# Patient Record
Sex: MALE | Race: WHITE | Employment: OTHER | URBAN - METROPOLITAN AREA
[De-identification: names, ages, dates, MRNs, and addresses within clinical notes are randomized per-mention and may not be internally consistent; named-entity substitution may affect disease eponyms.]

---

## 2017-01-09 ENCOUNTER — TELEPHONE (OUTPATIENT)
Dept: CARDIOLOGY CLINIC | Age: 67
End: 2017-01-09

## 2017-02-23 ENCOUNTER — TELEPHONE (OUTPATIENT)
Dept: CARDIOLOGY CLINIC | Age: 67
End: 2017-02-23

## 2017-03-14 ENCOUNTER — PROCEDURE VISIT (OUTPATIENT)
Dept: CARDIOLOGY CLINIC | Age: 67
End: 2017-03-14

## 2017-03-14 DIAGNOSIS — Z95.0 CARDIAC PACEMAKER IN SITU: Primary | ICD-10-CM

## 2017-03-14 PROCEDURE — 93294 REM INTERROG EVL PM/LDLS PM: CPT | Performed by: INTERNAL MEDICINE

## 2017-03-14 PROCEDURE — 93296 REM INTERROG EVL PM/IDS: CPT | Performed by: INTERNAL MEDICINE

## 2017-05-08 ENCOUNTER — OFFICE VISIT (OUTPATIENT)
Dept: CARDIOLOGY CLINIC | Age: 67
End: 2017-05-08

## 2017-05-08 VITALS
DIASTOLIC BLOOD PRESSURE: 80 MMHG | WEIGHT: 233 LBS | HEIGHT: 71 IN | SYSTOLIC BLOOD PRESSURE: 136 MMHG | BODY MASS INDEX: 32.62 KG/M2 | HEART RATE: 76 BPM

## 2017-05-08 DIAGNOSIS — I48.0 PAF (PAROXYSMAL ATRIAL FIBRILLATION) (HCC): ICD-10-CM

## 2017-05-08 DIAGNOSIS — I25.10 CORONARY ARTERY DISEASE INVOLVING NATIVE CORONARY ARTERY OF NATIVE HEART WITHOUT ANGINA PECTORIS: Primary | ICD-10-CM

## 2017-05-08 PROCEDURE — 99214 OFFICE O/P EST MOD 30 MIN: CPT | Performed by: INTERNAL MEDICINE

## 2017-06-19 ENCOUNTER — TELEPHONE (OUTPATIENT)
Dept: CARDIOLOGY CLINIC | Age: 67
End: 2017-06-19

## 2017-06-19 ENCOUNTER — PROCEDURE VISIT (OUTPATIENT)
Dept: CARDIOLOGY CLINIC | Age: 67
End: 2017-06-19

## 2017-06-19 DIAGNOSIS — Z95.0 CARDIAC PACEMAKER IN SITU: Primary | ICD-10-CM

## 2017-06-19 PROCEDURE — 93296 REM INTERROG EVL PM/IDS: CPT | Performed by: INTERNAL MEDICINE

## 2017-06-19 PROCEDURE — 93294 REM INTERROG EVL PM/LDLS PM: CPT | Performed by: INTERNAL MEDICINE

## 2017-08-15 RX ORDER — METOPROLOL SUCCINATE 25 MG/1
25 TABLET, EXTENDED RELEASE ORAL DAILY
Qty: 90 TABLET | Refills: 3 | Status: SHIPPED | OUTPATIENT
Start: 2017-08-15 | End: 2018-05-04 | Stop reason: SDUPTHER

## 2017-09-25 ENCOUNTER — PROCEDURE VISIT (OUTPATIENT)
Dept: CARDIOLOGY CLINIC | Age: 67
End: 2017-09-25

## 2017-09-25 DIAGNOSIS — Z95.0 CARDIAC PACEMAKER IN SITU: Primary | ICD-10-CM

## 2017-09-25 PROCEDURE — 93296 REM INTERROG EVL PM/IDS: CPT | Performed by: INTERNAL MEDICINE

## 2017-09-25 PROCEDURE — 93294 REM INTERROG EVL PM/LDLS PM: CPT | Performed by: INTERNAL MEDICINE

## 2017-12-01 PROBLEM — S70.01XA HEMATOMA OF HIP, RIGHT, INITIAL ENCOUNTER: Status: ACTIVE | Noted: 2017-12-01

## 2017-12-01 PROBLEM — T14.8XXA HEMATOMA: Status: ACTIVE | Noted: 2017-12-01

## 2018-01-02 ENCOUNTER — PROCEDURE VISIT (OUTPATIENT)
Dept: CARDIOLOGY CLINIC | Age: 68
End: 2018-01-02

## 2018-01-02 DIAGNOSIS — Z95.0 CARDIAC PACEMAKER IN SITU: Primary | ICD-10-CM

## 2018-01-02 PROCEDURE — 93294 REM INTERROG EVL PM/LDLS PM: CPT | Performed by: INTERNAL MEDICINE

## 2018-01-02 PROCEDURE — 93296 REM INTERROG EVL PM/IDS: CPT | Performed by: INTERNAL MEDICINE

## 2018-04-09 ENCOUNTER — PROCEDURE VISIT (OUTPATIENT)
Dept: CARDIOLOGY CLINIC | Age: 68
End: 2018-04-09

## 2018-04-09 DIAGNOSIS — Z95.0 CARDIAC PACEMAKER IN SITU: Primary | ICD-10-CM

## 2018-04-09 PROCEDURE — 93294 REM INTERROG EVL PM/LDLS PM: CPT | Performed by: INTERNAL MEDICINE

## 2018-04-09 PROCEDURE — 93296 REM INTERROG EVL PM/IDS: CPT | Performed by: INTERNAL MEDICINE

## 2018-05-04 ENCOUNTER — OFFICE VISIT (OUTPATIENT)
Dept: CARDIOLOGY CLINIC | Age: 68
End: 2018-05-04

## 2018-05-04 VITALS
SYSTOLIC BLOOD PRESSURE: 134 MMHG | DIASTOLIC BLOOD PRESSURE: 80 MMHG | WEIGHT: 242.8 LBS | HEIGHT: 71 IN | BODY MASS INDEX: 33.99 KG/M2 | HEART RATE: 72 BPM

## 2018-05-04 DIAGNOSIS — I48.0 PAF (PAROXYSMAL ATRIAL FIBRILLATION) (HCC): ICD-10-CM

## 2018-05-04 DIAGNOSIS — I25.10 CORONARY ARTERY DISEASE INVOLVING NATIVE HEART WITHOUT ANGINA PECTORIS, UNSPECIFIED VESSEL OR LESION TYPE: ICD-10-CM

## 2018-05-04 DIAGNOSIS — I10 ESSENTIAL HYPERTENSION: ICD-10-CM

## 2018-05-04 PROCEDURE — G8417 CALC BMI ABV UP PARAM F/U: HCPCS | Performed by: INTERNAL MEDICINE

## 2018-05-04 PROCEDURE — G8598 ASA/ANTIPLAT THER USED: HCPCS | Performed by: INTERNAL MEDICINE

## 2018-05-04 PROCEDURE — 1036F TOBACCO NON-USER: CPT | Performed by: INTERNAL MEDICINE

## 2018-05-04 PROCEDURE — 1123F ACP DISCUSS/DSCN MKR DOCD: CPT | Performed by: INTERNAL MEDICINE

## 2018-05-04 PROCEDURE — G8427 DOCREV CUR MEDS BY ELIG CLIN: HCPCS | Performed by: INTERNAL MEDICINE

## 2018-05-04 PROCEDURE — 99214 OFFICE O/P EST MOD 30 MIN: CPT | Performed by: INTERNAL MEDICINE

## 2018-05-04 PROCEDURE — 4040F PNEUMOC VAC/ADMIN/RCVD: CPT | Performed by: INTERNAL MEDICINE

## 2018-05-04 PROCEDURE — 3017F COLORECTAL CA SCREEN DOC REV: CPT | Performed by: INTERNAL MEDICINE

## 2018-05-04 RX ORDER — AMLODIPINE BESYLATE 5 MG/1
5 TABLET ORAL DAILY
Qty: 90 TABLET | Refills: 3 | Status: SHIPPED | OUTPATIENT
Start: 2018-05-04 | End: 2019-05-24 | Stop reason: SDUPTHER

## 2018-05-04 RX ORDER — METOPROLOL SUCCINATE 25 MG/1
25 TABLET, EXTENDED RELEASE ORAL DAILY
Qty: 90 TABLET | Refills: 3 | Status: SHIPPED | OUTPATIENT
Start: 2018-05-04 | End: 2019-05-24 | Stop reason: SDUPTHER

## 2018-05-04 RX ORDER — SIMVASTATIN 20 MG
10 TABLET ORAL NIGHTLY
Qty: 45 TABLET | Refills: 3 | Status: SHIPPED | OUTPATIENT
Start: 2018-05-04 | End: 2019-05-24 | Stop reason: SDUPTHER

## 2018-05-04 RX ORDER — VALSARTAN 80 MG/1
80 TABLET ORAL DAILY
Qty: 90 TABLET | Refills: 3 | Status: SHIPPED | OUTPATIENT
Start: 2018-05-04 | End: 2018-07-27 | Stop reason: SDUPTHER

## 2018-05-04 RX ORDER — FUROSEMIDE 20 MG/1
20 TABLET ORAL 2 TIMES DAILY
Qty: 180 TABLET | Refills: 3 | Status: SHIPPED | OUTPATIENT
Start: 2018-05-04 | End: 2019-05-21 | Stop reason: SDUPTHER

## 2018-05-21 ENCOUNTER — PROCEDURE VISIT (OUTPATIENT)
Dept: CARDIOLOGY CLINIC | Age: 68
End: 2018-05-21

## 2018-05-21 DIAGNOSIS — I38 VHD (VALVULAR HEART DISEASE): Primary | ICD-10-CM

## 2018-05-21 DIAGNOSIS — I10 ESSENTIAL HYPERTENSION: ICD-10-CM

## 2018-05-21 DIAGNOSIS — I25.10 CORONARY ARTERY DISEASE INVOLVING NATIVE HEART WITHOUT ANGINA PECTORIS, UNSPECIFIED VESSEL OR LESION TYPE: ICD-10-CM

## 2018-05-21 DIAGNOSIS — I48.0 PAF (PAROXYSMAL ATRIAL FIBRILLATION) (HCC): ICD-10-CM

## 2018-05-21 LAB
LV EF: 50 %
LVEF MODALITY: NORMAL

## 2018-05-21 PROCEDURE — 93306 TTE W/DOPPLER COMPLETE: CPT | Performed by: INTERNAL MEDICINE

## 2018-05-22 ENCOUNTER — TELEPHONE (OUTPATIENT)
Dept: CARDIOLOGY CLINIC | Age: 68
End: 2018-05-22

## 2018-07-16 ENCOUNTER — PROCEDURE VISIT (OUTPATIENT)
Dept: CARDIOLOGY CLINIC | Age: 68
End: 2018-07-16

## 2018-07-16 DIAGNOSIS — Z95.0 CARDIAC PACEMAKER IN SITU: Primary | ICD-10-CM

## 2018-07-16 PROCEDURE — 93296 REM INTERROG EVL PM/IDS: CPT | Performed by: INTERNAL MEDICINE

## 2018-07-16 PROCEDURE — 93294 REM INTERROG EVL PM/LDLS PM: CPT | Performed by: INTERNAL MEDICINE

## 2018-07-27 DIAGNOSIS — I10 ESSENTIAL HYPERTENSION: ICD-10-CM

## 2018-07-27 DIAGNOSIS — I48.0 PAF (PAROXYSMAL ATRIAL FIBRILLATION) (HCC): ICD-10-CM

## 2018-07-27 RX ORDER — VALSARTAN 80 MG/1
80 TABLET ORAL DAILY
Qty: 90 TABLET | Refills: 0 | Status: SHIPPED | OUTPATIENT
Start: 2018-07-27 | End: 2018-11-17 | Stop reason: SDUPTHER

## 2018-10-22 ENCOUNTER — PROCEDURE VISIT (OUTPATIENT)
Dept: CARDIOLOGY CLINIC | Age: 68
End: 2018-10-22
Payer: MEDICARE

## 2018-10-22 ENCOUNTER — TELEPHONE (OUTPATIENT)
Dept: CARDIOLOGY CLINIC | Age: 68
End: 2018-10-22

## 2018-10-22 DIAGNOSIS — Z95.0 CARDIAC PACEMAKER IN SITU: Primary | ICD-10-CM

## 2018-10-22 PROCEDURE — 93296 REM INTERROG EVL PM/IDS: CPT | Performed by: INTERNAL MEDICINE

## 2018-10-22 PROCEDURE — 93294 REM INTERROG EVL PM/LDLS PM: CPT | Performed by: INTERNAL MEDICINE

## 2018-11-06 LAB
AVERAGE GLUCOSE: NORMAL
CHOLESTEROL, TOTAL: 118 MG/DL
CHOLESTEROL/HDL RATIO: NORMAL
HBA1C MFR BLD: 7.6 %
HDLC SERPL-MCNC: 41 MG/DL (ref 35–70)
LDL CHOLESTEROL CALCULATED: 44 MG/DL (ref 0–160)
LDL CHOLESTEROL DIRECT: NORMAL MG/DL
TRIGL SERPL-MCNC: 165 MG/DL
VLDLC SERPL CALC-MCNC: 33 MG/DL

## 2018-11-09 ENCOUNTER — TELEPHONE (OUTPATIENT)
Dept: CARDIOLOGY CLINIC | Age: 68
End: 2018-11-09

## 2018-11-17 DIAGNOSIS — I10 ESSENTIAL HYPERTENSION: ICD-10-CM

## 2018-11-17 DIAGNOSIS — I48.0 PAF (PAROXYSMAL ATRIAL FIBRILLATION) (HCC): ICD-10-CM

## 2018-11-19 RX ORDER — VALSARTAN 80 MG/1
80 TABLET ORAL DAILY
Qty: 90 TABLET | Refills: 3 | Status: SHIPPED | OUTPATIENT
Start: 2018-11-19 | End: 2019-05-21 | Stop reason: SDUPTHER

## 2018-12-06 LAB — ANTIBODY: NEGATIVE

## 2019-01-28 ENCOUNTER — TELEPHONE (OUTPATIENT)
Dept: CARDIOLOGY CLINIC | Age: 69
End: 2019-01-28

## 2019-01-29 ENCOUNTER — PROCEDURE VISIT (OUTPATIENT)
Dept: CARDIOLOGY CLINIC | Age: 69
End: 2019-01-29
Payer: MEDICARE

## 2019-01-29 DIAGNOSIS — Z95.0 CARDIAC PACEMAKER IN SITU: Primary | ICD-10-CM

## 2019-01-29 PROCEDURE — 93294 REM INTERROG EVL PM/LDLS PM: CPT | Performed by: INTERNAL MEDICINE

## 2019-01-29 PROCEDURE — 93296 REM INTERROG EVL PM/IDS: CPT | Performed by: INTERNAL MEDICINE

## 2019-04-23 ENCOUNTER — CLINICAL DOCUMENTATION (OUTPATIENT)
Dept: FAMILY MEDICINE CLINIC | Age: 69
End: 2019-04-23

## 2019-04-23 DIAGNOSIS — M10.9 GOUT, UNSPECIFIED CAUSE, UNSPECIFIED CHRONICITY, UNSPECIFIED SITE: ICD-10-CM

## 2019-04-23 DIAGNOSIS — E11.49 OTHER DIABETIC NEUROLOGICAL COMPLICATION ASSOCIATED WITH TYPE 2 DIABETES MELLITUS (HCC): ICD-10-CM

## 2019-04-23 PROBLEM — E11.40 DIABETIC NEUROPATHY ASSOCIATED WITH TYPE 2 DIABETES MELLITUS (HCC): Status: ACTIVE | Noted: 2019-04-23

## 2019-04-23 RX ORDER — TADALAFIL 20 MG/1
20 TABLET ORAL PRN
COMMUNITY
End: 2019-05-14 | Stop reason: SDUPTHER

## 2019-04-23 RX ORDER — CLOTRIMAZOLE AND BETAMETHASONE DIPROPIONATE 10; .64 MG/G; MG/G
CREAM TOPICAL 2 TIMES DAILY
COMMUNITY
End: 2019-05-14 | Stop reason: ALTCHOICE

## 2019-05-06 ENCOUNTER — PROCEDURE VISIT (OUTPATIENT)
Dept: CARDIOLOGY CLINIC | Age: 69
End: 2019-05-06
Payer: MEDICARE

## 2019-05-06 DIAGNOSIS — Z95.0 CARDIAC PACEMAKER IN SITU: Primary | ICD-10-CM

## 2019-05-06 PROCEDURE — 93296 REM INTERROG EVL PM/IDS: CPT | Performed by: INTERNAL MEDICINE

## 2019-05-06 PROCEDURE — 93294 REM INTERROG EVL PM/LDLS PM: CPT | Performed by: INTERNAL MEDICINE

## 2019-05-08 LAB
A/G RATIO: 1.5 (ref 1.1–2.2)
ALBUMIN SERPL-MCNC: 4.4 G/DL (ref 3.4–5)
ALP BLD-CCNC: 85 U/L (ref 40–129)
ALT SERPL-CCNC: 20 U/L (ref 10–40)
ANION GAP SERPL CALCULATED.3IONS-SCNC: 14 MMOL/L (ref 3–16)
AST SERPL-CCNC: 18 U/L (ref 15–37)
BASOPHILS ABSOLUTE: 0 K/UL (ref 0–0.2)
BASOPHILS RELATIVE PERCENT: 0.7 %
BILIRUB SERPL-MCNC: 0.4 MG/DL (ref 0–1)
BUN BLDV-MCNC: 30 MG/DL (ref 7–20)
CALCIUM SERPL-MCNC: 9.3 MG/DL (ref 8.3–10.6)
CHLORIDE BLD-SCNC: 99 MMOL/L (ref 99–110)
CHOLESTEROL, FASTING: 110 MG/DL (ref 0–199)
CO2: 26 MMOL/L (ref 21–32)
CREAT SERPL-MCNC: 1.3 MG/DL (ref 0.8–1.3)
CREATININE URINE: 48.3 MG/DL (ref 39–259)
EOSINOPHILS ABSOLUTE: 0.3 K/UL (ref 0–0.6)
EOSINOPHILS RELATIVE PERCENT: 4.9 %
GFR AFRICAN AMERICAN: >60
GFR NON-AFRICAN AMERICAN: 54
GLOBULIN: 2.9 G/DL
GLUCOSE FASTING: 173 MG/DL (ref 70–99)
HCT VFR BLD CALC: 51.5 % (ref 40.5–52.5)
HDLC SERPL-MCNC: 39 MG/DL (ref 40–60)
HEMOGLOBIN: 16.9 G/DL (ref 13.5–17.5)
LDL CHOLESTEROL CALCULATED: 44 MG/DL
LYMPHOCYTES ABSOLUTE: 1.4 K/UL (ref 1–5.1)
LYMPHOCYTES RELATIVE PERCENT: 25.8 %
MCH RBC QN AUTO: 28 PG (ref 26–34)
MCHC RBC AUTO-ENTMCNC: 32.7 G/DL (ref 31–36)
MCV RBC AUTO: 85.5 FL (ref 80–100)
MICROALBUMIN UR-MCNC: 49.1 MG/DL
MICROALBUMIN/CREAT UR-RTO: 1016.6 MG/G (ref 0–30)
MONOCYTES ABSOLUTE: 0.6 K/UL (ref 0–1.3)
MONOCYTES RELATIVE PERCENT: 10.9 %
NEUTROPHILS ABSOLUTE: 3.1 K/UL (ref 1.7–7.7)
NEUTROPHILS RELATIVE PERCENT: 57.7 %
PDW BLD-RTO: 15.4 % (ref 12.4–15.4)
PLATELET # BLD: 174 K/UL (ref 135–450)
PMV BLD AUTO: 9.6 FL (ref 5–10.5)
POTASSIUM SERPL-SCNC: 4 MMOL/L (ref 3.5–5.1)
RBC # BLD: 6.03 M/UL (ref 4.2–5.9)
SODIUM BLD-SCNC: 139 MMOL/L (ref 136–145)
TOTAL PROTEIN: 7.3 G/DL (ref 6.4–8.2)
TRIGLYCERIDE, FASTING: 137 MG/DL (ref 0–150)
TSH SERPL DL<=0.05 MIU/L-ACNC: 1.26 UIU/ML (ref 0.27–4.2)
URIC ACID, SERUM: 3.7 MG/DL (ref 3.5–7.2)
VLDLC SERPL CALC-MCNC: 27 MG/DL
WBC # BLD: 5.3 K/UL (ref 4–11)

## 2019-05-09 LAB
ESTIMATED AVERAGE GLUCOSE: 188.6 MG/DL
HBA1C MFR BLD: 8.2 %

## 2019-05-14 ENCOUNTER — OFFICE VISIT (OUTPATIENT)
Dept: FAMILY MEDICINE CLINIC | Age: 69
End: 2019-05-14
Payer: MEDICARE

## 2019-05-14 VITALS
SYSTOLIC BLOOD PRESSURE: 134 MMHG | BODY MASS INDEX: 32.23 KG/M2 | HEIGHT: 71 IN | HEART RATE: 68 BPM | WEIGHT: 230.2 LBS | DIASTOLIC BLOOD PRESSURE: 88 MMHG

## 2019-05-14 DIAGNOSIS — E78.2 MIXED HYPERLIPIDEMIA: ICD-10-CM

## 2019-05-14 DIAGNOSIS — Z23 NEED FOR PROPHYLACTIC VACCINATION AGAINST STREPTOCOCCUS PNEUMONIAE (PNEUMOCOCCUS): ICD-10-CM

## 2019-05-14 DIAGNOSIS — I73.9 PVD (PERIPHERAL VASCULAR DISEASE) (HCC): ICD-10-CM

## 2019-05-14 DIAGNOSIS — E11.9 TYPE 2 DIABETES MELLITUS WITHOUT COMPLICATION, WITHOUT LONG-TERM CURRENT USE OF INSULIN (HCC): ICD-10-CM

## 2019-05-14 DIAGNOSIS — G47.30 SLEEP APNEA, UNSPECIFIED TYPE: ICD-10-CM

## 2019-05-14 DIAGNOSIS — N18.30 CHRONIC KIDNEY DISEASE, STAGE III (MODERATE) (HCC): ICD-10-CM

## 2019-05-14 DIAGNOSIS — I10 ESSENTIAL HYPERTENSION: ICD-10-CM

## 2019-05-14 DIAGNOSIS — M10.9 GOUT, UNSPECIFIED CAUSE, UNSPECIFIED CHRONICITY, UNSPECIFIED SITE: Primary | ICD-10-CM

## 2019-05-14 DIAGNOSIS — Z23 NEED FOR PROPHYLACTIC VACCINATION AND INOCULATION AGAINST VARICELLA: ICD-10-CM

## 2019-05-14 DIAGNOSIS — I25.10 CORONARY ARTERY DISEASE INVOLVING NATIVE CORONARY ARTERY OF NATIVE HEART WITHOUT ANGINA PECTORIS: ICD-10-CM

## 2019-05-14 DIAGNOSIS — Z12.11 SCREENING FOR COLON CANCER: ICD-10-CM

## 2019-05-14 DIAGNOSIS — I48.0 PAF (PAROXYSMAL ATRIAL FIBRILLATION) (HCC): ICD-10-CM

## 2019-05-14 PROCEDURE — G8598 ASA/ANTIPLAT THER USED: HCPCS | Performed by: FAMILY MEDICINE

## 2019-05-14 PROCEDURE — 3017F COLORECTAL CA SCREEN DOC REV: CPT | Performed by: FAMILY MEDICINE

## 2019-05-14 PROCEDURE — 3045F PR MOST RECENT HEMOGLOBIN A1C LEVEL 7.0-9.0%: CPT | Performed by: FAMILY MEDICINE

## 2019-05-14 PROCEDURE — G8427 DOCREV CUR MEDS BY ELIG CLIN: HCPCS | Performed by: FAMILY MEDICINE

## 2019-05-14 PROCEDURE — 4040F PNEUMOC VAC/ADMIN/RCVD: CPT | Performed by: FAMILY MEDICINE

## 2019-05-14 PROCEDURE — G8417 CALC BMI ABV UP PARAM F/U: HCPCS | Performed by: FAMILY MEDICINE

## 2019-05-14 PROCEDURE — 1036F TOBACCO NON-USER: CPT | Performed by: FAMILY MEDICINE

## 2019-05-14 PROCEDURE — 1123F ACP DISCUSS/DSCN MKR DOCD: CPT | Performed by: FAMILY MEDICINE

## 2019-05-14 PROCEDURE — 99214 OFFICE O/P EST MOD 30 MIN: CPT | Performed by: FAMILY MEDICINE

## 2019-05-14 PROCEDURE — 2022F DILAT RTA XM EVC RTNOPTHY: CPT | Performed by: FAMILY MEDICINE

## 2019-05-14 RX ORDER — ALLOPURINOL 300 MG/1
300 TABLET ORAL DAILY
Qty: 90 TABLET | Refills: 1 | Status: SHIPPED | OUTPATIENT
Start: 2019-05-14 | End: 2019-09-20 | Stop reason: SDUPTHER

## 2019-05-14 RX ORDER — GLIMEPIRIDE 4 MG/1
4 TABLET ORAL DAILY
Qty: 90 TABLET | Refills: 1 | Status: SHIPPED | OUTPATIENT
Start: 2019-05-14 | End: 2019-08-11 | Stop reason: SDUPTHER

## 2019-05-14 RX ORDER — TADALAFIL 20 MG/1
20 TABLET ORAL PRN
Qty: 18 TABLET | Refills: 1 | Status: SHIPPED | OUTPATIENT
Start: 2019-05-14 | End: 2019-05-24 | Stop reason: SDUPTHER

## 2019-05-14 RX ORDER — ALLOPURINOL 100 MG/1
100 TABLET ORAL DAILY
Qty: 90 TABLET | Refills: 1 | Status: SHIPPED | OUTPATIENT
Start: 2019-05-14 | End: 2019-09-20 | Stop reason: SDUPTHER

## 2019-05-14 RX ORDER — GABAPENTIN 300 MG/1
300 CAPSULE ORAL 3 TIMES DAILY
Qty: 270 CAPSULE | Refills: 0 | Status: SHIPPED | OUTPATIENT
Start: 2019-05-14 | End: 2019-09-11 | Stop reason: SDUPTHER

## 2019-05-14 RX ORDER — ALLOPURINOL 100 MG/1
100 TABLET ORAL DAILY
COMMUNITY
End: 2019-05-14 | Stop reason: SDUPTHER

## 2019-05-14 ASSESSMENT — ENCOUNTER SYMPTOMS
SHORTNESS OF BREATH: 0
SINUS PRESSURE: 0
WHEEZING: 0
SORE THROAT: 0
CHEST TIGHTNESS: 0
VOMITING: 0
NAUSEA: 0
ABDOMINAL PAIN: 0
BACK PAIN: 0
COUGH: 0
SINUS PAIN: 0
DIARRHEA: 0
RHINORRHEA: 0
CONSTIPATION: 0
BLOOD IN STOOL: 0

## 2019-05-14 ASSESSMENT — PATIENT HEALTH QUESTIONNAIRE - PHQ9
SUM OF ALL RESPONSES TO PHQ QUESTIONS 1-9: 0
2. FEELING DOWN, DEPRESSED OR HOPELESS: 0
SUM OF ALL RESPONSES TO PHQ9 QUESTIONS 1 & 2: 0
SUM OF ALL RESPONSES TO PHQ QUESTIONS 1-9: 0
1. LITTLE INTEREST OR PLEASURE IN DOING THINGS: 0

## 2019-05-14 NOTE — PROGRESS NOTES
5/14/2019     Nettie Lyn is a 68 y.o. male who presents today with   Chief Complaint   Patient presents with    Medication Refill     6 MTH CHECK    6 Month Follow-Up     MED REVIEW, REVIEW BW-DISCUSS DECREASE IN ALLOPURINOL    Other     PT HAS HAD CPAP SINCE 2013, NEEDS NEW PAPERWORK FOR THIS BUT DOES NOT HAVE ANY FORMS WITH HIM. NEED OF NECESSITY NOTED IN DOCUMENATION. WILL ALSO HAVE TO HAVE SLEEP EVAL AGAIN PER [facility] HPI    Patient is here for follow-up. He had questions about his allopurinol dose, which was prescribed by rheumatologist at a total of 400 mg daily. His last uric acid was 3.7. Lab profile done this weeks. He has been trying to watch his diet and moderate his alcohol. He needs its face-to-face documentation for re-Donohue of his CPAP supplies and may need a CPAP study ordered. He tolerates his CPAP well. Uses it religiously. He feels much better when he is on it with more restful sleep and improved energy. He's completely compliant with this  Is an appointment this week with his vascular surgeon. Sees podiatry to evaluate his feet through the [place]  He sees cardiology regularly for his coronary artery disease and paroxysmal atrial fibrillation, which is stable. He is on long-term anticoagulation was a relative. He had an episode of cellulitis this winter when he was in [place], which is treated with long-term prescription of antibiotics but is now resolved      REVIEW OF SYMPTOMS    Review of Systems   Constitutional: Negative for appetite change, fatigue and unexpected weight change. HENT: Negative for congestion, hearing loss, postnasal drip, rhinorrhea, sinus pressure, sinus pain and sore throat. Eyes: Negative for visual disturbance. Respiratory: Negative for cough, chest tightness, shortness of breath and wheezing. Cardiovascular: Positive for leg swelling. Negative for chest pain and palpitations.    Gastrointestinal: Negative for abdominal pain, blood in stool, constipation, diarrhea, nausea and vomiting. Endocrine: Negative for polydipsia and polyuria. Genitourinary: Negative for dysuria, flank pain, frequency, hematuria and urgency. Musculoskeletal: Negative for arthralgias, back pain, joint swelling and myalgias. Skin: Negative for rash and wound. Neurological: Negative for dizziness, seizures, speech difficulty, weakness, numbness and headaches. Hematological: Negative for adenopathy. Does not bruise/bleed easily. Psychiatric/Behavioral: Negative for agitation, confusion, decreased concentration, dysphoric mood, sleep disturbance and suicidal ideas. The patient is not nervous/anxious. PAST MEDICAL HISTORY  Past Medical History:   Diagnosis Date    Arthritis 12/2013    rt wrist    Atrial fibrillation ([address]     CAD (coronary artery disease) 6/18/2014    see dr Naima Maharaj Chronic kidney disease, stage III (moderate) ([address] 7/7/2016    Diabetes mellitus ([address]     dx 2004    Diabetic neuropathy associated with type 2 diabetes mellitus ([address] 4/23/2019    Gout     \"got gout when had pacer put in because they did not give me my medication for gout \"    Gout 4/23/2019    H/O 24 hour EKG monitoring 10/3/2013    no afib noted, sinsus rhythm    H/O cardiovascular stress test 5/12/2014    cardiolite- mild ischemia RCA EF50%    H/O transesophageal echocardiography (MABLE) for monitoring 08/05/2013    normal LV function and normal LA appendage without any clot    [place] (hard of hearing)     hearing tonya aides    Hx of Doppler echocardiogram 05/21/2018    EF 50%  Mild LV hypertrophy. Mildly enlarged RA. Mod aortic valve calcification with mod AS. Mitral annular calcification is present. Mild AR, MR and TR. Mild pulmonary htn.     Hyperlipidemia     Hypertension     Other disorders of kidney and ureter     Pneumonia 12/29/2012    Sleep apnea     dx 2013- has c-pap    Type II or unspecified type diabetes mellitus with other specified ALLERGIES  Allergies   Allergen Reactions    Spironolactone        PHYSICAL EXAM    /88 (Site: Left Upper Arm, Position: Sitting, Cuff Size: Medium Adult)   Pulse 68   Ht 5' 11\" (1.803 m)   Wt 230 lb 3.2 oz (104.4 kg)   BMI 32.11 kg/m²     Physical Exam   Constitutional: He is oriented to person, place, and time. He appears well-developed and well-nourished. HENT:   Nose: Nose normal.   Mouth/Throat: Oropharynx is clear and moist.   Eyes: Conjunctivae are normal.   Neck: No thyromegaly present. Cardiovascular: Normal rate, regular rhythm, normal heart sounds and intact distal pulses. No murmur heard. Pulmonary/Chest: Effort normal and breath sounds normal. No respiratory distress. He has no wheezes. He has no rales. Abdominal: Soft. There is no guarding. Musculoskeletal: He exhibits edema. 2+ edema bilaterally   Lymphadenopathy:     He has no cervical adenopathy. Neurological: He is alert and oriented to person, place, and time. Skin: Skin is warm and dry. Psychiatric: He has a normal mood and affect. Nursing note and vitals reviewed. labs were reviewed which included A1c was gone up to 8.4 from a previous value of 7.4. His lipids are in a satisfactory range. His GFR is now above 60. He has a positive microalbumin, but is already on an ARB medication    ASSESSMENT:    Enma Zamorano was seen today for medication refill, 6 month follow-up and other.     Diagnoses and all orders for this visit:    Gout, unspecified cause, unspecified chronicity, unspecified site    Screening for colon cancer    Need for prophylactic vaccination against Streptococcus pneumoniae (pneumococcus)    Need for prophylactic vaccination and inoculation against varicella    Mixed hyperlipidemia    Sleep apnea, unspecified type    Chronic kidney disease, stage III (moderate) (HCC)    PVD (peripheral vascular disease) ([facility]    Coronary artery disease involving native coronary artery of native heart without angina

## 2019-05-16 DIAGNOSIS — G47.30 SLEEP APNEA, UNSPECIFIED TYPE: ICD-10-CM

## 2019-05-16 DIAGNOSIS — G47.33 OBSTRUCTIVE SLEEP APNEA SYNDROME: Primary | ICD-10-CM

## 2019-05-24 ENCOUNTER — OFFICE VISIT (OUTPATIENT)
Dept: CARDIOLOGY CLINIC | Age: 69
End: 2019-05-24
Payer: MEDICARE

## 2019-05-24 ENCOUNTER — TELEPHONE (OUTPATIENT)
Dept: FAMILY MEDICINE CLINIC | Age: 69
End: 2019-05-24

## 2019-05-24 VITALS
HEIGHT: 71 IN | BODY MASS INDEX: 33.07 KG/M2 | SYSTOLIC BLOOD PRESSURE: 120 MMHG | HEART RATE: 72 BPM | DIASTOLIC BLOOD PRESSURE: 80 MMHG | WEIGHT: 236.2 LBS

## 2019-05-24 DIAGNOSIS — I73.9 PVD (PERIPHERAL VASCULAR DISEASE) (HCC): ICD-10-CM

## 2019-05-24 DIAGNOSIS — N18.30 CHRONIC KIDNEY DISEASE, STAGE III (MODERATE) (HCC): ICD-10-CM

## 2019-05-24 DIAGNOSIS — I25.10 CORONARY ARTERY DISEASE INVOLVING NATIVE HEART WITHOUT ANGINA PECTORIS, UNSPECIFIED VESSEL OR LESION TYPE: Primary | ICD-10-CM

## 2019-05-24 DIAGNOSIS — E78.2 MIXED HYPERLIPIDEMIA: ICD-10-CM

## 2019-05-24 DIAGNOSIS — I10 ESSENTIAL HYPERTENSION: ICD-10-CM

## 2019-05-24 DIAGNOSIS — I48.0 PAF (PAROXYSMAL ATRIAL FIBRILLATION) (HCC): ICD-10-CM

## 2019-05-24 DIAGNOSIS — G47.30 SLEEP APNEA, UNSPECIFIED TYPE: ICD-10-CM

## 2019-05-24 PROCEDURE — G8598 ASA/ANTIPLAT THER USED: HCPCS | Performed by: NURSE PRACTITIONER

## 2019-05-24 PROCEDURE — 3017F COLORECTAL CA SCREEN DOC REV: CPT | Performed by: NURSE PRACTITIONER

## 2019-05-24 PROCEDURE — G8427 DOCREV CUR MEDS BY ELIG CLIN: HCPCS | Performed by: NURSE PRACTITIONER

## 2019-05-24 PROCEDURE — 99214 OFFICE O/P EST MOD 30 MIN: CPT | Performed by: NURSE PRACTITIONER

## 2019-05-24 PROCEDURE — 4040F PNEUMOC VAC/ADMIN/RCVD: CPT | Performed by: NURSE PRACTITIONER

## 2019-05-24 PROCEDURE — 1123F ACP DISCUSS/DSCN MKR DOCD: CPT | Performed by: NURSE PRACTITIONER

## 2019-05-24 PROCEDURE — 1036F TOBACCO NON-USER: CPT | Performed by: NURSE PRACTITIONER

## 2019-05-24 PROCEDURE — G8417 CALC BMI ABV UP PARAM F/U: HCPCS | Performed by: NURSE PRACTITIONER

## 2019-05-24 RX ORDER — FUROSEMIDE 20 MG/1
20 TABLET ORAL 2 TIMES DAILY
Qty: 210 TABLET | Refills: 3 | Status: SHIPPED | OUTPATIENT
Start: 2019-05-24 | End: 2020-03-20 | Stop reason: SDUPTHER

## 2019-05-24 RX ORDER — AMLODIPINE BESYLATE 5 MG/1
5 TABLET ORAL DAILY
Qty: 90 TABLET | Refills: 3 | Status: SHIPPED | OUTPATIENT
Start: 2019-05-24 | End: 2020-03-20

## 2019-05-24 RX ORDER — VALSARTAN 80 MG/1
80 TABLET ORAL DAILY
Qty: 90 TABLET | Refills: 3 | Status: SHIPPED | OUTPATIENT
Start: 2019-05-24 | End: 2020-03-20 | Stop reason: SDUPTHER

## 2019-05-24 RX ORDER — SIMVASTATIN 20 MG
10 TABLET ORAL NIGHTLY
Qty: 45 TABLET | Refills: 3 | Status: SHIPPED | OUTPATIENT
Start: 2019-05-24 | End: 2020-03-20 | Stop reason: SDUPTHER

## 2019-05-24 RX ORDER — METOPROLOL SUCCINATE 25 MG/1
25 TABLET, EXTENDED RELEASE ORAL DAILY
Qty: 90 TABLET | Refills: 3 | Status: SHIPPED | OUTPATIENT
Start: 2019-05-24 | End: 2020-03-20

## 2019-05-24 RX ORDER — PRASUGREL 10 MG/1
10 TABLET, FILM COATED ORAL DAILY
Qty: 90 TABLET | Refills: 3 | Status: SHIPPED | OUTPATIENT
Start: 2019-05-24 | End: 2020-06-22

## 2019-05-24 RX ORDER — TADALAFIL 20 MG/1
20 TABLET ORAL PRN
Qty: 18 TABLET | Refills: 1 | Status: SHIPPED | OUTPATIENT
Start: 2019-05-24 | End: 2019-09-09

## 2019-05-24 NOTE — PROGRESS NOTES
[id number] Score for Atrial Fibrillation Stroke Risk   Risk   Factors  Component Value   C CHF No 0   H HTN Yes 1   A2 Age >= 76 No,  (73 y.o.) 0   D DM Yes 1   S2 Prior Stroke/TIA No 0   V Vascular Disease No 0   A Age 74-69 Yes,  (73 y.o.) 1   Sc Sex male 0    [id number]  Score  3   Score last updated 4/83/27 9:62 PM    Click here for a link to the UpToDate guideline \"Atrial Fibrillation: Anticoagulation therapy to prevent embolization    Disclaimer: Risk Score calculation is dependent on accuracy of patient problem list and past encounter diagnosis.

## 2019-05-24 NOTE — TELEPHONE ENCOUNTER
MESSAGE FWD TO DR Mallika Mcdonald TO ADDRESS. PER PT [facility] HE SAID [facility] MEDICAL EQUIPMENT NEEDS DR Mallika Mcdonald TO ORDER SLEEP STUDY.     Electronically signed by Tripp Vaughan MA on 5/24/2019 at 3:00 PM

## 2019-05-24 NOTE — TELEPHONE ENCOUNTER
----- Message from Deborah Lynn sent at 5/24/2019  2:36 PM EDT -----  Contact: Geoffry Shone- [facility] medical equip.   Need  Sleep study ordered to go along with 5/14/19 visit

## 2019-05-24 NOTE — TELEPHONE ENCOUNTER
----- Message from Angelina Bar sent at 5/24/2019  2:36 PM EDT -----  Contact: Bakari Bojorquez- [facility] medical equip.   Need  Sleep study ordered to go along with 5/14/19 visit

## 2019-05-24 NOTE — PROGRESS NOTES
[facility] ([facility] [place] PHYSICAL REHABILITATION [place]  [address], [address]  Phone: [phone]    Fax [phone]                  Virgil Renae MD, Luis Rosas MD, [address], MD, MD Davie Pelayo MD Mela Fairy, MD Julianne Orn, JUSTIN Darby, JUSTIN    CARDIOLOGY  NOTE      6/14/2019    RE: Steph Siddiqui  (1950)                               TO:  Dr. Jeffy Juarez MD  The primary cardiologist is Dr. Jimmie De Luna    CC:  1. Coronary artery disease involving native heart without angina pectoris, unspecified vessel or lesion type    2. PAF (paroxysmal atrial fibrillation) ([facility]    3. Essential hypertension    4. PVD (peripheral vascular disease) ([facility]    5. Sleep apnea, unspecified type    6. Mixed hyperlipidemia    7. Chronic kidney disease, stage III (moderate) (HCC)        Patient denies all of the following:  Chest Pain  Palpitations  Shortness of Breath  Edema  Dizziness  Syncope      HPI: Thank you for involving me in taking care of your patient Steph Siddiqui, who is a  68y.o. year old male with a history as listed above. Denies any cardiac symptoms today. He is complaint with taking his medications. He is active at home.      Vitals:    05/24/19 1337   BP: 120/80   Pulse: 72       Current Outpatient Medications   Medication Sig Dispense Refill    valsartan (DIOVAN) 80 MG tablet Take 1 tablet by mouth daily 90 tablet 3    tadalafil (CIALIS) 20 MG tablet Take 1 tablet by mouth as needed for Erectile Dysfunction 18 tablet 1    simvastatin (ZOCOR) 20 MG tablet Take 0.5 tablets by mouth nightly Patient is taking 1/2 tab daily 45 tablet 3    rivaroxaban (XARELTO) 20 MG TABS tablet Take 1 tablet by mouth daily (with breakfast) 90 tablet 3    prasugrel (EFFIENT) 10 MG TABS Take 1 tablet by mouth daily 90 tablet 3    metoprolol succinate (TOPROL XL) 25 MG extended release tablet Take 1 tablet by mouth daily 90 tablet 3    furosemide (LASIX) 20 MG tablet Take 1 tablet by mouth 2 times daily May take an extra Lasix if has increased swelling 210 tablet 3    amLODIPine (NORVASC) 5 MG tablet Take 1 tablet by mouth daily Patient is taking 1 tab daily 90 tablet 3    allopurinol (ZYLOPRIM) 300 MG tablet Take 1 tablet by mouth daily 90 tablet 1    allopurinol (ZYLOPRIM) 100 MG tablet Take 1 tablet by mouth daily 90 tablet 1    Dulaglutide (TRULICITY) 1.5 KB/6.3HO SOPN Inject 1 pen into the skin once a week 12 pen 1    gabapentin (NEURONTIN) 300 MG capsule Take 1 capsule by mouth 3 times daily for 90 days. 270 capsule 0    glimepiride (AMARYL) 4 MG tablet Take 1 tablet by mouth daily 90 tablet 1    metFORMIN (GLUCOPHAGE) 1000 MG tablet Take 1 tablet by mouth 2 times daily (with meals) 180 tablet 1    canagliflozin (INVOKANA) 300 MG TABS tablet Take 1 tablet by mouth every morning (before breakfast) 90 tablet 1    aspirin 81 MG tablet Take 81 mg by mouth daily       No current facility-administered medications for this visit.       Allergies: Spironolactone  Past Medical History:   Diagnosis Date    Arthritis 12/2013    rt wrist    Atrial fibrillation (HCC)     CAD (coronary artery disease) 6/18/2014    see dr Timbo Rodriguez Chronic kidney disease, stage III (moderate) ([facility] 7/7/2016    Diabetes mellitus ([facility]     dx 2004    Diabetic neuropathy associated with type 2 diabetes mellitus ([facility] 4/23/2019    Gout     \"got gout when had pacer put in because they did not give me my medication for gout \"    Gout 4/23/2019    H/O 24 hour EKG monitoring 10/3/2013    no afib noted, sinsus rhythm    H/O cardiovascular stress test 5/12/2014    cardiolite- mild ischemia RCA EF50%    H/O transesophageal echocardiography (MABLE) for monitoring 08/05/2013    normal LV function and normal LA appendage without any clot    [place] (hard of hearing)     hearing tonya aides    Hx of Doppler echocardiogram 05/21/2018    EF 50%  Mild LV hypertrophy. Mildly enlarged RA. Mod aortic valve calcification with mod AS. Mitral annular calcification is present. Mild AR, MR and TR. Mild pulmonary htn.  Hyperlipidemia     Hypertension     Other disorders of kidney and ureter     Pneumonia 12/29/2012    Sleep apnea     dx 2013- has c-pap    Type II or unspecified type diabetes mellitus with other specified manifestations, uncontrolled 12/12/2012    Venous hypertension, chronic, with ulcer ([address] 12/12/2012    resolved     Past Surgical History:   Procedure Laterality Date    CARDIOVERSION  12/14    at [address]  2014    \"2 stents put in \"   C/ [address]  2004    total left hip    OTHER SURGICAL HISTORY Right 12/02/2017    I&D; evacuation of hematoma right hip    PACEMAKER PLACEMENT      9/18/14 Status post remote permanent pacemaker with atrial lead dislodgement. 7/24/14 PPM Implant    VASCULAR SURGERY  2012    \"have stents in both legs- done in [place]     Family History   Problem Relation Age of Onset    High Blood Pressure Mother     Arthritis Mother     Diabetes Mother     Heart Disease Mother     High Blood Pressure Father     Heart Disease Father     Kidney Disease Father      Social History     Tobacco Use    Smoking status: Never Smoker    Smokeless tobacco: Never Used   Substance Use Topics    Alcohol use:  Yes     Alcohol/week: 1.2 oz     Types: 2 Cans of beer per week     Comment: average \"one time per week\"        Review of Systems - History obtained from the patient  General: negative for - fatigue, malaise, night sweats, weight gain  Psychological: negative for - anxiety, depression, sleep disturbances  Ophthalmic: negative for - blurry vision, loss of vision  ENT: negative for - headaches, vertigo, visual changes  Hematological and Lymphatic: negative for - bleeding problems, blood clots, bruising, fatigue or pallor  Endocrine: negative for - malaise/lethargy, palpitations, unexpected weight changes  Respiratory: negative for - cough, orthopnea, shortness of breath or tachypnea  Cardiovascular: negative for - chest pain, dyspnea on exertion, edema or palpitations  Gastrointestinal: no abdominal pain, change in bowel habits, or black or bloody stools  Genito-Urinary: no dysuria, trouble voiding, or hematuria  Musculoskeletal: negative for - gait disturbance, pain, swelling   Neurological: negative for - confusion, dizziness, impaired coordination/balance or numbness/tingling    Objective:      Physical Exam:  /80   Pulse 72   Ht 5' 11\" (1.803 m)   Wt 236 lb 3.2 oz (107.1 kg)   BMI 32.94 kg/m²   Wt Readings from Last 3 Encounters:   05/24/19 236 lb 3.2 oz (107.1 kg)   05/15/19 237 lb (107.5 kg)   05/14/19 230 lb 3.2 oz (104.4 kg)     Body mass index is 32.94 kg/m². GENERAL - Alert, oriented, pleasant, in no apparent distress. Head unremarkable  Eyes - pupils equal and reactive to light - bilateral conjunctiva are pink: sclera are white   ENT - external ears intact, nose is intact:  tongue is midline pink and moist  Neck - Supple. No jugular venous distention noted. No carotid bruits appreciated. Cardiovascular - Normal S1 and S2:  murmur appreciated No, No gallop. Regular rate- Yes,  rhythm regular-Yes. Extremities - No cyanosis, clubbing, no edema to lower legs. Pulmonary - No respiratory distress. No wheezes or rales. Chest is clear  Pulses: Bilateral radial and pedal pulses normal  Abdomen -  Soft no tenderness, non distended   Musculoskeletal - Normal movement of all extremities   Neurologic - alert and oriented: There are no gross focal neurologic abnormalities.    Skin-  No rash: No echymosis   Affect- normal mood and pleasant       DATA:  Lab Results   Component Value Date    CKTOTAL 116 12/01/2017     BNP:  No results found for: BNP  PT/INR:  No results found for: PTINR  Lab Results   Component Value Date    LABA1C 8.2 Appropriate prescriptions are addressed. Questions answered and patient verbalizes understanding. Call for any problems, questions, or concerns.     Pt is to follow up in 12 months for Cardiac management    Electronically signed by JUSTIN Kee CNP on 6/14/2019 at 2:39 PM

## 2019-06-03 ENCOUNTER — TELEPHONE (OUTPATIENT)
Dept: FAMILY MEDICINE CLINIC | Age: 69
End: 2019-06-03

## 2019-06-03 NOTE — TELEPHONE ENCOUNTER
REP FROM [facility] CALLED TO VERIFY METFORMIN DOSE, ASVISED 500MG 2 TABS BID. VOICED UNDERSTANDING.   Electronically signed by Sergo Slaughter MA on 6/3/2019 at 4:10 PM

## 2019-06-14 ENCOUNTER — HOSPITAL ENCOUNTER (OUTPATIENT)
Dept: SLEEP CENTER | Age: 69
Discharge: HOME | End: 2019-06-14
Payer: MEDICARE

## 2019-06-14 VITALS
SYSTOLIC BLOOD PRESSURE: 125 MMHG | BODY MASS INDEX: 35 KG/M2 | DIASTOLIC BLOOD PRESSURE: 72 MMHG | WEIGHT: 250 LBS | HEART RATE: 62 BPM | OXYGEN SATURATION: 97 % | HEIGHT: 71 IN

## 2019-06-14 DIAGNOSIS — Z99.89 OSA ON CPAP: Primary | ICD-10-CM

## 2019-06-14 DIAGNOSIS — G47.33 OSA ON CPAP: Primary | ICD-10-CM

## 2019-06-14 PROCEDURE — 99211 OFF/OP EST MAY X REQ PHY/QHP: CPT

## 2019-06-14 ASSESSMENT — SLEEP AND FATIGUE QUESTIONNAIRES
HOW LIKELY ARE YOU TO NOD OFF OR FALL ASLEEP WHILE SITTING AND READING: 1
ESS TOTAL SCORE: 9
HOW LIKELY ARE YOU TO NOD OFF OR FALL ASLEEP WHILE LYING DOWN TO REST IN THE AFTERNOON WHEN CIRCUMSTANCES PERMIT: 2
HOW LIKELY ARE YOU TO NOD OFF OR FALL ASLEEP WHILE SITTING QUIETLY AFTER LUNCH WITHOUT ALCOHOL: 0
HOW LIKELY ARE YOU TO NOD OFF OR FALL ASLEEP WHEN YOU ARE A PASSENGER IN A CAR FOR AN HOUR WITHOUT A BREAK: 3
HOW LIKELY ARE YOU TO NOD OFF OR FALL ASLEEP WHILE SITTING INACTIVE IN A PUBLIC PLACE: 2
HOW LIKELY ARE YOU TO NOD OFF OR FALL ASLEEP WHILE SITTING AND TALKING TO SOMEONE: 0
HOW LIKELY ARE YOU TO NOD OFF OR FALL ASLEEP IN A CAR, WHILE STOPPED FOR A FEW MINUTES IN TRAFFIC: 0
HOW LIKELY ARE YOU TO NOD OFF OR FALL ASLEEP WHILE WATCHING TV: 1

## 2019-06-14 NOTE — PROGRESS NOTES
REASON FOR CONSULTATION/CC: DMITRIY on Cpap, needs new machine. CONSULTING PHYSICIAN: Dr Poonam Lopez MD  PCP: Steff Sharma MD    HISTORY OF PRESENT ILLNESS: Darek Hood is a 68y.o. year old male with a history of CAD, DMITRIY on Cpap, who presents with C/O Cpap machine not working well. Has machine since 2013. No SOB. No cough. No f/c. No n/v. No CP. Old studies reviewed. Face to face not available. PAST MEDICAL HISTORY:  Past Medical History:   Diagnosis Date    Arthritis 12/2013    rt wrist    Atrial fibrillation ([address]     CAD (coronary artery disease) 6/18/2014    see dr Patricia Sena Chronic kidney disease, stage III (moderate) ([address] 7/7/2016    Diabetes mellitus ([address]     dx 2004    Diabetic neuropathy associated with type 2 diabetes mellitus ([address] 4/23/2019    Gout     \"got gout when had pacer put in because they did not give me my medication for gout \"    Gout 4/23/2019    H/O 24 hour EKG monitoring 10/3/2013    no afib noted, sinsus rhythm    H/O cardiovascular stress test 5/12/2014    cardiolite- mild ischemia RCA EF50%    H/O transesophageal echocardiography (MABLE) for monitoring 08/05/2013    normal LV function and normal LA appendage without any clot    [place] (hard of hearing)     hearing tonya aides    Hx of Doppler echocardiogram 05/21/2018    EF 50%  Mild LV hypertrophy. Mildly enlarged RA. Mod aortic valve calcification with mod AS. Mitral annular calcification is present. Mild AR, MR and TR. Mild pulmonary htn.     Hyperlipidemia     Hypertension     Other disorders of kidney and ureter     Pneumonia 12/29/2012    Sleep apnea     dx 2013- has c-pap    Type II or unspecified type diabetes mellitus with other specified manifestations, uncontrolled 12/12/2012    Venous hypertension, chronic, with ulcer ([address] 12/12/2012    resolved       PAST SURGICAL HISTORY:  Past Surgical History:   Procedure Laterality Date    CARDIOVERSION  12/14    at [address] [place] [address] ANGIOPLASTY WITH STENT PLACEMENT  2014    \"2 stents put in \"   C/ [address]  2004    total left hip    OTHER SURGICAL HISTORY Right 12/02/2017    I&D; evacuation of hematoma right hip    PACEMAKER PLACEMENT      9/18/14 Status post remote permanent pacemaker with atrial lead dislodgement. 7/24/14 PPM Implant    VASCULAR SURGERY  2012    \"have stents in both legs- done [address]:  family history includes Arthritis in his mother; Diabetes in his mother; Heart Disease in his father and mother; High Blood Pressure in his father and mother; Kidney Disease in his father. SOCIAL HISTORY:   reports that he has never smoked. He has never used smokeless tobacco.    ALLERGIES:  Patient is allergic to spironolactone. REVIEW OF SYSTEMS:  Constitutional: Negative for fever  HENT: Negative for sore throat  Eyes: Negative for redness   Respiratory: Negative for dyspnea, cough  Cardiovascular: Negative for chest pain  Gastrointestinal: Negative for vomiting, diarrhea    Musculoskeletal: Negative for arthralgias   Skin: Negative for rash  Neurological: Negative for syncope        Objective:   PHYSICAL EXAM:  Blood pressure 125/72, pulse 62, height 5' 11\" (1.803 m), weight 250 lb (113.4 kg), SpO2 97 %.'    [place]: Total score: 9    Neck Circumference:  Neck circumference: 18  Inches    BMI:  Body mass index is 34.87 kg/m². Gen: No distress. Eyes: PERRL. No sclera icterus. No conjunctival injection. ENT: No discharge. Pharynx clear. External appearance of ears and nose normal.Mallampati score 4. Neck: Trachea midline. No obvious mass. Resp: No accessory muscle use. No crackles. No wheezes. No rhonchi. No dullness on percussion. CV: Regular rate. Regular rhythm. No murmur or rub. No edema. GI: Non-tender. Non-distended. No hernia. Skin: Warm, dry, normal texture and turgor. No nodule on exposed extremities. Lymph: No cervical LAD. No supraclavicular LAD. M/S: No cyanosis.  No clubbing. No joint deformity. Neuro: Moves all four extremities. CN 2-12 tested, no defect noted. Psych: Oriented x 3. No anxiety. Awake. Alert. Intact judgement and insight. Current Outpatient Medications on File Prior to Encounter   Medication Sig Dispense Refill    valsartan (DIOVAN) 80 MG tablet Take 1 tablet by mouth daily 90 tablet 3    tadalafil (CIALIS) 20 MG tablet Take 1 tablet by mouth as needed for Erectile Dysfunction 18 tablet 1    simvastatin (ZOCOR) 20 MG tablet Take 0.5 tablets by mouth nightly Patient is taking 1/2 tab daily 45 tablet 3    rivaroxaban (XARELTO) 20 MG TABS tablet Take 1 tablet by mouth daily (with breakfast) 90 tablet 3    prasugrel (EFFIENT) 10 MG TABS Take 1 tablet by mouth daily 90 tablet 3    metoprolol succinate (TOPROL XL) 25 MG extended release tablet Take 1 tablet by mouth daily 90 tablet 3    furosemide (LASIX) 20 MG tablet Take 1 tablet by mouth 2 times daily May take an extra Lasix if has increased swelling 210 tablet 3    amLODIPine (NORVASC) 5 MG tablet Take 1 tablet by mouth daily Patient is taking 1 tab daily 90 tablet 3    allopurinol (ZYLOPRIM) 300 MG tablet Take 1 tablet by mouth daily 90 tablet 1    allopurinol (ZYLOPRIM) 100 MG tablet Take 1 tablet by mouth daily 90 tablet 1    Dulaglutide (TRULICITY) 1.5 KL/9.6XO SOPN Inject 1 pen into the skin once a week 12 pen 1    gabapentin (NEURONTIN) 300 MG capsule Take 1 capsule by mouth 3 times daily for 90 days. 270 capsule 0    glimepiride (AMARYL) 4 MG tablet Take 1 tablet by mouth daily 90 tablet 1    metFORMIN (GLUCOPHAGE) 1000 MG tablet Take 1 tablet by mouth 2 times daily (with meals) 180 tablet 1    canagliflozin (INVOKANA) 300 MG TABS tablet Take 1 tablet by mouth every morning (before breakfast) 90 tablet 1    aspirin 81 MG tablet Take 81 mg by mouth daily       No current facility-administered medications on file prior to encounter. Data Reviewed:       Assessment:     1.  DMITRIY on Cpap, needs new machine. Plan:      1. Split night study  2. RTO 1 mth.         c

## 2019-06-21 RX ORDER — GABAPENTIN 300 MG/1
CAPSULE ORAL
Qty: 270 CAPSULE | Refills: 1 | OUTPATIENT
Start: 2019-06-21

## 2019-06-23 ENCOUNTER — HOSPITAL ENCOUNTER (OUTPATIENT)
Dept: SLEEP CENTER | Age: 69
Discharge: HOME | End: 2019-06-23
Payer: MEDICARE

## 2019-06-23 PROCEDURE — 95811 POLYSOM 6/>YRS CPAP 4/> PARM: CPT

## 2019-06-24 NOTE — PROGRESS NOTES
6/24/2019  sleep study  for Salazar Palma  1950 is complete. Results are pending physician review.     Electronically signed by Tj Miles on 6/24/2019 at 5:50 AM

## 2019-06-29 NOTE — PROGRESS NOTES
Results for the most recent sleep study on Mary Most  1950 are finalized and available. Please see media tab.     Electronically signed by Pedro Tom on 6/28/2019 at 10:20 PM

## 2019-08-12 ENCOUNTER — PROCEDURE VISIT (OUTPATIENT)
Dept: CARDIOLOGY CLINIC | Age: 69
End: 2019-08-12
Payer: MEDICARE

## 2019-08-12 DIAGNOSIS — Z95.0 CARDIAC PACEMAKER IN SITU: Primary | ICD-10-CM

## 2019-08-12 PROCEDURE — 93294 REM INTERROG EVL PM/LDLS PM: CPT | Performed by: INTERNAL MEDICINE

## 2019-08-12 PROCEDURE — 93296 REM INTERROG EVL PM/IDS: CPT | Performed by: INTERNAL MEDICINE

## 2019-08-12 RX ORDER — GLIMEPIRIDE 4 MG/1
TABLET ORAL
Qty: 90 TABLET | Refills: 1 | Status: SHIPPED | OUTPATIENT
Start: 2019-08-12 | End: 2019-09-20 | Stop reason: SDUPTHER

## 2019-08-16 ENCOUNTER — HOSPITAL ENCOUNTER (OUTPATIENT)
Dept: SLEEP CENTER | Age: 69
Discharge: HOME | End: 2019-08-16
Payer: MEDICARE

## 2019-08-16 PROCEDURE — 9990000010 HC NO CHARGE VISIT: Performed by: INTERNAL MEDICINE

## 2019-08-16 ASSESSMENT — SLEEP AND FATIGUE QUESTIONNAIRES
HOW LIKELY ARE YOU TO NOD OFF OR FALL ASLEEP IN A CAR, WHILE STOPPED FOR A FEW MINUTES IN TRAFFIC: 0
HOW LIKELY ARE YOU TO NOD OFF OR FALL ASLEEP WHILE SITTING QUIETLY AFTER LUNCH WITHOUT ALCOHOL: 0
HOW LIKELY ARE YOU TO NOD OFF OR FALL ASLEEP WHILE WATCHING TV: 1
ESS TOTAL SCORE: 2
HOW LIKELY ARE YOU TO NOD OFF OR FALL ASLEEP WHILE SITTING AND READING: 0
HOW LIKELY ARE YOU TO NOD OFF OR FALL ASLEEP WHILE SITTING AND TALKING TO SOMEONE: 0
HOW LIKELY ARE YOU TO NOD OFF OR FALL ASLEEP WHEN YOU ARE A PASSENGER IN A CAR FOR AN HOUR WITHOUT A BREAK: 1
HOW LIKELY ARE YOU TO NOD OFF OR FALL ASLEEP WHILE SITTING INACTIVE IN A PUBLIC PLACE: 0
HOW LIKELY ARE YOU TO NOD OFF OR FALL ASLEEP WHILE LYING DOWN TO REST IN THE AFTERNOON WHEN CIRCUMSTANCES PERMIT: 0

## 2019-08-16 NOTE — PROGRESS NOTES
Progress Note    Subjective: The patient received new machine. Sleeping well. Fresh in am.No EDS. No snoring. Download reviewed. AHI 3.0. Shortness of breath none  Chest pain none  Addressing respiratory complaints Patient is negative for  hemoptysis and cyanosis  CONSTITUTIONAL:  negative for fevers and chills      Past Medical History:     has a past medical history of Arthritis, Atrial fibrillation ([address] CAD (coronary artery disease), Chronic kidney disease, stage III (moderate) ([address] Diabetes mellitus ([address] Diabetic neuropathy associated with type 2 diabetes mellitus ([address] Gout, Gout, H/O 24 hour EKG monitoring, H/O cardiovascular stress test, H/O transesophageal echocardiography (MABLE) for monitoring, [place] (hard of hearing), Hx of Doppler echocardiogram, Hyperlipidemia, Hypertension, Other disorders of kidney and ureter, Pneumonia, Sleep apnea, Type II or unspecified type diabetes mellitus with other specified manifestations, uncontrolled, and Venous hypertension, chronic, with ulcer ([address] PAST SURGICAL HISTORY:   has a past surgical history that includes joint replacement (2004); pacemaker placement; Coronary angioplasty with stent (2014); vascular surgery (2012); Colonoscopy (2011); Cardioversion (12/14); and other surgical history (Right, 12/02/2017). SOCIAL HISTORY:   reports that he has never smoked. He has never used smokeless tobacco. He reports that he drinks about 2.0 standard drinks of alcohol per week. He reports that he does not use drugs. Family history:  family history includes Arthritis in his mother; Diabetes in his mother; Heart Disease in his father and mother; High Blood Pressure in his father and mother; Kidney Disease in his father. Objective:   PHYSICAL EXAM:        VITALS:  There were no vitals taken for this visit.     24HR INTAKE/OUTPUT:  No intake or output data in the 24 hours ending 08/16/19 1559    CONSTITUTIONAL:  awake, alert, cooperative, no apparent distress,

## 2019-09-09 ENCOUNTER — OFFICE VISIT (OUTPATIENT)
Dept: FAMILY MEDICINE CLINIC | Age: 69
End: 2019-09-09
Payer: MEDICARE

## 2019-09-09 VITALS
SYSTOLIC BLOOD PRESSURE: 126 MMHG | HEART RATE: 64 BPM | HEIGHT: 71 IN | DIASTOLIC BLOOD PRESSURE: 84 MMHG | WEIGHT: 230.4 LBS | BODY MASS INDEX: 32.26 KG/M2

## 2019-09-09 DIAGNOSIS — S80.10XA TRAUMATIC HEMATOMA OF LOWER LEG, UNSPECIFIED LATERALITY, INITIAL ENCOUNTER: Primary | ICD-10-CM

## 2019-09-09 PROCEDURE — 4040F PNEUMOC VAC/ADMIN/RCVD: CPT | Performed by: FAMILY MEDICINE

## 2019-09-09 PROCEDURE — 3017F COLORECTAL CA SCREEN DOC REV: CPT | Performed by: FAMILY MEDICINE

## 2019-09-09 PROCEDURE — 1123F ACP DISCUSS/DSCN MKR DOCD: CPT | Performed by: FAMILY MEDICINE

## 2019-09-09 PROCEDURE — 1036F TOBACCO NON-USER: CPT | Performed by: FAMILY MEDICINE

## 2019-09-09 PROCEDURE — G8598 ASA/ANTIPLAT THER USED: HCPCS | Performed by: FAMILY MEDICINE

## 2019-09-09 PROCEDURE — 99213 OFFICE O/P EST LOW 20 MIN: CPT | Performed by: FAMILY MEDICINE

## 2019-09-09 PROCEDURE — G8427 DOCREV CUR MEDS BY ELIG CLIN: HCPCS | Performed by: FAMILY MEDICINE

## 2019-09-09 PROCEDURE — G8417 CALC BMI ABV UP PARAM F/U: HCPCS | Performed by: FAMILY MEDICINE

## 2019-09-09 RX ORDER — SULFAMETHOXAZOLE AND TRIMETHOPRIM 400; 80 MG/1; MG/1
1 TABLET ORAL 2 TIMES DAILY
Qty: 20 TABLET | Refills: 0 | Status: SHIPPED | OUTPATIENT
Start: 2019-09-09 | End: 2019-09-19

## 2019-09-12 RX ORDER — GABAPENTIN 300 MG/1
CAPSULE ORAL
Qty: 270 CAPSULE | Refills: 0 | Status: SHIPPED | OUTPATIENT
Start: 2019-09-12 | End: 2019-09-20 | Stop reason: SDUPTHER

## 2019-09-17 DIAGNOSIS — I10 ESSENTIAL HYPERTENSION: ICD-10-CM

## 2019-09-17 DIAGNOSIS — E11.9 TYPE 2 DIABETES MELLITUS WITHOUT COMPLICATION, WITHOUT LONG-TERM CURRENT USE OF INSULIN (HCC): ICD-10-CM

## 2019-09-17 LAB
A/G RATIO: 1.8 (ref 1.1–2.2)
ALBUMIN SERPL-MCNC: 4.3 G/DL (ref 3.4–5)
ALP BLD-CCNC: 85 U/L (ref 40–129)
ALT SERPL-CCNC: 23 U/L (ref 10–40)
ANION GAP SERPL CALCULATED.3IONS-SCNC: 16 MMOL/L (ref 3–16)
AST SERPL-CCNC: 29 U/L (ref 15–37)
BILIRUB SERPL-MCNC: 0.3 MG/DL (ref 0–1)
BUN BLDV-MCNC: 60 MG/DL (ref 7–20)
CALCIUM SERPL-MCNC: 9.2 MG/DL (ref 8.3–10.6)
CHLORIDE BLD-SCNC: 99 MMOL/L (ref 99–110)
CHOLESTEROL, TOTAL: 110 MG/DL (ref 0–199)
CO2: 24 MMOL/L (ref 21–32)
CREAT SERPL-MCNC: 2.6 MG/DL (ref 0.8–1.3)
GFR AFRICAN AMERICAN: 30
GFR NON-AFRICAN AMERICAN: 24
GLOBULIN: 2.4 G/DL
GLUCOSE BLD-MCNC: 175 MG/DL (ref 70–99)
HDLC SERPL-MCNC: 37 MG/DL (ref 40–60)
LDL CHOLESTEROL CALCULATED: 47 MG/DL
POTASSIUM SERPL-SCNC: 5 MMOL/L (ref 3.5–5.1)
SODIUM BLD-SCNC: 139 MMOL/L (ref 136–145)
TOTAL PROTEIN: 6.7 G/DL (ref 6.4–8.2)
TRIGL SERPL-MCNC: 130 MG/DL (ref 0–150)
VLDLC SERPL CALC-MCNC: 26 MG/DL

## 2019-09-18 ENCOUNTER — TELEPHONE (OUTPATIENT)
Dept: FAMILY MEDICINE CLINIC | Age: 69
End: 2019-09-18

## 2019-09-18 LAB
ESTIMATED AVERAGE GLUCOSE: 159.9 MG/DL
HBA1C MFR BLD: 7.2 %

## 2019-09-20 ENCOUNTER — OFFICE VISIT (OUTPATIENT)
Dept: FAMILY MEDICINE CLINIC | Age: 69
End: 2019-09-20
Payer: MEDICARE

## 2019-09-20 VITALS
HEIGHT: 71 IN | SYSTOLIC BLOOD PRESSURE: 124 MMHG | BODY MASS INDEX: 32.53 KG/M2 | WEIGHT: 232.4 LBS | HEART RATE: 61 BPM | OXYGEN SATURATION: 98 % | DIASTOLIC BLOOD PRESSURE: 74 MMHG

## 2019-09-20 DIAGNOSIS — D12.5 ADENOMATOUS POLYP OF SIGMOID COLON: Chronic | ICD-10-CM

## 2019-09-20 DIAGNOSIS — N18.30 CKD (CHRONIC KIDNEY DISEASE) STAGE 3, GFR 30-59 ML/MIN (HCC): ICD-10-CM

## 2019-09-20 DIAGNOSIS — T14.8XXA HEMATOMA: ICD-10-CM

## 2019-09-20 DIAGNOSIS — Z23 NEEDS FLU SHOT: Primary | ICD-10-CM

## 2019-09-20 DIAGNOSIS — M10.9 GOUT, UNSPECIFIED CAUSE, UNSPECIFIED CHRONICITY, UNSPECIFIED SITE: ICD-10-CM

## 2019-09-20 DIAGNOSIS — E78.2 MIXED HYPERLIPIDEMIA: ICD-10-CM

## 2019-09-20 DIAGNOSIS — Z79.4 TYPE 2 DIABETES MELLITUS WITHOUT COMPLICATION, WITH LONG-TERM CURRENT USE OF INSULIN (HCC): ICD-10-CM

## 2019-09-20 DIAGNOSIS — E11.9 TYPE 2 DIABETES MELLITUS WITHOUT COMPLICATION, WITH LONG-TERM CURRENT USE OF INSULIN (HCC): ICD-10-CM

## 2019-09-20 PROCEDURE — G8427 DOCREV CUR MEDS BY ELIG CLIN: HCPCS | Performed by: FAMILY MEDICINE

## 2019-09-20 PROCEDURE — 99214 OFFICE O/P EST MOD 30 MIN: CPT | Performed by: FAMILY MEDICINE

## 2019-09-20 PROCEDURE — G8598 ASA/ANTIPLAT THER USED: HCPCS | Performed by: FAMILY MEDICINE

## 2019-09-20 PROCEDURE — 3017F COLORECTAL CA SCREEN DOC REV: CPT | Performed by: FAMILY MEDICINE

## 2019-09-20 PROCEDURE — 90653 IIV ADJUVANT VACCINE IM: CPT | Performed by: FAMILY MEDICINE

## 2019-09-20 PROCEDURE — G8417 CALC BMI ABV UP PARAM F/U: HCPCS | Performed by: FAMILY MEDICINE

## 2019-09-20 PROCEDURE — 1036F TOBACCO NON-USER: CPT | Performed by: FAMILY MEDICINE

## 2019-09-20 PROCEDURE — 1123F ACP DISCUSS/DSCN MKR DOCD: CPT | Performed by: FAMILY MEDICINE

## 2019-09-20 PROCEDURE — 4040F PNEUMOC VAC/ADMIN/RCVD: CPT | Performed by: FAMILY MEDICINE

## 2019-09-20 PROCEDURE — 3045F PR MOST RECENT HEMOGLOBIN A1C LEVEL 7.0-9.0%: CPT | Performed by: FAMILY MEDICINE

## 2019-09-20 PROCEDURE — G0008 ADMIN INFLUENZA VIRUS VAC: HCPCS | Performed by: FAMILY MEDICINE

## 2019-09-20 PROCEDURE — 2022F DILAT RTA XM EVC RTNOPTHY: CPT | Performed by: FAMILY MEDICINE

## 2019-09-20 RX ORDER — ALLOPURINOL 100 MG/1
100 TABLET ORAL DAILY
Qty: 90 TABLET | Refills: 1 | Status: SHIPPED | OUTPATIENT
Start: 2019-09-20 | End: 2020-03-20 | Stop reason: SDUPTHER

## 2019-09-20 RX ORDER — GLIMEPIRIDE 4 MG/1
4 TABLET ORAL DAILY
Qty: 90 TABLET | Refills: 1 | Status: SHIPPED | OUTPATIENT
Start: 2019-09-20 | End: 2020-03-20 | Stop reason: SDUPTHER

## 2019-09-20 RX ORDER — ALLOPURINOL 300 MG/1
300 TABLET ORAL DAILY
Qty: 90 TABLET | Refills: 1 | Status: SHIPPED | OUTPATIENT
Start: 2019-09-20 | End: 2020-03-20 | Stop reason: SDUPTHER

## 2019-09-20 RX ORDER — GABAPENTIN 300 MG/1
CAPSULE ORAL
Qty: 270 CAPSULE | Refills: 0 | Status: SHIPPED | OUTPATIENT
Start: 2019-09-20 | End: 2019-12-26 | Stop reason: SDUPTHER

## 2019-09-20 ASSESSMENT — ENCOUNTER SYMPTOMS
SHORTNESS OF BREATH: 0
WHEEZING: 0
RESPIRATORY NEGATIVE: 1
CHEST TIGHTNESS: 0
COUGH: 0
ABDOMINAL PAIN: 0

## 2019-09-20 NOTE — PROGRESS NOTES
2019     Sondra Navarrete      Chief Complaint   Patient presents with    Other     4 MONTH F/U.  Leg Injury     F/U RIGHT LEG. PER PT, EDEMA HAS WENT DOWN.  Medication Refill     TO [facility]      Caroline Shabazz is a 68 y.o. male who presents today with the followin. Needs flu shot    2. Adenomatous polyp of sigmoid colon    3. Gout, unspecified cause, unspecified chronicity, unspecified site    4. Mixed hyperlipidemia    5. Type 2 diabetes mellitus without complication, with long-term current use of insulin ([address]    6. CKD (chronic kidney disease) stage 3, GFR 30-59 ml/min ([facility])    7. Hematoma    Multiple issues today  The patient came to have the infected hematoma on his right leg check  The redness and pain went away still has the swelling which is part of the natural history of this there is no drainage  He had some labs done recently and concern is a significant drop in his GFR now is below 30  He admitted he was taking ibuprofen I am sure we told him in the past not to take nonsteroidal anti-inflammatories he is taking it daily  He has not noticed any diminished urine  He is on Invokana and metformin  His diabetes actually is well controlled with A1c is 7.1  He had a normal normal lipid profile  He is not taking any other medicines would affect his kidneys  He is under the care of a nephrologist and has an appointment in about a month  He is going to be going to [place] in a couple months  Blood pressure has been well controlled    REVIEW OF SYMPTOMS    Review of Systems   Constitutional: Negative. Negative for activity change, appetite change and unexpected weight change. Respiratory: Negative. Negative for cough, chest tightness, shortness of breath and wheezing. Cardiovascular: Negative. Negative for chest pain and leg swelling. Gastrointestinal: Negative for abdominal pain. Genitourinary: Negative.     Musculoskeletal:        History of gout in remission on allopurinol  High Blood Pressure Father     Heart Disease Father     Kidney Disease Father        SOCIAL HISTORY  Social History     Socioeconomic History    Marital status:      Spouse name: None    Number of children: None    Years of education: None    Highest education level: None   Occupational History    None   Social Needs    Financial resource strain: None    Food insecurity:     Worry: None     Inability: None    Transportation needs:     Medical: None     Non-medical: None   Tobacco Use    Smoking status: Never Smoker    Smokeless tobacco: Never Used   Substance and Sexual Activity    Alcohol use: Yes     Alcohol/week: 2.0 standard drinks     Types: 2 Cans of beer per week     Comment: average \"one time per week\"    Drug use: No    Sexual activity: Yes     Partners: Female     Comment:    Lifestyle    Physical activity:     Days per week: None     Minutes per session: None    Stress: None   Relationships    Social connections:     Talks on phone: None     Gets together: None     Attends Faith service: None     Active member of club or organization: None     Attends meetings of clubs or organizations: None     Relationship status: None    Intimate partner violence:     Fear of current or ex partner: None     Emotionally abused: None     Physically abused: None     Forced sexual activity: None   Other Topics Concern    None   Social History Narrative    None        SURGICAL HISTORY  Past Surgical History:   Procedure Laterality Date    CARDIOVERSION  12/14    at [address]  2014    \"2 stents put in \"   [facility]/ [address]  2004    total left hip    OTHER SURGICAL HISTORY Right 12/02/2017    I&D; evacuation of hematoma right hip    PACEMAKER PLACEMENT      9/18/14 Status post remote permanent pacemaker with atrial lead dislodgement.  7/24/14 PPM Implant    VASCULAR SURGERY  2012    \"have stents in both legs- done in [place]       CURRENT MEDICATIONS  Current Outpatient Medications   Medication Sig Dispense Refill    allopurinol (ZYLOPRIM) 100 MG tablet Take 1 tablet by mouth daily 90 tablet 1    allopurinol (ZYLOPRIM) 300 MG tablet Take 1 tablet by mouth daily 90 tablet 1    canagliflozin (INVOKANA) 300 MG TABS tablet Take 1 tablet by mouth every morning (before breakfast) 90 tablet 1    gabapentin (NEURONTIN) 300 MG capsule TAKE 1  capsule 0    glimepiride (AMARYL) 4 MG tablet Take 1 tablet by mouth daily 90 tablet 1    valsartan (DIOVAN) 80 MG tablet Take 1 tablet by mouth daily 90 tablet 3    simvastatin (ZOCOR) 20 MG tablet Take 0.5 tablets by mouth nightly Patient is taking 1/2 tab daily 45 tablet 3    rivaroxaban (XARELTO) 20 MG TABS tablet Take 1 tablet by mouth daily (with breakfast) 90 tablet 3    prasugrel (EFFIENT) 10 MG TABS Take 1 tablet by mouth daily 90 tablet 3    metoprolol succinate (TOPROL XL) 25 MG extended release tablet Take 1 tablet by mouth daily 90 tablet 3    furosemide (LASIX) 20 MG tablet Take 1 tablet by mouth 2 times daily May take an extra Lasix if has increased swelling 210 tablet 3    amLODIPine (NORVASC) 5 MG tablet Take 1 tablet by mouth daily Patient is taking 1 tab daily 90 tablet 3    Dulaglutide (TRULICITY) 1.5 /6.1HZ SOPN Inject 1 pen into the skin once a week 12 pen 1    aspirin 81 MG tablet Take 81 mg by mouth daily      metFORMIN (GLUCOPHAGE) 1000 MG tablet Take 1 tablet by mouth 2 times daily (with meals) (Patient not taking: Reported on 9/20/2019) 180 tablet 1     No current facility-administered medications for this visit.         ALLERGIES  Allergies   Allergen Reactions    Spironolactone      CAUSES INCREASED K+    Tape [place] Tape] Rash     SURGICAL TAPE       PHYSICAL EXAM    /74 (Site: Left Upper Arm, Position: Sitting, Cuff Size: Large Adult)   Pulse 61   Ht 5' 11\" (1.803 m)   Wt 232 lb 6.4 oz (105.4 kg)   SpO2 98% Comment: RA  BMI 32.41

## 2019-10-02 RX ORDER — TADALAFIL 20 MG/1
20 TABLET ORAL DAILY PRN
Qty: 90 TABLET | Refills: 1 | Status: SHIPPED | OUTPATIENT
Start: 2019-10-02 | End: 2019-10-23 | Stop reason: DRUGHIGH

## 2019-10-03 ENCOUNTER — OFFICE VISIT (OUTPATIENT)
Dept: FAMILY MEDICINE CLINIC | Age: 69
End: 2019-10-03
Payer: MEDICARE

## 2019-10-03 VITALS
DIASTOLIC BLOOD PRESSURE: 80 MMHG | HEART RATE: 56 BPM | WEIGHT: 222 LBS | BODY MASS INDEX: 31.08 KG/M2 | HEIGHT: 71 IN | SYSTOLIC BLOOD PRESSURE: 110 MMHG

## 2019-10-03 DIAGNOSIS — N18.30 CKD (CHRONIC KIDNEY DISEASE) STAGE 3, GFR 30-59 ML/MIN (HCC): ICD-10-CM

## 2019-10-03 DIAGNOSIS — M54.31 SCIATICA OF RIGHT SIDE: Primary | ICD-10-CM

## 2019-10-03 LAB
ANION GAP SERPL CALCULATED.3IONS-SCNC: 18 MMOL/L (ref 3–16)
BUN BLDV-MCNC: 33 MG/DL (ref 7–20)
CALCIUM SERPL-MCNC: 10.1 MG/DL (ref 8.3–10.6)
CHLORIDE BLD-SCNC: 96 MMOL/L (ref 99–110)
CO2: 26 MMOL/L (ref 21–32)
CREAT SERPL-MCNC: 1.4 MG/DL (ref 0.8–1.3)
GFR AFRICAN AMERICAN: >60
GFR NON-AFRICAN AMERICAN: 50
GLUCOSE BLD-MCNC: 177 MG/DL (ref 70–99)
POTASSIUM SERPL-SCNC: 4.6 MMOL/L (ref 3.5–5.1)
SODIUM BLD-SCNC: 140 MMOL/L (ref 136–145)

## 2019-10-03 PROCEDURE — 3017F COLORECTAL CA SCREEN DOC REV: CPT | Performed by: FAMILY MEDICINE

## 2019-10-03 PROCEDURE — G8417 CALC BMI ABV UP PARAM F/U: HCPCS | Performed by: FAMILY MEDICINE

## 2019-10-03 PROCEDURE — 4040F PNEUMOC VAC/ADMIN/RCVD: CPT | Performed by: FAMILY MEDICINE

## 2019-10-03 PROCEDURE — G8598 ASA/ANTIPLAT THER USED: HCPCS | Performed by: FAMILY MEDICINE

## 2019-10-03 PROCEDURE — 1123F ACP DISCUSS/DSCN MKR DOCD: CPT | Performed by: FAMILY MEDICINE

## 2019-10-03 PROCEDURE — 1036F TOBACCO NON-USER: CPT | Performed by: FAMILY MEDICINE

## 2019-10-03 PROCEDURE — 99213 OFFICE O/P EST LOW 20 MIN: CPT | Performed by: FAMILY MEDICINE

## 2019-10-03 PROCEDURE — G8482 FLU IMMUNIZE ORDER/ADMIN: HCPCS | Performed by: FAMILY MEDICINE

## 2019-10-03 PROCEDURE — G8427 DOCREV CUR MEDS BY ELIG CLIN: HCPCS | Performed by: FAMILY MEDICINE

## 2019-10-03 RX ORDER — TRAMADOL HYDROCHLORIDE 50 MG/1
50 TABLET ORAL EVERY 6 HOURS PRN
Qty: 28 TABLET | Refills: 0 | Status: SHIPPED | OUTPATIENT
Start: 2019-10-03 | End: 2019-10-10

## 2019-10-04 ENCOUNTER — TELEPHONE (OUTPATIENT)
Dept: FAMILY MEDICINE CLINIC | Age: 69
End: 2019-10-04

## 2019-10-08 ENCOUNTER — TELEPHONE (OUTPATIENT)
Dept: CARDIOLOGY CLINIC | Age: 69
End: 2019-10-08

## 2019-10-22 RX ORDER — TADALAFIL 20 MG/1
20 TABLET ORAL DAILY PRN
Qty: 90 TABLET | Refills: 1 | Status: CANCELLED | OUTPATIENT
Start: 2019-10-22

## 2019-10-23 RX ORDER — TADALAFIL 20 MG/1
20 TABLET ORAL DAILY PRN
Qty: 30 TABLET | Refills: 1 | Status: SHIPPED
Start: 2019-10-23 | End: 2020-05-26

## 2019-11-18 ENCOUNTER — TELEPHONE (OUTPATIENT)
Dept: CARDIOLOGY CLINIC | Age: 69
End: 2019-11-18

## 2019-11-18 ENCOUNTER — ANESTHESIA EVENT (OUTPATIENT)
Dept: ENDOSCOPY | Age: 69
End: 2019-11-18
Payer: MEDICARE

## 2019-11-18 ENCOUNTER — PROCEDURE VISIT (OUTPATIENT)
Dept: CARDIOLOGY CLINIC | Age: 69
End: 2019-11-18
Payer: MEDICARE

## 2019-11-18 DIAGNOSIS — Z95.0 CARDIAC PACEMAKER IN SITU: Primary | ICD-10-CM

## 2019-11-18 PROCEDURE — 93294 REM INTERROG EVL PM/LDLS PM: CPT | Performed by: INTERNAL MEDICINE

## 2019-11-18 PROCEDURE — 93296 REM INTERROG EVL PM/IDS: CPT | Performed by: INTERNAL MEDICINE

## 2019-11-19 ENCOUNTER — ANESTHESIA (OUTPATIENT)
Dept: ENDOSCOPY | Age: 69
End: 2019-11-19
Payer: MEDICARE

## 2019-11-19 ENCOUNTER — HOSPITAL ENCOUNTER (OUTPATIENT)
Age: 69
Setting detail: OUTPATIENT SURGERY
Discharge: HOME | End: 2019-11-19
Attending: SPECIALIST | Admitting: SPECIALIST
Payer: MEDICARE

## 2019-11-19 VITALS
SYSTOLIC BLOOD PRESSURE: 98 MMHG | DIASTOLIC BLOOD PRESSURE: 35 MMHG | OXYGEN SATURATION: 100 % | RESPIRATION RATE: 16 BRPM

## 2019-11-19 VITALS
RESPIRATION RATE: 16 BRPM | TEMPERATURE: 97.6 F | DIASTOLIC BLOOD PRESSURE: 67 MMHG | BODY MASS INDEX: 31.5 KG/M2 | WEIGHT: 225 LBS | HEART RATE: 61 BPM | SYSTOLIC BLOOD PRESSURE: 134 MMHG | OXYGEN SATURATION: 95 % | HEIGHT: 71 IN

## 2019-11-19 LAB
GLUCOSE BLD-MCNC: 182 MG/DL (ref 70–99)
GLUCOSE BLD-MCNC: 190 MG/DL (ref 70–99)

## 2019-11-19 PROCEDURE — 3700000000 HC ANESTHESIA ATTENDED CARE: Performed by: SPECIALIST

## 2019-11-19 PROCEDURE — 3700000001 HC ADD 15 MINUTES (ANESTHESIA): Performed by: SPECIALIST

## 2019-11-19 PROCEDURE — 2580000003 HC RX 258

## 2019-11-19 PROCEDURE — 3609027000 HC COLONOSCOPY: Performed by: SPECIALIST

## 2019-11-19 PROCEDURE — 6360000002 HC RX W HCPCS: Performed by: NURSE ANESTHETIST, CERTIFIED REGISTERED

## 2019-11-19 PROCEDURE — 7100000011 HC PHASE II RECOVERY - ADDTL 15 MIN: Performed by: SPECIALIST

## 2019-11-19 PROCEDURE — 2500000003 HC RX 250 WO HCPCS: Performed by: NURSE ANESTHETIST, CERTIFIED REGISTERED

## 2019-11-19 PROCEDURE — 82962 GLUCOSE BLOOD TEST: CPT

## 2019-11-19 PROCEDURE — 7100000010 HC PHASE II RECOVERY - FIRST 15 MIN: Performed by: SPECIALIST

## 2019-11-19 PROCEDURE — 2709999900 HC NON-CHARGEABLE SUPPLY: Performed by: SPECIALIST

## 2019-11-19 RX ORDER — LIDOCAINE HYDROCHLORIDE 20 MG/ML
INJECTION, SOLUTION INFILTRATION; PERINEURAL PRN
Status: DISCONTINUED | OUTPATIENT
Start: 2019-11-19 | End: 2019-11-19 | Stop reason: SDUPTHER

## 2019-11-19 RX ORDER — EPHEDRINE SULFATE 50 MG/ML
INJECTION INTRAVENOUS PRN
Status: DISCONTINUED | OUTPATIENT
Start: 2019-11-19 | End: 2019-11-19 | Stop reason: SDUPTHER

## 2019-11-19 RX ORDER — SODIUM CHLORIDE, SODIUM LACTATE, POTASSIUM CHLORIDE, CALCIUM CHLORIDE 600; 310; 30; 20 MG/100ML; MG/100ML; MG/100ML; MG/100ML
INJECTION, SOLUTION INTRAVENOUS
Status: COMPLETED
Start: 2019-11-19 | End: 2019-11-19

## 2019-11-19 RX ORDER — PROPOFOL 10 MG/ML
INJECTION, EMULSION INTRAVENOUS PRN
Status: DISCONTINUED | OUTPATIENT
Start: 2019-11-19 | End: 2019-11-19 | Stop reason: SDUPTHER

## 2019-11-19 RX ORDER — SODIUM CHLORIDE, SODIUM LACTATE, POTASSIUM CHLORIDE, CALCIUM CHLORIDE 600; 310; 30; 20 MG/100ML; MG/100ML; MG/100ML; MG/100ML
INJECTION, SOLUTION INTRAVENOUS CONTINUOUS
Status: DISCONTINUED | OUTPATIENT
Start: 2019-11-19 | End: 2019-11-19 | Stop reason: HOSPADM

## 2019-11-19 RX ADMIN — LIDOCAINE HYDROCHLORIDE 60 MG: 20 INJECTION, SOLUTION INFILTRATION; PERINEURAL at 09:26

## 2019-11-19 RX ADMIN — PHENYLEPHRINE HYDROCHLORIDE 100 MCG: 10 INJECTION INTRAVENOUS at 09:30

## 2019-11-19 RX ADMIN — PHENYLEPHRINE HYDROCHLORIDE 200 MCG: 10 INJECTION INTRAVENOUS at 09:34

## 2019-11-19 RX ADMIN — PHENYLEPHRINE HYDROCHLORIDE 100 MCG: 10 INJECTION INTRAVENOUS at 09:51

## 2019-11-19 RX ADMIN — SODIUM CHLORIDE, SODIUM LACTATE, POTASSIUM CHLORIDE, CALCIUM CHLORIDE: 600; 310; 30; 20 INJECTION, SOLUTION INTRAVENOUS at 09:06

## 2019-11-19 RX ADMIN — PROPOFOL 170 MG: 10 INJECTION, EMULSION INTRAVENOUS at 09:26

## 2019-11-19 RX ADMIN — PHENYLEPHRINE HYDROCHLORIDE 100 MCG: 10 INJECTION INTRAVENOUS at 09:29

## 2019-11-19 RX ADMIN — SODIUM CHLORIDE, POTASSIUM CHLORIDE, SODIUM LACTATE AND CALCIUM CHLORIDE: 600; 310; 30; 20 INJECTION, SOLUTION INTRAVENOUS at 09:06

## 2019-11-19 RX ADMIN — PHENYLEPHRINE HYDROCHLORIDE 200 MCG: 10 INJECTION INTRAVENOUS at 09:32

## 2019-11-19 RX ADMIN — EPHEDRINE SULFATE 10 MG: 50 INJECTION, SOLUTION INTRAVENOUS at 09:38

## 2019-11-19 RX ADMIN — PHENYLEPHRINE HYDROCHLORIDE 100 MCG: 10 INJECTION INTRAVENOUS at 09:44

## 2019-11-19 RX ADMIN — EPHEDRINE SULFATE 5 MG: 50 INJECTION, SOLUTION INTRAVENOUS at 09:51

## 2019-11-19 ASSESSMENT — PAIN - FUNCTIONAL ASSESSMENT: PAIN_FUNCTIONAL_ASSESSMENT: 0-10

## 2019-12-05 ENCOUNTER — OFFICE VISIT (OUTPATIENT)
Dept: FAMILY MEDICINE CLINIC | Age: 69
End: 2019-12-05
Payer: MEDICARE

## 2019-12-05 VITALS
HEIGHT: 71 IN | SYSTOLIC BLOOD PRESSURE: 130 MMHG | DIASTOLIC BLOOD PRESSURE: 76 MMHG | OXYGEN SATURATION: 96 % | WEIGHT: 229.3 LBS | BODY MASS INDEX: 32.1 KG/M2 | HEART RATE: 80 BPM

## 2019-12-05 DIAGNOSIS — E11.42 TYPE 2 DIABETES MELLITUS WITH DIABETIC POLYNEUROPATHY, WITHOUT LONG-TERM CURRENT USE OF INSULIN (HCC): ICD-10-CM

## 2019-12-05 DIAGNOSIS — Z91.81 AT HIGH RISK FOR FALLS: Primary | ICD-10-CM

## 2019-12-05 DIAGNOSIS — E78.2 MIXED HYPERLIPIDEMIA: ICD-10-CM

## 2019-12-05 DIAGNOSIS — I10 ESSENTIAL HYPERTENSION: ICD-10-CM

## 2019-12-05 LAB
CONTROL: ABNORMAL
HEMOCCULT STL QL: ABNORMAL

## 2019-12-05 PROCEDURE — 3017F COLORECTAL CA SCREEN DOC REV: CPT | Performed by: FAMILY MEDICINE

## 2019-12-05 PROCEDURE — G8598 ASA/ANTIPLAT THER USED: HCPCS | Performed by: FAMILY MEDICINE

## 2019-12-05 PROCEDURE — 99213 OFFICE O/P EST LOW 20 MIN: CPT | Performed by: FAMILY MEDICINE

## 2019-12-05 PROCEDURE — 2022F DILAT RTA XM EVC RTNOPTHY: CPT | Performed by: FAMILY MEDICINE

## 2019-12-05 PROCEDURE — 1123F ACP DISCUSS/DSCN MKR DOCD: CPT | Performed by: FAMILY MEDICINE

## 2019-12-05 PROCEDURE — 1036F TOBACCO NON-USER: CPT | Performed by: FAMILY MEDICINE

## 2019-12-05 PROCEDURE — G8482 FLU IMMUNIZE ORDER/ADMIN: HCPCS | Performed by: FAMILY MEDICINE

## 2019-12-05 PROCEDURE — G8427 DOCREV CUR MEDS BY ELIG CLIN: HCPCS | Performed by: FAMILY MEDICINE

## 2019-12-05 PROCEDURE — G8417 CALC BMI ABV UP PARAM F/U: HCPCS | Performed by: FAMILY MEDICINE

## 2019-12-05 PROCEDURE — 4040F PNEUMOC VAC/ADMIN/RCVD: CPT | Performed by: FAMILY MEDICINE

## 2019-12-05 PROCEDURE — 3051F HG A1C>EQUAL 7.0%<8.0%: CPT | Performed by: FAMILY MEDICINE

## 2019-12-05 ASSESSMENT — ENCOUNTER SYMPTOMS
ABDOMINAL PAIN: 0
DIARRHEA: 0
NAUSEA: 0
SHORTNESS OF BREATH: 0
VOMITING: 0
COUGH: 0

## 2019-12-10 ENCOUNTER — TELEPHONE (OUTPATIENT)
Dept: FAMILY MEDICINE CLINIC | Age: 69
End: 2019-12-10

## 2019-12-16 ENCOUNTER — TELEPHONE (OUTPATIENT)
Dept: FAMILY MEDICINE CLINIC | Age: 69
End: 2019-12-16

## 2019-12-16 DIAGNOSIS — E11.42 TYPE 2 DIABETES MELLITUS WITH DIABETIC POLYNEUROPATHY, WITHOUT LONG-TERM CURRENT USE OF INSULIN (HCC): ICD-10-CM

## 2019-12-20 ENCOUNTER — TELEPHONE (OUTPATIENT)
Dept: FAMILY MEDICINE CLINIC | Age: 69
End: 2019-12-20

## 2019-12-23 DIAGNOSIS — I10 ESSENTIAL HYPERTENSION: ICD-10-CM

## 2019-12-23 DIAGNOSIS — E11.42 TYPE 2 DIABETES MELLITUS WITH DIABETIC POLYNEUROPATHY, WITHOUT LONG-TERM CURRENT USE OF INSULIN (HCC): ICD-10-CM

## 2019-12-23 DIAGNOSIS — E78.2 MIXED HYPERLIPIDEMIA: ICD-10-CM

## 2019-12-23 LAB
A/G RATIO: 1.8 (ref 1.1–2.2)
ALBUMIN SERPL-MCNC: 4.6 G/DL (ref 3.4–5)
ALP BLD-CCNC: 77 U/L (ref 40–129)
ALT SERPL-CCNC: 20 U/L (ref 10–40)
ANION GAP SERPL CALCULATED.3IONS-SCNC: 17 MMOL/L (ref 3–16)
AST SERPL-CCNC: 19 U/L (ref 15–37)
BILIRUB SERPL-MCNC: 0.4 MG/DL (ref 0–1)
BUN BLDV-MCNC: 26 MG/DL (ref 7–20)
CALCIUM SERPL-MCNC: 10.4 MG/DL (ref 8.3–10.6)
CHLORIDE BLD-SCNC: 99 MMOL/L (ref 99–110)
CHOLESTEROL, TOTAL: 116 MG/DL (ref 0–199)
CO2: 27 MMOL/L (ref 21–32)
CREAT SERPL-MCNC: 1.3 MG/DL (ref 0.8–1.3)
GFR AFRICAN AMERICAN: >60
GFR NON-AFRICAN AMERICAN: 54
GLOBULIN: 2.5 G/DL
GLUCOSE BLD-MCNC: 222 MG/DL (ref 70–99)
HDLC SERPL-MCNC: 43 MG/DL (ref 40–60)
LDL CHOLESTEROL CALCULATED: 45 MG/DL
POTASSIUM SERPL-SCNC: 4.3 MMOL/L (ref 3.5–5.1)
SODIUM BLD-SCNC: 143 MMOL/L (ref 136–145)
TOTAL PROTEIN: 7.1 G/DL (ref 6.4–8.2)
TRIGL SERPL-MCNC: 140 MG/DL (ref 0–150)
VLDLC SERPL CALC-MCNC: 28 MG/DL

## 2019-12-24 LAB
ESTIMATED AVERAGE GLUCOSE: 200.1 MG/DL
HBA1C MFR BLD: 8.6 %

## 2019-12-26 ENCOUNTER — OFFICE VISIT (OUTPATIENT)
Dept: FAMILY MEDICINE CLINIC | Age: 69
End: 2019-12-26
Payer: MEDICARE

## 2019-12-26 VITALS
WEIGHT: 230 LBS | HEART RATE: 60 BPM | BODY MASS INDEX: 32.2 KG/M2 | DIASTOLIC BLOOD PRESSURE: 72 MMHG | HEIGHT: 71 IN | SYSTOLIC BLOOD PRESSURE: 120 MMHG

## 2019-12-26 DIAGNOSIS — E11.9 TYPE 2 DIABETES MELLITUS WITHOUT COMPLICATION, WITHOUT LONG-TERM CURRENT USE OF INSULIN (HCC): ICD-10-CM

## 2019-12-26 DIAGNOSIS — N18.30 CKD (CHRONIC KIDNEY DISEASE) STAGE 3, GFR 30-59 ML/MIN (HCC): ICD-10-CM

## 2019-12-26 DIAGNOSIS — E11.9 TYPE 2 DIABETES MELLITUS WITHOUT COMPLICATION, WITH LONG-TERM CURRENT USE OF INSULIN (HCC): ICD-10-CM

## 2019-12-26 DIAGNOSIS — I48.0 PAROXYSMAL ATRIAL FIBRILLATION (HCC): ICD-10-CM

## 2019-12-26 DIAGNOSIS — I48.0 PAF (PAROXYSMAL ATRIAL FIBRILLATION) (HCC): ICD-10-CM

## 2019-12-26 DIAGNOSIS — Z79.4 TYPE 2 DIABETES MELLITUS WITHOUT COMPLICATION, WITH LONG-TERM CURRENT USE OF INSULIN (HCC): ICD-10-CM

## 2019-12-26 DIAGNOSIS — E78.2 MIXED HYPERLIPIDEMIA: Primary | ICD-10-CM

## 2019-12-26 PROCEDURE — G8482 FLU IMMUNIZE ORDER/ADMIN: HCPCS | Performed by: FAMILY MEDICINE

## 2019-12-26 PROCEDURE — 1123F ACP DISCUSS/DSCN MKR DOCD: CPT | Performed by: FAMILY MEDICINE

## 2019-12-26 PROCEDURE — 4040F PNEUMOC VAC/ADMIN/RCVD: CPT | Performed by: FAMILY MEDICINE

## 2019-12-26 PROCEDURE — G8417 CALC BMI ABV UP PARAM F/U: HCPCS | Performed by: FAMILY MEDICINE

## 2019-12-26 PROCEDURE — 2022F DILAT RTA XM EVC RTNOPTHY: CPT | Performed by: FAMILY MEDICINE

## 2019-12-26 PROCEDURE — G8598 ASA/ANTIPLAT THER USED: HCPCS | Performed by: FAMILY MEDICINE

## 2019-12-26 PROCEDURE — G8427 DOCREV CUR MEDS BY ELIG CLIN: HCPCS | Performed by: FAMILY MEDICINE

## 2019-12-26 PROCEDURE — 1036F TOBACCO NON-USER: CPT | Performed by: FAMILY MEDICINE

## 2019-12-26 PROCEDURE — 99214 OFFICE O/P EST MOD 30 MIN: CPT | Performed by: FAMILY MEDICINE

## 2019-12-26 PROCEDURE — 3017F COLORECTAL CA SCREEN DOC REV: CPT | Performed by: FAMILY MEDICINE

## 2019-12-26 RX ORDER — GABAPENTIN 300 MG/1
CAPSULE ORAL
Qty: 270 CAPSULE | Refills: 0 | Status: SHIPPED | OUTPATIENT
Start: 2019-12-26 | End: 2020-03-20 | Stop reason: SDUPTHER

## 2019-12-26 ASSESSMENT — ENCOUNTER SYMPTOMS
CHEST TIGHTNESS: 0
RESPIRATORY NEGATIVE: 1
COUGH: 0
WHEEZING: 0
SHORTNESS OF BREATH: 0
ABDOMINAL PAIN: 0

## 2020-02-25 ENCOUNTER — TELEPHONE (OUTPATIENT)
Dept: CARDIOLOGY CLINIC | Age: 70
End: 2020-02-25

## 2020-02-27 ENCOUNTER — PROCEDURE VISIT (OUTPATIENT)
Dept: CARDIOLOGY CLINIC | Age: 70
End: 2020-02-27
Payer: MEDICARE

## 2020-02-27 PROCEDURE — 93296 REM INTERROG EVL PM/IDS: CPT | Performed by: INTERNAL MEDICINE

## 2020-02-27 PROCEDURE — 93294 REM INTERROG EVL PM/LDLS PM: CPT | Performed by: INTERNAL MEDICINE

## 2020-03-20 RX ORDER — GABAPENTIN 300 MG/1
CAPSULE ORAL
Qty: 270 CAPSULE | Refills: 0 | Status: SHIPPED | OUTPATIENT
Start: 2020-03-20 | End: 2020-06-22 | Stop reason: SDUPTHER

## 2020-03-20 RX ORDER — ALLOPURINOL 100 MG/1
100 TABLET ORAL DAILY
Qty: 90 TABLET | Refills: 1 | Status: SHIPPED | OUTPATIENT
Start: 2020-03-20 | End: 2020-06-22 | Stop reason: SDUPTHER

## 2020-03-20 RX ORDER — GLIMEPIRIDE 4 MG/1
TABLET ORAL
Qty: 90 TABLET | Refills: 1 | OUTPATIENT
Start: 2020-03-20

## 2020-03-20 RX ORDER — METOPROLOL SUCCINATE 25 MG/1
25 TABLET, EXTENDED RELEASE ORAL DAILY
Qty: 90 TABLET | Refills: 3 | Status: ON HOLD | OUTPATIENT
Start: 2020-03-20 | End: 2021-02-24 | Stop reason: HOSPADM

## 2020-03-20 RX ORDER — AMLODIPINE BESYLATE 5 MG/1
5 TABLET ORAL DAILY
Qty: 90 TABLET | Refills: 3 | Status: SHIPPED | OUTPATIENT
Start: 2020-03-20 | End: 2020-06-22 | Stop reason: DRUGHIGH

## 2020-03-20 RX ORDER — ALLOPURINOL 300 MG/1
300 TABLET ORAL DAILY
Qty: 90 TABLET | Refills: 1 | Status: SHIPPED | OUTPATIENT
Start: 2020-03-20 | End: 2020-06-22 | Stop reason: SDUPTHER

## 2020-03-20 RX ORDER — SIMVASTATIN 20 MG
10 TABLET ORAL NIGHTLY
Qty: 45 TABLET | Refills: 3 | Status: SHIPPED | OUTPATIENT
Start: 2020-03-20 | End: 2020-06-22 | Stop reason: SDUPTHER

## 2020-03-20 RX ORDER — GLIMEPIRIDE 4 MG/1
4 TABLET ORAL DAILY
Qty: 90 TABLET | Refills: 1 | Status: SHIPPED | OUTPATIENT
Start: 2020-03-20 | End: 2020-06-22 | Stop reason: SDUPTHER

## 2020-03-20 RX ORDER — GABAPENTIN 300 MG/1
CAPSULE ORAL
Qty: 270 CAPSULE | Refills: 0 | OUTPATIENT
Start: 2020-03-20

## 2020-05-18 NOTE — TELEPHONE ENCOUNTER
12/26/20 6/22/20  Requested Prescriptions     Signed Prescriptions Disp Refills    metFORMIN (GLUCOPHAGE) 1000 MG tablet 180 tablet 0     Sig: TAKE 1 TABLET TWICE DAILY  WITH MEALS     Authorizing Provider: Reid Joseph     Ordering User: Vishnu Antonio

## 2020-05-26 RX ORDER — TADALAFIL 20 MG/1
20 TABLET ORAL PRN
Qty: 30 TABLET | Refills: 1 | Status: SHIPPED | OUTPATIENT
Start: 2020-05-26 | End: 2020-06-22 | Stop reason: ALTCHOICE

## 2020-06-01 ENCOUNTER — PROCEDURE VISIT (OUTPATIENT)
Dept: CARDIOLOGY CLINIC | Age: 70
End: 2020-06-01
Payer: MEDICARE

## 2020-06-01 PROCEDURE — 93294 REM INTERROG EVL PM/LDLS PM: CPT | Performed by: INTERNAL MEDICINE

## 2020-06-01 PROCEDURE — 93296 REM INTERROG EVL PM/IDS: CPT | Performed by: INTERNAL MEDICINE

## 2020-06-16 DIAGNOSIS — E11.9 TYPE 2 DIABETES MELLITUS WITHOUT COMPLICATION, WITH LONG-TERM CURRENT USE OF INSULIN (HCC): ICD-10-CM

## 2020-06-16 DIAGNOSIS — Z79.4 TYPE 2 DIABETES MELLITUS WITHOUT COMPLICATION, WITH LONG-TERM CURRENT USE OF INSULIN (HCC): ICD-10-CM

## 2020-06-16 LAB
A/G RATIO: 2 (ref 1.1–2.2)
ALBUMIN SERPL-MCNC: 4.2 G/DL (ref 3.4–5)
ALP BLD-CCNC: 67 U/L (ref 40–129)
ALT SERPL-CCNC: 18 U/L (ref 10–40)
ANION GAP SERPL CALCULATED.3IONS-SCNC: 12 MMOL/L (ref 3–16)
AST SERPL-CCNC: 25 U/L (ref 15–37)
BILIRUB SERPL-MCNC: <0.2 MG/DL (ref 0–1)
BUN BLDV-MCNC: 47 MG/DL (ref 7–20)
CALCIUM SERPL-MCNC: 8.8 MG/DL (ref 8.3–10.6)
CHLORIDE BLD-SCNC: 100 MMOL/L (ref 99–110)
CHOLESTEROL, TOTAL: 82 MG/DL (ref 0–199)
CO2: 23 MMOL/L (ref 21–32)
CREAT SERPL-MCNC: 1.4 MG/DL (ref 0.8–1.3)
ESTIMATED AVERAGE GLUCOSE: 162.8 MG/DL
GFR AFRICAN AMERICAN: >60
GFR NON-AFRICAN AMERICAN: 50
GLOBULIN: 2.1 G/DL
GLUCOSE BLD-MCNC: 131 MG/DL (ref 70–99)
HBA1C MFR BLD: 7.3 %
HDLC SERPL-MCNC: 34 MG/DL (ref 40–60)
LDL CHOLESTEROL CALCULATED: 25 MG/DL
POTASSIUM SERPL-SCNC: 4.9 MMOL/L (ref 3.5–5.1)
SODIUM BLD-SCNC: 135 MMOL/L (ref 136–145)
TOTAL PROTEIN: 6.3 G/DL (ref 6.4–8.2)
TRIGL SERPL-MCNC: 116 MG/DL (ref 0–150)
VLDLC SERPL CALC-MCNC: 23 MG/DL

## 2020-06-22 ENCOUNTER — OFFICE VISIT (OUTPATIENT)
Dept: FAMILY MEDICINE CLINIC | Age: 70
End: 2020-06-22
Payer: MEDICARE

## 2020-06-22 VITALS
OXYGEN SATURATION: 99 % | DIASTOLIC BLOOD PRESSURE: 60 MMHG | BODY MASS INDEX: 30.66 KG/M2 | HEART RATE: 60 BPM | TEMPERATURE: 97.7 F | HEIGHT: 71 IN | WEIGHT: 219 LBS | SYSTOLIC BLOOD PRESSURE: 114 MMHG

## 2020-06-22 VITALS
HEART RATE: 60 BPM | OXYGEN SATURATION: 99 % | TEMPERATURE: 97.7 F | DIASTOLIC BLOOD PRESSURE: 60 MMHG | RESPIRATION RATE: 16 BRPM | HEIGHT: 71 IN | WEIGHT: 219 LBS | SYSTOLIC BLOOD PRESSURE: 114 MMHG | BODY MASS INDEX: 30.66 KG/M2

## 2020-06-22 PROCEDURE — 1036F TOBACCO NON-USER: CPT | Performed by: FAMILY MEDICINE

## 2020-06-22 PROCEDURE — G8427 DOCREV CUR MEDS BY ELIG CLIN: HCPCS | Performed by: FAMILY MEDICINE

## 2020-06-22 PROCEDURE — 4040F PNEUMOC VAC/ADMIN/RCVD: CPT | Performed by: FAMILY MEDICINE

## 2020-06-22 PROCEDURE — G0438 PPPS, INITIAL VISIT: HCPCS | Performed by: FAMILY MEDICINE

## 2020-06-22 PROCEDURE — 90732 PPSV23 VACC 2 YRS+ SUBQ/IM: CPT | Performed by: FAMILY MEDICINE

## 2020-06-22 PROCEDURE — 3051F HG A1C>EQUAL 7.0%<8.0%: CPT | Performed by: FAMILY MEDICINE

## 2020-06-22 PROCEDURE — 1123F ACP DISCUSS/DSCN MKR DOCD: CPT | Performed by: FAMILY MEDICINE

## 2020-06-22 PROCEDURE — 99214 OFFICE O/P EST MOD 30 MIN: CPT | Performed by: FAMILY MEDICINE

## 2020-06-22 PROCEDURE — G8417 CALC BMI ABV UP PARAM F/U: HCPCS | Performed by: FAMILY MEDICINE

## 2020-06-22 PROCEDURE — 3017F COLORECTAL CA SCREEN DOC REV: CPT | Performed by: FAMILY MEDICINE

## 2020-06-22 PROCEDURE — 2022F DILAT RTA XM EVC RTNOPTHY: CPT | Performed by: FAMILY MEDICINE

## 2020-06-22 PROCEDURE — G0009 ADMIN PNEUMOCOCCAL VACCINE: HCPCS | Performed by: FAMILY MEDICINE

## 2020-06-22 RX ORDER — GLIMEPIRIDE 4 MG/1
4 TABLET ORAL DAILY
Qty: 90 TABLET | Refills: 1 | Status: SHIPPED | OUTPATIENT
Start: 2020-06-22 | End: 2021-02-09 | Stop reason: SDUPTHER

## 2020-06-22 RX ORDER — ALLOPURINOL 300 MG/1
300 TABLET ORAL DAILY
Qty: 90 TABLET | Refills: 1 | Status: SHIPPED | OUTPATIENT
Start: 2020-06-22 | End: 2020-09-29 | Stop reason: SDUPTHER

## 2020-06-22 RX ORDER — ALLOPURINOL 100 MG/1
100 TABLET ORAL DAILY
Qty: 90 TABLET | Refills: 1 | Status: SHIPPED | OUTPATIENT
Start: 2020-06-22 | End: 2020-09-29 | Stop reason: SDUPTHER

## 2020-06-22 RX ORDER — AMLODIPINE BESYLATE 2.5 MG/1
2.5 TABLET ORAL DAILY
Qty: 90 TABLET | Refills: 1 | Status: SHIPPED | OUTPATIENT
Start: 2020-06-22 | End: 2020-07-07 | Stop reason: SINTOL

## 2020-06-22 RX ORDER — GABAPENTIN 300 MG/1
CAPSULE ORAL
Qty: 270 CAPSULE | Refills: 0 | Status: SHIPPED | OUTPATIENT
Start: 2020-06-22 | End: 2020-08-04

## 2020-06-22 RX ORDER — DULAGLUTIDE 1.5 MG/.5ML
1.5 INJECTION, SOLUTION SUBCUTANEOUS WEEKLY
Qty: 12 PEN | Refills: 1 | Status: SHIPPED | OUTPATIENT
Start: 2020-06-22 | End: 2020-07-22

## 2020-06-22 RX ORDER — SIMVASTATIN 20 MG
10 TABLET ORAL NIGHTLY
Qty: 45 TABLET | Refills: 1 | Status: SHIPPED | OUTPATIENT
Start: 2020-06-22 | End: 2020-09-29 | Stop reason: SDUPTHER

## 2020-06-22 RX ORDER — SILDENAFIL 100 MG/1
100 TABLET, FILM COATED ORAL DAILY PRN
Qty: 30 TABLET | Refills: 1 | Status: SHIPPED | OUTPATIENT
Start: 2020-06-22 | End: 2020-10-09

## 2020-06-22 ASSESSMENT — LIFESTYLE VARIABLES
AUDIT-C TOTAL SCORE: 1
HOW OFTEN DO YOU HAVE SIX OR MORE DRINKS ON ONE OCCASION: 0
HOW MANY STANDARD DRINKS CONTAINING ALCOHOL DO YOU HAVE ON A TYPICAL DAY: 0
HAVE YOU OR SOMEONE ELSE BEEN INJURED AS A RESULT OF YOUR DRINKING: 0
HOW OFTEN DURING THE LAST YEAR HAVE YOU FAILED TO DO WHAT WAS NORMALLY EXPECTED FROM YOU BECAUSE OF DRINKING: 0
AUDIT TOTAL SCORE: 1
HOW OFTEN DURING THE LAST YEAR HAVE YOU NEEDED AN ALCOHOLIC DRINK FIRST THING IN THE MORNING TO GET YOURSELF GOING AFTER A NIGHT OF HEAVY DRINKING: 0
HOW OFTEN DURING THE LAST YEAR HAVE YOU BEEN UNABLE TO REMEMBER WHAT HAPPENED THE NIGHT BEFORE BECAUSE YOU HAD BEEN DRINKING: 0
HAS A RELATIVE, FRIEND, DOCTOR, OR ANOTHER HEALTH PROFESSIONAL EXPRESSED CONCERN ABOUT YOUR DRINKING OR SUGGESTED YOU CUT DOWN: 0
HOW OFTEN DO YOU HAVE A DRINK CONTAINING ALCOHOL: 1
HOW OFTEN DURING THE LAST YEAR HAVE YOU HAD A FEELING OF GUILT OR REMORSE AFTER DRINKING: 0
HOW OFTEN DURING THE LAST YEAR HAVE YOU FOUND THAT YOU WERE NOT ABLE TO STOP DRINKING ONCE YOU HAD STARTED: 0

## 2020-06-22 ASSESSMENT — ENCOUNTER SYMPTOMS
SHORTNESS OF BREATH: 0
DIARRHEA: 0
SORE THROAT: 0
CHEST TIGHTNESS: 0
ABDOMINAL PAIN: 0
WHEEZING: 0
RHINORRHEA: 0

## 2020-06-22 ASSESSMENT — PATIENT HEALTH QUESTIONNAIRE - PHQ9
SUM OF ALL RESPONSES TO PHQ QUESTIONS 1-9: 0
SUM OF ALL RESPONSES TO PHQ QUESTIONS 1-9: 0

## 2020-06-22 NOTE — PROGRESS NOTES
vaccine [facility]) 50 MCG/0.5ML SUSR injection Inject 0.5 mLs into the muscle once for 1 dose 50 MCG IM then repeat 2-6 months. 1 each 1    metFORMIN (GLUCOPHAGE) 1000 MG tablet TAKE 1 TABLET TWICE DAILY  WITH MEALS 180 tablet 1    Dulaglutide (TRULICITY) 1.5 TV/8.3YF SOPN Inject 1.5 mg as directed once a week 12 pen 1    allopurinol (ZYLOPRIM) 300 MG tablet Take 1 tablet by mouth daily 90 tablet 1    allopurinol (ZYLOPRIM) 100 MG tablet Take 1 tablet by mouth daily 90 tablet 1    glimepiride (AMARYL) 4 MG tablet Take 1 tablet by mouth daily 90 tablet 1    simvastatin (ZOCOR) 20 MG tablet Take 0.5 tablets by mouth nightly Patient is taking 1/2 tab daily 45 tablet 1    canagliflozin (INVOKANA) 300 MG TABS tablet Take 1 tablet by mouth every morning (before breakfast) 90 tablet 1    gabapentin (NEURONTIN) 300 MG capsule TAKE 1  capsule 0    amLODIPine (NORVASC) 2.5 MG tablet Take 1 tablet by mouth daily 90 tablet 1    sildenafil (VIAGRA) 100 MG tablet Take 1 tablet by mouth daily as needed for Erectile Dysfunction 30 tablet 1    valsartan (DIOVAN) 80 MG tablet TAKE 1 TABLET DAILY 90 tablet 3    metoprolol succinate (TOPROL XL) 25 MG extended release tablet Take 1 tablet by mouth daily 90 tablet 3    furosemide (LASIX) 20 MG tablet Take 1 tablet by mouth 2 times daily May take an extra Lasix if has increased swelling 180 tablet 3    rivaroxaban (XARELTO) 20 MG TABS tablet Take 1 tablet by mouth daily (with breakfast) 90 tablet 3    aspirin 81 MG tablet Take 81 mg by mouth daily       No current facility-administered medications for this visit.         ALLERGIES  Allergies   Allergen Reactions    Spironolactone      CAUSES INCREASED K+    Tape [place] Tape] Rash     SURGICAL TAPE       PHYSICAL EXAM    /60 (Site: Right Upper Arm, Position: Sitting, Cuff Size: Medium Adult)   Pulse 60   Temp 97.7 °F (36.5 °C) (Temporal)   Ht 5' 11\" (1.803 m)   Wt 219 lb (99.3 kg)   SpO2 99%   BMI 30.54 kg/m²     Physical Exam  Constitutional:       Appearance: Normal appearance. HENT:      Head: Normocephalic. Right Ear: External ear normal.      Left Ear: External ear normal.   Eyes:      Conjunctiva/sclera: Conjunctivae normal.   Cardiovascular:      Rate and Rhythm: Normal rate. Pulmonary:      Effort: Pulmonary effort is normal. No respiratory distress. Musculoskeletal:      Right lower leg: No edema. Left lower leg: No edema. Skin:     General: Skin is warm and dry. Neurological:      General: No focal deficit present. Mental Status: He is alert and oriented to person, place, and time. Mental status is at baseline. Psychiatric:         Mood and Affect: Mood normal.     DIABETIC FOOT EXAM:    Visual inspection:      Deformity/amputation: absent  Skin lesions/pre-ulcerative calluses: absent  Edema: right- negative, left- negative    Sensory exam:  Monofilament sensation: abnormal -had multiple areas of no sensation of both feet  (minimum of 5 random plantar locations tested, avoiding callused areas - > 1 area with absence of sensation is + for neuropathy)    Plus at least one of the following:  Pulses:   Does not have good pulses in either foot     Blood work reviewed  A1c 7.3 which is improved  Lipids and chemistry profile are okay except for chronic stage III kidney disease    ASSESSMENT and PLAN    Vinh Turner was seen today for 6 month follow-up and medication refill. Diagnoses and all orders for this visit:    Need for prophylactic vaccination against Streptococcus pneumoniae (pneumococcus)  -     Pneumococcal polysaccharide vaccine 23-valent PPSV23    Need for prophylactic vaccination and inoculation against varicella  -     zoster recombinant adjuvanted vaccine (SHINGRIX) 50 MCG/0.5ML SUSR injection; Inject 0.5 mLs into the muscle once for 1 dose 50 MCG IM then repeat 2-6 months.     Type 2 diabetes mellitus with diabetic polyneuropathy, without long-term current use of

## 2020-06-22 NOTE — PROGRESS NOTES
Medicare Annual Wellness Visit  Name: Srikanth Montoya Date: 2020   MRN: [id number] Sex: Male   Age: 65 y.o. Ethnicity: Non-/Non    : 1950 Race: Amanda Yeboah is here for Medicare AWV    Screenings for behavioral, psychosocial and functional/safety risks, and cognitive dysfunction are all negative except as indicated below. These results, as well as other patient data from the [address] form, are documented in Flowsheets linked to this Encounter. Allergies   Allergen Reactions    Spironolactone      CAUSES INCREASED K+    Tape [place] Tape] Rash     SURGICAL TAPE       Prior to Visit Medications    Medication Sig Taking? Authorizing Provider   zoster recombinant adjuvanted vaccine [facility]) 50 MCG/0.5ML SUSR injection Inject 0.5 mLs into the muscle once for 1 dose 50 MCG IM then repeat 2-6 months.  Yes Aleks Hurst MD   metFORMIN (GLUCOPHAGE) 1000 MG tablet TAKE 1 TABLET TWICE DAILY  WITH MEALS Yes Aleks Hurst MD   Dulaglutide (TRULICITY) 1.5 NI/9.0DA SOPN Inject 1.5 mg as directed once a week Yes Aleks Hurst MD   allopurinol (ZYLOPRIM) 300 MG tablet Take 1 tablet by mouth daily Yes Aleks Hurst MD   allopurinol (ZYLOPRIM) 100 MG tablet Take 1 tablet by mouth daily Yes Aleks Hurst MD   glimepiride (AMARYL) 4 MG tablet Take 1 tablet by mouth daily Yes Aleks Hurst MD   simvastatin (ZOCOR) 20 MG tablet Take 0.5 tablets by mouth nightly Patient is taking 1/2 tab daily Yes Aleks Hurst MD   canagliflozin (INVOKANA) 300 MG TABS tablet Take 1 tablet by mouth every morning (before breakfast) Yes Aleks Hurst MD   gabapentin (NEURONTIN) 300 MG capsule TAKE 1 TID Yes Aleks Hurst MD   amLODIPine (NORVASC) 2.5 MG tablet Take 1 tablet by mouth daily Yes Aleks Hurst MD   sildenafil (VIAGRA) 100 MG tablet Take 1 tablet by mouth daily as needed for Erectile Dysfunction Yes Aleks Hurst MD valsartan (DIOVAN) 80 MG tablet TAKE 1 TABLET DAILY Yes Siegfried Baumgarten, MD   metoprolol succinate (TOPROL XL) 25 MG extended release tablet Take 1 tablet by mouth daily Yes Adriano Mcdonald MD   furosemide (LASIX) 20 MG tablet Take 1 tablet by mouth 2 times daily May take an extra Lasix if has increased swelling Yes Bar Peterson MD   rivaroxaban (XARELTO) 20 MG TABS tablet Take 1 tablet by mouth daily (with breakfast) Yes JUSTIN Shay - CNP   aspirin 81 MG tablet Take 81 mg by mouth daily Yes Historical Provider, MD       Past Medical History:   Diagnosis Date    Arthritis 12/2013    rt wrist    Atrial fibrillation ([address]     CAD (coronary artery disease) 6/18/2014    see dr Jim López Chronic kidney disease, stage III (moderate) ([address] 7/7/2016    Diabetes mellitus ([address]     dx 2004    Diabetic neuropathy associated with type 2 diabetes mellitus ([facility] 4/23/2019    Gout     \"got gout when had pacer put in because they did not give me my medication for gout \"    Gout 4/23/2019    H/O 24 hour EKG monitoring 10/3/2013    no afib noted, sinsus rhythm    H/O cardiovascular stress test 5/12/2014    cardiolite- mild ischemia RCA EF50%    H/O transesophageal echocardiography (MABLE) for monitoring 08/05/2013    normal LV function and normal LA appendage without any clot    [place] (hard of hearing)     hearing tonya aides    Hx of Doppler echocardiogram 05/21/2018    EF 50%  Mild LV hypertrophy. Mildly enlarged RA. Mod aortic valve calcification with mod AS. Mitral annular calcification is present. Mild AR, MR and TR. Mild pulmonary htn.     Hyperlipidemia     Hypertension     Other disorders of kidney and ureter     Pneumonia 12/29/2012    Sleep apnea     dx 2013- has c-pap    Type II or unspecified type diabetes mellitus with other specified manifestations, uncontrolled 12/12/2012    Venous hypertension, chronic, with ulcer ([address] 12/12/2012    resolved       Past Surgical History:   Procedure Laterality Date    CARDIOVERSION  12/14    at [address]  2011    COLONOSCOPY N/A 11/19/2019    COLONOSCOPY DIAGNOSTIC performed by Brandon Christianson MD at [facility] 19 2014    \"2 stents put in \"   C/ [address] 2004    total left hip    OTHER SURGICAL HISTORY Right 12/02/2017    I&D; evacuation of hematoma right hip    PACEMAKER PLACEMENT      9/18/14 Status post remote permanent pacemaker with atrial lead dislodgement. 7/24/14 PPM Implant    VASCULAR SURGERY  2012    \"have stents in both legs- done in [place]       Family History   Problem Relation Age of Onset    High Blood Pressure Mother     Arthritis Mother     Diabetes Mother     Heart Disease Mother     High Blood Pressure Father     Heart Disease Father     Kidney Disease Father        CareTeam (Including outside providers/suppliers regularly involved in providing care):   Patient Care Team:  Sunny Mitchell MD as PCP - Jaci Avalos MD as PCP - [facility] Provider  Johnny Pichardo MD as Consulting Physician (Cardiology)    Wt Readings from Last 3 Encounters:   06/22/20 219 lb (99.3 kg)   06/22/20 219 lb (99.3 kg)   12/26/19 230 lb (104.3 kg)     Vitals:    06/22/20 0930   BP: 114/60   Pulse: 60   Resp: 16   Temp: 97.7 °F (36.5 °C)   TempSrc: Temporal   SpO2: 99%   Weight: 219 lb (99.3 kg)   Height: 5' 11\" (1.803 m)     Body mass index is 30.54 kg/m². Based upon direct observation of the patient, evaluation of cognition reveals recent and remote memory intact. Patient's complete Health Risk Assessment and screening values have been reviewed and are found in Flowsheets. The following problems were reviewed today and where indicated follow up appointments were made and/or referrals ordered.     Positive Risk Factor Screenings with Interventions:     General Health:  General  In general, how would you say your health is?: Very Good  In the past 7 days, have you experienced any of the following? New or Increased Pain, New or Increased Fatigue, Loneliness, Social Isolation, Stress or Anger?: (!) New or Increased Fatigue  Do you get the social and emotional support that you need?: Yes  Do you have a Living Will?: Yes  General Health Risk Interventions:  · Fatigue: regular exercise recommended- 3-5 times per week, 30-45 minutes per session    Health Habits/Nutrition:  Health Habits/Nutrition  Do you exercise for at least 20 minutes 2-3 times per week?: Yes  Have you lost any weight without trying in the past 3 months?: (!) Yes  Do you eat fewer than 2 meals per day?: No  Have you seen a dentist within the past year?: Yes  Body mass index is 30.54 kg/m².   Health Habits/Nutrition Interventions:  · Nutritional issues:  patient is not ready to address his/her nutritional/weight issues at this time    Personalized Preventive Plan   Current Health Maintenance Status  Immunization History   Administered Date(s) Administered    Influenza Vaccine, unspecified formulation 10/27/2017    Influenza Virus Vaccine 11/09/2015, 10/27/2017    Influenza, High Dose (Fluzone 65 yrs and older) 10/11/2018    Influenza, Triv, inactivated, subunit, adjuvanted, IM (Fluad 65 yrs and older) 09/20/2019    Pneumococcal Conjugate 13-valent ([id number]) 10/30/2014    Pneumococcal Polysaccharide ([id number]) 01/01/2008    Tdap (Boostrix, Adacel) 11/09/2015        Health Maintenance   Topic Date Due    Shingles Vaccine (1 of 2) 01/13/1998    Pneumococcal 65+ years Vaccine (2 of 2 - PPSV23) 10/30/2015    Annual Wellness Visit (AWV)  05/29/2019    Diabetic foot exam  04/23/2020    Diabetic retinal exam  08/20/2020    A1C test (Diabetic or Prediabetic)  06/16/2021    Lipid screen  06/16/2021    Potassium monitoring  06/16/2021    Creatinine monitoring  06/16/2021    Colon cancer screen colonoscopy  11/19/2024    DTaP/Tdap/Td vaccine (2 - Td) 11/09/2025    Flu vaccine  Completed    Hepatitis C screen  Completed    Hepatitis A vaccine  Aged Out    Hib vaccine  Aged Out    Meningococcal (ACWY) vaccine  Aged Out     Recommendations for [employer] Due: see orders and patient instructions/AVS.  . Recommended screening schedule for the next 5-10 years is provided to the patient in written form: see Patient Instructions/AVS.  This encounter was performed under my, Alexis Aquino, direct supervision, 6/22/2020. Lizy Robison LPN, 8/72/1400, performed the documented evaluation under the direct supervision of the attending physician.

## 2020-07-07 ENCOUNTER — OFFICE VISIT (OUTPATIENT)
Dept: FAMILY MEDICINE CLINIC | Age: 70
End: 2020-07-07
Payer: MEDICARE

## 2020-07-07 VITALS
HEIGHT: 71 IN | WEIGHT: 216.7 LBS | TEMPERATURE: 97.7 F | BODY MASS INDEX: 30.34 KG/M2 | OXYGEN SATURATION: 100 % | HEART RATE: 62 BPM | SYSTOLIC BLOOD PRESSURE: 100 MMHG | DIASTOLIC BLOOD PRESSURE: 70 MMHG

## 2020-07-07 DIAGNOSIS — E11.42 TYPE 2 DIABETES MELLITUS WITH DIABETIC POLYNEUROPATHY, WITHOUT LONG-TERM CURRENT USE OF INSULIN (HCC): ICD-10-CM

## 2020-07-07 DIAGNOSIS — R53.83 FATIGUE, UNSPECIFIED TYPE: ICD-10-CM

## 2020-07-07 DIAGNOSIS — I10 ESSENTIAL HYPERTENSION: ICD-10-CM

## 2020-07-07 DIAGNOSIS — E78.2 MIXED HYPERLIPIDEMIA: ICD-10-CM

## 2020-07-07 DIAGNOSIS — Z95.0 CARDIAC PACEMAKER IN SITU: ICD-10-CM

## 2020-07-07 PROCEDURE — 1123F ACP DISCUSS/DSCN MKR DOCD: CPT | Performed by: FAMILY MEDICINE

## 2020-07-07 PROCEDURE — 3051F HG A1C>EQUAL 7.0%<8.0%: CPT | Performed by: FAMILY MEDICINE

## 2020-07-07 PROCEDURE — 2022F DILAT RTA XM EVC RTNOPTHY: CPT | Performed by: FAMILY MEDICINE

## 2020-07-07 PROCEDURE — 99214 OFFICE O/P EST MOD 30 MIN: CPT | Performed by: FAMILY MEDICINE

## 2020-07-07 PROCEDURE — G8417 CALC BMI ABV UP PARAM F/U: HCPCS | Performed by: FAMILY MEDICINE

## 2020-07-07 PROCEDURE — 3017F COLORECTAL CA SCREEN DOC REV: CPT | Performed by: FAMILY MEDICINE

## 2020-07-07 PROCEDURE — 1036F TOBACCO NON-USER: CPT | Performed by: FAMILY MEDICINE

## 2020-07-07 PROCEDURE — G8427 DOCREV CUR MEDS BY ELIG CLIN: HCPCS | Performed by: FAMILY MEDICINE

## 2020-07-07 PROCEDURE — 4040F PNEUMOC VAC/ADMIN/RCVD: CPT | Performed by: FAMILY MEDICINE

## 2020-07-07 ASSESSMENT — ENCOUNTER SYMPTOMS
DIARRHEA: 0
ABDOMINAL PAIN: 0
VOMITING: 0
COUGH: 0
NAUSEA: 0
SHORTNESS OF BREATH: 0

## 2020-07-07 NOTE — PROGRESS NOTES
7/7/2020     Dorie Stauffer is a 65 y.o. male who presents today with:  Chief Complaint   Patient presents with    Dizziness     pt states that he has dizziness when he bends over playing golf and when he is just walking . Pt states that his calf  muscle area feels heavy when he walks or does yard work . Pt states no headaches or chest pains . Pt states this has been going on for 3 weeks and has got worse . .    /70 (Site: Right Upper Arm, Position: Standing)   Pulse 62   Temp 97.7 °F (36.5 °C)   Ht 5' 11\" (1.803 m)   Wt 216 lb 11.2 oz (98.3 kg)   SpO2 100%   BMI 30.22 kg/m²     HPI  Patient is here for for a sick office visit due to his dizziness. History was obtained from the pt. He states that his blood pressures at home are 100-109/40's. He was seen in the office back in June for these similar symptoms, and his blood pressure medication was decreased. States that he gets dizzy when he changes position and when he is up and moving. He gets fatigued, or \"weary\" where he feels like he is going to pass out. This passes after a few minutes. He started taking Amlodipine 2.5mg, 1 tablet PO daily but his blood pressures have not really increased as they have been on the lower side. He reports that he has been working out and has lost some weight and feels that he is on too much blood pressure medication at this time. Patient does report some blurred vision with his dizziness and lightheadedness, but denies any cough, shortness of breath, chest pain, palpitations, or leg swelling. He also has complaints of leg pain and bilateral aching and heaviness in his legs. Reports his legs get tired when he is up and walking. He states that he has a history of peripheral vascular disease, and has had stents placed in his legs bilaterally. He does follow w/ Dr. Melina Burks for this. He has issues with his legs feeling heavy. No increased swelling.  sxs are bilateral.  Patient does have a history of type 2 diabetes with polyneuropathy affecting his bilateral lower extremities. Patient is currently treated for A. fib as well as hyperlipidemia and is doing well with those medications, but labs rechecked as we do not have any recent results. Denies any fevers, chills, cough, shortness of breath, chest pain, palpitations, leg swelling, abdominal pain, or headaches. REVIEW OF SYMPTOMS    Review of Systems   Constitutional: Positive for fatigue. Negative for chills and fever. Eyes: Positive for visual disturbance (intermittently blurred vision with dizziness). Respiratory: Negative for cough and shortness of breath. Cardiovascular: Negative for chest pain, palpitations and leg swelling. Gastrointestinal: Negative for abdominal pain, diarrhea, nausea and vomiting. Neurological: Positive for dizziness and light-headedness. Negative for headaches. PAST MEDICAL HISTORY  Past Medical History:   Diagnosis Date    Arthritis 12/2013    rt wrist    Atrial fibrillation ([address]     CAD (coronary artery disease) 6/18/2014    see dr Page Vargas Chronic kidney disease, stage III (moderate) ([address] 7/7/2016    Diabetes mellitus ([facility]     dx 2004    Diabetic neuropathy associated with type 2 diabetes mellitus ([facility] 4/23/2019    Gout     \"got gout when had pacer put in because they did not give me my medication for gout \"    Gout 4/23/2019    H/O 24 hour EKG monitoring 10/3/2013    no afib noted, sinsus rhythm    H/O cardiovascular stress test 5/12/2014    cardiolite- mild ischemia RCA EF50%    H/O transesophageal echocardiography (MABLE) for monitoring 08/05/2013    normal LV function and normal LA appendage without any clot    [place] (hard of hearing)     hearing [employer]    Hx of Doppler echocardiogram 05/21/2018    EF 50%  Mild LV hypertrophy. Mildly enlarged RA. Mod aortic valve calcification with mod AS. Mitral annular calcification is present. Mild AR, MR and TR. Mild pulmonary htn.     Hyperlipidemia     Hypertension     Other disorders of kidney and ureter     Pneumonia 12/29/2012    Sleep apnea     dx 2013- has c-pap    Type II or unspecified type diabetes mellitus with other specified manifestations, uncontrolled 12/12/2012    Venous hypertension, chronic, with ulcer ([facility] 12/12/2012    resolved       FAMILY HISTORY  Family History   Problem Relation Age of Onset    High Blood Pressure Mother     Arthritis Mother     Diabetes Mother     Heart Disease Mother     High Blood Pressure Father     Heart Disease Father     Kidney Disease Father        SOCIAL HISTORY  Social History     Socioeconomic History    Marital status:      Spouse name: None    Number of children: None    Years of education: None    Highest education level: None   Occupational History    None   Social Needs    Financial resource strain: None    Food insecurity     Worry: None     Inability: None    Transportation needs     Medical: None     Non-medical: None   Tobacco Use    Smoking status: Never Smoker    Smokeless tobacco: Never Used   Substance and Sexual Activity    Alcohol use:  Yes     Alcohol/week: 2.0 standard drinks     Types: 2 Cans of beer per week     Comment: average \"one time per week\"    Drug use: No    Sexual activity: Yes     Partners: Female     Comment:    Lifestyle    Physical activity     Days per week: None     Minutes per session: None    Stress: None   Relationships    Social connections     Talks on phone: None     Gets together: None     Attends [employer] service: None     Active member of club or organization: None     Attends meetings of clubs or organizations: None     Relationship status: None    Intimate partner violence     Fear of current or ex partner: None     Emotionally abused: None     Physically abused: None     Forced sexual activity: None   Other Topics Concern    None   Social History Narrative    None        SURGICAL HISTORY  Past Surgical 3    aspirin 81 MG tablet Take 81 mg by mouth daily       No current facility-administered medications for this visit. ALLERGIES  Allergies   Allergen Reactions    Spironolactone      CAUSES INCREASED K+    Tape Ann Ashutosh Tape] Rash     SURGICAL TAPE       PHYSICAL EXAM    Physical Exam  Vitals signs and nursing note reviewed. Constitutional:       General: He is not in acute distress. Appearance: He is well-developed. He is not diaphoretic. HENT:      Head: Normocephalic and atraumatic. Cardiovascular:      Rate and Rhythm: Normal rate and regular rhythm. Pulses:           Dorsalis pedis pulses are 2+ on the right side and 2+ on the left side. Posterior tibial pulses are 2+ on the right side and 2+ on the left side. Heart sounds: Normal heart sounds. Pulmonary:      Effort: Pulmonary effort is normal. No respiratory distress. Breath sounds: Normal breath sounds. Abdominal:      General: Bowel sounds are normal.      Palpations: Abdomen is soft. Tenderness: There is no abdominal tenderness. Musculoskeletal:         General: No swelling or tenderness. Right lower leg: No edema. Left lower leg: No edema. Comments: Bilateral calves non-TTP. No erythema, redness or warmth to the touch      Feet:      Right foot:      Skin integrity: No erythema or warmth. Left foot:      Skin integrity: No erythema or warmth. Skin:     General: Skin is warm and dry. Comments: Venous stasis staining noted in BLE. Hx of PAD. Neurological:      Mental Status: He is alert and oriented to person, place, and time. Psychiatric:         Thought Content: Thought content normal.         ASSESSMENT & PLAN    Spoke with patient's PCP, Dr. Ilir Michael about his presentation and plan of care. Dr. Ilir Michael agreed that for his legs, the patient needed to see Dr. Keena Parikh again, and that we could order the carotid ultrasound due to the possible carotid bruit.   Patient is to follow-up with cardiology, for further evaluation of his murmur. 1. Dizziness  For his dizziness and carotid bruit that was auscultated will have an ultrasound of his bilateral carotid arteries due to these symptoms as well as the patient's history of hyperlipidemia and peripheral vascular disease.  - US CAROTID ARTERY BILATERAL; Future    2. PVD (peripheral vascular disease) ([facility]  Patient has a history of peripheral vascular disease and has seen a vein specialist for this. I did instruct him to contact their office today for further evaluation and management of this issue as I believe his stents may need revisement. 3. Fatigue, unspecified type  For his increased fatigue, we will obtain lab work as outlined below. Patient will have lab work completed today and medication dosages may be changed based on the lab results. Patient will be updated on lab results once they are completed and notified of any medication changes if necessary.  - Comprehensive Metabolic Panel; Future  - CBC Auto Differential; Future  - Vitamin B12; Future  - T4, Free; Future  - TSH WITH REFLEX TO FT4; Future  - Folate; Future    4. Bruit of right carotid artery  See above (#1).  - US CAROTID ARTERY BILATERAL; Future    5. Type 2 diabetes mellitus with diabetic polyneuropathy, without long-term current use of insulin ([facility]  Patient does not have a recent A1c, and due to his neuropathy request his complaints of leg heaviness and achiness we will evaluate labs as outlined below. Patient will have lab work completed today and medication dosages may be changed based on the lab results. Patient will be updated on lab results once they are completed and notified of any medication changes if necessary.  - Vitamin B12; Future  - Hemoglobin A1C; Future  - Folate; Future    6. PAF (paroxysmal atrial fibrillation) ([facility]  Patient is currently taking Xarelto for anticoagulation and is doing well on this.   Issue is stable on current medications. Continue taking medications as prescribed and refills will be provided as necessary. 7. Essential hypertension  Patient is having more hypotensive episodes and so we will have him stop use of amlodipine to see if this helps with his pressures return to her normal level. We will also see if this helps resolve some of the dizziness he is experiencing. Continue taking other medications as prescribed and refills will be provided as necessary. Patient will have lab work completed today and medication dosages may be changed based on the lab results. Patient will be updated on lab results once they are completed and notified of any medication changes if necessary.  - Comprehensive Metabolic Panel; Future  - CBC Auto Differential; Future    8. Mixed hyperlipidemia  Issue is stable on current medications. Continue taking medications as prescribed and refills will be provided as necessary. Patient will have lab work completed today and medication dosages may be changed based on the lab results. Patient will be updated on lab results once they are completed and notified of any medication changes if necessary.  - Lipid Panel; Future    Patient is to follow-up in approximately 2-3 weeks, unless otherwise needed in that time. Pt is to call with any questions, concerns, or increase in symptoms. Pt verbalized understanding of and agreement with current plan of care. Electronically signed by SATISH Medrano on 7/8/2020      Please note that this chart was generated using dragon dictation software. Although every effort was made to ensure the accuracy of this automated transcription, some errors in transcription may have occurred.

## 2020-07-08 ENCOUNTER — TELEPHONE (OUTPATIENT)
Dept: FAMILY MEDICINE CLINIC | Age: 70
End: 2020-07-08

## 2020-07-08 DIAGNOSIS — D50.9 MICROCYTIC ANEMIA: ICD-10-CM

## 2020-07-08 LAB
A/G RATIO: 2.1 (ref 1.1–2.2)
ALBUMIN SERPL-MCNC: 4.9 G/DL (ref 3.4–5)
ALP BLD-CCNC: 64 U/L (ref 40–129)
ALT SERPL-CCNC: 15 U/L (ref 10–40)
ANION GAP SERPL CALCULATED.3IONS-SCNC: 16 MMOL/L (ref 3–16)
AST SERPL-CCNC: 16 U/L (ref 15–37)
BANDED NEUTROPHILS RELATIVE PERCENT: 1 % (ref 0–7)
BASOPHILS ABSOLUTE: 0.1 K/UL (ref 0–0.2)
BASOPHILS RELATIVE PERCENT: 1 %
BILIRUB SERPL-MCNC: 0.3 MG/DL (ref 0–1)
BUN BLDV-MCNC: 34 MG/DL (ref 7–20)
CALCIUM SERPL-MCNC: 9.6 MG/DL (ref 8.3–10.6)
CHLORIDE BLD-SCNC: 97 MMOL/L (ref 99–110)
CHOLESTEROL, TOTAL: 95 MG/DL (ref 0–199)
CO2: 26 MMOL/L (ref 21–32)
CREAT SERPL-MCNC: 1.3 MG/DL (ref 0.8–1.3)
CRENATED RBC'S: ABNORMAL
EOSINOPHILS ABSOLUTE: 0.1 K/UL (ref 0–0.6)
EOSINOPHILS RELATIVE PERCENT: 2 %
ESTIMATED AVERAGE GLUCOSE: 162.8 MG/DL
FOLATE: 12.07 NG/ML (ref 4.78–24.2)
GFR AFRICAN AMERICAN: >60
GFR NON-AFRICAN AMERICAN: 54
GLOBULIN: 2.3 G/DL
GLUCOSE BLD-MCNC: 146 MG/DL (ref 70–99)
HBA1C MFR BLD: 7.3 %
HCT VFR BLD CALC: 31.2 % (ref 40.5–52.5)
HDLC SERPL-MCNC: 36 MG/DL (ref 40–60)
HEMATOLOGY PATH CONSULT: NORMAL
HEMATOLOGY PATH CONSULT: YES
HEMOGLOBIN: 9.1 G/DL (ref 13.5–17.5)
IRON SATURATION: 5 % (ref 20–50)
IRON: 21 UG/DL (ref 59–158)
LDL CHOLESTEROL CALCULATED: 37 MG/DL
LYMPHOCYTES ABSOLUTE: 1.2 K/UL (ref 1–5.1)
LYMPHOCYTES RELATIVE PERCENT: 17 %
MCH RBC QN AUTO: 20.4 PG (ref 26–34)
MCHC RBC AUTO-ENTMCNC: 29.2 G/DL (ref 31–36)
MCV RBC AUTO: 69.9 FL (ref 80–100)
MICROCYTES: ABNORMAL
MONOCYTES ABSOLUTE: 0.4 K/UL (ref 0–1.3)
MONOCYTES RELATIVE PERCENT: 5 %
NEUTROPHILS ABSOLUTE: 5.3 K/UL (ref 1.7–7.7)
NEUTROPHILS RELATIVE PERCENT: 74 %
OVALOCYTES: ABNORMAL
PDW BLD-RTO: 18.3 % (ref 12.4–15.4)
PLATELET # BLD: 270 K/UL (ref 135–450)
PLATELET SLIDE REVIEW: ADEQUATE
PMV BLD AUTO: 8.7 FL (ref 5–10.5)
POIKILOCYTES: ABNORMAL
POTASSIUM SERPL-SCNC: 4.6 MMOL/L (ref 3.5–5.1)
RBC # BLD: 4.47 M/UL (ref 4.2–5.9)
SLIDE REVIEW: ABNORMAL
SODIUM BLD-SCNC: 139 MMOL/L (ref 136–145)
T4 FREE: 1.5 NG/DL (ref 0.9–1.8)
TOTAL IRON BINDING CAPACITY: 462 UG/DL (ref 260–445)
TOTAL PROTEIN: 7.2 G/DL (ref 6.4–8.2)
TRIGL SERPL-MCNC: 109 MG/DL (ref 0–150)
TSH SERPL DL<=0.05 MIU/L-ACNC: 0.99 UIU/ML (ref 0.27–4.2)
VITAMIN B-12: 473 PG/ML (ref 211–911)
VLDLC SERPL CALC-MCNC: 22 MG/DL
WBC # BLD: 7.1 K/UL (ref 4–11)

## 2020-07-08 NOTE — TELEPHONE ENCOUNTER
I spoke with this patient this morning informing him of the plan as previously noted. Patient verbalized understanding of and agreement with current plan of care.

## 2020-07-08 NOTE — TELEPHONE ENCOUNTER
----- [facility] [place] sent at 7/7/2020  6:08 PM EDT -----  Could you please call this patient and let him know that we are going to move forward with the carotid ultrasound as ordered. As far as the murmur goes, he may want to call his cardiologist, Dr. Judy Silveira office to see if they wanted to evaluate this any further. Also see if he was able to get in with Dr. Harpreet Joseph office as well.     Thanks, Linda

## 2020-07-09 RX ORDER — FERROUS SULFATE 325(65) MG
325 TABLET ORAL
Qty: 30 TABLET | Refills: 5 | Status: SHIPPED | OUTPATIENT
Start: 2020-07-09 | End: 2020-11-06

## 2020-07-14 ENCOUNTER — HOSPITAL ENCOUNTER (OUTPATIENT)
Dept: ULTRASOUND IMAGING | Age: 70
Discharge: HOME | End: 2020-07-14
Payer: MEDICARE

## 2020-07-14 PROCEDURE — 93880 EXTRACRANIAL BILAT STUDY: CPT

## 2020-07-21 ENCOUNTER — OFFICE VISIT (OUTPATIENT)
Dept: FAMILY MEDICINE CLINIC | Age: 70
End: 2020-07-21
Payer: MEDICARE

## 2020-07-21 VITALS
HEART RATE: 62 BPM | DIASTOLIC BLOOD PRESSURE: 78 MMHG | HEIGHT: 71 IN | OXYGEN SATURATION: 99 % | TEMPERATURE: 97.6 F | WEIGHT: 220.2 LBS | SYSTOLIC BLOOD PRESSURE: 120 MMHG | BODY MASS INDEX: 30.83 KG/M2

## 2020-07-21 DIAGNOSIS — D50.9 MICROCYTIC ANEMIA: ICD-10-CM

## 2020-07-21 LAB
BASOPHILS ABSOLUTE: 0.1 K/UL (ref 0–0.2)
BASOPHILS RELATIVE PERCENT: 1.6 %
EOSINOPHILS ABSOLUTE: 0.3 K/UL (ref 0–0.6)
EOSINOPHILS RELATIVE PERCENT: 4.8 %
FERRITIN: 15.5 NG/ML (ref 30–400)
HCT VFR BLD CALC: 30.6 % (ref 40.5–52.5)
HEMATOLOGY PATH CONSULT: NO
HEMOGLOBIN: 8.9 G/DL (ref 13.5–17.5)
LYMPHOCYTES ABSOLUTE: 1.4 K/UL (ref 1–5.1)
LYMPHOCYTES RELATIVE PERCENT: 21.3 %
MCH RBC QN AUTO: 20.3 PG (ref 26–34)
MCHC RBC AUTO-ENTMCNC: 29.2 G/DL (ref 31–36)
MCV RBC AUTO: 69.5 FL (ref 80–100)
MONOCYTES ABSOLUTE: 0.8 K/UL (ref 0–1.3)
MONOCYTES RELATIVE PERCENT: 12.2 %
NEUTROPHILS ABSOLUTE: 3.9 K/UL (ref 1.7–7.7)
NEUTROPHILS RELATIVE PERCENT: 60.1 %
PDW BLD-RTO: 20.1 % (ref 12.4–15.4)
PLATELET # BLD: 224 K/UL (ref 135–450)
PMV BLD AUTO: 8.7 FL (ref 5–10.5)
RBC # BLD: 4.4 M/UL (ref 4.2–5.9)
WBC # BLD: 6.5 K/UL (ref 4–11)

## 2020-07-21 PROCEDURE — 3017F COLORECTAL CA SCREEN DOC REV: CPT | Performed by: FAMILY MEDICINE

## 2020-07-21 PROCEDURE — 99213 OFFICE O/P EST LOW 20 MIN: CPT | Performed by: FAMILY MEDICINE

## 2020-07-21 PROCEDURE — 1036F TOBACCO NON-USER: CPT | Performed by: FAMILY MEDICINE

## 2020-07-21 PROCEDURE — G8417 CALC BMI ABV UP PARAM F/U: HCPCS | Performed by: FAMILY MEDICINE

## 2020-07-21 PROCEDURE — 1123F ACP DISCUSS/DSCN MKR DOCD: CPT | Performed by: FAMILY MEDICINE

## 2020-07-21 PROCEDURE — 4040F PNEUMOC VAC/ADMIN/RCVD: CPT | Performed by: FAMILY MEDICINE

## 2020-07-21 PROCEDURE — G8427 DOCREV CUR MEDS BY ELIG CLIN: HCPCS | Performed by: FAMILY MEDICINE

## 2020-07-21 ASSESSMENT — ENCOUNTER SYMPTOMS
SHORTNESS OF BREATH: 0
COUGH: 0
ABDOMINAL PAIN: 0

## 2020-07-21 NOTE — PROGRESS NOTES
7/21/2020     Sam Bowman is a 65 y.o. male who presents today with:  Chief Complaint   Patient presents with    Other     2 week f/u for labs and pt needs to see DR Jose Lin    . /78   Pulse 62   Temp 97.6 °F (36.4 °C)   Ht 5' 11\" (1.803 m)   Wt 220 lb 3.2 oz (99.9 kg)   SpO2 99%   BMI 30.71 kg/m²     HPI  This is a f/u for dizziness and lightheadedness, and hx was obtained from the pt. Is seeing Dr. Patrice Garcia for peripheral vascular disease, but we did refer him back there for further evaluation of his carotid arteries due to his dizziness and lightheadedness. We did do a carotid Doppler which showed some blockages in his carotid arteries, as well as poor visualization of the left vertebral artery which cause concern for possible blockage. Patient was open to going back to see Dr. Patrice Garcia for this additional issue. His symptoms of dizziness and fatigue are much improved, but still present. His fatigue is improving since starting the iron tablets, and his dizziness is getting better since stopping the amlodipine. He notes that his home blood pressures are still in the 90s over 40s typically and is continuing to have some dizziness with the hypotension. Patient states that the highest his blood pressure has been was in the low 100s over 50s. Denies any fevers, chills, blurred vision or double vision, cough, shortness of breath, chest pain, palpitations, leg swelling, abdominal pain, headaches, or lightheadedness. REVIEW OF SYMPTOMS    Review of Systems   Constitutional: Positive for fatigue (improved). Negative for chills and fever. Eyes: Negative for visual disturbance (no blurred or double vision. ). Respiratory: Negative for cough and shortness of breath. Cardiovascular: Negative for chest pain, palpitations and leg swelling. Gastrointestinal: Negative for abdominal pain. Skin: Negative for rash.    Neurological: Positive for dizziness (improved but still present). Negative for light-headedness and headaches. PAST MEDICAL HISTORY  Past Medical History:   Diagnosis Date    Arthritis 12/2013    rt wrist    Atrial fibrillation ([address]     CAD (coronary artery disease) 6/18/2014    see dr Martinez Gambino Chronic kidney disease, stage III (moderate) ([address] 7/7/2016    Diabetes mellitus ([address]     dx 2004    Diabetic neuropathy associated with type 2 diabetes mellitus ([address] 4/23/2019    Gout     \"got gout when had pacer put in because they did not give me my medication for gout \"    Gout 4/23/2019    H/O 24 hour EKG monitoring 10/3/2013    no afib noted, sinsus rhythm    H/O cardiovascular stress test 5/12/2014    cardiolite- mild ischemia RCA EF50%    H/O transesophageal echocardiography (MABLE) for monitoring 08/05/2013    normal LV function and normal LA appendage without any clot    [place] (hard of hearing)     hearing [employer] aides    Hx of Doppler echocardiogram 05/21/2018    EF 50%  Mild LV hypertrophy. Mildly enlarged RA. Mod aortic valve calcification with mod AS. Mitral annular calcification is present. Mild AR, MR and TR. Mild pulmonary htn.     Hyperlipidemia     Hypertension     Other disorders of kidney and ureter     Pneumonia 12/29/2012    Sleep apnea     dx 2013- has c-pap    Type II or unspecified type diabetes mellitus with other specified manifestations, uncontrolled 12/12/2012    Venous hypertension, chronic, with ulcer ([address] 12/12/2012    resolved       FAMILY HISTORY  Family History   Problem Relation Age of Onset    High Blood Pressure Mother     Arthritis Mother     Diabetes Mother     Heart Disease Mother     High Blood Pressure Father     Heart Disease Father     Kidney Disease Father        SOCIAL HISTORY  Social History     Socioeconomic History    Marital status:      Spouse name: None    Number of children: None    Years of education: None    Highest education level: None   Occupational History    None   Social Needs    Financial resource strain: None    Food insecurity     Worry: None     Inability: None    Transportation needs     Medical: None     Non-medical: None   Tobacco Use    Smoking status: Never Smoker    Smokeless tobacco: Never Used   Substance and Sexual Activity    Alcohol use: Yes     Alcohol/week: 2.0 standard drinks     Types: 2 Cans of beer per week     Comment: average \"one time per week\"    Drug use: No    Sexual activity: Yes     Partners: Female     Comment:    Lifestyle    Physical activity     Days per week: None     Minutes per session: None    Stress: None   Relationships    Social connections     Talks on phone: None     Gets together: None     Attends [employer] service: None     Active member of club or organization: None     Attends meetings of clubs or organizations: None     Relationship status: None    Intimate partner violence     Fear of current or ex partner: None     Emotionally abused: None     Physically abused: None     Forced sexual activity: None   Other Topics Concern    None   Social History Narrative    None        SURGICAL HISTORY  Past Surgical History:   Procedure Laterality Date    CARDIOVERSION  12/14    at [address] [place] [address] Avenue [place]A 11/19/2019    COLONOSCOPY DIAGNOSTIC performed by Mario Booker MD at [address]  2014    \"2 stents put in \"   [address]/ [address]  2004    total left hip    OTHER SURGICAL HISTORY Right 12/02/2017    I&D; evacuation of hematoma right hip    PACEMAKER PLACEMENT      9/18/14 Status post remote permanent pacemaker with atrial lead dislodgement.  7/24/14 PPM Implant    VASCULAR SURGERY  2012    \"have stents in both legs- [facility] in [facility]  Current Outpatient Medications   Medication Sig Dispense Refill    ferrous sulfate (IRON 325) 325 (65 Fe) MG tablet Take 1 tablet by mouth daily (with breakfast) 30 tablet 5    metFORMIN (GLUCOPHAGE) 1000 MG tablet TAKE 1 TABLET TWICE DAILY  WITH MEALS 180 tablet 1    Dulaglutide (TRULICITY) 1.5 UH/5.7XQ SOPN Inject 1.5 mg as directed once a week 12 pen 1    allopurinol (ZYLOPRIM) 300 MG tablet Take 1 tablet by mouth daily 90 tablet 1    allopurinol (ZYLOPRIM) 100 MG tablet Take 1 tablet by mouth daily 90 tablet 1    glimepiride (AMARYL) 4 MG tablet Take 1 tablet by mouth daily 90 tablet 1    simvastatin (ZOCOR) 20 MG tablet Take 0.5 tablets by mouth nightly Patient is taking 1/2 tab daily 45 tablet 1    canagliflozin (INVOKANA) 300 MG TABS tablet Take 1 tablet by mouth every morning (before breakfast) 90 tablet 1    gabapentin (NEURONTIN) 300 MG capsule TAKE 1  capsule 0    sildenafil (VIAGRA) 100 MG tablet Take 1 tablet by mouth daily as needed for Erectile Dysfunction 30 tablet 1    valsartan (DIOVAN) 80 MG tablet TAKE 1 TABLET DAILY 90 tablet 3    metoprolol succinate (TOPROL XL) 25 MG extended release tablet Take 1 tablet by mouth daily 90 tablet 3    furosemide (LASIX) 20 MG tablet Take 1 tablet by mouth 2 times daily May take an extra Lasix if has increased swelling 180 tablet 3    rivaroxaban (XARELTO) 20 MG TABS tablet Take 1 tablet by mouth daily (with breakfast) 90 tablet 3    aspirin 81 MG tablet Take 81 mg by mouth daily       No current facility-administered medications for this visit. ALLERGIES  Allergies   Allergen Reactions    Spironolactone      CAUSES INCREASED K+    Tape Arthurine Fuchs Tape] Rash     SURGICAL TAPE       PHYSICAL EXAM    Physical Exam  Vitals signs and nursing note reviewed. Constitutional:       General: He is not in acute distress. Appearance: Normal appearance. He is well-developed and well-groomed. He is not diaphoretic. HENT:      Head: Normocephalic and atraumatic. Cardiovascular:      Rate and Rhythm: Normal rate and regular rhythm. Heart sounds: Normal heart sounds.    Pulmonary:      Effort: Pulmonary effort is

## 2020-08-03 RX ORDER — RIVAROXABAN 20 MG/1
TABLET, FILM COATED ORAL
Qty: 90 TABLET | Refills: 3 | Status: SHIPPED | OUTPATIENT
Start: 2020-08-03 | End: 2021-02-09 | Stop reason: SDUPTHER

## 2020-08-04 ENCOUNTER — TELEPHONE (OUTPATIENT)
Dept: CARDIOLOGY CLINIC | Age: 70
End: 2020-08-04

## 2020-08-04 RX ORDER — GABAPENTIN 300 MG/1
CAPSULE ORAL
Qty: 270 CAPSULE | Refills: 0 | Status: SHIPPED | OUTPATIENT
Start: 2020-08-04 | End: 2020-09-29

## 2020-08-04 NOTE — TELEPHONE ENCOUNTER
Requested Prescriptions     Signed Prescriptions Disp Refills    gabapentin (NEURONTIN) 300 MG capsule 270 capsule 0     Sig: TAKE 1 CAPSULE 3 TIMES A   DAY     Authorizing Provider: Anthony Johns     Ordering User: Moe Solis

## 2020-08-04 NOTE — TELEPHONE ENCOUNTER
Patient needs medical records to take to the [place]  He needs report on when he had pace maker   Implanted / when he had last PTCA / list of medications  And any other cardiac related diagnosis , call when   Ready he will

## 2020-08-13 ENCOUNTER — HOSPITAL ENCOUNTER (INPATIENT)
Age: 70
LOS: 2 days | Discharge: HOME | DRG: 177 | End: 2020-08-16
Attending: INTERNAL MEDICINE | Admitting: INTERNAL MEDICINE
Payer: MEDICARE

## 2020-08-13 ENCOUNTER — APPOINTMENT (OUTPATIENT)
Dept: GENERAL RADIOLOGY | Age: 70
DRG: 177 | End: 2020-08-13
Payer: MEDICARE

## 2020-08-13 ENCOUNTER — APPOINTMENT (OUTPATIENT)
Dept: CT IMAGING | Age: 70
DRG: 177 | End: 2020-08-13
Payer: MEDICARE

## 2020-08-13 ENCOUNTER — TELEPHONE (OUTPATIENT)
Dept: FAMILY MEDICINE CLINIC | Age: 70
End: 2020-08-13

## 2020-08-13 LAB
ALBUMIN SERPL-MCNC: 3.6 GM/DL (ref 3.4–5)
ALP BLD-CCNC: 63 IU/L (ref 40–129)
ALT SERPL-CCNC: 14 U/L (ref 10–40)
ANION GAP SERPL CALCULATED.3IONS-SCNC: 13 MMOL/L (ref 4–16)
AST SERPL-CCNC: 25 IU/L (ref 15–37)
BASOPHILS ABSOLUTE: 0 K/CU MM
BASOPHILS RELATIVE PERCENT: 0.6 % (ref 0–1)
BILIRUB SERPL-MCNC: 0.4 MG/DL (ref 0–1)
BUN BLDV-MCNC: 40 MG/DL (ref 6–23)
CALCIUM SERPL-MCNC: 8.8 MG/DL (ref 8.3–10.6)
CHLORIDE BLD-SCNC: 96 MMOL/L (ref 99–110)
CO2: 23 MMOL/L (ref 21–32)
CREAT SERPL-MCNC: 1.5 MG/DL (ref 0.9–1.3)
DIFFERENTIAL TYPE: ABNORMAL
EOSINOPHILS ABSOLUTE: 0 K/CU MM
EOSINOPHILS RELATIVE PERCENT: 0.2 % (ref 0–3)
GFR AFRICAN AMERICAN: 56 ML/MIN/1.73M2
GFR NON-AFRICAN AMERICAN: 46 ML/MIN/1.73M2
GLUCOSE BLD-MCNC: 79 MG/DL (ref 70–99)
HCT VFR BLD CALC: 36.7 % (ref 42–52)
HEMOGLOBIN: 10 GM/DL (ref 13.5–18)
IMMATURE NEUTROPHIL %: 0.2 % (ref 0–0.43)
LACTATE: 1.5 MMOL/L (ref 0.4–2)
LYMPHOCYTES ABSOLUTE: 1 K/CU MM
LYMPHOCYTES RELATIVE PERCENT: 18.5 % (ref 24–44)
MCH RBC QN AUTO: 20.4 PG (ref 27–31)
MCHC RBC AUTO-ENTMCNC: 27.2 % (ref 32–36)
MCV RBC AUTO: 74.7 FL (ref 78–100)
MONOCYTES ABSOLUTE: 0.7 K/CU MM
MONOCYTES RELATIVE PERCENT: 13.4 % (ref 0–4)
NUCLEATED RBC %: 0 %
PDW BLD-RTO: 21.4 % (ref 11.7–14.9)
PLATELET # BLD: 170 K/CU MM (ref 140–440)
PMV BLD AUTO: 10.9 FL (ref 7.5–11.1)
POTASSIUM SERPL-SCNC: 4.4 MMOL/L (ref 3.5–5.1)
PRO-BNP: 1604 PG/ML
RBC # BLD: 4.91 M/CU MM (ref 4.6–6.2)
SEGMENTED NEUTROPHILS ABSOLUTE COUNT: 3.7 K/CU MM
SEGMENTED NEUTROPHILS RELATIVE PERCENT: 67.1 % (ref 36–66)
SODIUM BLD-SCNC: 132 MMOL/L (ref 135–145)
TOTAL IMMATURE NEUTOROPHIL: 0.01 K/CU MM
TOTAL NUCLEATED RBC: 0 K/CU MM
TOTAL PROTEIN: 6.6 GM/DL (ref 6.4–8.2)
TROPONIN T: 0.04 NG/ML
WBC # BLD: 5.5 K/CU MM (ref 4–10.5)

## 2020-08-13 PROCEDURE — 96365 THER/PROPH/DIAG IV INF INIT: CPT

## 2020-08-13 PROCEDURE — 99285 EMERGENCY DEPT VISIT HI MDM: CPT

## 2020-08-13 PROCEDURE — 96367 TX/PROPH/DG ADDL SEQ IV INF: CPT

## 2020-08-13 PROCEDURE — 71275 CT ANGIOGRAPHY CHEST: CPT

## 2020-08-13 PROCEDURE — U0002 COVID-19 LAB TEST NON-CDC: HCPCS

## 2020-08-13 PROCEDURE — 85025 COMPLETE CBC W/AUTO DIFF WBC: CPT

## 2020-08-13 PROCEDURE — 83605 ASSAY OF LACTIC ACID: CPT

## 2020-08-13 PROCEDURE — 6360000002 HC RX W HCPCS: Performed by: PHYSICIAN ASSISTANT

## 2020-08-13 PROCEDURE — 6370000000 HC RX 637 (ALT 250 FOR IP): Performed by: PHYSICIAN ASSISTANT

## 2020-08-13 PROCEDURE — 96361 HYDRATE IV INFUSION ADD-ON: CPT

## 2020-08-13 PROCEDURE — 93005 ELECTROCARDIOGRAM TRACING: CPT | Performed by: PHYSICIAN ASSISTANT

## 2020-08-13 PROCEDURE — 6360000004 HC RX CONTRAST MEDICATION: Performed by: PHYSICIAN ASSISTANT

## 2020-08-13 PROCEDURE — 80053 COMPREHEN METABOLIC PANEL: CPT

## 2020-08-13 PROCEDURE — 71045 X-RAY EXAM CHEST 1 VIEW: CPT

## 2020-08-13 PROCEDURE — 84484 ASSAY OF TROPONIN QUANT: CPT

## 2020-08-13 PROCEDURE — 2580000003 HC RX 258: Performed by: PHYSICIAN ASSISTANT

## 2020-08-13 PROCEDURE — 83880 ASSAY OF NATRIURETIC PEPTIDE: CPT

## 2020-08-13 RX ORDER — ACETAMINOPHEN 500 MG
1000 TABLET ORAL ONCE
Status: COMPLETED | OUTPATIENT
Start: 2020-08-13 | End: 2020-08-13

## 2020-08-13 RX ORDER — SODIUM CHLORIDE 9 MG/ML
INJECTION, SOLUTION INTRAVENOUS CONTINUOUS
Status: DISCONTINUED | OUTPATIENT
Start: 2020-08-13 | End: 2020-08-13

## 2020-08-13 RX ADMIN — IOPAMIDOL 90 ML: 755 INJECTION, SOLUTION INTRAVENOUS at 21:52

## 2020-08-13 RX ADMIN — ACETAMINOPHEN 1000 MG: 500 TABLET ORAL at 20:15

## 2020-08-13 RX ADMIN — AZITHROMYCIN MONOHYDRATE 500 MG: 500 INJECTION, POWDER, LYOPHILIZED, FOR SOLUTION INTRAVENOUS at 23:14

## 2020-08-13 RX ADMIN — SODIUM CHLORIDE: 900 INJECTION INTRAVENOUS at 21:52

## 2020-08-13 RX ADMIN — SODIUM CHLORIDE: 900 INJECTION INTRAVENOUS at 22:00

## 2020-08-13 RX ADMIN — CEFTRIAXONE SODIUM 1 G: 1 INJECTION, POWDER, FOR SOLUTION INTRAMUSCULAR; INTRAVENOUS at 22:47

## 2020-08-13 ASSESSMENT — PAIN SCALES - GENERAL
PAINLEVEL_OUTOF10: 3
PAINLEVEL_OUTOF10: 2

## 2020-08-13 NOTE — ED PROVIDER NOTES
Emergency [address] EMERGENCY DEPARTMENT    Patient: Otto Garcia  MRN: [id number]  : 1950  Date of Evaluation: 2020  ED Provider: Murphy Stephenson PA-C    Chief Complaint       Chief Complaint   Patient presents with    Cough    Fever     fever off and on but today temp was >100. wife reports temp of 102.4 at home PTA. no meds given       [place]     Otto Garcia is a 65 y.o. male who presents to the emergency department for a cough and fever. Onset was 2 weeks ago. Patient states he and wife both got sick around the same time but she recovered quickly. His symptoms have been waxing and waning. Cough is mostly dry, non-productive. He denies chest pain or sob. Temperature had been up to 100 Tmax until today when it reached 102.4 at home, so wife decided he should come in for evaluation. He has loss of taste. Denies headache. Denies nasal congestion or sore throat. Denies n/v/d. No known sick contacts. No recent travel. ROS     CONSTITUTIONAL:  + fever. EYES:  Denies visual changes. HEAD:  Denies headache. ENT:  Denies earache, nasal congestion, sore throat.  + loss taste. NECK:  Denies neck pain. RESPIRATORY:  Denies any shortness of breath.  + cough. CARDIOVASCULAR:  Denies chest pain. GI:  Denies nausea or vomiting. :  Denies urinary symptoms. MUSCULOSKELETAL:  Denies extremity pain or swelling. BACK:  Denies back pain. INTEGUMENT:  Denies skin changes. LYMPHATIC:  Denies lymphadenopathy. NEUROLOGIC:  Denies any numbness/tingling. PSYCHIATRIC:  Denies SI/HI.     Past History     Past Medical History:   Diagnosis Date    Arthritis 2013    rt wrist    Atrial fibrillation ([facility]     CAD (coronary artery disease) 2014    see dr Kike Lau Chronic kidney disease, stage III (moderate) ([address] 2016    Diabetes mellitus ([facility]     dx 2004    Diabetic neuropathy associated with type 2 diabetes Worry: None     Inability: None    Transportation needs     Medical: None     Non-medical: None   Tobacco Use    Smoking status: Never Smoker    Smokeless tobacco: Never Used   Substance and Sexual Activity    Alcohol use:  Yes     Alcohol/week: 2.0 standard drinks     Types: 2 Cans of beer per week     Comment: average \"one time per week\"    Drug use: No    Sexual activity: Yes     Partners: Female     Comment:    Lifestyle    Physical activity     Days per week: None     Minutes per session: None    Stress: None   Relationships    Social connections     Talks on phone: None     Gets together: None     Attends Sabianist service: None     Active member of club or organization: None     Attends meetings of clubs or organizations: None     Relationship status: None    Intimate partner violence     Fear of current or ex partner: None     Emotionally abused: None     Physically abused: None     Forced sexual activity: None   Other Topics Concern    None   Social History Narrative    None       Medications/Allergies     Previous Medications    ALLOPURINOL (ZYLOPRIM) 100 MG TABLET    Take 1 tablet by mouth daily    ALLOPURINOL (ZYLOPRIM) 300 MG TABLET    Take 1 tablet by mouth daily    ASPIRIN 81 MG TABLET    Take 81 mg by mouth daily    CANAGLIFLOZIN (INVOKANA) 300 MG TABS TABLET    Take 1 tablet by mouth every morning (before breakfast)    FERROUS SULFATE (IRON 325) 325 (65 FE) MG TABLET    Take 1 tablet by mouth daily (with breakfast)    FUROSEMIDE (LASIX) 20 MG TABLET    Take 1 tablet by mouth 2 times daily May take an extra Lasix if has increased swelling    GABAPENTIN (NEURONTIN) 300 MG CAPSULE    TAKE 1 CAPSULE 3 TIMES A   DAY    GLIMEPIRIDE (AMARYL) 4 MG TABLET    Take 1 tablet by mouth daily    METFORMIN (GLUCOPHAGE) 1000 MG TABLET    TAKE 1 TABLET TWICE DAILY  WITH MEALS    METOPROLOL SUCCINATE (TOPROL XL) 25 MG EXTENDED RELEASE TABLET    Take 1 tablet by mouth daily    SILDENAFIL (VIAGRA) 100 MG TABLET    Take 1 tablet by mouth daily as needed for Erectile Dysfunction    SIMVASTATIN (ZOCOR) 20 MG TABLET    Take 0.5 tablets by mouth nightly Patient is taking 1/2 tab daily    VALSARTAN (DIOVAN) 80 MG TABLET    TAKE 1 TABLET DAILY    XARELTO 20 MG TABS TABLET    TAKE 1 TABLET DAILY WITH   BREAKFAST     Allergies   Allergen Reactions    Spironolactone      CAUSES INCREASED K+    Tape [Adhesive Tape] Rash     SURGICAL TAPE        Physical Exam       ED Triage Vitals [08/13/20 1902]   BP Temp Temp Source Pulse Resp SpO2 Height Weight   (!) 134/97 102.9 °F (39.4 °C) Oral 71 18 99 % 5' 11\" (1.803 m) 216 lb (98 kg)     GENERAL APPEARANCE:  Well-developed, well-nourished, no acute distress. HEAD:  NC/AT. EYES:  Sclera anicteric. ENT:  Ears, nose, mouth normal.     NECK:  Supple. LUNGS:   Respirations unlabored. EXTREMITIES:  No acute deformities. SKIN:  Warm and dry. NEUROLOGICAL:  Alert and oriented. PSYCHIATRIC:  Normal mood.      Diagnostics     Labs:  Labs Reviewed   CBC WITH AUTO DIFFERENTIAL - Abnormal; Notable for the following components:       Result Value    Hemoglobin 10.0 (*)     Hematocrit 36.7 (*)     MCV 74.7 (*)     MCH 20.4 (*)     MCHC 27.2 (*)     RDW 21.4 (*)     Segs Relative 67.1 (*)     Lymphocytes % 18.5 (*)     Monocytes % 13.4 (*)     All other components within normal limits   COMPREHENSIVE METABOLIC PANEL W/ REFLEX TO MG FOR LOW K - Abnormal; Notable for the following components:    Sodium 132 (*)     Chloride 96 (*)     BUN 40 (*)     CREATININE 1.5 (*)     GFR Non- 46 (*)     GFR  56 (*)     All other components within normal limits   TROPONIN - Abnormal; Notable for the following components:    Troponin T 0.042 (*)     All other components within normal limits   BRAIN NATRIURETIC PEPTIDE - Abnormal; Notable for the following components:    Pro-BNP 1,604 (*)     All other components within normal limits   LACTIC ACID, PLASMA   COVID-19 PROCALCITONIN   HIGH SENSITIVITY CRP     Radiographs:  Xr Chest Portable    Result Date: 8/13/2020  EXAMINATION: ONE XRAY VIEW OF THE CHEST 8/13/2020 8:30 pm COMPARISON: 12/03/2017 HISTORY: ORDERING SYSTEM PROVIDED HISTORY: cough TECHNOLOGIST PROVIDED HISTORY: Reason for exam:->cough Reason for Exam: cough Acuity: Acute Type of Exam: Initial Additional signs and symptoms: na Relevant Medical/Surgical History: Pneumonia, CAD FINDINGS: The lungs are clear. The cardiac and mediastinal contours are stable. Pacemaker appears unchanged. There is no pleural effusion or pneumothorax. No acute osseous abnormality is identified. No acute cardiopulmonary abnormality. Cta Pulmonary W Contrast    Result Date: 8/13/2020  EXAMINATION: CTA OF THE CHEST 8/13/2020 9:52 pm TECHNIQUE: CTA of the chest was performed after the administration of intravenous contrast.  Multiplanar reformatted images are provided for review. MIP images are provided for review. Dose modulation, iterative reconstruction, and/or weight based adjustment of the mA/kV was utilized to reduce the radiation dose to as low as reasonably achievable. COMPARISON: None. HISTORY: ORDERING SYSTEM PROVIDED HISTORY: further eval from xr, febrile, cough TECHNOLOGIST PROVIDED HISTORY: Reason for exam:->further eval from xr, febrile, cough Reason for Exam: further eval from xr, febrile, cough Acuity: Acute Type of Exam: Initial Additional signs and symptoms: no Relevant Medical/Surgical History: 90 ml isovue 370 used. FINDINGS: Pulmonary Arteries: Study is of good technical quality for evaluation of pulmonary embolism. There are no filling defects to suggest pulmonary embolism. Main pulmonary artery is normal in caliber. No evidence of right ventricular strain. Mediastinum: Pacemaker present in the left chest.  Heart size is normal. There is atherosclerotic calcification of the coronary arteries and aortic valve. Normal caliber aorta.   Shotty mediastinal lymph nodes and bilateral hilar lymph nodes are slightly prominent. Index subcarinal node measures 13 mm. Lungs/pleura: Airways are patent. Trace bilateral pleural effusions. Multifocal ground-glass opacities are present bilaterally with no consolidation or discrete mass/nodule. Upper Abdomen: The adrenal glands are normal.  Perinephric stranding is seen around both kidneys. Pattern suggests chronic medical renal disease. Soft Tissues/Bones: No skeletal abnormalities throughout the chest.     1. No pulmonary embolism. 2. Multifocal ground-glass opacities in the lungs with trace bilateral pleural effusions. Pattern likely represents an underlying viral infection. No focal consolidation to suggest pneumonia. Procedures/EKG:   Please see Dr. Kiko Tinsley note for interpretation. ED Course and MDM   -  Patient seen and evaluated in the emergency department. -  Triage and nursing notes reviewed and incorporated. -  Old chart records reviewed and incorporated. -  Supervising physician was Dr. Rudy Avendano. Patient was seen independently. -  Work-up included:  See above  -  ED medications:  Rocephin, Zithromax, Tylenol  -  Results discussed with patient. No leukocytosis. Cr slightly bumped at 1.5. Troponin 0.042--do not have previous since 2017. BNP 1604. Lactic is normal.  CXR was clear but obtained CTA chest which shows multifocal ground glass opacities with trace pleural effusions. COVID testing sent. Rocephin and Zithromax. I spoke with Dr. Jacy Wolfe, hospitalist, who will admit for further management. In light of current events, I did utilize appropriate PPE (including N95 and surgical face mask, safety glasses, and gloves, as recommended by the health facility/national standard best practice, during my bedside interactions with the patient. Full droplet precaution as well as full PPE was followed throughout patient's ED course and evaluation. Patient was also masked throughout ED course.        Final Impression 1. Pneumonia due to organism    2. Elevated troponin    3.  [facility]acute [facility] ([facility]            DISPOSITION        Isatu Mirza PA-C  [address] [place], [place]  08/14/20 0110

## 2020-08-13 NOTE — TELEPHONE ENCOUNTER
Pt is taking a pill for an iron tablet-- pt is weak and is getting worn down faster-- would like to see what we can do to make him feel better-- please advise and call back-- would like to talk to [facility]

## 2020-08-13 NOTE — TELEPHONE ENCOUNTER
Called pt and gave him the info . Pt stated he will be in here tomorrow in the morning to get labs . Pt has a EGD scheduled for tomorrow at 330pm . Pt says he is really weak and sleeps from 8 at night to 9 in the morning and has no energy . He said he feels like there is something really wrong . Can you please put the orders in for him .  Thank you

## 2020-08-13 NOTE — TELEPHONE ENCOUNTER
We need to make sure his iron levels are going up instead of down. He can come in to the office and get these labs rechecked today or tomorrow.

## 2020-08-14 LAB
ADENOVIRUS DETECTION BY PCR: NOT DETECTED
ANION GAP SERPL CALCULATED.3IONS-SCNC: 13 MMOL/L (ref 4–16)
BORDETELLA PARAPERTUSSIS BY PCR: NOT DETECTED
BORDETELLA PERTUSSIS PCR: NOT DETECTED
BUN BLDV-MCNC: 39 MG/DL (ref 6–23)
C-REACTIVE PROTEIN, HIGH SENSITIVITY: 97.6 MG/L
CALCIUM SERPL-MCNC: 8.2 MG/DL (ref 8.3–10.6)
CHLAMYDOPHILA PNEUMONIA PCR: NOT DETECTED
CHLORIDE BLD-SCNC: 100 MMOL/L (ref 99–110)
CO2: 22 MMOL/L (ref 21–32)
CORONAVIRUS 229E PCR: NOT DETECTED
CORONAVIRUS HKU1 PCR: NOT DETECTED
CORONAVIRUS NL63 PCR: NOT DETECTED
CORONAVIRUS OC43 PCR: NOT DETECTED
CREAT SERPL-MCNC: 1.4 MG/DL (ref 0.9–1.3)
GFR AFRICAN AMERICAN: >60 ML/MIN/1.73M2
GFR NON-AFRICAN AMERICAN: 50 ML/MIN/1.73M2
GLUCOSE BLD-MCNC: 105 MG/DL (ref 70–99)
GLUCOSE BLD-MCNC: 152 MG/DL (ref 70–99)
GLUCOSE BLD-MCNC: 163 MG/DL (ref 70–99)
GLUCOSE BLD-MCNC: 164 MG/DL (ref 70–99)
GLUCOSE BLD-MCNC: 181 MG/DL (ref 70–99)
GLUCOSE BLD-MCNC: 97 MG/DL (ref 70–99)
HUMAN METAPNEUMOVIRUS PCR: NOT DETECTED
INFLUENZA A BY PCR: NOT DETECTED
INFLUENZA A H1 (2009) PCR: NOT DETECTED
INFLUENZA A H1 PANDEMIC PCR: NOT DETECTED
INFLUENZA A H3 PCR: NOT DETECTED
INFLUENZA B BY PCR: NOT DETECTED
LEGIONELLA URINARY AG: NEGATIVE
MYCOPLASMA PNEUMONIAE PCR: NOT DETECTED
PARAINFLUENZA 1 PCR: NOT DETECTED
PARAINFLUENZA 2 PCR: NOT DETECTED
PARAINFLUENZA 3 PCR: NOT DETECTED
PARAINFLUENZA 4 PCR: NOT DETECTED
POTASSIUM SERPL-SCNC: 4.1 MMOL/L (ref 3.5–5.1)
PROCALCITONIN: 0.26
RHINOVIRUS ENTEROVIRUS PCR: NOT DETECTED
RSV PCR: NOT DETECTED
SODIUM BLD-SCNC: 135 MMOL/L (ref 135–145)
STREP PNEUMONIAE ANTIGEN: NORMAL

## 2020-08-14 PROCEDURE — 87486 CHLMYD PNEUM DNA AMP PROBE: CPT

## 2020-08-14 PROCEDURE — 6370000000 HC RX 637 (ALT 250 FOR IP): Performed by: INTERNAL MEDICINE

## 2020-08-14 PROCEDURE — 6360000002 HC RX W HCPCS: Performed by: INTERNAL MEDICINE

## 2020-08-14 PROCEDURE — 87899 AGENT NOS ASSAY W/OPTIC: CPT

## 2020-08-14 PROCEDURE — 84145 PROCALCITONIN (PCT): CPT

## 2020-08-14 PROCEDURE — 82962 GLUCOSE BLOOD TEST: CPT

## 2020-08-14 PROCEDURE — 87581 M.PNEUMON DNA AMP PROBE: CPT

## 2020-08-14 PROCEDURE — 87633 RESP VIRUS 12-25 TARGETS: CPT

## 2020-08-14 PROCEDURE — 86140 C-REACTIVE PROTEIN: CPT

## 2020-08-14 PROCEDURE — 87798 DETECT AGENT NOS DNA AMP: CPT

## 2020-08-14 PROCEDURE — 2580000003 HC RX 258: Performed by: INTERNAL MEDICINE

## 2020-08-14 PROCEDURE — 80048 BASIC METABOLIC PNL TOTAL CA: CPT

## 2020-08-14 PROCEDURE — 6370000000 HC RX 637 (ALT 250 FOR IP): Performed by: NURSE PRACTITIONER

## 2020-08-14 PROCEDURE — 87449 NOS EACH ORGANISM AG IA: CPT

## 2020-08-14 PROCEDURE — 93010 ELECTROCARDIOGRAM REPORT: CPT | Performed by: INTERNAL MEDICINE

## 2020-08-14 PROCEDURE — 1200000000 HC SEMI PRIVATE

## 2020-08-14 PROCEDURE — 94761 N-INVAS EAR/PLS OXIMETRY MLT: CPT

## 2020-08-14 RX ORDER — ONDANSETRON 2 MG/ML
4 INJECTION INTRAMUSCULAR; INTRAVENOUS EVERY 6 HOURS PRN
Status: DISCONTINUED | OUTPATIENT
Start: 2020-08-14 | End: 2020-08-16 | Stop reason: HOSPADM

## 2020-08-14 RX ORDER — SODIUM CHLORIDE 0.9 % (FLUSH) 0.9 %
10 SYRINGE (ML) INJECTION PRN
Status: DISCONTINUED | OUTPATIENT
Start: 2020-08-14 | End: 2020-08-16 | Stop reason: HOSPADM

## 2020-08-14 RX ORDER — DULAGLUTIDE 1.5 MG/.5ML
1.5 INJECTION, SOLUTION SUBCUTANEOUS WEEKLY
COMMUNITY
End: 2020-09-21 | Stop reason: SDUPTHER

## 2020-08-14 RX ORDER — ACETAMINOPHEN 650 MG/1
650 SUPPOSITORY RECTAL EVERY 6 HOURS PRN
Status: DISCONTINUED | OUTPATIENT
Start: 2020-08-14 | End: 2020-08-16 | Stop reason: HOSPADM

## 2020-08-14 RX ORDER — PROMETHAZINE HYDROCHLORIDE 25 MG/1
12.5 TABLET ORAL EVERY 6 HOURS PRN
Status: DISCONTINUED | OUTPATIENT
Start: 2020-08-14 | End: 2020-08-16 | Stop reason: HOSPADM

## 2020-08-14 RX ORDER — ASPIRIN 81 MG/1
81 TABLET, CHEWABLE ORAL DAILY
Status: DISCONTINUED | OUTPATIENT
Start: 2020-08-14 | End: 2020-08-16 | Stop reason: HOSPADM

## 2020-08-14 RX ORDER — ALLOPURINOL 100 MG/1
100 TABLET ORAL DAILY
Status: DISCONTINUED | OUTPATIENT
Start: 2020-08-14 | End: 2020-08-14

## 2020-08-14 RX ORDER — VALSARTAN 40 MG/1
40 TABLET ORAL DAILY
Status: DISCONTINUED | OUTPATIENT
Start: 2020-08-14 | End: 2020-08-16 | Stop reason: HOSPADM

## 2020-08-14 RX ORDER — SIMVASTATIN 10 MG
10 TABLET ORAL EVERY EVENING
Status: DISCONTINUED | OUTPATIENT
Start: 2020-08-14 | End: 2020-08-16 | Stop reason: HOSPADM

## 2020-08-14 RX ORDER — FERROUS SULFATE 325(65) MG
325 TABLET ORAL
Status: DISCONTINUED | OUTPATIENT
Start: 2020-08-14 | End: 2020-08-16 | Stop reason: HOSPADM

## 2020-08-14 RX ORDER — DEXTROSE MONOHYDRATE 25 G/50ML
12.5 INJECTION, SOLUTION INTRAVENOUS PRN
Status: DISCONTINUED | OUTPATIENT
Start: 2020-08-14 | End: 2020-08-16 | Stop reason: HOSPADM

## 2020-08-14 RX ORDER — SODIUM CHLORIDE 9 MG/ML
INJECTION, SOLUTION INTRAVENOUS CONTINUOUS
Status: DISCONTINUED | OUTPATIENT
Start: 2020-08-14 | End: 2020-08-16 | Stop reason: HOSPADM

## 2020-08-14 RX ORDER — SODIUM CHLORIDE 0.9 % (FLUSH) 0.9 %
10 SYRINGE (ML) INJECTION EVERY 12 HOURS SCHEDULED
Status: DISCONTINUED | OUTPATIENT
Start: 2020-08-14 | End: 2020-08-16 | Stop reason: HOSPADM

## 2020-08-14 RX ORDER — POLYETHYLENE GLYCOL 3350 17 G/17G
17 POWDER, FOR SOLUTION ORAL DAILY PRN
Status: DISCONTINUED | OUTPATIENT
Start: 2020-08-14 | End: 2020-08-16 | Stop reason: HOSPADM

## 2020-08-14 RX ORDER — ALLOPURINOL 100 MG/1
400 TABLET ORAL NIGHTLY
Status: DISCONTINUED | OUTPATIENT
Start: 2020-08-14 | End: 2020-08-16 | Stop reason: HOSPADM

## 2020-08-14 RX ORDER — VALSARTAN 40 MG/1
80 TABLET ORAL DAILY
Status: DISCONTINUED | OUTPATIENT
Start: 2020-08-14 | End: 2020-08-14

## 2020-08-14 RX ORDER — NICOTINE POLACRILEX 4 MG
15 LOZENGE BUCCAL PRN
Status: DISCONTINUED | OUTPATIENT
Start: 2020-08-14 | End: 2020-08-16 | Stop reason: HOSPADM

## 2020-08-14 RX ORDER — GABAPENTIN 300 MG/1
300 CAPSULE ORAL 3 TIMES DAILY
Status: DISCONTINUED | OUTPATIENT
Start: 2020-08-14 | End: 2020-08-16 | Stop reason: HOSPADM

## 2020-08-14 RX ORDER — DEXTROSE MONOHYDRATE 50 MG/ML
100 INJECTION, SOLUTION INTRAVENOUS PRN
Status: DISCONTINUED | OUTPATIENT
Start: 2020-08-14 | End: 2020-08-16 | Stop reason: HOSPADM

## 2020-08-14 RX ORDER — METOPROLOL SUCCINATE 25 MG/1
25 TABLET, EXTENDED RELEASE ORAL DAILY
Status: DISCONTINUED | OUTPATIENT
Start: 2020-08-14 | End: 2020-08-16 | Stop reason: HOSPADM

## 2020-08-14 RX ORDER — ACETAMINOPHEN 325 MG/1
650 TABLET ORAL EVERY 6 HOURS PRN
Status: DISCONTINUED | OUTPATIENT
Start: 2020-08-14 | End: 2020-08-16 | Stop reason: HOSPADM

## 2020-08-14 RX ADMIN — SODIUM CHLORIDE, PRESERVATIVE FREE 10 ML: 5 INJECTION INTRAVENOUS at 21:30

## 2020-08-14 RX ADMIN — SODIUM CHLORIDE: 9 INJECTION, SOLUTION INTRAVENOUS at 02:14

## 2020-08-14 RX ADMIN — ALLOPURINOL 400 MG: 100 TABLET ORAL at 21:26

## 2020-08-14 RX ADMIN — FERROUS SULFATE TAB 325 MG (65 MG ELEMENTAL FE) 325 MG: 325 (65 FE) TAB at 09:31

## 2020-08-14 RX ADMIN — ACETAMINOPHEN 650 MG: 325 TABLET ORAL at 06:02

## 2020-08-14 RX ADMIN — RIVAROXABAN 20 MG: 20 TABLET, FILM COATED ORAL at 17:41

## 2020-08-14 RX ADMIN — INSULIN LISPRO 1 UNITS: 100 INJECTION, SOLUTION INTRAVENOUS; SUBCUTANEOUS at 12:29

## 2020-08-14 RX ADMIN — VALSARTAN 40 MG: 40 TABLET ORAL at 09:27

## 2020-08-14 RX ADMIN — METOPROLOL SUCCINATE 25 MG: 25 TABLET, EXTENDED RELEASE ORAL at 09:27

## 2020-08-14 RX ADMIN — SODIUM CHLORIDE: 9 INJECTION, SOLUTION INTRAVENOUS at 17:41

## 2020-08-14 RX ADMIN — SIMVASTATIN 10 MG: 10 TABLET, FILM COATED ORAL at 21:39

## 2020-08-14 RX ADMIN — INSULIN LISPRO 1 UNITS: 100 INJECTION, SOLUTION INTRAVENOUS; SUBCUTANEOUS at 09:32

## 2020-08-14 RX ADMIN — CEFTRIAXONE 1 G: 1 INJECTION, POWDER, FOR SOLUTION INTRAMUSCULAR; INTRAVENOUS at 21:27

## 2020-08-14 RX ADMIN — GABAPENTIN 300 MG: 300 CAPSULE ORAL at 21:27

## 2020-08-14 RX ADMIN — GABAPENTIN 300 MG: 300 CAPSULE ORAL at 09:26

## 2020-08-14 RX ADMIN — GABAPENTIN 300 MG: 300 CAPSULE ORAL at 15:27

## 2020-08-14 RX ADMIN — ACETAMINOPHEN 650 MG: 325 TABLET ORAL at 21:26

## 2020-08-14 RX ADMIN — INSULIN LISPRO 1 UNITS: 100 INJECTION, SOLUTION INTRAVENOUS; SUBCUTANEOUS at 17:37

## 2020-08-14 RX ADMIN — SODIUM CHLORIDE, PRESERVATIVE FREE 10 ML: 5 INJECTION INTRAVENOUS at 09:28

## 2020-08-14 RX ADMIN — SIMVASTATIN 10 MG: 10 TABLET, FILM COATED ORAL at 02:14

## 2020-08-14 RX ADMIN — ASPIRIN 81 MG CHEWABLE TABLET 81 MG: 81 TABLET CHEWABLE at 09:26

## 2020-08-14 RX ADMIN — RIVAROXABAN 20 MG: 20 TABLET, FILM COATED ORAL at 02:14

## 2020-08-14 ASSESSMENT — PAIN SCALES - GENERAL
PAINLEVEL_OUTOF10: 0

## 2020-08-14 NOTE — ED PROVIDER NOTES
12 lead EKG per my interpretation:  Paced rhythm with premature supraventricular complexes in a pattern of bigeminy at a rate of 74  Axis is   Left axis deviation  QTc is  prolonged at 621    Prior EKG to compare with was available and atrial fibrillation is noted on prior from 2014.     Rina Evans MD  08/13/20 2009

## 2020-08-14 NOTE — PLAN OF CARE
Problem: Falls - Risk of:  Goal: Will remain free from falls  Description: Will remain free from falls  8/14/2020 1615 by Uvaldo Carter RN  Outcome: Ongoing  8/14/2020 0344 by Niki Vargas RN  Outcome: Ongoing  Goal: Absence of physical injury  Description: Absence of physical injury  8/14/2020 1615 by Uvaldo Carter RN  Outcome: Ongoing  8/14/2020 0344 by Niki Vargas RN  Outcome: Ongoing     Problem: Airway Clearance - Ineffective  Goal: Achieve or maintain patent airway  8/14/2020 1615 by Uvaldo Carter RN  Outcome: Ongoing  8/14/2020 0344 by Niki Vargas RN  Outcome: Ongoing     Problem: Gas Exchange - Impaired  Goal: Absence of hypoxia  8/14/2020 1615 by Uvaldo Carter RN  Outcome: Ongoing  8/14/2020 0344 by Niki Vargas RN  Outcome: Ongoing  Goal: Promote optimal lung function  8/14/2020 1615 by Uvaldo Carter RN  Outcome: Ongoing  8/14/2020 0344 by Niki Vargas RN  Outcome: Ongoing     Problem: Breathing Pattern - Ineffective  Goal: Ability to achieve and maintain a regular respiratory rate  8/14/2020 1615 by Uvaldo Carter RN  Outcome: Ongoing  8/14/2020 0344 by Niki Vargas RN  Outcome: Ongoing     Problem:  Body Temperature -  Risk of, Imbalanced  Goal: Ability to maintain a body temperature within defined limits  8/14/2020 1615 by Uvaldo Carter RN  Outcome: Ongoing  8/14/2020 0344 by Niki Vargas RN  Outcome: Ongoing  Goal: Will regain or maintain usual level of consciousness  8/14/2020 1615 by Uvaldo Carter RN  Outcome: Ongoing  8/14/2020 0344 by Niki Vargas RN  Outcome: Ongoing  Goal: Complications related to the disease process, condition or treatment will be avoided or minimized  8/14/2020 1615 by Uvaldo Carter RN  Outcome: Ongoing  8/14/2020 0344 by Niki Vargas RN  Outcome: Ongoing     Problem: Isolation Precautions - Risk of Spread of Infection  Goal: Prevent transmission of infection  8/14/2020 1615 by Uvaldo Carter RN  Outcome: Ongoing  8/14/2020 0344 by Jarvis Quiñones RN  Outcome: Ongoing     Problem: Nutrition Deficits  Goal: Optimize nutrtional status  8/14/2020 1615 by Rc Vega RN  Outcome: Ongoing  8/14/2020 0344 by Jarvis Quiñones RN  Outcome: Ongoing     Problem: Risk for Fluid Volume Deficit  Goal: Maintain normal heart rhythm  8/14/2020 1615 by Rc Vega RN  Outcome: Ongoing  8/14/2020 0344 by Jarvis Quiñones RN  Outcome: Ongoing  Goal: Maintain absence of muscle cramping  8/14/2020 1615 by Rc Vega RN  Outcome: Ongoing  8/14/2020 0344 by Jarvis Quiñones RN  Outcome: Ongoing  Goal: Maintain normal serum potassium, sodium, calcium, phosphorus, and pH  8/14/2020 1615 by Rc Vega RN  Outcome: Ongoing  8/14/2020 0344 by Jarvis Quiñones RN  Outcome: Ongoing     Problem: Loneliness or Risk for Loneliness  Goal: Demonstrate positive use of time alone when socialization is not possible  8/14/2020 1615 by Rc Vega RN  Outcome: Ongoing  8/14/2020 0344 by Jarvis Quiñones RN  Outcome: Ongoing     Problem: Fatigue  Goal: Verbalize increase energy and improved vitality  8/14/2020 1615 by Rc Vega RN  Outcome: Ongoing  8/14/2020 0344 by Jarvis Quiñones RN  Outcome: Ongoing     Problem: Patient Education: Go to Patient Education Activity  Goal: Patient/Family Education  8/14/2020 1615 by Rc Vega RN  Outcome: Ongoing  8/14/2020 0344 by Jarvis Quiñones RN  Outcome: Ongoing     Problem:  Activity:  Goal: Risk for activity intolerance will decrease  Description: Risk for activity intolerance will decrease  8/14/2020 1615 by Rc Vega RN  Outcome: Ongoing  8/14/2020 0344 by Jarvis Quiñones RN  Outcome: Ongoing     Problem: Coping:  Goal: Ability to adjust to condition or change in health will improve  Description: Ability to adjust to condition or change in health will improve  8/14/2020 1615 by Rc Vega RN  Outcome: Ongoing  8/14/2020 0344 by Jarvis Quiñones RN  Outcome: Ongoing  8/14/2020 0344 by Harini Valle RN  Outcome: Ongoing  Goal: Diagnostic test results will improve  Description: Diagnostic test results will improve  8/14/2020 1615 by Catracho Dos Santos RN  Outcome: Ongoing  8/14/2020 0344 by Harini Valle RN  Outcome: Ongoing     Problem: Skin Integrity:  Goal: Risk for impaired skin integrity will decrease  Description: Risk for impaired skin integrity will decrease  8/14/2020 1615 by Catracho Dos Santos RN  Outcome: Ongoing  8/14/2020 0344 by Harini Valle RN  Outcome: Ongoing     Problem: Tissue Perfusion:  Goal: Adequacy of tissue perfusion will improve  Description: Adequacy of tissue perfusion will improve  8/14/2020 1615 by Catracho Dos Santos RN  Outcome: Ongoing  8/14/2020 0344 by Harini Valle RN  Outcome: Ongoing

## 2020-08-14 NOTE — PROGRESS NOTES
[address] HOSPITALIST PROGRESS NOTE  Date: 8/14/2020   Name: Cristal Ashton   MRN: [id number]   YOB: 1950  Chief Complaint   Patient presents with    Cough    Fever     fever off and on but today temp was >100. wife reports temp of 102.4 at home PTA. no meds given        Subjective/Interval Hx:     States he was feeling weak for the last 2-3 weeks. He denies any shortness of breath. States he always has cough. He had been placed on iron tablets by his PCP. He was not getting better. He denies fever. ROS: negative unless mentioned above  Objective:   Physical Exam:   BP (!) 115/59   Pulse 84   Temp 98.9 °F (37.2 °C) (Oral)   Resp 16   Ht 5' 11\" (1.803 m)   Wt 216 lb (98 kg)   SpO2 95%   BMI 30.13 kg/m²   CONSTITUTIONAL:  No acute distress  HENT:  NCAT  LUNGS:  cta b/l bs+  CARDIOVASCULAR:  s1s2 rrr  ABDOMEN:  Soft nt nd  MUSCULOSKELETAL/Extremities:  No edema, nontender  NEUROLOGIC:  No gross deficits, aao  SKIN:  No gross lesions    Assessment/Plan:       1. Suspected COVID-19 pneumonia. Need to exclude gram-positive/atypical bacterial infection. CT chest-multifocal groundglass opacities. Fever  Rocephin and azithromycin. Follow-up COVID-19 result. -fever 102.9 at 1902 yesterday. Procalcitonin 0.262. CRP 97. BNP 1.6K. R PCR negative. Urine Legionella and strep negative. 2. Elevated troponin  -EKG: Ventricular paced rhythm. Troponin 0 0.042. Check serial troponins. 3. Mild hyponatremia  -given gentle fluid hydration for 12 hours. Improved.     Other diagnoses, medications continued unless contraindicated  Type 2 diabetes mellitus- oral medications held. Sliding scale insulin  CKD 3-stable  Hypertension  Hyperlipidemia  Atrial fibrillation- rate controlled. On chronic anticoagulation with apixaban. S/p permanent pacemaker  Sleep apnea-CPAP  Gout  Chronic iron deficiency anemia-stable. Suspected occult GI bleed- to continue outpatient work-up by gastroenterology.     DVT Prophylaxis: Xarelto  Diet: DIET GENERAL;  Code Status: Full Code      Dispo:  -Okay to transfer to [facility] if COVID test is negative. Ambulate. Roney Banuelos MD  8/14/2020    Meds:   Meds:    aspirin  81 mg Oral Daily    ferrous sulfate  325 mg Oral Daily with breakfast    gabapentin  300 mg Oral TID    metoprolol succinate  25 mg Oral Daily    simvastatin  10 mg Oral QPM    sodium chloride flush  10 mL Intravenous 2 times per day    azithromycin  500 mg Intravenous Q24H    And    cefTRIAXone (ROCEPHIN) IV  1 g Intravenous Q24H    insulin lispro  0-6 Units Subcutaneous TID WC    insulin lispro  0-3 Units Subcutaneous QPM    rivaroxaban  20 mg Oral Daily    allopurinol  400 mg Oral Nightly    valsartan  40 mg Oral Daily      Infusions:    dextrose      sodium chloride 75 mL/hr at 08/14/20 0214     PRN Meds: sodium chloride flush, 10 mL, PRN  acetaminophen, 650 mg, Q6H PRN    Or  acetaminophen, 650 mg, Q6H PRN  polyethylene glycol, 17 g, Daily PRN  promethazine, 12.5 mg, Q6H PRN    Or  ondansetron, 4 mg, Q6H PRN  glucose, 15 g, PRN  dextrose, 12.5 g, PRN  glucagon (rDNA), 1 mg, PRN  dextrose, 100 mL/hr, PRN        Data/Labs:     Recent Labs     08/13/20 2000   WBC 5.5   HGB 10.0*   HCT 36.7*         Recent Labs     08/13/20 2000 08/14/20 0202   * 135   K 4.4 4.1   CL 96* 100   CO2 23 22   BUN 40* 39*   CREATININE 1.5* 1.4*     Recent Labs     08/13/20 2000   AST 25   ALT 14   BILITOT 0.4   ALKPHOS 63     No results for input(s): INR in the last 72 hours. Recent Labs     08/13/20 2000   TROPONINT 0.042*     HgBA1c:   Lab Results   Component Value Date    LABA1C 7.3 07/07/2020     CALCIUM:  8.2/22 (08/14 0202)    I/O last 3 completed shifts: In: 653.8 [P.O.:240;  I.V.:413.8]  Out: 550 [Urine:550]    Intake/Output Summary (Last 24 hours) at 8/14/2020 1050  Last data filed at 8/14/2020 0605  Gross per 24 hour   Intake 653.75 ml   Output 550 ml   Net 103.75 ml

## 2020-08-14 NOTE — PLAN OF CARE
or change in health will improve  Description: Ability to adjust to condition or change in health will improve  Outcome: Ongoing     Problem: Fluid Volume:  Goal: Ability to maintain a balanced intake and output will improve  Description: Ability to maintain a balanced intake and output will improve  Outcome: Ongoing     Problem: Health Behavior:  Goal: Ability to identify and utilize available resources and services will improve  Description: Ability to identify and utilize available resources and services will improve  Outcome: Ongoing  Goal: Ability to manage health-related needs will improve  Description: Ability to manage health-related needs will improve  Outcome: Ongoing     Problem: Metabolic:  Goal: Ability to maintain appropriate glucose levels will improve  Description: Ability to maintain appropriate glucose levels will improve  Outcome: Ongoing     Problem: Nutritional:  Goal: Maintenance of adequate nutrition will improve  Description: Maintenance of adequate nutrition will improve  Outcome: Ongoing  Goal: Progress toward achieving an optimal weight will improve  Description: Progress toward achieving an optimal weight will improve  Outcome: Ongoing     Problem: Physical Regulation:  Goal: Complications related to the disease process, condition or treatment will be avoided or minimized  Description: Complications related to the disease process, condition or treatment will be avoided or minimized  Outcome: Ongoing  Goal: Diagnostic test results will improve  Description: Diagnostic test results will improve  Outcome: Ongoing     Problem: Skin Integrity:  Goal: Risk for impaired skin integrity will decrease  Description: Risk for impaired skin integrity will decrease  Outcome: Ongoing     Problem: Tissue Perfusion:  Goal: Adequacy of tissue perfusion will improve  Description: Adequacy of tissue perfusion will improve  Outcome: Ongoing

## 2020-08-14 NOTE — H&P
History and Physical  Internal Medicine Hospitalist      Name:  Vu Calderón /Age/Sex: 1950  (65 y.o. male)   MRN & CSN:  [id number] & [id number] Admission Date/Time: 2020  7:15 PM   Location:  ED14/ED-14 PCP: Celsa Early, 27 Asha Dunham Day: 1          Chief Complaint: Cough and Fever (fever off and on but today temp was >100. wife reports temp of 102.4 at home PTA. no meds given)     Assessment and Plan:     --- Suspected COVID-19 pneumonia. Need to exclude gram-positive/atypical bacterial infection. CT chest-multifocal groundglass opacities. Patient also has high-grade fever. Currently not short of breath. Doing okay on room air. Plan  [facility] [address] [facility]. Okay to continue empiric Rocephin and azithromycin. Check procalcitonin and CRP  Check respiratory viral disease panel PCR, urine Legionella and Streptococcus antigen. Follow-up COVID-19 result. --- Mild hyponatremia [132] -gentle fluid rehydration for 12 hrs. Other diagnoses, medications continued unless contraindicated  Type 2 diabetes mellitus- oral medications held. Sliding scale insulin  CKD 3-stable  Hypertension  Hyperlipidemia  Atrial fibrillation- rate controlled. On chronic anticoagulation with apixaban. S/p permanent pacemaker  Sleep apnea-CPAP  Gout  Chronic iron deficiency anemia-stable. Suspected occult GI bleed- to continue outpatient work-up by gastroenterology. Current diagnosis and plan of management discussed with the patient  at the time of admission in lay language who agree to the above plan and disposition of admission for further care. All concerns and questions addressed.          Diet  General diet   DVT Prophylaxis [] Lovenox, []  Heparin, [] SCDs, [] Ambulation  [x] Long term AC   GI Prophylaxis [] PPI,  [] H2 Blocker,  [] Carafate,  [] Diet,  [] No GI prophylaxis, N/A: patient is not under significant medical stress, non-ICU or is receiving a diet/tube feeds Code Status  full code   Disposition  Home   MDM [] Low, [x] Moderate,[]  High     History of Present Illness:     Principal Problem: Cough x2 weeks   Pj Tello is a 65 y.o. male who presents to the ED with above complaint. Patient has PMH of type II DM on oral medications, CKD 3, hypertension, hyperlipidemia atrial fibrillation, s/p pacemaker, sleep apnea, gout. He reports cough and fever for the past 2 weeks. Cough is mostly dry and nonproductive associated with low-grade fever usually up to 100 F max but temperature reportedly went up to 102.4F today at home so wife brought patient in for evaluation. Patient also has loss of taste. He denies nasal congestion, chest pain or shortness of breath. He does endorse generalized weakness. He is currently being evaluated for anemia due to suspected occult GI bleed and was scheduled to have EGD/enteroscopy soon. Temperature in the .9F. CT chest-multifocal groundglass opacities in the lung suggestive of possible viral infection. ED Course: Discussed case with ED physician prior to admission. ROS: As per HPI, otherwise 10-systems reviewed negative, unless as noted above. Objective: Intake/Output Summary (Last 24 hours) at 8/13/2020 2355  Last data filed at 8/13/2020 2307  Gross per 24 hour   Intake 125 ml   Output --   Net 125 ml        Vitals:   Vitals:    08/13/20 1902 08/13/20 2019 08/13/20 2227   BP: (!) 134/97 (!) 132/55    Pulse: 71 85 60   Resp: 18 18 18   Temp: 102.9 °F (39.4 °C)  98.9 °F (37.2 °C)   TempSrc: Oral  Oral   SpO2: 99% 95% 95%   Weight: 216 lb (98 kg)     Height: 5' 11\" (1.803 m)       Physical Exam: 08/13/20    GEN  -Awake, alert, male, Appears given age. EYES -Pupils are equally round. No vision changes. No scleral erythema, discharge, or conjunctivitis. HENT -MM are moist. Oral pharynx without exudates, no evidence of thrush. NECK -Supple, no apparent thyromegaly or masses.   RESP -breathing comfortably on room air  C/V  -S1/S2 auscultated. RRR without appreciable M/R/G. No peripheral edema. GI  -Abdomen is soft non-distended, no significant tenderness. No masses or guarding. + BS in all quadrants. MS  -B/L extremities strong muscles strength. Full movements. No gross joint deformities. No swelling, intact sensation symmetrical.   SKIN  -Normal coloration, warm, dry. No open wounds or ulcers. NEURO  -normal speech, no lateralizing weakness. [facility]  -Awake, alert, oriented x 3    Past Medical History:      Past Medical History:   Diagnosis Date    Arthritis 12/2013    rt wrist    Atrial fibrillation (HCC)     CAD (coronary artery disease) 6/18/2014    see dr Misty Diane Chronic kidney disease, stage III (moderate) ([address] 7/7/2016    Diabetes mellitus ([address]     dx 2004    Diabetic neuropathy associated with type 2 diabetes mellitus ([address] 4/23/2019    Gout     \"got gout when had pacer put in because they did not give me my medication for gout \"    Gout 4/23/2019    H/O 24 hour EKG monitoring 10/3/2013    no afib noted, sinsus rhythm    H/O cardiovascular stress test 5/12/2014    cardiolite- mild ischemia RCA EF50%    H/O transesophageal echocardiography (MABLE) for monitoring 08/05/2013    normal LV function and normal LA appendage without any clot    [place] (hard of hearing)     hearing [employer]    Hx of Doppler echocardiogram 05/21/2018    EF 50%  Mild LV hypertrophy. Mildly enlarged RA. Mod aortic valve calcification with mod AS. Mitral annular calcification is present. Mild AR, MR and TR. Mild pulmonary htn.     Hyperlipidemia     Hypertension     Other disorders of kidney and ureter     Pneumonia 12/29/2012    Sleep apnea     dx 2013- has c-pap    Type II or unspecified type diabetes mellitus with other specified manifestations, uncontrolled 12/12/2012    Venous hypertension, chronic, with ulcer ([address] 12/12/2012    resolved     Past Surgery History:  Patient  has a past surgical history that includes joint replacement (2004); pacemaker placement; Coronary angioplasty with stent (2014); vascular surgery (2012); Colonoscopy (2011); Cardioversion (12/14); other surgical history (Right, 12/02/2017); and Colonoscopy (N/A, 11/19/2019). Social History:    FAM HX: Assessed: family history includes Arthritis in his mother; Diabetes in his mother; Heart Disease in his father and mother; High Blood Pressure in his father and mother; Kidney Disease in his father. Soc HX:   Social History     Socioeconomic History    Marital status:      Spouse name: None    Number of children: None    Years of education: None    Highest education level: None   Occupational History    None   Social Needs    Financial resource strain: None    Food insecurity     Worry: None     Inability: None    Transportation needs     Medical: None     Non-medical: None   Tobacco Use    Smoking status: Never Smoker    Smokeless tobacco: Never Used   Substance and Sexual Activity    Alcohol use: Yes     Alcohol/week: 2.0 standard drinks     Types: 2 Cans of beer per week     Comment: average \"one time per week\"    Drug use: No    Sexual activity: Yes     Partners: Female     Comment:    Lifestyle    Physical activity     Days per week: None     Minutes per session: None    Stress: None   Relationships    Social connections     Talks on phone: None     Gets together: None     Attends Jain service: None     Active member of club or organization: None     Attends meetings of clubs or organizations: None     Relationship status: None    Intimate partner violence     Fear of current or ex partner: None     Emotionally abused: None     Physically abused: None     Forced sexual activity: None   Other Topics Concern    None   Social History Narrative    None     TOBACCO:   reports that he has never smoked.  He has never used smokeless tobacco.  ETOH:   reports current alcohol use of about 2.0 standard drinks of mg by mouth daily    Historical Provider, MD     Data:     Laboratory this visit:  Reviewed  Recent Labs     08/13/20 2000   WBC 5.5   HGB 10.0*   HCT 36.7*         Recent Labs     08/13/20 2000   *   K 4.4   CL 96*   CO2 23   BUN 40*   CREATININE 1.5*     Recent Labs     08/13/20 2000   AST 25   ALT 14   BILITOT 0.4   ALKPHOS 63     No results for input(s): INR in the last 72 hours. No results for input(s): CKTOTAL, CKMB, CKMBINDEX in the last 72 hours. Invalid input(s): Stephy Dice input(s): PRO-BNP    Radiology this visit:  Reviewed. Xr Chest Portable    Result Date: 8/13/2020  EXAMINATION: ONE XRAY VIEW OF THE CHEST 8/13/2020 8:30 pm COMPARISON: 12/03/2017 HISTORY: ORDERING SYSTEM PROVIDED HISTORY: cough TECHNOLOGIST PROVIDED HISTORY: Reason for exam:->cough Reason for Exam: cough Acuity: Acute Type of Exam: Initial Additional signs and symptoms: na Relevant Medical/Surgical History: Pneumonia, CAD FINDINGS: The lungs are clear. The cardiac and mediastinal contours are stable. Pacemaker appears unchanged. There is no pleural effusion or pneumothorax. No acute osseous abnormality is identified. No acute cardiopulmonary abnormality. Cta Pulmonary W Contrast    Result Date: 8/13/2020  EXAMINATION: CTA OF THE CHEST 8/13/2020 9:52 pm TECHNIQUE: CTA of the chest was performed after the administration of intravenous contrast.  Multiplanar reformatted images are provided for review. MIP images are provided for review. Dose modulation, iterative reconstruction, and/or weight based adjustment of the mA/kV was utilized to reduce the radiation dose to as low as reasonably achievable. COMPARISON: None.  HISTORY: ORDERING SYSTEM PROVIDED HISTORY: further eval from xr, febrile, cough TECHNOLOGIST PROVIDED HISTORY: Reason for exam:->further eval from xr, febrile, cough Reason for Exam: further eval from xr, febrile, cough Acuity: Acute Type of Exam: Initial Additional signs and symptoms: no Relevant Medical/Surgical History: 90 ml isovue 370 used. FINDINGS: Pulmonary Arteries: Study is of good technical quality for evaluation of pulmonary embolism. There are no filling defects to suggest pulmonary embolism. Main pulmonary artery is normal in caliber. No evidence of right ventricular strain. Mediastinum: Pacemaker present in the left chest.  Heart size is normal. There is atherosclerotic calcification of the coronary arteries and aortic valve. Normal caliber aorta. Shotty mediastinal lymph nodes and bilateral hilar lymph nodes are slightly prominent. Index subcarinal node measures 13 mm. Lungs/pleura: Airways are patent. Trace bilateral pleural effusions. Multifocal ground-glass opacities are present bilaterally with no consolidation or discrete mass/nodule. Upper Abdomen: The adrenal glands are normal.  Perinephric stranding is seen around both kidneys. Pattern suggests chronic medical renal disease. Soft Tissues/Bones: No skeletal abnormalities throughout the chest.     1. No pulmonary embolism. 2. Multifocal ground-glass opacities in the lungs with trace bilateral pleural effusions. Pattern likely represents an underlying viral infection. No focal consolidation to suggest pneumonia.      EKG this visit:   EKG: V paced rhythm    Current Treatment Team:  Treatment Team: Registered Nurse: Robin Phillips RN; Physician Assistant: DEXTER Taylor Md, MS  [facility]  8/13/2020 11:55 PM      Electronically signed by Titus Pope MD on 8/13/2020 at 11:55 PM

## 2020-08-15 LAB
ABO/RH: NORMAL
ANION GAP SERPL CALCULATED.3IONS-SCNC: 11 MMOL/L (ref 4–16)
ANTIBODY SCREEN: NEGATIVE
BASOPHILS ABSOLUTE: 0 K/CU MM
BASOPHILS RELATIVE PERCENT: 0.4 % (ref 0–1)
BUN BLDV-MCNC: 40 MG/DL (ref 6–23)
CALCIUM SERPL-MCNC: 8 MG/DL (ref 8.3–10.6)
CHLORIDE BLD-SCNC: 101 MMOL/L (ref 99–110)
CO2: 22 MMOL/L (ref 21–32)
CREAT SERPL-MCNC: 1.6 MG/DL (ref 0.9–1.3)
DIFFERENTIAL TYPE: ABNORMAL
EOSINOPHILS ABSOLUTE: 0 K/CU MM
EOSINOPHILS RELATIVE PERCENT: 0 % (ref 0–3)
GFR AFRICAN AMERICAN: 52 ML/MIN/1.73M2
GFR NON-AFRICAN AMERICAN: 43 ML/MIN/1.73M2
GLUCOSE BLD-MCNC: 133 MG/DL (ref 70–99)
GLUCOSE BLD-MCNC: 143 MG/DL (ref 70–99)
GLUCOSE BLD-MCNC: 178 MG/DL (ref 70–99)
GLUCOSE BLD-MCNC: 192 MG/DL (ref 70–99)
GLUCOSE BLD-MCNC: 208 MG/DL (ref 70–99)
HCT VFR BLD CALC: 33.7 % (ref 42–52)
HEMOGLOBIN: 9.5 GM/DL (ref 13.5–18)
HIGH SENSITIVE C-REACTIVE PROTEIN: 177.1 MG/L
IMMATURE NEUTROPHIL %: 0.6 % (ref 0–0.43)
LYMPHOCYTES ABSOLUTE: 0.8 K/CU MM
LYMPHOCYTES RELATIVE PERCENT: 11.9 % (ref 24–44)
MCH RBC QN AUTO: 21.1 PG (ref 27–31)
MCHC RBC AUTO-ENTMCNC: 28.2 % (ref 32–36)
MCV RBC AUTO: 74.7 FL (ref 78–100)
MONOCYTES ABSOLUTE: 0.6 K/CU MM
MONOCYTES RELATIVE PERCENT: 9.2 % (ref 0–4)
NUCLEATED RBC %: 0 %
PDW BLD-RTO: 21.6 % (ref 11.7–14.9)
PLATELET # BLD: 160 K/CU MM (ref 140–440)
PMV BLD AUTO: 11.1 FL (ref 7.5–11.1)
POTASSIUM SERPL-SCNC: 4.5 MMOL/L (ref 3.5–5.1)
RBC # BLD: 4.51 M/CU MM (ref 4.6–6.2)
SARS-COV-2: DETECTED
SEGMENTED NEUTROPHILS ABSOLUTE COUNT: 5.2 K/CU MM
SEGMENTED NEUTROPHILS RELATIVE PERCENT: 77.9 % (ref 36–66)
SODIUM BLD-SCNC: 134 MMOL/L (ref 135–145)
SOURCE: ABNORMAL
TOTAL IMMATURE NEUTOROPHIL: 0.04 K/CU MM
TOTAL NUCLEATED RBC: 0 K/CU MM
TROPONIN T: 0.05 NG/ML
TROPONIN T: 0.07 NG/ML
WBC # BLD: 6.7 K/CU MM (ref 4–10.5)

## 2020-08-15 PROCEDURE — 6360000002 HC RX W HCPCS: Performed by: INTERNAL MEDICINE

## 2020-08-15 PROCEDURE — 82962 GLUCOSE BLOOD TEST: CPT

## 2020-08-15 PROCEDURE — 6370000000 HC RX 637 (ALT 250 FOR IP): Performed by: HOSPITALIST

## 2020-08-15 PROCEDURE — 80048 BASIC METABOLIC PNL TOTAL CA: CPT

## 2020-08-15 PROCEDURE — 6370000000 HC RX 637 (ALT 250 FOR IP): Performed by: NURSE PRACTITIONER

## 2020-08-15 PROCEDURE — 6370000000 HC RX 637 (ALT 250 FOR IP): Performed by: INTERNAL MEDICINE

## 2020-08-15 PROCEDURE — 86900 BLOOD TYPING SEROLOGIC ABO: CPT

## 2020-08-15 PROCEDURE — 86850 RBC ANTIBODY SCREEN: CPT

## 2020-08-15 PROCEDURE — 2700000000 HC OXYGEN THERAPY PER DAY

## 2020-08-15 PROCEDURE — 86901 BLOOD TYPING SEROLOGIC RH(D): CPT

## 2020-08-15 PROCEDURE — 1200000000 HC SEMI PRIVATE

## 2020-08-15 PROCEDURE — 86141 C-REACTIVE PROTEIN HS: CPT

## 2020-08-15 PROCEDURE — 2580000003 HC RX 258: Performed by: INTERNAL MEDICINE

## 2020-08-15 PROCEDURE — 84484 ASSAY OF TROPONIN QUANT: CPT

## 2020-08-15 PROCEDURE — 85025 COMPLETE CBC W/AUTO DIFF WBC: CPT

## 2020-08-15 PROCEDURE — 6360000002 HC RX W HCPCS: Performed by: HOSPITALIST

## 2020-08-15 PROCEDURE — 94761 N-INVAS EAR/PLS OXIMETRY MLT: CPT

## 2020-08-15 PROCEDURE — 94660 CPAP INITIATION&MGMT: CPT

## 2020-08-15 PROCEDURE — 36415 COLL VENOUS BLD VENIPUNCTURE: CPT

## 2020-08-15 RX ORDER — ACETAMINOPHEN 650 MG/1
650 SUPPOSITORY RECTAL EVERY 6 HOURS PRN
Status: DISCONTINUED | OUTPATIENT
Start: 2020-08-15 | End: 2020-08-16 | Stop reason: HOSPADM

## 2020-08-15 RX ORDER — DEXAMETHASONE 4 MG/1
6 TABLET ORAL DAILY
Status: DISCONTINUED | OUTPATIENT
Start: 2020-08-15 | End: 2020-08-16

## 2020-08-15 RX ORDER — ACETAMINOPHEN 325 MG/1
650 TABLET ORAL EVERY 6 HOURS PRN
Status: DISCONTINUED | OUTPATIENT
Start: 2020-08-15 | End: 2020-08-16 | Stop reason: HOSPADM

## 2020-08-15 RX ADMIN — AZITHROMYCIN MONOHYDRATE 500 MG: 500 INJECTION, POWDER, LYOPHILIZED, FOR SOLUTION INTRAVENOUS at 00:19

## 2020-08-15 RX ADMIN — INSULIN LISPRO 1 UNITS: 100 INJECTION, SOLUTION INTRAVENOUS; SUBCUTANEOUS at 12:46

## 2020-08-15 RX ADMIN — RIVAROXABAN 20 MG: 20 TABLET, FILM COATED ORAL at 17:37

## 2020-08-15 RX ADMIN — SODIUM CHLORIDE: 9 INJECTION, SOLUTION INTRAVENOUS at 15:46

## 2020-08-15 RX ADMIN — ACETAMINOPHEN 650 MG: 325 TABLET ORAL at 22:18

## 2020-08-15 RX ADMIN — SIMVASTATIN 10 MG: 10 TABLET, FILM COATED ORAL at 22:18

## 2020-08-15 RX ADMIN — FERROUS SULFATE TAB 325 MG (65 MG ELEMENTAL FE) 325 MG: 325 (65 FE) TAB at 08:29

## 2020-08-15 RX ADMIN — METOPROLOL SUCCINATE 25 MG: 25 TABLET, EXTENDED RELEASE ORAL at 08:29

## 2020-08-15 RX ADMIN — SODIUM CHLORIDE, PRESERVATIVE FREE 10 ML: 5 INJECTION INTRAVENOUS at 22:19

## 2020-08-15 RX ADMIN — CEFTRIAXONE 1 G: 1 INJECTION, POWDER, FOR SOLUTION INTRAMUSCULAR; INTRAVENOUS at 22:18

## 2020-08-15 RX ADMIN — INSULIN LISPRO 2 UNITS: 100 INJECTION, SOLUTION INTRAVENOUS; SUBCUTANEOUS at 17:37

## 2020-08-15 RX ADMIN — DEXAMETHASONE 6 MG: 4 TABLET ORAL at 18:52

## 2020-08-15 RX ADMIN — ALLOPURINOL 400 MG: 100 TABLET ORAL at 22:19

## 2020-08-15 RX ADMIN — GABAPENTIN 300 MG: 300 CAPSULE ORAL at 14:25

## 2020-08-15 RX ADMIN — VALSARTAN 40 MG: 40 TABLET ORAL at 08:29

## 2020-08-15 RX ADMIN — GABAPENTIN 300 MG: 300 CAPSULE ORAL at 22:18

## 2020-08-15 RX ADMIN — GABAPENTIN 300 MG: 300 CAPSULE ORAL at 08:29

## 2020-08-15 RX ADMIN — ASPIRIN 81 MG CHEWABLE TABLET 81 MG: 81 TABLET CHEWABLE at 08:29

## 2020-08-15 ASSESSMENT — PAIN SCALES - GENERAL
PAINLEVEL_OUTOF10: 0
PAINLEVEL_OUTOF10: 0

## 2020-08-15 NOTE — PROGRESS NOTES
COVID 19 positive. Nurse states he has had episodes where he has become hypoxic and placed on nasal canula. CRP is also rising. Will start on oral decadron.

## 2020-08-15 NOTE — PROGRESS NOTES
08/15/20 0029   NIV Type   $NIV $Daily Charge   NIV Started/Stopped On   Equipment Type v60   Mode CPAP   Mask Type Full face mask   Mask Size Medium   Settings/Measurements   CPAP/EPAP 10 cmH2O   Resp 26   FiO2  32 %   Vt Exhaled 392 mL   Mask Leak (lpm) 13 lpm   Comfort Level Good   Using Accessory Muscles No   SpO2 94   Alarm Settings   Alarms On Y   Press Low Alarm 5 cmH2O   High Pressure Alarm 25 cmH2O   Apnea (secs) 20 secs   Resp Rate Low Alarm 12   High Respiratory Rate 40 br/min

## 2020-08-15 NOTE — PROGRESS NOTES
08/15/20 0307   NIV Type   NIV Started/Stopped On   Equipment Type v60   Mode CPAP   Mask Type Full face mask   Mask Size Medium   Settings/Measurements   CPAP/EPAP 10 cmH2O   Resp 19   FiO2  28 %   Vt Exhaled 409 mL   Mask Leak (lpm) 10 lpm   Comfort Level Good   Using Accessory Muscles No   SpO2 100   Alarm Settings   Alarms On Y   Press Low Alarm 5 cmH2O   High Pressure Alarm 25 cmH2O   Delay Alarm 20 sec(s)   Resp Rate Low Alarm 12   High Respiratory Rate 40 br/min

## 2020-08-15 NOTE — FLOWSHEET NOTE
Kait Mccracken about new consult reason for consult covid positive if you need anything refer back to the hospitalist he is signed off till Monday

## 2020-08-15 NOTE — PROGRESS NOTES
Called and spoke to lab again to come and draw troponin level. Stated she would call the  again.      Electronically signed by Maxene Cockayne, RN on 8/15/2020 at 6:50 PM

## 2020-08-16 VITALS
HEART RATE: 66 BPM | BODY MASS INDEX: 30.68 KG/M2 | DIASTOLIC BLOOD PRESSURE: 77 MMHG | SYSTOLIC BLOOD PRESSURE: 130 MMHG | WEIGHT: 219.14 LBS | HEIGHT: 71 IN | RESPIRATION RATE: 22 BRPM | OXYGEN SATURATION: 94 % | TEMPERATURE: 99.1 F

## 2020-08-16 LAB
ALBUMIN SERPL-MCNC: 3.5 GM/DL (ref 3.4–5)
ALP BLD-CCNC: 61 IU/L (ref 40–128)
ALT SERPL-CCNC: 27 U/L (ref 10–40)
ANION GAP SERPL CALCULATED.3IONS-SCNC: 17 MMOL/L (ref 4–16)
APTT: 61.3 SECONDS (ref 25.1–37.1)
AST SERPL-CCNC: 40 IU/L (ref 15–37)
BASOPHILS ABSOLUTE: 0 K/CU MM
BASOPHILS RELATIVE PERCENT: 0 % (ref 0–1)
BILIRUB SERPL-MCNC: 0.4 MG/DL (ref 0–1)
BUN BLDV-MCNC: 39 MG/DL (ref 6–23)
CALCIUM SERPL-MCNC: 8.1 MG/DL (ref 8.3–10.6)
CHLORIDE BLD-SCNC: 103 MMOL/L (ref 99–110)
CO2: 16 MMOL/L (ref 21–32)
CREAT SERPL-MCNC: 1.5 MG/DL (ref 0.9–1.3)
D DIMER: 432 NG/ML(DDU)
DIFFERENTIAL TYPE: ABNORMAL
EOSINOPHILS ABSOLUTE: 0 K/CU MM
EOSINOPHILS RELATIVE PERCENT: 0 % (ref 0–3)
FERRITIN: 246 NG/ML (ref 30–400)
FIBRINOGEN LEVEL: 729 MG/DL (ref 196.9–442.1)
FOLATE: 17 NG/ML (ref 3.1–17.5)
GFR AFRICAN AMERICAN: 56 ML/MIN/1.73M2
GFR NON-AFRICAN AMERICAN: 46 ML/MIN/1.73M2
GLUCOSE BLD-MCNC: 247 MG/DL (ref 70–99)
GLUCOSE BLD-MCNC: 247 MG/DL (ref 70–99)
GLUCOSE BLD-MCNC: 317 MG/DL (ref 70–99)
HCT VFR BLD CALC: 39.5 % (ref 42–52)
HEMOGLOBIN: 10.6 GM/DL (ref 13.5–18)
HIGH SENSITIVE C-REACTIVE PROTEIN: 221 MG/L
IMMATURE NEUTROPHIL %: 0.5 % (ref 0–0.43)
INR BLD: 1.8 INDEX
IRON: 15 UG/DL (ref 59–158)
LACTATE DEHYDROGENASE: 303 IU/L (ref 120–246)
LYMPHOCYTES ABSOLUTE: 0.5 K/CU MM
LYMPHOCYTES RELATIVE PERCENT: 22.8 % (ref 24–44)
MCH RBC QN AUTO: 20.7 PG (ref 27–31)
MCHC RBC AUTO-ENTMCNC: 26.8 % (ref 32–36)
MCV RBC AUTO: 77.3 FL (ref 78–100)
MONOCYTES ABSOLUTE: 0.1 K/CU MM
MONOCYTES RELATIVE PERCENT: 6.8 % (ref 0–4)
NUCLEATED RBC %: 0 %
PCT TRANSFERRIN: 6 % (ref 10–44)
PDW BLD-RTO: 22.1 % (ref 11.7–14.9)
PLATELET # BLD: 150 K/CU MM (ref 140–440)
PMV BLD AUTO: 11.2 FL (ref 7.5–11.1)
POTASSIUM SERPL-SCNC: 5.1 MMOL/L (ref 3.5–5.1)
PROCALCITONIN: 1.88
PROTHROMBIN TIME: 21.9 SECONDS (ref 11.7–14.5)
RBC # BLD: 5.11 M/CU MM (ref 4.6–6.2)
RETICULOCYTE COUNT PCT: 0.6 % (ref 0.2–2.2)
SEGMENTED NEUTROPHILS ABSOLUTE COUNT: 1.4 K/CU MM
SEGMENTED NEUTROPHILS RELATIVE PERCENT: 69.9 % (ref 36–66)
SODIUM BLD-SCNC: 136 MMOL/L (ref 135–145)
TOTAL IMMATURE NEUTOROPHIL: 0.01 K/CU MM
TOTAL IRON BINDING CAPACITY: 262 UG/DL (ref 250–450)
TOTAL NUCLEATED RBC: 0 K/CU MM
TOTAL PROTEIN: 6.1 GM/DL (ref 6.4–8.2)
TROPONIN T: 0.03 NG/ML
UNSATURATED IRON BINDING CAPACITY: 247 UG/DL (ref 110–370)
VITAMIN B-12: 855.7 PG/ML (ref 211–911)
WBC # BLD: 2.1 K/CU MM (ref 4–10.5)

## 2020-08-16 PROCEDURE — 94761 N-INVAS EAR/PLS OXIMETRY MLT: CPT

## 2020-08-16 PROCEDURE — 6360000002 HC RX W HCPCS: Performed by: HOSPITALIST

## 2020-08-16 PROCEDURE — 85384 FIBRINOGEN ACTIVITY: CPT

## 2020-08-16 PROCEDURE — 6370000000 HC RX 637 (ALT 250 FOR IP): Performed by: INTERNAL MEDICINE

## 2020-08-16 PROCEDURE — 2580000003 HC RX 258: Performed by: INTERNAL MEDICINE

## 2020-08-16 PROCEDURE — 6370000000 HC RX 637 (ALT 250 FOR IP): Performed by: NURSE PRACTITIONER

## 2020-08-16 PROCEDURE — 80053 COMPREHEN METABOLIC PANEL: CPT

## 2020-08-16 PROCEDURE — 84145 PROCALCITONIN (PCT): CPT

## 2020-08-16 PROCEDURE — 85025 COMPLETE CBC W/AUTO DIFF WBC: CPT

## 2020-08-16 PROCEDURE — 85610 PROTHROMBIN TIME: CPT

## 2020-08-16 PROCEDURE — 85045 AUTOMATED RETICULOCYTE COUNT: CPT

## 2020-08-16 PROCEDURE — 82607 VITAMIN B-12: CPT

## 2020-08-16 PROCEDURE — 82962 GLUCOSE BLOOD TEST: CPT

## 2020-08-16 PROCEDURE — 85379 FIBRIN DEGRADATION QUANT: CPT

## 2020-08-16 PROCEDURE — 82728 ASSAY OF FERRITIN: CPT

## 2020-08-16 PROCEDURE — 83550 IRON BINDING TEST: CPT

## 2020-08-16 PROCEDURE — 83615 LACTATE (LD) (LDH) ENZYME: CPT

## 2020-08-16 PROCEDURE — 82746 ASSAY OF FOLIC ACID SERUM: CPT

## 2020-08-16 PROCEDURE — 83540 ASSAY OF IRON: CPT

## 2020-08-16 PROCEDURE — 85730 THROMBOPLASTIN TIME PARTIAL: CPT

## 2020-08-16 PROCEDURE — 84484 ASSAY OF TROPONIN QUANT: CPT

## 2020-08-16 PROCEDURE — 86141 C-REACTIVE PROTEIN HS: CPT

## 2020-08-16 PROCEDURE — 6360000002 HC RX W HCPCS: Performed by: INTERNAL MEDICINE

## 2020-08-16 RX ORDER — CEFPODOXIME PROXETIL 200 MG/1
200 TABLET, FILM COATED ORAL 2 TIMES DAILY
Qty: 8 TABLET | Refills: 0 | Status: SHIPPED | OUTPATIENT
Start: 2020-08-16 | End: 2020-08-20

## 2020-08-16 RX ADMIN — DEXAMETHASONE 6 MG: 4 TABLET ORAL at 08:21

## 2020-08-16 RX ADMIN — INSULIN LISPRO 4 UNITS: 100 INJECTION, SOLUTION INTRAVENOUS; SUBCUTANEOUS at 12:42

## 2020-08-16 RX ADMIN — AZITHROMYCIN MONOHYDRATE 500 MG: 500 INJECTION, POWDER, LYOPHILIZED, FOR SOLUTION INTRAVENOUS at 00:18

## 2020-08-16 RX ADMIN — VALSARTAN 40 MG: 40 TABLET ORAL at 08:22

## 2020-08-16 RX ADMIN — GABAPENTIN 300 MG: 300 CAPSULE ORAL at 08:22

## 2020-08-16 RX ADMIN — SODIUM CHLORIDE: 9 INJECTION, SOLUTION INTRAVENOUS at 07:14

## 2020-08-16 RX ADMIN — FERROUS SULFATE TAB 325 MG (65 MG ELEMENTAL FE) 325 MG: 325 (65 FE) TAB at 08:22

## 2020-08-16 RX ADMIN — ASPIRIN 81 MG CHEWABLE TABLET 81 MG: 81 TABLET CHEWABLE at 08:21

## 2020-08-16 RX ADMIN — INSULIN LISPRO 2 UNITS: 100 INJECTION, SOLUTION INTRAVENOUS; SUBCUTANEOUS at 08:40

## 2020-08-16 RX ADMIN — GABAPENTIN 300 MG: 300 CAPSULE ORAL at 14:47

## 2020-08-16 RX ADMIN — METOPROLOL SUCCINATE 25 MG: 25 TABLET, EXTENDED RELEASE ORAL at 08:22

## 2020-08-16 NOTE — PROGRESS NOTES
All discharge instructions discussed with wife over phone call. Wife verbalized understanding and all questions answered. All discharge instructions and medications discussed with patient. Pt verbalized understanding. All questions answered. Pt understands to quarantine at home for 14 more days. PIV removed from patient. Pt belonging gathered and bagged. Pt wheeled to front of 2E unit via wheelchair by this RN. ICU RN picked up patient and transported to main entrance via wheelchair. Pt wearing mask.            Electronically signed by Aurora Boles RN on 8/16/2020 at 4:12 PM

## 2020-08-16 NOTE — PLAN OF CARE
Problem: Falls - Risk of:  Goal: Will remain free from falls  Description: Will remain free from falls  Outcome: Ongoing  Goal: Absence of physical injury  Description: Absence of physical injury  Outcome: Ongoing     Problem: Airway Clearance - Ineffective  Goal: Achieve or maintain patent airway  Outcome: Ongoing     Problem: Gas Exchange - Impaired  Goal: Absence of hypoxia  Outcome: Ongoing  Goal: Promote optimal lung function  Outcome: Ongoing     Problem: Breathing Pattern - Ineffective  Goal: Ability to achieve and maintain a regular respiratory rate  Outcome: Ongoing     Problem:  Body Temperature -  Risk of, Imbalanced  Goal: Ability to maintain a body temperature within defined limits  Outcome: Ongoing  Goal: Will regain or maintain usual level of consciousness  Outcome: Ongoing  Goal: Complications related to the disease process, condition or treatment will be avoided or minimized  Outcome: Ongoing     Problem: Isolation Precautions - Risk of Spread of Infection  Goal: Prevent transmission of infection  Outcome: Ongoing     Problem: Nutrition Deficits  Goal: Optimize nutrtional status  Outcome: Ongoing     Problem: Risk for Fluid Volume Deficit  Goal: Maintain normal heart rhythm  Outcome: Ongoing  Goal: Maintain absence of muscle cramping  Outcome: Ongoing  Goal: Maintain normal serum potassium, sodium, calcium, phosphorus, and pH  Outcome: Ongoing

## 2020-08-16 NOTE — DISCHARGE SUMMARY
negative. Urine Legionella and strep were negative. Patient did briefly require some nasal cannula oxygen but was quickly weaned off to room air. His COVID-19 test was positive. Patient felt that he was doing well. He was able to ambulate without difficulty. He was not requiring any supplemental oxygen. He was discharged on antibiotics in case he was developing a bacterial pneumonia superimposed on top of his COVID-19, because his procalcitonin was elevated on recheck at 1.88. However patient symptomatically appeared to be doing well. He was advised to quarantine for 10 days. He did not need any dexamethasone on discharge because he was not requiring oxygen. Patient had mildly elevated troponin. EKG showed ventricular paced rhythm. Elevated troponin may be due to CKD. Patient was not having any chest pain. Chronically appears to have elevated troponins. Patient had mild hyponatremia. It improved with IVF. Patient had anemia. His iron level was 15. He was on iron tablets. He was being worked up by his GI specialist as an outpatient. Patient also had hyperglycemia and leukopenia during his stay. Patient was stable. He was discharged home. Physical Exam:  /77   Pulse 66   Temp 99.1 °F (37.3 °C) (Oral)   Resp 22   Ht 5' 11\" (1.803 m)   Wt 219 lb 2.2 oz (99.4 kg)   SpO2 94%   BMI 30.56 kg/m²   CONSTITUTIONAL:  No acute distress, laying in bed, relaxed  HENT:  NCAT  LUNGS:  cta b/l bs+  CARDIOVASCULAR:  s1s2 rrr  ABDOMEN:  Soft nt nd  MUSCULOSKELETAL/Extremities:  No edema, nontender  NEUROLOGIC:  No gross deficits, aao  SKIN:  No gross lesions       Consultations  IP CONSULT TO HOSPITALIST  IP CONSULT TO INFECTIOUS DISEASES    Invasive procedures:   none    Significant Diagnostic Studies:    Imaging  Xr Chest Portable  Result Date: 8/13/2020  No acute cardiopulmonary abnormality. Cta Pulmonary W Contrast  Result Date: 8/13/2020  1. No pulmonary embolism.  2. Multifocal 20 MG Tabs tablet  Generic drug:  rivaroxaban  TAKE 1 TABLET DAILY WITH   BREAKFAST         * This list has 2 medication(s) that are the same as other medications prescribed for you. Read the directions carefully, and ask your doctor or other care provider to review them with you. Where to Get Your Medications      These [facility] [facility]  [address] [facility]    Phone:  [phone]   · cefpodoxime 200 MG tablet         Patient Instructions: Follow-up With  Details  Why  Contact Info   Andrea Garcia MD  Schedule an appointment as soon as possible for a visit in 1 week  for follow up of your hospital stay  [place] [address]  [phone]         Please quarantine for another 10 days     Medications: see computerized discharge medication list  Activity: activity as tolerated  Diet: regular diet   Disposition: home  Discharged Condition: Stable     -if you start to feel sick, worsening shortness of breath or fever then please return to the ER. The patient was seen and examined on day of discharge and this discharge summary is in conjunction with any daily progress note from day of discharge. Time spent on discharge is 41 minutes in the examination, evaluation, counseling and review of medications and discharge plan.     Preeti Live MD

## 2020-08-17 ENCOUNTER — CARE COORDINATION (OUTPATIENT)
Dept: CASE MANAGEMENT | Age: 70
End: 2020-08-17

## 2020-08-17 ENCOUNTER — TELEPHONE (OUTPATIENT)
Dept: CARDIOLOGY CLINIC | Age: 70
End: 2020-08-17

## 2020-08-17 NOTE — CARE COORDINATION
[facility] Transitions Initial Follow Up Call    Call within 2 business days of discharge: Yes    Patient: Tanja Robledo Patient : 1950   MRN: [id number]  Reason for Admission: PRIYANK Bullard  Discharge Date: 20 RARS: Readmission Risk Score: 21  Facility: [address] [place] [address]       Complaint Diagnosis Description Type Department Provider    20 Cough; Fever Pneumonia due to organism . .. ED to Hosp-Admission (Discharged) (ADMITTED) [facility] Shannan Sapp MD; Geo Perez. .. Care Transitions 24 Hour Call    Schedule Follow Up Appointment with PCP:  Shalonda Ivey you have any ongoing symptoms?:  Yes  Patient-reported symptoms:  Cough  Interventions for patient-reported symptoms:  Other (Comment: Patient denied need)  Do you have a copy of your discharge instructions?:  Yes  Do you have all of your prescriptions and are they filled?:  Yes  Have you been contacted by a [facility] Pharmacist?:  No  Have you scheduled your follow up appointment?:  No (Comment: Patient to schedule VV w/ PCP)  Were you discharged with any Home Care or Post Acute Services:  No  Do you feel like you have everything you need to keep you well at home?:  Yes  Care Transitions Interventions  No Identified Needs   Home Care Waiver:  Declined        DME Assistance:  Declined        Follow Up  Future Appointments   Date Time Provider Melissa Russell   2020  1:30 PM Janell Cage MD [facility]   2020  8:30 AM Awais Bundy MD [facility]  [address]: 2020  PATIENT RISK FACTORS: DM, Age, CKD    Patient contacted regarding COVID-19 Pna and recent discharge. Care Transition Nurse spoke w/ Patient by telephone to perform post discharge assessment. Verified name and  as identifiers. Provided introduction to self, and explanation of the CTN role, and reason for call due.  Discussed the following recent COVID19 related results:  SARS-CoV-2  DETECTEDAbnormal    NOT DETECTED  Final  08/13/2020  8:10      Patient reports \"doing pretty good, so much better than when I got to the hospital\". Reports ongoing dry, n/p cough. Denies sob, acute distress, fever, malaise, n/v or other Covid19 related sx. Denies need for PCP notification as sx improving. Encouraged rest, adequate nutrition/hydration, prn Tylenol, Gatorade. CTN reviewed discharge instructions, medical action plan and red flags such as increased shortness of breath, increasing fever and signs of decompensation with Patient who verbalized understanding. Discussed potential risk of DVT/PE associated w/ COVID19; education provided re: sx, preventative measures. Discussed exposure protocols and quarantine with CDC Guidelines What to do if you are sick with coronavirus disease 2019.  Patient aware he is to self-quarantine until 8/30/2020 unless otherwise directed by [facility]/PCP. Wife just tested, awaiting results and is following quarantine as well. Have support members who can drop off food/supplies as needed. Patient was given an opportunity for questions and concerns. Patient agrees to contact [facility]/PCP office for questions related to their healthcare. CTN provided contact information for future needs. Reviewed and educated Patient on new and changed medications. Confirmed new obtained. Stressed importance of taking Vantin as directed, not skipping doses, completing entire course unless otherwise directed by MD. Denies resource needs including hhc, dme. Patient to schedule 7 day VV appt w/ PCP given quarantine. Instructed to contact PCP 24/7 re: any health concerns. Primary Healthcare Decision Maker: Arturo Downs, Wife [phone]. Noted to Epic. Patient given information for [employer] and agrees to enroll: Yes  Patient's preferred e-mail: [email or web address]   Patient's preferred phone number: [phone]   Based on Loop alert triggers, patient will be contacted by nurse care manager for worsening symptoms. Loop enrollment completed per this CTN.   Anais Romano RN

## 2020-08-17 NOTE — TELEPHONE ENCOUNTER
Pt called to cx ov on 8/24/20. He is positive for covid 19. Has to stay in for 2 weeks. States will be tested again. If neg he will reschedule than.

## 2020-08-18 LAB
EKG DIAGNOSIS: NORMAL
EKG Q-T INTERVAL: 560 MS
EKG QRS DURATION: 158 MS
EKG QTC CALCULATION (BAZETT): 621 MS
EKG R AXIS: 101 DEGREES
EKG T AXIS: -82 DEGREES
EKG VENTRICULAR RATE: 74 BPM

## 2020-08-19 NOTE — PROGRESS NOTES
Physician Progress Note      Jose Morley  [facility] #:                  [id number]  :                       1950  ADMIT DATE:       2020 7:15 PM  [address] DATE:        2020 2:19 PM  RESPONDING  PROVIDER #:        Mary Alice Lopez MD          QUERY TEXT:    Dear hospitalist,    Patient admitted with ***, noted to have atrial fibrillation. If possible,   please document in progress notes and discharge summary further specificity   regarding the type of atrial fibrillation: The medical record reflects the following:  Risk Factors: history of Afib on anticoagulants. Clinical Indicators: H&P: Atrial fibrillation- rate controlled., ED provider:   Atrial fibrillation ([facility].  Treatment: Xarelto daily, admission, labs, monitor. Chronic: nonspecific term that could be referring to paroxysmal, persistent,   or permanent  Longstanding persistent: persistent and continuous, lasting > 1 year. Paroxysmal - self-terminating or intermittent; resolves with or without   intervention within 7 days of onset; may recur with various frequency. Persistent - Fails to terminate within 7 days; Often requires meds or   cardioversion to restore to NSR. Permanent - longstanding & persistent; Medication has been ineffective in   restoring NSR &/or cardioversion is contraindicated    Definitions per MS-DRG Training Guide and Quick Reference Guide, Bernardino Larsenmar 112 5   Diseases and Disorders of the Circulatory System; 2019; [employer]. Software content   from the [employer]  Advanced CDI Transformation Program  Options provided:  -- Paroxysmal Atrial Fibrillation  -- Longstanding Persistent Atrial Fibrillation  -- Permanent Atrial Fibrillation  -- Persistent Atrial Fibrillation  -- Chronic Atrial Fibrillation, unspecified  -- Other - I will add my own diagnosis  -- Disagree - Not applicable / Not valid  -- Disagree - Clinically unable to determine / Unknown  -- Refer to Clinical Documentation Reviewer    PROVIDER RESPONSE TEXT:    This patient has unspecified chronic atrial fibrillation.     Query created by: Merly Mcginnis on 8/14/2020 4:20 PM      Electronically signed by:  Gregg Dillard MD 8/19/2020 8:14 AM

## 2020-08-27 ENCOUNTER — TELEPHONE (OUTPATIENT)
Dept: FAMILY MEDICINE CLINIC | Age: 70
End: 2020-08-27

## 2020-08-27 NOTE — TELEPHONE ENCOUNTER
Pt called and stated that he was in the hosp . With the Covid a couple weeks ago and he is just so weak and tired . He said he takes his iron pills daily . He also said that when he gets up from his chair to go to the bathroom he can not wait to get back to his chair he is so tired . Did not get EGD done yet due to the Covid . He wants to know if there is anything we can give him for his tiredness .

## 2020-08-27 NOTE — TELEPHONE ENCOUNTER
I believe that most of the symptoms are due to COVID especially the fatigue. Do you think we could try steroids to see if that helps with his energy level?

## 2020-08-28 NOTE — TELEPHONE ENCOUNTER
Can we get him scheduled with someone sooner? Like on Monday? Is he willing to see Brandon Augustin?  If he worsens we may need him seen over the weekend at an UC or ER

## 2020-08-28 NOTE — TELEPHONE ENCOUNTER
Can we get him set up for a visit with someone? Dr. Rowland Salvage suggesting that he be seen virtually by someone.

## 2020-09-01 ENCOUNTER — VIRTUAL VISIT (OUTPATIENT)
Dept: FAMILY MEDICINE CLINIC | Age: 70
End: 2020-09-01
Payer: MEDICARE

## 2020-09-01 PROBLEM — D50.0 IRON DEFICIENCY ANEMIA DUE TO CHRONIC BLOOD LOSS: Chronic | Status: ACTIVE | Noted: 2020-09-01

## 2020-09-01 PROCEDURE — 1123F ACP DISCUSS/DSCN MKR DOCD: CPT | Performed by: FAMILY MEDICINE

## 2020-09-01 PROCEDURE — 3051F HG A1C>EQUAL 7.0%<8.0%: CPT | Performed by: FAMILY MEDICINE

## 2020-09-01 PROCEDURE — 1111F DSCHRG MED/CURRENT MED MERGE: CPT | Performed by: FAMILY MEDICINE

## 2020-09-01 PROCEDURE — 2022F DILAT RTA XM EVC RTNOPTHY: CPT | Performed by: FAMILY MEDICINE

## 2020-09-01 PROCEDURE — 99214 OFFICE O/P EST MOD 30 MIN: CPT | Performed by: FAMILY MEDICINE

## 2020-09-01 PROCEDURE — 3017F COLORECTAL CA SCREEN DOC REV: CPT | Performed by: FAMILY MEDICINE

## 2020-09-01 PROCEDURE — 4040F PNEUMOC VAC/ADMIN/RCVD: CPT | Performed by: FAMILY MEDICINE

## 2020-09-01 PROCEDURE — G8427 DOCREV CUR MEDS BY ELIG CLIN: HCPCS | Performed by: FAMILY MEDICINE

## 2020-09-01 ASSESSMENT — ENCOUNTER SYMPTOMS
SHORTNESS OF BREATH: 0
COUGH: 0
WHEEZING: 0
SORE THROAT: 0

## 2020-09-01 NOTE — PROGRESS NOTES
2020    TELEHEALTH EVALUATION -- Audio/Visual (During WAGBL-83 public health emergency)    HPI:    Bipin Bojorquez (:  1950) has requested an audio/video evaluation for the following concern(s):    Couple concerns patient was hospitalized with respiratory distress and was tested positive for Covid 19  He did not require ICU we had supplemental oxygen he was treated with antibiotics but he had a superimposed pneumonia  He said his sugar went up while he was in the hospital but he was on IV steroids  He is now been home for week or so  Is been 6 days since he has had a fever he has no cough or other symptoms currently  His wife did get ill but she was only ill 1 day and did test positive but she is now asymptomatic      Review of Systems   Constitutional: Positive for activity change. Negative for fever and unexpected weight change. Goes pretty well been confined to home since he had Covid 19 is very weak but he is slowly getting better  Lost about 10 pounds during this recent illness   HENT: Negative for congestion and sore throat. Respiratory: Negative for cough, shortness of breath and wheezing. Cardiovascular:        Patient has a history of hypertension and hyperlipidemia     Endocrine:        Diabetes type 2 reasonably well controlled  Before he got the COVID his A1c was 7.2   Genitourinary:        Chronic chronic kidney disease stage III which is stable   Neurological: Positive for numbness. Has diabetic peripheral neuropathy   Hematological:        Recently found to be anemic  He was undergoing a work-up for GI blood loss which was to include endoscopy per GI but he got Covid 19 and that is been put on hold he was asking whether he could go back and start working on that I think he can now as he is not infectious  Low white count on his most recent labs  Last hemoglobin was around 10   Psychiatric/Behavioral: Negative. Prior to Visit Medications    Medication Sig Taking? Comment: average \"one time per week\"    Drug use: No            PHYSICAL EXAMINATION:  [ INSTRUCTIONS:  \"[x]\" Indicates a positive item  \"[]\" Indicates a negative item  -- DELETE ALL ITEMS NOT EXAMINED]  Vital Signs: (As obtained by patient/caregiver or practitioner observation)    Blood pressure-  Heart rate-    Respiratory rate-    Temperature-  Pulse oximetry-     Constitutional: [x] Appears well-developed and well-nourished [] No apparent distress      [] Abnormal-   Mental status  [x] Alert and awake  [x] Oriented to person/place/time [x]Able to follow commands      Eyes:  EOM    []  Normal  [] Abnormal-  Sclera  []  Normal  [] Abnormal -         Discharge []  None visible  [] Abnormal -    HENT:   [x] Normocephalic, atraumatic. [] Abnormal   [] Mouth/Throat: Mucous membranes are moist.     External Ears [x] Normal  [] Abnormal-     Neck: [x] No visualized mass     Pulmonary/Chest: [x] Respiratory effort normal.  [x] No visualized signs of difficulty breathing or respiratory distress        [] Abnormal-      Musculoskeletal:   [] Normal gait with no signs of ataxia         [] Normal range of motion of neck        [] Abnormal-       Neurological:        [x] No Facial Asymmetry (Cranial nerve 7 motor function) (limited exam to video visit)          [] No gaze palsy        [] Abnormal-         Skin:        [x] No significant exanthematous lesions or discoloration noted on facial skin         [] Abnormal-            Psychiatric:       [x] Normal Affect [] No Hallucinations        [] Abnormal-     Other pertinent observable physical exam findings-     I reviewed his hospital records  Reconciled his medication  Went over his labs  Went over his chest x-ray  Reviewed immunizations     ASSESSMENT/PLAN:  1. COVID-19  In recovery phase and believe he is not contagious    2. Coronary artery disease involving native coronary artery of native heart without angina pectoris      3.  Other diabetic neurological complication

## 2020-09-02 ENCOUNTER — HOSPITAL ENCOUNTER (OUTPATIENT)
Age: 70
Discharge: HOME | End: 2020-09-02
Payer: MEDICARE

## 2020-09-02 ENCOUNTER — TELEPHONE (OUTPATIENT)
Dept: FAMILY MEDICINE CLINIC | Age: 70
End: 2020-09-02

## 2020-09-02 ENCOUNTER — HOSPITAL ENCOUNTER (OUTPATIENT)
Dept: GENERAL RADIOLOGY | Age: 70
Discharge: HOME | End: 2020-09-02
Payer: MEDICARE

## 2020-09-02 LAB
ALBUMIN SERPL-MCNC: 3.5 GM/DL (ref 3.4–5)
ALP BLD-CCNC: 157 IU/L (ref 40–128)
ALT SERPL-CCNC: 74 U/L (ref 10–40)
ANION GAP SERPL CALCULATED.3IONS-SCNC: 15 MMOL/L (ref 4–16)
AST SERPL-CCNC: 36 IU/L (ref 15–37)
BASOPHILS ABSOLUTE: 0.1 K/CU MM
BASOPHILS RELATIVE PERCENT: 1.5 % (ref 0–1)
BILIRUB SERPL-MCNC: 0.4 MG/DL (ref 0–1)
BUN BLDV-MCNC: 34 MG/DL (ref 6–23)
CALCIUM SERPL-MCNC: 9.4 MG/DL (ref 8.3–10.6)
CHLORIDE BLD-SCNC: 96 MMOL/L (ref 99–110)
CO2: 26 MMOL/L (ref 21–32)
CREAT SERPL-MCNC: 1.4 MG/DL (ref 0.9–1.3)
DIFFERENTIAL TYPE: ABNORMAL
EOSINOPHILS ABSOLUTE: 0.2 K/CU MM
EOSINOPHILS RELATIVE PERCENT: 2.3 % (ref 0–3)
GFR AFRICAN AMERICAN: >60 ML/MIN/1.73M2
GFR NON-AFRICAN AMERICAN: 50 ML/MIN/1.73M2
GLUCOSE BLD-MCNC: 224 MG/DL (ref 70–99)
HCT VFR BLD CALC: 39.9 % (ref 42–52)
HEMOGLOBIN: 10.8 GM/DL (ref 13.5–18)
IMMATURE NEUTROPHIL %: 0.6 % (ref 0–0.43)
LYMPHOCYTES ABSOLUTE: 1.2 K/CU MM
LYMPHOCYTES RELATIVE PERCENT: 16.8 % (ref 24–44)
MCH RBC QN AUTO: 20.4 PG (ref 27–31)
MCHC RBC AUTO-ENTMCNC: 27.1 % (ref 32–36)
MCV RBC AUTO: 75.3 FL (ref 78–100)
MONOCYTES ABSOLUTE: 0.6 K/CU MM
MONOCYTES RELATIVE PERCENT: 8.7 % (ref 0–4)
NUCLEATED RBC %: 0 %
PDW BLD-RTO: 20.9 % (ref 11.7–14.9)
PLATELET # BLD: 500 K/CU MM (ref 140–440)
PMV BLD AUTO: 9.3 FL (ref 7.5–11.1)
POTASSIUM SERPL-SCNC: 5.2 MMOL/L (ref 3.5–5.1)
RBC # BLD: 5.3 M/CU MM (ref 4.6–6.2)
SEGMENTED NEUTROPHILS ABSOLUTE COUNT: 5.1 K/CU MM
SEGMENTED NEUTROPHILS RELATIVE PERCENT: 70.1 % (ref 36–66)
SODIUM BLD-SCNC: 137 MMOL/L (ref 135–145)
TOTAL IMMATURE NEUTOROPHIL: 0.04 K/CU MM
TOTAL NUCLEATED RBC: 0 K/CU MM
TOTAL PROTEIN: 7.3 GM/DL (ref 6.4–8.2)
WBC # BLD: 7.3 K/CU MM (ref 4–10.5)

## 2020-09-02 PROCEDURE — 36415 COLL VENOUS BLD VENIPUNCTURE: CPT

## 2020-09-02 PROCEDURE — 85025 COMPLETE CBC W/AUTO DIFF WBC: CPT

## 2020-09-02 PROCEDURE — 71046 X-RAY EXAM CHEST 2 VIEWS: CPT

## 2020-09-02 PROCEDURE — 80053 COMPREHEN METABOLIC PANEL: CPT

## 2020-09-04 ENCOUNTER — TELEPHONE (OUTPATIENT)
Dept: FAMILY MEDICINE CLINIC | Age: 70
End: 2020-09-04

## 2020-09-08 ENCOUNTER — PROCEDURE VISIT (OUTPATIENT)
Dept: CARDIOLOGY CLINIC | Age: 70
End: 2020-09-08
Payer: MEDICARE

## 2020-09-08 PROCEDURE — 93296 REM INTERROG EVL PM/IDS: CPT | Performed by: INTERNAL MEDICINE

## 2020-09-08 PROCEDURE — 93294 REM INTERROG EVL PM/LDLS PM: CPT | Performed by: INTERNAL MEDICINE

## 2020-09-11 ENCOUNTER — HOSPITAL ENCOUNTER (OUTPATIENT)
Dept: LAB | Age: 70
Discharge: HOME | End: 2020-09-11
Payer: MEDICARE

## 2020-09-11 PROCEDURE — U0002 COVID-19 LAB TEST NON-CDC: HCPCS

## 2020-09-12 LAB
SARS-COV-2: NOT DETECTED
SOURCE: NORMAL

## 2020-09-16 ENCOUNTER — ANESTHESIA EVENT (OUTPATIENT)
Dept: OPERATING ROOM | Age: 70
End: 2020-09-16
Payer: MEDICARE

## 2020-09-16 NOTE — ANESTHESIA PRE PROCEDURE
Department of Anesthesiology  Preprocedure Note       Name:  Emmanuel Sheffield   Age:  65 y.o.  :  1950                                          MRN:  [id number]         Date:  2020      Surgeon: Niru Estrella):  Young Wilder MD    Procedure: ENTEROSCOPY (N/A )    Medications prior to admission:   Prior to Admission medications    Medication Sig Start Date End Date Taking?  Authorizing Provider   Dulaglutide (TRULICITY) 1.5 MS/5.4IA SOPN Inject 1.5 mg into the skin once a week    Historical Provider, MD   gabapentin (NEURONTIN) 300 MG capsule TAKE 1 CAPSULE 3 TIMES A   DAY 8/4/20 11/3/20  Merlin Lawrence MD   XARELTO 20 MG TABS tablet TAKE 1 TABLET DAILY WITH   BREAKFAST 8/3/20   Aline Soler MD   ferrous sulfate (IRON 325) 325 (65 Fe) MG tablet Take 1 tablet by mouth daily (with breakfast) 20   SATISH Anton   metFORMIN (GLUCOPHAGE) 1000 MG tablet TAKE 1 TABLET TWICE DAILY  WITH MEALS 20   Merlin Lawrence MD   allopurinol (ZYLOPRIM) 300 MG tablet Take 1 tablet by mouth daily 20   Merlin Lawrence MD   allopurinol (ZYLOPRIM) 100 MG tablet Take 1 tablet by mouth daily 20   Merlin Lawrence MD   glimepiride (AMARYL) 4 MG tablet Take 1 tablet by mouth daily 20   Merlin Lawrence MD   simvastatin (ZOCOR) 20 MG tablet Take 0.5 tablets by mouth nightly Patient is taking 1/2 tab daily  Patient taking differently: Take 10 mg by mouth nightly 10mg 20   Merlin Lawrence MD   canagliflozin [facility]) 300 MG TABS tablet Take 1 tablet by mouth every morning (before breakfast) 20   Merlin Lawrence MD   sildenafil (VIAGRA) 100 MG tablet Take 1 tablet by mouth daily as needed for Erectile Dysfunction 20   Merlin Lawrence MD   valsartan (DIOVAN) 80 MG tablet TAKE 1 TABLET DAILY  Patient taking differently: 40 mg  20   Tylor Fierro MD   metoprolol succinate (TOPROL XL) 25 MG extended release tablet Take 1 tablet by mouth daily 3/20/20   Jovita Olson Renee Barrios MD   furosemide (LASIX) 20 MG tablet Take 1 tablet by mouth 2 times daily May take an extra Lasix if has increased swelling 3/20/20   Artist MD Christopher   aspirin 81 MG tablet Take 81 mg by mouth daily    Historical Provider, MD       Current medications:    Current Outpatient Medications   Medication Sig Dispense Refill    Dulaglutide (TRULICITY) 1.5 DX/1.9CE SOPN Inject 1.5 mg into the skin once a week      gabapentin (NEURONTIN) 300 MG capsule TAKE 1 CAPSULE 3 TIMES A    capsule 0    XARELTO 20 MG TABS tablet TAKE 1 TABLET DAILY WITH   BREAKFAST 90 tablet 3    ferrous sulfate (IRON 325) 325 (65 Fe) MG tablet Take 1 tablet by mouth daily (with breakfast) 30 tablet 5    metFORMIN (GLUCOPHAGE) 1000 MG tablet TAKE 1 TABLET TWICE DAILY  WITH MEALS 180 tablet 1    allopurinol (ZYLOPRIM) 300 MG tablet Take 1 tablet by mouth daily 90 tablet 1    allopurinol (ZYLOPRIM) 100 MG tablet Take 1 tablet by mouth daily 90 tablet 1    glimepiride (AMARYL) 4 MG tablet Take 1 tablet by mouth daily 90 tablet 1    simvastatin (ZOCOR) 20 MG tablet Take 0.5 tablets by mouth nightly Patient is taking 1/2 tab daily (Patient taking differently: Take 10 mg by mouth nightly 10mg) 45 tablet 1    canagliflozin (INVOKANA) 300 MG TABS tablet Take 1 tablet by mouth every morning (before breakfast) 90 tablet 1    sildenafil (VIAGRA) 100 MG tablet Take 1 tablet by mouth daily as needed for Erectile Dysfunction 30 tablet 1    valsartan (DIOVAN) 80 MG tablet TAKE 1 TABLET DAILY (Patient taking differently: 40 mg ) 90 tablet 3    metoprolol succinate (TOPROL XL) 25 MG extended release tablet Take 1 tablet by mouth daily 90 tablet 3    furosemide (LASIX) 20 MG tablet Take 1 tablet by mouth 2 times daily May take an extra Lasix if has increased swelling 180 tablet 3    aspirin 81 MG tablet Take 81 mg by mouth daily       No current facility-administered medications for this visit. Allergies:     Allergies   Allergen Reactions    Spironolactone      CAUSES INCREASED K+    Tape Olive Dy Tape] Rash     SURGICAL TAPE       Problem List:    Patient Active Problem List   Diagnosis Code    Type 2 diabetes mellitus without complication, without long-term current use of insulin ([facility]) E11.9    Ulcer of other part of lower limb L97.809    Venous hypertension, chronic, with ulcer ([facility] I87.319, L97.909    Ulcer of other part of foot L97.509    Pneumonia J18.9    Atrial fibrillation ([facility]) I48.91    Sinus pause I45.5    PAF (paroxysmal atrial fibrillation) ([facility]) I48.0    Hypertension I10    DM (diabetes mellitus) ([facility]) E11.9    DMITRIY on CPAP G47.33, Z99.89    Hyperlipidemia E78.5    Status post incision and drainage Z98.890    CAD (coronary artery disease) I25.10    CKD (chronic kidney disease) stage 3, GFR 30-59 ml/min ([facility]) N18.3    Hyperpotassemia E87.5    Arthritis M19.90    PVD (peripheral vascular disease) ([facility]) I73.9    Hematoma T14. 8XXA    Cardiac pacemaker in situ Z95.0    Coronary artery disease involving native coronary artery of native heart without angina pectoris I25.10    Essential hypertension I10    Gout M10.9    Diabetic neuropathy associated with type 2 diabetes mellitus ([address] E11.40    Adenomatous polyp of sigmoid colon D12.5    Iron deficiency anemia due to chronic blood loss D50.0       Past Medical History:        Diagnosis Date    Arthritis 12/2013    rt wrist    Atrial fibrillation (HCC)     CAD (coronary artery disease) 6/18/2014    see dr [facility] Chronic kidney disease, stage III (moderate) ([address] 7/7/2016    Diabetes mellitus ([address]     dx 2004    Diabetic neuropathy associated with type 2 diabetes mellitus ([address] 4/23/2019    Gout     \"got gout when had pacer put in because they did not give me my medication for gout \"    Gout 4/23/2019    H/O 24 hour EKG monitoring 10/3/2013    no afib noted, sinsus rhythm    H/O cardiovascular stress test 5/12/2014    cardiolite- mild ischemia RCA EF50%    H/O transesophageal echocardiography (MABLE) for monitoring 08/05/2013    normal LV function and normal LA appendage without any clot    Elem (hard of hearing)     hearing [employer]    Hx of Doppler echocardiogram 05/21/2018    EF 50%  Mild LV hypertrophy. Mildly enlarged RA. Mod aortic valve calcification with mod AS. Mitral annular calcification is present. Mild AR, MR and TR. Mild pulmonary htn.  Hyperlipidemia     Hypertension     Other disorders of kidney and ureter     Pneumonia 12/29/2012    Sleep apnea     dx 2013- has c-pap    Type II or unspecified type diabetes mellitus with other specified manifestations, uncontrolled 12/12/2012    Venous hypertension, chronic, with ulcer ([address] 12/12/2012    resolved       Past Surgical History:        Procedure Laterality Date    CARDIOVERSION  12/14    at [address]  2011    COLONOSCOPY N/A 11/19/2019    COLONOSCOPY DIAGNOSTIC performed by Yoselyn Huffman MD at [address]  2014    \"2 stents put in \"   [address]  2004    total left hip    OTHER SURGICAL HISTORY Right 12/02/2017    I&D; evacuation of hematoma right hip    PACEMAKER PLACEMENT      9/18/14 Status post remote permanent pacemaker with atrial lead dislodgement. 7/24/14 PPM Implant    VASCULAR SURGERY  2012    \"have stents in both legs- done in [place]       Social History:    Social History     Tobacco Use    Smoking status: Never Smoker    Smokeless tobacco: Never Used   Substance Use Topics    Alcohol use: Yes     Alcohol/week: 2.0 standard drinks     Types: 2 Cans of beer per week     Comment: average \"one time per week\"                                Counseling given: Not Answered      Vital Signs (Current): There were no vitals filed for this visit.                                            BP Readings from Last 3 Encounters:   08/16/20 130/77   07/21/20 120/78   07/07/20 100/70       NPO Status: BMI:   Wt Readings from Last 3 Encounters:   08/15/20 219 lb 2.2 oz (99.4 kg)   08/10/20 220 lb (99.8 kg)   07/21/20 220 lb 3.2 oz (99.9 kg)     There is no height or weight on file to calculate BMI.    CBC:   Lab Results   Component Value Date    WBC 7.3 09/02/2020    RBC 5.30 09/02/2020    HGB 10.8 09/02/2020    HCT 39.9 09/02/2020    MCV 75.3 09/02/2020    RDW 20.9 09/02/2020     09/02/2020       CMP:   Lab Results   Component Value Date     09/02/2020    K 5.2 09/02/2020    CL 96 09/02/2020    CO2 26 09/02/2020    BUN 34 09/02/2020    CREATININE 1.4 09/02/2020    GFRAA >60 09/02/2020    AGRATIO 2.1 07/07/2020    LABGLOM 50 09/02/2020    LABGLOM 51 06/15/2016    GLUCOSE 224 09/02/2020    PROT 7.3 09/02/2020    PROT 7.3 12/27/2012    CALCIUM 9.4 09/02/2020    BILITOT 0.4 09/02/2020    ALKPHOS 157 09/02/2020    AST 36 09/02/2020    ALT 74 09/02/2020       POC Tests: No results for input(s): POCGLU, POCNA, POCK, POCCL, POCBUN, POCHEMO, POCHCT in the last 72 hours. Coags:   Lab Results   Component Value Date    PROTIME 21.9 08/16/2020    INR 1.80 08/16/2020    APTT 61.3 08/16/2020       HCG (If Applicable): No results found for: PREGTESTUR, PREGSERUM, HCG, HCGQUANT     ABGs: No results found for: PHART, PO2ART, GCL2LDO, ZGI7BKX, BEART, D0BPKBCK     Type & Screen (If Applicable):  No results found for: LABABO, [address]    Anesthesia Evaluation  Patient summary reviewed   history of anesthetic complications: PONV.   Airway: Mallampati: II  TM distance: <3 FB   Neck ROM: full  Mouth opening: > = 3 FB Dental:          Pulmonary:   (+) pneumonia:  sleep apnea:                             Cardiovascular:    (+) hypertension:, pacemaker:, CAD:, pulmonary hypertension: mild,             Echocardiogram reviewed  Stress test reviewed             ROS comment: No recent cardiac symptoms     Neuro/Psych:               GI/Hepatic/Renal:

## 2020-09-17 ENCOUNTER — ANESTHESIA (OUTPATIENT)
Dept: OPERATING ROOM | Age: 70
End: 2020-09-17
Payer: MEDICARE

## 2020-09-17 ENCOUNTER — HOSPITAL ENCOUNTER (OUTPATIENT)
Age: 70
Setting detail: OUTPATIENT SURGERY
Discharge: HOME | End: 2020-09-17
Attending: SPECIALIST | Admitting: SPECIALIST
Payer: MEDICARE

## 2020-09-17 VITALS
OXYGEN SATURATION: 95 % | SYSTOLIC BLOOD PRESSURE: 135 MMHG | DIASTOLIC BLOOD PRESSURE: 65 MMHG | RESPIRATION RATE: 16 BRPM | HEART RATE: 59 BPM | TEMPERATURE: 97 F | BODY MASS INDEX: 28.14 KG/M2 | WEIGHT: 201 LBS | HEIGHT: 71 IN

## 2020-09-17 VITALS — OXYGEN SATURATION: 97 % | DIASTOLIC BLOOD PRESSURE: 38 MMHG | SYSTOLIC BLOOD PRESSURE: 99 MMHG

## 2020-09-17 LAB — GLUCOSE BLD-MCNC: 149 MG/DL (ref 70–99)

## 2020-09-17 PROCEDURE — 3700000001 HC ADD 15 MINUTES (ANESTHESIA): Performed by: SPECIALIST

## 2020-09-17 PROCEDURE — 2709999900 HC NON-CHARGEABLE SUPPLY: Performed by: SPECIALIST

## 2020-09-17 PROCEDURE — 6360000002 HC RX W HCPCS: Performed by: NURSE ANESTHETIST, CERTIFIED REGISTERED

## 2020-09-17 PROCEDURE — 82962 GLUCOSE BLOOD TEST: CPT

## 2020-09-17 PROCEDURE — 2580000003 HC RX 258: Performed by: SPECIALIST

## 2020-09-17 PROCEDURE — 88305 TISSUE EXAM BY PATHOLOGIST: CPT

## 2020-09-17 PROCEDURE — 6370000000 HC RX 637 (ALT 250 FOR IP): Performed by: SPECIALIST

## 2020-09-17 PROCEDURE — 3700000000 HC ANESTHESIA ATTENDED CARE: Performed by: SPECIALIST

## 2020-09-17 PROCEDURE — 7100000010 HC PHASE II RECOVERY - FIRST 15 MIN: Performed by: SPECIALIST

## 2020-09-17 PROCEDURE — 7100000011 HC PHASE II RECOVERY - ADDTL 15 MIN: Performed by: SPECIALIST

## 2020-09-17 PROCEDURE — 2500000003 HC RX 250 WO HCPCS: Performed by: NURSE ANESTHETIST, CERTIFIED REGISTERED

## 2020-09-17 PROCEDURE — 3609014000 HC ENTEROSCOPY BIOPSY: Performed by: SPECIALIST

## 2020-09-17 PROCEDURE — 3609012400 HC EGD TRANSORAL BIOPSY SINGLE/MULTIPLE: Performed by: SPECIALIST

## 2020-09-17 RX ORDER — SIMETHICONE 20 MG/.3ML
EMULSION ORAL PRN
Status: DISCONTINUED | OUTPATIENT
Start: 2020-09-17 | End: 2020-09-17 | Stop reason: ALTCHOICE

## 2020-09-17 RX ORDER — PROPOFOL 10 MG/ML
INJECTION, EMULSION INTRAVENOUS PRN
Status: DISCONTINUED | OUTPATIENT
Start: 2020-09-17 | End: 2020-09-17 | Stop reason: SDUPTHER

## 2020-09-17 RX ORDER — LIDOCAINE HYDROCHLORIDE 20 MG/ML
INJECTION, SOLUTION INTRAVENOUS PRN
Status: DISCONTINUED | OUTPATIENT
Start: 2020-09-17 | End: 2020-09-17 | Stop reason: SDUPTHER

## 2020-09-17 RX ORDER — SODIUM CHLORIDE, SODIUM LACTATE, POTASSIUM CHLORIDE, CALCIUM CHLORIDE 600; 310; 30; 20 MG/100ML; MG/100ML; MG/100ML; MG/100ML
INJECTION, SOLUTION INTRAVENOUS CONTINUOUS
Status: DISCONTINUED | OUTPATIENT
Start: 2020-09-17 | End: 2020-09-17 | Stop reason: HOSPADM

## 2020-09-17 RX ORDER — ONDANSETRON 2 MG/ML
INJECTION INTRAMUSCULAR; INTRAVENOUS PRN
Status: DISCONTINUED | OUTPATIENT
Start: 2020-09-17 | End: 2020-09-17 | Stop reason: SDUPTHER

## 2020-09-17 RX ADMIN — SODIUM CHLORIDE, POTASSIUM CHLORIDE, SODIUM LACTATE AND CALCIUM CHLORIDE: 600; 310; 30; 20 INJECTION, SOLUTION INTRAVENOUS at 09:00

## 2020-09-17 RX ADMIN — PHENYLEPHRINE HYDROCHLORIDE 100 MCG: 10 INJECTION INTRAVENOUS at 09:21

## 2020-09-17 RX ADMIN — PHENYLEPHRINE HYDROCHLORIDE 100 MCG: 10 INJECTION INTRAVENOUS at 09:19

## 2020-09-17 RX ADMIN — PHENYLEPHRINE HYDROCHLORIDE 200 MCG: 10 INJECTION INTRAVENOUS at 09:28

## 2020-09-17 RX ADMIN — LIDOCAINE HYDROCHLORIDE 200 MG: 20 INJECTION, SOLUTION INTRAVENOUS at 09:09

## 2020-09-17 RX ADMIN — PHENYLEPHRINE HYDROCHLORIDE 100 MCG: 10 INJECTION INTRAVENOUS at 09:12

## 2020-09-17 RX ADMIN — PHENYLEPHRINE HYDROCHLORIDE 100 MCG: 10 INJECTION INTRAVENOUS at 09:09

## 2020-09-17 RX ADMIN — PHENYLEPHRINE HYDROCHLORIDE 100 MCG: 10 INJECTION INTRAVENOUS at 09:15

## 2020-09-17 RX ADMIN — ONDANSETRON 4 MG: 2 INJECTION INTRAMUSCULAR; INTRAVENOUS at 09:15

## 2020-09-17 RX ADMIN — PROPOFOL 280 MG: 10 INJECTION, EMULSION INTRAVENOUS at 09:09

## 2020-09-17 RX ADMIN — GLUCAGON HYDROCHLORIDE 0.5 MG: KIT at 09:15

## 2020-09-17 ASSESSMENT — PAIN - FUNCTIONAL ASSESSMENT: PAIN_FUNCTIONAL_ASSESSMENT: 0-10

## 2020-09-17 ASSESSMENT — PAIN SCALES - GENERAL
PAINLEVEL_OUTOF10: 0

## 2020-09-17 NOTE — PROGRESS NOTES
8543 Received from OR, placed on monitor. Drowsy, but appropriate. Denies pain, nausea. Call light in reach. 4685 Repositioned in bed, sitting up sipping [employer] without difficulty. Call light in reach. 5932 Dr. Kriss Gaffney at bedside updating patient on procedure. 200 Spoke with Lindsey Saldivart, wife. Reviewed discharge instructions with wife and patient. Verbalized understanding. 1020 Discharge to car via wheelchair.   Deborah Alvarado

## 2020-09-17 NOTE — BRIEF OP NOTE
BRIEF ENTEROSCOPY REPORT:   Photos and full colonoscopy report available by going to \"chart review\" then \"procedures\" then  \"colonoscopy\" then \"View Endoscopy Report\"      IMPRESSION :    1) mild erythemal of proximal jejunum of doubtful significance- biopsied   2) otherwise normal to a point felt to be 1-2 feet beyond the Ligament of [place]   3) biopsies taken of the normal-appearing descending  duodenum to rule out celiac sprue    PLAN :    repeat colonoscopy- if that is negative refer for capsule  endoscopy

## 2020-09-17 NOTE — ANESTHESIA POSTPROCEDURE EVALUATION
Department of Anesthesiology  Postprocedure Note    Patient: Tiara Taylor  MRN: [id number]  YOB: 1950  Date of evaluation: 9/17/2020  Time:  9:35 AM     Procedure Summary     Date:  09/17/20 Room / Location:  [address]    Anesthesia Start:  0900 Anesthesia Stop:  6617    Procedure:  ENTEROSCOPY (N/A ) Diagnosis:  (IRON DEFICIENCY ANEMIA)    Surgeon:  Elidia Martinez MD Responsible Provider:  JUSTIN Cook CRNA    Anesthesia Type:  MAC ASA Status:  3          Anesthesia Type: MAC    Sanju Phase I:      Sanju Phase II:      Last vitals: Reviewed and per EMR flowsheets.        Anesthesia Post Evaluation    Patient location during evaluation: bedside  Patient participation: complete - patient participated  Level of consciousness: awake and alert  Pain score: 0  Airway patency: patent  Nausea & Vomiting: no nausea and no vomiting  Complications: no  Cardiovascular status: blood pressure returned to baseline  Respiratory status: acceptable, nonlabored ventilation, spontaneous ventilation and room air  Hydration status: euvolemic

## 2020-09-23 ENCOUNTER — HOSPITAL ENCOUNTER (OUTPATIENT)
Dept: GENERAL RADIOLOGY | Age: 70
Discharge: HOME | End: 2020-09-23
Payer: MEDICARE

## 2020-09-23 ENCOUNTER — HOSPITAL ENCOUNTER (OUTPATIENT)
Age: 70
Discharge: HOME | End: 2020-09-23
Payer: MEDICARE

## 2020-09-23 PROCEDURE — 71046 X-RAY EXAM CHEST 2 VIEWS: CPT

## 2020-09-24 ENCOUNTER — HOSPITAL ENCOUNTER (OUTPATIENT)
Dept: LAB | Age: 70
Discharge: HOME | End: 2020-09-24
Payer: MEDICARE

## 2020-09-24 PROCEDURE — U0002 COVID-19 LAB TEST NON-CDC: HCPCS

## 2020-09-24 RX ORDER — DULAGLUTIDE 1.5 MG/.5ML
1.5 INJECTION, SOLUTION SUBCUTANEOUS WEEKLY
Qty: 12 PEN | Refills: 1 | Status: SHIPPED | OUTPATIENT
Start: 2020-09-24 | End: 2020-09-29 | Stop reason: SDUPTHER

## 2020-09-26 LAB
SARS-COV-2: NOT DETECTED
SOURCE: NORMAL

## 2020-09-28 ENCOUNTER — ANESTHESIA EVENT (OUTPATIENT)
Dept: OPERATING ROOM | Age: 70
End: 2020-09-28
Payer: MEDICARE

## 2020-09-29 ENCOUNTER — OFFICE VISIT (OUTPATIENT)
Dept: FAMILY MEDICINE CLINIC | Age: 70
End: 2020-09-29
Payer: MEDICARE

## 2020-09-29 VITALS
OXYGEN SATURATION: 98 % | HEART RATE: 61 BPM | TEMPERATURE: 97.2 F | HEIGHT: 71 IN | DIASTOLIC BLOOD PRESSURE: 80 MMHG | BODY MASS INDEX: 28.56 KG/M2 | WEIGHT: 204 LBS | SYSTOLIC BLOOD PRESSURE: 122 MMHG

## 2020-09-29 DIAGNOSIS — I10 ESSENTIAL HYPERTENSION: ICD-10-CM

## 2020-09-29 DIAGNOSIS — D50.0 IRON DEFICIENCY ANEMIA DUE TO CHRONIC BLOOD LOSS: Chronic | ICD-10-CM

## 2020-09-29 LAB
A/G RATIO: 1.4 (ref 1.1–2.2)
ALBUMIN SERPL-MCNC: 4.2 G/DL (ref 3.4–5)
ALP BLD-CCNC: 78 U/L (ref 40–129)
ALT SERPL-CCNC: 11 U/L (ref 10–40)
ANION GAP SERPL CALCULATED.3IONS-SCNC: 12 MMOL/L (ref 3–16)
AST SERPL-CCNC: 18 U/L (ref 15–37)
BASOPHILS ABSOLUTE: 0.1 K/UL (ref 0–0.2)
BASOPHILS RELATIVE PERCENT: 1.2 %
BILIRUB SERPL-MCNC: 0.3 MG/DL (ref 0–1)
BUN BLDV-MCNC: 24 MG/DL (ref 7–20)
CALCIUM SERPL-MCNC: 9.3 MG/DL (ref 8.3–10.6)
CHLORIDE BLD-SCNC: 101 MMOL/L (ref 99–110)
CO2: 25 MMOL/L (ref 21–32)
CREAT SERPL-MCNC: 1.3 MG/DL (ref 0.8–1.3)
EOSINOPHILS ABSOLUTE: 0.2 K/UL (ref 0–0.6)
EOSINOPHILS RELATIVE PERCENT: 3.8 %
GFR AFRICAN AMERICAN: >60
GFR NON-AFRICAN AMERICAN: 54
GLOBULIN: 3.1 G/DL
GLUCOSE BLD-MCNC: 130 MG/DL (ref 70–99)
HCT VFR BLD CALC: 38.3 % (ref 40.5–52.5)
HEMOGLOBIN: 11.7 G/DL (ref 13.5–17.5)
LYMPHOCYTES ABSOLUTE: 1.3 K/UL (ref 1–5.1)
LYMPHOCYTES RELATIVE PERCENT: 21.6 %
MCH RBC QN AUTO: 21.7 PG (ref 26–34)
MCHC RBC AUTO-ENTMCNC: 30.4 G/DL (ref 31–36)
MCV RBC AUTO: 71.2 FL (ref 80–100)
MONOCYTES ABSOLUTE: 0.6 K/UL (ref 0–1.3)
MONOCYTES RELATIVE PERCENT: 10.5 %
NEUTROPHILS ABSOLUTE: 3.7 K/UL (ref 1.7–7.7)
NEUTROPHILS RELATIVE PERCENT: 62.9 %
PDW BLD-RTO: 22.9 % (ref 12.4–15.4)
PLATELET # BLD: 254 K/UL (ref 135–450)
PMV BLD AUTO: 8.7 FL (ref 5–10.5)
POTASSIUM SERPL-SCNC: 4.7 MMOL/L (ref 3.5–5.1)
RBC # BLD: 5.38 M/UL (ref 4.2–5.9)
SODIUM BLD-SCNC: 138 MMOL/L (ref 136–145)
TOTAL PROTEIN: 7.3 G/DL (ref 6.4–8.2)
WBC # BLD: 5.9 K/UL (ref 4–11)

## 2020-09-29 PROCEDURE — 3017F COLORECTAL CA SCREEN DOC REV: CPT | Performed by: FAMILY MEDICINE

## 2020-09-29 PROCEDURE — 1123F ACP DISCUSS/DSCN MKR DOCD: CPT | Performed by: FAMILY MEDICINE

## 2020-09-29 PROCEDURE — G8427 DOCREV CUR MEDS BY ELIG CLIN: HCPCS | Performed by: FAMILY MEDICINE

## 2020-09-29 PROCEDURE — G8417 CALC BMI ABV UP PARAM F/U: HCPCS | Performed by: FAMILY MEDICINE

## 2020-09-29 PROCEDURE — 1036F TOBACCO NON-USER: CPT | Performed by: FAMILY MEDICINE

## 2020-09-29 PROCEDURE — G0008 ADMIN INFLUENZA VIRUS VAC: HCPCS | Performed by: FAMILY MEDICINE

## 2020-09-29 PROCEDURE — 99214 OFFICE O/P EST MOD 30 MIN: CPT | Performed by: FAMILY MEDICINE

## 2020-09-29 PROCEDURE — 4040F PNEUMOC VAC/ADMIN/RCVD: CPT | Performed by: FAMILY MEDICINE

## 2020-09-29 PROCEDURE — 90694 VACC AIIV4 NO PRSRV 0.5ML IM: CPT | Performed by: FAMILY MEDICINE

## 2020-09-29 RX ORDER — ALLOPURINOL 300 MG/1
300 TABLET ORAL DAILY
Qty: 90 TABLET | Refills: 1 | Status: SHIPPED | OUTPATIENT
Start: 2020-09-29 | End: 2021-02-09 | Stop reason: SDUPTHER

## 2020-09-29 RX ORDER — SIMVASTATIN 20 MG
TABLET ORAL
Qty: 90 TABLET | Refills: 1 | Status: SHIPPED | OUTPATIENT
Start: 2020-09-29 | End: 2021-02-09 | Stop reason: SDUPTHER

## 2020-09-29 RX ORDER — VALSARTAN 40 MG/1
TABLET ORAL
Qty: 90 TABLET | Refills: 1 | Status: ON HOLD | OUTPATIENT
Start: 2020-09-29 | End: 2021-02-24 | Stop reason: HOSPADM

## 2020-09-29 RX ORDER — GABAPENTIN 600 MG/1
600 TABLET ORAL 3 TIMES DAILY
Qty: 90 TABLET | Refills: 1 | Status: SHIPPED | OUTPATIENT
Start: 2020-09-29 | End: 2021-02-09 | Stop reason: SDUPTHER

## 2020-09-29 RX ORDER — DULAGLUTIDE 1.5 MG/.5ML
1.5 INJECTION, SOLUTION SUBCUTANEOUS WEEKLY
Qty: 12 PEN | Refills: 1 | Status: SHIPPED | OUTPATIENT
Start: 2020-09-29 | End: 2021-02-09 | Stop reason: SDUPTHER

## 2020-09-29 RX ORDER — ALLOPURINOL 100 MG/1
100 TABLET ORAL DAILY
Qty: 90 TABLET | Refills: 1 | Status: SHIPPED | OUTPATIENT
Start: 2020-09-29 | End: 2021-02-09 | Stop reason: SDUPTHER

## 2020-09-29 ASSESSMENT — ENCOUNTER SYMPTOMS
COUGH: 0
SORE THROAT: 0
ALLERGIC/IMMUNOLOGIC NEGATIVE: 1

## 2020-09-29 NOTE — H&P
Original H &P in soft chart. I have examined the patient immediately before the procedure and there is no change in the previous history and physical exam, which has been reviewed. There is a history of sleep apnea. There has been no  previous adverse experience with sedation/anesthesia. There is no increased risk for aspiration of gastric contents. The patient has been instructed that all resuscitative measures (during the operative and immediate perioperative period) will be instituted in the unlikely event that they will be needed. The patient has no pertinent past surgical or family history other than listed in the original H&P.     ASA Class: 3  AIRWAY Class: 1

## 2020-09-29 NOTE — PROGRESS NOTES
2020     Bipin Reno      Chief Complaint   Patient presents with    1 Month Follow-Up     pt had the covid,pneumonia and pt is anemic . Pt states that DR Tyler Au did the EGD  1 week ago and he did not find anything and he is having a colonoscopy tomorrow . Pt wants to know if he has had a 2nd pneumonia  shot .  Flu Vaccine     would like flu shot today . Pt states that he is feeling so much better. Pt would like to up his gabapentin to 600mg due to his neuropothy . He can not sleep due to the pain. ELIZABETH Beal is a 65 y.o. male who presents today with the followin. Need for vaccination for H flu type B    2. PAF (paroxysmal atrial fibrillation) ([address]    3. Essential hypertension    4. Flu    5. Iron deficiency anemia due to chronic blood loss    6. Pneumonia due to infectious organism, unspecified laterality, unspecified part of lung    l visit for follow-up of COVID-19 19 pneumonia  He says great now he does not have any cough shortness of breath he back to his usual activities he says he feels back to normal  The started play golf again  He has any cough fever shortness of breath etc.  He had 2 chest x-rays 1 initially showed some worsening of his findings on on x-ray but actually was clinically improved  He got another one a couple weeks later and he was starting to improve  REVIEW OF SYMPTOMS    Review of Systems   Constitutional: Negative for activity change, fever and unexpected weight change. Still following guidelines for prevention of Covid 19 both he and his wife are infected and have recovered   HENT: Negative for congestion and sore throat. Respiratory: Negative for cough. Cardiovascular: Negative for chest pain.         History of hypertension and hyperlipidemia  Both are under control paroxysmal atrial fibrillation stable     Endocrine:        Diabetes type 2 which been fairly well controlled  His last A1c was near 7  Shortly to get another because he been critically ill   Genitourinary:        Stage III kidney disease which is stable  He he had recent covered infection and needs to have a recheck on the labs   Allergic/Immunologic: Negative. Hematological:        Patient was found to have iron deficiency anemia I think with a hemoglobin about 8  He had an EGD which was negative is having colonoscopy tomorrow  I looked back through his chart he had an most recent CBC about a month ago was 10.4  He has not shown any obvious signs of bleeding   Psychiatric/Behavioral: Negative. PAST MEDICAL HISTORY  Past Medical History:   Diagnosis Date    Arthritis 12/2013    rt wrist    Atrial fibrillation ([address]     CAD (coronary artery disease) 6/18/2014    see dr Marylen Caddy Chronic kidney disease, stage III (moderate) ([address] 7/7/2016    Diabetes mellitus ([address]     dx 2004    Diabetic neuropathy associated with type 2 diabetes mellitus ([address] 4/23/2019    Gout     \"got gout when had pacer put in because they did not give me my medication for gout \"    Gout 4/23/2019    H/O 24 hour EKG monitoring 10/3/2013    no afib noted, sinsus rhythm    H/O cardiovascular stress test 5/12/2014    cardiolite- mild ischemia RCA EF50%    H/O transesophageal echocardiography (MABLE) for monitoring 08/05/2013    normal LV function and normal LA appendage without any clot    [place] (hard of hearing)     hearing tonya aides    Hx of Doppler echocardiogram 05/21/2018    EF 50%  Mild LV hypertrophy. Mildly enlarged RA. Mod aortic valve calcification with mod AS. Mitral annular calcification is present. Mild AR, MR and TR. Mild pulmonary htn.     Hyperlipidemia     Hypertension     Other disorders of kidney and ureter     Pneumonia 12/29/2012    Sleep apnea     dx 2013- has c-pap    Type II or unspecified type diabetes mellitus with other specified manifestations, uncontrolled 12/12/2012    Venous hypertension, chronic, with ulcer ([address] 12/12/2012    resolved       FAMILY HISTORY  Family 12/02/2017    I&D; evacuation of hematoma right hip    OTHER SURGICAL HISTORY  09/17/2020    enteroscopy    PACEMAKER PLACEMENT      9/18/14 Status post remote permanent pacemaker with atrial lead dislodgement. 7/24/14 PPM Implant    UPPER GASTROINTESTINAL ENDOSCOPY N/A 9/17/2020    ENTEROSCOPY PUSH BIOPSY performed by Deborah London MD at [address]  2012    \"have stents in both legs- done in [facility]  Current Outpatient Medications   Medication Sig Dispense Refill    allopurinol (ZYLOPRIM) 100 MG tablet Take 1 tablet by mouth daily 90 tablet 1    allopurinol (ZYLOPRIM) 300 MG tablet Take 1 tablet by mouth daily 90 tablet 1    canagliflozin (INVOKANA) 300 MG TABS tablet Take 1 tablet by mouth every morning (before breakfast) 90 tablet 1    Dulaglutide (TRULICITY) 1.5 QK/1.6KX SOPN Inject 1.5 mg into the skin once a week 12 pen 1    metFORMIN (GLUCOPHAGE) 1000 MG tablet TAKE 1 TABLET TWICE DAILY  WITH MEALS 180 tablet 1    simvastatin (ZOCOR) 20 MG tablet Pt takes 10 mg daily 90 tablet 1    valsartan (DIOVAN) 40 MG tablet Take 40mg daily 90 tablet 1    gabapentin (NEURONTIN) 600 MG tablet Take 1 tablet by mouth 3 times daily for 270 days.  90 tablet 1    XARELTO 20 MG TABS tablet TAKE 1 TABLET DAILY WITH   BREAKFAST 90 tablet 3    ferrous sulfate (IRON 325) 325 (65 Fe) MG tablet Take 1 tablet by mouth daily (with breakfast) 30 tablet 5    glimepiride (AMARYL) 4 MG tablet Take 1 tablet by mouth daily 90 tablet 1    sildenafil (VIAGRA) 100 MG tablet Take 1 tablet by mouth daily as needed for Erectile Dysfunction 30 tablet 1    metoprolol succinate (TOPROL XL) 25 MG extended release tablet Take 1 tablet by mouth daily 90 tablet 3    furosemide (LASIX) 20 MG tablet Take 1 tablet by mouth 2 times daily May take an extra Lasix if has increased swelling 180 tablet 3    aspirin 81 MG tablet Take 81 mg by mouth daily       No current facility-administered tablet; Take 1 tablet by mouth daily  -     allopurinol (ZYLOPRIM) 300 MG tablet; Take 1 tablet by mouth daily  -     canagliflozin (INVOKANA) 300 MG TABS tablet; Take 1 tablet by mouth every morning (before breakfast)  -     Dulaglutide (TRULICITY) 1.5 AW/1.3DF SOPN; Inject 1.5 mg into the skin once a week  -     metFORMIN (GLUCOPHAGE) 1000 MG tablet; TAKE 1 TABLET TWICE DAILY  WITH MEALS  -     gabapentin (NEURONTIN) 600 MG tablet; Take 1 tablet by mouth 3 times daily for 270 days. Patient is recovered well from Covid 19 infection  He is undergoing a GI work-up for iron deficiency anemia  His diabetes is coming back under control    Plan is to complete the GI work-up  Getting labs today including CBC and a chemistry profile  He is to follow-up here in 6 weeks  Influenza vaccine administered    Return in about 6 weeks (around 11/10/2020). Electronically signed by Brinda Stout MD on 9/29/2020    Please note that this chart was generated using dragon dictation software. Although every effort was made to ensure the accuracy of this automated transcription, some errors in transcription may have occurred.

## 2020-09-29 NOTE — ANESTHESIA PRE PROCEDURE
Allergies   Allergen Reactions    Spironolactone      CAUSES INCREASED K+    Tape [employer] Tape] Rash     SURGICAL TAPE       Problem List:    Patient Active Problem List   Diagnosis Code    Type 2 diabetes mellitus without complication, without long-term current use of insulin ([facility]) E11.9    Ulcer of other part of lower limb L97.809    Venous hypertension, chronic, with ulcer ([facility] I87.319, L97.909    Ulcer of other part of foot L97.509    Pneumonia J18.9    Atrial fibrillation ([facility]) I48.91    Sinus pause I45.5    PAF (paroxysmal atrial fibrillation) ([facility]) I48.0    Hypertension I10    DM (diabetes mellitus) ([facility]) E11.9    DMITRIY on CPAP G47.33, Z99.89    Hyperlipidemia E78.5    Status post incision and drainage Z98.890    CAD (coronary artery disease) I25.10    CKD (chronic kidney disease) stage 3, GFR 30-59 ml/min ([facility]) N18.3    Hyperpotassemia E87.5    Arthritis M19.90    PVD (peripheral vascular disease) ([facility]) I73.9    Hematoma T14. 8XXA    Cardiac pacemaker in situ Z95.0    Coronary artery disease involving native coronary artery of native heart without angina pectoris I25.10    Essential hypertension I10    Gout M10.9    Diabetic neuropathy associated with type 2 diabetes mellitus ([address] E11.40    Adenomatous polyp of sigmoid colon D12.5    Iron deficiency anemia due to chronic blood loss D50.0       Past Medical History:        Diagnosis Date    Arthritis 12/2013    rt wrist    Atrial fibrillation ([facility])     CAD (coronary artery disease) 6/18/2014    see dr Veroinca Donahue Chronic kidney disease, stage III (moderate) ([address] 7/7/2016    Diabetes mellitus ([address]     dx 2004    Diabetic neuropathy associated with type 2 diabetes mellitus ([address] 4/23/2019    Gout     \"got gout when had pacer put in because they did not give me my medication for gout \"    Gout 4/23/2019    H/O 24 hour EKG monitoring 10/3/2013    no afib noted, sinsus rhythm    H/O cardiovascular stress test 5/12/2014 (Current): There were no vitals filed for this visit. BP Readings from Last 3 Encounters:   09/17/20 (!) 99/38   09/17/20 135/65   08/16/20 130/77       NPO Status:                                                                                 BMI:   Wt Readings from Last 3 Encounters:   09/17/20 201 lb (91.2 kg)   08/15/20 219 lb 2.2 oz (99.4 kg)   08/10/20 220 lb (99.8 kg)     There is no height or weight on file to calculate BMI.    CBC:   Lab Results   Component Value Date    WBC 7.3 09/02/2020    RBC 5.30 09/02/2020    HGB 10.8 09/02/2020    HCT 39.9 09/02/2020    MCV 75.3 09/02/2020    RDW 20.9 09/02/2020     09/02/2020       CMP:   Lab Results   Component Value Date     09/02/2020    K 5.2 09/02/2020    CL 96 09/02/2020    CO2 26 09/02/2020    BUN 34 09/02/2020    CREATININE 1.4 09/02/2020    GFRAA >60 09/02/2020    AGRATIO 2.1 07/07/2020    LABGLOM 50 09/02/2020    LABGLOM 51 06/15/2016    GLUCOSE 224 09/02/2020    PROT 7.3 09/02/2020    PROT 7.3 12/27/2012    CALCIUM 9.4 09/02/2020    BILITOT 0.4 09/02/2020    ALKPHOS 157 09/02/2020    AST 36 09/02/2020    ALT 74 09/02/2020       POC Tests: No results for input(s): POCGLU, POCNA, POCK, POCCL, POCBUN, POCHEMO, POCHCT in the last 72 hours. Coags:   Lab Results   Component Value Date    PROTIME 21.9 08/16/2020    INR 1.80 08/16/2020    APTT 61.3 08/16/2020       HCG (If Applicable): No results found for: PREGTESTUR, PREGSERUM, HCG, HCGQUANT     ABGs: No results found for: PHART, PO2ART, SQX1XLB, EWF0MDZ, BEART, Y8VRZUHX     Type & Screen (If Applicable):  No results found for: [facility], [address]    Anesthesia Evaluation  Patient summary reviewed   history of anesthetic complications: PONV.   Airway:   TM distance: <3 FB      Dental:          Pulmonary:   (+) pneumonia:  sleep apnea:                             Cardiovascular:    (+) hypertension:, pacemaker:, CAD:, pulmonary hypertension: mild, Echocardiogram reviewed  Stress test reviewed             ROS comment: No recent cardiac symptoms     Neuro/Psych:               GI/Hepatic/Renal:   (+) renal disease: CRI,           Endo/Other:    (+) DiabetesType II DM, , .          Pt had no PAT visit       Abdominal:           Vascular:                                          Anesthesia Plan      MAC     ASA 3     (Chart review)  Induction: intravenous.

## 2020-09-30 ENCOUNTER — TELEPHONE (OUTPATIENT)
Dept: FAMILY MEDICINE CLINIC | Age: 70
End: 2020-09-30

## 2020-09-30 ENCOUNTER — TELEPHONE (OUTPATIENT)
Dept: GASTROENTEROLOGY | Age: 70
End: 2020-09-30

## 2020-09-30 ENCOUNTER — HOSPITAL ENCOUNTER (OUTPATIENT)
Age: 70
Setting detail: OUTPATIENT SURGERY
Discharge: HOME | End: 2020-09-30
Attending: SPECIALIST | Admitting: SPECIALIST
Payer: MEDICARE

## 2020-09-30 ENCOUNTER — ANESTHESIA (OUTPATIENT)
Dept: OPERATING ROOM | Age: 70
End: 2020-09-30
Payer: MEDICARE

## 2020-09-30 VITALS
HEIGHT: 71 IN | WEIGHT: 201 LBS | BODY MASS INDEX: 28.14 KG/M2 | RESPIRATION RATE: 18 BRPM | DIASTOLIC BLOOD PRESSURE: 70 MMHG | OXYGEN SATURATION: 98 % | TEMPERATURE: 97.5 F | SYSTOLIC BLOOD PRESSURE: 132 MMHG | HEART RATE: 62 BPM

## 2020-09-30 VITALS
DIASTOLIC BLOOD PRESSURE: 58 MMHG | RESPIRATION RATE: 16 BRPM | OXYGEN SATURATION: 100 % | SYSTOLIC BLOOD PRESSURE: 109 MMHG

## 2020-09-30 LAB — GLUCOSE BLD-MCNC: 88 MG/DL (ref 70–99)

## 2020-09-30 PROCEDURE — 7100000010 HC PHASE II RECOVERY - FIRST 15 MIN: Performed by: SPECIALIST

## 2020-09-30 PROCEDURE — 6360000002 HC RX W HCPCS: Performed by: NURSE ANESTHETIST, CERTIFIED REGISTERED

## 2020-09-30 PROCEDURE — 7100000011 HC PHASE II RECOVERY - ADDTL 15 MIN: Performed by: SPECIALIST

## 2020-09-30 PROCEDURE — 2720000010 HC SURG SUPPLY STERILE: Performed by: SPECIALIST

## 2020-09-30 PROCEDURE — 3700000000 HC ANESTHESIA ATTENDED CARE: Performed by: SPECIALIST

## 2020-09-30 PROCEDURE — 2709999900 HC NON-CHARGEABLE SUPPLY: Performed by: SPECIALIST

## 2020-09-30 PROCEDURE — 3609009900 HC COLONOSCOPY W/CONTROL BLEEDING ANY METHOD: Performed by: SPECIALIST

## 2020-09-30 PROCEDURE — 3700000001 HC ADD 15 MINUTES (ANESTHESIA): Performed by: SPECIALIST

## 2020-09-30 PROCEDURE — 82962 GLUCOSE BLOOD TEST: CPT

## 2020-09-30 PROCEDURE — 2580000003 HC RX 258: Performed by: SPECIALIST

## 2020-09-30 RX ORDER — PROPOFOL 10 MG/ML
INJECTION, EMULSION INTRAVENOUS PRN
Status: DISCONTINUED | OUTPATIENT
Start: 2020-09-30 | End: 2020-09-30 | Stop reason: SDUPTHER

## 2020-09-30 RX ORDER — SODIUM CHLORIDE, SODIUM LACTATE, POTASSIUM CHLORIDE, CALCIUM CHLORIDE 600; 310; 30; 20 MG/100ML; MG/100ML; MG/100ML; MG/100ML
INJECTION, SOLUTION INTRAVENOUS CONTINUOUS
Status: DISCONTINUED | OUTPATIENT
Start: 2020-09-30 | End: 2020-09-30 | Stop reason: HOSPADM

## 2020-09-30 RX ORDER — ONDANSETRON 2 MG/ML
INJECTION INTRAMUSCULAR; INTRAVENOUS PRN
Status: DISCONTINUED | OUTPATIENT
Start: 2020-09-30 | End: 2020-09-30 | Stop reason: SDUPTHER

## 2020-09-30 RX ORDER — LIDOCAINE HYDROCHLORIDE 20 MG/ML
INJECTION, SOLUTION INTRAVENOUS PRN
Status: DISCONTINUED | OUTPATIENT
Start: 2020-09-30 | End: 2020-09-30 | Stop reason: SDUPTHER

## 2020-09-30 RX ADMIN — PROPOFOL 20 MG: 10 INJECTION, EMULSION INTRAVENOUS at 09:17

## 2020-09-30 RX ADMIN — PROPOFOL 20 MG: 10 INJECTION, EMULSION INTRAVENOUS at 09:20

## 2020-09-30 RX ADMIN — ONDANSETRON 4 MG: 2 INJECTION INTRAMUSCULAR; INTRAVENOUS at 09:06

## 2020-09-30 RX ADMIN — PROPOFOL 50 MG: 10 INJECTION, EMULSION INTRAVENOUS at 09:12

## 2020-09-30 RX ADMIN — PROPOFOL 20 MG: 10 INJECTION, EMULSION INTRAVENOUS at 09:24

## 2020-09-30 RX ADMIN — PROPOFOL 20 MG: 10 INJECTION, EMULSION INTRAVENOUS at 09:28

## 2020-09-30 RX ADMIN — PROPOFOL 20 MG: 10 INJECTION, EMULSION INTRAVENOUS at 09:31

## 2020-09-30 RX ADMIN — LIDOCAINE HYDROCHLORIDE 100 MG: 20 INJECTION, SOLUTION INTRAVENOUS at 09:12

## 2020-09-30 RX ADMIN — SODIUM CHLORIDE, POTASSIUM CHLORIDE, SODIUM LACTATE AND CALCIUM CHLORIDE: 600; 310; 30; 20 INJECTION, SOLUTION INTRAVENOUS at 08:50

## 2020-09-30 ASSESSMENT — PAIN - FUNCTIONAL ASSESSMENT: PAIN_FUNCTIONAL_ASSESSMENT: 0-10

## 2020-09-30 ASSESSMENT — PAIN SCALES - GENERAL
PAINLEVEL_OUTOF10: 0
PAINLEVEL_OUTOF10: 0

## 2020-09-30 NOTE — TELEPHONE ENCOUNTER
I called him to report that his prostate felt a bit firm on the digital rectal exam and I forgot to mention that to him atb the time of his colonoscopy  Will order a PSA

## 2020-09-30 NOTE — ANESTHESIA PRE PROCEDURE
increased swelling 3/20/20   Victor M Sanabria MD   aspirin 81 MG tablet Take 81 mg by mouth daily    Historical Provider, MD       Current medications:    No current facility-administered medications for this encounter. Current Outpatient Medications   Medication Sig Dispense Refill    allopurinol (ZYLOPRIM) 100 MG tablet Take 1 tablet by mouth daily 90 tablet 1    allopurinol (ZYLOPRIM) 300 MG tablet Take 1 tablet by mouth daily 90 tablet 1    canagliflozin (INVOKANA) 300 MG TABS tablet Take 1 tablet by mouth every morning (before breakfast) 90 tablet 1    Dulaglutide (TRULICITY) 1.5 IM/4.4BC SOPN Inject 1.5 mg into the skin once a week 12 pen 1    metFORMIN (GLUCOPHAGE) 1000 MG tablet TAKE 1 TABLET TWICE DAILY  WITH MEALS 180 tablet 1    simvastatin (ZOCOR) 20 MG tablet Pt takes 10 mg daily 90 tablet 1    valsartan (DIOVAN) 40 MG tablet Take 40mg daily 90 tablet 1    gabapentin (NEURONTIN) 600 MG tablet Take 1 tablet by mouth 3 times daily for 270 days. 90 tablet 1    XARELTO 20 MG TABS tablet TAKE 1 TABLET DAILY WITH   BREAKFAST 90 tablet 3    ferrous sulfate (IRON 325) 325 (65 Fe) MG tablet Take 1 tablet by mouth daily (with breakfast) 30 tablet 5    glimepiride (AMARYL) 4 MG tablet Take 1 tablet by mouth daily 90 tablet 1    sildenafil (VIAGRA) 100 MG tablet Take 1 tablet by mouth daily as needed for Erectile Dysfunction 30 tablet 1    metoprolol succinate (TOPROL XL) 25 MG extended release tablet Take 1 tablet by mouth daily 90 tablet 3    furosemide (LASIX) 20 MG tablet Take 1 tablet by mouth 2 times daily May take an extra Lasix if has increased swelling 180 tablet 3    aspirin 81 MG tablet Take 81 mg by mouth daily         Allergies:     Allergies   Allergen Reactions    Spironolactone      CAUSES INCREASED K+    Tape [employer] Tape] Rash     SURGICAL TAPE       Problem List:    Patient Active Problem List   Diagnosis Code    Type 2 diabetes mellitus without complication, without long-term current use of insulin ([facility] E11.9    Ulcer of other part of lower limb L97.809    Venous hypertension, chronic, with ulcer ([facility] I87.319, L97.909    Ulcer of other part of foot L97.509    Pneumonia J18.9    Atrial fibrillation ([facility]) I48.91    Sinus pause I45.5    PAF (paroxysmal atrial fibrillation) ([facility]) I48.0    Hypertension I10    DM (diabetes mellitus) ([facility]) E11.9    DMITRIY on CPAP G47.33, Z99.89    Hyperlipidemia E78.5    Status post incision and drainage Z98.890    CAD (coronary artery disease) I25.10    CKD (chronic kidney disease) stage 3, GFR 30-59 ml/min ([facility]) N18.3    Hyperpotassemia E87.5    Arthritis M19.90    PVD (peripheral vascular disease) ([facility]) I73.9    Hematoma T14. 8XXA    Cardiac pacemaker in situ Z95.0    Coronary artery disease involving native coronary artery of native heart without angina pectoris I25.10    Essential hypertension I10    Gout M10.9    Diabetic neuropathy associated with type 2 diabetes mellitus ([facility]) E11.40    Adenomatous polyp of sigmoid colon D12.5    Iron deficiency anemia due to chronic blood loss D50.0       Past Medical History:        Diagnosis Date    Arthritis 12/2013    rt wrist    Atrial fibrillation ([facility])     CAD (coronary artery disease) 6/18/2014    see dr Reagan Howell Chronic kidney disease, stage III (moderate) ([facility] 7/7/2016    Diabetes mellitus ([facility]     dx 2004    Diabetic neuropathy associated with type 2 diabetes mellitus [facility] 4/23/2019    Gout     \"got gout when had pacer put in because they did not give me my medication for gout \"    Gout 4/23/2019    H/O 24 hour EKG monitoring 10/3/2013    no afib noted, sinsus rhythm    H/O cardiovascular stress test 5/12/2014    cardiolite- mild ischemia RCA EF50%    H/O transesophageal echocardiography (MABLE) for monitoring 08/05/2013    normal LV function and normal LA appendage without any clot    [place] (hard of hearing)     hearing tonya aides    Hx of Doppler echocardiogram 05/21/2018    EF 50%  Mild LV hypertrophy. Mildly enlarged RA. Mod aortic valve calcification with mod AS. Mitral annular calcification is present. Mild AR, MR and TR. Mild pulmonary htn.  Hyperlipidemia     Hypertension     Other disorders of kidney and ureter     Pneumonia 12/29/2012    Sleep apnea     dx 2013- has c-pap    Type II or unspecified type diabetes mellitus with other specified manifestations, uncontrolled 12/12/2012    Venous hypertension, chronic, with ulcer ([address] 12/12/2012    resolved       Past Surgical History:        Procedure Laterality Date    CARDIOVERSION  12/14    at [address]  2011    COLONOSCOPY N/A 11/19/2019    COLONOSCOPY DIAGNOSTIC performed by Mari Middleton MD at [address]  2014    \"2 stents put in \"   [address]  2004    total left hip    OTHER SURGICAL HISTORY Right 12/02/2017    I&D; evacuation of hematoma right hip    OTHER SURGICAL HISTORY  09/17/2020    enteroscopy    PACEMAKER PLACEMENT      9/18/14 Status post remote permanent pacemaker with atrial lead dislodgement. 7/24/14 PPM Implant    UPPER GASTROINTESTINAL ENDOSCOPY N/A 9/17/2020    ENTEROSCOPY PUSH BIOPSY performed by Mari Middleton MD at [address]  2012    \"have stents in both legs- done in [place]       Social History:    Social History     Tobacco Use    Smoking status: Never Smoker    Smokeless tobacco: Never Used   Substance Use Topics    Alcohol use:  Yes     Alcohol/week: 2.0 standard drinks     Types: 2 Cans of beer per week     Comment: average \"one time per week\"                                Counseling given: Not Answered      Vital Signs (Current):   Vitals:    09/24/20 1238   Weight: 201 lb (91.2 kg)   Height: 5' 11\" (1.803 m)                                              BP Readings from Last 3 Encounters:   09/29/20 122/80   09/17/20 (!) 99/38   09/17/20 135/65       NPO Status: BMI:   Wt Readings from Last 3 Encounters:   09/29/20 204 lb (92.5 kg)   09/17/20 201 lb (91.2 kg)   08/15/20 219 lb 2.2 oz (99.4 kg)     Body mass index is 28.03 kg/m². CBC:   Lab Results   Component Value Date    WBC 5.9 09/29/2020    RBC 5.38 09/29/2020    HGB 11.7 09/29/2020    HCT 38.3 09/29/2020    MCV 71.2 09/29/2020    RDW 22.9 09/29/2020     09/29/2020       CMP:   Lab Results   Component Value Date     09/29/2020    K 4.7 09/29/2020     09/29/2020    CO2 25 09/29/2020    BUN 24 09/29/2020    CREATININE 1.3 09/29/2020    GFRAA >60 09/29/2020    AGRATIO 1.4 09/29/2020    LABGLOM 54 09/29/2020    LABGLOM 51 06/15/2016    GLUCOSE 130 09/29/2020    PROT 7.3 09/29/2020    PROT 7.3 12/27/2012    CALCIUM 9.3 09/29/2020    BILITOT 0.3 09/29/2020    ALKPHOS 78 09/29/2020    AST 18 09/29/2020    ALT 11 09/29/2020       POC Tests: No results for input(s): POCGLU, POCNA, POCK, POCCL, POCBUN, POCHEMO, POCHCT in the last 72 hours. Coags:   Lab Results   Component Value Date    PROTIME 21.9 08/16/2020    INR 1.80 08/16/2020    APTT 61.3 08/16/2020       HCG (If Applicable): No results found for: PREGTESTUR, PREGSERUM, HCG, HCGQUANT     ABGs: No results found for: PHART, PO2ART, GZT6GUA, PSF2PKO, BEART, G8MPLJNJ     Type & Screen (If Applicable):  No results found for: [facility]    Anesthesia Evaluation  Patient summary reviewed   history of anesthetic complications: PONV. Airway: Mallampati: II  TM distance: <3 FB   Neck ROM: full  Mouth opening: > = 3 FB Dental: normal exam         Pulmonary:   (+) pneumonia: resolved,  sleep apnea: on CPAP,            Patient did not smoke on day of surgery.                 ROS comment: covid + 9/4/2020   Cardiovascular:  Exercise tolerance: poor (<4 METS),   (+) hypertension: mild, valvular problems/murmurs: AS, pacemaker: pacemaker and dependent, CAD: no interval change, pulmonary hypertension: mild,             Echocardiogram reviewed  Stress test reviewed       Beta Blocker:  Dose within 24 Hrs      ROS comment: No recent cardiac symptoms    Pacer analysis is reviewed is consistent with normal dual-chamber MRI safe [employer] Advisa pacer function with stable leads and appropriate battery status for the age of the device. Remaining average battery life is 2.5 years. Device is 95% pacing in the ventricle.  patient is in persistent at fibrillation and on Xarelto for anticoagulation therapy.       Recommend continued every three month check and follow up office visit as scheduled.       Elizabeth Munguia MD, 9/8/2020 6:50 PM       Ventricular-paced rhythm with frequent   PVCs  in a pattern of bigeminy   Left bundle branch block   Abnormal ECG   No previous ECGs available   Confirmed by Amari Littlejohn MD, Lashawn Hernandes ([id number]) on 8/14/2020 3:44:07 PM     Conclusions  1. Suboptimal image quality. 2. The patient was in a-fib and V paced durring this study.     3. The left ventricular chamber size is normal.The estimated ejection  fraction is 50-55%, consistent with low-normal systolic function. 4. There is abnormal ventricular septal wall motion consistent with  right ventricular pacemaker. 5. The right ventricle is enlarged. 6. The right ventricular global systolic function is moderately  reduced. 7. The left atrium is severely dilated. 8. The right atrial cavity size is severely dilated. 9. There is a trace of mitral regurgitation. 10. There is mild to moderate aortic stenosis. The mean gradient of the  aortic valve is 16 mmHg. 11. The aortic valve area, by peak velocities, is calculated at 1 cm2. 12. There is mild tricuspid regurgitation. 13. The right ventricular systolic pressure is calculated at 28 mmHg. Indication - Procedure:  Study indication: LV function assessment.  The study quality is fair.  8/12/2015       Neuro/Psych:                ROS comment: Neuropathy bl feet GI/Hepatic/Renal:   (+) GERD: well controlled, renal disease: CRI,           Endo/Other:    (+) DiabetesType II DM, , blood dyscrasia: anticoagulation therapy, arthritis: OA., electrolyte abnormalities, . Pt had no PAT visit       Abdominal:           Vascular:   + PVD, aortic or cerebral, . Anesthesia Plan      MAC     ASA 3     (Risks benefits of mac discussed pt agrees to proceed)  Induction: intravenous. MIPS: Prophylactic antiemetics administered. Anesthetic plan and risks discussed with patient. Plan discussed with CRNA and attending.

## 2020-09-30 NOTE — BRIEF OP NOTE
BRIEF COLONOSCOPY REPORT:   Photos and full colonoscopy report available by going to \"chart review\" then \"procedures\" then  \"colonoscopy\" then \"View Endoscopy Report\"      IMPRESSION :   1) possible angiodysplasia in the cecum- coagulated with  the APC  2) sigmoid diverticulosis  3) internal hemorrhoids    PLAN :   1) monitor H/H regularly- if evidence of recurrent occult GI  bleed, refer for capsule endoscopy

## 2020-09-30 NOTE — ANESTHESIA PRE PROCEDURE
Department of Anesthesiology  Preprocedure Note       Name:  Dhiraj Breen   Age:  65 y.o.  :  1950                                          MRN:  [id number]         Date:  2020      Surgeon: Akiko Randhawa):  Maria C Lange MD    Procedure: Procedure(s):  COLONOSCOPY DIAGNOSTIC    Medications prior to admission:   Prior to Admission medications    Medication Sig Start Date End Date Taking? Authorizing Provider   allopurinol (ZYLOPRIM) 100 MG tablet Take 1 tablet by mouth daily 20  Yes Ar Jameson MD   allopurinol (ZYLOPRIM) 300 MG tablet Take 1 tablet by mouth daily 20  Yes Ar Jameson MD   canagliflozin [facility]) 300 MG TABS tablet Take 1 tablet by mouth every morning (before breakfast) 20  Yes Ar Jameson MD   Dulaglutide (TRULICITY) 1.5 YN/7.2PU SOPN Inject 1.5 mg into the skin once a week 20  Yes Ar Jameson MD   metFORMIN (GLUCOPHAGE) 1000 MG tablet TAKE 1 TABLET TWICE DAILY  WITH MEALS 20  Yes Ar Jamseon MD   simvastatin (ZOCOR) 20 MG tablet Pt takes 10 mg daily 20  Yes Ar Jameson MD   valsartan (DIOVAN) 40 MG tablet Take 40mg daily 20  Yes Ar Jameson MD   gabapentin (NEURONTIN) 600 MG tablet Take 1 tablet by mouth 3 times daily for 270 days.  20 Yes Ar Jameson MD   ferrous sulfate (IRON 325) 325 (65 Fe) MG tablet Take 1 tablet by mouth daily (with breakfast) 20  Yes SATISH López   glimepiride (AMARYL) 4 MG tablet Take 1 tablet by mouth daily 20  Yes Ar Jameson MD   metoprolol succinate (TOPROL XL) 25 MG extended release tablet Take 1 tablet by mouth daily 3/20/20  Yes Paramjit Bedolla MD   furosemide (LASIX) 20 MG tablet Take 1 tablet by mouth 2 times daily May take an extra Lasix if has increased swelling 3/20/20  Yes Eitan Lebron MD   XARELTO 20 MG TABS tablet TAKE 1 TABLET DAILY WITH   BREAKFAST 8/3/20   Paramjit Bedolla MD   sildenafil (VIAGRA) 100 MG tablet Take 1 tablet by mouth daily as needed for Erectile Dysfunction 6/22/20   Sarthak Chun MD   aspirin 81 MG tablet Take 81 mg by mouth daily    Historical Provider, MD       Current medications:    Current Facility-Administered Medications   Medication Dose Route Frequency Provider Last Rate Last Dose    lactated ringers infusion   Intravenous Continuous Radha Martinez  mL/hr at 09/30/20 0850         Allergies: Allergies   Allergen Reactions    Spironolactone      CAUSES INCREASED K+    Tape Jamey Sers Tape] Rash     SURGICAL TAPE       Problem List:    Patient Active Problem List   Diagnosis Code    Type 2 diabetes mellitus without complication, without long-term current use of insulin ([facility]) E11.9    Ulcer of other part of lower limb L97.809    Venous hypertension, chronic, with ulcer ([facility] I87.319, L97.909    Ulcer of other part of foot L97.509    Pneumonia J18.9    Atrial fibrillation ([facility]) I48.91    Sinus pause I45.5    PAF (paroxysmal atrial fibrillation) ([facility]) I48.0    Hypertension I10    DM (diabetes mellitus) ([facility]) E11.9    DMITRIY on CPAP G47.33, Z99.89    Hyperlipidemia E78.5    Status post incision and drainage Z98.890    CAD (coronary artery disease) I25.10    CKD (chronic kidney disease) stage 3, GFR 30-59 ml/min ([facility]) N18.3    Hyperpotassemia E87.5    Arthritis M19.90    PVD (peripheral vascular disease) ([facility]) I73.9    Hematoma T14. 8XXA    Cardiac pacemaker in situ Z95.0    Coronary artery disease involving native coronary artery of native heart without angina pectoris I25.10    Essential hypertension I10    Gout M10.9    Diabetic neuropathy associated with type 2 diabetes mellitus ([facility] E11.40    Adenomatous polyp of sigmoid colon D12.5    Iron deficiency anemia due to chronic blood loss D50.0       Past Medical History:        Diagnosis Date    Arthritis 12/2013    rt wrist    Atrial fibrillation ([facility])     CAD (coronary artery disease) 6/18/2014    see dr Page Vargas 2012    \"have stents in both legs- done in [place]       Social History:    Social History     Tobacco Use    Smoking status: Never Smoker    Smokeless tobacco: Never Used   Substance Use Topics    Alcohol use: Yes     Alcohol/week: 2.0 standard drinks     Types: 2 Cans of beer per week     Comment: average \"one time per week\"                                Counseling given: Not Answered      Vital Signs (Current):   Vitals:    09/24/20 1238 09/30/20 0851   BP:  134/78   Pulse:  87   Resp:  16   Temp:  36.3 °C (97.3 °F)   TempSrc:  Temporal   SpO2:  100%   Weight: 201 lb (91.2 kg) 201 lb (91.2 kg)   Height: 5' 11\" (1.803 m) 5' 11\" (1.803 m)                                              BP Readings from Last 3 Encounters:   09/30/20 134/78   09/29/20 122/80   09/17/20 (!) 99/38       NPO Status: Time of last liquid consumption: 0700                        Time of last solid consumption: 1000                        Date of last liquid consumption: 09/30/20                        Date of last solid food consumption: 09/29/20    BMI:   Wt Readings from Last 3 Encounters:   09/30/20 201 lb (91.2 kg)   09/29/20 204 lb (92.5 kg)   09/17/20 201 lb (91.2 kg)     Body mass index is 28.03 kg/m².     CBC:   Lab Results   Component Value Date    WBC 5.9 09/29/2020    RBC 5.38 09/29/2020    HGB 11.7 09/29/2020    HCT 38.3 09/29/2020    MCV 71.2 09/29/2020    RDW 22.9 09/29/2020     09/29/2020       CMP:   Lab Results   Component Value Date     09/29/2020    K 4.7 09/29/2020     09/29/2020    CO2 25 09/29/2020    BUN 24 09/29/2020    CREATININE 1.3 09/29/2020    GFRAA >60 09/29/2020    AGRATIO 1.4 09/29/2020    LABGLOM 54 09/29/2020    LABGLOM 51 06/15/2016    GLUCOSE 130 09/29/2020    PROT 7.3 09/29/2020    PROT 7.3 12/27/2012    CALCIUM 9.3 09/29/2020    BILITOT 0.3 09/29/2020    ALKPHOS 78 09/29/2020    AST 18 09/29/2020    ALT 11 09/29/2020       POC Tests:   Recent Labs     09/30/20  0845   POCGLU 88 Coags:   Lab Results   Component Value Date    PROTIME 21.9 08/16/2020    INR 1.80 08/16/2020    APTT 61.3 08/16/2020       HCG (If Applicable): No results found for: PREGTESTUR, PREGSERUM, HCG, HCGQUANT     ABGs: No results found for: PHART, PO2ART, HAS1MUV, OUI7LGW, BEART, R9LXLIJL     Type & Screen (If Applicable):  No results found for: LABABO, LABRH    Drug/Infectious Status (If Applicable):  No results found for: HIV, HEPCAB    COVID-19 Screening (If Applicable):   Lab Results   Component Value Date    COVID19 NOT DETECTED 09/24/2020         Anesthesia Evaluation     Anesthesia Plan        JUSTIN Sinha CRNA   9/30/2020

## 2020-09-30 NOTE — PROGRESS NOTES
6174  Pt returned to room from procedure. Call light given. Denies any discomfort. Will continue to monitor. 0916  Dr Sunny Hanks here to discuss results with pt. Questions answered. Verbalized understanding.

## 2020-09-30 NOTE — PROGRESS NOTES
1282 [facility] Up to side of bed. Denies any discomfort. Call light in reach. Will continue to monitor. 1030  Pt dressing. Call light in reach. 36  Wife here to transport. Pt discharged home via [facility] to private vehicle driven by wife. Denies any discomfort. No distress noted. DC instructions reviewed again with wife at vehicle. Signature obtained.

## 2020-09-30 NOTE — PROGRESS NOTES
BEDSIDE REPORT RECEIVED FROM TATYANA QURESHI. PREOP QUESTIONS AND NPO STATUS VERIFIED WITH PT. PT STATES HE TOOK IS TOPROL AT 0700 AND Braydenal Meckel ON 9/28.

## 2020-09-30 NOTE — PROGRESS NOTES
REPORT GIVEN TO [facility] RN. PT AWAKE AND RESPONSIVE. VS STABLE. MAY RESTART XARELTO TOMORROW AND RECALL IN 10 YEARS PER DR CARRASCO.

## 2020-09-30 NOTE — ANESTHESIA POSTPROCEDURE EVALUATION
Department of Anesthesiology  Postprocedure Note    Patient: Rome Navarrete  MRN: [id number]  YOB: 1950  Date of evaluation: 2020  Time:  10:45 AM     Procedure Summary     Date:  20 Room / Location:  [address]    Anesthesia Start:  7686 Anesthesia Stop:  3135    Procedure:  COLONOSCOPY CONTROL HEMORRHAGE WITH APC (N/A ) Diagnosis:  (Occult GI Bleed)    Surgeon:  Keith Marroquin MD Responsible Provider:  JUSTIN Walters CRNA    Anesthesia Type:  MAC ASA Status:  3          Anesthesia Type: MAC    Sanju Phase I: Sanju Score: 10    Sanju Phase II: Sanju Score: 10    Last vitals: Reviewed and per EMR flowsheets.        Anesthesia Post Evaluation    Patient location during evaluation: bedside  Patient participation: complete - patient participated  Level of consciousness: awake and alert  Pain score: 0  Airway patency: patent  Nausea & Vomiting: no vomiting and no nausea  Complications: no  Cardiovascular status: blood pressure returned to baseline  Respiratory status: acceptable  Hydration status: euvolemic

## 2020-10-01 ENCOUNTER — HOSPITAL ENCOUNTER (OUTPATIENT)
Age: 70
Discharge: HOME | End: 2020-10-01
Payer: MEDICARE

## 2020-10-01 ENCOUNTER — TELEPHONE (OUTPATIENT)
Dept: FAMILY MEDICINE CLINIC | Age: 70
End: 2020-10-01

## 2020-10-01 LAB — PROSTATE SPECIFIC ANTIGEN: 1.38 NG/ML (ref 0–4)

## 2020-10-01 PROCEDURE — 36415 COLL VENOUS BLD VENIPUNCTURE: CPT

## 2020-10-01 PROCEDURE — G0103 PSA SCREENING: HCPCS

## 2020-10-01 NOTE — TELEPHONE ENCOUNTER
Dr. Montserrat Barrera--      Dr. Gucci Patton needs to look at your lab results for patient, when he comes to get blood work done:    Patient explained that he is anemic and has to come once a month for labs so Dr. Gucci Patton can check to make sure patient's red blood count is remaining normal.    Did Dr. Gucci Patton fax over a letter letting you know this or is there anything in Epic from Dr. Gucci Patton regarding this. Please call patient to let him know if he can come in and get lab work done that will show results for DrPrincess Jiménez saw some lab orders that you have on Epic, but patient wants to get a call from you or your nurse re: this.

## 2020-10-09 RX ORDER — SILDENAFIL 100 MG/1
TABLET, FILM COATED ORAL
Qty: 30 TABLET | Refills: 1 | Status: ON HOLD | OUTPATIENT
Start: 2020-10-09 | End: 2021-02-24 | Stop reason: HOSPADM

## 2020-10-14 DIAGNOSIS — I10 ESSENTIAL HYPERTENSION: ICD-10-CM

## 2020-10-14 DIAGNOSIS — D50.0 IRON DEFICIENCY ANEMIA DUE TO CHRONIC BLOOD LOSS: Chronic | ICD-10-CM

## 2020-10-14 LAB
A/G RATIO: 1.4 (ref 1.1–2.2)
ALBUMIN SERPL-MCNC: 4 G/DL (ref 3.4–5)
ALP BLD-CCNC: 66 U/L (ref 40–129)
ALT SERPL-CCNC: 10 U/L (ref 10–40)
ANION GAP SERPL CALCULATED.3IONS-SCNC: 11 MMOL/L (ref 3–16)
AST SERPL-CCNC: 17 U/L (ref 15–37)
BASOPHILS ABSOLUTE: 0.1 K/UL (ref 0–0.2)
BASOPHILS RELATIVE PERCENT: 2.2 %
BILIRUB SERPL-MCNC: 0.4 MG/DL (ref 0–1)
BUN BLDV-MCNC: 26 MG/DL (ref 7–20)
CALCIUM SERPL-MCNC: 9.5 MG/DL (ref 8.3–10.6)
CHLORIDE BLD-SCNC: 103 MMOL/L (ref 99–110)
CO2: 28 MMOL/L (ref 21–32)
CREAT SERPL-MCNC: 1.2 MG/DL (ref 0.8–1.3)
EOSINOPHILS ABSOLUTE: 0.2 K/UL (ref 0–0.6)
EOSINOPHILS RELATIVE PERCENT: 4.8 %
GFR AFRICAN AMERICAN: >60
GFR NON-AFRICAN AMERICAN: 59
GLOBULIN: 2.9 G/DL
GLUCOSE BLD-MCNC: 122 MG/DL (ref 70–99)
HCT VFR BLD CALC: 36.2 % (ref 40.5–52.5)
HEMOGLOBIN: 10.8 G/DL (ref 13.5–17.5)
LYMPHOCYTES ABSOLUTE: 1.1 K/UL (ref 1–5.1)
LYMPHOCYTES RELATIVE PERCENT: 23.6 %
MCH RBC QN AUTO: 21.2 PG (ref 26–34)
MCHC RBC AUTO-ENTMCNC: 30 G/DL (ref 31–36)
MCV RBC AUTO: 70.8 FL (ref 80–100)
MONOCYTES ABSOLUTE: 0.6 K/UL (ref 0–1.3)
MONOCYTES RELATIVE PERCENT: 13 %
NEUTROPHILS ABSOLUTE: 2.7 K/UL (ref 1.7–7.7)
NEUTROPHILS RELATIVE PERCENT: 56.4 %
PDW BLD-RTO: 20.7 % (ref 12.4–15.4)
PLATELET # BLD: 248 K/UL (ref 135–450)
PMV BLD AUTO: 9.1 FL (ref 5–10.5)
POTASSIUM SERPL-SCNC: 4.4 MMOL/L (ref 3.5–5.1)
RBC # BLD: 5.12 M/UL (ref 4.2–5.9)
SODIUM BLD-SCNC: 142 MMOL/L (ref 136–145)
TOTAL PROTEIN: 6.9 G/DL (ref 6.4–8.2)
WBC # BLD: 4.8 K/UL (ref 4–11)

## 2020-11-06 ENCOUNTER — OFFICE VISIT (OUTPATIENT)
Dept: CARDIOLOGY CLINIC | Age: 70
End: 2020-11-06
Payer: MEDICARE

## 2020-11-06 VITALS
SYSTOLIC BLOOD PRESSURE: 128 MMHG | WEIGHT: 218.2 LBS | HEART RATE: 64 BPM | HEIGHT: 71 IN | DIASTOLIC BLOOD PRESSURE: 82 MMHG | BODY MASS INDEX: 30.55 KG/M2

## 2020-11-06 PROCEDURE — 1123F ACP DISCUSS/DSCN MKR DOCD: CPT | Performed by: INTERNAL MEDICINE

## 2020-11-06 PROCEDURE — 1036F TOBACCO NON-USER: CPT | Performed by: INTERNAL MEDICINE

## 2020-11-06 PROCEDURE — 3017F COLORECTAL CA SCREEN DOC REV: CPT | Performed by: INTERNAL MEDICINE

## 2020-11-06 PROCEDURE — G8417 CALC BMI ABV UP PARAM F/U: HCPCS | Performed by: INTERNAL MEDICINE

## 2020-11-06 PROCEDURE — 99214 OFFICE O/P EST MOD 30 MIN: CPT | Performed by: INTERNAL MEDICINE

## 2020-11-06 PROCEDURE — 4040F PNEUMOC VAC/ADMIN/RCVD: CPT | Performed by: INTERNAL MEDICINE

## 2020-11-06 PROCEDURE — G8427 DOCREV CUR MEDS BY ELIG CLIN: HCPCS | Performed by: INTERNAL MEDICINE

## 2020-11-06 PROCEDURE — G8484 FLU IMMUNIZE NO ADMIN: HCPCS | Performed by: INTERNAL MEDICINE

## 2020-11-06 NOTE — PROGRESS NOTES
CARDIOLOGY NOTE      11/6/2020    RE: Rinku Gutierrez  (1950)                               TO:  Dr. Payam Garcia MD            Edward Beaulieu is a 65 y.o. male who was seen today for management of  cad                                    HPI:                   The patient does not have cardiac complaints  Patient also seen  for    - Coronary artery disease, does not have chest pain. Patient is  compliant with prescribed medicines. - Hypertension,is  well controlled, pt is  compliant with medicines  - Hyperlipidimea, importance of hyperlipidimea discussed with pt.   - Diabetes mellitus, blood glucose level is  well controlled. Pt is compliant with meds and diet  - PPM  On carelink  - Atrial fibrillation, pt is  compliant with meds.  Patient does not have symptoms from atrial fibrillation    Rinku Gutierrez has the following history recorded in care path:  Patient Active Problem List    Diagnosis Date Noted    Cardiac pacemaker in situ      Priority: High    Coronary artery disease involving native coronary artery of native heart without angina pectoris      Priority: High    Essential hypertension      Priority: High    Type 2 diabetes mellitus without complication, without long-term current use of insulin ([facility]) 12/12/2012     Priority: High     Class: Chronic    Ulcer of other part of lower limb 12/12/2012     Priority: High     Class: Chronic    Venous hypertension, chronic, with ulcer ([facility] 12/12/2012     Priority: High     Class: Chronic    Ulcer of other part of foot 12/12/2012     Priority: High     Class: Chronic    Hematoma 12/01/2017     Priority: Low    CKD (chronic kidney disease) stage 3, GFR 30-59 ml/min ([address] 07/07/2016     Priority: Low    Hyperpotassemia 07/07/2016     Priority: Low    Arthritis 07/07/2016     Priority: Low    PVD (peripheral vascular disease) ([facility] 07/07/2016     Priority: Low    Status post incision and drainage      Priority: Low    PAF (paroxysmal atrial fibrillation) ([facility] 08/19/2013     Priority: Low    DM (diabetes mellitus) ([facility] 08/19/2013     Priority: Low    DMITRIY on CPAP 08/19/2013     Priority: Low    Hyperlipidemia 08/19/2013     Priority: Low    Atrial fibrillation ([facility] 01/01/2013     Priority: Low    Sinus pause 01/01/2013     Priority: Low    Pneumonia 12/29/2012     Priority: Low    Iron deficiency anemia due to chronic blood loss 09/01/2020    Adenomatous polyp of sigmoid colon 09/20/2019    Gout 04/23/2019    Diabetic neuropathy associated with type 2 diabetes mellitus ([facility] 04/23/2019     Current Outpatient Medications   Medication Sig Dispense Refill    sildenafil (VIAGRA) 100 MG tablet TAKE 1 TABLET DAILY AS     NEEDED FOR ERECTILE        DYSFUNCTION 30 tablet 1    allopurinol (ZYLOPRIM) 100 MG tablet Take 1 tablet by mouth daily 90 tablet 1    allopurinol (ZYLOPRIM) 300 MG tablet Take 1 tablet by mouth daily 90 tablet 1    canagliflozin (INVOKANA) 300 MG TABS tablet Take 1 tablet by mouth every morning (before breakfast) 90 tablet 1    Dulaglutide (TRULICITY) 1.5 ZP/6.1XY SOPN Inject 1.5 mg into the skin once a week 12 pen 1    metFORMIN (GLUCOPHAGE) 1000 MG tablet TAKE 1 TABLET TWICE DAILY  WITH MEALS 180 tablet 1    simvastatin (ZOCOR) 20 MG tablet Pt takes 10 mg daily 90 tablet 1    valsartan (DIOVAN) 40 MG tablet Take 40mg daily 90 tablet 1    gabapentin (NEURONTIN) 600 MG tablet Take 1 tablet by mouth 3 times daily for 270 days.  90 tablet 1    XARELTO 20 MG TABS tablet TAKE 1 TABLET DAILY WITH   BREAKFAST 90 tablet 3    glimepiride (AMARYL) 4 MG tablet Take 1 tablet by mouth daily 90 tablet 1    metoprolol succinate (TOPROL XL) 25 MG extended release tablet Take 1 tablet by mouth daily 90 tablet 3    furosemide (LASIX) 20 MG tablet Take 1 tablet by mouth 2 times daily May take an extra Lasix if has increased swelling 180 tablet 3    aspirin 81 MG tablet Take 81 mg by mouth daily       No current facility-administered medications for this visit. Allergies: Spironolactone and Tape [adhesive tape]  Past Medical History:   Diagnosis Date    Arthritis 12/2013    rt wrist    Atrial fibrillation (HCC)     CAD (coronary artery disease) 6/18/2014    see dr Blayne Hutson Chronic kidney disease, stage III (moderate) 7/7/2016    Diabetes mellitus ([facility]     dx 2004    Diabetic neuropathy associated with type 2 diabetes mellitus ([facility] 4/23/2019    Gout     \"got gout when had pacer put in because they did not give me my medication for gout \"    Gout 4/23/2019    H/O 24 hour EKG monitoring 10/3/2013    no afib noted, sinsus rhythm    H/O cardiovascular stress test 5/12/2014    cardiolite- mild ischemia RCA EF50%    H/O transesophageal echocardiography (MABLE) for monitoring 08/05/2013    normal LV function and normal LA appendage without any clot    Kake (hard of hearing)     hearing tonya aides    Hx of Doppler echocardiogram 05/21/2018    EF 50%  Mild LV hypertrophy. Mildly enlarged RA. Mod aortic valve calcification with mod AS. Mitral annular calcification is present. Mild AR, MR and TR. Mild pulmonary htn.     Hyperlipidemia     Hypertension     Other disorders of kidney and ureter     Pneumonia 12/29/2012    Sleep apnea     dx 2013- has c-pap    Type II or unspecified type diabetes mellitus with other specified manifestations, uncontrolled 12/12/2012    Venous hypertension, chronic, with ulcer ([facility] 12/12/2012    resolved     Past Surgical History:   Procedure Laterality Date    CARDIOVERSION  12/14    at [address]  2011    COLONOSCOPY N/A 11/19/2019    COLONOSCOPY DIAGNOSTIC performed by Finn Galvez MD at [place] [address]  09/30/2020    POSSIBLE CECAL avms, SIGMOID DIVERTICULOSIS, INTERNAL HEMORRHOIDS GRADE 1    COLONOSCOPY N/A 9/30/2020    COLONOSCOPY CONTROL HEMORRHAGE WITH APC performed by Finn Galvez MD at [address] [place] [address] 2014    \"2 stents put in \"   [facility]/ [address]  2004    total left hip    OTHER SURGICAL HISTORY Right 12/02/2017    I&D; evacuation of hematoma right hip    OTHER SURGICAL HISTORY  09/17/2020    enteroscopy    PACEMAKER PLACEMENT      9/18/14 Status post remote permanent pacemaker with atrial lead dislodgement. 7/24/14 PPM Implant    UPPER GASTROINTESTINAL ENDOSCOPY N/A 9/17/2020    ENTEROSCOPY PUSH BIOPSY performed by Adelaide Mann MD at [address]  2012    \"have stents in both legs- done in [place]      As reviewed   Family History   Problem Relation Age of Onset    High Blood Pressure Mother     Arthritis Mother     Diabetes Mother     Heart Disease Mother     High Blood Pressure Father     Heart Disease Father     Kidney Disease Father      Social History     Tobacco Use    Smoking status: Never Smoker    Smokeless tobacco: Never Used   Substance Use Topics    Alcohol use: Yes     Alcohol/week: 2.0 standard drinks     Types: 2 Cans of beer per week     Comment: average \"one time per week\"      Review of Systems:    Constitutional: Negative for diaphoresis and fatigue  Psychological:Negative for anxiety or depression  HENT: Negative for headaches, nasal congestion, sinus pain or vertigo  Eyes: Negative for visual disturbance.    Endocrine: Negative for polydipsia/polyuria  Respiratory: Negative for shortness of breath  Cardiovascular: Negative for chest pain, dyspnea on exertion, claudication, edema, irregular heartbeat, murmur, palpitations or shortness of breath  Gastrointestinal: Negative for abdominal pain or heartburn  Genito-Urinary: Negative for urinary frequency/urgency  Musculoskeletal: Negative for muscle pain, muscular weakness, negative for pain in arm and leg or swelling in foot and leg  Neurological: Negative for dizziness, headaches, memory loss, numbness/tingling, visual changes, syncope  Dermatological: Negative for rash    Objective:    Vitals: AST 17 10/14/2020     BMP:    Lab Results   Component Value Date     10/14/2020    K 4.4 10/14/2020     10/14/2020    CO2 28 10/14/2020    BUN 26 10/14/2020    CREATININE 1.2 10/14/2020     CMP:   Lab Results   Component Value Date     10/14/2020    K 4.4 10/14/2020     10/14/2020    CO2 28 10/14/2020    BUN 26 10/14/2020    PROT 6.9 10/14/2020    PROT 7.3 12/27/2012     TSH:    Lab Results   Component Value Date    TSH 0.99 07/07/2020    TSHHS 1.100 12/31/2012           Impression:    No diagnosis found. Patient Active Problem List   Diagnosis Code    Type 2 diabetes mellitus without complication, without long-term current use of insulin ([facility]) E11.9    Ulcer of other part of lower limb L97.809    Venous hypertension, chronic, with ulcer ([facility] I87.319, L97.909    Ulcer of other part of foot L97.509    Pneumonia J18.9    Atrial fibrillation ([facility]) I48.91    Sinus pause I45.5    PAF (paroxysmal atrial fibrillation) ([facility]) I48.0    DM (diabetes mellitus) ([facility]) E11.9    DMITRIY on CPAP G47.33, Z99.89    Hyperlipidemia E78.5    Status post incision and drainage Z98.890    CKD (chronic kidney disease) stage 3, GFR 30-59 ml/min ([facility]) N18.30    Hyperpotassemia E87.5    Arthritis M19.90    PVD (peripheral vascular disease) ([facility]) I73.9    Hematoma T14. 8XXA    Cardiac pacemaker in situ Z95.0    Coronary artery disease involving native coronary artery of native heart without angina pectoris I25.10    Essential hypertension I10    Gout M10.9    Diabetic neuropathy associated with type 2 diabetes mellitus ([facility] E11.40    Adenomatous polyp of sigmoid colon D12.5    Iron deficiency anemia due to chronic blood loss D50.0       Assessment & Plan:               -     CORONARY ARTERY DISEASE:  asymptomatic     All available  tests in chart reviewed. Management discussed .   Testing ordered  no                                 -  Hypertension: Patients blood pressure is normal. Patient is advised about low sodium diet. Present medical regimen will not be changed. -  LIPID MANAGEMENT:  Importance of lipid levels discussed with patient   and patient was given dietary advice. NCEP- ATP III guidelines reviewed with patient. -   Changes  in medicines made: No          - PPM   On carelink    - Atrial fibrillation, pt is  compliant with meds. Patient does not have symptoms from atrial fibrillation                     -   DIABETES MELLITUS: Available pertinent lab data reviewed   and  patient was given dietary advice . Advised to check blood glucose level on a regular basis. -   Changes  in medicines made:  No              - had covid    - had heart murmur echo          Aileen Ivey MD    [facility]

## 2020-11-06 NOTE — LETTER
[facility] Tamara Dupree  1950  [id number]    Have you had any Chest Pain - No    Have you had any Shortness of Breath - No      Have you had any dizziness - No    Have you had any palpitations - No    Do you have any edema - No  Do you have a surgery or procedure scheduled in the near future - No

## 2020-11-17 ENCOUNTER — TELEPHONE (OUTPATIENT)
Dept: FAMILY MEDICINE CLINIC | Age: 70
End: 2020-11-17

## 2020-11-17 DIAGNOSIS — D50.0 IRON DEFICIENCY ANEMIA DUE TO CHRONIC BLOOD LOSS: Chronic | ICD-10-CM

## 2020-11-17 NOTE — TELEPHONE ENCOUNTER
11/17/2020 Quincy Leigh wanted to know if he needed any other blood work for his 11/30/2020 virtual visit. Will you please call him to let him know @ [phone]?     Past appt 09/29/2020   Future appt 11/30/2020

## 2020-11-18 LAB
BASOPHILS ABSOLUTE: 0.1 K/UL (ref 0–0.2)
BASOPHILS RELATIVE PERCENT: 1.4 %
EOSINOPHILS ABSOLUTE: 0.3 K/UL (ref 0–0.6)
EOSINOPHILS RELATIVE PERCENT: 5 %
HCT VFR BLD CALC: 29.2 % (ref 40.5–52.5)
HEMATOLOGY PATH CONSULT: NO
HEMOGLOBIN: 8.7 G/DL (ref 13.5–17.5)
LYMPHOCYTES ABSOLUTE: 1.2 K/UL (ref 1–5.1)
LYMPHOCYTES RELATIVE PERCENT: 20.8 %
MCH RBC QN AUTO: 20.4 PG (ref 26–34)
MCHC RBC AUTO-ENTMCNC: 29.8 G/DL (ref 31–36)
MCV RBC AUTO: 68.5 FL (ref 80–100)
MONOCYTES ABSOLUTE: 0.7 K/UL (ref 0–1.3)
MONOCYTES RELATIVE PERCENT: 13 %
NEUTROPHILS ABSOLUTE: 3.4 K/UL (ref 1.7–7.7)
NEUTROPHILS RELATIVE PERCENT: 59.8 %
PDW BLD-RTO: 18.8 % (ref 12.4–15.4)
PLATELET # BLD: 250 K/UL (ref 135–450)
PMV BLD AUTO: 8 FL (ref 5–10.5)
RBC # BLD: 4.27 M/UL (ref 4.2–5.9)
WBC # BLD: 5.7 K/UL (ref 4–11)

## 2020-11-24 DIAGNOSIS — I10 ESSENTIAL HYPERTENSION: ICD-10-CM

## 2020-11-24 DIAGNOSIS — N18.30 CKD (CHRONIC KIDNEY DISEASE) STAGE 3, GFR 30-59 ML/MIN (HCC): ICD-10-CM

## 2020-11-24 DIAGNOSIS — E11.49 OTHER DIABETIC NEUROLOGICAL COMPLICATION ASSOCIATED WITH TYPE 2 DIABETES MELLITUS (HCC): ICD-10-CM

## 2020-11-24 DIAGNOSIS — D63.1 ERYTHROPOIETIN DEFICIENCY ANEMIA: ICD-10-CM

## 2020-11-24 DIAGNOSIS — E87.5 HYPERPOTASSEMIA: ICD-10-CM

## 2020-11-24 DIAGNOSIS — I48.0 PAF (PAROXYSMAL ATRIAL FIBRILLATION) (HCC): ICD-10-CM

## 2020-11-24 DIAGNOSIS — E11.9 TYPE 2 DIABETES MELLITUS WITHOUT COMPLICATION, WITHOUT LONG-TERM CURRENT USE OF INSULIN (HCC): ICD-10-CM

## 2020-11-24 DIAGNOSIS — Z95.0 CARDIAC PACEMAKER IN SITU: ICD-10-CM

## 2020-11-24 DIAGNOSIS — D50.0 IRON DEFICIENCY ANEMIA DUE TO CHRONIC BLOOD LOSS: ICD-10-CM

## 2020-11-24 DIAGNOSIS — G47.33 OSA ON CPAP: ICD-10-CM

## 2020-11-24 LAB
A/G RATIO: 1.8 (ref 1.1–2.2)
ALBUMIN SERPL-MCNC: 4.2 G/DL (ref 3.4–5)
ALP BLD-CCNC: 72 U/L (ref 40–129)
ALT SERPL-CCNC: 10 U/L (ref 10–40)
ANION GAP SERPL CALCULATED.3IONS-SCNC: 12 MMOL/L (ref 3–16)
AST SERPL-CCNC: 16 U/L (ref 15–37)
BASOPHILS ABSOLUTE: 0.1 K/UL (ref 0–0.2)
BASOPHILS RELATIVE PERCENT: 1.7 %
BILIRUB SERPL-MCNC: 0.3 MG/DL (ref 0–1)
BILIRUBIN URINE: NEGATIVE
BLOOD, URINE: NEGATIVE
BUN BLDV-MCNC: 41 MG/DL (ref 7–20)
CALCIUM SERPL-MCNC: 9.3 MG/DL (ref 8.3–10.6)
CHLORIDE BLD-SCNC: 103 MMOL/L (ref 99–110)
CLARITY: CLEAR
CO2: 26 MMOL/L (ref 21–32)
COLOR: YELLOW
CREAT SERPL-MCNC: 1.5 MG/DL (ref 0.8–1.3)
CREATININE URINE: 39 MG/DL (ref 39–259)
EOSINOPHILS ABSOLUTE: 0.3 K/UL (ref 0–0.6)
EOSINOPHILS RELATIVE PERCENT: 6 %
GFR AFRICAN AMERICAN: 56
GFR NON-AFRICAN AMERICAN: 46
GLOBULIN: 2.4 G/DL
GLUCOSE BLD-MCNC: 133 MG/DL (ref 70–99)
GLUCOSE URINE: 500 MG/DL
HCT VFR BLD CALC: 26 % (ref 40.5–52.5)
HEMATOLOGY PATH CONSULT: NO
HEMOGLOBIN: 7.6 G/DL (ref 13.5–17.5)
KETONES, URINE: NEGATIVE MG/DL
LEUKOCYTE ESTERASE, URINE: NEGATIVE
LYMPHOCYTES ABSOLUTE: 1.1 K/UL (ref 1–5.1)
LYMPHOCYTES RELATIVE PERCENT: 20.7 %
MAGNESIUM: 2.6 MG/DL (ref 1.8–2.4)
MCH RBC QN AUTO: 20 PG (ref 26–34)
MCHC RBC AUTO-ENTMCNC: 29.1 G/DL (ref 31–36)
MCV RBC AUTO: 68.6 FL (ref 80–100)
MICROSCOPIC EXAMINATION: ABNORMAL
MONOCYTES ABSOLUTE: 0.7 K/UL (ref 0–1.3)
MONOCYTES RELATIVE PERCENT: 12.9 %
NEUTROPHILS ABSOLUTE: 3.1 K/UL (ref 1.7–7.7)
NEUTROPHILS RELATIVE PERCENT: 58.7 %
NITRITE, URINE: NEGATIVE
PDW BLD-RTO: 18.9 % (ref 12.4–15.4)
PH UA: 6.5 (ref 5–8)
PHOSPHORUS: 3.7 MG/DL (ref 2.5–4.9)
PLATELET # BLD: 233 K/UL (ref 135–450)
PMV BLD AUTO: 8.1 FL (ref 5–10.5)
POTASSIUM SERPL-SCNC: 4.7 MMOL/L (ref 3.5–5.1)
PROTEIN PROTEIN: 10 MG/DL
PROTEIN UA: NEGATIVE MG/DL
PROTEIN/CREAT RATIO: 0.3 MG/DL
RBC # BLD: 3.79 M/UL (ref 4.2–5.9)
SODIUM BLD-SCNC: 141 MMOL/L (ref 136–145)
SPECIFIC GRAVITY UA: 1.01 (ref 1–1.03)
TOTAL PROTEIN: 6.6 G/DL (ref 6.4–8.2)
URINE TYPE: ABNORMAL
UROBILINOGEN, URINE: 0.2 E.U./DL
WBC # BLD: 5.4 K/UL (ref 4–11)

## 2020-11-25 LAB
ESTIMATED AVERAGE GLUCOSE: 145.6 MG/DL
HBA1C MFR BLD: 6.7 %

## 2020-11-30 ENCOUNTER — OFFICE VISIT (OUTPATIENT)
Dept: FAMILY MEDICINE CLINIC | Age: 70
End: 2020-11-30
Payer: MEDICARE

## 2020-11-30 ENCOUNTER — HOSPITAL ENCOUNTER (OUTPATIENT)
Age: 70
Discharge: HOME | End: 2020-11-30
Payer: MEDICARE

## 2020-11-30 VITALS
HEIGHT: 71 IN | DIASTOLIC BLOOD PRESSURE: 80 MMHG | SYSTOLIC BLOOD PRESSURE: 116 MMHG | TEMPERATURE: 97.2 F | OXYGEN SATURATION: 96 % | WEIGHT: 216.4 LBS | HEART RATE: 78 BPM | BODY MASS INDEX: 30.3 KG/M2

## 2020-11-30 LAB
BASOPHILS ABSOLUTE: 0.1 K/CU MM
BASOPHILS RELATIVE PERCENT: 1.1 % (ref 0–1)
DIFFERENTIAL TYPE: ABNORMAL
EOSINOPHILS ABSOLUTE: 0.3 K/CU MM
EOSINOPHILS RELATIVE PERCENT: 3.2 % (ref 0–3)
HCT VFR BLD CALC: 26.8 % (ref 42–52)
HEMOGLOBIN: 7 GM/DL (ref 13.5–18)
IMMATURE NEUTROPHIL %: 0.4 % (ref 0–0.43)
LYMPHOCYTES ABSOLUTE: 1.8 K/CU MM
LYMPHOCYTES RELATIVE PERCENT: 19.9 % (ref 24–44)
MCH RBC QN AUTO: 18.8 PG (ref 27–31)
MCHC RBC AUTO-ENTMCNC: 25 % (ref 32–36)
MCV RBC AUTO: 75.1 FL (ref 78–100)
MONOCYTES ABSOLUTE: 1.2 K/CU MM
MONOCYTES RELATIVE PERCENT: 12.6 % (ref 0–4)
NUCLEATED RBC %: 0 %
PDW BLD-RTO: 17.5 % (ref 11.7–14.9)
PLATELET # BLD: 252 K/CU MM (ref 140–440)
PMV BLD AUTO: 10.1 FL (ref 7.5–11.1)
RBC # BLD: 3.57 M/CU MM (ref 4.6–6.2)
SEGMENTED NEUTROPHILS ABSOLUTE COUNT: 5.8 K/CU MM
SEGMENTED NEUTROPHILS RELATIVE PERCENT: 62.8 % (ref 36–66)
TOTAL IMMATURE NEUTOROPHIL: 0.04 K/CU MM
TOTAL NUCLEATED RBC: 0 K/CU MM
WBC # BLD: 9.2 K/CU MM (ref 4–10.5)

## 2020-11-30 PROCEDURE — 1123F ACP DISCUSS/DSCN MKR DOCD: CPT | Performed by: FAMILY MEDICINE

## 2020-11-30 PROCEDURE — 3044F HG A1C LEVEL LT 7.0%: CPT | Performed by: FAMILY MEDICINE

## 2020-11-30 PROCEDURE — 2022F DILAT RTA XM EVC RTNOPTHY: CPT | Performed by: FAMILY MEDICINE

## 2020-11-30 PROCEDURE — G8417 CALC BMI ABV UP PARAM F/U: HCPCS | Performed by: FAMILY MEDICINE

## 2020-11-30 PROCEDURE — 3017F COLORECTAL CA SCREEN DOC REV: CPT | Performed by: FAMILY MEDICINE

## 2020-11-30 PROCEDURE — 4040F PNEUMOC VAC/ADMIN/RCVD: CPT | Performed by: FAMILY MEDICINE

## 2020-11-30 PROCEDURE — G8484 FLU IMMUNIZE NO ADMIN: HCPCS | Performed by: FAMILY MEDICINE

## 2020-11-30 PROCEDURE — G8427 DOCREV CUR MEDS BY ELIG CLIN: HCPCS | Performed by: FAMILY MEDICINE

## 2020-11-30 PROCEDURE — 99214 OFFICE O/P EST MOD 30 MIN: CPT | Performed by: FAMILY MEDICINE

## 2020-11-30 PROCEDURE — 36415 COLL VENOUS BLD VENIPUNCTURE: CPT

## 2020-11-30 PROCEDURE — 85025 COMPLETE CBC W/AUTO DIFF WBC: CPT

## 2020-11-30 PROCEDURE — 1036F TOBACCO NON-USER: CPT | Performed by: FAMILY MEDICINE

## 2020-11-30 RX ORDER — FERROUS SULFATE 325(65) MG
325 TABLET ORAL 2 TIMES DAILY
Qty: 180 TABLET | Refills: 5 | Status: SHIPPED | OUTPATIENT
Start: 2020-11-30 | End: 2021-02-09 | Stop reason: SDUPTHER

## 2020-11-30 ASSESSMENT — ENCOUNTER SYMPTOMS
WHEEZING: 0
COUGH: 0
SHORTNESS OF BREATH: 1
SORE THROAT: 0

## 2020-11-30 NOTE — PROGRESS NOTES
2020     Marry Maxwell      Chief Complaint   Patient presents with    Follow-up     2 month     Shortness of Breath     Complains X 3-4 weeks his breathing has been off.  Gait Problem     Also he is getting off balance when walking around, started after increasing Gabapentin 3 weeks ago    Discuss Labs     20    Other     PATIENT WILL HAVE ECHO TOMORROW       HPI      Memo Jackson is a 65 y.o. male who presents today with the followin. Pneumonia due to COVID-19 virus    2. Other diabetic neurological complication associated with type 2 diabetes mellitus ([address]    3. Type 2 diabetes mellitus without complication, without long-term current use of insulin ([address]    4. Iron deficiency anemia due to chronic blood loss    5. Stage 3 chronic kidney disease, unspecified whether stage 3a or 3b CKD    6. Coronary artery disease involving native coronary artery of native heart without angina pectoris    7. Paroxysmal atrial fibrillation (HCC)    8. Type 2 diabetes mellitus without complication, with long-term current use of insulin ([address]    9. Pneumonia due to infectious organism, unspecified laterality, unspecified part of lung    Here for routine follow-up  The patient complains of extremely poor exercise tolerance with minimal activity which seems to be getting worse  He did have Covid pneumonia but appeared clinically and radiology wise to be improving  He does not have any cough or wheezing  He did increase his gabapentin was asking whether that might be the cause of this    REVIEW OF SYMPTOMS    Review of Systems   Constitutional: Positive for activity change. Negative for fever and unexpected weight change. HENT: Negative for congestion and sore throat. Respiratory: Positive for shortness of breath. Negative for cough and wheezing.          Recent well-documented Covid pneumonia but he is about 2 months post that  He is due to have another chest x-ray but was improving  He does not have any cough sputum production or wheezing  He complains of shortness of breath with minimal activity   Cardiovascular: Negative for chest pain. History of stable coronary artery disease and chronic atrial fibrillation both well controlled   Endocrine:        Type 2 diabetes which is under good control on current medications and diet   Genitourinary:        Previous history of chronic kidney disease stage III that is improved in the last 6 months   Neurological: Positive for numbness. Symptoms of diabetic peripheral neuropathy   Hematological:        History of iron deficiency anemia probably from AV malformations  He had colonoscopy and I think some cautery he quit taking iron  Last hemoglobin done in the last 2 weeks was 7.6 which is trending down   Psychiatric/Behavioral: Negative. PAST MEDICAL HISTORY  Past Medical History:   Diagnosis Date    Arthritis 12/2013    rt wrist    Atrial fibrillation ([address]     CAD (coronary artery disease) 6/18/2014    see dr Eric Salgado kidney disease, stage III (moderate) 7/7/2016    Diabetes mellitus ([address]     dx 2004    Diabetic neuropathy associated with type 2 diabetes mellitus ([facility] 4/23/2019    Gout     \"got gout when had pacer put in because they did not give me my medication for gout \"    Gout 4/23/2019    H/O 24 hour EKG monitoring 10/3/2013    no afib noted, sinsus rhythm    H/O cardiovascular stress test 5/12/2014    cardiolite- mild ischemia RCA EF50%    H/O transesophageal echocardiography (MABLE) for monitoring 08/05/2013    normal LV function and normal LA appendage without any clot    [place] (hard of hearing)     hearing [employer]    Hx of Doppler echocardiogram 05/21/2018    EF 50%  Mild LV hypertrophy. Mildly enlarged RA. Mod aortic valve calcification with mod AS. Mitral annular calcification is present. Mild AR, MR and TR. Mild pulmonary htn.     Hyperlipidemia     Hypertension     Other disorders of kidney and ureter     Pneumonia 12/29/2012    Sleep apnea     dx 2013- has c-pap    Type II or unspecified type diabetes mellitus with other specified manifestations, uncontrolled 12/12/2012    Venous hypertension, chronic, with ulcer ([facility] 12/12/2012    resolved       FAMILY HISTORY  Family History   Problem Relation Age of Onset    High Blood Pressure Mother     Arthritis Mother     Diabetes Mother     Heart Disease Mother     High Blood Pressure Father     Heart Disease Father     Kidney Disease Father        SOCIAL HISTORY  Social History     Socioeconomic History    Marital status:      Spouse name: None    Number of children: None    Years of education: None    Highest education level: None   Occupational History    None   Social Needs    Financial resource strain: None    Food insecurity     Worry: None     Inability: None    Transportation needs     Medical: None     Non-medical: None   Tobacco Use    Smoking status: Never Smoker    Smokeless tobacco: Never Used   Substance and Sexual Activity    Alcohol use:  Yes     Alcohol/week: 2.0 standard drinks     Types: 2 Cans of beer per week     Comment: average \"one time per week\"    Drug use: No    Sexual activity: Yes     Partners: Female     Comment:    Lifestyle    Physical activity     Days per week: None     Minutes per session: None    Stress: None   Relationships    Social connections     Talks on phone: None     Gets together: None     Attends Sikh service: None     Active member of club or organization: None     Attends meetings of clubs or organizations: None     Relationship status: None    Intimate partner violence     Fear of current or ex partner: None     Emotionally abused: None     Physically abused: None     Forced sexual activity: None   Other Topics Concern    None   Social History Narrative    None        SURGICAL HISTORY  Past Surgical History:   Procedure Laterality Date    CARDIOVERSION  12/14    at [address] [place] [address] 2011    COLONOSCOPY N/A 11/19/2019    COLONOSCOPY DIAGNOSTIC performed by Sean Cazares MD at [address]  09/30/2020    POSSIBLE CECAL avms, SIGMOID DIVERTICULOSIS, INTERNAL HEMORRHOIDS GRADE 1    COLONOSCOPY N/A 9/30/2020    COLONOSCOPY CONTROL HEMORRHAGE WITH APC performed by Sean Cazares MD at [address] [facility]  2014    \"2 stents put in \"   [address]  2004    total left hip    OTHER SURGICAL HISTORY Right 12/02/2017    I&D; evacuation of hematoma right hip    OTHER SURGICAL HISTORY  09/17/2020    enteroscopy    PACEMAKER PLACEMENT      9/18/14 Status post remote permanent pacemaker with atrial lead dislodgement. 7/24/14 PPM Implant    UPPER GASTROINTESTINAL ENDOSCOPY N/A 9/17/2020    ENTEROSCOPY PUSH BIOPSY performed by Sean Cazares MD at [address] [facility]  2012    \"have stents in both legs- done in [place] [address]  Current Outpatient Medications   Medication Sig Dispense Refill    ferrous sulfate (IRON 325) 325 (65 Fe) MG tablet Take 1 tablet by mouth 2 times daily 180 tablet 5    sildenafil (VIAGRA) 100 MG tablet TAKE 1 TABLET DAILY AS     NEEDED FOR ERECTILE        DYSFUNCTION 30 tablet 1    allopurinol (ZYLOPRIM) 100 MG tablet Take 1 tablet by mouth daily 90 tablet 1    allopurinol (ZYLOPRIM) 300 MG tablet Take 1 tablet by mouth daily 90 tablet 1    canagliflozin (INVOKANA) 300 MG TABS tablet Take 1 tablet by mouth every morning (before breakfast) 90 tablet 1    Dulaglutide (TRULICITY) 1.5 MG/4.5OS SOPN Inject 1.5 mg into the skin once a week 12 pen 1    metFORMIN (GLUCOPHAGE) 1000 MG tablet TAKE 1 TABLET TWICE DAILY  WITH MEALS 180 tablet 1    simvastatin (ZOCOR) 20 MG tablet Pt takes 10 mg daily 90 tablet 1    valsartan (DIOVAN) 40 MG tablet Take 40mg daily 90 tablet 1    gabapentin (NEURONTIN) 600 MG tablet Take 1 tablet by mouth 3 times daily for 270 days.  90 tablet 1    Terrell Chapman 20 MG TABS tablet TAKE 1 TABLET DAILY WITH   BREAKFAST 90 tablet 3    glimepiride (AMARYL) 4 MG tablet Take 1 tablet by mouth daily 90 tablet 1    metoprolol succinate (TOPROL XL) 25 MG extended release tablet Take 1 tablet by mouth daily 90 tablet 3    furosemide (LASIX) 20 MG tablet Take 1 tablet by mouth 2 times daily May take an extra Lasix if has increased swelling 180 tablet 3    aspirin 81 MG tablet Take 81 mg by mouth daily       No current facility-administered medications for this visit. ALLERGIES  Allergies   Allergen Reactions    Spironolactone      CAUSES INCREASED K+    Tape Sharl Lancaster Tape] Rash     SURGICAL TAPE       PHYSICAL EXAM    /80 (Site: Left Upper Arm, Position: Standing, Cuff Size: Medium Adult)   Pulse 78   Temp 97.2 °F (36.2 °C) (Temporal)   Ht 5' 11\" (1.803 m)   Wt 216 lb 6.4 oz (98.2 kg)   SpO2 96%   BMI 30.18 kg/m²     Physical Exam  Vitals signs and nursing note reviewed. Constitutional:       General: He is not in acute distress. Appearance: Normal appearance. HENT:      Right Ear: External ear normal.      Left Ear: External ear normal.   Eyes:      Conjunctiva/sclera: Conjunctivae normal.   Cardiovascular:      Rate and Rhythm: Normal rate. Pulmonary:      Effort: Pulmonary effort is normal. No respiratory distress. Neurological:      General: No focal deficit present. Mental Status: He is alert. Psychiatric:         Mood and Affect: Mood normal.     Reviewed his immunizations  Reviewed recent lab that shows hemoglobin 7.6  GFR is 46 which is stable  A1c 6.7      ASSESSMENT and Mansoor Cervantes was seen today for follow-up, shortness of breath, gait problem, discuss labs and other. Diagnoses and all orders for this visit:    Pneumonia due to COVID-19 virus  -     XR CHEST (2 VW);  Future    Other diabetic neurological complication associated with type 2 diabetes mellitus ([facility]    Type 2 diabetes mellitus without complication, without long-term current use of insulin (HCC)    Iron deficiency anemia due to chronic blood loss  -     CBC Auto Differential; Standing    Stage 3 chronic kidney disease, unspecified whether stage 3a or 3b CKD    Coronary artery disease involving native coronary artery of native heart without angina pectoris    Paroxysmal atrial fibrillation (HCC)    Type 2 diabetes mellitus without complication, with long-term current use of insulin (HCC)    Pneumonia due to infectious organism, unspecified laterality, unspecified part of lung    Other orders  -     ferrous sulfate (IRON 325) 325 (65 Fe) MG tablet; Take 1 tablet by mouth 2 times daily    Fatigue and -shortness of breath or due to anemia secondary to iron deficiency  Patient will need to take long-term iron ferrous sulfate 325 twice daily  Told him to go ahead and start on that we will check a CBC in 3 weeks  Check monthly for 6 months  See him back in 3 months  Get a repeat chest x-ray for follow-up with pneumonia today      Return in about 3 months (around 2/28/2021). Electronically signed by Berna Cuevas MD on 11/30/2020    Please note that this chart was generated using dragon dictation software. Although every effort was made to ensure the accuracy of this automated transcription, some errors in transcription may have occurred.

## 2020-12-01 ENCOUNTER — PROCEDURE VISIT (OUTPATIENT)
Dept: CARDIOLOGY CLINIC | Age: 70
End: 2020-12-01
Payer: MEDICARE

## 2020-12-01 PROBLEM — D63.1 ERYTHROPOIETIN DEFICIENCY ANEMIA: Status: ACTIVE | Noted: 2020-12-01

## 2020-12-01 LAB
LV EF: 58 %
LVEF MODALITY: NORMAL

## 2020-12-01 PROCEDURE — 93306 TTE W/DOPPLER COMPLETE: CPT | Performed by: INTERNAL MEDICINE

## 2020-12-01 RX ORDER — 0.9 % SODIUM CHLORIDE 0.9 %
250 INTRAVENOUS SOLUTION INTRAVENOUS ONCE
Status: CANCELLED | OUTPATIENT
Start: 2020-12-02

## 2020-12-01 RX ORDER — SODIUM CHLORIDE 0.9 % (FLUSH) 0.9 %
20 SYRINGE (ML) INJECTION PRN
Status: CANCELLED | OUTPATIENT
Start: 2020-12-02

## 2020-12-02 ENCOUNTER — TELEPHONE (OUTPATIENT)
Dept: CARDIOLOGY CLINIC | Age: 70
End: 2020-12-02

## 2020-12-02 ENCOUNTER — HOSPITAL ENCOUNTER (OUTPATIENT)
Dept: INFUSION THERAPY | Age: 70
Setting detail: INFUSION SERIES
Discharge: HOME | End: 2020-12-02
Payer: MEDICARE

## 2020-12-02 VITALS
OXYGEN SATURATION: 100 % | SYSTOLIC BLOOD PRESSURE: 117 MMHG | HEART RATE: 60 BPM | BODY MASS INDEX: 30.52 KG/M2 | HEIGHT: 71 IN | TEMPERATURE: 98.1 F | WEIGHT: 218 LBS | RESPIRATION RATE: 16 BRPM | DIASTOLIC BLOOD PRESSURE: 58 MMHG

## 2020-12-02 DIAGNOSIS — D63.1 ERYTHROPOIETIN DEFICIENCY ANEMIA: Primary | ICD-10-CM

## 2020-12-02 PROCEDURE — 86850 RBC ANTIBODY SCREEN: CPT

## 2020-12-02 PROCEDURE — 2580000003 HC RX 258: Performed by: INTERNAL MEDICINE

## 2020-12-02 PROCEDURE — 99211 OFF/OP EST MAY X REQ PHY/QHP: CPT

## 2020-12-02 PROCEDURE — 36430 TRANSFUSION BLD/BLD COMPNT: CPT

## 2020-12-02 PROCEDURE — 86922 COMPATIBILITY TEST ANTIGLOB: CPT

## 2020-12-02 PROCEDURE — 86900 BLOOD TYPING SEROLOGIC ABO: CPT

## 2020-12-02 PROCEDURE — 86901 BLOOD TYPING SEROLOGIC RH(D): CPT

## 2020-12-02 PROCEDURE — P9016 RBC LEUKOCYTES REDUCED: HCPCS

## 2020-12-02 RX ORDER — SODIUM CHLORIDE 0.9 % (FLUSH) 0.9 %
20 SYRINGE (ML) INJECTION PRN
Status: DISCONTINUED | OUTPATIENT
Start: 2020-12-02 | End: 2020-12-03 | Stop reason: HOSPADM

## 2020-12-02 RX ORDER — 0.9 % SODIUM CHLORIDE 0.9 %
250 INTRAVENOUS SOLUTION INTRAVENOUS ONCE
Status: CANCELLED | OUTPATIENT
Start: 2020-12-02

## 2020-12-02 RX ORDER — 0.9 % SODIUM CHLORIDE 0.9 %
250 INTRAVENOUS SOLUTION INTRAVENOUS ONCE
Status: DISCONTINUED | OUTPATIENT
Start: 2020-12-02 | End: 2020-12-02 | Stop reason: ALTCHOICE

## 2020-12-02 RX ORDER — SODIUM CHLORIDE 0.9 % (FLUSH) 0.9 %
20 SYRINGE (ML) INJECTION PRN
Status: CANCELLED | OUTPATIENT
Start: 2020-12-02

## 2020-12-02 RX ADMIN — SODIUM CHLORIDE 250 ML: 9 INJECTION, SOLUTION INTRAVENOUS at 13:00

## 2020-12-02 NOTE — LETTER
Patient Name: Marielle Haddad   : 1950   MRN# [id number]    Date of Procedure:                         Time:      DIAGNOSIS:   Z01.810    LEFT HEART CATHETERIZATION & RIGHT HEART CATHETERIZATION WITH POSSIBLE PERCUTANEOUS CORONARY INTERVENTION       X Chest x-Ray PA & Lateral View     XEKG   X Type & Screen     X CBC  X BMP  X PT  X PTT            ? PLEASE CALL ABNORMAL RESULTS TO THE  PHYSICIAN? ATTENTION PATIENT: Pretesting is to be done before the cath. You do not have to fast for the lab work. You must go to the [facility] at [address] Zachary Jones to have this lab work done. Phone: [phone] Hours: 7:00 am to 5:00 pm                             PHYSICIAN SIGNATURE:      DATE:                       Rachana Chavarria  Pre Cath Lab Orders   Patient Name: Marielle Haddad   : 1950   MRN# [id number]  Pre Cath Lab orders   1. IV of 0.9 NS@ 75ml/hr started 2 hours prior to procedure. (#20 Gauge Insyte or larger)  2. Diazepam (Valium) 5 mg po ONCE in Cath Lab. 3. Diphenhydramine (Benadryl) 25 mg ONCE. PHYSICIAN SIGNATURE:      DATE:                                                          [facility] ([facility]) Informed Consent for Anesthesia/Sedation, Surgery, Invasive Procedures, and other High-risk Interventions and Medication use      *This consent is applicable for 30 days following patient signature*    Procedure(s)   IMarielle authorize, Dr. Knigs Katz    and the associate(s) or assistant(s) of his/her choice, to perform the following procedure(s): [address]       I know that unexpected conditions may require additional or different procedures than those above. I authorize the above named practitioner(s) perform these as necessary and desirable. This is based on the practitioners professional judgment. The above named practitioner has discussed the above procedure(s) with me, including:  ? Potential benefits, including likelihood of success of the procedure(s) goals  ? Risks  ? Side effects, risk of death, and risk of infection  ? Any potential problems that might occur during recuperation or healing post-procedure  ? Reasonable alternatives  ? Risks of NOT performing the procedure(s)    I acknowledge that no warranty or guarantee has been made to the results the procedure(s). I consent to the above named practitioner(s) providing additional services to me as deemed reasonable and necessary, including but not limited to:    ? Use of medications for anesthesia or sedation. ? All anesthesia and sedation carry risks. My practitioner has discussed my anticipated anesthesia and/or sedation and the risks of using, risk of not using, benefits, side effects, and alternatives. ? Use of pathology  ? I authorize [facility]) to dispose of tissues, specimens or organs when pathology is complete. ? Use of radiology  ? A contrast agent may be required for radiology procedures. My practitioner has advised me of the risks of using, risks of not using, benefits, side effects, and alternatives. ? Observers or use of photography, video/audio recording, or televising of the procedure(s). This is for medical, scientific, or educational purposes. This includes appropriate portions of my body. My identity will not be revealed. ? I consent to release of my social security number and other identifying information to [address] (FDA), and the supplier/, if I receive tissue, a device, or implant. This is to track the tissue, device, or implant for defect, recall, infection, etc.     ? Use of blood and/or blood products, if needed, through my hospital stay. My practitioner has advised me of the risks of using, risks of not using, benefits, side effects, and alternatives. ___ I do NOT want Blood or Blood products given. (Complete separate  refusal form)    Code Status (jose one):  ___ I do NOT HAVE a DNR order. I am a Full-code.   I will receive CPR, intubation,  chest compressions, medications, and/or other life saving measures if I have a  cardiac or respiratory arrest.    ___ I have a Do Not Resuscitate (DNR)order.   (jose one below)  ___  I rescind my DNR for surgery and immediate post-operative period through Phase 2 recovery. This means, for that time period, I will be a Full-code and receive CPR, intubation, chest compressions, medications, and/or other life saving measures, if I have a cardiac or respiratory arrest.    ___ I WANT to keep my DNR in effect during my procedure(s) and immediate post-operative recovery period through Phase 2 recovery. (Complete separate refusal form)     This form has been fully explained to me. I understand its contents. Patients Signature: ___________________________Date: ________  Time: ________    If patient unable to sign, has engaged the [address] [place] [address], is a minor, or has a court-appointed Guardian:  [address] [place] [address] Representative Name (Print):  ____________________________________      Relationship ([place] one):    Guardian   Parent    Spouse    HCPOA   Child   Sibling  Next-of-Kin Friend    Patients Representative Signature: _______________________________________              Date: ______________  Time: __________    An  was used.    name/ID: _________________________________      [facility]) Witness________________________  Date: ________   Time: _________    Physician/Practitioner _______________________  Date: ________   Time: _________     [place] [facility] [place]    Dr. Cyrus Ybarra    PROCEDURE TO SCHEDULE:

## 2020-12-02 NOTE — DISCHARGE SUMMARY
Tolerated Blood Transfusion well. Reviewed discharge instructions, understanding verbalized. Copies of AVS given to take home. Patient discharged home. Down to exit per self.     Orders Placed This Encounter   Medications    DISCONTD: 0.9 % sodium chloride infusion 250 mL    sodium chloride flush 0.9 % injection 20 mL

## 2020-12-03 LAB
ABO/RH: NORMAL
ANTIBODY SCREEN: NEGATIVE
COMPONENT: NORMAL
CROSSMATCH RESULT: NORMAL
STATUS: NORMAL
TRANSFUSION STATUS: NORMAL
UNIT DIVISION: 0
UNIT NUMBER: NORMAL

## 2020-12-04 ENCOUNTER — HOSPITAL ENCOUNTER (OUTPATIENT)
Age: 70
Setting detail: SPECIMEN
Discharge: HOME | End: 2020-12-04
Payer: MEDICARE

## 2020-12-04 ENCOUNTER — OFFICE VISIT (OUTPATIENT)
Dept: CARDIOLOGY CLINIC | Age: 70
End: 2020-12-04
Payer: MEDICARE

## 2020-12-04 ENCOUNTER — NURSE ONLY (OUTPATIENT)
Dept: CARDIOLOGY CLINIC | Age: 70
End: 2020-12-04

## 2020-12-04 VITALS — HEART RATE: 76 BPM | DIASTOLIC BLOOD PRESSURE: 54 MMHG | SYSTOLIC BLOOD PRESSURE: 108 MMHG

## 2020-12-04 PROCEDURE — 93000 ELECTROCARDIOGRAM COMPLETE: CPT | Performed by: INTERNAL MEDICINE

## 2020-12-04 PROCEDURE — G8484 FLU IMMUNIZE NO ADMIN: HCPCS | Performed by: INTERNAL MEDICINE

## 2020-12-04 PROCEDURE — 1036F TOBACCO NON-USER: CPT | Performed by: INTERNAL MEDICINE

## 2020-12-04 PROCEDURE — 99214 OFFICE O/P EST MOD 30 MIN: CPT | Performed by: INTERNAL MEDICINE

## 2020-12-04 PROCEDURE — G8417 CALC BMI ABV UP PARAM F/U: HCPCS | Performed by: INTERNAL MEDICINE

## 2020-12-04 PROCEDURE — 4040F PNEUMOC VAC/ADMIN/RCVD: CPT | Performed by: INTERNAL MEDICINE

## 2020-12-04 PROCEDURE — 3017F COLORECTAL CA SCREEN DOC REV: CPT | Performed by: INTERNAL MEDICINE

## 2020-12-04 PROCEDURE — G8427 DOCREV CUR MEDS BY ELIG CLIN: HCPCS | Performed by: INTERNAL MEDICINE

## 2020-12-04 PROCEDURE — 1123F ACP DISCUSS/DSCN MKR DOCD: CPT | Performed by: INTERNAL MEDICINE

## 2020-12-04 PROCEDURE — U0002 COVID-19 LAB TEST NON-CDC: HCPCS

## 2020-12-04 RX ORDER — MULTIVIT-MIN/IRON/FOLIC ACID/K 18-600-40
CAPSULE ORAL NIGHTLY
COMMUNITY

## 2020-12-04 NOTE — PROGRESS NOTES
Pt here in office & educated on [facility] for Dx: aortic stenosis, scheduled for 12/10/20 @ 1:00pm, w/arrival @ 11am, @ [facility]; risks explained; & consents signed. Pre-admission orders given to pt for labs & CXR due 1-2 @ [facility]. Instructions given to pt to:  NPO after midnight night before procedure; do not take lasix morning before procedure, xarelto held 48 hours , metformin hold day before procedure and restart two days after, do not take regular insulin dose, 1/2 in the morning of procedure or night before. covid test and self quarantine. Call hospital @ [phone] to pre-register; May take rest of morning meds. am of procedure; & pt voiced understanding. Copies of consent, pre-testing orders, & info. sheet given to [facility] for scanning.

## 2020-12-04 NOTE — LETTER
[address]  1950  [id number]    Have you had any Chest Pain that is not new? - No         Have you had any Shortness of Breath - Yes  If Yes - When on exertion    Have you had any dizziness - No       Have you had any palpitations that are not new? - No       Is the patient on any of the following medications - NONE  If Yes DO EKG - Needs done every 6 months    Do you have any edema - swelling in ankles, ongoing, occasional, unchanged    If Yes - CHECK TO SEE IF THE EDEMA IS PITTING  How long have they been having edema - Years  If Yes - Have they worn compression stockings Yes     Do you have a surgery or procedure scheduled in the near future - No  ? If Yes- DO EKG  ?   ? [facility] patient [facility] if [facility] are not already [facility]    ? Check to see if we have an E-MAIL on file for the patient    ? Check medication list thoroughly!!! AND RECONCILE OUTSIDE MEDICATIONS  If dose has changed change the entire order not just the MG  BE SURE TO ASK PATIENT IF THEY NEED MEDICATION REFILLS    ?  At check out add to every patient's \"wrap up\" the following dot phrase AFTERHOURSEDUCATION and ensure we explain this to our patients

## 2020-12-04 NOTE — PROGRESS NOTES
CARDIOLOGY NOTE      12/4/2020    RE: Dahiana Byrd  (1950)                               TO:  Dr. Tahir Man MD            Oletha Curling is a 65 y.o. male who was seen today for management of  cad                         Here for FU on echo           HPI:                   The patient does have cardiac complaints of feeling weak and getting sob on minimal exertion , hgb was low was transfused feels a little better  Patient also seen  for    - Coronary artery disease, does not have chest pain. Patient is  compliant with prescribed medicines. - Hypertension,is  well controlled, pt is  compliant with medicines  - Hyperlipidimea, importance of hyperlipidimea discussed with pt.   - Diabetes mellitus, blood glucose level is  well controlled. Pt is compliant with meds and diet  - PPM  On carelink  - Atrial fibrillation, pt is  compliant with meds.  Patient does not have symptoms from atrial fibrillation    Dahiana Byrd has the following history recorded in care path:  Patient Active Problem List    Diagnosis Date Noted    Cardiac pacemaker in situ      Priority: High    Coronary artery disease involving native coronary artery of native heart without angina pectoris      Priority: High    Essential hypertension      Priority: High    Type 2 diabetes mellitus without complication, without long-term current use of insulin (HCC) 12/12/2012     Priority: High     Class: Chronic    Ulcer of other part of lower limb 12/12/2012     Priority: High     Class: Chronic    Venous hypertension, chronic, with ulcer ([address] 12/12/2012     Priority: High     Class: Chronic    Ulcer of other part of foot 12/12/2012     Priority: High     Class: Chronic    Hematoma 12/01/2017     Priority: Low    CKD (chronic kidney disease) stage 3, GFR 30-59 ml/min ([address] 07/07/2016     Priority: Low    Hyperpotassemia 07/07/2016     Priority: Low    Arthritis 07/07/2016     Priority: Low    PVD (peripheral vascular disease) ([facility] 07/07/2016     Priority: Low    Status post incision and drainage      Priority: Low    PAF (paroxysmal atrial fibrillation) ([facility] 08/19/2013     Priority: Low    DM (diabetes mellitus) ([facility] 08/19/2013     Priority: Low    DMITRIY on CPAP 08/19/2013     Priority: Low    Hyperlipidemia 08/19/2013     Priority: Low    Atrial fibrillation ([facility] 01/01/2013     Priority: Low    Sinus pause 01/01/2013     Priority: Low    Pneumonia 12/29/2012     Priority: Low    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    Iron deficiency anemia due to chronic blood loss 09/01/2020    Adenomatous polyp of sigmoid colon 09/20/2019    Gout 04/23/2019    Diabetic neuropathy associated with type 2 diabetes mellitus ([facility] 04/23/2019     Current Outpatient Medications   Medication Sig Dispense Refill    Cholecalciferol (VITAMIN D) 50 MCG (2000 UT) CAPS capsule Take by mouth daily      ferrous sulfate (IRON 325) 325 (65 Fe) MG tablet Take 1 tablet by mouth 2 times daily 180 tablet 5    sildenafil (VIAGRA) 100 MG tablet TAKE 1 TABLET DAILY AS     NEEDED FOR ERECTILE        DYSFUNCTION 30 tablet 1    allopurinol (ZYLOPRIM) 100 MG tablet Take 1 tablet by mouth daily 90 tablet 1    allopurinol (ZYLOPRIM) 300 MG tablet Take 1 tablet by mouth daily 90 tablet 1    canagliflozin (INVOKANA) 300 MG TABS tablet Take 1 tablet by mouth every morning (before breakfast) 90 tablet 1    Dulaglutide (TRULICITY) 1.5 AJ/8.7SJ SOPN Inject 1.5 mg into the skin once a week 12 pen 1    metFORMIN (GLUCOPHAGE) 1000 MG tablet TAKE 1 TABLET TWICE DAILY  WITH MEALS 180 tablet 1    simvastatin (ZOCOR) 20 MG tablet Pt takes 10 mg daily 90 tablet 1    valsartan (DIOVAN) 40 MG tablet Take 40mg daily 90 tablet 1    gabapentin (NEURONTIN) 600 MG tablet Take 1 tablet by mouth 3 times daily for 270 days.  90 tablet 1    XARELTO 20 MG TABS tablet TAKE 1 TABLET DAILY WITH   BREAKFAST 90 tablet 3    glimepiride (AMARYL) 4 MG tablet Take 1 tablet by mouth daily 90 tablet 1    metoprolol succinate (TOPROL XL) 25 MG extended release tablet Take 1 tablet by mouth daily 90 tablet 3    furosemide (LASIX) 20 MG tablet Take 1 tablet by mouth 2 times daily May take an extra Lasix if has increased swelling 180 tablet 3    aspirin 81 MG tablet Take 81 mg by mouth daily       No current facility-administered medications for this visit. Allergies: Spironolactone and Tape [adhesive tape]  Past Medical History:   Diagnosis Date    Arthritis 12/2013    rt wrist    Atrial fibrillation (HCC)     CAD (coronary artery disease) 6/18/2014    see dr Rosalina Cheadle Chronic kidney disease, stage III (moderate) 7/7/2016    Diabetes mellitus ([facility]     dx 2004    Diabetic neuropathy associated with type 2 diabetes mellitus ([facility] 4/23/2019    Erythropoietin deficiency anemia 12/1/2020    Gout     \"got gout when had pacer put in because they did not give me my medication for gout \"    Gout 4/23/2019    H/O 24 hour EKG monitoring 10/3/2013    no afib noted, sinsus rhythm    H/O cardiovascular stress test 5/12/2014    cardiolite- mild ischemia RCA EF50%    H/O echocardiogram 12/01/2020    EF 55-60% severe aortic stenosis mild to mod aortic regurg mod to severe tricuspid regurg severe pulm htn significant changes since 2018 echo.  H/O transesophageal echocardiography (MABLE) for monitoring 08/05/2013    normal LV function and normal LA appendage without any clot    [place] (hard of hearing)     hearing [employer]    Hx of Doppler echocardiogram 05/21/2018    EF 50%  Mild LV hypertrophy. Mildly enlarged RA. Mod aortic valve calcification with mod AS. Mitral annular calcification is present. Mild AR, MR and TR. Mild pulmonary htn.     Hyperlipidemia     Hypertension     Other disorders of kidney and ureter     Pneumonia 12/29/2012    Sleep apnea     dx 2013- has c-pap    Type II or unspecified type diabetes mellitus with other specified manifestations, uncontrolled 12/12/2012    Venous hypertension, chronic, with ulcer ([address] 12/12/2012    resolved     Past Surgical History:   Procedure Laterality Date    CARDIOVERSION  12/14    at [address]  2011    COLONOSCOPY N/A 11/19/2019    [facility]  09/30/2020    POSSIBLE CECAL avms, SIGMOID DIVERTICULOSIS, INTERNAL HEMORRHOIDS GRADE 1    COLONOSCOPY N/A 9/30/2020    COLONOSCOPY CONTROL HEMORRHAGE WITH APC performed by Hunter Bird MD at [facility]  2014    \"2 stents put in \"   [address]/ [address]  2004    total left hip    OTHER SURGICAL HISTORY Right 12/02/2017    I&D; evacuation of hematoma right hip    OTHER SURGICAL HISTORY  09/17/2020    enteroscopy    PACEMAKER PLACEMENT      9/18/14 Status post remote permanent pacemaker with atrial lead dislodgement. 7/24/14 PPM Implant    UPPER GASTROINTESTINAL ENDOSCOPY N/A 9/17/2020    ENTEROSCOPY PUSH BIOPSY performed by Hunter Bird MD at [address]  2012    \"have stents in both legs- done in [place]      As reviewed   Family History   Problem Relation Age of Onset    High Blood Pressure Mother     Arthritis Mother     Diabetes Mother     Heart Disease Mother     High Blood Pressure Father     Heart Disease Father     Kidney Disease Father      Social History     Tobacco Use    Smoking status: Never Smoker    Smokeless tobacco: Never Used   Substance Use Topics    Alcohol use: Yes     Alcohol/week: 2.0 standard drinks     Types: 2 Cans of beer per week     Comment: average \"one time per week\"      Review of Systems:    Constitutional: Negative for diaphoresis and fatigue  Psychological:Negative for anxiety or depression  HENT: Negative for headaches, nasal congestion, sinus pain or vertigo  Eyes: Negative for visual disturbance.    Endocrine: Negative for polydipsia/polyuria  Respiratory: Negative for shortness of breath  Cardiovascular:  SOB  Gastrointestinal: Negative for abdominal pain or heartburn  Genito-Urinary: Negative for urinary frequency/urgency  Musculoskeletal: Negative for muscle pain, muscular weakness, negative for pain in arm and leg or swelling in foot and leg  Neurological: Negative for dizziness, headaches, memory loss, numbness/tingling, visual changes, syncope  Dermatological: Negative for rash    Objective:    Vitals:    12/04/20 1100   BP: (!) 108/54   Site: Left Upper Arm   Position: Sitting   Cuff Size: Medium Adult   Pulse: 76     BP (!) 108/54 (Site: Left Upper Arm, Position: Sitting, Cuff Size: Medium Adult)   Pulse 76     Patient-Reported Vitals 9/1/2020   Patient-Reported Weight 196lb   Patient-Reported Height 71\"   Patient-Reported Systolic 112   Patient-Reported Diastolic 55   Patient-Reported Pulse 62        Wt Readings from Last 3 Encounters:   12/02/20 218 lb (98.9 kg)   12/01/20 218 lb 6.4 oz (99.1 kg)   11/30/20 216 lb 6.4 oz (98.2 kg)     There is no height or weight on file to calculate BMI. GENERAL - Alert, oriented, pleasant, in no apparent distress. EYES: No jaundice, no conjunctival pallor. SKIN: It is warm & dry. No rashes. No Echhymosis    HEENT - No clinically significant abnormalities seen. Neck - Supple. No jugular venous distention noted. No carotid bruits. Cardiovascular - Normal S1 and S2 with3/6 AIDA. Extremities - No cyanosis, clubbing, or significant edema. Pulmonary - No respiratory distress. No wheezes or rales. Abdomen - No masses, tenderness, or organomegaly. Musculoskeletal - No significant edema. No joint deformities. No muscle wasting. Neurologic - Cranial nerves II through XII are grossly intact. There were no gross focal neurologic abnormalities.     Lab Review   Lab Results   Component Value Date    CKTOTAL 116 12/01/2017    TROPONINT 0.033 08/16/2020     BNP:  No results found for: BNP  PT/INR:    Lab Results   Component Value Date    INR 1.80 08/16/2020     Lab Results   Component Value Date    LABA1C 6.7 11/24/2020    LABA1C 7.3 07/07/2020     Lab Results   Component Value Date    WBC 9.2 11/30/2020    HCT 26.8 (L) 11/30/2020    MCV 75.1 (L) 11/30/2020     11/30/2020     Lab Results   Component Value Date    CHOL 95 07/07/2020    TRIG 109 07/07/2020    HDL 36 (L) 07/07/2020    LDLCALC 37 07/07/2020    LDLDIRECT 62 07/22/2014     Lab Results   Component Value Date    ALT 10 11/24/2020    AST 16 11/24/2020     BMP:    Lab Results   Component Value Date     11/24/2020    K 4.7 11/24/2020     11/24/2020    CO2 26 11/24/2020    BUN 41 11/24/2020    CREATININE 1.5 11/24/2020     CMP:   Lab Results   Component Value Date     11/24/2020    K 4.7 11/24/2020     11/24/2020    CO2 26 11/24/2020    BUN 41 11/24/2020    PROT 6.6 11/24/2020    PROT 7.3 12/27/2012     TSH:    Lab Results   Component Value Date    TSH 0.99 07/07/2020    TSHHS 1.100 12/31/2012           Impression:    1. Atrial fibrillation, unspecified type [facility])       Patient Active Problem List   Diagnosis Code    Type 2 diabetes mellitus without complication, without long-term current use of insulin ([facility] E11.9    Ulcer of other part of lower limb L97.809    Venous hypertension, chronic, with ulcer ([facility] I87.319, L97.909    Ulcer of other part of foot L97.509    Pneumonia J18.9    Atrial fibrillation (HCC) I48.91    Sinus pause I45.5    PAF (paroxysmal atrial fibrillation) ([facility] I48.0    DM (diabetes mellitus) (HCC) E11.9    DMITRIY on CPAP G47.33, Z99.89    Hyperlipidemia E78.5    Status post incision and drainage Z98.890    CKD (chronic kidney disease) stage 3, GFR 30-59 ml/min ([facility]) N18.30    Hyperpotassemia E87.5    Arthritis M19.90    PVD (peripheral vascular disease) ([facility]) I73.9    Hematoma T14. 8XXA    Cardiac pacemaker in situ Z95.0    Coronary artery disease involving native coronary artery of native heart without angina pectoris I25.10    Essential hypertension I10    Gout M10.9    Diabetic neuropathy associated with type 2 diabetes mellitus ([facility] E11.40    Adenomatous polyp of sigmoid colon D12.5    Iron deficiency anemia due to chronic blood loss D50.0    Erythropoietin deficiency anemia D63.1       Assessment & Plan:               -     CORONARY ARTERY DISEASE:  symptomatic     All available  tests in chart reviewed. Management discussed . Testing ordered  lhc                                 -  Hypertension: Patients blood pressure is normal. Patient is advised about low sodium diet. Present medical regimen will not be changed. -  LIPID MANAGEMENT:  Importance of lipid levels discussed with patient   and patient was given dietary advice. NCEP- ATP III guidelines reviewed with patient. -   Changes  in medicines made: No          - PPM   On carelink    - Atrial fibrillation, pt is  compliant with meds. Patient does not have symptoms from atrial fibrillation                     -   DIABETES MELLITUS: Available pertinent lab data reviewed   and  patient was given dietary advice . Advised to check blood glucose level on a regular basis. -   Changes  in medicines made: No              - had covid    -  VHD has severe as  Rt and LHC  Conclusions      Summary   Left ventricular systolic function is normal with an ejection fraction of   55-60%. Mild concentric left ventricular hypertrophy. Moderately dilated left atrium. Mildly dilated right side with mildly hypokinetic right ventricular   function. Calcific aortic valve with severe aortic stenosis, mean gradient of 43 mmhg   and an ANNA of 0.99 cm sq. Mild to moderate aortic regurgitation with PHT of 453 msec. Mitral annular calcification is present with increased mean pressure   gradient of 4 mmHg. Moderate to severe tricuspid regurgitation. Pacer wire noted on right side passing through tricuspid valve.    Severe pulmonary hypertension at 88 mmHg. No evidence of pericardial effusion. Significant changes since 2018 echo. ov to discuss   schedule rhc and lhc      Signature      ------------------------------------------------------------------   Electronically signed by Swetha Dillard MD   (Interpreting physician) on 12/01/2020 at 01:38 PM    - CKD  Per renal  DW renal  - anemia  ?  Heyde syndrome, dw renal for epo      Sydni Medrano MD    [address] [facility]

## 2020-12-04 NOTE — PATIENT INSTRUCTIONS
Please be informed that if you contact our office outside of normal business hours the physician on call cannot help with any scheduling or rescheduling issues, procedure instruction questions or any type of medication issue. We advise you for any urgent/emergency that you go to the nearest emergency room!     PLEASE CALL OUR OFFICE DURING NORMAL BUSINESS HOURS    Monday - Friday   8 am to 5 pm    [place]: [address]: [phone]    [place]:  [phone]

## 2020-12-04 NOTE — PROGRESS NOTES
[id number] Score for Atrial Fibrillation Stroke Risk   Risk   Factors  Component Value   C CHF No 0   H HTN Yes 1   A2 Age >= 76 No,  (71 y.o.) 0   D DM Yes 1   S2 Prior Stroke/TIA No 0   V Vascular Disease No 0   A Age 74-69 Yes,  (71 y.o.) 1   Sc Sex male 0    [id number]  Score  3   Score last updated 12/4/20 39:05 AM EST    Click here for a link to the UpToDate guideline \"Atrial Fibrillation: Anticoagulation therapy to prevent embolization    Disclaimer: Risk Score calculation is dependent on accuracy of patient problem list and past encounter diagnosis.

## 2020-12-07 ENCOUNTER — HOSPITAL ENCOUNTER (OUTPATIENT)
Dept: GENERAL RADIOLOGY | Age: 70
Discharge: HOME | End: 2020-12-07
Payer: MEDICARE

## 2020-12-07 ENCOUNTER — HOSPITAL ENCOUNTER (OUTPATIENT)
Age: 70
Discharge: HOME | End: 2020-12-07
Payer: MEDICARE

## 2020-12-07 LAB
ABO/RH: NORMAL
ANION GAP SERPL CALCULATED.3IONS-SCNC: 10 MMOL/L (ref 4–16)
ANTIBODY SCREEN: NEGATIVE
APTT: 60.8 SECONDS (ref 25.1–37.1)
BASOPHILS ABSOLUTE: 0.1 K/CU MM
BASOPHILS RELATIVE PERCENT: 1.6 % (ref 0–1)
BUN BLDV-MCNC: 41 MG/DL (ref 6–23)
CALCIUM SERPL-MCNC: 9.3 MG/DL (ref 8.3–10.6)
CHLORIDE BLD-SCNC: 97 MMOL/L (ref 99–110)
CO2: 28 MMOL/L (ref 21–32)
CREAT SERPL-MCNC: 1.7 MG/DL (ref 0.9–1.3)
DIFFERENTIAL TYPE: ABNORMAL
EOSINOPHILS ABSOLUTE: 0.4 K/CU MM
EOSINOPHILS RELATIVE PERCENT: 5.3 % (ref 0–3)
GFR AFRICAN AMERICAN: 48 ML/MIN/1.73M2
GFR NON-AFRICAN AMERICAN: 40 ML/MIN/1.73M2
GLUCOSE BLD-MCNC: 145 MG/DL (ref 70–99)
HCT VFR BLD CALC: 29.1 % (ref 42–52)
HEMOGLOBIN: 7.7 GM/DL (ref 13.5–18)
IMMATURE NEUTROPHIL %: 0.3 % (ref 0–0.43)
INR BLD: 2.2 INDEX
LYMPHOCYTES ABSOLUTE: 1.4 K/CU MM
LYMPHOCYTES RELATIVE PERCENT: 20.4 % (ref 24–44)
MCH RBC QN AUTO: 20.7 PG (ref 27–31)
MCHC RBC AUTO-ENTMCNC: 26.5 % (ref 32–36)
MCV RBC AUTO: 78.2 FL (ref 78–100)
MONOCYTES ABSOLUTE: 0.9 K/CU MM
MONOCYTES RELATIVE PERCENT: 13 % (ref 0–4)
NUCLEATED RBC %: 0 %
PDW BLD-RTO: 24.1 % (ref 11.7–14.9)
PLATELET # BLD: 226 K/CU MM (ref 140–440)
PMV BLD AUTO: 10.1 FL (ref 7.5–11.1)
POTASSIUM SERPL-SCNC: 4.6 MMOL/L (ref 3.5–5.1)
PROTHROMBIN TIME: 26.8 SECONDS (ref 11.7–14.5)
RBC # BLD: 3.72 M/CU MM (ref 4.6–6.2)
SARS-COV-2: NOT DETECTED
SEGMENTED NEUTROPHILS ABSOLUTE COUNT: 4 K/CU MM
SEGMENTED NEUTROPHILS RELATIVE PERCENT: 59.4 % (ref 36–66)
SODIUM BLD-SCNC: 135 MMOL/L (ref 135–145)
SOURCE: NORMAL
TOTAL IMMATURE NEUTOROPHIL: 0.02 K/CU MM
TOTAL NUCLEATED RBC: 0 K/CU MM
WBC # BLD: 6.8 K/CU MM (ref 4–10.5)

## 2020-12-07 PROCEDURE — 86901 BLOOD TYPING SEROLOGIC RH(D): CPT

## 2020-12-07 PROCEDURE — 85610 PROTHROMBIN TIME: CPT

## 2020-12-07 PROCEDURE — 71046 X-RAY EXAM CHEST 2 VIEWS: CPT

## 2020-12-07 PROCEDURE — 93005 ELECTROCARDIOGRAM TRACING: CPT | Performed by: INTERNAL MEDICINE

## 2020-12-07 PROCEDURE — 86900 BLOOD TYPING SEROLOGIC ABO: CPT

## 2020-12-07 PROCEDURE — 85730 THROMBOPLASTIN TIME PARTIAL: CPT

## 2020-12-07 PROCEDURE — 85025 COMPLETE CBC W/AUTO DIFF WBC: CPT

## 2020-12-07 PROCEDURE — 86850 RBC ANTIBODY SCREEN: CPT

## 2020-12-07 PROCEDURE — 80048 BASIC METABOLIC PNL TOTAL CA: CPT

## 2020-12-07 PROCEDURE — 36415 COLL VENOUS BLD VENIPUNCTURE: CPT

## 2020-12-07 PROCEDURE — 93010 ELECTROCARDIOGRAM REPORT: CPT | Performed by: INTERNAL MEDICINE

## 2020-12-09 ENCOUNTER — TELEPHONE (OUTPATIENT)
Dept: CARDIOLOGY CLINIC | Age: 70
End: 2020-12-09

## 2020-12-09 NOTE — TELEPHONE ENCOUNTER
Called patient to remind of procedure tomorrow he asked how his labs were and stated that he is anemic and was wondering if he would still be able to have procedure, reviewed abnormal labs with Dr. Kiki Weller and he said it is okay to proceed with procedure.

## 2020-12-10 ENCOUNTER — HOSPITAL ENCOUNTER (OUTPATIENT)
Dept: CARDIAC CATH/INVASIVE PROCEDURES | Age: 70
Setting detail: OUTPATIENT SURGERY
Discharge: HOME | End: 2020-12-10
Attending: INTERNAL MEDICINE | Admitting: INTERNAL MEDICINE
Payer: MEDICARE

## 2020-12-10 ENCOUNTER — TELEPHONE (OUTPATIENT)
Dept: CARDIOLOGY CLINIC | Age: 70
End: 2020-12-10

## 2020-12-10 VITALS
BODY MASS INDEX: 30.52 KG/M2 | RESPIRATION RATE: 16 BRPM | DIASTOLIC BLOOD PRESSURE: 65 MMHG | HEIGHT: 71 IN | WEIGHT: 218 LBS | OXYGEN SATURATION: 99 % | HEART RATE: 65 BPM | SYSTOLIC BLOOD PRESSURE: 124 MMHG | TEMPERATURE: 97.6 F

## 2020-12-10 LAB
APTT: 41.5 SECONDS (ref 25.1–37.1)
BASE EXCESS MIXED: 3.5 (ref 0–1.2)
CARBON MONOXIDE, BLOOD: 1.8 % (ref 0–5)
CO2 CONTENT: 29 MMOL/L (ref 19–24)
COMMENT: ABNORMAL
ESTIMATED AVERAGE GLUCOSE: 120 MG/DL
GLUCOSE BLD-MCNC: 124 MG/DL (ref 70–99)
HBA1C MFR BLD: 5.8 % (ref 4.2–6.3)
HCO3 ARTERIAL: 27.8 MMOL/L (ref 18–23)
INR BLD: 1.22 INDEX
METHEMOGLOBIN ARTERIAL: 0.4 %
O2 SATURATION: 93.8 % (ref 96–97)
PCO2 ARTERIAL: 40 MMHG (ref 32–45)
PH BLOOD: 7.45 (ref 7.34–7.45)
PO2 ARTERIAL: 81 MMHG (ref 75–100)
PROTHROMBIN TIME: 14.8 SECONDS (ref 11.7–14.5)

## 2020-12-10 PROCEDURE — 2580000003 HC RX 258: Performed by: INTERNAL MEDICINE

## 2020-12-10 PROCEDURE — 93460 R&L HRT ART/VENTRICLE ANGIO: CPT

## 2020-12-10 PROCEDURE — 85730 THROMBOPLASTIN TIME PARTIAL: CPT

## 2020-12-10 PROCEDURE — 6360000002 HC RX W HCPCS

## 2020-12-10 PROCEDURE — 82962 GLUCOSE BLOOD TEST: CPT

## 2020-12-10 PROCEDURE — C1894 INTRO/SHEATH, NON-LASER: HCPCS

## 2020-12-10 PROCEDURE — 2709999900 HC NON-CHARGEABLE SUPPLY

## 2020-12-10 PROCEDURE — 82803 BLOOD GASES ANY COMBINATION: CPT

## 2020-12-10 PROCEDURE — 6370000000 HC RX 637 (ALT 250 FOR IP): Performed by: INTERNAL MEDICINE

## 2020-12-10 PROCEDURE — 85610 PROTHROMBIN TIME: CPT

## 2020-12-10 PROCEDURE — 83036 HEMOGLOBIN GLYCOSYLATED A1C: CPT

## 2020-12-10 PROCEDURE — 94761 N-INVAS EAR/PLS OXIMETRY MLT: CPT

## 2020-12-10 PROCEDURE — 2500000003 HC RX 250 WO HCPCS

## 2020-12-10 PROCEDURE — 37799 UNLISTED PX VASCULAR SURGERY: CPT

## 2020-12-10 PROCEDURE — 6360000004 HC RX CONTRAST MEDICATION

## 2020-12-10 PROCEDURE — 93460 R&L HRT ART/VENTRICLE ANGIO: CPT | Performed by: INTERNAL MEDICINE

## 2020-12-10 PROCEDURE — C1751 CATH, INF, PER/CENT/MIDLINE: HCPCS

## 2020-12-10 RX ORDER — SODIUM CHLORIDE 0.9 % (FLUSH) 0.9 %
10 SYRINGE (ML) INJECTION EVERY 12 HOURS SCHEDULED
Status: CANCELLED | OUTPATIENT
Start: 2020-12-10

## 2020-12-10 RX ORDER — SODIUM CHLORIDE 0.9 % (FLUSH) 0.9 %
10 SYRINGE (ML) INJECTION EVERY 12 HOURS SCHEDULED
Status: DISCONTINUED | OUTPATIENT
Start: 2020-12-10 | End: 2020-12-10 | Stop reason: HOSPADM

## 2020-12-10 RX ORDER — SODIUM CHLORIDE 0.9 % (FLUSH) 0.9 %
10 SYRINGE (ML) INJECTION PRN
Status: CANCELLED | OUTPATIENT
Start: 2020-12-10

## 2020-12-10 RX ORDER — DIPHENHYDRAMINE HCL 25 MG
25 TABLET ORAL EVERY 6 HOURS PRN
Status: DISCONTINUED | OUTPATIENT
Start: 2020-12-10 | End: 2020-12-10 | Stop reason: HOSPADM

## 2020-12-10 RX ORDER — SODIUM CHLORIDE 9 MG/ML
INJECTION, SOLUTION INTRAVENOUS CONTINUOUS
Status: DISCONTINUED | OUTPATIENT
Start: 2020-12-10 | End: 2020-12-10 | Stop reason: HOSPADM

## 2020-12-10 RX ORDER — ACETAMINOPHEN 325 MG/1
650 TABLET ORAL EVERY 4 HOURS PRN
Status: CANCELLED | OUTPATIENT
Start: 2020-12-10

## 2020-12-10 RX ORDER — 0.9 % SODIUM CHLORIDE 0.9 %
250 INTRAVENOUS SOLUTION INTRAVENOUS ONCE
Status: COMPLETED | OUTPATIENT
Start: 2020-12-10 | End: 2020-12-10

## 2020-12-10 RX ORDER — ACETAMINOPHEN 325 MG/1
650 TABLET ORAL EVERY 4 HOURS PRN
Status: DISCONTINUED | OUTPATIENT
Start: 2020-12-10 | End: 2020-12-10 | Stop reason: HOSPADM

## 2020-12-10 RX ORDER — SODIUM CHLORIDE 9 MG/ML
INJECTION, SOLUTION INTRAVENOUS CONTINUOUS
Status: CANCELLED | OUTPATIENT
Start: 2020-12-10

## 2020-12-10 RX ORDER — SODIUM CHLORIDE 0.9 % (FLUSH) 0.9 %
10 SYRINGE (ML) INJECTION PRN
Status: DISCONTINUED | OUTPATIENT
Start: 2020-12-10 | End: 2020-12-10 | Stop reason: HOSPADM

## 2020-12-10 RX ORDER — DIAZEPAM 5 MG/1
5 TABLET ORAL EVERY 6 HOURS PRN
Status: DISCONTINUED | OUTPATIENT
Start: 2020-12-10 | End: 2020-12-10 | Stop reason: HOSPADM

## 2020-12-10 RX ADMIN — SODIUM CHLORIDE 250 ML: 9 INJECTION, SOLUTION INTRAVENOUS at 12:15

## 2020-12-10 RX ADMIN — DIAZEPAM 5 MG: 5 TABLET ORAL at 12:13

## 2020-12-10 RX ADMIN — SODIUM CHLORIDE: 9 INJECTION, SOLUTION INTRAVENOUS at 12:12

## 2020-12-10 RX ADMIN — DIPHENHYDRAMINE HYDROCHLORIDE 25 MG: 25 TABLET ORAL at 12:13

## 2020-12-10 NOTE — TELEPHONE ENCOUNTER
[facility] sent fax requesting additional clinical information for CPT [id number]- L/RHC. Procedure was done today. Please call to go over clinicals. Patient has history of CAD, Hypertension, hyperlipidemia and echo showed severe AS. Patient being evaluated for possible valve surgery.  Please go over clinicals with insurance

## 2020-12-10 NOTE — H&P
Yazmin Rosado is a 65 y.o. male who was seen today for management of cad   Here for  [facility]  HPI:   The patient does have cardiac complaints of feeling weak and getting sob on minimal exertion , hgb was low was transfused feels a little better   Patient also seen for   - Coronary artery disease, does not have chest pain. Patient is compliant with prescribed medicines. - Hypertension,is well controlled, pt is compliant with medicines   - Hyperlipidimea, importance of hyperlipidimea discussed with pt.   - Diabetes mellitus, blood glucose level is well controlled. Pt is compliant with meds and diet   - PPM On carelink   - Atrial fibrillation, pt is compliant with meds.  Patient does not have symptoms from atrial fibrillation   Marielle Haddad has the following history recorded in care path:         Patient Active Problem List    Diagnosis Date Noted    Cardiac pacemaker in situ      Priority: High    Coronary artery disease involving native coronary artery of native heart without angina pectoris      Priority: High    Essential hypertension      Priority: High    Type 2 diabetes mellitus without complication, without long-term current use of insulin ([facility]) 12/12/2012     Priority: High     Class: Chronic    Ulcer of other part of lower limb 12/12/2012     Priority: High     Class: Chronic    Venous hypertension, chronic, with ulcer ([facility] 12/12/2012     Priority: High     Class: Chronic    Ulcer of other part of foot 12/12/2012     Priority: High     Class: Chronic    Hematoma 12/01/2017     Priority: Low    CKD (chronic kidney disease) stage 3, GFR 30-59 ml/min ([address] 07/07/2016     Priority: Low    Hyperpotassemia 07/07/2016     Priority: Low    Arthritis 07/07/2016     Priority: Low    PVD (peripheral vascular disease) ([address] 07/07/2016     Priority: Low    Status post incision and drainage      Priority: Low    PAF (paroxysmal atrial fibrillation) ([facility] 08/19/2013     Priority: Low    DM (diabetes mellitus) ([facility] 08/19/2013     Priority: Low    DMITRIY on CPAP 08/19/2013     Priority: Low    Hyperlipidemia 08/19/2013     Priority: Low    Atrial fibrillation ([facility] 01/01/2013     Priority: Low    Sinus pause 01/01/2013     Priority: Low    Pneumonia 12/29/2012     Priority: Low    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    Iron deficiency anemia due to chronic blood loss 09/01/2020    Adenomatous polyp of sigmoid colon 09/20/2019    Gout 04/23/2019    Diabetic neuropathy associated with type 2 diabetes mellitus ([facility] 04/23/2019     Current Facility-Administered Medications          Current Outpatient Medications   Medication Sig Dispense Refill    Cholecalciferol (VITAMIN D) 50 MCG (2000 UT) CAPS capsule Take by mouth daily      ferrous sulfate (IRON 325) 325 (65 Fe) MG tablet Take 1 tablet by mouth 2 times daily 180 tablet 5    sildenafil (VIAGRA) 100 MG tablet TAKE 1 TABLET DAILY AS NEEDED FOR ERECTILE DYSFUNCTION 30 tablet 1    allopurinol (ZYLOPRIM) 100 MG tablet Take 1 tablet by mouth daily 90 tablet 1    allopurinol (ZYLOPRIM) 300 MG tablet Take 1 tablet by mouth daily 90 tablet 1    canagliflozin (INVOKANA) 300 MG TABS tablet Take 1 tablet by mouth every morning (before breakfast) 90 tablet 1    Dulaglutide (TRULICITY) 1.5 SS/5.7AM SOPN Inject 1.5 mg into the skin once a week 12 pen 1    metFORMIN (GLUCOPHAGE) 1000 MG tablet TAKE 1 TABLET TWICE DAILY WITH MEALS 180 tablet 1    simvastatin (ZOCOR) 20 MG tablet Pt takes 10 mg daily 90 tablet 1    valsartan (DIOVAN) 40 MG tablet Take 40mg daily 90 tablet 1    gabapentin (NEURONTIN) 600 MG tablet Take 1 tablet by mouth 3 times daily for 270 days.  90 tablet 1    XARELTO 20 MG TABS tablet TAKE 1 TABLET DAILY WITH BREAKFAST 90 tablet 3    glimepiride (AMARYL) 4 MG tablet Take 1 tablet by mouth daily 90 tablet 1    metoprolol succinate (TOPROL XL) 25 MG extended release tablet Take 1 tablet by mouth daily 90 tablet 3    furosemide (LASIX) 20 MG tablet Take 1 tablet by mouth 2 times daily May take an extra Lasix if has increased swelling 180 tablet 3    aspirin 81 MG tablet Take 81 mg by mouth daily     No current facility-administered medications for this visit. Allergies: Spironolactone and Tape [adhesive tape]   Past Medical History        Past Medical History:   Diagnosis Date    Arthritis 12/2013    rt wrist    Atrial fibrillation (HCC)     CAD (coronary artery disease) 6/18/2014    see dr Canelo Bender kidney disease, stage III (moderate) 7/7/2016    Diabetes mellitus ([address]     dx 2004    Diabetic neuropathy associated with type 2 diabetes mellitus ([facility] 4/23/2019    Erythropoietin deficiency anemia 12/1/2020    Gout     \"got gout when had pacer put in because they did not give me my medication for gout \"    Gout 4/23/2019    H/O 24 hour EKG monitoring 10/3/2013    no afib noted, sinsus rhythm    H/O cardiovascular stress test 5/12/2014    cardiolite- mild ischemia RCA EF50%    H/O echocardiogram 12/01/2020    EF 55-60% severe aortic stenosis mild to mod aortic regurg mod to severe tricuspid regurg severe pulm htn significant changes since 2018 echo.  H/O transesophageal echocardiography (MABLE) for monitoring 08/05/2013    normal LV function and normal LA appendage without any clot    [place] (hard of hearing)     hearing tonya aides    Hx of Doppler echocardiogram 05/21/2018    EF 50% Mild LV hypertrophy. Mildly enlarged RA. Mod aortic valve calcification with mod AS. Mitral annular calcification is present. Mild AR, MR and TR. Mild pulmonary htn.     Hyperlipidemia     Hypertension     Other disorders of kidney and ureter     Pneumonia 12/29/2012    Sleep apnea     dx 2013- has c-pap    Type II or unspecified type diabetes mellitus with other specified manifestations, uncontrolled 12/12/2012    Venous hypertension, chronic, with ulcer ([address] 12/12/2012    resolved     Past Surgical History Gastrointestinal: Negative for abdominal pain or heartburn   Genito-Urinary: Negative for urinary frequency/urgency   Musculoskeletal: Negative for muscle pain, muscular weakness, negative for pain in arm and leg or swelling in foot and leg   Neurological: Negative for dizziness, headaches, memory loss, numbness/tingling, visual changes, syncope   Dermatological: Negative for rash   Objective:   Vitals                                          BP (!) 108/54 (Site: Left Upper Arm, Position: Sitting, Cuff Size: Medium Adult)  Pulse 76   Patient-Reported Vitals 9/1/2020   Patient-Reported Weight 196lb   Patient-Reported Height 71\"   Patient-Reported Systolic 932   Patient-Reported Diastolic 55   Patient-Reported Pulse 62         Wt Readings from Last 3 Encounters:   12/02/20 218 lb (98.9 kg)   12/01/20 218 lb 6.4 oz (99.1 kg)   11/30/20 216 lb 6.4 oz (98.2 kg)     There is no height or weight on file to calculate BMI. GENERAL - Alert, oriented, pleasant, in no apparent distress. EYES: No jaundice, no conjunctival pallor. SKIN: It is warm & dry. No rashes. No Echhymosis   HEENT - No clinically significant abnormalities seen. Neck - Supple. No jugular venous distention noted. No carotid bruits. Cardiovascular - Normal S1 and S2 with3/6 AIDA. Extremities - No cyanosis, clubbing, or significant edema. Pulmonary - No respiratory distress. No wheezes or rales. Abdomen - No masses, tenderness, or organomegaly. Musculoskeletal - No significant edema. No joint deformities. No muscle wasting. Neurologic - Cranial nerves II through XII are grossly intact. There were no gross focal neurologic abnormalities.    Lab Review         Lab Results   Component Value Date    CKTOTAL 116 12/01/2017    TROPONINT 0.033 08/16/2020     BNP: No results found for: BNP   PT/INR:         Lab Results   Component Value Date    INR 1.80 08/16/2020           Lab Results   Component Value Date    LABA1C 6.7 11/24/2020    LABA1C 7.3 neuropathy associated with type 2 diabetes mellitus (HCC) E11.40    Adenomatous polyp of sigmoid colon D12.5    Iron deficiency anemia due to chronic blood loss D50.0    Erythropoietin deficiency anemia D63.1     Assessment & Plan:   - CORONARY ARTERY DISEASE: symptomatic All available tests in chart reviewed. Management discussed . Testing ordered lhc   - Hypertension: Patients blood pressure is normal. Patient is advised about low sodium diet. Present medical regimen will not be changed. - LIPID MANAGEMENT: Importance of lipid levels discussed with patient and patient was given dietary advice. NCEP- ATP III guidelines reviewed with patient. - Changes in medicines made: No   - PPM On carelink   - Atrial fibrillation, pt is compliant with meds. Patient does not have symptoms from atrial fibrillation   - DIABETES MELLITUS: Available pertinent lab data reviewed and patient was given dietary advice . Advised to check blood glucose level on a regular basis.    - Changes in medicines made: No   - had covid   - VHD has severe as Rt and LHC   Conclusions

## 2020-12-10 NOTE — PROGRESS NOTES
Discharge instructions reviewed with patient. Voices understanding. Ambulated without difficulty. Right groin site free from any active bleeding, oozing, or hematoma noted at this time.

## 2020-12-10 NOTE — OP NOTE
SEVERE AS  MODERATE LAD DISEASE    LOW HGB PER GI  HIGH CREAT PER RENAL    ?  TAVR, WILL SEE IN TAVR CLINIC  MABLE NEXT WEEK    HOLD MEFORMIN FOR 48 HOURS  RESTART [address]

## 2020-12-11 ENCOUNTER — NURSE ONLY (OUTPATIENT)
Dept: CARDIOLOGY CLINIC | Age: 70
End: 2020-12-11
Payer: MEDICARE

## 2020-12-11 ENCOUNTER — HOSPITAL ENCOUNTER (OUTPATIENT)
Age: 70
Setting detail: SPECIMEN
Discharge: HOME | End: 2020-12-11
Payer: MEDICARE

## 2020-12-11 ENCOUNTER — TELEPHONE (OUTPATIENT)
Dept: CARDIOLOGY CLINIC | Age: 70
End: 2020-12-11

## 2020-12-11 PROCEDURE — U0002 COVID-19 LAB TEST NON-CDC: HCPCS

## 2020-12-11 PROCEDURE — 99211 OFF/OP EST MAY X REQ PHY/QHP: CPT | Performed by: INTERNAL MEDICINE

## 2020-12-11 NOTE — TELEPHONE ENCOUNTER
Called [facility] & spoke to Hallie GOLDMANconcerning pt's L/RHC needing additional information to be provided. Pt's procedure was done on 12/10/2020. Call was transferred to nurse reviewer Miss Delgadillofady Keane. Reviewed pt's past cardiac medical history, along w/abn. ECHO that was done on 12/1/2020 w/her. She approved auth. for [facility], valid from today's date of 12/11/2020, through to 1/25/2021. Asked her if able to have back-dated & she gave this nurse telephone# for [phone]. Called [facility] & spoke to [facility] getting procedure auth. back-dated to 12/10/2020. She stated that they do not back-date & that this nurse would have [facility] Informed her had already spoke to them & they had this nure call [facility]. She placed call on a brief hold to call [facility] herself. She connected this nurse to Torres, nurse reviewer @ [facility]. Reviewed all of pt's information w/her & she stated that she sent an email to have procedure auth. back-dated to yesterday, 12/10/2020. She stated to check back in 2 business days to make sure of back-dating. Routed to [facility] P.for notification.

## 2020-12-13 ENCOUNTER — PROCEDURE VISIT (OUTPATIENT)
Dept: CARDIOLOGY CLINIC | Age: 70
End: 2020-12-13
Payer: MEDICARE

## 2020-12-13 LAB
SARS-COV-2: NOT DETECTED
SOURCE: NORMAL

## 2020-12-13 PROCEDURE — 93294 REM INTERROG EVL PM/LDLS PM: CPT | Performed by: INTERNAL MEDICINE

## 2020-12-13 PROCEDURE — 93296 REM INTERROG EVL PM/IDS: CPT | Performed by: INTERNAL MEDICINE

## 2020-12-14 ENCOUNTER — TELEPHONE (OUTPATIENT)
Dept: CARDIOLOGY CLINIC | Age: 70
End: 2020-12-14

## 2020-12-15 ENCOUNTER — HOSPITAL ENCOUNTER (OUTPATIENT)
Dept: NON INVASIVE DIAGNOSTICS | Age: 70
Discharge: HOME | End: 2020-12-15
Payer: MEDICARE

## 2020-12-15 ENCOUNTER — TELEPHONE (OUTPATIENT)
Dept: CARDIOLOGY CLINIC | Age: 70
End: 2020-12-15

## 2020-12-15 VITALS
HEART RATE: 60 BPM | OXYGEN SATURATION: 97 % | SYSTOLIC BLOOD PRESSURE: 114 MMHG | RESPIRATION RATE: 14 BRPM | DIASTOLIC BLOOD PRESSURE: 56 MMHG

## 2020-12-15 PROCEDURE — 7100000001 HC PACU RECOVERY - ADDTL 15 MIN

## 2020-12-15 PROCEDURE — 7100000000 HC PACU RECOVERY - FIRST 15 MIN

## 2020-12-15 PROCEDURE — 93325 DOPPLER ECHO COLOR FLOW MAPG: CPT | Performed by: INTERNAL MEDICINE

## 2020-12-15 PROCEDURE — 93312 ECHO TRANSESOPHAGEAL: CPT | Performed by: INTERNAL MEDICINE

## 2020-12-15 PROCEDURE — 93312 ECHO TRANSESOPHAGEAL: CPT

## 2020-12-18 ENCOUNTER — HOSPITAL ENCOUNTER (OUTPATIENT)
Dept: GENERAL RADIOLOGY | Age: 70
Discharge: HOME | End: 2020-12-18
Payer: MEDICARE

## 2020-12-18 ENCOUNTER — HOSPITAL ENCOUNTER (OUTPATIENT)
Age: 70
Discharge: HOME | End: 2020-12-18
Payer: MEDICARE

## 2020-12-18 DIAGNOSIS — D50.0 IRON DEFICIENCY ANEMIA DUE TO CHRONIC BLOOD LOSS: Chronic | ICD-10-CM

## 2020-12-18 DIAGNOSIS — E11.42 TYPE 2 DIABETES MELLITUS WITH DIABETIC POLYNEUROPATHY, WITHOUT LONG-TERM CURRENT USE OF INSULIN (HCC): ICD-10-CM

## 2020-12-18 LAB
BASOPHILS ABSOLUTE: 0.1 K/UL (ref 0–0.2)
BASOPHILS RELATIVE PERCENT: 1.5 %
CHOLESTEROL, TOTAL: 91 MG/DL (ref 0–199)
EOSINOPHILS ABSOLUTE: 0.3 K/UL (ref 0–0.6)
EOSINOPHILS RELATIVE PERCENT: 6.3 %
HCT VFR BLD CALC: 30.8 % (ref 40.5–52.5)
HDLC SERPL-MCNC: 43 MG/DL (ref 40–60)
HEMOGLOBIN: 9 G/DL (ref 13.5–17.5)
LDL CHOLESTEROL CALCULATED: 35 MG/DL
LYMPHOCYTES ABSOLUTE: 1.1 K/UL (ref 1–5.1)
LYMPHOCYTES RELATIVE PERCENT: 21.7 %
MCH RBC QN AUTO: 21.4 PG (ref 26–34)
MCHC RBC AUTO-ENTMCNC: 29.2 G/DL (ref 31–36)
MCV RBC AUTO: 73.3 FL (ref 80–100)
MONOCYTES ABSOLUTE: 0.6 K/UL (ref 0–1.3)
MONOCYTES RELATIVE PERCENT: 11.9 %
NEUTROPHILS ABSOLUTE: 2.9 K/UL (ref 1.7–7.7)
NEUTROPHILS RELATIVE PERCENT: 58.6 %
PDW BLD-RTO: 26 % (ref 12.4–15.4)
PLATELET # BLD: 264 K/UL (ref 135–450)
PMV BLD AUTO: 8 FL (ref 5–10.5)
RBC # BLD: 4.2 M/UL (ref 4.2–5.9)
TRIGL SERPL-MCNC: 66 MG/DL (ref 0–150)
VLDLC SERPL CALC-MCNC: 13 MG/DL
WBC # BLD: 4.9 K/UL (ref 4–11)

## 2020-12-18 PROCEDURE — 71046 X-RAY EXAM CHEST 2 VIEWS: CPT

## 2020-12-19 LAB
ESTIMATED AVERAGE GLUCOSE: 114 MG/DL
HBA1C MFR BLD: 5.6 %

## 2021-01-05 ENCOUNTER — HOSPITAL ENCOUNTER (OUTPATIENT)
Dept: CARDIAC CATH/INVASIVE PROCEDURES | Age: 71
Discharge: HOME | End: 2021-01-05
Attending: INTERNAL MEDICINE | Admitting: INTERNAL MEDICINE
Payer: MEDICARE

## 2021-01-05 ENCOUNTER — HOSPITAL ENCOUNTER (OUTPATIENT)
Dept: LAB | Age: 71
Discharge: HOME | End: 2021-01-05
Payer: MEDICARE

## 2021-01-05 ENCOUNTER — TELEPHONE (OUTPATIENT)
Dept: CARDIOLOGY CLINIC | Age: 71
End: 2021-01-05

## 2021-01-05 ENCOUNTER — HOSPITAL ENCOUNTER (OUTPATIENT)
Dept: PULMONOLOGY | Age: 71
Discharge: HOME | End: 2021-01-05
Payer: MEDICARE

## 2021-01-05 ENCOUNTER — HOSPITAL ENCOUNTER (OUTPATIENT)
Dept: CT IMAGING | Age: 71
Discharge: HOME | End: 2021-01-05
Payer: MEDICARE

## 2021-01-05 VITALS
DIASTOLIC BLOOD PRESSURE: 73 MMHG | OXYGEN SATURATION: 98 % | HEART RATE: 68 BPM | WEIGHT: 218 LBS | RESPIRATION RATE: 14 BRPM | SYSTOLIC BLOOD PRESSURE: 135 MMHG | BODY MASS INDEX: 30.52 KG/M2 | HEIGHT: 71 IN

## 2021-01-05 LAB
ALBUMIN SERPL-MCNC: 4.4 GM/DL (ref 3.4–5)
ALP BLD-CCNC: 75 IU/L (ref 40–129)
ALT SERPL-CCNC: 13 U/L (ref 10–40)
ANION GAP SERPL CALCULATED.3IONS-SCNC: 10 MMOL/L (ref 4–16)
APTT: 56.1 SECONDS (ref 25.1–37.1)
AST SERPL-CCNC: 17 IU/L (ref 15–37)
BILIRUB SERPL-MCNC: 0.2 MG/DL (ref 0–1)
BUN BLDV-MCNC: 31 MG/DL (ref 6–23)
CALCIUM SERPL-MCNC: 9.1 MG/DL (ref 8.3–10.6)
CHLORIDE BLD-SCNC: 96 MMOL/L (ref 99–110)
CO2: 29 MMOL/L (ref 21–32)
CREAT SERPL-MCNC: 1.7 MG/DL (ref 0.9–1.3)
GFR AFRICAN AMERICAN: 48 ML/MIN/1.73M2
GFR AFRICAN AMERICAN: 51 ML/MIN/1.73M2
GFR NON-AFRICAN AMERICAN: 40 ML/MIN/1.73M2
GFR NON-AFRICAN AMERICAN: 42 ML/MIN/1.73M2
GLUCOSE BLD-MCNC: 124 MG/DL (ref 70–99)
HCT VFR BLD CALC: 37.8 % (ref 42–52)
HEMOGLOBIN: 10.4 GM/DL (ref 13.5–18)
INR BLD: 2.12 INDEX
MCH RBC QN AUTO: 22 PG (ref 27–31)
MCHC RBC AUTO-ENTMCNC: 27.5 % (ref 32–36)
MCV RBC AUTO: 79.9 FL (ref 78–100)
PDW BLD-RTO: 21.2 % (ref 11.7–14.9)
PLATELET # BLD: 250 K/CU MM (ref 140–440)
PMV BLD AUTO: 10.5 FL (ref 7.5–11.1)
POC CREATININE: 1.6 MG/DL (ref 0.9–1.3)
POTASSIUM SERPL-SCNC: 3.9 MMOL/L (ref 3.5–5.1)
PRO-BNP: 1131 PG/ML
PROTHROMBIN TIME: 25.9 SECONDS (ref 11.7–14.5)
RBC # BLD: 4.73 M/CU MM (ref 4.6–6.2)
SARS-COV-2, NAAT: NOT DETECTED
SODIUM BLD-SCNC: 135 MMOL/L (ref 135–145)
SOURCE: NORMAL
TOTAL PROTEIN: 7.2 GM/DL (ref 6.4–8.2)
WBC # BLD: 6.2 K/CU MM (ref 4–10.5)

## 2021-01-05 PROCEDURE — 85730 THROMBOPLASTIN TIME PARTIAL: CPT

## 2021-01-05 PROCEDURE — 74174 CTA ABD&PLVS W/CONTRAST: CPT

## 2021-01-05 PROCEDURE — 94010 BREATHING CAPACITY TEST: CPT

## 2021-01-05 PROCEDURE — 6360000004 HC RX CONTRAST MEDICATION: Performed by: INTERNAL MEDICINE

## 2021-01-05 PROCEDURE — 2580000003 HC RX 258: Performed by: INTERNAL MEDICINE

## 2021-01-05 PROCEDURE — 83880 ASSAY OF NATRIURETIC PEPTIDE: CPT

## 2021-01-05 PROCEDURE — 85610 PROTHROMBIN TIME: CPT

## 2021-01-05 PROCEDURE — 80053 COMPREHEN METABOLIC PANEL: CPT

## 2021-01-05 PROCEDURE — 85027 COMPLETE CBC AUTOMATED: CPT

## 2021-01-05 PROCEDURE — 75574 CT ANGIO HRT W/3D IMAGE: CPT

## 2021-01-05 PROCEDURE — 36415 COLL VENOUS BLD VENIPUNCTURE: CPT

## 2021-01-05 PROCEDURE — U0002 COVID-19 LAB TEST NON-CDC: HCPCS

## 2021-01-05 RX ORDER — 0.9 % SODIUM CHLORIDE 0.9 %
10 VIAL (ML) INJECTION
Status: COMPLETED | OUTPATIENT
Start: 2021-01-05 | End: 2021-01-05

## 2021-01-05 RX ADMIN — Medication 10 ML: at 08:29

## 2021-01-05 RX ADMIN — IOPAMIDOL 150 ML: 755 INJECTION, SOLUTION INTRAVENOUS at 08:29

## 2021-01-05 NOTE — TELEPHONE ENCOUNTER
[facility] scheduled for 1/12/21 @10 am arrival 8am. Patient will come sign consent 1/6/21 @ 11am.         Conclusions      Summary   PPM wiring visualized within the right heart. Severe aortic stenosis (ANNA by planimetry: 0.778 cm sq). Mild aortic regurgitation. There was no thrombus noted in the left atrial appendage. Negative bubble study. No PFO or ASD noted.       Signature

## 2021-01-05 NOTE — TELEPHONE ENCOUNTER
Called patient to discuss [employer] monitor. He has the old gray monitor that half's works now with then phone system. I gave patient the number to call [employer] Stay connected service to get a new monitor.

## 2021-01-06 ENCOUNTER — TELEPHONE (OUTPATIENT)
Dept: CARDIOLOGY CLINIC | Age: 71
End: 2021-01-06

## 2021-01-06 NOTE — TELEPHONE ENCOUNTER
Pt here in office & educated on [facility] for Dx: AS , scheduled for 1/12/21 @ 10am, w/arrival @ 8am, @ [facility]; risks explained; & consents signed. Pre-admission orders given to pt. Instructions given to pt to:  NPO after midnight night before procedure; Call hospital @ [phone] to pre-register; May take rest of morning meds. am of procedure; & pt voiced understanding. Copies of consent, pre-testing orders, & info. sheet scanned into chart.

## 2021-01-06 NOTE — CONSULTS
Department of Cardiovascular & Thoracic Surgery   Consult Note        Reason for Consult: Severe symptomatic aortic stenosis  Requesting Physician: Rosalio Vasquez       Date of Consult: 01/06/21    Chief Complaint: Shortness of breath and fatigue    History Obtained From:  patient, electronic medical record    HISTORY OF PRESENT ILLNESS:    The patient is a 65 y.o. male who presents with significant shortness of breath with minimal exertion. The patient was recently diagnosed with GI bleed and underwent colonoscopy. Colonoscopy in November showed possible angiodysplasia in the cecum. He is also had a history of multivessel coronary disease status post PCI in the past.  He is followed by nephrology (Dr. Chad Villaseñor). The patient is known to our office. We have assisted in adjustment of his pacemaker in the past.  He has also been seen for evaluation for peripheral vascular disease. He has a history of atrial fibrillation. He does have intermittent bilateral lower extremity edema which has been fairly stable recently. He denies anginal symptoms. He has no history of syncope. He has never been admitted to the hospital with heart failure. He is a history of peripheral vascular disease and has undergone stenting in bilateral lower extremities per the patient. This was performed in [place].     Patient was found to have COVID-19 in August    He now is feeling better from this infection and golfs regularly though he has noticed being winded more often    Past Medical History:        Diagnosis Date    Arthritis 12/2013    rt wrist    Atrial fibrillation ([address]     CAD (coronary artery disease) 06/18/2014    see dr Mabel Kehr Chronic kidney disease, stage III (moderate) 07/07/2016    Diabetes mellitus ([address]     dx 2004    Diabetic neuropathy associated with type 2 diabetes mellitus ([address] 04/23/2019    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    Gout     \"got gout when had pacer put in because they did not give me my medication for gout \"    Gout 04/23/2019    H/O 24 hour EKG monitoring 10/03/2013    no afib noted, sinsus rhythm    H/O cardiovascular stress test 05/12/2014    cardiolite- mild ischemia RCA EF50%    H/O echocardiogram 12/01/2020    EF 55-60% severe aortic stenosis mild to mod aortic regurg mod to severe tricuspid regurg severe pulm htn significant changes since 2018 echo.  H/O right and left heart catheterization 12/10/2020    DIFFUSE LAD DISEASE, Mild ECA Disease, Severe AS, Milf Pul HTN on RHC.  H/O transesophageal echocardiography (MABLE) for monitoring 08/05/2013    normal LV function and normal LA appendage without any clot    History of transesophageal echocardiography (MABLE) 12/15/2020    Severe aortic stenosis (ANNA by planimetry: 0.778 cm sq). Mild AR.    [place] (hard of hearing)     hearing tonya aides    Hx of Doppler echocardiogram 05/21/2018    EF 50%  Mild LV hypertrophy. Mildly enlarged RA. Mod aortic valve calcification with mod AS. Mitral annular calcification is present. Mild AR, MR and TR. Mild pulmonary htn.     Hyperlipidemia     Hypertension     Other disorders of kidney and ureter     Pneumonia 12/29/2012    Sleep apnea     dx 2013- has c-pap    Type II or unspecified type diabetes mellitus with other specified manifestations, uncontrolled 12/12/2012    Venous hypertension, chronic, with ulcer ([address] 12/12/2012    resolved     Past Surgical History:        Procedure Laterality Date    CARDIOVERSION  12/14    at [facility]  2011    COLONOSCOPY N/A 11/19/2019    COLONOSCOPY DIAGNOSTIC performed by Jose Breen MD at [facility]  09/30/2020    POSSIBLE CECAL avms, SIGMOID DIVERTICULOSIS, INTERNAL HEMORRHOIDS GRADE 1    COLONOSCOPY N/A 9/30/2020    COLONOSCOPY CONTROL HEMORRHAGE WITH APC performed by Jose Breen MD at [facility]  2014    \"2 stents put in \"   [address]  2004 Emotionally abused: Not on file     Physically abused: Not on file     Forced sexual activity: Not on file   Other Topics Concern    Not on file   Social History Narrative    Not on file     Family History:    Family History   Problem Relation Age of Onset    High Blood Pressure Mother     Arthritis Mother     Diabetes Mother     Heart Disease Mother     High Blood Pressure Father     Heart Disease Father     Kidney Disease Father        REVIEW OF SYSTEMS:    CONSTITUTIONAL:  Neg. EYES:  Neg. EARS:  Neg     NOSE:  Neg.    MOUTH/THROAT:  Neg.    RESPIRATORY:   Neg. CARDIOVASCULAR   see HPI  GASTROINTESTINAL: Recent GI bleed  GENITOURINARY: History of chronic renal insufficiency  HEMATOLOGIC/LYMPHATIC:  Neg.    MUSCULOSKELETAL:  Neg. NEUROLOGICAL:  Neg. SKIN :  Neg. PSYCHIATRIC:  Neg   ENDOCRINE:  Neg. ALL/IMM :  Neg. PHYSICAL EXAM:  VITALS:  /73   Pulse 68   Resp 14   Ht 5' 11\" (1.803 m)   Wt 218 lb (98.9 kg)   SpO2 98%   BMI 30.40 kg/m²   CONSTITUTIONAL:  awake, alert, cooperative, no apparent distress, and appears stated age  NECK:  Supple, symmetrical, trachea midline, no adenopathy, thyroid symmetric, not enlarged and no tenderness, skin normal  HEMATOLOGIC/LYMPHATICS:  no cervical lymphadenopathy and no supraclavicular lymphadenopathy  LUNGS:  No increased work of breathing, good air exchange, clear to auscultation bilaterally, no crackles or wheezing  CARDIOVASCULAR:  Normal apical impulse, regular rate and rhythm, normal S1 and S2, no S3 or S4, and murmurs noted  CHEST/BREASTS:  symmetric  ABDOMEN: soft nt nd, no pulsitile masses  MUSCULOSKELETAL:  There is no redness, warmth, or swelling of the joints. Full range of motion noted. Motor strength is 5 out of 5 all extremities bilaterally. Tone is normal.  NEUROLOGIC:  Awake, alert, oriented to name, place and time. Cranial nerves II-XII are grossly intact. Motor is 5 out of 5 bilaterally.      SKIN:  no bruising or bleeding and normal skin color, texture, turgor  VASC: 1+ femoral pulses        DATA:  CT was evaluated in detail. He has fairly adequate access with no significant circumferential areas of calcification in his lower extremity vessels. Cardiac catheterization was reviewed in detail with cardiology. He has diffuse LAD disease with an area just distal to the takeoff of the diagonal that may be significant. He has significant and worsening proximal right coronary disease. Transesophageal echocardiogram shows normal ejection fraction with mean gradient across the aortic valve of 43 mmHg    IMPRESSION  There are no active hospital problems to display for this patient. Severe symptomatic aortic stenosis      RECOMMENDATIONS:    After review of his cardiac catheterization, cardiology and I have discussed that he will reevaluate the LAD using FFR. If the FFR is positive then surgery is his most likely best option. If FFR is negative then he will stent the right coronary artery and we will plan for transcatheter aortic valve replacement. He will most likely be a good candidate for transfemoral approach. She has score is 4.89% which makes him intermediate risk for surgical AVR and CABG    We will discuss further as a heart team.  Patient understands and agrees with the plan.     Electronically signed by Saskia Tapia MD on 1/6/2021 at 10:42 AM

## 2021-01-07 ENCOUNTER — TELEPHONE (OUTPATIENT)
Dept: CARDIOLOGY CLINIC | Age: 71
End: 2021-01-07

## 2021-01-11 ENCOUNTER — CLINICAL DOCUMENTATION (OUTPATIENT)
Dept: CARDIOLOGY CLINIC | Age: 71
End: 2021-01-11

## 2021-01-11 ENCOUNTER — TELEPHONE (OUTPATIENT)
Dept: CARDIOLOGY CLINIC | Age: 71
End: 2021-01-11

## 2021-01-12 ENCOUNTER — TELEPHONE (OUTPATIENT)
Dept: CARDIOLOGY CLINIC | Age: 71
End: 2021-01-12

## 2021-01-14 LAB
EKG ATRIAL RATE: 72 BPM
EKG DIAGNOSIS: NORMAL
EKG Q-T INTERVAL: 502 MS
EKG QRS DURATION: 194 MS
EKG QTC CALCULATION (BAZETT): 533 MS
EKG R AXIS: -76 DEGREES
EKG T AXIS: 66 DEGREES
EKG VENTRICULAR RATE: 68 BPM

## 2021-01-15 ENCOUNTER — HOSPITAL ENCOUNTER (OUTPATIENT)
Dept: CARDIAC CATH/INVASIVE PROCEDURES | Age: 71
Discharge: HOME | End: 2021-01-15
Attending: INTERNAL MEDICINE | Admitting: INTERNAL MEDICINE
Payer: MEDICARE

## 2021-01-15 VITALS
BODY MASS INDEX: 30.8 KG/M2 | HEIGHT: 71 IN | WEIGHT: 220 LBS | SYSTOLIC BLOOD PRESSURE: 112 MMHG | TEMPERATURE: 97.4 F | OXYGEN SATURATION: 100 % | RESPIRATION RATE: 17 BRPM | HEART RATE: 102 BPM | DIASTOLIC BLOOD PRESSURE: 52 MMHG

## 2021-01-15 LAB
ACTIVATED CLOTTING TIME, LOW RANGE: 284 SEC
ANION GAP SERPL CALCULATED.3IONS-SCNC: 12 MMOL/L (ref 4–16)
BUN BLDV-MCNC: 34 MG/DL (ref 6–23)
CALCIUM SERPL-MCNC: 9.1 MG/DL (ref 8.3–10.6)
CHLORIDE BLD-SCNC: 95 MMOL/L (ref 99–110)
CO2: 27 MMOL/L (ref 21–32)
CREAT SERPL-MCNC: 1.8 MG/DL (ref 0.9–1.3)
GFR AFRICAN AMERICAN: 45 ML/MIN/1.73M2
GFR NON-AFRICAN AMERICAN: 37 ML/MIN/1.73M2
GLUCOSE BLD-MCNC: 105 MG/DL (ref 70–99)
POTASSIUM SERPL-SCNC: 3.9 MMOL/L (ref 3.5–5.1)
SODIUM BLD-SCNC: 134 MMOL/L (ref 135–145)

## 2021-01-15 PROCEDURE — 85347 COAGULATION TIME ACTIVATED: CPT

## 2021-01-15 PROCEDURE — 93454 CORONARY ARTERY ANGIO S&I: CPT | Performed by: INTERNAL MEDICINE

## 2021-01-15 PROCEDURE — 2580000003 HC RX 258: Performed by: INTERNAL MEDICINE

## 2021-01-15 PROCEDURE — C1894 INTRO/SHEATH, NON-LASER: HCPCS

## 2021-01-15 PROCEDURE — 2500000003 HC RX 250 WO HCPCS

## 2021-01-15 PROCEDURE — C1760 CLOSURE DEV, VASC: HCPCS

## 2021-01-15 PROCEDURE — 2709999900 HC NON-CHARGEABLE SUPPLY

## 2021-01-15 PROCEDURE — 6370000000 HC RX 637 (ALT 250 FOR IP): Performed by: INTERNAL MEDICINE

## 2021-01-15 PROCEDURE — 2720000010 HC SURG SUPPLY STERILE

## 2021-01-15 PROCEDURE — C1887 CATHETER, GUIDING: HCPCS

## 2021-01-15 PROCEDURE — C1769 GUIDE WIRE: HCPCS

## 2021-01-15 PROCEDURE — 93799 UNLISTED CV SVC/PROCEDURE: CPT

## 2021-01-15 PROCEDURE — 6360000002 HC RX W HCPCS

## 2021-01-15 PROCEDURE — 93571 IV DOP VEL&/PRESS C FLO 1ST: CPT | Performed by: INTERNAL MEDICINE

## 2021-01-15 PROCEDURE — 6360000004 HC RX CONTRAST MEDICATION

## 2021-01-15 PROCEDURE — 80048 BASIC METABOLIC PNL TOTAL CA: CPT

## 2021-01-15 RX ORDER — SODIUM CHLORIDE 9 MG/ML
INJECTION, SOLUTION INTRAVENOUS CONTINUOUS
Status: DISCONTINUED | OUTPATIENT
Start: 2021-01-15 | End: 2021-01-15 | Stop reason: HOSPADM

## 2021-01-15 RX ORDER — 0.9 % SODIUM CHLORIDE 0.9 %
250 INTRAVENOUS SOLUTION INTRAVENOUS ONCE
Status: COMPLETED | OUTPATIENT
Start: 2021-01-15 | End: 2021-01-15

## 2021-01-15 RX ORDER — ACETAMINOPHEN 325 MG/1
650 TABLET ORAL EVERY 4 HOURS PRN
Status: DISCONTINUED | OUTPATIENT
Start: 2021-01-15 | End: 2021-01-15 | Stop reason: HOSPADM

## 2021-01-15 RX ORDER — DIAZEPAM 5 MG/1
5 TABLET ORAL ONCE
Status: COMPLETED | OUTPATIENT
Start: 2021-01-15 | End: 2021-01-15

## 2021-01-15 RX ORDER — SODIUM CHLORIDE 0.9 % (FLUSH) 0.9 %
10 SYRINGE (ML) INJECTION PRN
Status: DISCONTINUED | OUTPATIENT
Start: 2021-01-15 | End: 2021-01-15 | Stop reason: HOSPADM

## 2021-01-15 RX ORDER — DIPHENHYDRAMINE HCL 25 MG
25 TABLET ORAL ONCE
Status: COMPLETED | OUTPATIENT
Start: 2021-01-15 | End: 2021-01-15

## 2021-01-15 RX ORDER — SODIUM CHLORIDE 0.9 % (FLUSH) 0.9 %
10 SYRINGE (ML) INJECTION EVERY 12 HOURS SCHEDULED
Status: DISCONTINUED | OUTPATIENT
Start: 2021-01-15 | End: 2021-01-15 | Stop reason: HOSPADM

## 2021-01-15 RX ADMIN — DIAZEPAM 5 MG: 5 TABLET ORAL at 06:25

## 2021-01-15 RX ADMIN — DIPHENHYDRAMINE HYDROCHLORIDE 25 MG: 25 TABLET ORAL at 06:25

## 2021-01-15 RX ADMIN — SODIUM CHLORIDE 250 ML: 9 INJECTION, SOLUTION INTRAVENOUS at 06:25

## 2021-01-15 NOTE — OP NOTE
dw cts  ffr of lad performed   Plan was if lad ifr positive   Will need cabg X 2 plus SAVR  Otherwise PCI of RCa and TAVR    IFR of LAD  . 77  dw cts   Will need cabgX 2  plus SAVR

## 2021-01-15 NOTE — FLOWSHEET NOTE
Called patient with clarification to hold Xarelto starting 1/24 for surgery 1/26. He will not take it on 1/24,1/25 or 1/26. Understanding verbalized.

## 2021-01-15 NOTE — PROGRESS NOTES
Received from cath lab. Monitor and alarms on. Call Light in reach. Procedure site assessment completed per JUANY Jay Rn and J Peabody RN    No hematoma or bleeding noted.

## 2021-01-15 NOTE — H&P
Saqib Rowland is a 65 y.o. male who was seen today for management of cad   Here for [facility]   HPI:   The patient does have cardiac complaints of feeling weak and getting sob on minimal exertion , hgb was low was transfused feels a little better   Patient also seen for   - Coronary artery disease, does not have chest pain. Patient is compliant with prescribed medicines. - Hypertension,is well controlled, pt is compliant with medicines   - Hyperlipidimea, importance of hyperlipidimea discussed with pt.   - Diabetes mellitus, blood glucose level is well controlled. Pt is compliant with meds and diet   - PPM On carelink   - Atrial fibrillation, pt is compliant with meds.  Patient does not have symptoms from atrial fibrillation   Jimbo Martinez has the following history recorded in care path:         Patient Active Problem List    Diagnosis Date Noted    Cardiac pacemaker in situ      Priority: High    Coronary artery disease involving native coronary artery of native heart without angina pectoris      Priority: High    Essential hypertension      Priority: High    Type 2 diabetes mellitus without complication, without long-term current use of insulin ([facility]) 12/12/2012     Priority: High     Class: Chronic    Ulcer of other part of lower limb 12/12/2012     Priority: High     Class: Chronic    Venous hypertension, chronic, with ulcer ([facility] [address] 12/12/2012     Priority: High     Class: Chronic    Ulcer of other part of foot 12/12/2012     Priority: High     Class: Chronic    Hematoma 12/01/2017     Priority: Low    CKD (chronic kidney disease) stage 3, GFR 30-59 ml/min ([address] 07/07/2016     Priority: Low    Hyperpotassemia 07/07/2016     Priority: Low    Arthritis 07/07/2016     Priority: Low    PVD (peripheral vascular disease) ([address] 07/07/2016     Priority: Low    Status post incision and drainage      Priority: Low    PAF (paroxysmal atrial fibrillation) ([address] 08/19/2013     Priority: Low    DM (diabetes mellitus) ([address] 08/19/2013     Priority: Low    DMITRIY on CPAP 08/19/2013     Priority: Low    Hyperlipidemia 08/19/2013     Priority: Low    Atrial fibrillation ([address] 01/01/2013     Priority: Low    Sinus pause 01/01/2013     Priority: Low    Pneumonia 12/29/2012     Priority: Low    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    Iron deficiency anemia due to chronic blood loss 09/01/2020    Adenomatous polyp of sigmoid colon 09/20/2019    Gout 04/23/2019    Diabetic neuropathy associated with type 2 diabetes mellitus ([address] 04/23/2019            Current Facility-Administered Medications          Current Outpatient Medications   Medication Sig Dispense Refill    Cholecalciferol (VITAMIN D) 50 MCG (2000 UT) CAPS capsule Take by mouth daily      ferrous sulfate (IRON 325) 325 (65 Fe) MG tablet Take 1 tablet by mouth 2 times daily 180 tablet 5    sildenafil (VIAGRA) 100 MG tablet TAKE 1 TABLET DAILY AS NEEDED FOR ERECTILE DYSFUNCTION 30 tablet 1    allopurinol (ZYLOPRIM) 100 MG tablet Take 1 tablet by mouth daily 90 tablet 1    allopurinol (ZYLOPRIM) 300 MG tablet Take 1 tablet by mouth daily 90 tablet 1    canagliflozin (INVOKANA) 300 MG TABS tablet Take 1 tablet by mouth every morning (before breakfast) 90 tablet 1    Dulaglutide (TRULICITY) 1.5 OX/5.3SA SOPN Inject 1.5 mg into the skin once a week 12 pen 1    metFORMIN (GLUCOPHAGE) 1000 MG tablet TAKE 1 TABLET TWICE DAILY WITH MEALS 180 tablet 1    simvastatin (ZOCOR) 20 MG tablet Pt takes 10 mg daily 90 tablet 1    valsartan (DIOVAN) 40 MG tablet Take 40mg daily 90 tablet 1    gabapentin (NEURONTIN) 600 MG tablet Take 1 tablet by mouth 3 times daily for 270 days.  90 tablet 1    XARELTO 20 MG TABS tablet TAKE 1 TABLET DAILY WITH BREAKFAST 90 tablet 3    glimepiride (AMARYL) 4 MG tablet Take 1 tablet by mouth daily 90 tablet 1    metoprolol succinate (TOPROL XL) 25 MG extended release tablet Take 1 tablet by mouth daily 90 tablet 3    furosemide (LASIX) 20 MG tablet Take 1 tablet by mouth 2 times daily May take an extra Lasix if has increased swelling 180 tablet 3    aspirin 81 MG tablet Take 81 mg by mouth daily     No current facility-administered medications for this visit. Allergies: Spironolactone and Tape [adhesive tape]        Past Medical History        Past Medical History:   Diagnosis Date    Arthritis 12/2013    rt wrist    Atrial fibrillation (HCC)     CAD (coronary artery disease) 6/18/2014    see dr Jenelle Spain kidney disease, stage III (moderate) 7/7/2016    Diabetes mellitus ([facility]     dx 2004    Diabetic neuropathy associated with type 2 diabetes mellitus ([facility] 4/23/2019    Erythropoietin deficiency anemia 12/1/2020    Gout     \"got gout when had pacer put in because they did not give me my medication for gout \"    Gout 4/23/2019    H/O 24 hour EKG monitoring 10/3/2013    no afib noted, sinsus rhythm    H/O cardiovascular stress test 5/12/2014    cardiolite- mild ischemia RCA EF50%    H/O echocardiogram 12/01/2020    EF 55-60% severe aortic stenosis mild to mod aortic regurg mod to severe tricuspid regurg severe pulm htn significant changes since 2018 echo.  H/O transesophageal echocardiography (MABLE) for monitoring 08/05/2013    normal LV function and normal LA appendage without any clot    [employer] [place] (hard of hearing)     hearing tonya aides    Hx of Doppler echocardiogram 05/21/2018    EF 50% Mild LV hypertrophy. Mildly enlarged RA. Mod aortic valve calcification with mod AS. Mitral annular calcification is present. Mild AR, MR and TR. Mild pulmonary htn.     Hyperlipidemia     Hypertension     Other disorders of kidney and ureter     Pneumonia 12/29/2012    Sleep apnea     dx 2013- has c-pap    Type II or unspecified type diabetes mellitus with other specified manifestations, uncontrolled 12/12/2012    Venous hypertension, chronic, with ulcer ([address] 12/12/2012    resolved           Past Surgical History Past Surgical History:   Procedure Laterality Date    CARDIOVERSION  12/14    at [address]  2011    COLONOSCOPY N/A 11/19/2019    [facility]  09/30/2020    POSSIBLE CECAL avms, SIGMOID DIVERTICULOSIS, INTERNAL HEMORRHOIDS GRADE 1    COLONOSCOPY N/A 9/30/2020    COLONOSCOPY CONTROL HEMORRHAGE WITH APC performed by Wendy Hare MD at [facility]  2014    \"2 stents put in \"   [address]/ [address]  2004    total left hip    OTHER SURGICAL HISTORY Right 12/02/2017    I&D; evacuation of hematoma right hip    OTHER SURGICAL HISTORY  09/17/2020    enteroscopy    PACEMAKER PLACEMENT      9/18/14 Status post remote permanent pacemaker with atrial lead dislodgement. 7/24/14 PPM Implant    UPPER GASTROINTESTINAL ENDOSCOPY N/A 9/17/2020    ENTEROSCOPY PUSH BIOPSY performed by Wendy Hare MD at [address] [facility]  2012    \"have stents in both legs- done in [place]   As reviewed         Family HistoryExpand by Default         Family History   Problem Relation Age of Onset    High Blood Pressure Mother     Arthritis Mother     Diabetes Mother     Heart Disease Mother     High Blood Pressure Father     Heart Disease Father     Kidney Disease Father      Social History           Tobacco Use    Smoking status: Never Smoker    Smokeless tobacco: Never Used   Substance Use Topics    Alcohol use: Yes     Alcohol/week: 2.0 standard drinks     Types: 2 Cans of beer per week     Comment: average \"one time per week\"   Review of Systems:   Constitutional: Negative for diaphoresis and fatigue   Psychological:Negative for anxiety or depression   HENT: Negative for headaches, nasal congestion, sinus pain or vertigo   Eyes: Negative for visual disturbance.    Endocrine: Negative for polydipsia/polyuria   Respiratory: Negative for shortness of breath   Cardiovascular: SOB Gastrointestinal: Negative for abdominal pain or heartburn   Genito-Urinary: Negative for urinary frequency/urgency   Musculoskeletal: Negative for muscle pain, muscular weakness, negative for pain in arm and leg or swelling in foot and leg   Neurological: Negative for dizziness, headaches, memory loss, numbness/tingling, visual changes, syncope   Dermatological: Negative for rash   Objective:        Vitals                                               BP (!) 108/54 (Site: Left Upper Arm, Position: Sitting, Cuff Size: Medium Adult)  Pulse 76   Patient-Reported Vitals 9/1/2020   Patient-Reported Weight 196lb   Patient-Reported Height 71\"   Patient-Reported Systolic 416   Patient-Reported Diastolic 55   Patient-Reported Pulse 62         Wt Readings from Last 3 Encounters:   12/02/20 218 lb (98.9 kg)   12/01/20 218 lb 6.4 oz (99.1 kg)   11/30/20 216 lb 6.4 oz (98.2 kg)   There is no height or weight on file to calculate BMI. GENERAL - Alert, oriented, pleasant, in no apparent distress. EYES: No jaundice, no conjunctival pallor. SKIN: It is warm & dry. No rashes. No Echhymosis   HEENT - No clinically significant abnormalities seen. Neck - Supple. No jugular venous distention noted. No carotid bruits. Cardiovascular - Normal S1 and S2 with3/6 AIDA. Extremities - No cyanosis, clubbing, or significant edema. Pulmonary - No respiratory distress. No wheezes or rales. Abdomen - No masses, tenderness, or organomegaly. Musculoskeletal - No significant edema. No joint deformities. No muscle wasting. Neurologic - Cranial nerves II through XII are grossly intact. There were no gross focal neurologic abnormalities.    Lab Review         Lab Results   Component Value Date    CKTOTAL 116 12/01/2017    TROPONINT 0.033 08/16/2020   BNP: No results found for: BNP   PT/INR:         Lab Results   Component Value Date    INR 1.80 08/16/2020           Lab Results   Component Value Date    LABA1C 6.7 11/24/2020    LABA1C 7.3 07/07/2020           Lab Results   Component Value Date    WBC 9.2 11/30/2020    HCT 26.8 (L) 11/30/2020    MCV 75.1 (L) 11/30/2020     11/30/2020           Lab Results   Component Value Date    CHOL 95 07/07/2020    TRIG 109 07/07/2020    HDL 36 (L) 07/07/2020    LDLCALC 37 07/07/2020    LDLDIRECT 62 07/22/2014           Lab Results   Component Value Date    ALT 10 11/24/2020    AST 16 11/24/2020   BMP:         Lab Results   Component Value Date     11/24/2020    K 4.7 11/24/2020     11/24/2020    CO2 26 11/24/2020    BUN 41 11/24/2020    CREATININE 1.5 11/24/2020   CMP:         Lab Results   Component Value Date     11/24/2020    K 4.7 11/24/2020     11/24/2020    CO2 26 11/24/2020    BUN 41 11/24/2020    PROT 6.6 11/24/2020    PROT 7.3 12/27/2012   TSH:         Lab Results   Component Value Date    TSH 0.99 07/07/2020    TSHHS 1.100 12/31/2012   Impression:   1. Atrial fibrillation, unspecified type [facility])           Patient Active Problem List   Diagnosis Code    Type 2 diabetes mellitus without complication, without long-term current use of insulin ([facility] E11.9    Ulcer of other part of lower limb L97.809    Venous hypertension, chronic, with ulcer ([facility] I87.319, L97.909    Ulcer of other part of foot L97.509    Pneumonia J18.9    Atrial fibrillation ([facility]) I48.91    Sinus pause I45.5    PAF (paroxysmal atrial fibrillation) ([facility]) I48.0    DM (diabetes mellitus) ([facility]) E11.9    DMITRIY on CPAP G47.33, Z99.89    Hyperlipidemia E78.5    Status post incision and drainage Z98.890    CKD (chronic kidney disease) stage 3, GFR 30-59 ml/min ([facility]) N18.30    Hyperpotassemia E87.5    Arthritis M19.90    PVD (peripheral vascular disease) ([facility]) I73.9    Hematoma T14. 8XXA    Cardiac pacemaker in situ Z95.0    Coronary artery disease involving native coronary artery of native heart without angina pectoris I25.10    Essential hypertension I10    Gout M10.9    Diabetic neuropathy associated with type 2 diabetes mellitus (HCC) E11.40    Adenomatous polyp of sigmoid colon D12.5    Iron deficiency anemia due to chronic blood loss D50.0    Erythropoietin deficiency anemia D63.1   Assessment & Plan:   - CORONARY ARTERY DISEASE: symptomatic All available tests in chart reviewed. Management discussed . Testing ordered [facility]   - Hypertension: Patients blood pressure is normal. Patient is advised about low sodium diet. Present medical regimen will not be changed. - LIPID MANAGEMENT: Importance of lipid levels discussed with patient and patient was given dietary advice. NCEP- ATP III guidelines reviewed with patient. - Changes in medicines made: No   - PPM On carelink   - Atrial fibrillation, pt is compliant with meds. Patient does not have symptoms from atrial fibrillation   - DIABETES MELLITUS: Available pertinent lab data reviewed and patient was given dietary advice . Advised to check blood glucose level on a regular basis.    - Changes in medicines made: No   - had covid

## 2021-01-19 RX ORDER — CARVEDILOL 3.12 MG/1
3.12 TABLET ORAL ONCE
Status: CANCELLED | OUTPATIENT
Start: 2021-01-26

## 2021-01-19 RX ORDER — SIMVASTATIN 40 MG
40 TABLET ORAL ONCE
Status: CANCELLED | OUTPATIENT
Start: 2021-01-26

## 2021-01-19 RX ORDER — CHLORHEXIDINE GLUCONATE 0.12 MG/ML
30 RINSE ORAL ONCE
Status: CANCELLED | OUTPATIENT
Start: 2021-01-26

## 2021-01-19 RX ORDER — SODIUM CHLORIDE 9 MG/ML
INJECTION, SOLUTION INTRAVENOUS PRN
Status: CANCELLED | OUTPATIENT
Start: 2021-01-19

## 2021-01-21 ENCOUNTER — HOSPITAL ENCOUNTER (OUTPATIENT)
Dept: PREADMISSION TESTING | Age: 71
Discharge: HOME | End: 2021-01-25
Payer: MEDICARE

## 2021-01-21 ENCOUNTER — HOSPITAL ENCOUNTER (OUTPATIENT)
Dept: LAB | Age: 71
Discharge: HOME | End: 2021-01-21
Payer: MEDICARE

## 2021-01-21 ENCOUNTER — HOSPITAL ENCOUNTER (OUTPATIENT)
Dept: ULTRASOUND IMAGING | Age: 71
Discharge: HOME | End: 2021-01-21
Payer: MEDICARE

## 2021-01-21 ENCOUNTER — HOSPITAL ENCOUNTER (OUTPATIENT)
Dept: PULMONOLOGY | Age: 71
Discharge: HOME | End: 2021-01-21
Payer: MEDICARE

## 2021-01-21 VITALS
TEMPERATURE: 97.9 F | RESPIRATION RATE: 16 BRPM | OXYGEN SATURATION: 98 % | HEIGHT: 70 IN | HEART RATE: 65 BPM | WEIGHT: 210 LBS | DIASTOLIC BLOOD PRESSURE: 63 MMHG | BODY MASS INDEX: 30.06 KG/M2 | SYSTOLIC BLOOD PRESSURE: 119 MMHG

## 2021-01-21 LAB
ANION GAP SERPL CALCULATED.3IONS-SCNC: 11 MMOL/L (ref 4–16)
APTT: 59.6 SECONDS (ref 25.1–37.1)
BACTERIA: NEGATIVE /HPF
BASOPHILS ABSOLUTE: 0.1 K/CU MM
BASOPHILS RELATIVE PERCENT: 1.4 % (ref 0–1)
BILIRUBIN URINE: NEGATIVE MG/DL
BLOOD, URINE: NEGATIVE
BUN BLDV-MCNC: 42 MG/DL (ref 6–23)
CALCIUM SERPL-MCNC: 9.4 MG/DL (ref 8.3–10.6)
CHLORIDE BLD-SCNC: 97 MMOL/L (ref 99–110)
CLARITY: CLEAR
CO2: 26 MMOL/L (ref 21–32)
COLOR: ABNORMAL
CREAT SERPL-MCNC: 1.6 MG/DL (ref 0.9–1.3)
DIFFERENTIAL TYPE: ABNORMAL
EKG ATRIAL RATE: 96 BPM
EKG DIAGNOSIS: NORMAL
EKG Q-T INTERVAL: 464 MS
EKG QRS DURATION: 188 MS
EKG QTC CALCULATION (BAZETT): 511 MS
EKG R AXIS: -82 DEGREES
EKG T AXIS: 72 DEGREES
EKG VENTRICULAR RATE: 73 BPM
EOSINOPHILS ABSOLUTE: 0.4 K/CU MM
EOSINOPHILS RELATIVE PERCENT: 5.6 % (ref 0–3)
ESTIMATED AVERAGE GLUCOSE: 123 MG/DL
GFR AFRICAN AMERICAN: 52 ML/MIN/1.73M2
GFR NON-AFRICAN AMERICAN: 43 ML/MIN/1.73M2
GLUCOSE BLD-MCNC: 148 MG/DL (ref 70–99)
GLUCOSE, URINE: >500 MG/DL
HBA1C MFR BLD: 5.9 % (ref 4.2–6.3)
HCT VFR BLD CALC: 38.3 % (ref 42–52)
HEMOGLOBIN: 10.7 GM/DL (ref 13.5–18)
HYALINE CASTS: 0 /LPF
IMMATURE NEUTROPHIL %: 0.2 % (ref 0–0.43)
INR BLD: 1.91 INDEX
KETONES, URINE: NEGATIVE MG/DL
LEUKOCYTE ESTERASE, URINE: NEGATIVE
LYMPHOCYTES ABSOLUTE: 1.4 K/CU MM
LYMPHOCYTES RELATIVE PERCENT: 21.8 % (ref 24–44)
MAGNESIUM: 2.2 MG/DL (ref 1.8–2.4)
MCH RBC QN AUTO: 22.3 PG (ref 27–31)
MCHC RBC AUTO-ENTMCNC: 27.9 % (ref 32–36)
MCV RBC AUTO: 80 FL (ref 78–100)
MONOCYTES ABSOLUTE: 0.7 K/CU MM
MONOCYTES RELATIVE PERCENT: 10.8 % (ref 0–4)
NITRITE URINE, QUANTITATIVE: NEGATIVE
NUCLEATED RBC %: 0 %
PDW BLD-RTO: 21 % (ref 11.7–14.9)
PH, URINE: 6 (ref 5–8)
PLATELET # BLD: 226 K/CU MM (ref 140–440)
PMV BLD AUTO: 10.3 FL (ref 7.5–11.1)
POTASSIUM SERPL-SCNC: 4.3 MMOL/L (ref 3.5–5.1)
PROTEIN UA: NEGATIVE MG/DL
PROTHROMBIN TIME: 23.3 SECONDS (ref 11.7–14.5)
RBC # BLD: 4.79 M/CU MM (ref 4.6–6.2)
RBC URINE: 1 /HPF (ref 0–3)
SARS-COV-2, NAAT: NOT DETECTED
SEGMENTED NEUTROPHILS ABSOLUTE COUNT: 3.8 K/CU MM
SEGMENTED NEUTROPHILS RELATIVE PERCENT: 60.2 % (ref 36–66)
SODIUM BLD-SCNC: 134 MMOL/L (ref 135–145)
SOURCE: NORMAL
SPECIFIC GRAVITY UA: 1.01 (ref 1–1.03)
TOTAL IMMATURE NEUTOROPHIL: 0.01 K/CU MM
TOTAL NUCLEATED RBC: 0 K/CU MM
TRICHOMONAS: ABNORMAL /HPF
UROBILINOGEN, URINE: NORMAL MG/DL (ref 0.2–1)
WBC # BLD: 6.3 K/CU MM (ref 4–10.5)
WBC UA: 1 /HPF (ref 0–2)

## 2021-01-21 PROCEDURE — C9803 HOPD COVID-19 SPEC COLLECT: HCPCS

## 2021-01-21 PROCEDURE — 36415 COLL VENOUS BLD VENIPUNCTURE: CPT

## 2021-01-21 PROCEDURE — U0002 COVID-19 LAB TEST NON-CDC: HCPCS

## 2021-01-21 PROCEDURE — 86900 BLOOD TYPING SEROLOGIC ABO: CPT

## 2021-01-21 PROCEDURE — 83735 ASSAY OF MAGNESIUM: CPT

## 2021-01-21 PROCEDURE — 85610 PROTHROMBIN TIME: CPT

## 2021-01-21 PROCEDURE — 93010 ELECTROCARDIOGRAM REPORT: CPT | Performed by: INTERNAL MEDICINE

## 2021-01-21 PROCEDURE — 93005 ELECTROCARDIOGRAM TRACING: CPT | Performed by: THORACIC SURGERY (CARDIOTHORACIC VASCULAR SURGERY)

## 2021-01-21 PROCEDURE — 93880 EXTRACRANIAL BILAT STUDY: CPT

## 2021-01-21 PROCEDURE — 81001 URINALYSIS AUTO W/SCOPE: CPT

## 2021-01-21 PROCEDURE — 80048 BASIC METABOLIC PNL TOTAL CA: CPT

## 2021-01-21 PROCEDURE — 87081 CULTURE SCREEN ONLY: CPT

## 2021-01-21 PROCEDURE — 83036 HEMOGLOBIN GLYCOSYLATED A1C: CPT

## 2021-01-21 PROCEDURE — 93971 EXTREMITY STUDY: CPT

## 2021-01-21 PROCEDURE — 85025 COMPLETE CBC W/AUTO DIFF WBC: CPT

## 2021-01-21 PROCEDURE — 85730 THROMBOPLASTIN TIME PARTIAL: CPT

## 2021-01-21 NOTE — PROGRESS NOTES
Chart reviewed per cardiac rehab for upcoming CABG and AVR surgery. Patient completed all testing except for type and screen d/t recent blood transfusion. While reviewing chart Carotid US results came back and patient has a left internal carotid stenosis which reads 70-99%. Dr. Jerold Kanner updated on findings, labs and recent blood transfusion. Dr. Jerold Kanner into speak to patient. Surgery will be canceled until further review. Patient is scheduled to see Dr Jerold Kanner in the office on 1/25/2021 @ [phone], repeat carotid US will be done in office. Patient is also to see Dr. Wilfred Toussaint in office before surgery as well due to an increase in kidney levels. Patient states he will also be seeing Dr Damian Oconnell on 1/25/2021 as well.

## 2021-01-23 LAB
CULTURE: NORMAL
Lab: NORMAL
SPECIMEN: NORMAL

## 2021-01-27 NOTE — PROGRESS NOTES
.Surgery 1/29/2021 @ 1200, arrival 0900               1. Do not eat or drink anything after midnight - unless instructed by your doctor prior to surgery. This includes                   no water, chewing gum or mints. 2. Follow your directions as prescribed by the doctor for your procedure and medications. Take Metformin & Metoprolol in am with a sip. 3. Check with your Doctor regarding stopping Plavix, Coumadin, Lovenox,Effient,Pradaxa,Xarelto, Fragmin or other blood thinners and                   follow their instructions. 4. Do not smoke, and do not drink any alcoholic beverages 24 hours prior to surgery. This includes NA Beer. 5. You may brush your teeth and gargle the morning of surgery. DO NOT SWALLOW WATER   6. You MUST make arrangements for a responsible adult to take you home after your surgery and be able to check on you every couple                   hours for the day. You will not be allowed to leave alone or drive yourself home. It is strongly suggested someone stay with you the first 24                   hrs. Your surgery will be cancelled if you do not have a ride home. 7. Please wear simple, loose fitting clothing to the hospital.  Theora Brian not bring valuables (money, credit cards, checkbooks, etc.) Do not wear any                   makeup (including no eye makeup) or nail polish on your fingers or toes. 8. DO NOT wear any jewelry or piercings on day of surgery. All body piercing jewelry must be removed. 9. If you have dentures, they will be removed before going to the OR; we will provide you a container. If you wear contact lenses or glasses,                  they will be removed; please bring a case for them. 10. If you  have a Living Will and Durable Power of  for Healthcare, please bring in a copy. 11. Please bring picture ID,  insurance card, paperwork from the doctors office    (H & P, Consent, & card for implantable devices). 12. Take a shower the night before or morning of your procedure, do not apply any lotion, oil or powder. 13. Wear a mask covering your nose & mouth when entering the hospital. Have your covid-19 test performed within 10 days of your                  Surgery (tested neg 1/21/21). Quarantine yourself after the test until after your surgery.

## 2021-01-29 ENCOUNTER — ANESTHESIA EVENT (OUTPATIENT)
Dept: OPERATING ROOM | Age: 71
DRG: 039 | End: 2021-01-29
Payer: MEDICARE

## 2021-01-29 ENCOUNTER — HOSPITAL ENCOUNTER (INPATIENT)
Age: 71
LOS: 1 days | Discharge: HOME | DRG: 039 | End: 2021-01-30
Attending: THORACIC SURGERY (CARDIOTHORACIC VASCULAR SURGERY) | Admitting: THORACIC SURGERY (CARDIOTHORACIC VASCULAR SURGERY)
Payer: MEDICARE

## 2021-01-29 ENCOUNTER — ANESTHESIA (OUTPATIENT)
Dept: OPERATING ROOM | Age: 71
DRG: 039 | End: 2021-01-29
Payer: MEDICARE

## 2021-01-29 VITALS
TEMPERATURE: 95.2 F | OXYGEN SATURATION: 100 % | DIASTOLIC BLOOD PRESSURE: 75 MMHG | SYSTOLIC BLOOD PRESSURE: 130 MMHG | RESPIRATION RATE: 9 BRPM

## 2021-01-29 DIAGNOSIS — I65.22 CAROTID STENOSIS, LEFT: Primary | ICD-10-CM

## 2021-01-29 LAB
ABO/RH: NORMAL
ANTIBODY SCREEN: NEGATIVE
GLUCOSE BLD-MCNC: 115 MG/DL (ref 70–99)
GLUCOSE BLD-MCNC: 183 MG/DL (ref 70–99)
GLUCOSE BLD-MCNC: 303 MG/DL (ref 70–99)
GLUCOSE BLD-MCNC: 89 MG/DL (ref 70–99)

## 2021-01-29 PROCEDURE — 7100000001 HC PACU RECOVERY - ADDTL 15 MIN: Performed by: SURGERY

## 2021-01-29 PROCEDURE — 94761 N-INVAS EAR/PLS OXIMETRY MLT: CPT

## 2021-01-29 PROCEDURE — 2580000003 HC RX 258: Performed by: PHYSICIAN ASSISTANT

## 2021-01-29 PROCEDURE — 6370000000 HC RX 637 (ALT 250 FOR IP): Performed by: PHYSICIAN ASSISTANT

## 2021-01-29 PROCEDURE — 82962 GLUCOSE BLOOD TEST: CPT

## 2021-01-29 PROCEDURE — 2580000003 HC RX 258: Performed by: NURSE ANESTHETIST, CERTIFIED REGISTERED

## 2021-01-29 PROCEDURE — 6360000002 HC RX W HCPCS: Performed by: ANESTHESIOLOGY

## 2021-01-29 PROCEDURE — 03CJ0ZZ EXTIRPATION OF MATTER FROM LEFT COMMON CAROTID ARTERY, OPEN APPROACH: ICD-10-PCS | Performed by: SURGERY

## 2021-01-29 PROCEDURE — 6360000002 HC RX W HCPCS: Performed by: PHYSICIAN ASSISTANT

## 2021-01-29 PROCEDURE — 88300 SURGICAL PATH GROSS: CPT

## 2021-01-29 PROCEDURE — 6360000002 HC RX W HCPCS: Performed by: THORACIC SURGERY (CARDIOTHORACIC VASCULAR SURGERY)

## 2021-01-29 PROCEDURE — 6360000002 HC RX W HCPCS

## 2021-01-29 PROCEDURE — 86900 BLOOD TYPING SEROLOGIC ABO: CPT

## 2021-01-29 PROCEDURE — C1768 GRAFT, VASCULAR: HCPCS | Performed by: SURGERY

## 2021-01-29 PROCEDURE — 2709999900 HC NON-CHARGEABLE SUPPLY: Performed by: SURGERY

## 2021-01-29 PROCEDURE — 6370000000 HC RX 637 (ALT 250 FOR IP)

## 2021-01-29 PROCEDURE — 6360000002 HC RX W HCPCS: Performed by: SURGERY

## 2021-01-29 PROCEDURE — 3600000004 HC SURGERY LEVEL 4 BASE: Performed by: SURGERY

## 2021-01-29 PROCEDURE — 2500000003 HC RX 250 WO HCPCS: Performed by: NURSE ANESTHETIST, CERTIFIED REGISTERED

## 2021-01-29 PROCEDURE — 6370000000 HC RX 637 (ALT 250 FOR IP): Performed by: NURSE ANESTHETIST, CERTIFIED REGISTERED

## 2021-01-29 PROCEDURE — C9248 INJ, CLEVIDIPINE BUTYRATE: HCPCS | Performed by: ANESTHESIOLOGY

## 2021-01-29 PROCEDURE — 03UJ07Z SUPPLEMENT LEFT COMMON CAROTID ARTERY WITH AUTOLOGOUS TISSUE SUBSTITUTE, OPEN APPROACH: ICD-10-PCS | Performed by: SURGERY

## 2021-01-29 PROCEDURE — 2000000000 HC ICU R&B

## 2021-01-29 PROCEDURE — 2580000003 HC RX 258: Performed by: SURGERY

## 2021-01-29 PROCEDURE — 3700000001 HC ADD 15 MINUTES (ANESTHESIA): Performed by: SURGERY

## 2021-01-29 PROCEDURE — 86901 BLOOD TYPING SEROLOGIC RH(D): CPT

## 2021-01-29 PROCEDURE — 86850 RBC ANTIBODY SCREEN: CPT

## 2021-01-29 PROCEDURE — 3700000000 HC ANESTHESIA ATTENDED CARE: Performed by: SURGERY

## 2021-01-29 PROCEDURE — 6360000002 HC RX W HCPCS: Performed by: NURSE ANESTHETIST, CERTIFIED REGISTERED

## 2021-01-29 PROCEDURE — 7100000000 HC PACU RECOVERY - FIRST 15 MIN: Performed by: SURGERY

## 2021-01-29 PROCEDURE — 3600000014 HC SURGERY LEVEL 4 ADDTL 15MIN: Performed by: SURGERY

## 2021-01-29 PROCEDURE — P9045 ALBUMIN (HUMAN), 5%, 250 ML: HCPCS | Performed by: NURSE ANESTHETIST, CERTIFIED REGISTERED

## 2021-01-29 DEVICE — PATCH CAR TISS REP CORMATRIX 1 X 10 CM: Type: IMPLANTABLE DEVICE | Site: NECK | Status: FUNCTIONAL

## 2021-01-29 DEVICE — Z INACTIVE USE 2641837 CLIP LIG M BLU TI HRT SHP WIRE HORZ 600 PER BX: Type: IMPLANTABLE DEVICE | Site: NECK | Status: FUNCTIONAL

## 2021-01-29 DEVICE — CLIP INT SM WIDE RED TI TRNSVRS GRV CHEVRON SHP W PRECIS: Type: IMPLANTABLE DEVICE | Site: NECK | Status: FUNCTIONAL

## 2021-01-29 RX ORDER — SUCCINYLCHOLINE/SOD CL,ISO/PF 100 MG/5ML
SYRINGE (ML) INTRAVENOUS PRN
Status: DISCONTINUED | OUTPATIENT
Start: 2021-01-29 | End: 2021-01-29 | Stop reason: SDUPTHER

## 2021-01-29 RX ORDER — ROCURONIUM BROMIDE 10 MG/ML
INJECTION, SOLUTION INTRAVENOUS PRN
Status: DISCONTINUED | OUTPATIENT
Start: 2021-01-29 | End: 2021-01-29 | Stop reason: SDUPTHER

## 2021-01-29 RX ORDER — DEXTROSE MONOHYDRATE 25 G/50ML
12.5 INJECTION, SOLUTION INTRAVENOUS PRN
Status: DISCONTINUED | OUTPATIENT
Start: 2021-01-29 | End: 2021-01-30 | Stop reason: HOSPADM

## 2021-01-29 RX ORDER — METOPROLOL SUCCINATE 25 MG/1
25 TABLET, EXTENDED RELEASE ORAL DAILY
Status: DISCONTINUED | OUTPATIENT
Start: 2021-01-30 | End: 2021-01-30 | Stop reason: HOSPADM

## 2021-01-29 RX ORDER — PHENYLEPHRINE HYDROCHLORIDE 10 MG/ML
INJECTION INTRAVENOUS PRN
Status: DISCONTINUED | OUTPATIENT
Start: 2021-01-29 | End: 2021-01-29 | Stop reason: SDUPTHER

## 2021-01-29 RX ORDER — FENTANYL CITRATE 50 UG/ML
25 INJECTION, SOLUTION INTRAMUSCULAR; INTRAVENOUS EVERY 5 MIN PRN
Status: DISCONTINUED | OUTPATIENT
Start: 2021-01-29 | End: 2021-01-29 | Stop reason: HOSPADM

## 2021-01-29 RX ORDER — ASPIRIN 81 MG/1
81 TABLET ORAL DAILY
Status: DISCONTINUED | OUTPATIENT
Start: 2021-01-29 | End: 2021-01-30 | Stop reason: HOSPADM

## 2021-01-29 RX ORDER — FERROUS SULFATE 325(65) MG
325 TABLET ORAL 2 TIMES DAILY WITH MEALS
Status: DISCONTINUED | OUTPATIENT
Start: 2021-01-30 | End: 2021-01-30 | Stop reason: HOSPADM

## 2021-01-29 RX ORDER — DEXTROSE MONOHYDRATE 50 MG/ML
100 INJECTION, SOLUTION INTRAVENOUS PRN
Status: DISCONTINUED | OUTPATIENT
Start: 2021-01-29 | End: 2021-01-30 | Stop reason: HOSPADM

## 2021-01-29 RX ORDER — GLIMEPIRIDE 4 MG/1
4 TABLET ORAL
Status: DISCONTINUED | OUTPATIENT
Start: 2021-01-30 | End: 2021-01-30 | Stop reason: HOSPADM

## 2021-01-29 RX ORDER — PROPOFOL 10 MG/ML
INJECTION, EMULSION INTRAVENOUS PRN
Status: DISCONTINUED | OUTPATIENT
Start: 2021-01-29 | End: 2021-01-29 | Stop reason: SDUPTHER

## 2021-01-29 RX ORDER — LABETALOL HYDROCHLORIDE 5 MG/ML
5 INJECTION, SOLUTION INTRAVENOUS EVERY 10 MIN PRN
Status: DISCONTINUED | OUTPATIENT
Start: 2021-01-29 | End: 2021-01-29 | Stop reason: HOSPADM

## 2021-01-29 RX ORDER — PROTAMINE SULFATE 10 MG/ML
INJECTION, SOLUTION INTRAVENOUS PRN
Status: DISCONTINUED | OUTPATIENT
Start: 2021-01-29 | End: 2021-01-29 | Stop reason: SDUPTHER

## 2021-01-29 RX ORDER — LIDOCAINE HYDROCHLORIDE 40 MG/ML
SOLUTION TOPICAL PRN
Status: DISCONTINUED | OUTPATIENT
Start: 2021-01-29 | End: 2021-01-29 | Stop reason: SDUPTHER

## 2021-01-29 RX ORDER — FENTANYL CITRATE 50 UG/ML
INJECTION, SOLUTION INTRAMUSCULAR; INTRAVENOUS PRN
Status: DISCONTINUED | OUTPATIENT
Start: 2021-01-29 | End: 2021-01-29 | Stop reason: SDUPTHER

## 2021-01-29 RX ORDER — SODIUM CHLORIDE 0.9 % (FLUSH) 0.9 %
10 SYRINGE (ML) INJECTION EVERY 12 HOURS SCHEDULED
Status: DISCONTINUED | OUTPATIENT
Start: 2021-01-29 | End: 2021-01-30 | Stop reason: HOSPADM

## 2021-01-29 RX ORDER — ESMOLOL HYDROCHLORIDE 10 MG/ML
INJECTION INTRAVENOUS PRN
Status: DISCONTINUED | OUTPATIENT
Start: 2021-01-29 | End: 2021-01-29 | Stop reason: SDUPTHER

## 2021-01-29 RX ORDER — LABETALOL 20 MG/4 ML (5 MG/ML) INTRAVENOUS SYRINGE
PRN
Status: DISCONTINUED | OUTPATIENT
Start: 2021-01-29 | End: 2021-01-29 | Stop reason: SDUPTHER

## 2021-01-29 RX ORDER — GABAPENTIN 300 MG/1
600 CAPSULE ORAL 3 TIMES DAILY
Status: DISCONTINUED | OUTPATIENT
Start: 2021-01-29 | End: 2021-01-30 | Stop reason: HOSPADM

## 2021-01-29 RX ORDER — ONDANSETRON 2 MG/ML
4 INJECTION INTRAMUSCULAR; INTRAVENOUS EVERY 6 HOURS PRN
Status: DISCONTINUED | OUTPATIENT
Start: 2021-01-29 | End: 2021-01-30 | Stop reason: HOSPADM

## 2021-01-29 RX ORDER — ACETAMINOPHEN 325 MG/1
650 TABLET ORAL EVERY 4 HOURS PRN
Status: DISCONTINUED | OUTPATIENT
Start: 2021-01-29 | End: 2021-01-30 | Stop reason: HOSPADM

## 2021-01-29 RX ORDER — NICOTINE POLACRILEX 4 MG
15 LOZENGE BUCCAL PRN
Status: DISCONTINUED | OUTPATIENT
Start: 2021-01-29 | End: 2021-01-30 | Stop reason: HOSPADM

## 2021-01-29 RX ORDER — LABETALOL HYDROCHLORIDE 5 MG/ML
10 INJECTION, SOLUTION INTRAVENOUS PRN
Status: DISCONTINUED | OUTPATIENT
Start: 2021-01-29 | End: 2021-01-30 | Stop reason: HOSPADM

## 2021-01-29 RX ORDER — DEXAMETHASONE SODIUM PHOSPHATE 4 MG/ML
INJECTION, SOLUTION INTRA-ARTICULAR; INTRALESIONAL; INTRAMUSCULAR; INTRAVENOUS; SOFT TISSUE PRN
Status: DISCONTINUED | OUTPATIENT
Start: 2021-01-29 | End: 2021-01-29 | Stop reason: SDUPTHER

## 2021-01-29 RX ORDER — SODIUM CHLORIDE 0.9 % (FLUSH) 0.9 %
10 SYRINGE (ML) INJECTION PRN
Status: DISCONTINUED | OUTPATIENT
Start: 2021-01-29 | End: 2021-01-30 | Stop reason: HOSPADM

## 2021-01-29 RX ORDER — ONDANSETRON 2 MG/ML
4 INJECTION INTRAMUSCULAR; INTRAVENOUS
Status: DISCONTINUED | OUTPATIENT
Start: 2021-01-29 | End: 2021-01-29 | Stop reason: HOSPADM

## 2021-01-29 RX ORDER — VALSARTAN 40 MG/1
40 TABLET ORAL DAILY
Status: DISCONTINUED | OUTPATIENT
Start: 2021-01-29 | End: 2021-01-30 | Stop reason: HOSPADM

## 2021-01-29 RX ORDER — ATORVASTATIN CALCIUM 10 MG/1
10 TABLET, FILM COATED ORAL DAILY
Status: DISCONTINUED | OUTPATIENT
Start: 2021-01-29 | End: 2021-01-30 | Stop reason: HOSPADM

## 2021-01-29 RX ORDER — HEPARIN SODIUM 1000 [USP'U]/ML
INJECTION, SOLUTION INTRAVENOUS; SUBCUTANEOUS PRN
Status: DISCONTINUED | OUTPATIENT
Start: 2021-01-29 | End: 2021-01-29 | Stop reason: SDUPTHER

## 2021-01-29 RX ORDER — SODIUM CHLORIDE, SODIUM LACTATE, POTASSIUM CHLORIDE, CALCIUM CHLORIDE 600; 310; 30; 20 MG/100ML; MG/100ML; MG/100ML; MG/100ML
INJECTION, SOLUTION INTRAVENOUS CONTINUOUS PRN
Status: DISCONTINUED | OUTPATIENT
Start: 2021-01-29 | End: 2021-01-29 | Stop reason: SDUPTHER

## 2021-01-29 RX ORDER — ALBUMIN, HUMAN INJ 5% 5 %
SOLUTION INTRAVENOUS PRN
Status: DISCONTINUED | OUTPATIENT
Start: 2021-01-29 | End: 2021-01-29 | Stop reason: SDUPTHER

## 2021-01-29 RX ORDER — ONDANSETRON 2 MG/ML
INJECTION INTRAMUSCULAR; INTRAVENOUS PRN
Status: DISCONTINUED | OUTPATIENT
Start: 2021-01-29 | End: 2021-01-29 | Stop reason: SDUPTHER

## 2021-01-29 RX ORDER — FENTANYL CITRATE 50 UG/ML
INJECTION, SOLUTION INTRAMUSCULAR; INTRAVENOUS
Status: COMPLETED
Start: 2021-01-29 | End: 2021-01-29

## 2021-01-29 RX ORDER — TRAMADOL HYDROCHLORIDE 50 MG/1
50 TABLET ORAL EVERY 6 HOURS PRN
Status: DISCONTINUED | OUTPATIENT
Start: 2021-01-29 | End: 2021-01-30 | Stop reason: HOSPADM

## 2021-01-29 RX ORDER — FUROSEMIDE 20 MG/1
20 TABLET ORAL 2 TIMES DAILY
Status: DISCONTINUED | OUTPATIENT
Start: 2021-01-29 | End: 2021-01-30 | Stop reason: HOSPADM

## 2021-01-29 RX ORDER — FENTANYL CITRATE 50 UG/ML
50 INJECTION, SOLUTION INTRAMUSCULAR; INTRAVENOUS EVERY 5 MIN PRN
Status: DISCONTINUED | OUTPATIENT
Start: 2021-01-29 | End: 2021-01-29 | Stop reason: HOSPADM

## 2021-01-29 RX ORDER — HYDRALAZINE HYDROCHLORIDE 20 MG/ML
5 INJECTION INTRAMUSCULAR; INTRAVENOUS EVERY 10 MIN PRN
Status: DISCONTINUED | OUTPATIENT
Start: 2021-01-29 | End: 2021-01-29 | Stop reason: HOSPADM

## 2021-01-29 RX ORDER — ETOMIDATE 2 MG/ML
INJECTION INTRAVENOUS PRN
Status: DISCONTINUED | OUTPATIENT
Start: 2021-01-29 | End: 2021-01-29 | Stop reason: SDUPTHER

## 2021-01-29 RX ORDER — MORPHINE SULFATE 2 MG/ML
2 INJECTION, SOLUTION INTRAMUSCULAR; INTRAVENOUS
Status: DISCONTINUED | OUTPATIENT
Start: 2021-01-29 | End: 2021-01-30 | Stop reason: HOSPADM

## 2021-01-29 RX ORDER — CEFAZOLIN SODIUM 2 G/50ML
2000 SOLUTION INTRAVENOUS EVERY 8 HOURS
Status: DISCONTINUED | OUTPATIENT
Start: 2021-01-29 | End: 2021-01-29 | Stop reason: CLARIF

## 2021-01-29 RX ORDER — LIDOCAINE HYDROCHLORIDE 20 MG/ML
INJECTION, SOLUTION INTRAVENOUS PRN
Status: DISCONTINUED | OUTPATIENT
Start: 2021-01-29 | End: 2021-01-29 | Stop reason: SDUPTHER

## 2021-01-29 RX ORDER — SODIUM CHLORIDE 450 MG/100ML
INJECTION, SOLUTION INTRAVENOUS CONTINUOUS
Status: DISCONTINUED | OUTPATIENT
Start: 2021-01-29 | End: 2021-01-30

## 2021-01-29 RX ADMIN — FENTANYL CITRATE 25 MCG: 50 INJECTION, SOLUTION INTRAMUSCULAR; INTRAVENOUS at 16:17

## 2021-01-29 RX ADMIN — LIDOCAINE HYDROCHLORIDE 4 ML: 40 SOLUTION TOPICAL at 13:24

## 2021-01-29 RX ADMIN — ALBUMIN (HUMAN) 250 ML: 12.5 INJECTION, SOLUTION INTRAVENOUS at 13:36

## 2021-01-29 RX ADMIN — DEXTROSE MONOHYDRATE 2000 MG: 50 INJECTION, SOLUTION INTRAVENOUS at 20:37

## 2021-01-29 RX ADMIN — HEPARIN SODIUM 5000 UNITS: 1000 INJECTION, SOLUTION INTRAVENOUS; SUBCUTANEOUS at 14:15

## 2021-01-29 RX ADMIN — GABAPENTIN 600 MG: 300 CAPSULE ORAL at 20:36

## 2021-01-29 RX ADMIN — SODIUM CHLORIDE, POTASSIUM CHLORIDE, SODIUM LACTATE AND CALCIUM CHLORIDE: 600; 310; 30; 20 INJECTION, SOLUTION INTRAVENOUS at 13:08

## 2021-01-29 RX ADMIN — ATORVASTATIN CALCIUM 10 MG: 10 TABLET, FILM COATED ORAL at 20:37

## 2021-01-29 RX ADMIN — ETOMIDATE 12 MG: 2 INJECTION, SOLUTION INTRAVENOUS at 13:23

## 2021-01-29 RX ADMIN — PHENYLEPHRINE HYDROCHLORIDE 100 MCG: 10 INJECTION INTRAVENOUS at 13:34

## 2021-01-29 RX ADMIN — ONDANSETRON 4 MG: 2 INJECTION INTRAMUSCULAR; INTRAVENOUS at 13:51

## 2021-01-29 RX ADMIN — FUROSEMIDE 20 MG: 20 TABLET ORAL at 20:36

## 2021-01-29 RX ADMIN — PROTAMINE SULFATE 25 MG: 10 INJECTION, SOLUTION INTRAVENOUS at 14:48

## 2021-01-29 RX ADMIN — FENTANYL CITRATE 100 MCG: 50 INJECTION INTRAMUSCULAR; INTRAVENOUS at 13:23

## 2021-01-29 RX ADMIN — METFORMIN HYDROCHLORIDE 1000 MG: 500 TABLET ORAL at 18:56

## 2021-01-29 RX ADMIN — PHENYLEPHRINE HYDROCHLORIDE 50 MCG: 10 INJECTION INTRAVENOUS at 13:23

## 2021-01-29 RX ADMIN — PHENYLEPHRINE HYDROCHLORIDE 100 MCG: 10 INJECTION INTRAVENOUS at 13:41

## 2021-01-29 RX ADMIN — PHENYLEPHRINE HYDROCHLORIDE 20 MCG/MIN: 10 INJECTION INTRAVENOUS at 13:55

## 2021-01-29 RX ADMIN — SUGAMMADEX 200 MG: 100 INJECTION, SOLUTION INTRAVENOUS at 15:04

## 2021-01-29 RX ADMIN — VALSARTAN 40 MG: 40 TABLET ORAL at 20:37

## 2021-01-29 RX ADMIN — LABETALOL 20 MG/4 ML (5 MG/ML) INTRAVENOUS SYRINGE 5 MG: at 14:50

## 2021-01-29 RX ADMIN — ASPIRIN 81 MG: 81 TABLET, COATED ORAL at 20:37

## 2021-01-29 RX ADMIN — ALBUMIN (HUMAN) 250 ML: 12.5 INJECTION, SOLUTION INTRAVENOUS at 14:02

## 2021-01-29 RX ADMIN — PROPOFOL 20 MG: 10 INJECTION, EMULSION INTRAVENOUS at 13:23

## 2021-01-29 RX ADMIN — CEFAZOLIN SODIUM 2 G: 10 INJECTION, POWDER, FOR SOLUTION INTRAVENOUS at 13:19

## 2021-01-29 RX ADMIN — ROCURONIUM BROMIDE 10 MG: 10 INJECTION INTRAVENOUS at 14:34

## 2021-01-29 RX ADMIN — ESMOLOL HYDROCHLORIDE 20 MG: 10 INJECTION, SOLUTION INTRAVENOUS at 13:26

## 2021-01-29 RX ADMIN — SODIUM CHLORIDE: 4.5 INJECTION, SOLUTION INTRAVENOUS at 16:02

## 2021-01-29 RX ADMIN — LIDOCAINE HYDROCHLORIDE 60 MG: 20 INJECTION, SOLUTION INTRAVENOUS at 13:23

## 2021-01-29 RX ADMIN — DEXAMETHASONE SODIUM PHOSPHATE 8 MG: 4 INJECTION, SOLUTION INTRAMUSCULAR; INTRAVENOUS at 13:51

## 2021-01-29 RX ADMIN — Medication 100 MG: at 13:23

## 2021-01-29 RX ADMIN — ROCURONIUM BROMIDE 50 MG: 10 INJECTION INTRAVENOUS at 13:29

## 2021-01-29 RX ADMIN — CLEVIPIDINE 5 MG/HR: 0.5 EMULSION INTRAVENOUS at 15:06

## 2021-01-29 ASSESSMENT — PULMONARY FUNCTION TESTS
PIF_VALUE: 21
PIF_VALUE: 16
PIF_VALUE: 0
PIF_VALUE: 17
PIF_VALUE: 17
PIF_VALUE: 15
PIF_VALUE: 15
PIF_VALUE: 17
PIF_VALUE: 15
PIF_VALUE: 15
PIF_VALUE: 17
PIF_VALUE: 15
PIF_VALUE: 16
PIF_VALUE: 1
PIF_VALUE: 16
PIF_VALUE: 1
PIF_VALUE: 5
PIF_VALUE: 17
PIF_VALUE: 16
PIF_VALUE: 17
PIF_VALUE: 16
PIF_VALUE: 21
PIF_VALUE: 15
PIF_VALUE: 17
PIF_VALUE: 15
PIF_VALUE: 17
PIF_VALUE: 17
PIF_VALUE: 0
PIF_VALUE: 16
PIF_VALUE: 15
PIF_VALUE: 15
PIF_VALUE: 16
PIF_VALUE: 23
PIF_VALUE: 0
PIF_VALUE: 17
PIF_VALUE: 15
PIF_VALUE: 17
PIF_VALUE: 1
PIF_VALUE: 15
PIF_VALUE: 17
PIF_VALUE: 1
PIF_VALUE: 1
PIF_VALUE: 0
PIF_VALUE: 13
PIF_VALUE: 1
PIF_VALUE: 14
PIF_VALUE: 8
PIF_VALUE: 17
PIF_VALUE: 15
PIF_VALUE: 17
PIF_VALUE: 17
PIF_VALUE: 19
PIF_VALUE: 17
PIF_VALUE: 28
PIF_VALUE: 17
PIF_VALUE: 1
PIF_VALUE: 17
PIF_VALUE: 15
PIF_VALUE: 17
PIF_VALUE: 16
PIF_VALUE: 16
PIF_VALUE: 17
PIF_VALUE: 16
PIF_VALUE: 17
PIF_VALUE: 1

## 2021-01-29 ASSESSMENT — PAIN SCALES - GENERAL
PAINLEVEL_OUTOF10: 0
PAINLEVEL_OUTOF10: 5

## 2021-01-29 ASSESSMENT — PAIN DESCRIPTION - FREQUENCY: FREQUENCY: CONTINUOUS

## 2021-01-29 ASSESSMENT — PAIN DESCRIPTION - ORIENTATION: ORIENTATION: LEFT

## 2021-01-29 ASSESSMENT — PAIN DESCRIPTION - DESCRIPTORS: DESCRIPTORS: DISCOMFORT

## 2021-01-29 ASSESSMENT — PAIN DESCRIPTION - LOCATION: LOCATION: NECK

## 2021-01-29 ASSESSMENT — PAIN DESCRIPTION - PAIN TYPE: TYPE: SURGICAL PAIN

## 2021-01-29 NOTE — ANESTHESIA PRE PROCEDURE
Department of Anesthesiology  Preprocedure Note       Name:  Marco Antonio Cline   Age:  66 y.o.  :  1950                                          MRN:  [id number]         Date:  2021      Surgeon: Jake Figueroa):  Ryan Hays MD    Procedure: Procedure(s):  LEFT CAROTID ENDARTERECTOMY    Medications prior to admission:   Prior to Admission medications    Medication Sig Start Date End Date Taking? Authorizing Provider   Cholecalciferol (VITAMIN D) 50 MCG ( UT) CAPS capsule Take by mouth daily    Historical Provider, MD   ferrous sulfate (IRON 325) 325 (65 Fe) MG tablet Take 1 tablet by mouth 2 times daily  Patient taking differently: Take 325 mg by mouth daily  20   Jose Vick MD   sildenafil (VIAGRA) 100 MG tablet TAKE 1 TABLET DAILY AS     NEEDED FOR ERECTILE        DYSFUNCTION 10/9/20   Jose Vick MD   allopurinol (ZYLOPRIM) 100 MG tablet Take 1 tablet by mouth daily 20   Jose Vick MD   allopurinol (ZYLOPRIM) 300 MG tablet Take 1 tablet by mouth daily 20   Jose Vick MD   canagliflozin [facility]) 300 MG TABS tablet Take 1 tablet by mouth every morning (before breakfast) 20   Jose Vick MD   Dulaglutide (TRULICITY) 1.5 BA/3.3YE SOPN Inject 1.5 mg into the skin once a week  Patient taking differently: Inject 1.5 mg into the skin once a week Takes on 20   Jose Vick MD   metFORMIN (GLUCOPHAGE) 1000 MG tablet TAKE 1 TABLET TWICE DAILY  WITH MEALS 20   Jose Vick MD   simvastatin (ZOCOR) 20 MG tablet Pt takes 10 mg daily  Patient taking differently: Take 10 mg by mouth Pt takes 10 mg daily 20   Jose Vick MD   valsartan (DIOVAN) 40 MG tablet Take 40mg daily 20   Jose Vick MD   gabapentin (NEURONTIN) 600 MG tablet Take 1 tablet by mouth 3 times daily for 270 days.  20  Jose Vick MD   XARELTO 20 MG TABS tablet TAKE 1 TABLET DAILY WITH   BREAKFAST 8/3/20   Viridiana Gardiner MD glimepiride (AMARYL) 4 MG tablet Take 1 tablet by mouth daily 6/22/20   Judi Berrios MD   metoprolol succinate (TOPROL XL) 25 MG extended release tablet Take 1 tablet by mouth daily 3/20/20   Bruna Christianson MD   furosemide (LASIX) 20 MG tablet Take 1 tablet by mouth 2 times daily May take an extra Lasix if has increased swelling 3/20/20   Nguyen Peterson MD   aspirin 81 MG tablet Take 81 mg by mouth daily    Historical Provider, MD       Current medications:    No current facility-administered medications for this encounter. Current Outpatient Medications   Medication Sig Dispense Refill    Cholecalciferol (VITAMIN D) 50 MCG (2000 UT) CAPS capsule Take by mouth daily      ferrous sulfate (IRON 325) 325 (65 Fe) MG tablet Take 1 tablet by mouth 2 times daily (Patient taking differently: Take 325 mg by mouth daily ) 180 tablet 5    sildenafil (VIAGRA) 100 MG tablet TAKE 1 TABLET DAILY AS     NEEDED FOR ERECTILE        DYSFUNCTION 30 tablet 1    allopurinol (ZYLOPRIM) 100 MG tablet Take 1 tablet by mouth daily 90 tablet 1    allopurinol (ZYLOPRIM) 300 MG tablet Take 1 tablet by mouth daily 90 tablet 1    canagliflozin (INVOKANA) 300 MG TABS tablet Take 1 tablet by mouth every morning (before breakfast) 90 tablet 1    Dulaglutide (TRULICITY) 1.5 SQ/8.9IP SOPN Inject 1.5 mg into the skin once a week (Patient taking differently: Inject 1.5 mg into the skin once a week Takes on Monday) 12 pen 1    metFORMIN (GLUCOPHAGE) 1000 MG tablet TAKE 1 TABLET TWICE DAILY  WITH MEALS 180 tablet 1    simvastatin (ZOCOR) 20 MG tablet Pt takes 10 mg daily (Patient taking differently: Take 10 mg by mouth Pt takes 10 mg daily) 90 tablet 1    valsartan (DIOVAN) 40 MG tablet Take 40mg daily 90 tablet 1    gabapentin (NEURONTIN) 600 MG tablet Take 1 tablet by mouth 3 times daily for 270 days.  90 tablet 1    XARELTO 20 MG TABS tablet TAKE 1 TABLET DAILY WITH   BREAKFAST 90 tablet 3    glimepiride (AMARYL) 4 MG tablet Take 1 tablet by mouth daily 90 tablet 1    metoprolol succinate (TOPROL XL) 25 MG extended release tablet Take 1 tablet by mouth daily 90 tablet 3    furosemide (LASIX) 20 MG tablet Take 1 tablet by mouth 2 times daily May take an extra Lasix if has increased swelling 180 tablet 3    aspirin 81 MG tablet Take 81 mg by mouth daily         Allergies: Allergies   Allergen Reactions    Spironolactone      CAUSES INCREASED K+    Tape Juany Clos Tape] Rash     SURGICAL TAPE       Problem List:    Patient Active Problem List   Diagnosis Code    Type 2 diabetes mellitus without complication, without long-term current use of insulin ([facility]) E11.9    Ulcer of other part of lower limb L97.809    Venous hypertension, chronic, with ulcer [facility] I87.319, L97.909    Ulcer of other part of foot L97.509    Pneumonia J18.9    Atrial fibrillation ([facility]) I48.91    Sinus pause I45.5    PAF (paroxysmal atrial fibrillation) ([facility]) I48.0    DM (diabetes mellitus) ([facility]) E11.9    DMITRIY on CPAP G47.33, Z99.89    Hyperlipidemia E78.5    Status post incision and drainage Z98.890    CKD (chronic kidney disease) stage 3, GFR 30-59 ml/min ([facility]) N18.30    Hyperpotassemia E87.5    Arthritis M19.90    PVD (peripheral vascular disease) ([facility]) I73.9    Hematoma T14. 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 Salvador Gates CAD (coronary artery disease) 06/18/2014    see dr Darrel Reich performed by Omi Morales MD at [facility] [address] COLONOSCOPY  09/30/2020    POSSIBLE CECAL avms, SIGMOID DIVERTICULOSIS, INTERNAL HEMORRHOIDS GRADE 1    COLONOSCOPY N/A 9/30/2020    COLONOSCOPY CONTROL HEMORRHAGE WITH APC performed by Omi Morales MD at [facility]  2014    \"2 stents put in \"   C/ [address]  2004    total left hip    OTHER SURGICAL HISTORY Right 12/02/2017    I&D; evacuation of hematoma right hip    OTHER SURGICAL HISTORY  09/17/2020    enteroscopy    PACEMAKER PLACEMENT      9/18/14 Status post remote permanent pacemaker with atrial lead dislodgement. 7/24/14 PPM Implant    UPPER GASTROINTESTINAL ENDOSCOPY N/A 9/17/2020    ENTEROSCOPY PUSH BIOPSY performed by Omi Morales MD at [address]  2012    \"have stents in both legs- done in [place]       Social History:    Social History     Tobacco Use    Smoking status: Never Smoker    Smokeless tobacco: Never Used   Substance Use Topics    Alcohol use: Yes     Alcohol/week: 2.0 standard drinks     Types: 2 Cans of beer per week     Comment: average \"one time per week\"                                Counseling given: Not Answered      Vital Signs (Current): There were no vitals filed for this visit.                                            BP Readings from Last 3 Encounters:   01/26/21 128/70   01/21/21 119/63   01/25/21 132/78       NPO Status:                                                                                 BMI:   Wt Readings from Last 3 Encounters:   01/26/21 221 lb (100.2 kg)   01/21/21 210 lb (95.3 kg)   01/25/21 210 lb (95.3 kg)     There is no height or weight on file to calculate BMI.    CBC:   Lab Results   Component Value Date    WBC 6.3 01/21/2021    RBC 4.79 01/21/2021    HGB 10.7 01/21/2021    HCT 38.3 01/21/2021    MCV 80.0 01/21/2021    RDW 21.0 01/21/2021     01/21/2021       CMP:   Lab Results   Component Value Date    NA 134 01/21/2021    K 4.3 01/21/2021    CL 97 01/21/2021    CO2 26 01/21/2021    BUN 42 01/21/2021    CREATININE 1.6 01/21/2021    GFRAA 52 01/21/2021    AGRATIO 1.8 11/24/2020    LABGLOM 43 01/21/2021    LABGLOM 51 06/15/2016    GLUCOSE 148 01/21/2021    PROT 7.2 01/05/2021    PROT 7.3 12/27/2012    CALCIUM 9.4 01/21/2021    BILITOT 0.2 01/05/2021    ALKPHOS 75 01/05/2021    AST 17 01/05/2021    ALT 13 01/05/2021       POC Tests: No results for input(s): POCGLU, POCNA, POCK, POCCL, POCBUN, POCHEMO, POCHCT in the last 72 hours. Coags:   Lab Results   Component Value Date    PROTIME 23.3 01/21/2021    INR 1.91 01/21/2021    APTT 59.6 01/21/2021       HCG (If Applicable): No results found for: PREGTESTUR, PREGSERUM, HCG, HCGQUANT     ABGs:   Lab Results   Component Value Date    PO2ART 81 12/10/2020    EBM3GKF 40.0 12/10/2020    UOA9XTU 27.8 12/10/2020        Type & Screen (If Applicable):  No results found for: LABABO, LABRH    Drug/Infectious Status (If Applicable):  No results found for: HIV, HEPCAB    COVID-19 Screening (If Applicable):   Lab Results   Component Value Date    COVID19 NOT DETECTED 01/21/2021    COVID19 NOT DETECTED 12/11/2020         Anesthesia Evaluation  Patient summary reviewed and Nursing notes reviewed no history of anesthetic complications:   Airway: Mallampati: I  TM distance: >3 FB   Neck ROM: limited  Mouth opening: > = 3 FB Dental: normal exam         Pulmonary:   (+) pneumonia:  sleep apnea: on CPAP,                             Cardiovascular:  Exercise tolerance: poor (<4 METS),   (+) hypertension:, valvular problems/murmurs (severe AS): MR and AS, pacemaker: pacemaker, CAD:, dysrhythmias: atrial fibrillation, CHF:, pulmonary hypertension: mild, hyperlipidemia      ECG reviewed      Echocardiogram reviewed  Stress test reviewed  Cleared by cardiology     Beta Blocker:  Dose within 24 Hrs      ROS comment:  Summary   PPM wiring visualized within the right heart.    Severe aortic stenosis (ANNA by planimetry: 0.778 cm sq). Mild aortic regurgitation. There was no thrombus noted in the left atrial appendage. Negative bubble study. No PFO or ASD noted. Signature      ------------------------------------------------------------------   Electronically signed by Olita Olszewski MD   (Interpreting physician) on 12/15/2020 at 03:50 PM     Procedure Summary   SIGNIFICANT LAD DISEASE   IFR . 77      Recommendations   DW CTS   WILL BE SCHEDULED FOR CABG+ SAVR      Signatures      --------------------------------------------------------   Electronically signed by Olita Olszewski MD   (Interventional Physician) on 01/18/2021 at 08:59     was scheduled for cabgx2 and avr on 1/26 2021- given bun and cr, delayed     PE comment: pacemaker   Neuro/Psych:               GI/Hepatic/Renal:   (+) renal disease: CRI,      (-) GERD      ROS comment: Nephrology on board . Endo/Other:    (+) DiabetesType II DM, , blood dyscrasia: anticoagulation therapy and anemia, arthritis: OA., . Pt had PAT visit. Abdominal:   (+) obese,         Vascular:   + PVD, aortic or cerebral, . ROS comment: Left carotid 70-99%. Anesthesia Plan      general     ASA 4       Induction: intravenous. arterial line  MIPS: Postoperative opioids intended and Prophylactic antiemetics administered. Use of blood products discussed with patient whom. Plan discussed with CRNA. Pre Anesthesia Assessment complete.  Chart reviewed on 1/29/2021        JUSTIN Borden - CRNA   1/29/2021

## 2021-01-29 NOTE — PLAN OF CARE
Problem: Falls - Risk of:  Goal: Will remain free from falls  Description: Will remain free from falls  Outcome: Ongoing  Goal: Absence of physical injury  Description: Absence of physical injury  Outcome: Ongoing     Problem: Infection - Surgical Site:  Goal: Will show no infection signs and symptoms  Description: Will show no infection signs and symptoms  Outcome: Ongoing     Problem: Infection:  Goal: Will remain free from infection  Description: Will remain free from infection  Outcome: Ongoing     Problem: Safety:  Goal: Free from accidental physical injury  Description: Free from accidental physical injury  Outcome: Ongoing  Goal: Free from intentional harm  Description: Free from intentional harm  Outcome: Ongoing     Problem: Daily Care:  Goal: Daily care needs are met  Description: Daily care needs are met  Outcome: Ongoing     Problem: Pain:  Goal: Patient's pain/discomfort is manageable  Description: Patient's pain/discomfort is manageable  Outcome: Ongoing     Problem: Skin Integrity:  Goal: Skin integrity will stabilize  Description: Skin integrity will stabilize  Outcome: Ongoing     Problem: Discharge Planning:  Goal: Patients continuum of care needs are met  Description: Patients continuum of care needs are met  Outcome: Ongoing

## 2021-01-29 NOTE — ANESTHESIA PRE PROCEDURE
Department of Anesthesiology  Preprocedure Note       Name:  Jimbo Martinez   Age:  66 y.o.  :  1950                                          MRN:  [id number]         Date:  2021      Surgeon: Mechelle Jaime):  Rd Jimenez MD    Procedure: Procedure(s):  LEFT CAROTID ENDARTERECTOMY    Medications prior to admission:   Prior to Admission medications    Medication Sig Start Date End Date Taking? Authorizing Provider   Cholecalciferol (VITAMIN D) 50 MCG ( UT) CAPS capsule Take by mouth daily    Historical Provider, MD   ferrous sulfate (IRON 325) 325 (65 Fe) MG tablet Take 1 tablet by mouth 2 times daily  Patient taking differently: Take 325 mg by mouth daily  20   Aelyda Almodovar MD   sildenafil (VIAGRA) 100 MG tablet TAKE 1 TABLET DAILY AS     NEEDED FOR ERECTILE        DYSFUNCTION 10/9/20   Aleyda Almodovar MD   allopurinol (ZYLOPRIM) 100 MG tablet Take 1 tablet by mouth daily 20   Aleyda Almodovar MD   allopurinol (ZYLOPRIM) 300 MG tablet Take 1 tablet by mouth daily 20   Aleyda Almodovar MD   canagliflozin [facility]) 300 MG TABS tablet Take 1 tablet by mouth every morning (before breakfast) 20   Aleyda Almodovar MD   Dulaglutide (TRULICITY) 1.5 YW/7.5OA SOPN Inject 1.5 mg into the skin once a week  Patient taking differently: Inject 1.5 mg into the skin once a week Takes on 20   Aleyda Almodovar MD   metFORMIN (GLUCOPHAGE) 1000 MG tablet TAKE 1 TABLET TWICE DAILY  WITH MEALS 20   Aleyda Almodovar MD   simvastatin (ZOCOR) 20 MG tablet Pt takes 10 mg daily  Patient taking differently: Take 10 mg by mouth Pt takes 10 mg daily 20   Aleyda Almodovar MD   valsartan (DIOVAN) 40 MG tablet Take 40mg daily 20   Aleyda Almodovar MD   gabapentin (NEURONTIN) 600 MG tablet Take 1 tablet by mouth 3 times daily for 270 days.  20  Aleyda Almodovar MD   XARELTO 20 MG TABS tablet TAKE 1 TABLET DAILY WITH   BREAKFAST 8/3/20   Nanette Hutchins MD glimepiride (AMARYL) 4 MG tablet Take 1 tablet by mouth daily 6/22/20   Mary Jo Arce MD   metoprolol succinate (TOPROL XL) 25 MG extended release tablet Take 1 tablet by mouth daily 3/20/20   Orlando Metzger MD   furosemide (LASIX) 20 MG tablet Take 1 tablet by mouth 2 times daily May take an extra Lasix if has increased swelling 3/20/20   Teofilo Desouza MD   aspirin 81 MG tablet Take 81 mg by mouth daily    Historical Provider, MD       Current medications:    No current facility-administered medications for this encounter. Current Outpatient Medications   Medication Sig Dispense Refill    Cholecalciferol (VITAMIN D) 50 MCG (2000 UT) CAPS capsule Take by mouth daily      ferrous sulfate (IRON 325) 325 (65 Fe) MG tablet Take 1 tablet by mouth 2 times daily (Patient taking differently: Take 325 mg by mouth daily ) 180 tablet 5    sildenafil (VIAGRA) 100 MG tablet TAKE 1 TABLET DAILY AS     NEEDED FOR ERECTILE        DYSFUNCTION 30 tablet 1    allopurinol (ZYLOPRIM) 100 MG tablet Take 1 tablet by mouth daily 90 tablet 1    allopurinol (ZYLOPRIM) 300 MG tablet Take 1 tablet by mouth daily 90 tablet 1    canagliflozin (INVOKANA) 300 MG TABS tablet Take 1 tablet by mouth every morning (before breakfast) 90 tablet 1    Dulaglutide (TRULICITY) 1.5 NM/7.5KB SOPN Inject 1.5 mg into the skin once a week (Patient taking differently: Inject 1.5 mg into the skin once a week Takes on Monday) 12 pen 1    metFORMIN (GLUCOPHAGE) 1000 MG tablet TAKE 1 TABLET TWICE DAILY  WITH MEALS 180 tablet 1    simvastatin (ZOCOR) 20 MG tablet Pt takes 10 mg daily (Patient taking differently: Take 10 mg by mouth Pt takes 10 mg daily) 90 tablet 1    valsartan (DIOVAN) 40 MG tablet Take 40mg daily 90 tablet 1    gabapentin (NEURONTIN) 600 MG tablet Take 1 tablet by mouth 3 times daily for 270 days.  90 tablet 1    XARELTO 20 MG TABS tablet TAKE 1 TABLET DAILY WITH   BREAKFAST 90 tablet 3    glimepiride (AMARYL) 4 MG tablet kidney disease, stage III (moderate) 07/07/2016    Diabetes mellitus ([facility]     dx 2004    Diabetic neuropathy associated with type 2 diabetes mellitus ([facility] 04/23/2019    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    Gout 04/2019    \"got gout when had pacer put in because they did not give me my medication for gout \"    H/O 24 hour EKG monitoring 10/03/2013    no afib noted, sinsus rhythm    H/O cardiovascular stress test 05/12/2014    cardiolite- mild ischemia RCA EF50%    H/O echocardiogram 12/01/2020    EF 55-60% severe aortic stenosis mild to mod aortic regurg mod to severe tricuspid regurg severe pulm htn significant changes since 2018 echo.  H/O right and left heart catheterization 12/10/2020    DIFFUSE LAD DISEASE, Mild ECA Disease, Severe AS, Milf Pul HTN on RHC.  H/O transesophageal echocardiography (MABLE) for monitoring 08/05/2013    normal LV function and normal LA appendage without any clot    History of transesophageal echocardiography (MABLE) 12/15/2020    Severe aortic stenosis (ANNA by planimetry: 0.778 cm sq). Mild AR.    [place] (hard of hearing)     hearing tonya aides    Hx of Doppler echocardiogram 05/21/2018    EF 50%  Mild LV hypertrophy. Mildly enlarged RA. Mod aortic valve calcification with mod AS. Mitral annular calcification is present. Mild AR, MR and TR. Mild pulmonary htn.     Hyperlipidemia     Hypertension     Follows with PCP & Dr. Davida Sainz Other disorders of kidney and ureter     Pacemaker     [employer], implanted 2014    Pneumonia 12/29/2012    Sleep apnea     dx 2013- has c-pap    Type II or unspecified type diabetes mellitus with other specified manifestations, uncontrolled 12/12/2012    Venous hypertension, chronic, with ulcer ([address] 12/12/2012    resolved       Past Surgical History:        Procedure Laterality Date    CARDIOVERSION  12/14    at [address]  2017    COLONOSCOPY  2011    COLONOSCOPY N/A 11/19/2019    COLONOSCOPY DIAGNOSTIC performed by Wendy Hare MD at [facility] [address] COLONOSCOPY  09/30/2020    POSSIBLE CECAL avms, SIGMOID DIVERTICULOSIS, INTERNAL HEMORRHOIDS GRADE 1    COLONOSCOPY N/A 9/30/2020    COLONOSCOPY CONTROL HEMORRHAGE WITH APC performed by Wendy Hare MD at [facility]  2014    \"2 stents put in \"   C/ [address]  2004    total left hip    OTHER SURGICAL HISTORY Right 12/02/2017    I&D; evacuation of hematoma right hip    OTHER SURGICAL HISTORY  09/17/2020    enteroscopy    PACEMAKER PLACEMENT      9/18/14 Status post remote permanent pacemaker with atrial lead dislodgement. 7/24/14 PPM Implant    UPPER GASTROINTESTINAL ENDOSCOPY N/A 9/17/2020    ENTEROSCOPY PUSH BIOPSY performed by Wendy Hare MD at [address]  2012    \"have stents in both legs- done in [place]       Social History:    Social History     Tobacco Use    Smoking status: Never Smoker    Smokeless tobacco: Never Used   Substance Use Topics    Alcohol use: Yes     Alcohol/week: 2.0 standard drinks     Types: 2 Cans of beer per week     Comment: average \"one time per week\"                                Counseling given: Not Answered      Vital Signs (Current): There were no vitals filed for this visit.                                            BP Readings from Last 3 Encounters:   01/26/21 128/70   01/21/21 119/63   01/25/21 132/78       NPO Status:                                                                                 BMI:   Wt Readings from Last 3 Encounters:   01/26/21 221 lb (100.2 kg)   01/21/21 210 lb (95.3 kg)   01/25/21 210 lb (95.3 kg)     There is no height or weight on file to calculate BMI.    CBC:   Lab Results   Component Value Date    WBC 6.3 01/21/2021    RBC 4.79 01/21/2021    HGB 10.7 01/21/2021    HCT 38.3 01/21/2021    MCV 80.0 01/21/2021    RDW 21.0 01/21/2021     01/21/2021       CMP:   Lab Results   Component Value Date    NA 134 01/21/2021    K 4.3 01/21/2021    CL 97 01/21/2021    CO2 26 01/21/2021    BUN 42 01/21/2021    CREATININE 1.6 01/21/2021    GFRAA 52 01/21/2021    AGRATIO 1.8 11/24/2020    LABGLOM 43 01/21/2021    LABGLOM 51 06/15/2016    GLUCOSE 148 01/21/2021    PROT 7.2 01/05/2021    PROT 7.3 12/27/2012    CALCIUM 9.4 01/21/2021    BILITOT 0.2 01/05/2021    ALKPHOS 75 01/05/2021    AST 17 01/05/2021    ALT 13 01/05/2021       POC Tests: No results for input(s): POCGLU, POCNA, POCK, POCCL, POCBUN, POCHEMO, POCHCT in the last 72 hours. Coags:   Lab Results   Component Value Date    PROTIME 23.3 01/21/2021    INR 1.91 01/21/2021    APTT 59.6 01/21/2021       HCG (If Applicable): No results found for: PREGTESTUR, PREGSERUM, HCG, HCGQUANT     ABGs:   Lab Results   Component Value Date    PO2ART 81 12/10/2020    ZYJ5KTD 40.0 12/10/2020    CLJ4OJK 27.8 12/10/2020        Type & Screen (If Applicable):  No results found for: LABABO, LABRH    Drug/Infectious Status (If Applicable):  No results found for: HIV, HEPCAB    COVID-19 Screening (If Applicable):   Lab Results   Component Value Date    COVID19 NOT DETECTED 01/21/2021    COVID19 NOT DETECTED 12/11/2020         Anesthesia Evaluation    Airway:         Dental:          Pulmonary:   (+) pneumonia:  sleep apnea: on CPAP,                             Cardiovascular:  Exercise tolerance: poor (<4 METS),   (+) hypertension:, valvular problems/murmurs (severe AS): MR and AS, pacemaker: pacemaker, CAD:, dysrhythmias: atrial fibrillation, CHF:, pulmonary hypertension: mild, hyperlipidemia         Beta Blocker:  Dose within 24 Hrs      ROS comment: 50%  Mild LV hypertrophy. Mildly enlarged RA. Mod aortic valve calcification with mod AS. Mitral annular calcification is present. Mild AR, MR and TR. Mild pulmonary htn.      Neuro/Psych:               GI/Hepatic/Renal:   (+) renal disease: CRI,           Endo/Other:    (+) DiabetesType II DM, , blood dyscrasia: anticoagulation therapy and anemia, arthritis: OA., .                 Abdominal:   (+) obese,         Vascular:   + PVD, aortic or cerebral, . Anesthesia Plan      general     ASA 4       Induction: intravenous. arterial line                 Pre Anesthesia Assessment complete.  Chart reviewed on 1/29/2021        JUSTIN Boyd - BRADLY   1/29/2021

## 2021-01-30 VITALS
HEIGHT: 70 IN | OXYGEN SATURATION: 100 % | TEMPERATURE: 98.1 F | WEIGHT: 220 LBS | RESPIRATION RATE: 16 BRPM | HEART RATE: 61 BPM | DIASTOLIC BLOOD PRESSURE: 51 MMHG | SYSTOLIC BLOOD PRESSURE: 105 MMHG | BODY MASS INDEX: 31.5 KG/M2

## 2021-01-30 LAB
ANION GAP SERPL CALCULATED.3IONS-SCNC: 14 MMOL/L (ref 4–16)
BASOPHILS ABSOLUTE: 0 K/CU MM
BASOPHILS RELATIVE PERCENT: 0.2 % (ref 0–1)
BUN BLDV-MCNC: 49 MG/DL (ref 6–23)
CALCIUM SERPL-MCNC: 8.2 MG/DL (ref 8.3–10.6)
CHLORIDE BLD-SCNC: 100 MMOL/L (ref 99–110)
CO2: 22 MMOL/L (ref 21–32)
CREAT SERPL-MCNC: 1.6 MG/DL (ref 0.9–1.3)
DIFFERENTIAL TYPE: ABNORMAL
EOSINOPHILS ABSOLUTE: 0 K/CU MM
EOSINOPHILS RELATIVE PERCENT: 0 % (ref 0–3)
GFR AFRICAN AMERICAN: 52 ML/MIN/1.73M2
GFR NON-AFRICAN AMERICAN: 43 ML/MIN/1.73M2
GLUCOSE BLD-MCNC: 217 MG/DL (ref 70–99)
GLUCOSE BLD-MCNC: 246 MG/DL (ref 70–99)
GLUCOSE BLD-MCNC: 256 MG/DL (ref 70–99)
HCT VFR BLD CALC: 33.1 % (ref 42–52)
HEMOGLOBIN: 9.3 GM/DL (ref 13.5–18)
IMMATURE NEUTROPHIL %: 0.5 % (ref 0–0.43)
LYMPHOCYTES ABSOLUTE: 0.6 K/CU MM
LYMPHOCYTES RELATIVE PERCENT: 8.9 % (ref 24–44)
MAGNESIUM: 2 MG/DL (ref 1.8–2.4)
MCH RBC QN AUTO: 22.5 PG (ref 27–31)
MCHC RBC AUTO-ENTMCNC: 28.1 % (ref 32–36)
MCV RBC AUTO: 80.1 FL (ref 78–100)
MONOCYTES ABSOLUTE: 0.4 K/CU MM
MONOCYTES RELATIVE PERCENT: 6.6 % (ref 0–4)
NUCLEATED RBC %: 0 %
PDW BLD-RTO: 19.5 % (ref 11.7–14.9)
PLATELET # BLD: 195 K/CU MM (ref 140–440)
PMV BLD AUTO: 9.9 FL (ref 7.5–11.1)
POTASSIUM SERPL-SCNC: 4.9 MMOL/L (ref 3.5–5.1)
RBC # BLD: 4.13 M/CU MM (ref 4.6–6.2)
SEGMENTED NEUTROPHILS ABSOLUTE COUNT: 5.3 K/CU MM
SEGMENTED NEUTROPHILS RELATIVE PERCENT: 83.8 % (ref 36–66)
SODIUM BLD-SCNC: 136 MMOL/L (ref 135–145)
TOTAL IMMATURE NEUTOROPHIL: 0.03 K/CU MM
TOTAL NUCLEATED RBC: 0 K/CU MM
WBC # BLD: 6.4 K/CU MM (ref 4–10.5)

## 2021-01-30 PROCEDURE — 82962 GLUCOSE BLOOD TEST: CPT

## 2021-01-30 PROCEDURE — 6360000002 HC RX W HCPCS: Performed by: PHYSICIAN ASSISTANT

## 2021-01-30 PROCEDURE — 85025 COMPLETE CBC W/AUTO DIFF WBC: CPT

## 2021-01-30 PROCEDURE — 6370000000 HC RX 637 (ALT 250 FOR IP): Performed by: PHYSICIAN ASSISTANT

## 2021-01-30 PROCEDURE — 80048 BASIC METABOLIC PNL TOTAL CA: CPT

## 2021-01-30 PROCEDURE — 83735 ASSAY OF MAGNESIUM: CPT

## 2021-01-30 PROCEDURE — 2580000003 HC RX 258: Performed by: PHYSICIAN ASSISTANT

## 2021-01-30 RX ORDER — ACETAMINOPHEN 325 MG/1
650 TABLET ORAL EVERY 6 HOURS PRN
Qty: 30 TABLET | Refills: 0 | Status: SHIPPED | OUTPATIENT
Start: 2021-01-30 | End: 2021-05-31

## 2021-01-30 RX ADMIN — GABAPENTIN 600 MG: 300 CAPSULE ORAL at 14:18

## 2021-01-30 RX ADMIN — FUROSEMIDE 20 MG: 20 TABLET ORAL at 09:46

## 2021-01-30 RX ADMIN — FERROUS SULFATE TAB 325 MG (65 MG ELEMENTAL FE) 325 MG: 325 (65 FE) TAB at 09:46

## 2021-01-30 RX ADMIN — METFORMIN HYDROCHLORIDE 1000 MG: 500 TABLET ORAL at 09:46

## 2021-01-30 RX ADMIN — METOPROLOL SUCCINATE 25 MG: 25 TABLET, EXTENDED RELEASE ORAL at 09:46

## 2021-01-30 RX ADMIN — DEXTROSE MONOHYDRATE 2000 MG: 50 INJECTION, SOLUTION INTRAVENOUS at 05:22

## 2021-01-30 RX ADMIN — VALSARTAN 40 MG: 40 TABLET ORAL at 09:52

## 2021-01-30 RX ADMIN — ACETAMINOPHEN 650 MG: 325 TABLET ORAL at 09:47

## 2021-01-30 RX ADMIN — SODIUM CHLORIDE: 4.5 INJECTION, SOLUTION INTRAVENOUS at 05:20

## 2021-01-30 RX ADMIN — SODIUM CHLORIDE, PRESERVATIVE FREE 10 ML: 5 INJECTION INTRAVENOUS at 09:47

## 2021-01-30 RX ADMIN — ASPIRIN 81 MG: 81 TABLET, COATED ORAL at 09:46

## 2021-01-30 RX ADMIN — GABAPENTIN 600 MG: 300 CAPSULE ORAL at 05:32

## 2021-01-30 ASSESSMENT — PAIN SCALES - GENERAL
PAINLEVEL_OUTOF10: 0

## 2021-01-30 ASSESSMENT — PAIN DESCRIPTION - FREQUENCY: FREQUENCY: CONTINUOUS

## 2021-01-30 ASSESSMENT — PAIN DESCRIPTION - DESCRIPTORS: DESCRIPTORS: ACHING

## 2021-01-30 ASSESSMENT — PAIN DESCRIPTION - ORIENTATION
ORIENTATION: LEFT
ORIENTATION: LEFT

## 2021-01-30 ASSESSMENT — PAIN DESCRIPTION - PAIN TYPE: TYPE: ACUTE PAIN;SURGICAL PAIN

## 2021-01-30 ASSESSMENT — PAIN DESCRIPTION - PROGRESSION
CLINICAL_PROGRESSION: NOT CHANGED
CLINICAL_PROGRESSION: GRADUALLY WORSENING

## 2021-01-30 ASSESSMENT — PAIN DESCRIPTION - ONSET: ONSET: ON-GOING

## 2021-01-30 ASSESSMENT — PAIN DESCRIPTION - LOCATION: LOCATION: NECK

## 2021-01-30 NOTE — FLOWSHEET NOTE
Right radial art line discontinued as ordered. Manual pressure held for 20 minutes until hemostasis achieved. Pressure dressing applied. Patient up to chair.      Shirley Milan RN 8:39 AM

## 2021-01-30 NOTE — FLOWSHEET NOTE
Discharge instructions reviewed. Questions answered. Belongings gathered and checked with admission list. Saline locks removed. Site WNL. Patient discharged to home. To lobby via wheelchair and wife picked him up in private vehicle. No acute distress. Patient with hearting aids, case, , cell phone, money clip, all clothing.      Courtney Vick RN 3:08 PM

## 2021-01-30 NOTE — DISCHARGE SUMMARY
Discharge Summary     Patient ID  Davie Bose   1950  [id number]      Primary Care Doctor:  Trinidad Jose MD    Admit date: 1/29/2021   Discharge date: 1/30/2021      Admitting Physician: To Rutherford MD   Discharge Physician: Lucille Urbina MD    Discharge Diagnoses: Active Hospital Problems    Diagnosis Date Noted    Coronary artery stenosis [I25.10]      Priority: High    Carotid stenosis, left [I65.22] 01/29/2021    VHD (valvular heart disease) [I38]      Discharged Condition: stable. Hospital Course:  Upon admission underwent surgery of L CEA after discussion and preparation. Tolerated surgery well   Post op ; monitored rhythm, O2 sat, I/O, Labs, CXRs serially  Neuro status , BP and vital signs monitored  Started on diet and activity.   Uneventful recovery  At discharge, pt ambulating  Tolerating diet  Wounds clean  Had Bm  Lungs clear   NSR  VS at discharge   Vitals:    01/30/21 1303   BP: 113/63   Pulse: 64   Resp: 20   Temp:    SpO2: 100%       Consults: none    Significant Diagnostic Studies:     Disposition: home    Patient Instructions:      Medication List      START taking these medications    acetaminophen 325 MG tablet  Commonly known as: Aminofen  Take 2 tablets by mouth every 6 hours as needed for Pain        CHANGE how you take these medications    ferrous sulfate 325 (65 Fe) MG tablet  Commonly known as: IRON 325  Take 1 tablet by mouth 2 times daily  What changed: when to take this     simvastatin 20 MG tablet  Commonly known as: ZOCOR  Pt takes 10 mg daily  What changed:   · how much to take  · how to take this     Trulicity 1.5 XK/0.6UO Sopn  Generic drug: Dulaglutide  Inject 1.5 mg into the skin once a week  What changed: additional instructions        CONTINUE taking these medications    * allopurinol 100 MG tablet  Commonly known as: ZYLOPRIM  Take 1 tablet by mouth daily     * allopurinol 300 MG tablet  Commonly known as: ZYLOPRIM  Take 1 tablet by mouth daily aspirin 81 MG tablet     canagliflozin 300 MG Tabs tablet  Commonly known as: INVOKANA  Take 1 tablet by mouth every morning (before breakfast)     furosemide 20 MG tablet  Commonly known as: LASIX  Take 1 tablet by mouth 2 times daily May take an extra Lasix if has increased swelling     gabapentin 600 MG tablet  Commonly known as: Neurontin  Take 1 tablet by mouth 3 times daily for 270 days. glimepiride 4 MG tablet  Commonly known as: AMARYL  Take 1 tablet by mouth daily     metFORMIN 1000 MG tablet  Commonly known as: GLUCOPHAGE  TAKE 1 TABLET TWICE DAILY  WITH MEALS     metoprolol succinate 25 MG extended release tablet  Commonly known as: TOPROL XL  Take 1 tablet by mouth daily     sildenafil 100 MG tablet  Commonly known as: VIAGRA  TAKE 1 TABLET DAILY AS     NEEDED FOR ERECTILE        DYSFUNCTION     valsartan 40 MG tablet  Commonly known as: DIOVAN  Take 40mg daily     vitamin D 50 MCG (2000 UT) Caps capsule     Xarelto 20 MG Tabs tablet  Generic drug: rivaroxaban  TAKE 1 TABLET DAILY WITH   BREAKFAST         * This list has 2 medication(s) that are the same as other medications prescribed for you. Read the directions carefully, and ask your doctor or other care provider to review them with you.                Where to Get Your Medications      You can get these medications from any pharmacy    Bring a paper prescription for each of these medications  · acetaminophen 325 MG tablet       Activity: activity as tolerated and no lifting, Driving, or Strenuous exercise until seen by physician in office  Diet: cardiac diet  Wound Care: as directed    Follow-up as directed at discharge    Signed: Jessica Cummings    Time spent on discharge 35 minutes

## 2021-01-30 NOTE — PLAN OF CARE
Problem: Falls - Risk of:  Goal: Will remain free from falls  Description: Will remain free from falls  1/30/2021 0719 by Melany Magana RN  Outcome: Ongoing  1/29/2021 1726 by Melany Magana RN  Outcome: Ongoing  Goal: Absence of physical injury  Description: Absence of physical injury  1/30/2021 0719 by Melany Magana RN  Outcome: Ongoing  1/29/2021 1726 by Melany Magana RN  Outcome: Ongoing     Problem: Infection - Surgical Site:  Goal: Will show no infection signs and symptoms  Description: Will show no infection signs and symptoms  1/30/2021 0719 by Melany Magana RN  Outcome: Ongoing  1/29/2021 1726 by Melany Magana RN  Outcome: Ongoing     Problem: Infection:  Goal: Will remain free from infection  Description: Will remain free from infection  1/30/2021 0719 by Melany Magana RN  Outcome: Ongoing  1/29/2021 1726 by Melany Magana RN  Outcome: Ongoing     Problem: Safety:  Goal: Free from accidental physical injury  Description: Free from accidental physical injury  1/30/2021 0719 by Melany Magana RN  Outcome: Ongoing  1/29/2021 1726 by Melany Magana RN  Outcome: Ongoing  Goal: Free from intentional harm  Description: Free from intentional harm  1/30/2021 0719 by Melany Magana RN  Outcome: Ongoing  1/29/2021 1726 by Melany Magana RN  Outcome: Ongoing     Problem: Daily Care:  Goal: Daily care needs are met  Description: Daily care needs are met  1/30/2021 0719 by Melany Magana RN  Outcome: Ongoing  1/29/2021 1726 by Melany Magana RN  Outcome: Ongoing     Problem: Pain:  Goal: Patient's pain/discomfort is manageable  Description: Patient's pain/discomfort is manageable  1/30/2021 0719 by Melany Magana RN  Outcome: Ongoing  1/29/2021 1726 by Melany Magana RN  Outcome: Ongoing     Problem: Skin Integrity:  Goal: Skin integrity will stabilize  Description: Skin integrity will stabilize  1/30/2021 0719 by Melany Magana RN  Outcome: Ongoing  1/29/2021 1726 by Melany Magana RN  Outcome: Ongoing     Problem: Discharge Planning:  Goal: Patients continuum of care needs are met  Description: Patients continuum of care needs are met  1/30/2021 0719 by Olive Womack RN  Outcome: Ongoing  1/29/2021 1726 by Olive Womack RN  Outcome: Ongoing

## 2021-01-30 NOTE — FLOWSHEET NOTE
Patient having bigeminal PVCs, magnesium level added on to morning labs. Patient is not symptomatic, patient to have open heart surgery in 2 weeks.      Era Pacheco RN 8:02 AM

## 2021-01-30 NOTE — FLOWSHEET NOTE
Wife Moira Adams called and updated, security code verified, condition update given.      Sigrid Abraham RN 7:22 PM

## 2021-01-30 NOTE — FLOWSHEET NOTE
Discharge instruction reviewed with patient and wife Neha Dove while on speakerphone. Both verbalize understanding of all instructions. Dr. Tamiko Gomez aware of ongoing PVCs, continued but decreased since this morning.      Juan Davison RN 2:17 PM

## 2021-01-30 NOTE — DISCHARGE INSTR - ACTIVITY
Activity as tolerated. No heavy lifting or work. No strenuous exercise. No driving until seen in the office of your cardiac surgeon. Continue to ambulate in your home at least every 2 hours.

## 2021-02-01 NOTE — H&P
Patient was seen in pre-op. I have reviewed the history and physical.  I have examined the patient today. There are no changes noted from the H & P available in chart. The patient was counseled at length about the risks of nahun Covid-19 during their perioperative period and any recovery window from their procedure. The patient was made aware that nahun Covid-19  may worsen their prognosis for recovering from their procedure  and lend to a higher morbidity and/or mortality risk. All material risks, benefits, and reasonable alternatives including postponing the procedure were discussed. The patient does wish to proceed with the procedure at this time.

## 2021-02-01 NOTE — OP NOTE
Date of Procedure:   1/30/21    Surgeon: Devin Preston MD    Assistant: Lane Aviles MD    Preoperative Diagnosis: Carotid stenosis, left [I65.22]  Active Hospital Problems    Diagnosis Date Noted    Coronary artery stenosis [I25.10]      Priority: High    Carotid stenosis, left [I65.22] 01/29/2021    VHD (valvular heart disease) [I38]         Severe Carotid Stenosis with history of syncope, TIA, CVA     Postoperative Diagnosis: Carotid stenosis, left [I65.22]  Active Hospital Problems    Diagnosis Date Noted    Coronary artery stenosis [I25.10]      Priority: High    Carotid stenosis, left [I65.22] 01/29/2021    VHD (valvular heart disease) [I38]          Operation: Left Carotid Endarterectomy Cormatrix patch      Anesthesia: General    Findings: Heavily calcific, ulcerated stenosis on the takeoff of the internal carotid artery    Procedure:  Patient was brought to the operating room after preoperative discussion with patient and family and review of the investigations. Preoperative arterial line and transcranial oxygen saturation monitoring was established. Time out per check list confirmed  Under general anesthesia Left neck was prepared and draped. Incision was made along the anterior border of the sternomastoid muscle. The common carotid was dissected out circumferentially and dissected distally to the bifurcation. The internal carotid was dissected beyond the lesion. After heparinization clamps were placed on the internal followed by external and common carotid arteries in that order. Arteriotomy was made in the common carotid and carried through the plaque, into the internal beyond the lesion. The plaque was  shortly from the common carotid and removed smoothly from the internal and the external carotid. All the loose plaques were removed. The arteriotomy was closed using a CorMatrix patch with a continuous prolene suture.  Blood flow was established to the external followed by internal carotid arteries. Protamine was given to reverse the Heparin. Hemostasis was secured and confirmed throughout surgery. Wound was irrigated with antibiotic solution and closed in layers. Blood loss was 50 ml. Sponge and instrument count was correct before closure. Patient tolerated the procedure well. He was fully alert and awake without neurologic and cardiologic problems at the end of the procedure. We're optimistic that he will make a good recovery.

## 2021-02-02 NOTE — ANESTHESIA POSTPROCEDURE EVALUATION
Department of Anesthesiology  Postprocedure Note    Patient: Farhan Castillo  MRN: [id number]  YOB: 1950  Date of evaluation: 2/2/2021  Time:  7:11 AM     Procedure Summary     Date: 01/29/21 Room / Location: [address]    Anesthesia Start: 7497 Anesthesia Stop: 4985    Procedure: LEFT CAROTID ENDARTERECTOMY (Left Neck) Diagnosis: (LEFT CAROTID STENOSIS)    Surgeons: Trini Dang MD Responsible Provider: Kristin Che MD    Anesthesia Type: general ASA Status: 4          Anesthesia Type: general    Sanju Phase I: Sanju Score: 10    Sanju Phase II:      Last vitals: Reviewed and per EMR flowsheets.        Anesthesia Post Evaluation    Patient location during evaluation: PACU  Patient participation: waiting for patient participation  Level of consciousness: awake and alert  Pain score: 2  Airway patency: patent  Nausea & Vomiting: no nausea and no vomiting  Complications: no  Cardiovascular status: blood pressure returned to baseline  Respiratory status: acceptable  Hydration status: euvolemic

## 2021-02-04 DIAGNOSIS — I48.0 PAF (PAROXYSMAL ATRIAL FIBRILLATION) (HCC): ICD-10-CM

## 2021-02-04 DIAGNOSIS — I10 ESSENTIAL HYPERTENSION: ICD-10-CM

## 2021-02-04 RX ORDER — SIMVASTATIN 20 MG
20 TABLET ORAL NIGHTLY
Qty: 90 TABLET | Refills: 1 | OUTPATIENT
Start: 2021-02-04 | End: 2021-03-06

## 2021-02-04 RX ORDER — FERROUS SULFATE 325(65) MG
325 TABLET ORAL 2 TIMES DAILY
Qty: 180 TABLET | Refills: 5 | OUTPATIENT
Start: 2021-02-04

## 2021-02-09 ENCOUNTER — OFFICE VISIT (OUTPATIENT)
Dept: FAMILY MEDICINE CLINIC | Age: 71
End: 2021-02-09
Payer: MEDICARE

## 2021-02-09 VITALS
WEIGHT: 214.4 LBS | BODY MASS INDEX: 30.69 KG/M2 | TEMPERATURE: 96.8 F | OXYGEN SATURATION: 96 % | SYSTOLIC BLOOD PRESSURE: 124 MMHG | HEART RATE: 66 BPM | DIASTOLIC BLOOD PRESSURE: 62 MMHG | HEIGHT: 70 IN

## 2021-02-09 DIAGNOSIS — I65.22 CAROTID STENOSIS, LEFT: Primary | ICD-10-CM

## 2021-02-09 DIAGNOSIS — I35.0 AORTIC STENOSIS, SEVERE: ICD-10-CM

## 2021-02-09 DIAGNOSIS — I25.10 CORONARY ARTERY DISEASE INVOLVING NATIVE HEART WITHOUT ANGINA PECTORIS, UNSPECIFIED VESSEL OR LESION TYPE: ICD-10-CM

## 2021-02-09 PROCEDURE — 3017F COLORECTAL CA SCREEN DOC REV: CPT | Performed by: PHYSICIAN ASSISTANT

## 2021-02-09 PROCEDURE — 1111F DSCHRG MED/CURRENT MED MERGE: CPT | Performed by: PHYSICIAN ASSISTANT

## 2021-02-09 PROCEDURE — 99214 OFFICE O/P EST MOD 30 MIN: CPT | Performed by: PHYSICIAN ASSISTANT

## 2021-02-09 PROCEDURE — G8427 DOCREV CUR MEDS BY ELIG CLIN: HCPCS | Performed by: PHYSICIAN ASSISTANT

## 2021-02-09 PROCEDURE — 4040F PNEUMOC VAC/ADMIN/RCVD: CPT | Performed by: PHYSICIAN ASSISTANT

## 2021-02-09 PROCEDURE — G8417 CALC BMI ABV UP PARAM F/U: HCPCS | Performed by: PHYSICIAN ASSISTANT

## 2021-02-09 PROCEDURE — G8484 FLU IMMUNIZE NO ADMIN: HCPCS | Performed by: PHYSICIAN ASSISTANT

## 2021-02-09 PROCEDURE — 1123F ACP DISCUSS/DSCN MKR DOCD: CPT | Performed by: PHYSICIAN ASSISTANT

## 2021-02-09 PROCEDURE — 1036F TOBACCO NON-USER: CPT | Performed by: PHYSICIAN ASSISTANT

## 2021-02-09 RX ORDER — FERROUS SULFATE 325(65) MG
325 TABLET ORAL 2 TIMES DAILY
Qty: 180 TABLET | Refills: 1 | Status: ON HOLD | OUTPATIENT
Start: 2021-02-09 | End: 2021-03-24 | Stop reason: HOSPADM

## 2021-02-09 RX ORDER — GABAPENTIN 600 MG/1
600 TABLET ORAL 3 TIMES DAILY
Qty: 90 TABLET | Refills: 1 | Status: ON HOLD | OUTPATIENT
Start: 2021-02-09 | End: 2021-03-24 | Stop reason: HOSPADM

## 2021-02-09 RX ORDER — SIMVASTATIN 20 MG
TABLET ORAL
Qty: 90 TABLET | Refills: 1 | Status: SHIPPED | OUTPATIENT
Start: 2021-02-09 | End: 2021-04-07 | Stop reason: SDUPTHER

## 2021-02-09 RX ORDER — ALLOPURINOL 300 MG/1
300 TABLET ORAL DAILY
Qty: 90 TABLET | Refills: 1 | Status: ON HOLD | OUTPATIENT
Start: 2021-02-09 | End: 2021-02-24 | Stop reason: HOSPADM

## 2021-02-09 RX ORDER — GLIMEPIRIDE 4 MG/1
4 TABLET ORAL DAILY
Qty: 90 TABLET | Refills: 1 | Status: SHIPPED | OUTPATIENT
Start: 2021-02-09 | End: 2021-04-02 | Stop reason: ALTCHOICE

## 2021-02-09 RX ORDER — DULAGLUTIDE 1.5 MG/.5ML
1.5 INJECTION, SOLUTION SUBCUTANEOUS WEEKLY
Qty: 12 PEN | Refills: 1 | Status: SHIPPED | OUTPATIENT
Start: 2021-02-09 | End: 2021-08-04

## 2021-02-09 RX ORDER — ALLOPURINOL 100 MG/1
100 TABLET ORAL DAILY
Qty: 90 TABLET | Refills: 1 | Status: ON HOLD | OUTPATIENT
Start: 2021-02-09 | End: 2021-03-24 | Stop reason: HOSPADM

## 2021-02-09 NOTE — PROGRESS NOTES
2/9/2021    [facility]    Chief Complaint   Patient presents with    Follow-Up from [address] [place] [address]. , blocked arteries, 2-4-21 , having open heart surg , replacing heart valves 2-16-21    Medication Refill       HPI  History obtained from the patient. Leila Vazquez is a 66 y.o. male who presents today for hospital follow up. Patient underwent left carotid endarterectomy on 1/29/21. The patient states that he had an open heart surgery planned in which his mitral valve would be replaced and two bypasses would be done. However, before the operation, he was found to have carotid artery stenosis with 90% blockage on the left and 50% on right. He states that his open heart surgery has been rescheduled for 2/16/21. The patient states that he never did have any chest pain. His symptoms were extreme fatigue. He states that after 15-20 minutes of being active, he gets extremely exhausted, so he has to sit down and rest. He denies ever having chest pain or shortness of breath. He notes that his only medication change was increasing his dose of simvastatin to 20 mg daily and increasing iron supplements to two daily. REVIEW OF SYMPTOMS  Review of Systems   Constitutional: Negative for chills and fever. Respiratory: Negative for cough and shortness of breath. Gastrointestinal: Negative for diarrhea and vomiting.      PAST MEDICAL HISTORY  Past Medical History:   Diagnosis Date    Arrhythmia     Pacemaker placed aprox 5 years ago for A Fib per patient    Arthritis 12/2013    rt wrist    Atrial fibrillation ([facility] [address]     on Xarelto - Dr. Nettie Cuevas CAD (coronary artery disease) 06/18/2014    see dr Stephanie Truong kidney disease, stage III (moderate) 07/07/2016    Diabetes mellitus ([address]     dx 2004    Diabetic neuropathy associated with type 2 diabetes mellitus ([address] 04/23/2019    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    Gout 04/2019    \"got gout when had pacer put in because they did not give me my medication for gout \"    H/O 24 hour EKG monitoring 10/03/2013    no afib noted, sinsus rhythm    H/O cardiovascular stress test 05/12/2014    cardiolite- mild ischemia RCA EF50%    H/O echocardiogram 12/01/2020    EF 55-60% severe aortic stenosis mild to mod aortic regurg mod to severe tricuspid regurg severe pulm htn significant changes since 2018 echo.  H/O right and left heart catheterization 12/10/2020    DIFFUSE LAD DISEASE, Mild ECA Disease, Severe AS, Milf Pul HTN on RHC.  H/O transesophageal echocardiography (MABLE) for monitoring 08/05/2013    normal LV function and normal LA appendage without any clot    History of blood transfusion     History of transesophageal echocardiography (MABLE) 12/15/2020    Severe aortic stenosis (ANNA by planimetry: 0.778 cm sq). Mild AR.    [place] (hard of hearing)     hearing tonya aides    Hx of Doppler echocardiogram 05/21/2018    EF 50%  Mild LV hypertrophy. Mildly enlarged RA. Mod aortic valve calcification with mod AS. Mitral annular calcification is present. Mild AR, MR and TR. Mild pulmonary htn.     Hyperlipidemia     Hypertension     Follows with PCP & Dr. Elsy Fang Other disorders of kidney and ureter     Pacemaker     [employer], implanted 2014    Pneumonia 12/29/2012    Sleep apnea     dx 2013- has c-pap    Type II or unspecified type diabetes mellitus with other specified manifestations, uncontrolled 12/12/2012    Venous hypertension, chronic, with ulcer ([address] 12/12/2012    resolved       FAMILY HISTORY  Family History   Problem Relation Age of Onset    High Blood Pressure Mother     Arthritis Mother     Diabetes Mother     Heart Disease Mother     High Blood Pressure Father     Heart Disease Father     Kidney Disease Father        SOCIAL HISTORY  Social History     Socioeconomic History    Marital status:      Spouse name: Not on file    Number of children: Not on file    Years of education: Not on file    Highest education level: Not on file   Occupational History    Not on file   Social Needs    Financial resource strain: Not on file    Food insecurity     Worry: Not on file     Inability: Not on file    Transportation needs     Medical: Not on file     Non-medical: Not on file   Tobacco Use    Smoking status: Never Smoker    Smokeless tobacco: Never Used   Substance and Sexual Activity    Alcohol use:  Yes     Alcohol/week: 2.0 standard drinks     Types: 2 Cans of beer per week     Comment: average \"one time per week\"    Drug use: No    Sexual activity: Yes     Partners: Female     Comment:    Lifestyle    Physical activity     Days per week: Not on file     Minutes per session: Not on file    Stress: Not on file   Relationships    Social connections     Talks on phone: Not on file     Gets together: Not on file     Attends Congregation service: Not on file     Active member of club or organization: Not on file     Attends meetings of clubs or organizations: Not on file     Relationship status: Not on file    Intimate partner violence     Fear of current or ex partner: Not on file     Emotionally abused: Not on file     Physically abused: Not on file     Forced sexual activity: Not on file   Other Topics Concern    Not on file   Social History Narrative    Not on file        SURGICAL HISTORY  Past Surgical History:   Procedure Laterality Date    CARDIOVERSION  12/14    at [address] Left 1/29/2021    LEFT CAROTID ENDARTERECTOMY performed by Russel Hernández MD at [address] [place] [address]  2017    COLONOSCOPY  2011    COLONOSCOPY N/A 11/19/2019    COLONOSCOPY DIAGNOSTIC performed by Omi Morales MD at [place] [address]  09/30/2020    POSSIBLE CECAL avms, SIGMOID DIVERTICULOSIS, INTERNAL HEMORRHOIDS GRADE 1    COLONOSCOPY N/A 9/30/2020    COLONOSCOPY CONTROL HEMORRHAGE WITH APC performed by Omi Morales MD at [place] [address] \"2 stents put in \"   [address]  2004    total left hip    OTHER SURGICAL HISTORY Right 12/02/2017    I&D; evacuation of hematoma right hip    OTHER SURGICAL HISTORY  09/17/2020    enteroscopy    PACEMAKER PLACEMENT      9/18/14 Status post remote permanent pacemaker with atrial lead dislodgement. 7/24/14 PPM Implant    UPPER GASTROINTESTINAL ENDOSCOPY N/A 9/17/2020    ENTEROSCOPY PUSH BIOPSY performed by Wendy Pyle MD at [address]  2012    \"have stents in both legs- done in [facility]  Current Outpatient Medications   Medication Sig Dispense Refill    ferrous sulfate (IRON 325) 325 (65 Fe) MG tablet Take 1 tablet by mouth 2 times daily 180 tablet 1    allopurinol (ZYLOPRIM) 100 MG tablet Take 1 tablet by mouth daily 90 tablet 1    allopurinol (ZYLOPRIM) 300 MG tablet Take 1 tablet by mouth daily 90 tablet 1    canagliflozin (INVOKANA) 300 MG TABS tablet Take 1 tablet by mouth every morning (before breakfast) 90 tablet 1    Dulaglutide (TRULICITY) 1.5 HN/5.1VJ SOPN Inject 1.5 mg into the skin once a week 12 pen 1    gabapentin (NEURONTIN) 600 MG tablet Take 1 tablet by mouth 3 times daily for 270 days.  90 tablet 1    glimepiride (AMARYL) 4 MG tablet Take 1 tablet by mouth daily 90 tablet 1    metFORMIN (GLUCOPHAGE) 1000 MG tablet TAKE 1 TABLET TWICE DAILY  WITH MEALS 180 tablet 1    simvastatin (ZOCOR) 20 MG tablet Pt takes 10 mg daily 90 tablet 1    rivaroxaban (XARELTO) 20 MG TABS tablet TAKE 1 TABLET DAILY WITH   BREAKFAST 90 tablet 3    acetaminophen (AMINOFEN) 325 MG tablet Take 2 tablets by mouth every 6 hours as needed for Pain 30 tablet 0    Cholecalciferol (VITAMIN D) 50 MCG (2000 UT) CAPS capsule Take by mouth daily      sildenafil (VIAGRA) 100 MG tablet TAKE 1 TABLET DAILY AS     NEEDED FOR ERECTILE        DYSFUNCTION 30 tablet 1    valsartan (DIOVAN) 40 MG tablet Take 40mg daily 90 tablet 1    metoprolol succinate (TOPROL XL) 25 MG extended release tablet Take 1 tablet by mouth daily 90 tablet 3    furosemide (LASIX) 20 MG tablet Take 1 tablet by mouth 2 times daily May take an extra Lasix if has increased swelling 180 tablet 3    aspirin 81 MG tablet Take 81 mg by mouth daily       No current facility-administered medications for this visit. ALLERGIES  Allergies   Allergen Reactions    Spironolactone      CAUSES INCREASED K+    Tape [Adhesive Tape] Rash     SURGICAL TAPE       RECENT LABS    Lab Results   Component Value Date    LABA1C 5.9 01/21/2021     Lab Results   Component Value Date     01/21/2021       Lab Results   Component Value Date    CHOL 91 12/18/2020    CHOL 95 07/07/2020    CHOL 82 06/16/2020     Lab Results   Component Value Date    LDLCALC 35 12/18/2020    LDLCALC 37 07/07/2020    [address] 06/16/2020       Lab Results   Component Value Date    WBC 6.4 01/30/2021    HGB 9.3 (L) 01/30/2021    HCT 33.1 (L) 01/30/2021    MCV 80.1 01/30/2021     01/30/2021       PHYSICAL EXAM  /62   Pulse 66   Temp 96.8 °F (36 °C)   Ht 5' 10\" (1.778 m)   Wt 214 lb 6.4 oz (97.3 kg)   SpO2 96%   BMI 30.76 kg/m²     Physical Exam  Constitutional:       Appearance: Normal appearance. HENT:      Head: Normocephalic and atraumatic. Eyes:      Comments: EOM grossly intact. Neck:      Comments: Incision over left carotid clean, dry, intact. Healing well. Cardiovascular:      Rate and Rhythm: Normal rate and regular rhythm. Heart sounds: Murmur present. No friction rub. No gallop. Pulmonary:      Effort: Pulmonary effort is normal.      Breath sounds: Normal breath sounds. No wheezing, rhonchi or rales. Skin:     General: Skin is warm and dry. Neurological:      Mental Status: He is alert and oriented to person, place, and time.       Comments: Cranial nerves II-XII grossly intact   Psychiatric:         Mood and Affect: Mood normal.         Behavior: Behavior normal.         ASSESSMENT & PLAN  1. Carotid stenosis, left  Healing well from left carotid endarterectomy. 2. Coronary artery disease involving native heart without angina pectoris, unspecified vessel or lesion type  Patient has an open heart surgery scheduled for 2/16/21 to have his valve replaced and two bypasses. - simvastatin (ZOCOR) 20 MG tablet; Pt takes 10 mg daily  Dispense: 90 tablet; Refill: 1    3. Aortic stenosis, severe  Patient has an open heart surgery scheduled for 2/16/21 to have his valve replaced and two bypasses. No follow-ups on file.             Electronically signed by Rafael Pineda PA-C on 2/9/2021

## 2021-02-10 ENCOUNTER — ANESTHESIA EVENT (OUTPATIENT)
Dept: OPERATING ROOM | Age: 71
DRG: 219 | End: 2021-02-10
Payer: MEDICARE

## 2021-02-10 NOTE — ANESTHESIA PRE PROCEDURE
Department of Anesthesiology  Preprocedure Note       Name:  Soo Marc   Age:  66 y.o.  :  1950                                          MRN:  [id number]         Date:  2/10/2021      Surgeon: Tish Montelongo):  Felipa Howard MD    Procedure: Procedure(s):  CABG CORONARY ARTERY BYPASS  AORTIC VALVE REPAIR    Medications prior to admission:   Prior to Admission medications    Medication Sig Start Date End Date Taking? Authorizing Provider   Cholecalciferol (VITAMIN D) 50 MCG (2000) CAPS capsule Take by mouth daily   Yes Historical Provider, MD   sildenafil (VIAGRA) 100 MG tablet TAKE 1 TABLET DAILY AS     NEEDED FOR ERECTILE        DYSFUNCTION 10/9/20  Yes Amanda Mireles MD   valsartan (DIOVAN) 40 MG tablet Take 40mg daily 20  Yes Amanda Mireles MD   metoprolol succinate (TOPROL XL) 25 MG extended release tablet Take 1 tablet by mouth daily 3/20/20  Yes Esa Hernandez MD   furosemide (LASIX) 20 MG tablet Take 1 tablet by mouth 2 times daily May take an extra Lasix if has increased swelling 3/20/20  Yes Bar Carroll MD   aspirin 81 MG tablet Take 81 mg by mouth daily   Yes Historical Provider, MD   ferrous sulfate (IRON 325) 325 (65 Fe) MG tablet Take 1 tablet by mouth 2 times daily 21   Yessenia Rey PA-C   allopurinol (ZYLOPRIM) 100 MG tablet Take 1 tablet by mouth daily 21   Yessenia Rey PA-C   allopurinol (ZYLOPRIM) 300 MG tablet Take 1 tablet by mouth daily 21   Yessenia Rey PA-C   canagliflozin [facility]) 300 MG TABS tablet Take 1 tablet by mouth every morning (before breakfast) 21   Yessenia Rey PA-C   Dulaglutide (TRULICITY) 1.5 GD/4.4SH SOPN Inject 1.5 mg into the skin once a week 21   Yessenia Rey PA-C   gabapentin (NEURONTIN) 600 MG tablet Take 1 tablet by mouth 3 times daily for 270 days.  21  Yessenia Rey PA-C   glimepiride (AMARYL) 4 MG tablet Take 1 tablet by mouth daily 21   Yessenia Rey PA-C   metFORMIN (GLUCOPHAGE) 1000 MG tablet TAKE 1 TABLET TWICE DAILY  WITH MEALS 2/9/21   Palmira Douglas, PA-C   simvastatin (ZOCOR) 20 MG tablet Pt takes 10 mg daily 2/9/21   Palmira Douglas, PA-C   rivaroxaban (XARELTO) 20 MG TABS tablet TAKE 1 TABLET DAILY WITH   BREAKFAST 2/9/21   Palmira Douglas, PA-C   acetaminophen (AMINOFEN) 325 MG tablet Take 2 tablets by mouth every 6 hours as needed for Pain 1/30/21   Judy Middleton MD       Current medications:    Current Outpatient Medications   Medication Sig Dispense Refill    Cholecalciferol (VITAMIN D) 50 MCG (2000 UT) CAPS capsule Take by mouth daily      sildenafil (VIAGRA) 100 MG tablet TAKE 1 TABLET DAILY AS     NEEDED FOR ERECTILE        DYSFUNCTION 30 tablet 1    valsartan (DIOVAN) 40 MG tablet Take 40mg daily 90 tablet 1    metoprolol succinate (TOPROL XL) 25 MG extended release tablet Take 1 tablet by mouth daily 90 tablet 3    furosemide (LASIX) 20 MG tablet Take 1 tablet by mouth 2 times daily May take an extra Lasix if has increased swelling 180 tablet 3    aspirin 81 MG tablet Take 81 mg by mouth daily      ferrous sulfate (IRON 325) 325 (65 Fe) MG tablet Take 1 tablet by mouth 2 times daily 180 tablet 1    allopurinol (ZYLOPRIM) 100 MG tablet Take 1 tablet by mouth daily 90 tablet 1    allopurinol (ZYLOPRIM) 300 MG tablet Take 1 tablet by mouth daily 90 tablet 1    canagliflozin (INVOKANA) 300 MG TABS tablet Take 1 tablet by mouth every morning (before breakfast) 90 tablet 1    Dulaglutide (TRULICITY) 1.5 HUSEYIN/6.6VV SOPN Inject 1.5 mg into the skin once a week 12 pen 1    gabapentin (NEURONTIN) 600 MG tablet Take 1 tablet by mouth 3 times daily for 270 days.  90 tablet 1    glimepiride (AMARYL) 4 MG tablet Take 1 tablet by mouth daily 90 tablet 1    metFORMIN (GLUCOPHAGE) 1000 MG tablet TAKE 1 TABLET TWICE DAILY  WITH MEALS 180 tablet 1    simvastatin (ZOCOR) 20 MG tablet Pt takes 10 mg daily 90 tablet 1    rivaroxaban (XARELTO) 20 MG TABS tablet TAKE 1 TABLET DAILY WITH BREAKFAST 90 tablet 3    acetaminophen (AMINOFEN) 325 MG tablet Take 2 tablets by mouth every 6 hours as needed for Pain 30 tablet 0     No current facility-administered medications for this encounter. Allergies: Allergies   Allergen Reactions    Spironolactone      CAUSES INCREASED K+    Tape Evalene Irasema Tape] Rash     SURGICAL TAPE       Problem List:    Patient Active Problem List   Diagnosis Code    Type 2 diabetes mellitus without complication, without long-term current use of insulin ([facility]) E11.9    Ulcer of other part of lower limb L97.809    Venous hypertension, chronic, with ulcer ([facility] I87.319, L97.909    Ulcer of other part of foot L97.509    Pneumonia J18.9    Atrial fibrillation ([facility]) I48.91    Sinus pause I45.5    PAF (paroxysmal atrial fibrillation) ([facility]) I48.0    DM (diabetes mellitus) ([facility]) E11.9    DMITRIY on CPAP G47.33, Z99.89    Hyperlipidemia E78.5    Status post incision and drainage Z98.890    CKD (chronic kidney disease) stage 3, GFR 30-59 ml/min ([facility]) N18.30    Hyperpotassemia E87.5    Arthritis M19.90    PVD (peripheral vascular disease) ([facility]) I73.9    Hematoma T14. 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 Dann Ferreira CAD (coronary artery disease) 06/18/2014    see dr Dann Ferreira Chronic kidney disease, stage III (moderate) 07/07/2016    Diabetes mellitus ([facility]     dx 2004    Diabetic neuropathy associated with type 2 diabetes mellitus ([facility] 04/23/2019    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    Gout 04/2019    \"got gout when had pacer put in because they did not give me my medication for gout \"    H/O 24 hour EKG monitoring 10/03/2013    no afib noted, sinsus rhythm    H/O cardiovascular stress test 05/12/2014    cardiolite- mild ischemia RCA EF50%    H/O echocardiogram 12/01/2020    EF 55-60% severe aortic stenosis mild to mod aortic regurg mod to severe tricuspid regurg severe pulm htn significant changes since 2018 echo.  H/O right and left heart catheterization 12/10/2020    DIFFUSE LAD DISEASE, Mild ECA Disease, Severe AS, Milf Pul HTN on RHC.  H/O transesophageal echocardiography (MABLE) for monitoring 08/05/2013    normal LV function and normal LA appendage without any clot    History of blood transfusion     History of transesophageal echocardiography (MABLE) 12/15/2020    Severe aortic stenosis (ANNA by planimetry: 0.778 cm sq). Mild AR.    [place] (hard of hearing)     hearing tonya aides    Hx of Doppler echocardiogram 05/21/2018    EF 50%  Mild LV hypertrophy. Mildly enlarged RA. Mod aortic valve calcification with mod AS. Mitral annular calcification is present. Mild AR, MR and TR. Mild pulmonary htn.     Hyperlipidemia     Hypertension     Follows with PCP & Dr. Jason Herrera Other disorders of kidney and ureter     Pacemaker     Medtronic, implanted 2014    Pneumonia 12/29/2012    Sleep apnea     dx 2013- has c-pap    Type II or unspecified type diabetes mellitus with other specified manifestations, uncontrolled 12/12/2012    Venous hypertension, chronic, with ulcer ([address] 12/12/2012    resolved       Past Surgical History:        Procedure Laterality Date    CARDIOVERSION  12/14    at [address] Left 1/29/2021    LEFT CAROTID ENDARTERECTOMY performed by Eddy Ricardo MD at [facility]  2017    COLONOSCOPY  2011    COLONOSCOPY N/A 11/19/2019    COLONOSCOPY DIAGNOSTIC performed by Aly Walsh MD at [facility]  09/30/2020    POSSIBLE CECAL avms, SIGMOID DIVERTICULOSIS, INTERNAL HEMORRHOIDS GRADE 1    COLONOSCOPY N/A 9/30/2020    COLONOSCOPY CONTROL HEMORRHAGE WITH APC performed by Aly Walsh MD at [address] [facility]  2014    \"2 stents put in \"   [address]/ [address]  2004    total left hip    OTHER SURGICAL HISTORY Right 12/02/2017    I&D; evacuation of hematoma right hip    OTHER SURGICAL HISTORY  09/17/2020    enteroscopy    PACEMAKER PLACEMENT      9/18/14 Status post remote permanent pacemaker with atrial lead dislodgement. 7/24/14 PPM Implant    UPPER GASTROINTESTINAL ENDOSCOPY N/A 9/17/2020    ENTEROSCOPY PUSH BIOPSY performed by Aly Walsh MD at [address]  2012    \"have stents in both legs- done in [place]       Social History:    Social History     Tobacco Use    Smoking status: Never Smoker    Smokeless tobacco: Never Used   Substance Use Topics    Alcohol use:  Yes     Alcohol/week: 2.0 standard drinks     Types: 2 Cans of beer per week     Comment: average \"one time per week\"                                Counseling given: Not Answered      Vital Signs (Current):   Vitals:    01/21/21 0825 01/21/21 0826   BP: 130/69 119/63   Pulse: 86 65   Resp: 16 16   Temp: 36.6 °C (97.9 °F)    TempSrc: Temporal    SpO2: 96% 98%   Weight: 210 lb (95.3 kg)    Height: 5' 10\" (1.778 m)                                               BP Readings from Last 3 Encounters:   02/09/21 124/62   02/08/21 134/82   01/30/21 (!) 105/51       NPO Status:                                                                                 BMI:   Wt Readings from Last 3 Encounters:   02/09/21 214 lb 6.4 oz (97.3 kg)   02/08/21 218 lb (98.9 (+) hypertension (on beta blocker and ASA):, valvular problems/murmurs (severe AS, mean gradient of 43 mmhg): AS and AI, pacemaker (medtronic implanted 2014, last interrogated: 12/2020, DDDR mode, 96% , dep. EBL: 2 years ): pacemaker and dependent, CAD:, CABG/stent (stents x 2, 2014 ):, dysrhythmias (on Xarelto. Last dose 2/11/2021 s/p cardioversion, 2014): atrial fibrillation, pulmonary hypertension ( 88 mmHg per echo ): severe, hyperlipidemia        Rhythm: regular  Rate: normal           Beta Blocker:  Dose within 24 Hrs      ROS comment: Cardiac cath 1/2021   SIGNIFICANT LAD DISEASE    MABLE 1/2021   PPM wiring visualized within the right heart. Severe aortic stenosis (ANNA by planimetry: 0.778 cm sq). Mild aortic regurgitation. There was no thrombus noted in the left atrial appendage. Negative bubble study. No PFO or ASD noted. Echo 12/2020  EF 55-60%   Left ventricular systolic function is normal    Mild concentric left ventricular hypertrophy. Moderately dilated left atrium. Mildly dilated right side with mildly hypokinetic right ventricular  function. Calcific aortic valve with severe aortic stenosis, mean gradient of 43 mmhg and an ANNA of 0.99 cm sq. Mild to moderate aortic regurgitation with PHT of 453 msec. Mitral annular calcification is present with increased mean pressure   gradient of 4 mmHg. Moderate to severe tricuspid regurgitation. Pacer wire noted on right side passing through tricuspid valve. Severe pulmonary hypertension at 88 mmHg. No evidence of pericardial effusion. Significant changes since 2018 echo. ov to discuss   schedule rhc and lhc       CP or SOB: NO   Exercise: NO     Neuro/Psych:                ROS comment: HEENT:   [place] tonya hearing aids  \  Mini-cog evaluation performed per anesthesia protocol,    > age 70. Scored: 3/5  Copy on chart for review. GI/Hepatic/Renal:   (+) renal disease (CKD, stage III. Baseline CR:  1.6-1.8.   PAT CR:  1.7, BUN: 54, 2/12/2021. K+ 5.4, recheck ordered DOS. Followed by Dr. Nora Tellez): CRI,          ROS comment: S/p barbara. Endo/Other:    (+) Diabetes (A1c 5.9,  1/2021)Type II DM, , blood dyscrasia (STEVEN): anemia and anticoagulation therapy, arthritis (hips s/p left RODNEY, 2004): OA., electrolyte abnormalities, . Pt had PAT visit. ROS comment: Hx gout, on allopurinol Abdominal:   (+) obese,         Vascular:   + PVD, aortic or cerebral (hx venous hypertension with leg ulcer, 2012, resolved   PAD s/p tonya leg stents, 2012 ), .        ROS comment: Carotid stenosis left ICA 80-90%  s/p left CEA, 1/29/2021  Right ICA 40-50%. Anesthesia Plan      general     ASA 4       Induction: intravenous. arterial line, central line, CVP, PA catheter and MABLE  MIPS: Postoperative opioids intended and Prophylactic antiemetics administered. Anesthetic plan and risks discussed with patient. Use of blood products discussed with patient whom consented to blood products. Plan discussed with CRNA. Attending anesthesiologist reviewed and agrees with Pre Eval content            JUSTIN Childress - CNP Chart reviewed and pt. Interviewed. Anesthesia Evaluation will follow by a certified practitioner prior to surgery. 2/10/2021      Pre Anesthesia Evaluation complete. Anesthesia plan, risks, benefits, alternatives, and personnel discussed with patient and/or legal guardian. Patient and/or legal guardian verbalized an understanding and agreed to proceed. Anesthesia plan discussed with care team members and agreed upon.   JUSTIN Zapata - BRADLY  2/16/2021

## 2021-02-12 ENCOUNTER — HOSPITAL ENCOUNTER (OUTPATIENT)
Dept: PREADMISSION TESTING | Age: 71
Discharge: HOME | End: 2021-02-16
Payer: MEDICARE

## 2021-02-12 ENCOUNTER — HOSPITAL ENCOUNTER (OUTPATIENT)
Dept: GENERAL RADIOLOGY | Age: 71
Discharge: HOME | End: 2021-02-12
Payer: MEDICARE

## 2021-02-12 ENCOUNTER — HOSPITAL ENCOUNTER (OUTPATIENT)
Dept: ULTRASOUND IMAGING | Age: 71
End: 2021-02-12
Payer: MEDICARE

## 2021-02-12 ENCOUNTER — HOSPITAL ENCOUNTER (OUTPATIENT)
Dept: PULMONOLOGY | Age: 71
Discharge: HOME | End: 2021-02-12
Payer: MEDICARE

## 2021-02-12 ENCOUNTER — HOSPITAL ENCOUNTER (OUTPATIENT)
Dept: ULTRASOUND IMAGING | Age: 71
Discharge: HOME | End: 2021-02-12
Payer: MEDICARE

## 2021-02-12 VITALS
DIASTOLIC BLOOD PRESSURE: 60 MMHG | HEART RATE: 78 BPM | BODY MASS INDEX: 30.92 KG/M2 | TEMPERATURE: 97.8 F | SYSTOLIC BLOOD PRESSURE: 119 MMHG | RESPIRATION RATE: 16 BRPM | OXYGEN SATURATION: 95 % | HEIGHT: 70 IN | WEIGHT: 216 LBS

## 2021-02-12 DIAGNOSIS — Z01.818 PREOP TESTING: Primary | ICD-10-CM

## 2021-02-12 LAB
ANION GAP SERPL CALCULATED.3IONS-SCNC: 12 MMOL/L (ref 4–16)
BACTERIA: NEGATIVE /HPF
BASOPHILS ABSOLUTE: 0.1 K/CU MM
BASOPHILS RELATIVE PERCENT: 1.1 % (ref 0–1)
BILIRUBIN URINE: NEGATIVE MG/DL
BLOOD, URINE: ABNORMAL
BUN BLDV-MCNC: 54 MG/DL (ref 6–23)
CALCIUM SERPL-MCNC: 8.8 MG/DL (ref 8.3–10.6)
CHLORIDE BLD-SCNC: 96 MMOL/L (ref 99–110)
CLARITY: CLEAR
CO2: 26 MMOL/L (ref 21–32)
COLOR: YELLOW
CREAT SERPL-MCNC: 1.7 MG/DL (ref 0.9–1.3)
DIFFERENTIAL TYPE: ABNORMAL
EOSINOPHILS ABSOLUTE: 0.6 K/CU MM
EOSINOPHILS RELATIVE PERCENT: 6.6 % (ref 0–3)
GFR AFRICAN AMERICAN: 48 ML/MIN/1.73M2
GFR NON-AFRICAN AMERICAN: 40 ML/MIN/1.73M2
GLUCOSE BLD-MCNC: 173 MG/DL (ref 70–99)
GLUCOSE, URINE: >500 MG/DL
HCT VFR BLD CALC: 38.2 % (ref 42–52)
HEMOGLOBIN: 10.4 GM/DL (ref 13.5–18)
HYALINE CASTS: 2 /LPF
IMMATURE NEUTROPHIL %: 0.8 % (ref 0–0.43)
INR BLD: 2.01 INDEX
KETONES, URINE: NEGATIVE MG/DL
LEUKOCYTE ESTERASE, URINE: NEGATIVE
LYMPHOCYTES ABSOLUTE: 1.5 K/CU MM
LYMPHOCYTES RELATIVE PERCENT: 15.9 % (ref 24–44)
MAGNESIUM: 2.6 MG/DL (ref 1.8–2.4)
MCH RBC QN AUTO: 23.1 PG (ref 27–31)
MCHC RBC AUTO-ENTMCNC: 27.2 % (ref 32–36)
MCV RBC AUTO: 84.9 FL (ref 78–100)
MONOCYTES ABSOLUTE: 0.9 K/CU MM
MONOCYTES RELATIVE PERCENT: 8.8 % (ref 0–4)
MUCUS: ABNORMAL HPF
NITRITE URINE, QUANTITATIVE: NEGATIVE
NUCLEATED RBC %: 0 %
PDW BLD-RTO: 19.9 % (ref 11.7–14.9)
PH, URINE: 6 (ref 5–8)
PLATELET # BLD: 242 K/CU MM (ref 140–440)
PMV BLD AUTO: 9.6 FL (ref 7.5–11.1)
POTASSIUM SERPL-SCNC: 5.4 MMOL/L (ref 3.5–5.1)
PROTEIN UA: NEGATIVE MG/DL
PROTHROMBIN TIME: 24.5 SECONDS (ref 11.7–14.5)
RBC # BLD: 4.5 M/CU MM (ref 4.6–6.2)
RBC URINE: 2 /HPF (ref 0–3)
SARS-COV-2, NAAT: NOT DETECTED
SEGMENTED NEUTROPHILS ABSOLUTE COUNT: 6.4 K/CU MM
SEGMENTED NEUTROPHILS RELATIVE PERCENT: 66.8 % (ref 36–66)
SODIUM BLD-SCNC: 134 MMOL/L (ref 135–145)
SOURCE: NORMAL
SPECIFIC GRAVITY UA: 1.01 (ref 1–1.03)
TOTAL IMMATURE NEUTOROPHIL: 0.08 K/CU MM
TOTAL NUCLEATED RBC: 0 K/CU MM
TRICHOMONAS: ABNORMAL /HPF
UROBILINOGEN, URINE: NEGATIVE MG/DL (ref 0.2–1)
WBC # BLD: 9.6 K/CU MM (ref 4–10.5)
WBC UA: 1 /HPF (ref 0–2)

## 2021-02-12 PROCEDURE — 83735 ASSAY OF MAGNESIUM: CPT

## 2021-02-12 PROCEDURE — 93971 EXTREMITY STUDY: CPT

## 2021-02-12 PROCEDURE — 80048 BASIC METABOLIC PNL TOTAL CA: CPT

## 2021-02-12 PROCEDURE — 71046 X-RAY EXAM CHEST 2 VIEWS: CPT

## 2021-02-12 PROCEDURE — 85025 COMPLETE CBC W/AUTO DIFF WBC: CPT

## 2021-02-12 PROCEDURE — U0002 COVID-19 LAB TEST NON-CDC: HCPCS

## 2021-02-12 PROCEDURE — 85610 PROTHROMBIN TIME: CPT

## 2021-02-12 PROCEDURE — 86900 BLOOD TYPING SEROLOGIC ABO: CPT

## 2021-02-12 PROCEDURE — 87081 CULTURE SCREEN ONLY: CPT

## 2021-02-12 PROCEDURE — 36415 COLL VENOUS BLD VENIPUNCTURE: CPT

## 2021-02-12 PROCEDURE — 81001 URINALYSIS AUTO W/SCOPE: CPT

## 2021-02-12 PROCEDURE — 86850 RBC ANTIBODY SCREEN: CPT

## 2021-02-12 RX ORDER — CHLORHEXIDINE GLUCONATE 0.12 MG/ML
30 RINSE ORAL ONCE
Status: CANCELLED | OUTPATIENT
Start: 2021-02-16

## 2021-02-12 RX ORDER — SIMVASTATIN 40 MG
40 TABLET ORAL ONCE
Status: CANCELLED | OUTPATIENT
Start: 2021-02-16

## 2021-02-12 RX ORDER — CARVEDILOL 3.12 MG/1
3.12 TABLET ORAL ONCE
Status: CANCELLED | OUTPATIENT
Start: 2021-02-16

## 2021-02-12 NOTE — PROGRESS NOTES
Chart reviewed by cardiac rehab nurse. This nurse had PAT'd patient last month but surgery had to be canceled due to his left carotid stenosis and the need for surgery before going through with this surgery. Met patient in lobby. Re-introduced myself and teaching plan. Patient's planned procedure is CABG/AVR. Teaching done on A&P of the heart and heart valves, as well as the formation of CAD and valve disease. Explained the surgical process, Pre-Op,Inter-Op and Post-Op. Discussed length of stay and recovery. Explanation given of pre op routine tests, lines/tubes/equipment, and infection control. Educated on weaning from ventilator, medication and equipment to expect, on progressive activity, post op pain, and how it will be managed. Encouraged pt to communicate about pain in order to create a partnership for his recovery success. Discussed importance of coughing and deep breathing and sternal precautions. Introduced multidisciplinary approach and daily routine in CVICU. Pt verbalized commitment to recovery program.        Teaching done on post discharge recovery, activity guidelines, and weight monitoring. Discussed follow up appointments, possibility of ECF, role of home health, and family involvement. Pt lives with his spouse who will be assisting with his care when he returns home. Introduced benefits of out patient cardiac rehab after appropriate time frame. Given Understanding Heart Surgery book. Escorted to main exit with all personal belongings as well as items given to patient from facility for upcoming surgery.

## 2021-02-12 NOTE — PROGRESS NOTES
Cardiac Surgery Risk Factor Worksheet      STS Criteria  Possible Score Yes/No Score ICD 10 Notes   Emergency Case 6 No      Serum Creatinine      BUN 54  CR 1.7   >1.2 0 No      >1.6 to <1.8mg/dl 1 Yes 1     >.1.9 4 No      Acute/Chronic Renal Failure/Renal Insufficiency 0   Yes 0 GFR  Stage of CKD GFR 40  STAGE 3 CKD   Left Ventricular Dysfunction(EF<40%) 3   No   EF 55-60%   Operative Mitral Valve Insufficiency 3   No      Reoperation 3 No      Age >71 and <74 years 1  Yes   1  66 YOA   Age >75 years 2 No      Prior Vascular Surgery 2   No      COPD +History of Patient Use of Bronchodilators 2   No  Acuity of  COPD PNEUMONIA AND COVID AUGUST 2020   COPD 0 No   PFT STUDY:  MILD RESTRICTIVE   FEV1 83   Current Smoker of History of Smoking  0   No   NEVER SMOKER   Anemia (Hemotocrit<0.34) 2   No   HGB 10.4  HCT 38.2     ASA / Anticoagulants/ Bleeding Tendiencies / Antiplatelets 0   Yes 0  XARELTO STOPPED 2/11/2021  PT 24.5  INR 2.01   Operative Aortic Valve Stenosis 1 Yes 1  AVR     Weight<143 lbs (  BMI<18.5) 1   No  Obesity with  BMI    Weight >200 lbs (BMI >30    0   Yes 0  HT 5'10\"/177.8CM  WT 216LBS/98KG  BMI 30.99   Diabetes Oral or Insulin Therapy 1   Yes 1 Type & Control HGB A1C 5.9  CLUCOSE 173    Cerebrovascular Disease 1   Yes 1  CAROTID US:  R 0-50  L 70-99   left Carotid endarterectomy1/29/2021    Impaired Mobility 0 Yes 0  Looses balance easily   Walker/Cane/Wheelchair 0 No   cane   Recent MI 0 No      Hypertension 0 Yes 0     Peripheral Vascular Disease 0 Yes   DM Neuropathy   Lives Alone 0 No   SpouseDonell Conn   Other (GI Auto Immune Hepatic etc) 0 No  Malnutrition &   Acuity    CHF & Type & Acuity    TOTAL   5     Note The surgeon must be notified for all risk scores >7    Notified by Kelechi Stone, RN   2/12/2021

## 2021-02-15 LAB
CULTURE: NORMAL
Lab: NORMAL
SPECIMEN: NORMAL

## 2021-02-16 ENCOUNTER — APPOINTMENT (OUTPATIENT)
Dept: GENERAL RADIOLOGY | Age: 71
DRG: 219 | End: 2021-02-16
Attending: THORACIC SURGERY (CARDIOTHORACIC VASCULAR SURGERY)
Payer: MEDICARE

## 2021-02-16 ENCOUNTER — HOSPITAL ENCOUNTER (INPATIENT)
Age: 71
LOS: 8 days | Discharge: HOME | DRG: 219 | End: 2021-02-24
Attending: THORACIC SURGERY (CARDIOTHORACIC VASCULAR SURGERY) | Admitting: THORACIC SURGERY (CARDIOTHORACIC VASCULAR SURGERY)
Payer: MEDICARE

## 2021-02-16 ENCOUNTER — ANESTHESIA (OUTPATIENT)
Dept: OPERATING ROOM | Age: 71
DRG: 219 | End: 2021-02-16
Payer: MEDICARE

## 2021-02-16 VITALS
DIASTOLIC BLOOD PRESSURE: 62 MMHG | OXYGEN SATURATION: 96 % | RESPIRATION RATE: 12 BRPM | SYSTOLIC BLOOD PRESSURE: 106 MMHG

## 2021-02-16 PROBLEM — I25.10 CAD IN NATIVE ARTERY: Status: ACTIVE | Noted: 2021-02-16

## 2021-02-16 LAB
ANION GAP SERPL CALCULATED.3IONS-SCNC: 14 MMOL/L (ref 4–16)
APTT: 33 SECONDS (ref 25.1–37.1)
BASE EXCESS MIXED: 0 (ref 0–1.2)
BASE EXCESS MIXED: 0.1 (ref 0–1.2)
BASE EXCESS MIXED: 0.1 (ref 0–1.2)
BASE EXCESS MIXED: 1 (ref 0–1.2)
BASE EXCESS MIXED: 1 (ref 0–1.2)
BASE EXCESS MIXED: 1.3 (ref 0–1.2)
BASE EXCESS MIXED: 1.4 (ref 0–1.2)
BASE EXCESS MIXED: 1.6 (ref 0–1.2)
BASE EXCESS MIXED: 1.6 (ref 0–1.2)
BASE EXCESS MIXED: 1.8 (ref 0–1.2)
BASE EXCESS MIXED: 1.8 (ref 0–1.2)
BASE EXCESS MIXED: 2.1 (ref 0–1.2)
BASE EXCESS MIXED: 2.2 (ref 0–1.2)
BASE EXCESS MIXED: 2.2 (ref 0–1.2)
BASE EXCESS MIXED: 3.1 (ref 0–1.2)
BASE EXCESS MIXED: 3.1 (ref 0–1.2)
BASE EXCESS MIXED: 3.3 (ref 0–1.2)
BASE EXCESS MIXED: 3.9 (ref 0–1.2)
BASE EXCESS: ABNORMAL (ref 0–3.3)
BUN BLDV-MCNC: 41 MG/DL (ref 6–23)
CALCIUM SERPL-MCNC: 8.8 MG/DL (ref 8.3–10.6)
CHLORIDE BLD-SCNC: 107 MMOL/L (ref 99–110)
CO2 CONTENT: 26.6 MMOL/L (ref 19–24)
CO2: 21 MMOL/L (ref 21–32)
CO2: 24 MMOL/L (ref 21–32)
CO2: 24 MMOL/L (ref 21–32)
CO2: 25 MMOL/L (ref 21–32)
CO2: 25 MMOL/L (ref 21–32)
CO2: 26 MMOL/L (ref 21–32)
CO2: 27 MMOL/L (ref 21–32)
CO2: 28 MMOL/L (ref 21–32)
CO2: 30 MMOL/L (ref 21–32)
CREAT SERPL-MCNC: 1.8 MG/DL (ref 0.9–1.3)
GFR AFRICAN AMERICAN: 34 ML/MIN/1.73M2
GFR AFRICAN AMERICAN: 36 ML/MIN/1.73M2
GFR AFRICAN AMERICAN: 40 ML/MIN/1.73M2
GFR AFRICAN AMERICAN: 45 ML/MIN/1.73M2
GFR AFRICAN AMERICAN: 46 ML/MIN/1.73M2
GFR NON-AFRICAN AMERICAN: 28 ML/MIN/1.73M2
GFR NON-AFRICAN AMERICAN: 30 ML/MIN/1.73M2
GFR NON-AFRICAN AMERICAN: 33 ML/MIN/1.73M2
GFR NON-AFRICAN AMERICAN: 37 ML/MIN/1.73M2
GFR NON-AFRICAN AMERICAN: 38 ML/MIN/1.73M2
GLUCOSE BLD-MCNC: 108 MG/DL (ref 70–99)
GLUCOSE BLD-MCNC: 118 MG/DL (ref 70–99)
GLUCOSE BLD-MCNC: 125 MG/DL (ref 70–99)
GLUCOSE BLD-MCNC: 135 MG/DL (ref 70–99)
GLUCOSE BLD-MCNC: 136 MG/DL (ref 70–99)
GLUCOSE BLD-MCNC: 162 MG/DL (ref 70–99)
GLUCOSE BLD-MCNC: 168 MG/DL (ref 70–99)
GLUCOSE BLD-MCNC: 175 MG/DL (ref 70–99)
GLUCOSE BLD-MCNC: 188 MG/DL (ref 70–99)
GLUCOSE BLD-MCNC: 194 MG/DL (ref 70–99)
GLUCOSE BLD-MCNC: 206 MG/DL (ref 70–99)
GLUCOSE BLD-MCNC: 222 MG/DL (ref 70–99)
GLUCOSE BLD-MCNC: 226 MG/DL (ref 70–99)
GLUCOSE BLD-MCNC: 227 MG/DL (ref 70–99)
GLUCOSE BLD-MCNC: 231 MG/DL (ref 70–99)
GLUCOSE BLD-MCNC: 233 MG/DL (ref 70–99)
GLUCOSE BLD-MCNC: 250 MG/DL (ref 70–99)
GLUCOSE BLD-MCNC: 250 MG/DL (ref 70–99)
GLUCOSE BLD-MCNC: 93 MG/DL (ref 70–99)
GLUCOSE BLD-MCNC: 97 MG/DL (ref 70–99)
HCO3 ARTERIAL: 22.5 MMOL/L (ref 18–23)
HCO3 ARTERIAL: 22.7 MMOL/L (ref 18–23)
HCO3 ARTERIAL: 23.9 MMOL/L (ref 18–23)
HCO3 ARTERIAL: 24 MMOL/L (ref 18–23)
HCO3 ARTERIAL: 24.2 MMOL/L (ref 18–23)
HCO3 ARTERIAL: 24.3 MMOL/L (ref 18–23)
HCO3 ARTERIAL: 24.4 MMOL/L (ref 18–23)
HCO3 ARTERIAL: 24.5 MMOL/L (ref 18–23)
HCO3 ARTERIAL: 24.5 MMOL/L (ref 18–23)
HCO3 ARTERIAL: 24.6 MMOL/L (ref 18–23)
HCO3 ARTERIAL: 24.9 MMOL/L (ref 18–23)
HCO3 ARTERIAL: 25 MMOL/L (ref 18–23)
HCO3 ARTERIAL: 25.1 MMOL/L (ref 18–23)
HCO3 ARTERIAL: 25.2 MMOL/L (ref 18–23)
HCO3 ARTERIAL: 26.3 MMOL/L (ref 18–23)
HCO3 ARTERIAL: 26.6 MMOL/L (ref 18–23)
HCO3 ARTERIAL: 26.7 MMOL/L (ref 18–23)
HCO3 ARTERIAL: 28.4 MMOL/L (ref 18–23)
HCT VFR BLD CALC: 24 % (ref 42–52)
HCT VFR BLD CALC: 25 % (ref 42–52)
HCT VFR BLD CALC: 25 % (ref 42–52)
HCT VFR BLD CALC: 26 % (ref 42–52)
HCT VFR BLD CALC: 27 % (ref 42–52)
HCT VFR BLD CALC: 28 % (ref 42–52)
HCT VFR BLD CALC: 29 % (ref 42–52)
HCT VFR BLD CALC: 29 % (ref 42–52)
HCT VFR BLD CALC: 30 % (ref 42–52)
HCT VFR BLD CALC: 31 % (ref 42–52)
HCT VFR BLD CALC: 32 % (ref 42–52)
HCT VFR BLD CALC: 35 % (ref 42–52)
HEMOGLOBIN: 10 GM/DL (ref 13.5–18)
HEMOGLOBIN: 10.2 GM/DL (ref 13.5–18)
HEMOGLOBIN: 10.2 GM/DL (ref 13.5–18)
HEMOGLOBIN: 10.5 GM/DL (ref 13.5–18)
HEMOGLOBIN: 10.7 GM/DL (ref 13.5–18)
HEMOGLOBIN: 10.8 GM/DL (ref 13.5–18)
HEMOGLOBIN: 10.8 GM/DL (ref 13.5–18)
HEMOGLOBIN: 8.2 GM/DL (ref 13.5–18)
HEMOGLOBIN: 8.4 GM/DL (ref 13.5–18)
HEMOGLOBIN: 8.6 GM/DL (ref 13.5–18)
HEMOGLOBIN: 8.9 GM/DL (ref 13.5–18)
HEMOGLOBIN: 9.2 GM/DL (ref 13.5–18)
HEMOGLOBIN: 9.3 GM/DL (ref 13.5–18)
HEMOGLOBIN: 9.7 GM/DL (ref 13.5–18)
HEMOGLOBIN: 9.7 GM/DL (ref 13.5–18)
INR BLD: 1.25 INDEX
MAGNESIUM: 2.7 MG/DL (ref 1.8–2.4)
MCH RBC QN AUTO: 23.1 PG (ref 27–31)
MCHC RBC AUTO-ENTMCNC: 26.6 % (ref 32–36)
MCV RBC AUTO: 87.1 FL (ref 78–100)
O2 SATURATION: 100 % (ref 96–97)
O2 SATURATION: 89 % (ref 96–97)
O2 SATURATION: 89.1 % (ref 96–97)
O2 SATURATION: 91.7 % (ref 96–97)
O2 SATURATION: 92.9 % (ref 96–97)
O2 SATURATION: 93.5 % (ref 96–97)
O2 SATURATION: 93.6 % (ref 96–97)
O2 SATURATION: 93.8 % (ref 96–97)
O2 SATURATION: 94 % (ref 96–97)
O2 SATURATION: 94.4 % (ref 96–97)
O2 SATURATION: 94.7 % (ref 96–97)
O2 SATURATION: 99.9 % (ref 96–97)
O2 SATURATION: 99.9 % (ref 96–97)
PCO2 ARTERIAL: 41.7 MMHG (ref 32–45)
PCO2 ARTERIAL: 42.6 MMHG (ref 32–45)
PCO2 ARTERIAL: 42.6 MMHG (ref 32–45)
PCO2 ARTERIAL: 43.1 MMHG (ref 32–45)
PCO2 ARTERIAL: 44 MMHG (ref 32–45)
PCO2 ARTERIAL: 45.5 MMHG (ref 32–45)
PCO2 ARTERIAL: 45.6 MMHG (ref 32–45)
PCO2 ARTERIAL: 45.8 MMHG (ref 32–45)
PCO2 ARTERIAL: 46.6 MMHG (ref 32–45)
PCO2 ARTERIAL: 47.7 MMHG (ref 32–45)
PCO2 ARTERIAL: 47.8 MMHG (ref 32–45)
PCO2 ARTERIAL: 48.5 MMHG (ref 32–45)
PCO2 ARTERIAL: 49.4 MMHG (ref 32–45)
PCO2 ARTERIAL: 50.3 MMHG (ref 32–45)
PCO2 ARTERIAL: 52.5 MMHG (ref 32–45)
PCO2 ARTERIAL: 53.1 MMHG (ref 32–45)
PCO2 ARTERIAL: 53.3 MMHG (ref 32–45)
PCO2 ARTERIAL: 57.5 MMHG (ref 32–45)
PDW BLD-RTO: 20.4 % (ref 11.7–14.9)
PH BLOOD: 7.23 (ref 7.34–7.45)
PH BLOOD: 7.28 (ref 7.34–7.45)
PH BLOOD: 7.29 (ref 7.34–7.45)
PH BLOOD: 7.29 (ref 7.34–7.45)
PH BLOOD: 7.3 (ref 7.34–7.45)
PH BLOOD: 7.31 (ref 7.34–7.45)
PH BLOOD: 7.31 (ref 7.34–7.45)
PH BLOOD: 7.32 (ref 7.34–7.45)
PH BLOOD: 7.33 (ref 7.34–7.45)
PH BLOOD: 7.34 (ref 7.34–7.45)
PH BLOOD: 7.36 (ref 7.34–7.45)
PH BLOOD: 7.36 (ref 7.34–7.45)
PH BLOOD: 7.37 (ref 7.34–7.45)
PH BLOOD: 7.38 (ref 7.34–7.45)
PH BLOOD: 7.4 (ref 7.34–7.45)
PLATELET # BLD: 183 K/CU MM (ref 140–440)
PMV BLD AUTO: 9.5 FL (ref 7.5–11.1)
PO2 ARTERIAL: 293 MMHG (ref 75–100)
PO2 ARTERIAL: 350.1 MMHG (ref 75–100)
PO2 ARTERIAL: 471.9 MMHG (ref 75–100)
PO2 ARTERIAL: 482.2 MMHG (ref 75–100)
PO2 ARTERIAL: 520.7 MMHG (ref 75–100)
PO2 ARTERIAL: 553.1 MMHG (ref 75–100)
PO2 ARTERIAL: 589.8 MMHG (ref 75–100)
PO2 ARTERIAL: 617.1 MMHG (ref 75–100)
PO2 ARTERIAL: 64.1 MMHG (ref 75–100)
PO2 ARTERIAL: 64.6 MMHG (ref 75–100)
PO2 ARTERIAL: 68.5 MMHG (ref 75–100)
PO2 ARTERIAL: 71.6 MMHG (ref 75–100)
PO2 ARTERIAL: 73.8 MMHG (ref 75–100)
PO2 ARTERIAL: 73.9 MMHG (ref 75–100)
PO2 ARTERIAL: 74 MMHG (ref 75–100)
PO2 ARTERIAL: 76.2 MMHG (ref 75–100)
PO2 ARTERIAL: 78.5 MMHG (ref 75–100)
PO2 ARTERIAL: 83.1 MMHG (ref 75–100)
POC ACT PLUS: 114 SEC
POC ACT PLUS: 117 SEC
POC ACT PLUS: 426 SEC
POC ACT PLUS: 432 SEC
POC ACT PLUS: 443 SEC
POC ACT PLUS: 495 SEC
POC ACT PLUS: 503 SEC
POC ACT PLUS: 513 SEC
POC ACT PLUS: 520 SEC
POC ACT PLUS: 601 SEC
POC CALCIUM: 1.06 MMOL/L (ref 1.12–1.32)
POC CALCIUM: 1.07 MMOL/L (ref 1.12–1.32)
POC CALCIUM: 1.09 MMOL/L (ref 1.12–1.32)
POC CALCIUM: 1.11 MMOL/L (ref 1.12–1.32)
POC CALCIUM: 1.12 MMOL/L (ref 1.12–1.32)
POC CALCIUM: 1.12 MMOL/L (ref 1.12–1.32)
POC CALCIUM: 1.14 MMOL/L (ref 1.12–1.32)
POC CALCIUM: 1.15 MMOL/L (ref 1.12–1.32)
POC CALCIUM: 1.17 MMOL/L (ref 1.12–1.32)
POC CALCIUM: 1.19 MMOL/L (ref 1.12–1.32)
POC CALCIUM: 1.22 MMOL/L (ref 1.12–1.32)
POC CALCIUM: 1.22 MMOL/L (ref 1.12–1.32)
POC CALCIUM: 1.23 MMOL/L (ref 1.12–1.32)
POC CALCIUM: 1.23 MMOL/L (ref 1.12–1.32)
POC CALCIUM: 1.24 MMOL/L (ref 1.12–1.32)
POC CALCIUM: 1.25 MMOL/L (ref 1.12–1.32)
POC CALCIUM: 1.26 MMOL/L (ref 1.12–1.32)
POC CALCIUM: 1.38 MMOL/L (ref 1.12–1.32)
POC CHLORIDE: 102 MMOL/L (ref 98–109)
POC CHLORIDE: 104 MMOL/L (ref 98–109)
POC CHLORIDE: 104 MMOL/L (ref 98–109)
POC CHLORIDE: 105 MMOL/L (ref 98–109)
POC CHLORIDE: 106 MMOL/L (ref 98–109)
POC CHLORIDE: 107 MMOL/L (ref 98–109)
POC CHLORIDE: 107 MMOL/L (ref 98–109)
POC CHLORIDE: 108 MMOL/L (ref 98–109)
POC CHLORIDE: 108 MMOL/L (ref 98–109)
POC CHLORIDE: 109 MMOL/L (ref 98–109)
POC CHLORIDE: 110 MMOL/L (ref 98–109)
POC CHLORIDE: 110 MMOL/L (ref 98–109)
POC CREATININE: 1.4 MG/DL (ref 0.9–1.3)
POC CREATININE: 1.4 MG/DL (ref 0.9–1.3)
POC CREATININE: 1.5 MG/DL (ref 0.9–1.3)
POC CREATININE: 1.7 MG/DL (ref 0.9–1.3)
POC CREATININE: 1.7 MG/DL (ref 0.9–1.3)
POC CREATININE: 1.8 MG/DL (ref 0.9–1.3)
POC CREATININE: 1.8 MG/DL (ref 0.9–1.3)
POC CREATININE: 1.9 MG/DL (ref 0.9–1.3)
POC CREATININE: 2 MG/DL (ref 0.9–1.3)
POC CREATININE: 2.1 MG/DL (ref 0.9–1.3)
POC CREATININE: 2.1 MG/DL (ref 0.9–1.3)
POC CREATININE: 2.2 MG/DL (ref 0.9–1.3)
POC CREATININE: 2.3 MG/DL (ref 0.9–1.3)
POTASSIUM SERPL-SCNC: 3.8 MMOL/L (ref 3.5–4.5)
POTASSIUM SERPL-SCNC: 3.9 MMOL/L (ref 3.5–4.5)
POTASSIUM SERPL-SCNC: 3.9 MMOL/L (ref 3.5–5.1)
POTASSIUM SERPL-SCNC: 4 MMOL/L (ref 3.5–4.5)
POTASSIUM SERPL-SCNC: 4 MMOL/L (ref 3.5–4.5)
POTASSIUM SERPL-SCNC: 4.2 MMOL/L (ref 3.5–4.5)
POTASSIUM SERPL-SCNC: 4.3 MMOL/L (ref 3.5–4.5)
POTASSIUM SERPL-SCNC: 4.4 MMOL/L (ref 3.5–4.5)
POTASSIUM SERPL-SCNC: 4.5 MMOL/L (ref 3.5–4.5)
POTASSIUM SERPL-SCNC: 4.7 MMOL/L (ref 3.5–4.5)
POTASSIUM SERPL-SCNC: 4.9 MMOL/L (ref 3.5–4.5)
PROTHROMBIN TIME: 15.1 SECONDS (ref 11.7–14.5)
RBC # BLD: 4.02 M/CU MM (ref 4.6–6.2)
SODIUM BLD-SCNC: 136 MMOL/L (ref 138–146)
SODIUM BLD-SCNC: 137 MMOL/L (ref 138–146)
SODIUM BLD-SCNC: 139 MMOL/L (ref 138–146)
SODIUM BLD-SCNC: 140 MMOL/L (ref 138–146)
SODIUM BLD-SCNC: 141 MMOL/L (ref 138–146)
SODIUM BLD-SCNC: 142 MMOL/L (ref 135–145)
SODIUM BLD-SCNC: 143 MMOL/L (ref 138–146)
SODIUM BLD-SCNC: 144 MMOL/L (ref 138–146)
SODIUM BLD-SCNC: 144 MMOL/L (ref 138–146)
SOURCE, BLOOD GAS: ABNORMAL
WBC # BLD: 34.6 K/CU MM (ref 4–10.5)

## 2021-02-16 PROCEDURE — 6360000002 HC RX W HCPCS

## 2021-02-16 PROCEDURE — 6360000002 HC RX W HCPCS: Performed by: THORACIC SURGERY (CARDIOTHORACIC VASCULAR SURGERY)

## 2021-02-16 PROCEDURE — 99223 1ST HOSP IP/OBS HIGH 75: CPT | Performed by: INTERNAL MEDICINE

## 2021-02-16 PROCEDURE — 2580000003 HC RX 258: Performed by: THORACIC SURGERY (CARDIOTHORACIC VASCULAR SURGERY)

## 2021-02-16 PROCEDURE — C1768 GRAFT, VASCULAR: HCPCS | Performed by: THORACIC SURGERY (CARDIOTHORACIC VASCULAR SURGERY)

## 2021-02-16 PROCEDURE — 85610 PROTHROMBIN TIME: CPT

## 2021-02-16 PROCEDURE — 86900 BLOOD TYPING SEROLOGIC ABO: CPT

## 2021-02-16 PROCEDURE — APPNB45 APP NON BILLABLE 31-45 MINUTES: Performed by: NURSE PRACTITIONER

## 2021-02-16 PROCEDURE — 84295 ASSAY OF SERUM SODIUM: CPT

## 2021-02-16 PROCEDURE — 2500000003 HC RX 250 WO HCPCS: Performed by: THORACIC SURGERY (CARDIOTHORACIC VASCULAR SURGERY)

## 2021-02-16 PROCEDURE — 2580000003 HC RX 258: Performed by: NURSE ANESTHETIST, CERTIFIED REGISTERED

## 2021-02-16 PROCEDURE — 6360000002 HC RX W HCPCS: Performed by: NURSE ANESTHETIST, CERTIFIED REGISTERED

## 2021-02-16 PROCEDURE — 82803 BLOOD GASES ANY COMBINATION: CPT

## 2021-02-16 PROCEDURE — 2720000010 HC SURG SUPPLY STERILE: Performed by: THORACIC SURGERY (CARDIOTHORACIC VASCULAR SURGERY)

## 2021-02-16 PROCEDURE — 85347 COAGULATION TIME ACTIVATED: CPT

## 2021-02-16 PROCEDURE — 86850 RBC ANTIBODY SCREEN: CPT

## 2021-02-16 PROCEDURE — 2580000003 HC RX 258: Performed by: ANESTHESIOLOGY

## 2021-02-16 PROCEDURE — 2500000003 HC RX 250 WO HCPCS

## 2021-02-16 PROCEDURE — C1889 IMPLANT/INSERT DEVICE, NOC: HCPCS | Performed by: THORACIC SURGERY (CARDIOTHORACIC VASCULAR SURGERY)

## 2021-02-16 PROCEDURE — P9016 RBC LEUKOCYTES REDUCED: HCPCS

## 2021-02-16 PROCEDURE — 6360000002 HC RX W HCPCS: Performed by: PHYSICIAN ASSISTANT

## 2021-02-16 PROCEDURE — 2580000003 HC RX 258

## 2021-02-16 PROCEDURE — 6370000000 HC RX 637 (ALT 250 FOR IP): Performed by: THORACIC SURGERY (CARDIOTHORACIC VASCULAR SURGERY)

## 2021-02-16 PROCEDURE — P9047 ALBUMIN (HUMAN), 25%, 50ML: HCPCS

## 2021-02-16 PROCEDURE — P9045 ALBUMIN (HUMAN), 5%, 250 ML: HCPCS | Performed by: PHYSICIAN ASSISTANT

## 2021-02-16 PROCEDURE — P9045 ALBUMIN (HUMAN), 5%, 250 ML: HCPCS | Performed by: NURSE ANESTHETIST, CERTIFIED REGISTERED

## 2021-02-16 PROCEDURE — 80048 BASIC METABOLIC PNL TOTAL CA: CPT

## 2021-02-16 PROCEDURE — 86901 BLOOD TYPING SEROLOGIC RH(D): CPT

## 2021-02-16 PROCEDURE — 3700000001 HC ADD 15 MINUTES (ANESTHESIA): Performed by: THORACIC SURGERY (CARDIOTHORACIC VASCULAR SURGERY)

## 2021-02-16 PROCEDURE — 6370000000 HC RX 637 (ALT 250 FOR IP)

## 2021-02-16 PROCEDURE — C1894 INTRO/SHEATH, NON-LASER: HCPCS | Performed by: THORACIC SURGERY (CARDIOTHORACIC VASCULAR SURGERY)

## 2021-02-16 PROCEDURE — 93005 ELECTROCARDIOGRAM TRACING: CPT | Performed by: NURSE PRACTITIONER

## 2021-02-16 PROCEDURE — 3700000000 HC ANESTHESIA ATTENDED CARE: Performed by: THORACIC SURGERY (CARDIOTHORACIC VASCULAR SURGERY)

## 2021-02-16 PROCEDURE — 2780000006 HC MISC HEART VALVE: Performed by: THORACIC SURGERY (CARDIOTHORACIC VASCULAR SURGERY)

## 2021-02-16 PROCEDURE — 2500000003 HC RX 250 WO HCPCS: Performed by: NURSE ANESTHETIST, CERTIFIED REGISTERED

## 2021-02-16 PROCEDURE — 3600000018 HC SURGERY OHS ADDTL 15MIN: Performed by: THORACIC SURGERY (CARDIOTHORACIC VASCULAR SURGERY)

## 2021-02-16 PROCEDURE — 94002 VENT MGMT INPAT INIT DAY: CPT

## 2021-02-16 PROCEDURE — 85730 THROMBOPLASTIN TIME PARTIAL: CPT

## 2021-02-16 PROCEDURE — P9045 ALBUMIN (HUMAN), 5%, 250 ML: HCPCS

## 2021-02-16 PROCEDURE — 6370000000 HC RX 637 (ALT 250 FOR IP): Performed by: PHYSICIAN ASSISTANT

## 2021-02-16 PROCEDURE — 2000000000 HC ICU R&B

## 2021-02-16 PROCEDURE — 88305 TISSUE EXAM BY PATHOLOGIST: CPT | Performed by: PATHOLOGY

## 2021-02-16 PROCEDURE — 7100000000 HC PACU RECOVERY - FIRST 15 MIN

## 2021-02-16 PROCEDURE — 3600000008 HC SURGERY OHS BASE: Performed by: THORACIC SURGERY (CARDIOTHORACIC VASCULAR SURGERY)

## 2021-02-16 PROCEDURE — 94761 N-INVAS EAR/PLS OXIMETRY MLT: CPT

## 2021-02-16 PROCEDURE — 2580000003 HC RX 258: Performed by: PHYSICIAN ASSISTANT

## 2021-02-16 PROCEDURE — 2709999900 HC NON-CHARGEABLE SUPPLY: Performed by: THORACIC SURGERY (CARDIOTHORACIC VASCULAR SURGERY)

## 2021-02-16 PROCEDURE — 85027 COMPLETE CBC AUTOMATED: CPT

## 2021-02-16 PROCEDURE — 71045 X-RAY EXAM CHEST 1 VIEW: CPT

## 2021-02-16 PROCEDURE — 83735 ASSAY OF MAGNESIUM: CPT

## 2021-02-16 PROCEDURE — 88311 DECALCIFY TISSUE: CPT | Performed by: PATHOLOGY

## 2021-02-16 PROCEDURE — 86922 COMPATIBILITY TEST ANTIGLOB: CPT

## 2021-02-16 PROCEDURE — C1729 CATH, DRAINAGE: HCPCS | Performed by: THORACIC SURGERY (CARDIOTHORACIC VASCULAR SURGERY)

## 2021-02-16 PROCEDURE — C1751 CATH, INF, PER/CENT/MIDLINE: HCPCS | Performed by: THORACIC SURGERY (CARDIOTHORACIC VASCULAR SURGERY)

## 2021-02-16 PROCEDURE — 82330 ASSAY OF CALCIUM: CPT

## 2021-02-16 PROCEDURE — 82962 GLUCOSE BLOOD TEST: CPT

## 2021-02-16 PROCEDURE — 2700000000 HC OXYGEN THERAPY PER DAY

## 2021-02-16 DEVICE — PATCH CAR TISS REP CORMATRIX 1 X 10 CM: Type: IMPLANTABLE DEVICE | Site: CHEST | Status: FUNCTIONAL

## 2021-02-16 DEVICE — Z INACTIVE USE 2424444 DEVICE OCCL CLP L45MM SM FOOTPRINT 1 HND APPL SUTURELESS W/: Type: IMPLANTABLE DEVICE | Site: CHEST | Status: FUNCTIONAL

## 2021-02-16 DEVICE — Z INACTIVE USE 2540311 LEAD PACE L475MM CHN A OR V MYOCARDIAL STEROID ELUT SIL: Type: IMPLANTABLE DEVICE | Site: CHEST | Status: FUNCTIONAL

## 2021-02-16 DEVICE — VALVE AORT DIA27MM H18.5MM ORIFICE DIA24MM SUT RNG DIA32MM: Type: IMPLANTABLE DEVICE | Site: CHEST | Status: FUNCTIONAL

## 2021-02-16 DEVICE — Z INACTIVE USE 2641837 CLIP LIG M BLU TI HRT SHP WIRE HORZ 600 PER BX: Type: IMPLANTABLE DEVICE | Site: CHEST | Status: FUNCTIONAL

## 2021-02-16 DEVICE — CLIP INT SM WIDE RED TI TRNSVRS GRV CHEVRON SHP W PRECIS: Type: IMPLANTABLE DEVICE | Site: CHEST | Status: FUNCTIONAL

## 2021-02-16 DEVICE — BARD® PTFE FELT, 10.2 CM X 10.2 CM
Type: IMPLANTABLE DEVICE | Site: CHEST | Status: FUNCTIONAL
Brand: BARD® PTFE FELT

## 2021-02-16 RX ORDER — HYDROCODONE BITARTRATE AND ACETAMINOPHEN 5; 325 MG/1; MG/1
1 TABLET ORAL EVERY 4 HOURS PRN
Status: DISCONTINUED | OUTPATIENT
Start: 2021-02-16 | End: 2021-02-24 | Stop reason: HOSPADM

## 2021-02-16 RX ORDER — PANTOPRAZOLE SODIUM 40 MG/1
40 TABLET, DELAYED RELEASE ORAL DAILY
Status: DISCONTINUED | OUTPATIENT
Start: 2021-02-16 | End: 2021-02-17

## 2021-02-16 RX ORDER — ALBUMIN, HUMAN INJ 5% 5 %
SOLUTION INTRAVENOUS PRN
Status: DISCONTINUED | OUTPATIENT
Start: 2021-02-16 | End: 2021-02-16 | Stop reason: SDUPTHER

## 2021-02-16 RX ORDER — POLYETHYLENE GLYCOL 3350 17 G/17G
17 POWDER, FOR SOLUTION ORAL DAILY
Status: DISCONTINUED | OUTPATIENT
Start: 2021-02-16 | End: 2021-02-24 | Stop reason: HOSPADM

## 2021-02-16 RX ORDER — BISACODYL 10 MG
10 SUPPOSITORY, RECTAL RECTAL DAILY PRN
Status: DISCONTINUED | OUTPATIENT
Start: 2021-02-16 | End: 2021-02-24 | Stop reason: HOSPADM

## 2021-02-16 RX ORDER — SODIUM CHLORIDE, SODIUM LACTATE, POTASSIUM CHLORIDE, CALCIUM CHLORIDE 600; 310; 30; 20 MG/100ML; MG/100ML; MG/100ML; MG/100ML
INJECTION, SOLUTION INTRAVENOUS CONTINUOUS
Status: DISCONTINUED | OUTPATIENT
Start: 2021-02-16 | End: 2021-02-16

## 2021-02-16 RX ORDER — NICOTINE POLACRILEX 4 MG
15 LOZENGE BUCCAL PRN
Status: DISCONTINUED | OUTPATIENT
Start: 2021-02-16 | End: 2021-02-19 | Stop reason: SDUPTHER

## 2021-02-16 RX ORDER — VECURONIUM BROMIDE 1 MG/ML
INJECTION, POWDER, LYOPHILIZED, FOR SOLUTION INTRAVENOUS PRN
Status: DISCONTINUED | OUTPATIENT
Start: 2021-02-16 | End: 2021-02-17 | Stop reason: SDUPTHER

## 2021-02-16 RX ORDER — CARVEDILOL 3.12 MG/1
3.12 TABLET ORAL 2 TIMES DAILY
Status: DISCONTINUED | OUTPATIENT
Start: 2021-02-16 | End: 2021-02-20

## 2021-02-16 RX ORDER — MIDAZOLAM HYDROCHLORIDE 5 MG/ML
INJECTION INTRAMUSCULAR; INTRAVENOUS PRN
Status: DISCONTINUED | OUTPATIENT
Start: 2021-02-16 | End: 2021-02-17 | Stop reason: SDUPTHER

## 2021-02-16 RX ORDER — LIDOCAINE HYDROCHLORIDE 20 MG/ML
INJECTION, SOLUTION INTRAVENOUS PRN
Status: DISCONTINUED | OUTPATIENT
Start: 2021-02-16 | End: 2021-02-17 | Stop reason: SDUPTHER

## 2021-02-16 RX ORDER — METHYLENE BLUE 10 MG/ML
1 INJECTION INTRAVENOUS ONCE
Status: DISCONTINUED | OUTPATIENT
Start: 2021-02-16 | End: 2021-02-16 | Stop reason: RX

## 2021-02-16 RX ORDER — SODIUM CHLORIDE 0.9 % (FLUSH) 0.9 %
10 SYRINGE (ML) INJECTION EVERY 12 HOURS SCHEDULED
Status: DISCONTINUED | OUTPATIENT
Start: 2021-02-16 | End: 2021-02-20 | Stop reason: SDUPTHER

## 2021-02-16 RX ORDER — DEXTROSE MONOHYDRATE 25 G/50ML
12.5 INJECTION, SOLUTION INTRAVENOUS PRN
Status: DISCONTINUED | OUTPATIENT
Start: 2021-02-16 | End: 2021-02-19 | Stop reason: SDUPTHER

## 2021-02-16 RX ORDER — HYDROCODONE BITARTRATE AND ACETAMINOPHEN 5; 325 MG/1; MG/1
2 TABLET ORAL EVERY 4 HOURS PRN
Status: DISCONTINUED | OUTPATIENT
Start: 2021-02-16 | End: 2021-02-24 | Stop reason: HOSPADM

## 2021-02-16 RX ORDER — PROTAMINE SULFATE 10 MG/ML
INJECTION, SOLUTION INTRAVENOUS PRN
Status: DISCONTINUED | OUTPATIENT
Start: 2021-02-16 | End: 2021-02-17 | Stop reason: SDUPTHER

## 2021-02-16 RX ORDER — HYDRALAZINE HYDROCHLORIDE 20 MG/ML
5 INJECTION INTRAMUSCULAR; INTRAVENOUS EVERY 5 MIN PRN
Status: DISCONTINUED | OUTPATIENT
Start: 2021-02-16 | End: 2021-02-24 | Stop reason: HOSPADM

## 2021-02-16 RX ORDER — CHLORHEXIDINE GLUCONATE 0.12 MG/ML
30 RINSE ORAL ONCE
Status: COMPLETED | OUTPATIENT
Start: 2021-02-16 | End: 2021-02-16

## 2021-02-16 RX ORDER — ALBUTEROL SULFATE 90 UG/1
2 AEROSOL, METERED RESPIRATORY (INHALATION) 4 TIMES DAILY
Status: DISCONTINUED | OUTPATIENT
Start: 2021-02-16 | End: 2021-02-19

## 2021-02-16 RX ORDER — PROPOFOL 10 MG/ML
INJECTION, EMULSION INTRAVENOUS PRN
Status: DISCONTINUED | OUTPATIENT
Start: 2021-02-16 | End: 2021-02-17 | Stop reason: SDUPTHER

## 2021-02-16 RX ORDER — FUROSEMIDE 10 MG/ML
20 INJECTION INTRAMUSCULAR; INTRAVENOUS
Status: ACTIVE | OUTPATIENT
Start: 2021-02-16 | End: 2021-02-16

## 2021-02-16 RX ORDER — DOBUTAMINE HYDROCHLORIDE 200 MG/100ML
2.5 INJECTION INTRAVENOUS CONTINUOUS
Status: DISCONTINUED | OUTPATIENT
Start: 2021-02-16 | End: 2021-02-20

## 2021-02-16 RX ORDER — POTASSIUM CHLORIDE 29.8 MG/ML
20 INJECTION INTRAVENOUS PRN
Status: DISCONTINUED | OUTPATIENT
Start: 2021-02-16 | End: 2021-02-24 | Stop reason: HOSPADM

## 2021-02-16 RX ORDER — ALBUMIN, HUMAN INJ 5% 5 %
25 SOLUTION INTRAVENOUS PRN
Status: DISCONTINUED | OUTPATIENT
Start: 2021-02-16 | End: 2021-02-24 | Stop reason: HOSPADM

## 2021-02-16 RX ORDER — ROCURONIUM BROMIDE 10 MG/ML
INJECTION, SOLUTION INTRAVENOUS PRN
Status: DISCONTINUED | OUTPATIENT
Start: 2021-02-16 | End: 2021-02-17 | Stop reason: SDUPTHER

## 2021-02-16 RX ORDER — AMIODARONE HYDROCHLORIDE 200 MG/1
200 TABLET ORAL 3 TIMES DAILY
Status: DISCONTINUED | OUTPATIENT
Start: 2021-02-16 | End: 2021-02-20 | Stop reason: SDUPTHER

## 2021-02-16 RX ORDER — MAGNESIUM SULFATE IN WATER 40 MG/ML
2000 INJECTION, SOLUTION INTRAVENOUS PRN
Status: DISCONTINUED | OUTPATIENT
Start: 2021-02-16 | End: 2021-02-21

## 2021-02-16 RX ORDER — ASPIRIN 81 MG/1
81 TABLET ORAL DAILY
Status: DISCONTINUED | OUTPATIENT
Start: 2021-02-16 | End: 2021-02-24 | Stop reason: HOSPADM

## 2021-02-16 RX ORDER — SODIUM PHOSPHATE, DIBASIC AND SODIUM PHOSPHATE, MONOBASIC 7; 19 G/133ML; G/133ML
1 ENEMA RECTAL DAILY PRN
Status: DISCONTINUED | OUTPATIENT
Start: 2021-02-16 | End: 2021-02-24 | Stop reason: HOSPADM

## 2021-02-16 RX ORDER — HEPARIN SODIUM 1000 [USP'U]/ML
INJECTION, SOLUTION INTRAVENOUS; SUBCUTANEOUS PRN
Status: DISCONTINUED | OUTPATIENT
Start: 2021-02-16 | End: 2021-02-17 | Stop reason: SDUPTHER

## 2021-02-16 RX ORDER — SIMVASTATIN 40 MG
40 TABLET ORAL ONCE
Status: COMPLETED | OUTPATIENT
Start: 2021-02-16 | End: 2021-02-16

## 2021-02-16 RX ORDER — METOPROLOL TARTRATE 5 MG/5ML
2.5 INJECTION INTRAVENOUS EVERY 10 MIN PRN
Status: DISCONTINUED | OUTPATIENT
Start: 2021-02-16 | End: 2021-02-24 | Stop reason: HOSPADM

## 2021-02-16 RX ORDER — DEXTROSE MONOHYDRATE 50 MG/ML
100 INJECTION, SOLUTION INTRAVENOUS PRN
Status: DISCONTINUED | OUTPATIENT
Start: 2021-02-16 | End: 2021-02-19 | Stop reason: SDUPTHER

## 2021-02-16 RX ORDER — PHENYLEPHRINE HYDROCHLORIDE 10 MG/ML
INJECTION INTRAVENOUS PRN
Status: DISCONTINUED | OUTPATIENT
Start: 2021-02-16 | End: 2021-02-17 | Stop reason: SDUPTHER

## 2021-02-16 RX ORDER — AMIODARONE HYDROCHLORIDE 200 MG/1
200 TABLET ORAL DAILY
Status: DISCONTINUED | OUTPATIENT
Start: 2021-02-20 | End: 2021-02-24 | Stop reason: HOSPADM

## 2021-02-16 RX ORDER — CALCIUM CHLORIDE 100 MG/ML
1000 INJECTION INTRAVENOUS; INTRAVENTRICULAR ONCE
Status: DISCONTINUED | OUTPATIENT
Start: 2021-02-16 | End: 2021-02-16 | Stop reason: CLARIF

## 2021-02-16 RX ORDER — FENTANYL CITRATE 50 UG/ML
25 INJECTION, SOLUTION INTRAMUSCULAR; INTRAVENOUS
Status: DISCONTINUED | OUTPATIENT
Start: 2021-02-16 | End: 2021-02-17

## 2021-02-16 RX ORDER — NITROGLYCERIN 20 MG/100ML
10 INJECTION INTRAVENOUS CONTINUOUS PRN
Status: DISCONTINUED | OUTPATIENT
Start: 2021-02-16 | End: 2021-02-24 | Stop reason: HOSPADM

## 2021-02-16 RX ORDER — FENTANYL CITRATE 0.05 MG/ML
INJECTION, SOLUTION INTRAMUSCULAR; INTRAVENOUS PRN
Status: DISCONTINUED | OUTPATIENT
Start: 2021-02-16 | End: 2021-02-17 | Stop reason: SDUPTHER

## 2021-02-16 RX ORDER — SODIUM CHLORIDE 9 MG/ML
INJECTION, SOLUTION INTRAVENOUS CONTINUOUS PRN
Status: DISCONTINUED | OUTPATIENT
Start: 2021-02-16 | End: 2021-02-17 | Stop reason: SDUPTHER

## 2021-02-16 RX ORDER — CALCIUM GLUCONATE 20 MG/ML
2000 INJECTION, SOLUTION INTRAVENOUS PRN
Status: DISCONTINUED | OUTPATIENT
Start: 2021-02-16 | End: 2021-02-24 | Stop reason: HOSPADM

## 2021-02-16 RX ORDER — CARVEDILOL 3.12 MG/1
3.12 TABLET ORAL ONCE
Status: DISCONTINUED | OUTPATIENT
Start: 2021-02-16 | End: 2021-02-16 | Stop reason: HOSPADM

## 2021-02-16 RX ORDER — ATORVASTATIN CALCIUM 20 MG/1
20 TABLET, FILM COATED ORAL NIGHTLY
Status: DISCONTINUED | OUTPATIENT
Start: 2021-02-17 | End: 2021-02-24 | Stop reason: HOSPADM

## 2021-02-16 RX ORDER — DOBUTAMINE HYDROCHLORIDE 200 MG/100ML
INJECTION INTRAVENOUS
Status: COMPLETED
Start: 2021-02-16 | End: 2021-02-16

## 2021-02-16 RX ORDER — VASOPRESSIN 20 U/ML
INJECTION PARENTERAL PRN
Status: DISCONTINUED | OUTPATIENT
Start: 2021-02-16 | End: 2021-02-17 | Stop reason: SDUPTHER

## 2021-02-16 RX ORDER — IPRATROPIUM BROMIDE AND ALBUTEROL SULFATE 2.5; .5 MG/3ML; MG/3ML
1 SOLUTION RESPIRATORY (INHALATION)
Status: DISCONTINUED | OUTPATIENT
Start: 2021-02-16 | End: 2021-02-16

## 2021-02-16 RX ORDER — ONDANSETRON 2 MG/ML
4 INJECTION INTRAMUSCULAR; INTRAVENOUS EVERY 8 HOURS PRN
Status: DISCONTINUED | OUTPATIENT
Start: 2021-02-16 | End: 2021-02-24 | Stop reason: HOSPADM

## 2021-02-16 RX ORDER — SODIUM CHLORIDE 450 MG/100ML
INJECTION, SOLUTION INTRAVENOUS CONTINUOUS
Status: DISCONTINUED | OUTPATIENT
Start: 2021-02-16 | End: 2021-02-17

## 2021-02-16 RX ORDER — ACETAMINOPHEN 325 MG/1
650 TABLET ORAL EVERY 4 HOURS PRN
Status: DISCONTINUED | OUTPATIENT
Start: 2021-02-16 | End: 2021-02-24 | Stop reason: HOSPADM

## 2021-02-16 RX ORDER — M-VIT,TX,IRON,MINS/CALC/FOLIC 27MG-0.4MG
1 TABLET ORAL
Status: DISCONTINUED | OUTPATIENT
Start: 2021-02-17 | End: 2021-02-24 | Stop reason: HOSPADM

## 2021-02-16 RX ORDER — SODIUM CHLORIDE 0.9 % (FLUSH) 0.9 %
10 SYRINGE (ML) INJECTION PRN
Status: DISCONTINUED | OUTPATIENT
Start: 2021-02-16 | End: 2021-02-20 | Stop reason: SDUPTHER

## 2021-02-16 RX ORDER — CALCIUM CHLORIDE 100 MG/ML
1000 INJECTION INTRAVENOUS; INTRAVENTRICULAR ONCE
Status: COMPLETED | OUTPATIENT
Start: 2021-02-16 | End: 2021-02-16

## 2021-02-16 RX ADMIN — SIMVASTATIN 40 MG: 40 TABLET, FILM COATED ORAL at 06:45

## 2021-02-16 RX ADMIN — PHENYLEPHRINE HYDROCHLORIDE 100 MCG: 10 INJECTION INTRAVENOUS at 07:23

## 2021-02-16 RX ADMIN — SODIUM CHLORIDE, POTASSIUM CHLORIDE, SODIUM LACTATE AND CALCIUM CHLORIDE: 600; 310; 30; 20 INJECTION, SOLUTION INTRAVENOUS at 06:34

## 2021-02-16 RX ADMIN — DEXTROSE MONOHYDRATE 1 MG/MIN: 50 INJECTION, SOLUTION INTRAVENOUS at 09:46

## 2021-02-16 RX ADMIN — SODIUM CHLORIDE, POTASSIUM CHLORIDE, SODIUM LACTATE AND CALCIUM CHLORIDE: 600; 310; 30; 20 INJECTION, SOLUTION INTRAVENOUS at 06:57

## 2021-02-16 RX ADMIN — PHENYLEPHRINE HYDROCHLORIDE 100 MCG: 10 INJECTION INTRAVENOUS at 07:50

## 2021-02-16 RX ADMIN — PROTAMINE SULFATE 350 MG: 10 INJECTION, SOLUTION INTRAVENOUS at 13:01

## 2021-02-16 RX ADMIN — DEXTROSE MONOHYDRATE 150 MG: 50 INJECTION, SOLUTION INTRAVENOUS at 09:36

## 2021-02-16 RX ADMIN — ROCURONIUM BROMIDE 50 MG: 10 INJECTION INTRAVENOUS at 07:16

## 2021-02-16 RX ADMIN — FENTANYL CITRATE 250 MCG: 50 INJECTION, SOLUTION INTRAMUSCULAR; INTRAVENOUS at 07:15

## 2021-02-16 RX ADMIN — METHYLENE BLUE: 5 INJECTION INTRAVENOUS at 20:25

## 2021-02-16 RX ADMIN — ALBUMIN (HUMAN) 12.5 G: 12.5 INJECTION, SOLUTION INTRAVENOUS at 17:18

## 2021-02-16 RX ADMIN — SODIUM CHLORIDE 4.5 UNITS/HR: 9 INJECTION, SOLUTION INTRAVENOUS at 15:31

## 2021-02-16 RX ADMIN — Medication: at 21:44

## 2021-02-16 RX ADMIN — MIDAZOLAM 2 MG: 5 INJECTION INTRAMUSCULAR; INTRAVENOUS at 07:15

## 2021-02-16 RX ADMIN — VASOPRESSIN 1 UNITS: 20 INJECTION INTRAVENOUS at 13:07

## 2021-02-16 RX ADMIN — VECURONIUM BROMIDE FOR INJECTION 6 MG: 1 INJECTION, POWDER, LYOPHILIZED, FOR SOLUTION INTRAVENOUS at 08:35

## 2021-02-16 RX ADMIN — SODIUM CHLORIDE 5 UNITS/HR: 9 INJECTION, SOLUTION INTRAVENOUS at 09:16

## 2021-02-16 RX ADMIN — VECURONIUM BROMIDE FOR INJECTION 2 MG: 1 INJECTION, POWDER, LYOPHILIZED, FOR SOLUTION INTRAVENOUS at 10:50

## 2021-02-16 RX ADMIN — SODIUM CHLORIDE, PRESERVATIVE FREE 10 ML: 5 INJECTION INTRAVENOUS at 21:46

## 2021-02-16 RX ADMIN — MIDAZOLAM 3 MG: 5 INJECTION INTRAMUSCULAR; INTRAVENOUS at 12:56

## 2021-02-16 RX ADMIN — SODIUM CHLORIDE, POTASSIUM CHLORIDE, SODIUM LACTATE AND CALCIUM CHLORIDE: 600; 310; 30; 20 INJECTION, SOLUTION INTRAVENOUS at 11:46

## 2021-02-16 RX ADMIN — SODIUM CHLORIDE: 4.5 INJECTION, SOLUTION INTRAVENOUS at 14:52

## 2021-02-16 RX ADMIN — ALBUMIN (HUMAN) 250 ML: 12.5 INJECTION, SOLUTION INTRAVENOUS at 13:15

## 2021-02-16 RX ADMIN — MIDAZOLAM 2 MG: 5 INJECTION INTRAMUSCULAR; INTRAVENOUS at 12:32

## 2021-02-16 RX ADMIN — PHENYLEPHRINE HYDROCHLORIDE 100 MCG: 10 INJECTION INTRAVENOUS at 13:07

## 2021-02-16 RX ADMIN — ALBUMIN (HUMAN) 12.5 G: 12.5 INJECTION, SOLUTION INTRAVENOUS at 23:38

## 2021-02-16 RX ADMIN — DEXTROSE MONOHYDRATE 0.5 MG/MIN: 50 INJECTION, SOLUTION INTRAVENOUS at 21:23

## 2021-02-16 RX ADMIN — MIDAZOLAM 3 MG: 5 INJECTION INTRAMUSCULAR; INTRAVENOUS at 12:40

## 2021-02-16 RX ADMIN — LIDOCAINE HYDROCHLORIDE 60 MG: 20 INJECTION, SOLUTION INTRAVENOUS at 07:15

## 2021-02-16 RX ADMIN — VASOPRESSIN 1 UNITS: 20 INJECTION INTRAVENOUS at 13:11

## 2021-02-16 RX ADMIN — Medication: at 06:30

## 2021-02-16 RX ADMIN — ALBUMIN (HUMAN) 25 G: 12.5 INJECTION, SOLUTION INTRAVENOUS at 16:00

## 2021-02-16 RX ADMIN — EPINEPHRINE 3 MCG/MIN: 1 INJECTION INTRAMUSCULAR; INTRAVENOUS; SUBCUTANEOUS at 07:51

## 2021-02-16 RX ADMIN — PHENYLEPHRINE HYDROCHLORIDE 100 MCG: 10 INJECTION INTRAVENOUS at 07:42

## 2021-02-16 RX ADMIN — FENTANYL CITRATE 250 MCG: 50 INJECTION, SOLUTION INTRAMUSCULAR; INTRAVENOUS at 07:56

## 2021-02-16 RX ADMIN — VASOPRESSIN 2 UNITS: 20 INJECTION INTRAVENOUS at 12:34

## 2021-02-16 RX ADMIN — CALCIUM CHLORIDE 1000 MG: 100 INJECTION INTRAVENOUS; INTRAVENTRICULAR at 18:08

## 2021-02-16 RX ADMIN — CEFAZOLIN SODIUM 2000 MG: 10 INJECTION, POWDER, FOR SOLUTION INTRAVENOUS at 18:59

## 2021-02-16 RX ADMIN — CHLORHEXIDINE GLUCONATE 0.12% ORAL RINSE 30 ML: 1.2 LIQUID ORAL at 06:30

## 2021-02-16 RX ADMIN — AMINOCAPROIC ACID 5 G/HR: 250 INJECTION, SOLUTION INTRAVENOUS at 07:39

## 2021-02-16 RX ADMIN — VASOPRESSIN 0.04 UNITS/MIN: 20 INJECTION INTRAVENOUS at 14:51

## 2021-02-16 RX ADMIN — PROPOFOL 60 MG: 10 INJECTION, EMULSION INTRAVENOUS at 07:15

## 2021-02-16 RX ADMIN — PHENYLEPHRINE HYDROCHLORIDE 100 MCG: 10 INJECTION INTRAVENOUS at 07:20

## 2021-02-16 RX ADMIN — PHENYLEPHRINE HYDROCHLORIDE 100 MCG: 10 INJECTION INTRAVENOUS at 07:48

## 2021-02-16 RX ADMIN — VASOPRESSIN 0.04 UNITS/MIN: 20 INJECTION INTRAVENOUS at 21:28

## 2021-02-16 RX ADMIN — FENTANYL CITRATE 100 MCG: 50 INJECTION, SOLUTION INTRAMUSCULAR; INTRAVENOUS at 12:32

## 2021-02-16 RX ADMIN — PHENYLEPHRINE HYDROCHLORIDE 100 MCG: 10 INJECTION INTRAVENOUS at 07:29

## 2021-02-16 RX ADMIN — DOBUTAMINE HYDROCHLORIDE 5 MCG/KG/MIN: 200 INJECTION INTRAVENOUS at 16:27

## 2021-02-16 RX ADMIN — FENTANYL CITRATE 200 MCG: 50 INJECTION, SOLUTION INTRAMUSCULAR; INTRAVENOUS at 12:23

## 2021-02-16 RX ADMIN — VECURONIUM BROMIDE FOR INJECTION 2 MG: 1 INJECTION, POWDER, LYOPHILIZED, FOR SOLUTION INTRAVENOUS at 12:32

## 2021-02-16 RX ADMIN — PHENYLEPHRINE HYDROCHLORIDE 100 MCG: 10 INJECTION INTRAVENOUS at 07:37

## 2021-02-16 RX ADMIN — FENTANYL CITRATE 25 MCG: 50 INJECTION INTRAMUSCULAR; INTRAVENOUS at 15:16

## 2021-02-16 RX ADMIN — NOREPINEPHRINE BITARTRATE 5 MCG/MIN: 1 INJECTION INTRAVENOUS at 12:31

## 2021-02-16 RX ADMIN — CEFAZOLIN SODIUM 2 G: 10 INJECTION, POWDER, FOR SOLUTION INTRAVENOUS at 11:30

## 2021-02-16 RX ADMIN — SODIUM CHLORIDE: 900 INJECTION INTRAVENOUS at 07:40

## 2021-02-16 RX ADMIN — CEFAZOLIN SODIUM 2 G: 10 INJECTION, POWDER, FOR SOLUTION INTRAVENOUS at 07:25

## 2021-02-16 RX ADMIN — FENTANYL CITRATE 200 MCG: 50 INJECTION, SOLUTION INTRAMUSCULAR; INTRAVENOUS at 13:01

## 2021-02-16 RX ADMIN — CHLORHEXIDINE GLUCONATE: 4 LIQUID TOPICAL at 06:34

## 2021-02-16 RX ADMIN — HEPARIN SODIUM 30000 UNITS: 1000 INJECTION INTRAVENOUS; SUBCUTANEOUS at 08:22

## 2021-02-16 ASSESSMENT — PULMONARY FUNCTION TESTS
PIF_VALUE: 0
PIF_VALUE: 18
PIF_VALUE: 0
PIF_VALUE: 15
PIF_VALUE: 0
PIF_VALUE: 0
PIF_VALUE: 13
PIF_VALUE: 0
PIF_VALUE: 14
PIF_VALUE: 13
PIF_VALUE: 12
PIF_VALUE: 0
PIF_VALUE: 13
PIF_VALUE: 0
PIF_VALUE: 1
PIF_VALUE: 16
PIF_VALUE: 13
PIF_VALUE: 1
PIF_VALUE: 13
PIF_VALUE: 0
PIF_VALUE: 14
PIF_VALUE: 0
PIF_VALUE: 14
PIF_VALUE: 0
PIF_VALUE: 20
PIF_VALUE: 20
PIF_VALUE: 0
PIF_VALUE: 22
PIF_VALUE: 13
PIF_VALUE: 14
PIF_VALUE: 15
PIF_VALUE: 14
PIF_VALUE: 0
PIF_VALUE: 20
PIF_VALUE: 0
PIF_VALUE: 14
PIF_VALUE: 12
PIF_VALUE: 0
PIF_VALUE: 19
PIF_VALUE: 25
PIF_VALUE: 0
PIF_VALUE: 17
PIF_VALUE: 15
PIF_VALUE: 0
PIF_VALUE: 1
PIF_VALUE: 20
PIF_VALUE: 0
PIF_VALUE: 14
PIF_VALUE: 0
PIF_VALUE: 14
PIF_VALUE: 0
PIF_VALUE: 21
PIF_VALUE: 13
PIF_VALUE: 8
PIF_VALUE: 0
PIF_VALUE: 0
PIF_VALUE: 1
PIF_VALUE: 21
PIF_VALUE: 17
PIF_VALUE: 0
PIF_VALUE: 14
PIF_VALUE: 17
PIF_VALUE: 0
PIF_VALUE: 18
PIF_VALUE: 0
PIF_VALUE: 0
PIF_VALUE: 13
PIF_VALUE: 0
PIF_VALUE: 14
PIF_VALUE: 21
PIF_VALUE: 0
PIF_VALUE: 0
PIF_VALUE: 3
PIF_VALUE: 0
PIF_VALUE: 0
PIF_VALUE: 21
PIF_VALUE: 0
PIF_VALUE: 1
PIF_VALUE: 20
PIF_VALUE: 0
PIF_VALUE: 0
PIF_VALUE: 16
PIF_VALUE: 0
PIF_VALUE: 16
PIF_VALUE: 1
PIF_VALUE: 0
PIF_VALUE: 0
PIF_VALUE: 20
PIF_VALUE: 14
PIF_VALUE: 15
PIF_VALUE: 0
PIF_VALUE: 18
PIF_VALUE: 15
PIF_VALUE: 0
PIF_VALUE: 22
PIF_VALUE: 0
PIF_VALUE: 15
PIF_VALUE: 14
PIF_VALUE: 0
PIF_VALUE: 1
PIF_VALUE: 0
PIF_VALUE: 20
PIF_VALUE: 16
PIF_VALUE: 16
PIF_VALUE: 0
PIF_VALUE: 0
PIF_VALUE: 13
PIF_VALUE: 0
PIF_VALUE: 20
PIF_VALUE: 20
PIF_VALUE: 0
PIF_VALUE: 13
PIF_VALUE: 13
PIF_VALUE: 0
PIF_VALUE: 15
PIF_VALUE: 0
PIF_VALUE: 13
PIF_VALUE: 0
PIF_VALUE: 16
PIF_VALUE: 16
PIF_VALUE: 17
PIF_VALUE: 15
PIF_VALUE: 16
PIF_VALUE: 0
PIF_VALUE: 0
PIF_VALUE: 14
PIF_VALUE: 14
PIF_VALUE: 1
PIF_VALUE: 0
PIF_VALUE: 0
PIF_VALUE: 19
PIF_VALUE: 1
PIF_VALUE: 13
PIF_VALUE: 16
PIF_VALUE: 0
PIF_VALUE: 0
PIF_VALUE: 2
PIF_VALUE: 16
PIF_VALUE: 14
PIF_VALUE: 0
PIF_VALUE: 14
PIF_VALUE: 13
PIF_VALUE: 14
PIF_VALUE: 0
PIF_VALUE: 14
PIF_VALUE: 21
PIF_VALUE: 0
PIF_VALUE: 0
PIF_VALUE: 14
PIF_VALUE: 22
PIF_VALUE: 1
PIF_VALUE: 0
PIF_VALUE: 0
PIF_VALUE: 14
PIF_VALUE: 0
PIF_VALUE: 13
PIF_VALUE: 0
PIF_VALUE: 14
PIF_VALUE: 13
PIF_VALUE: 21
PIF_VALUE: 0
PIF_VALUE: 21
PIF_VALUE: 13
PIF_VALUE: 0
PIF_VALUE: 14
PIF_VALUE: 0
PIF_VALUE: 12
PIF_VALUE: 20
PIF_VALUE: 0
PIF_VALUE: 20
PIF_VALUE: 14
PIF_VALUE: 14
PIF_VALUE: 0
PIF_VALUE: 15
PIF_VALUE: 0
PIF_VALUE: 20

## 2021-02-16 NOTE — PROGRESS NOTES
Russ, RT at bedside, pt waking up more at this time, following commands. Changed to SIMV at this time, FiO2 40%. Per Kayy Dimas with [employer], pt's atrial lead is not working appropriately, speaking with Dr. King Mcgrath on phone at this time.

## 2021-02-16 NOTE — PROGRESS NOTES
Updated Dr. Michael Victor regarding hemodynamics and ABG results. /48, PAP 50/21 CVP 13    ABG pH 7.33 pCO2 45 pO2 74 HCO3 24 BE -1.6    Orders to give methylene blue. Orders not to extubate d/t hemodynamic instability, pt remains on CPAP mode on ventilator and tolerating well.

## 2021-02-16 NOTE — CONSULTS
Nephrology Service Consultation    Patient:  Emi Mckenzie  MRN: [id number]  Consulting physician:  Corazon Hines MD  Reason for Consult: arf on ckd     History Obtained From:  electronic medical record  PCP: Jamari Carter MD    HISTORY OF PRESENT ILLNESS:   The patient is a 66 y.o. male who presents with sp cabg with vent and recent carotid surgery well known to me with ckd 3 1.4-1.6 and close to baseline when had surgery todaya dn tolerated well and seen post op with chest tube and vent and trying to arouse. Stable uop 40-50 cc/hr and monitor with swan volume status. Pt with Dm2, cad, PM. htn    Past Medical History:        Diagnosis Date    Arrhythmia     Pacemaker placed aprox 5 years ago for A Fib per patient    Arthritis 12/2013    rt wrist    Atrial fibrillation ([facility]     on Xarelto - Dr. Mary Han CAD (coronary artery disease) 06/18/2014    see dr Yanci Dangelo kidney disease, stage III (moderate) 07/07/2016    Diabetes mellitus ([facility]     dx 2004    Diabetic neuropathy associated with type 2 diabetes mellitus ([facility] 04/23/2019    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    Gout 04/2019    \"got gout when had pacer put in because they did not give me my medication for gout \"    H/O 24 hour EKG monitoring 10/03/2013    no afib noted, sinsus rhythm    H/O cardiovascular stress test 05/12/2014    cardiolite- mild ischemia RCA EF50%    H/O echocardiogram 12/01/2020    EF 55-60% severe aortic stenosis mild to mod aortic regurg mod to severe tricuspid regurg severe pulm htn significant changes since 2018 echo.  H/O right and left heart catheterization 12/10/2020    DIFFUSE LAD DISEASE, Mild ECA Disease, Severe AS, Milf Pul HTN on RHC.     H/O transesophageal echocardiography (MABLE) for monitoring 08/05/2013    normal LV function and normal LA appendage without any clot    History of blood transfusion 12/2020    d/t anemia    History of transesophageal echocardiography (MABLE) 12/15/2020    Severe aortic stenosis (ANNA by planimetry: 0.778 cm sq). Mild AR.    [place] (hard of hearing)     hearing tonya aides    Hx of Doppler echocardiogram 05/21/2018    EF 50%  Mild LV hypertrophy. Mildly enlarged RA. Mod aortic valve calcification with mod AS. Mitral annular calcification is present. Mild AR, MR and TR. Mild pulmonary htn.  Hyperlipidemia     Hypertension     Follows with PCP & Dr. Chary Guerra Other disorders of kidney and ureter     Pacemaker     Medtronic, implanted 2014    Pneumonia 12/29/2012    Sleep apnea     dx 2013- has c-pap    Type II or unspecified type diabetes mellitus with other specified manifestations, uncontrolled 12/12/2012    Venous hypertension, chronic, with ulcer ([address] 12/12/2012    resolved       Past Surgical History:        Procedure Laterality Date    CARDIOVERSION  12/14    at [address] Left 1/29/2021    LEFT CAROTID ENDARTERECTOMY performed by Gilda Voss MD at [id number]  2017    COLONOSCOPY  2011    COLONOSCOPY N/A 11/19/2019    COLONOSCOPY DIAGNOSTIC performed by Lubna Tidwell MD at [facility]  09/30/2020    POSSIBLE CECAL avms, SIGMOID DIVERTICULOSIS, INTERNAL HEMORRHOIDS GRADE 1    COLONOSCOPY N/A 9/30/2020    COLONOSCOPY CONTROL HEMORRHAGE WITH APC performed by Lubna Tidwell MD at [facility]  2014    \"2 stents put in \"   [address]/ [address]  2004    total left hip    OTHER SURGICAL HISTORY Right 12/02/2017    I&D; evacuation of hematoma right hip    OTHER SURGICAL HISTORY  09/17/2020    enteroscopy    PACEMAKER PLACEMENT      9/18/14 Status post remote permanent pacemaker with atrial lead dislodgement.  7/24/14 PPM Implant    UPPER GASTROINTESTINAL ENDOSCOPY N/A 9/17/2020    ENTEROSCOPY PUSH BIOPSY performed by Lubna Tidwell MD at [email or web address]  2012    \"have stents in both legs- done in [place] Medications:   Scheduled Meds:   sodium chloride flush  10 mL Intravenous 2 times per day    aspirin  81 mg Oral Daily    amiodarone  200 mg Oral TID    mupirocin   Nasal BID    [START ON 2/17/2021] therapeutic multivitamin-minerals  1 tablet Oral Daily with breakfast    polyethylene glycol  17 g Oral Daily    carvedilol  3.125 mg Oral BID    [START ON 2/17/2021] atorvastatin  20 mg Oral Nightly    pantoprazole  40 mg Oral Daily    ceFAZolin (ANCEF) IVPB  2,000 mg Intravenous Q8H    ipratropium-albuterol  1 ampule Inhalation Q4H WA    [START ON 2/20/2021] amiodarone  200 mg Oral Daily     Continuous Infusions:   amiodarone 0.5 mg/min (02/16/21 1504)    sodium chloride 75 mL/hr at 02/16/21 1452    norepinephrine      EPINEPHrine infusion 7 mcg/min (02/16/21 1507)    nitroGLYCERIN      insulin 4.5 Units/hr (02/16/21 1531)    dextrose      vasopressin (Septic Shock) infusion 0.04 Units/min (02/16/21 1451)     PRN Meds:.sodium chloride flush, ondansetron, acetaminophen, HYDROcodone 5 mg - acetaminophen **OR** HYDROcodone 5 mg - acetaminophen, fentanNYL, hydrALAZINE, metoprolol, sodium bicarbonate, bisacodyl, fleet, potassium chloride, magnesium sulfate, calcium gluconate, albumin human, furosemide, norepinephrine, EPINEPHrine infusion, nitroGLYCERIN, glucose, dextrose, glucagon (rDNA), dextrose    Allergies:  Spironolactone and Tape [adhesive tape]    Social History:   TOBACCO:   reports that he has never smoked. He has never used smokeless tobacco.  ETOH:   reports current alcohol use of about 2.0 standard drinks of alcohol per week. OCCUPATION:      Family History:       Problem Relation Age of Onset    High Blood Pressure Mother     Arthritis Mother     Diabetes Mother     Heart Disease Mother     High Blood Pressure Father     Heart Disease Father     Kidney Disease Father        REVIEW OF SYSTEMS:  Negative except for sedated on vent.     Physical Exam:    I/O: No intake/output data recorded. Vitals: BP (!) 96/53   Pulse 70   Temp 98.6 °F (37 °C) (Core)   Resp 18   Ht 5' 10\" (1.778 m)   Wt 216 lb (98 kg)   SpO2 95%   BMI 30.99 kg/m²   General appearance: sedated ett  HEENT: Head: Normal, normocephalic, atraumatic. Neck: supple, symmetrical, trachea midline  Lungs: diminished breath sounds bilaterally chest tube  Heart: S1, S2 sp cabg  Abdomen: abnormal findings:  Soft obese nt  Extremities: edema trace  Neurologic: Mental status: alertness: sedated    CBC:   Recent Labs     02/16/21  1229 02/16/21  1310 02/16/21  1400   WBC  --   --  34.6*   HGB 8.4* 8.2* 9.3*   PLT  --   --  183     BMP:    Recent Labs     02/16/21  1229 02/16/21  1310 02/16/21  1400    143 142   K 4.9* 3.9 3.9   CL  --   --  107   CO2 26 26 21   BUN  --   --  41*   CREATININE 1.5* 2.1* 1.8*   GLUCOSE  --   --  125*     Hepatic: No results for input(s): AST, ALT, ALB, BILITOT, ALKPHOS in the last 72 hours. Troponin: No results for input(s): TROPONINI in the last 72 hours.   Mg, Phos:   Recent Labs     02/16/21  1400   MG 2.7*       ABGs:   Lab Results   Component Value Date    PO2ART 83.1 02/16/2021    EVJ9ZUN 57.5 02/16/2021     INR:   Recent Labs     02/16/21  1400   INR 1.25     -----------------------------------------------------------------      Assessment and Recommendations     Patient Active Problem List   Diagnosis Code    Type 2 diabetes mellitus without complication, without long-term current use of insulin (HCC) E11.9    Ulcer of other part of lower limb L97.809    Venous hypertension, chronic, with ulcer ([facility] I87.319, L97.909    Ulcer of other part of foot L97.509    Pneumonia J18.9    Atrial fibrillation (HCC) I48.91    Sinus pause I45.5    PAF (paroxysmal atrial fibrillation) (HCC) I48.0    DM (diabetes mellitus) (HCC) E11.9    DMITRIY on CPAP G47.33, Z99.89    Hyperlipidemia E78.5    Status post incision and drainage Z98.890    CKD (chronic kidney disease) stage 3, GFR 30-59 ml/min ([facility] N18.30    Hyperpotassemia E87.5    Arthritis M19.90    PVD (peripheral vascular disease) ([facility] I73.9    Hematoma T14. 8XXA    Cardiac pacemaker in situ Z95.0    Coronary artery stenosis I25.10    Essential hypertension I10    Gout M10.9    Diabetic neuropathy associated with type 2 diabetes mellitus ([facility] E11.40    Adenomatous polyp of sigmoid colon D12.5    Iron deficiency anemia due to chronic blood loss D50.0    Erythropoietin deficiency anemia D63.1    VHD (valvular heart disease) I38    Abnormal fractional flow reserve (FFR) on cardiac catheterization R94.39    Carotid stenosis, left I65.22    Aortic stenosis, severe I35.0    CAD in native artery I25.10     Imp/plan  1 arf from atn on ckd 3 creat 1.6  2 cad sp cabg  3 sp carotid surgery left  4 hypotension  5 anemia    Plan  1 gentle ivf and monitor- hold K supplement and diuretic for now  2 cardiac monitor post cabg  3 recent carotid surgery monitor  4 bp low stable post surgery monitor  5 hb low stable  Will monitor and plan to wean vent today and lasix as need    Electronically signed by Mayra Lott MD on 2/16/2021 at 3:46 PM

## 2021-02-16 NOTE — PROGRESS NOTES
Pt's Wife Mark Alvarado called and updated, all questions regarding recovery answered. Denies any further questions. Pt's room number, ICU main phone number, and pt's security code #[id number] given.

## 2021-02-16 NOTE — PROGRESS NOTES
Pt arrive to CVICU from OR with OR RN and anesthesia, pt connected to CVICU monitor's. Assessment completed, see documentation. Pt with epi gtt, levophed gtt, amiodarone gtt, and insulin gtt infusing. See MAR for titrations. Pt with V wires, disconnected from temporary pacer, pt has internal pacemaker. Pt intubated, RT at bedside and placed pt on ventilator. Will continue to monitor very closely.     Electronically signed by Enzo Braxton RN on 2/16/2021 at 2:56 PM

## 2021-02-16 NOTE — PROGRESS NOTES
Russ RT at bedside, pt placed on CPAP mode on ventilator at this time. Pt tolerating well, will continue to wean to extubate as tolerated.

## 2021-02-16 NOTE — PROGRESS NOTES
Dr. Rodolfo Del Toro at bedside, updated on pt's hemodynamics. ABGs pH 7.29, pCO2 52, pO2 64, HCO3 25.2    Orders to changed AC rate to 18 and Tv to 550. RT Russ at bedside and adjustments made.

## 2021-02-16 NOTE — PROGRESS NOTES
Non capturing atrial lead  S/P AVR  S/P MAZE  S/P 2 vessel CABG     Atrial lead is not capturing  Chronic v paced. Previous lead was sensing prior to procedure. Now not sensing  Patient currently in ectopic atrial rhythm with LBBB  Patient previous non paced EKG patient had 2013 left anterior fasicular block, so probably may have developed LBBB post AVR  Currently patient is on 3 pressors post procedure  Patient is high risk of going back in afib  Could consider new atrial lead placement once patient is more stable  Currently underlying HR is 80, pacing at higher rate is not improving BP any further.    Will continue to follow patient

## 2021-02-16 NOTE — PROGRESS NOTES
Updated Dr. Negrito Knapp regarding pt's hemodynamics. ABP 84/39 map 52; PAP 37/18 CVP 10; orders to increase dobutamine gtt to 7.5mcg/kg/min; give 250ml of albumin. Orders placed.

## 2021-02-16 NOTE — ANESTHESIA PROCEDURE NOTES
Arterial Line:    An arterial line was placed in the OR for the following indication(s): continuous blood pressure monitoring and blood sampling needed. A 20 gauge (size), (length), Arrow (type) catheter was placed, Seldinger technique not used, into the left radial artery, secured by tape and Tegaderm. Anesthesia type: Local  Local infiltration: Injection    Events:  patient tolerated procedure well with no complications. 2/16/2021 7:08 AM2/16/2021 7:15 AM  Anesthesiologist: Rupal Gil MD  Resident/CRNA: JUSTIN Peña CRNA  Other anesthesia staff: JUSTIN Jacques CRNA  Performed:  Other anesthesia staff   Preanesthetic Checklist  Completed: patient identified, IV checked, site marked, risks and benefits discussed, surgical consent, monitors and equipment checked, pre-op evaluation, timeout performed, anesthesia consent given, oxygen available and patient being monitored

## 2021-02-16 NOTE — PROGRESS NOTES
Dr. Ogden Overall at bedside, orders for 12 lead EKG to be performed, EKG completed, atrial lead of permanent pacemaker is not functioning correctly.  Updated Dr. Ortiz Bel

## 2021-02-16 NOTE — PROGRESS NOTES
Dr. Hernandes Mantoloking at bedside, updated on pt's progress. No new orders at this time. Will continue to monitor closely.

## 2021-02-16 NOTE — PROGRESS NOTES
Dr. Ortiz Bel updated, pt's hemodynamics ABP 80s/40s, CVP 10; orders to increase levophed gtt to 15mcg/min; give calcium chloride, and reassess.

## 2021-02-17 ENCOUNTER — APPOINTMENT (OUTPATIENT)
Dept: GENERAL RADIOLOGY | Age: 71
DRG: 219 | End: 2021-02-17
Attending: THORACIC SURGERY (CARDIOTHORACIC VASCULAR SURGERY)
Payer: MEDICARE

## 2021-02-17 LAB
ANION GAP SERPL CALCULATED.3IONS-SCNC: 11 MMOL/L (ref 4–16)
ANION GAP SERPL CALCULATED.3IONS-SCNC: 12 MMOL/L (ref 4–16)
BASE EXCESS MIXED: 0 (ref 0–1.2)
BASE EXCESS MIXED: 0.1 (ref 0–1.2)
BASE EXCESS MIXED: 0.3 (ref 0–1.2)
BASE EXCESS MIXED: 0.3 (ref 0–1.2)
BASE EXCESS MIXED: 0.4 (ref 0–1.2)
BASE EXCESS MIXED: 0.4 (ref 0–1.2)
BASE EXCESS MIXED: 0.5 (ref 0–1.2)
BASE EXCESS MIXED: 0.6 (ref 0–1.2)
BASE EXCESS MIXED: 0.9 (ref 0–1.2)
BASE EXCESS MIXED: 0.9 (ref 0–1.2)
BASE EXCESS MIXED: 1 (ref 0–1.2)
BASE EXCESS MIXED: 1.2 (ref 0–1.2)
BASE EXCESS MIXED: 1.2 (ref 0–1.2)
BASE EXCESS MIXED: 1.4 (ref 0–1.2)
BASE EXCESS MIXED: 1.6 (ref 0–1.2)
BASE EXCESS MIXED: 1.9 (ref 0–1.2)
BASE EXCESS MIXED: 2.1 (ref 0–1.2)
BASE EXCESS MIXED: 4 (ref 0–1.2)
BASE EXCESS: 2 (ref 0–3.3)
BASE EXCESS: ABNORMAL (ref 0–3.3)
BUN BLDV-MCNC: 46 MG/DL (ref 6–23)
BUN BLDV-MCNC: 50 MG/DL (ref 6–23)
CALCIUM IONIZED: 4.16 MG/DL (ref 4.48–5.28)
CALCIUM SERPL-MCNC: 8.1 MG/DL (ref 8.3–10.6)
CALCIUM SERPL-MCNC: 8.5 MG/DL (ref 8.3–10.6)
CARBON MONOXIDE, BLOOD: 1.2 % (ref 0–5)
CHLORIDE BLD-SCNC: 100 MMOL/L (ref 99–110)
CHLORIDE BLD-SCNC: 107 MMOL/L (ref 99–110)
CO2 CONTENT: 25.7 MMOL/L (ref 19–24)
CO2 CONTENT: 26.4 MMOL/L (ref 19–24)
CO2 CONTENT: 26.5 MMOL/L (ref 19–24)
CO2 CONTENT: 26.9 MMOL/L (ref 19–24)
CO2: 22 MMOL/L (ref 21–32)
CO2: 23 MMOL/L (ref 21–32)
CO2: 25 MMOL/L (ref 21–32)
CO2: 26 MMOL/L (ref 21–32)
CO2: 27 MMOL/L (ref 21–32)
CO2: 28 MMOL/L (ref 21–32)
COMMENT: ABNORMAL
CREAT SERPL-MCNC: 2.3 MG/DL (ref 0.9–1.3)
CREAT SERPL-MCNC: 2.6 MG/DL (ref 0.9–1.3)
EKG DIAGNOSIS: NORMAL
EKG Q-T INTERVAL: 438 MS
EKG QRS DURATION: 160 MS
EKG QTC CALCULATION (BAZETT): 505 MS
EKG R AXIS: -55 DEGREES
EKG T AXIS: 201 DEGREES
EKG VENTRICULAR RATE: 80 BPM
GFR AFRICAN AMERICAN: 29 ML/MIN/1.73M2
GFR AFRICAN AMERICAN: 32 ML/MIN/1.73M2
GFR AFRICAN AMERICAN: 33 ML/MIN/1.73M2
GFR AFRICAN AMERICAN: 34 ML/MIN/1.73M2
GFR AFRICAN AMERICAN: 35 ML/MIN/1.73M2
GFR NON-AFRICAN AMERICAN: 24 ML/MIN/1.73M2
GFR NON-AFRICAN AMERICAN: 27 ML/MIN/1.73M2
GFR NON-AFRICAN AMERICAN: 28 ML/MIN/1.73M2
GFR NON-AFRICAN AMERICAN: 29 ML/MIN/1.73M2
GLUCOSE BLD-MCNC: 111 MG/DL (ref 70–99)
GLUCOSE BLD-MCNC: 112 MG/DL (ref 70–99)
GLUCOSE BLD-MCNC: 112 MG/DL (ref 70–99)
GLUCOSE BLD-MCNC: 121 MG/DL (ref 70–99)
GLUCOSE BLD-MCNC: 122 MG/DL (ref 70–99)
GLUCOSE BLD-MCNC: 129 MG/DL (ref 70–99)
GLUCOSE BLD-MCNC: 133 MG/DL (ref 70–99)
GLUCOSE BLD-MCNC: 134 MG/DL (ref 70–99)
GLUCOSE BLD-MCNC: 141 MG/DL (ref 70–99)
GLUCOSE BLD-MCNC: 142 MG/DL (ref 70–99)
GLUCOSE BLD-MCNC: 142 MG/DL (ref 70–99)
GLUCOSE BLD-MCNC: 145 MG/DL (ref 70–99)
GLUCOSE BLD-MCNC: 146 MG/DL (ref 70–99)
GLUCOSE BLD-MCNC: 148 MG/DL (ref 70–99)
GLUCOSE BLD-MCNC: 149 MG/DL (ref 70–99)
GLUCOSE BLD-MCNC: 149 MG/DL (ref 70–99)
GLUCOSE BLD-MCNC: 152 MG/DL (ref 70–99)
GLUCOSE BLD-MCNC: 159 MG/DL (ref 70–99)
GLUCOSE BLD-MCNC: 169 MG/DL (ref 70–99)
GLUCOSE BLD-MCNC: 176 MG/DL (ref 70–99)
GLUCOSE BLD-MCNC: 190 MG/DL (ref 70–99)
GLUCOSE BLD-MCNC: 193 MG/DL (ref 70–99)
GLUCOSE BLD-MCNC: 97 MG/DL (ref 70–99)
HCO3 ARTERIAL: 20.7 MMOL/L (ref 18–23)
HCO3 ARTERIAL: 23.6 MMOL/L (ref 18–23)
HCO3 ARTERIAL: 23.7 MMOL/L (ref 18–23)
HCO3 ARTERIAL: 23.8 MMOL/L (ref 18–23)
HCO3 ARTERIAL: 23.9 MMOL/L (ref 18–23)
HCO3 ARTERIAL: 24 MMOL/L (ref 18–23)
HCO3 ARTERIAL: 24 MMOL/L (ref 18–23)
HCO3 ARTERIAL: 24.1 MMOL/L (ref 18–23)
HCO3 ARTERIAL: 24.2 MMOL/L (ref 18–23)
HCO3 ARTERIAL: 24.3 MMOL/L (ref 18–23)
HCO3 ARTERIAL: 24.5 MMOL/L (ref 18–23)
HCO3 ARTERIAL: 24.8 MMOL/L (ref 18–23)
HCO3 ARTERIAL: 24.9 MMOL/L (ref 18–23)
HCO3 ARTERIAL: 25 MMOL/L (ref 18–23)
HCO3 ARTERIAL: 25.1 MMOL/L (ref 18–23)
HCO3 ARTERIAL: 25.2 MMOL/L (ref 18–23)
HCO3 ARTERIAL: 25.2 MMOL/L (ref 18–23)
HCO3 ARTERIAL: 25.6 MMOL/L (ref 18–23)
HCO3 ARTERIAL: 26.1 MMOL/L (ref 18–23)
HCT VFR BLD CALC: 24 % (ref 42–52)
HCT VFR BLD CALC: 24 % (ref 42–52)
HCT VFR BLD CALC: 26 % (ref 42–52)
HCT VFR BLD CALC: 27 % (ref 42–52)
HCT VFR BLD CALC: 27 % (ref 42–52)
HCT VFR BLD CALC: 28 % (ref 42–52)
HCT VFR BLD CALC: 29 % (ref 42–52)
HCT VFR BLD CALC: 29.2 % (ref 42–52)
HCT VFR BLD CALC: 29.3 % (ref 42–52)
HCT VFR BLD CALC: 30 % (ref 42–52)
HEMOGLOBIN: 10 GM/DL (ref 13.5–18)
HEMOGLOBIN: 8.1 GM/DL (ref 13.5–18)
HEMOGLOBIN: 8.3 GM/DL (ref 13.5–18)
HEMOGLOBIN: 8.3 GM/DL (ref 13.5–18)
HEMOGLOBIN: 8.6 GM/DL (ref 13.5–18)
HEMOGLOBIN: 8.8 GM/DL (ref 13.5–18)
HEMOGLOBIN: 9.1 GM/DL (ref 13.5–18)
HEMOGLOBIN: 9.2 GM/DL (ref 13.5–18)
HEMOGLOBIN: 9.4 GM/DL (ref 13.5–18)
HEMOGLOBIN: 9.4 GM/DL (ref 13.5–18)
HEMOGLOBIN: 9.5 GM/DL (ref 13.5–18)
HEMOGLOBIN: 9.6 GM/DL (ref 13.5–18)
HEMOGLOBIN: 9.7 GM/DL (ref 13.5–18)
IONIZED CA: 1.04 MMOL/L (ref 1.12–1.32)
LACTATE: 0.9 MMOL/L (ref 0.4–2)
MAGNESIUM: 2.6 MG/DL (ref 1.8–2.4)
MCH RBC QN AUTO: 23.7 PG (ref 27–31)
MCH RBC QN AUTO: 24.4 PG (ref 27–31)
MCHC RBC AUTO-ENTMCNC: 28.3 % (ref 32–36)
MCHC RBC AUTO-ENTMCNC: 29.5 % (ref 32–36)
MCV RBC AUTO: 82.7 FL (ref 78–100)
MCV RBC AUTO: 83.7 FL (ref 78–100)
METHEMOGLOBIN ARTERIAL: 0.5 %
O2 SATURATION: 84.7 % (ref 96–97)
O2 SATURATION: 86.5 % (ref 96–97)
O2 SATURATION: 90.5 % (ref 96–97)
O2 SATURATION: 91.8 % (ref 96–97)
O2 SATURATION: 92.5 % (ref 96–97)
O2 SATURATION: 92.7 % (ref 96–97)
O2 SATURATION: 92.9 % (ref 96–97)
O2 SATURATION: 93.1 % (ref 96–97)
O2 SATURATION: 93.8 % (ref 96–97)
O2 SATURATION: 93.8 % (ref 96–97)
O2 SATURATION: 93.9 % (ref 96–97)
O2 SATURATION: 94.4 % (ref 96–97)
O2 SATURATION: 94.7 % (ref 96–97)
O2 SATURATION: 94.9 % (ref 96–97)
O2 SATURATION: 94.9 % (ref 96–97)
O2 SATURATION: 95 % (ref 96–97)
O2 SATURATION: 95.2 % (ref 96–97)
O2 SATURATION: 95.2 % (ref 96–97)
O2 SATURATION: 95.4 % (ref 96–97)
O2 SATURATION: 97.4 % (ref 96–97)
O2 SATURATION: 97.5 % (ref 96–97)
PCO2 ARTERIAL: 34.9 MMHG (ref 32–45)
PCO2 ARTERIAL: 36.5 MMHG (ref 32–45)
PCO2 ARTERIAL: 38.8 MMHG (ref 32–45)
PCO2 ARTERIAL: 39.3 MMHG (ref 32–45)
PCO2 ARTERIAL: 39.3 MMHG (ref 32–45)
PCO2 ARTERIAL: 40.6 MMHG (ref 32–45)
PCO2 ARTERIAL: 40.9 MMHG (ref 32–45)
PCO2 ARTERIAL: 40.9 MMHG (ref 32–45)
PCO2 ARTERIAL: 41.2 MMHG (ref 32–45)
PCO2 ARTERIAL: 42.8 MMHG (ref 32–45)
PCO2 ARTERIAL: 43 MMHG (ref 32–45)
PCO2 ARTERIAL: 43.1 MMHG (ref 32–45)
PCO2 ARTERIAL: 43.8 MMHG (ref 32–45)
PCO2 ARTERIAL: 44 MMHG (ref 32–45)
PCO2 ARTERIAL: 44.4 MMHG (ref 32–45)
PCO2 ARTERIAL: 44.7 MMHG (ref 32–45)
PCO2 ARTERIAL: 44.9 MMHG (ref 32–45)
PCO2 ARTERIAL: 45.2 MMHG (ref 32–45)
PCO2 ARTERIAL: 45.3 MMHG (ref 32–45)
PCO2 ARTERIAL: 45.6 MMHG (ref 32–45)
PCO2 ARTERIAL: 46 MMHG (ref 32–45)
PDW BLD-RTO: 20.5 % (ref 11.7–14.9)
PDW BLD-RTO: 20.5 % (ref 11.7–14.9)
PH BLOOD: 7.33 (ref 7.34–7.45)
PH BLOOD: 7.33 (ref 7.34–7.45)
PH BLOOD: 7.34 (ref 7.34–7.45)
PH BLOOD: 7.35 (ref 7.34–7.45)
PH BLOOD: 7.37 (ref 7.34–7.45)
PH BLOOD: 7.38 (ref 7.34–7.45)
PH BLOOD: 7.39 (ref 7.34–7.45)
PH BLOOD: 7.4 (ref 7.34–7.45)
PH BLOOD: 7.4 (ref 7.34–7.45)
PH BLOOD: 7.41 (ref 7.34–7.45)
PH BLOOD: 7.43 (ref 7.34–7.45)
PLATELET # BLD: 105 K/CU MM (ref 140–440)
PLATELET # BLD: 146 K/CU MM (ref 140–440)
PMV BLD AUTO: 10.2 FL (ref 7.5–11.1)
PMV BLD AUTO: 10.4 FL (ref 7.5–11.1)
PO2 ARTERIAL: 102.3 MMHG (ref 75–100)
PO2 ARTERIAL: 52.5 MMHG (ref 75–100)
PO2 ARTERIAL: 53.6 MMHG (ref 75–100)
PO2 ARTERIAL: 61.3 MMHG (ref 75–100)
PO2 ARTERIAL: 63.6 MMHG (ref 75–100)
PO2 ARTERIAL: 65.1 MMHG (ref 75–100)
PO2 ARTERIAL: 67.2 MMHG (ref 75–100)
PO2 ARTERIAL: 67.7 MMHG (ref 75–100)
PO2 ARTERIAL: 68 MMHG (ref 75–100)
PO2 ARTERIAL: 69.4 MMHG (ref 75–100)
PO2 ARTERIAL: 71 MMHG (ref 75–100)
PO2 ARTERIAL: 75.2 MMHG (ref 75–100)
PO2 ARTERIAL: 75.9 MMHG (ref 75–100)
PO2 ARTERIAL: 77.5 MMHG (ref 75–100)
PO2 ARTERIAL: 77.7 MMHG (ref 75–100)
PO2 ARTERIAL: 78.1 MMHG (ref 75–100)
PO2 ARTERIAL: 80.3 MMHG (ref 75–100)
PO2 ARTERIAL: 80.8 MMHG (ref 75–100)
PO2 ARTERIAL: 82.3 MMHG (ref 75–100)
PO2 ARTERIAL: 84 MMHG (ref 75–100)
PO2 ARTERIAL: 97.3 MMHG (ref 75–100)
POC CALCIUM: 1.02 MMOL/L (ref 1.12–1.32)
POC CALCIUM: 1.02 MMOL/L (ref 1.12–1.32)
POC CALCIUM: 1.03 MMOL/L (ref 1.12–1.32)
POC CALCIUM: 1.04 MMOL/L (ref 1.12–1.32)
POC CALCIUM: 1.11 MMOL/L (ref 1.12–1.32)
POC CALCIUM: 1.11 MMOL/L (ref 1.12–1.32)
POC CALCIUM: 1.13 MMOL/L (ref 1.12–1.32)
POC CALCIUM: 1.14 MMOL/L (ref 1.12–1.32)
POC CALCIUM: 1.16 MMOL/L (ref 1.12–1.32)
POC CHLORIDE: 103 MMOL/L (ref 98–109)
POC CHLORIDE: 104 MMOL/L (ref 98–109)
POC CHLORIDE: 105 MMOL/L (ref 98–109)
POC CHLORIDE: 106 MMOL/L (ref 98–109)
POC CHLORIDE: 107 MMOL/L (ref 98–109)
POC CHLORIDE: 107 MMOL/L (ref 98–109)
POC CHLORIDE: 108 MMOL/L (ref 98–109)
POC CREATININE: 2.2 MG/DL (ref 0.9–1.3)
POC CREATININE: 2.3 MG/DL (ref 0.9–1.3)
POC CREATININE: 2.4 MG/DL (ref 0.9–1.3)
POC CREATININE: 2.5 MG/DL (ref 0.9–1.3)
POC CREATININE: 2.6 MG/DL (ref 0.9–1.3)
POC CREATININE: 2.6 MG/DL (ref 0.9–1.3)
POTASSIUM SERPL-SCNC: 4.4 MMOL/L (ref 3.5–4.5)
POTASSIUM SERPL-SCNC: 4.5 MMOL/L (ref 3.5–4.5)
POTASSIUM SERPL-SCNC: 5 MMOL/L (ref 3.5–4.5)
POTASSIUM SERPL-SCNC: 5.1 MMOL/L (ref 3.5–4.5)
POTASSIUM SERPL-SCNC: 5.2 MMOL/L (ref 3.5–4.5)
POTASSIUM SERPL-SCNC: 5.2 MMOL/L (ref 3.5–4.5)
POTASSIUM SERPL-SCNC: 5.2 MMOL/L (ref 3.5–5.1)
POTASSIUM SERPL-SCNC: 5.2 MMOL/L (ref 3.5–5.1)
POTASSIUM SERPL-SCNC: 5.3 MMOL/L (ref 3.5–4.5)
POTASSIUM SERPL-SCNC: 5.3 MMOL/L (ref 3.5–4.5)
POTASSIUM SERPL-SCNC: 5.5 MMOL/L (ref 3.5–4.5)
POTASSIUM SERPL-SCNC: 5.6 MMOL/L (ref 3.5–5.1)
POTASSIUM SERPL-SCNC: 5.6 MMOL/L (ref 3.5–5.1)
RBC # BLD: 3.5 M/CU MM (ref 4.6–6.2)
RBC # BLD: 3.53 M/CU MM (ref 4.6–6.2)
SODIUM BLD-SCNC: 132 MMOL/L (ref 138–146)
SODIUM BLD-SCNC: 132 MMOL/L (ref 138–146)
SODIUM BLD-SCNC: 133 MMOL/L (ref 138–146)
SODIUM BLD-SCNC: 134 MMOL/L (ref 138–146)
SODIUM BLD-SCNC: 134 MMOL/L (ref 138–146)
SODIUM BLD-SCNC: 135 MMOL/L (ref 135–145)
SODIUM BLD-SCNC: 135 MMOL/L (ref 138–146)
SODIUM BLD-SCNC: 135 MMOL/L (ref 138–146)
SODIUM BLD-SCNC: 138 MMOL/L (ref 138–146)
SODIUM BLD-SCNC: 139 MMOL/L (ref 138–146)
SODIUM BLD-SCNC: 139 MMOL/L (ref 138–146)
SODIUM BLD-SCNC: 143 MMOL/L (ref 135–145)
SOURCE, BLOOD GAS: ABNORMAL
WBC # BLD: 15.5 K/CU MM (ref 4–10.5)
WBC # BLD: 15.6 K/CU MM (ref 4–10.5)

## 2021-02-17 PROCEDURE — 2700000000 HC OXYGEN THERAPY PER DAY

## 2021-02-17 PROCEDURE — 94660 CPAP INITIATION&MGMT: CPT

## 2021-02-17 PROCEDURE — 6370000000 HC RX 637 (ALT 250 FOR IP): Performed by: THORACIC SURGERY (CARDIOTHORACIC VASCULAR SURGERY)

## 2021-02-17 PROCEDURE — 94640 AIRWAY INHALATION TREATMENT: CPT

## 2021-02-17 PROCEDURE — 84295 ASSAY OF SERUM SODIUM: CPT

## 2021-02-17 PROCEDURE — 2500000003 HC RX 250 WO HCPCS: Performed by: PHYSICIAN ASSISTANT

## 2021-02-17 PROCEDURE — 31720 CLEARANCE OF AIRWAYS: CPT

## 2021-02-17 PROCEDURE — 82330 ASSAY OF CALCIUM: CPT

## 2021-02-17 PROCEDURE — 85018 HEMOGLOBIN: CPT

## 2021-02-17 PROCEDURE — 85027 COMPLETE CBC AUTOMATED: CPT

## 2021-02-17 PROCEDURE — 84132 ASSAY OF SERUM POTASSIUM: CPT

## 2021-02-17 PROCEDURE — 82962 GLUCOSE BLOOD TEST: CPT

## 2021-02-17 PROCEDURE — 6360000002 HC RX W HCPCS: Performed by: INTERNAL MEDICINE

## 2021-02-17 PROCEDURE — 80048 BASIC METABOLIC PNL TOTAL CA: CPT

## 2021-02-17 PROCEDURE — 74018 RADEX ABDOMEN 1 VIEW: CPT

## 2021-02-17 PROCEDURE — 6370000000 HC RX 637 (ALT 250 FOR IP): Performed by: PHYSICIAN ASSISTANT

## 2021-02-17 PROCEDURE — 2580000003 HC RX 258: Performed by: PHYSICIAN ASSISTANT

## 2021-02-17 PROCEDURE — 93308 TTE F-UP OR LMTD: CPT

## 2021-02-17 PROCEDURE — P9045 ALBUMIN (HUMAN), 5%, 250 ML: HCPCS | Performed by: THORACIC SURGERY (CARDIOTHORACIC VASCULAR SURGERY)

## 2021-02-17 PROCEDURE — 2580000003 HC RX 258: Performed by: THORACIC SURGERY (CARDIOTHORACIC VASCULAR SURGERY)

## 2021-02-17 PROCEDURE — 85014 HEMATOCRIT: CPT

## 2021-02-17 PROCEDURE — 93306 TTE W/DOPPLER COMPLETE: CPT

## 2021-02-17 PROCEDURE — 82803 BLOOD GASES ANY COMBINATION: CPT

## 2021-02-17 PROCEDURE — 2580000003 HC RX 258: Performed by: INTERNAL MEDICINE

## 2021-02-17 PROCEDURE — 6360000002 HC RX W HCPCS: Performed by: PHYSICIAN ASSISTANT

## 2021-02-17 PROCEDURE — 93010 ELECTROCARDIOGRAM REPORT: CPT | Performed by: INTERNAL MEDICINE

## 2021-02-17 PROCEDURE — 6370000000 HC RX 637 (ALT 250 FOR IP): Performed by: INTERNAL MEDICINE

## 2021-02-17 PROCEDURE — 94761 N-INVAS EAR/PLS OXIMETRY MLT: CPT

## 2021-02-17 PROCEDURE — 71045 X-RAY EXAM CHEST 1 VIEW: CPT

## 2021-02-17 PROCEDURE — 2500000003 HC RX 250 WO HCPCS: Performed by: THORACIC SURGERY (CARDIOTHORACIC VASCULAR SURGERY)

## 2021-02-17 PROCEDURE — 2000000000 HC ICU R&B

## 2021-02-17 PROCEDURE — 6360000002 HC RX W HCPCS: Performed by: THORACIC SURGERY (CARDIOTHORACIC VASCULAR SURGERY)

## 2021-02-17 PROCEDURE — 83605 ASSAY OF LACTIC ACID: CPT

## 2021-02-17 PROCEDURE — 83735 ASSAY OF MAGNESIUM: CPT

## 2021-02-17 PROCEDURE — C9113 INJ PANTOPRAZOLE SODIUM, VIA: HCPCS | Performed by: THORACIC SURGERY (CARDIOTHORACIC VASCULAR SURGERY)

## 2021-02-17 PROCEDURE — 94667 MNPJ CHEST WALL 1ST: CPT

## 2021-02-17 RX ORDER — FUROSEMIDE 10 MG/ML
20 INJECTION INTRAMUSCULAR; INTRAVENOUS ONCE
Status: DISCONTINUED | OUTPATIENT
Start: 2021-02-17 | End: 2021-02-17

## 2021-02-17 RX ORDER — SODIUM CHLORIDE 450 MG/100ML
INJECTION, SOLUTION INTRAVENOUS CONTINUOUS
Status: DISCONTINUED | OUTPATIENT
Start: 2021-02-17 | End: 2021-02-20

## 2021-02-17 RX ORDER — ALBUMIN, HUMAN INJ 5% 5 %
12.5 SOLUTION INTRAVENOUS ONCE
Status: COMPLETED | OUTPATIENT
Start: 2021-02-17 | End: 2021-02-17

## 2021-02-17 RX ORDER — FUROSEMIDE 10 MG/ML
40 INJECTION INTRAMUSCULAR; INTRAVENOUS ONCE
Status: COMPLETED | OUTPATIENT
Start: 2021-02-17 | End: 2021-02-17

## 2021-02-17 RX ORDER — ACETYLCYSTEINE 200 MG/ML
600 SOLUTION ORAL; RESPIRATORY (INHALATION) 2 TIMES DAILY
Status: DISCONTINUED | OUTPATIENT
Start: 2021-02-17 | End: 2021-02-18

## 2021-02-17 RX ORDER — PANTOPRAZOLE SODIUM 40 MG/10ML
40 INJECTION, POWDER, LYOPHILIZED, FOR SOLUTION INTRAVENOUS 2 TIMES DAILY
Status: DISCONTINUED | OUTPATIENT
Start: 2021-02-17 | End: 2021-02-20

## 2021-02-17 RX ORDER — GUAIFENESIN 600 MG/1
600 TABLET, EXTENDED RELEASE ORAL 2 TIMES DAILY
Status: DISCONTINUED | OUTPATIENT
Start: 2021-02-17 | End: 2021-02-24 | Stop reason: HOSPADM

## 2021-02-17 RX ADMIN — ALBUMIN (HUMAN) 12.5 G: 12.5 INJECTION, SOLUTION INTRAVENOUS at 08:41

## 2021-02-17 RX ADMIN — ASPIRIN 81 MG: 81 TABLET, FILM COATED ORAL at 08:42

## 2021-02-17 RX ADMIN — FUROSEMIDE 40 MG: 10 INJECTION INTRAMUSCULAR; INTRAVENOUS at 10:06

## 2021-02-17 RX ADMIN — GUAIFENESIN 600 MG: 600 TABLET, EXTENDED RELEASE ORAL at 20:44

## 2021-02-17 RX ADMIN — Medication 2 PUFF: at 08:24

## 2021-02-17 RX ADMIN — VASOPRESSIN 0.04 UNITS/MIN: 20 INJECTION INTRAVENOUS at 06:20

## 2021-02-17 RX ADMIN — Medication 2 PUFF: at 04:51

## 2021-02-17 RX ADMIN — Medication 2 PUFF: at 11:53

## 2021-02-17 RX ADMIN — ALBUTEROL SULFATE 2 PUFF: 90 AEROSOL, METERED RESPIRATORY (INHALATION) at 15:34

## 2021-02-17 RX ADMIN — ALBUTEROL SULFATE 2 PUFF: 90 AEROSOL, METERED RESPIRATORY (INHALATION) at 04:50

## 2021-02-17 RX ADMIN — POLYETHYLENE GLYCOL (3350) 17 G: 17 POWDER, FOR SOLUTION ORAL at 08:42

## 2021-02-17 RX ADMIN — Medication 2 PUFF: at 20:59

## 2021-02-17 RX ADMIN — SODIUM CHLORIDE: 4.5 INJECTION, SOLUTION INTRAVENOUS at 04:32

## 2021-02-17 RX ADMIN — Medication 14 MCG/MIN: at 12:14

## 2021-02-17 RX ADMIN — SODIUM CHLORIDE 3.7 UNITS/HR: 9 INJECTION, SOLUTION INTRAVENOUS at 11:07

## 2021-02-17 RX ADMIN — CARVEDILOL 3.12 MG: 3.12 TABLET, FILM COATED ORAL at 20:43

## 2021-02-17 RX ADMIN — ACETAMINOPHEN 650 MG: 325 TABLET ORAL at 08:42

## 2021-02-17 RX ADMIN — CALCIUM GLUCONATE 2000 MG: 20 INJECTION, SOLUTION INTRAVENOUS at 20:46

## 2021-02-17 RX ADMIN — ACETAMINOPHEN 650 MG: 325 TABLET ORAL at 20:44

## 2021-02-17 RX ADMIN — GUAIFENESIN 600 MG: 600 TABLET, EXTENDED RELEASE ORAL at 14:35

## 2021-02-17 RX ADMIN — Medication: at 08:42

## 2021-02-17 RX ADMIN — ATORVASTATIN CALCIUM 20 MG: 20 TABLET, FILM COATED ORAL at 20:43

## 2021-02-17 RX ADMIN — FENTANYL CITRATE 25 MCG: 50 INJECTION INTRAMUSCULAR; INTRAVENOUS at 00:38

## 2021-02-17 RX ADMIN — ACETAMINOPHEN 650 MG: 325 TABLET ORAL at 14:36

## 2021-02-17 RX ADMIN — ALBUTEROL SULFATE 2 PUFF: 90 AEROSOL, METERED RESPIRATORY (INHALATION) at 11:51

## 2021-02-17 RX ADMIN — CALCIUM GLUCONATE 2000 MG: 20 INJECTION, SOLUTION INTRAVENOUS at 22:58

## 2021-02-17 RX ADMIN — AMIODARONE HYDROCHLORIDE 200 MG: 200 TABLET ORAL at 14:36

## 2021-02-17 RX ADMIN — SODIUM ZIRCONIUM CYCLOSILICATE 10 G: 5 POWDER, FOR SUSPENSION ORAL at 15:40

## 2021-02-17 RX ADMIN — FUROSEMIDE 40 MG: 10 INJECTION INTRAMUSCULAR; INTRAVENOUS at 10:51

## 2021-02-17 RX ADMIN — Medication: at 20:44

## 2021-02-17 RX ADMIN — SODIUM ZIRCONIUM CYCLOSILICATE 10 G: 5 POWDER, FOR SUSPENSION ORAL at 20:44

## 2021-02-17 RX ADMIN — Medication 2 PUFF: at 15:36

## 2021-02-17 RX ADMIN — VASOPRESSIN 0.04 UNITS/MIN: 20 INJECTION INTRAVENOUS at 15:14

## 2021-02-17 RX ADMIN — DOBUTAMINE HYDROCHLORIDE 7.5 MCG/KG/MIN: 200 INJECTION INTRAVENOUS at 16:23

## 2021-02-17 RX ADMIN — DOBUTAMINE HYDROCHLORIDE 7.5 MCG/KG/MIN: 200 INJECTION INTRAVENOUS at 04:31

## 2021-02-17 RX ADMIN — SODIUM BICARBONATE 50 MEQ: 84 INJECTION, SOLUTION INTRAVENOUS at 22:29

## 2021-02-17 RX ADMIN — MULTIPLE VITAMINS W/ MINERALS TAB 1 TABLET: TAB at 08:41

## 2021-02-17 RX ADMIN — AMIODARONE HYDROCHLORIDE 200 MG: 200 TABLET ORAL at 20:43

## 2021-02-17 RX ADMIN — PANTOPRAZOLE SODIUM 40 MG: 40 TABLET, DELAYED RELEASE ORAL at 08:41

## 2021-02-17 RX ADMIN — SODIUM CHLORIDE, PRESERVATIVE FREE 10 ML: 5 INJECTION INTRAVENOUS at 10:52

## 2021-02-17 RX ADMIN — PANTOPRAZOLE SODIUM 40 MG: 40 INJECTION, POWDER, LYOPHILIZED, FOR SOLUTION INTRAVENOUS at 20:43

## 2021-02-17 RX ADMIN — FUROSEMIDE 10 MG/HR: 10 INJECTION, SOLUTION INTRAVENOUS at 11:09

## 2021-02-17 RX ADMIN — AMIODARONE HYDROCHLORIDE 200 MG: 200 TABLET ORAL at 08:56

## 2021-02-17 RX ADMIN — ALBUTEROL SULFATE 2 PUFF: 90 AEROSOL, METERED RESPIRATORY (INHALATION) at 20:59

## 2021-02-17 RX ADMIN — CALCIUM GLUCONATE 2000 MG: 20 INJECTION, SOLUTION INTRAVENOUS at 12:39

## 2021-02-17 RX ADMIN — FUROSEMIDE 10 MG/HR: 10 INJECTION, SOLUTION INTRAVENOUS at 20:08

## 2021-02-17 RX ADMIN — ALBUTEROL SULFATE 2 PUFF: 90 AEROSOL, METERED RESPIRATORY (INHALATION) at 08:22

## 2021-02-17 RX ADMIN — CEFAZOLIN SODIUM 2000 MG: 10 INJECTION, POWDER, FOR SOLUTION INTRAVENOUS at 03:19

## 2021-02-17 ASSESSMENT — PULMONARY FUNCTION TESTS
PIF_VALUE: 16
PIF_VALUE: 15
PIF_VALUE: 16

## 2021-02-17 ASSESSMENT — PAIN - FUNCTIONAL ASSESSMENT
PAIN_FUNCTIONAL_ASSESSMENT: ACTIVITIES ARE NOT PREVENTED

## 2021-02-17 ASSESSMENT — PAIN DESCRIPTION - PAIN TYPE
TYPE: ACUTE PAIN
TYPE: ACUTE PAIN;SURGICAL PAIN

## 2021-02-17 ASSESSMENT — PAIN DESCRIPTION - FREQUENCY
FREQUENCY: CONTINUOUS

## 2021-02-17 ASSESSMENT — PAIN DESCRIPTION - ORIENTATION
ORIENTATION: MID

## 2021-02-17 ASSESSMENT — PAIN DESCRIPTION - LOCATION
LOCATION: CHEST;STERNUM

## 2021-02-17 ASSESSMENT — PAIN DESCRIPTION - PROGRESSION
CLINICAL_PROGRESSION: GRADUALLY WORSENING
CLINICAL_PROGRESSION: NOT CHANGED
CLINICAL_PROGRESSION: GRADUALLY WORSENING
CLINICAL_PROGRESSION: GRADUALLY WORSENING

## 2021-02-17 ASSESSMENT — PAIN DESCRIPTION - ONSET
ONSET: ON-GOING

## 2021-02-17 ASSESSMENT — PAIN SCALES - GENERAL
PAINLEVEL_OUTOF10: 0
PAINLEVEL_OUTOF10: 0
PAINLEVEL_OUTOF10: 3
PAINLEVEL_OUTOF10: 7

## 2021-02-17 ASSESSMENT — PAIN DESCRIPTION - DESCRIPTORS
DESCRIPTORS: ACHING
DESCRIPTORS: ACHING;DISCOMFORT

## 2021-02-17 NOTE — PLAN OF CARE
pain  Description: Control of acute pain  Outcome: Ongoing  Goal: Control of chronic pain  Description: Control of chronic pain  Outcome: Ongoing     Problem: Tissue Perfusion - Cardiopulmonary, Altered:  Goal: Absence of angina  Description: Absence of angina  Outcome: Ongoing  Goal: Hemodynamic stability will improve  Description: Hemodynamic stability will improve  Outcome: Ongoing  Goal: Will show no evidence of cardiac arrhythmias  Description: Will show no evidence of cardiac arrhythmias  Outcome: Ongoing     Problem: Tobacco Use:  Goal: Will participate in inpatient tobacco-use cessation counseling  Description: Will participate in inpatient tobacco-use cessation counseling  Outcome: Ongoing

## 2021-02-17 NOTE — BRIEF OP NOTE
Brief Postoperative Note      Patient: Janine Harris  YOB: 1950  MRN: [id number]    Date of Procedure: 2/16/2021    Pre-Op Diagnosis: CAD    Post-Op Diagnosis: Same       Procedure(s):  CABG CORONARY ARTERY BYPASS X2 WITH LIMA, AORTIC VALVE REPLACEMENT AND AORTIC ROOT REPAIR, INTRAOPERATIVE MABLE, INDUCED HYPOTHERMIA, LEFT LEG ENDOVEIN HARVEST, LEFT ATRIAL CLIP, AND CRYO PROCEDURE   FRIEND to LAD  SVG to RCA    Surgeon(s):  Shine Kamara MD    Assistant:  Physician Assistant: Tereso Smith PA-C    Anesthesia: General    Estimated Blood Loss (mL): 320    Complications: None    Specimens:   ID Type Source Tests Collected by Time Destination   A : NATIVE AORTIC VALVE Tissue Heart SURGICAL PATHOLOGY Shine Kamara MD 2/16/2021 1150        Implants:  Implant Name Type Inv.  Item Serial No.  Lot No. LRB No. Used Action   CLIP INT SM WIDE RED TI TRNSVRS GRV CHEVRON SHP W PRECIS  CLIP INT SM WIDE RED TI TRNSVRS GRV [place]ON SHP W PRECIS  TELEFLEX WECK-WD  N/A 1 Implanted   CLIP LIG M KATHERINE TI HRT SHP WIRE HORZ 600 PER BX  CLIP LIG M KATHERINE TI HRT SHP WIRE HORZ 600 PER BX  TELEFLEX WECK-WD  N/A 1 Implanted   ZINACTIVE USE [id number] LEAD PACE L475MM CHN A OR V MYOCARDIAL STEROID ELUT LORI  ZINACTIVE USE [id number] LEAD PACE L475MM CHN A OR V MYOCARDIAL STEROID ELUT LORI  MEDTRONIC CARDIAC SURGERY-WD  N/A 1 Implanted   DEVICE OCCL CLP L45MM SM FOOTPRINT 1 HND APPL SUTURELESS W/  DEVICE OCCL CLP L45MM SM FOOTPRINT 1 HND APPL SUTURELESS W/  ATRICURE INC-WD [id number] N/A 1 Implanted   VALVE AORT [id number] H18.5MM ORIFICE [id number] SUT RNG [id number]  VALVE AORT [id number] H18.5MM ORIFICE [id number] SUT RNG [id number]  MEDTRONIC [place] INC-WD [id number] N/A 1 Implanted   FELT SURG [id number] THK1.65MM POLY PTFE  FELT SURG [id number] THK1.65MM POLY PTFE  BARD PERIPHERAL VASCULAR-WD [id number] N/A 1 Implanted   PATCH CAR TISS REP CORMATRIX 1 X 10 CM  PATCH CAR TISS REP CORMATRIX 1 X 10 CM  CORMATRIX CARDIOVASCULAR INC-WD [id number] N/A 1 Implanted Drains:   Chest Tube 2 Pleural 24 Trin[place]adian (Active)   Suction -20 cm H2O 02/17/21 1203   Chest Tube Airleak No 02/17/21 1203   Drainage Description Serosanguinous 02/17/21 1203   Dressing Status Clean;Dry; Intact 02/17/21 1203   Dressing Type Dry dressing 02/17/21 1203   Dressing Change Due 02/18/21 02/17/21 1203   Site Assessment Dry; Intact 02/17/21 1203   Surrounding Skin Dry 02/17/21 1203   Patency Intervention Stripped; Tip/Tilt 02/16/21 2300   Output (ml) 40 ml 02/17/21 1414       Chest Tube Pleural 24 Trin[place] (Active)       Chest Tube 1 Mediastinal 32 Trin[place]adian (Active)   Suction -20 cm H2O 02/17/21 1203   Chest Tube Airleak No 02/17/21 1203   Drainage Description Serosanguinous 02/17/21 1203   Dressing Status Clean;Dry; Intact 02/17/21 1203   Dressing Type Dry dressing 02/17/21 1203   Dressing Change Due 02/18/21 02/17/21 1203   Site Assessment Dry; Intact 02/17/21 1203   Surrounding Skin Unable to view 02/17/21 1203   Patency Intervention Stripped; Tip/Tilt 02/16/21 2300   Output (ml) 0 ml 02/17/21 1414       NG/OG/NJ/NE Tube Nasogastric 18 fr Right nostril (Active)   Surrounding Skin Intact 02/17/21 1220   Securement device Yes 02/17/21 1220   Status Clamped 02/17/21 1436   Placement Verified by Gastric Contents;by X-Ray (repeat) 02/17/21 1220   NG/OG/NJ/NE External Measurement (cm) 68 cm 02/17/21 1220   Drainage Appearance Bile;Green 02/17/21 1220   Output (mL) 230 ml 02/17/21 1220       Urethral Catheter (Active)   Catheter Indications Need for fluid management in critically ill patients in a critical care setting not able to be managed by other means such as BSC with hat, bedpan, urinal, condom catheter, or short term intermittent urethral catherization 02/17/21 1203   Site Assessment No urethral drainage;[place] 02/17/21 1203   Urine Color Yellow 02/17/21 1203   Urine Appearance Clear 02/17/21 1203   Output (mL) 100 mL 02/17/21 1414       [REMOVED] Urethral Catheter Non-latex 16 fr (Removed)   Catheter Indications Perioperative use in selected surgeries including but not limited to urologic, pelvic or need for intraoperative monitoring of urinary output due to prolonged surgery, large volume infusion or need for diuretic therapy in surgery 01/30/21 0330   Site Assessment Pink 01/30/21 0330   Urine Color Yellow 01/30/21 0330   Urine Appearance Clear 01/30/21 0330   Output (mL) 700 mL 01/30/21 0530       Findings:     Electronically signed by Aylin Hutchins PA-C on 2/17/2021 at 2:37 PM

## 2021-02-17 NOTE — PROGRESS NOTES
02/17/21 0411   Vent Information   Vent Type 980   Vent Mode CPAP   Vt Ordered 0 mL   Rate Set 0 bmp   Peak Flow 0 L/min   Pressure Support 10 cmH20   FiO2  40 %   SpO2 98 %   SpO2/FiO2 ratio 245   Sensitivity 3   PEEP/CPAP 5   I Time/ I Time % 0 s   Humidification Source HME   Vent Patient Data   High Peep/I Pressure 0   Peak Inspiratory Pressure 16 cmH2O   Mean Airway Pressure 7.6 cmH20   Rate Measured 21 br/min   Vt Exhaled 492 mL   Minute Volume 9.78 Liters   I:E Ratio 1:3.30   Spontaneous Breathing Trial (SBT) RT Doc   Pulse 74   Additional Respiratory  Assessments   Resp 20   Alarm Settings   High Pressure Alarm 40 cmH2O   Delay Alarm 20 sec(s)   Low Minute Volume Alarm 2.5 L/min   Apnea (secs) 20 secs   High Respiratory Rate 50 br/min   Low Exhaled Vt  250 mL   ETT (adult)   Placement Date/Time: 02/16/21 0717   Preoxygenation: Yes  Mask Ventilation: Ventilated by mask with oral airway (2)  Technique: Video laryngoscopy  Type: Cuffed  Tube Size: 8 mm  Laryngoscope: Buchanan  Blade Size: 4  Location: Oral  Grade View: Full v...    Measured From Lips   ET Placement Right   Secured By Commercial tube mendoza   Site Condition Dry   Cuff Pressure   (minimal leak)

## 2021-02-17 NOTE — FLOWSHEET NOTE
EPOC ABG improving. ABG drawn and sent to RT to run to check accuracy of EPOC. O2 sat 93% on monitor. Lasix drip now infusing as ordered.      Anita Stanton RN 11:12 AM

## 2021-02-17 NOTE — PROGRESS NOTES
Physical Therapy  Attempted to see pt this afternoon for therapy rigo. RN requesting therapy hold at this time until pt more medically appropriate. Will check back tomorrow.

## 2021-02-17 NOTE — FLOWSHEET NOTE
Dr. Eduin Burch rounding and Dr. Moe Sullivan updated. Dr. Eduin Burch states NGT is in good placement. Both aware RN is slowly weaning FIO2 per bipap O2 sat 100% on monitor at this time. States patient may have ice chips.      Jamaica Cadena RN 12:24 PM

## 2021-02-17 NOTE — FLOWSHEET NOTE
Portable CXR and KUB done, manual percussion performed while patient was turned.  Dr. Andrea Meade at bedside and viewed both images     Valeriano Michael RN 10:24 AM

## 2021-02-17 NOTE — FLOWSHEET NOTE
O2 saturations continue to be low 80-88%. Rachidse August RT to room. Bipap placed on as ordered per Dr. Willa Ulrich who is also at bedside. Dr. Viviana Aldrich called, telephone orders received for an additional 40 mg Lasix IV now and then start Lasix drip @ 10 mg/hr. EPOC ABGs done, Dr. Willa Ulrich at bedside. STAT portable CXR ordered, to room immediately.      Tonia Alberts RN 10:11 AM

## 2021-02-17 NOTE — FLOWSHEET NOTE
Dr. Tolbert Slight updated via phone     Emanuel Mckeon RN 1:29 PM Spoke with patient about message below. Patient stated that she has had problems with the [place] [facility] and their bad customer service. Patient would like to go to [facility] in [place]. Patient also agreeable with labs below. She will call back for the appointment.    Orders placed as below.    Dr. Henriquez- please review and sign PT order. Thanks

## 2021-02-17 NOTE — PROGRESS NOTES
Nephrology Progress Note  2/17/2021 3:05 PM  Subjective: Interval History: Emi Mckenzie is a 66 y.o. male with weakness and awake off vent and sob        Data:   Scheduled Meds:   guaiFENesin  600 mg Oral BID    sodium zirconium cyclosilicate  10 g Oral TID    acetylcysteine  600 mg Inhalation BID    sodium chloride flush  10 mL Intravenous 2 times per day    aspirin  81 mg Oral Daily    amiodarone  200 mg Oral TID    mupirocin   Nasal BID    therapeutic multivitamin-minerals  1 tablet Oral Daily with breakfast    polyethylene glycol  17 g Oral Daily    carvedilol  3.125 mg Oral BID    atorvastatin  20 mg Oral Nightly    pantoprazole  40 mg Oral Daily    [START ON 2/20/2021] amiodarone  200 mg Oral Daily    albuterol sulfate HFA  2 puff Inhalation 4x daily    ipratropium  2 puff Inhalation 4x daily     Continuous Infusions:   furosemide (LASIX) 1mg/ml infusion 10 mg/hr (02/17/21 1109)    sodium chloride 10 mL/hr at 02/17/21 1046    norepinephrine 7 mcg/min (02/17/21 1352)    EPINEPHrine infusion Stopped (02/17/21 0423)    nitroGLYCERIN      insulin 4.2 Units/hr (02/17/21 1427)    dextrose      vasopressin (Septic Shock) infusion 0.04 Units/min (02/17/21 0620)    DOBUTamine 7.5 mcg/kg/min (02/17/21 0431)           CBC:   Recent Labs     02/16/21  1400 02/16/21  1400 02/17/21  0450 02/17/21  0450 02/17/21  1004 02/17/21  1104 02/17/21  1226   WBC 34.6*  --  15.5*  --   --   --   --    HGB 9.3*   < > 8.6*   < > 9.2* 9.6* 9.7*     --  146  --   --   --   --     < > = values in this interval not displayed.      BMP:    Recent Labs     02/16/21  1400 02/16/21  1400 02/17/21  0450 02/17/21  0450 02/17/21  1004 02/17/21  1104 02/17/21  1226 02/17/21  1230 02/17/21  1310      < > 143   < > 135* 134* 133*  --   --    K 3.9   < > 5.2*   < > 5.2* 5.3* 5.5* 5.6*  --      --  107  --   --   --   --   --   --    CO2 21   < > 25   < > 26 25 26  --  25   BUN 41*  --  46*  --   -- --   --   --   --    CREATININE 1.8*   < > 2.3*   < > 2.6* 2.5* 2.6*  --   --    GLUCOSE 125*  --  146*  --   --   --   --   --   --     < > = values in this interval not displayed. Renal Labs  Albumin:    Lab Results   Component Value Date    LABALBU 4.4 01/05/2021     Calcium:    Lab Results   Component Value Date    CALCIUM 8.5 02/17/2021     Phosphorus:    Lab Results   Component Value Date    PHOS 3.7 11/24/2020     U/A:    Lab Results   Component Value Date    NITRU NEGATIVE 02/12/2021    NITRU Negative 11/24/2020    COLORU YELLOW 02/12/2021    PHUR 6.5 11/24/2020    LABCAST NONE SEEN 06/15/2016    LABCAST NONE SEEN 06/15/2016    WBCUA 1 02/12/2021    RBCUA 2 02/12/2021    MUCUS RARE 02/12/2021    TRICHOMONAS NONE SEEN 02/12/2021    BACTERIA NEGATIVE 02/12/2021    CLARITYU CLEAR 02/12/2021    SPECGRAV 1.012 02/12/2021    UROBILINOGEN NEGATIVE 02/12/2021    BILIRUBINUR NEGATIVE 02/12/2021    BLOODU SMALL 02/12/2021    GLUCOSEU 500 11/24/2020    KETUA NEGATIVE 02/12/2021           Objective:   I/O: 02/16 0701 - 02/17 0700  In: 5324 [I.V.:4963]  Out: 5608 [Urine:1056]  Vitals: BP (!) 121/59   Pulse 66   Temp 97.9 °F (36.6 °C) (Core)   Resp 22   Ht 5' 10\" (1.778 m)   Wt 227 lb 15.3 oz (103.4 kg)   SpO2 96%   BMI 32.71 kg/m²   General appearance: awake weak  HEENT: Head: Normal, normocephalic, atraumatic.   Neck: supple, symmetrical, trachea midline  Lungs: diminished breath sounds bilaterally  Heart: S1, S2 normal sp cabg  Abdomen: abnormal findings:  soft nt  Extremities: edema +  Neurologic: Mental status: alertness: awake        Assessment and Plan:      IMP:  1 arf from atn on ckd 3 creat 1.6  2 cad sp cabg  3 sp carotid surgery left  4 hypotension  5 anemia    Plan     1 renal worse from atn as expected post surgery and monitor for now- maintain diuresis and no acute hd need  2 stable post cabg monitor  3 sp carotid monitor stable  4 bp stable overall and wean pressor  5 hb low stable  6 with sob start lasix gtt and keep negative and not uremic no acute dialysis need           Yeison Garcia MD

## 2021-02-17 NOTE — FLOWSHEET NOTE
Wife Neha Dove updated via phone, condition update given. Aware oxygen levels are improving with continuous bipap. Updated on K+ level 5.6, awaiting orders from Dr. Faby Sanchez RN 1:25 PM     Spoke to Dr. Anai Carbone, [facility]      Juan Davison RN 1:39 PM

## 2021-02-17 NOTE — PROGRESS NOTES
Pt extubated at [phone]. RSBI 48, NIF -36, equal bilateral breath sounds. Given 2L of oxygen by nasal cannula. Albuterol and Atrovent ,2 puffs each, given for post extubation breathing treatment. Will continue to monitor.

## 2021-02-17 NOTE — FLOWSHEET NOTE
Dr. Nora Tellez notified via phone noon K+ critical value 5.6. Notified patient has received 80 mg Lasix IVP total this morning, and Lasix drip infusing @ 10 mg/hr as ordered. RN administering Calcium gluconate IV 2 GM now as ordered, see eMAR. Awaiting further orders.      Nupur Westbrook RN 1:17 PM

## 2021-02-17 NOTE — FLOWSHEET NOTE
Danilo RT at bedside, acapella used multiple times to loosen secretions and NT suctioned. Very thick secretions noted.      Matt Muñoz RN 9:13 AM

## 2021-02-17 NOTE — CARE COORDINATION
Awaiting PT/OT recs. Patient is from home; independent prior to admission. He has a PCP and insurance that assist with Rx when needed.  Pati Connolly RN

## 2021-02-17 NOTE — PROGRESS NOTES
PATIENT NAME: Siri Mayer    TODAY'S DATE: 02/17/21    SUBJECTIVE:    Pt is POD # 1 s/p CABG x 2, AVR, maze. Pt has had a productive cough and periods of hypoxia this am. He has not been out of bed. He was hypotensive overnight. OBJECTIVE:   VITALS:    Vitals:    02/17/21 1403   BP: (!) 121/59   Pulse: 66   Resp: 22   Temp: 97.9 °F (36.6 °C)   SpO2: 96%     INTAKE/OUTPUT:    Date 02/17/21 0000 - 02/17/21 2359   Shift 7777-1797 7371-4661 9129-9501 24 Hour Total   INTAKE   I.V.(mL/kg/hr) 1821(2.2) 10  1831   IV Piggyback 250   250   Shift Total(mL/kg) 3366(64) 10(0.1)  1706(61.6)   OUTPUT   Urine(mL/kg/hr) 295(0.4) 443  738   Emesis/NG output  230  230   Chest Tube 230 280  510   Shift Total(mL/kg) 525(5.1) 953(9.2)  1478(14.3)   Weight (kg) 103.4 103.4 103.4 103.4      Patient Vitals for the past 96 hrs (Last 3 readings):   Weight   02/17/21 0600 227 lb 15.3 oz (103.4 kg)   02/16/21 0556 216 lb (98 kg)       EXAM:  Blood pressure (!) 121/59, pulse 66, temperature 97.9 °F (36.6 °C), temperature source Core, resp. rate 22, height 5' 10\" (1.778 m), weight 227 lb 15.3 oz (103.4 kg), SpO2 96 %. General appearance: No apparent distress, appears stated age and cooperative. Skin: unremarkable  HEENT Normocephalic, atraumatic without obvious deformity. Neck: Supple, Trachea midline   Lungs: Good respiratory effort. Rhonchi, bilaterally  Heart: Regular rate/ rhythm inc c/d/i  Abdomen: Soft, non-tender or non-distended   Extremities: 1+ edema warm well perfused  Neurologic: Alert, grossly intact  Mental status: normal affect      Data:  CBC:   Recent Labs     02/16/21  1400 02/16/21  1400 02/17/21  0450 02/17/21  0450 02/17/21  1004 02/17/21  1104 02/17/21  1226   WBC 34.6*  --  15.5*  --   --   --   --    HGB 9.3*   < > 8.6*   < > 9.2* 9.6* 9.7*   HCT 35.0*   < > 29.2*   < > 27.0* 28.0* 29.0*     --  146  --   --   --   --     < > = values in this interval not displayed.      BMP:    Recent Labs 02/16/21  1400 02/16/21  1400 02/17/21  0450 02/17/21  0450 02/17/21  1004 02/17/21  1104 02/17/21  1226 02/17/21  1230 02/17/21  1310      < > 143   < > 135* 134* 133*  --   --    K 3.9   < > 5.2*   < > 5.2* 5.3* 5.5* 5.6*  --      --  107  --   --   --   --   --   --    CO2 21   < > 25   < > 26 25 26  --  25   BUN 41*  --  46*  --   --   --   --   --   --    CREATININE 1.8*   < > 2.3*   < > 2.6* 2.5* 2.6*  --   --    GLUCOSE 125*  --  146*  --   --   --   --   --   --     < > = values in this interval not displayed. Hepatic: No results for input(s): AST, ALT, ALB, BILITOT, ALKPHOS in the last 72 hours. Mag:      Recent Labs     02/16/21  1400 02/17/21  0450   MG 2.7* 2.6*      Phos:   No results for input(s): PHOS in the last 72 hours. INR:   Recent Labs     02/16/21  1400   INR 1.25       Radiology Review:  CXR  Impression:     1. No change in life support.  The appearance of the chest is not   significantly changed. 2. Moderate gastric distention.  Mild small bowel distention, consider a   postoperative ileus.             ASSESSMENT AND PLAN:    Patient Active Problem List   Diagnosis    Type 2 diabetes mellitus without complication, without long-term current use of insulin (HCC)    Ulcer of other part of lower limb    Venous hypertension, chronic, with ulcer ([address]    Ulcer of other part of foot    Pneumonia    Atrial fibrillation (HCC)    Sinus pause    PAF (paroxysmal atrial fibrillation) ([address]    DM (diabetes mellitus) ([address]    DMITRIY on CPAP    Hyperlipidemia    Status post incision and drainage    CKD (chronic kidney disease) stage 3, GFR 30-59 ml/min (HCC)    Hyperpotassemia    Arthritis    PVD (peripheral vascular disease) ([address]    Hematoma    Cardiac pacemaker in situ    Coronary artery stenosis    Essential hypertension    Gout    Diabetic neuropathy associated with type 2 diabetes mellitus ([address]    Adenomatous polyp of sigmoid colon    Iron deficiency anemia due to chronic blood loss    Erythropoietin deficiency anemia    VHD (valvular heart disease)    Abnormal fractional flow reserve (FFR) on cardiac catheterization    Carotid stenosis, left    Aortic stenosis, severe    CAD in native artery    Displacement of atrial pacemaker leads       S/P CABG x 2, AVR, ascending aortic repair, maze     Cardio: HOTN overnight requiring multiple pressors and gave 1 dose of methylene blue. Now weaning gtts as tolerated. Continue amio gtt x 24 hrs. No BB for now. Current drips: amio 30, dobutamine 7.5, levo 7, vaso 0.04, lasix 10  Pulm: had sudden onset of hypoxia this am, desaturating down to 70%. Placed on vapotherm, then BiPAP. He has thick secretions, mucinex and mucomyst ordered. NT suction PRN. Send sputum cx. On BiPAP FiO2 70%. GI: large gastric bubble on KUB, NGT placed and abdomen less distended   Renal: creatinine up to 2.3, lasix 80 mg IV this am and now on lasix gtt. Appreciate nephrology input. DC MIVF. Tubes/Lines: keep all lines and tubes for now.    Wound: stable     Critical care time 60 min     Reno Baez PA-C

## 2021-02-18 ENCOUNTER — APPOINTMENT (OUTPATIENT)
Dept: GENERAL RADIOLOGY | Age: 71
DRG: 219 | End: 2021-02-18
Attending: THORACIC SURGERY (CARDIOTHORACIC VASCULAR SURGERY)
Payer: MEDICARE

## 2021-02-18 LAB
ALBUMIN SERPL-MCNC: 3.1 GM/DL (ref 3.4–5)
ALBUMIN SERPL-MCNC: 3.3 GM/DL (ref 3.4–5)
ALP BLD-CCNC: 42 IU/L (ref 40–129)
ALT SERPL-CCNC: 106 U/L (ref 10–40)
ANION GAP SERPL CALCULATED.3IONS-SCNC: 10 MMOL/L (ref 4–16)
ANION GAP SERPL CALCULATED.3IONS-SCNC: 11 MMOL/L (ref 4–16)
AST SERPL-CCNC: 168 IU/L (ref 15–37)
BASE EXCESS MIXED: 0.2 (ref 0–1.2)
BASE EXCESS MIXED: 0.3 (ref 0–1.2)
BASE EXCESS MIXED: 0.8 (ref 0–1.2)
BASE EXCESS MIXED: 0.9 (ref 0–1.2)
BASE EXCESS MIXED: 2 (ref 0–1.2)
BASE EXCESS MIXED: 2.7 (ref 0–1.2)
BASE EXCESS MIXED: 3.6 (ref 0–1.2)
BASE EXCESS MIXED: 4.9 (ref 0–1.2)
BASE EXCESS: ABNORMAL (ref 0–2)
BASE EXCESS: ABNORMAL (ref 0–3.3)
BILIRUB SERPL-MCNC: 0.3 MG/DL (ref 0–1)
BILIRUBIN DIRECT: 0.2 MG/DL (ref 0–0.3)
BILIRUBIN, INDIRECT: 0.1 MG/DL (ref 0–0.7)
BUN BLDV-MCNC: 52 MG/DL (ref 6–23)
BUN BLDV-MCNC: 55 MG/DL (ref 6–23)
CALCIUM IONIZED: 4.32 MG/DL (ref 4.48–5.28)
CALCIUM SERPL-MCNC: 8.7 MG/DL (ref 8.3–10.6)
CALCIUM SERPL-MCNC: 9 MG/DL (ref 8.3–10.6)
CHLORIDE BLD-SCNC: 96 MMOL/L (ref 99–110)
CHLORIDE BLD-SCNC: 97 MMOL/L (ref 99–110)
CO2 CONTENT: 20.6 MMOL/L (ref 19–24)
CO2 CONTENT: 21.6 MMOL/L (ref 19–24)
CO2 CONTENT: 22.8 MMOL/L (ref 19–24)
CO2 CONTENT: 22.9 MMOL/L (ref 19–24)
CO2 CONTENT: 25.1 MMOL/L (ref 19–24)
CO2 CONTENT: 26 MMOL/L (ref 19–24)
CO2: 25 MMOL/L (ref 21–32)
CO2: 27 MMOL/L (ref 21–32)
CREAT SERPL-MCNC: 2.5 MG/DL (ref 0.9–1.3)
CREAT SERPL-MCNC: 2.6 MG/DL (ref 0.9–1.3)
EKG DIAGNOSIS: NORMAL
EKG Q-T INTERVAL: 472 MS
EKG QRS DURATION: 152 MS
EKG QTC CALCULATION (BAZETT): 501 MS
EKG R AXIS: -46 DEGREES
EKG T AXIS: -19 DEGREES
EKG VENTRICULAR RATE: 68 BPM
GFR AFRICAN AMERICAN: 29 ML/MIN/1.73M2
GFR AFRICAN AMERICAN: 31 ML/MIN/1.73M2
GFR NON-AFRICAN AMERICAN: 24 ML/MIN/1.73M2
GFR NON-AFRICAN AMERICAN: 25 ML/MIN/1.73M2
GLUCOSE BLD-MCNC: 108 MG/DL (ref 70–99)
GLUCOSE BLD-MCNC: 110 MG/DL (ref 70–99)
GLUCOSE BLD-MCNC: 113 MG/DL (ref 70–99)
GLUCOSE BLD-MCNC: 113 MG/DL (ref 70–99)
GLUCOSE BLD-MCNC: 114 MG/DL (ref 70–99)
GLUCOSE BLD-MCNC: 115 MG/DL (ref 70–99)
GLUCOSE BLD-MCNC: 116 MG/DL (ref 70–99)
GLUCOSE BLD-MCNC: 119 MG/DL (ref 70–99)
GLUCOSE BLD-MCNC: 120 MG/DL (ref 70–99)
GLUCOSE BLD-MCNC: 120 MG/DL (ref 70–99)
GLUCOSE BLD-MCNC: 123 MG/DL (ref 70–99)
GLUCOSE BLD-MCNC: 126 MG/DL (ref 70–99)
GLUCOSE BLD-MCNC: 129 MG/DL (ref 70–99)
GLUCOSE BLD-MCNC: 129 MG/DL (ref 70–99)
GLUCOSE BLD-MCNC: 136 MG/DL (ref 70–99)
GLUCOSE BLD-MCNC: 137 MG/DL (ref 70–99)
GLUCOSE BLD-MCNC: 139 MG/DL (ref 70–99)
GLUCOSE BLD-MCNC: 147 MG/DL (ref 70–99)
GLUCOSE BLD-MCNC: 167 MG/DL (ref 70–99)
GLUCOSE BLD-MCNC: 87 MG/DL (ref 70–99)
HCO3 ARTERIAL: 19.6 MMOL/L (ref 18–23)
HCO3 ARTERIAL: 20.6 MMOL/L (ref 18–23)
HCO3 ARTERIAL: 21.8 MMOL/L (ref 18–23)
HCO3 ARTERIAL: 21.8 MMOL/L (ref 18–23)
HCO3 ARTERIAL: 23.8 MMOL/L (ref 22–26)
HCO3 ARTERIAL: 23.9 MMOL/L (ref 18–23)
HCO3 ARTERIAL: 24.1 MMOL/L (ref 18–23)
HCO3 ARTERIAL: 24.8 MMOL/L (ref 18–23)
HCO3 ARTERIAL: 25.5 MMOL/L (ref 18–23)
HCT VFR BLD CALC: 22 % (ref 42–52)
HCT VFR BLD CALC: 22 % (ref 42–52)
HCT VFR BLD CALC: 23 % (ref 42–52)
HCT VFR BLD CALC: 24 % (ref 42–52)
HCT VFR BLD CALC: 26 % (ref 42–52)
HCT VFR BLD CALC: 26 % (ref 42–52)
HCT VFR BLD CALC: 27 % (ref 42–52)
HCT VFR BLD CALC: 28.4 % (ref 42–52)
HEMOGLOBIN: 7.5 GM/DL (ref 13.5–18)
HEMOGLOBIN: 7.6 GM/DL (ref 13.5–18)
HEMOGLOBIN: 7.7 GM/DL (ref 13.5–18)
HEMOGLOBIN: 8.3 GM/DL (ref 13.5–18)
HEMOGLOBIN: 8.3 GM/DL (ref 13.5–18)
HEMOGLOBIN: 9 GM/DL (ref 13.5–18)
HEMOGLOBIN: 9 GM/DL (ref 13.5–18)
HEMOGLOBIN: 9.2 GM/DL (ref 13.5–18)
HEMOGLOBIN: 9.2 GM/DL (ref 13.5–18)
HEMOGLOBIN: 9.3 GM/DL (ref 13.5–18)
IONIZED CA: 1.08 MMOL/L (ref 1.12–1.32)
MAGNESIUM: 2.4 MG/DL (ref 1.8–2.4)
MCH RBC QN AUTO: 24.1 PG (ref 27–31)
MCHC RBC AUTO-ENTMCNC: 29.2 % (ref 32–36)
MCV RBC AUTO: 82.3 FL (ref 78–100)
O2 SATURATION: 91.5 % (ref 96–97)
O2 SATURATION: 91.7 % (ref 94–100)
O2 SATURATION: 93.3 % (ref 96–97)
O2 SATURATION: 93.5 % (ref 96–97)
O2 SATURATION: 93.9 % (ref 96–97)
O2 SATURATION: 94.6 % (ref 96–97)
O2 SATURATION: 95.6 % (ref 96–97)
O2 SATURATION: 98.1 % (ref 96–97)
O2 SATURATION: 99.7 % (ref 96–97)
PCO2 ARTERIAL: 32 MMHG (ref 32–45)
PCO2 ARTERIAL: 32.7 MMHG (ref 32–45)
PCO2 ARTERIAL: 32.7 MMHG (ref 32–45)
PCO2 ARTERIAL: 35.2 MMHG (ref 32–45)
PCO2 ARTERIAL: 37 MMHG (ref 32–45)
PCO2 ARTERIAL: 37.4 MMHG (ref 32–45)
PCO2 ARTERIAL: 38.6 MMHG (ref 32–45)
PCO2 ARTERIAL: 39 MMHG (ref 32–45)
PCO2 ARTERIAL: 40 MMHG (ref 32–45)
PDW BLD-RTO: 20.6 % (ref 11.7–14.9)
PH BLOOD: 7.39 (ref 7.34–7.45)
PH BLOOD: 7.4 (ref 7.34–7.45)
PH BLOOD: 7.4 (ref 7.34–7.45)
PH BLOOD: 7.41 (ref 7.34–7.45)
PH BLOOD: 7.42 (ref 7.34–7.45)
PH BLOOD: 7.42 (ref 7.34–7.45)
PH BLOOD: 7.44 (ref 7.34–7.45)
PHOSPHORUS: 7.3 MG/DL (ref 2.5–4.9)
PLATELET # BLD: 91 K/CU MM (ref 140–440)
PMV BLD AUTO: 10.2 FL (ref 7.5–11.1)
PO2 ARTERIAL: 106.4 MMHG (ref 75–100)
PO2 ARTERIAL: 197.1 MMHG (ref 75–100)
PO2 ARTERIAL: 61.1 MMHG (ref 75–100)
PO2 ARTERIAL: 61.6 MMHG (ref 80–90)
PO2 ARTERIAL: 66.6 MMHG (ref 75–100)
PO2 ARTERIAL: 67.3 MMHG (ref 75–100)
PO2 ARTERIAL: 68.1 MMHG (ref 75–100)
PO2 ARTERIAL: 72.5 MMHG (ref 75–100)
PO2 ARTERIAL: 79.1 MMHG (ref 75–100)
POC CALCIUM: 1.01 MMOL/L (ref 1.12–1.32)
POC CALCIUM: 1.05 MMOL/L (ref 1.12–1.32)
POC CALCIUM: 1.06 MMOL/L (ref 1.12–1.32)
POC CALCIUM: 1.08 MMOL/L (ref 1.12–1.32)
POC CALCIUM: 1.09 MMOL/L (ref 1.12–1.32)
POC CALCIUM: 1.11 MMOL/L (ref 1.12–1.32)
POC CALCIUM: 1.14 MMOL/L (ref 1.12–1.32)
POC CALCIUM: 1.16 MMOL/L (ref 1.12–1.32)
POC CALCIUM: 1.16 MMOL/L (ref 1.12–1.32)
POC CHLORIDE: 102 MMOL/L (ref 98–109)
POC CHLORIDE: 102 MMOL/L (ref 98–109)
POC CHLORIDE: 103 MMOL/L (ref 98–109)
POC CREATININE: 2.4 MG/DL (ref 0.9–1.3)
POC CREATININE: 2.8 MG/DL (ref 0.9–1.3)
POTASSIUM SERPL-SCNC: 3.9 MMOL/L (ref 3.5–4.5)
POTASSIUM SERPL-SCNC: 4 MMOL/L (ref 3.5–4.5)
POTASSIUM SERPL-SCNC: 4 MMOL/L (ref 3.5–4.5)
POTASSIUM SERPL-SCNC: 4.4 MMOL/L (ref 3.5–4.5)
POTASSIUM SERPL-SCNC: 4.5 MMOL/L (ref 3.5–4.5)
POTASSIUM SERPL-SCNC: 4.8 MMOL/L (ref 3.5–4.5)
POTASSIUM SERPL-SCNC: 5 MMOL/L (ref 3.5–4.5)
POTASSIUM SERPL-SCNC: 5 MMOL/L (ref 3.5–5.1)
POTASSIUM SERPL-SCNC: 5 MMOL/L (ref 3.6–5.1)
POTASSIUM SERPL-SCNC: 5.2 MMOL/L (ref 3.5–4.5)
POTASSIUM SERPL-SCNC: 5.3 MMOL/L (ref 3.5–5.1)
RBC # BLD: 3.45 M/CU MM (ref 4.6–6.2)
SODIUM BLD-SCNC: 130 MMOL/L (ref 138–146)
SODIUM BLD-SCNC: 131 MMOL/L (ref 135–145)
SODIUM BLD-SCNC: 131 MMOL/L (ref 138–146)
SODIUM BLD-SCNC: 131 MMOL/L (ref 138–146)
SODIUM BLD-SCNC: 132 MMOL/L (ref 136–145)
SODIUM BLD-SCNC: 133 MMOL/L (ref 135–145)
SODIUM BLD-SCNC: 133 MMOL/L (ref 138–146)
SODIUM BLD-SCNC: 134 MMOL/L (ref 138–146)
SODIUM BLD-SCNC: 135 MMOL/L (ref 138–146)
SOURCE, BLOOD GAS: ABNORMAL
TOTAL PROTEIN: 5.1 GM/DL (ref 6.4–8.2)
WBC # BLD: 14.5 K/CU MM (ref 4–10.5)

## 2021-02-18 PROCEDURE — 94660 CPAP INITIATION&MGMT: CPT

## 2021-02-18 PROCEDURE — 94761 N-INVAS EAR/PLS OXIMETRY MLT: CPT

## 2021-02-18 PROCEDURE — 80076 HEPATIC FUNCTION PANEL: CPT

## 2021-02-18 PROCEDURE — 6360000002 HC RX W HCPCS: Performed by: INTERNAL MEDICINE

## 2021-02-18 PROCEDURE — 99211 OFF/OP EST MAY X REQ PHY/QHP: CPT

## 2021-02-18 PROCEDURE — 93010 ELECTROCARDIOGRAM REPORT: CPT | Performed by: INTERNAL MEDICINE

## 2021-02-18 PROCEDURE — 021009W BYPASS CORONARY ARTERY, ONE ARTERY FROM AORTA WITH AUTOLOGOUS VENOUS TISSUE, OPEN APPROACH: ICD-10-PCS | Performed by: INTERNAL MEDICINE

## 2021-02-18 PROCEDURE — 97166 OT EVAL MOD COMPLEX 45 MIN: CPT

## 2021-02-18 PROCEDURE — 94668 MNPJ CHEST WALL SBSQ: CPT

## 2021-02-18 PROCEDURE — 02583ZZ DESTRUCTION OF CONDUCTION MECHANISM, PERCUTANEOUS APPROACH: ICD-10-PCS | Performed by: INTERNAL MEDICINE

## 2021-02-18 PROCEDURE — 93005 ELECTROCARDIOGRAM TRACING: CPT | Performed by: THORACIC SURGERY (CARDIOTHORACIC VASCULAR SURGERY)

## 2021-02-18 PROCEDURE — 02RF08Z REPLACEMENT OF AORTIC VALVE WITH ZOOPLASTIC TISSUE, OPEN APPROACH: ICD-10-PCS | Performed by: INTERNAL MEDICINE

## 2021-02-18 PROCEDURE — 84295 ASSAY OF SERUM SODIUM: CPT

## 2021-02-18 PROCEDURE — 85027 COMPLETE CBC AUTOMATED: CPT

## 2021-02-18 PROCEDURE — 02L70CK OCCLUSION OF LEFT ATRIAL APPENDAGE WITH EXTRALUMINAL DEVICE, OPEN APPROACH: ICD-10-PCS | Performed by: INTERNAL MEDICINE

## 2021-02-18 PROCEDURE — 2580000003 HC RX 258: Performed by: PHYSICIAN ASSISTANT

## 2021-02-18 PROCEDURE — 2580000003 HC RX 258: Performed by: INTERNAL MEDICINE

## 2021-02-18 PROCEDURE — 87070 CULTURE OTHR SPECIMN AEROBIC: CPT

## 2021-02-18 PROCEDURE — 6360000002 HC RX W HCPCS: Performed by: THORACIC SURGERY (CARDIOTHORACIC VASCULAR SURGERY)

## 2021-02-18 PROCEDURE — 97530 THERAPEUTIC ACTIVITIES: CPT

## 2021-02-18 PROCEDURE — 85018 HEMOGLOBIN: CPT

## 2021-02-18 PROCEDURE — 02100Z9 BYPASS CORONARY ARTERY, ONE ARTERY FROM LEFT INTERNAL MAMMARY, OPEN APPROACH: ICD-10-PCS | Performed by: INTERNAL MEDICINE

## 2021-02-18 PROCEDURE — 6370000000 HC RX 637 (ALT 250 FOR IP): Performed by: THORACIC SURGERY (CARDIOTHORACIC VASCULAR SURGERY)

## 2021-02-18 PROCEDURE — 94640 AIRWAY INHALATION TREATMENT: CPT

## 2021-02-18 PROCEDURE — 82330 ASSAY OF CALCIUM: CPT

## 2021-02-18 PROCEDURE — 02HV33Z INSERTION OF INFUSION DEVICE INTO SUPERIOR VENA CAVA, PERCUTANEOUS APPROACH: ICD-10-PCS | Performed by: INTERNAL MEDICINE

## 2021-02-18 PROCEDURE — 82962 GLUCOSE BLOOD TEST: CPT

## 2021-02-18 PROCEDURE — 71045 X-RAY EXAM CHEST 1 VIEW: CPT

## 2021-02-18 PROCEDURE — 97162 PT EVAL MOD COMPLEX 30 MIN: CPT

## 2021-02-18 PROCEDURE — 5A1221Z PERFORMANCE OF CARDIAC OUTPUT, CONTINUOUS: ICD-10-PCS | Performed by: INTERNAL MEDICINE

## 2021-02-18 PROCEDURE — 06BQ4ZZ EXCISION OF LEFT SAPHENOUS VEIN, PERCUTANEOUS ENDOSCOPIC APPROACH: ICD-10-PCS | Performed by: INTERNAL MEDICINE

## 2021-02-18 PROCEDURE — 6370000000 HC RX 637 (ALT 250 FOR IP): Performed by: PHYSICIAN ASSISTANT

## 2021-02-18 PROCEDURE — 84132 ASSAY OF SERUM POTASSIUM: CPT

## 2021-02-18 PROCEDURE — C9113 INJ PANTOPRAZOLE SODIUM, VIA: HCPCS | Performed by: THORACIC SURGERY (CARDIOTHORACIC VASCULAR SURGERY)

## 2021-02-18 PROCEDURE — 80069 RENAL FUNCTION PANEL: CPT

## 2021-02-18 PROCEDURE — 6360000002 HC RX W HCPCS: Performed by: PHYSICIAN ASSISTANT

## 2021-02-18 PROCEDURE — 02K83ZZ MAP CONDUCTION MECHANISM, PERCUTANEOUS APPROACH: ICD-10-PCS | Performed by: INTERNAL MEDICINE

## 2021-02-18 PROCEDURE — 83735 ASSAY OF MAGNESIUM: CPT

## 2021-02-18 PROCEDURE — 2580000003 HC RX 258: Performed by: THORACIC SURGERY (CARDIOTHORACIC VASCULAR SURGERY)

## 2021-02-18 PROCEDURE — 80048 BASIC METABOLIC PNL TOTAL CA: CPT

## 2021-02-18 PROCEDURE — 2500000003 HC RX 250 WO HCPCS: Performed by: THORACIC SURGERY (CARDIOTHORACIC VASCULAR SURGERY)

## 2021-02-18 PROCEDURE — 82803 BLOOD GASES ANY COMBINATION: CPT

## 2021-02-18 PROCEDURE — 85014 HEMATOCRIT: CPT

## 2021-02-18 PROCEDURE — 2000000000 HC ICU R&B

## 2021-02-18 PROCEDURE — 87205 SMEAR GRAM STAIN: CPT

## 2021-02-18 PROCEDURE — 2700000000 HC OXYGEN THERAPY PER DAY

## 2021-02-18 RX ORDER — FUROSEMIDE 10 MG/ML
40 INJECTION INTRAMUSCULAR; INTRAVENOUS 2 TIMES DAILY
Status: DISCONTINUED | OUTPATIENT
Start: 2021-02-18 | End: 2021-02-19

## 2021-02-18 RX ADMIN — Medication 2 PUFF: at 07:24

## 2021-02-18 RX ADMIN — SODIUM CHLORIDE, PRESERVATIVE FREE 10 ML: 5 INJECTION INTRAVENOUS at 09:50

## 2021-02-18 RX ADMIN — SODIUM CHLORIDE, PRESERVATIVE FREE 10 ML: 5 INJECTION INTRAVENOUS at 21:05

## 2021-02-18 RX ADMIN — FUROSEMIDE 40 MG: 10 INJECTION INTRAMUSCULAR; INTRAVENOUS at 21:17

## 2021-02-18 RX ADMIN — AMIODARONE HYDROCHLORIDE 200 MG: 200 TABLET ORAL at 21:01

## 2021-02-18 RX ADMIN — PANTOPRAZOLE SODIUM 40 MG: 40 INJECTION, POWDER, LYOPHILIZED, FOR SOLUTION INTRAVENOUS at 21:07

## 2021-02-18 RX ADMIN — POLYETHYLENE GLYCOL (3350) 17 G: 17 POWDER, FOR SOLUTION ORAL at 09:50

## 2021-02-18 RX ADMIN — VASOPRESSIN 0.04 UNITS/MIN: 20 INJECTION INTRAVENOUS at 00:31

## 2021-02-18 RX ADMIN — FUROSEMIDE 15 MG/HR: 10 INJECTION, SOLUTION INTRAVENOUS at 02:39

## 2021-02-18 RX ADMIN — HYDROCODONE BITARTRATE AND ACETAMINOPHEN 2 TABLET: 5; 325 TABLET ORAL at 12:32

## 2021-02-18 RX ADMIN — Medication 2 PUFF: at 21:09

## 2021-02-18 RX ADMIN — Medication 2 PUFF: at 11:21

## 2021-02-18 RX ADMIN — SODIUM CHLORIDE 1.9 UNITS/HR: 9 INJECTION, SOLUTION INTRAVENOUS at 16:52

## 2021-02-18 RX ADMIN — ASPIRIN 81 MG: 81 TABLET, FILM COATED ORAL at 09:50

## 2021-02-18 RX ADMIN — CALCIUM GLUCONATE 2000 MG: 20 INJECTION, SOLUTION INTRAVENOUS at 04:21

## 2021-02-18 RX ADMIN — SODIUM CHLORIDE, PRESERVATIVE FREE 10 ML: 5 INJECTION INTRAVENOUS at 21:01

## 2021-02-18 RX ADMIN — AMIODARONE HYDROCHLORIDE 200 MG: 200 TABLET ORAL at 09:50

## 2021-02-18 RX ADMIN — Medication: at 09:50

## 2021-02-18 RX ADMIN — DOBUTAMINE HYDROCHLORIDE 7.5 MCG/KG/MIN: 200 INJECTION INTRAVENOUS at 02:40

## 2021-02-18 RX ADMIN — ALBUTEROL SULFATE 2 PUFF: 90 AEROSOL, METERED RESPIRATORY (INHALATION) at 07:23

## 2021-02-18 RX ADMIN — PANTOPRAZOLE SODIUM 40 MG: 40 INJECTION, POWDER, LYOPHILIZED, FOR SOLUTION INTRAVENOUS at 09:50

## 2021-02-18 RX ADMIN — ATORVASTATIN CALCIUM 20 MG: 20 TABLET, FILM COATED ORAL at 21:01

## 2021-02-18 RX ADMIN — Medication: at 21:01

## 2021-02-18 RX ADMIN — AMIODARONE HYDROCHLORIDE 200 MG: 200 TABLET ORAL at 12:33

## 2021-02-18 RX ADMIN — PHENOL 1 SPRAY: 1.4 LIQUID ORAL at 12:37

## 2021-02-18 RX ADMIN — ALBUTEROL SULFATE 2 PUFF: 90 AEROSOL, METERED RESPIRATORY (INHALATION) at 11:20

## 2021-02-18 RX ADMIN — ACETAMINOPHEN 650 MG: 325 TABLET ORAL at 06:04

## 2021-02-18 RX ADMIN — SODIUM CHLORIDE, PRESERVATIVE FREE 10 ML: 5 INJECTION INTRAVENOUS at 21:17

## 2021-02-18 RX ADMIN — GUAIFENESIN 600 MG: 600 TABLET, EXTENDED RELEASE ORAL at 09:50

## 2021-02-18 RX ADMIN — ALBUTEROL SULFATE 2 PUFF: 90 AEROSOL, METERED RESPIRATORY (INHALATION) at 21:08

## 2021-02-18 RX ADMIN — GUAIFENESIN 600 MG: 600 TABLET, EXTENDED RELEASE ORAL at 21:06

## 2021-02-18 RX ADMIN — FUROSEMIDE 15 MG/HR: 10 INJECTION, SOLUTION INTRAVENOUS at 10:05

## 2021-02-18 RX ADMIN — DOBUTAMINE HYDROCHLORIDE 3 MCG/KG/MIN: 200 INJECTION INTRAVENOUS at 22:09

## 2021-02-18 ASSESSMENT — PAIN DESCRIPTION - PAIN TYPE
TYPE: SURGICAL PAIN
TYPE: ACUTE PAIN

## 2021-02-18 ASSESSMENT — PAIN SCALES - GENERAL
PAINLEVEL_OUTOF10: 0
PAINLEVEL_OUTOF10: 3

## 2021-02-18 ASSESSMENT — PAIN DESCRIPTION - LOCATION: LOCATION: GENERALIZED

## 2021-02-18 ASSESSMENT — PULMONARY FUNCTION TESTS: PIF_VALUE: 40

## 2021-02-18 ASSESSMENT — PAIN DESCRIPTION - DESCRIPTORS
DESCRIPTORS: ACHING
DESCRIPTORS: ACHING;DISCOMFORT

## 2021-02-18 NOTE — PROGRESS NOTES
BI-PAP off- High Flow NC 4L  applied- will monitor closely. Called wife- updated her over the phone. Pt also spoke to her over the phone. Set up a time for late to do face time over the phone so they could see each other.

## 2021-02-18 NOTE — PROGRESS NOTES
02/18/21 0440   NIV Type   NIV Started/Stopped On   Equipment Type v60   Mode Bilevel   Mask Type Full face mask   Mask Size Large   Settings/Measurements   IPAP 12 cmH20   CPAP/EPAP 6 cmH2O   Rate Ordered 12   Resp 22   Insp Rise Time (%) 3 %   FiO2  55 %   I Time/ I Time % 1.25 s   Vt Exhaled 407 mL   Minute Volume 8.6 Liters   Mask Leak (lpm) 49 lpm   Comfort Level Good   Using Accessory Muscles No   Alarm Settings   Alarms On Y   Press Low Alarm 5 cmH2O   High Pressure Alarm 25 cmH2O   Delay Alarm 20 sec(s)   Apnea (secs) 20 secs   Resp Rate Low Alarm 8   High Respiratory Rate 40 br/min   Oxygen Therapy/Pulse Ox   SpO2 92 %

## 2021-02-18 NOTE — PROGRESS NOTES
PATIENT NAME: Jimbo Martinez    TODAY'S DATE: 02/18/21    SUBJECTIVE:    Pt is POD # 2 s/p CABG x 2, AVR, maze. Pt is feeling better today. His SOB is improving. He denies abdominal pain or nausea. OBJECTIVE:   VITALS:    Vitals:    02/18/21 1130   BP:    Pulse: 69   Resp: 21   Temp:    SpO2: 100%     INTAKE/OUTPUT:    Date 02/18/21 0000 - 02/18/21 2359   Shift 5020-8205 4607-4348 5311-1250 24 Hour Total   INTAKE   P.O. 100   100   I. V.(mL/kg/hr) 406(0.5)   406   IV Piggyback 200   200   Shift Total(mL/kg) 706(6.7)   706(6.7)   OUTPUT   Urine(mL/kg/hr) 795(0.9) 650  1445   Emesis/NG output 180   180   Chest Tube 120 10  130   Shift Total(mL/kg) 1095(10.4) 660(6.3)  1755(16.7)   Weight (kg) 104.9 104.9 104.9 104.9      Patient Vitals for the past 96 hrs (Last 3 readings):   Weight   02/18/21 0600 231 lb 4.2 oz (104.9 kg)   02/17/21 0600 227 lb 15.3 oz (103.4 kg)   02/16/21 0556 216 lb (98 kg)       EXAM:  Blood pressure (!) 113/59, pulse 69, temperature 97.7 °F (36.5 °C), temperature source Core, resp. rate 21, height 5' 10\" (1.778 m), weight 231 lb 4.2 oz (104.9 kg), SpO2 100 %. General appearance: No apparent distress, appears stated age and cooperative. Skin: unremarkable  HEENT Normocephalic, atraumatic without obvious deformity. Neck: Supple, Trachea midline   Lungs: Good respiratory effort.  CTA, bilaterally  Heart: Regular rate/ rhythm inc c/d/i  Abdomen: Soft, non-tender or non-distended   Extremities: 1+ edema warm well perfused  Neurologic: Alert, grossly intact  Mental status: normal affect      Data:  CBC:   Recent Labs     02/17/21  0450 02/17/21  0450 02/17/21 2015 02/17/21 2015 02/18/21  0410 02/18/21  0410 02/18/21  0620 02/18/21  0719 02/18/21  1118   WBC 15.5*  --  15.6*  --  14.5*  --   --   --   --    HGB 8.6*   < > 8.3*   < > 8.3*  9.3*   < > 7.5* 7.6* 9.0*   HCT 29.2*   < > 29.3*   < > 28.4*  27.0*   < > 22.0* 22.0* 26.0*     --  105*  --  91*  --   --   --   --     < > = values in this interval not displayed. BMP:    Recent Labs     02/17/21  1900 02/17/21  1900 02/18/21  0025 02/18/21  0025 02/18/21  0326 02/18/21  0410 02/18/21  0410 02/18/21  0620 02/18/21  0719 02/18/21  1118      < > 131*   < > 133* 133*  131*   < > 135* 135* 130*   K 5.6*   < > 5.3*   < > 4.8* 5.0  5.0*   < > 4.0 4.0 4.5     --  96*  --   --  97*  --   --   --   --    CO2 23   < > 25  --  25 25  --   --   --   --    BUN 50*  --  52*  --   --  55*  --   --   --   --    CREATININE 2.6*   < > 2.6*  --  2.8* 2.5*  --   --   --   --    GLUCOSE 129*  --  113*  --   --  114*  --   --   --   --     < > = values in this interval not displayed. Hepatic:   Recent Labs     02/18/21  0025   *   *   BILITOT 0.3   ALKPHOS 42     Mag:      Recent Labs     02/16/21  1400 02/17/21  0450 02/18/21  0410   MG 2.7* 2.6* 2.4      Phos:     Recent Labs     02/18/21  0410   PHOS 7.3*      INR:   Recent Labs     02/16/21  1400   INR 1.25       Radiology Review:  CXR  Impression:     1. Stable position of support lines and tubes. 2. No pneumothorax or acute cardiopulmonary process identified.           ASSESSMENT AND PLAN:    Patient Active Problem List   Diagnosis    Type 2 diabetes mellitus without complication, without long-term current use of insulin (HCC)    Ulcer of other part of lower limb    [facility]ous [facility], chronic, with ulcer [facility]    Ulcer of other part of foot    Pneumonia    Atrial fibrillation (HCC)    Sinus pause    PAF (paroxysmal atrial fibrillation) [facility]    DM (diabetes mellitus) ([facility]    DMITRIY on CPAP    Hyperlipidemia    Status post incision and drainage    CKD (chronic kidney disease) stage 3, GFR 30-59 ml/min ([facility]    Hyperpotassemia    Arthritis    PVD (peripheral vascular disease) ([facility]    Hematoma    Cardiac pacemaker in situ    Coronary artery stenosis    Essential hypertension    Gout    Diabetic neuropathy associated with type 2 diabetes mellitus [facility]    Adenomatous polyp of sigmoid colon    Iron deficiency anemia due to chronic blood loss    Erythropoietin deficiency anemia    VHD (valvular heart disease)    Abnormal fractional flow reserve (FFR) on cardiac catheterization    Carotid stenosis, left    Aortic stenosis, severe    CAD in native artery    Displacement of atrial pacemaker leads       S/P CABG x 2, AVR, ascending aortic repair, maze     Cardio: HOTN resolved. Vaso stopped this am. Continue dobutamine at 5. Hold coreg. On PO amio  Pulm: hypoxia improving. Will convert to NC O2 from BiPAP. GI: DC NGT, advance diet as tolerated. ? Coffee grounds NGT overnight but looks better now. Watch for signs of GI bleed. On IV protonix. Renal: creatinine stable 2.5, adequate UOP on lasix gtt at 15. Weight up. Continue lasix gtt. Tubes/Lines: YAN Davenport. Keep art line for ABGs. Keep zaragoza for accurate UOP.    Wound: stable     Dejan Aldridge PA-C

## 2021-02-18 NOTE — PROGRESS NOTES
Called wife- updated her on pt's condition, questions answered. Also let her face time with pt.directly so she could speak with him.

## 2021-02-18 NOTE — PROGRESS NOTES
Lisa at bedside- discussed plan of care etc. New orders received     1. D/C swan  2. D/C MS CT's after pt ambulates  3.  D/C NG tube- advance pt to clear liquids

## 2021-02-18 NOTE — PLAN OF CARE
On bipap, lasix gtt, dobutamine gtt, weaned off of levophed, continues to have poor activity tolerance, continue with plan of care

## 2021-02-18 NOTE — PROGRESS NOTES
Right introducer and swan teri catheter d/c'd per protocol and Dr's order- tip intact and balloon deflated on swan teri catheter.  New dsg applied- pt tolerated well

## 2021-02-18 NOTE — CONSULTS
[address] [address] Continence Nurse  Consult Note       Samreen Gomez  AGE: 66 y.o. GENDER: male  : 1950  TODAY'S DATE:  2021    Subjective:     Reason for Evaluation and Assessment: wound care tommy Gomez is a 66 y.o. male referred by:   [x] Physician  [] Nursing  [] Other:     Wound Identification:  Wound Type: diabetic and pressure  Contributing Factors: diabetes and chronic pressure        PAST MEDICAL HISTORY        Diagnosis Date    Arrhythmia     Pacemaker placed aprox 5 years ago for A Fib per patient    Arthritis 2013    rt wrist    Atrial fibrillation ([facility]     on Xarelto - Dr. Hema Garcia CAD (coronary artery disease) 2014    see dr Jorge Gong kidney disease, stage III (moderate) 2016    Diabetes mellitus ([facility]     dx 2004    Diabetic neuropathy associated with type 2 diabetes mellitus ([facility] 2019    Erythropoietin deficiency anemia 2020    Gout 2019    \"got gout when had pacer put in because they did not give me my medication for gout \"    H/O 24 hour EKG monitoring 10/03/2013    no afib noted, sinsus rhythm    H/O cardiovascular stress test 2014    cardiolite- mild ischemia RCA EF50%    H/O echocardiogram 2020    EF 55-60% severe aortic stenosis mild to mod aortic regurg mod to severe tricuspid regurg severe pulm htn significant changes since  echo.  H/O right and left heart catheterization 12/10/2020    DIFFUSE LAD DISEASE, Mild ECA Disease, Severe AS, Milf Pul HTN on RHC.  H/O transesophageal echocardiography (MABLE) for monitoring 2013    normal LV function and normal LA appendage without any clot    History of blood transfusion 2020    d/t anemia    History of transesophageal echocardiography (MABLE) 12/15/2020    Severe aortic stenosis (ANNA by planimetry: 0.778 cm sq).   Mild AR.    [place] (hard of hearing)     hearing tonya aides    Hx of Doppler echocardiogram 2018    EF 50%  Mild LV hypertrophy. Mildly enlarged RA. Mod aortic valve calcification with mod AS. Mitral annular calcification is present. Mild AR, MR and TR. Mild pulmonary htn.  Hyperlipidemia     Hypertension     Follows with PCP & Dr. Gudelia Black Other disorders of kidney and ureter     Pacemaker     Medtronic, implanted 2014    Pneumonia 12/29/2012    Sleep apnea     dx 2013- has c-pap    Type II or unspecified type diabetes mellitus with other specified manifestations, uncontrolled 12/12/2012    Venous hypertension, chronic, with ulcer ([address] 12/12/2012    resolved       PAST SURGICAL HISTORY    Past Surgical History:   Procedure Laterality Date    CARDIOVERSION  12/14    at [address] Left 1/29/2021    LEFT CAROTID ENDARTERECTOMY performed by Nicolas Moise MD at [address] [place] [address]  2017    COLONOSCOPY  2011    COLONOSCOPY N/A 11/19/2019    COLONOSCOPY DIAGNOSTIC performed by Janet Chavarria MD at [facility]  09/30/2020    POSSIBLE CECAL avms, SIGMOID DIVERTICULOSIS, INTERNAL HEMORRHOIDS GRADE 1    COLONOSCOPY N/A 9/30/2020    COLONOSCOPY CONTROL HEMORRHAGE WITH APC performed by Janet Chavarria MD at [facility]  2014    \"2 stents put in \"   [address]  2004    total left hip    OTHER SURGICAL HISTORY Right 12/02/2017    I&D; evacuation of hematoma right hip    OTHER SURGICAL HISTORY  09/17/2020    enteroscopy    PACEMAKER PLACEMENT      9/18/14 Status post remote permanent pacemaker with atrial lead dislodgement.  7/24/14 PPM Implant    UPPER GASTROINTESTINAL ENDOSCOPY N/A 9/17/2020    ENTEROSCOPY PUSH BIOPSY performed by Janet Chavarria MD at [address]  2012    \"have stents in both legs- done in [facility]    Family History   Problem Relation Age of Onset    High Blood Pressure Mother     Arthritis Mother     Diabetes Mother     Heart Disease Mother     High Blood Pressure Father     Heart Disease Father     Kidney Disease Father        SOCIAL HISTORY    Social History     Tobacco Use    Smoking status: Never Smoker    Smokeless tobacco: Never Used   Substance Use Topics    Alcohol use: Yes     Alcohol/week: 2.0 standard drinks     Types: 2 Cans of beer per week     Comment: average \"one time per week\"    Drug use: No       ALLERGIES    Allergies   Allergen Reactions    Spironolactone      CAUSES INCREASED K+    Tape Worthy Hadley Tape] Rash     SURGICAL TAPE       MEDICATIONS    No current facility-administered medications on file prior to encounter. Current Outpatient Medications on File Prior to Encounter   Medication Sig Dispense Refill    ferrous sulfate (IRON 325) 325 (65 Fe) MG tablet Take 1 tablet by mouth 2 times daily 180 tablet 1    allopurinol (ZYLOPRIM) 100 MG tablet Take 1 tablet by mouth daily (Patient taking differently: Take 100 mg by mouth nightly ) 90 tablet 1    allopurinol (ZYLOPRIM) 300 MG tablet Take 1 tablet by mouth daily 90 tablet 1    canagliflozin (INVOKANA) 300 MG TABS tablet Take 1 tablet by mouth every morning (before breakfast) 90 tablet 1    gabapentin (NEURONTIN) 600 MG tablet Take 1 tablet by mouth 3 times daily for 270 days.  90 tablet 1    glimepiride (AMARYL) 4 MG tablet Take 1 tablet by mouth daily 90 tablet 1    metFORMIN (GLUCOPHAGE) 1000 MG tablet TAKE 1 TABLET TWICE DAILY  WITH MEALS 180 tablet 1    acetaminophen (AMINOFEN) 325 MG tablet Take 2 tablets by mouth every 6 hours as needed for Pain 30 tablet 0    Cholecalciferol (VITAMIN D) 50 MCG (2000 UT) CAPS capsule Take by mouth nightly       metoprolol succinate (TOPROL XL) 25 MG extended release tablet Take 1 tablet by mouth daily 90 tablet 3    furosemide (LASIX) 20 MG tablet Take 1 tablet by mouth 2 times daily May take an extra Lasix if has increased swelling 180 tablet 3    Dulaglutide (TRULICITY) 1.5 DD/5.4FX SOPN Inject 1.5 mg into the skin once a week 12 pen 1    simvastatin (ZOCOR) 20 MG tablet Pt takes 10 mg daily 90 tablet 1    rivaroxaban (XARELTO) 20 MG TABS tablet TAKE 1 TABLET DAILY WITH   BREAKFAST 90 tablet 3    sildenafil (VIAGRA) 100 MG tablet TAKE 1 TABLET DAILY AS     NEEDED FOR ERECTILE        DYSFUNCTION 30 tablet 1    valsartan (DIOVAN) 40 MG tablet Take 40mg daily 90 tablet 1    aspirin 81 MG tablet Take 81 mg by mouth daily           Objective:      /69   Pulse 72   Temp 97.6 °F (36.4 °C) (Oral)   Resp 19   Ht 5' 10\" (1.778 m)   Wt 231 lb 4.2 oz (104.9 kg)   SpO2 97%   BMI 33.18 kg/m²   Dani Risk Score: Dani Scale Score: 20    LABS    CBC:   Lab Results   Component Value Date    WBC 14.5 02/18/2021    RBC 3.45 02/18/2021    HGB 9.0 02/18/2021    HCT 26.0 02/18/2021    MCV 82.3 02/18/2021    MCH 24.1 02/18/2021    MCHC 29.2 02/18/2021    RDW 20.6 02/18/2021    PLT 91 02/18/2021    MPV 10.2 02/18/2021     CMP:    Lab Results   Component Value Date     02/18/2021    K 4.5 02/18/2021    CL 97 02/18/2021    CO2 25 02/18/2021    BUN 55 02/18/2021    CREATININE 2.5 02/18/2021    GFRAA 31 02/18/2021    AGRATIO 1.8 11/24/2020    LABGLOM 25 02/18/2021    LABGLOM 51 06/15/2016    GLUCOSE 114 02/18/2021    PROT 5.1 02/18/2021    PROT 7.3 12/27/2012    LABALBU 3.3 02/18/2021    CALCIUM 8.7 02/18/2021    BILITOT 0.3 02/18/2021    ALKPHOS 42 02/18/2021     02/18/2021     02/18/2021     Albumin:    Lab Results   Component Value Date    LABALBU 3.3 02/18/2021     PT/INR:    Lab Results   Component Value Date    PROTIME 15.1 02/16/2021    INR 1.25 02/16/2021     HgBA1c:    Lab Results   Component Value Date    LABA1C 5.9 01/21/2021         Assessment:     Patient Active Problem List   Diagnosis    Type 2 diabetes mellitus without complication, without long-term current use of insulin (HCC)    Ulcer of other part of lower limb    Venous hypertension, chronic, with ulcer (HCC)    Ulcer of other part of foot    Pneumonia    Atrial fibrillation (HCC)    Sinus pause    PAF (paroxysmal atrial fibrillation) (HCC)    DM (diabetes mellitus) ([facility]    DMITRIY on CPAP    Hyperlipidemia    Status post incision and drainage    CKD (chronic kidney disease) stage 3, GFR 30-59 ml/min (HCC)    Hyperpotassemia    Arthritis    PVD (peripheral vascular disease) (HCC)    Hematoma    Cardiac pacemaker in situ    Coronary artery stenosis    Essential hypertension    Gout    Diabetic neuropathy associated with type 2 diabetes mellitus (HCC)    Adenomatous polyp of sigmoid colon    Iron deficiency anemia due to chronic blood loss    Erythropoietin deficiency anemia    VHD (valvular heart disease)    Abnormal fractional flow reserve (FFR) on cardiac catheterization    Carotid stenosis, left    Aortic stenosis, severe    CAD in native artery    Displacement of atrial pacemaker leads       Measurements:  Wound 02/18/21 Toe (Comment  which one) Right 2nd toe anterior (Active)   Wound Etiology Diabetic 02/18/21 1400   Wound Cleansed Cleansed with saline 02/18/21 1400   Dressing/Treatment Betadine swabs/povidone iodine 02/18/21 1400   Wound Length (cm) 0.5 cm 02/18/21 1400   Wound Width (cm) 0.5 cm 02/18/21 1400   Wound Depth (cm) 0 cm 02/18/21 1400   Wound Surface Area (cm^2) 0.25 cm^2 02/18/21 1400   Wound Volume (cm^3) 0 cm^3 02/18/21 1400   Distance Tunneling (cm) 0 cm 02/18/21 1400   Tunneling Position ___ O'Clock 0 02/18/21 1400   Undermining Starts ___ O'Clock 0 02/18/21 1400   Undermining Ends___ O'Clock 0 02/18/21 1400   Undermining Maxium Distance (cm) 0 02/18/21 1400   Drainage Amount None 02/18/21 1400   Odor None 02/18/21 1400   Margins Attached edges 02/18/21 1400   Number of days: 0       Wound 02/18/21 Toe (Comment  which one) Anterior; Left 3rd toe (Active)   Wound Etiology Diabetic 02/18/21 1400   Wound Cleansed Cleansed with saline 02/18/21 1400   Dressing/Treatment Betadine swabs/povidone iodine 02/18/21 1400 Wound Length (cm) 0.5 cm 02/18/21 1400   Wound Width (cm) 0.5 cm 02/18/21 1400   Wound Depth (cm) 0 cm 02/18/21 1400   Wound Surface Area (cm^2) 0.25 cm^2 02/18/21 1400   Wound Volume (cm^3) 0 cm^3 02/18/21 1400   Distance Tunneling (cm) 0 cm 02/18/21 1400   Tunneling Position ___ O'Clock 0 02/18/21 1400   Undermining Starts ___ O'Clock 0 02/18/21 1400   Undermining Ends___ O'Clock 0 02/18/21 1400   Undermining Maxium Distance (cm) 0 02/18/21 1400   Drainage Amount None 02/18/21 1400   Odor None 02/18/21 1400   Margins Attached edges 02/18/21 1400   Number of days: 0       Response to treatment:  Well tolerated by patient. Pain Assessment:  Severity:  0  Quality of pain: none  Wound Pain Timing/Severity:   Premedicated: no    Plan:     Plan of Care: Wound 02/18/21 Toe (Comment  which one) Right 2nd toe anterior-Dressing/Treatment: Betadine swabs/povidone iodine  Wound 02/18/21 Toe (Comment  which one) Anterior; Left 3rd toe-Dressing/Treatment: Betadine swabs/povidone iodine     Patient sitting up in chair agreeable to wound care assessment. Pt has dry eschar to rt 2nd anterior toe and left 3rd toe etiology diabetic/pressure. Cleansed with NS measured and pictured painted with betadine and maximo. Heels intact. Pt has eduar hose on. Per pt's nurse buttocks is intact. Pt is generally not at risk for skin breakdown AEB christiane score. Specialty Bed Required : yes  [x] Low Air Loss   [] Pressure Redistribution  [] Fluid Immersion  [] Bariatric  [] Total Pressure Relief  [] Other:     Discharge Plan:  Placement for patient upon discharge: tbd  Hospice Care: no  Patient appropriate for Outpatient [address]: discussed with pt to f/u with podiatrist and or wound clinic     Patient/Caregiver Teaching:  Level of patient/caregiver understanding able to: pt verbalized understanding. Electronically signed by Didi Harris.  KIERA Wetzel, on 2/18/2021 at 4:03 PM

## 2021-02-18 NOTE — PROGRESS NOTES
Physical Therapy  [facility] ACUTE CARE PHYSICAL THERAPY EVALUATION  Siri Mayer, 1950, 2128/2128-A, 2/18/2021    History  Kashia:  There were no encounter diagnoses. Patient  has a past medical history of Arrhythmia, Arthritis, Atrial fibrillation ([address] CAD (coronary artery disease), Chronic kidney disease, stage III (moderate), Diabetes mellitus ([address] Diabetic neuropathy associated with type 2 diabetes mellitus ([address] Erythropoietin deficiency anemia, Gout, H/O 24 hour EKG monitoring, H/O cardiovascular stress test, H/O echocardiogram, H/O right and left heart catheterization, H/O transesophageal echocardiography (MABLE) for monitoring, History of blood transfusion, History of transesophageal echocardiography (MABLE), [place] (hard of hearing), Hx of Doppler echocardiogram, Hyperlipidemia, Hypertension, Other disorders of kidney and ureter, Pacemaker, Pneumonia, Sleep apnea, Type II or unspecified type diabetes mellitus with other specified manifestations, uncontrolled, and Venous hypertension, chronic, with ulcer ([address] Patient  has a past surgical history that includes joint replacement (2004); pacemaker placement; Coronary angioplasty with stent (2014); vascular surgery (2012); Cardioversion (12/14); other surgical history (Right, 12/02/2017); other surgical history (09/17/2020); Upper gastrointestinal endoscopy (N/A, 9/17/2020); Colonoscopy (2011); Colonoscopy (N/A, 11/19/2019); Colonoscopy (09/30/2020); Colonoscopy (N/A, 9/30/2020); Cholecystectomy (2017); and Carotid endarterectomy (Left, 1/29/2021). Subjective:  Patient states:  \"I really don't have any pain\"   Pain:  Denies   Communication with other providers:  Handoff to RN, co-eval with Maicol QUINTANA   Restrictions: sternal precautions, falls, portable tele, pulse ox, BP cuff, O2, SCDs, central line, swan teri, NG tube, chest tubes, zaragoza.      Home Setup/Prior level of function  Social/Functional History  Lives With: Spouse(available 24/7)  Type of Home: House  Home Layout: One level(+ basement but does not need to go down there)  Home Access: Stairs to enter with rails  Entrance Stairs - Number of Steps: 2  Bathroom Shower/Tub: Walk-in shower  Bathroom Toilet: Handicap height  Bathroom Equipment: Grab bars in shower, Built-in shower seat  Home Equipment: Cane  ADL Assistance: Independent  Homemaking Assistance: Independent  Ambulation Assistance: Independent  Transfer Assistance: Independent  Active : Yes  Occupation: Retired  Type of occupation: [employer]  Leisure & Hobbies: golf    Examination of body systems (includes body structures/functions, activity/participation limitations):  · Observation:  Supine in bed upon arrival. Cooperative to work with therapy   · Vision:  [employer]  · Hearing:  [place], hearing aids but not charged   · Cardiopulmonary: 6L O2, vitals stable throughout session. · Orientation: [place]     Musculoskeletal  · ROM R/L:  WFL BLEs  · Strength R/L:  BLEs grossly 4+/5    Mobility/treatment:   · Rolling L/R:  NT   · Supine to sit:  modA for trunk support. Cues for overall sequencing and UE placement   · Transfers:   · Sit to stand: CGA from EOB. Cues for fwd weight shift and hand placement  · Stand to sit: Gaby for small amount of steadying to control sit to recliner   · Step pivot: CGA with CW. Cues for sequencing full pivot turn prior to sitting   · Sitting balance:  SBA at EOB, static and light dynamic, no UE support   · Standing balance:  CGA at CW   · Gait: ~8ft with CW CGA for safety. Fair to slower pace with slightly fwd posture. Limited distance today with RN requesting pt only to the chair at this time. · Educated pt on POC, role of PT, DME use, sternal precautions, discharge.  Cues for sequencing, posture, weight shift, UE placement to inc safety and indep with mobility     [facility] Clicks Inpatient Mobility:  AM-PAC Inpatient Mobility Raw Score : 16    Safety: patient left in chair, call light within reach, RN notified, gait belt used. Assessment:  Pt is a 66year old male admitted with CAD and is s/p CABG x 2 and AVR on 2/16. Recommend home with initial 24/7 supervision and [facility] PT once medically stable. At baseline he is indep with gross mobility and ADLs. He performed well this date though somewhat limited with lines. He would benefit from continued therapy to address his current deficits, dec potential fall risk, and restore function. Complexity: Moderate  Prognosis: Good, no significant barriers to participation at this time.    Plan Times per week: 6+/week, 1 week  Discharge Recommendations: 24 hour supervision or assist, Home with Home health PT  Equipment: continue to assess     Goals:  Short term goals  Time Frame for Short term goals: 1 week  Short term goal 1: Pt will perform sit><supine Gaby  Short term goal 2: Pt will transition sit><stand SBA  Short term goal 3: Pt will transfer to bed/recliner SBA  Short term goal 4: Pt will ambulate 200ft with LRAD SBA  Short term goal 5: Pt will ascend/descend 2 steps, 1-2 rails CGA       Treatment plan:  Bed mobility, transfers, balance, gait, TA, TX, stairs     Recommendations for NURSING mobility: ambulate with CW     Time:   Time in: 1035  Time out: 1100  Timed treatment minutes: 10  Total time: 25    Electronically signed by:    Kervin Arita [id number], 1:18 PM

## 2021-02-18 NOTE — PROGRESS NOTES
Occupational Therapy    [facility] ACUTE CARE OCCUPATIONAL THERAPY EVALUATION    Sebastián Holder, 1950, 2128/2128-A, 2/18/2021    Discharge Recommendation: Home with initial 24 hour supervision/assistance, Home Health OT services (S Level 2)      History:  [place]:  There were no encounter diagnoses. Subjective:  Patient states: \"Surprisingly nothing is really hurting right now. \"  Pain: Pt denied pain this date  Communication with other providers: PT Mellissa Novoa, KIERA Castorena  Restrictions: General Precautions, Low Fall Risk, Sternal Precautions, Chest tube (x2), [place], A Line, [place], [employer], NG tube, Telemetry, Pulse Ox, BP cuff, o2, SCDs    Home Setup/Prior level of function:  Social/Functional History  Lives With: Spouse (available 24/7)  Type of Home: [facility] [address]: One level (+ basement but does not need to go down there)  Home Access: Stairs to enter with rails  Entrance Stairs - Number of Steps: 2  Bathroom Shower/Tub: Walk-in shower  Bathroom Toilet: Handicap height  Bathroom Equipment: Grab bars in shower, Built-in shower seat  Home Equipment: Cane  ADL Assistance: Independent  Homemaking Assistance: Independent  Ambulation Assistance: Independent (uses no AD)  Transfer Assistance: Independent  Active : Yes  Occupation: Retired  Type of occupation: [employer]  Leisure & Hobbies: Golf    Examination:  · Observation: Supine in bed upon arrival. Pleasant and agreeable to evaluation. · Vision: [facility]  · Hearing: [place] (has BL hearing aids)  · Vitals: Stable vitals throughout session    Body Systems and functions:  · ROM: WFL all joints in BL UEs (active shoulder flexion kept 0-90')  · Strength: WFL all major muscle groups BL UEs  · Sensation: WFL (denies numbness/tingling)  · Tone: Normal  · Coordination: WFL  · Perception: WNL    Activities of Daily Living (ADLs):  · Feeding: Independent   · Grooming: SBA (seated facial hygiene task EOB)  · UB bathing: SBA   · LB bathing:  Mod A (reaching distal LEs/buttocks)  · UB dressing: SBA (donning clean robe seated EOB)  · LB dressing: Max A (dependent with donning BL socks, able to manage clothing to hips in standing)  · Toileting: CGA    Cognitive and Psychosocial Functioning:  · Overall cognitive status: WNL  · Affect: Normal     Balance:   · Sitting: SBA in unsupported sitting EOB  · Standing: CGA with cardiac walker    Functional Mobility:  · Bed Mobility: Mod A supine to sitting EOB (HOB elevated, increased time/effort required, able to scoot legs but mod trunk boost required for uprighting; followed sternal precautions without cueing)  · Transfers: CGA to and from bed and recliner (min cues for sternal precautions/technique)  · Ambulation: CGA with cardiac walker 8 ft bed to chair-tolerated very well and could have ambulated significantly further but limited by RN request for bed to chair distance only      AM-PAC 6 click short form for inpatient daily activity:   How much help from another person does the patient currently need. .. Unable  Dep A Lot  Max A A Lot   Mod A A Little  Min A A Little   CGA  SBA None   Mod I  Indep  Sup   1. Putting on and taking off regular lower body clothing? [] 1    [x] 2   [] 2   [] 3   [] 3   [] 4      2. Bathing (including washing, rinsing, drying)? [] 1   [] 2   [x] 2 [] 3 [] 3 [] 4   3. Toileting, which includes using toilet, bedpan, or urinal? [] 1    [] 2   [] 2   [] 3   [x] 3   [] 4     4. Putting on and taking off regular upper body clothing? [] 1   [] 2   [] 2   [] 3   [x] 3    [] 4      5. Taking care of personal grooming such as brushing teeth? [] 1   [] 2    [] 2 [] 3    [x] 3   [] 4      6. Eating meals? [] 1   [] 2   [] 2   [] 3   [] 3   [x] 4      Raw Score:  17     [24=0% impaired(CH), 23=1-19%(CI), 20-22=20-39%(CJ), 15-19=40-59%(CK), 10-14=60-79%(CL), 7-9=80-99%(CM), 6=100%(CN)]     Treatment:  Therapeutic Activity Training:   Therapeutic activity training was instructed today.   Cues were given for safety, sequence, UE/LE placement, awareness, and balance. Activities performed today included bed mobility training, UB/LB dressing tasks, transfer training, HH mobility with cardiac walker, education on role of OT, POC, sternal precautions, pursed lip breathing, importance of OOB activity, d/c planning      Safety Measures: Gait belt used, Left in Chair, Alarm in place    Assessment:  Pt is a 66 year male with a past medical history of Arrhythmia, Arthritis, Atrial fibrillation ([address] CAD (coronary artery disease), Chronic kidney disease, stage III (moderate), Diabetes mellitus ([address] Diabetic neuropathy associated with type 2 diabetes mellitus ([facility] Erythropoietin deficiency anemia, Gout, H/O 24 hour EKG monitoring, H/O cardiovascular stress test, H/O echocardiogram, H/O right and left heart catheterization, H/O transesophageal echocardiography (MABLE) for monitoring, History of blood transfusion, History of transesophageal echocardiography (MABLE), [place] (hard of hearing), Hx of Doppler echocardiogram, Hyperlipidemia, Hypertension, Other disorders of kidney and ureter, Pacemaker, Pneumonia, Sleep apnea, Type II or unspecified type diabetes mellitus with other specified manifestations, uncontrolled, and Venous hypertension, chronic, with ulcer ([facility] Pt admitted with CAD and aortic stenosis and underwent CABG x 2 and AV replacement on 2-16. Pt lives at home with his wife and at baseline is fully independent with ADLs, IADLs, mobility, driving, and is active with golfing. Pt performed well this date, and from a self-care and mobility standpoint, is safe to return home at discharge with initial 24 hour supervision and [facility] OT services recommended. Complexity: Moderate  Prognosis: Good  Plan: 3x/week      Goals:  1. Pt will complete all aspects of bed mobility for EOB/OOB ADLs SBA/good adherence to sternal precautions  2. Pt will complete UB/LB bathing CGA with setup  3.  Pt will complete all aspects of

## 2021-02-18 NOTE — FLOWSHEET NOTE
Wife [facility] called and updated, she is very tearful and appreciative of the update. Dr. Rodolfo Del Toro called and notified of RN suddenly noted NG contents now appearing coffee ground in color (had been green bile previously). Telephone orders received to start Protonix 40 mg IV BID and discontinue oral Protonix. Telephone orders received for STAT CBC. Dr. Rodolfo Del Toro also notified patient just informed RN he is feeling flatus and urge to have BM. Lashanda QURESHI aware of new orders received.      Dk Mason RN 8:20 PM

## 2021-02-18 NOTE — PROGRESS NOTES
Hung COVARRUBIAS here- discussed plan of care, labs, IV GTT's and over condition etc.   See new orders:  1. Take BI-PAP off- place on NC- get ABG's 1 hour after  2. D/C Heart CT today and MS CT's later after pt has been up and are able to do so. 3. Leave [place] in for today  4. Leave all lines in for now  5. May have PT/OT evaluate pt and get up to the chair   6. Leave NG in, keep pt NPO except for meds and able to give a few ice chips at times.

## 2021-02-18 NOTE — PROGRESS NOTES
Discussed general updates with Dr. Pau Osorio- he stated ok to wean down Dobutrex GTT - 2.5 mcg/kg/min if SBP> 120's.    See [facility]

## 2021-02-18 NOTE — PROGRESS NOTES
Nephrology Progress Note  2/18/2021 4:13 PM  Subjective: Interval History: Blanquita Horowitz is a 66 y.o. male  Now less sob and more awake with lasix gtt        Data:   Scheduled Meds:   guaiFENesin  600 mg Oral BID    sodium zirconium cyclosilicate  10 g Oral TID    pantoprazole  40 mg Intravenous BID    sodium chloride flush  10 mL Intravenous 2 times per day    aspirin  81 mg Oral Daily    amiodarone  200 mg Oral TID    mupirocin   Nasal BID    therapeutic multivitamin-minerals  1 tablet Oral Daily with breakfast    polyethylene glycol  17 g Oral Daily    carvedilol  3.125 mg Oral BID    atorvastatin  20 mg Oral Nightly    [START ON 2/20/2021] amiodarone  200 mg Oral Daily    albuterol sulfate HFA  2 puff Inhalation 4x daily    ipratropium  2 puff Inhalation 4x daily     Continuous Infusions:   furosemide (LASIX) 1mg/ml infusion 10 mg/hr (02/18/21 1513)    sodium chloride 10 mL/hr at 02/17/21 2236    norepinephrine Stopped (02/17/21 2200)    EPINEPHrine infusion Stopped (02/17/21 0423)    nitroGLYCERIN      insulin 1.9 Units/hr (02/18/21 1527)    dextrose      vasopressin (Septic Shock) infusion Stopped (02/18/21 0930)    DOBUTamine 5 mcg/kg/min (02/18/21 0350)           CBC:   Recent Labs     02/17/21  0450 02/17/21  0450 02/17/21 2015 02/17/21 2015 02/18/21  0410 02/18/21  0410 02/18/21  0620 02/18/21  0719 02/18/21  1118   WBC 15.5*  --  15.6*  --  14.5*  --   --   --   --    HGB 8.6*   < > 8.3*   < > 8.3*  9.3*   < > 7.5* 7.6* 9.0*     --  105*  --  91*  --   --   --   --     < > = values in this interval not displayed.      BMP:    Recent Labs     02/17/21  1900 02/17/21  1900 02/18/21  0025 02/18/21  0025 02/18/21  0326 02/18/21  0410 02/18/21  0410 02/18/21  0620 02/18/21  0719 02/18/21  1118      < > 131*   < > 133* 133*  131*   < > 135* 135* 130*   K 5.6*   < > 5.3*   < > 4.8* 5.0  5.0*   < > 4.0 4.0 4.5     --  96*  --   --  97*  --   --   --   -- CO2 23   < > 25  --  25 25  --   --   --   --    BUN 50*  --  52*  --   --  55*  --   --   --   --    CREATININE 2.6*   < > 2.6*  --  2.8* 2.5*  --   --   --   --    GLUCOSE 129*  --  113*  --   --  114*  --   --   --   --     < > = values in this interval not displayed. Renal Labs  Albumin:    Lab Results   Component Value Date    LABALBU 3.3 02/18/2021     Calcium:    Lab Results   Component Value Date    CALCIUM 8.7 02/18/2021     Phosphorus:    Lab Results   Component Value Date    PHOS 7.3 02/18/2021     U/A:    Lab Results   Component Value Date    NITRU NEGATIVE 02/12/2021    NITRU Negative 11/24/2020    COLORU YELLOW 02/12/2021    PHUR 6.5 11/24/2020    LABCAST NONE SEEN 06/15/2016    LABCAST NONE SEEN 06/15/2016    WBCUA 1 02/12/2021    RBCUA 2 02/12/2021    MUCUS RARE 02/12/2021    TRICHOMONAS NONE SEEN 02/12/2021    BACTERIA NEGATIVE 02/12/2021    CLARITYU CLEAR 02/12/2021    SPECGRAV 1.012 02/12/2021    UROBILINOGEN NEGATIVE 02/12/2021    BILIRUBINUR NEGATIVE 02/12/2021    BLOODU SMALL 02/12/2021    GLUCOSEU 500 11/24/2020    KETUA NEGATIVE 02/12/2021           Objective:   I/O: 02/17 0701 - 02/18 0700  In: 2705.3 [P.O.:300; I.V.:2105.3]  Out: 3129 [Urine:2045]  Vitals: /69   Pulse 72   Temp 97.6 °F (36.4 °C) (Oral)   Resp 19   Ht 5' 10\" (1.778 m)   Wt 231 lb 4.2 oz (104.9 kg)   SpO2 97%   BMI 33.18 kg/m²   General appearance: awake weak  HEENT: Head: Normal, normocephalic, atraumatic.   Neck: supple, symmetrical, trachea midline  Lungs: diminished breath sounds bilaterally  Heart: S1, S2 normal sp cabg  Abdomen: abnormal findings:  soft nt  Extremities: edema + improved   Neurologic: Mental status: alertness: awake        Assessment and Plan:      IMP:  1 arf from atn on ckd 3 creat 1.6  2 cad sp cabg  3 sp carotid surgery left  4 hypotension  5 anemia    Plan     1 creat start improve with good uop  2 sp cabg tolerated  3 carotid stable  4 bp improved wean pressor  5 hb better  6 watch na as drop with diuretic  7 will stop lasix gtt and change to iv lasix 40mg bid  8 K better           Tali Dubon MD

## 2021-02-18 NOTE — PROGRESS NOTES
Dr. Sue Going in unit- observed pt ambulating in hallway. New orders:  1. D/C CT  2. . Leave ART LINE and LE in  3. Leave Dobutamine @ 5 mcg/kg/min, will start to wean tomorrow   4. Leave BI-PAP in room for overnight in case pt needs it, but if pt is doing well, he can continue on NC at nighttime.

## 2021-02-18 NOTE — PROGRESS NOTES
2 MS CT's d/c'd per protocol/Dr's order. New DSG applied    Sternal DSG changed- INC well approx, no redness or drainage noted. Betadine/[place] DSG applied. Pt tolerated all well.

## 2021-02-18 NOTE — OP NOTE
[address] [place] [address], [address] [place] [address]                                OPERATIVE REPORT    PATIENT NAME: Ramón Driscoll                    :        1950  MED REC NO:   [id number]                          ROOM:       2128  ACCOUNT NO:   [de-identified]                           ADMIT DATE: 2021  PROVIDER:     To Rutherford MD    DATE OF PROCEDURE:  2021    PREOPERATIVE DIAGNOSES:  1. Coronary artery disease. 2.  History of severe aortic stenosis. 3.  Chronic atrial fibrillation. POSTPROCEDURE DIAGNOSES:  1. Coronary artery disease. 2.  History of severe aortic stenosis. 3.  Chronic atrial fibrillation. EBL: unable to determine due to CPB    PROCEDURES:  1. Coronary artery bypass grafting x2; left internal mammary artery  anastomosed to left anterior descending artery, reverse saphenous vein  graft anastomosed to distal right coronary artery. 2.  Aortic valve replacement including an aortic root enlargement using  a 27 mm Medtronic Mosaic bioprosthetic prosthesis and the root  enlargement was assisted using CorMatrix patch. 3.  Left atrial maze procedure including pulmonary vein isolation using  bipolar and epicardial roof and floor line using cryoablation. 4.  A 45 mm left atrial clip placement. 5.  Ultrasound vein mapping, left greater saphenous vein. 6.  Endoscopic vein harvest, left greater saphenous vein. 7.  Right [place]-Dipak introducer and central venous catheter placement. SURGEON:  RAFFAELE Hearn:  RAFFAELE Cash [facility]:  Shagufta Renteria PA-C. PREOP:  This is a 26-year-old male who has had worsening shortness of  breath and was found to have symptomatic severe aortic stenosis. Cardiac catheterization showed 2-vessel coronary artery disease  including a positive FFR in the LAD and the right coronary lesion that  was approximately 60% to 70%.   The risks and benefits of the surgery  were discussed in detail with the patient. The patient understood and  agreed to proceed. He has a history of chronic atrial fibrillation with  a pacemaker in place. I discussed the case with Electrophysiology, who  felt that since he has AFib 80% of the time, that it would be beneficial  to do a maze procedure. FINDINGS:  Ultrasound vein mapping of left greater saphenous vein showed  a 2 mm vein that was thin at times, but was adequate for bypass usage. The aortic valve on echocardiogram was severely calcified. Pre and post  biventricular function was normal.  There was no paravalvular leak noted  post bypass. The right atrium and right ventricle were dilated preop  and postop, though function was fairly normal.  LAD was diffusely  diseased and was grafted in location where there was less calcification. The right coronary artery was also diffusely diseased as well. Aortic  valve was trileaflet in nature. DESCRIPTION OF PROCEDURE:  The patient was identified, brought to the  operating room, and laid in supine position. After successful induction  of general anesthesia, the patient was intubated by Anesthesia staff,  prepped and draped in a normal sterile fashion. Prior to incision, a  time-out was performed and antibiotics were given. We placed a right  [place]-Dipak introducer and central venous catheter using Seldinger  technique as the patient has a left-sided pacemaker in place. Ultrasound vein mapping of the left greater saphenous vein and  identified the vein. The median sternotomy was performed in a normal  fashion. The pericardium was opened from the innominate vein and T'd  off at the diaphragm. Left internal mammary artery was harvested in a  normal fashion. At the same time, left greater saphenous vein was  endoscopically harvested. Prior to transection, the patient was  systemically heparinized. Pericardial cradle was formed.   When  appropriate ACT was artery. At this point, we exposed our aortic valve by  performing aortotomy. LV vent was placed through right superior  pulmonary vein and retrograde coronary sinus catheter was placed to the  right atrium. We periodically gave cardioplegia through retrograde and  also down the right coronary graft. At this point, we exposed the  valve. There was heavy calcification at the sinotubular junction and  severe heavy calcification in the valve. The valve leaflets were  removed and aggressive decalcification was performed. We decalcified  onto the underside of the anterior leaflet of the mitral valve at this  location also was heavily calcified. As we were performing this, we  identified that the sinotubular junction was extremely calcified and  that we would have difficulty passing any size valve through this;  therefore, we then turned our attention to decalcifying a portion of the  sinotubular junction. As we did this, we then extended our incision  down to the annulus. We did not feel like we needed to go through the  aortic annulus as this area was also calcified, but we felt that just  like coming down to the annulus, we could put a larger size valve. After decalcifying this location, we then placed our sutures with  pledgets on the ventricular side, first the left side, then the right  and the noncoronary annulus. The heart was rotated with this as well. We measured a  27 valve and we then placed the sutures through the sewing ring of the  aortic valve and the valve was seated into place using Cor-Knot device. At this point, we then began performing a patch of the aorta. We used a  CorMatrix in an elliptical shape using a 4-0 Prolene suture with an RV  needle to reapproximate both sides of the open aorta. As we  reapproximated to a point, we then used a 4-0 Prolene suture from the  opposite side and brought that over. Both sutures were met in the  middle of the aorta.   Prior to doing this, we assured the valve was  seated into place and is functioning well. At this point, we then did  our proximal anastomosis from the right coronary graft using a 6-0  Prolene suture in running fashion. Once this graft is completed, there  was no bleeding distally. We then gave warm blood and deairing  maneuvers were performed. The patient was placed in Trendelenburg  position. Cross-clamp was removed and the heart was allowed to perfuse. Distal targets were evaluated for bleeding. There was no significant  bleeding. We placed a ventricular pacing wire. At this point, the  patient was slowly weaned from cardiopulmonary bypass. After successful  separation from cardiopulmonary bypass, Protamine was given, cannulas  were removed, and cannulation sites were repaired using pledgeted  Prolene suture. Bilateral pleural cavities and mediastinum were policed  for bleeding. Once we were satisfied there was no significant bleeding,  atrial pacing wire was placed, bilateral Blakes are placed. Pericardium  was closed. Hard chest tube was placed under the sternum. Sternum was  reapproximated using sternal wires. Presternal fascia was closed using  0 Vicryl suture, subcutaneous tissue was closed using 2-0 Vicryl suture,  and skin was closed using 4-0 Monocryl in a subcuticular fashion. Sponge count, needle count, and instrument count were correct. The  patient tolerated the procedure well and was transported to the CVICU in  stable but critical condition.         Nicky Bowden MD    D: 02/18/2021 10:07:54       T: 02/18/2021 15:44:49     [id number]  Job#: [id number]     Doc#: [id number]    CC:

## 2021-02-18 NOTE — CONSULTS
Electrophysiology Consult Note      Reason for consultation: atrial lead MALFUNCTION    Chief complaint : S/p aortic valve replacement, maze procedure, two-vessel CABG    Referring physician: Dr. Aly Arellano      Primary care physician: Karo Alba MD      History of Present Illness:     Patient is a 77-year-old male with history of carotid artery disease s/p left carotid endarterectomy, severe aortic stenosis, renal insufficiency, coronary artery disease, diabetes mellitus type 2, s/p pacemaker, hypertension, persistent atrial fibrillation, obstructive sleep apnea on CPAP, hyperlipidemia presented for AVR and maze and two-vessel CABG. Postprocedure patient atrial lead noted to be not be capturing. Patient is currently intubated and sedated and is on pressors and unable to give much information so most of the history was obtained from the nurse and CT surgery.   EP was consulted for evaluation of the atrial lead on pacemaker     Pastmedical history:   Past Medical History:   Diagnosis Date    Arrhythmia     Pacemaker placed aprox 5 years ago for A Fib per patient    Arthritis 12/2013    rt wrist    Atrial fibrillation ([address]     on Xarelto - Dr. Gabriella Viramontes CAD (coronary artery disease) 06/18/2014    see dr Romero Jay kidney disease, stage III (moderate) 07/07/2016    Diabetes mellitus ([address]     dx 2004    Diabetic neuropathy associated with type 2 diabetes mellitus ([address] 04/23/2019    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    Gout 04/2019    \"got gout when had pacer put in because they did not give me my medication for gout \"    H/O 24 hour EKG monitoring 10/03/2013    no afib noted, sinsus rhythm    H/O cardiovascular stress test 05/12/2014    cardiolite- mild ischemia RCA EF50%    H/O echocardiogram 12/01/2020    EF 55-60% severe aortic stenosis mild to mod aortic regurg mod to severe tricuspid regurg severe pulm htn significant changes since 2018 echo.     H/O right and left heart catheterization 12/10/2020    DIFFUSE LAD DISEASE, Mild ECA Disease, Severe AS, Milf Pul HTN on RHC.  H/O transesophageal echocardiography (MABLE) for monitoring 08/05/2013    normal LV function and normal LA appendage without any clot    History of blood transfusion 12/2020    d/t anemia    History of transesophageal echocardiography (MABLE) 12/15/2020    Severe aortic stenosis (ANNA by planimetry: 0.778 cm sq). Mild AR.    [place] (hard of hearing)     hearing tonya aides    Hx of Doppler echocardiogram 05/21/2018    EF 50%  Mild LV hypertrophy. Mildly enlarged RA. Mod aortic valve calcification with mod AS. Mitral annular calcification is present. Mild AR, MR and TR. Mild pulmonary htn.     Hyperlipidemia     Hypertension     Follows with PCP & Dr. Colleen Turner Other disorders of kidney and ureter     Pacemaker     Medtronic, implanted 2014    Pneumonia 12/29/2012    Sleep apnea     dx 2013- has c-pap    Type II or unspecified type diabetes mellitus with other specified manifestations, uncontrolled 12/12/2012    Venous hypertension, chronic, with ulcer ([address] 12/12/2012    resolved       Surgical history :   Past Surgical History:   Procedure Laterality Date    CABG WITH AORTIC VALVE REPLACEMENT N/A 2/16/2021    CABG CORONARY ARTERY BYPASS X2 WITH LIMA, AORTIC VALVE REPLACEMENT AND AORTIC ROOT REPAIR, INTRAOPERATIVE MABLE, INDUCED HYPOTHERMIA, LEFT LEG ENDOVEIN HARVEST, LEFT ATRIAL CLIP, AND CRYO PROCEDURE performed by Paul Maher MD at [address] [place] [address]  12/14    at [address] Left 1/29/2021    LEFT CAROTID ENDARTERECTOMY performed by Edgard Sanderson MD at [id number]  2017    COLONOSCOPY  2011    COLONOSCOPY N/A 11/19/2019    COLONOSCOPY DIAGNOSTIC performed by Scott Spain MD at [place] [address]  09/30/2020    POSSIBLE CECAL avms, SIGMOID DIVERTICULOSIS, INTERNAL HEMORRHOIDS GRADE 1    COLONOSCOPY N/A 9/30/2020    COLONOSCOPY CONTROL HEMORRHAGE WITH APC performed by Aly Walsh MD at [address]   2014    \"2 stents put in \"   [address]  2004    total left hip    OTHER SURGICAL HISTORY Right 12/02/2017    I&D; evacuation of hematoma right hip    OTHER SURGICAL HISTORY  09/17/2020    enteroscopy    PACEMAKER PLACEMENT      9/18/14 Status post remote permanent pacemaker with atrial lead dislodgement. 7/24/14 PPM Implant    UPPER GASTROINTESTINAL ENDOSCOPY N/A 9/17/2020    ENTEROSCOPY PUSH BIOPSY performed by Aly Walsh MD at [address] [employer]  2012    \"have stents in both legs- done in [place]       Family history:   Family History   Problem Relation Age of Onset    High Blood Pressure Mother     Arthritis Mother     Diabetes Mother     Heart Disease Mother     High Blood Pressure Father     Heart Disease Father     Kidney Disease Father        Social history :  reports that he has never smoked. He has never used smokeless tobacco. He reports current alcohol use of about 2.0 standard drinks of alcohol per week. He reports that he does not use drugs. Allergies   Allergen Reactions    Spironolactone      CAUSES INCREASED K+    Tape Franck Katerina Tape] Rash     SURGICAL TAPE       No current facility-administered medications on file prior to encounter.       Current Outpatient Medications on File Prior to Encounter   Medication Sig Dispense Refill    ferrous sulfate (IRON 325) 325 (65 Fe) MG tablet Take 1 tablet by mouth 2 times daily 180 tablet 1    allopurinol (ZYLOPRIM) 100 MG tablet Take 1 tablet by mouth daily (Patient taking differently: Take 100 mg by mouth nightly ) 90 tablet 1    allopurinol (ZYLOPRIM) 300 MG tablet Take 1 tablet by mouth daily 90 tablet 1    canagliflozin (INVOKANA) 300 MG TABS tablet Take 1 tablet by mouth every morning (before breakfast) 90 tablet 1    gabapentin (NEURONTIN) 600 MG tablet Take 1 tablet by mouth 3 times daily for 270 days. 90 tablet 1    glimepiride (AMARYL) 4 MG tablet Take 1 tablet by mouth daily 90 tablet 1    metFORMIN (GLUCOPHAGE) 1000 MG tablet TAKE 1 TABLET TWICE DAILY  WITH MEALS 180 tablet 1    acetaminophen (AMINOFEN) 325 MG tablet Take 2 tablets by mouth every 6 hours as needed for Pain 30 tablet 0    Cholecalciferol (VITAMIN D) 50 MCG (2000 UT) CAPS capsule Take by mouth nightly       metoprolol succinate (TOPROL XL) 25 MG extended release tablet Take 1 tablet by mouth daily 90 tablet 3    furosemide (LASIX) 20 MG tablet Take 1 tablet by mouth 2 times daily May take an extra Lasix if has increased swelling 180 tablet 3    Dulaglutide (TRULICITY) 1.5 WS/5.4UE SOPN Inject 1.5 mg into the skin once a week 12 pen 1    simvastatin (ZOCOR) 20 MG tablet Pt takes 10 mg daily 90 tablet 1    rivaroxaban (XARELTO) 20 MG TABS tablet TAKE 1 TABLET DAILY WITH   BREAKFAST 90 tablet 3    sildenafil (VIAGRA) 100 MG tablet TAKE 1 TABLET DAILY AS     NEEDED FOR ERECTILE        DYSFUNCTION 30 tablet 1    valsartan (DIOVAN) 40 MG tablet Take 40mg daily 90 tablet 1    aspirin 81 MG tablet Take 81 mg by mouth daily         Review of Systems:   Review of Systems   Unable to perform ROS: Intubated           Examination:      Vitals:    02/18/21 1400 02/18/21 1430 02/18/21 1445 02/18/21 1500   BP: 116/66 116/66  118/69   Pulse: 71 72 73 72   Resp: 16 23  19   Temp:    97.6 °F (36.4 °C)   TempSrc:    Oral   SpO2: 98%   97%   Weight:       Height:            Body mass index is 33.18 kg/m². Physical Exam  Constitutional:       Comments: intubated   HENT:      Head: Normocephalic. Mouth/Throat:      Mouth: Mucous membranes are moist.   Eyes:      Conjunctiva/sclera: Conjunctivae normal.      Pupils: Pupils are equal, round, and reactive to light. Cardiovascular:      Rate and Rhythm: Normal rate and regular rhythm. Heart sounds: Murmur (grade 2/6 systolic murmur) present. Comments: Chest tubes in place. External pacemaker from the RV in place. Pulmonary:      Breath sounds: Rales present. Comments: Patient is on vent and conducted sounds with mechanical breath sounds  Abdominal:      General: Abdomen is flat. Palpations: Abdomen is soft. Musculoskeletal:      Right lower leg: No edema. Left lower leg: No edema. Skin:     General: Skin is warm. Neurological:      Comments: Intubated and sedated           CBC:   Lab Results   Component Value Date    WBC 14.5 02/18/2021    HGB 9.0 02/18/2021    HCT 26.0 02/18/2021    PLT 91 02/18/2021     Lipids:  Lab Results   Component Value Date    CHOL 91 12/18/2020    TRIG 66 12/18/2020    HDL 43 12/18/2020    LDLCALC 35 12/18/2020    LDLDIRECT 62 07/22/2014     PT/INR:   Lab Results   Component Value Date    INR 1.25 02/16/2021        BMP:    Lab Results   Component Value Date     (L) 02/18/2021    K 4.5 02/18/2021    CL 97 (L) 02/18/2021    CO2 25 02/18/2021    BUN 55 (H) 02/18/2021     CMP:   Lab Results   Component Value Date     (H) 02/18/2021    PROT 5.1 (L) 02/18/2021    BILITOT 0.3 02/18/2021    ALKPHOS 42 02/18/2021     TSH:    Lab Results   Component Value Date    TSH 0.99 07/07/2020       EKGINTERPRETATION - EKG Interpretation:        IMPRESSION / RECOMMENDATIONS:     Noncapturing atrial lead - malfunctioning atrial lead  Aortic stenosis s/p AVR  Status post maze procedure  Status post CABG    Patient device interrogated and patient was in longstanding persistent atrial fibrillation. Patient atrial lead appears to be pulled back from the previous x-rays to but it was still sensing. We could not assess the capture because patient was in A. fib at that time. Patient is now s/p surgery and atrial lead is not sensing or capturing. Patient is RV paced with a pacemaker prior to the procedure to.   When patient was in A. fib with out pacemaker and in sinus rhythm and his first years ago he had a left

## 2021-02-19 ENCOUNTER — APPOINTMENT (OUTPATIENT)
Dept: GENERAL RADIOLOGY | Age: 71
DRG: 219 | End: 2021-02-19
Attending: THORACIC SURGERY (CARDIOTHORACIC VASCULAR SURGERY)
Payer: MEDICARE

## 2021-02-19 LAB
ALBUMIN SERPL-MCNC: 3.1 GM/DL (ref 3.4–5)
ANION GAP SERPL CALCULATED.3IONS-SCNC: 11 MMOL/L (ref 4–16)
BASE EXCESS MIXED: 0 (ref 0–1.2)
BUN BLDV-MCNC: 64 MG/DL (ref 6–23)
CALCIUM IONIZED: 4.28 MG/DL (ref 4.48–5.28)
CALCIUM SERPL-MCNC: 8.4 MG/DL (ref 8.3–10.6)
CARBON MONOXIDE, BLOOD: 0.9 % (ref 0–5)
CHLORIDE BLD-SCNC: 94 MMOL/L (ref 99–110)
CO2 CONTENT: 26 MMOL/L (ref 19–24)
CO2: 26 MMOL/L (ref 21–32)
COMMENT: ABNORMAL
CREAT SERPL-MCNC: 2.6 MG/DL (ref 0.9–1.3)
GFR AFRICAN AMERICAN: 29 ML/MIN/1.73M2
GFR NON-AFRICAN AMERICAN: 24 ML/MIN/1.73M2
GLUCOSE BLD-MCNC: 114 MG/DL (ref 70–99)
GLUCOSE BLD-MCNC: 119 MG/DL (ref 70–99)
GLUCOSE BLD-MCNC: 156 MG/DL (ref 70–99)
GLUCOSE BLD-MCNC: 182 MG/DL (ref 70–99)
GLUCOSE BLD-MCNC: 186 MG/DL (ref 70–99)
GLUCOSE BLD-MCNC: 87 MG/DL (ref 70–99)
GLUCOSE BLD-MCNC: 97 MG/DL (ref 70–99)
GLUCOSE BLD-MCNC: 97 MG/DL (ref 70–99)
GLUCOSE BLD-MCNC: 98 MG/DL (ref 70–99)
HCO3 ARTERIAL: 24.8 MMOL/L (ref 18–23)
HCT VFR BLD CALC: 27.5 % (ref 42–52)
HEMOGLOBIN: 8.2 GM/DL (ref 13.5–18)
IONIZED CA: 1.07 MMOL/L (ref 1.12–1.32)
MAGNESIUM: 2.4 MG/DL (ref 1.8–2.4)
MCH RBC QN AUTO: 23.8 PG (ref 27–31)
MCHC RBC AUTO-ENTMCNC: 29.8 % (ref 32–36)
MCV RBC AUTO: 79.7 FL (ref 78–100)
METHEMOGLOBIN ARTERIAL: 0.3 %
O2 SATURATION: 92.9 % (ref 96–97)
PCO2 ARTERIAL: 40 MMHG (ref 32–45)
PDW BLD-RTO: 20 % (ref 11.7–14.9)
PH BLOOD: 7.4 (ref 7.34–7.45)
PHOSPHORUS: 6.6 MG/DL (ref 2.5–4.9)
PLATELET # BLD: 107 K/CU MM (ref 140–440)
PMV BLD AUTO: 10.1 FL (ref 7.5–11.1)
PO2 ARTERIAL: 81 MMHG (ref 75–100)
POTASSIUM SERPL-SCNC: 3.9 MMOL/L (ref 3.5–5.1)
RBC # BLD: 3.45 M/CU MM (ref 4.6–6.2)
SODIUM BLD-SCNC: 131 MMOL/L (ref 135–145)
WBC # BLD: 12.5 K/CU MM (ref 4–10.5)

## 2021-02-19 PROCEDURE — 82330 ASSAY OF CALCIUM: CPT

## 2021-02-19 PROCEDURE — 80069 RENAL FUNCTION PANEL: CPT

## 2021-02-19 PROCEDURE — P9045 ALBUMIN (HUMAN), 5%, 250 ML: HCPCS | Performed by: THORACIC SURGERY (CARDIOTHORACIC VASCULAR SURGERY)

## 2021-02-19 PROCEDURE — 85027 COMPLETE CBC AUTOMATED: CPT

## 2021-02-19 PROCEDURE — 6360000002 HC RX W HCPCS: Performed by: THORACIC SURGERY (CARDIOTHORACIC VASCULAR SURGERY)

## 2021-02-19 PROCEDURE — 97530 THERAPEUTIC ACTIVITIES: CPT

## 2021-02-19 PROCEDURE — 2000000000 HC ICU R&B

## 2021-02-19 PROCEDURE — 6360000002 HC RX W HCPCS: Performed by: INTERNAL MEDICINE

## 2021-02-19 PROCEDURE — 99231 SBSQ HOSP IP/OBS SF/LOW 25: CPT | Performed by: INTERNAL MEDICINE

## 2021-02-19 PROCEDURE — 71046 X-RAY EXAM CHEST 2 VIEWS: CPT

## 2021-02-19 PROCEDURE — 94640 AIRWAY INHALATION TREATMENT: CPT

## 2021-02-19 PROCEDURE — 6370000000 HC RX 637 (ALT 250 FOR IP): Performed by: THORACIC SURGERY (CARDIOTHORACIC VASCULAR SURGERY)

## 2021-02-19 PROCEDURE — 6360000002 HC RX W HCPCS: Performed by: PHYSICIAN ASSISTANT

## 2021-02-19 PROCEDURE — 82962 GLUCOSE BLOOD TEST: CPT

## 2021-02-19 PROCEDURE — 6370000000 HC RX 637 (ALT 250 FOR IP): Performed by: SURGERY

## 2021-02-19 PROCEDURE — P9047 ALBUMIN (HUMAN), 25%, 50ML: HCPCS | Performed by: THORACIC SURGERY (CARDIOTHORACIC VASCULAR SURGERY)

## 2021-02-19 PROCEDURE — C9113 INJ PANTOPRAZOLE SODIUM, VIA: HCPCS | Performed by: THORACIC SURGERY (CARDIOTHORACIC VASCULAR SURGERY)

## 2021-02-19 PROCEDURE — 2580000003 HC RX 258: Performed by: PHYSICIAN ASSISTANT

## 2021-02-19 PROCEDURE — 6370000000 HC RX 637 (ALT 250 FOR IP): Performed by: PHYSICIAN ASSISTANT

## 2021-02-19 PROCEDURE — 97116 GAIT TRAINING THERAPY: CPT

## 2021-02-19 PROCEDURE — 82803 BLOOD GASES ANY COMBINATION: CPT

## 2021-02-19 PROCEDURE — 83735 ASSAY OF MAGNESIUM: CPT

## 2021-02-19 PROCEDURE — 94761 N-INVAS EAR/PLS OXIMETRY MLT: CPT

## 2021-02-19 PROCEDURE — 71045 X-RAY EXAM CHEST 1 VIEW: CPT

## 2021-02-19 PROCEDURE — 94668 MNPJ CHEST WALL SBSQ: CPT

## 2021-02-19 RX ORDER — SODIUM CHLORIDE 0.9 % (FLUSH) 0.9 %
10 SYRINGE (ML) INJECTION PRN
Status: DISCONTINUED | OUTPATIENT
Start: 2021-02-19 | End: 2021-02-20 | Stop reason: SDUPTHER

## 2021-02-19 RX ORDER — SODIUM CHLORIDE 0.9 % (FLUSH) 0.9 %
10 SYRINGE (ML) INJECTION EVERY 12 HOURS SCHEDULED
Status: DISCONTINUED | OUTPATIENT
Start: 2021-02-19 | End: 2021-02-20 | Stop reason: SDUPTHER

## 2021-02-19 RX ORDER — MIDODRINE HYDROCHLORIDE 5 MG/1
10 TABLET ORAL
Status: DISCONTINUED | OUTPATIENT
Start: 2021-02-19 | End: 2021-02-23

## 2021-02-19 RX ORDER — ALBUMIN (HUMAN) 12.5 G/50ML
12.5 SOLUTION INTRAVENOUS EVERY 8 HOURS
Status: DISCONTINUED | OUTPATIENT
Start: 2021-02-19 | End: 2021-02-20

## 2021-02-19 RX ORDER — ALBUMIN, HUMAN INJ 5% 5 %
12.5 SOLUTION INTRAVENOUS ONCE
Status: COMPLETED | OUTPATIENT
Start: 2021-02-19 | End: 2021-02-19

## 2021-02-19 RX ORDER — FUROSEMIDE 10 MG/ML
20 INJECTION INTRAMUSCULAR; INTRAVENOUS DAILY
Status: DISCONTINUED | OUTPATIENT
Start: 2021-02-20 | End: 2021-02-20

## 2021-02-19 RX ORDER — DEXTROSE MONOHYDRATE 25 G/50ML
12.5 INJECTION, SOLUTION INTRAVENOUS PRN
Status: DISCONTINUED | OUTPATIENT
Start: 2021-02-19 | End: 2021-02-24 | Stop reason: HOSPADM

## 2021-02-19 RX ORDER — FUROSEMIDE 10 MG/ML
20 INJECTION INTRAMUSCULAR; INTRAVENOUS 2 TIMES DAILY
Status: DISCONTINUED | OUTPATIENT
Start: 2021-02-19 | End: 2021-02-19

## 2021-02-19 RX ORDER — NICOTINE POLACRILEX 4 MG
15 LOZENGE BUCCAL PRN
Status: DISCONTINUED | OUTPATIENT
Start: 2021-02-19 | End: 2021-02-24 | Stop reason: HOSPADM

## 2021-02-19 RX ORDER — INSULIN GLARGINE 100 [IU]/ML
0.15 INJECTION, SOLUTION SUBCUTANEOUS NIGHTLY
Status: DISCONTINUED | OUTPATIENT
Start: 2021-02-19 | End: 2021-02-20

## 2021-02-19 RX ORDER — ALBUTEROL SULFATE 90 UG/1
2 AEROSOL, METERED RESPIRATORY (INHALATION) EVERY 4 HOURS PRN
Status: DISCONTINUED | OUTPATIENT
Start: 2021-02-19 | End: 2021-02-24 | Stop reason: HOSPADM

## 2021-02-19 RX ORDER — LIDOCAINE HYDROCHLORIDE 10 MG/ML
5 INJECTION, SOLUTION EPIDURAL; INFILTRATION; INTRACAUDAL; PERINEURAL ONCE
Status: DISCONTINUED | OUTPATIENT
Start: 2021-02-19 | End: 2021-02-20 | Stop reason: SDUPTHER

## 2021-02-19 RX ORDER — DEXTROSE MONOHYDRATE 50 MG/ML
100 INJECTION, SOLUTION INTRAVENOUS PRN
Status: DISCONTINUED | OUTPATIENT
Start: 2021-02-19 | End: 2021-02-24 | Stop reason: HOSPADM

## 2021-02-19 RX ADMIN — AMIODARONE HYDROCHLORIDE 200 MG: 200 TABLET ORAL at 15:32

## 2021-02-19 RX ADMIN — ALBUMIN (HUMAN) 12.5 G: 0.25 INJECTION, SOLUTION INTRAVENOUS at 18:00

## 2021-02-19 RX ADMIN — INSULIN GLARGINE 16 UNITS: 100 INJECTION, SOLUTION SUBCUTANEOUS at 22:05

## 2021-02-19 RX ADMIN — PANTOPRAZOLE SODIUM 40 MG: 40 INJECTION, POWDER, LYOPHILIZED, FOR SOLUTION INTRAVENOUS at 09:07

## 2021-02-19 RX ADMIN — Medication: at 09:07

## 2021-02-19 RX ADMIN — GUAIFENESIN 600 MG: 600 TABLET, EXTENDED RELEASE ORAL at 09:07

## 2021-02-19 RX ADMIN — Medication 2 PUFF: at 08:13

## 2021-02-19 RX ADMIN — APIXABAN 5 MG: 5 TABLET, FILM COATED ORAL at 21:55

## 2021-02-19 RX ADMIN — ALBUMIN (HUMAN) 12.5 G: 12.5 INJECTION, SOLUTION INTRAVENOUS at 19:06

## 2021-02-19 RX ADMIN — CARVEDILOL 3.12 MG: 3.12 TABLET, FILM COATED ORAL at 09:08

## 2021-02-19 RX ADMIN — ATORVASTATIN CALCIUM 20 MG: 20 TABLET, FILM COATED ORAL at 21:56

## 2021-02-19 RX ADMIN — PANTOPRAZOLE SODIUM 40 MG: 40 INJECTION, POWDER, LYOPHILIZED, FOR SOLUTION INTRAVENOUS at 21:57

## 2021-02-19 RX ADMIN — FUROSEMIDE 20 MG: 10 INJECTION, SOLUTION INTRAVENOUS at 09:07

## 2021-02-19 RX ADMIN — AMIODARONE HYDROCHLORIDE 200 MG: 200 TABLET ORAL at 21:56

## 2021-02-19 RX ADMIN — SODIUM CHLORIDE, PRESERVATIVE FREE 10 ML: 5 INJECTION INTRAVENOUS at 21:57

## 2021-02-19 RX ADMIN — INSULIN LISPRO 1 UNITS: 100 INJECTION, SOLUTION INTRAVENOUS; SUBCUTANEOUS at 18:43

## 2021-02-19 RX ADMIN — INSULIN LISPRO 1 UNITS: 100 INJECTION, SOLUTION INTRAVENOUS; SUBCUTANEOUS at 12:41

## 2021-02-19 RX ADMIN — CALCIUM GLUCONATE 2000 MG: 20 INJECTION, SOLUTION INTRAVENOUS at 09:39

## 2021-02-19 RX ADMIN — ASPIRIN 81 MG: 81 TABLET, FILM COATED ORAL at 09:29

## 2021-02-19 RX ADMIN — MIDODRINE HYDROCHLORIDE 10 MG: 5 TABLET ORAL at 18:48

## 2021-02-19 RX ADMIN — POLYETHYLENE GLYCOL (3350) 17 G: 17 POWDER, FOR SOLUTION ORAL at 09:06

## 2021-02-19 RX ADMIN — APIXABAN 5 MG: 5 TABLET, FILM COATED ORAL at 12:15

## 2021-02-19 RX ADMIN — Medication: at 21:56

## 2021-02-19 RX ADMIN — ALBUMIN (HUMAN) 12.5 G: 12.5 INJECTION, SOLUTION INTRAVENOUS at 15:32

## 2021-02-19 RX ADMIN — AMIODARONE HYDROCHLORIDE 200 MG: 200 TABLET ORAL at 09:07

## 2021-02-19 RX ADMIN — GUAIFENESIN 600 MG: 600 TABLET, EXTENDED RELEASE ORAL at 21:56

## 2021-02-19 RX ADMIN — ALBUTEROL SULFATE 2 PUFF: 90 AEROSOL, METERED RESPIRATORY (INHALATION) at 08:12

## 2021-02-19 RX ADMIN — SODIUM CHLORIDE, PRESERVATIVE FREE 10 ML: 5 INJECTION INTRAVENOUS at 09:07

## 2021-02-19 RX ADMIN — MULTIPLE VITAMINS W/ MINERALS TAB 1 TABLET: TAB at 09:08

## 2021-02-19 ASSESSMENT — PAIN SCALES - GENERAL: PAINLEVEL_OUTOF10: 0

## 2021-02-19 ASSESSMENT — PAIN DESCRIPTION - PROGRESSION
CLINICAL_PROGRESSION: NOT CHANGED
CLINICAL_PROGRESSION: NOT CHANGED

## 2021-02-19 ASSESSMENT — PAIN DESCRIPTION - DESCRIPTORS: DESCRIPTORS: DISCOMFORT

## 2021-02-19 ASSESSMENT — PAIN DESCRIPTION - ONSET: ONSET: ON-GOING

## 2021-02-19 ASSESSMENT — PAIN DESCRIPTION - FREQUENCY: FREQUENCY: INTERMITTENT

## 2021-02-19 NOTE — PROGRESS NOTES
Patient walking in [place]  With PT, RN.  is going to  Have a  BM. Rushing to get back to  Room. Tanmay Patel is hurry to get to toilet and sit down. Cental line was aroun walker and pulled out. Digital pressure  To site. Site cleaned with betadine and Occlusive drsg applied.   [place] Worthys on  Unit, informed

## 2021-02-19 NOTE — PROGRESS NOTES
Admit Date:  2/16/2021    Admission diagnosis / Complaint :  sP avr      Subjective:  Mr. Yesica Gibson  Is feeling better and feels he is recovering      Objective:   BP (!) 81/53   Pulse 60   Temp 98.2 °F (36.8 °C) (Oral)   Resp 21   Ht 5' 10\" (1.778 m)   Wt 229 lb 11.5 oz (104.2 kg)   SpO2 97%   BMI 32.96 kg/m²       Intake/Output Summary (Last 24 hours) at 2/19/2021 1518  Last data filed at 2/19/2021 5064  Gross per 24 hour   Intake 1541.65 ml   Output 1390 ml   Net 151.65 ml       TELEMETRY: ectopic rhythm and intermittent ventricular pacing      has a past medical history of Arrhythmia, Arthritis, Atrial fibrillation ([address] CAD (coronary artery disease), Chronic kidney disease, stage III (moderate), Diabetes mellitus ([address] Diabetic neuropathy associated with type 2 diabetes mellitus ([address] Erythropoietin deficiency anemia, Gout, H/O 24 hour EKG monitoring, H/O cardiovascular stress test, H/O echocardiogram, H/O right and left heart catheterization, H/O transesophageal echocardiography (MABLE) for monitoring, History of blood transfusion, History of transesophageal echocardiography (MABLE), [place] (hard of hearing), Hx of Doppler echocardiogram, Hyperlipidemia, Hypertension, Other disorders of kidney and ureter, Pacemaker, Pneumonia, Sleep apnea, Type II or unspecified type diabetes mellitus with other specified manifestations, uncontrolled, and Venous hypertension, chronic, with ulcer ([address] has a past surgical history that includes joint replacement (2004); pacemaker placement; Coronary angioplasty with stent (2014); vascular surgery (2012); Cardioversion (12/14); other surgical history (Right, 12/02/2017); other surgical history (09/17/2020); Upper gastrointestinal endoscopy (N/A, 9/17/2020); Colonoscopy (2011); Colonoscopy (N/A, 11/19/2019); Colonoscopy (09/30/2020); Colonoscopy (N/A, 9/30/2020); Cholecystectomy (2017);  Carotid endarterectomy (Left, 1/29/2021); and Coronary artery bypass graft (N/A, 2/16/2021).   Physical Exam:  General:  Awake, alert, NAD  Skin:  Warm and dry  Neck:  JVD none  Chest:  Basilar rales  Cardiovascular:  RRR S1S2, grade 2/6 systolic murmur  Abdomen:  Soft nontender  Extremities:   edema    Medications:    furosemide  20 mg Intravenous BID    insulin glargine  0.15 Units/kg Subcutaneous Nightly    insulin lispro  0-6 Units Subcutaneous TID WC    insulin lispro  0-3 Units Subcutaneous Nightly    apixaban  5 mg Oral BID    lidocaine PF  5 mL Intradermal Once    sodium chloride flush  10 mL Intravenous 2 times per day    albumin human  12.5 g Intravenous Once    albumin human  12.5 g Intravenous Q8H    guaiFENesin  600 mg Oral BID    pantoprazole  40 mg Intravenous BID    sodium chloride flush  10 mL Intravenous 2 times per day    aspirin  81 mg Oral Daily    amiodarone  200 mg Oral TID    mupirocin   Nasal BID    therapeutic multivitamin-minerals  1 tablet Oral Daily with breakfast    polyethylene glycol  17 g Oral Daily    carvedilol  3.125 mg Oral BID    atorvastatin  20 mg Oral Nightly    [START ON 2/20/2021] amiodarone  200 mg Oral Daily      dextrose      sodium chloride 10 mL/hr at 02/17/21 2236    norepinephrine Stopped (02/17/21 2200)    EPINEPHrine infusion Stopped (02/17/21 0423)    nitroGLYCERIN      vasopressin (Septic Shock) infusion Stopped (02/18/21 0930)    DOBUTamine 2.5 mcg/kg/min (02/19/21 0605)     glucose, dextrose, glucagon (rDNA), dextrose, albuterol sulfate HFA, ipratropium, sodium chloride flush, sodium chloride, phenol, sodium chloride flush, ondansetron, acetaminophen, HYDROcodone 5 mg - acetaminophen **OR** HYDROcodone 5 mg - acetaminophen, hydrALAZINE, metoprolol, bisacodyl, fleet, potassium chloride, magnesium sulfate, calcium gluconate, albumin human, norepinephrine, EPINEPHrine infusion, nitroGLYCERIN    Lab Data:  CBC:   Recent Labs     02/17/21 2015 02/17/21 2015 02/18/21  0410 02/18/21  0410 02/18/21  0719 02/18/21  1118 02/19/21  0545   WBC 15.6*  --  14.5*  --   --   --  12.5*   HGB 8.3*   < > 8.3*  9.3*   < > 7.6* 9.0* 8.2*   HCT 29.3*   < > 28.4*  27.0*   < > 22.0* 26.0* 27.5*   MCV 83.7  --  82.3  --   --   --  79.7   *  --  91*  --   --   --  107*    < > = values in this interval not displayed. BMP:   Recent Labs     02/18/21  0025 02/18/21  0025 02/18/21  0326 02/18/21  0410 02/18/21  0410 02/18/21  0719 02/18/21  1118 02/19/21  0545   *   < > 133* 133*  131*   < > 135* 130* 131*   K 5.3*   < > 4.8* 5.0  5.0*   < > 4.0 4.5 3.9   CL 96*  --   --  97*  --   --   --  94*   CO2 25   < > 25 25  --   --   --  26   PHOS  --   --   --  7.3*  --   --   --  6.6*   BUN 52*  --   --  55*  --   --   --  64*   CREATININE 2.6*  --  2.8* 2.5*  --   --   --  2.6*    < > = values in this interval not displayed. LIVER PROFILE:   Recent Labs     02/18/21 0025   *   *   BILIDIR 0.2   BILITOT 0.3   ALKPHOS 42     PT/INR: No results for input(s): PROTIME, INR in the last 72 hours. APTT: No results for input(s): APTT in the last 72 hours. BNP:  No results for input(s): BNP in the last 72 hours. TROPONIN: @TROPONINI:3@      Assessment:      Noncapturing atrial lead - malfunctioning atrial lead  Aortic stenosis s/p AVR  Status post maze procedure  Status post CABG    Patient is recovering well  Currently in ectopic rhythm with  intermittent ventricular paced beats  Patient is high risk of going back into atrial fibrillation without the atrial lead  Patient is still recovering from surgery. Patient remains on low-dose dobutamine drip  Could consider atrial pacemaker lead when patient is more stable.     I will follow as needed please call me with questions      Owen Bonilla MD, 2/19/2021 3:18 PM     Please note this report has been partially produced using speech recognition software and may contain errors related to that system including errors in grammar, punctuation, and spelling, as well as words and phrases that may be inappropriate. If there are any questions or concerns please feel free to contact the dictating provider for clarification.

## 2021-02-19 NOTE — PROGRESS NOTES
Physical Therapy    Physical Therapy Treatment Note  Name: Ria Rasheed MRN: [id number] :   1950   Date:  2021   Admission Date: 2021 Room:  -A   Restrictions/Precautions:        Sternal  Communication with other providers:  Blanca Minor states pt is ok to see for therapy, she shlomo help me get his up and walk him  Subjective:  Patient states:  He feels like he has a lot of gas  Pain:   Location, Type, Intensity (0/10 to 10/10):  No c/o  Objective:    Observation:  Pt was sitting up in the bed  Treatment, including education/measures:  Vital Signs: resting  HR: 70, B/P 118/64, O2 97% on room air  Transfers with line management of [place], IV's , O2  Supine to sit :min A and VC's for sternal precautions  Scooting :mod A of 1 and VC's for sternal precautions  Rolling :mod a of 1  Sit to stand :min a of 2 and VC's for sternal precautions  Stand to sit :min A of 2 and VC's for sternal precautions  Pt needed extra VC's for removing arms from CW before sitting  Gait:  Pt amb with CW for 110 ft with min to mod A assist for line management  Pt needed VC's for posture, pathway and PLB  Pt states he has to go to the bathroom now and started to increase his pace. Pt became incontinent of stool, pt came back to his room and amb to the bathroom. When pt was trans to the toilet his art line was removed. I held pressure at the sight as nursing gathered needed equipment. Pt trans sit to stand as above pt amb with CW  For 5 ft to the chair. Trans as above with min a for arms removed from the CW.   Nursing took B/P 62/50, repeated B/P 73/50  Nursing needed to restart IV for meds. No ex at this time  Safety  Patient left safely in the chair, with call light/phone in reach with nursing in the room. Gait belt and mask were used for transfers and gait.   Assessment / Impression:    Patient's tolerance of treatment:  Good, pt is impulsive with delayed responses with directions, pt appears groggy   Adverse Reaction: decreased B/P  Significant change in status and impact:  activity intol  Barriers to improvement:  Endurance, B/P  Plan for Next Session:    Will cont to progress ex's and gt per cardiac protocol and educate pt on sternal precautions, S & S of ex intolerance, purpose of ex's, progression of ex's and gt at home. Time in:  1035  Time out:  1120  Timed treatment minutes:  45  Total treatment time:  39  Previously filed items:  Social/Functional History  Lives With: [employer])  Type of Home: House  Home Layout: One level(+ basement but does not need to go down there)  Home Access: Stairs to enter with rails  Entrance Stairs - Number of Steps: 2  Bathroom Shower/Tub: Walk-in shower  Bathroom Toilet: Handicap height  Bathroom Equipment: Grab bars in shower, Built-in shower seat  Home Equipment: Cane  ADL Assistance: Independent  Homemaking Assistance: Independent  Ambulation Assistance: Independent  Transfer Assistance: Independent  Active : Yes  Occupation: Retired  Type of occupation: [employer]  Leisure & Hobbies: golf  Short term goals  Time Frame for Short term goals: 1 week  Short term goal 1: Pt will perform sit><supine Gaby  Short term goal 2: Pt will transition sit><stand SBA  Short term goal 3: Pt will transfer to bed/recliner SBA  Short term goal 4: Pt will ambulate 200ft with LRAD SBA  Short term goal 5: Pt will ascend/descend 2 steps, 1-2 rails CGA   Electronically signed by:     Winifred Lal PTA  2/19/2021, 11:06 AM

## 2021-02-19 NOTE — PROGRESS NOTES
Spoke with pt and his wife and both decided against [facility] and has [facility] number if they change their minds. They have used [facility] in the past and stated they don't think they will need them. Yael Gaffney updated.

## 2021-02-19 NOTE — ANESTHESIA POSTPROCEDURE EVALUATION
Department of Anesthesiology  Postprocedure Note    Patient: Marco Antonio Cline  MRN: [id number]  YOB: 1950  Date of evaluation: 2/19/2021  Time:  9:07 AM     Procedure Summary     Date: 02/16/21 Room / Location: [address] [place] [address]    Anesthesia Start: 8619 Anesthesia Stop: 1114    Procedure: CABG CORONARY ARTERY BYPASS X2 WITH LIMA, AORTIC VALVE REPLACEMENT AND AORTIC ROOT REPAIR, INTRAOPERATIVE MABLE, INDUCED HYPOTHERMIA, LEFT LEG ENDOVEIN HARVEST, LEFT ATRIAL CLIP, AND CRYO PROCEDURE (N/A Chest) Diagnosis: (CAD)    Surgeons: Ryan Hays MD Responsible Provider: Keri Emmanuel MD    Anesthesia Type: general ASA Status: 4          Anesthesia Type: general    Sanju Phase I: Sanju Score: 5    Sanju Phase II:      Last vitals: Reviewed and per EMR flowsheets.        Anesthesia Post Evaluation    Patient location during evaluation: PACU  Patient participation: complete - patient participated  Level of consciousness: awake and alert  Pain score: 4  Airway patency: patent  Nausea & Vomiting: no nausea and no vomiting  Complications: no  Cardiovascular status: blood pressure returned to baseline  Respiratory status: acceptable  Hydration status: euvolemic

## 2021-02-19 NOTE — CARE COORDINATION
Discussed PT/OT rec of home w/HHC. Patient had no objections to [facility]; voiced no preference when [facility] providers discussed. Contacted [facility] liakendall Aguilar RN. Justus Villalpando RN    1100 Received VM from Santino Nielsen; patient and spouse do not want [facility] upon discharge. Patien twill be dischrage to home WITHOUT [facility] AS PATIENT AND SPOUSE REFUSE.

## 2021-02-19 NOTE — PROGRESS NOTES
Art line removed from left radial.  Pressure held x 10 mins, before bleeding completely stopped.   Pressure drsg  applied

## 2021-02-19 NOTE — PROGRESS NOTES
PATIENT NAME: Samreen Gomez    TODAY'S DATE: 02/19/21    SUBJECTIVE:    Pt is POD # 3 s/p CABG x 2, AVR, maze. Pt continues to improve. He denies SOB. He does feel bloated. OBJECTIVE:   VITALS:    Vitals:    02/19/21 0814   BP:    Pulse:    Resp: 14   Temp:    SpO2: 96%     INTAKE/OUTPUT:    Date 02/19/21 0000 - 02/19/21 2359   Shift 8344-5650 9041-7793 6096-3740 24 Hour Total   INTAKE   I.V.(mL/kg/hr) 277.7(0.3)   277.7   Shift Total(mL/kg) 277.7(2.7)   277.7(2.7)   OUTPUT   Urine(mL/kg/hr) 555(0.7)   555   Shift Total(mL/kg) 555(5.3)   555(5.3)   Weight (kg) 104.2 104.2 104.2 104.2      Patient Vitals for the past 96 hrs (Last 3 readings):   Weight   02/19/21 0600 229 lb 11.5 oz (104.2 kg)   02/18/21 0600 231 lb 4.2 oz (104.9 kg)   02/17/21 0600 227 lb 15.3 oz (103.4 kg)       EXAM:  Blood pressure 118/64, pulse 70, temperature 98.2 °F (36.8 °C), temperature source Oral, resp. rate 14, height 5' 10\" (1.778 m), weight 229 lb 11.5 oz (104.2 kg), SpO2 96 %. General appearance: No apparent distress, appears stated age and cooperative. Skin: unremarkable  HEENT Normocephalic, atraumatic without obvious deformity. Neck: Supple, Trachea midline   Lungs: Good respiratory effort. CTA, bilaterally  Heart: Regular rate/ rhythm inc c/d/i  Abdomen: Soft, non-tender or non-distended   Extremities: 1+ edema warm well perfused  Neurologic: Alert, grossly intact  Mental status: normal affect      Data:  CBC:   Recent Labs     02/17/21 2015 02/17/21 2015 02/18/21  0410 02/18/21  0410 02/18/21  0719 02/18/21  1118 02/19/21  0545   WBC 15.6*  --  14.5*  --   --   --  12.5*   HGB 8.3*   < > 8.3*  9.3*   < > 7.6* 9.0* 8.2*   HCT 29.3*   < > 28.4*  27.0*   < > 22.0* 26.0* 27.5*   *  --  91*  --   --   --  107*    < > = values in this interval not displayed.      BMP:    Recent Labs     02/18/21  0025 02/18/21  0025 02/18/21  0326 02/18/21  0410 02/18/21  0410 02/18/21  0719 02/18/21  1118 02/19/21  0545   * < > 133* 133*  131*   < > 135* 130* 131*   K 5.3*   < > 4.8* 5.0  5.0*   < > 4.0 4.5 3.9   CL 96*  --   --  97*  --   --   --  94*   CO2 25   < > 25 25  --   --   --  26   BUN 52*  --   --  55*  --   --   --  64*   CREATININE 2.6*  --  2.8* 2.5*  --   --   --  2.6*   GLUCOSE 113*  --   --  114*  --   --   --  87    < > = values in this interval not displayed. Hepatic:   Recent Labs     02/18/21  0025   *   *   BILITOT 0.3   ALKPHOS 42     Mag:      Recent Labs     02/17/21  0450 02/18/21  0410 02/19/21  0545   MG 2.6* 2.4 2.4      Phos:     Recent Labs     02/18/21  0410 02/19/21  0545   PHOS 7.3* 6.6*      INR:   Recent Labs     02/16/21  1400   INR 1.25       Radiology Review:  CXR  Impression:     1. Lines and tubes as described.    2. Basilar atelectasis with small effusions.  Atelectasis in the left base   has improved since the prior study       ASSESSMENT AND PLAN:    Patient Active Problem List   Diagnosis    Type 2 diabetes mellitus without complication, without long-term current use of insulin (HCC)    Ulcer of other part of lower limb    Venous hypertension, chronic, with ulcer (HCC)    Ulcer of other part of foot    Pneumonia    Atrial fibrillation (HCC)    Sinus pause    PAF [facility]paroxysmal atrial fibrillation) [facility]    DM (diabetes mellitus) ([facility]    DMITRIY on CPAP    Hyperlipidemia    Status post incision and drainage    CKD (chronic kidney disease) stage 3, GFR 30-59 ml/min (HCC)    Hyperpotassemia    Arthritis    PVD (peripheral vascular disease) ([facility])    Hematoma    Cardiac pacemaker in situ    Coronary artery stenosis    Essential hypertension    Gout    Diabetic neuropathy associated with type 2 diabetes mellitus ([facility]    Adenomatous polyp of sigmoid colon    Iron deficiency anemia due to chronic blood loss    Erythropoietin deficiency anemia    VHD (valvular heart disease)    Abnormal fractional flow reserve (FFR) on cardiac catheterization    Carotid stenosis, left    Aortic stenosis, severe    CAD in native artery    Displacement of atrial pacemaker leads    Pacemaker lead malfunction       S/P CABG x 2, AVR, ascending aortic repair, maze     Cardio: stable, continue dobutamine 2.5 today and plan to DC tomorrow am. Will start on AC once pacing wires are out. Pulm: stable on RA, encourage IS  GI: suppository today  Renal: creatinine stable 2.6, adequate UOP, lasix 20 BID  Tubes/Lines: DC pacing wires, art line.    Wound: stable     Ledy Duke PA-C

## 2021-02-19 NOTE — FLOWSHEET NOTE
Dr. Jim Landon notified of patient with CVC accidentally pulled out during walk this morning. Lamin Cartagena RN started x1 peripheral IV access. Dobutamine infusing @ ordered, see eMAR. Per JOLIE Severino RN, when Dobumatmine was off due to no IV access, BP did drop to 33T to 11X systolic. Telephone orders received to give 5% Albumin 250 ml IV x1 now and then Albumin 25% IV every 8 hours x3 doses. Dr. Jim Landon orders second peripheral IV to be started now. Dr. Jim Landon states no PICC line or central line to be started as long as BP is stable. If BP becomes unstable, RN to notify CT surgery. Lamin Cartagena RN aware. PICC team called that Dr. Jim Landon defers PICC at this time. Per PICC team, patient not candidate for a midline due to Dobutamine infusion. Dr. Leonard Marroquin does okay a PICC if it should become needed, aware of CRE level.      Shirley Milan RN 3:17 PM

## 2021-02-19 NOTE — PROGRESS NOTES
Nephrology Progress Note  2/19/2021 4:52 PM  Subjective: Interval History: Erasmo Lan is a 66 y.o. male  More awake and weak with + uop      Data:   Scheduled Meds:   furosemide  20 mg Intravenous BID    insulin glargine  0.15 Units/kg Subcutaneous Nightly    insulin lispro  0-6 Units Subcutaneous TID WC    insulin lispro  0-3 Units Subcutaneous Nightly    apixaban  5 mg Oral BID    lidocaine PF  5 mL Intradermal Once    sodium chloride flush  10 mL Intravenous 2 times per day    albumin human  12.5 g Intravenous Q8H    guaiFENesin  600 mg Oral BID    pantoprazole  40 mg Intravenous BID    sodium chloride flush  10 mL Intravenous 2 times per day    aspirin  81 mg Oral Daily    amiodarone  200 mg Oral TID    mupirocin   Nasal BID    therapeutic multivitamin-minerals  1 tablet Oral Daily with breakfast    polyethylene glycol  17 g Oral Daily    carvedilol  3.125 mg Oral BID    atorvastatin  20 mg Oral Nightly    [START ON 2/20/2021] amiodarone  200 mg Oral Daily     Continuous Infusions:   dextrose      sodium chloride 10 mL/hr at 02/17/21 2236    norepinephrine Stopped (02/17/21 2200)    EPINEPHrine infusion Stopped (02/17/21 0423)    nitroGLYCERIN      vasopressin (Septic Shock) infusion Stopped (02/18/21 0930)    DOBUTamine 2.5 mcg/kg/min (02/19/21 0605)           CBC:   Recent Labs     02/17/21 2015 02/17/21  2015 02/18/21  0410 02/18/21  0410 02/18/21  0719 02/18/21  1118 02/19/21  0545   WBC 15.6*  --  14.5*  --   --   --  12.5*   HGB 8.3*   < > 8.3*  9.3*   < > 7.6* 9.0* 8.2*   *  --  91*  --   --   --  107*    < > = values in this interval not displayed.      BMP:    Recent Labs     02/18/21  0025 02/18/21  0025 02/18/21  0326 02/18/21  0410 02/18/21  0410 02/18/21  0719 02/18/21  1118 02/19/21  0545   *   < > 133* 133*  131*   < > 135* 130* 131*   K 5.3*   < > 4.8* 5.0  5.0*   < > 4.0 4.5 3.9   CL 96*  --   --  97*  --   --   --  94*   CO2 25   < > 25 25 --   --   --  26   BUN 52*  --   --  55*  --   --   --  64*   CREATININE 2.6*  --  2.8* 2.5*  --   --   --  2.6*   GLUCOSE 113*  --   --  114*  --   --   --  87    < > = values in this interval not displayed. Renal Labs  Albumin:    Lab Results   Component Value Date    LABALBU 3.1 02/19/2021     Calcium:    Lab Results   Component Value Date    CALCIUM 8.4 02/19/2021     Phosphorus:    Lab Results   Component Value Date    PHOS 6.6 02/19/2021     U/A:    Lab Results   Component Value Date    NITRU NEGATIVE 02/12/2021    NITRU Negative 11/24/2020    COLORU YELLOW 02/12/2021    PHUR 6.5 11/24/2020    LABCAST NONE SEEN 06/15/2016    LABCAST NONE SEEN 06/15/2016    WBCUA 1 02/12/2021    RBCUA 2 02/12/2021    MUCUS RARE 02/12/2021    TRICHOMONAS NONE SEEN 02/12/2021    BACTERIA NEGATIVE 02/12/2021    CLARITYU CLEAR 02/12/2021    SPECGRAV 1.012 02/12/2021    UROBILINOGEN NEGATIVE 02/12/2021    BILIRUBINUR NEGATIVE 02/12/2021    BLOODU SMALL 02/12/2021    GLUCOSEU 500 11/24/2020    KETUA NEGATIVE 02/12/2021           Objective:   I/O: 02/18 0701 - 02/19 0700  In: 1761.7 [P.O.:1040; I.V.:721.7]  Out: 2200 [Urine:1990]  Vitals: BP (!) 81/53   Pulse 60   Temp 98.2 °F (36.8 °C) (Oral)   Resp 21   Ht 5' 10\" (1.778 m)   Wt 229 lb 11.5 oz (104.2 kg)   SpO2 97%   BMI 32.96 kg/m²   General appearance: awake weak  HEENT: Head: Normal, normocephalic, atraumatic.   Neck: supple, symmetrical, trachea midline  Lungs: diminished breath sounds bilaterally  Heart: S1, S2 normal sp cabg  Abdomen: abnormal findings:  soft nt  Extremities: edema + improved   Neurologic: Mental status: alertness: awake        Assessment and Plan:      IMP:  1 arf from atn on ckd 3 creat 1.6  2 cad sp cabg  3 sp carotid surgery left  4 hypotension  5 anemia    Plan     1 renal slight increase with diuresis and on low dose lasix  2 ok for picc for pressor  3 stable post cabg chest tube out  4 bp low monitor  5 hb low stable  Will follow and monitor  Can plan dc nik Thomas MD

## 2021-02-20 ENCOUNTER — APPOINTMENT (OUTPATIENT)
Dept: GENERAL RADIOLOGY | Age: 71
DRG: 219 | End: 2021-02-20
Attending: THORACIC SURGERY (CARDIOTHORACIC VASCULAR SURGERY)
Payer: MEDICARE

## 2021-02-20 LAB
ABO/RH: NORMAL
ALBUMIN SERPL-MCNC: 3.8 GM/DL (ref 3.4–5)
ANION GAP SERPL CALCULATED.3IONS-SCNC: 16 MMOL/L (ref 4–16)
ANTIBODY SCREEN: NEGATIVE
BUN BLDV-MCNC: 70 MG/DL (ref 6–23)
CALCIUM IONIZED: 3.56 MG/DL (ref 4.48–5.28)
CALCIUM SERPL-MCNC: 8.3 MG/DL (ref 8.3–10.6)
CHLORIDE BLD-SCNC: 92 MMOL/L (ref 99–110)
CO2: 22 MMOL/L (ref 21–32)
COMMENT: NORMAL
COMPONENT: NORMAL
COMPONENT: NORMAL
CREAT SERPL-MCNC: 2.5 MG/DL (ref 0.9–1.3)
CROSSMATCH RESULT: NORMAL
CROSSMATCH RESULT: NORMAL
CULTURE: NORMAL
ESTIMATED AVERAGE GLUCOSE: 128 MG/DL
GFR AFRICAN AMERICAN: 31 ML/MIN/1.73M2
GFR NON-AFRICAN AMERICAN: 25 ML/MIN/1.73M2
GLUCOSE BLD-MCNC: 150 MG/DL (ref 70–99)
GLUCOSE BLD-MCNC: 207 MG/DL (ref 70–99)
GLUCOSE BLD-MCNC: 215 MG/DL (ref 70–99)
GLUCOSE BLD-MCNC: 54 MG/DL (ref 70–99)
GLUCOSE BLD-MCNC: 74 MG/DL (ref 70–99)
GRAM SMEAR: NORMAL
HBA1C MFR BLD: 6.1 % (ref 4.2–6.3)
HCT VFR BLD CALC: 27.7 % (ref 42–52)
HEMOGLOBIN: 8 GM/DL (ref 13.5–18)
IONIZED CA: 0.89 MMOL/L (ref 1.12–1.32)
Lab: NORMAL
MAGNESIUM: 2.8 MG/DL (ref 1.8–2.4)
MCH RBC QN AUTO: 23.4 PG (ref 27–31)
MCHC RBC AUTO-ENTMCNC: 28.9 % (ref 32–36)
MCV RBC AUTO: 81 FL (ref 78–100)
PDW BLD-RTO: 19.4 % (ref 11.7–14.9)
PHOSPHORUS: 5.3 MG/DL (ref 2.5–4.9)
PLATELET # BLD: 161 K/CU MM (ref 140–440)
PMV BLD AUTO: 11.3 FL (ref 7.5–11.1)
POTASSIUM SERPL-SCNC: 4.4 MMOL/L (ref 3.5–5.1)
RBC # BLD: 3.42 M/CU MM (ref 4.6–6.2)
SODIUM BLD-SCNC: 130 MMOL/L (ref 135–145)
SPECIMEN: NORMAL
STATUS: NORMAL
STATUS: NORMAL
THERMAL AMPLITUDE STUDY: NORMAL
TRANSFUSION STATUS: NORMAL
TRANSFUSION STATUS: NORMAL
UNIT DIVISION: 0
UNIT DIVISION: 0
UNIT NUMBER: NORMAL
UNIT NUMBER: NORMAL
WBC # BLD: 7.3 K/CU MM (ref 4–10.5)

## 2021-02-20 PROCEDURE — 83036 HEMOGLOBIN GLYCOSYLATED A1C: CPT

## 2021-02-20 PROCEDURE — 71045 X-RAY EXAM CHEST 1 VIEW: CPT

## 2021-02-20 PROCEDURE — 2500000003 HC RX 250 WO HCPCS: Performed by: INTERNAL MEDICINE

## 2021-02-20 PROCEDURE — 6370000000 HC RX 637 (ALT 250 FOR IP): Performed by: THORACIC SURGERY (CARDIOTHORACIC VASCULAR SURGERY)

## 2021-02-20 PROCEDURE — 6370000000 HC RX 637 (ALT 250 FOR IP): Performed by: PHYSICIAN ASSISTANT

## 2021-02-20 PROCEDURE — 36569 INSJ PICC 5 YR+ W/O IMAGING: CPT

## 2021-02-20 PROCEDURE — P9047 ALBUMIN (HUMAN), 25%, 50ML: HCPCS | Performed by: THORACIC SURGERY (CARDIOTHORACIC VASCULAR SURGERY)

## 2021-02-20 PROCEDURE — 6360000002 HC RX W HCPCS: Performed by: SURGERY

## 2021-02-20 PROCEDURE — 6360000002 HC RX W HCPCS: Performed by: INTERNAL MEDICINE

## 2021-02-20 PROCEDURE — 97116 GAIT TRAINING THERAPY: CPT

## 2021-02-20 PROCEDURE — 6370000000 HC RX 637 (ALT 250 FOR IP): Performed by: INTERNAL MEDICINE

## 2021-02-20 PROCEDURE — 94761 N-INVAS EAR/PLS OXIMETRY MLT: CPT

## 2021-02-20 PROCEDURE — C1751 CATH, INF, PER/CENT/MIDLINE: HCPCS

## 2021-02-20 PROCEDURE — 6360000002 HC RX W HCPCS: Performed by: THORACIC SURGERY (CARDIOTHORACIC VASCULAR SURGERY)

## 2021-02-20 PROCEDURE — 97530 THERAPEUTIC ACTIVITIES: CPT

## 2021-02-20 PROCEDURE — 76937 US GUIDE VASCULAR ACCESS: CPT

## 2021-02-20 PROCEDURE — 82330 ASSAY OF CALCIUM: CPT

## 2021-02-20 PROCEDURE — 2580000003 HC RX 258: Performed by: INTERNAL MEDICINE

## 2021-02-20 PROCEDURE — 2580000003 HC RX 258: Performed by: PHYSICIAN ASSISTANT

## 2021-02-20 PROCEDURE — 82962 GLUCOSE BLOOD TEST: CPT

## 2021-02-20 PROCEDURE — 36415 COLL VENOUS BLD VENIPUNCTURE: CPT

## 2021-02-20 PROCEDURE — 85027 COMPLETE CBC AUTOMATED: CPT

## 2021-02-20 PROCEDURE — 83735 ASSAY OF MAGNESIUM: CPT

## 2021-02-20 PROCEDURE — 2580000003 HC RX 258: Performed by: THORACIC SURGERY (CARDIOTHORACIC VASCULAR SURGERY)

## 2021-02-20 PROCEDURE — 2000000000 HC ICU R&B

## 2021-02-20 PROCEDURE — 97110 THERAPEUTIC EXERCISES: CPT

## 2021-02-20 PROCEDURE — 80069 RENAL FUNCTION PANEL: CPT

## 2021-02-20 RX ORDER — PANTOPRAZOLE SODIUM 40 MG/1
40 TABLET, DELAYED RELEASE ORAL
Status: DISCONTINUED | OUTPATIENT
Start: 2021-02-20 | End: 2021-02-24 | Stop reason: HOSPADM

## 2021-02-20 RX ORDER — CALCIUM CARBONATE 200(500)MG
1000 TABLET,CHEWABLE ORAL ONCE
Status: COMPLETED | OUTPATIENT
Start: 2021-02-20 | End: 2021-02-20

## 2021-02-20 RX ORDER — SODIUM CHLORIDE 0.9 % (FLUSH) 0.9 %
10 SYRINGE (ML) INJECTION PRN
Status: DISCONTINUED | OUTPATIENT
Start: 2021-02-20 | End: 2021-02-24 | Stop reason: HOSPADM

## 2021-02-20 RX ORDER — GLIPIZIDE 5 MG/1
5 TABLET ORAL
Status: DISCONTINUED | OUTPATIENT
Start: 2021-02-21 | End: 2021-02-23

## 2021-02-20 RX ORDER — DOBUTAMINE HYDROCHLORIDE 200 MG/100ML
2.5 INJECTION INTRAVENOUS CONTINUOUS
Status: DISCONTINUED | OUTPATIENT
Start: 2021-02-20 | End: 2021-02-24 | Stop reason: HOSPADM

## 2021-02-20 RX ORDER — FUROSEMIDE 10 MG/ML
20 INJECTION INTRAMUSCULAR; INTRAVENOUS PRN
Status: DISCONTINUED | OUTPATIENT
Start: 2021-02-20 | End: 2021-02-24 | Stop reason: HOSPADM

## 2021-02-20 RX ORDER — SODIUM CHLORIDE 0.9 % (FLUSH) 0.9 %
10 SYRINGE (ML) INJECTION EVERY 12 HOURS SCHEDULED
Status: DISCONTINUED | OUTPATIENT
Start: 2021-02-20 | End: 2021-02-24 | Stop reason: HOSPADM

## 2021-02-20 RX ORDER — DOBUTAMINE HYDROCHLORIDE 200 MG/100ML
INJECTION INTRAVENOUS
Status: DISCONTINUED
Start: 2021-02-20 | End: 2021-02-21

## 2021-02-20 RX ORDER — LIDOCAINE HYDROCHLORIDE 10 MG/ML
5 INJECTION, SOLUTION EPIDURAL; INFILTRATION; INTRACAUDAL; PERINEURAL ONCE
Status: COMPLETED | OUTPATIENT
Start: 2021-02-20 | End: 2021-02-20

## 2021-02-20 RX ORDER — LOPERAMIDE HYDROCHLORIDE 2 MG/1
4 CAPSULE ORAL ONCE
Status: DISCONTINUED | OUTPATIENT
Start: 2021-02-20 | End: 2021-02-24 | Stop reason: HOSPADM

## 2021-02-20 RX ORDER — INSULIN GLARGINE 100 [IU]/ML
25 INJECTION, SOLUTION SUBCUTANEOUS NIGHTLY
Status: DISCONTINUED | OUTPATIENT
Start: 2021-02-20 | End: 2021-02-21

## 2021-02-20 RX ORDER — FUROSEMIDE 10 MG/ML
20 INJECTION INTRAMUSCULAR; INTRAVENOUS ONCE
Status: COMPLETED | OUTPATIENT
Start: 2021-02-20 | End: 2021-02-20

## 2021-02-20 RX ADMIN — PANTOPRAZOLE SODIUM 40 MG: 40 TABLET, DELAYED RELEASE ORAL at 08:54

## 2021-02-20 RX ADMIN — Medication: at 08:45

## 2021-02-20 RX ADMIN — FUROSEMIDE 20 MG: 10 INJECTION, SOLUTION INTRAVENOUS at 08:54

## 2021-02-20 RX ADMIN — MIDODRINE HYDROCHLORIDE 10 MG: 5 TABLET ORAL at 11:50

## 2021-02-20 RX ADMIN — ATORVASTATIN CALCIUM 20 MG: 20 TABLET, FILM COATED ORAL at 20:52

## 2021-02-20 RX ADMIN — AMIODARONE HYDROCHLORIDE 200 MG: 200 TABLET ORAL at 08:45

## 2021-02-20 RX ADMIN — DOBUTAMINE HYDROCHLORIDE 2.5 MCG/KG/MIN: 200 INJECTION INTRAVENOUS at 13:20

## 2021-02-20 RX ADMIN — INSULIN LISPRO 2 UNITS: 100 INJECTION, SOLUTION INTRAVENOUS; SUBCUTANEOUS at 16:44

## 2021-02-20 RX ADMIN — LIDOCAINE HYDROCHLORIDE ANHYDROUS 5 ML: 10 INJECTION, SOLUTION INFILTRATION at 15:44

## 2021-02-20 RX ADMIN — GUAIFENESIN 600 MG: 600 TABLET, EXTENDED RELEASE ORAL at 08:45

## 2021-02-20 RX ADMIN — FUROSEMIDE 20 MG: 10 INJECTION, SOLUTION INTRAVENOUS at 18:48

## 2021-02-20 RX ADMIN — SODIUM CHLORIDE, PRESERVATIVE FREE 10 ML: 5 INJECTION INTRAVENOUS at 20:53

## 2021-02-20 RX ADMIN — SODIUM CHLORIDE, PRESERVATIVE FREE 10 ML: 5 INJECTION INTRAVENOUS at 08:49

## 2021-02-20 RX ADMIN — GUAIFENESIN 600 MG: 600 TABLET, EXTENDED RELEASE ORAL at 20:52

## 2021-02-20 RX ADMIN — MIDODRINE HYDROCHLORIDE 10 MG: 5 TABLET ORAL at 08:46

## 2021-02-20 RX ADMIN — MULTIPLE VITAMINS W/ MINERALS TAB 1 TABLET: TAB at 08:46

## 2021-02-20 RX ADMIN — APIXABAN 5 MG: 5 TABLET, FILM COATED ORAL at 21:32

## 2021-02-20 RX ADMIN — CARVEDILOL 3.12 MG: 3.12 TABLET, FILM COATED ORAL at 08:45

## 2021-02-20 RX ADMIN — INSULIN LISPRO 2 UNITS: 100 INJECTION, SOLUTION INTRAVENOUS; SUBCUTANEOUS at 11:51

## 2021-02-20 RX ADMIN — SODIUM CHLORIDE: 4.5 INJECTION, SOLUTION INTRAVENOUS at 00:46

## 2021-02-20 RX ADMIN — ALBUMIN (HUMAN) 12.5 G: 0.25 INJECTION, SOLUTION INTRAVENOUS at 00:46

## 2021-02-20 RX ADMIN — CALCIUM CARBONATE (ANTACID) CHEW TAB 500 MG 1000 MG: 500 CHEW TAB at 18:39

## 2021-02-20 RX ADMIN — APIXABAN 5 MG: 5 TABLET, FILM COATED ORAL at 08:45

## 2021-02-20 RX ADMIN — ASPIRIN 81 MG: 81 TABLET, FILM COATED ORAL at 08:45

## 2021-02-20 RX ADMIN — MIDODRINE HYDROCHLORIDE 10 MG: 5 TABLET ORAL at 16:44

## 2021-02-20 RX ADMIN — Medication: at 20:52

## 2021-02-20 ASSESSMENT — PAIN SCALES - GENERAL
PAINLEVEL_OUTOF10: 0

## 2021-02-20 NOTE — PROGRESS NOTES
Messaged Dr. Vinayak Smith about getting midline. Phlebot was unable to draw CBC, stated she was the only phlebot in house at the present time, no one will be here until 1900, then they will do. Pt was also restarted on IV DOBUTREX GTT.    He stated ok to get midline

## 2021-02-20 NOTE — PROGRESS NOTES
Nephrology Progress Note  2/20/2021 12:10 PM  Subjective: Interval History: Vickey Fenton is a 66 y.o. male  State doing better today and got lightheaded yesterday    Data:   Scheduled Meds:   pantoprazole  40 mg Oral QAM AC    insulin glargine  0.15 Units/kg Subcutaneous Nightly    insulin lispro  0-6 Units Subcutaneous TID WC    insulin lispro  0-3 Units Subcutaneous Nightly    apixaban  5 mg Oral BID    lidocaine PF  5 mL Intradermal Once    sodium chloride flush  10 mL Intravenous 2 times per day    midodrine  10 mg Oral TID WC    guaiFENesin  600 mg Oral BID    sodium chloride flush  10 mL Intravenous 2 times per day    aspirin  81 mg Oral Daily    amiodarone  200 mg Oral TID    mupirocin   Nasal BID    therapeutic multivitamin-minerals  1 tablet Oral Daily with breakfast    polyethylene glycol  17 g Oral Daily    carvedilol  3.125 mg Oral BID    atorvastatin  20 mg Oral Nightly    amiodarone  200 mg Oral Daily     Continuous Infusions:   dextrose      norepinephrine Stopped (02/17/21 2200)    EPINEPHrine infusion Stopped (02/17/21 0423)    nitroGLYCERIN             CBC:   Recent Labs     02/17/21 2015 02/17/21 2015 02/18/21  0410 02/18/21  0410 02/18/21  0719 02/18/21  1118 02/19/21  0545   WBC 15.6*  --  14.5*  --   --   --  12.5*   HGB 8.3*   < > 8.3*  9.3*   < > 7.6* 9.0* 8.2*   *  --  91*  --   --   --  107*    < > = values in this interval not displayed. BMP:    Recent Labs     02/18/21 0410 02/18/21  0410 02/18/21  1118 02/19/21  0545 02/20/21  0525   *  131*   < > 130* 131* 130*   K 5.0  5.0*   < > 4.5 3.9 4.4   CL 97*  --   --  94* 92*   CO2 25  --   --  26 22   BUN 55*  --   --  64* 70*   CREATININE 2.5*  --   --  2.6* 2.5*   GLUCOSE 114*  --   --  87 150*    < > = values in this interval not displayed.        Renal Labs  Albumin:    Lab Results   Component Value Date    LABALBU 3.8 02/20/2021     Calcium:    Lab Results   Component Value Date CALCIUM 8.3 02/20/2021     Phosphorus:    Lab Results   Component Value Date    PHOS 5.3 02/20/2021     U/A:    Lab Results   Component Value Date    NITRU NEGATIVE 02/12/2021    NITRU Negative 11/24/2020    COLORU YELLOW 02/12/2021    PHUR 6.5 11/24/2020    LABCAST NONE SEEN 06/15/2016    LABCAST NONE SEEN 06/15/2016    WBCUA 1 02/12/2021    RBCUA 2 02/12/2021    MUCUS RARE 02/12/2021    TRICHOMONAS NONE SEEN 02/12/2021    BACTERIA NEGATIVE 02/12/2021    CLARITYU CLEAR 02/12/2021    SPECGRAV 1.012 02/12/2021    UROBILINOGEN NEGATIVE 02/12/2021    BILIRUBINUR NEGATIVE 02/12/2021    BLOODU SMALL 02/12/2021    GLUCOSEU 500 11/24/2020    KETUA NEGATIVE 02/12/2021           Objective:   I/O: 02/19 0701 - 02/20 0700  In: 1352.8 [P.O.:240; I.V.:712.8]  Out: 1425 [Urine:1425]  Vitals: BP 92/65   Pulse 60   Temp 98 °F (36.7 °C) (Oral)   Resp 21   Ht 5' 10\" (1.778 m)   Wt 232 lb 9.4 oz (105.5 kg)   SpO2 100%   BMI 33.37 kg/m²   General appearance: awake weak  HEENT: Head: Normal, normocephalic, atraumatic.   Neck: supple, symmetrical, trachea midline  Lungs: diminished breath sounds bilaterally  Heart: S1, S2 normal sp cabg  Abdomen: abnormal findings:  soft nt  Extremities: edema trace   Neurologic: Mental status: alertness: awake        Assessment and Plan:      IMP:  1 arf from atn on ckd 3 creat 1.6  2 cad sp cabg  3 sp carotid surgery left  4 hypotension  5 anemia    Plan     1 arf from atn and hold lasix after today  2 rehab post cabg and improved  3 carotid stable  4 bp low stable with proamatine stopped dobutrex  5 hb montior  Slowly better           Olya Maldonado MD

## 2021-02-20 NOTE — PROGRESS NOTES
Wife called, security code received. Updates given, questions answered.  She verbalized understanding

## 2021-02-20 NOTE — PROGRESS NOTES
Pt's oximeter has not been reading well off/on the whole day. Hands are both cold- warm blankets applied with no success on toes either.  Attempting spot checks to get a reading will ask for disposal from RT

## 2021-02-20 NOTE — PROGRESS NOTES
Messaged Dr. Anita Kc in regards to pt's overall weakness, not tolerating ambulating well, gets very weak and dizzy. IV Dobutrex turned off earlier in the AM. Informed him received x2 doses of midodrine(SBP 's), Pacer set @ 60 with good capture(V. Paced) - urine output 300 total for my shift. Wanted to clarify about having pacer rep come in to increase rate( I know Dr. Anita Kc had spoken with them when he made rounds)  or restart IV DOBUTREX GTT. Dr. Anita Kc stated to watch urine output for now.

## 2021-02-20 NOTE — PROGRESS NOTES
Messaged Dr. Ford Prows again about PICC line(IV RN suggested it would be ok). Aubery Kocher Dr. Vilinda Marshal in regards to PICC vs. Midline. Both stated ok to get either one.

## 2021-02-20 NOTE — PROGRESS NOTES
Consult received for Midline. Spoke to nurse in regards to patient being on Dobutamine. Midline being requested d/t dobutamine and frequent blood draws. Discussed with nurse the benefits of a PICC for both these issues. Kell QURESHI getting PICC approved over midline placement with nephrology.

## 2021-02-20 NOTE — PROGRESS NOTES
PATIENT NAME: Jose M Bucio    TODAY'S DATE: 02/20/21    SUBJECTIVE:    Pt postop CABG  AVR and maze. day #3  Slow steady progress  Still on oxygen  Intermittent atrial arrhythmias     OBJECTIVE:   VITALS:    Vitals:    02/20/21 1000   BP: (!) 100/56   Pulse: 60   Resp: 17   Temp:    SpO2: 99%     INTAKE/OUTPUT:    Date 02/20/21 0000 - 02/20/21 2359   Shift [id number] 1978-2865 1600-2359 24 Hour Total   INTAKE   P.O. 240 500  740   I. V.(mL/kg/hr) 508.8(0.6) 37  545. 8   Shift Total(mL/kg) 748. 8(7.1) 537(5.1)  1285. 8(12.2)   OUTPUT   Urine(mL/kg/hr) 470(0.6)   470   Shift Total(mL/kg) 470(4.5)   470(4.5)   Weight (kg) 105.5 105.5 105.5 105.5      Patient Vitals for the past 96 hrs (Last 3 readings):   Weight   02/20/21 0600 232 lb 9.4 oz (105.5 kg)   02/19/21 0600 229 lb 11.5 oz (104.2 kg)   02/18/21 0600 231 lb 4.2 oz (104.9 kg)       EXAM:  Blood pressure (!) 100/56, pulse 60, temperature 98 °F (36.7 °C), temperature source Oral, resp. rate 17, height 5' 10\" (1.778 m), weight 232 lb 9.4 oz (105.5 kg), SpO2 99 %. General appearance: No apparent distress, appears stated age and cooperative. Skin: unremarkable  HEENT Normocephalic, atraumatic without obvious deformity. Neck: Supple, Trachea midline   Lungs: Good respiratory effort. Clear to auscultation, bilaterally  Heart: Regular rate/ rhythm inc c/d/i  Abdomen: Soft, non-tender or non-distended   Extremities: edema warm well perfused  Neurologic: Alert, grossly intact  Mental status: normal affect      Data:  CBC:   Recent Labs     02/17/21 2015 02/17/21 2015 02/18/21  0410 02/18/21  0410 02/18/21  0719 02/18/21 1118 02/19/21  0545   WBC 15.6*  --  14.5*  --   --   --  12.5*   HGB 8.3*   < > 8.3*  9.3*   < > 7.6* 9.0* 8.2*   HCT 29.3*   < > 28.4*  27.0*   < > 22.0* 26.0* 27.5*   *  --  91*  --   --   --  107*    < > = values in this interval not displayed.      BMP:    Recent Labs     02/18/21  0410 02/18/21  0410 02/18/21  1118 02/19/21  0545 02/20/21  0525   *  131*   < > 130* 131* 130*   K 5.0  5.0*   < > 4.5 3.9 4.4   CL 97*  --   --  94* 92*   CO2 25  --   --  26 22   BUN 55*  --   --  64* 70*   CREATININE 2.5*  --   --  2.6* 2.5*   GLUCOSE 114*  --   --  87 150*    < > = values in this interval not displayed. Hepatic:   Recent Labs     02/18/21  0025   *   *   BILITOT 0.3   ALKPHOS 42     Mag:      Recent Labs     02/18/21  0410 02/19/21  0545 02/20/21  0525   MG 2.4 2.4 2.8*      Phos:     Recent Labs     02/18/21  0410 02/19/21  0545 02/20/21  0525   PHOS 7.3* 6.6* 5.3*      INR: No results for input(s): INR in the last 72 hours. Radiology Review:  CXR      ASSESSMENT AND PLAN:  S/P CABG aVR maze  Patient Active Problem List   Diagnosis    Type 2 diabetes mellitus without complication, without long-term current use of insulin (HCC)    Ulcer of other part of lower limb    Venous hypertension, chronic, with ulcer ([address]    Ulcer of other part of foot    Pneumonia    Atrial fibrillation (HCC)    Sinus pause    PAF (paroxysmal atrial fibrillation) ([address]    DM (diabetes mellitus) ([address]    DMITRIY on CPAP    Hyperlipidemia    Status post incision and drainage    CKD (chronic kidney disease) stage 3, GFR 30-59 ml/min (HCC)    Hyperpotassemia    Arthritis    PVD (peripheral vascular disease) ([address]    Hematoma    Cardiac pacemaker in situ    Coronary artery stenosis    Essential hypertension    Gout    Diabetic neuropathy associated with type 2 diabetes mellitus (HCC)    Adenomatous polyp of sigmoid colon    Iron deficiency anemia due to chronic blood loss    Erythropoietin deficiency anemia    VHD (valvular heart disease)    Abnormal fractional flow reserve (FFR) on cardiac catheterization    Carotid stenosis, left    Aortic stenosis, severe    CAD in native artery    Displacement of atrial pacemaker leads    Pacemaker lead malfunction       Mobilize. Pulmonary toilet.    Monitor rhythm  Monitor intake output  Continue postoperative management per pathway

## 2021-02-20 NOTE — CONSULTS
Endocrinology   Consult Note      Dear Doctor Astrid Butler    Thank You for the Consult     Pt. Was Admitted for : CAD, aortic valve disease/underwent CABG, aortic valve replacement and aortic root repair on 2/16/2020    Reason for Consult: Better control of blood glucose      History Obtained From:  Patient/ EMR       HISTORY OF PRESENT ILLNESS:                The patient is a 66 y.o. male with significant past medical history of atrial fibrillation, CAD, chronic kidney disease, diabetes mellitus with diabetic neuropathy has cardiac pacemaker hypertension hyperlipidemia, peripheral vascular disease stent in both legs old admitted to hospital and underwent CABG and aortic valve replacement repair on 2/16/2021. I was  consulted for better control of blood glucose. ROS:   Pt's ROS done in detail. Abnormal ROS are noted in Medical and Surgical History Section below: Other Medical History:        Diagnosis Date    Arrhythmia     Pacemaker placed aprox 5 years ago for A Fib per patient    Arthritis 12/2013    rt wrist    Atrial fibrillation ([address]     on [place] - Dr. Mary Han CAD (coronary artery disease) 06/18/2014    see dr Yanci Dangelo kidney disease, stage III (moderate) 07/07/2016    Diabetes mellitus ([address]     dx 2004    Diabetic neuropathy associated with type 2 diabetes mellitus ([address] 04/23/2019    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    Gout 04/2019    \"got gout when had pacer put in because they did not give me my medication for gout \"    H/O 24 hour EKG monitoring 10/03/2013    no afib noted, sinsus rhythm    H/O cardiovascular stress test 05/12/2014    cardiolite- mild ischemia RCA EF50%    H/O echocardiogram 12/01/2020    EF 55-60% severe aortic stenosis mild to mod aortic regurg mod to severe tricuspid regurg severe pulm htn significant changes since 2018 echo.      H/O right and left heart catheterization 12/10/2020    DIFFUSE LAD DISEASE, Mild ECA Disease, Severe AS, Milf Pul HTN on RHC.  H/O transesophageal echocardiography (MABLE) for monitoring 08/05/2013    normal LV function and normal LA appendage without any clot    History of blood transfusion 12/2020    d/t anemia    History of transesophageal echocardiography (MABLE) 12/15/2020    Severe aortic stenosis (ANNA by planimetry: 0.778 cm sq). Mild AR.    [place] (hard of hearing)     hearing tonya aides    Hx of Doppler echocardiogram 05/21/2018    EF 50%  Mild LV hypertrophy. Mildly enlarged RA. Mod aortic valve calcification with mod AS. Mitral annular calcification is present. Mild AR, MR and TR. Mild pulmonary htn.     Hyperlipidemia     Hypertension     Follows with PCP & Dr. Irwin Morales Other disorders of kidney and ureter     Pacemaker     [employer], implanted 2014    Pneumonia 12/29/2012    Sleep apnea     dx 2013- has c-pap    Type II or unspecified type diabetes mellitus with other specified manifestations, uncontrolled 12/12/2012    Venous hypertension, chronic, with ulcer ([address] 12/12/2012    resolved     Surgical History:        Procedure Laterality Date    CABG WITH AORTIC VALVE REPLACEMENT N/A 2/16/2021    CABG CORONARY ARTERY BYPASS X2 WITH LIMA, AORTIC VALVE REPLACEMENT AND AORTIC ROOT REPAIR, INTRAOPERATIVE MABLE, INDUCED HYPOTHERMIA, LEFT LEG ENDOVEIN HARVEST, LEFT ATRIAL CLIP, AND CRYO PROCEDURE performed by Rd Jimenez MD at [address]  12/14    at [address] Left 1/29/2021    LEFT CAROTID ENDARTERECTOMY performed by Russel Hernández MD at [id number]  2017    COLONOSCOPY  2011    COLONOSCOPY N/A 11/19/2019    COLONOSCOPY DIAGNOSTIC performed by Omi Morales MD at [facility]  09/30/2020    POSSIBLE CECAL avms, SIGMOID DIVERTICULOSIS, INTERNAL HEMORRHOIDS GRADE 1    COLONOSCOPY N/A 9/30/2020    COLONOSCOPY CONTROL HEMORRHAGE WITH APC performed by Omi Morales MD at [address] [facility] 2014    \"2 stents put in \"   [address]  2004    total left hip    OTHER SURGICAL HISTORY Right 12/02/2017    I&D; evacuation of hematoma right hip    OTHER SURGICAL HISTORY  09/17/2020    enteroscopy    PACEMAKER PLACEMENT      9/18/14 Status post remote permanent pacemaker with atrial lead dislodgement. 7/24/14 PPM Implant    UPPER GASTROINTESTINAL ENDOSCOPY N/A 9/17/2020    ENTEROSCOPY PUSH BIOPSY performed by Aparna Maatmoros MD at [employer]  2012    \"have stents in both legs- done in [place]       Allergies:  Spironolactone and Tape Mich Reyes tape]    Family History:       Problem Relation Age of Onset    High Blood Pressure Mother     Arthritis Mother     Diabetes Mother     Heart Disease Mother     High Blood Pressure Father     Heart Disease Father     Kidney Disease Father      REVIEW OF SYSTEMS:  Review of System Done as noted above     PHYSICAL EXAM:      Vitals:    /69   Pulse 60   Temp 98 °F (36.7 °C) (Oral)   Resp 18   Ht 5' 10\" (1.778 m)   Wt 232 lb 9.4 oz (105.5 kg)   SpO2 98%   BMI 33.37 kg/m²     CONSTITUTIONAL:  awake, alert, cooperative, appears stated age   EYES:  vision intact Fundoscopic Exam not performed   ENT:Normal  NECK:  Supple, No JVD. Thyroid Exam:Normal   LUNGS:  Has Vesicular Breath Sounds,   CARDIOVASCULAR:  Normal apical impulse, regular rate and rhythm, normal S1 and S2, no S3 or S4, and has no  murmur cardiac pacemaker  ABDOMEN:  No scars, normal bowel sounds, soft, non-distended, non-tender, no masses palpated, no hepatolienomegaly  Musculoskeletal: Normal  Extremities: Normal, peripheral pulses normal, , has no edema   NEUROLOGIC:  Awake, alert, oriented to name, place and time. Cranial nerves II-XII are grossly intact. Motor is  intact. Sensory neuropathy.  ,  and gait is normal.    DATA:    CBC:   Recent Labs     02/17/21 2015 02/17/21  2015 02/18/21  0410 02/18/21  0410 02/18/21  0719 02/18/21  1118 02/19/21  5643 WBC 15.6*  --  14.5*  --   --   --  12.5*   HGB 8.3*   < > 8.3*  9.3*   < > 7.6* 9.0* 8.2*   *  --  91*  --   --   --  107*    < > = values in this interval not displayed. CMP:  Recent Labs     02/18/21  0025 02/18/21  0025 02/18/21  0410 02/18/21  0410 02/18/21  1118 02/19/21  0545 02/20/21  0525   *   < > 133*  131*   < > 130* 131* 130*   K 5.3*   < > 5.0  5.0*   < > 4.5 3.9 4.4   CL 96*  --  97*  --   --  94* 92*   CO2 25   < > 25  --   --  26 22   BUN 52*  --  55*  --   --  64* 70*   CREATININE 2.6*   < > 2.5*  --   --  2.6* 2.5*   CALCIUM 9.0  --  8.7  --   --  8.4 8.3   PROT 5.1*  --   --   --   --   --   --    LABALBU 3.1*  --  3.3*  --   --  3.1* 3.8   BILITOT 0.3  --   --   --   --   --   --    ALKPHOS 42  --   --   --   --   --   --    *  --   --   --   --   --   --    *  --   --   --   --   --   --     < > = values in this interval not displayed.      Lipids:   Lab Results   Component Value Date    CHOL 91 12/18/2020    HDL 43 12/18/2020    TRIG 66 12/18/2020     Glucose:   Recent Labs     02/19/21  2204 02/20/21  1149 02/20/21  1642   POCGLU 182* 207* 215*     Hemoglobin A1C:   Lab Results   Component Value Date    LABA1C 5.9 01/21/2021     Free T4:   Lab Results   Component Value Date    T4FREE 1.5 07/07/2020     Free T3: No results found for: FT3  TSH High Sensitivity:   Lab Results   Component Value Date    TSHHS 1.100 12/31/2012       Echocardiogram Limited    Result Date: 2/17/2021  Transthoracic Echocardiography Report (TTE)  Demographics   Patient Name       Jayla TATE    Date of Study       02/17/2021   Date of Birth      1950         Gender              Male   Age                66 year(s)         Race                   Patient Number     [id number]         Room Number         2128   Visit Number       [id number]   Corporate ID       [id number]   Accession Number   [id number]         Sonographer         Kandy Munoz [facility]   Ordering Physician Miguelina Ledesma            Physician           Willem TATE  Procedure Type of Study   TTE procedure:ECHOCARDIOGRAM LIMITED. Procedure Date Date: 2021 Start: 07:34 AM Study Location: Portable Technical Quality: Fair visualization Indications:S/P CABG. Patient Status: Routine Contrast Medium: Bubble Study. Height: 70 inches Weight: 215 pounds BSA: 2.15 m2 BMI: 30.85 kg/m2 HR: 70 bpm BP: 111/56 mmHg  Conclusions   Summary  This is a limited echocardiogram.  Left ventricular systolic function is low normal.  Ejection fraction is visually estimated at 50%. S/p AVR on 2021: mean P mmHg. Mild tricuspid regurgitation is present. RVSP is 44 mmHg. No evidence of any pericardial effusion. Negative bubble study; no indication for PFO.    Signature   ------------------------------------------------------------------  Electronically signed by Benjamín Betancourt MD  (Interpreting physician) on 2021 at 11:44 AM              Scheduled Medicines   Medications:    pantoprazole  40 mg Oral QAM AC    loperamide  4 mg Oral Once    sodium chloride flush  10 mL Intravenous 2 times per day    [START ON 2021] glipiZIDE  5 mg Oral QAM AC    insulin lispro  0-12 Units Subcutaneous TID WC    insulin lispro  0-12 Units Subcutaneous 2 times per day    insulin glargine  25 Units Subcutaneous Nightly    insulin lispro  10 Units Subcutaneous TID WC    apixaban  5 mg Oral BID    midodrine  10 mg Oral TID WC    guaiFENesin  600 mg Oral BID    aspirin  81 mg Oral Daily    mupirocin   Nasal BID    therapeutic multivitamin-minerals  1 tablet Oral Daily with breakfast    polyethylene glycol  17 g Oral Daily    atorvastatin  20 mg Oral Nightly    amiodarone  200 mg Oral Daily      Infusions:    DOBUTamine      DOBUTamine 2.5 mcg/kg/min (21 1320)    dextrose      norepinephrine Stopped (02/17/21 2200)    EPINEPHrine infusion Stopped (02/17/21 0423)    nitroGLYCERIN           IMPRESSION    Patient Active Problem List   Diagnosis    Type 2 diabetes mellitus without complication, without long-term current use of insulin (HCC)    Ulcer of other part of lower limb    Venous hypertension, chronic, with ulcer (HCC)    Ulcer of other part of foot    Pneumonia    Atrial fibrillation (HCC)    Sinus pause    [facility]ox[facility] fibrillation) [facility]    DM (diabetes mellitus) [facility]    DMITRIY on CPAP    Hyperlipidemia    Status post incision and drainage    CKD (chronic kidney disease) stage 3, GFR 30-59 ml/min ([facility])    Hyperpotassemia    Arthritis    PVD (peripheral vascular disease) ([facility])    Hematoma    Cardiac pacemaker in situ    Coronary artery stenosis    Essential hypertension    Gout    Diabetic neuropathy associated with type 2 diabetes mellitus ([facility])    Adenomatous polyp of sigmoid colon    Iron deficiency anemia due to chronic blood loss    Erythropoietin deficiency anemia    VHD (valvular heart disease)    Abnormal fractional flow reserve (FFR) on cardiac catheterization    Carotid stenosis, left    Aortic stenosis, severe    CAD in native artery    Displacement of atrial pacemaker leads    Pacemaker lead malfunction         CABG/aortic valve replacement/repair 2/16/2021      RECOMMENDATIONS:      1. Reviewed POC blood glucose . Labs and X ray results   2. Reviewed Home and Current Medicines   3. Will Start On meal+ Correction bolus Humalog/ Lantus Insulin  regime  4. Monitor Blood glucose frequently   5. Modify  the dose of Insulin  as needed        Will follow with you  Again thank you for sharing pt's care with me.      Truly yours,       Steve Clements MD

## 2021-02-20 NOTE — PROGRESS NOTES
Physical Therapy    Physical Therapy Treatment Note  Name: Erasmo Lan MRN: [id number] :   1950   Date:  2021   Admission Date: 2021 Room:  -A   Restrictions/Precautions:          Communication with other providers:  Per nurse ok to tx and was called to room after pt c/o feeling dizzy and needing to return to room during gt training. Pt's blood pressure before gt was 100/56 and after 92/65. Subjective:  Patient states:  Agreeable to tx  Pain:   Location, Type, Intensity (0/10 to 10/10): Denies having pain at this time  Objective:    Observation:  Alert and oriented to self but reports having trouble remembering date and was not sure he had therapy yesterday. Treatment, including education/measures:  Sit to sup cga and cues  Scooting to [facility] with bed flat and cues for using legs to scoot up in bed with min assist.  Sit<=>stand from chair, commode, bed cga/min assist and cues for sternal precautions. Pt attempting mica care in sitting but unable to get completely clean and then needing mod assist in standing. Pt also dependent to don depends. amb with CW [de-identified]' cga but c/o feeling very dizzy and had to return to room for safety. Safety  Patient left safely in the bed, with call light/phone in reach with alarm applied. Gait belt and mask were used for transfers and gait. Education:  Pt was instructed in Sternal Precautions, Purpose of Exercise Program, Ambar Scale and Signs and Symptoms of Exercise Intolerance per Cardiac Protocol  Pt was instructed in Intermediate Cardiac ex program:  Overhead Side Stretch x 10  Ankle Pumps/ Heel Raises x 10  Marching Seated x 10  Forward Arm Raises x 10  Side Arm Raises x 10  Arm Crosses x 10  SittingTrunkTwist x 10    Safety  Patient left safely in the bed, with call light/phone in reach with alarm applied. Gait belt and mask were used for transfers and gait.   Assessment / Impression:       Patient's tolerance of treatment:  good  Adverse Reaction: na  Significant change in status and impact:  na  Barriers to improvement:  Strength and safety  Plan for Next Session:    Education:  Pt was instructed in Sternal Precautions, Purpose of Exercise Program, Ambar Scale and Signs and Symptoms of Exercise Intolerance per Cardiac Protocol  Time in:  1055  Time out:  1150  Timed treatment minutes:  55  Total treatment time:  54    Previously filed items:  Social/Functional History  Lives With: [employer])  Type of Home: House  Home Layout: One level(+ basement but does not need to go down there)  Home Access: Stairs to enter with rails  Entrance Stairs - Number of Steps: 2  Bathroom Shower/Tub: Walk-in shower  Bathroom Toilet: Handicap height  Bathroom Equipment: Grab bars in shower, Built-in shower seat  Home Equipment: Cane  ADL Assistance: Independent  Homemaking Assistance: Independent  Ambulation Assistance: Independent  Transfer Assistance: Independent  Active : Yes  Occupation: Retired  Type of occupation: Create  Leisure & Hobbies: golf  Short term goals  Time Frame for Short term goals: 1 week  Short term goal 1: Pt will perform sit><supine Gaby  Short term goal 2: Pt will transition sit><stand SBA  Short term goal 3: Pt will transfer to bed/recliner SBA  Short term goal 4: Pt will ambulate 200ft with LRAD SBA  Short term goal 5: Pt will ascend/descend 2 steps, 1-2 rails CGA       Electronically signed by:    Byron Salazar PTA  2/20/2021, 8:26 AM

## 2021-02-20 NOTE — PROGRESS NOTES
Ellsworth catheter removed per protocol/Dr's order- urinal given, mica care done. Pt tolerated well. Applied depends d/t incontinence(at times) of very loose stools. Pt stated it comes on so quick, he cannot place his call button on quick enough to be able to get to the bathroom.

## 2021-02-20 NOTE — FLOWSHEET NOTE
Spoke to wife Mukul Rose) regarding pt. Status. Home care needs, teds, gait belt use at home. Emotional support given. Wife would like Dr Dima Ellis / Dr Willa Ulrich to call to update her tomorrow if possible.

## 2021-02-20 NOTE — PROGRESS NOTES
Up to bathroom prior to walking. C/o dizziness while on toilet. B/P 75/58  Assisted back to chair. Repeat b/p in chair 69/55. Feet elevated . Dr Tracey Silver called, requests  Additional 250 5% albumin be given.

## 2021-02-20 NOTE — CONSULTS
Consult for PICC placement completed. Education regarding insertion of PICC reviewed with patient. Risk/benefit/and alternatives discussed. verbalized understanding. Consent given by patient. Time out done at 1500. PICC line inserted right upper arm  without difficulty per protocol. Pt tolerated well. Good blood return and flushes easily. CXR ordered for PICC tip verification. Nurse aware. Educational booklet left at bedside.   Courtney Miller

## 2021-02-20 NOTE — PROGRESS NOTES
PICC tip in appropriate position per xray reading. See below. Nurse aware OK to use PICC. A right arm PICC catheter has been placed which ends in the distal SVC.

## 2021-02-20 NOTE — PROGRESS NOTES
Dr. Neir Noriega back in unit, discussed plan of care about pt- stated to restart  IV DOBUTREX GTT @ 2.5mcg/kg/min

## 2021-02-21 LAB
ALBUMIN SERPL-MCNC: 3.6 GM/DL (ref 3.4–5)
ANION GAP SERPL CALCULATED.3IONS-SCNC: 13 MMOL/L (ref 4–16)
BUN BLDV-MCNC: 82 MG/DL (ref 6–23)
CALCIUM IONIZED: 4.44 MG/DL (ref 4.48–5.28)
CALCIUM SERPL-MCNC: 8.3 MG/DL (ref 8.3–10.6)
CHLORIDE BLD-SCNC: 90 MMOL/L (ref 99–110)
CO2: 24 MMOL/L (ref 21–32)
CREAT SERPL-MCNC: 2.6 MG/DL (ref 0.9–1.3)
GFR AFRICAN AMERICAN: 29 ML/MIN/1.73M2
GFR NON-AFRICAN AMERICAN: 24 ML/MIN/1.73M2
GLUCOSE BLD-MCNC: 100 MG/DL (ref 70–99)
GLUCOSE BLD-MCNC: 103 MG/DL (ref 70–99)
GLUCOSE BLD-MCNC: 120 MG/DL (ref 70–99)
GLUCOSE BLD-MCNC: 215 MG/DL (ref 70–99)
GLUCOSE BLD-MCNC: 304 MG/DL (ref 70–99)
GLUCOSE BLD-MCNC: 61 MG/DL (ref 70–99)
GLUCOSE BLD-MCNC: 64 MG/DL (ref 70–99)
GLUCOSE BLD-MCNC: 79 MG/DL (ref 70–99)
HCT VFR BLD CALC: 27.5 % (ref 42–52)
HEMOGLOBIN: 8.1 GM/DL (ref 13.5–18)
IONIZED CA: 1.11 MMOL/L (ref 1.12–1.32)
MAGNESIUM: 2.9 MG/DL (ref 1.8–2.4)
MCH RBC QN AUTO: 23.6 PG (ref 27–31)
MCHC RBC AUTO-ENTMCNC: 29.5 % (ref 32–36)
MCV RBC AUTO: 80.2 FL (ref 78–100)
PDW BLD-RTO: 19 % (ref 11.7–14.9)
PHOSPHORUS: 4.9 MG/DL (ref 2.5–4.9)
PLATELET # BLD: 172 K/CU MM (ref 140–440)
PMV BLD AUTO: 10.3 FL (ref 7.5–11.1)
POTASSIUM SERPL-SCNC: 4.2 MMOL/L (ref 3.5–5.1)
RBC # BLD: 3.43 M/CU MM (ref 4.6–6.2)
SODIUM BLD-SCNC: 127 MMOL/L (ref 135–145)
WBC # BLD: 10.6 K/CU MM (ref 4–10.5)

## 2021-02-21 PROCEDURE — 2580000003 HC RX 258: Performed by: INTERNAL MEDICINE

## 2021-02-21 PROCEDURE — 6370000000 HC RX 637 (ALT 250 FOR IP): Performed by: THORACIC SURGERY (CARDIOTHORACIC VASCULAR SURGERY)

## 2021-02-21 PROCEDURE — 6370000000 HC RX 637 (ALT 250 FOR IP): Performed by: INTERNAL MEDICINE

## 2021-02-21 PROCEDURE — 82330 ASSAY OF CALCIUM: CPT

## 2021-02-21 PROCEDURE — 6360000002 HC RX W HCPCS: Performed by: PHYSICIAN ASSISTANT

## 2021-02-21 PROCEDURE — 83735 ASSAY OF MAGNESIUM: CPT

## 2021-02-21 PROCEDURE — 6370000000 HC RX 637 (ALT 250 FOR IP): Performed by: PHYSICIAN ASSISTANT

## 2021-02-21 PROCEDURE — 85027 COMPLETE CBC AUTOMATED: CPT

## 2021-02-21 PROCEDURE — 94150 VITAL CAPACITY TEST: CPT

## 2021-02-21 PROCEDURE — 94761 N-INVAS EAR/PLS OXIMETRY MLT: CPT

## 2021-02-21 PROCEDURE — 6360000002 HC RX W HCPCS: Performed by: INTERNAL MEDICINE

## 2021-02-21 PROCEDURE — 80069 RENAL FUNCTION PANEL: CPT

## 2021-02-21 PROCEDURE — 82962 GLUCOSE BLOOD TEST: CPT

## 2021-02-21 PROCEDURE — 2580000003 HC RX 258: Performed by: SURGERY

## 2021-02-21 PROCEDURE — 2000000000 HC ICU R&B

## 2021-02-21 RX ORDER — INSULIN GLARGINE 100 [IU]/ML
10 INJECTION, SOLUTION SUBCUTANEOUS NIGHTLY
Status: DISCONTINUED | OUTPATIENT
Start: 2021-02-21 | End: 2021-02-23

## 2021-02-21 RX ORDER — ALLOPURINOL 100 MG/1
100 TABLET ORAL DAILY
Status: DISCONTINUED | OUTPATIENT
Start: 2021-02-21 | End: 2021-02-24 | Stop reason: HOSPADM

## 2021-02-21 RX ORDER — FUROSEMIDE 10 MG/ML
20 INJECTION INTRAMUSCULAR; INTRAVENOUS ONCE
Status: COMPLETED | OUTPATIENT
Start: 2021-02-21 | End: 2021-02-21

## 2021-02-21 RX ORDER — GABAPENTIN 100 MG/1
100 CAPSULE ORAL 3 TIMES DAILY
Status: DISCONTINUED | OUTPATIENT
Start: 2021-02-21 | End: 2021-02-24 | Stop reason: HOSPADM

## 2021-02-21 RX ADMIN — ASPIRIN 81 MG: 81 TABLET, FILM COATED ORAL at 08:06

## 2021-02-21 RX ADMIN — FUROSEMIDE 20 MG: 10 INJECTION, SOLUTION INTRAVENOUS at 08:07

## 2021-02-21 RX ADMIN — INSULIN GLARGINE 10 UNITS: 100 INJECTION, SOLUTION SUBCUTANEOUS at 20:07

## 2021-02-21 RX ADMIN — GUAIFENESIN 600 MG: 600 TABLET, EXTENDED RELEASE ORAL at 20:07

## 2021-02-21 RX ADMIN — ALLOPURINOL 100 MG: 100 TABLET ORAL at 14:44

## 2021-02-21 RX ADMIN — APIXABAN 5 MG: 5 TABLET, FILM COATED ORAL at 08:06

## 2021-02-21 RX ADMIN — SODIUM CHLORIDE, PRESERVATIVE FREE 10 ML: 5 INJECTION INTRAVENOUS at 08:07

## 2021-02-21 RX ADMIN — MIDODRINE HYDROCHLORIDE 10 MG: 5 TABLET ORAL at 08:07

## 2021-02-21 RX ADMIN — GABAPENTIN 100 MG: 100 CAPSULE ORAL at 14:45

## 2021-02-21 RX ADMIN — AMIODARONE HYDROCHLORIDE 200 MG: 200 TABLET ORAL at 08:06

## 2021-02-21 RX ADMIN — MULTIPLE VITAMINS W/ MINERALS TAB 1 TABLET: TAB at 08:06

## 2021-02-21 RX ADMIN — PANTOPRAZOLE SODIUM 40 MG: 40 TABLET, DELAYED RELEASE ORAL at 08:06

## 2021-02-21 RX ADMIN — CALCIUM GLUCONATE 2000 MG: 20 INJECTION, SOLUTION INTRAVENOUS at 01:09

## 2021-02-21 RX ADMIN — Medication 15 G: at 18:47

## 2021-02-21 RX ADMIN — ATORVASTATIN CALCIUM 20 MG: 20 TABLET, FILM COATED ORAL at 20:07

## 2021-02-21 RX ADMIN — GUAIFENESIN 600 MG: 600 TABLET, EXTENDED RELEASE ORAL at 08:08

## 2021-02-21 RX ADMIN — DEXTROSE MONOHYDRATE 12.5 G: 25 INJECTION, SOLUTION INTRAVENOUS at 00:10

## 2021-02-21 RX ADMIN — APIXABAN 5 MG: 5 TABLET, FILM COATED ORAL at 20:07

## 2021-02-21 RX ADMIN — GABAPENTIN 100 MG: 100 CAPSULE ORAL at 20:07

## 2021-02-21 ASSESSMENT — PAIN SCALES - GENERAL
PAINLEVEL_OUTOF10: 0

## 2021-02-21 NOTE — FLOWSHEET NOTE
bs 64. 1/2 amp D50 ivp given. Dr. Vicenta Jones called and updated per this rn. Orders given to periodically check blood sugars and to start D5 gtt if blood sugars continue to drop.

## 2021-02-21 NOTE — PROGRESS NOTES
PATIENT NAME: Pierce Roman    TODAY'S DATE: 02/21/21    SUBJECTIVE:    Pt status post CABG aVR #3  Making steady progress from surgery  Oxygen being weaned. OBJECTIVE:   VITALS:    Vitals:    02/21/21 0900   BP: (!) 145/67   Pulse: 66   Resp: 20   Temp:    SpO2: 98%     INTAKE/OUTPUT:    Date 02/21/21 0000 - 02/21/21 2359   Shift 9217-10312 8757-0211 7431-5931 24 Hour Total   INTAKE   P.O. 240   240   Shift Total(mL/kg) 240(2.3)   240(2.3)   OUTPUT   Shift Total(mL/kg)       Weight (kg) 105.8 105.8 105.8 105.8      Patient Vitals for the past 96 hrs (Last 3 readings):   Weight   02/21/21 0551 233 lb 4 oz (105.8 kg)   02/20/21 0600 232 lb 9.4 oz (105.5 kg)   02/19/21 0600 229 lb 11.5 oz (104.2 kg)       EXAM:  Blood pressure (!) 145/67, pulse 66, temperature 97.3 °F (36.3 °C), temperature source Oral, resp. rate 20, height 5' 10\" (1.778 m), weight 233 lb 4 oz (105.8 kg), SpO2 98 %. General appearance: No apparent distress, appears stated age and cooperative. Skin: unremarkable  HEENT Normocephalic, atraumatic without obvious deformity. Neck: Supple, Trachea midline   Lungs: Good respiratory effort. Clear to auscultation, bilaterally  Heart: Regular rate/ rhythm inc c/d/i  Abdomen: Soft, non-tender or non-distended   Extremities: edema warm well perfused  Neurologic: Alert, grossly intact  Mental status: normal affect      Data:  CBC:   Recent Labs     02/19/21  0545 02/20/21  1741 02/21/21  0600   WBC 12.5* 7.3 10.6*   HGB 8.2* 8.0* 8.1*   HCT 27.5* 27.7* 27.5*   * 161 172     BMP:    Recent Labs     02/19/21  0545 02/20/21  0525 02/21/21  0600   * 130* 127*   K 3.9 4.4 4.2   CL 94* 92* 90*   CO2 26 22 24   BUN 64* 70* 82*   CREATININE 2.6* 2.5* 2.6*   GLUCOSE 87 150* 61*     Hepatic: No results for input(s): AST, ALT, ALB, BILITOT, ALKPHOS in the last 72 hours.   Mag:      Recent Labs     02/19/21  0545 02/20/21  0525 02/21/21  0600   MG 2.4 2.8* 2.9*      Phos:     Recent Labs

## 2021-02-21 NOTE — PROGRESS NOTES
Progress Note( Dr. Espinal Settler)  2/21/2021  Subjective:   Admit Date: 2/16/2021  PCP: Dk Pyle MD    Admitted For :CAD, aortic valve disease/underwent CABG, aortic valve replacement and aortic root repair on 2/16/2020    Consulted For: Better control of blood glucose    Interval History: Lower blood glucose early morning hours at night  More ambulatory    Soft chest wall pains,   And SOB . Denies nausea or vomiting. No new bowel or bladder symptoms.        Intake/Output Summary (Last 24 hours) at 2/21/2021 0749  Last data filed at 2/21/2021 0300  Gross per 24 hour   Intake 2540 ml   Output 500 ml   Net 2040 ml       DATA    CBC:   Recent Labs     02/19/21  0545 02/20/21  1741 02/21/21  0600   WBC 12.5* 7.3 10.6*   HGB 8.2* 8.0* 8.1*   * 161 172    CMP:  Recent Labs     02/18/21  1118 02/19/21  0545 02/20/21  0525   * 131* 130*   K 4.5 3.9 4.4   CL  --  94* 92*   CO2  --  26 22   BUN  --  64* 70*   CREATININE  --  2.6* 2.5*   CALCIUM  --  8.4 8.3   LABALBU  --  3.1* 3.8     Lipids:   Lab Results   Component Value Date    CHOL 91 12/18/2020    HDL 43 12/18/2020    TRIG 66 12/18/2020     Glucose:  Recent Labs     02/21/21  0036 02/21/21  0229 02/21/21  0558   POCGLU 120* 100* 79     [id number]:  Lab Results   Component Value Date    LABA1C 6.1 02/20/2021     High Sensitivity TSH:   Lab Results   Component Value Date    TSHHS 1.100 12/31/2012     Free T3: No results found for: FT3  Free T4:  Lab Results   Component Value Date    T4FREE 1.5 07/07/2020       Echocardiogram Limited    Result Date: 2/17/2021  Transthoracic Echocardiography Report (TTE)  Demographics   Patient Name       RASHAD TATE    Date of Study       02/17/2021   Date of Birth      1950         Gender              Male   Age                66 year(s)         Race                   Patient Number     [id number]         Room Number         2128   Visit Number       [id number]   Corporate ID       [id number] Accession Number   [id number]         [address] [facility], [address] [facility]   Ordering Physician Craig Osorio            Physician           Willem TATE  Procedure Type of Study   TTE procedure:ECHOCARDIOGRAM LIMITED. Procedure Date Date: 2021 Start: 07:34 AM Study Location: Portable Technical Quality: Fair visualization Indications:S/P CABG. Patient Status: Routine Contrast Medium: Bubble Study. Height: 70 inches Weight: 215 pounds BSA: 2.15 m2 BMI: 30.85 kg/m2 HR: 70 bpm BP: 111/56 mmHg  Conclusions   Summary  This is a limited echocardiogram.  Left ventricular systolic function is low normal.  Ejection fraction is visually estimated at 50%. S/p AVR on 2021: mean P mmHg. Mild tricuspid regurgitation is present. RVSP is 44 mmHg. No evidence of any pericardial effusion. Negative bubble study; no indication for PFO.    Signature   ------------------------------------------------------------------  Electronically signed by Gordon Nevarez MD  (Interpreting physician) on 2021 at 11:44 AM  -.       Scheduled Medicines   Medications:    furosemide  20 mg Intravenous Once    insulin glargine  10 Units Subcutaneous Nightly    pantoprazole  40 mg Oral QAM AC    loperamide  4 mg Oral Once    sodium chloride flush  10 mL Intravenous 2 times per day    glipiZIDE  5 mg Oral QAM AC    insulin lispro  0-12 Units Subcutaneous TID     insulin lispro  0-12 Units Subcutaneous 2 times per day    apixaban  5 mg Oral BID    midodrine  10 mg Oral TID     guaiFENesin  600 mg Oral BID    aspirin  81 mg Oral Daily    mupirocin   Nasal BID    therapeutic multivitamin-minerals  1 tablet Oral Daily with breakfast    polyethylene glycol  17 g Oral Daily    atorvastatin  20 mg Oral Nightly    amiodarone  200 mg Oral Daily      Infusions:    DOBUTamine 2.5 mcg/kg/min (02/20/21 1320)    dextrose      norepinephrine Stopped (02/17/21 2200)    EPINEPHrine infusion Stopped (02/17/21 4713)    nitroGLYCERIN           Objective:   Vitals: /69   Pulse 61   Temp 97.3 °F (36.3 °C) (Oral)   Resp 15   Ht 5' 10\" (1.778 m)   Wt 233 lb 4 oz (105.8 kg)   SpO2 97%   BMI 33.47 kg/m²   General appearance: alert and cooperative with exam  Neck: no JVD or bruit  Thyroid : Normal lobes   Lungs: Has Vesicular Breath sounds   Heart:  regular rate and rhythm cardiac pacemaker  Abdomen: soft, non-tender; bowel sounds normal; no masses,  no organomegaly  Musculoskeletal: Normal  Extremities: extremities normal, , no edema  Neurologic:  Awake, alert, oriented to name, place and time. Cranial nerves II-XII are grossly intact. Motor is  intact. Sensory is intact. ,  and gait is normal.    Assessment:     Patient Active Problem List:     Type 2 diabetes mellitus without complication, without long-term current use of insulin ([facility])     Ulcer of other part of lower limb     Venous hypertension, chronic, with ulcer ([facility])     Ulcer of other part of foot     Pneumonia     Atrial fibrillation ([facility])     Sinus pause     PAF (paroxysmal atrial fibrillation) ([facility])     DM (diabetes mellitus) ([facility])     DMITRIY on CPAP     Hyperlipidemia     Status post incision and drainage     CKD (chronic kidney disease) stage 3, GFR 30-59 ml/min ([facility])     Hyperpotassemia     Arthritis     PVD (peripheral vascular disease) ([facility])     Hematoma     Cardiac pacemaker in situ     Coronary artery stenosis     Essential hypertension     Gout     Diabetic neuropathy associated with type 2 diabetes mellitus ([facility])     Adenomatous polyp of sigmoid colon     Iron deficiency anemia due to chronic blood loss     Erythropoietin deficiency anemia     VHD (valvular heart disease)     Abnormal fractional flow reserve (FFR) on cardiac catheterization     Carotid stenosis, left     Aortic stenosis, severe     CAD in native artery     Displacement of atrial pacemaker leads     Pacemaker lead malfunction      CABG/aortic valve replacement/repair 2/16/2021    Plan:     1. Reviewed POC blood glucose . Labs and X ray results   2. Reviewed Current Medicines   3. On Correction bolus Humalog/ Basal Lantus Insulin regime+ OHGD  4. Monitor Blood glucose frequently   5. Modified  the dose of Insulin/ other medicines as needed   6. Will follow     .      Zaina Noriega MD

## 2021-02-21 NOTE — PROGRESS NOTES
Nephrology Progress Note  2/21/2021 10:35 AM  Subjective:      Interval History: Elias Ayers is a 66 y.o. male  Appear doing better got picc and on dobutrex and Hr above PM function    Data:   Scheduled Meds:   insulin glargine  10 Units Subcutaneous Nightly    gabapentin  100 mg Oral TID    allopurinol  100 mg Oral Daily    pantoprazole  40 mg Oral QAM AC    loperamide  4 mg Oral Once    sodium chloride flush  10 mL Intravenous 2 times per day    glipiZIDE  5 mg Oral QAM AC    insulin lispro  0-12 Units Subcutaneous TID WC    insulin lispro  0-12 Units Subcutaneous 2 times per day    apixaban  5 mg Oral BID    midodrine  10 mg Oral TID WC    guaiFENesin  600 mg Oral BID    aspirin  81 mg Oral Daily    therapeutic multivitamin-minerals  1 tablet Oral Daily with breakfast    polyethylene glycol  17 g Oral Daily    atorvastatin  20 mg Oral Nightly    amiodarone  200 mg Oral Daily     Continuous Infusions:   DOBUTamine 2.5 mcg/kg/min (02/20/21 1320)    dextrose      norepinephrine Stopped (02/17/21 2200)    EPINEPHrine infusion Stopped (02/17/21 0423)    nitroGLYCERIN             CBC:   Recent Labs     02/19/21  0545 02/20/21  1741 02/21/21  0600   WBC 12.5* 7.3 10.6*   HGB 8.2* 8.0* 8.1*   * 161 172     BMP:    Recent Labs     02/19/21  0545 02/20/21  0525 02/21/21  0600   * 130* 127*   K 3.9 4.4 4.2   CL 94* 92* 90*   CO2 26 22 24   BUN 64* 70* 82*   CREATININE 2.6* 2.5* 2.6*   GLUCOSE 87 150* 61*       Renal Labs  Albumin:    Lab Results   Component Value Date    LABALBU 3.6 02/21/2021     Calcium:    Lab Results   Component Value Date    CALCIUM 8.3 02/21/2021     Phosphorus:    Lab Results   Component Value Date    PHOS 4.9 02/21/2021     U/A:    Lab Results   Component Value Date    NITRU NEGATIVE 02/12/2021    NITRU Negative 11/24/2020    COLORU YELLOW 02/12/2021    PHUR 6.5 11/24/2020    LABCAST NONE SEEN 06/15/2016    LABCAST NONE SEEN 06/15/2016    WBCUA 1 02/12/2021 RBCUA 2 02/12/2021    MUCUS RARE 02/12/2021    TRICHOMONAS NONE SEEN 02/12/2021    BACTERIA NEGATIVE 02/12/2021    CLARITYU CLEAR 02/12/2021    SPECGRAV 1.012 02/12/2021    UROBILINOGEN NEGATIVE 02/12/2021    BILIRUBINUR NEGATIVE 02/12/2021    BLOODU SMALL 02/12/2021    GLUCOSEU 500 11/24/2020    KETUA NEGATIVE 02/12/2021           Objective:   I/O: 02/20 0701 - 02/21 0700  In: 2540 [P.O.:2480; I.V.:60]  Out: 500 [Urine:500]  Vitals: BP (!) 145/67   Pulse 66   Temp 97.3 °F (36.3 °C) (Oral)   Resp 20   Ht 5' 10\" (1.778 m)   Wt 233 lb 4 oz (105.8 kg)   SpO2 98%   BMI 33.47 kg/m²   General appearance: awake weak  HEENT: Head: Normal, normocephalic, atraumatic. Neck: supple, symmetrical, trachea midline  Lungs: diminished breath sounds bilaterally  Heart: S1, S2 normal sp cabg  Abdomen: abnormal findings:  soft nt  Extremities: edema trace   Neurologic: Mental status: alertness: awake        Assessment and Plan:      IMP:  1 arf from atn on ckd 3 creat 1.6  2 cad sp cabg  3 sp carotid surgery left  4 hypotension  5 anemia  6 low na      Plan     1 zaragoza out and renal slight worse. Gave lasix today and do prn.  Monitor  2 cardiac stable post cabg but not able wean dobutrex and plan to adjust PM setting  3 carotid stable  4 bp stable with proamatine and wean dobutrex as able  5 hb low stable  6 restrict water add urea           Abdias Flores MD

## 2021-02-22 ENCOUNTER — ANESTHESIA EVENT (OUTPATIENT)
Dept: OPERATING ROOM | Age: 71
DRG: 219 | End: 2021-02-22
Payer: MEDICARE

## 2021-02-22 LAB
ALBUMIN SERPL-MCNC: 3.4 GM/DL (ref 3.4–5)
ANION GAP SERPL CALCULATED.3IONS-SCNC: 11 MMOL/L (ref 4–16)
BUN BLDV-MCNC: 94 MG/DL (ref 6–23)
CALCIUM SERPL-MCNC: 8 MG/DL (ref 8.3–10.6)
CHLORIDE BLD-SCNC: 94 MMOL/L (ref 99–110)
CO2: 25 MMOL/L (ref 21–32)
CREAT SERPL-MCNC: 2.3 MG/DL (ref 0.9–1.3)
GFR AFRICAN AMERICAN: 34 ML/MIN/1.73M2
GFR NON-AFRICAN AMERICAN: 28 ML/MIN/1.73M2
GLUCOSE BLD-MCNC: 125 MG/DL (ref 70–99)
GLUCOSE BLD-MCNC: 137 MG/DL (ref 70–99)
GLUCOSE BLD-MCNC: 177 MG/DL (ref 70–99)
GLUCOSE BLD-MCNC: 218 MG/DL (ref 70–99)
GLUCOSE BLD-MCNC: 242 MG/DL (ref 70–99)
GLUCOSE BLD-MCNC: 342 MG/DL (ref 70–99)
PHOSPHORUS: 4.2 MG/DL (ref 2.5–4.9)
POTASSIUM SERPL-SCNC: 3.8 MMOL/L (ref 3.5–5.1)
SODIUM BLD-SCNC: 130 MMOL/L (ref 135–145)

## 2021-02-22 PROCEDURE — 97116 GAIT TRAINING THERAPY: CPT

## 2021-02-22 PROCEDURE — 93005 ELECTROCARDIOGRAM TRACING: CPT | Performed by: THORACIC SURGERY (CARDIOTHORACIC VASCULAR SURGERY)

## 2021-02-22 PROCEDURE — 80069 RENAL FUNCTION PANEL: CPT

## 2021-02-22 PROCEDURE — 6370000000 HC RX 637 (ALT 250 FOR IP): Performed by: PHYSICIAN ASSISTANT

## 2021-02-22 PROCEDURE — 6370000000 HC RX 637 (ALT 250 FOR IP): Performed by: THORACIC SURGERY (CARDIOTHORACIC VASCULAR SURGERY)

## 2021-02-22 PROCEDURE — 97110 THERAPEUTIC EXERCISES: CPT

## 2021-02-22 PROCEDURE — 6360000002 HC RX W HCPCS: Performed by: SURGERY

## 2021-02-22 PROCEDURE — 82962 GLUCOSE BLOOD TEST: CPT

## 2021-02-22 PROCEDURE — 6370000000 HC RX 637 (ALT 250 FOR IP): Performed by: INTERNAL MEDICINE

## 2021-02-22 PROCEDURE — 97530 THERAPEUTIC ACTIVITIES: CPT

## 2021-02-22 PROCEDURE — 2580000003 HC RX 258: Performed by: INTERNAL MEDICINE

## 2021-02-22 PROCEDURE — 2000000000 HC ICU R&B

## 2021-02-22 PROCEDURE — 94761 N-INVAS EAR/PLS OXIMETRY MLT: CPT

## 2021-02-22 RX ADMIN — DOBUTAMINE HYDROCHLORIDE 2.5 MCG/KG/MIN: 200 INJECTION INTRAVENOUS at 00:40

## 2021-02-22 RX ADMIN — PANTOPRAZOLE SODIUM 40 MG: 40 TABLET, DELAYED RELEASE ORAL at 13:05

## 2021-02-22 RX ADMIN — GLIPIZIDE 5 MG: 5 TABLET ORAL at 12:59

## 2021-02-22 RX ADMIN — SODIUM CHLORIDE, PRESERVATIVE FREE 10 ML: 5 INJECTION INTRAVENOUS at 09:49

## 2021-02-22 RX ADMIN — Medication 15 G: at 09:43

## 2021-02-22 RX ADMIN — ACETAMINOPHEN 325 MG: 325 TABLET ORAL at 21:31

## 2021-02-22 RX ADMIN — GABAPENTIN 100 MG: 100 CAPSULE ORAL at 09:30

## 2021-02-22 RX ADMIN — HYDROCODONE BITARTRATE AND ACETAMINOPHEN 1 TABLET: 5; 325 TABLET ORAL at 21:31

## 2021-02-22 RX ADMIN — INSULIN GLARGINE 10 UNITS: 100 INJECTION, SOLUTION SUBCUTANEOUS at 23:02

## 2021-02-22 RX ADMIN — GABAPENTIN 100 MG: 100 CAPSULE ORAL at 21:32

## 2021-02-22 RX ADMIN — AMIODARONE HYDROCHLORIDE 200 MG: 200 TABLET ORAL at 09:30

## 2021-02-22 RX ADMIN — ASPIRIN 81 MG: 81 TABLET, FILM COATED ORAL at 09:29

## 2021-02-22 RX ADMIN — GUAIFENESIN 600 MG: 600 TABLET, EXTENDED RELEASE ORAL at 09:29

## 2021-02-22 RX ADMIN — ALLOPURINOL 100 MG: 100 TABLET ORAL at 09:30

## 2021-02-22 RX ADMIN — APIXABAN 5 MG: 5 TABLET, FILM COATED ORAL at 09:34

## 2021-02-22 RX ADMIN — GUAIFENESIN 600 MG: 600 TABLET, EXTENDED RELEASE ORAL at 21:31

## 2021-02-22 RX ADMIN — SODIUM CHLORIDE, PRESERVATIVE FREE 10 ML: 5 INJECTION INTRAVENOUS at 21:31

## 2021-02-22 RX ADMIN — MULTIPLE VITAMINS W/ MINERALS TAB 1 TABLET: TAB at 09:43

## 2021-02-22 RX ADMIN — GABAPENTIN 100 MG: 100 CAPSULE ORAL at 14:48

## 2021-02-22 RX ADMIN — ATORVASTATIN CALCIUM 20 MG: 20 TABLET, FILM COATED ORAL at 21:32

## 2021-02-22 ASSESSMENT — PAIN DESCRIPTION - FREQUENCY: FREQUENCY: INTERMITTENT

## 2021-02-22 ASSESSMENT — PAIN DESCRIPTION - PROGRESSION: CLINICAL_PROGRESSION: GRADUALLY WORSENING

## 2021-02-22 ASSESSMENT — PAIN SCALES - GENERAL
PAINLEVEL_OUTOF10: 0
PAINLEVEL_OUTOF10: 6
PAINLEVEL_OUTOF10: 0

## 2021-02-22 ASSESSMENT — PAIN DESCRIPTION - PAIN TYPE: TYPE: SURGICAL PAIN

## 2021-02-22 ASSESSMENT — PAIN DESCRIPTION - DESCRIPTORS: DESCRIPTORS: ACHING

## 2021-02-22 ASSESSMENT — PAIN DESCRIPTION - ONSET: ONSET: ON-GOING

## 2021-02-22 ASSESSMENT — PAIN - FUNCTIONAL ASSESSMENT: PAIN_FUNCTIONAL_ASSESSMENT: PREVENTS OR INTERFERES SOME ACTIVE ACTIVITIES AND ADLS

## 2021-02-22 ASSESSMENT — PAIN DESCRIPTION - LOCATION: LOCATION: BACK;CHEST

## 2021-02-22 NOTE — PROGRESS NOTES
PATIENT NAME: Neela Espinoza    TODAY'S DATE: 02/22/21    SUBJECTIVE:    Pt is POD # 6 s/p CABG x 2, AVR, maze. Pt with minimal complaints. He has been up walking. He has minimal SOB. OBJECTIVE:   VITALS:    Vitals:    02/22/21 1210   BP:    Pulse: 70   Resp:    Temp:    SpO2:      INTAKE/OUTPUT:    Date 02/22/21 0000 - 02/22/21 2359   Shift 3874-6952 5184-0280 8120-0487 24 Hour Total   INTAKE   P.O.  300  300   I. V.(mL/kg/hr) 75(0.1)   75   Shift Total(mL/kg) 75(0.7) 300(2.8)  375(3.5)   OUTPUT   Urine(mL/kg/hr) 900(1.1) 1600  2500   Shift Total(mL/kg) 900(8.4) 1600(15)  2500(23.4)   Weight (kg) 106.7 106.7 106.7 106.7      Patient Vitals for the past 96 hrs (Last 3 readings):   Weight   02/22/21 0500 235 lb 3.7 oz (106.7 kg)   02/21/21 0551 233 lb 4 oz (105.8 kg)   02/20/21 0600 232 lb 9.4 oz (105.5 kg)       EXAM:  Blood pressure 135/61, pulse 70, temperature 97.8 °F (36.6 °C), temperature source Oral, resp. rate 20, height 5' 10\" (1.778 m), weight 235 lb 3.7 oz (106.7 kg), SpO2 97 %. General appearance: No apparent distress, appears stated age and cooperative. Skin: unremarkable  HEENT Normocephalic, atraumatic without obvious deformity. Neck: Supple, Trachea midline   Lungs: Good respiratory effort. CTA, bilaterally  Heart: Regular rate/ rhythm inc c/d/i  Abdomen: Soft, non-tender or non-distended   Extremities: min edema warm well perfused  Neurologic: Alert, grossly intact  Mental status: normal affect      Data:  CBC:   Recent Labs     02/20/21  1741 02/21/21  0600   WBC 7.3 10.6*   HGB 8.0* 8.1*   HCT 27.7* 27.5*    172     BMP:    Recent Labs     02/20/21  0525 02/21/21  0600 02/22/21  0500   * 127* 130*   K 4.4 4.2 3.8   CL 92* 90* 94*   CO2 22 24 25   BUN 70* 82* 94*   CREATININE 2.5* 2.6* 2.3*   GLUCOSE 150* 61* 137*     Hepatic:   No results for input(s): AST, ALT, ALB, BILITOT, ALKPHOS in the last 72 hours.   Mag:      Recent Labs     02/20/21  0525 02/21/21  0600   MG 2.8* 2.9* Phos:     Recent Labs     02/20/21  0525 02/21/21  0600 02/22/21  0500   PHOS 5.3* 4.9 4.2      INR:   No results for input(s): INR in the last 72 hours. Radiology Review:  CXR  Impression:     Left basilar airspace disease may represent atelectasis and/or pneumonia. ASSESSMENT AND PLAN:    Patient Active Problem List   Diagnosis    Type 2 diabetes mellitus without complication, without long-term current use of insulin ([facility])    Ulcer of other part of lower limb    Venous hypertension, chronic, with ulcer ([address]    Ulcer of other part of foot    Pneumonia    Atrial fibrillation ([facility])    Sinus pause    [facility]    DM (diabetes mellitus) ([address]    DMITRIY on CPAP    Hyperlipidemia    Status post incision and drainage    CKD (chronic kidney disease) stage 3, GFR 30-59 ml/min ([facility])    Hyperpotassemia    Arthritis    PVD (peripheral vascular disease) ([facility]    Hematoma    Cardiac pacemaker in situ    Coronary artery stenosis    Essential hypertension    Gout    Diabetic neuropathy associated with type 2 diabetes mellitus ([facility])    Adenomatous polyp of sigmoid colon    Iron deficiency anemia due to chronic blood loss    Erythropoietin deficiency anemia    VHD (valvular heart disease)    Abnormal fractional flow reserve (FFR) on cardiac catheterization    Carotid stenosis, left    Aortic stenosis, severe    CAD in native artery    Displacement of atrial pacemaker leads    Pacemaker lead malfunction       S/P CABG x 2, AVR, ascending aortic repair, maze     Cardio: stable, continue dobutamine 2.5. to OR tomorrow for atrial PPM lead revision. Pulm: stable on RA, encourage IS  GI: had BM  Renal: creatinine stable 2.3, adequate UOP, BUN up, holding diuretics  Wound: stable     Pt plans to return home on DC.      Sandy Moses PA-C

## 2021-02-22 NOTE — PROGRESS NOTES
Physical Therapy    Physical Therapy Treatment Note  Name: Jarred Talbot MRN: [id number] :   1950   Date:  2021   Admission Date: 2021 Room:  -A   Restrictions/Precautions:        Sternal  Communication with other providers:  Jay Jay Au RN states pt is ok to see for therapy  Subjective:  Patient states:  He is done with the I-Pad and the education, waiting for nursing to help him with his bath  Pain:   Location, Type, Intensity (0/10 to 10/10):  3/10 shoulders and chest  Objective:    Observation:  Pt was sitting up in the chair  Treatment, including education/measures:  Vital Signs  HR: 73, B/P 130/56, O2 97 % on room air  Education:  Pt was instructed in Sternal Precautions, Purpose of Exercise Program, Ambar Scale and Signs and Symptoms of Exercise Intolerance per Cardiac Protocol  Pt was instructed in Intermediate Cardiac ex program:sitting with limited ROM in B SHlders  Overhead Side Stretch x 10  Ankle Pumps/ Heel Raises x 10  Marching Seated x 10  Forward Arm Raises x 10  Side Arm Raises x 10  Arm Crosses x 10  Arm Circles Forwards and Backwards x 10  SittingTrunkTwist x 10  Transfers with line management of IV and tele  Scooting :mod a of 1 and VC's for sternal precautions  Sit to stand :min A  and VC's for sternal precautions  Stand to sit :min A  and VC's for sternal precautions  Gait:  Pt amb with CW for 110 ft with CGA to min A for control of the CW  Pt needed VC's for posture, PLB and pathway  Safety  Patient left safely in the chair, with call light/phone in reach. Gait belt and mask were used for transfers and gait.   Assessment / Impression:    Patient's tolerance of treatment:  Good, able to understand education more clearly today,   patient NPO after midnight for pacemaker a wire reconnect  Adverse Reaction: none  Significant change in status and impact:  none  Barriers to improvement:  Endurance, mentation  Plan for Next Session:    Will cont to progress ex's and gt per cardiac protocol and educate pt on sternal precautions, S & S of ex intolerance, purpose of ex's, progression of ex's and gt at home. Time in:  1045  Time out:  1145  Timed treatment minutes:  60  Total treatment time:  61  Previously filed items:  Social/Functional History  Lives With: [employer])  Type of Home: House  Home Layout: One level(+ basement but does not need to go down there)  Home Access: Stairs to enter with rails  Entrance Stairs - Number of Steps: 2  Bathroom Shower/Tub: Walk-in shower  Bathroom Toilet: Handicap height  Bathroom Equipment: Grab bars in shower, Built-in shower seat  Home Equipment: Cane  ADL Assistance: Independent  Homemaking Assistance: Independent  Ambulation Assistance: Independent  Transfer Assistance: Independent  Active : Yes  Occupation: Retired  Type of occupation: [employer]  Leisure & Hobbies: golf  Short term goals  Time Frame for Short term goals: 1 week  Short term goal 1: Pt will perform sit><supine Gaby  Short term goal 2: Pt will transition sit><stand SBA  Short term goal 3: Pt will transfer to bed/recliner SBA  Short term goal 4: Pt will ambulate 200ft with LRAD SBA  Short term goal 5: Pt will ascend/descend 2 steps, 1-2 rails CGA   Electronically signed by:     Kevin Gabriel PTA  2/22/2021, 11:36 AM

## 2021-02-22 NOTE — PROGRESS NOTES
Nephrology Progress Note  2/22/2021 10:40 AM  Subjective:      Interval History: Farhan Castillo is a 66 y.o. male  Overall improved and for lead humbertoal with dr Pavel Ovalles in am    Data:   Scheduled Meds:   insulin glargine  10 Units Subcutaneous Nightly    gabapentin  100 mg Oral TID    allopurinol  100 mg Oral Daily    urea  15 g Oral Daily    pantoprazole  40 mg Oral QAM AC    loperamide  4 mg Oral Once    sodium chloride flush  10 mL Intravenous 2 times per day    glipiZIDE  5 mg Oral QAM AC    insulin lispro  0-12 Units Subcutaneous TID WC    insulin lispro  0-12 Units Subcutaneous 2 times per day    [Held by provider] apixaban  5 mg Oral BID    midodrine  10 mg Oral TID WC    guaiFENesin  600 mg Oral BID    aspirin  81 mg Oral Daily    therapeutic multivitamin-minerals  1 tablet Oral Daily with breakfast    polyethylene glycol  17 g Oral Daily    atorvastatin  20 mg Oral Nightly    amiodarone  200 mg Oral Daily     Continuous Infusions:   DOBUTamine 2.5 mcg/kg/min (02/22/21 0916)    dextrose      norepinephrine Stopped (02/17/21 2200)    EPINEPHrine infusion Stopped (02/17/21 0423)    nitroGLYCERIN             CBC:   Recent Labs     02/20/21  1741 02/21/21  0600   WBC 7.3 10.6*   HGB 8.0* 8.1*    172     BMP:    Recent Labs     02/20/21  0525 02/21/21  0600 02/22/21  0500   * 127* 130*   K 4.4 4.2 3.8   CL 92* 90* 94*   CO2 22 24 25   BUN 70* 82* 94*   CREATININE 2.5* 2.6* 2.3*   GLUCOSE 150* 61* 137*       Renal Labs  Albumin:    Lab Results   Component Value Date    LABALBU 3.4 02/22/2021     Calcium:    Lab Results   Component Value Date    CALCIUM 8.0 02/22/2021     Phosphorus:    Lab Results   Component Value Date    PHOS 4.2 02/22/2021     U/A:    Lab Results   Component Value Date    NITRU NEGATIVE 02/12/2021    NITRU Negative 11/24/2020    COLORU YELLOW 02/12/2021    PHUR 6.5 11/24/2020    LABCAST NONE SEEN 06/15/2016    LABCAST NONE SEEN 06/15/2016    WBCUA 1 02/12/2021 RBCUA 2 02/12/2021    MUCUS RARE 02/12/2021    TRICHOMONAS NONE SEEN 02/12/2021    BACTERIA NEGATIVE 02/12/2021    CLARITYU CLEAR 02/12/2021    SPECGRAV 1.012 02/12/2021    UROBILINOGEN NEGATIVE 02/12/2021    BILIRUBINUR NEGATIVE 02/12/2021    BLOODU SMALL 02/12/2021    GLUCOSEU 500 11/24/2020    KETUA NEGATIVE 02/12/2021           Objective:   I/O: 02/21 0701 - 02/22 0700  In: 6020 [P.O.:1400; I.V.:169]  Out: 1100 [Urine:1100]  Vitals: BP (!) 132/57   Pulse 74   Temp 97.8 °F (36.6 °C) (Oral)   Resp 15   Ht 5' 10\" (1.778 m)   Wt 235 lb 3.7 oz (106.7 kg)   SpO2 98%   BMI 33.75 kg/m²   General appearance: awake weak  HEENT: Head: Normal, normocephalic, atraumatic.   Neck: supple, symmetrical, trachea midline  Lungs: diminished breath sounds bilaterally  Heart: S1, S2 irregular sp cabg  Abdomen: abnormal findings:  soft nt  Extremities: edema trace   Neurologic: Mental status: alertness: awake        Assessment and Plan:      IMP:  1 arf from atn on ckd 3 creat 1.6  2 cad sp cabg  3 sp carotid surgery left  4 hypotension  5 anemia  6 low na      Plan     1 renal slight better and increase bun from diuretic- not uremic and hold diuretic and fluid today  2 cardiac monitor and follow  3 sp carotid surgery  4 bp stable and improved and wean dobutrex if able and adjust PM with atrial lead in am  5 na better  Overall improved and monitor and felt need rehab and plan for it after wean off Charles Bradford MD

## 2021-02-22 NOTE — PLAN OF CARE
Problem: Falls - Risk of:  Goal: Will remain free from falls  Description: Will remain free from falls  Outcome: Ongoing  Goal: Absence of physical injury  Description: Absence of physical injury  Outcome: Ongoing     Problem: Discharge Planning:  Goal: Discharged to appropriate level of care  Description: Discharged to appropriate level of care  Outcome: Ongoing     Problem:  Activity Intolerance:  Goal: Able to perform prescribed physical activity  Description: Able to perform prescribed physical activity  Outcome: Ongoing  Goal: Ability to tolerate increased activity will improve  Description: Ability to tolerate increased activity will improve  Outcome: Ongoing     Problem: Anxiety:  Goal: Level of anxiety will decrease  Description: Level of anxiety will decrease  Outcome: Ongoing     Problem: Cardiac Output - Decreased:  Goal: Cardiac output within specified parameters  Description: Cardiac output within specified parameters  Outcome: Ongoing  Goal: Hemodynamic stability will improve  Description: Hemodynamic stability will improve  Outcome: Ongoing     Problem: Fluid Volume - Imbalance:  Goal: Ability to achieve a balanced intake and output will improve  Description: Ability to achieve a balanced intake and output will improve  Outcome: Ongoing  Goal: Chest tube drainage is within specified parameters  Description: Chest tube drainage is within specified parameters  Outcome: Ongoing     Problem: Gas Exchange - Impaired:  Goal: Levels of oxygenation will improve  Description: Levels of oxygenation will improve  Outcome: Ongoing  Goal: Ability to maintain adequate ventilation will improve  Description: Ability to maintain adequate ventilation will improve  Outcome: Ongoing     Problem: Pain:  Goal: Pain level will decrease  Description: Pain level will decrease  Outcome: Ongoing  Goal: Control of acute pain  Description: Control of acute pain  Outcome: Ongoing  Goal: Control of chronic pain  Description: Control of chronic pain  Outcome: Ongoing     Problem: Tissue Perfusion - Cardiopulmonary, Altered:  Goal: Absence of angina  Description: Absence of angina  Outcome: Ongoing  Goal: Hemodynamic stability will improve  Description: Hemodynamic stability will improve  Outcome: Ongoing  Goal: Will show no evidence of cardiac arrhythmias  Description: Will show no evidence of cardiac arrhythmias  Outcome: Ongoing     Problem: Tobacco Use:  Goal: Will participate in inpatient tobacco-use cessation counseling  Description: Will participate in inpatient tobacco-use cessation counseling  Outcome: Ongoing

## 2021-02-22 NOTE — PROGRESS NOTES
New verbal order per Dr. Negrito Knapp keep DOBUTamine at 2.5 mcg/kg/min, patient NPO after midnight for pacemaker a wire reconnect. Wife updated over telephone.

## 2021-02-22 NOTE — CARE COORDINATION
Plan remains home with spouse. Spouse is refusing Yamil Hitchcock visits after several attempts to explain need.  Sahra Zuñiga RN

## 2021-02-22 NOTE — PROGRESS NOTES
Ralph Pritchett BSN RN clinical  for [facility] RN students 07-19:00 this date.

## 2021-02-23 ENCOUNTER — APPOINTMENT (OUTPATIENT)
Dept: GENERAL RADIOLOGY | Age: 71
DRG: 219 | End: 2021-02-23
Attending: THORACIC SURGERY (CARDIOTHORACIC VASCULAR SURGERY)
Payer: MEDICARE

## 2021-02-23 ENCOUNTER — ANESTHESIA (OUTPATIENT)
Dept: OPERATING ROOM | Age: 71
DRG: 219 | End: 2021-02-23
Payer: MEDICARE

## 2021-02-23 VITALS — OXYGEN SATURATION: 98 % | DIASTOLIC BLOOD PRESSURE: 76 MMHG | SYSTOLIC BLOOD PRESSURE: 141 MMHG

## 2021-02-23 LAB
ALBUMIN SERPL-MCNC: 3.4 GM/DL (ref 3.4–5)
ANION GAP SERPL CALCULATED.3IONS-SCNC: 11 MMOL/L (ref 4–16)
BUN BLDV-MCNC: 88 MG/DL (ref 6–23)
CALCIUM SERPL-MCNC: 8.1 MG/DL (ref 8.3–10.6)
CHLORIDE BLD-SCNC: 97 MMOL/L (ref 99–110)
CO2: 25 MMOL/L (ref 21–32)
CREAT SERPL-MCNC: 1.9 MG/DL (ref 0.9–1.3)
EKG ATRIAL RATE: 71 BPM
EKG DIAGNOSIS: NORMAL
EKG Q-T INTERVAL: 552 MS
EKG QRS DURATION: 172 MS
EKG QTC CALCULATION (BAZETT): 578 MS
EKG R AXIS: -31 DEGREES
EKG T AXIS: 46 DEGREES
EKG VENTRICULAR RATE: 66 BPM
GFR AFRICAN AMERICAN: 42 ML/MIN/1.73M2
GFR NON-AFRICAN AMERICAN: 35 ML/MIN/1.73M2
GLUCOSE BLD-MCNC: 176 MG/DL (ref 70–99)
GLUCOSE BLD-MCNC: 190 MG/DL (ref 70–99)
GLUCOSE BLD-MCNC: 199 MG/DL (ref 70–99)
GLUCOSE BLD-MCNC: 364 MG/DL (ref 70–99)
HCT VFR BLD CALC: 27 % (ref 42–52)
HEMOGLOBIN: 7.7 GM/DL (ref 13.5–18)
MCH RBC QN AUTO: 23 PG (ref 27–31)
MCHC RBC AUTO-ENTMCNC: 28.5 % (ref 32–36)
MCV RBC AUTO: 80.6 FL (ref 78–100)
PDW BLD-RTO: 19 % (ref 11.7–14.9)
PHOSPHORUS: 3.7 MG/DL (ref 2.5–4.9)
PLATELET # BLD: 246 K/CU MM (ref 140–440)
PMV BLD AUTO: 9.1 FL (ref 7.5–11.1)
POTASSIUM SERPL-SCNC: 4.2 MMOL/L (ref 3.5–5.1)
RBC # BLD: 3.35 M/CU MM (ref 4.6–6.2)
SODIUM BLD-SCNC: 133 MMOL/L (ref 135–145)
WBC # BLD: 9 K/CU MM (ref 4–10.5)

## 2021-02-23 PROCEDURE — C1898 LEAD, PMKR, OTHER THAN TRANS: HCPCS | Performed by: THORACIC SURGERY (CARDIOTHORACIC VASCULAR SURGERY)

## 2021-02-23 PROCEDURE — 94761 N-INVAS EAR/PLS OXIMETRY MLT: CPT

## 2021-02-23 PROCEDURE — 3600000003 HC SURGERY LEVEL 3 BASE: Performed by: THORACIC SURGERY (CARDIOTHORACIC VASCULAR SURGERY)

## 2021-02-23 PROCEDURE — 2000000000 HC ICU R&B

## 2021-02-23 PROCEDURE — 2580000003 HC RX 258: Performed by: INTERNAL MEDICINE

## 2021-02-23 PROCEDURE — 02H63JZ INSERTION OF PACEMAKER LEAD INTO RIGHT ATRIUM, PERCUTANEOUS APPROACH: ICD-10-PCS | Performed by: INTERNAL MEDICINE

## 2021-02-23 PROCEDURE — 93010 ELECTROCARDIOGRAM REPORT: CPT | Performed by: INTERNAL MEDICINE

## 2021-02-23 PROCEDURE — 80069 RENAL FUNCTION PANEL: CPT

## 2021-02-23 PROCEDURE — 85027 COMPLETE CBC AUTOMATED: CPT

## 2021-02-23 PROCEDURE — 3600000013 HC SURGERY LEVEL 3 ADDTL 15MIN: Performed by: THORACIC SURGERY (CARDIOTHORACIC VASCULAR SURGERY)

## 2021-02-23 PROCEDURE — 6360000002 HC RX W HCPCS: Performed by: SURGERY

## 2021-02-23 PROCEDURE — 97110 THERAPEUTIC EXERCISES: CPT

## 2021-02-23 PROCEDURE — 2720000010 HC SURG SUPPLY STERILE: Performed by: THORACIC SURGERY (CARDIOTHORACIC VASCULAR SURGERY)

## 2021-02-23 PROCEDURE — 6370000000 HC RX 637 (ALT 250 FOR IP): Performed by: THORACIC SURGERY (CARDIOTHORACIC VASCULAR SURGERY)

## 2021-02-23 PROCEDURE — 3E0102A INTRODUCTION OF ANTI-INFECTIVE ENVELOPE INTO SUBCUTANEOUS TISSUE, OPEN APPROACH: ICD-10-PCS | Performed by: INTERNAL MEDICINE

## 2021-02-23 PROCEDURE — 2500000003 HC RX 250 WO HCPCS: Performed by: THORACIC SURGERY (CARDIOTHORACIC VASCULAR SURGERY)

## 2021-02-23 PROCEDURE — 82962 GLUCOSE BLOOD TEST: CPT

## 2021-02-23 PROCEDURE — 3700000000 HC ANESTHESIA ATTENDED CARE: Performed by: THORACIC SURGERY (CARDIOTHORACIC VASCULAR SURGERY)

## 2021-02-23 PROCEDURE — 2700000000 HC OXYGEN THERAPY PER DAY

## 2021-02-23 PROCEDURE — 71045 X-RAY EXAM CHEST 1 VIEW: CPT

## 2021-02-23 PROCEDURE — 2780000010 HC IMPLANT OTHER: Performed by: THORACIC SURGERY (CARDIOTHORACIC VASCULAR SURGERY)

## 2021-02-23 PROCEDURE — 6360000002 HC RX W HCPCS: Performed by: NURSE ANESTHETIST, CERTIFIED REGISTERED

## 2021-02-23 PROCEDURE — 87205 SMEAR GRAM STAIN: CPT

## 2021-02-23 PROCEDURE — 2709999900 HC NON-CHARGEABLE SUPPLY: Performed by: THORACIC SURGERY (CARDIOTHORACIC VASCULAR SURGERY)

## 2021-02-23 PROCEDURE — 87070 CULTURE OTHR SPECIMN AEROBIC: CPT

## 2021-02-23 PROCEDURE — 6370000000 HC RX 637 (ALT 250 FOR IP)

## 2021-02-23 PROCEDURE — 6360000002 HC RX W HCPCS: Performed by: THORACIC SURGERY (CARDIOTHORACIC VASCULAR SURGERY)

## 2021-02-23 PROCEDURE — 2580000003 HC RX 258: Performed by: THORACIC SURGERY (CARDIOTHORACIC VASCULAR SURGERY)

## 2021-02-23 PROCEDURE — 02PA0MZ REMOVAL OF CARDIAC LEAD FROM HEART, OPEN APPROACH: ICD-10-PCS | Performed by: INTERNAL MEDICINE

## 2021-02-23 PROCEDURE — 97116 GAIT TRAINING THERAPY: CPT

## 2021-02-23 PROCEDURE — 3700000001 HC ADD 15 MINUTES (ANESTHESIA): Performed by: THORACIC SURGERY (CARDIOTHORACIC VASCULAR SURGERY)

## 2021-02-23 PROCEDURE — 76000 FLUOROSCOPY <1 HR PHYS/QHP: CPT

## 2021-02-23 PROCEDURE — 2580000003 HC RX 258: Performed by: NURSE ANESTHETIST, CERTIFIED REGISTERED

## 2021-02-23 DEVICE — ENVELOPE PACEMKR L W2.9XL3.3IN ABSRB ANTIBACT TYRX: Type: IMPLANTABLE DEVICE | Site: CHEST | Status: FUNCTIONAL

## 2021-02-23 DEVICE — LEAD PACE 6FR L52CM SIL INSUL BPLR PLATINIZED STEROID DXAC: Type: IMPLANTABLE DEVICE | Site: CHEST | Status: FUNCTIONAL

## 2021-02-23 RX ORDER — INSULIN GLARGINE 100 [IU]/ML
15 INJECTION, SOLUTION SUBCUTANEOUS NIGHTLY
Status: DISCONTINUED | OUTPATIENT
Start: 2021-02-23 | End: 2021-02-24

## 2021-02-23 RX ORDER — MIDODRINE HYDROCHLORIDE 5 MG/1
5 TABLET ORAL
Status: DISCONTINUED | OUTPATIENT
Start: 2021-02-23 | End: 2021-02-24 | Stop reason: HOSPADM

## 2021-02-23 RX ORDER — CEFAZOLIN SODIUM 2 G/50ML
SOLUTION INTRAVENOUS PRN
Status: DISCONTINUED | OUTPATIENT
Start: 2021-02-23 | End: 2021-02-23 | Stop reason: SDUPTHER

## 2021-02-23 RX ORDER — PROPOFOL 10 MG/ML
INJECTION, EMULSION INTRAVENOUS PRN
Status: DISCONTINUED | OUTPATIENT
Start: 2021-02-23 | End: 2021-02-23 | Stop reason: SDUPTHER

## 2021-02-23 RX ORDER — SODIUM CHLORIDE 0.9 % (FLUSH) 0.9 %
10 SYRINGE (ML) INJECTION PRN
Status: DISCONTINUED | OUTPATIENT
Start: 2021-02-23 | End: 2021-02-24 | Stop reason: HOSPADM

## 2021-02-23 RX ORDER — PROPOFOL 10 MG/ML
INJECTION, EMULSION INTRAVENOUS CONTINUOUS PRN
Status: DISCONTINUED | OUTPATIENT
Start: 2021-02-23 | End: 2021-02-23 | Stop reason: SDUPTHER

## 2021-02-23 RX ORDER — SODIUM CHLORIDE, SODIUM LACTATE, POTASSIUM CHLORIDE, CALCIUM CHLORIDE 600; 310; 30; 20 MG/100ML; MG/100ML; MG/100ML; MG/100ML
INJECTION, SOLUTION INTRAVENOUS CONTINUOUS PRN
Status: DISCONTINUED | OUTPATIENT
Start: 2021-02-23 | End: 2021-02-23 | Stop reason: SDUPTHER

## 2021-02-23 RX ORDER — GLIPIZIDE 5 MG/1
10 TABLET ORAL
Status: DISCONTINUED | OUTPATIENT
Start: 2021-02-24 | End: 2021-02-24 | Stop reason: HOSPADM

## 2021-02-23 RX ORDER — SODIUM CHLORIDE 0.9 % (FLUSH) 0.9 %
10 SYRINGE (ML) INJECTION EVERY 12 HOURS SCHEDULED
Status: DISCONTINUED | OUTPATIENT
Start: 2021-02-23 | End: 2021-02-24 | Stop reason: HOSPADM

## 2021-02-23 RX ORDER — CEFAZOLIN SODIUM 2 G/50ML
2000 SOLUTION INTRAVENOUS EVERY 8 HOURS
Status: DISCONTINUED | OUTPATIENT
Start: 2021-02-23 | End: 2021-02-23 | Stop reason: CLARIF

## 2021-02-23 RX ORDER — BUPIVACAINE HYDROCHLORIDE AND EPINEPHRINE 5; 5 MG/ML; UG/ML
INJECTION, SOLUTION EPIDURAL; INTRACAUDAL; PERINEURAL CONTINUOUS PRN
Status: COMPLETED | OUTPATIENT
Start: 2021-02-23 | End: 2021-02-23

## 2021-02-23 RX ORDER — FENTANYL CITRATE 50 UG/ML
INJECTION, SOLUTION INTRAMUSCULAR; INTRAVENOUS PRN
Status: DISCONTINUED | OUTPATIENT
Start: 2021-02-23 | End: 2021-02-23 | Stop reason: SDUPTHER

## 2021-02-23 RX ORDER — LIDOCAINE HYDROCHLORIDE 20 MG/ML
INJECTION, SOLUTION INTRAVENOUS PRN
Status: DISCONTINUED | OUTPATIENT
Start: 2021-02-23 | End: 2021-02-23 | Stop reason: SDUPTHER

## 2021-02-23 RX ADMIN — PROPOFOL 50 MCG/KG/MIN: 10 INJECTION, EMULSION INTRAVENOUS at 17:14

## 2021-02-23 RX ADMIN — FENTANYL CITRATE 50 MCG: 50 INJECTION INTRAMUSCULAR; INTRAVENOUS at 17:29

## 2021-02-23 RX ADMIN — FENTANYL CITRATE 50 MCG: 50 INJECTION INTRAMUSCULAR; INTRAVENOUS at 17:12

## 2021-02-23 RX ADMIN — LIDOCAINE HYDROCHLORIDE 80 MG: 20 INJECTION, SOLUTION INTRAVENOUS at 17:12

## 2021-02-23 RX ADMIN — SODIUM CHLORIDE, PRESERVATIVE FREE 10 ML: 5 INJECTION INTRAVENOUS at 21:31

## 2021-02-23 RX ADMIN — DOBUTAMINE HYDROCHLORIDE 2.5 MCG/KG/MIN: 200 INJECTION INTRAVENOUS at 14:02

## 2021-02-23 RX ADMIN — INSULIN GLARGINE 15 UNITS: 100 INJECTION, SOLUTION SUBCUTANEOUS at 21:36

## 2021-02-23 RX ADMIN — ACETAMINOPHEN 650 MG: 325 TABLET ORAL at 18:58

## 2021-02-23 RX ADMIN — ATORVASTATIN CALCIUM 20 MG: 20 TABLET, FILM COATED ORAL at 21:31

## 2021-02-23 RX ADMIN — MIDODRINE HYDROCHLORIDE 5 MG: 5 TABLET ORAL at 18:58

## 2021-02-23 RX ADMIN — GABAPENTIN 100 MG: 100 CAPSULE ORAL at 21:31

## 2021-02-23 RX ADMIN — GUAIFENESIN 600 MG: 600 TABLET, EXTENDED RELEASE ORAL at 21:31

## 2021-02-23 RX ADMIN — CEFAZOLIN SODIUM 2 G: 2 SOLUTION INTRAVENOUS at 17:07

## 2021-02-23 RX ADMIN — PROPOFOL 40 MG: 10 INJECTION, EMULSION INTRAVENOUS at 17:12

## 2021-02-23 RX ADMIN — SODIUM CHLORIDE, PRESERVATIVE FREE 10 ML: 5 INJECTION INTRAVENOUS at 12:35

## 2021-02-23 RX ADMIN — SODIUM CHLORIDE, POTASSIUM CHLORIDE, SODIUM LACTATE AND CALCIUM CHLORIDE: 600; 310; 30; 20 INJECTION, SOLUTION INTRAVENOUS at 17:01

## 2021-02-23 ASSESSMENT — PULMONARY FUNCTION TESTS
PIF_VALUE: 1
PIF_VALUE: 0
PIF_VALUE: 1
PIF_VALUE: 0
PIF_VALUE: 1
PIF_VALUE: 0
PIF_VALUE: 1
PIF_VALUE: 0
PIF_VALUE: 1
PIF_VALUE: 0
PIF_VALUE: 1
PIF_VALUE: 0
PIF_VALUE: 1

## 2021-02-23 ASSESSMENT — PAIN SCALES - GENERAL
PAINLEVEL_OUTOF10: 2
PAINLEVEL_OUTOF10: 4

## 2021-02-23 NOTE — PROGRESS NOTES
PATIENT NAME: Vickey Fenton    TODAY'S DATE: 02/23/21    SUBJECTIVE:    Pt is POD # 7 s/p CABG x 2, AVR, maze. Pt with minimal complaints. OBJECTIVE:   VITALS:    Vitals:    02/23/21 0902   BP: 127/82   Pulse: 75   Resp: 16   Temp:    SpO2:      INTAKE/OUTPUT:    Date 02/23/21 0000 - 02/23/21 2359   Shift 5309-7933 7549-0901 4042-5915 24 Hour Total   INTAKE   Shift Total(mL/kg)       OUTPUT   Urine(mL/kg/hr) 300(0.4)   300   Shift Total(mL/kg) 300(2.8)   300(2.8)   Weight (kg) 106.7 106.7 106.7 106.7      Patient Vitals for the past 96 hrs (Last 3 readings):   Weight   02/22/21 0500 235 lb 3.7 oz (106.7 kg)   02/21/21 0551 233 lb 4 oz (105.8 kg)   02/20/21 0600 232 lb 9.4 oz (105.5 kg)       EXAM:  Blood pressure 127/82, pulse 75, temperature 97.7 °F (36.5 °C), temperature source Oral, resp. rate 16, height 5' 10\" (1.778 m), weight 235 lb 3.7 oz (106.7 kg), SpO2 100 %. General appearance: No apparent distress, appears stated age and cooperative. Skin: unremarkable  HEENT Normocephalic, atraumatic without obvious deformity. Neck: Supple, Trachea midline   Lungs: Good respiratory effort. CTA, bilaterally  Heart: Regular rate/ rhythm inc c/d/i  Abdomen: Soft, non-tender or non-distended   Extremities: min edema warm well perfused  Neurologic: Alert, grossly intact  Mental status: normal affect      Data:  CBC:   Recent Labs     02/20/21  1741 02/21/21  0600   WBC 7.3 10.6*   HGB 8.0* 8.1*   HCT 27.7* 27.5*    172     BMP:    Recent Labs     02/21/21  0600 02/22/21  0500 02/23/21  0515   * 130* 133*   K 4.2 3.8 4.2   CL 90* 94* 97*   CO2 24 25 25   BUN 82* 94* 88*   CREATININE 2.6* 2.3* 1.9*   GLUCOSE 61* 137* 190*     Hepatic:   No results for input(s): AST, ALT, ALB, BILITOT, ALKPHOS in the last 72 hours.   Mag:      Recent Labs     02/21/21  0600   MG 2.9*      Phos:     Recent Labs     02/21/21  0600 02/22/21  0500 02/23/21  0515   PHOS 4.9 4.2 3.7      INR:   No results for input(s): INR in

## 2021-02-23 NOTE — PROGRESS NOTES
Comprehensive Nutrition Assessment    Type and Reason for Visit:  Initial(LOS)    Nutrition Recommendations/Plan:   Advance diet when medically appropriate  Oral nutrition supplements available PRN  Will closely monitor for ability to initiate nutrition  Will monitor nutrition status, poc    Nutrition Assessment:  Pt admitted for CAD in native artery, PMH: DM, PNA, Afib, CAD, gout, Pt is POD # 7 s/p CABG x 2, AVR, maze, pt unavailable at time of visit, pt currently NPO, plan for atrial lead PM placement today, no significant wt loss noted, pt at moderate nutrition risk    Malnutrition Assessment:  Malnutrition Status:  No malnutrition    Context:  Acute Illness       Estimated Daily Nutrient Needs:  Energy (kcal):  6021-2243(EELAKAG St. Jeor w/ stress factor 1.0-1.2); Weight Used for Energy Requirements:  Current     Protein (g):  75-90(1.0-1.2 g/kg); Weight Used for Protein Requirements:  Ideal        Fluid (ml/day):  1800; Method Used for Fluid Requirements:  1 ml/kcal      Nutrition Related Findings:  Na 133, BUN 88, Cr 1.9, Glucose 190      Wounds:  Diabetic Ulcer, Surgical Incision       Current Nutrition Therapies:    Diet NPO, After Midnight Exceptions are: Ice Chips, Sips of Water with Meds    Anthropometric Measures:  · Height: 5' 10\" (177.8 cm)  · Current Body Weight: 235 lb 3.7 oz (106.7 kg)   · Admission Body Weight: 227 lb 15.3 oz (103.4 kg)(suspected measured weight)    · Usual Body Weight: 216 lb 7.9 oz (98.2 kg)((11/30/20) per chart review)     · Ideal Body Weight: 166 lbs; % Ideal Body Weight 141.7 %   · BMI: 33.8  · BMI Categories: Obese Class 1 (BMI 30.0-34. 9)       Nutrition Diagnosis:   · Inadequate oral intake related to acute injury/trauma as evidenced by NPO or clear liquid status due to medical condition    Nutrition Interventions:   Food and/or Nutrient Delivery:  (resume diet when medically appropriate)  Nutrition Education/Counseling:  Education needed   Coordination of Nutrition Care:  Continue to monitor while inpatient    Goals:  pt will have diet advanced in the next 24 hr       Nutrition Monitoring and Evaluation:   Behavioral-Environmental Outcomes:  None Identified   Food/Nutrient Intake Outcomes:  Diet Advancement/Tolerance, Supplement Intake  Physical Signs/Symptoms Outcomes:  Biochemical Data, Weight, GI Status, Hemodynamic Status, Fluid Status or Edema     Discharge Planning:     Too soon to determine     Electronically signed by Ct Roth MS, RD, LD on 2/23/21 at 2:47 PM EST    Contact: [phone]

## 2021-02-23 NOTE — PROGRESS NOTES
patient NPO, for pacemeker surgery in AM, Verbalizes understanding,suly V apced /A  fib, VVS, Patient wearing CPAP from home,

## 2021-02-23 NOTE — ANESTHESIA POSTPROCEDURE EVALUATION
Department of Anesthesiology  Postprocedure Note    Patient: Janine Harris  MRN: [id number]  YOB: 1950  Date of evaluation: 2/23/2021  Time:  6:58 PM     Procedure Summary     Date: 02/23/21 Room / Location: [address] [place] [address]    Anesthesia Start: [facility] Anesthesia Stop: 2097    Procedure: PACEMAKER GENERATOR LEAD REVISION (N/A ) Diagnosis: (POST OP HEART)    Surgeons: Shine Kamara MD Responsible Provider: Rosibel Osborn MD    Anesthesia Type: MAC ASA Status: 4          Anesthesia Type: No value filed. Sanju Phase I: 10  Sanju Phase II:  10    Last vitals: Reviewed and per EMR flowsheets.        Anesthesia Post Evaluation    Patient location during evaluation: bedside  Patient participation: complete - patient participated  Level of consciousness: awake and alert  Pain score: 1  Airway patency: patent  Nausea & Vomiting: no nausea and no vomiting  Complications: no  Cardiovascular status: hemodynamically stable  Respiratory status: acceptable, room air, spontaneous ventilation and nonlabored ventilation  Hydration status: euvolemic

## 2021-02-23 NOTE — PROGRESS NOTES
Progress Note( Dr. Uriel Franco)  2/22/2021  Subjective:   Admit Date: 2/16/2021  PCP: Toni Alba MD    Admitted For :CAD, aortic valve disease/underwent CABG, aortic valve replacement and aortic root repair on 2/16/2020    Consulted For: Better control of blood glucose    Interval History: Lower blood glucose early morning hours at night  More ambulatory    Soft chest wall pains,   And SOB . Denies nausea or vomiting. No new bowel or bladder symptoms.        Intake/Output Summary (Last 24 hours) at 2/22/2021 2319  Last data filed at 2/22/2021 2200  Gross per 24 hour   Intake 1175 ml   Output 3550 ml   Net -2375 ml       DATA    CBC:   Recent Labs     02/20/21  1741 02/21/21  0600   WBC 7.3 10.6*   HGB 8.0* 8.1*    172    CMP:  Recent Labs     02/20/21  0525 02/21/21  0600 02/22/21  0500   * 127* 130*   K 4.4 4.2 3.8   CL 92* 90* 94*   CO2 22 24 25   BUN 70* 82* 94*   CREATININE 2.5* 2.6* 2.3*   CALCIUM 8.3 8.3 8.0*   LABALBU 3.8 3.6 3.4     Lipids:   Lab Results   Component Value Date    CHOL 91 12/18/2020    HDL 43 12/18/2020    TRIG 66 12/18/2020     Glucose:  Recent Labs     02/22/21  0805 02/22/21  1227 02/22/21  1808   POCGLU 125* 218* 242*     [id number]:  Lab Results   Component Value Date    LABA1C 6.1 02/20/2021     High Sensitivity TSH:   Lab Results   Component Value Date    TSHHS 1.100 12/31/2012     Free T3: No results found for: FT3  Free T4:  Lab Results   Component Value Date    T4FREE 1.5 07/07/2020       Echocardiogram Limited    Result Date: 2/17/2021  Transthoracic Echocardiography Report (TTE)  Demographics   Patient Name       RASHAD TATE    Date of Study       02/17/2021   Date of Birth      1950         Gender              Male   Age                66 year(s)         Race                   Patient Number     [id number]         Room Number         2128   Visit Number       [id number]   Corporate ID       [id number]   Accession Number   [id number] Sonographer         Rocael, [facility]   Ordering Physician Enma Tatum            Physician           Willem TATE  Procedure Type of Study   TTE procedure:ECHOCARDIOGRAM LIMITED. Procedure Date Date: 2021 Start: 07:34 AM Study Location: Portable Technical Quality: Fair visualization Indications:S/P CABG. Patient Status: Routine Contrast Medium: Bubble Study. Height: 70 inches Weight: 215 pounds BSA: 2.15 m2 BMI: 30.85 kg/m2 HR: 70 bpm BP: 111/56 mmHg  Conclusions   Summary  This is a limited echocardiogram.  Left ventricular systolic function is low normal.  Ejection fraction is visually estimated at 50%. S/p AVR on 2021: mean P mmHg. Mild tricuspid regurgitation is present. RVSP is 44 mmHg. No evidence of any pericardial effusion. Negative bubble study; no indication for PFO.    Signature   ------------------------------------------------------------------  Electronically signed by Rosa Reyes MD  (Interpreting physician) on 2021 at 11:44 AM  -.       Scheduled Medicines   Medications:    insulin glargine  10 Units Subcutaneous Nightly    gabapentin  100 mg Oral TID    allopurinol  100 mg Oral Daily    urea  15 g Oral Daily    pantoprazole  40 mg Oral QAM AC    loperamide  4 mg Oral Once    sodium chloride flush  10 mL Intravenous 2 times per day    glipiZIDE  5 mg Oral QAM AC    insulin lispro  0-12 Units Subcutaneous TID WC    insulin lispro  0-12 Units Subcutaneous 2 times per day    [Held by provider] apixaban  5 mg Oral BID    midodrine  10 mg Oral TID WC    guaiFENesin  600 mg Oral BID    aspirin  81 mg Oral Daily    therapeutic multivitamin-minerals  1 tablet Oral Daily with breakfast    polyethylene glycol  17 g Oral Daily    atorvastatin  20 mg Oral Nightly    amiodarone  200 mg Oral Daily      Infusions:    DOBUTamine 2.5 mcg/kg/min (02/22/21 0916)    dextrose      norepinephrine Stopped (02/17/21 2200)    EPINEPHrine infusion Stopped (02/17/21 7664)    nitroGLYCERIN           Objective:   Vitals: BP (!) 134/55   Pulse 65   Temp 98.2 °F (36.8 °C) (Oral)   Resp 19   Ht 5' 10\" (1.778 m)   Wt 235 lb 3.7 oz (106.7 kg)   SpO2 100%   BMI 33.75 kg/m²   General appearance: alert and cooperative with exam  Neck: no JVD or bruit  Thyroid : Normal lobes   Lungs: Has Vesicular Breath sounds   Heart:  regular rate and rhythm cardiac pacemaker  Abdomen: soft, non-tender; bowel sounds normal; no masses,  no organomegaly  Musculoskeletal: Normal  Extremities: extremities normal, , no edema  Neurologic:  Awake, alert, oriented to name, place and time. Cranial nerves II-XII are grossly intact. Motor is  intact. Sensory is intact. ,  and gait is normal.    Assessment:     Patient Active Problem List:     Type 2 diabetes mellitus without complication, without long-term current use of insulin ([facility])     Ulcer of other part of lower limb     Venous hypertension, chronic, with ulcer ([facility] [place] [address]     Ulcer of other part of foot     Pneumonia     Atrial fibrillation ([facility])     Sinus pause     PAF (paroxysmal atrial fibrillation) ([facility])     DM (diabetes mellitus) ([facility])     DMITRIY on CPAP     Hyperlipidemia     Status post incision and drainage     CKD (chronic kidney disease) stage 3, GFR 30-59 ml/min ([facility])     Hyperpotassemia     Arthritis     PVD (peripheral vascular disease) ([facility])     Hematoma     Cardiac pacemaker in situ     Coronary artery stenosis     Essential hypertension     Gout     Diabetic neuropathy associated with type 2 diabetes mellitus ([facility])     Adenomatous polyp of sigmoid colon     Iron deficiency anemia due to chronic blood loss     Erythropoietin deficiency anemia     VHD (valvular heart disease)     Abnormal fractional flow reserve (FFR) on cardiac catheterization     Carotid stenosis, left     Aortic stenosis, severe CAD in native artery     Displacement of atrial pacemaker leads     Pacemaker lead malfunction      CABG/aortic valve replacement/repair 2/16/2021    Plan:     1. Reviewed POC blood glucose . Labs and X ray results   2. Reviewed Current Medicines   3. On Correction bolus Humalog/ Basal Lantus Insulin regime+ OHGD  4. Monitor Blood glucose frequently   5. Modified  the dose of Insulin/ other medicines as needed   6. Will follow     .      Michael Jo MD

## 2021-02-23 NOTE — ANESTHESIA PRE PROCEDURE
Department of Anesthesiology  Preprocedure Note       Name:  Samreen Gomez   Age:  66 y.o.  :  1950                                          MRN:  [id number]         Date:  2021      Surgeon: Cristin Guevara):  Kreri Vincent MD    Procedure: Procedure(s):  PACEMAKER GENERATOR LEAD REVISION    Medications prior to admission:   Prior to Admission medications    Medication Sig Start Date End Date Taking? Authorizing Provider   ferrous sulfate (IRON 325) 325 (65 Fe) MG tablet Take 1 tablet by mouth 2 times daily 21  Yes Dory Ramos PA-C   allopurinol (ZYLOPRIM) 100 MG tablet Take 1 tablet by mouth daily  Patient taking differently: Take 100 mg by mouth nightly  21  Yes Dory Ramos PA-C   allopurinol (ZYLOPRIM) 300 MG tablet Take 1 tablet by mouth daily 21  Yes Dory Ramos PA-C   canagliflozin (INVOKANA) 300 MG TABS tablet Take 1 tablet by mouth every morning (before breakfast) 21  Yes Dory Ramos PA-C   gabapentin (NEURONTIN) 600 MG tablet Take 1 tablet by mouth 3 times daily for 270 days.  21 Yes Dory Ramos PA-C   glimepiride (AMARYL) 4 MG tablet Take 1 tablet by mouth daily 21  Yes Dory Ramos PA-C   metFORMIN (GLUCOPHAGE) 1000 MG tablet TAKE 1 TABLET TWICE DAILY  WITH MEALS 21  Yes Dory Ramos PA-C   acetaminophen (AMINOFEN) 325 MG tablet Take 2 tablets by mouth every 6 hours as needed for Pain 21  Yes Lane Aviles MD   Cholecalciferol (VITAMIN D) 50 MCG ( UT) CAPS capsule Take by mouth nightly    Yes Historical Provider, MD   metoprolol succinate (TOPROL XL) 25 MG extended release tablet Take 1 tablet by mouth daily 3/20/20  Yes Krishna Griffin MD   furosemide (LASIX) 20 MG tablet Take 1 tablet by mouth 2 times daily May take an extra Lasix if has increased swelling 3/20/20  Yes Bar Medellin MD   Dulaglutide (TRULICITY) 1.5 BL/5.6DT SOPN Inject 1.5 mg into the skin once a week 21   Dory Ramos PA-C   simvastatin (ZOCOR) 20 MG tablet Pt takes 10 mg daily 2/9/21   Celio Da Silva PA-C   rivaroxaban (XARELTO) 20 MG TABS tablet TAKE 1 TABLET DAILY WITH   BREAKFAST 2/9/21   Celio Da Silva PA-C   sildenafil (VIAGRA) 100 MG tablet TAKE 1 TABLET DAILY AS     NEEDED FOR ERECTILE        DYSFUNCTION 10/9/20   Allan Mitchell MD   valsartan (DIOVAN) 40 MG tablet Take 40mg daily 9/29/20   Allan Mitchell MD   aspirin 81 MG tablet Take 81 mg by mouth daily    Historical Provider, MD       Current medications:    Current Facility-Administered Medications   Medication Dose Route Frequency Provider Last Rate Last Admin    insulin glargine (LANTUS) injection vial 15 Units  15 Units Subcutaneous Nightly Joey Anderson MD        [START ON 2/24/2021] glipiZIDE (GLUCOTROL) tablet 10 mg  10 mg Oral QAM AC RIKKI Quinones MD        midodrine (PROAMATINE) tablet 5 mg  5 mg Oral TID  Elizabeth Irizarry PA-C        gabapentin (NEURONTIN) capsule 100 mg  100 mg Oral TID Rekha Gregg MD   100 mg at 02/22/21 2132    allopurinol (ZYLOPRIM) tablet 100 mg  100 mg Oral Daily Bar Johns MD   100 mg at 02/22/21 0930    urea (URE-NA) packet 15 g  15 g Oral Daily Bar Johns MD   15 g at 02/22/21 0943    furosemide (LASIX) injection 20 mg  20 mg Intravenous PRN Rekha Gregg MD   20 mg at 02/20/21 1848    pantoprazole (PROTONIX) tablet 40 mg  40 mg Oral QAM AC Rekha Gregg MD   Stopped at 02/23/21 0647    DOBUTamine (DOBUTREX) 500 mg in dextrose 5 % 250 mL infusion  2.5 mcg/kg/min Intravenous Continuous Pola Randall MD 7.9 mL/hr at 02/23/21 1402 2.5 mcg/kg/min at 02/23/21 1402    loperamide (IMODIUM) capsule 4 mg  4 mg Oral Once Pola Randall MD        sodium chloride flush 0.9 % injection 10 mL  10 mL Intravenous 2 times per day Rekha Gregg MD   10 mL at 02/23/21 1235    sodium chloride flush 0.9 % injection 10 mL  10 mL Intravenous PRN Bar Mckeon MD        insulin lispro (HUMALOG) injection L97.509    Pneumonia J18.9    Atrial fibrillation (HCC) I48.91    Sinus pause I45.5    PAF (paroxysmal atrial fibrillation) (HCC) I48.0    DM (diabetes mellitus) (HCC) E11.9    DMITRIY on CPAP G47.33, Z99.89    Hyperlipidemia E78.5    Status post incision and drainage Z98.890    CKD (chronic kidney disease) stage 3, GFR 30-59 ml/min (HCC) N18.30    Hyperpotassemia E87.5    Arthritis M19.90    PVD (peripheral vascular disease) (HCC) I73.9    Hematoma T14. 8XXA    Cardiac pacemaker in situ Z95.0    Coronary artery stenosis I25.10    Essential hypertension I10    Gout M10.9    Diabetic neuropathy associated with type 2 diabetes mellitus (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 Richard Cisneros CAD (coronary artery disease) 06/18/2014    see dr Richard Cisneros Chronic kidney disease, stage III (moderate) 07/07/2016    Diabetes mellitus ([facility] [address]     dx 2004    Diabetic neuropathy associated with type 2 diabetes mellitus ([facility] [address] 04/23/2019    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    Gout 04/2019    \"got gout when had pacer put in because they did not give me my medication for gout \"    H/O 24 hour EKG monitoring 10/03/2013    no afib noted, sinsus rhythm    H/O cardiovascular stress test 05/12/2014    cardiolite- mild ischemia RCA EF50%    H/O echocardiogram 12/01/2020    EF 55-60% severe aortic stenosis mild to mod aortic regurg mod to severe tricuspid regurg severe pulm htn significant changes since 2018 echo.  H/O right and left heart catheterization 12/10/2020    DIFFUSE LAD DISEASE, Mild ECA Disease, Severe AS, Milf Pul HTN on RHC.  H/O transesophageal echocardiography (MABLE) for monitoring 08/05/2013    normal LV function and normal LA appendage without any clot    History of blood transfusion 12/2020    d/t anemia    History of transesophageal echocardiography (MABLE) 12/15/2020    Severe aortic stenosis (ANNA by planimetry: 0.778 cm sq). Mild AR.    [place] (hard of hearing)     hearing tonya aides    Hx of Doppler echocardiogram 05/21/2018    EF 50%  Mild LV hypertrophy. Mildly enlarged RA. Mod aortic valve calcification with mod AS. Mitral annular calcification is present. Mild AR, MR and TR. Mild pulmonary htn.     Hyperlipidemia     Hypertension     Follows with PCP & Dr. Davida Sainz Other disorders of kidney and ureter     Pacemaker     [employer]tr[employer], implanted 2014    Pneumonia 12/29/2012    Sleep apnea     dx 2013- has c-pap    Type II or unspecified type diabetes mellitus with other specified manifestations, uncontrolled 12/12/2012    Venous hypertension, chronic, with ulcer ([address] 12/12/2012    resolved       Past Surgical History:        Procedure Laterality Date    CABG WITH AORTIC VALVE REPLACEMENT N/A 2/16/2021    CABG CORONARY ARTERY BYPASS X2 WITH LIMA, AORTIC VALVE REPLACEMENT AND AORTIC ROOT REPAIR, INTRAOPERATIVE MABLE, INDUCED HYPOTHERMIA, LEFT LEG ENDOVEIN HARVEST, LEFT ATRIAL CLIP, AND CRYO PROCEDURE performed by Scotty Ghosh MD at [facility]  12/14    at [address] Left 1/29/2021    LEFT CAROTID ENDARTERECTOMY performed by Page Gardner MD at [address] [facility]  2017    COLONOSCOPY  2011    COLONOSCOPY N/A 11/19/2019    COLONOSCOPY DIAGNOSTIC performed by Get Flores MD at [place] [address] 09/30/2020    POSSIBLE CECAL avms, SIGMOID DIVERTICULOSIS, INTERNAL HEMORRHOIDS GRADE 1    COLONOSCOPY N/A 9/30/2020    COLONOSCOPY CONTROL HEMORRHAGE WITH APC performed by Aly Walsh MD at [address] [facility]  2014    \"2 stents put in \"   [address]  2004    total left hip    OTHER SURGICAL HISTORY Right 12/02/2017    I&D; evacuation of hematoma right hip    OTHER SURGICAL HISTORY  09/17/2020    enteroscopy    PACEMAKER PLACEMENT      9/18/14 Status post remote permanent pacemaker with atrial lead dislodgement. 7/24/14 PPM Implant    UPPER GASTROINTESTINAL ENDOSCOPY N/A 9/17/2020    ENTEROSCOPY PUSH BIOPSY performed by Aly Walsh MD at [address] [employer]  2012    \"have stents in both legs- done in [place]       Social History:    Social History     Tobacco Use    Smoking status: Never Smoker    Smokeless tobacco: Never Used   Substance Use Topics    Alcohol use:  Yes     Alcohol/week: 2.0 standard drinks     Types: 2 Cans of beer per week     Comment: average \"one time per week\"                                Counseling given: Not Answered      Vital Signs (Current):   Vitals:    02/23/21 1403 02/23/21 1433 02/23/21 1502 02/23/21 1634   BP: (!) 115/59  136/66 129/61   Pulse: 76  69 84   Resp: 15  16 18   Temp: 36.8 °C (98.2 °F)      TempSrc: Oral      SpO2: 100%  100%    Weight:       Height:  5' 10\" (1.778 m)                                                BP Readings from Last 3 Encounters:   02/23/21 129/61   02/16/21 106/62   02/12/21 119/60       NPO Status: Time of last liquid consumption: 2200                        Time of last solid consumption: 1800                        Date of last liquid consumption: 02/22/21                        Date of last solid food consumption: 02/22/21    BMI:   Wt Readings from Last 3 Encounters:   02/22/21 235 lb 3.7 oz (106.7 kg)   02/12/21 216 lb (98 kg)   02/09/21 214 lb 6.4 oz (97.3 kg)     Body mass index is 33.75 kg/m².     CBC:   Lab Results   Component Value Date    WBC 10.6 02/21/2021    RBC 3.43 02/21/2021    HGB 8.1 02/21/2021    HCT 27.5 02/21/2021    MCV 80.2 02/21/2021    RDW 19.0 02/21/2021     02/21/2021       CMP:   Lab Results   Component Value Date     02/23/2021    K 4.2 02/23/2021    CL 97 02/23/2021    CO2 25 02/23/2021    BUN 88 02/23/2021    CREATININE 1.9 02/23/2021    GFRAA 42 02/23/2021    AGRATIO 1.8 11/24/2020    LABGLOM 35 02/23/2021    LABGLOM 51 06/15/2016    GLUCOSE 190 02/23/2021    PROT 5.1 02/18/2021    PROT 7.3 12/27/2012    CALCIUM 8.1 02/23/2021    BILITOT 0.3 02/18/2021    ALKPHOS 42 02/18/2021     02/18/2021     02/18/2021       POC Tests:   Recent Labs     02/23/21  1224   POCGLU 199*       Coags:   Lab Results   Component Value Date    PROTIME 15.1 02/16/2021    INR 1.25 02/16/2021    APTT 33.0 02/16/2021       HCG (If Applicable): No results found for: PREGTESTUR, PREGSERUM, HCG, HCGQUANT     ABGs:   Lab Results   Component Value Date    PO2ART 81 02/19/2021    BEI1NEQ 40.0 02/19/2021    EHR6OHQ 24.8 02/19/2021        Type & Screen (If Applicable):  No results found for: LABABO, LABRH    Drug/Infectious Status (If Applicable):  No results found for: HIV, HEPCAB    COVID-19 Screening (If Applicable):   Lab Results   Component Value Date    COVID19 NOT DETECTED 02/12/2021    COVID19 NOT DETECTED 12/11/2020         Anesthesia Evaluation  Patient summary reviewed no history of anesthetic complications:   Airway: Mallampati: II  TM distance: >3 FB   Neck ROM: full  Mouth opening: > = 3 FB Dental: normal exam         Pulmonary:   (+) sleep apnea: on CPAP,                             Cardiovascular:  Exercise tolerance: poor (<4 METS),   (+) hypertension:, pacemaker: pacemaker, CAD:, dysrhythmias: atrial fibrillation, hyperlipidemia         Beta Blocker:  Dose within 24 Hrs      ROS comment: Echo 2/16/2021:  Summary   This is a limited echocardiogram.   Left ventricular systolic function is low normal.   Ejection fraction is visually estimated at 50%. S/p AVR on 2021: mean P mmHg. Mild tricuspid regurgitation is present. RVSP is 44 mmHg. No evidence of any pericardial effusion. Negative bubble study; no indication for PFO. Neuro/Psych:   Negative Neuro/Psych ROS              GI/Hepatic/Renal:   (+) renal disease: CRI, morbid obesity          Endo/Other:    (+) DiabetesType II DM, , blood dyscrasia: anemia:., .                 Abdominal:           Vascular:   + PVD, aortic or cerebral, . Anesthesia Plan      MAC     ASA 4       Induction: intravenous. Anesthetic plan and risks discussed with patient. JUSTIN Meza CRNA   2021        Pre Anesthesia Evaluation complete. Anesthesia plan, risks, benefits, alternatives, and personnel discussed with patient and/or legal guardian. Patient and/or legal guardian verbalized an understanding and agreed to proceed. Anesthesia plan discussed with care team members and agreed upon.   JUSTIN Meza CRNA  2021

## 2021-02-23 NOTE — PROGRESS NOTES
treatment minutes:  45  Total treatment time:  39  Previously filed items:  Social/Functional History  Lives With: Spouse(available 24/7)  Type of Home: House  Home Layout: One level(+ basement but does not need to go down there)  Home Access: Stairs to enter with rails  Entrance Stairs - Number of Steps: 2  Bathroom Shower/Tub: Walk-in shower  Bathroom Toilet: Handicap height  Bathroom Equipment: Grab bars in shower, Built-in shower seat  Home Equipment: Cane  ADL Assistance: Independent  Homemaking Assistance: Independent  Ambulation Assistance: Independent  Transfer Assistance: Independent  Active : Yes  Occupation: Retired  Type of occupation: [employer]  Leisure & Hobbies: golf  Short term goals  Time Frame for Short term goals: 1 week  Short term goal 1: Pt will perform sit><supine Gaby  Short term goal 2: Pt will transition sit><stand SBA  Short term goal 3: Pt will transfer to bed/recliner SBA  Short term goal 4: Pt will ambulate 200ft with LRAD SBA  Short term goal 5: Pt will ascend/descend 2 steps, 1-2 rails CGA   Electronically signed by:     Chanel Haro PTA  2/23/2021, 9:23 AM

## 2021-02-24 VITALS
OXYGEN SATURATION: 99 % | DIASTOLIC BLOOD PRESSURE: 72 MMHG | RESPIRATION RATE: 18 BRPM | TEMPERATURE: 97.3 F | BODY MASS INDEX: 34.93 KG/M2 | SYSTOLIC BLOOD PRESSURE: 139 MMHG | HEIGHT: 70 IN | HEART RATE: 68 BPM | WEIGHT: 244 LBS

## 2021-02-24 LAB
ALBUMIN SERPL-MCNC: 3.4 GM/DL (ref 3.4–5)
ANION GAP SERPL CALCULATED.3IONS-SCNC: 10 MMOL/L (ref 4–16)
BUN BLDV-MCNC: 72 MG/DL (ref 6–23)
CALCIUM SERPL-MCNC: 8.1 MG/DL (ref 8.3–10.6)
CHLORIDE BLD-SCNC: 99 MMOL/L (ref 99–110)
CO2: 26 MMOL/L (ref 21–32)
CREAT SERPL-MCNC: 1.6 MG/DL (ref 0.9–1.3)
GFR AFRICAN AMERICAN: 52 ML/MIN/1.73M2
GFR NON-AFRICAN AMERICAN: 43 ML/MIN/1.73M2
GLUCOSE BLD-MCNC: 144 MG/DL (ref 70–99)
GLUCOSE BLD-MCNC: 228 MG/DL (ref 70–99)
GLUCOSE BLD-MCNC: 323 MG/DL (ref 70–99)
HCT VFR BLD CALC: 27 % (ref 42–52)
HEMOGLOBIN: 7.6 GM/DL (ref 13.5–18)
MCH RBC QN AUTO: 23 PG (ref 27–31)
MCHC RBC AUTO-ENTMCNC: 28.1 % (ref 32–36)
MCV RBC AUTO: 81.8 FL (ref 78–100)
PDW BLD-RTO: 19.1 % (ref 11.7–14.9)
PHOSPHORUS: 3.4 MG/DL (ref 2.5–4.9)
PLATELET # BLD: 254 K/CU MM (ref 140–440)
PMV BLD AUTO: 9.4 FL (ref 7.5–11.1)
POTASSIUM SERPL-SCNC: 4.4 MMOL/L (ref 3.5–5.1)
RBC # BLD: 3.3 M/CU MM (ref 4.6–6.2)
SODIUM BLD-SCNC: 135 MMOL/L (ref 135–145)
WBC # BLD: 9.3 K/CU MM (ref 4–10.5)

## 2021-02-24 PROCEDURE — 82962 GLUCOSE BLOOD TEST: CPT

## 2021-02-24 PROCEDURE — 2580000003 HC RX 258: Performed by: THORACIC SURGERY (CARDIOTHORACIC VASCULAR SURGERY)

## 2021-02-24 PROCEDURE — 6370000000 HC RX 637 (ALT 250 FOR IP): Performed by: THORACIC SURGERY (CARDIOTHORACIC VASCULAR SURGERY)

## 2021-02-24 PROCEDURE — 6360000002 HC RX W HCPCS: Performed by: THORACIC SURGERY (CARDIOTHORACIC VASCULAR SURGERY)

## 2021-02-24 PROCEDURE — 85027 COMPLETE CBC AUTOMATED: CPT

## 2021-02-24 PROCEDURE — 80069 RENAL FUNCTION PANEL: CPT

## 2021-02-24 RX ORDER — INSULIN GLARGINE 100 [IU]/ML
25 INJECTION, SOLUTION SUBCUTANEOUS NIGHTLY
Status: DISCONTINUED | OUTPATIENT
Start: 2021-02-24 | End: 2021-02-24 | Stop reason: HOSPADM

## 2021-02-24 RX ORDER — AMIODARONE HYDROCHLORIDE 200 MG/1
200 TABLET ORAL DAILY
Qty: 30 TABLET | Refills: 1 | Status: ON HOLD | OUTPATIENT
Start: 2021-02-25 | End: 2021-03-24 | Stop reason: HOSPADM

## 2021-02-24 RX ADMIN — MULTIPLE VITAMINS W/ MINERALS TAB 1 TABLET: TAB at 09:29

## 2021-02-24 RX ADMIN — CEFAZOLIN SODIUM 2000 MG: 10 INJECTION, POWDER, FOR SOLUTION INTRAVENOUS at 01:54

## 2021-02-24 RX ADMIN — GLIPIZIDE 10 MG: 5 TABLET ORAL at 09:28

## 2021-02-24 RX ADMIN — PANTOPRAZOLE SODIUM 40 MG: 40 TABLET, DELAYED RELEASE ORAL at 09:28

## 2021-02-24 RX ADMIN — SODIUM CHLORIDE, PRESERVATIVE FREE 10 ML: 5 INJECTION INTRAVENOUS at 09:29

## 2021-02-24 RX ADMIN — SODIUM CHLORIDE, PRESERVATIVE FREE 10 ML: 5 INJECTION INTRAVENOUS at 09:30

## 2021-02-24 RX ADMIN — AMIODARONE HYDROCHLORIDE 200 MG: 200 TABLET ORAL at 09:28

## 2021-02-24 RX ADMIN — POLYETHYLENE GLYCOL (3350) 17 G: 17 POWDER, FOR SOLUTION ORAL at 09:29

## 2021-02-24 RX ADMIN — ACETAMINOPHEN 650 MG: 325 TABLET ORAL at 00:11

## 2021-02-24 RX ADMIN — ALLOPURINOL 100 MG: 100 TABLET ORAL at 09:30

## 2021-02-24 RX ADMIN — GUAIFENESIN 600 MG: 600 TABLET, EXTENDED RELEASE ORAL at 09:28

## 2021-02-24 RX ADMIN — GABAPENTIN 100 MG: 100 CAPSULE ORAL at 09:28

## 2021-02-24 RX ADMIN — ASPIRIN 81 MG: 81 TABLET, FILM COATED ORAL at 09:28

## 2021-02-24 ASSESSMENT — PAIN DESCRIPTION - PROGRESSION: CLINICAL_PROGRESSION: GRADUALLY WORSENING

## 2021-02-24 ASSESSMENT — PAIN DESCRIPTION - ORIENTATION: ORIENTATION: LEFT;UPPER

## 2021-02-24 ASSESSMENT — PAIN SCALES - GENERAL
PAINLEVEL_OUTOF10: 3
PAINLEVEL_OUTOF10: 0

## 2021-02-24 ASSESSMENT — PAIN DESCRIPTION - DESCRIPTORS: DESCRIPTORS: SORE

## 2021-02-24 ASSESSMENT — PAIN DESCRIPTION - ONSET: ONSET: GRADUAL

## 2021-02-24 NOTE — PROGRESS NOTES
Sternum and CT dressings assessed with MN assessment, clean, dry, and intact. Will continue to monitor.

## 2021-02-24 NOTE — PLAN OF CARE
Problem: Falls - Risk of:  Goal: Will remain free from falls  Description: Will remain free from falls  Outcome: Ongoing  Goal: Absence of physical injury  Description: Absence of physical injury  Outcome: Ongoing     Problem: Discharge Planning:  Goal: Discharged to appropriate level of care  Description: Discharged to appropriate level of care  Outcome: Ongoing     Problem:  Activity Intolerance:  Goal: Able to perform prescribed physical activity  Description: Able to perform prescribed physical activity  Outcome: Ongoing  Goal: Ability to tolerate increased activity will improve  Description: Ability to tolerate increased activity will improve  Outcome: Ongoing     Problem: Anxiety:  Goal: Level of anxiety will decrease  Description: Level of anxiety will decrease  Outcome: Ongoing     Problem: Cardiac Output - Decreased:  Goal: Cardiac output within specified parameters  Description: Cardiac output within specified parameters  Outcome: Ongoing  Goal: Hemodynamic stability will improve  Description: Hemodynamic stability will improve  Outcome: Ongoing     Problem: Fluid Volume - Imbalance:  Goal: Ability to achieve a balanced intake and output will improve  Description: Ability to achieve a balanced intake and output will improve  Outcome: Ongoing  Goal: Chest tube drainage is within specified parameters  Description: Chest tube drainage is within specified parameters  Outcome: Ongoing     Problem: Gas Exchange - Impaired:  Goal: Levels of oxygenation will improve  Description: Levels of oxygenation will improve  Outcome: Ongoing  Goal: Ability to maintain adequate ventilation will improve  Description: Ability to maintain adequate ventilation will improve  Outcome: Ongoing     Problem: Pain:  Goal: Pain level will decrease  Description: Pain level will decrease  Outcome: Ongoing  Goal: Control of acute pain  Description: Control of acute pain  Outcome: Ongoing  Goal: Control of chronic pain  Description: Control of chronic pain  Outcome: Ongoing     Problem: Tissue Perfusion - Cardiopulmonary, Altered:  Goal: Absence of angina  Description: Absence of angina  Outcome: Ongoing  Goal: Hemodynamic stability will improve  Description: Hemodynamic stability will improve  Outcome: Ongoing  Goal: Will show no evidence of cardiac arrhythmias  Description: Will show no evidence of cardiac arrhythmias  Outcome: Ongoing     Problem: Tobacco Use:  Goal: Will participate in inpatient tobacco-use cessation counseling  Description: Will participate in inpatient tobacco-use cessation counseling  Outcome: Ongoing     Problem: Pain:  Goal: Pain level will decrease  Description: Pain level will decrease  Outcome: Ongoing  Goal: Control of acute pain  Description: Control of acute pain  Outcome: Ongoing  Goal: Control of chronic pain  Description: Control of chronic pain  Outcome: Ongoing

## 2021-02-24 NOTE — PROGRESS NOTES
Patient ambulated with use of gait belt and on room air. Patient only able to ambulate approximately 50 ft before becoming fatigued. O2 sats > 92%, HR 80's. Patient returned to bed. Call light with in reach.

## 2021-02-24 NOTE — OP NOTE
[address], [facility]                                OPERATIVE REPORT    PATIENT NAME: Geovanna Epps                    :        1950  MED REC NO:   [id number]                          ROOM:       2128  ACCOUNT NO:   [de-identified]                           ADMIT DATE: 2021  PROVIDER:     Ximena Mchugh MD    DATE OF PROCEDURE:  2021    PREPROCEDURE DIAGNOSIS:  Nonfunctional atrial pacemaker lead. POSTPROCEDURE DIAGNOSIS:  Nonfunctional atrial pacemaker lead. PROCEDURES PERFORMED:  1. Revision and replacement of right atrial lead permanent pacemaker. 2.  Placement of TYRX pouch. SURGEON:  Ximena Mchugh MD    ESTIMATED BLOOD LOSS:  Less than 50 mL. PREOP:  This is a 57-year-old male who had a pacemaker placed in   and had revision of his right atrial lead. The patient was in chronic  AFib and the lead was not functioning. He underwent an aortic valve  replacement with CABG and maze procedure and has been in junctional  rhythm postoperatively. After discussion with Cardiology, it was felt  that an atrial lead replacement would be beneficial for the patient. The risks and benefits of the procedure were discussed in detail with  the patient. The patient understood and agreed to proceed. FINDINGS:  There was a fibrinous exudate within the pocket of the  pacemaker, but this was not concerning for infection, though we did take  cultures. Lead was placed without difficulty. DESCRIPTION OF PROCEDURE:  The patient was identified, laid in supine  position. Prepped and draped in a normal sterile fashion. Prior to  beginning, time-out was performed. Antibiotics were given. Incision  was made over the previous incision. Dissection down through the  subcutaneous tissue and entered into the capsule. The pacemaker  generator was removed from the pocket. Sponge was placed into the  pocket. At this point, we then obtained access to the subclavian vein  and wire was placed into the vein. It was difficult for the J wire to  drop into the SVC, so the sheath was placed. Once the sheath was  placed, we were able to place the lead into the SVC without difficulty. We attached the lead to the free wall of the atrium, but had silent  atrium. We then attached to two other locations, which again still were  silent atria. At this point, we then attached to the free wall and felt  this would be an appropriate location. We did do a TUG test and the  lead did not fall. At this point, the peel-away sheath was removed. The lead was sutured into place and it was attached to the generator. The previous atrial lead was capped and placed into the pocket. The  patient was AV paced at the completion of the procedure. At this point,  the wound was irrigated copiously and closed in a multilayer fashion. Sponge count, needle count, and instrument count were correct. A TYRX  antibiotic pouch was also used during the procedure.         Nahum Armendariz MD    D: 02/23/2021 23:07:16       T: 02/24/2021 2:17:17     [email or web address]  Job#: [id number]     Doc#: [id number]    CC:

## 2021-02-24 NOTE — PROGRESS NOTES
Patient assisted up to bathroom, large BM. Patient returned to bed, gown and splinting pillow noted to be saturated with serosanguinous drainage. Dressing assessed and changed. Drainage noted to be coming from lower part of sternal incision, site palpated, no bogginess or hematoma noted. Dr. Ishan Corey notified and CBC ordered. New dressing applied. CBC drawn and sent to lab. Will continue to monitor.

## 2021-02-24 NOTE — BRIEF OP NOTE
Brief Postoperative Note      Patient: Hina Pham  YOB: 1950  MRN: [id number]    Date of Procedure: 2/23/2021    Pre-Op Diagnosis: nonfunctioning R atrial lead    Post-Op Diagnosis: Same       Procedure(s):  PACEMAKER GENERATOR LEAD REVISION    Surgeon(s):  Ximena Mchugh MD    Assistant:  * No surgical staff found *    Anesthesia: Monitor Anesthesia Care    Estimated Blood Loss (mL): less than 50     Complications: None    Specimens:   ID Type Source Tests Collected by Time Destination   1 : PACEMAKER POCKET Body Fluid Cul de sac CULTURE, BODY FLUID Ximena Mchugh MD 2/23/2021 1737        Implants:  Implant Name Type Inv. Item Serial No.  Lot No. LRB No. Used Action   LEAD PACE 6FR L52CM LORI INSUL BPLR PLATINIZED STEROID [facility] - [id number]  LEAD PACE 6FR L52CM LORI INSUL BPLR PLATINIZED STEROID DXA [id number] [facility] CARDIAC [facility]  N/A 1 Implanted   ENVELOPE PACEMKR L W2.9XL3.3IN ABSRB ANTIBACT TYRX  ENVELOPE PACEMKR L W2.9XL3.3IN ABSRB ANTIBACT TYRX  [facility] [id number] N/A 1 Implanted         Drains:   Chest Tube Pleural 24 Ethiopian (Active)       [REMOVED] Chest Tube 2 Pleural 24 Ethiopian (Removed)   Suction -20 cm H2O 02/18/21 1132   Chest Tube Airleak No 02/18/21 1445   Drainage Description Serosanguinous 02/18/21 1445   Dressing Status Clean;Dry; Intact 02/18/21 1445   Dressing Type Dry dressing 02/18/21 1445   Dressing Change Due 02/18/21 02/18/21 1445   Site Assessment Dry; Intact 02/18/21 1445   Surrounding Skin Dry 02/18/21 1445   Patency Intervention Tip/Tilt 02/18/21 1132   Output (ml) 150 ml 02/18/21 1445       [REMOVED] Chest Tube 1 Mediastinal 32 Ethiopian (Removed)   Suction -20 cm H2O 02/18/21 0830   Chest Tube Airleak No 02/18/21 1132   Drainage Description Serosanguinous 02/18/21 1132   Dressing Status Clean;Dry; Intact 02/18/21 1132   Dressing Type Dry dressing 02/18/21 1132   Dressing Change Due 02/18/21 02/18/21 1132   Site Assessment Dry; Intact 02/18/21 1132   Surrounding Skin Unable to view 02/18/21 1132   Patency Intervention Tip/Tilt 02/18/21 0830   Output (ml) 10 ml 02/18/21 1030       [REMOVED] NG/OG/NJ/NE Tube Nasogastric 18 fr Right nostril (Removed)   Surrounding Skin Dry; Intact 02/18/21 0800   Securement device Yes 02/18/21 0800   Status Suction-low intermittent 02/18/21 0800   Placement Verified by X-Ray (Initial);by External Catheter Length 02/18/21 0800   NG/OG/NJ/NE External Measurement (cm) 68 cm 02/18/21 0800   Drainage Appearance Bile;Green 02/18/21 0800   Output (mL) 50 ml 02/18/21 1715       [REMOVED] Urethral Catheter Non-latex 16 fr (Removed)   Catheter Indications Perioperative use in selected surgeries including but not limited to urologic, pelvic or need for intraoperative monitoring of urinary output due to prolonged surgery, large volume infusion or need for diuretic therapy in surgery 01/30/21 0330   Site Assessment Pink 01/30/21 0330   Urine Color Yellow 01/30/21 0330   Urine Appearance Clear 01/30/21 0330   Output (mL) 700 mL 01/30/21 0530       [REMOVED] Urethral Catheter (Removed)   Catheter Indications Need for fluid management in critically ill patients in a critical care setting not able to be managed by other means such as BSC with hat, bedpan, urinal, condom catheter, or short term intermittent urethral catherization 02/20/21 1503   Site Assessment No urethral drainage;Sawpit 02/20/21 1503   Urine Color Green;Yellow 02/20/21 1503   Urine Appearance Clear 02/20/21 1503   Output (mL) 200 mL 02/20/21 1400       Findings: [id number]    Electronically signed by Sade Wahl MD on 2/23/2021 at 11:02 PM

## 2021-02-24 NOTE — PROGRESS NOTES
Nephrology Progress Note  2/24/2021 8:22 AM  Subjective:      Interval History: Pierce Roman is a 66 y.o. male  Doing ok and less weak and hr and bp better    Data:   Scheduled Meds:   insulin glargine  25 Units Subcutaneous Nightly    insulin NPH  10 Units Subcutaneous Once    glipiZIDE  10 mg Oral QAM AC    midodrine  5 mg Oral TID WC    sodium chloride flush  10 mL Intravenous 2 times per day    ceFAZolin (ANCEF) 2000 mg in dextrose 5 % 100 mL IVPB  2,000 mg Intravenous Q8H    gabapentin  100 mg Oral TID    allopurinol  100 mg Oral Daily    pantoprazole  40 mg Oral QAM AC    loperamide  4 mg Oral Once    sodium chloride flush  10 mL Intravenous 2 times per day    insulin lispro  0-12 Units Subcutaneous TID WC    insulin lispro  0-12 Units Subcutaneous 2 times per day    [Held by provider] apixaban  5 mg Oral BID    guaiFENesin  600 mg Oral BID    aspirin  81 mg Oral Daily    therapeutic multivitamin-minerals  1 tablet Oral Daily with breakfast    polyethylene glycol  17 g Oral Daily    atorvastatin  20 mg Oral Nightly    amiodarone  200 mg Oral Daily     Continuous Infusions:   DOBUTamine Stopped (02/23/21 2015)    dextrose      norepinephrine Stopped (02/17/21 2200)    EPINEPHrine infusion Stopped (02/17/21 0423)    nitroGLYCERIN             CBC:   Recent Labs     02/23/21  2250 02/24/21  0400   WBC 9.0 9.3   HGB 7.7* 7.6*    254     BMP:    Recent Labs     02/22/21  0500 02/23/21  0515 02/24/21  0400   * 133* 135   K 3.8 4.2 4.4   CL 94* 97* 99   CO2 25 25 26   BUN 94* 88* 72*   CREATININE 2.3* 1.9* 1.6*   GLUCOSE 137* 190* 228*       Renal Labs  Albumin:    Lab Results   Component Value Date    LABALBU 3.4 02/24/2021     Calcium:    Lab Results   Component Value Date    CALCIUM 8.1 02/24/2021     Phosphorus:    Lab Results   Component Value Date    PHOS 3.4 02/24/2021     U/A:    Lab Results   Component Value Date    NITRU NEGATIVE 02/12/2021    NITRU Negative 11/24/2020    COLORU YELLOW 02/12/2021    PHUR 6.5 11/24/2020    LABCAST NONE SEEN 06/15/2016    LABCAST NONE SEEN 06/15/2016    WBCUA 1 02/12/2021    RBCUA 2 02/12/2021    MUCUS RARE 02/12/2021    TRICHOMONAS NONE SEEN 02/12/2021    BACTERIA NEGATIVE 02/12/2021    CLARITYU CLEAR 02/12/2021    SPECGRAV 1.012 02/12/2021    UROBILINOGEN NEGATIVE 02/12/2021    BILIRUBINUR NEGATIVE 02/12/2021    BLOODU SMALL 02/12/2021    GLUCOSEU 500 11/24/2020    KETUA NEGATIVE 02/12/2021           Objective:   I/O: 02/23 0701 - 02/24 0700  In: 1007.1 [P.O.:480; I.V.:422.1]  Out: 1060 [Urine:1050]  Vitals: /71   Pulse 60   Temp 97.6 °F (36.4 °C) (Oral)   Resp 18   Ht 5' 10\" (1.778 m)   Wt 244 lb (110.7 kg)   SpO2 99%   BMI 35.01 kg/m²   General appearance: awake weak  HEENT: Head: Normal, normocephalic, atraumatic.   Neck: supple, symmetrical, trachea midline  Lungs: diminished breath sounds bilaterally  Heart: S1, S2 irregular sp cabg  Abdomen: abnormal findings:  soft nt  Extremities: edema trace   Neurologic: Mental status: alertness: awake        Assessment and Plan:      IMP:  1 arf from atn on ckd 3 creat 1.6  2 cad sp cabg  3 sp carotid surgery left  4 hypotension  5 anemia  6 low na  7 dm2      Plan     1 renal to baseline monitor and stable uop no diuretic  2 cardiac improved and fixed atrial lead and bp better  3 carotid stable  4 bp stable now  5 hb low stable epo X1 today  6 stop urea and na better  7 give trulicity tody and adjust insulin  Overall improve  Can dc from renal today and fu 2 week as able           Sameer Stevens MD Quality 130: Documentation Of Current Medications In The Medical Record: Current Medications Documented Detail Level: Detailed Quality 431: Preventive Care And Screening: Unhealthy Alcohol Use - Screening: Patient screened for unhealthy alcohol use using a single question and scores less than 2 times per year Quality 226: Preventive Care And Screening: Tobacco Use: Screening And Cessation Intervention: Patient screened for tobacco use and is an ex/non-smoker

## 2021-02-24 NOTE — PROGRESS NOTES
Progress Note( Dr. Dave Silva)    Subjective:   Admit Date: 2/16/2021  PCP: Sam Juarez MD    Admitted For :CAD, aortic valve disease/underwent CABG, aortic valve replacement and aortic root repair on 2/16/2020    Consulted For: Better control of blood glucose    Interval History: Going for pacemaker wire correction later in the day Dr. Anat Kirby is planning to do the surgery    Soft chest wall pains,   And SOB . Denies nausea or vomiting. No new bowel or bladder symptoms.        Intake/Output Summary (Last 24 hours) at 2/24/2021 0727  Last data filed at 2/24/2021 0300  Gross per 24 hour   Intake 1007.05 ml   Output 1060 ml   Net -52.95 ml       DATA    CBC:   Recent Labs     02/23/21  2250 02/24/21  0400   WBC 9.0 9.3   HGB 7.7* 7.6*    254    CMP:  Recent Labs     02/22/21  0500 02/23/21  0515 02/24/21  0400   * 133* 135   K 3.8 4.2 4.4   CL 94* 97* 99   CO2 25 25 26   BUN 94* 88* 72*   CREATININE 2.3* 1.9* 1.6*   CALCIUM 8.0* 8.1* 8.1*   LABALBU 3.4 3.4 3.4     Lipids:   Lab Results   Component Value Date    CHOL 91 12/18/2020    HDL 43 12/18/2020    TRIG 66 12/18/2020     Glucose:  Recent Labs     02/23/21  1906 02/23/21  2128 02/24/21  0153   POCGLU 176* 364* 323*     [id number]:  Lab Results   Component Value Date    LABA1C 6.1 02/20/2021     High Sensitivity TSH:   Lab Results   Component Value Date    TSHHS 1.100 12/31/2012     Free T3: No results found for: FT3  Free T4:  Lab Results   Component Value Date    T4FREE 1.5 07/07/2020       Echocardiogram Limited    Result Date: 2/17/2021  Transthoracic Echocardiography Report (TTE)  Demographics   Patient Name       RASHAD TATE    Date of Study       02/17/2021   Date of Birth      1950         Gender              Male   Age                66 year(s)         Race                   Patient Number     [id number]         Room Number         2128   Visit Number       [id number]   Corporate ID       [id number]   Accession Number [id number]         [facility], [address] [facility]   Ordering Physician Bud GrayNP            Physician           Willem TATE  Procedure Type of Study   TTE procedure:ECHOCARDIOGRAM LIMITED. Procedure Date Date: 2021 Start: 07:34 AM Study Location: Portable Technical Quality: Fair visualization Indications:S/P CABG. Patient Status: Routine Contrast Medium: Bubble Study. Height: 70 inches Weight: 215 pounds BSA: 2.15 m2 BMI: 30.85 kg/m2 HR: 70 bpm BP: 111/56 mmHg  Conclusions   Summary  This is a limited echocardiogram.  Left ventricular systolic function is low normal.  Ejection fraction is visually estimated at 50%. S/p AVR on 2021: mean P mmHg. Mild tricuspid regurgitation is present. RVSP is 44 mmHg. No evidence of any pericardial effusion. Negative bubble study; no indication for PFO.    Signature   ------------------------------------------------------------------  Electronically signed by Preston Hunter MD  (Interpreting physician) on 2021 at 11:44 AM  -.       Scheduled Medicines   Medications:    insulin glargine  15 Units Subcutaneous Nightly    glipiZIDE  10 mg Oral QAM AC    midodrine  5 mg Oral TID WC    sodium chloride flush  10 mL Intravenous 2 times per day    ceFAZolin (ANCEF) 2000 mg in dextrose 5 % 100 mL IVPB  2,000 mg Intravenous Q8H    gabapentin  100 mg Oral TID    allopurinol  100 mg Oral Daily    urea  15 g Oral Daily    pantoprazole  40 mg Oral QAM AC    loperamide  4 mg Oral Once    sodium chloride flush  10 mL Intravenous 2 times per day    insulin lispro  0-12 Units Subcutaneous TID WC    insulin lispro  0-12 Units Subcutaneous 2 times per day    [Held by provider] apixaban  5 mg Oral BID    guaiFENesin  600 mg Oral BID    aspirin  81 mg Oral Daily    therapeutic multivitamin-minerals  1 tablet Oral Daily with breakfast    polyethylene glycol  17 g Oral Daily    atorvastatin  20 mg Oral Nightly    amiodarone  200 mg Oral Daily      Infusions:    DOBUTamine Stopped (02/23/21 2015)    dextrose      norepinephrine Stopped (02/17/21 2200)    EPINEPHrine infusion Stopped (02/17/21 0423)    nitroGLYCERIN           Objective:   Vitals: /71   Pulse 66   Temp 97.6 °F (36.4 °C) (Oral)   Resp 24   Ht 5' 10\" (1.778 m)   Wt 244 lb (110.7 kg)   SpO2 100%   BMI 35.01 kg/m²   General appearance: alert and cooperative with exam  Neck: no JVD or bruit  Thyroid : Normal lobes   Lungs: Has Vesicular Breath sounds   Heart:  regular rate and rhythm cardiac pacemaker  Abdomen: soft, non-tender; bowel sounds normal; no masses,  no organomegaly  Musculoskeletal: Normal  Extremities: extremities normal, , no edema  Neurologic:  Awake, alert, oriented to name, place and time. Cranial nerves II-XII are grossly intact. Motor is  intact. Sensory is intact. ,  and gait is normal.    Assessment:     Patient Active Problem List:     Type 2 diabetes mellitus without complication, without long-term current use of insulin ([facility])     Ulcer of other part of lower limb     Venous hypertension, chronic, with ulcer ([facility])     Ulcer of other part of foot     Pneumonia     Atrial fibrillation ([facility])     Sinus pause     PAF (paroxysmal atrial fibrillation) ([facility])     DM (diabetes mellitus) ([facility])     DMITRIY on CPAP     Hyperlipidemia     Status post incision and drainage     CKD (chronic kidney disease) stage 3, GFR 30-59 ml/min ([facility])     Hyperpotassemia     Arthritis     PVD (peripheral vascular disease) ([facility])     Hematoma     Cardiac pacemaker in situ     Coronary artery stenosis     Essential hypertension     Gout     Diabetic neuropathy associated with type 2 diabetes mellitus ([facility])     Adenomatous polyp of sigmoid colon     Iron deficiency anemia due to chronic blood loss     Erythropoietin deficiency anemia     VHD (valvular heart disease)     Abnormal fractional flow reserve (FFR) on cardiac catheterization     Carotid stenosis, left     Aortic stenosis, severe     CAD in native artery     Displacement of atrial pacemaker leads     Pacemaker lead malfunction      CABG/aortic valve replacement/repair 2/16/2021    Plan:     1. Reviewed POC blood glucose . Labs and X ray results   2. Reviewed Current Medicines   3. On Correction bolus Humalog/ Basal Lantus Insulin regime+ OHGD  4. Monitor Blood glucose frequently   5. Modified  the dose of Insulin/ other medicines as needed   6. Will follow     .      Michael Jo MD

## 2021-02-24 NOTE — PROGRESS NOTES
Gown and splinting pillow noted to be saturated in serosanguinous drainage. Dressing removed and site assessed. Per patient, sutures removed by physician during pacemaker insertion case. sm amount of drainage noted from lower portion of sternal incision, no bogginess or hematoma noted. New dressing applied. Will continue to monitor.

## 2021-02-24 NOTE — PROGRESS NOTES
Pt returned to room and spouse called and updated. Dinner on its way. VSS and pt is completely paced in the 60's. 650 mg po Tylenol given for pain.

## 2021-02-25 ENCOUNTER — CARE COORDINATION (OUTPATIENT)
Dept: CASE MANAGEMENT | Age: 71
End: 2021-02-25

## 2021-02-25 NOTE — CARE COORDINATION
Tor 45 Transitions Initial Follow Up Call    Call within 2 business days of discharge: Yes    Patient: Jarred Talbot Patient : 1950   MRN: [id number]  Reason for Admission: CAD s/p Cabg  Discharge Date: 21 RARS: Readmission Risk Score: 31  [facility]ility: [facility]    Last Discharge [facility]       Complaint Diagnosis Description Type Department Provider    21   Admission (Discharged) [facility] ICU Adrienne Marrero MD      Non-face-to-face services provided:  Obtained and reviewed discharge summary and/or continuity of care documents  Education of patient/family/caregiver/guardian to support self-management-CABG protocol  Assessment and support for treatment adherence and medication management-Amiodarone  Establishment or re-establishment of referrals-[facility]    Care Transitions 24 Hour Call    Schedule Follow Up Appointment with PCP: Declined  Do you have any ongoing symptoms?: Yes  Patient-reported symptoms: Weakness, Fatigue  Interventions for patient-reported symptoms: Notified PCP/Physician, Notified Elisha 86 you have a copy of your discharge instructions?: Yes  Do you have all of your prescriptions and are they filled?: Yes  Have you been contacted by a [facility] Pharmacist?: No  Have you scheduled your follow up appointment?: No (Comment: Wife to schedule)  Were you discharged with any Home Care or Post Acute Services: No  Do you feel like you have everything you need to keep you well at home?: No  Care Transitions Interventions   Home Care Waiver: Completed        DME Assistance: Declined        Follow Up  Future Appointments   Date Time Provider Melissa Russell   3/8/2021  3:15 PM Adrienne Marrero MD [facility] H[facility]  [facility] HRT   3/10/2021 12:20 PM Benja Rodriguez  [address] [facility]   3/16/2021  2:15 PM Sundar Sanabria MD AFLADVNPHHTN [facility]   2021  2:30 PM Stacey Spain MD [address] [place] [address]   2021  2:00 PM Bar Boyce MD AFLADVNPHHTN [facility] AD[facility]   .     Challenges to be reviewed by the provider   Additional needs identified to be addressed with provider: Patient in need of [address]     Method of communication with provider : call to office    CARE TRANSITION/COVID19 [address]: 2/12/21 Negative  PATIENT RISK FACTORS: DM, CAD, Age  RARS: 29   [facility] PCP: Dr Joann Gutierrez    Was this a readmission? Yes  Patient stated reason for admission: Planned procedure  Patients top risk factors for readmission: medical condition and delay in [address] services (see below)     Advance Care Planning:   Does patient have an Advance Directive:  reviewed and current. 1136 Attempt to reach Patient unsuccessful; message left requesting call back. [address] w/ Kimberlyn Short, Wife for discharge call. Reports Patient w/o chest pain, sob, orthopnea, weight gain, edema. Patient is noting generalized weakness, fatigue. Reports while Patient was hospitalized she had denied need for [address] however now that he is home she feels he would benefit for strengthening, endurance, SN/PT/OT. Agreeable for CTN to set up [address], no agency preference. Informed her I will contact [address] [place] [address] Rd, agreeable. Denies dme needs. Reports Patient adhering to s/p CABG guidelines including sternal precautions, low sodium diet, daily wts and vs monitoring. Reports this a.m. /65, HR 60, Wt 217.4# (baseline wt 224#). Advised to contact Dr Nolan/PCP/[facility] 24/7 re: any health or post-op concerns. Wife to schedule 7 day hosp f/u appts and provide transportation. Confirmed copy of AVS received, reviewed. Obtained and taking Amiodarone as directed, no questions. 1111F deferred as Wife currently heading to grocery store. Advised Covid19 preventative measures. Agreeable to ongoing CT follow up. 1158 [employer] to RICK Weller RN- [address] [place] [address] Rd Liaison requesting services. 18 T/C Dr Stanislaw Soriano office. Notified of need for [address] rx.     1325 Incoming [employer] from [address] [place] [address]- [address] [place] [address] Rd Liaison who spoke w/ Wife, services initiated.      Upon next outreach: 1111F[facility], ongoing post CABG education,  confirm [facility] and jimmy Bell RN

## 2021-02-25 NOTE — PROGRESS NOTES
Spoke with Blade Serra and Gracie Weiss. Aminata Andersen Also talked with wife who now would like [facility]. Info sent to [facility].

## 2021-02-26 ENCOUNTER — CARE COORDINATION (OUTPATIENT)
Dept: CASE MANAGEMENT | Age: 71
End: 2021-02-26

## 2021-02-26 LAB
CULTURE: NORMAL
Lab: NORMAL
SPECIMEN: NORMAL

## 2021-02-26 NOTE — CARE COORDINATION
Tor 45 Transitions Follow Up Call    2021    Patient: Hina Pham  Patient : 1950   MRN: [id number]  Reason for Admission: CAD s/p Cabg  Discharge Date: 21 RARS: Readmission Risk Score: 31    Follow Up  Future Appointments   Date Time Provider Melissa Russell   3/8/2021  3:15 PM Ximena Mchugh MD [facility] HRT  [facility] SPR HRT   3/10/2021 12:20 PM Nico Johnson MD [facility]   3/16/2021  2:15 PM Josh Mejia MD AFLAD[facility]NPHHTN [facility]   2021  2:30 PM Salvador Denton MD [address] [facility] [facility]   2021  2:00 PM Bar Riddle MD AFLADVNPHHTN AFL ADV [facility]   . CARE TRANSITION/COVID19 RISK MONITORING  COVID19 SCREEN: 21 Negative  PATIENT RISK FACTORS: DM, CAD, Age  RARS: 27   [facility] PCP: Dr Joanne Nunse briefly w/ Wife. [facility] did contact her and is awaiting on call from RN to schedule intake visit. Has transportation for post op appts w/ Dr Olsen Form 3/8/21 and Dr Tan Friedman 3/10/21. Advised to contact PCP/[facility]/Dr Farias/Dr Olsen Form  re: any post-op concerns. Agreeable to ongoing CT follow up. No concerns, needs voiced.      Leticia Ryan RN

## 2021-03-01 ENCOUNTER — HOSPITAL ENCOUNTER (INPATIENT)
Age: 71
LOS: 11 days | Discharge: NURSING, REHAB OR LONG-TERM CARE | DRG: 871 | End: 2021-03-15
Attending: EMERGENCY MEDICINE | Admitting: THORACIC SURGERY (CARDIOTHORACIC VASCULAR SURGERY)
Payer: MEDICARE

## 2021-03-01 ENCOUNTER — APPOINTMENT (OUTPATIENT)
Dept: CT IMAGING | Age: 71
DRG: 871 | End: 2021-03-01
Payer: MEDICARE

## 2021-03-01 ENCOUNTER — APPOINTMENT (OUTPATIENT)
Dept: GENERAL RADIOLOGY | Age: 71
DRG: 871 | End: 2021-03-01
Payer: MEDICARE

## 2021-03-01 DIAGNOSIS — J90 PLEURAL EFFUSION: ICD-10-CM

## 2021-03-01 DIAGNOSIS — R53.1 GENERAL WEAKNESS: ICD-10-CM

## 2021-03-01 DIAGNOSIS — J96.90 RESPIRATORY FAILURE, UNSPECIFIED CHRONICITY, UNSPECIFIED WHETHER WITH HYPOXIA OR HYPERCAPNIA (HCC): ICD-10-CM

## 2021-03-01 DIAGNOSIS — E87.5 HYPERKALEMIA: Primary | ICD-10-CM

## 2021-03-01 PROBLEM — R06.02 SOB (SHORTNESS OF BREATH): Status: ACTIVE | Noted: 2021-03-01

## 2021-03-01 LAB
ALBUMIN SERPL-MCNC: 2.6 GM/DL (ref 3.4–5)
ALP BLD-CCNC: 148 IU/L (ref 40–129)
ALT SERPL-CCNC: 49 U/L (ref 10–40)
ANION GAP SERPL CALCULATED.3IONS-SCNC: 13 MMOL/L (ref 4–16)
AST SERPL-CCNC: 59 IU/L (ref 15–37)
BACTERIA: NEGATIVE /HPF
BASE EXCESS: 1 (ref 0–3.3)
BASOPHILS ABSOLUTE: 0.1 K/CU MM
BASOPHILS RELATIVE PERCENT: 0.4 % (ref 0–1)
BILIRUB SERPL-MCNC: 0.4 MG/DL (ref 0–1)
BILIRUBIN URINE: NEGATIVE MG/DL
BLOOD, URINE: NEGATIVE
BUN BLDV-MCNC: 82 MG/DL (ref 6–23)
CALCIUM SERPL-MCNC: 8.4 MG/DL (ref 8.3–10.6)
CHLORIDE BLD-SCNC: 92 MMOL/L (ref 99–110)
CLARITY: CLEAR
CO2: 21 MMOL/L (ref 21–32)
COLOR: YELLOW
COMMENT: NORMAL
CREAT SERPL-MCNC: 1.7 MG/DL (ref 0.9–1.3)
DIFFERENTIAL TYPE: ABNORMAL
EOSINOPHILS ABSOLUTE: 0.2 K/CU MM
EOSINOPHILS RELATIVE PERCENT: 1 % (ref 0–3)
GFR AFRICAN AMERICAN: 48 ML/MIN/1.73M2
GFR NON-AFRICAN AMERICAN: 40 ML/MIN/1.73M2
GLUCOSE BLD-MCNC: 142 MG/DL (ref 70–99)
GLUCOSE BLD-MCNC: 253 MG/DL (ref 70–99)
GLUCOSE, URINE: >500 MG/DL
HCO3 VENOUS: 24.3 MMOL/L (ref 19–25)
HCT VFR BLD CALC: 28.7 % (ref 42–52)
HEMOGLOBIN: 8.2 GM/DL (ref 13.5–18)
HYALINE CASTS: 2 /LPF
IMMATURE NEUTROPHIL %: 0.6 % (ref 0–0.43)
KETONES, URINE: NEGATIVE MG/DL
LEUKOCYTE ESTERASE, URINE: NEGATIVE
LYMPHOCYTES ABSOLUTE: 1.1 K/CU MM
LYMPHOCYTES RELATIVE PERCENT: 5.2 % (ref 24–44)
MCH RBC QN AUTO: 22.9 PG (ref 27–31)
MCHC RBC AUTO-ENTMCNC: 28.6 % (ref 32–36)
MCV RBC AUTO: 80.2 FL (ref 78–100)
MONOCYTES ABSOLUTE: 3.6 K/CU MM
MONOCYTES RELATIVE PERCENT: 16.7 % (ref 0–4)
NITRITE URINE, QUANTITATIVE: NEGATIVE
NUCLEATED RBC %: 0.1 %
O2 SAT, VEN: 53.7 % (ref 50–70)
PCO2, VEN: 42 MMHG (ref 38–52)
PDW BLD-RTO: 19.2 % (ref 11.7–14.9)
PH VENOUS: 7.37 (ref 7.32–7.42)
PH, URINE: 5 (ref 5–8)
PLATELET # BLD: 425 K/CU MM (ref 140–440)
PMV BLD AUTO: 9.1 FL (ref 7.5–11.1)
PO2, VEN: 31 MMHG (ref 28–48)
POTASSIUM SERPL-SCNC: 6.2 MMOL/L (ref 3.5–5.1)
PRO-BNP: 1897 PG/ML
PROTEIN UA: NEGATIVE MG/DL
RBC # BLD: 3.58 M/CU MM (ref 4.6–6.2)
RBC URINE: ABNORMAL /HPF (ref 0–3)
SEGMENTED NEUTROPHILS ABSOLUTE COUNT: 16.3 K/CU MM
SEGMENTED NEUTROPHILS RELATIVE PERCENT: 76.1 % (ref 36–66)
SODIUM BLD-SCNC: 126 MMOL/L (ref 135–145)
SPECIFIC GRAVITY UA: 1.01 (ref 1–1.03)
TOTAL IMMATURE NEUTOROPHIL: 0.13 K/CU MM
TOTAL NUCLEATED RBC: 0 K/CU MM
TOTAL PROTEIN: 6.3 GM/DL (ref 6.4–8.2)
TRICHOMONAS: ABNORMAL /HPF
TROPONIN T: 0.22 NG/ML
UROBILINOGEN, URINE: NEGATIVE MG/DL (ref 0.2–1)
WBC # BLD: 21.5 K/CU MM (ref 4–10.5)
WBC UA: <1 /HPF (ref 0–2)

## 2021-03-01 PROCEDURE — 71045 X-RAY EXAM CHEST 1 VIEW: CPT

## 2021-03-01 PROCEDURE — 87077 CULTURE AEROBIC IDENTIFY: CPT

## 2021-03-01 PROCEDURE — 51798 US URINE CAPACITY MEASURE: CPT

## 2021-03-01 PROCEDURE — 83880 ASSAY OF NATRIURETIC PEPTIDE: CPT

## 2021-03-01 PROCEDURE — 6370000000 HC RX 637 (ALT 250 FOR IP): Performed by: INTERNAL MEDICINE

## 2021-03-01 PROCEDURE — P9047 ALBUMIN (HUMAN), 25%, 50ML: HCPCS | Performed by: INTERNAL MEDICINE

## 2021-03-01 PROCEDURE — 93005 ELECTROCARDIOGRAM TRACING: CPT | Performed by: EMERGENCY MEDICINE

## 2021-03-01 PROCEDURE — G0378 HOSPITAL OBSERVATION PER HR: HCPCS

## 2021-03-01 PROCEDURE — 2700000000 HC OXYGEN THERAPY PER DAY

## 2021-03-01 PROCEDURE — 87086 URINE CULTURE/COLONY COUNT: CPT

## 2021-03-01 PROCEDURE — 85025 COMPLETE CBC W/AUTO DIFF WBC: CPT

## 2021-03-01 PROCEDURE — 71250 CT THORAX DX C-: CPT

## 2021-03-01 PROCEDURE — 96375 TX/PRO/DX INJ NEW DRUG ADDON: CPT

## 2021-03-01 PROCEDURE — 81001 URINALYSIS AUTO W/SCOPE: CPT

## 2021-03-01 PROCEDURE — 87186 SC STD MICRODIL/AGAR DIL: CPT

## 2021-03-01 PROCEDURE — 2000000000 HC ICU R&B

## 2021-03-01 PROCEDURE — 84484 ASSAY OF TROPONIN QUANT: CPT

## 2021-03-01 PROCEDURE — 6360000002 HC RX W HCPCS: Performed by: INTERNAL MEDICINE

## 2021-03-01 PROCEDURE — 82805 BLOOD GASES W/O2 SATURATION: CPT

## 2021-03-01 PROCEDURE — 80053 COMPREHEN METABOLIC PANEL: CPT

## 2021-03-01 PROCEDURE — 96374 THER/PROPH/DIAG INJ IV PUSH: CPT

## 2021-03-01 PROCEDURE — 96365 THER/PROPH/DIAG IV INF INIT: CPT

## 2021-03-01 PROCEDURE — 99285 EMERGENCY DEPT VISIT HI MDM: CPT

## 2021-03-01 PROCEDURE — 82962 GLUCOSE BLOOD TEST: CPT

## 2021-03-01 RX ORDER — DOBUTAMINE HYDROCHLORIDE 200 MG/100ML
5 INJECTION INTRAVENOUS CONTINUOUS
Status: DISCONTINUED | OUTPATIENT
Start: 2021-03-01 | End: 2021-03-12

## 2021-03-01 RX ORDER — ACETAMINOPHEN 325 MG/1
650 TABLET ORAL EVERY 6 HOURS PRN
Status: DISCONTINUED | OUTPATIENT
Start: 2021-03-01 | End: 2021-03-15 | Stop reason: HOSPADM

## 2021-03-01 RX ORDER — ASPIRIN 81 MG/1
81 TABLET, CHEWABLE ORAL DAILY
Status: DISCONTINUED | OUTPATIENT
Start: 2021-03-02 | End: 2021-03-15 | Stop reason: HOSPADM

## 2021-03-01 RX ORDER — FUROSEMIDE 10 MG/ML
40 INJECTION INTRAMUSCULAR; INTRAVENOUS ONCE
Status: COMPLETED | OUTPATIENT
Start: 2021-03-01 | End: 2021-03-01

## 2021-03-01 RX ORDER — FUROSEMIDE 10 MG/ML
20 INJECTION INTRAMUSCULAR; INTRAVENOUS ONCE
Status: DISCONTINUED | OUTPATIENT
Start: 2021-03-01 | End: 2021-03-01

## 2021-03-01 RX ORDER — FERROUS SULFATE 325(65) MG
325 TABLET ORAL 2 TIMES DAILY WITH MEALS
Status: DISCONTINUED | OUTPATIENT
Start: 2021-03-02 | End: 2021-03-03

## 2021-03-01 RX ORDER — GABAPENTIN 300 MG/1
600 CAPSULE ORAL 3 TIMES DAILY
Status: DISCONTINUED | OUTPATIENT
Start: 2021-03-02 | End: 2021-03-05

## 2021-03-01 RX ORDER — ALBUMIN (HUMAN) 12.5 G/50ML
25 SOLUTION INTRAVENOUS ONCE
Status: COMPLETED | OUTPATIENT
Start: 2021-03-01 | End: 2021-03-01

## 2021-03-01 RX ORDER — GLIMEPIRIDE 4 MG/1
4 TABLET ORAL DAILY
Status: DISCONTINUED | OUTPATIENT
Start: 2021-03-02 | End: 2021-03-02

## 2021-03-01 RX ORDER — ATORVASTATIN CALCIUM 10 MG/1
10 TABLET, FILM COATED ORAL DAILY
Status: DISCONTINUED | OUTPATIENT
Start: 2021-03-02 | End: 2021-03-15 | Stop reason: HOSPADM

## 2021-03-01 RX ADMIN — ALBUMIN (HUMAN) 25 G: 0.25 INJECTION, SOLUTION INTRAVENOUS at 20:10

## 2021-03-01 RX ADMIN — FUROSEMIDE 40 MG: 10 INJECTION, SOLUTION INTRAVENOUS at 19:58

## 2021-03-01 RX ADMIN — SODIUM ZIRCONIUM CYCLOSILICATE 10 G: 5 POWDER, FOR SUSPENSION ORAL at 20:33

## 2021-03-01 ASSESSMENT — ENCOUNTER SYMPTOMS
SORE THROAT: 0
NAUSEA: 0
RHINORRHEA: 0
VOMITING: 0
SHORTNESS OF BREATH: 1
COUGH: 0
BACK PAIN: 0
ABDOMINAL PAIN: 0
EYE PAIN: 0
EYE DISCHARGE: 0

## 2021-03-01 ASSESSMENT — PAIN DESCRIPTION - DESCRIPTORS: DESCRIPTORS: ACHING

## 2021-03-01 NOTE — ED TRIAGE NOTES
Pt presents to ED by EMS from home for bilateral leg swelling and chest pain. Pt received open heart surgery on the 16th. Pt states he has had a 5lb weight gain over the weekend. Pt is A&O x4. Pt arrived on 4L NC per EMS for sats in the 80's.

## 2021-03-01 NOTE — CARE COORDINATION
CM received consult from Dr Chip Haile for patient in hallway bed #2 to assist with discharge planning. CM met with patient to begin discharge planning. CM introduced self and CM role. Patient presents to ER with c/o generalized weakness and \"fluid buildup\". Patient status post CABG 2/16 with revision 2/18. Patient also reports 2 weeks prior to CABG he had a left carotid endarterectomy. Patient lives in a single story home in [place] with his spouse Karli Jay. Patient has PCP and insurance which assists with medication affordability. Patient has the following DME: cane and CPAP. Patient reports prior to surgical procedure he was independent with ADLs and was able to drive to and from doctor's appointments. Patient reports he is active with [address] [place] [address] Rd. States had a nursing visit today and was advised by nurse to come to the emergency department for evaluation. Patient reports today he has been unable to get out of the chair on his own. During previous hospitalization patient reports he refused SNF placement, but states his spouse is \"wore out\" and is unable to provide necessary care for patient at this time. Patient requests possible SNF placement. Patient provided with SNF list. The patient's individualized treatment goals were discussed and they are in agreement with the transitional plan of care. The patient was provided with a choice in provider and understands the freedom of choice list that was provided to them. The patient and/or family verbalize understanding of treatment preferences and quality data associated with providers. First choice [facility] and second [address] Aiden Hutton. [id number] CM called Christine at [facility] to advise of possible referral. Pertinent information provided. Demographics sheet e faxed to [facility].

## 2021-03-01 NOTE — ED PROVIDER NOTES
[address] ENCOUNTER      Pt Name: Sebastián Holder  MRN: [id number]  [place] 1950  Date of evaluation: 3/1/2021  Provider: Emily Renee MD    CHIEF COMPLAINT       Chief Complaint   Patient presents with    Leg Swelling    Chest Pain         HISTORY OF PRESENT ILLNESS      Sebastián Holder is a 66 y.o. male who presents to the emergency department  for   Chief Complaint   Patient presents with    Leg Swelling    Chest Pain       66-year-old male presents with generalized weakness. He was recently discharged home after undergoing open heart surgery for CABG as well as aortic valve replacement and Maze procedure. He then subsequently underwent a procedure on his pacer. He states that he was originally doing well in the hospital but since discharge home he is becoming progressively more weak. He states he has been unable to get to a standing position. He states he does not typically use any sort of assistive walking device. He does endorse some chest pain. Denies any remarkable fever or chills. Does endorse some shortness of breath. He is not having remarkable abdominal pain. Denies any focal neurological deficits. In the emergency department, he is alert and oriented answer questions appropriately. GCS of 15. He states that his wife is not able to care for him at home given the extent of his weakness. He was noted to be hypoxic from EMS. He was placed on supplemental oxygen. With supplemental oxygen, his sats are improved in the emergency department. Nursing Notes, Triage Notes & Vital Signs were reviewed. REVIEW OF SYSTEMS    (2-9 systems for level 4, 10 or more for level 5)     Review of Systems   Constitutional: Negative for chills and fever. HENT: Negative for congestion, rhinorrhea and sore throat. Eyes: Negative for pain and discharge.    Respiratory: Positive for shortness of breath. Negative for cough. Cardiovascular: Positive for chest pain. Gastrointestinal: Negative for abdominal pain, nausea and vomiting. Endocrine: Negative for polydipsia and polyuria. Genitourinary: Negative for dysuria and flank pain. Musculoskeletal: Negative for back pain and neck pain. Skin: Negative for pallor and wound. Neurological: Positive for weakness. Negative for dizziness, facial asymmetry, light-headedness and headaches. Except as noted above the remainder of the review of systems was reviewed and negative. PAST MEDICAL HISTORY     Past Medical History:   Diagnosis Date    Arrhythmia     Pacemaker placed aprox 5 years ago for A Fib per patient    Arthritis 12/2013    rt wrist    Atrial fibrillation ([facility]     on Xarelto - Dr. Sophie Mojica CAD (coronary artery disease) 06/18/2014    see dr Harish Sanchez kidney disease, stage III (moderate) 07/07/2016    Diabetes mellitus ([facility]     dx 2004    Diabetic neuropathy associated with type 2 diabetes mellitus ([facility] 04/23/2019    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    Gout 04/2019    \"got gout when had pacer put in because they did not give me my medication for gout \"    H/O 24 hour EKG monitoring 10/03/2013    no afib noted, sinsus rhythm    H/O cardiovascular stress test 05/12/2014    cardiolite- mild ischemia RCA EF50%    H/O echocardiogram 12/01/2020    EF 55-60% severe aortic stenosis mild to mod aortic regurg mod to severe tricuspid regurg severe pulm htn significant changes since 2018 echo.  H/O right and left heart catheterization 12/10/2020    DIFFUSE LAD DISEASE, Mild ECA Disease, Severe AS, Milf Pul HTN on RHC.     H/O transesophageal echocardiography (MABLE) for monitoring 08/05/2013    normal LV function and normal LA appendage without any clot    History of blood transfusion 12/2020    d/t anemia    History of transesophageal echocardiography (MABLE) 12/15/2020    Severe aortic stenosis (ANNA by planimetry: 0.778 cm sq). Mild AR.    [place] (hard of hearing)     hearing tonya aides    Hx of Doppler echocardiogram 05/21/2018    EF 50%  Mild LV hypertrophy. Mildly enlarged RA. Mod aortic valve calcification with mod AS. Mitral annular calcification is present. Mild AR, MR and TR. Mild pulmonary htn.  Hyperlipidemia     Hypertension     Follows with PCP & Dr. Lupe Zaldivar Other disorders of kidney and ureter     Pacemaker     [employer], implanted 2014    Pneumonia 12/29/2012    Sleep apnea     dx 2013- has c-pap    Type II or unspecified type diabetes mellitus with other specified manifestations, uncontrolled 12/12/2012    Venous hypertension, chronic, with ulcer ([facility] 12/12/2012    resolved       Prior to Admission medications    Medication Sig Start Date End Date Taking? Authorizing Provider   amiodarone (CORDARONE) 200 MG tablet Take 1 tablet by mouth daily 2/25/21   Faby Webber PA-C   ferrous sulfate (IRON 325) 325 (65 Fe) MG tablet Take 1 tablet by mouth 2 times daily 2/9/21   Gonzales Lindquist PA-C   allopurinol (ZYLOPRIM) 100 MG tablet Take 1 tablet by mouth daily  Patient taking differently: Take 100 mg by mouth nightly  2/9/21   Gonzales Lindquist PA-C   canagliflozin [facility]) 300 MG TABS tablet Take 1 tablet by mouth every morning (before breakfast) 2/9/21   Gonzales Lindquist PA-C   Dulaglutide (TRULICITY) 1.5 CS/9.0IV SOPN Inject 1.5 mg into the skin once a week 2/9/21   Gonzales Lindquist PA-C   gabapentin (NEURONTIN) 600 MG tablet Take 1 tablet by mouth 3 times daily for 270 days.  2/9/21 11/6/21  Gonzales Lindquist PA-C   glimepiride (AMARYL) 4 MG tablet Take 1 tablet by mouth daily 2/9/21   Gonzales Lindquist PA-C   metFORMIN (GLUCOPHAGE) 1000 MG tablet TAKE 1 TABLET TWICE DAILY  WITH MEALS 2/9/21   Gonzales Lindquist PA-C   simvastatin (ZOCOR) 20 MG tablet Pt takes 10 mg daily 2/9/21   Gonzales Lindquist PA-C   rivaroxaban (XARELTO) 20 MG TABS tablet TAKE 1 TABLET DAILY WITH   BREAKFAST 2/9/21   Slater Nyhan DEXTER Brown   acetaminophen (AMINOFEN) 325 MG tablet Take 2 tablets by mouth every 6 hours as needed for Pain 1/30/21   Ruslan Vang MD   Cholecalciferol (VITAMIN D) 50 MCG (2000 UT) CAPS capsule Take by mouth nightly     Historical Provider, MD   furosemide (LASIX) 20 MG tablet Take 1 tablet by mouth 2 times daily May take an extra Lasix if has increased swelling 3/20/20   Felisha Leon MD   aspirin 81 MG tablet Take 81 mg by mouth daily    Historical Provider, MD        Patient Active Problem List   Diagnosis    Type 2 diabetes mellitus without complication, without long-term current use of insulin ([facility])    Ulcer of other part of lower limb    Venous hypertension, chronic, with ulcer ([facility]    Ulcer of other part of foot    Pneumonia    Atrial fibrillation ([facility])    Sinus pause    PAF (paroxysmal atrial fibrillation) ([facility]    DM (diabetes mellitus) ([facility]    DMITRIY on CPAP    Hyperlipidemia    Status post incision and drainage    CKD (chronic kidney disease) stage 3, GFR 30-59 ml/min ([facility])    Hyperpotassemia    Arthritis    PVD (peripheral vascular disease) ([facility]    Hematoma    Cardiac pacemaker in situ    Coronary artery stenosis    Essential hypertension    Gout    Diabetic neuropathy associated with type 2 diabetes mellitus ([facility]    Adenomatous polyp of sigmoid colon    Iron deficiency anemia due to chronic blood loss    Erythropoietin deficiency anemia    VHD (valvular heart disease)    Abnormal fractional flow reserve (FFR) on cardiac catheterization    Carotid stenosis, left    Aortic stenosis, severe    CAD in native artery    Displacement of atrial pacemaker leads    Pacemaker lead malfunction    SOB (shortness of breath)         SURGICAL HISTORY       Past Surgical History:   Procedure Laterality Date    CABG WITH AORTIC VALVE REPLACEMENT N/A 2/16/2021    CABG CORONARY ARTERY BYPASS X2 WITH LIMA, AORTIC VALVE REPLACEMENT AND AORTIC ROOT REPAIR, INTRAOPERATIVE MABLE, SOPN    Inject 1.5 mg into the skin once a week    FERROUS SULFATE (IRON 325) 325 (65 FE) MG TABLET    Take 1 tablet by mouth 2 times daily    FUROSEMIDE (LASIX) 20 MG TABLET    Take 1 tablet by mouth 2 times daily May take an extra Lasix if has increased swelling    GABAPENTIN (NEURONTIN) 600 MG TABLET    Take 1 tablet by mouth 3 times daily for 270 days. GLIMEPIRIDE (AMARYL) 4 MG TABLET    Take 1 tablet by mouth daily    METFORMIN (GLUCOPHAGE) 1000 MG TABLET    TAKE 1 TABLET TWICE DAILY  WITH MEALS    RIVAROXABAN (XARELTO) 20 MG TABS TABLET    TAKE 1 TABLET DAILY WITH   BREAKFAST    SIMVASTATIN (ZOCOR) 20 MG TABLET    Pt takes 10 mg daily       ALLERGIES     Spironolactone and Tape [adhesive tape]    FAMILY HISTORY       Family History   Problem Relation Age of Onset    High Blood Pressure Mother     Arthritis Mother     Diabetes Mother     Heart Disease Mother     High Blood Pressure Father     Heart Disease Father     Kidney Disease Father           SOCIAL HISTORY       Social History     Socioeconomic History    Marital status:      Spouse name: Not on file    Number of children: Not on file    Years of education: Not on file    Highest education level: Not on file   Occupational History    Not on file   Social Needs    Financial resource strain: Not on file    Food insecurity     Worry: Not on file     Inability: Not on file    Transportation needs     Medical: Not on file     Non-medical: Not on file   Tobacco Use    Smoking status: Never Smoker    Smokeless tobacco: Never Used   Substance and Sexual Activity    Alcohol use:  Yes     Alcohol/week: 2.0 standard drinks     Types: 2 Cans of beer per week     Comment: average \"one time per week\"    Drug use: No    Sexual activity: Yes     Partners: Female     Comment:    Lifestyle    Physical activity     Days per week: Not on file     Minutes per session: Not on file    Stress: Not on file   Relationships    Social connections     Talks on phone: Not on file     Gets together: Not on file     Attends [employer] service: Not on file     Active member of club or organization: Not on file     Attends meetings of clubs or organizations: Not on file     Relationship status: Not on file    Intimate partner violence     Fear of current or ex partner: Not on file     Emotionally abused: Not on file     Physically abused: Not on file     Forced sexual activity: Not on file   Other Topics Concern    Not on file   Social History Narrative    Not on file       SCREENINGS    [place] Coma Scale  Eye Opening: Spontaneous  Best Verbal Response: Oriented  Best Motor Response: Obeys commands  [place] Coma Scale Score: 15          PHYSICAL EXAM    (up to 7 for level 4, 8 or more for level 5)     ED Triage Vitals   BP Temp Temp Source Pulse Resp SpO2 Height Weight   03/01/21 1447 03/01/21 1447 03/01/21 1447 03/01/21 1447 03/01/21 1447 03/01/21 1449 -- --   (!) 118/56 96.3 °F (35.7 °C) Oral 62 18 100 %         Physical Exam  Vitals signs reviewed. Constitutional:       Appearance: He is not ill-appearing or toxic-appearing. HENT:      Head: Normocephalic and atraumatic. Nose: No congestion or rhinorrhea. Mouth/Throat:      Mouth: Mucous membranes are dry. Pharynx: No oropharyngeal exudate or posterior oropharyngeal erythema. Eyes:      General:         Right eye: No discharge. Left eye: No discharge. Extraocular Movements: Extraocular movements intact. Pupils: Pupils are equal, round, and reactive to light. Neck:      Musculoskeletal: Normal range of motion and neck supple. No muscular tenderness. Cardiovascular:      Rate and Rhythm: Normal rate. Heart sounds: No friction rub. No gallop. Pulmonary:      Effort: Pulmonary effort is normal. No respiratory distress. Comments: Diminished breath sounds bilaterally  Abdominal:      Palpations: Abdomen is soft. Tenderness:  There is no abdominal tenderness. Comments: Abdomen soft, non-tender, non-peritoneal  No guarding on abdominal exam   Musculoskeletal:      Right lower leg: Edema present. Left lower leg: Edema present. Comments: B/l pitting edema   Skin:     General: Skin is warm. Capillary Refill: Capillary refill takes less than 2 seconds. Coloration: Skin is not jaundiced. Findings: No rash. Neurological:      General: No focal deficit present. Mental Status: He is alert and oriented to person, place, and time. Mental status is at baseline.          DIAGNOSTIC RESULTS     Labs Reviewed   COMPREHENSIVE METABOLIC PANEL W/ REFLEX TO MG FOR LOW K - Abnormal; Notable for the following components:       Result Value    Sodium 126 (*)     Potassium 6.2 (*)     Chloride 92 (*)     BUN 82 (*)     CREATININE 1.7 (*)     Glucose 142 (*)     Albumin 2.6 (*)     Total Protein 6.3 (*)     ALT 49 (*)     AST 59 (*)     Alkaline Phosphatase 148 (*)     GFR Non- 40 (*)     GFR  48 (*)     All other components within normal limits   CBC WITH AUTO DIFFERENTIAL - Abnormal; Notable for the following components:    WBC 21.5 (*)     RBC 3.58 (*)     Hemoglobin 8.2 (*)     Hematocrit 28.7 (*)     MCH 22.9 (*)     MCHC 28.6 (*)     RDW 19.2 (*)     Segs Relative 76.1 (*)     Lymphocytes % 5.2 (*)     Monocytes % 16.7 (*)     Immature Neutrophil % 0.6 (*)     All other components within normal limits   BRAIN NATRIURETIC PEPTIDE - Abnormal; Notable for the following components:    Pro-BNP 1,897 (*)     All other components within normal limits   TROPONIN - Abnormal; Notable for the following components:    Troponin T 0.216 (*)     All other components within normal limits   URINE RT REFLEX TO CULTURE - Abnormal; Notable for the following components:    Glucose, Urine >500 (*)     All other components within normal limits   CULTURE, BLOOD 1   CULTURE, BLOOD 2   CULTURE, URINE   CULTURE, RESPIRATORY BLOOD GAS, VENOUS          EKG: All EKG's are interpreted by the Emergency Department Physician who either signs or Co-signs this chart in the absence of a cardiologist.       EKG Interpretation    Interpreted by emergency department physician    EKG is interpreted by me. EKG shows rhythm at 60 bpm, rhythm appears paced, QRS complexes are wide, he has left axis deviation, no marked ST segment ovation, T waves are unremarkable, WA interval 174, QRS duration 180, QTC of 500. Final pression, nonspecific EKG. Review of records, he has had similar morphology on prior EKGs    [address]:     Non-plain film images such as CT, Ultrasound and MRI are read by the radiologist. Plain radiographic images are visualized and preliminarily interpreted by the emergency physician. Interpretation per the Radiologist below, if available at the time of this note:    CT CHEST [address]   Final Result   Patient status post midline sternotomy. Immediately posterior to the   sternotomy defect, there is a small gas and fluid collection which is   nonspecific. It is not necessarily outside normal limits for a sternotomy   that was performed 2 weeks ago. However, sterility cannot be ascertained   with imaging, and therefore a small developing abscess is considered as well. No evidence of osteolysis of the sternotomy defect to suggest osteomyelitis. Interval development of a moderate to large right and a moderate left pleural   effusion. Adjacent airspace disease is some combination of atelectasis,   pneumonia, and/or edema.          XR CHEST PORTABLE   Final Result   Right-sided pleural effusion      Bibasilar hypoaeration               ED BEDSIDE ULTRASOUND:   Performed by ED Physician Tanja Mcwilliams MD       LABS:  Labs Reviewed   COMPREHENSIVE METABOLIC PANEL W/ REFLEX TO MG FOR LOW K - Abnormal; Notable for the following components:       Result Value    Sodium 126 (*)     Potassium 6.2 (*)     Chloride 92 (*)     BUN 82 (*)     CREATININE 1.7 (*)     Glucose 142 (*)     Albumin 2.6 (*)     Total Protein 6.3 (*)     ALT 49 (*)     AST 59 (*)     Alkaline Phosphatase 148 (*)     GFR Non- 40 (*)     GFR  48 (*)     All other components within normal limits   CBC WITH AUTO DIFFERENTIAL - Abnormal; Notable for the following components:    WBC 21.5 (*)     RBC 3.58 (*)     Hemoglobin 8.2 (*)     Hematocrit 28.7 (*)     MCH 22.9 (*)     MCHC 28.6 (*)     RDW 19.2 (*)     Segs Relative 76.1 (*)     Lymphocytes % 5.2 (*)     Monocytes % 16.7 (*)     Immature Neutrophil % 0.6 (*)     All other components within normal limits   BRAIN NATRIURETIC PEPTIDE - Abnormal; Notable for the following components:    Pro-BNP 1,897 (*)     All other components within normal limits   TROPONIN - Abnormal; Notable for the following components:    Troponin T 0.216 (*)     All other components within normal limits   URINE RT REFLEX TO CULTURE - Abnormal; Notable for the following components:    Glucose, Urine >500 (*)     All other components within normal limits   CULTURE, BLOOD 1   CULTURE, BLOOD 2   CULTURE, URINE   CULTURE, RESPIRATORY   BLOOD GAS, VENOUS       All other labs were within normal range or not returned as of this dictation. EMERGENCY DEPARTMENT COURSE and DIFFERENTIAL DIAGNOSIS/MDM:   Vitals:    Vitals:    03/01/21 2017 03/01/21 2019 03/01/21 2032 03/01/21 2139   BP:  (!) 109/90 (!) 124/43 (!) 105/57   Pulse: 60 60 60 60   Resp:  21 21 19   Temp:       TempSrc:       SpO2:  100%     Weight:       Height:               MDM  Number of Diagnoses or Management Options  General weakness  Hyperkalemia  Pleural effusion  Respiratory failure, unspecified chronicity, unspecified whether with hypoxia or hypercapnia (HCC)  Diagnosis management comments: 43-year-old male presents reporting generalized weakness.   He was recently hospitalized after undergoing a CABG, aortic valve replacement and Maze procedure. He also had a procedure done on his pacemaker. He reports he was initially doing well and then discharged home. Since being home he is become progressively more weak. He also endorses some chest pain or shortness of breath. He reportedly was hypoxic for EMS and 80s and was placed on supplemental oxygen. He presented to the emergency department with overall unremarkable vitals. On supplemental oxygen, he had saturations in the high 90s to 100%. In emergency department, he is alert and oriented answer questions appropriately. Labs and chest x-ray are obtained. Chest x-ray does show pleural effusions which are new. Labs do show a number of abnormalities. His white count is 31.5. Serum creatinine is at baseline. He does have hyperkalemia with potassium of 6.2. His troponin is also elevated 0.2. VBG does not show any hypercapnia. Patient CT surgeon's nephrologist who were consulted. He will be given Lasix as well as Lokelma for the hyperkalemia. He will be admitted to his CT surgeon for further evaluation management. CT surgeon did enter some orders such as CT scan to medication orders. Those will be followed by the inpatient team.       Amount and/or Complexity of Data Reviewed  Clinical lab tests: reviewed  Tests in the radiology section of CPT®: reviewed  Decide to obtain previous medical records or to obtain history from someone other than the patient: yes            -  Patient seen and evaluated in the emergency department. -  Triage and nursing notes reviewed and incorporated. -  Old chart records reviewed and incorporated. -  Work-up included:  See above  -  Results discussed with patient. REASSESSMENT          CRITICAL CARE TIME     Total critical care time today provided was 35 minutes. This excludes seperately billable procedure.  Critical care time provided for hyperkalemia, weakness, volume overload that required close evaluation and/or intervention with concern for patient decompensation. This excludes seperately billable procedures and family discussion time. Critical care time provided for obtaining history, conducting a physical exam, performing and monitoring interventions, ordering, collecting and interpreting tests, and establishing medical decision-making. There was a potential for life/limb threatening pathology requiring close evaluation and intervention with concern for patient decompensation. CONSULTS:  IP CONSULT TO CASE MANAGEMENT  IP CONSULT TO CARDIOTHORACIC SURGERY  IP CONSULT TO ENDOCRINOLOGY  IP CONSULT TO INTERVENTIONAL RADIOLOGY  IP CONSULT TO NEPHROLOGY    PROCEDURES:  None performed unless otherwise noted below     Procedures        FINAL IMPRESSION      1. Hyperkalemia    2. General weakness    3. Pleural effusion    4. Respiratory failure, unspecified chronicity, unspecified whether with hypoxia or hypercapnia ([facility]          DISPOSITION/PLAN   DISPOSITION Admitted 03/01/2021 08:05:00 PM      PATIENT REFERRED TO:  No follow-up provider specified. DISCHARGE MEDICATIONS:  New Prescriptions    No medications on file       ED Provider Disposition Time  DISPOSITION Admitted 03/01/2021 08:05:00 PM      Appropriate personal protective equipment was worn during the patient's evaluation. These included surgical, eye protection, surgical mask or in 95 respirator and gloves. The patient was also placed in a surgical mask for the prevention of possible spread of respiratory viral illnesses. The Patient was instructed to read the package inserts with any medication that was prescribed. Major potential reactions and medication interactions were discussed. The Patient understands that there are numerous possible adverse reactions not covered.     The patient was also instructed to arrange follow-up with his or her primary care provider for review of any pending labwork or incidental findings on any radiology results that were

## 2021-03-01 NOTE — ED NOTES
Bed: H-02  Expected date:   Expected time:   Means of arrival:   Comments:  EMS     Arnel Coreas RN  03/01/21 0391

## 2021-03-01 NOTE — CONSULTS
Nephrology Service Consultation    Patient:  Arnav Gonzales  MRN: [id number]  Consulting physician:  Ramirez Ruiz MD  Reason for Consult: sob with fatigue and edema    History Obtained From:  patient, electronic medical record  PCP: Ruma Iverson MD    HISTORY OF PRESENT ILLNESS:   The patient is a 66 y.o. male who presents with weakness and sob was dc last week after cardiac surgery and tolerated well and creat 1.6 on dc with K 4.4 and glucose improved. State since dc increase edema and more weak not francesca to get up and stand and came er. Noted increase wbc and right effusion with sob. Now increase K as well and noted +uop in urinal. glucsoe stable but low na and low protein needing eval. I dw dr Vasquez Slater and er doctor and will plan admit work up    Past Medical History:        Diagnosis Date    Arrhythmia     Pacemaker placed aprox 5 years ago for A Fib per patient    Arthritis 12/2013    rt wrist    Atrial fibrillation ([facility]     on Xarelto - Dr. Tiffany Gates CAD (coronary artery disease) 06/18/2014    see dr Saida Gray kidney disease, stage III (moderate) 07/07/2016    Diabetes mellitus ([address]     dx 2004    Diabetic neuropathy associated with type 2 diabetes mellitus ([facility] 04/23/2019    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    Gout 04/2019    \"got gout when had pacer put in because they did not give me my medication for gout \"    H/O 24 hour EKG monitoring 10/03/2013    no afib noted, sinsus rhythm    H/O cardiovascular stress test 05/12/2014    cardiolite- mild ischemia RCA EF50%    H/O echocardiogram 12/01/2020    EF 55-60% severe aortic stenosis mild to mod aortic regurg mod to severe tricuspid regurg severe pulm htn significant changes since 2018 echo.  H/O right and left heart catheterization 12/10/2020    DIFFUSE LAD DISEASE, Mild ECA Disease, Severe AS, Milf Pul HTN on RHC.     H/O transesophageal echocardiography (MABLE) for monitoring 08/05/2013    normal LV function and normal LA appendage without any clot    History of blood transfusion 12/2020    d/t anemia    History of transesophageal echocardiography (MABLE) 12/15/2020    Severe aortic stenosis (ANNA by planimetry: 0.778 cm sq). Mild AR.    [place] (hard of hearing)     hearing tonya aides    Hx of Doppler echocardiogram 05/21/2018    EF 50%  Mild LV hypertrophy. Mildly enlarged RA. Mod aortic valve calcification with mod AS. Mitral annular calcification is present. Mild AR, MR and TR. Mild pulmonary htn.     Hyperlipidemia     Hypertension     Follows with PCP & Dr. Morena Isaac Other disorders of kidney and ureter     Pacemaker     [employer], implanted 2014    Pneumonia 12/29/2012    Sleep apnea     dx 2013- has c-pap    Type II or unspecified type diabetes mellitus with other specified manifestations, uncontrolled 12/12/2012    Venous hypertension, chronic, with ulcer ([address] 12/12/2012    resolved       Past Surgical History:        Procedure Laterality Date    CABG WITH AORTIC VALVE REPLACEMENT N/A 2/16/2021    CABG CORONARY ARTERY BYPASS X2 WITH LIMA, AORTIC VALVE REPLACEMENT AND AORTIC ROOT REPAIR, INTRAOPERATIVE MABLE, INDUCED HYPOTHERMIA, LEFT LEG ENDOVEIN HARVEST, LEFT ATRIAL CLIP, AND CRYO PROCEDURE performed by Tyson Contreras MD at [address] [place] [address]  12/14    at [address] Left 1/29/2021    LEFT CAROTID ENDARTERECTOMY performed by Anibal Yeung MD at [id number]  2017    COLONOSCOPY  2011    COLONOSCOPY N/A 11/19/2019    COLONOSCOPY DIAGNOSTIC performed by Julián Childress MD at [place] [address]  09/30/2020    POSSIBLE CECAL avms, SIGMOID DIVERTICULOSIS, INTERNAL HEMORRHOIDS GRADE 1    COLONOSCOPY N/A 9/30/2020    COLONOSCOPY CONTROL HEMORRHAGE WITH APC performed by Julián Childress MD at [facility]  2014    \"2 stents put in \"   [address]  2004    total left hip    OTHER SURGICAL HISTORY Right 12/02/2017    I&D; evacuation of hematoma right hip    OTHER SURGICAL HISTORY  09/17/2020    enteroscopy    PACEMAKER INSERTION N/A 2/23/2021    PACEMAKER GENERATOR LEAD REVISION performed by Kimberli Colin MD at [facility]      9/18/14 Status post remote permanent pacemaker with atrial lead dislodgement. 7/24/14 PPM Implant    UPPER GASTROINTESTINAL ENDOSCOPY N/A 9/17/2020    ENTEROSCOPY PUSH BIOPSY performed by Asia Jessica MD [facility]  2012    \"have stents in both legs- done in [place]       Medications:   Scheduled Meds:  Continuous Infusions:  PRN Meds:. Allergies:  Spironolactone and Tape Dewight Hopper tape]    Social History:   TOBACCO:   reports that he has never smoked. He has never used smokeless tobacco.  ETOH:   reports current alcohol use of about 2.0 standard drinks of alcohol per week. OCCUPATION:      Family History:       Problem Relation Age of Onset    High Blood Pressure Mother     Arthritis Mother     Diabetes Mother     Heart Disease Mother     High Blood Pressure Father     Heart Disease Father     Kidney Disease Father        REVIEW OF SYSTEMS:  Negative except for weak sob edema. Physical Exam:    I/O: No intake/output data recorded. Vitals: BP (!) 107/53   Pulse 60   Temp 96.3 °F (35.7 °C) (Oral)   Resp 18   SpO2 100%   General appearance: awake weak  HEENT: Head: Normal, normocephalic, atraumatic.   Neck: supple, symmetrical, trachea midline  Lungs: diminished breath sounds bilaterally  Heart: S1, S2  bradycardia  Abdomen: abnormal findings:  Soft obese   Extremities: edema +  Neurologic: Mental status: alertness: alert    CBC:   Recent Labs     03/01/21  1639   WBC 21.5*   HGB 8.2*        BMP:    Recent Labs     03/01/21  1639   *   K 6.2*   CL 92*   CO2 21   BUN 82*   CREATININE 1.7*   GLUCOSE 142*     Hepatic:   Recent Labs     03/01/21  1639   AST 59*   ALT 49*   BILITOT 0.4   ALKPHOS 148* Troponin: No results for input(s): TROPONINI in the last 72 hours. Mg, Phos: No results for input(s): MG, PHOS in the last 72 hours. ABGs:   Lab Results   Component Value Date    PO2ART 81 02/19/2021    CMF8IHP 40.0 02/19/2021     INR: No results for input(s): INR in the last 72 hours. -----------------------------------------------------------------      Assessment and Recommendations     Patient Active Problem List   Diagnosis Code    Type 2 diabetes mellitus without complication, without long-term current use of insulin ([facility]) E11.9    Ulcer of other part of lower limb L97.809    Venous hypertension, chronic, with ulcer ([facility] I87.319, L97.909    Ulcer of other part of foot L97.509    Pneumonia J18.9    Atrial fibrillation ([facility]) I48.91    Sinus pause I45.5    PAF (paroxysmal atrial fibrillation) ([facility]) I48.0    DM (diabetes mellitus) ([facility]) E11.9    DMITRIY on CPAP G47.33, Z99.89    Hyperlipidemia E78.5    Status post incision and drainage Z98.890    CKD (chronic kidney disease) stage 3, GFR 30-59 ml/min ([facility]) N18.30    Hyperpotassemia E87.5    Arthritis M19.90    PVD (peripheral vascular disease) ([facility]) I73.9    Hematoma T14. 8XXA    Cardiac pacemaker in situ Z95.0    Coronary artery stenosis I25.10    Essential hypertension I10    Gout M10.9    Diabetic neuropathy associated with type 2 diabetes mellitus ([facility] E11.40    Adenomatous polyp of sigmoid colon D12.5    Iron deficiency anemia due to chronic blood loss D50.0    Erythropoietin deficiency anemia D63.1    VHD (valvular heart disease) I38    Abnormal fractional flow reserve (FFR) on cardiac catheterization R94.39    Carotid stenosis, left I65.22    Aortic stenosis, severe I35.0    CAD in native artery I25.10    Displacement of atrial pacemaker leads T82.120A    Pacemaker lead malfunction T82.110A     Imp/plan  1 ckd 3B from htn and dm2  2 Dm2 controlled  3 cad sp cabg with chest pain  4 leukocytosis with right pleural effusion  5 hyperkalemia  6 hypotension  7 moderate protein malnutrtion  8 hyponatremia  9 anemia    Plan  1 renal appear baseline and mointor as increase diuresis  2 ssi and monitor  3 fu ct-s surgery rec and eval.  4 fu culture and may need thoracentesis and start abx rec vanco  5 start lokelma  6 bp monitor  7 need increase protein and give albumin lasix X1 check bladder scan  8 na monitor   9 hb monitor  Will follow and rec admit and monitor, dw dr Lee Beat and er    1   Electronically signed by Tonia Jones MD on 3/1/2021 at 6:22 PM

## 2021-03-02 ENCOUNTER — APPOINTMENT (OUTPATIENT)
Dept: INTERVENTIONAL RADIOLOGY/VASCULAR | Age: 71
DRG: 871 | End: 2021-03-02
Payer: MEDICARE

## 2021-03-02 ENCOUNTER — APPOINTMENT (OUTPATIENT)
Dept: GENERAL RADIOLOGY | Age: 71
DRG: 871 | End: 2021-03-02
Payer: MEDICARE

## 2021-03-02 LAB
ANION GAP SERPL CALCULATED.3IONS-SCNC: 11 MMOL/L (ref 4–16)
BUN BLDV-MCNC: 86 MG/DL (ref 6–23)
CALCIUM SERPL-MCNC: 7.8 MG/DL (ref 8.3–10.6)
CHLORIDE BLD-SCNC: 94 MMOL/L (ref 99–110)
CO2: 23 MMOL/L (ref 21–32)
CREAT SERPL-MCNC: 1.9 MG/DL (ref 0.9–1.3)
FERRITIN: 133 NG/ML (ref 30–400)
GFR AFRICAN AMERICAN: 42 ML/MIN/1.73M2
GFR NON-AFRICAN AMERICAN: 35 ML/MIN/1.73M2
GLUCOSE BLD-MCNC: 116 MG/DL (ref 70–99)
GLUCOSE BLD-MCNC: 136 MG/DL (ref 70–99)
GLUCOSE BLD-MCNC: 156 MG/DL (ref 70–99)
GLUCOSE BLD-MCNC: 183 MG/DL (ref 70–99)
GLUCOSE BLD-MCNC: 220 MG/DL (ref 70–99)
HCT VFR BLD CALC: 24.9 % (ref 42–52)
HEMOGLOBIN: 7 GM/DL (ref 13.5–18)
IRON: 9 UG/DL (ref 59–158)
MCH RBC QN AUTO: 23.2 PG (ref 27–31)
MCHC RBC AUTO-ENTMCNC: 28.1 % (ref 32–36)
MCV RBC AUTO: 82.5 FL (ref 78–100)
PCT TRANSFERRIN: 4 % (ref 10–44)
PDW BLD-RTO: 19.1 % (ref 11.7–14.9)
PLATELET # BLD: 376 K/CU MM (ref 140–440)
PMV BLD AUTO: 9.2 FL (ref 7.5–11.1)
POTASSIUM SERPL-SCNC: 5.4 MMOL/L (ref 3.5–5.1)
RBC # BLD: 3.02 M/CU MM (ref 4.6–6.2)
SODIUM BLD-SCNC: 128 MMOL/L (ref 135–145)
TOTAL IRON BINDING CAPACITY: 248 UG/DL (ref 250–450)
UNSATURATED IRON BINDING CAPACITY: 239 UG/DL (ref 110–370)
WBC # BLD: 14.9 K/CU MM (ref 4–10.5)

## 2021-03-02 PROCEDURE — 6370000000 HC RX 637 (ALT 250 FOR IP): Performed by: INTERNAL MEDICINE

## 2021-03-02 PROCEDURE — 71045 X-RAY EXAM CHEST 1 VIEW: CPT

## 2021-03-02 PROCEDURE — 96366 THER/PROPH/DIAG IV INF ADDON: CPT

## 2021-03-02 PROCEDURE — 96375 TX/PRO/DX INJ NEW DRUG ADDON: CPT

## 2021-03-02 PROCEDURE — 2709999900 HC NON-CHARGEABLE SUPPLY

## 2021-03-02 PROCEDURE — G0378 HOSPITAL OBSERVATION PER HR: HCPCS

## 2021-03-02 PROCEDURE — 2700000000 HC OXYGEN THERAPY PER DAY

## 2021-03-02 PROCEDURE — 0W9B3ZZ DRAINAGE OF LEFT PLEURAL CAVITY, PERCUTANEOUS APPROACH: ICD-10-PCS | Performed by: THORACIC SURGERY (CARDIOTHORACIC VASCULAR SURGERY)

## 2021-03-02 PROCEDURE — 96372 THER/PROPH/DIAG INJ SC/IM: CPT

## 2021-03-02 PROCEDURE — 96367 TX/PROPH/DG ADDL SEQ IV INF: CPT

## 2021-03-02 PROCEDURE — 97162 PT EVAL MOD COMPLEX 30 MIN: CPT

## 2021-03-02 PROCEDURE — P9047 ALBUMIN (HUMAN), 25%, 50ML: HCPCS | Performed by: INTERNAL MEDICINE

## 2021-03-02 PROCEDURE — 87070 CULTURE OTHR SPECIMN AEROBIC: CPT

## 2021-03-02 PROCEDURE — 36415 COLL VENOUS BLD VENIPUNCTURE: CPT

## 2021-03-02 PROCEDURE — 32555 ASPIRATE PLEURA W/ IMAGING: CPT

## 2021-03-02 PROCEDURE — 94761 N-INVAS EAR/PLS OXIMETRY MLT: CPT

## 2021-03-02 PROCEDURE — 82728 ASSAY OF FERRITIN: CPT

## 2021-03-02 PROCEDURE — 87040 BLOOD CULTURE FOR BACTERIA: CPT

## 2021-03-02 PROCEDURE — 80048 BASIC METABOLIC PNL TOTAL CA: CPT

## 2021-03-02 PROCEDURE — 2580000003 HC RX 258: Performed by: THORACIC SURGERY (CARDIOTHORACIC VASCULAR SURGERY)

## 2021-03-02 PROCEDURE — 6360000002 HC RX W HCPCS: Performed by: INTERNAL MEDICINE

## 2021-03-02 PROCEDURE — C1729 CATH, DRAINAGE: HCPCS

## 2021-03-02 PROCEDURE — 96376 TX/PRO/DX INJ SAME DRUG ADON: CPT

## 2021-03-02 PROCEDURE — 97166 OT EVAL MOD COMPLEX 45 MIN: CPT

## 2021-03-02 PROCEDURE — 6360000002 HC RX W HCPCS: Performed by: THORACIC SURGERY (CARDIOTHORACIC VASCULAR SURGERY)

## 2021-03-02 PROCEDURE — 93010 ELECTROCARDIOGRAM REPORT: CPT | Performed by: INTERNAL MEDICINE

## 2021-03-02 PROCEDURE — 87205 SMEAR GRAM STAIN: CPT

## 2021-03-02 PROCEDURE — 51702 INSERT TEMP BLADDER CATH: CPT

## 2021-03-02 PROCEDURE — 85027 COMPLETE CBC AUTOMATED: CPT

## 2021-03-02 PROCEDURE — 83550 IRON BINDING TEST: CPT

## 2021-03-02 PROCEDURE — 97530 THERAPEUTIC ACTIVITIES: CPT

## 2021-03-02 PROCEDURE — 93308 TTE F-UP OR LMTD: CPT

## 2021-03-02 PROCEDURE — 83540 ASSAY OF IRON: CPT

## 2021-03-02 PROCEDURE — 6370000000 HC RX 637 (ALT 250 FOR IP): Performed by: THORACIC SURGERY (CARDIOTHORACIC VASCULAR SURGERY)

## 2021-03-02 PROCEDURE — 0W993ZZ DRAINAGE OF RIGHT PLEURAL CAVITY, PERCUTANEOUS APPROACH: ICD-10-PCS | Performed by: THORACIC SURGERY (CARDIOTHORACIC VASCULAR SURGERY)

## 2021-03-02 PROCEDURE — 82962 GLUCOSE BLOOD TEST: CPT

## 2021-03-02 RX ORDER — FUROSEMIDE 10 MG/ML
40 INJECTION INTRAMUSCULAR; INTRAVENOUS EVERY 8 HOURS
Status: COMPLETED | OUTPATIENT
Start: 2021-03-02 | End: 2021-03-03

## 2021-03-02 RX ORDER — GLIMEPIRIDE 4 MG/1
4 TABLET ORAL 2 TIMES DAILY WITH MEALS
Status: DISCONTINUED | OUTPATIENT
Start: 2021-03-02 | End: 2021-03-02

## 2021-03-02 RX ORDER — ALBUMIN (HUMAN) 12.5 G/50ML
25 SOLUTION INTRAVENOUS EVERY 8 HOURS
Status: COMPLETED | OUTPATIENT
Start: 2021-03-02 | End: 2021-03-03

## 2021-03-02 RX ORDER — INSULIN GLARGINE 100 [IU]/ML
30 INJECTION, SOLUTION SUBCUTANEOUS NIGHTLY
Status: DISCONTINUED | OUTPATIENT
Start: 2021-03-02 | End: 2021-03-06

## 2021-03-02 RX ORDER — GLIMEPIRIDE 4 MG/1
2 TABLET ORAL 2 TIMES DAILY WITH MEALS
Status: DISCONTINUED | OUTPATIENT
Start: 2021-03-02 | End: 2021-03-06

## 2021-03-02 RX ADMIN — FUROSEMIDE 40 MG: 10 INJECTION, SOLUTION INTRAMUSCULAR; INTRAVENOUS at 10:44

## 2021-03-02 RX ADMIN — SODIUM ZIRCONIUM CYCLOSILICATE 10 G: 5 POWDER, FOR SUSPENSION ORAL at 09:52

## 2021-03-02 RX ADMIN — ACETAMINOPHEN 650 MG: 325 TABLET ORAL at 00:24

## 2021-03-02 RX ADMIN — ASPIRIN 81 MG: 81 TABLET, CHEWABLE ORAL at 09:52

## 2021-03-02 RX ADMIN — GABAPENTIN 600 MG: 300 CAPSULE ORAL at 20:37

## 2021-03-02 RX ADMIN — DOBUTAMINE IN DEXTROSE 5 MCG/KG/MIN: 200 INJECTION, SOLUTION INTRAVENOUS at 16:42

## 2021-03-02 RX ADMIN — GLIMEPIRIDE 2 MG: 4 TABLET ORAL at 09:55

## 2021-03-02 RX ADMIN — GLIMEPIRIDE 2 MG: 4 TABLET ORAL at 17:06

## 2021-03-02 RX ADMIN — ATORVASTATIN CALCIUM 10 MG: 10 TABLET, FILM COATED ORAL at 09:52

## 2021-03-02 RX ADMIN — INSULIN GLARGINE 30 UNITS: 100 INJECTION, SOLUTION SUBCUTANEOUS at 20:49

## 2021-03-02 RX ADMIN — ALBUMIN (HUMAN) 25 G: 0.25 INJECTION, SOLUTION INTRAVENOUS at 18:19

## 2021-03-02 RX ADMIN — SODIUM ZIRCONIUM CYCLOSILICATE 10 G: 5 POWDER, FOR SUSPENSION ORAL at 20:37

## 2021-03-02 RX ADMIN — INSULIN LISPRO 2 UNITS: 100 INJECTION, SOLUTION INTRAVENOUS; SUBCUTANEOUS at 14:39

## 2021-03-02 RX ADMIN — GABAPENTIN 600 MG: 300 CAPSULE ORAL at 09:52

## 2021-03-02 RX ADMIN — ALBUMIN (HUMAN) 25 G: 0.25 INJECTION, SOLUTION INTRAVENOUS at 10:52

## 2021-03-02 RX ADMIN — FUROSEMIDE 40 MG: 10 INJECTION, SOLUTION INTRAMUSCULAR; INTRAVENOUS at 18:19

## 2021-03-02 RX ADMIN — GABAPENTIN 600 MG: 300 CAPSULE ORAL at 14:44

## 2021-03-02 RX ADMIN — FERROUS SULFATE TAB 325 MG (65 MG ELEMENTAL FE) 325 MG: 325 (65 FE) TAB at 16:42

## 2021-03-02 RX ADMIN — VANCOMYCIN HYDROCHLORIDE 2000 MG: 1 INJECTION, POWDER, LYOPHILIZED, FOR SOLUTION INTRAVENOUS at 01:09

## 2021-03-02 RX ADMIN — SODIUM ZIRCONIUM CYCLOSILICATE 10 G: 5 POWDER, FOR SUSPENSION ORAL at 14:43

## 2021-03-02 RX ADMIN — EPOETIN ALFA-EPBX 10000 UNITS: 10000 INJECTION, SOLUTION INTRAVENOUS; SUBCUTANEOUS at 10:43

## 2021-03-02 RX ADMIN — FERROUS SULFATE TAB 325 MG (65 MG ELEMENTAL FE) 325 MG: 325 (65 FE) TAB at 09:58

## 2021-03-02 RX ADMIN — ACETAMINOPHEN 650 MG: 325 TABLET ORAL at 20:37

## 2021-03-02 RX ADMIN — DOBUTAMINE IN DEXTROSE 5 MCG/KG/MIN: 200 INJECTION, SOLUTION INTRAVENOUS at 00:24

## 2021-03-02 RX ADMIN — GABAPENTIN 600 MG: 300 CAPSULE ORAL at 00:24

## 2021-03-02 ASSESSMENT — PAIN DESCRIPTION - PROGRESSION: CLINICAL_PROGRESSION: GRADUALLY WORSENING

## 2021-03-02 ASSESSMENT — PAIN DESCRIPTION - LOCATION: LOCATION: CHEST

## 2021-03-02 ASSESSMENT — PAIN DESCRIPTION - ONSET
ONSET: GRADUAL
ONSET: GRADUAL

## 2021-03-02 ASSESSMENT — PAIN SCALES - GENERAL
PAINLEVEL_OUTOF10: 3
PAINLEVEL_OUTOF10: 0

## 2021-03-02 ASSESSMENT — PAIN DESCRIPTION - FREQUENCY: FREQUENCY: CONTINUOUS

## 2021-03-02 ASSESSMENT — PAIN - FUNCTIONAL ASSESSMENT: PAIN_FUNCTIONAL_ASSESSMENT: ACTIVITIES ARE NOT PREVENTED

## 2021-03-02 ASSESSMENT — PAIN DESCRIPTION - PAIN TYPE: TYPE: SURGICAL PAIN

## 2021-03-02 ASSESSMENT — PAIN DESCRIPTION - ORIENTATION
ORIENTATION: MID
ORIENTATION: MID

## 2021-03-02 ASSESSMENT — PAIN DESCRIPTION - DIRECTION: RADIATING_TOWARDS: NO

## 2021-03-02 NOTE — ED NOTES
Report called to [facility], bed is dirty at this time     Miriam Avelar, RN  03/01/21 3598

## 2021-03-02 NOTE — ED NOTES
[place] blood gas (venous) from underside of patient's left wrist.      Polo Amezquita  03/01/21 2006

## 2021-03-02 NOTE — CARE COORDINATION
ANA informed that  would like pt to go to ARU. ANA received/ placed VO for PT/OT to see. [id number] ANA called Kasandra in ARU and requested that she follow.  [address]

## 2021-03-02 NOTE — PROGRESS NOTES
Occupational Therapy    OT/PT attempted evaluation this AM. Evaluation deferred by RN Niki Jaquez, who stated pt is \"whooped. \" Will re-attempt in afternoon as able and appropriate.     Shantell Anthony, OTR/L, [place], [id number]

## 2021-03-02 NOTE — PROGRESS NOTES
[facility]  consulted by Dr. Dallas Chicas for monitoring and adjustment. Indication for treatment: Sepsis  Goal trough: 15 mcg/mL    Pertinent Laboratory Values:   Temp Readings from Last 3 Encounters:   03/02/21 98.3 °F (36.8 °C) (Oral)   02/24/21 97.3 °F (36.3 °C) (Axillary)   02/12/21 97.8 °F (36.6 °C) (Temporal)     Recent Labs     03/01/21  1639   WBC 21.5*     Recent Labs     03/01/21  1639   BUN 82*   CREATININE 1.7*     Estimated Creatinine Clearance: 47 mL/min (A) (based on SCr of 1.7 mg/dL (H)). No intake or output data in the 24 hours ending 03/02/21 0130    Pertinent Cultures:  Date    Source    Results  03/01   Blood    Pending  03/01   Respiratory   Pending    Assessment:  WBC elevated at 21.5; Afebrile  CKD-3B: SCr = 1.7 (at baseline), BUN = 82, No I/O data  Day(s) of therapy: 1  Vancomycin concentration:   03/03 - To be drawn    Plan:  Vancomycin 2,000 mg IV initial dose; Follow with Vancomycin 1,500 mg IV Q 24 Hours  Check Vancomycin trough prior to third dose (due to CKD)  Pharmacy will continue to monitor patient and adjust therapy as indicated    Rosana 3 03/03/21 @ 23:30    Thank you for the consult.   Pankaj Preston, [address]   3/2/2021 1:30 AM

## 2021-03-02 NOTE — CARE COORDINATION
Received potential referral for ARU. Will review patients clinicals and await  PT/OT notes.   Thank you for the referral.

## 2021-03-02 NOTE — PROGRESS NOTES
Physical Therapy  [facility] ACUTE CARE PHYSICAL THERAPY EVALUATION  Vickey Fenton, 1950, 2101/2101-A, 3/2/2021    History  [place]:  The primary encounter diagnosis was Hyperkalemia. Diagnoses of General weakness, Pleural effusion, and Respiratory failure, unspecified chronicity, unspecified whether with hypoxia or hypercapnia ([address] were also pertinent to this visit. Patient  has a past medical history of Arrhythmia, Arthritis, Atrial fibrillation ([address] CAD (coronary artery disease), Chronic kidney disease, stage III (moderate), Diabetes mellitus ([address] Diabetic neuropathy associated with type 2 diabetes mellitus ([address] Erythropoietin deficiency anemia, Gout, H/O 24 hour EKG monitoring, H/O cardiovascular stress test, H/O echocardiogram, H/O right and left heart catheterization, H/O transesophageal echocardiography (MABLE) for monitoring, History of blood transfusion, History of transesophageal echocardiography (MABLE), [place] (hard of hearing), Hx of Doppler echocardiogram, Hyperlipidemia, Hypertension, Other disorders of kidney and ureter, Pacemaker, Pneumonia, Sleep apnea, Type II or unspecified type diabetes mellitus with other specified manifestations, uncontrolled, and Venous hypertension, chronic, with ulcer ([address] Patient  has a past surgical history that includes joint replacement (2004); pacemaker placement; Coronary angioplasty with stent (2014); vascular surgery (2012); Cardioversion (12/14); other surgical history (Right, 12/02/2017); other surgical history (09/17/2020); Upper gastrointestinal endoscopy (N/A, 9/17/2020); Colonoscopy (2011); Colonoscopy (N/A, 11/19/2019); Colonoscopy (09/30/2020); Colonoscopy (N/A, 9/30/2020); Cholecystectomy (2017); Carotid endarterectomy (Left, 1/29/2021); Coronary artery bypass graft (N/A, 2/16/2021); and Pacemaker insertion (N/A, 2/23/2021).     Subjective:  Patient states:  \"I'm just really tired\"   Pain: denies   Communication with other providers: Handoff to RN, co-eval with OT, Kenneth Phalen   Restrictions: sternal precautions, falls, zaragoza, IV, portable tele, 6L O2, BP cuff     Home Setup/Prior level of function  Social/Functional History  Lives With: Spouse(available 24/7)  Type of Home: House  Home Layout: One level(+ basement but does not need to go down there)  Home Access: Stairs to enter with rails  Entrance Stairs - Number of Steps: 2  Bathroom Shower/Tub: Walk-in shower  Bathroom Toilet: Handicap height  Bathroom Equipment: Grab bars in shower, Built-in shower seat  Home Equipment: Cane  ADL Assistance: Independent  Homemaking Assistance: Independent  Ambulation Assistance: Independent  Transfer Assistance: Independent  Active : Yes  Occupation: Retired  Type of occupation: [employer]  Leisure & Hobbies: golf   Examination of body systems (includes body structures/functions, activity/participation limitations):  · Observation:  Supine in bed upon arrival. Wife visiting. Pt cooperative to work with therapy though limited with how tired he felt. Swelling to bilat LEs  · Vision:  [facility]  · Hearing:  [place], hearing aids but not with him   · Cardiopulmonary:  Stable vitals on 6L O2   · Orientation: [facility]     Musculoskeletal  · ROM R/L:  WFL BLEs. · Strength R/L:  BLEs grossly 4+/5 though moderate strength deficits observed in function and endurance. Mobility/treatment:   · Rolling L/R:  NT   · Supine to sit:   modA for trunk support. Cues for overall sequencing and UE placement     · Transfers:   · Sit to stand: Gaby from EOB. Cues for fwd weight shift and hand placement  · Stand to sit:  modA for steadying to control sit to recliner. Weakness in LEs and feeling fatigue wanting to sit fairly quickly    · Step pivot: no AD, modA for steadying   · Sitting balance:  SBA/CGA static without UE support.  Gaby light dynamic without UE support   · Standing balance:  Min-modA without UE support < 1 minute   · Gait: ~3-4 steps to recliner during transfer, no AD, min-modA for balance and stability. LEs felt very weak to pt, heavy steps with dec ability to laterally weight shift. Short steps with fwd posture. Declined further ambulation trial with AD this date  · Educated pt on POC, role of PT, DME use, sternal precautions with mobility, discharge recommendations. Cues for sequencing to inc safety and indep with mobility     [facility] 6 Clicks Inpatient Mobility:  AM-PAC Inpatient Mobility Raw Score : 13    Safety: patient left in chair with alarm, call light within reach, RN notified, gait belt used. Assessment:  Pt is a 66year old male admitted with weakness and bilat LE swelling. Diagnosed with pleural effusion and hyperkalemia. He is s/p CABG/AVR on 2/16. Recommend ARU once medically stable. At baseline he is indep with gross mobility and ADLs. He is functioning well below his baseline and would benefit from continued therapy to address his current deficits, dec potential fall risk, and restore function. Complexity: Moderate  Prognosis: Good, no significant barriers to participation at this time.    Plan Times per week: 5+/week, 1 week   Discharge Recommendations: IP Rehab  Equipment: continue to assess at next level of care     Goals:  Short term goals  Time Frame for Short term goals: 1 week  Short term goal 1: Pt will perform sit><supine Gaby  Short term goal 2: Pt will transfer Gaby  Short term goal 3: Pt will ambulate 100ft with LRAD Gaby  Short term goal 4: Pt will perform standing light dynamic activity with single UE support Gaby x 3 minutes  Short term goal 5: Pt will ascend/descend 2 steps single rail Gaby       Treatment plan:  Bed mobility, transfers, balance, gait, TA, TX, stairs     Recommendations for NURSING mobility: stand pivot     Time:   Time in: 1328  Time out: 1350  Timed treatment minutes: 10  Total time: 22    Electronically signed by:    Iftikhar Schultz, [id number], 4:22 PM

## 2021-03-02 NOTE — H&P
Department of Cardiovascular & Thoracic Surgery   H&P Note      History Obtained From:  patient     HISTORY OF PRESENT ILLNESS:    The patient is a 66 y.o. male who presents with progressively worsening weakness, SOB, and swelling. He underwent CABG/AVR, maze, LA clip on 2/16/21. His post op course was complicated by hypoxia and HOTN which he slowly recovered from. A new atrial lead was placed for his PPM. He was doing well and discharged home in stable condition. Since then he has had progressive weakness and worsening edema. He denies fever or chills. He denies issues with his wounds. He has minimal pain. Past Medical History:        Diagnosis Date    Arrhythmia     Pacemaker placed aprox 5 years ago for A Fib per patient    Arthritis 12/2013    rt wrist    Atrial fibrillation ([facility]     on Xarelto - Dr. John Nichole CAD (coronary artery disease) 06/18/2014    see dr Niya Villarreal kidney disease, stage III (moderate) 07/07/2016    Diabetes mellitus ([facility]     dx 2004    Diabetic neuropathy associated with type 2 diabetes mellitus ([facility] 04/23/2019    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    Gout 04/2019    \"got gout when had pacer put in because they did not give me my medication for gout \"    H/O 24 hour EKG monitoring 10/03/2013    no afib noted, sinsus rhythm    H/O cardiovascular stress test 05/12/2014    cardiolite- mild ischemia RCA EF50%    H/O echocardiogram 12/01/2020    EF 55-60% severe aortic stenosis mild to mod aortic regurg mod to severe tricuspid regurg severe pulm htn significant changes since 2018 echo.  H/O right and left heart catheterization 12/10/2020    DIFFUSE LAD DISEASE, Mild ECA Disease, Severe AS, Milf Pul HTN on RHC.     H/O transesophageal echocardiography (MABLE) for monitoring 08/05/2013    normal LV function and normal LA appendage without any clot    History of blood transfusion 12/2020    d/t anemia    History of transesophageal echocardiography (MABLE) PACEMAKER GENERATOR LEAD REVISION performed by Nate Ardon MD [facility]      9/18/14 Status post remote permanent pacemaker with atrial lead dislodgement. 7/24/14 PPM Implant    UPPER GASTROINTESTINAL ENDOSCOPY N/A 9/17/2020    ENTEROSCOPY PUSH BIOPSY performed by Lubna Tidwell MD at [facility]  2012    \"have stents in both legs- done in [place]     Current Medications:   Current Facility-Administered Medications: [START ON 3/3/2021] vancomycin (VANCOCIN) 1,500 mg in dextrose 5 % 500 mL IVPB, 1,500 mg, Intravenous, Q24H  insulin glargine (LANTUS) injection vial 30 Units, 30 Units, Subcutaneous, Nightly  glimepiride (AMARYL) tablet 2 mg, 2 mg, Oral, BID WC  albumin human 25 % IV solution 25 g, 25 g, Intravenous, Q8H  furosemide (LASIX) injection 40 mg, 40 mg, Intravenous, Q8H  sodium zirconium cyclosilicate (LOKELMA) oral suspension 10 g, 10 g, Oral, TID  acetaminophen (TYLENOL) tablet 650 mg, 650 mg, Oral, Q6H PRN  aspirin chewable tablet 81 mg, 81 mg, Oral, Daily  canagliflozin (INVOKANA) tablet 300 mg  ++Non Formulary / Patient Supplied++, 300 mg, Oral, QAM AC  [START ON 3/6/2021] Dulaglutide Solution Pen 1.5 mg/0.5 mL  ++Non Formulary / Patient Supplied++, 1.5 mg, Subcutaneous, Weekly  ferrous sulfate (IRON 325) tablet 325 mg, 325 mg, Oral, BID WC  gabapentin (NEURONTIN) capsule 600 mg, 600 mg, Oral, TID  atorvastatin (LIPITOR) tablet 10 mg, 10 mg, Oral, Daily  DOBUTamine (DOBUTREX) 500 mg in dextrose 5 % 250 mL infusion, 5 mcg/kg/min, Intravenous, Continuous  insulin lispro (HUMALOG) injection vial 0-12 Units, 0-12 Units, Subcutaneous, TID WC  insulin lispro (HUMALOG) injection vial 0-6 Units, 0-6 Units, Subcutaneous, Nightly  Allergies:     Allergies   Allergen Reactions    Spironolactone      CAUSES INCREASED K+    Tape Simmie Guy Tape] Rash     SURGICAL TAPE       Social History:   Social History     Socioeconomic History    Marital status:      Spouse - diarrhea   : - dysuria   MSK: - joint pain, - muscle pain  Integument: - rash, - skin color change   Heme: - easy bruising or bleeding  Neurologic: - headache, + weakness, - dizziness, - paresthesias       EXAM:  Constitutional: Blood pressure (!) 107/43, pulse 63, temperature 98.5 °F (36.9 °C), temperature source Oral, resp. rate 20, height 5' 10\" (1.778 m), weight 230 lb 6.1 oz (104.5 kg), SpO2 99 %. No apparent distress, appears stated age and cooperative. Neurologic: follows commands, no focal weakness noted   Lungs: Good respiratory effort. Clear to auscultation,   CV: Regular rate/ rhythm , no peripheral edema, feet warm and well perfused  GI: Soft, non-tender in all four quadrants, non-distended, + bowel sounds, liver and spleen no palpable masses  : bladder nondistended   MSK: no obvious deformity   Skin: warm, pink and dry       DATA:  CT chest  Impression   Patient status post midline sternotomy.  Immediately posterior to the   sternotomy defect, there is a small gas and fluid collection which is   nonspecific.  It is not necessarily outside normal limits for a sternotomy   that was performed 2 weeks ago. [place] Course, sterility cannot be ascertained   with imaging, and therefore a small developing abscess is considered as well.       No evidence of osteolysis of the sternotomy defect to suggest osteomyelitis.       Interval development of a moderate to large right and a moderate left pleural   effusion.  Adjacent airspace disease is some combination of atelectasis,   pneumonia, and/or edema.          IMPRESSION  Patient Active Problem List   Diagnosis    Type 2 diabetes mellitus without complication, without long-term current use of insulin ([address]    Ulcer of other part of lower limb    Venous hypertension, chronic, with ulcer ([address]    Ulcer of other part of foot    Pneumonia    Atrial fibrillation (HCC)    Sinus pause    PAF (paroxysmal atrial fibrillation) ([address]    DM (diabetes mellitus) ([address]  DMITRIY on CPAP    Hyperlipidemia    Status post incision and drainage    CKD (chronic kidney disease) stage 3, GFR 30-59 ml/min (HCC)    Hyperpotassemia    Arthritis    PVD (peripheral vascular disease) (HCC)    Hematoma    Cardiac pacemaker in situ    Coronary artery stenosis    Essential hypertension    Gout    Diabetic neuropathy associated with type 2 diabetes mellitus (HCC)    Adenomatous polyp of sigmoid colon    Iron deficiency anemia due to chronic blood loss    Erythropoietin deficiency anemia    VHD (valvular heart disease)    Abnormal fractional flow reserve (FFR) on cardiac catheterization    Carotid stenosis, left    Aortic stenosis, severe    CAD in native artery    Displacement of atrial pacemaker leads    Pacemaker lead malfunction    SOB (shortness of breath)       S/p CABG/AVR, maze, LA clip   CHF   CKD    RECOMMENDATIONS:  Will admit for observation. Check echo. Will get bilateral thoracentesis. Diuresis per nephrology. Lasix 40 TID. \  On dobutamine 5. Pacer check. Will see if he can go to ARU. Pt seen with Dr. Diana Moyer.      Luis Gamboa PA-C

## 2021-03-02 NOTE — ED NOTES
Bladder scanner showed 335 mL, pt used urinal immediately after scan     Michelle Giles RN  03/01/21 0144

## 2021-03-02 NOTE — PROGRESS NOTES
Nephrology Progress Note  3/2/2021 9:52 AM  Subjective: Interval History: Emi Mckenzie is a 66 y.o. male with weakness and appear more awake and with zaragoza + uop and on dobutrex.  Plan rehab        Data:   Scheduled Meds:   [START ON 3/3/2021] vancomycin  1,500 mg Intravenous Q24H    insulin glargine  30 Units Subcutaneous Nightly    glimepiride  2 mg Oral BID WC    sodium zirconium cyclosilicate  10 g Oral TID    aspirin  81 mg Oral Daily    canagliflozin  300 mg Oral QAM AC    [START ON 3/6/2021] Dulaglutide  1.5 mg Subcutaneous Weekly    ferrous sulfate  325 mg Oral BID WC    gabapentin  600 mg Oral TID    atorvastatin  10 mg Oral Daily    insulin lispro  0-12 Units Subcutaneous TID WC    insulin lispro  0-6 Units Subcutaneous Nightly     Continuous Infusions:   DOBUTamine 5 mcg/kg/min (03/02/21 0024)           CBC:   Recent Labs     03/01/21  1639 03/02/21  0115   WBC 21.5* 14.9*   HGB 8.2* 7.0*    376     BMP:    Recent Labs     03/01/21  1639 03/02/21  0115   * 128*   K 6.2* 5.4*   CL 92* 94*   CO2 21 23   BUN 82* 86*   CREATININE 1.7* 1.9*   GLUCOSE 142* 183*       Renal Labs  Albumin:    Lab Results   Component Value Date    LABALBU 2.6 03/01/2021     Calcium:    Lab Results   Component Value Date    CALCIUM 7.8 03/02/2021     Phosphorus:    Lab Results   Component Value Date    PHOS 3.4 02/24/2021     U/A:    Lab Results   Component Value Date    NITRU NEGATIVE 03/01/2021    NITRU Negative 11/24/2020    COLORU YELLOW 03/01/2021    PHUR 6.5 11/24/2020    LABCAST NONE SEEN 06/15/2016    LABCAST NONE SEEN 06/15/2016    WBCUA <1 03/01/2021    RBCUA NONE SEEN 03/01/2021    MUCUS RARE 02/12/2021    TRICHOMONAS NONE SEEN 03/01/2021    BACTERIA NEGATIVE 03/01/2021    CLARITYU CLEAR 03/01/2021    SPECGRAV 1.009 03/01/2021    UROBILINOGEN NEGATIVE 03/01/2021    BILIRUBINUR NEGATIVE 03/01/2021    BLOODU NEGATIVE 03/01/2021    GLUCOSEU 500 11/24/2020    KETUA NEGATIVE 03/01/2021 Objective:   I/O: 03/01 0701 - 03/02 0700  In: 939 [P.O.:120; I.V.:86]  Out: 525 [Urine:525]  Vitals: BP (!) 108/40   Pulse 62   Temp 98.5 °F (36.9 °C) (Oral)   Resp 17   Ht 5' 10\" (1.778 m)   Wt 230 lb 6.1 oz (104.5 kg)   SpO2 100%   BMI 33.06 kg/m²   General appearance: awake weak  HEENT: Head: Normal, normocephalic, atraumatic.   Neck: supple, symmetrical, trachea midline  Lungs: diminished breath sounds bilaterally  Heart: S1, S2 normal  Abdomen: abnormal findings:  soft nt  Extremities: edema +  Neurologic: Mental status: alertness: alert        Assessment and Plan:      IMP:  1 ckd 3B from htn and dm2  2 Dm2 controlled  3 cad sp cabg with chest pain  4 leukocytosis with right pleural effusion  5 hyperkalemia  6 hypotension  7 moderate protein malnutrtion  8 hyponatremia  9 anemia    Plan     1 renal worse slightly in process diuresis and monitor and maintain with negative balance with zaragoza and no acute dialysis need  2 ssi and monitor  3 fu cardiac eval and check echo  4 wbc better and plan thoracentesis  5 K improved and maintain lokelma  6 on dobutrex and fu PM interogation  7 give albumin iv with lasix  8 na low monitor restrict water  9 give epo check iron  Will follow and mointor  No dialysis for now           Abdias Flores MD

## 2021-03-02 NOTE — PROGRESS NOTES
PROCEDURE PERFORMED: Bilateral Thoracentesis    PRIMARY INDICATION FOR PROCEDURE:  Pleural Effusion    INFORMED CONSENT:  PT ALERT & ORIENTED and verbalizes understanding of procedure. Pt signed consent with Dr. Lori Mayen prior to procedure. Consent placed in chart. TIME OUT COMPLETE: 0850    BARRIER PRECAUTIONS & STERILE TECHNIQUE  Pt in bed and will stay in bed for the procedure. Pt was assisted into the sitting position on the side of the bed Pt is already on Cardiac Monitor. CHLORHEXADINE scrubbed and draped in a sterile fashion by Rylan BOLANOS    PAIN/LOCAL ANESTHESIA/SEDATION MANAGEMENT:  No sedation medications were used. Local lidocaine was used to numb the skin and surrounding tissues. INTRAOPERATIVE:    ACCESS TIME:   US/FLUORO: 8 images  WIRE USED:  SHEATH USED:   CATHETER USED:  FINAL IMAGE TAKEN TO CONFIRM PLACEMENT OF:   CONTRAST/CC:     STERILE DRESSINGS: Dry sterile dressings applied by Rylan BOLANOS    SPECIMENS: No orders to send fluids to lab. 800cc removed from left side 1050cc removed from right side. Fluid was bright red blood colored.     FOLLOW- UP X-RAY: STAT chest X-Ray ordered    COMPLICATIONS:  Pt tolerated procedure well without any complications    STAFF PRESENT DURING PROCEDURE:  Kelsey BOLANOS    REPORT CALLED TO: Bedside report given to Marqius Bonilla RN in ICU

## 2021-03-02 NOTE — PROGRESS NOTES
IR here to do thoracentesis.   Took off 800 ml dk red drainage from left  Chest, and 1050  Dr red from rt chest.  SATISH Montoya informed

## 2021-03-03 ENCOUNTER — APPOINTMENT (OUTPATIENT)
Dept: ULTRASOUND IMAGING | Age: 71
DRG: 871 | End: 2021-03-03
Payer: MEDICARE

## 2021-03-03 ENCOUNTER — APPOINTMENT (OUTPATIENT)
Dept: GENERAL RADIOLOGY | Age: 71
DRG: 871 | End: 2021-03-03
Payer: MEDICARE

## 2021-03-03 LAB
ALBUMIN SERPL-MCNC: 3.3 GM/DL (ref 3.4–5)
ALP BLD-CCNC: 151 IU/L (ref 40–129)
ALT SERPL-CCNC: 55 U/L (ref 10–40)
ANION GAP SERPL CALCULATED.3IONS-SCNC: 10 MMOL/L (ref 4–16)
ANION GAP SERPL CALCULATED.3IONS-SCNC: 15 MMOL/L (ref 4–16)
APTT: 44.7 SECONDS (ref 25.1–37.1)
AST SERPL-CCNC: 63 IU/L (ref 15–37)
BASE EXCESS MIXED: 0.3 (ref 0–1.2)
BASOPHILS ABSOLUTE: 0.1 K/CU MM
BASOPHILS RELATIVE PERCENT: 0.5 % (ref 0–1)
BASOPHILS RELATIVE PERCENT: 0.7 % (ref 0–1)
BASOPHILS RELATIVE PERCENT: 0.7 % (ref 0–1)
BILIRUB SERPL-MCNC: 0.5 MG/DL (ref 0–1)
BILIRUBIN DIRECT: 0.2 MG/DL (ref 0–0.3)
BILIRUBIN, INDIRECT: 0.3 MG/DL (ref 0–0.7)
BUN BLDV-MCNC: 95 MG/DL (ref 6–23)
BUN BLDV-MCNC: 98 MG/DL (ref 6–23)
CALCIUM SERPL-MCNC: 7.5 MG/DL (ref 8.3–10.6)
CALCIUM SERPL-MCNC: 7.9 MG/DL (ref 8.3–10.6)
CARBON MONOXIDE, BLOOD: 1.6 % (ref 0–5)
CHLORIDE BLD-SCNC: 92 MMOL/L (ref 99–110)
CHLORIDE BLD-SCNC: 93 MMOL/L (ref 99–110)
CO2 CONTENT: 24.5 MMOL/L (ref 19–24)
CO2: 21 MMOL/L (ref 21–32)
CO2: 25 MMOL/L (ref 21–32)
COMMENT: ABNORMAL
CREAT SERPL-MCNC: 2.4 MG/DL (ref 0.9–1.3)
CREAT SERPL-MCNC: 2.6 MG/DL (ref 0.9–1.3)
D DIMER: 1530 NG/ML(DDU)
DIFFERENTIAL TYPE: ABNORMAL
DOSE AMOUNT: NORMAL
DOSE TIME: NORMAL
EOSINOPHILS ABSOLUTE: 0.5 K/CU MM
EOSINOPHILS ABSOLUTE: 0.5 K/CU MM
EOSINOPHILS ABSOLUTE: 0.7 K/CU MM
EOSINOPHILS RELATIVE PERCENT: 3.8 % (ref 0–3)
EOSINOPHILS RELATIVE PERCENT: 4.5 % (ref 0–3)
EOSINOPHILS RELATIVE PERCENT: 5 % (ref 0–3)
FIBRINOGEN LEVEL: 560 MG/DL (ref 196.9–442.1)
GFR AFRICAN AMERICAN: 29 ML/MIN/1.73M2
GFR AFRICAN AMERICAN: 32 ML/MIN/1.73M2
GFR NON-AFRICAN AMERICAN: 24 ML/MIN/1.73M2
GFR NON-AFRICAN AMERICAN: 27 ML/MIN/1.73M2
GLUCOSE BLD-MCNC: 137 MG/DL (ref 70–99)
GLUCOSE BLD-MCNC: 181 MG/DL (ref 70–99)
GLUCOSE BLD-MCNC: 186 MG/DL (ref 70–99)
GLUCOSE BLD-MCNC: 201 MG/DL (ref 70–99)
GLUCOSE BLD-MCNC: 204 MG/DL (ref 70–99)
GLUCOSE BLD-MCNC: 253 MG/DL (ref 70–99)
GLUCOSE BLD-MCNC: 257 MG/DL (ref 70–99)
HCO3 ARTERIAL: 23.5 MMOL/L (ref 18–23)
HCT VFR BLD CALC: 19.5 % (ref 42–52)
HCT VFR BLD CALC: 23 % (ref 42–52)
HCT VFR BLD CALC: 24.4 % (ref 42–52)
HCT VFR BLD CALC: 24.4 % (ref 42–52)
HEMOCCULT SP1 STL QL: NEGATIVE
HEMOGLOBIN: 5.6 GM/DL (ref 13.5–18)
HEMOGLOBIN: 7 GM/DL (ref 13.5–18)
HEMOGLOBIN: 7.7 GM/DL (ref 13.5–18)
HEMOGLOBIN: 7.7 GM/DL (ref 13.5–18)
IMMATURE NEUTROPHIL %: 0.6 % (ref 0–0.43)
IMMATURE NEUTROPHIL %: 0.6 % (ref 0–0.43)
IMMATURE NEUTROPHIL %: 0.7 % (ref 0–0.43)
INR BLD: 1.84 INDEX
LACTATE DEHYDROGENASE: 186 IU/L (ref 120–246)
LYMPHOCYTES ABSOLUTE: 0.4 K/CU MM
LYMPHOCYTES ABSOLUTE: 0.5 K/CU MM
LYMPHOCYTES ABSOLUTE: 0.5 K/CU MM
LYMPHOCYTES RELATIVE PERCENT: 3.2 % (ref 24–44)
LYMPHOCYTES RELATIVE PERCENT: 4.1 % (ref 24–44)
LYMPHOCYTES RELATIVE PERCENT: 4.1 % (ref 24–44)
MAGNESIUM: 2.6 MG/DL (ref 1.8–2.4)
MCH RBC QN AUTO: 22.8 PG (ref 27–31)
MCH RBC QN AUTO: 23.6 PG (ref 27–31)
MCH RBC QN AUTO: 24.5 PG (ref 27–31)
MCH RBC QN AUTO: 24.8 PG (ref 27–31)
MCHC RBC AUTO-ENTMCNC: 28.7 % (ref 32–36)
MCHC RBC AUTO-ENTMCNC: 30.4 % (ref 32–36)
MCHC RBC AUTO-ENTMCNC: 31.6 % (ref 32–36)
MCHC RBC AUTO-ENTMCNC: 31.6 % (ref 32–36)
MCV RBC AUTO: 77.4 FL (ref 78–100)
MCV RBC AUTO: 77.7 FL (ref 78–100)
MCV RBC AUTO: 78.7 FL (ref 78–100)
MCV RBC AUTO: 79.3 FL (ref 78–100)
METHEMOGLOBIN ARTERIAL: 0.8 %
MONOCYTES ABSOLUTE: 1.1 K/CU MM
MONOCYTES ABSOLUTE: 1.2 K/CU MM
MONOCYTES ABSOLUTE: 1.2 K/CU MM
MONOCYTES RELATIVE PERCENT: 9.2 % (ref 0–4)
MONOCYTES RELATIVE PERCENT: 9.2 % (ref 0–4)
MONOCYTES RELATIVE PERCENT: 9.8 % (ref 0–4)
NUCLEATED RBC %: 0 %
NUCLEATED RBC %: 0 %
NUCLEATED RBC %: 0.2 %
O2 SATURATION: 93.4 % (ref 96–97)
PCO2 ARTERIAL: 33 MMHG (ref 32–45)
PDW BLD-RTO: 18.6 % (ref 11.7–14.9)
PDW BLD-RTO: 19.1 % (ref 11.7–14.9)
PH BLOOD: 7.46 (ref 7.34–7.45)
PHOSPHORUS: 5.1 MG/DL (ref 2.5–4.9)
PLATELET # BLD: 239 K/CU MM (ref 140–440)
PLATELET # BLD: 264 K/CU MM (ref 140–440)
PLATELET # BLD: 266 K/CU MM (ref 140–440)
PLATELET # BLD: 266 K/CU MM (ref 140–440)
PMV BLD AUTO: 8.9 FL (ref 7.5–11.1)
PMV BLD AUTO: 9.1 FL (ref 7.5–11.1)
PMV BLD AUTO: 9.2 FL (ref 7.5–11.1)
PMV BLD AUTO: 9.4 FL (ref 7.5–11.1)
PO2 ARTERIAL: 74 MMHG (ref 75–100)
POTASSIUM SERPL-SCNC: 4.8 MMOL/L (ref 3.5–5.1)
POTASSIUM SERPL-SCNC: 5.4 MMOL/L (ref 3.5–5.1)
PROTHROMBIN TIME: 22.4 SECONDS (ref 11.7–14.5)
RBC # BLD: 2.46 M/CU MM (ref 4.6–6.2)
RBC # BLD: 2.97 M/CU MM (ref 4.6–6.2)
RBC # BLD: 3.1 M/CU MM (ref 4.6–6.2)
RBC # BLD: 3.14 M/CU MM (ref 4.6–6.2)
RETICULOCYTE COUNT PCT: 2.3 % (ref 0.2–2.2)
SARS-COV-2, NAAT: NOT DETECTED
SEGMENTED NEUTROPHILS ABSOLUTE COUNT: 10.2 K/CU MM
SEGMENTED NEUTROPHILS ABSOLUTE COUNT: 10.5 K/CU MM
SEGMENTED NEUTROPHILS ABSOLUTE COUNT: 9.5 K/CU MM
SEGMENTED NEUTROPHILS RELATIVE PERCENT: 80.3 % (ref 36–66)
SEGMENTED NEUTROPHILS RELATIVE PERCENT: 81 % (ref 36–66)
SEGMENTED NEUTROPHILS RELATIVE PERCENT: 82 % (ref 36–66)
SODIUM BLD-SCNC: 128 MMOL/L (ref 135–145)
SODIUM BLD-SCNC: 128 MMOL/L (ref 135–145)
SOURCE: NORMAL
TOTAL IMMATURE NEUTOROPHIL: 0.07 K/CU MM
TOTAL IMMATURE NEUTOROPHIL: 0.08 K/CU MM
TOTAL IMMATURE NEUTOROPHIL: 0.09 K/CU MM
TOTAL NUCLEATED RBC: 0 K/CU MM
TOTAL PROTEIN: 5.1 GM/DL (ref 6.4–8.2)
VANCOMYCIN RANDOM: 29 UG/ML
WBC # BLD: 11.6 K/CU MM (ref 4–10.5)
WBC # BLD: 11.6 K/CU MM (ref 4–10.5)
WBC # BLD: 12.5 K/CU MM (ref 4–10.5)
WBC # BLD: 13.1 K/CU MM (ref 4–10.5)

## 2021-03-03 PROCEDURE — 85025 COMPLETE CBC W/AUTO DIFF WBC: CPT

## 2021-03-03 PROCEDURE — 6370000000 HC RX 637 (ALT 250 FOR IP): Performed by: INTERNAL MEDICINE

## 2021-03-03 PROCEDURE — 36430 TRANSFUSION BLD/BLD COMPNT: CPT

## 2021-03-03 PROCEDURE — 05H933Z INSERTION OF INFUSION DEVICE INTO RIGHT BRACHIAL VEIN, PERCUTANEOUS APPROACH: ICD-10-PCS | Performed by: INTERNAL MEDICINE

## 2021-03-03 PROCEDURE — 86900 BLOOD TYPING SEROLOGIC ABO: CPT

## 2021-03-03 PROCEDURE — 71045 X-RAY EXAM CHEST 1 VIEW: CPT

## 2021-03-03 PROCEDURE — 2500000003 HC RX 250 WO HCPCS: Performed by: INTERNAL MEDICINE

## 2021-03-03 PROCEDURE — 36600 WITHDRAWAL OF ARTERIAL BLOOD: CPT

## 2021-03-03 PROCEDURE — 6360000002 HC RX W HCPCS: Performed by: THORACIC SURGERY (CARDIOTHORACIC VASCULAR SURGERY)

## 2021-03-03 PROCEDURE — 85027 COMPLETE CBC AUTOMATED: CPT

## 2021-03-03 PROCEDURE — G0328 FECAL BLOOD SCRN IMMUNOASSAY: HCPCS

## 2021-03-03 PROCEDURE — 85384 FIBRINOGEN ACTIVITY: CPT

## 2021-03-03 PROCEDURE — P9047 ALBUMIN (HUMAN), 25%, 50ML: HCPCS | Performed by: INTERNAL MEDICINE

## 2021-03-03 PROCEDURE — 6360000002 HC RX W HCPCS: Performed by: SPECIALIST

## 2021-03-03 PROCEDURE — 6370000000 HC RX 637 (ALT 250 FOR IP): Performed by: THORACIC SURGERY (CARDIOTHORACIC VASCULAR SURGERY)

## 2021-03-03 PROCEDURE — 85045 AUTOMATED RETICULOCYTE COUNT: CPT

## 2021-03-03 PROCEDURE — 83735 ASSAY OF MAGNESIUM: CPT

## 2021-03-03 PROCEDURE — 96367 TX/PROPH/DG ADDL SEQ IV INF: CPT

## 2021-03-03 PROCEDURE — 85730 THROMBOPLASTIN TIME PARTIAL: CPT

## 2021-03-03 PROCEDURE — 83615 LACTATE (LD) (LDH) ENZYME: CPT

## 2021-03-03 PROCEDURE — G0378 HOSPITAL OBSERVATION PER HR: HCPCS

## 2021-03-03 PROCEDURE — 96376 TX/PRO/DX INJ SAME DRUG ADON: CPT

## 2021-03-03 PROCEDURE — 82803 BLOOD GASES ANY COMBINATION: CPT

## 2021-03-03 PROCEDURE — 96375 TX/PRO/DX INJ NEW DRUG ADDON: CPT

## 2021-03-03 PROCEDURE — 2580000003 HC RX 258: Performed by: INTERNAL MEDICINE

## 2021-03-03 PROCEDURE — 85379 FIBRIN DEGRADATION QUANT: CPT

## 2021-03-03 PROCEDURE — 6360000002 HC RX W HCPCS: Performed by: INTERNAL MEDICINE

## 2021-03-03 PROCEDURE — 93970 EXTREMITY STUDY: CPT

## 2021-03-03 PROCEDURE — 86901 BLOOD TYPING SEROLOGIC RH(D): CPT

## 2021-03-03 PROCEDURE — C1751 CATH, INF, PER/CENT/MIDLINE: HCPCS

## 2021-03-03 PROCEDURE — 36410 VNPNXR 3YR/> PHY/QHP DX/THER: CPT

## 2021-03-03 PROCEDURE — 86850 RBC ANTIBODY SCREEN: CPT

## 2021-03-03 PROCEDURE — 2580000003 HC RX 258: Performed by: THORACIC SURGERY (CARDIOTHORACIC VASCULAR SURGERY)

## 2021-03-03 PROCEDURE — 80076 HEPATIC FUNCTION PANEL: CPT

## 2021-03-03 PROCEDURE — 6370000000 HC RX 637 (ALT 250 FOR IP): Performed by: PHYSICIAN ASSISTANT

## 2021-03-03 PROCEDURE — 76604 US EXAM CHEST: CPT

## 2021-03-03 PROCEDURE — 76937 US GUIDE VASCULAR ACCESS: CPT

## 2021-03-03 PROCEDURE — 80202 ASSAY OF VANCOMYCIN: CPT

## 2021-03-03 PROCEDURE — 80048 BASIC METABOLIC PNL TOTAL CA: CPT

## 2021-03-03 PROCEDURE — 94761 N-INVAS EAR/PLS OXIMETRY MLT: CPT

## 2021-03-03 PROCEDURE — 83010 ASSAY OF HAPTOGLOBIN QUANT: CPT

## 2021-03-03 PROCEDURE — 82962 GLUCOSE BLOOD TEST: CPT

## 2021-03-03 PROCEDURE — 84100 ASSAY OF PHOSPHORUS: CPT

## 2021-03-03 PROCEDURE — 86922 COMPATIBILITY TEST ANTIGLOB: CPT

## 2021-03-03 PROCEDURE — C9113 INJ PANTOPRAZOLE SODIUM, VIA: HCPCS | Performed by: SPECIALIST

## 2021-03-03 PROCEDURE — 85610 PROTHROMBIN TIME: CPT

## 2021-03-03 PROCEDURE — 84443 ASSAY THYROID STIM HORMONE: CPT

## 2021-03-03 PROCEDURE — P9016 RBC LEUKOCYTES REDUCED: HCPCS

## 2021-03-03 PROCEDURE — 96368 THER/DIAG CONCURRENT INF: CPT

## 2021-03-03 PROCEDURE — 87635 SARS-COV-2 COVID-19 AMP PRB: CPT

## 2021-03-03 PROCEDURE — 96366 THER/PROPH/DIAG IV INF ADDON: CPT

## 2021-03-03 RX ORDER — FUROSEMIDE 10 MG/ML
40 INJECTION INTRAMUSCULAR; INTRAVENOUS EVERY 8 HOURS
Status: COMPLETED | OUTPATIENT
Start: 2021-03-03 | End: 2021-03-04

## 2021-03-03 RX ORDER — INSULIN GLARGINE 100 [IU]/ML
15 INJECTION, SOLUTION SUBCUTANEOUS NIGHTLY
Status: DISCONTINUED | OUTPATIENT
Start: 2021-03-03 | End: 2021-03-03

## 2021-03-03 RX ORDER — SODIUM CHLORIDE 9 MG/ML
INJECTION, SOLUTION INTRAVENOUS PRN
Status: DISCONTINUED | OUTPATIENT
Start: 2021-03-03 | End: 2021-03-15 | Stop reason: HOSPADM

## 2021-03-03 RX ORDER — PANTOPRAZOLE SODIUM 40 MG/10ML
40 INJECTION, POWDER, LYOPHILIZED, FOR SOLUTION INTRAVENOUS 2 TIMES DAILY
Status: DISCONTINUED | OUTPATIENT
Start: 2021-03-03 | End: 2021-03-15 | Stop reason: HOSPADM

## 2021-03-03 RX ORDER — ALBUMIN (HUMAN) 12.5 G/50ML
25 SOLUTION INTRAVENOUS EVERY 8 HOURS
Status: COMPLETED | OUTPATIENT
Start: 2021-03-03 | End: 2021-03-04

## 2021-03-03 RX ORDER — MIDODRINE HYDROCHLORIDE 5 MG/1
10 TABLET ORAL
Status: DISCONTINUED | OUTPATIENT
Start: 2021-03-03 | End: 2021-03-15

## 2021-03-03 RX ORDER — MIDODRINE HYDROCHLORIDE 5 MG/1
10 TABLET ORAL
Status: DISCONTINUED | OUTPATIENT
Start: 2021-03-03 | End: 2021-03-03

## 2021-03-03 RX ORDER — INSULIN GLARGINE 100 [IU]/ML
15 INJECTION, SOLUTION SUBCUTANEOUS ONCE
Status: COMPLETED | OUTPATIENT
Start: 2021-03-03 | End: 2021-03-03

## 2021-03-03 RX ADMIN — VANCOMYCIN HYDROCHLORIDE 1500 MG: 5 INJECTION, POWDER, LYOPHILIZED, FOR SOLUTION INTRAVENOUS at 00:09

## 2021-03-03 RX ADMIN — SODIUM BICARBONATE: 84 INJECTION, SOLUTION INTRAVENOUS at 07:06

## 2021-03-03 RX ADMIN — INSULIN LISPRO 6 UNITS: 100 INJECTION, SOLUTION INTRAVENOUS; SUBCUTANEOUS at 15:42

## 2021-03-03 RX ADMIN — MIDODRINE HYDROCHLORIDE 10 MG: 5 TABLET ORAL at 18:28

## 2021-03-03 RX ADMIN — FUROSEMIDE 40 MG: 10 INJECTION, SOLUTION INTRAMUSCULAR; INTRAVENOUS at 21:17

## 2021-03-03 RX ADMIN — GABAPENTIN 600 MG: 300 CAPSULE ORAL at 11:27

## 2021-03-03 RX ADMIN — ACETAMINOPHEN 650 MG: 325 TABLET ORAL at 02:28

## 2021-03-03 RX ADMIN — INSULIN GLARGINE 15 UNITS: 100 INJECTION, SOLUTION SUBCUTANEOUS at 21:10

## 2021-03-03 RX ADMIN — GABAPENTIN 600 MG: 300 CAPSULE ORAL at 15:42

## 2021-03-03 RX ADMIN — CEFTRIAXONE 1000 MG: 1 INJECTION, POWDER, FOR SOLUTION INTRAMUSCULAR; INTRAVENOUS at 16:03

## 2021-03-03 RX ADMIN — ATORVASTATIN CALCIUM 10 MG: 10 TABLET, FILM COATED ORAL at 11:27

## 2021-03-03 RX ADMIN — SODIUM ZIRCONIUM CYCLOSILICATE 10 G: 5 POWDER, FOR SUSPENSION ORAL at 21:00

## 2021-03-03 RX ADMIN — INSULIN LISPRO 4 UNITS: 100 INJECTION, SOLUTION INTRAVENOUS; SUBCUTANEOUS at 18:32

## 2021-03-03 RX ADMIN — SODIUM ZIRCONIUM CYCLOSILICATE 10 G: 5 POWDER, FOR SUSPENSION ORAL at 15:42

## 2021-03-03 RX ADMIN — ALBUMIN (HUMAN) 25 G: 0.25 INJECTION, SOLUTION INTRAVENOUS at 02:17

## 2021-03-03 RX ADMIN — GABAPENTIN 600 MG: 300 CAPSULE ORAL at 21:00

## 2021-03-03 RX ADMIN — PANTOPRAZOLE SODIUM 40 MG: 40 INJECTION, POWDER, FOR SOLUTION INTRAVENOUS at 11:27

## 2021-03-03 RX ADMIN — GLIMEPIRIDE 2 MG: 4 TABLET ORAL at 18:20

## 2021-03-03 RX ADMIN — ALBUMIN (HUMAN) 25 G: 0.25 INJECTION, SOLUTION INTRAVENOUS at 18:19

## 2021-03-03 RX ADMIN — DOBUTAMINE IN DEXTROSE 5 MCG/KG/MIN: 200 INJECTION, SOLUTION INTRAVENOUS at 11:25

## 2021-03-03 RX ADMIN — PANTOPRAZOLE SODIUM 40 MG: 40 INJECTION, POWDER, FOR SOLUTION INTRAVENOUS at 21:00

## 2021-03-03 RX ADMIN — FUROSEMIDE 40 MG: 10 INJECTION, SOLUTION INTRAMUSCULAR; INTRAVENOUS at 02:17

## 2021-03-03 RX ADMIN — MIDODRINE HYDROCHLORIDE 10 MG: 5 TABLET ORAL at 11:27

## 2021-03-03 RX ADMIN — ALBUMIN (HUMAN) 25 G: 0.25 INJECTION, SOLUTION INTRAVENOUS at 12:42

## 2021-03-03 RX ADMIN — FUROSEMIDE 40 MG: 10 INJECTION, SOLUTION INTRAMUSCULAR; INTRAVENOUS at 15:42

## 2021-03-03 RX ADMIN — GLIMEPIRIDE 2 MG: 4 TABLET ORAL at 11:26

## 2021-03-03 RX ADMIN — IRON SUCROSE 200 MG: 20 INJECTION, SOLUTION INTRAVENOUS at 12:50

## 2021-03-03 RX ADMIN — SODIUM ZIRCONIUM CYCLOSILICATE 10 G: 5 POWDER, FOR SUSPENSION ORAL at 11:26

## 2021-03-03 NOTE — CARE COORDINATION
CM called and spoke with Kasandra in ARU. They continue to follow pt for ARU. HGB 5.6.   [address]

## 2021-03-03 NOTE — CONSULTS
Endocrinology   Consult Note  Dear Doctor Raegan Omer for the Consult     Pt. Was Admitted for : Shortness of breath leg swelling    Reason for Consult: Better control of blood glucose      History Obtained From:  Patient/ EMR       HISTORY OF PRESENT ILLNESS:                The patient is a 66 y.o. male with significant past medical history of CAD, underwent CABG and aortic valve repair 2/16/2021. She did not do very well after discharge from hospital planes of severe shortness of breath and edema of both legs and was very hypoxic and also has cardiac pacemaker, atrial fibrillation, hypertension, hyperlipidemia has diabetes mellitus and diabetic neuropathy also has carotid endarterectomy left side. I was  consulted for better control of blood glucose. ROS:   Pt's ROS done in detail. Abnormal ROS are noted in Medical and Surgical History Section below: Other Medical History:        Diagnosis Date    Arrhythmia     Pacemaker placed aprox 5 years ago for A Fib per patient    Arthritis 12/2013    rt wrist    Atrial fibrillation ([address]     on Xarelto - Dr. 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      H/O right and left heart catheterization 12/10/2020    DIFFUSE LAD DISEASE, Mild ECA Disease, Severe AS, Milf Pul HTN on RHC.  H/O transesophageal echocardiography (MABLE) for monitoring 08/05/2013    normal LV function and normal LA appendage without any clot    History of blood transfusion 12/2020    d/t anemia    History of transesophageal echocardiography (MABLE) 12/15/2020    Severe aortic stenosis (ANNA by planimetry: 0.778 cm sq). Mild AR.    [place] (hard of hearing)     hearing tonya aides    Hx of Doppler echocardiogram 05/21/2018    EF 50%  Mild LV hypertrophy. Mildly enlarged RA. Mod aortic valve calcification with mod AS. Mitral annular calcification is present. Mild AR, MR and TR. Mild pulmonary htn.     Hyperlipidemia     Hypertension     Follows with PCP & Dr. Davida Sainz Other disorders of kidney and ureter     Pacemaker     Medtronic, implanted 2014    Pneumonia 12/29/2012    Sleep apnea     dx 2013- has c-pap    Type II or unspecified type diabetes mellitus with other specified manifestations, uncontrolled 12/12/2012    Venous hypertension, chronic, with ulcer ([address] 12/12/2012    resolved     Surgical History:        Procedure Laterality Date    CABG WITH AORTIC VALVE REPLACEMENT N/A 2/16/2021    CABG CORONARY ARTERY BYPASS X2 WITH LIMA, AORTIC VALVE REPLACEMENT AND AORTIC ROOT REPAIR, INTRAOPERATIVE MABLE, INDUCED HYPOTHERMIA, LEFT LEG ENDOVEIN HARVEST, LEFT ATRIAL CLIP, AND CRYO PROCEDURE performed by Scotty Ghosh MD at [address]  12/14    at [address] Left 1/29/2021    LEFT CAROTID ENDARTERECTOMY performed by Page Gardner MD at [id number]  2017    COLONOSCOPY  2011    COLONOSCOPY N/A 11/19/2019    COLONOSCOPY DIAGNOSTIC performed by Get Flores MD at [facility]  09/30/2020    POSSIBLE CECAL avms, SIGMOID DIVERTICULOSIS, INTERNAL HEMORRHOIDS GRADE 1    COLONOSCOPY N/A 9/30/2020    COLONOSCOPY CONTROL HEMORRHAGE WITH APC performed by Get Flores MD at [address] [facility] PLACEMENT  2014    \"2 stents put in \"   [address]  2004    total left hip    OTHER SURGICAL HISTORY Right 12/02/2017    I&D; evacuation of hematoma right hip    OTHER SURGICAL HISTORY  09/17/2020    enteroscopy    PACEMAKER INSERTION N/A 2/23/2021    PACEMAKER GENERATOR LEAD REVISION performed by Ras Turner MD at [facility]      9/18/14 Status post remote permanent pacemaker with atrial lead dislodgement. 7/24/14 PPM Implant    UPPER GASTROINTESTINAL ENDOSCOPY N/A 9/17/2020    ENTEROSCOPY PUSH BIOPSY performed by Keanu Schulte MD at [facility]  2012    \"have stents in both legs- done in [place]       Allergies:  Spironolactone and Tape Dang Fine tape]    Family History:       Problem Relation Age of Onset    High Blood Pressure Mother     Arthritis Mother     Diabetes Mother     Heart Disease Mother     High Blood Pressure Father     Heart Disease Father     Kidney Disease Father      REVIEW OF SYSTEMS:  Review of System Done as noted above     PHYSICAL EXAM:      Vitals:    /77   Pulse 70   Temp 98.9 °F (37.2 °C) (Oral)   Resp 16   Ht 5' 10\" (1.778 m)   Wt 230 lb 6.1 oz (104.5 kg)   SpO2 92%   BMI 33.06 kg/m²     CONSTITUTIONAL:  awake, alert, cooperative, appears stated age   EYES:  vision intact Fundoscopic Exam not performed   ENT:Normal  NECK:  Supple, No JVD. Thyroid Exam:Normal   LUNGS:  Has Vesicular Breath Sounds, some rales at bases patient had L thoracentesis done  CARDIOVASCULAR: Atrial fibrillation  ABDOMEN:  No scars, normal bowel sounds, soft, non-distended, non-tender, no masses palpated, no hepatolienomegaly  Musculoskeletal: Normal  Extremities: Normal, peripheral pulses normal, , has bilateral edema   NEUROLOGIC:  Awake, alert, oriented to name, place and time. Cranial nerves II-XII are grossly intact. Motor is  intact.   Sensory neuropathy t. ,  and gait is normal.    DATA:    CBC:   Recent Labs     03/01/21  1639 03/02/21  0115   WBC 21.5* 14.9*   HGB 8.2* 7.0*    376    CMP:  Recent Labs     03/01/21  1639 03/02/21  0115   * 128*   K 6.2* 5.4*   CL 92* 94*   CO2 21 23   BUN 82* 86*   CREATININE 1.7* 1.9*   CALCIUM 8.4 7.8*   PROT 6.3*  --    LABALBU 2.6*  --    BILITOT 0.4  --    ALKPHOS 148*  --    AST 59*  --    ALT 49*  --      Lipids:   Lab Results   Component Value Date    CHOL 91 12/18/2020    HDL 43 12/18/2020    TRIG 66 12/18/2020     Glucose:   Recent Labs     03/02/21  1405 03/02/21  1700 03/02/21 2039   POCGLU 156* 136* 220*     Hemoglobin A1C:   Lab Results   Component Value Date    LABA1C 6.1 02/20/2021     Free T4:   Lab Results   Component Value Date    T4FREE 1.5 07/07/2020     Free T3: No results found for: FT3  TSH High Sensitivity:   Lab Results   Component Value Date    TSHHS 1.100 12/31/2012       Echocardiogram Limited    Result Date: 3/2/2021  Transthoracic Echocardiography Report (TTE)  Demographics   Patient Name       Jayla TATE    Date of Study       03/02/2021   Date of Birth      1950         Gender              Male   Age                66 year(s)         Race                   Patient Number     [id number]         Room Number         [facility]   Visit Number       [id number]   Corporate ID       [id number]   Accession Number   [id number]         23 Asha Perry RVT   Ordering Physician Gucci Solomon       Interpreting        Emi Villalobos PA-C       Physician           MD  Procedure Type of Study   TTE procedure:ECHOCARDIOGRAM LIMITED. Procedure Date Date: 03/02/2021 Start: 11:53 AM Study Location: Portable Technical Quality: Adequate visualization Indications:S/P CABG.  Patient Status: Routine Height: 70 inches Weight: 230 pounds BSA: 2.22 m2 BMI: 33 kg/m2 HR: 91 bpm BP: 91/45 mmHg  Conclusions   Summary  This is a limited echocardiogram.  Left ventricular systolic function is normal.  Ejection fraction is visually estimated at 55-60%. Bilateral atrial enlargement. S/p AVR with a 27 mm Medtronic Mosaic. Moderate mitral regurgitation. Moderate tricuspid regurgitation; RVSP: 50 mmHg. Severe PHTN. No evidence of any pericardial effusion. Signature   ------------------------------------------------------------------  Electronically signed by Tanmay Hill MD (Interpreting  physician) on 03/02/2021 at 05:06 PM        Ct Chest Wo Contrast    Result Date: 3/1/2021  EXAMINATION: CT OF THE CHEST WITHOUT CONTRAST 3/1/2021 3:57 pm T    Patient status post midline sternotomy. Immediately posterior to the sternotomy defect, there is a small gas and fluid collection which is nonspecific. It is not necessarily outside normal limits for a sternotomy that was performed 2 weeks ago. However, sterility cannot be ascertained with imaging, and therefore a small developing abscess is considered as well. No evidence of osteolysis of the sternotomy defect to suggest osteomyelitis. Interval development of a moderate to large right and a moderate left pleural effusion. Adjacent airspace disease is some combination of atelectasis, pneumonia, and/or edema. Xr Chest Portable    Result Date: 3/2/2021  EXAMINATION: ONE XRAY VIEW OF THE CHEST 3/2/2021 9:38 am COMPARISON: 03/01/2021 HISTORY: ORDERING SYSTEM PROVIDED HISTORY: post bilateral thoracentesis   . Pleural effusions right greater than left with bibasilar atelectasis right worse than left. No pneumothorax is seen.      Xr Chest Portable    Result Date: 3/1/2021  EXAMINATION: ONE XRAY VIEW OF THE CHEST 3/1/2021 5:12 pm COMPARISON: 02/23/2021 HISTORY: ORDERING SYSTEM PROVIDED HISTORY: chest pain, shorntess of breath TECHNOLOGIST PROVIDED HISTORY: Reason for exam:->chest pain, shorntess of breath Reason for Exam: chest pain   sob   leg swelling Acuity: Unknown Type of Exam: Unknown Relevant Medical/Surgical

## 2021-03-03 NOTE — PROGRESS NOTES
Nephrology Progress Note  3/3/2021 6:18 AM  Subjective: Interval History: Vinayak Alonso is a 66 y.o. male  Appear awake and weak and sp thoracentesis and hb drop today. dw dr Luz Coreas and will give prbc. Pt agree.        Data:   Scheduled Meds:   iron sucrose  200 mg Intravenous Q24H    albumin human  25 g Intravenous Q8H    furosemide  40 mg Intravenous Q8H    vancomycin  1,500 mg Intravenous Q24H    insulin glargine  30 Units Subcutaneous Nightly    glimepiride  2 mg Oral BID WC    insulin lispro  0-6 Units Subcutaneous 2 times per day    sodium zirconium cyclosilicate  10 g Oral TID    aspirin  81 mg Oral Daily    canagliflozin  300 mg Oral QAM AC    [START ON 3/6/2021] Dulaglutide  1.5 mg Subcutaneous Weekly    gabapentin  600 mg Oral TID    atorvastatin  10 mg Oral Daily    insulin lispro  0-12 Units Subcutaneous TID WC     Continuous Infusions:   DOBUTamine 5 mcg/kg/min (03/02/21 1642)           CBC:   Recent Labs     03/01/21  1639 03/02/21  0115 03/03/21  0440   WBC 21.5* 14.9* 11.6*   HGB 8.2* 7.0* 5.6*    376 266     BMP:    Recent Labs     03/01/21  1639 03/02/21  0115 03/03/21  0440   * 128* 128*   K 6.2* 5.4* 5.4*   CL 92* 94* 92*   CO2 21 23 21   BUN 82* 86* 95*   CREATININE 1.7* 1.9* 2.6*   GLUCOSE 142* 183* 186*       Renal Labs  Albumin:    Lab Results   Component Value Date    LABALBU 2.6 03/01/2021     Calcium:    Lab Results   Component Value Date    CALCIUM 7.5 03/03/2021     Phosphorus:    Lab Results   Component Value Date    PHOS 3.4 02/24/2021     U/A:    Lab Results   Component Value Date    NITRU NEGATIVE 03/01/2021    NITRU Negative 11/24/2020    COLORU YELLOW 03/01/2021    PHUR 6.5 11/24/2020    LABCAST NONE SEEN 06/15/2016    LABCAST NONE SEEN 06/15/2016    WBCUA <1 03/01/2021    RBCUA NONE SEEN 03/01/2021    MUCUS RARE 02/12/2021    TRICHOMONAS NONE SEEN 03/01/2021    BACTERIA NEGATIVE 03/01/2021    CLARITYU CLEAR 03/01/2021    SPECGRAV 1.009 03/01/2021 UROBILINOGEN NEGATIVE 03/01/2021    BILIRUBINUR NEGATIVE 03/01/2021    BLOODU NEGATIVE 03/01/2021    GLUCOSEU 500 11/24/2020    KETUA NEGATIVE 03/01/2021           Objective:   I/O: 03/02 0701 - 03/03 0700  In: 3625 [P.O.:450; I.V.:359]  Out: 3780 [Urine:1930]  Vitals: BP 92/66   Pulse 90   Temp 99.7 °F (37.6 °C) (Oral)   Resp 18   Ht 5' 10\" (1.778 m)   Wt 234 lb 9.1 oz (106.4 kg)   SpO2 93%   BMI 33.66 kg/m²   General appearance: awake weak  HEENT: Head: Normal, normocephalic, atraumatic. Neck: supple, symmetrical, trachea midline  Lungs: diminished breath sounds bilaterally  Heart: S1, S2 normal  Abdomen: abnormal findings:  soft nt  Extremities: edema + improved  Neurologic: Mental status: alertness: alert        Assessment and Plan:      IMP:  1 ckd 3B from htn and dm2  2 Dm2 controlled  3 cad sp cabg with chest pain  4 leukocytosis with right pleural effusion  5 hyperkalemia  6 hypotension  7 moderate protein malnutrtion  8 hyponatremia  9 anemia    Plan     1 creat increase with diuresis and no acute dialysis for now and maintain diuretic  2 ssi glucose  3 fu cardio and cabg site appear stable  4 fever last night now better , wbc better cutlure pending  5 on lokelma and start bicarb gtt, check lactic and abg  6 bp low stable with dobutrex  7 encourage protein and getting iv albumin  8 na low monitor restrict water  9 giving iv iron check stool , order 1 unit prbc and fu hb, cxr with ? Recurrent right effusion per me and may need more eval, vs gi consult  Will follow  Holding on dialysis for now    I called and updated wife and aware and ok with prbc, also she state she had fever day he came in and not sure if she also had some viral infection. Dr Angelic Monroe with me in icu and he is on call and asked him to see as consult.        Rosalina Anderson MD

## 2021-03-03 NOTE — FLOWSHEET NOTE
Patient blood glucose acquired and noted patient skin very warm as he was sleeping soundly. He had 6 bath blankets covering him from earlier this evening when he stated he was cold after returning to bed. Temp 103.1. Blankets removed and Tylenol 650mg given po per temp.

## 2021-03-03 NOTE — PROGRESS NOTES
Comprehensive Nutrition Assessment    Type and Reason for Visit:  Initial, Positive Nutrition Screen    Nutrition Recommendations/Plan:   · Continue current diet  · Start standard supplements    Nutrition Assessment:  Pt fell out due to a positive screen for wounds. Pt has a diabetic ulcer. Will order supplements and continue to follow    Malnutrition Assessment:  Malnutrition Status: At risk for malnutrition (Comment)    Context:  Acute Illness       Estimated Daily Nutrient Needs:  Energy (kcal):  3630-4076; Weight Used for Energy Requirements:  Current     Protein (g):  91; Weight Used for Protein Requirements:  Ideal        Fluid (ml/day):  0681-1639; Method Used for Fluid Requirements:  1 ml/kcal       Wounds:  Diabetic Ulcer       Current Nutrition Therapies:    DIET CARB CONTROL; Carb Control: 4 carb choices (60 gms)/meal; Low Sodium (2 GM); Daily Fluid Restriction: 1800 ml; Low Potassium  Dietary Nutrition Supplements: Diabetic Oral Supplement    Anthropometric Measures:  · Height: 5' 10\" (177.8 cm)  · Current Body Weight: 230 lb (104.3 kg)   · Admission Body Weight: 230 lb (104.3 kg)    · Usual Body Weight: 220 lb (99.8 kg)     · Ideal Body Weight: 166 lbs; % Ideal Body Weight 138.6 %   · BMI: 33  · BMI Categories: Obese Class 1 (BMI 30.0-34. 9)       Nutrition Diagnosis:   · Inadequate oral intake related to acute injury/trauma as evidenced by intake 0-25%      Nutrition Interventions:   Food and/or Nutrient Delivery:  Continue Current Diet, Start Oral Nutrition Supplement  Nutrition Education/Counseling:  No recommendation at this time   Coordination of Nutrition Care:  Continue to monitor while inpatient    Goals:  pt will consume greater 75% of his meals and supplements       Nutrition Monitoring and Evaluation:   Behavioral-Environmental Outcomes:  None Identified   Food/Nutrient Intake Outcomes:  Food and Nutrient Intake, Supplement Intake  Physical Signs/Symptoms Outcomes:  Biochemical Data, Skin, Weight     Discharge Planning:     Too soon to determine     Electronically signed by Kori Mosquera RD, LD on 2/3/72 at 10:07 PM EST    Contact: [phone]

## 2021-03-03 NOTE — FLOWSHEET NOTE
Complete bedbath done, linens changed and ice packs applied behind his neck, under his arm pits, and on his groin.

## 2021-03-03 NOTE — PROGRESS NOTES
Occupational Therapy    Attempted at 1125 hrs c nurse reporting pt is going to get blood and would be appropriate to re-attempt this afternoon. Will re-attempt as schedule allows.      Electronically signed by:    BYRON Perry  3/3/2021, 11:27 AM

## 2021-03-03 NOTE — PROGRESS NOTES
Verbal orders from Dr. Micheline Romero for right lung ultrasound and midline insertion, no PICC line.

## 2021-03-03 NOTE — CONSULTS
Department of Internal Medicine  Gastroenterology Consult Note  Shannon Carias. Karo TATE      Reason for Consult:  GI bleed    Primary Care Physician:  Shamika Schultz MD    History Obtained From:  patient    HISTORY OF PRESENT ILLNESS:              The patient is a 66 y.o.  male known to me. He was evaluated 6 months ago for iron deficiency anemia--- Enteroscopy 9/17/20 showed jejunal erythema- normal on biopsy. Colonoscopy 9/30/20 showed divertics and a small cecal AVM which was coagulated. Capsule endoscopy was suggested but never accomplished. On 2/16/21 he underwent CABG/AVR, MAZE and LA clip. He was admitted now with progressive weakness and worsening edema. his Hb went down to 5.9 and he has been having dark stools, though on iron. His BUN/creat was 82/1.7 on admission. He denies abd pain, nausea, vomiting, diarrhea, constipation. He was taking Xarelto and ASA    Past Medical History:        Diagnosis Date    Arrhythmia     Pacemaker placed aprox 5 years ago for A Fib per patient    Arthritis 12/2013    rt wrist    Atrial fibrillation ([facility]     on Xarelto - Dr. Brenden Rao CAD (coronary artery disease) 06/18/2014    see dr Morenita Breen kidney disease, stage III (moderate) 07/07/2016    Diabetes mellitus ([facility] [address]     dx 2004    Diabetic neuropathy associated with type 2 diabetes mellitus ([facility] [address] 04/23/2019    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    Gout 04/2019    \"got gout when had pacer put in because they did not give me my medication for gout \"    H/O 24 hour EKG monitoring 10/03/2013    no afib noted, sinsus rhythm    H/O cardiovascular stress test 05/12/2014    cardiolite- mild ischemia RCA EF50%    H/O echocardiogram 12/01/2020    EF 55-60% severe aortic stenosis mild to mod aortic regurg mod to severe tricuspid regurg severe pulm htn significant changes since 2018 echo.      H/O right and left heart catheterization 12/10/2020    DIFFUSE LAD DISEASE, Mild ECA Disease, Severe AS, Milf Pul HTN on RHC.  H/O transesophageal echocardiography (MABLE) for monitoring 08/05/2013    normal LV function and normal LA appendage without any clot    History of blood transfusion 12/2020    d/t anemia    History of transesophageal echocardiography (MABLE) 12/15/2020    Severe aortic stenosis (ANNA by planimetry: 0.778 cm sq). Mild AR.    [place] (hard of hearing)     hearing tonya aides    Hx of Doppler echocardiogram 05/21/2018    EF 50%  Mild LV hypertrophy. Mildly enlarged RA. Mod aortic valve calcification with mod AS. Mitral annular calcification is present. Mild AR, MR and TR. Mild pulmonary htn.     Hyperlipidemia     Hypertension     Follows with PCP & Dr. Teresa Huff Other disorders of kidney and ureter     Pacemaker     [employer], implanted 2014    Pneumonia 12/29/2012    Sleep apnea     dx 2013- has c-pap    Type II or unspecified type diabetes mellitus with other specified manifestations, uncontrolled 12/12/2012    Venous hypertension, chronic, with ulcer ([address] 12/12/2012    resolved       Past Surgical History:        Procedure Laterality Date    CABG WITH AORTIC VALVE REPLACEMENT N/A 2/16/2021    CABG CORONARY ARTERY BYPASS X2 WITH LIMA, AORTIC VALVE REPLACEMENT AND AORTIC ROOT REPAIR, INTRAOPERATIVE MABLE, INDUCED HYPOTHERMIA, LEFT LEG ENDOVEIN HARVEST, LEFT ATRIAL CLIP, AND CRYO PROCEDURE performed by Seda Moseley MD at [facility]  12/14    at [address] Left 1/29/2021    LEFT CAROTID ENDARTERECTOMY performed by Coletta Bloch, MD at [address]  2017    COLONOSCOPY  2011    COLONOSCOPY N/A 11/19/2019    COLONOSCOPY DIAGNOSTIC performed by Wendy Hare MD at [address]  09/30/2020    POSSIBLE CECAL avms, SIGMOID DIVERTICULOSIS, INTERNAL HEMORRHOIDS GRADE 1    COLONOSCOPY N/A 9/30/2020    COLONOSCOPY CONTROL HEMORRHAGE WITH APC performed by Wendy Hare MD at [address] [facility] PLACEMENT  2014    \"2 stents put in \"   [address]  2004    total left hip    OTHER SURGICAL HISTORY Right 12/02/2017    I&D; evacuation of hematoma right hip    OTHER SURGICAL HISTORY  09/17/2020    enteroscopy    PACEMAKER INSERTION N/A 2/23/2021    PACEMAKER GENERATOR LEAD REVISION performed by Griselda Hirsch MD at [facility]      9/18/14 Status post remote permanent pacemaker with atrial lead dislodgement. 7/24/14 PPM Implant    UPPER GASTROINTESTINAL ENDOSCOPY N/A 9/17/2020    ENTEROSCOPY PUSH BIOPSY performed by Ramya Marsh MD at [facility]  2012    \"have stents in both legs- done in [place]       Medications Prior to Admission:    Prior to Admission medications    Medication Sig Start Date End Date Taking? Authorizing Provider   amiodarone (CORDARONE) 200 MG tablet Take 1 tablet by mouth daily 2/25/21  Yes Aylin Hutchins PA-C   ferrous sulfate (IRON 325) 325 (65 Fe) MG tablet Take 1 tablet by mouth 2 times daily 2/9/21  Yes Ministerio Tripathi PA-C   allopurinol (ZYLOPRIM) 100 MG tablet Take 1 tablet by mouth daily  Patient taking differently: Take 100 mg by mouth nightly  2/9/21  Yes Ministerio Tripathi PA-C   canagliflozin (INVOKANA) 300 MG TABS tablet Take 1 tablet by mouth every morning (before breakfast) 2/9/21  Yes Ministerio Tripathi PA-C   Dulaglutide (TRULICITY) 1.5 KX/9.3YD SOPN Inject 1.5 mg into the skin once a week 2/9/21  Yes Ministerio Tripathi PA-C   gabapentin (NEURONTIN) 600 MG tablet Take 1 tablet by mouth 3 times daily for 270 days.  2/9/21 11/6/21 Yes Ministerio Tripathi PA-C   glimepiride (AMARYL) 4 MG tablet Take 1 tablet by mouth daily 2/9/21  Yes Ministerio Tripathi PA-C   metFORMIN (GLUCOPHAGE) 1000 MG tablet TAKE 1 TABLET TWICE DAILY  WITH MEALS 2/9/21  Yes Ministerio Tripathi PA-C   simvastatin (ZOCOR) 20 MG tablet Pt takes 10 mg daily 2/9/21  Yes Ministerio Tripathi PA-C   rivaroxaban (XARELTO) 20 MG TABS tablet TAKE 1 TABLET DAILY WITH   BREAKFAST 2/9/21 Yes Elle Nunez PA-C   acetaminophen (AMINOFEN) 325 MG tablet Take 2 tablets by mouth every 6 hours as needed for Pain 1/30/21  Yes Rosamaria Jensen MD   Cholecalciferol (VITAMIN D) 50 MCG (2000 UT) CAPS capsule Take by mouth nightly    Yes Historical Provider, MD   furosemide (LASIX) 20 MG tablet Take 1 tablet by mouth 2 times daily May take an extra Lasix if has increased swelling 3/20/20  Yes Favian Cedeño MD   aspirin 81 MG tablet Take 81 mg by mouth daily   Yes Historical Provider, MD       Allergies: Allergies   Allergen Reactions    Spironolactone      CAUSES INCREASED K+    Tape Viona Chasten Tape] Rash     SURGICAL TAPE   . Social History:    TOBACCO:  No  ETOH:  No    Family History:   Family History   Problem Relation Age of Onset    High Blood Pressure Mother     Arthritis Mother     Diabetes Mother     Heart Disease Mother     High Blood Pressure Father     Heart Disease Father     Kidney Disease Father        REVIEW OF SYSTEMS: (POSITIVES WILL BE UNDERLINED)  CONSTITUTIONAL:    Weight change,fatigue, fever, chills  EYES:  Diplopia, change in vision  EARS:  hearing loss, tinnitus, vertigo  NOSE:   epistaxis  MOUTH/THROAT:     hoarseness, sore throat. RESPIRATORY:  SOB,  cough, sputum, hemoptysis  CARDIOVASCULAR : chest pain,palpitations, dyspnea exertion, orthopnea, paroxysmal nocturnal dyspnea, pedal edema. GASTROINTESTINAL:  See HPI  GENITOURINARY:   dysuria, hematuria, . HEMATOLOGIC/LYMPHATIC:   Anemia, bleeding tendencies.   MUSCULOSKELETAL:    myalgias,  joint pain  NEUROLOGICAL:   Loss of Consciousness, paresthesias, anesthesias, focal weakness  SKIN :   History of dermatitis, rashes  PSYCHIATRIC:  depression, , anxiety past psychosis  ENDOCRINE:  History of diabetes, thyroid disease  ALL/IMM : allergies, rashes    PHYSICAL EXAM:    Vitals:  BP (!) 93/50   Pulse 90   Temp 99.7 °F (37.6 °C) (Oral)   Resp 16   Ht 5' 10\" (1.778 m)   Wt 234 lb 9.1 oz (106.4 kg)   SpO2 93% BMI 33.66 kg/m²   CONSTITUTIONAL: alert, cooperative, no apparent distress,   EYES:  pupils equal, round and reactive to light and sclera clear  ENT:  normocepalic, without obvious abnormality  NECK:  supple, symmetrical, trachea midline  HEMATOLOGIC/LYMPHATICS:  no cervical lymphadenopathy and no supraclavicular lymphadenopathy  LUNGS:  clear to auscultation  CARDIOVASCULAR:  regular rate and rhythm and no murmur noted  ABDOMEN:  Soft, non-tender with normal bowel sounds. No organomegaly or masses  NEUROLOGIC: no focal deficit detected  SKIN:  no lesions  EXTREMITIES: no clubbing, cyanosis, or edema  RECTAL: black stool- sent for hemoccult    IMPRESSION:  1) probable GI bleed- suspect upper- ? PUD, gastritis or angiodysplasia aggravarted by anticoag  2) recent CABD/AVR      RECOMMENDATIONS:  1) high dose PPI, hold anticoag for now  2) EGD tentatively scheduled for tomorrow        Ulysses Sy M.D

## 2021-03-03 NOTE — PROGRESS NOTES
55*   BILITOT 0.4 0.5   ALKPHOS 148* 151*     Mag:    No results for input(s): MG in the last 72 hours. Phos:   No results for input(s): PHOS in the last 72 hours. INR:   Recent Labs     03/03/21  0702   INR 1.84       Radiology Review:  CXR independently reviewed - residual R pleural effusion       ASSESSMENT:  Patient Active Problem List   Diagnosis    Type 2 diabetes mellitus without complication, without long-term current use of insulin ([facility]    Ulcer of other part of lower limb    Venous hypertension, chronic, with ulcer ([address]    Ulcer of other part of foot    Pneumonia    Atrial fibrillation ([facility]    Sinus pause    [facility]ox[facility]mal [facility]) [facility]    DM (diabetes mellitus) ([facility] [address] [facility]    DMITRIY on CPAP    Hyperlipidemia    Status post incision and drainage    CKD (chronic kidney disease) stage 3, GFR 30-59 ml/min ([facility]    Hyperpotassemia    Arthritis    PVD (peripheral vascular disease) ([facility]    Hematoma    Cardiac pacemaker in situ    Coronary artery stenosis    Essential hypertension    Gout    Diabetic neuropathy associated with type 2 diabetes mellitus ([facility]    Adenomatous polyp of sigmoid colon    Iron deficiency anemia due to chronic blood loss    Erythropoietin deficiency anemia    VHD (valvular heart disease)    Abnormal fractional flow reserve (FFR) on cardiac catheterization    Carotid stenosis, left    Aortic stenosis, severe    CAD in native artery    Displacement of atrial pacemaker leads    Pacemaker lead malfunction    SOB (shortness of breath)       S/p CABG, AVR, maze  CHF  CKD   ? GI bleed    PLAN:     On dobutamine 5, still with mild HOTN. Add proamatine and will continue to monitor closely. hgb 5.6, suspect GI loss. protonix BID. Consult GI, going for EGD tomorrow. Will transfuse 2U PRBC. Residual R pleural effusion, check US, may need repeat thoracentesis. Creatinine up to 2.6, on lasix TID and bicarb gtt. Appreciate nephrology input.    Fevers overnight tmax 103, but leukocytosis improving. Cultures pending. Check covid test. On vanc/rocephin.    PT/OT    Reno Baez PA-C

## 2021-03-03 NOTE — PROGRESS NOTES
Physical Therapy  Nursing placed pt on hold this am will check back in pm. Han Ash.  Elizabeth Morel PTA

## 2021-03-03 NOTE — CONSULTS
Consult completed. #20ga Arrow Endurance Extended Dwell MidLine catheter initiated to RUE Brachial Vein using sterile, Midline to RUE OK'd per Dr Viviana Aldrich, Nephrology. UltraSound-guided technique. Positioning verified via UltraSound visualization of catheter within vessel lumen; site returns blood briskly and flushes without resistance/abnormalities. Terri, Primary RN, notified and no other c/o or needs noted/reported at this time.

## 2021-03-03 NOTE — PROGRESS NOTES
[address] [facility]  consulted by Dr. Micheline Romero for monitoring and adjustment. Indication for treatment: Sepsis  Goal trough: 15 mcg/mL    Pertinent Laboratory Values:   Temp Readings from Last 3 Encounters:   03/03/21 98 °F (36.7 °C) (Oral)   02/24/21 97.3 °F (36.3 °C) (Axillary)   02/12/21 97.8 °F (36.6 °C) (Temporal)     Recent Labs     03/01/21  1639 03/02/21  0115 03/03/21  0440   WBC 21.5* 14.9* 11.6*     Recent Labs     03/01/21  1639 03/02/21  0115 03/03/21  0440   BUN 82* 86* 95*   CREATININE 1.7* 1.9* 2.6*     Estimated Creatinine Clearance: 31 mL/min (A) (based on SCr of 2.6 mg/dL (H)). Intake/Output Summary (Last 24 hours) at 3/3/2021 1305  Last data filed at 3/3/2021 0600  Gross per 24 hour   Intake 1356 ml   Output 1470 ml   Net -114 ml       Pertinent Cultures:  Date    Source    Results  03/01   Blood    Pending  03/01   Respiratory   Pending  03/01                           Urine                                       Citrobacter Farmeri    Assessment:  · WBC elevated, trending down @ 11.6; patient is Febrile. Tmax 103 F  · CKD-3B: SCr = 2.6 (baseline = 1.7), BUN = 95, UOP OK  · Holding on Dialysis for now  · Day(s) of therapy: # 2  · Vancomycin concentration:   · 03/03 - 29.0 (random, 9h post dose)  · 03/03 - trough level due @ 2330    Plan:  · Vancomycin 2,000 mg IV initial dose; Follow with Vancomycin 1,500 mg IV Q 24 Hours  · CKD:  Renal function continues to decline; SCr @ 2.6, CrCl = 31 ml/min  · Random level elevated, drawn 9h post dose. · Will obtain Trough level tonight prior to third dose. · Pharmacy will continue to monitor patient and adjust therapy as indicated    Rosana 3 03/03/21 @ 23:30    Thank you for the consult.   Casie Gudino, [address]   3/3/2021 1:05 PM

## 2021-03-04 ENCOUNTER — ANESTHESIA EVENT (OUTPATIENT)
Dept: ENDOSCOPY | Age: 71
DRG: 871 | End: 2021-03-04
Payer: MEDICARE

## 2021-03-04 ENCOUNTER — ANESTHESIA (OUTPATIENT)
Dept: ENDOSCOPY | Age: 71
DRG: 871 | End: 2021-03-04
Payer: MEDICARE

## 2021-03-04 ENCOUNTER — APPOINTMENT (OUTPATIENT)
Dept: GENERAL RADIOLOGY | Age: 71
DRG: 871 | End: 2021-03-04
Payer: MEDICARE

## 2021-03-04 ENCOUNTER — APPOINTMENT (OUTPATIENT)
Dept: INTERVENTIONAL RADIOLOGY/VASCULAR | Age: 71
DRG: 871 | End: 2021-03-04
Payer: MEDICARE

## 2021-03-04 ENCOUNTER — CARE COORDINATION (OUTPATIENT)
Dept: CASE MANAGEMENT | Age: 71
End: 2021-03-04

## 2021-03-04 VITALS — DIASTOLIC BLOOD PRESSURE: 49 MMHG | SYSTOLIC BLOOD PRESSURE: 94 MMHG | OXYGEN SATURATION: 97 %

## 2021-03-04 PROBLEM — J90 PLEURAL EFFUSION: Status: ACTIVE | Noted: 2021-03-04

## 2021-03-04 LAB
ABO/RH: NORMAL
ALBUMIN SERPL-MCNC: 3.6 GM/DL (ref 3.4–5)
ANION GAP SERPL CALCULATED.3IONS-SCNC: 11 MMOL/L (ref 4–16)
ANTIBODY SCREEN: NEGATIVE
BUN BLDV-MCNC: 92 MG/DL (ref 6–23)
CALCIUM SERPL-MCNC: 7.7 MG/DL (ref 8.3–10.6)
CHLORIDE BLD-SCNC: 92 MMOL/L (ref 99–110)
CO2: 25 MMOL/L (ref 21–32)
COMPONENT: NORMAL
COMPONENT: NORMAL
CREAT SERPL-MCNC: 2.4 MG/DL (ref 0.9–1.3)
CROSSMATCH RESULT: NORMAL
CROSSMATCH RESULT: NORMAL
CULTURE: ABNORMAL
CULTURE: ABNORMAL
CULTURE: NORMAL
DOSE AMOUNT: NORMAL
DOSE TIME: NORMAL
GFR AFRICAN AMERICAN: 32 ML/MIN/1.73M2
GFR NON-AFRICAN AMERICAN: 27 ML/MIN/1.73M2
GLUCOSE BLD-MCNC: 121 MG/DL (ref 70–99)
GLUCOSE BLD-MCNC: 126 MG/DL (ref 70–99)
GLUCOSE BLD-MCNC: 131 MG/DL (ref 70–99)
GLUCOSE BLD-MCNC: 153 MG/DL (ref 70–99)
GLUCOSE BLD-MCNC: 167 MG/DL (ref 70–99)
GRAM SMEAR: NORMAL
HAPTOGLOBIN: 355 MG/DL (ref 30–200)
LACTATE: 0.7 MMOL/L (ref 0.4–2)
Lab: ABNORMAL
Lab: NORMAL
MAGNESIUM: 2.5 MG/DL (ref 1.8–2.4)
PHOSPHORUS: 4.3 MG/DL (ref 2.5–4.9)
POTASSIUM SERPL-SCNC: 4 MMOL/L (ref 3.5–5.1)
SODIUM BLD-SCNC: 128 MMOL/L (ref 135–145)
SPECIMEN: ABNORMAL
SPECIMEN: NORMAL
STATUS: NORMAL
STATUS: NORMAL
TRANSFUSION STATUS: NORMAL
TRANSFUSION STATUS: NORMAL
TSH HIGH SENSITIVITY: 2.62 UIU/ML (ref 0.27–4.2)
UNIT DIVISION: 0
UNIT DIVISION: 0
UNIT NUMBER: NORMAL
UNIT NUMBER: NORMAL
VANCOMYCIN TROUGH: 17.6 UG/ML (ref 10–20)

## 2021-03-04 PROCEDURE — 76937 US GUIDE VASCULAR ACCESS: CPT

## 2021-03-04 PROCEDURE — P9047 ALBUMIN (HUMAN), 25%, 50ML: HCPCS | Performed by: INTERNAL MEDICINE

## 2021-03-04 PROCEDURE — 2580000003 HC RX 258: Performed by: INTERNAL MEDICINE

## 2021-03-04 PROCEDURE — 6370000000 HC RX 637 (ALT 250 FOR IP): Performed by: INTERNAL MEDICINE

## 2021-03-04 PROCEDURE — 2000000000 HC ICU R&B

## 2021-03-04 PROCEDURE — 6360000002 HC RX W HCPCS: Performed by: THORACIC SURGERY (CARDIOTHORACIC VASCULAR SURGERY)

## 2021-03-04 PROCEDURE — 0W993ZZ DRAINAGE OF RIGHT PLEURAL CAVITY, PERCUTANEOUS APPROACH: ICD-10-PCS | Performed by: RADIOLOGY

## 2021-03-04 PROCEDURE — 6360000002 HC RX W HCPCS: Performed by: SPECIALIST

## 2021-03-04 PROCEDURE — 6370000000 HC RX 637 (ALT 250 FOR IP): Performed by: THORACIC SURGERY (CARDIOTHORACIC VASCULAR SURGERY)

## 2021-03-04 PROCEDURE — 2580000003 HC RX 258

## 2021-03-04 PROCEDURE — 3700000000 HC ANESTHESIA ATTENDED CARE: Performed by: SPECIALIST

## 2021-03-04 PROCEDURE — 2580000003 HC RX 258: Performed by: THORACIC SURGERY (CARDIOTHORACIC VASCULAR SURGERY)

## 2021-03-04 PROCEDURE — 36410 VNPNXR 3YR/> PHY/QHP DX/THER: CPT

## 2021-03-04 PROCEDURE — 0DJ08ZZ INSPECTION OF UPPER INTESTINAL TRACT, VIA NATURAL OR ARTIFICIAL OPENING ENDOSCOPIC: ICD-10-PCS | Performed by: SPECIALIST

## 2021-03-04 PROCEDURE — 32555 ASPIRATE PLEURA W/ IMAGING: CPT

## 2021-03-04 PROCEDURE — 94761 N-INVAS EAR/PLS OXIMETRY MLT: CPT

## 2021-03-04 PROCEDURE — 2709999900 HC NON-CHARGEABLE SUPPLY

## 2021-03-04 PROCEDURE — 2709999900 HC NON-CHARGEABLE SUPPLY: Performed by: SPECIALIST

## 2021-03-04 PROCEDURE — 3700000001 HC ADD 15 MINUTES (ANESTHESIA): Performed by: SPECIALIST

## 2021-03-04 PROCEDURE — C9113 INJ PANTOPRAZOLE SODIUM, VIA: HCPCS | Performed by: SPECIALIST

## 2021-03-04 PROCEDURE — 6360000002 HC RX W HCPCS: Performed by: INTERNAL MEDICINE

## 2021-03-04 PROCEDURE — 83605 ASSAY OF LACTIC ACID: CPT

## 2021-03-04 PROCEDURE — 6360000002 HC RX W HCPCS

## 2021-03-04 PROCEDURE — 3609017100 HC EGD: Performed by: SPECIALIST

## 2021-03-04 PROCEDURE — 96376 TX/PRO/DX INJ SAME DRUG ADON: CPT

## 2021-03-04 PROCEDURE — 05HC33Z INSERTION OF INFUSION DEVICE INTO LEFT BASILIC VEIN, PERCUTANEOUS APPROACH: ICD-10-PCS | Performed by: INTERNAL MEDICINE

## 2021-03-04 PROCEDURE — 80069 RENAL FUNCTION PANEL: CPT

## 2021-03-04 PROCEDURE — 2500000003 HC RX 250 WO HCPCS

## 2021-03-04 PROCEDURE — C1729 CATH, DRAINAGE: HCPCS

## 2021-03-04 PROCEDURE — 96366 THER/PROPH/DIAG IV INF ADDON: CPT

## 2021-03-04 PROCEDURE — 6370000000 HC RX 637 (ALT 250 FOR IP): Performed by: PHYSICIAN ASSISTANT

## 2021-03-04 PROCEDURE — 71045 X-RAY EXAM CHEST 1 VIEW: CPT

## 2021-03-04 PROCEDURE — C1751 CATH, INF, PER/CENT/MIDLINE: HCPCS

## 2021-03-04 PROCEDURE — 82962 GLUCOSE BLOOD TEST: CPT

## 2021-03-04 RX ORDER — KETAMINE HYDROCHLORIDE 10 MG/ML
INJECTION, SOLUTION INTRAMUSCULAR; INTRAVENOUS PRN
Status: DISCONTINUED | OUTPATIENT
Start: 2021-03-04 | End: 2021-03-04 | Stop reason: SDUPTHER

## 2021-03-04 RX ORDER — SODIUM CHLORIDE 0.9 % (FLUSH) 0.9 %
10 SYRINGE (ML) INJECTION EVERY 12 HOURS SCHEDULED
Status: DISCONTINUED | OUTPATIENT
Start: 2021-03-04 | End: 2021-03-15 | Stop reason: HOSPADM

## 2021-03-04 RX ORDER — PROPOFOL 10 MG/ML
INJECTION, EMULSION INTRAVENOUS PRN
Status: DISCONTINUED | OUTPATIENT
Start: 2021-03-04 | End: 2021-03-04 | Stop reason: SDUPTHER

## 2021-03-04 RX ORDER — SODIUM CHLORIDE, SODIUM LACTATE, POTASSIUM CHLORIDE, CALCIUM CHLORIDE 600; 310; 30; 20 MG/100ML; MG/100ML; MG/100ML; MG/100ML
INJECTION, SOLUTION INTRAVENOUS CONTINUOUS PRN
Status: DISCONTINUED | OUTPATIENT
Start: 2021-03-04 | End: 2021-03-04 | Stop reason: SDUPTHER

## 2021-03-04 RX ORDER — SODIUM CHLORIDE 1000 MG
1 TABLET, SOLUBLE MISCELLANEOUS DAILY
Status: DISCONTINUED | OUTPATIENT
Start: 2021-03-04 | End: 2021-03-05

## 2021-03-04 RX ORDER — LIDOCAINE HYDROCHLORIDE 10 MG/ML
5 INJECTION, SOLUTION EPIDURAL; INFILTRATION; INTRACAUDAL; PERINEURAL ONCE
Status: DISCONTINUED | OUTPATIENT
Start: 2021-03-04 | End: 2021-03-06

## 2021-03-04 RX ORDER — SODIUM CHLORIDE 0.9 % (FLUSH) 0.9 %
10 SYRINGE (ML) INJECTION PRN
Status: DISCONTINUED | OUTPATIENT
Start: 2021-03-04 | End: 2021-03-15 | Stop reason: HOSPADM

## 2021-03-04 RX ADMIN — Medication 15 G: at 09:51

## 2021-03-04 RX ADMIN — VANCOMYCIN HYDROCHLORIDE 1500 MG: 5 INJECTION, POWDER, LYOPHILIZED, FOR SOLUTION INTRAVENOUS at 00:10

## 2021-03-04 RX ADMIN — DOBUTAMINE IN DEXTROSE 5 MCG/KG/MIN: 200 INJECTION, SOLUTION INTRAVENOUS at 05:22

## 2021-03-04 RX ADMIN — KETAMINE HYDROCHLORIDE 10 MG: 10 INJECTION INTRAMUSCULAR; INTRAVENOUS at 07:11

## 2021-03-04 RX ADMIN — GABAPENTIN 600 MG: 300 CAPSULE ORAL at 15:21

## 2021-03-04 RX ADMIN — PHENYLEPHRINE HYDROCHLORIDE 100 MCG: 10 INJECTION INTRAVENOUS at 07:15

## 2021-03-04 RX ADMIN — IRON SUCROSE 200 MG: 20 INJECTION, SOLUTION INTRAVENOUS at 11:50

## 2021-03-04 RX ADMIN — CEFTRIAXONE 1000 MG: 1 INJECTION, POWDER, FOR SOLUTION INTRAMUSCULAR; INTRAVENOUS at 09:33

## 2021-03-04 RX ADMIN — MIDODRINE HYDROCHLORIDE 10 MG: 5 TABLET ORAL at 11:49

## 2021-03-04 RX ADMIN — PHENYLEPHRINE HYDROCHLORIDE 100 MCG: 10 INJECTION INTRAVENOUS at 07:10

## 2021-03-04 RX ADMIN — DOBUTAMINE IN DEXTROSE 5 MCG/KG/MIN: 200 INJECTION, SOLUTION INTRAVENOUS at 21:21

## 2021-03-04 RX ADMIN — ATORVASTATIN CALCIUM 10 MG: 10 TABLET, FILM COATED ORAL at 09:34

## 2021-03-04 RX ADMIN — PROPOFOL 40 MG: 10 INJECTION, EMULSION INTRAVENOUS at 07:11

## 2021-03-04 RX ADMIN — GLIMEPIRIDE 2 MG: 4 TABLET ORAL at 15:25

## 2021-03-04 RX ADMIN — KETAMINE HYDROCHLORIDE 10 MG: 10 INJECTION INTRAMUSCULAR; INTRAVENOUS at 07:12

## 2021-03-04 RX ADMIN — ALBUMIN (HUMAN) 25 G: 0.25 INJECTION, SOLUTION INTRAVENOUS at 02:41

## 2021-03-04 RX ADMIN — KETAMINE HYDROCHLORIDE 10 MG: 10 INJECTION INTRAMUSCULAR; INTRAVENOUS at 07:19

## 2021-03-04 RX ADMIN — KETAMINE HYDROCHLORIDE 10 MG: 10 INJECTION INTRAMUSCULAR; INTRAVENOUS at 07:13

## 2021-03-04 RX ADMIN — INSULIN GLARGINE 30 UNITS: 100 INJECTION, SOLUTION SUBCUTANEOUS at 21:07

## 2021-03-04 RX ADMIN — ASPIRIN 81 MG: 81 TABLET, CHEWABLE ORAL at 09:35

## 2021-03-04 RX ADMIN — PROPOFOL 10 MG: 10 INJECTION, EMULSION INTRAVENOUS at 07:13

## 2021-03-04 RX ADMIN — GABAPENTIN 600 MG: 300 CAPSULE ORAL at 21:07

## 2021-03-04 RX ADMIN — GLIMEPIRIDE 2 MG: 4 TABLET ORAL at 09:35

## 2021-03-04 RX ADMIN — MIDODRINE HYDROCHLORIDE 10 MG: 5 TABLET ORAL at 08:30

## 2021-03-04 RX ADMIN — PROPOFOL 10 MG: 10 INJECTION, EMULSION INTRAVENOUS at 07:15

## 2021-03-04 RX ADMIN — SODIUM CHLORIDE, PRESERVATIVE FREE 10 ML: 5 INJECTION INTRAVENOUS at 09:36

## 2021-03-04 RX ADMIN — SODIUM CHLORIDE, POTASSIUM CHLORIDE, SODIUM LACTATE AND CALCIUM CHLORIDE: 600; 310; 30; 20 INJECTION, SOLUTION INTRAVENOUS at 07:03

## 2021-03-04 RX ADMIN — KETAMINE HYDROCHLORIDE 10 MG: 10 INJECTION INTRAMUSCULAR; INTRAVENOUS at 07:17

## 2021-03-04 RX ADMIN — PANTOPRAZOLE SODIUM 40 MG: 40 INJECTION, POWDER, FOR SOLUTION INTRAVENOUS at 21:06

## 2021-03-04 RX ADMIN — FUROSEMIDE 40 MG: 10 INJECTION, SOLUTION INTRAMUSCULAR; INTRAVENOUS at 05:12

## 2021-03-04 RX ADMIN — MIDODRINE HYDROCHLORIDE 10 MG: 5 TABLET ORAL at 15:22

## 2021-03-04 RX ADMIN — PANTOPRAZOLE SODIUM 40 MG: 40 INJECTION, POWDER, FOR SOLUTION INTRAVENOUS at 09:36

## 2021-03-04 RX ADMIN — GABAPENTIN 600 MG: 300 CAPSULE ORAL at 09:34

## 2021-03-04 RX ADMIN — SODIUM CHLORIDE, PRESERVATIVE FREE 10 ML: 5 INJECTION INTRAVENOUS at 21:21

## 2021-03-04 RX ADMIN — Medication 1 G: at 15:21

## 2021-03-04 ASSESSMENT — PAIN SCALES - GENERAL
PAINLEVEL_OUTOF10: 0

## 2021-03-04 ASSESSMENT — ENCOUNTER SYMPTOMS: SHORTNESS OF BREATH: 1

## 2021-03-04 NOTE — PROGRESS NOTES
IV therapy RN here- stated Dr. Nitesh Alvarez had said no PICC yesterday( was not informed in report). Called Dr. Nitesh Alvarez direct- he stated since pacer lead changed during last admission (also was not aware) he does not want a PICC line- may place a new midline on either side- d/c the old midline if needed.  IV RN made aware

## 2021-03-04 NOTE — ANESTHESIA POSTPROCEDURE EVALUATION
Department of Anesthesiology  Postprocedure Note    Patient: Marco Antonio Cline  MRN: [id number]  YOB: 1950  Date of evaluation: 3/4/2021  Time:  7:20 AM     Procedure Summary     Date: 03/04/21 Room / Location: [address]    Anesthesia Start: 0703 Anesthesia Stop:     Procedure: EGD ESOPHAGOGASTRODUODENOSCOPY (N/A ) Diagnosis: (gi bleed)    Surgeons: Va Elizabeth MD Responsible Provider: Keri Emmanuel MD    Anesthesia Type: Not recorded ASA Status: Not recorded          Anesthesia Type: No value filed. Sanju Phase I:      Sanju Phase II:      Last vitals: Reviewed and per EMR flowsheets.        Anesthesia Post Evaluation    Patient location during evaluation: bedside  Patient participation: complete - patient participated  Level of consciousness: awake and alert  Airway patency: patent  Nausea & Vomiting: no nausea and no vomiting  Complications: no  Cardiovascular status: hemodynamically stable and blood pressure returned to baseline  Respiratory status: acceptable, spontaneous ventilation, nonlabored ventilation and nasal cannula  Hydration status: stable

## 2021-03-04 NOTE — PROGRESS NOTES
Wife, Fredy Snell, at bedside. Updates given on EGD overall plan.  Stating she wants to speak to Dr. Anjelica Rivas direct- message sent to him through [employer] serve per wife's request

## 2021-03-04 NOTE — ANESTHESIA PRE PROCEDURE
Department of Anesthesiology  Preprocedure Note       Name:  Soo Marc   Age:  66 y.o.  :  1950                                          MRN:  [id number]         Date:  3/4/2021      Surgeon: Tish Montelongo):  Aly Walsh MD    Procedure: Procedure(s):  EGD ESOPHAGOGASTRODUODENOSCOPY    Medications prior to admission:   Prior to Admission medications    Medication Sig Start Date End Date Taking? Authorizing Provider   amiodarone (CORDARONE) 200 MG tablet Take 1 tablet by mouth daily 21  Yes Corey Roca PA-C   ferrous sulfate (IRON 325) 325 (65 Fe) MG tablet Take 1 tablet by mouth 2 times daily 21  Yes Yessenia Rey PA-C   allopurinol (ZYLOPRIM) 100 MG tablet Take 1 tablet by mouth daily  Patient taking differently: Take 100 mg by mouth nightly  21  Yes Yessenia Rey PA-C   canagliflozin (INVOKANA) 300 MG TABS tablet Take 1 tablet by mouth every morning (before breakfast) 21  Yes Yessenia Rey PA-C   Dulaglutide (TRULICITY) 1.5 TV/8.6XE SOPN Inject 1.5 mg into the skin once a week 21  Yes Yessenia Rey PA-C   gabapentin (NEURONTIN) 600 MG tablet Take 1 tablet by mouth 3 times daily for 270 days.  21 Yes Yessenia Rey PA-C   glimepiride (AMARYL) 4 MG tablet Take 1 tablet by mouth daily 21  Yes Yessenia Rey PA-C   metFORMIN (GLUCOPHAGE) 1000 MG tablet TAKE 1 TABLET TWICE DAILY  WITH MEALS 21  Yes Yessenia Rey PA-C   simvastatin (ZOCOR) 20 MG tablet Pt takes 10 mg daily 21  Yes Yessenia Rey PA-C   rivaroxaban (XARELTO) 20 MG TABS tablet TAKE 1 TABLET DAILY WITH   BREAKFAST 21  Yes Yessenia Rey PA-C   acetaminophen (AMINOFEN) 325 MG tablet Take 2 tablets by mouth every 6 hours as needed for Pain 21  Yes Ash Lopez MD   Cholecalciferol (VITAMIN D) 50 MCG (2000 UT) CAPS capsule Take by mouth nightly    Yes Historical Provider, MD   furosemide (LASIX) 20 MG tablet Take 1 tablet by mouth 2 times daily May take an extra Lasix if has increased swelling 3/20/20  Yes Bar Chavez MD   aspirin 81 MG tablet Take 81 mg by mouth daily   Yes Historical Provider, MD       Current medications:    Current Facility-Administered Medications   Medication Dose Route Frequency Provider Last Rate Last Admin    vancomycin (VANCOCIN) intermittent dosing (placeholder)   Other See Admin Instructions Danilo Omer MD        iron sucrose (VENOFER) 200 mg in sodium chloride 0.9 % 100 mL IVPB  200 mg Intravenous Q24H Bar Chavez MD   Stopped at 03/03/21 1350    0.9 % sodium chloride infusion   Intravenous PRN Bar Chavez MD        cefTRIAXone (ROCEPHIN) 1000 mg IVPB in 50 mL D5W minibag  1,000 mg Intravenous Q24H Teofilo Desouza MD   Stopped at 03/03/21 1633    pantoprazole (PROTONIX) injection 40 mg  40 mg Intravenous BID Angus Connolly MD   40 mg at 03/03/21 2100    midodrine (PROAMATINE) tablet 10 mg  10 mg Oral TID  Jaleel Mancuso PA-C   10 mg at 03/03/21 1828    insulin glargine (LANTUS) injection vial 30 Units  30 Units Subcutaneous Nightly Jayant Herring MD   30 Units at 03/02/21 2049    glimepiride (AMARYL) tablet 2 mg  2 mg Oral BID  Jayant Herring MD   2 mg at 03/03/21 1820    insulin lispro (HUMALOG) injection vial 0-6 Units  0-6 Units Subcutaneous 2 times per day Jayant Herring MD   1 Units at 03/04/21 0241    acetaminophen (TYLENOL) tablet 650 mg  650 mg Oral Q6H PRN Danilo Omer MD   650 mg at 03/03/21 6577    aspirin chewable tablet 81 mg  81 mg Oral Daily Danilo Omer MD   Stopped at 03/03/21 7125    canagliflozin (INVOKANA) tablet 300 mg  ++Non Formulary / Patient Supplied++  300 mg Oral QAM AC Danilo Omer MD   300 mg at 03/03/21 0708    [START ON 3/6/2021] Dulaglutide Solution Pen 1.5 mg/0.5 mL  ++Non Formulary / Patient Supplied++  1.5 mg Subcutaneous Weekly Danilo Omer MD        gabapentin (NEURONTIN) capsule 600 mg  600 mg Oral TID Danilo Omer MD   600 mg at 03/03/21 2100    atorvastatin (LIPITOR) tablet 10 mg  10 mg Oral Daily Milagros Balbuena MD   10 mg at 03/03/21 1127    DOBUTamine (DOBUTREX) 500 mg in dextrose 5 % 250 mL infusion  5 mcg/kg/min Intravenous Continuous Milagros Balbuena MD 15.8 mL/hr at 03/04/21 0522 5 mcg/kg/min at 03/04/21 0522    insulin lispro (HUMALOG) injection vial 0-12 Units  0-12 Units Subcutaneous TID  Lise Severs, MD   4 Units at 03/03/21 1832     Facility-Administered Medications Ordered in Other Encounters   Medication Dose Route Frequency Provider Last Rate Last Admin    propofol injection    PRN Jin Guthrie APRN - CRNA   10 mg at 03/04/21 0715    ketamine (KETALAR) injection    PRN Jin Guthrie APRN - CRNA   10 mg at 03/04/21 5687    lactated ringers infusion    Continuous PRN Jin Guthrie APRN - CRNA   New Bag at 03/04/21 0703    phenylephrine (ROD-SYNEPHRINE) injection    PRN JUSTIN Rudolph - CRNA   100 mcg at 03/04/21 0715       Allergies: Allergies   Allergen Reactions    Spironolactone      CAUSES INCREASED K+    Tape [place] Tape] Rash     SURGICAL TAPE       Problem List:    Patient Active Problem List   Diagnosis Code    Type 2 diabetes mellitus without complication, without long-term current use of insulin ([facility]) E11.9    Ulcer of other part of lower limb L97.809    Venous hypertension, chronic, with ulcer ([facility] I87.319, L97.909    Ulcer of other part of foot L97.509    Pneumonia J18.9    Atrial fibrillation ([facility]) I48.91    Sinus pause I45.5    PAF (paroxysmal atrial fibrillation) ([facility]) I48.0    DM (diabetes mellitus) ([facility]) E11.9    DMITRIY on CPAP G47.33, Z99.89    Hyperlipidemia E78.5    Status post incision and drainage Z98.890    CKD (chronic kidney disease) stage 3, GFR 30-59 ml/min ([facility]) N18.30    Hyperpotassemia E87.5    Arthritis M19.90    PVD (peripheral vascular disease) ([facility]) I73.9    Hematoma T14. 8XXA    Cardiac pacemaker in situ Z95.0    Coronary artery stenosis I25.10    Essential hypertension I10    Gout M10.9    Diabetic neuropathy  PACEMAKER PLACEMENT      9/18/14 Status post remote permanent pacemaker with atrial lead dislodgement. 7/24/14 PPM Implant    UPPER GASTROINTESTINAL ENDOSCOPY N/A 9/17/2020    ENTEROSCOPY PUSH BIOPSY performed by Janet Chavarria MD at [address]  2012    \"have stents in both legs- done in [place]       Social History:    Social History     Tobacco Use    Smoking status: Never Smoker    Smokeless tobacco: Never Used   Substance Use Topics    Alcohol use: Yes     Alcohol/week: 2.0 standard drinks     Types: 2 Cans of beer per week     Comment: average \"one time per week\"                                Counseling given: Not Answered      Vital Signs (Current):   Vitals:    03/04/21 0400 03/04/21 0500 03/04/21 0531 03/04/21 0600   BP: (!) 81/53 (!) 96/59  (!) 97/53   Pulse: 86 69 68 71   Resp: 22 22 17   Temp:       TempSrc:       SpO2: 93% 93%  94%   Weight:       Height:                                                  BP Readings from Last 3 Encounters:   03/04/21 (!) 97/53   03/04/21 (!) 91/50   02/24/21 139/72       NPO Status: Time of last liquid consumption: 0014                        Time of last solid consumption: 1800                        Date of last liquid consumption: 03/04/21                        Date of last solid food consumption: 03/03/21    BMI:   Wt Readings from Last 3 Encounters:   03/03/21 234 lb 9.1 oz (106.4 kg)   02/24/21 244 lb (110.7 kg)   02/12/21 216 lb (98 kg)     Body mass index is 33.66 kg/m².     CBC:   Lab Results   Component Value Date    WBC 13.1 03/03/2021    RBC 3.14 03/03/2021    HGB 7.7 03/03/2021    HCT 24.4 03/03/2021    MCV 77.7 03/03/2021    RDW 18.6 03/03/2021     03/03/2021       CMP:   Lab Results   Component Value Date     03/04/2021    K 4.0 03/04/2021    CL 92 03/04/2021    CO2 25 03/04/2021    BUN 92 03/04/2021    CREATININE 2.4 03/04/2021    GFRAA 32 03/04/2021    AGRATIO 1.8 11/24/2020    LABGLOM 27 03/04/2021    LABGLOM 51 06/15/2016    GLUCOSE 121 03/04/2021    PROT 5.1 03/03/2021    PROT 7.3 12/27/2012    CALCIUM 7.7 03/04/2021    BILITOT 0.5 03/03/2021    ALKPHOS 151 03/03/2021    AST 63 03/03/2021    ALT 55 03/03/2021       POC Tests:   Recent Labs     03/04/21  0218   POCGLU 167*       Coags:   Lab Results   Component Value Date    PROTIME 22.4 03/03/2021    INR 1.84 03/03/2021    APTT 44.7 03/03/2021       HCG (If Applicable): No results found for: PREGTESTUR, PREGSERUM, HCG, HCGQUANT     ABGs:   Lab Results   Component Value Date    PO2ART 74 03/03/2021    PQI2RTA 33.0 03/03/2021    JHX9BTP 23.5 03/03/2021        Type & Screen (If Applicable):  No results found for: LABABO, LABRH    Drug/Infectious Status (If Applicable):  No results found for: HIV, HEPCAB    COVID-19 Screening (If Applicable):   Lab Results   Component Value Date    COVID19 NOT DETECTED 03/03/2021    COVID19 NOT DETECTED 12/11/2020         Anesthesia Evaluation  Patient summary reviewed  Airway: Mallampati: II  TM distance: >3 FB   Neck ROM: full  Mouth opening: > = 3 FB Dental: normal exam         Pulmonary:   (+) pneumonia:  shortness of breath:  sleep apnea:  decreased breath sounds,                             Cardiovascular:    (+) hypertension:, pacemaker:, CAD:,       ECG reviewed  Rhythm: regular  Rate: normal  Echocardiogram reviewed                  Neuro/Psych:               GI/Hepatic/Renal:             Endo/Other:    (+) Diabetes, . Abdominal:   (+) obese,     Abdomen: soft. Vascular:                                        Anesthesia Plan      MAC     ASA 4       Induction: intravenous. MIPS: Prophylactic antiemetics administered. Anesthetic plan and risks discussed with patient. Use of blood products discussed with patient whom consented to blood products. Plan discussed with attending.                   JUSTIN Rudolph - CRNA   3/4/2021

## 2021-03-04 NOTE — PROGRESS NOTES
Dr. Susan Frederick called for , pt is NPO at midnight for EGD in AM. Dr. Susan Frederick gave verbal orders for 15 units of lantus this PM instead of 30 units.      Curt Flair

## 2021-03-04 NOTE — CARE COORDINATION
CM collaborated with [employer]. Requested for precert to be started padmaja for ARU. CM called Kasandra and informed. Plan remains the unchanged. Pt to go to ARU.  [facility]

## 2021-03-04 NOTE — PROGRESS NOTES
Pt back from Endo- bedside/updated report done pt awake- alert/orientedx4, sleepy. Connected to bedside monitor. Repositioned in bed- VSS- A/V Paced- 90 with complete capture.  Call light within reach

## 2021-03-04 NOTE — PROGRESS NOTES
Pt transferred to endo with this RN and Endo nurse.      Electronically signed by Zaira Avalos RN on 3/4/2021 at 7:02 AM

## 2021-03-04 NOTE — PROGRESS NOTES
Occupational Therapy    Attempted at 1040 hrs c pt receiving PICC line placement. Plan is to re-attempt this afternoon as schedule allows. Re-attempted at 1316 hrs c pt receiving thoracentesis procedure. Plan is to re-attempt tomorrow as schedule allows.     Electronically signed by:    BYRON Noel  3/4/2021, 10:34 AM

## 2021-03-04 NOTE — PROGRESS NOTES
Discussed situation with IV access with Dr. Greta Mitchell- stated to D/C midline and get PICC inserted- see new orders

## 2021-03-04 NOTE — PROGRESS NOTES
PATIENT NAME: Samreen Gomez    TODAY'S DATE: 03/04/21    Reason for today's visit: CHF s/p CABG AVR maze    SUBJECTIVE:    Pt feels better today. Minimal SOB. Still feels fatigued. Has not been out of bed. Had EGD this am.     OBJECTIVE:   VITALS:    Vitals:    03/04/21 1150   BP: (!) 101/56   Pulse: 90   Resp: 20   Temp:    SpO2: 96%     INTAKE/OUTPUT:    Date 03/04/21 0000 - 03/04/21 2359   Shift 7819-8593 9376-0200 3955-7196 24 Hour Total   INTAKE   P.O.  420  420   I. V.(mL/kg/hr) 259.7(0.3)   259.7   IV Piggyback 683. 3 150  833.3   Shift Total(mL/kg) 943(8.9) 570(5.4)  9564(45.6)   OUTPUT   Urine(mL/kg/hr) 960(1.1)   960   Shift Total(mL/kg) 960(9)   960(9)   Weight (kg) 106.4 106.4 106.4 106.4      Patient Vitals for the past 96 hrs (Last 3 readings):   Weight   03/03/21 0600 234 lb 9.1 oz (106.4 kg)   03/02/21 0600 230 lb 6.1 oz (104.5 kg)   03/01/21 2325 231 lb 11.3 oz (105.1 kg)       EXAM:  Constitutional: Blood pressure (!) 101/56, pulse 90, temperature 98 °F (36.7 °C), temperature source Oral, resp. rate 20, height 5' 10\" (1.778 m), weight 234 lb 9.1 oz (106.4 kg), SpO2 96 %. No apparent distress, appears stated age and cooperative. Neurologic: follows commands, no focal weakness noted   Lungs: Good respiratory effort.  Clear to auscultation,   CV: Regular rate/ rhythm , 2+ peripheral edema, feet warm and well perfused  GI: Soft, non-tender in all four quadrants, non-distended, + bowel sounds, spleen and liver no palpable masses   : bladder nondistended   MSK: no obvious deformity   Skin: pale       Data:  CBC:   Recent Labs     03/03/21  1600 03/03/21  1836 03/03/21  2324   WBC 12.5* 11.6* 13.1*   HGB 7.0* 7.7* 7.7*   HCT 23.0* 24.4* 24.4*    239 264     BMP:    Recent Labs     03/03/21  0440 03/03/21  1600 03/04/21  0520   * 128* 128*   K 5.4* 4.8 4.0   CL 92* 93* 92*   CO2 21 25 25   BUN 95* 98* 92*   CREATININE 2.6* 2.4* 2.4*   GLUCOSE 186* 181* 121*     Hepatic:   Recent Labs 03/01/21  1639 03/03/21  0702   AST 59* 63*   ALT 49* 55*   BILITOT 0.4 0.5   ALKPHOS 148* 151*     Mag:      Recent Labs     03/03/21  1600 03/03/21  2324   MG 2.6* 2.5*      Phos:     Recent Labs     03/03/21  1600 03/04/21  0520   PHOS 5.1* 4.3      INR:   Recent Labs     03/03/21  0702   INR 1.84       Radiology Review:       ASSESSMENT:  Patient Active Problem List   Diagnosis    Type 2 diabetes mellitus without complication, without long-term current use of insulin ([facility])    Ulcer of other part of lower limb    Venous hypertension, chronic, with ulcer ([facility])    Ulcer of other part of foot    Pneumonia    Atrial fibrillation ([facility])    Sinus pause    PAF (paroxysmal atrial fibrillation) ([facility])    DM (diabetes mellitus) ([facility] [address]    DMTIRIY on CPAP    Hyperlipidemia    Status post incision and drainage    CKD (chronic kidney disease) stage 3, GFR 30-59 ml/min ([facility])    Hyperpotassemia    Arthritis    PVD (peripheral vascular disease) ([facility])    Hematoma    Cardiac pacemaker in situ    Coronary artery stenosis    Essential hypertension    Gout    Diabetic neuropathy associated with type 2 diabetes mellitus ([facility])    Adenomatous polyp of sigmoid colon    Iron deficiency anemia due to chronic blood loss    Erythropoietin deficiency anemia    VHD (valvular heart disease)    Abnormal fractional flow reserve (FFR) on cardiac catheterization    Carotid stenosis, left    Aortic stenosis, severe    CAD in native artery    Displacement of atrial pacemaker leads    Pacemaker lead malfunction    SOB (shortness of breath)       S/p CABG, AVR, maze  CHF  CKD   ? GI bleed    PLAN:     Continue dobutamine 5 to assist with diuresis. On proamatine 10 TID. Holding coreg due to borderline BP.   hgb stable 7.7 s/p 2 U PRBC. Had EGD this am, no gastric ulcers noted. Possible small bowel angiodysplasia related to AS. Will continue to monitor closely. Residual R pleural effusion, repeat R thoracentesis today.    Will plan to restart eliquis after thoracentesis. Creatinine stable 2.4. no lasix ordered today. Appreciate nephrology input. Afebrile x 24 hrs. Urine cx growing citrobacter. Change abx to cefepime.    PT/OT    Keely Samples DEXTER

## 2021-03-04 NOTE — CARE COORDINATION
Upon attempt at Care Transition follow up call Patient noted to have been readmitted to [facility] on 3/2/21 with progressively worsening weakness, SOB, and swelling. Dx: CHF. Per protocol, current episode closed. Current discharge plan ARU. Will follow if criteria met upon discharge.  Damien Renner RN, CTN

## 2021-03-04 NOTE — PROGRESS NOTES
Ambulated pt approx 30 feet. Pt got very weak in his legs, another RN brought chair to pt and wheeled him back into the room. Pt tolerated fairly well.  Wife at bedside

## 2021-03-04 NOTE — PROGRESS NOTES
Physical Therapy  Attempted to see pt at 0930, pt and his wife were with nursing after an EGD, attempted at [id number] Sor[address] Yuan, pt was having a mid line inserted, attempted at 1315; pt is getting a thoracentesis bedside. Teddy Clinton.  Bren Odell PTA

## 2021-03-04 NOTE — PROGRESS NOTES
Dr. Neha Victor called wife direct.  Explained test results to her and possibility of what he is thinking it could be

## 2021-03-04 NOTE — PROGRESS NOTES
Progress Note( Dr. Gina Peterson)  3/3/2021  Subjective:   Admit Date: 3/1/2021  PCP: Diana Alcazar MD    Admitted For :Shortness of breath leg swelling    Consulted For:  Better control of blood glucose    Interval History: Feels somewhat better being on CPAP  Had bilateral pleural effusion were tapped both sides    Denies any chest pains,   Yes SOB . Denies nausea or vomiting. No new bowel or bladder symptoms.        Intake/Output Summary (Last 24 hours) at 3/3/2021 2325  Last data filed at 3/3/2021 2301  Gross per 24 hour   Intake 3131.95 ml   Output 3255 ml   Net -123.05 ml       DATA    CBC:   Recent Labs     03/03/21  0440 03/03/21  1600 03/03/21  1836   WBC 11.6* 12.5* 11.6*   HGB 5.6* 7.0* 7.7*    266 239    CMP:  Recent Labs     03/01/21  1639 03/02/21  0115 03/03/21  0440 03/03/21  0702 03/03/21  1600   * 128* 128*  --  128*   K 6.2* 5.4* 5.4*  --  4.8   CL 92* 94* 92*  --  93*   CO2 21 23 21  --  25   BUN 82* 86* 95*  --  98*   CREATININE 1.7* 1.9* 2.6*  --  2.4*   CALCIUM 8.4 7.8* 7.5*  --  7.9*   PROT 6.3*  --   --  5.1*  --    LABALBU 2.6*  --   --  3.3*  --    BILITOT 0.4  --   --  0.5  --    ALKPHOS 148*  --   --  151*  --    AST 59*  --   --  63*  --    ALT 49*  --   --  55*  --      Lipids:   Lab Results   Component Value Date    CHOL 91 12/18/2020    HDL 43 12/18/2020    TRIG 66 12/18/2020     Glucose:  Recent Labs     03/03/21  1303 03/03/21  1827 03/03/21 2058   POCGLU 257* 201* 204*     [id number]:  Lab Results   Component Value Date    LABA1C 6.1 02/20/2021     High Sensitivity TSH:   Lab Results   Component Value Date    TSHHS 1.100 12/31/2012     Free T3: No results found for: FT3  Free T4:  Lab Results   Component Value Date    T4FREE 1.5 07/07/2020       Echocardiogram Limited    Result Date: 3/2/2021  Transthoracic Echocardiography Report (TTE)  Demographics   Patient Name       RASHAD TATE    Date of Study       03/02/2021   Date of Birth      1950 Gender              Male   Age                66 year(s)         Race                   Patient Number     [id number]         Room Number         2101   Visit Number       [id number]   Corporate ID       [id number]   Accession Number   [id number]         23 Asha Perry RVT   Ordering Physician Martina Mehta PA-C       Physician           MD  Procedure Type of Study   TTE procedure:ECHOCARDIOGRAM LIMITED. Procedure Date Date: 03/02/2021 Start: 11:53 AM Study Location: Portable Technical Quality: Adequate visualization Indications:S/P CABG. Patient Status: Routine Height: 70 inches Weight: 230 pounds BSA: 2.22 m2 BMI: 33 kg/m2 HR: 91 bpm BP: 91/45 mmHg  Conclusions   Summary  This is a limited echocardiogram.  Left ventricular systolic function is normal.  Ejection fraction is visually estimated at 55-60%. Bilateral atrial enlargement. S/p AVR with a 27 mm Medtronic Mosaic. Moderate mitral regurgitation. Moderate tricuspid regurgitation; RVSP: 50 mmHg. Severe PHTN. No evidence of any pericardial effusion. Signature   ------------------------------------------------------------------  Electronically signed by Shamar Alberts MD (Interpreting  physician) on 03/02/2021 at 05:06 PM  -    Ct Chest Wo Contrast    Result Date: 3/1/2021  EXAMINATION: CT OF THE CHEST WITHOUT CONTRAST 3/1/2021 3:57 pm T    Patient status post midline sternotomy. Immediately posterior to the sternotomy defect, there is a small gas and fluid collection which is nonspecific. It is not necessarily outside normal limits for a sternotomy that was performed 2 weeks ago. However, sterility cannot be ascertained with imaging, and therefore a small developing abscess is considered as well. No evidence of osteolysis of the sternotomy defect to suggest osteomyelitis.  Interval development of a moderate to large right and a moderate left pleural effusion. Adjacent airspace disease is some combination of atelectasis, pneumonia, and/or edema. Xr Chest Portable    Result Date: 3/3/2021  EXAMINATION: ONE XRAY VIEW OF THE CHEST 3/3/2021 5:34 am COMPARISON: 03/02/2021 HISTORY: ORDERING SYSTEM PROVIDED HISTORY: heart failure TECHNOLOGIST PROVIDED HISTORY: Reason for exam:->heart failure Reason for Exam: heart failure Acuity: Acute Type of Exam: Subsequent/Follow-up FINDINGS: Status post median sternotomy and left-sided transvenous pacer placement. There is stable cardiomegaly. The chest films taken shallow degree of inspiration accentuating heart size and bronchovascular structures. There is a small right pleural effusion. No significant left effusion is seen. The patient is undergone clipping of the left atrial appendage. There is bibasilar atelectasis. Stable exam     Xr Chest Portable    Result Date: 3/2/2021  EXAMINATION: ONE XRAY VIEW OF THE CHEST 3/2/2021 9:38 am COMPARISON: 03/01/2021 HISTORY: ORDERING SYSTEM PROVIDED HISTORY: post bilateral thoracentesis   . Patient has undergone thoracentesis. The volume of fluid in the right pleural space has decreased and/or shifted. There is some persistent right basilar atelectasis. No significant pleural effusion on the left is seen. Minimal left basilar atelectasis is noted. No pneumothorax is seen. Bilateral shoulder arthritis is noted. Pleural effusions right greater than left with bibasilar atelectasis right worse than left. No pneumothorax is seen.      Xr Chest Portable    Result Date: 3/1/2021  EXAMINATION: ONE XRAY VIEW OF THE CHEST 3/1/2021 5:12 pm COMPARISON: 02/23/2021 HISTORY: ORDERING SYSTEM PROVIDED HISTORY: chest pain, shorntess of breath TECHNOLOGIST PROVIDED HISTORY: Reason for exam:->chest pain, shorntess of breath Reason for Exam: chest pain   sob   leg swelling Acuity: Unknown Type of Exam: Unknown Relevant Medical/Surgical History: afib    cad    diabetes FINDINGS: Status post median sternotomy. Transvenous pacer remains place. Stable cardiomegaly. Right-sided pleural effusion. Bibasilar hypoaeration. Right-sided pleural effusion Bibasilar hypoaeration     Vl Dup Lower Extremity Venous Bilateral    Result Date: 3/3/2021  EXAMINATION: DUPLEX VENOUS ULTRASOUND OF THE BILATERAL LOWER EXTREMITIES, 3/3/2021 9:01 am TECHNIQUE: Duplex ultrasound using B-mode/gray scaled imaging and Doppler spectral analysis and color flow was obtained of the bilateral lower extremities. COMPARISON: None. HISTORY: ORDERING SYSTEM PROVIDED HISTORY: fevers TECHNOLOGIST PROVIDED HISTORY: Reason for exam:->fevers Reason for Exam: edema FINDINGS: The visualized veins of the bilateral lower extremities are patent and free of echogenic thrombus. The veins demonstrate good compressibility with normal color flow study and spectral analysis. No evidence of DVT in either lower extremity. Us Chest Including Mediastinum    Result Date: 3/3/2021  EXAMINATION: ULTRASOUND OF THE CHEST 3/3/2021 9:02 am COMPARISON: Chest radiograph performed earlier in the same day HISTORY: ORDERING SYSTEM PROVIDED HISTORY: ASSESS FOR PLEURAL EFFUSION TECHNOLOGIST PROVIDED HISTORY: RIGHT LUNG Reason for exam:->ASSESS FOR PLEURAL EFFUSION Reason for Exam: pleural effusion FINDINGS: Moderate right pleural effusion is present. Right pleural effusion is present. Ir Guided Thoracentesis Pleural    Result Date: 3/2/2021  PROCEDURE: ULTRASOUNDGUIDED Bilateral THORACENTESIS 3/2/2021 HISTORY: ORDERING SYSTEM PROVIDED HISTORY: Bilateral pleural effusion. T   The patient tolerated the procedure well. FINDINGS: A total of 800 ml serosanguinous fluid from left and 1050 ml serosanguineous fluid from right  was removed. Successful ultrasound guided bilateral thoracentesis.        Scheduled Medicines   Medications:    iron sucrose  200 mg Intravenous Q24H    drainage     CKD (chronic kidney disease) stage 3, GFR 30-59 ml/min (HCC)     Hyperpotassemia     Arthritis     PVD (peripheral vascular disease) (HCC)     Hematoma     Cardiac pacemaker in situ     Coronary artery stenosis     Essential hypertension     Gout     Diabetic neuropathy associated with type 2 diabetes mellitus (HCC)     Adenomatous polyp of sigmoid colon     Iron deficiency anemia due to chronic blood loss     Erythropoietin deficiency anemia     VHD (valvular heart disease)     Abnormal fractional flow reserve (FFR) on cardiac catheterization     Carotid stenosis, left     Aortic stenosis, severe     CAD in native artery     Displacement of atrial pacemaker leads     Pacemaker lead malfunction     SOB (shortness of breath)      Plan:     1. Reviewed POC blood glucose . Labs and X ray results   2. Reviewed Current Medicines   3. On meal/ Correction bolus Humalog/ Basal Lantus Insulin regime   4. Monitor Blood glucose frequently   5. Modified  the dose of Insulin/ other medicines as needed   6. Will follow     .      Stanislaw Chang MD

## 2021-03-04 NOTE — BRIEF OP NOTE
BRIEF EGD REPORT:     Photos and full EGD report available by going to [facility] review\" then \"procedures\" then  \"EGD\" then \"View Endoscopy Report\"     IMPRESSION :   normal exam-- no fresh or old blood- no potential bleeding site    PLAN :   1) advance diet  2) resume anti-coag cautiously and follow H/H  3) if recurrent bleed, consider repeat colonoscopy, possible capsule endoscopy     MY SUSPICION IS THAT HE HAS SMALL BOWEL  ANGIODYSPLASIA (?? HEYDE'S SYNDROME) THAT  MAY RESOLVE NOW THAT HE HAS HAD AVR

## 2021-03-04 NOTE — PROGRESS NOTES
Nephrology Progress Note  3/4/2021 8:42 AM  Subjective:      Interval History: Jarred Talbot is a 66 y.o. male  State less sob and scope today      Data:   Scheduled Meds:   vancomycin (VANCOCIN) intermittent dosing (placeholder)   Other See Admin Instructions    lidocaine PF  5 mL Intradermal Once    sodium chloride flush  10 mL Intravenous 2 times per day    iron sucrose  200 mg Intravenous Q24H    cefTRIAXone (ROCEPHIN) IV  1,000 mg Intravenous Q24H    pantoprazole  40 mg Intravenous BID    midodrine  10 mg Oral TID WC    insulin glargine  30 Units Subcutaneous Nightly    glimepiride  2 mg Oral BID WC    insulin lispro  0-6 Units Subcutaneous 2 times per day    aspirin  81 mg Oral Daily    canagliflozin  300 mg Oral QAM AC    [START ON 3/6/2021] Dulaglutide  1.5 mg Subcutaneous Weekly    gabapentin  600 mg Oral TID    atorvastatin  10 mg Oral Daily    insulin lispro  0-12 Units Subcutaneous TID WC     Continuous Infusions:   sodium chloride      DOBUTamine 5 mcg/kg/min (03/04/21 0522)           CBC:   Recent Labs     03/03/21  1600 03/03/21  1836 03/03/21  2324   WBC 12.5* 11.6* 13.1*   HGB 7.0* 7.7* 7.7*    239 264     BMP:    Recent Labs     03/03/21  0440 03/03/21  1600 03/04/21  0520   * 128* 128*   K 5.4* 4.8 4.0   CL 92* 93* 92*   CO2 21 25 25   BUN 95* 98* 92*   CREATININE 2.6* 2.4* 2.4*   GLUCOSE 186* 181* 121*       Renal Labs  Albumin:    Lab Results   Component Value Date    LABALBU 3.6 03/04/2021     Calcium:    Lab Results   Component Value Date    CALCIUM 7.7 03/04/2021     Phosphorus:    Lab Results   Component Value Date    PHOS 4.3 03/04/2021     U/A:    Lab Results   Component Value Date    NITRU NEGATIVE 03/01/2021    NITRU Negative 11/24/2020    COLORU YELLOW 03/01/2021    PHUR 6.5 11/24/2020    LABCAST NONE SEEN 06/15/2016    LABCAST NONE SEEN 06/15/2016    WBCUA <1 03/01/2021    RBCUA NONE SEEN 03/01/2021    MUCUS RARE 02/12/2021    TRICHOMONAS NONE SEEN 03/01/2021    BACTERIA NEGATIVE 03/01/2021    CLARITYU CLEAR 03/01/2021    SPECGRAV 1.009 03/01/2021    UROBILINOGEN NEGATIVE 03/01/2021    BILIRUBINUR NEGATIVE 03/01/2021    BLOODU NEGATIVE 03/01/2021    GLUCOSEU 500 11/24/2020    KETUA NEGATIVE 03/01/2021           Objective:   I/O: 03/03 0701 - 03/04 0700  In: 3104 [P. O.:780; I.V.:334.8]  Out: 3850 [Urine:3850]  Vitals: BP 95/63   Pulse 90   Temp 99.1 °F (37.3 °C) (Oral)   Resp 17   Ht 5' 10\" (1.778 m)   Wt 234 lb 9.1 oz (106.4 kg)   SpO2 98%   BMI 33.66 kg/m²   General appearance: awake weak  HEENT: Head: Normal, normocephalic, atraumatic.   Neck: supple, symmetrical, trachea midline  Lungs: diminished breath sounds bilaterally  Heart: S1, S2 normal  Abdomen: abnormal findings:  soft nt  Extremities: edema + trace  Neurologic: Mental status: alertness: alert        Assessment and Plan:      IMP:  1 ckd 3B from htn and dm2  2 Dm2 controlled  3 cad sp cabg with chest pain  4 leukocytosis with right pleural effusion  5 hyperkalemia  6 hypotension  7 moderate protein malnutrtion  8 hyponatremia  9 anemia    Plan     1 renal improved and maintain diuresis not uremic  2 glucose stable  3 cardiac monitor post cabg  4 fu cutlure and wbc improved  5 K stable  6 bp low monitor  7 maintain protein with intake  8 na monitor as restrict water  9 gi scope no active bleed and hb better  Will follow         Yenifer Hilliard MD

## 2021-03-04 NOTE — PROGRESS NOTES
Gold and lavender top collected and sent to lab.     Electronically signed by Jayce Browne RN on 3/3/2021 at 11:26 PM

## 2021-03-04 NOTE — PROGRESS NOTES
Pt wife Becky Sawyer was called by this RN to update that patient was going to Endo.     Electronically signed by Sonya Valverde RN on 3/4/2021 at 7:04 AM

## 2021-03-04 NOTE — PLAN OF CARE
Problem: Falls - Risk of:  Goal: Will remain free from falls  Description: Will remain free from falls  Outcome: Ongoing  Goal: Absence of physical injury  Description: Absence of physical injury  Outcome: Ongoing     Problem: Pain:  Description: Pain management should include both nonpharmacologic and pharmacologic interventions.   Goal: Pain level will decrease  Description: Pain level will decrease  Outcome: Ongoing  Goal: Control of acute pain  Description: Control of acute pain  Outcome: Ongoing  Goal: Control of chronic pain  Description: Control of chronic pain  Outcome: Ongoing  Goal: Patient's pain/discomfort is manageable  Description: Patient's pain/discomfort is manageable  Outcome: Ongoing     Problem: Skin Integrity:  Goal: Will show no infection signs and symptoms  Description: Will show no infection signs and symptoms  Outcome: Ongoing  Goal: Absence of new skin breakdown  Description: Absence of new skin breakdown  Outcome: Ongoing     Problem: Infection:  Goal: Will remain free from infection  Description: Will remain free from infection  Outcome: Ongoing     Problem: Safety:  Goal: Free from accidental physical injury  Description: Free from accidental physical injury  Outcome: Ongoing  Goal: Free from intentional harm  Description: Free from intentional harm  Outcome: Ongoing     Problem: Daily Care:  Goal: Daily care needs are met  Description: Daily care needs are met  Outcome: Ongoing     Problem: Skin Integrity:  Goal: Skin integrity will stabilize  Description: Skin integrity will stabilize  Outcome: Ongoing     Problem: Discharge Planning:  Goal: Patients continuum of care needs are met  Description: Patients continuum of care needs are met  Outcome: Ongoing

## 2021-03-04 NOTE — PROGRESS NOTES
Messaged Dr. Dwight Shukla about Sodium level - 128 and pt having tremors at times in his arms, pt also jerks frequently in his sleep- new orders received

## 2021-03-04 NOTE — PROGRESS NOTES
[address] [facility]  consulted by Dr. oMe Sullivan for monitoring and adjustment. Indication for treatment: Sepsis of unknown origin   Goal trough: 15 mcg/mL    Pertinent Laboratory Values:   Temp Readings from Last 3 Encounters:   03/04/21 98 °F (36.7 °C) (Oral)   02/24/21 97.3 °F (36.3 °C) (Axillary)   02/12/21 97.8 °F (36.6 °C) (Temporal)     Recent Labs     03/03/21  1600 03/03/21  1836 03/03/21  2324 03/04/21  0520   WBC 12.5* 11.6* 13.1*  --    LACTATE  --   --   --  0.7     Recent Labs     03/03/21  0440 03/03/21  1600 03/04/21  0520   BUN 95* 98* 92*   CREATININE 2.6* 2.4* 2.4*     Estimated Creatinine Clearance: 34 mL/min (A) (based on SCr of 2.4 mg/dL (H)). Intake/Output Summary (Last 24 hours) at 3/4/2021 1544  Last data filed at 3/4/2021 1322  Gross per 24 hour   Intake 2707.7 ml   Output 5100 ml   Net -2392.3 ml       Pertinent Cultures:  Date    Source    Results  03/01   Blood    NGTD  03/01   Respiratory   NGTD  03/01                           Urine                                       Citrobacter Farmeri    Assessment:  · WBC previously elevated, afebrile   · PRIYANK on CKD, baseline Scr ~1.7  · Day(s) of therapy: 3  · Vancomycin concentration:   · 3/3: 17.6, trough level     Plan:  · Vancomycin 2,000 mg followed by 1,500 mg q24h  · Trough level was therapeutic last night, concern for accumulation given renal function and BMI    · Random level tomorrow AM 24h post-dose   · Pharmacy will continue to monitor patient and adjust therapy as indicated    VANCOMYCIN CONCENTRATION SCHEDULED FOR 03/05/21 @ 0600    Thank you for the consult.   Jesenia Mancuso, [address]   3/4/2021 3:44 PM

## 2021-03-04 NOTE — PROGRESS NOTES
PROCEDURE PERFORMED: Right thoracentesis by Dr Mcdonald Peel:  Right pleural effusion    INFORMED CONSENT:  PT A&OX4. Consent obtained prior to procedure. Consent placed in chart. TIME OUT COMPLETE: 0845    BARRIER PRECAUTIONS & STERILE TECHNIQUE  Pt sitting up side of bed, in own room, in ICU, prepped and draped in a sterile fashion with chlorhexadine.      PAIN/LOCAL ANESTHESIA/SEDATION MANAGEMENT:       Local: Lidocaine    INTRAOPERATIVE:    ACCESS TIME:   US/FLUORO: U/S guided    STERILE DRESSINGS:  Applied by Katey Alonso RT    FOLLOW- UP X-RAY:  Ordered    COMPLICATIONS:  None    OUTCOME:  1250cc serosanguous fluid drained    STAFF PRESENT DURING PROCEDURE:   Katey Rodriguez RT    REPORT CALLED TO: Given to [place] Lynd

## 2021-03-05 ENCOUNTER — APPOINTMENT (OUTPATIENT)
Dept: CT IMAGING | Age: 71
DRG: 871 | End: 2021-03-05
Payer: MEDICARE

## 2021-03-05 ENCOUNTER — APPOINTMENT (OUTPATIENT)
Dept: INTERVENTIONAL RADIOLOGY/VASCULAR | Age: 71
DRG: 871 | End: 2021-03-05
Payer: MEDICARE

## 2021-03-05 ENCOUNTER — APPOINTMENT (OUTPATIENT)
Dept: GENERAL RADIOLOGY | Age: 71
DRG: 871 | End: 2021-03-05
Payer: MEDICARE

## 2021-03-05 LAB
ALBUMIN SERPL-MCNC: 3 GM/DL (ref 3.4–5)
ALBUMIN SERPL-MCNC: 3.3 GM/DL (ref 3.4–5)
ALP BLD-CCNC: 259 IU/L (ref 40–129)
ALT SERPL-CCNC: 132 U/L (ref 10–40)
AMMONIA: 39 UMOL/L (ref 16–60)
ANION GAP SERPL CALCULATED.3IONS-SCNC: 11 MMOL/L (ref 4–16)
ANION GAP SERPL CALCULATED.3IONS-SCNC: 12 MMOL/L (ref 4–16)
ANION GAP SERPL CALCULATED.3IONS-SCNC: 13 MMOL/L (ref 4–16)
AST SERPL-CCNC: 139 IU/L (ref 15–37)
BASE EXCESS MIXED: 0.3 (ref 0–1.2)
BASE EXCESS MIXED: 0.4 (ref 0–1.2)
BILIRUB SERPL-MCNC: 1 MG/DL (ref 0–1)
BILIRUBIN DIRECT: 0.6 MG/DL (ref 0–0.3)
BILIRUBIN, INDIRECT: 0.4 MG/DL (ref 0–0.7)
BUN BLDV-MCNC: 116 MG/DL (ref 6–23)
BUN BLDV-MCNC: 117 MG/DL (ref 6–23)
BUN BLDV-MCNC: 78 MG/DL (ref 6–23)
CALCIUM IONIZED: 3.96 MG/DL (ref 4.48–5.28)
CALCIUM SERPL-MCNC: 7.6 MG/DL (ref 8.3–10.6)
CALCIUM SERPL-MCNC: 7.7 MG/DL (ref 8.3–10.6)
CALCIUM SERPL-MCNC: 7.9 MG/DL (ref 8.3–10.6)
CARBON MONOXIDE, BLOOD: 1.6 % (ref 0–5)
CARBON MONOXIDE, BLOOD: 1.7 % (ref 0–5)
CHLORIDE BLD-SCNC: 90 MMOL/L (ref 99–110)
CHLORIDE BLD-SCNC: 90 MMOL/L (ref 99–110)
CHLORIDE BLD-SCNC: 94 MMOL/L (ref 99–110)
CO2 CONTENT: 24.1 MMOL/L (ref 19–24)
CO2 CONTENT: 24.3 MMOL/L (ref 19–24)
CO2: 23 MMOL/L (ref 21–32)
CO2: 24 MMOL/L (ref 21–32)
CO2: 27 MMOL/L (ref 21–32)
COMMENT: ABNORMAL
COMMENT: ABNORMAL
CREAT SERPL-MCNC: 1.9 MG/DL (ref 0.9–1.3)
CREAT SERPL-MCNC: 2.3 MG/DL (ref 0.9–1.3)
CREAT SERPL-MCNC: 2.4 MG/DL (ref 0.9–1.3)
GFR AFRICAN AMERICAN: 32 ML/MIN/1.73M2
GFR AFRICAN AMERICAN: 34 ML/MIN/1.73M2
GFR AFRICAN AMERICAN: 42 ML/MIN/1.73M2
GFR NON-AFRICAN AMERICAN: 27 ML/MIN/1.73M2
GFR NON-AFRICAN AMERICAN: 28 ML/MIN/1.73M2
GFR NON-AFRICAN AMERICAN: 35 ML/MIN/1.73M2
GLUCOSE BLD-MCNC: 114 MG/DL (ref 70–99)
GLUCOSE BLD-MCNC: 114 MG/DL (ref 70–99)
GLUCOSE BLD-MCNC: 116 MG/DL (ref 70–99)
GLUCOSE BLD-MCNC: 120 MG/DL (ref 70–99)
GLUCOSE BLD-MCNC: 130 MG/DL (ref 70–99)
GLUCOSE BLD-MCNC: 74 MG/DL (ref 70–99)
GLUCOSE BLD-MCNC: 90 MG/DL (ref 70–99)
HBV SURFACE AB TITR SER: 10.18 {TITER}
HCO3 ARTERIAL: 23.1 MMOL/L (ref 18–23)
HCO3 ARTERIAL: 23.3 MMOL/L (ref 18–23)
HCT VFR BLD CALC: 24.3 % (ref 42–52)
HCT VFR BLD CALC: 26 % (ref 42–52)
HEMOGLOBIN: 7.5 GM/DL (ref 13.5–18)
HEMOGLOBIN: 8.3 GM/DL (ref 13.5–18)
HEPATITIS B SURFACE ANTIGEN: NON REACTIVE
HIGH SENSITIVE C-REACTIVE PROTEIN: 217.6 MG/L
IONIZED CA: 0.99 MMOL/L (ref 1.12–1.32)
LACTATE: 0.8 MMOL/L (ref 0.4–2)
MAGNESIUM: 2.5 MG/DL (ref 1.8–2.4)
MCH RBC QN AUTO: 24 PG (ref 27–31)
MCH RBC QN AUTO: 24.6 PG (ref 27–31)
MCHC RBC AUTO-ENTMCNC: 30.9 % (ref 32–36)
MCHC RBC AUTO-ENTMCNC: 31.9 % (ref 32–36)
MCV RBC AUTO: 76.9 FL (ref 78–100)
MCV RBC AUTO: 77.6 FL (ref 78–100)
METHEMOGLOBIN ARTERIAL: 1 %
METHEMOGLOBIN ARTERIAL: 1.1 %
O2 SATURATION: 90 % (ref 96–97)
O2 SATURATION: 93.4 % (ref 96–97)
PCO2 ARTERIAL: 31 MMHG (ref 32–45)
PCO2 ARTERIAL: 32 MMHG (ref 32–45)
PDW BLD-RTO: 19.2 % (ref 11.7–14.9)
PDW BLD-RTO: 19.2 % (ref 11.7–14.9)
PH BLOOD: 7.47 (ref 7.34–7.45)
PH BLOOD: 7.48 (ref 7.34–7.45)
PHOSPHORUS: 4.2 MG/DL (ref 2.5–4.9)
PLATELET # BLD: 229 K/CU MM (ref 140–440)
PLATELET # BLD: 232 K/CU MM (ref 140–440)
PMV BLD AUTO: 9 FL (ref 7.5–11.1)
PMV BLD AUTO: 9.7 FL (ref 7.5–11.1)
PO2 ARTERIAL: 58 MMHG (ref 75–100)
PO2 ARTERIAL: 71 MMHG (ref 75–100)
POTASSIUM SERPL-SCNC: 4.1 MMOL/L (ref 3.5–5.1)
POTASSIUM SERPL-SCNC: 4.4 MMOL/L (ref 3.5–5.1)
POTASSIUM SERPL-SCNC: 4.5 MMOL/L (ref 3.5–5.1)
PROCALCITONIN: 0.9
RBC # BLD: 3.13 M/CU MM (ref 4.6–6.2)
RBC # BLD: 3.38 M/CU MM (ref 4.6–6.2)
SODIUM BLD-SCNC: 125 MMOL/L (ref 135–145)
SODIUM BLD-SCNC: 126 MMOL/L (ref 135–145)
SODIUM BLD-SCNC: 133 MMOL/L (ref 135–145)
TOTAL PROTEIN: 5.2 GM/DL (ref 6.4–8.2)
WBC # BLD: 12.8 K/CU MM (ref 4–10.5)
WBC # BLD: 13.6 K/CU MM (ref 4–10.5)

## 2021-03-05 PROCEDURE — 82962 GLUCOSE BLOOD TEST: CPT

## 2021-03-05 PROCEDURE — 80069 RENAL FUNCTION PANEL: CPT

## 2021-03-05 PROCEDURE — 6370000000 HC RX 637 (ALT 250 FOR IP): Performed by: INTERNAL MEDICINE

## 2021-03-05 PROCEDURE — 94761 N-INVAS EAR/PLS OXIMETRY MLT: CPT

## 2021-03-05 PROCEDURE — 82140 ASSAY OF AMMONIA: CPT

## 2021-03-05 PROCEDURE — 02HV33Z INSERTION OF INFUSION DEVICE INTO SUPERIOR VENA CAVA, PERCUTANEOUS APPROACH: ICD-10-PCS | Performed by: INTERNAL MEDICINE

## 2021-03-05 PROCEDURE — 83735 ASSAY OF MAGNESIUM: CPT

## 2021-03-05 PROCEDURE — 84145 PROCALCITONIN (PCT): CPT

## 2021-03-05 PROCEDURE — 76937 US GUIDE VASCULAR ACCESS: CPT

## 2021-03-05 PROCEDURE — 71250 CT THORAX DX C-: CPT

## 2021-03-05 PROCEDURE — 71045 X-RAY EXAM CHEST 1 VIEW: CPT

## 2021-03-05 PROCEDURE — 5A1D70Z PERFORMANCE OF URINARY FILTRATION, INTERMITTENT, LESS THAN 6 HOURS PER DAY: ICD-10-PCS | Performed by: INTERNAL MEDICINE

## 2021-03-05 PROCEDURE — 83605 ASSAY OF LACTIC ACID: CPT

## 2021-03-05 PROCEDURE — 2709999900 HC NON-CHARGEABLE SUPPLY

## 2021-03-05 PROCEDURE — 80053 COMPREHEN METABOLIC PANEL: CPT

## 2021-03-05 PROCEDURE — 82248 BILIRUBIN DIRECT: CPT

## 2021-03-05 PROCEDURE — 86706 HEP B SURFACE ANTIBODY: CPT

## 2021-03-05 PROCEDURE — 90935 HEMODIALYSIS ONE EVALUATION: CPT

## 2021-03-05 PROCEDURE — 80048 BASIC METABOLIC PNL TOTAL CA: CPT

## 2021-03-05 PROCEDURE — 2700000000 HC OXYGEN THERAPY PER DAY

## 2021-03-05 PROCEDURE — 6370000000 HC RX 637 (ALT 250 FOR IP): Performed by: THORACIC SURGERY (CARDIOTHORACIC VASCULAR SURGERY)

## 2021-03-05 PROCEDURE — C1752 CATH,HEMODIALYSIS,SHORT-TERM: HCPCS

## 2021-03-05 PROCEDURE — 2000000000 HC ICU R&B

## 2021-03-05 PROCEDURE — 87340 HEPATITIS B SURFACE AG IA: CPT

## 2021-03-05 PROCEDURE — 82330 ASSAY OF CALCIUM: CPT

## 2021-03-05 PROCEDURE — 2580000003 HC RX 258: Performed by: PHYSICIAN ASSISTANT

## 2021-03-05 PROCEDURE — 6360000002 HC RX W HCPCS: Performed by: SPECIALIST

## 2021-03-05 PROCEDURE — 36600 WITHDRAWAL OF ARTERIAL BLOOD: CPT

## 2021-03-05 PROCEDURE — 6370000000 HC RX 637 (ALT 250 FOR IP): Performed by: PHYSICIAN ASSISTANT

## 2021-03-05 PROCEDURE — 87040 BLOOD CULTURE FOR BACTERIA: CPT

## 2021-03-05 PROCEDURE — 6360000002 HC RX W HCPCS: Performed by: INTERNAL MEDICINE

## 2021-03-05 PROCEDURE — C1894 INTRO/SHEATH, NON-LASER: HCPCS

## 2021-03-05 PROCEDURE — C9113 INJ PANTOPRAZOLE SODIUM, VIA: HCPCS | Performed by: SPECIALIST

## 2021-03-05 PROCEDURE — 82803 BLOOD GASES ANY COMBINATION: CPT

## 2021-03-05 PROCEDURE — 70450 CT HEAD/BRAIN W/O DYE: CPT

## 2021-03-05 PROCEDURE — 6360000002 HC RX W HCPCS: Performed by: PHYSICIAN ASSISTANT

## 2021-03-05 PROCEDURE — 85027 COMPLETE CBC AUTOMATED: CPT

## 2021-03-05 PROCEDURE — 86141 C-REACTIVE PROTEIN HS: CPT

## 2021-03-05 PROCEDURE — 36415 COLL VENOUS BLD VENIPUNCTURE: CPT

## 2021-03-05 PROCEDURE — 74150 CT ABDOMEN W/O CONTRAST: CPT

## 2021-03-05 PROCEDURE — 2580000003 HC RX 258: Performed by: INTERNAL MEDICINE

## 2021-03-05 PROCEDURE — 6360000002 HC RX W HCPCS: Performed by: THORACIC SURGERY (CARDIOTHORACIC VASCULAR SURGERY)

## 2021-03-05 PROCEDURE — 92610 EVALUATE SWALLOWING FUNCTION: CPT

## 2021-03-05 PROCEDURE — 87086 URINE CULTURE/COLONY COUNT: CPT

## 2021-03-05 PROCEDURE — 74176 CT ABD & PELVIS W/O CONTRAST: CPT

## 2021-03-05 PROCEDURE — 2580000003 HC RX 258: Performed by: THORACIC SURGERY (CARDIOTHORACIC VASCULAR SURGERY)

## 2021-03-05 PROCEDURE — 36556 INSERT NON-TUNNEL CV CATH: CPT

## 2021-03-05 RX ORDER — GABAPENTIN 100 MG/1
100 CAPSULE ORAL 3 TIMES DAILY
Status: DISCONTINUED | OUTPATIENT
Start: 2021-03-05 | End: 2021-03-06

## 2021-03-05 RX ORDER — HEPARIN SODIUM 1000 [USP'U]/ML
2600 INJECTION, SOLUTION INTRAVENOUS; SUBCUTANEOUS ONCE
Status: COMPLETED | OUTPATIENT
Start: 2021-03-05 | End: 2021-03-05

## 2021-03-05 RX ADMIN — CALCIUM GLUCONATE 2000 MG: 98 INJECTION, SOLUTION INTRAVENOUS at 17:20

## 2021-03-05 RX ADMIN — GABAPENTIN 100 MG: 100 CAPSULE ORAL at 21:37

## 2021-03-05 RX ADMIN — VANCOMYCIN HYDROCHLORIDE 1000 MG: 1 INJECTION, POWDER, LYOPHILIZED, FOR SOLUTION INTRAVENOUS at 23:22

## 2021-03-05 RX ADMIN — HEPARIN SODIUM 2600 UNITS: 1000 INJECTION, SOLUTION INTRAVENOUS; SUBCUTANEOUS at 16:30

## 2021-03-05 RX ADMIN — SODIUM CHLORIDE, PRESERVATIVE FREE 10 ML: 5 INJECTION INTRAVENOUS at 20:28

## 2021-03-05 RX ADMIN — CEFEPIME HYDROCHLORIDE 2000 MG: 2 INJECTION, POWDER, FOR SOLUTION INTRAVENOUS at 20:23

## 2021-03-05 RX ADMIN — PANTOPRAZOLE SODIUM 40 MG: 40 INJECTION, POWDER, FOR SOLUTION INTRAVENOUS at 20:23

## 2021-03-05 RX ADMIN — CEFEPIME HYDROCHLORIDE 2000 MG: 2 INJECTION, POWDER, FOR SOLUTION INTRAVENOUS at 09:43

## 2021-03-05 RX ADMIN — GABAPENTIN 100 MG: 100 CAPSULE ORAL at 10:46

## 2021-03-05 RX ADMIN — ASPIRIN 81 MG: 81 TABLET, CHEWABLE ORAL at 10:46

## 2021-03-05 RX ADMIN — PANTOPRAZOLE SODIUM 40 MG: 40 INJECTION, POWDER, FOR SOLUTION INTRAVENOUS at 09:43

## 2021-03-05 RX ADMIN — ATORVASTATIN CALCIUM 10 MG: 10 TABLET, FILM COATED ORAL at 10:46

## 2021-03-05 RX ADMIN — MIDODRINE HYDROCHLORIDE 10 MG: 5 TABLET ORAL at 13:30

## 2021-03-05 RX ADMIN — IRON SUCROSE 200 MG: 20 INJECTION, SOLUTION INTRAVENOUS at 09:43

## 2021-03-05 RX ADMIN — DOBUTAMINE IN DEXTROSE 5 MCG/KG/MIN: 200 INJECTION, SOLUTION INTRAVENOUS at 13:34

## 2021-03-05 RX ADMIN — GABAPENTIN 100 MG: 100 CAPSULE ORAL at 13:30

## 2021-03-05 RX ADMIN — SODIUM CHLORIDE, PRESERVATIVE FREE 10 ML: 5 INJECTION INTRAVENOUS at 09:43

## 2021-03-05 RX ADMIN — MIDODRINE HYDROCHLORIDE 10 MG: 5 TABLET ORAL at 10:46

## 2021-03-05 ASSESSMENT — PAIN SCALES - GENERAL: PAINLEVEL_OUTOF10: 0

## 2021-03-05 NOTE — PROGRESS NOTES
GI NOTE: asked to see again for elevated LFT's  -Has been deteriorating recently with PRIYANK (on dialysis), confusion, low-grade fever, and now increased LFT's  -Prior to 2 months ago his LFT's were normal.   -Since January, though, the LFT's have been mildly elevated but the alk phosph and transaminases roughly doubled in the past 2 days- his bili remains normal  -He is S/P remote barbara  -Not on any meds that stand out as hepato-toxins ( is on a statin but was pre-op also)    EXAM: abd soft, nontender    CT chest 3/1/21 did show some fluid and gas adjacent to the sternotomy felt to be post-surgical but could not rule out developing abscess  Impression:    1) ?  Source of elevated LFT's- R/O secondary to possible sepsis         Plan:   1) blood cultures, CRP, procalcitonin   2) repeat non-contrast CT chest/abd   3) hold statin (though doubt this is the problem)   4) follow LFT's

## 2021-03-05 NOTE — PROGRESS NOTES
ki smith, wife and dr Una Jeong    decresae dose neurontin to 100mg tid  Head ct no contrast and do abg  Place temp hd catheter and possible dialysis

## 2021-03-05 NOTE — PROGRESS NOTES
Progress Note( Dr. Evans Alamo)  3/4/2021  Subjective:   Admit Date: 3/1/2021  PCP: Tanja Galdamez MD    Admitted For :Shortness of breath leg swelling    Consulted For:  Better control of blood glucose    Interval History: Feels somewhat better being on CPAP  Had bilateral pleural effusion were tapped both sides  Patient going to go for another pleural tap on the right side this time    Denies any chest pains,   Yes SOB . Denies nausea or vomiting. No new bowel or bladder symptoms.        Intake/Output Summary (Last 24 hours) at 3/4/2021 2335  Last data filed at 3/4/2021 2139  Gross per 24 hour   Intake 2576 ml   Output 3285 ml   Net -709 ml       DATA    CBC:   Recent Labs     03/03/21  1600 03/03/21  1836 03/03/21  2324   WBC 12.5* 11.6* 13.1*   HGB 7.0* 7.7* 7.7*    239 264    CMP:  Recent Labs     03/03/21  0440 03/03/21  0702 03/03/21  1600 03/04/21  0520   *  --  128* 128*   K 5.4*  --  4.8 4.0   CL 92*  --  93* 92*   CO2 21  --  25 25   BUN 95*  --  98* 92*   CREATININE 2.6*  --  2.4* 2.4*   CALCIUM 7.5*  --  7.9* 7.7*   PROT  --  5.1*  --   --    LABALBU  --  3.3*  --  3.6   BILITOT  --  0.5  --   --    ALKPHOS  --  151*  --   --    AST  --  63*  --   --    ALT  --  55*  --   --      Lipids:   Lab Results   Component Value Date    CHOL 91 12/18/2020    HDL 43 12/18/2020    TRIG 66 12/18/2020     Glucose:  Recent Labs     03/04/21  1137 03/04/21  1750 03/04/21  2103   POCGLU 131* 126* 153*     [id number]:  Lab Results   Component Value Date    LABA1C 6.1 02/20/2021     High Sensitivity TSH:   Lab Results   Component Value Date    TSHHS 2.620 03/03/2021     Free T3: No results found for: FT3  Free T4:  Lab Results   Component Value Date    T4FREE 1.5 07/07/2020       Echocardiogram Limited    Result Date: 3/2/2021  Transthoracic Echocardiography Report (TTE)  Demographics   Patient Name       RASHAD TATE    Date of Study       03/02/2021   Date of Birth      1950         Gender Male   Age                66 year(s)         Race                   Patient Number     [id number]         Room Number         2101   Visit Number       [id number]   Corporate ID       [id number]   Accession Number   [id number]         23 Asha Perry RVT   Ordering Physician Tara Bustos PA-C       Physician           MD  Procedure Type of Study   TTE procedure:ECHOCARDIOGRAM LIMITED. Procedure Date Date: 03/02/2021 Start: 11:53 AM Study Location: Portable Technical Quality: Adequate visualization Indications:S/P CABG. Patient Status: Routine Height: 70 inches Weight: 230 pounds BSA: 2.22 m2 BMI: 33 kg/m2 HR: 91 bpm BP: 91/45 mmHg  Conclusions   Summary  This is a limited echocardiogram.  Left ventricular systolic function is normal.  Ejection fraction is visually estimated at 55-60%. Bilateral atrial enlargement. S/p AVR with a 27 mm Medtronic Mosaic. Moderate mitral regurgitation. Moderate tricuspid regurgitation; RVSP: 50 mmHg. Severe PHTN. No evidence of any pericardial effusion. Signature   ------------------------------------------------------------------  Electronically signed by Smith Tanner MD (Interpreting  physician) on 03/02/2021 at 05:06 PM  -    Ct Chest Wo Contrast    Result Date: 3/1/2021  EXAMINATION: CT OF THE CHEST WITHOUT CONTRAST 3/1/2021 3:57 pm T    Patient status post midline sternotomy. Immediately posterior to the sternotomy defect, there is a small gas and fluid collection which is nonspecific. It is not necessarily outside normal limits for a sternotomy that was performed 2 weeks ago. However, sterility cannot be ascertained with imaging, and therefore a small developing abscess is considered as well. No evidence of osteolysis of the sternotomy defect to suggest osteomyelitis.  Interval development of a moderate to large right and a moderate left pleural effusion. Adjacent airspace disease is some combination of atelectasis, pneumonia, and/or edema. Xr Chest Portable    Result Date: 3/3/2021  EXAMINATION: ONE XRAY VIEW OF THE CHEST 3/3/2021 5:34 am COMPARISON: 03/02/2021 HISTORY: ORDERING SYSTEM PROVIDED HISTORY: heart failure TECHNOLOGIST PROVIDED HISTORY: Reason for exam:->heart failure Reason for Exam: heart failure Acuity: Acute Type of Exam: Subsequent/Follow-up FINDINGS: Status post median sternotomy and left-sided transvenous pacer placement. There is stable cardiomegaly. The chest films taken shallow degree of inspiration accentuating heart size and bronchovascular structures. There is a small right pleural effusion. No significant left effusion is seen. The patient is undergone clipping of the left atrial appendage. There is bibasilar atelectasis. Stable exam     Xr Chest Portable    Result Date: 3/2/2021  EXAMINATION: ONE XRAY VIEW OF THE CHEST 3/2/2021 9:38 am COMPARISON: 03/01/2021 HISTORY: ORDERING SYSTEM PROVIDED HISTORY: post bilateral thoracentesis   . Patient has undergone thoracentesis. The volume of fluid in the right pleural space has decreased and/or shifted. There is some persistent right basilar atelectasis. No significant pleural effusion on the left is seen. Minimal left basilar atelectasis is noted. No pneumothorax is seen. Bilateral shoulder arthritis is noted. Pleural effusions right greater than left with bibasilar atelectasis right worse than left. No pneumothorax is seen.      Xr Chest Portable    Result Date: 3/1/2021  EXAMINATION: ONE XRAY VIEW OF THE CHEST 3/1/2021 5:12 pm COMPARISON: 02/23/2021 HISTORY: ORDERING SYSTEM PROVIDED HISTORY: chest pain, shorntess of breath TECHNOLOGIST PROVIDED HISTORY: Reason for exam:->chest pain, shorntess of breath Reason for Exam: chest pain   sob   leg swelling Acuity: Unknown Type of Exam: Unknown Relevant Medical/Surgical History: afib    cad    diabetes FINDINGS: Status post median sternotomy. Transvenous pacer remains place. Stable cardiomegaly. Right-sided pleural effusion. Bibasilar hypoaeration. Right-sided pleural effusion Bibasilar hypoaeration     Vl Dup Lower Extremity Venous Bilateral    Result Date: 3/3/2021  EXAMINATION: DUPLEX VENOUS ULTRASOUND OF THE BILATERAL LOWER EXTREMITIES, 3/3/2021 9:01 am TECHNIQUE: Duplex ultrasound using B-mode/gray scaled imaging and Doppler spectral analysis and color flow was obtained of the bilateral lower extremities. COMPARISON: None. HISTORY: ORDERING SYSTEM PROVIDED HISTORY: fevers TECHNOLOGIST PROVIDED HISTORY: Reason for exam:->fevers Reason for Exam: edema FINDINGS: The visualized veins of the bilateral lower extremities are patent and free of echogenic thrombus. The veins demonstrate good compressibility with normal color flow study and spectral analysis. No evidence of DVT in either lower extremity. Us Chest Including Mediastinum    Result Date: 3/3/2021  EXAMINATION: ULTRASOUND OF THE CHEST 3/3/2021 9:02 am COMPARISON: Chest radiograph performed earlier in the same day HISTORY: ORDERING SYSTEM PROVIDED HISTORY: ASSESS FOR PLEURAL EFFUSION TECHNOLOGIST PROVIDED HISTORY: RIGHT LUNG Reason for exam:->ASSESS FOR PLEURAL EFFUSION Reason for Exam: pleural effusion FINDINGS: Moderate right pleural effusion is present. Right pleural effusion is present. Ir Guided Thoracentesis Pleural    Result Date: 3/2/2021  PROCEDURE: ULTRASOUNDGUIDED Bilateral THORACENTESIS 3/2/2021 HISTORY: ORDERING SYSTEM PROVIDED HISTORY: Bilateral pleural effusion. T   The patient tolerated the procedure well. FINDINGS: A total of 800 ml serosanguinous fluid from left and 1050 ml serosanguineous fluid from right  was removed. Successful ultrasound guided bilateral thoracentesis.        Scheduled Medicines   Medications:    vancomycin (VANCOCIN) intermittent dosing (placeholder)   Other See Admin Instructions    lidocaine PF  5 mL Intradermal Once    sodium chloride flush  10 mL Intravenous 2 times per day    urea  15 g Oral Daily    sodium chloride  1 g Oral Daily    iron sucrose  200 mg Intravenous Q24H    cefTRIAXone (ROCEPHIN) IV  1,000 mg Intravenous Q24H    pantoprazole  40 mg Intravenous BID    midodrine  10 mg Oral TID WC    insulin glargine  30 Units Subcutaneous Nightly    glimepiride  2 mg Oral BID WC    insulin lispro  0-6 Units Subcutaneous 2 times per day    aspirin  81 mg Oral Daily    canagliflozin  300 mg Oral QAM AC    [START ON 3/6/2021] Dulaglutide  1.5 mg Subcutaneous Weekly    gabapentin  600 mg Oral TID    atorvastatin  10 mg Oral Daily    insulin lispro  0-12 Units Subcutaneous TID       Infusions:    sodium chloride      DOBUTamine 5 mcg/kg/min (03/04/21 2121)         Objective:   Vitals: BP (!) 123/53   Pulse 70   Temp 99 °F (37.2 °C) (Oral)   Resp 18   Ht 5' 10\" (1.778 m)   Wt 234 lb 9.1 oz (106.4 kg)   SpO2 97%   BMI 33.66 kg/m²   General appearance: alert and cooperative with exam  Neck: no JVD or bruit  Thyroid : Normal lobes   Lungs: Has Vesicular Breath sounds there is breath sounds bilateral pleural effusion tapped both sides noted below  Heart:  regular rate and rhythm  Abdomen: soft, non-tender; bowel sounds normal; no masses,  no organomegaly  Musculoskeletal: Normal  Extremities: extremities normal, , no edema  Neurologic:  Awake, alert, oriented to name, place and time. Cranial nerves II-XII are grossly intact. Motor is  intact. Sensory is intact. ,  and gait is normal.    Assessment:     Patient Active Problem List:     Type 2 diabetes mellitus without complication, without long-term current use of insulin (HCC)     Ulcer of other part of lower limb     Venous hypertension, chronic, with ulcer ([address]     Ulcer of other part of foot     Pneumonia     Atrial fibrillation (HCC)     Sinus pause     PAF (paroxysmal atrial fibrillation) (HCC)     DM (diabetes mellitus) ([facility] [address]     DMITRIY on CPAP     Hyperlipidemia     Status post incision and drainage     CKD (chronic kidney disease) stage 3, GFR 30-59 ml/min (HCC)     Hyperpotassemia     Arthritis     PVD (peripheral vascular disease) (HCC)     Hematoma     Cardiac pacemaker in situ     Coronary artery stenosis     Essential hypertension     Gout     Diabetic neuropathy associated with type 2 diabetes mellitus (HCC)     Adenomatous polyp of sigmoid colon     Iron deficiency anemia due to chronic blood loss     Erythropoietin deficiency anemia     VHD (valvular heart disease)     Abnormal fractional flow reserve (FFR) on cardiac catheterization     Carotid stenosis, left     Aortic stenosis, severe     CAD in native artery     Displacement of atrial pacemaker leads     Pacemaker lead malfunction     SOB (shortness of breath)      Plan:     1. Reviewed POC blood glucose . Labs and X ray results   2. Reviewed Current Medicines   3. On meal/ Correction bolus Humalog/ Basal Lantus Insulin regime   4. Monitor Blood glucose frequently   5. Modified  the dose of Insulin/ other medicines as needed   6. Waiting for pleural tap right side this time  7. Will follow     .      Raul Morris MD

## 2021-03-05 NOTE — PLAN OF CARE
Problem: Falls - Risk of:  Goal: Will remain free from falls  Description: Will remain free from falls  Outcome: Ongoing  Goal: Absence of physical injury  Description: Absence of physical injury  Outcome: Ongoing     Problem: Pain:  Goal: Pain level will decrease  Description: Pain level will decrease  Outcome: Ongoing  Goal: Control of acute pain  Description: Control of acute pain  Outcome: Ongoing  Goal: Control of chronic pain  Description: Control of chronic pain  Outcome: Ongoing  Goal: Patient's pain/discomfort is manageable  Description: Patient's pain/discomfort is manageable  Outcome: Ongoing     Problem: Skin Integrity:  Goal: Will show no infection signs and symptoms  Description: Will show no infection signs and symptoms  Outcome: Ongoing  Goal: Absence of new skin breakdown  Description: Absence of new skin breakdown  Outcome: Ongoing     Problem: Infection:  Goal: Will remain free from infection  Description: Will remain free from infection  Outcome: Ongoing     Problem: Safety:  Goal: Free from accidental physical injury  Description: Free from accidental physical injury  Outcome: Ongoing  Goal: Free from intentional harm  Description: Free from intentional harm  Outcome: Ongoing     Problem: Daily Care:  Goal: Daily care needs are met  Description: Daily care needs are met  Outcome: Ongoing     Problem: Skin Integrity:  Goal: Skin integrity will stabilize  Description: Skin integrity will stabilize  Outcome: Ongoing     Problem: Discharge Planning:  Goal: Patients continuum of care needs are met  Description: Patients continuum of care needs are met  Outcome: Ongoing

## 2021-03-05 NOTE — PROGRESS NOTES
Still awaiting x ray results to draw am labs from dialysis catheter. spouse at bedside. Updated that dialysis will be done today. Placed pt on 2 liters NC. But SpO2 on monitor is 96%. ABG results noted.

## 2021-03-05 NOTE — PROGRESS NOTES
Comprehensive Nutrition Assessment    Type and Reason for Visit:  Reassess    Nutrition Recommendations/Plan:   · Continue current diet  · Start diabetic supplements    Nutrition Assessment:  Pt is consuming 100% of meals and is at low risk at this time    Malnutrition Assessment:  Malnutrition Status: At risk for malnutrition (Comment)    Context:  Acute Illness       Estimated Daily Nutrient Needs:  Energy (kcal):  0932-5653; Weight Used for Energy Requirements:  Current     Protein (g):  91; Weight Used for Protein Requirements:  Ideal        Fluid (ml/day):  7316-0739; Method Used for Fluid Requirements:  1 ml/kcal      Wounds:  Diabetic Ulcer       Current Nutrition Therapies:    DIET CARB CONTROL; Carb Control: 4 carb choices (60 gms)/meal; Low Sodium (2 GM); Dysphagia Minced and Moist; Daily Fluid Restriction: 1500 ml; Low Potassium    Anthropometric Measures:  · Height: 5' 10\" (177.8 cm)  · Current Body Weight: 230 lb (104.3 kg)   · Admission Body Weight: 230 lb (104.3 kg)    · Usual Body Weight: 220 lb (99.8 kg)     · Ideal Body Weight: 166 lbs; % Ideal Body Weight 138.6 %   · BMI: 33   · BMI Categories: Obese Class 1 (BMI 30.0-34. 9)       Nutrition Diagnosis:   · Increased nutrient needs related to endocrine dysfuntion as evidenced by wounds      Nutrition Interventions:   Food and/or Nutrient Delivery:  Continue Current Diet, Start Oral Nutrition Supplement  Nutrition Education/Counseling:  No recommendation at this time   Coordination of Nutrition Care:  Continue to monitor while inpatient    Goals:  pt will consume greater 75% of his meals and supplements       Nutrition Monitoring and Evaluation:   Behavioral-Environmental Outcomes:  None Identified   Food/Nutrient Intake Outcomes:  Food and Nutrient Intake, Supplement Intake  Physical Signs/Symptoms Outcomes:  Biochemical Data, Skin, Weight     Discharge Planning:     Too soon to determine     Electronically signed by Grazyna Cote RD, LD on 3/5/21 at 6:16 PM EST    Contact: [phone]

## 2021-03-05 NOTE — PROGRESS NOTES
Nephrology Progress Note  3/5/2021 10:54 AM  Subjective:      Interval History: Jarred Talbot is a 66 y.o. male  Who is more confused today and may be from neurontin    Data:   Scheduled Meds:   gabapentin  100 mg Oral TID    cefepime  2,000 mg Intravenous Q12H    [Held by provider] apixaban  5 mg Oral BID    lidocaine PF  5 mL Intradermal Once    sodium chloride flush  10 mL Intravenous 2 times per day    iron sucrose  200 mg Intravenous Q24H    pantoprazole  40 mg Intravenous BID    midodrine  10 mg Oral TID WC    insulin glargine  30 Units Subcutaneous Nightly    glimepiride  2 mg Oral BID WC    insulin lispro  0-6 Units Subcutaneous 2 times per day    aspirin  81 mg Oral Daily    canagliflozin  300 mg Oral QAM AC    [START ON 3/6/2021] Dulaglutide  1.5 mg Subcutaneous Weekly    atorvastatin  10 mg Oral Daily    insulin lispro  0-12 Units Subcutaneous TID WC     Continuous Infusions:   sodium chloride      DOBUTamine 5 mcg/kg/min (03/04/21 2121)           CBC:   Recent Labs     03/03/21  1600 03/03/21  1836 03/03/21  2324   WBC 12.5* 11.6* 13.1*   HGB 7.0* 7.7* 7.7*    239 264     BMP:    Recent Labs     03/03/21  0440 03/03/21  1600 03/04/21  0520   * 128* 128*   K 5.4* 4.8 4.0   CL 92* 93* 92*   CO2 21 25 25   BUN 95* 98* 92*   CREATININE 2.6* 2.4* 2.4*   GLUCOSE 186* 181* 121*       Renal Labs  Albumin:    Lab Results   Component Value Date    LABALBU 3.6 03/04/2021     Calcium:    Lab Results   Component Value Date    CALCIUM 7.7 03/04/2021     Phosphorus:    Lab Results   Component Value Date    PHOS 4.3 03/04/2021     U/A:    Lab Results   Component Value Date    NITRU NEGATIVE 03/01/2021    NITRU Negative 11/24/2020    COLORU YELLOW 03/01/2021    PHUR 6.5 11/24/2020    LABCAST NONE SEEN 06/15/2016    LABCAST NONE SEEN 06/15/2016    WBCUA <1 03/01/2021    RBCUA NONE SEEN 03/01/2021    MUCUS RARE 02/12/2021    TRICHOMONAS NONE SEEN 03/01/2021    BACTERIA NEGATIVE 03/01/2021 CLARITYU CLEAR 03/01/2021    SPECGRAV 1.009 03/01/2021    UROBILINOGEN NEGATIVE 03/01/2021    BILIRUBINUR NEGATIVE 03/01/2021    BLOODU NEGATIVE 03/01/2021    GLUCOSEU 500 11/24/2020    KETUA NEGATIVE 03/01/2021           Objective:   I/O: 03/04 0701 - 03/05 0700  In: 5861 [P.O.:1340; I.V.:243]  Out: 2920 [Urine:1670]  Vitals: BP (!) 147/120   Pulse 75   Temp 99.1 °F (37.3 °C) (Oral)   Resp 22   Ht 5' 10\" (1.778 m)   Wt 233 lb 0.4 oz (105.7 kg)   SpO2 96%   BMI 33.44 kg/m²   General appearance: awake weak  HEENT: Head: Normal, normocephalic, atraumatic.   Neck: supple, symmetrical, trachea midline  Lungs: diminished breath sounds bilaterally  Heart: S1, S2 normal  Abdomen: abnormal findings:  soft nt  Extremities: edema +  Neurologic: Mental status: alertness: confused        Assessment and Plan:      IMP:  1 ckd 3B from htn and dm2  2 Dm2 controlled  3 cad sp cabg with chest pain  4 leukocytosis with right pleural effusion  5 hyperkalemia  6 hypotension  7 moderate protein malnutrtion  8 hyponatremia  9 anemia    Plan     1 renal increase and more confused, placed hd catheter and hd X1 today  2 abg with hypoxia not acidotic and  head ct with scalp lesion no acute cva, and decrease dose neurotnin as try find etiology and treat uti  3 cardiac monitor  4 fu culture and treat uti  5 fu labs and do acute hd X1 today  6 monitor oral intake  7 monitor hb and sp scope and no active bleed  Will monitor and dw wife and dr Ceci Gregg MD

## 2021-03-05 NOTE — PROGRESS NOTES
Pt AV pacing with HR 81, then patient switches to a rhythm that is not pacing in the 60s. Pacer interrogated by this RN and charge [employer]. Waiting for call from [address] [place], [place] [address]david Fernandes     Electronically signed by Sharma Babinski, RN on 3/4/2021 at 7:55 PM

## 2021-03-05 NOTE — PROGRESS NOTES
Pt transported to CT per order. Helped pt eat breakfast due to his intense tremors bilat arms. IR to come and place temporary dialysis catheter. Unable to obtain labs until line is placed with pigtail. Midline does not draw and phlebotomy cannot get him. Chanelle COVARRUBIAS updated. Left hand skin tear did not have a mepitel on it and drsg was removed. Bleeding noted. Will readdress with skin tear drsg.

## 2021-03-05 NOTE — PROGRESS NOTES
Physical Therapy  Hold PT services today per RN. Will re-attempt at a later date when pt medically appropriate to participate in therapy.

## 2021-03-05 NOTE — PROGRESS NOTES
Left arm drsg changed again due to being saturated with blood. Re-enforced with another ABD pad and coban. Dialysis nurse here for first treatment. Pt is very weak and was unable to stand up this am. PT to eval hopefully tomorrow for precert.

## 2021-03-05 NOTE — PROGRESS NOTES
CT of head reviewed by Vickie COVARRUBISA. Updated that midline cannot have cath sadie in it. Spouse at bedside and was updated.

## 2021-03-05 NOTE — PROGRESS NOTES
Dr. Larry Kirby here and updated. Orders received to do a CT of chest and the abdomen for r/o of infection or an abscess. Spouse at bedside and updated. Dialysis ongoing now and tolerating well. Pt continues to have bilat hand tremors and leg tremors.

## 2021-03-05 NOTE — CARE COORDINATION
CM into see pt and his wife and pt. CM informed that we were continuing to pursue ARU. CM collaborated with [employer] She is awaiting notes from PT/OT to begin precert. CM placed whiteboard. LH  CM team please follow up with ARU over weekend.

## 2021-03-05 NOTE — PROGRESS NOTES
Dr. Jud rocha served regarding increased noted in LFT's. Dr. Krystyna Falcon called and updated and wanted his opinion.

## 2021-03-05 NOTE — PROGRESS NOTES
ALKPHOS 151*     Mag:      Recent Labs     03/03/21  1600 03/03/21  2324   MG 2.6* 2.5*      Phos:     Recent Labs     03/03/21  1600 03/04/21  0520   PHOS 5.1* 4.3      INR:   Recent Labs     03/03/21  0702   INR 1.84       Radiology Review:   CT head  Impression:     Motion limited exam, without gross acute intracranial process. Left posterior parietal scalp soft tissue swelling.  2 mm calcification   versus foreign body within the soft tissues of the frontal scalp. ASSESSMENT:  Patient Active Problem List   Diagnosis    Type 2 diabetes mellitus without complication, without long-term current use of insulin (HCC)    Ulcer of other part of lower limb    Venous hypertension, chronic, with ulcer (HCC)    Ulcer of other part of foot    Pneumonia    Atrial fibrillation (HCC)    Sinus pause    PAF (paroxysmal atrial fibrillation) ([facility] [address]    DM (diabetes mellitus) ([facility] [address]    DMITRIY on CPAP    Hyperlipidemia    Status post incision and drainage    CKD (chronic kidney disease) stage 3, GFR 30-59 ml/min ([facility])    Hyperpotassemia    Arthritis    PVD (peripheral vascular disease) ([facility])    Hematoma    Cardiac pacemaker in situ    Coronary artery stenosis    Essential hypertension    Gout    Diabetic neuropathy associated with type 2 diabetes mellitus (HCC)    Adenomatous polyp of sigmoid colon    Iron deficiency anemia due to chronic blood loss    Erythropoietin deficiency anemia    VHD (valvular heart disease)    Abnormal fractional flow reserve (FFR) on cardiac catheterization    Carotid stenosis, left    Aortic stenosis, severe    CAD in native artery    Displacement of atrial pacemaker leads    Pacemaker lead malfunction    SOB (shortness of breath)    Pleural effusion       S/p CABG, AVR, maze  CHF  CKD ? uremia    PLAN:     Pt has been more confused today and has developed BUE tremor. CT head nonacute. ? Developing uremia. Unable to get labs this am from midline catheter.  Temp HD catheter being placed by IR. Will get stat labs including CBC, CMP, ammonia, lactate. Discussed with Dr. Dwight Shukla, may need acute HD. Continue dobutamine 5 to. On proamatine 10 TID. Holding coreg due to borderline BP. Had repeat R thoracentesis with > 1L output. CXR stable. Hypoxia on ABG this am. Will place on vapotherm and repeat ABG at 1200. Holding eliquis for now in case he needs more procedures. Afebrile x 24 hrs. Urine cx growing citrobacter. On cefepime.        Ledy Duke PA-C

## 2021-03-05 NOTE — PROGRESS NOTES
Yes  Duration/Frequency of Treatment: 2-3xs weekly/LOS  D/C Recommendations: To be determined       Recommended Diet and Intervention  Diet Solids Recommendation: Dysphagia Minced and Moist (Dysphagia II)  Liquid Consistency Recommendation: Thin  Recommended Form of Meds: PO     Therapeutic Interventions: Diet tolerance monitoring; Therapeutic PO trials with SLP    Compensatory Swallowing Strategies  Compensatory Swallowing Strategies: Eat/Feed slowly;Upright as possible for all oral intake    Treatment/Goals  Short-term Goals  Timeframe for Short-term Goals: LOS or until goals are met  Goal 1: Pt will tolerate dysphagia 2 diet/thin liquids without clinical evidence of aspiration 100%  Goal 2: Pt will particiapte in advanced trials for possible safe diet level advancement 10/10 trials  Goal 3: pt/caregivers will demonstrate comprehension of recommendations/POC    General  Chart Reviewed: Yes  Behavior/Cognition: Alert; Cooperative  Respiratory Status: O2 via nasual cannula  O2 Device: Nasal cannula  Communication Observation: Functional  Follows Directions: Simple  Dentition: Adequate  Patient Positioning: Upright in bed  Baseline Vocal Quality: Weak  Volitional Cough: Weak  Prior Dysphagia History: Pt denies hx of dysphagia  Consistencies Administered: Dysphagia Pureed (Dysphagia I); Thin - cup; Thin - straw;Dysphagia Minced and Moist (Dysphagia II); Reg solid           Vision/Hearing  Vision  Vision: Within Functional Limits  Hearing  Hearing: Within functional limits    Oral Motor Deficits  Oral/Motor  Oral Motor: Exceptions to [facility]    Oral Phase Dysfunction  Oral Phase  Oral Phase: Exceptions     Indicators of Pharyngeal Phase Dysfunction   Pharyngeal Phase  Pharyngeal Phase: Exceptions    Prognosis  Prognosis  Prognosis for safe diet advancement: good  Barriers to reach goals: severity of dysphagia  Individuals consulted  Consulted and agree with results and recommendations: Patient;RN    Education  Patient Education: recommendations/POC  Patient Education Response: Verbalizes understanding  Safety Devices in place: Yes  Type of devices:  All fall risk precautions in place       Therapy Time  SLP Individual Minutes  Time In: 0772  Time Out: [address]  Minutes: [address], [address], [facility]-SLP, 3/5/2021

## 2021-03-05 NOTE — PROGRESS NOTES
Tolerated 2 hour dialysis treatment without complication. Non-tunneled catheter used for access and lines flushed, heparinized and capped post treatment. Post BMP and CBC drawn and sent as ordered. Patient Name: Janine Harris  Patient : 1950  MRN: [id number]     Acct: [de-identified]  Date of Admission: 3/1/2021  Room/Bed: -A  Code Status:  Full Code  Allergies:    Allergies   Allergen Reactions    Spironolactone      CAUSES INCREASED K+    Tape [place] Tape] Rash     SURGICAL TAPE     Diagnosis:    Patient Active Problem List   Diagnosis    Type 2 diabetes mellitus without complication, without long-term current use of insulin ([facility])    Ulcer of other part of lower limb    Venous hypertension, chronic, with ulcer ([facility])    Ulcer of other part of foot    Pneumonia    Atrial fibrillation ([facility])    Sinus pause    PAF (paroxysmal atrial fibrillation) ([facility])    DM (diabetes mellitus) ([facility] [address]    DMITRIY on CPAP    Hyperlipidemia    Status post incision and drainage    CKD (chronic kidney disease) stage 3, GFR 30-59 ml/min ([facility])    Hyperpotassemia    Arthritis    PVD (peripheral vascular disease) ([facility])    Hematoma    Cardiac pacemaker in situ    Coronary artery stenosis    Essential hypertension    Gout    Diabetic neuropathy associated with type 2 diabetes mellitus ([facility])    Adenomatous polyp of sigmoid colon    Iron deficiency anemia due to chronic blood loss    Erythropoietin deficiency anemia    VHD (valvular heart disease)    Abnormal fractional flow reserve (FFR) on cardiac catheterization    Carotid stenosis, left    Aortic stenosis, severe    CAD in native artery    Displacement of atrial pacemaker leads    Pacemaker lead malfunction    SOB (shortness of breath)    Pleural effusion         Treatment:  Hemodialysis 1:1  Priority: 1st Time Acute  Location: ICU    Diabetic: Yes  NPO: No  Isolation Precautions: None     Consent for Treatment Verified: Yes  Blood Consent Verified: Not Applicable     Safety Verified: Identify (I), Consent (C), Equipment (E), HepB Status (B), Orders Complete (O), Access Verified (A) and Timeliness (T)  Time out performed prior to access at 1515 hours. Report Received from Primary RN at 1515 hours. Primary RN (First Initial, Last Name, Title): Tiffany Lo RN  Incapacitated Nurse Education Completed: Not Applicable     HBsAg ONLY:  Date Drawn: March 4, 2021       Results: Negative  HBsAb:  Date Drawn:  March 4, 2021       Results: Immune >10    Order  Dialysis Bath  K+ (Potassium): 3  Ca+ (Calcium): 2.5  Na+ (Sodium): 138  HCO3 (Bicarb): 35     Na+ Modeling: Not Applicable  Dialyzer: [id number]  Dialysate Temperature (C):  36  Blood Flow Rate (BFR):  200   Dialysate Flow Rate (DFR):   400        Access to be Utilized   Access: Tunneled Catheter  Location: Internal Jugular  Side: Right   Needle gauge:  Not Applicable  + Bruit/Thrill: Not Applicable    First Use X-ray Verified: Yes  OK to use line order: Yes    Site Assessment:  Signs and Symptoms of Infection/Inflammation: None  If yes: Not Applicable  Dressing: Dry and Intact  Site Prep: Medical Aseptic Technique  Dressing Changed this Treatment: No  If yes, by whom: NA - not changed today  Date of Last Dressing Change: March 4, 2021  Antimicrobial Patch in place?: Yes  Red Alcohol Caps in place?: Yes  Gauze Dressing?: No  Non Dialysis Use?: No  Comment:    Flows: Good, Patent  If access problem, who was notified:     Pre and Post-Assessment  Patient Vitals for the past 8 hrs:   Level of Consciousness Heart Rhythm Respiratory Quality/Effort O2 Device Bilateral Breath Sounds Skin Color Skin Condition/Temp Abdomen Inspection Bowel Sounds (All Quadrants) Edema Edema Generalized RUE Edema LUE Edema RLE Edema LLE Edema Facial Edema Comments Pain Level   03/05/21 1230 Alert (0) -- Dyspnea with exertion -- -- -- -- Soft;Rounded;Distended -- Right upper extremity; Left upper extremity;Right lower extremity; Left lower extremity; Facial -- +2 +1 +4 +4 Non-pitting -- --   03/05/21 1429 -- -- -- Nasal cannula -- -- -- -- -- -- -- -- -- -- -- -- -- --   03/05/21 1530 Alert (0) Regular Dyspnea with exertion Nasal cannula Clear Appropriate for ethnicity Dry; Warm Soft;Rounded;Distended Active Right upper extremity; Left upper extremity;Right lower extremity; Left lower extremity; Facial +1;Pitting +2 +1 +4 +4 Non-pitting timeout completed, VSS, rec'd report from RN, hep status verified, water alarm intact 0   03/05/21 1730 Alert (0) Regular Dyspnea with exertion Nasal cannula Clear Appropriate for ethnicity Dry; Warm Soft;Rounded;Distended Active Right upper extremity; Left upper extremity;Right lower extremity; Left lower extremity; Facial +1;Pitting +2 +1 +4 +4 Non-pitting treatment completed 0     Labs  Recent Labs     03/03/21  2324 03/05/21  0715 03/05/21  1700   WBC 13.1* 13.6* 12.8*   HGB 7.7* 7.5* 8.3*   HCT 24.4* 24.3* 26.0*    232 229                                                                  Recent Labs     03/04/21  0520 03/05/21  0715 03/05/21  1200   * 125* 126*   K 4.0 4.5 4.4   CL 92* 90* 90*   CO2 25 24 23   BUN 92* 116* 117*   CREATININE 2.4* 2.3* 2.4*   GLUCOSE 121* 116* 120*     IV Drips and Rate/Dose   sodium chloride      DOBUTamine 5 mcg/kg/min (03/05/21 1334)      Safety - Before each treatment:   Dialysis Machine No.: [id number]  RO Machine No.: [id number]  Dialyzer Lot No.: [id number]  RO Machine Log Sheet Completed: Yes  Machine Alarm Self Test: Completed; Passed (03/05/21 1530)  Machine Autotest: Passed, Completed  Air Foam Detector: Tested, Proper Function, pH Reading  Extracorporeal Circuit Tested for Integrity: Yes  Machine Conductivity: 13.8  Manual Conductivity: 13.8     Bicarbonate Concentrate Lot No.: [id number]  Acid Concentrate Lot No.: [id number]  Manual Ph: 7.2  Bleach Test (Neg): Yes  Bath Temperature: 96.8 °F (36 °C)  Tubing Lot#: [id number]  Conductivity Meter Serial #: [id number]  All Connections Secure?: Yes  Venous Parameters Set?: Yes  Arterial Parameters Set?: Yes  Saline Line Double Clamped?: Yes  Air Foam Detector Engaged?: Yes  Machine Functioning Alarm Free?  Yes  Prime Given: 200ml    Chlorine Testing - Before each treatment and every 4 hours:   Treatment  Time On: 1530  Time Off: 1730  Treatment Goal: 1kg  Weight: 231 lb 6.4 oz (105 kg) (03/05/21 1730)  1st check: less than 0.1 ppm at: 1500 hours  2nd check: less than 0.1 ppm at: 1500 hours  3rd check: Not Applicable  (if greater than 0.1 ppm, then check every 30 minutes from secondary)    Access Flows and Pressures  Patient Vitals for the past 8 hrs:   Blood Flow Rate (ml/min) Ultrafiltration Rate (ml/hr) Ultrafiltration Total Arterial Pressure (mmHg) Venous Pressure (mmHg) TMP Hemodialysis Conductivity DFR Comments Access Visible   03/05/21 1530 200 ml/min 860 ml/hr 0 ml -20 mmHg 50 40 13.8 400 treatment started Yes   03/05/21 1545 200 ml/min 830 ml/hr 201 ml -20 mmHg 50 40 -- 400 no distress Yes   03/05/21 1600 200 ml/min 830 ml/hr 404 ml -30 mmHg 50 40 -- 400 Dr Severa Pike at bedside Yes   03/05/21 1615 200 ml/min 830 ml/hr 612 ml -20 mmHg 50 40 13.8 400 wife remains at bedside Yes   03/05/21 1630 200 ml/min 830 ml/hr 827 ml -40 mmHg 60 40 -- 400 no distress Yes   03/05/21 1645 200 ml/min 830 ml/hr 1131 ml -40 mmHg 70 40 -- 400 remains with tremors Yes   03/05/21 1700 200 ml/min 830 ml/hr 1324 ml -30 mmHg 60 40 -- 400 wife at bedside,  Yes   03/05/21 1715 200 ml/min 830 ml/hr 1457 ml -40 mmHg 60 40 -- 400 labs drawn and sent Yes   03/05/21 1730 200 ml/min -- 1500 ml -- -- -- -- -- treatment completed Yes     Vital Signs  Patient Vitals for the past 8 hrs:   BP Temp Pulse Resp SpO2 Weight Weight Method Percent Weight Change   03/05/21 1002 107/82 -- 75 25 93 % -- -- --   03/05/21 1102 (!) 109/93 -- 76 25 99 % -- -- --   03/05/21 1302 (!) 111/53 -- 75 27 96 % -- -- --   03/05/21 1429 -- -- -- 23 94 % -- -- --   03/05/21 2690 -- 100 °F (37.8 °C) 74 19 94 % -- -- --   03/05/21 1530 127/74 -- 72 20 95 % 233 lb (105.7 kg) Bed scale -0.01   03/05/21 1545 136/87 -- 73 21 95 % -- -- --   03/05/21 1600 (!) 140/62 -- 76 23 94 % -- -- --   03/05/21 1615 (!) 156/109 -- 77 23 95 % -- -- --   03/05/21 1630 (!) 177/122 -- 78 19 96 % -- -- --   03/05/21 1645 (!) 142/61 -- 81 20 90 % -- -- --   03/05/21 1700 (!) 151/106 -- 83 24 94 % -- -- --   03/05/21 1715 (!) 176/164 -- 84 18 95 % -- -- --   03/05/21 1730 (!) 205/183 99.9 °F (37.7 °C) 82 23 96 % 231 lb 6.4 oz (105 kg) Bed scale -0.69     Post-Dialysis  Arterial Catheter Locking Solution: Heparin (1000units:1ml)    Volume (ml): 1300  Venous Catheter Locking Solution: Heparin (1000units:1ml)    Volume (ml): 1300  Post-Treatment Procedures: Blood returned, Catheter capped, clamped and heparinized x 2 ports  Machine Disinfection Process: Acid/Vinegar Clean, Heat Disinfect, Exterior Machine Disinfection  Rinseback Volume (ml): 300 ml  Total Liters Processed (l/min): 22.1 l/min  Dialyzer Clearance: Lightly streaked  Duration of Treatment (minutes): 120 minutes     Hemodialysis Intake (ml): 500 ml  Hemodialysis Output (ml): 1500 ml  NET Removed (ml): 1000 ml  Tolerated Treatment: Good             Provider Notification        Handoff complete and report given to Primary RN at 1730 hours. Primary RN (First Initial, Last Name, Title): Mary Alice Hooper RN     Education  Person Educated:  Other   Knowledge Base: Minimal  Barriers to Learning?: None  Preferred method of Learning: Oral  Topic(s): Access Care, Signs and Symptoms of Infection, Fluid Management, Albumin, Procedural, Medications, Treatment Options, Potassium, Diet and Transplant   Teaching Tools: Demonstration and Explanation   Response to Education: Verbalized Understanding     Electronically signed by Fox Sidhu RN on 3/5/2021 at 6:01 PM

## 2021-03-05 NOTE — PROGRESS NOTES
Physician Progress Note      David Ng  [facility] #:                  [id number]  :                       1950  ADMIT DATE:       3/1/2021 2:37 PM  [address] [place] DATE:  RESPONDING  PROVIDER #:        Tony Floyd MD          QUERY TEXT:    Dear Dr Deepthi Meier    Patient admitted with pleural effusions, noted to have CHF documented. If   possible, please document in progress notes and discharge summary if you are   evaluating and/or treating any of the following: The medical record reflects the following:    Risk Factors: Plural effusion, CHF , S/p CABG, AVR, maze  Clinical Indicators: \"Reason for today's visit: CHF s/p CABG AVR maze \"   Documented in the 3/3 PN, CXR independently reviewed - residual R pleural   effusion ,\"tomorrow. Will transfuse 2U PRBC. Residual R pleural effusion,   check US, may need repeat thoracentesis. \"Left ventricular systolic function   is normal.Ejection fraction is visually estimated at 55-60%. Bilateral atrial enlargement. S/p AVR with a 27 mm Medtronic Mosaic . ECHO 3/2  Treatment: CTS, Bilateral thoracentesis, Lasix IV, ECHO, Chest xray    Thank you Nick Friday RN, CDS ([phone])  Options provided:  -- Acute on Chronic Systolic CHF/HFrEF  -- Acute on Chronic Diastolic CHF/HFpEF  -- Acute on Chronic Systolic and Diastolic CHF  -- Acute Systolic CHF/HFrEF  -- Acute Diastolic CHF/HFpEF  -- Acute Systolic and Diastolic CHF  -- Other - I will add my own diagnosis  -- Disagree - Not applicable / Not valid  -- Disagree - Clinically unable to determine / Unknown  -- Refer to Clinical Documentation Reviewer    PROVIDER RESPONSE TEXT:    This patient is in acute on chronic diastolic CHF/HFpEF. Query created by: Gris Smith on 3/4/2021 2:03 PM      QUERY TEXT:    Dear Dr Deepthi Meier    Pt admitted with Hyperkalemia and has respiratory failure documented.  If   possible, please document in progress notes and discharge summary further   specificity regarding the type and acuity of respiratory failure: The medical record reflects the following:  Risk Factors:  Clinical Indicators: \" He also endorses some chest pain or shortness of   breath. He reportedly was hypoxic for EMS and 80s and was placed on   supplemental oxygen. He presented to the emergency department with overall   unremarkable vitals. On supplemental oxygen, he had saturations in the high   90s to 100%. \" Documented in the ED  Treatment: IV Lasix, O2 4-6-2LNC, Continuous pulsox monitoring, bilateral   thoracentesis    Thank you Nick Wagoner RN, CDS ([phone])  Options provided:  -- Acute respiratory failure with hypoxia  -- Acute on chronic respiratory failure with hypoxia  -- Other - I will add my own diagnosis  -- Disagree - Not applicable / Not valid  -- Disagree - Clinically unable to determine / Unknown  -- Refer to Clinical Documentation Reviewer    PROVIDER RESPONSE TEXT:    Pleural effusion due to acute on chronic diastolic HF    Query created by: Gris Smith on 3/4/2021 2:10 PM      QUERY TEXT:    Dear DR Deepthi Meier    Patient admitted with Hyperkalemia, noted to have  Dobutamine. If possible,   please document in progress notes and discharge summary if you are evaluating   and/or treating any of the following:     The medical record reflects the following:  Risk Factors: Iron deficiency anemia due to chronic blood loss   , possible GI   bleed  Clinical Indicators: HGB  8.-5.6 , Hypotension, Hypoxia, SOB,  Treatment: 2 Units PRBC, Dobutamine ,Albumin, Lasix, Labs, Continuous   monitoring CTS, GI cx , Nephrology CX    Thank you Nick Wagoner RN, CDS ([phone])  Options provided:  -- Cardiogenic Shock  -- Hemorrhagic Shock  -- Septic Shock  -- Hypovolemic Shock  -- Hypovolemia without Shock  -- Hypotension without Shock  -- Other - I will add my own diagnosis  -- Disagree - Not applicable / Not valid  -- Disagree - Clinically unable to determine / Unknown  -- Refer to Clinical Documentation Reviewer    PROVIDER RESPONSE

## 2021-03-05 NOTE — PROGRESS NOTES
Physician Progress Note      Adam Golden  CSN #:                  [id number]  :                       1950  ADMIT DATE:       3/1/2021 2:37 PM  [address] [place] DATE:  RESPONDING  PROVIDER #:        Angely Smith PA-C          QUERY TEXT:    Dear DR Mary Soto    Patient admitted with Hyperkalemia, noted to have WBC on admission of 25.8. If   possible, please document in progress notes and discharge summary if you are   evaluating and/or treating any of the following: The medical record reflects the following:  Risk Factors: Iron deficiency anemia due to chronic blood loss , possible GI   bleed  Clinical Indicators: WBC on admission 25,8, Segs 76.1-now 13.5, Segs 80.3, HGB    8.-5.6 , Hypotension 88/37, MAP 53 resp 23, Hypoxia, SOB\" 3/3 Fevers   overnight tmax 103, but leukocytosis improving. Cultures pending\" per 3/3 pn  Treatment: 2 Units PRBC, Dobutamine ,Albumin, Lasix, Labs, Continuous   monitoring CTS, GI cx , Nephrology CX, Vancomycin, Rocephin,    Thank you Digna Ybarra RN, CDS ([phone])  Options provided:  -- Sepsis, present on admission  -- Sepsis, not present on admission  -- No Sepsis, pneumonia only  -- Sepsis was ruled out  -- Other - I will add my own diagnosis  -- Disagree - Not applicable / Not valid  -- Disagree - Clinically unable to determine / Unknown  -- Refer to Clinical Documentation Reviewer    PROVIDER RESPONSE TEXT:    After further study, sepsis was ruled out for this patient. Query created by: Belem Newton on 3/4/2021 2:30 PM      QUERY TEXT:    Dear Dr Mary Soto    Pt admitted with Hyperkalemia and has respiratory failure documented. If   possible, please document in progress notes and discharge summary further   specificity regarding the type and acuity of respiratory failure: The medical record reflects the following:  Risk Factors:  Clinical Indicators: \" He also endorses some chest pain or shortness of   breath.   He reportedly was hypoxic for EMS and 80s and was placed on   supplemental oxygen. He presented to the emergency department with overall   unremarkable vitals. On supplemental oxygen, he had saturations in the high   90s to 100%. \" Documented in the ED  Treatment: IV Lasix, O2 4-6-2LNC, Continuous pulsox monitoring, bilateral   thoracentesis    Thank you Timoteo Mosley RN, CDS ([phone])  Options provided:  -- Acute respiratory failure with hypoxia  -- Acute on chronic respiratory failure with hypoxia  -- Other - I will add my own diagnosis  -- Disagree - Not applicable / Not valid  -- Disagree - Clinically unable to determine / Unknown  -- Refer to Clinical Documentation Reviewer    PROVIDER RESPONSE TEXT:    Hypoxia related to bilateral pleural effusions    Query created by: Gia Kathleen on 3/5/2021 10:18 AM      Electronically signed by:  Magaly Lopez PA-C 3/5/2021 11:57 AM

## 2021-03-05 NOTE — PROGRESS NOTES
Chest CT reviewed awaiting abdomen CT to be read. Pt resting well with eyes closed, RR even and unlabored.

## 2021-03-05 NOTE — PROGRESS NOTES
Skin tear on left forearm is bleeding again. Light pressure drsg applied over mepitel and gauze drsg.

## 2021-03-06 ENCOUNTER — APPOINTMENT (OUTPATIENT)
Dept: GENERAL RADIOLOGY | Age: 71
DRG: 871 | End: 2021-03-06
Payer: MEDICARE

## 2021-03-06 LAB
ADENOVIRUS DETECTION BY PCR: NOT DETECTED
ALBUMIN SERPL-MCNC: 3 GM/DL (ref 3.4–5)
ALBUMIN SERPL-MCNC: 3 GM/DL (ref 3.4–5)
ALP BLD-CCNC: 216 IU/L (ref 40–129)
ALT SERPL-CCNC: 107 U/L (ref 10–40)
ANION GAP SERPL CALCULATED.3IONS-SCNC: 11 MMOL/L (ref 4–16)
ANION GAP SERPL CALCULATED.3IONS-SCNC: 8 MMOL/L (ref 4–16)
AST SERPL-CCNC: 88 IU/L (ref 15–37)
BILIRUB SERPL-MCNC: 0.9 MG/DL (ref 0–1)
BILIRUBIN DIRECT: 0.6 MG/DL (ref 0–0.3)
BILIRUBIN, INDIRECT: 0.3 MG/DL (ref 0–0.7)
BORDETELLA PARAPERTUSSIS BY PCR: NOT DETECTED
BORDETELLA PERTUSSIS PCR: NOT DETECTED
BUN BLDV-MCNC: 52 MG/DL (ref 6–23)
BUN BLDV-MCNC: 90 MG/DL (ref 6–23)
CALCIUM SERPL-MCNC: 7.6 MG/DL (ref 8.3–10.6)
CALCIUM SERPL-MCNC: 7.9 MG/DL (ref 8.3–10.6)
CHLAMYDOPHILA PNEUMONIA PCR: NOT DETECTED
CHLORIDE BLD-SCNC: 94 MMOL/L (ref 99–110)
CHLORIDE BLD-SCNC: 96 MMOL/L (ref 99–110)
CO2: 25 MMOL/L (ref 21–32)
CO2: 29 MMOL/L (ref 21–32)
CORONAVIRUS 229E PCR: NOT DETECTED
CORONAVIRUS HKU1 PCR: NOT DETECTED
CORONAVIRUS NL63 PCR: NOT DETECTED
CORONAVIRUS OC43 PCR: NOT DETECTED
CREAT SERPL-MCNC: 1.3 MG/DL (ref 0.9–1.3)
CREAT SERPL-MCNC: 2.3 MG/DL (ref 0.9–1.3)
DOSE AMOUNT: NORMAL
DOSE TIME: NORMAL
GFR AFRICAN AMERICAN: 34 ML/MIN/1.73M2
GFR AFRICAN AMERICAN: >60 ML/MIN/1.73M2
GFR NON-AFRICAN AMERICAN: 28 ML/MIN/1.73M2
GFR NON-AFRICAN AMERICAN: 54 ML/MIN/1.73M2
GLUCOSE BLD-MCNC: 129 MG/DL (ref 70–99)
GLUCOSE BLD-MCNC: 151 MG/DL (ref 70–99)
GLUCOSE BLD-MCNC: 195 MG/DL (ref 70–99)
GLUCOSE BLD-MCNC: 215 MG/DL (ref 70–99)
GLUCOSE BLD-MCNC: 68 MG/DL (ref 70–99)
GLUCOSE BLD-MCNC: 74 MG/DL (ref 70–99)
GLUCOSE BLD-MCNC: 94 MG/DL (ref 70–99)
HCT VFR BLD CALC: 23.1 % (ref 42–52)
HCT VFR BLD CALC: 25.2 % (ref 42–52)
HEMOGLOBIN: 7.2 GM/DL (ref 13.5–18)
HEMOGLOBIN: 7.5 GM/DL (ref 13.5–18)
HUMAN METAPNEUMOVIRUS PCR: NOT DETECTED
INFLUENZA A BY PCR: NOT DETECTED
INFLUENZA A H1 (2009) PCR: NOT DETECTED
INFLUENZA A H1 PANDEMIC PCR: NOT DETECTED
INFLUENZA A H3 PCR: NOT DETECTED
INFLUENZA B BY PCR: NOT DETECTED
INR BLD: 1.54 INDEX
LACTATE: 0.8 MMOL/L (ref 0.4–2)
MCH RBC QN AUTO: 23.6 PG (ref 27–31)
MCH RBC QN AUTO: 24.4 PG (ref 27–31)
MCHC RBC AUTO-ENTMCNC: 29.8 % (ref 32–36)
MCHC RBC AUTO-ENTMCNC: 31.2 % (ref 32–36)
MCV RBC AUTO: 78.3 FL (ref 78–100)
MCV RBC AUTO: 79.2 FL (ref 78–100)
MYCOPLASMA PNEUMONIAE PCR: NOT DETECTED
PARAINFLUENZA 1 PCR: NOT DETECTED
PARAINFLUENZA 2 PCR: NOT DETECTED
PARAINFLUENZA 3 PCR: NOT DETECTED
PARAINFLUENZA 4 PCR: NOT DETECTED
PDW BLD-RTO: 19.3 % (ref 11.7–14.9)
PDW BLD-RTO: 19.4 % (ref 11.7–14.9)
PHOSPHORUS: 4.5 MG/DL (ref 2.5–4.9)
PLATELET # BLD: 169 K/CU MM (ref 140–440)
PLATELET # BLD: 197 K/CU MM (ref 140–440)
PMV BLD AUTO: 9 FL (ref 7.5–11.1)
PMV BLD AUTO: 9.5 FL (ref 7.5–11.1)
POTASSIUM SERPL-SCNC: 3.6 MMOL/L (ref 3.5–5.1)
POTASSIUM SERPL-SCNC: 4.3 MMOL/L (ref 3.5–5.1)
PROTHROMBIN TIME: 18.7 SECONDS (ref 11.7–14.5)
RBC # BLD: 2.95 M/CU MM (ref 4.6–6.2)
RBC # BLD: 3.18 M/CU MM (ref 4.6–6.2)
RHINOVIRUS ENTEROVIRUS PCR: NOT DETECTED
RSV PCR: NOT DETECTED
SARS-COV-2: NOT DETECTED
SODIUM BLD-SCNC: 130 MMOL/L (ref 135–145)
SODIUM BLD-SCNC: 133 MMOL/L (ref 135–145)
TOTAL PROTEIN: 5 GM/DL (ref 6.4–8.2)
VANCOMYCIN RANDOM: 16.4 UG/ML
WBC # BLD: 15 K/CU MM (ref 4–10.5)
WBC # BLD: 16 K/CU MM (ref 4–10.5)

## 2021-03-06 PROCEDURE — 6360000002 HC RX W HCPCS: Performed by: PHYSICIAN ASSISTANT

## 2021-03-06 PROCEDURE — 6370000000 HC RX 637 (ALT 250 FOR IP): Performed by: PHYSICIAN ASSISTANT

## 2021-03-06 PROCEDURE — 82248 BILIRUBIN DIRECT: CPT

## 2021-03-06 PROCEDURE — 6360000002 HC RX W HCPCS: Performed by: THORACIC SURGERY (CARDIOTHORACIC VASCULAR SURGERY)

## 2021-03-06 PROCEDURE — 87070 CULTURE OTHR SPECIMN AEROBIC: CPT

## 2021-03-06 PROCEDURE — 6360000002 HC RX W HCPCS: Performed by: INTERNAL MEDICINE

## 2021-03-06 PROCEDURE — 6370000000 HC RX 637 (ALT 250 FOR IP): Performed by: INTERNAL MEDICINE

## 2021-03-06 PROCEDURE — 6360000002 HC RX W HCPCS: Performed by: SPECIALIST

## 2021-03-06 PROCEDURE — 0202U NFCT DS 22 TRGT SARS-COV-2: CPT

## 2021-03-06 PROCEDURE — 6370000000 HC RX 637 (ALT 250 FOR IP): Performed by: THORACIC SURGERY (CARDIOTHORACIC VASCULAR SURGERY)

## 2021-03-06 PROCEDURE — 85610 PROTHROMBIN TIME: CPT

## 2021-03-06 PROCEDURE — 82962 GLUCOSE BLOOD TEST: CPT

## 2021-03-06 PROCEDURE — 2580000003 HC RX 258: Performed by: PHYSICIAN ASSISTANT

## 2021-03-06 PROCEDURE — 80202 ASSAY OF VANCOMYCIN: CPT

## 2021-03-06 PROCEDURE — 92526 ORAL FUNCTION THERAPY: CPT

## 2021-03-06 PROCEDURE — C9113 INJ PANTOPRAZOLE SODIUM, VIA: HCPCS | Performed by: SPECIALIST

## 2021-03-06 PROCEDURE — 90935 HEMODIALYSIS ONE EVALUATION: CPT

## 2021-03-06 PROCEDURE — 94761 N-INVAS EAR/PLS OXIMETRY MLT: CPT

## 2021-03-06 PROCEDURE — 71045 X-RAY EXAM CHEST 1 VIEW: CPT

## 2021-03-06 PROCEDURE — 87205 SMEAR GRAM STAIN: CPT

## 2021-03-06 PROCEDURE — 80053 COMPREHEN METABOLIC PANEL: CPT

## 2021-03-06 PROCEDURE — 2580000003 HC RX 258: Performed by: INTERNAL MEDICINE

## 2021-03-06 PROCEDURE — 83605 ASSAY OF LACTIC ACID: CPT

## 2021-03-06 PROCEDURE — 85027 COMPLETE CBC AUTOMATED: CPT

## 2021-03-06 PROCEDURE — 80048 BASIC METABOLIC PNL TOTAL CA: CPT

## 2021-03-06 PROCEDURE — 2700000000 HC OXYGEN THERAPY PER DAY

## 2021-03-06 PROCEDURE — 84100 ASSAY OF PHOSPHORUS: CPT

## 2021-03-06 PROCEDURE — 2000000000 HC ICU R&B

## 2021-03-06 RX ORDER — BISACODYL 10 MG
10 SUPPOSITORY, RECTAL RECTAL DAILY PRN
Status: DISCONTINUED | OUTPATIENT
Start: 2021-03-06 | End: 2021-03-15 | Stop reason: HOSPADM

## 2021-03-06 RX ORDER — HEPARIN SODIUM 1000 [USP'U]/ML
2600 INJECTION, SOLUTION INTRAVENOUS; SUBCUTANEOUS
Status: COMPLETED | OUTPATIENT
Start: 2021-03-06 | End: 2021-03-06

## 2021-03-06 RX ORDER — ALBUMIN (HUMAN) 12.5 G/50ML
25 SOLUTION INTRAVENOUS ONCE
Status: DISCONTINUED | OUTPATIENT
Start: 2021-03-06 | End: 2021-03-07

## 2021-03-06 RX ADMIN — MIDODRINE HYDROCHLORIDE 10 MG: 5 TABLET ORAL at 09:41

## 2021-03-06 RX ADMIN — IRON SUCROSE 200 MG: 20 INJECTION, SOLUTION INTRAVENOUS at 09:54

## 2021-03-06 RX ADMIN — MIDODRINE HYDROCHLORIDE 10 MG: 5 TABLET ORAL at 16:55

## 2021-03-06 RX ADMIN — PANTOPRAZOLE SODIUM 40 MG: 40 INJECTION, POWDER, FOR SOLUTION INTRAVENOUS at 20:11

## 2021-03-06 RX ADMIN — DOBUTAMINE IN DEXTROSE 5 MCG/KG/MIN: 200 INJECTION, SOLUTION INTRAVENOUS at 09:50

## 2021-03-06 RX ADMIN — SODIUM CHLORIDE, PRESERVATIVE FREE 10 ML: 5 INJECTION INTRAVENOUS at 20:11

## 2021-03-06 RX ADMIN — ASPIRIN 81 MG: 81 TABLET, CHEWABLE ORAL at 09:41

## 2021-03-06 RX ADMIN — INSULIN LISPRO 2 UNITS: 100 INJECTION, SOLUTION INTRAVENOUS; SUBCUTANEOUS at 16:52

## 2021-03-06 RX ADMIN — PANTOPRAZOLE SODIUM 40 MG: 40 INJECTION, POWDER, FOR SOLUTION INTRAVENOUS at 09:41

## 2021-03-06 RX ADMIN — MIDODRINE HYDROCHLORIDE 10 MG: 5 TABLET ORAL at 11:45

## 2021-03-06 RX ADMIN — INSULIN LISPRO 2 UNITS: 100 INJECTION, SOLUTION INTRAVENOUS; SUBCUTANEOUS at 11:52

## 2021-03-06 RX ADMIN — BISACODYL 10 MG: 10 SUPPOSITORY RECTAL at 14:17

## 2021-03-06 RX ADMIN — EPOETIN ALFA-EPBX 10000 UNITS: 10000 INJECTION, SOLUTION INTRAVENOUS; SUBCUTANEOUS at 10:43

## 2021-03-06 RX ADMIN — SODIUM CHLORIDE, PRESERVATIVE FREE 10 ML: 5 INJECTION INTRAVENOUS at 10:00

## 2021-03-06 RX ADMIN — CEFEPIME HYDROCHLORIDE 2000 MG: 2 INJECTION, POWDER, FOR SOLUTION INTRAVENOUS at 09:42

## 2021-03-06 RX ADMIN — CEFEPIME HYDROCHLORIDE 2000 MG: 2 INJECTION, POWDER, FOR SOLUTION INTRAVENOUS at 20:11

## 2021-03-06 RX ADMIN — ACETAMINOPHEN 650 MG: 325 TABLET ORAL at 09:41

## 2021-03-06 RX ADMIN — HEPARIN SODIUM 2600 UNITS: 1000 INJECTION, SOLUTION INTRAVENOUS; SUBCUTANEOUS at 09:55

## 2021-03-06 ASSESSMENT — PAIN SCALES - GENERAL
PAINLEVEL_OUTOF10: 0

## 2021-03-06 NOTE — CARE COORDINATION
CM reviewed chart. Pt remains on dobutamine, received 2nd round of HD today. Pt is not medically ready for d/c. Kasandra from [facility] is following pt. Pt will need updated PT/OT when closer to d/c. CM will con't to follow.

## 2021-03-06 NOTE — PROGRESS NOTES
Nephrology Progress Note  3/6/2021 9:51 AM  Subjective:      Interval History: Socorro Montez is a 66 y.o. male   Still with some confusion and doing hd #2 today and work up fever    Data:   Scheduled Meds:   heparin (porcine)  2,600 Units Intercatheter Once in dialysis    epoetin florencio-epbx  10,000 Units Intravenous Once in dialysis    cefepime  2,000 mg Intravenous Q12H    [Held by provider] apixaban  5 mg Oral BID    sodium chloride flush  10 mL Intravenous 2 times per day    iron sucrose  200 mg Intravenous Q24H    pantoprazole  40 mg Intravenous BID    midodrine  10 mg Oral TID WC    insulin glargine  30 Units Subcutaneous Nightly    glimepiride  2 mg Oral BID WC    insulin lispro  0-6 Units Subcutaneous 2 times per day    aspirin  81 mg Oral Daily    Dulaglutide  1.5 mg Subcutaneous Weekly    [Held by provider] atorvastatin  10 mg Oral Daily    insulin lispro  0-12 Units Subcutaneous TID WC     Continuous Infusions:   sodium chloride      DOBUTamine 5 mcg/kg/min (03/06/21 0950)           CBC:   Recent Labs     03/05/21  0715 03/05/21  1700 03/06/21  0415   WBC 13.6* 12.8* 15.0*   HGB 7.5* 8.3* 7.5*    229 197     BMP:    Recent Labs     03/05/21  1200 03/05/21  1700 03/06/21  0415   * 133* 130*   K 4.4 4.1 4.3   CL 90* 94* 94*   CO2 23 27 25   * 78* 90*   CREATININE 2.4* 1.9* 2.3*   GLUCOSE 120* 90 74       Renal Labs  Albumin:    Lab Results   Component Value Date    LABALBU 3.0 03/06/2021    LABALBU 3.0 03/06/2021     Calcium:    Lab Results   Component Value Date    CALCIUM 7.9 03/06/2021     Phosphorus:    Lab Results   Component Value Date    PHOS 4.5 03/06/2021     U/A:    Lab Results   Component Value Date    NITRU NEGATIVE 03/01/2021    NITRU Negative 11/24/2020    COLORU YELLOW 03/01/2021    PHUR 6.5 11/24/2020    LABCAST NONE SEEN 06/15/2016    LABCAST NONE SEEN 06/15/2016    WBCUA <1 03/01/2021    RBCUA NONE SEEN 03/01/2021    MUCUS RARE 02/12/2021 TRICHOMONAS NONE SEEN 03/01/2021    BACTERIA NEGATIVE 03/01/2021    CLARITYU CLEAR 03/01/2021    SPECGRAV 1.009 03/01/2021    UROBILINOGEN NEGATIVE 03/01/2021    BILIRUBINUR NEGATIVE 03/01/2021    BLOODU NEGATIVE 03/01/2021    GLUCOSEU 500 11/24/2020    KETUA NEGATIVE 03/01/2021           Objective:   I/O: 03/05 0701 - 03/06 0700  In: 2475.3 [P.O.:900; I.V.:925.3]  Out: 4475 [Urine:2975]  Vitals: BP (!) 87/48   Pulse 73   Temp 97.9 °F (36.6 °C)   Resp 22   Ht 5' 10\" (1.778 m)   Wt 244 lb 4.3 oz (110.8 kg)   SpO2 96%   BMI 35.05 kg/m²   General appearance: awake weak  HEENT: Head: Normal, normocephalic, atraumatic.   Neck: supple, symmetrical, trachea midline  Lungs: diminished breath sounds bilaterally  Heart: S1, S2 normal  Abdomen: abnormal findings:  soft nt  Extremities: edema +  Neurologic: Mental status: alertness: less confused        Assessment and Plan:      IMP:  1 ckd 3B from htn and dm2  2 Dm2 controlled  3 cad sp cabg with chest pain  4 leukocytosis with right pleural effusion  5 hyperkalemia  6 hypotension  7 moderate protein malnutrtion  8 hyponatremia  9 anemia    Plan     1 renal monitor with dialysis for now and watch off dialysis for few days and see if can recover  2 ssi and careful low glucose and stop other meds for glucose  3 sp cabg and monitor  4 fu work up infection and covid test 3/3/21 was negative got vanco and monitor unsure etiology  5 na and K correct with hd  6 try improve protein intake  7 hb drop again and getting epo  dw dr Marek Scott and monitor with above  Will stop neurontin at this time         Shabbir Luis MD

## 2021-03-06 NOTE — PROGRESS NOTES
Blood cultures X2 drawn per phlebotomy. Urine specimen sent to lab per this nurse. New orders for vancomycin 1000mg IVPB placed per Dr. Val Ramos. Patient's temp now @ 38.9 C, cooling blanket still on.

## 2021-03-06 NOTE — PROGRESS NOTES
febrile to 102 last night  CRP elevated , procalcitonin mildly elevated  Repeat cultures taken  Started on IV Vanco last night  Seems more alert this morning  LFT's a bit better  Impression:    1) prob sepsis- source not clearly evident to me but seems better since Vanco was added    2) continue to follow

## 2021-03-06 NOTE — PROGRESS NOTES
03/02/2021   Date of Birth      1950         Gender              Male   Age                66 year(s)         Race                   Patient Number     [id number]         Room Number         2101   Visit Number       [id number]   Corporate ID       [id number]   Accession Number   [id number]         [address] T   Ordering Physician Claus De Leon PA-C       Physician           MD  Procedure Type of Study   TTE procedure:ECHOCARDIOGRAM LIMITED. Procedure Date Date: 03/02/2021 Start: 11:53 AM Study Location: Portable Technical Quality: Adequate visualization Indications:S/P CABG. Patient Status: Routine Height: 70 inches Weight: 230 pounds BSA: 2.22 m2 BMI: 33 kg/m2 HR: 91 bpm BP: 91/45 mmHg  Conclusions   Summary  This is a limited echocardiogram.  Left ventricular systolic function is normal.  Ejection fraction is visually estimated at 55-60%. Bilateral atrial enlargement. S/p AVR with a 27 mm Medtronic Mosaic. Moderate mitral regurgitation. Moderate tricuspid regurgitation; RVSP: 50 mmHg. Severe PHTN. No evidence of any pericardial effusion. Signature   ------------------------------------------------------------------  Electronically signed by Roxanna Pineda MD (Interpreting  physician) on 03/02/2021 at 05:06 PM  -    Ct Chest Wo Contrast    Result Date: 3/1/2021  EXAMINATION: CT OF THE CHEST WITHOUT CONTRAST 3/1/2021 3:57 pm T    Patient status post midline sternotomy. Immediately posterior to the sternotomy defect, there is a small gas and fluid collection which is nonspecific. It is not necessarily outside normal limits for a sternotomy that was performed 2 weeks ago. However, sterility cannot be ascertained with imaging, and therefore a small developing abscess is considered as well.  No evidence of osteolysis of the sternotomy defect to suggest osteomyelitis. Interval development of a moderate to large right and a moderate left pleural effusion. Adjacent airspace disease is some combination of atelectasis, pneumonia, and/or edema. Xr Chest Portable    Result Date: 3/3/2021  EXAMINATION: ONE XRAY VIEW OF THE CHEST 3/3/2021 5:34 am COMPARISON: 03/02/2021 HISTORY: ORDERING SYSTEM PROVIDED HISTORY: heart failure TECHNOLOGIST PROVIDED HISTORY: Reason for exam:->heart failure Reason for Exam: heart failure Acuity: Acute Type of Exam: Subsequent/Follow-up FINDINGS: Status post median sternotomy and left-sided transvenous pacer placement. There is stable cardiomegaly. The chest films taken shallow degree of inspiration accentuating heart size and bronchovascular structures. There is a small right pleural effusion. No significant left effusion is seen. The patient is undergone clipping of the left atrial appendage. There is bibasilar atelectasis. Stable exam     Xr Chest Portable    Result Date: 3/2/2021  EXAMINATION: ONE XRAY VIEW OF THE CHEST 3/2/2021 9:38 am COMPARISON: 03/01/2021 HISTORY: ORDERING SYSTEM PROVIDED HISTORY: post bilateral thoracentesis   . Patient has undergone thoracentesis. The volume of fluid in the right pleural space has decreased and/or shifted. There is some persistent right basilar atelectasis. No significant pleural effusion on the left is seen. Minimal left basilar atelectasis is noted. No pneumothorax is seen. Bilateral shoulder arthritis is noted. Pleural effusions right greater than left with bibasilar atelectasis right worse than left. No pneumothorax is seen.      Xr Chest Portable    Result Date: 3/1/2021  EXAMINATION: ONE XRAY VIEW OF THE CHEST 3/1/2021 5:12 pm COMPARISON: 02/23/2021 HISTORY: ORDERING SYSTEM PROVIDED HISTORY: chest pain, shorntess of breath TECHNOLOGIST PROVIDED HISTORY: Reason for exam:->chest pain, shorntess of breath Reason for Exam: chest pain   sob   leg swelling Acuity: Unknown Type of Exam: Unknown Relevant Medical/Surgical History: afib    cad    diabetes FINDINGS: Status post median sternotomy. Transvenous pacer remains place. Stable cardiomegaly. Right-sided pleural effusion. Bibasilar hypoaeration. Right-sided pleural effusion Bibasilar hypoaeration     Vl Dup Lower Extremity Venous Bilateral    Result Date: 3/3/2021  EXAMINATION: DUPLEX VENOUS ULTRASOUND OF THE BILATERAL LOWER EXTREMITIES, 3/3/2021 9:01 am TECHNIQUE: Duplex ultrasound using B-mode/gray scaled imaging and Doppler spectral analysis and color flow was obtained of the bilateral lower extremities. COMPARISON: None. HISTORY: ORDERING SYSTEM PROVIDED HISTORY: fevers TECHNOLOGIST PROVIDED HISTORY: Reason for exam:->fevers Reason for Exam: edema FINDINGS: The visualized veins of the bilateral lower extremities are patent and free of echogenic thrombus. The veins demonstrate good compressibility with normal color flow study and spectral analysis. No evidence of DVT in either lower extremity. Us Chest Including Mediastinum    Result Date: 3/3/2021  EXAMINATION: ULTRASOUND OF THE CHEST 3/3/2021 9:02 am COMPARISON: Chest radiograph performed earlier in the same day HISTORY: ORDERING SYSTEM PROVIDED HISTORY: ASSESS FOR PLEURAL EFFUSION TECHNOLOGIST PROVIDED HISTORY: RIGHT LUNG Reason for exam:->ASSESS FOR PLEURAL EFFUSION Reason for Exam: pleural effusion FINDINGS: Moderate right pleural effusion is present. Right pleural effusion is present. Ir Guided Thoracentesis Pleural    Result Date: 3/2/2021  PROCEDURE: ULTRASOUNDGUIDED Bilateral THORACENTESIS 3/2/2021 HISTORY: ORDERING SYSTEM PROVIDED HISTORY: Bilateral pleural effusion. T   The patient tolerated the procedure well. FINDINGS: A total of 800 ml serosanguinous fluid from left and 1050 ml serosanguineous fluid from right  was removed. Successful ultrasound guided bilateral thoracentesis.        Scheduled Medicines Medications:    albumin human  25 g Intravenous Once    cefepime  2,000 mg Intravenous Q12H    [Held by provider] apixaban  5 mg Oral BID    sodium chloride flush  10 mL Intravenous 2 times per day    iron sucrose  200 mg Intravenous Q24H    pantoprazole  40 mg Intravenous BID    midodrine  10 mg Oral TID WC    insulin lispro  0-6 Units Subcutaneous 2 times per day    aspirin  81 mg Oral Daily    Dulaglutide  1.5 mg Subcutaneous Weekly    [Held by provider] atorvastatin  10 mg Oral Daily    insulin lispro  0-12 Units Subcutaneous TID       Infusions:    sodium chloride      DOBUTamine 5 mcg/kg/min (03/06/21 0950)         Objective:   Vitals: /61   Pulse 67   Temp 97.7 °F (36.5 °C) (Oral)   Resp 20   Ht 5' 10\" (1.778 m)   Wt 240 lb 4.8 oz (109 kg)   SpO2 97%   BMI 34.48 kg/m²   General appearance: alert and cooperative with exam  Neck: no JVD or bruit  Thyroid : Normal lobes   Lungs: Has Vesicular Breath sounds there is breath sounds bilateral pleural effusion tapped both sides noted below  Heart:  regular rate and rhythm  Abdomen: soft, non-tender; bowel sounds normal; no masses,  no organomegaly  Musculoskeletal: Normal  Extremities: extremities normal, , no edema  Neurologic:  Awake, alert, oriented to name, place and time. Cranial nerves II-XII are grossly intact. Motor is  intact. Sensory is intact. ,  and gait is normal.    Assessment:     Patient Active Problem List:     Type 2 diabetes mellitus without complication, without long-term current use of insulin ([facility])     Ulcer of other part of lower limb     Venous hypertension, chronic, with ulcer ([facility] [address]     Ulcer of other part of foot     Pneumonia     Atrial fibrillation ([facility])     Sinus pause     PAF (paroxysmal atrial fibrillation) ([facility])     DM (diabetes mellitus) ([facility])     DMITRIY on CPAP     Hyperlipidemia     Status post incision and drainage     CKD (chronic kidney disease) stage 3, GFR 30-59 ml/min ([facility])     Hyperpotassemia Arthritis     PVD (peripheral vascular disease) (HCC)     Hematoma     Cardiac pacemaker in situ     Coronary artery stenosis     Essential hypertension     Gout     Diabetic neuropathy associated with type 2 diabetes mellitus (HCC)     Adenomatous polyp of sigmoid colon     Iron deficiency anemia due to chronic blood loss     Erythropoietin deficiency anemia     VHD (valvular heart disease)     Abnormal fractional flow reserve (FFR) on cardiac catheterization     Carotid stenosis, left     Aortic stenosis, severe     CAD in native artery     Displacement of atrial pacemaker leads     Pacemaker lead malfunction     SOB (shortness of breath)      ? ? Sepsis      Plan:     1. Reviewed POC blood glucose . Labs and X ray results   2. Reviewed Current Medicines   3. On meal/ Correction bolus Humalog/ Basal Lantus Insulin regime   4. Monitor Blood glucose frequently   5. Modified  the dose of Insulin/ other medicines as needed   6. Future hemodialysis at discretion of nephrology  7. Patient was started on antibiotics source of infection is not clear at this point  8. Is being investigated for cause in place of sepsis  9. Will follow     .      Tanvi Villa MD

## 2021-03-06 NOTE — PROGRESS NOTES
Dr. Milagros Forrester at bedside to round. Updated on patient status per this nurse. Informed patient had hemodialysis this afternoon.

## 2021-03-06 NOTE — PROGRESS NOTES
progress Note( Dr. Daniel Fernandez)  3/5/2021  Subjective:   Admit Date: 3/1/2021  PCP: Elizabeth Moore MD    Admitted For :Shortness of breath leg swelling    Consulted For:  Better control of blood glucose    Interval History: Patient has altered mental state drowsy. not talking well  And is very sleepy not eating much  CT of brain done (acute injury  Had hemodialysis done today      Denies any chest pains,   Yes SOB . Denies nausea or vomiting. Not eating much  No new bowel or bladder symptoms. Intake/Output Summary (Last 24 hours) at 3/5/2021 2321  Last data filed at 3/5/2021 2000  Gross per 24 hour   Intake 1585.28 ml   Output 3870 ml   Net -2284.72 ml       DATA    CBC:   Recent Labs     03/03/21  2324 03/05/21  0715 03/05/21  1700   WBC 13.1* 13.6* 12.8*   HGB 7.7* 7.5* 8.3*    232 229    CMP:  Recent Labs     03/03/21  0702 03/03/21  0702 03/04/21  0520 03/05/21  0715 03/05/21  1200 03/05/21  1700   NA  --    < > 128* 125* 126* 133*   K  --    < > 4.0 4.5 4.4 4.1   CL  --    < > 92* 90* 90* 94*   CO2  --    < > 25 24 23 27   BUN  --    < > 92* 116* 117* 78*   CREATININE  --    < > 2.4* 2.3* 2.4* 1.9*   CALCIUM  --    < > 7.7* 7.6* 7.7* 7.9*   PROT 5.1*  --   --   --  5.2*  --    LABALBU 3.3*  --  3.6 3.0* 3.3*  --    BILITOT 0.5  --   --   --  1.0  --    ALKPHOS 151*  --   --   --  259*  --    AST 63*  --   --   --  139*  --    ALT 55*  --   --   --  132*  --     < > = values in this interval not displayed.      Lipids:   Lab Results   Component Value Date    CHOL 91 12/18/2020    HDL 43 12/18/2020    TRIG 66 12/18/2020     Glucose:  Recent Labs     03/05/21  0904 03/05/21  1318 03/05/21 2039   POCGLU 74 130* 114*     PcezzyjpbcP1J:  Lab Results   Component Value Date    LABA1C 6.1 02/20/2021     High Sensitivity TSH:   Lab Results   Component Value Date    TSHHS 2.620 03/03/2021     Free T3: No results found for: FT3  Free T4:  Lab Results   Component Value Date    T4FREE 1.5 07/07/2020 Echocardiogram Limited    Result Date: 3/2/2021  Transthoracic Echocardiography Report (TTE)  Demographics   Patient Name       RASHAD TATE    Date of Study       03/02/2021   Date of Birth      1950         Gender              Male   Age                66 year(s)         Race                   Patient Number     [id number]         Room Number         2101   Visit Number       [id number]   Corporate ID       [id number]   Accession Number   [id number]         [address] Ash[address] Perry RVT   Ordering Physician Latasha Sauceda       Physician           MD  Procedure Type of Study   TTE procedure:ECHOCARDIOGRAM LIMITED. Procedure Date Date: 03/02/2021 Start: 11:53 AM Study Location: Portable Technical Quality: Adequate visualization Indications:S/P CABG. Patient Status: Routine Height: 70 inches Weight: 230 pounds BSA: 2.22 m2 BMI: 33 kg/m2 HR: 91 bpm BP: 91/45 mmHg  Conclusions   Summary  This is a limited echocardiogram.  Left ventricular systolic function is normal.  Ejection fraction is visually estimated at 55-60%. Bilateral atrial enlargement. S/p AVR with a 27 mm Medtronic Mosaic. Moderate mitral regurgitation. Moderate tricuspid regurgitation; RVSP: 50 mmHg. Severe PHTN. No evidence of any pericardial effusion. Signature   ------------------------------------------------------------------  Electronically signed by Leticia Tomlinson MD (Interpreting  physician) on 03/02/2021 at 05:06 PM  -    Ct Chest Wo Contrast    Result Date: 3/1/2021  EXAMINATION: CT OF THE CHEST WITHOUT CONTRAST 3/1/2021 3:57 pm T    Patient status post midline sternotomy. Immediately posterior to the sternotomy defect, there is a small gas and fluid collection which is nonspecific.   It is not necessarily outside normal limits for a sternotomy that was performed 2 weeks ago. However, sterility cannot be ascertained with imaging, and therefore a small developing abscess is considered as well. No evidence of osteolysis of the sternotomy defect to suggest osteomyelitis. Interval development of a moderate to large right and a moderate left pleural effusion. Adjacent airspace disease is some combination of atelectasis, pneumonia, and/or edema. Xr Chest Portable    Result Date: 3/3/2021  EXAMINATION: ONE XRAY VIEW OF THE CHEST 3/3/2021 5:34 am COMPARISON: 03/02/2021 HISTORY: ORDERING SYSTEM PROVIDED HISTORY: heart failure TECHNOLOGIST PROVIDED HISTORY: Reason for exam:->heart failure Reason for Exam: heart failure Acuity: Acute Type of Exam: Subsequent/Follow-up FINDINGS: Status post median sternotomy and left-sided transvenous pacer placement. There is stable cardiomegaly. The chest films taken shallow degree of inspiration accentuating heart size and bronchovascular structures. There is a small right pleural effusion. No significant left effusion is seen. The patient is undergone clipping of the left atrial appendage. There is bibasilar atelectasis. Stable exam     Xr Chest Portable    Result Date: 3/2/2021  EXAMINATION: ONE XRAY VIEW OF THE CHEST 3/2/2021 9:38 am COMPARISON: 03/01/2021 HISTORY: ORDERING SYSTEM PROVIDED HISTORY: post bilateral thoracentesis   . Patient has undergone thoracentesis. The volume of fluid in the right pleural space has decreased and/or shifted. There is some persistent right basilar atelectasis. No significant pleural effusion on the left is seen. Minimal left basilar atelectasis is noted. No pneumothorax is seen. Bilateral shoulder arthritis is noted. Pleural effusions right greater than left with bibasilar atelectasis right worse than left. No pneumothorax is seen.      Xr Chest Portable    Result Date: 3/1/2021  EXAMINATION: ONE XRAY VIEW OF THE CHEST 3/1/2021 5:12 pm COMPARISON: 02/23/2021 HISTORY: ORDERING SYSTEM PROVIDED HISTORY: chest pain, shorntess of breath TECHNOLOGIST PROVIDED HISTORY: Reason for exam:->chest pain, shorntess of breath Reason for Exam: chest pain   sob   leg swelling Acuity: Unknown Type of Exam: Unknown Relevant Medical/Surgical History: afib    cad    diabetes FINDINGS: Status post median sternotomy. Transvenous pacer remains place. Stable cardiomegaly. Right-sided pleural effusion. Bibasilar hypoaeration. Right-sided pleural effusion Bibasilar hypoaeration     Vl Dup Lower Extremity Venous Bilateral    Result Date: 3/3/2021  EXAMINATION: DUPLEX VENOUS ULTRASOUND OF THE BILATERAL LOWER EXTREMITIES, 3/3/2021 9:01 am TECHNIQUE: Duplex ultrasound using B-mode/gray scaled imaging and Doppler spectral analysis and color flow was obtained of the bilateral lower extremities. COMPARISON: None. HISTORY: ORDERING SYSTEM PROVIDED HISTORY: fevers TECHNOLOGIST PROVIDED HISTORY: Reason for exam:->fevers Reason for Exam: edema FINDINGS: The visualized veins of the bilateral lower extremities are patent and free of echogenic thrombus. The veins demonstrate good compressibility with normal color flow study and spectral analysis. No evidence of DVT in either lower extremity. Us Chest Including Mediastinum    Result Date: 3/3/2021  EXAMINATION: ULTRASOUND OF THE CHEST 3/3/2021 9:02 am COMPARISON: Chest radiograph performed earlier in the same day HISTORY: ORDERING SYSTEM PROVIDED HISTORY: ASSESS FOR PLEURAL EFFUSION TECHNOLOGIST PROVIDED HISTORY: RIGHT LUNG Reason for exam:->ASSESS FOR PLEURAL EFFUSION Reason for Exam: pleural effusion FINDINGS: Moderate right pleural effusion is present. Right pleural effusion is present. Ir Guided Thoracentesis Pleural    Result Date: 3/2/2021  PROCEDURE: ULTRASOUNDGUIDED Bilateral THORACENTESIS 3/2/2021 HISTORY: ORDERING SYSTEM PROVIDED HISTORY: Bilateral pleural effusion. T   The patient tolerated the procedure well.  FINDINGS: A total of 800 ml serosanguinous fluid from left and 1050 ml serosanguineous fluid from right  was removed. Successful ultrasound guided bilateral thoracentesis. Scheduled Medicines   Medications:    gabapentin  100 mg Oral TID    cefepime  2,000 mg Intravenous Q12H    [Held by provider] apixaban  5 mg Oral BID    vancomycin  1,000 mg Intravenous Once    lidocaine PF  5 mL Intradermal Once    sodium chloride flush  10 mL Intravenous 2 times per day    iron sucrose  200 mg Intravenous Q24H    pantoprazole  40 mg Intravenous BID    midodrine  10 mg Oral TID WC    insulin glargine  30 Units Subcutaneous Nightly    glimepiride  2 mg Oral BID WC    insulin lispro  0-6 Units Subcutaneous 2 times per day    aspirin  81 mg Oral Daily    canagliflozin  300 mg Oral QAM AC    [START ON 3/6/2021] Dulaglutide  1.5 mg Subcutaneous Weekly    [Held by provider] atorvastatin  10 mg Oral Daily    insulin lispro  0-12 Units Subcutaneous TID       Infusions:    sodium chloride      DOBUTamine 5 mcg/kg/min (03/05/21 1334)         Objective:   Vitals: BP (!) 141/70   Pulse 80   Temp 102.3 °F (39.1 °C) (Axillary) Comment: cooling blanket placed on patient   Resp 23   Ht 5' 10\" (1.778 m)   Wt 231 lb 6.4 oz (105 kg)   SpO2 94%   BMI 33.20 kg/m²   General appearance: alert and cooperative with exam  Neck: no JVD or bruit  Thyroid : Normal lobes   Lungs: Has Vesicular Breath sounds there is breath sounds bilateral pleural effusion tapped both sides noted below  Heart:  regular rate and rhythm  Abdomen: soft, non-tender; bowel sounds normal; no masses,  no organomegaly  Musculoskeletal: Normal  Extremities: extremities normal, , no edema  Neurologic:  Awake, alert, oriented to name, place and time. Cranial nerves II-XII are grossly intact. Motor is  intact. Sensory is intact. ,  and gait is normal.    Assessment:     Patient Active Problem List:     Type 2 diabetes mellitus without complication, without long-term current use of insulin ([facility] [address]     Ulcer of other part of lower limb     Venous hypertension, chronic, with ulcer ([facility] [address]     Ulcer of other part of foot     Pneumonia     Atrial fibrillation (HCC)     Sinus pause     PAF (paroxysmal atrial fibrillation) ([facility])     DM (diabetes mellitus) ([facility])     DMITRIY on CPAP     Hyperlipidemia     Status post incision and drainage     CKD (chronic kidney disease) stage 3, GFR 30-59 ml/min ([facility])     Hyperpotassemia     Arthritis     PVD (peripheral vascular disease) ([facility])     Hematoma     Cardiac pacemaker in situ     Coronary artery stenosis     Essential hypertension     Gout     Diabetic neuropathy associated with type 2 diabetes mellitus ([facility])     Adenomatous polyp of sigmoid colon     Iron deficiency anemia due to chronic blood loss     Erythropoietin deficiency anemia     VHD (valvular heart disease)     Abnormal fractional flow reserve (FFR) on cardiac catheterization     Carotid stenosis, left     Aortic stenosis, severe     CAD in native artery     Displacement of atrial pacemaker leads     Pacemaker lead malfunction     SOB (shortness of breath)      Plan:     1. Reviewed POC blood glucose . Labs and X ray results   2. Reviewed Current Medicines   3. On meal/ Correction bolus Humalog/ Basal Lantus Insulin regime   4. Monitor Blood glucose frequently   5. Modified  the dose of Insulin/ other medicines as needed   6. Future hemodialysis at discretion of nephrology  7. Will follow     .      Keila Bell MD

## 2021-03-06 NOTE — CARE COORDINATION
ARU continuing to follow. Will need updated therapy notes closer to discharge for ARU to initiate pre-cert with [place] [employer].

## 2021-03-06 NOTE — PROGRESS NOTES
Dr. Dallas Chicas updated on patient's CT chest and CT abdomen results. Also updated on patient status and current temp of 102. 3. Cooling blanket placed on patient at this time. New orders for repeat blood cultures, urine, and sputum specimens.

## 2021-03-06 NOTE — PROGRESS NOTES
Physical Therapy  Pt is on dialysis in the room this am will cont. Fani Murphy. Tyshawn PTA    Attempted at [address], nursing placed pt on hold for this afternoon. Will cont. Fani Murphy.  Torin Tena PTA

## 2021-03-06 NOTE — PROGRESS NOTES
[id number] [place] OF SPEECH/LANGUAGE PATHOLOGY  DAILY PROGRESS NOTE  Neela Espinoza  3/6/2021  [id number]  Hyperkalemia [E87.5]  SOB (shortness of breath) [R06.02]  Pleural effusion [J90]  General weakness [R53.1]  Respiratory failure, unspecified chronicity, unspecified whether with hypoxia or hypercapnia (HCC) [J96.90]  Allergies   Allergen Reactions    Spironolactone      CAUSES INCREASED K+    Tape [place] Tape] Rash     SURGICAL TAPE         Pt was seen this date for dysphagia treatment. IMPRESSION AND RECOMMENDATIONS: Neela Espinoza was seen for dysphagia follow-up seated upright in bed, awake, lethargic, cooperative given cues. Wife present throughout. Pt was seen swallowing pills whole with thin liquids via straw (administered by RN) with intact oral clearance and no s/s aspiration. He was presented with additional PO trials of ice chips (per pt request), puree, and regular solids. Oral stage mildly impaired with slow mastication/AP transit, intact clearance. Pt exhibits poor endurance for continual mastication of foods d/t fatigue. Suspect premature posterior loss. Suspect mild pharyngeal dysphagia with inconsistently delayed swallow initiation, intact laryngeal elevation. No s/s aspiration. Education was provided to pt/wife re: current diet recommendations, permitted consistencies, rationale for modified diet, aspiration precautions, and oral care protocol. Wife indicated understanding/agreement. Pt performed oral care with assistance and demonstrated adequate secretion management. Recommend continued minced and moist diet, thin liquids, strict aspiration precautions. Assist with feeding. SLP will continue to follow.     GOALS (current status in bold):  Short-term Goals  Timeframe for Short-term Goals: LOS or until goals are met  Goal 1: Pt will tolerate dysphagia 2 diet/thin liquids without clinical evidence of aspiration 100% Meeting, continue  Goal 2: Pt will particiapte in advanced trials for possible safe diet level advancement 10/10 trials Ongoing, not yet appropriate for diet advancement d/t lethargy, continue  Goal 3: pt/caregivers will demonstrate comprehension of recommendations/POC Meeting, recommendations discussed in detail with wife          EDUCATION: as above    PAIN RATING (0-10 Scale): 0/denies pain  Time in/Time out: SLP Individual Minutes  Time In: 1005  Time Out: 1025  Minutes: 20    Visit number: 2      Hailee [place] [address] [facility]  3/6/2021  10:29 AM

## 2021-03-06 NOTE — PLAN OF CARE
Problem: Falls - Risk of:  Goal: Will remain free from falls  Description: Will remain free from falls  3/5/2021 1957 by Ifrah Callejas RN  Outcome: Ongoing  3/5/2021 0612 by Albino Mike RN  Outcome: Ongoing  Goal: Absence of physical injury  Description: Absence of physical injury  3/5/2021 1957 by Ifrah Callejas RN  Outcome: Ongoing  3/5/2021 0612 by Albino Mike RN  Outcome: Ongoing     Problem: Pain:  Goal: Pain level will decrease  Description: Pain level will decrease  3/5/2021 1957 by Ifrah Callejas RN  Outcome: Ongoing  3/5/2021 0612 by Albino Mike RN  Outcome: Ongoing  Goal: Control of acute pain  Description: Control of acute pain  3/5/2021 1957 by Ifrah Callejas RN  Outcome: Ongoing  3/5/2021 0612 by Albino Mike RN  Outcome: Ongoing  Goal: Control of chronic pain  Description: Control of chronic pain  3/5/2021 1957 by Ifrah Callejas RN  Outcome: Ongoing  3/5/2021 0612 by Albino Mike RN  Outcome: Ongoing  Goal: Patient's pain/discomfort is manageable  Description: Patient's pain/discomfort is manageable  3/5/2021 1957 by Ifrah Callejas RN  Outcome: Ongoing  3/5/2021 0612 by Albino Mike RN  Outcome: Ongoing     Problem: Skin Integrity:  Goal: Will show no infection signs and symptoms  Description: Will show no infection signs and symptoms  3/5/2021 1957 by Ifrah Callejas RN  Outcome: Ongoing  3/5/2021 0612 by Albino Mike RN  Outcome: Ongoing  Goal: Absence of new skin breakdown  Description: Absence of new skin breakdown  3/5/2021 1957 by Ifrah Callejas RN  Outcome: Ongoing  3/5/2021 0612 by Albino Mike RN  Outcome: Ongoing     Problem: Infection:  Goal: Will remain free from infection  Description: Will remain free from infection  3/5/2021 1957 by Ifrah Callejas RN  Outcome: Ongoing  3/5/2021 0612 by Albino Mike RN  Outcome: Ongoing     Problem: Safety:  Goal: Free from accidental physical injury  Description: Free from accidental physical injury  3/5/2021 1957 by Amaris Feliz RN  Outcome: Ongoing  3/5/2021 0612 by Sydnee Crowley RN  Outcome: Ongoing  Goal: Free from intentional harm  Description: Free from intentional harm  3/5/2021 1957 by Amaris Feliz RN  Outcome: Ongoing  3/5/2021 0612 by Sydnee Crowley RN  Outcome: Ongoing     Problem: Daily Care:  Goal: Daily care needs are met  Description: Daily care needs are met  3/5/2021 1957 by Amaris Feliz RN  Outcome: Ongoing  3/5/2021 0612 by Sydnee Crowley RN  Outcome: Ongoing     Problem: Skin Integrity:  Goal: Skin integrity will stabilize  Description: Skin integrity will stabilize  3/5/2021 1957 by Amaris Feliz RN  Outcome: Ongoing  3/5/2021 0612 by Sydnee Crowley RN  Outcome: Ongoing     Problem: Discharge Planning:  Goal: Patients continuum of care needs are met  Description: Patients continuum of care needs are met  3/5/2021 1957 by Amaris Feliz RN  Outcome: Ongoing  3/5/2021 0612 by Sydnee Crowley RN  Outcome: Ongoing

## 2021-03-06 NOTE — PROGRESS NOTES
Patient received 2.5 hours of treatment. No Fluid was removed. Patient tolerated treatment well. CVC lumens flushed, heparinized, clamped, and capped. (x2) Physician notified that treatment completed. Report given to floor RN. Patient Name: Elias Ayers  Patient : 1950  MRN: [id number]     Acct: [de-identified]  Date of Admission: 3/1/2021  Room/Bed: -A  Code Status:  Full Code  Allergies:    Allergies   Allergen Reactions    Spironolactone      CAUSES INCREASED K+    Tape Worthy Hadley Tape] Rash     SURGICAL TAPE     Diagnosis:    Patient Active Problem List   Diagnosis    Type 2 diabetes mellitus without complication, without long-term current use of insulin (HCC)    Ulcer of other part of lower limb    Venous hypertension, chronic, with ulcer (HCC)    Ulcer of other part of foot    Pneumonia    Atrial fibrillation (HCC)    Sinus pause    [facility]    DM (diabetes mellitus) ([facility] [address]    DMITRIY on CPAP    Hyperlipidemia    Status post incision and drainage    CKD (chronic kidney disease) stage 3, GFR 30-59 ml/min ([facility])    Hyperpotassemia    Arthritis    PVD (peripheral vascular disease) (HCC)    Hematoma    Cardiac pacemaker in situ    Coronary artery stenosis    Essential hypertension    Gout    Diabetic neuropathy associated with type 2 diabetes mellitus (HCC)    Adenomatous polyp of sigmoid colon    Iron deficiency anemia due to chronic blood loss    Erythropoietin deficiency anemia    VHD (valvular heart disease)    Abnormal fractional flow reserve (FFR) on cardiac catheterization    Carotid stenosis, left    Aortic stenosis, severe    CAD in native artery    Displacement of atrial pacemaker leads    Pacemaker lead malfunction    SOB (shortness of breath)    Pleural effusion         Treatment:  Hemodialysis 1:1  Priority: Routine  Location: ICU    Diabetic: Yes  NPO: No  Isolation Precautions: None     Consent for Treatment -- -- -- -- -- -- -- -- --   03/06/21 0759 Alert (0) -- -- -- PAP (positive airway pressure) -- -- -- -- -- -- -- -- -- -- -- -- -- -- 0 --   03/06/21 0800 Responds to Voice (1) -- -- Dyspnea with exertion -- -- Appropriate for ethnicity Dry; Warm -- Soft;Rounded -- Right upper extremity; Left upper extremity;Right lower extremity; Left lower extremity;Generalized -- +2;+3 +2 +3 +2 -- -- -- --   03/06/21 0900 -- -- -- -- -- -- -- -- -- -- -- -- -- -- -- -- -- -- -- 0 --   03/06/21 0905 Responds to Voice (1) x2 (person/place) Regular Dyspnea with exertion Nasal cannula Crackles; Expiratory wheezes Appropriate for ethnicity Dry; Warm Good Soft;Rounded Active Right upper extremity; Left upper extremity;Right lower extremity; Left lower extremity;Generalized +1;Pitting +2;+3 +2 +3 +2 Non-pitting Consent verified, Orders verified, revieved report from hospital RN, Assessment, Time Out, Treatment started 0 --   03/06/21 1045 -- -- -- -- PAP (positive airway pressure) -- -- -- -- -- -- -- -- -- -- -- -- -- -- -- --   03/06/21 1100 -- -- -- -- -- -- -- -- -- -- -- -- -- -- -- -- -- -- -- -- Asleep with RR greater than 10   03/06/21 1136 Responds to Voice (1) -- Regular Dyspnea with exertion PAP (positive airway pressure) Crackles; Expiratory wheezes Appropriate for ethnicity Dry; Warm Good Soft;Rounded Active Right upper extremity; Left upper extremity;Right lower extremity; Left lower extremity;Generalized +1;Pitting +2;+3 +2 +3 +2 Non-pitting Treatment completed, CVC lumens flushed, heparinized, and capped,  Physician notified, Report given to floor RN -- --   03/06/21 1145 Responds to Voice (1) -- -- -- PAP (positive airway pressure) -- -- -- -- -- -- -- -- -- -- -- -- -- -- -- Asleep with RR greater than 10     Labs  Recent Labs     03/05/21  1700 03/06/21  0415 03/06/21  1145   WBC 12.8* 15.0* 16.0*   HGB 8.3* 7.5* 7.2*   HCT 26.0* 25.2* 23.1*    197 431 Recent Labs     03/05/21  1200 03/05/21  1700 03/06/21  0415   * 133* 130*   K 4.4 4.1 4.3   CL 90* 94* 94*   CO2 23 27 25   * 78* 90*   CREATININE 2.4* 1.9* 2.3*   GLUCOSE 120* 90 74     IV Drips and Rate/Dose   sodium chloride      DOBUTamine 5 mcg/kg/min (03/06/21 0950)      Safety - Before each treatment:   Dialysis Machine No.: [id number]  RO Machine No.: [id number]  Dialyzer Lot No.: [id number]  RO Machine Log Sheet Completed: Yes  Machine Alarm Self Test: Completed; Passed (03/06/21 0905)  Machine Autotest: Completed, Passed  Air Foam Detector: Tested, Proper Function  Extracorporeal Circuit Tested for Integrity: Yes  Machine Conductivity: 13.8  Manual Conductivity: 13.7     Bicarbonate Concentrate Lot No.: [id number]  Acid Concentrate Lot No.: [id number]  Manual Ph: 7.4  Bleach Test (Neg): Yes  Bath Temperature: 96.8 °F (36 °C)  Tubing Lot#: [id number]  Conductivity Meter Serial #: [id number]  All Connections Secure?: Yes  Venous Parameters Set?: Yes  Arterial Parameters Set?: Yes  Saline Line Double Clamped?: Yes  Air Foam Detector Engaged?: Yes  Machine Functioning Alarm Free?  Yes  Prime Given: 200ml    Chlorine Testing - Before each treatment and every 4 hours:   Treatment  Treatment Number: 2  Time On: 0905  Time Off: 1136  Additional Hours: 2.5  Treatment Goal: 3 L  Weight: 240 lb 4.8 oz (109 kg) (03/06/21 1136)  1st check: less than 0.1 ppm at: 0830 hours  2nd check: less than 0.1 ppm at: Not Applicable  3rd check: Not Applicable  (if greater than 0.1 ppm, then check every 30 minutes from secondary)    Access Flows and Pressures  Patient Vitals for the past 8 hrs:   Blood Flow Rate (ml/min) Ultrafiltration Rate (ml/hr) Ultrafiltration Total Arterial Pressure (mmHg) Venous Pressure (mmHg) TMP Hemodialysis Conductivity DFR Comments Access Visible   03/06/21 0905 250 ml/min 1200 ml/hr 0 ml -30 mmHg 80 60 13.8 400 Treatment started, Lines secure Yes   03/06/21 0915 250 ml/min 1200 ml/hr 236 ml -40 mmHg 80 70 13.8 400 Lines secure, Pt eating breakfast, Wife at bedside Yes   03/06/21 0930 250 ml/min 1200 ml/hr 539 ml -60 mmHg 70 80 13.6 400 UF off, Physician, Nurse, and [place] at bedside, Lines secure Yes   03/06/21 0932 -- 0 ml/hr -- -- -- -- -- -- UF off, Physician, Floor Rn, and wife at bedside --   03/06/21 0945 250 ml/min 0 ml/hr 657 ml -50 mmHg 80 40 15.5 400 UF off, Floor RN aware Yes   03/06/21 1000 250 ml/min 0 ml/hr 657 ml -50 mmHg 80 40 13.8 400 Keep UF off per Dr Gabby Clark, pt resting, Wife and floor RN at bedside Yes   03/06/21 1015 250 ml/min 0 ml/hr 657 ml -50 mmHg 80 40 13.5 400 Dietition at bedside, Lines secure, No needs expressed Yes   03/06/21 1030 250 ml/min 0 ml/hr 657 ml -30 mmHg 80 40 15.4 400 Lines secure, patient resting with eyes closed, wife at bedside Yes   03/06/21 1045 250 ml/min 0 ml/hr 657 ml -50 mmHg 80 40 13.8 400 Lines secure, Floor RN and wife at bedside Yes   03/06/21 1100 250 ml/min 0 ml/hr 657 ml -50 mmHg 80 40 13.5 400 Physician at bedside, Patient resting with eyes closed, lines secure Yes   03/06/21 1115 250 ml/min 0 ml/hr 657 ml -50 mmHg 70 40 15.4 400 Wife at bedside, Lines secure Yes   03/06/21 1130 250 ml/min 0 ml/hr 657 ml -50 mmHg 70 40 13.8 400 Resting with eyes closed, Wife at bedside, No needs expressed Yes   03/06/21 1136 -- 0 ml/hr 657 ml -- -- -- -- -- Treatment ended Yes     Vital Signs  Patient Vitals for the past 8 hrs:   BP Temp Pulse Resp SpO2 Weight Weight Method Percent Weight Change   03/06/21 0500 127/66 -- 80 18 98 % -- -- --   03/06/21 0600 113/69 -- 87 25 92 % -- -- --   03/06/21 0700 112/61 -- 83 23 92 % -- -- --   03/06/21 0726 -- -- -- 24 92 % -- -- --   03/06/21 0759 (!) 101/59 101.3 °F (38.5 °C) 80 21 95 % -- -- --   03/06/21 0800 -- -- 80 -- -- -- -- --   03/06/21 0900 -- -- 76 21 94 % -- -- --   03/06/21 0905 101/73 97.9 °F (36.6 °C) 75 23 94 % 244 lb 4.3 oz (110.8 kg) Bed scale 5.56   03/06/21 0915 108/75 -- 72 21 98 % -- -- --   03/06/21 0930 88/60 -- 73 19 99 % -- -- --   03/06/21 0932 (!) 77/34 -- 72 20 97 % -- -- --   03/06/21 0945 (!) 87/48 -- 73 22 96 % -- -- --   03/06/21 1000 (!) 79/67 -- 72 20 95 % -- -- --   03/06/21 1015 (!) 99/56 -- 71 22 95 % -- -- --   03/06/21 1030 99/73 -- 71 21 94 % -- -- --   03/06/21 1045 95/68 -- 71 21 94 % -- -- --   03/06/21 1100 (!) 84/54 -- 70 16 93 % -- -- --   03/06/21 1115 (!) 97/54 -- 71 14 -- -- -- --   03/06/21 1130 (!) 87/49 -- 81 26 98 % -- -- --   03/06/21 1136 (!) 87/49 97.9 °F (36.6 °C) 69 28 95 % 240 lb 4.8 oz (109 kg) -- -1.62   03/06/21 1145 (!) 85/49 97.9 °F (36.6 °C) 68 13 94 % -- -- --     Post-Dialysis  Arterial Catheter Locking Solution: Heparin (1000units:1ml)    Volume (ml): 1.3  Venous Catheter Locking Solution: Heparin (1000units:1ml)    Volume (ml): 1.3  Post-Treatment Procedures: Blood returned, Catheter capped, clamped and heparinized x 2 ports  Machine Disinfection Process: Acid/Vinegar Clean, Heat Disinfect, Exterior Machine Disinfection  Rinseback Volume (ml): 200 ml  Total Liters Processed (l/min): 37 l/min  Dialyzer Clearance: Lightly streaked  Duration of Treatment (minutes): 150 minutes  Heparin amount administered during treatment (units): 0 units  Hemodialysis Intake (ml): 500 ml  Hemodialysis Output (ml): 657 ml  NET Removed (ml): 0 ml  Tolerated Treatment: Good  Patient Response to Treatment: No Complaints  Physician Notified?: Yes       Provider Notification  Provider Notification  Reason for Communication: Evaluate (03/06/21 2566)  Provider Name: Dr Roman Razo (03/06/21 3144)  Provider Notification: Physician (03/06/21 0950)  Method of Communication: Other (Comment)(Text) (03/06/21 0950)  Response: Other (Comment)(No UF, only dialysis. Give Albumin) (03/06/21 0950)  Notification Time: 6800 (03/06/21 0950)     Handoff complete and report given to Primary RN at 1145 hours. Primary RN (First Initial, Last Name, Title):  Katlyn Lacey RN     Education  Person Educated:  Other

## 2021-03-06 NOTE — PROGRESS NOTES
PATIENT NAME: Naima Dobson    TODAY'S DATE: 03/06/21    Reason for visit: Fever of unknown origin and sepsis    SUBJECTIVE:    Pt is slightly improved today from a confusion standpoint. He still luna confused. He continues to have tremors. He is undergoing his second round of dialysis today. His wife is at the bedside    He has no complaints    He has had fevers overnight. We asked GI to see him yesterday due to elevated LFTs and they felt that this was secondary to sepsis. OBJECTIVE:   VITALS:    Vitals:    03/06/21 1030   BP:    Pulse: 71   Resp: 21   Temp:    SpO2: 94%     INTAKE/OUTPUT:    Date 03/06/21 0000 - 03/06/21 2359   Shift 2541-98188 0924-8910 8446-5129 24 Hour Total   INTAKE   P.O. 150   150   I. V.(mL/kg/hr) 490(0.6)   490   Shift Total(mL/kg) 640(6.1)   640(5.8)   OUTPUT   Urine(mL/kg/hr) 1200(1.4) 50  1250   Shift Total(mL/kg) 1200(11.4) 50(0.5)  1250(11.3)   Weight (kg) 105 110.8 110.8 110.8        EXAM:  Blood pressure (!) 99/56, pulse 71, temperature 97.9 °F (36.6 °C), resp. rate 21, height 5' 10\" (1.778 m), weight 244 lb 4.3 oz (110.8 kg), SpO2 94 %. General appearance: No apparent distress, appears stated age and cooperative. Skin: unremarkable  HEENT Normocephalic, atraumatic without obvious deformity. Neck: Supple, Trachea midline   Lungs: Good respiratory effort. Clear to auscultation, bilaterally  Heart: Regular rate/ rhythm sternum stable inc c/d/i, pacemaker site incision has a stable fluid collection with healed incision  Abdomen: Soft, non-tender or non-distended   Extremities: Significant improvement in lower extremity edema warm well perfused  Neurologic: Alert, alert and oriented x3. He recognized myself and his wife.   Mental status: normal affect with some anxiety      Data:  CBC:   Recent Labs     03/05/21  0715 03/05/21  1700 03/06/21  0415   WBC 13.6* 12.8* 15.0*   HGB 7.5* 8.3* 7.5*   HCT 24.3* 26.0* 25.2*    229 197     BMP:    Recent Labs 03/05/21  1200 03/05/21  1700 03/06/21  0415   * 133* 130*   K 4.4 4.1 4.3   CL 90* 94* 94*   CO2 23 27 25   * 78* 90*   CREATININE 2.4* 1.9* 2.3*   GLUCOSE 120* 90 74     Hepatic:   Recent Labs     03/05/21  1200 03/06/21  0415   * 88*   * 107*   BILITOT 1.0 0.9   ALKPHOS 259* 216*     Mag:      Recent Labs     03/03/21  1600 03/03/21  2324 03/05/21  1200   MG 2.6* 2.5* 2.5*      Phos:     Recent Labs     03/04/21  0520 03/05/21  0715 03/06/21  0415   PHOS 4.3 4.2 4.5      INR:   Recent Labs     03/06/21  0415   INR 1.54     Radiology Review:  CXR stable pulmonary edema      ASSESSMENT AND PLAN:  S/P AVR CABG maze and pacemaker lead replacement. Patient Active Problem List   Diagnosis    Type 2 diabetes mellitus without complication, without long-term current use of insulin (HCC)    Ulcer of other part of lower limb    Venous hypertension, chronic, with ulcer ([address]    Ulcer of other part of foot    Pneumonia    [facility] fibrillation ([facility])    Sinus pause    PAF (paroxysmal atrial fibrillation) ([address]    DM (diabetes mellitus) ([address]    DMITRIY on CPAP    Hyperlipidemia    Status post incision and drainage    CKD (chronic kidney disease) stage 3, GFR 30-59 ml/min ([facility])    Hyperpotassemia    Arthritis    PVD (peripheral vascular disease) ([address]    Hematoma    Cardiac pacemaker in situ    Coronary artery stenosis    Essential hypertension    Gout    Diabetic neuropathy associated with type 2 diabetes mellitus (HCC)    Adenomatous polyp of sigmoid colon    Iron deficiency anemia due to chronic blood loss    Erythropoietin deficiency anemia    VHD (valvular heart disease)    Abnormal fractional flow reserve (FFR) on cardiac catheterization    Carotid stenosis, left    Aortic stenosis, severe    CAD in native artery    Displacement of atrial pacemaker leads    Pacemaker lead malfunction    SOB (shortness of breath)    Pleural effusion       1.  Cardio: He remains on dobutamine. He has been fairly stable though currently on dialysis has been slightly hypotensive. He is on midodrine 10 mg 3 times daily. Eliquis for A. fib is on hold   Current drips: Dobutamine 5  2. Pulm: He continues to have a right pleural effusion that looks mild to moderate on CT scan.  2 L nasal cannula and stable  3. GI: GI following. LFTs slightly improved. 4. ID: He remains on broad-spectrum antibiotics including cefepime and vancomycin. Previous Covid study was negative and we will send a PCR. His wife had a fever the day he was admitted. Recheck LFTs tomorrow  5. Heme: Hemoglobin remained stable repeat in the a.m. 6. Neuro: Neurontin has been stopped. Tremors are still present but slightly improved. We may need a neurology consult. 7. Renal: Nephrology is following. Getting second round of hemodialysis today. 8. Activity: PT/OT    All of his central lines are new. Incisions are all okay. Blood cultures were resent yesterday. Sputum culture was sent today. Urine culture was sent yesterday. Previous echocardiogram when he was readmitted with stable. I do not see a reason to obtain a repeat one at this point or need a MABLE. Even if he has endocarditis, his therapy would be the same currently.   Electronically signed by Lashanda Rivas MD on 3/6/2021 at 10:31 AM

## 2021-03-06 NOTE — PROGRESS NOTES
Doxycycline Counseling:  Patient counseled regarding possible photosensitivity and increased risk for sunburn.  Patient instructed to avoid sunlight, if possible.  When exposed to sunlight, patients should wear protective clothing, sunglasses, and sunscreen.  The patient was instructed to call the office immediately if the following severe adverse effects occur:  hearing changes, easy bruising/bleeding, severe headache, or vision changes.  The patient verbalized understanding of the proper use and possible adverse effects of doxycycline.  All of the patient's questions and concerns were addressed. Dr. Linette Suh and James COVARRUBIAS at bedside for daily rounds. Wife at bedside. Patient and wife updated on pt progress and plan for today. Orders received:    Respiratory panel today  DC Neurotin d/t tremors. Isotretinoin Counseling: Patient should get monthly blood tests, not donate blood, not drive at night if vision affected, not share medication, and not undergo elective surgery for 6 months after tx completed. Side effects reviewed, pt to contact office should one occur. Doxycycline Pregnancy And Lactation Text: This medication is Pregnancy Category D and not consider safe during pregnancy. It is also excreted in breast milk but is considered safe for shorter treatment courses. Topical Sulfur Applications Pregnancy And Lactation Text: This medication is Pregnancy Category C and has an unknown safety profile during pregnancy. It is unknown if this topical medication is excreted in breast milk. Minocycline Counseling: Patient advised regarding possible photosensitivity and discoloration of the teeth, skin, lips, tongue and gums.  Patient instructed to avoid sunlight, if possible.  When exposed to sunlight, patients should wear protective clothing, sunglasses, and sunscreen.  The patient was instructed to call the office immediately if the following severe adverse effects occur:  hearing changes, easy bruising/bleeding, severe headache, or vision changes.  The patient verbalized understanding of the proper use and possible adverse effects of minocycline.  All of the patient's questions and concerns were addressed. Topical Clindamycin Pregnancy And Lactation Text: This medication is Pregnancy Category B and is considered safe during pregnancy. It is unknown if it is excreted in breast milk. Bactrim Counseling:  I discussed with the patient the risks of sulfa antibiotics including but not limited to GI upset, allergic reaction, drug rash, diarrhea, dizziness, photosensitivity, and yeast infections.  Rarely, more serious reactions can occur including but not limited to aplastic anemia, agranulocytosis, methemoglobinemia, blood dyscrasias, liver or kidney failure, lung infiltrates or desquamative/blistering drug rashes. Detail Level: Detailed Tetracycline Counseling: Patient counseled regarding possible photosensitivity and increased risk for sunburn.  Patient instructed to avoid sunlight, if possible.  When exposed to sunlight, patients should wear protective clothing, sunglasses, and sunscreen.  The patient was instructed to call the office immediately if the following severe adverse effects occur:  hearing changes, easy bruising/bleeding, severe headache, or vision changes.  The patient verbalized understanding of the proper use and possible adverse effects of tetracycline.  All of the patient's questions and concerns were addressed. Patient understands to avoid pregnancy while on therapy due to potential birth defects. Tazorac Pregnancy And Lactation Text: This medication is not safe during pregnancy. It is unknown if this medication is excreted in breast milk. Tetracycline Pregnancy And Lactation Text: This medication is Pregnancy Category D and not consider safe during pregnancy. It is also excreted in breast milk. Erythromycin Counseling:  I discussed with the patient the risks of erythromycin including but not limited to GI upset, allergic reaction, drug rash, diarrhea, increase in liver enzymes, and yeast infections. High Dose Vitamin A Pregnancy And Lactation Text: High dose vitamin A therapy is contraindicated during pregnancy and breast feeding. Birth Control Pills Counseling: Birth Control Pill Counseling: I discussed with the patient the potential side effects of OCPs including but not limited to increased risk of stroke, heart attack, thrombophlebitis, deep venous thrombosis, hepatic adenomas, breast changes, GI upset, headaches, and depression.  The patient verbalized understanding of the proper use and possible adverse effects of OCPs. All of the patient's questions and concerns were addressed. Topical Sulfur Applications Counseling: Topical Sulfur Counseling: Patient counseled that this medication may cause skin irritation or allergic reactions.  In the event of skin irritation, the patient was advised to reduce the amount of the drug applied or use it less frequently.   The patient verbalized understanding of the proper use and possible adverse effects of topical sulfur application.  All of the patient's questions and concerns were addressed. Azithromycin Counseling:  I discussed with the patient the risks of azithromycin including but not limited to GI upset, allergic reaction, drug rash, diarrhea, and yeast infections. Use Enhanced Medication Counseling?: No Isotretinoin Pregnancy And Lactation Text: This medication is Pregnancy Category X and is considered extremely dangerous during pregnancy. It is unknown if it is excreted in breast milk. Dapsone Counseling: I discussed with the patient the risks of dapsone including but not limited to hemolytic anemia, agranulocytosis, rashes, methemoglobinemia, kidney failure, peripheral neuropathy, headaches, GI upset, and liver toxicity.  Patients who start dapsone require monitoring including baseline LFTs and weekly CBCs for the first month, then every month thereafter.  The patient verbalized understanding of the proper use and possible adverse effects of dapsone.  All of the patient's questions and concerns were addressed. Spironolactone Counseling: Patient advised regarding risks of diarrhea, abdominal pain, hyperkalemia, birth defects (for female patients), liver toxicity and renal toxicity. The patient may need blood work to monitor liver and kidney function and potassium levels while on therapy. The patient verbalized understanding of the proper use and possible adverse effects of spironolactone.  All of the patient's questions and concerns were addressed. Spironolactone Pregnancy And Lactation Text: This medication can cause feminization of the male fetus and should be avoided during pregnancy. The active metabolite is also found in breast milk. Azithromycin Pregnancy And Lactation Text: This medication is considered safe during pregnancy and is also secreted in breast milk. Benzoyl Peroxide Counseling: Patient counseled that medicine may cause skin irritation and bleach clothing.  In the event of skin irritation, the patient was advised to reduce the amount of the drug applied or use it less frequently.   The patient verbalized understanding of the proper use and possible adverse effects of benzoyl peroxide.  All of the patient's questions and concerns were addressed. Tazorac Counseling:  Patient advised that medication is irritating and drying.  Patient may need to apply sparingly and wash off after an hour before eventually leaving it on overnight.  The patient verbalized understanding of the proper use and possible adverse effects of tazorac.  All of the patient's questions and concerns were addressed. Dapsone Pregnancy And Lactation Text: This medication is Pregnancy Category C and is not considered safe during pregnancy or breast feeding. Topical Retinoid counseling:  Patient advised to apply a pea-sized amount only at bedtime and wait 30 minutes after washing their face before applying.  If too drying, patient may add a non-comedogenic moisturizer. The patient verbalized understanding of the proper use and possible adverse effects of retinoids.  All of the patient's questions and concerns were addressed. Benzoyl Peroxide Pregnancy And Lactation Text: This medication is Pregnancy Category C. It is unknown if benzoyl peroxide is excreted in breast milk. Bactrim Pregnancy And Lactation Text: This medication is Pregnancy Category D and is known to cause fetal risk.  It is also excreted in breast milk. Erythromycin Pregnancy And Lactation Text: This medication is Pregnancy Category B and is considered safe during pregnancy. It is also excreted in breast milk. Topical Retinoid Pregnancy And Lactation Text: This medication is Pregnancy Category C. It is unknown if this medication is excreted in breast milk. Topical Clindamycin Counseling: Patient counseled that this medication may cause skin irritation or allergic reactions.  In the event of skin irritation, the patient was advised to reduce the amount of the drug applied or use it less frequently.   The patient verbalized understanding of the proper use and possible adverse effects of clindamycin.  All of the patient's questions and concerns were addressed. High Dose Vitamin A Counseling: Side effects reviewed, pt to contact office should one occur. Birth Control Pills Pregnancy And Lactation Text: This medication should be avoided if pregnant and for the first 30 days post-partum.

## 2021-03-07 LAB
ALBUMIN SERPL-MCNC: 3.2 GM/DL (ref 3.4–5)
ALBUMIN SERPL-MCNC: 3.2 GM/DL (ref 3.4–5)
ALP BLD-CCNC: 195 IU/L (ref 40–129)
ALT SERPL-CCNC: 84 U/L (ref 10–40)
ANION GAP SERPL CALCULATED.3IONS-SCNC: 11 MMOL/L (ref 4–16)
AST SERPL-CCNC: 53 IU/L (ref 15–37)
BILIRUB SERPL-MCNC: 0.8 MG/DL (ref 0–1)
BILIRUBIN DIRECT: 0.5 MG/DL (ref 0–0.3)
BILIRUBIN, INDIRECT: 0.3 MG/DL (ref 0–0.7)
BUN BLDV-MCNC: 69 MG/DL (ref 6–23)
CALCIUM SERPL-MCNC: 7.8 MG/DL (ref 8.3–10.6)
CHLORIDE BLD-SCNC: 94 MMOL/L (ref 99–110)
CO2: 26 MMOL/L (ref 21–32)
CREAT SERPL-MCNC: 2 MG/DL (ref 0.9–1.3)
CULTURE: NORMAL
GFR AFRICAN AMERICAN: 40 ML/MIN/1.73M2
GFR NON-AFRICAN AMERICAN: 33 ML/MIN/1.73M2
GLUCOSE BLD-MCNC: 104 MG/DL (ref 70–99)
GLUCOSE BLD-MCNC: 139 MG/DL (ref 70–99)
GLUCOSE BLD-MCNC: 242 MG/DL (ref 70–99)
GLUCOSE BLD-MCNC: 248 MG/DL (ref 70–99)
GLUCOSE BLD-MCNC: 268 MG/DL (ref 70–99)
GLUCOSE BLD-MCNC: 322 MG/DL (ref 70–99)
HCT VFR BLD CALC: 24.8 % (ref 42–52)
HEMOGLOBIN: 7.7 GM/DL (ref 13.5–18)
Lab: NORMAL
MCH RBC QN AUTO: 24.5 PG (ref 27–31)
MCHC RBC AUTO-ENTMCNC: 31 % (ref 32–36)
MCV RBC AUTO: 79 FL (ref 78–100)
PDW BLD-RTO: 20.1 % (ref 11.7–14.9)
PHOSPHORUS: 2.9 MG/DL (ref 2.5–4.9)
PLATELET # BLD: 189 K/CU MM (ref 140–440)
PMV BLD AUTO: 9.8 FL (ref 7.5–11.1)
POTASSIUM SERPL-SCNC: 4.2 MMOL/L (ref 3.5–5.1)
RBC # BLD: 3.14 M/CU MM (ref 4.6–6.2)
SODIUM BLD-SCNC: 131 MMOL/L (ref 135–145)
SPECIMEN: NORMAL
TOTAL PROTEIN: 5.1 GM/DL (ref 6.4–8.2)
WBC # BLD: 17 K/CU MM (ref 4–10.5)

## 2021-03-07 PROCEDURE — 2580000003 HC RX 258: Performed by: INTERNAL MEDICINE

## 2021-03-07 PROCEDURE — 6370000000 HC RX 637 (ALT 250 FOR IP): Performed by: THORACIC SURGERY (CARDIOTHORACIC VASCULAR SURGERY)

## 2021-03-07 PROCEDURE — 84100 ASSAY OF PHOSPHORUS: CPT

## 2021-03-07 PROCEDURE — 80053 COMPREHEN METABOLIC PANEL: CPT

## 2021-03-07 PROCEDURE — 82248 BILIRUBIN DIRECT: CPT

## 2021-03-07 PROCEDURE — 2580000003 HC RX 258: Performed by: PHYSICIAN ASSISTANT

## 2021-03-07 PROCEDURE — 82962 GLUCOSE BLOOD TEST: CPT

## 2021-03-07 PROCEDURE — 6370000000 HC RX 637 (ALT 250 FOR IP): Performed by: PHYSICIAN ASSISTANT

## 2021-03-07 PROCEDURE — C9113 INJ PANTOPRAZOLE SODIUM, VIA: HCPCS | Performed by: SPECIALIST

## 2021-03-07 PROCEDURE — 6360000002 HC RX W HCPCS: Performed by: SPECIALIST

## 2021-03-07 PROCEDURE — 94761 N-INVAS EAR/PLS OXIMETRY MLT: CPT

## 2021-03-07 PROCEDURE — 2000000000 HC ICU R&B

## 2021-03-07 PROCEDURE — 6360000002 HC RX W HCPCS: Performed by: THORACIC SURGERY (CARDIOTHORACIC VASCULAR SURGERY)

## 2021-03-07 PROCEDURE — 2700000000 HC OXYGEN THERAPY PER DAY

## 2021-03-07 PROCEDURE — 85027 COMPLETE CBC AUTOMATED: CPT

## 2021-03-07 PROCEDURE — 36592 COLLECT BLOOD FROM PICC: CPT

## 2021-03-07 PROCEDURE — P9047 ALBUMIN (HUMAN), 25%, 50ML: HCPCS | Performed by: INTERNAL MEDICINE

## 2021-03-07 PROCEDURE — 6360000002 HC RX W HCPCS: Performed by: PHYSICIAN ASSISTANT

## 2021-03-07 PROCEDURE — 6360000002 HC RX W HCPCS: Performed by: INTERNAL MEDICINE

## 2021-03-07 RX ORDER — ALBUMIN (HUMAN) 12.5 G/50ML
25 SOLUTION INTRAVENOUS ONCE
Status: COMPLETED | OUTPATIENT
Start: 2021-03-07 | End: 2021-03-07

## 2021-03-07 RX ADMIN — SODIUM CHLORIDE, PRESERVATIVE FREE 10 ML: 5 INJECTION INTRAVENOUS at 20:03

## 2021-03-07 RX ADMIN — INSULIN LISPRO 4 UNITS: 100 INJECTION, SOLUTION INTRAVENOUS; SUBCUTANEOUS at 16:51

## 2021-03-07 RX ADMIN — MIDODRINE HYDROCHLORIDE 10 MG: 5 TABLET ORAL at 09:29

## 2021-03-07 RX ADMIN — PANTOPRAZOLE SODIUM 40 MG: 40 INJECTION, POWDER, FOR SOLUTION INTRAVENOUS at 09:29

## 2021-03-07 RX ADMIN — CEFEPIME HYDROCHLORIDE 2000 MG: 2 INJECTION, POWDER, FOR SOLUTION INTRAVENOUS at 20:03

## 2021-03-07 RX ADMIN — ACETAMINOPHEN 650 MG: 325 TABLET ORAL at 04:41

## 2021-03-07 RX ADMIN — ASPIRIN 81 MG: 81 TABLET, CHEWABLE ORAL at 09:29

## 2021-03-07 RX ADMIN — DOBUTAMINE IN DEXTROSE 5 MCG/KG/MIN: 200 INJECTION, SOLUTION INTRAVENOUS at 06:52

## 2021-03-07 RX ADMIN — PANTOPRAZOLE SODIUM 40 MG: 40 INJECTION, POWDER, FOR SOLUTION INTRAVENOUS at 20:03

## 2021-03-07 RX ADMIN — SODIUM CHLORIDE, PRESERVATIVE FREE 10 ML: 5 INJECTION INTRAVENOUS at 09:29

## 2021-03-07 RX ADMIN — VANCOMYCIN HYDROCHLORIDE 1250 MG: 5 INJECTION, POWDER, LYOPHILIZED, FOR SOLUTION INTRAVENOUS at 09:30

## 2021-03-07 RX ADMIN — ACETAMINOPHEN 650 MG: 325 TABLET ORAL at 15:40

## 2021-03-07 RX ADMIN — INSULIN LISPRO 6 UNITS: 100 INJECTION, SOLUTION INTRAVENOUS; SUBCUTANEOUS at 12:10

## 2021-03-07 RX ADMIN — IRON SUCROSE 200 MG: 20 INJECTION, SOLUTION INTRAVENOUS at 09:29

## 2021-03-07 RX ADMIN — ALBUMIN (HUMAN) 25 G: 0.25 INJECTION, SOLUTION INTRAVENOUS at 09:29

## 2021-03-07 RX ADMIN — MIDODRINE HYDROCHLORIDE 10 MG: 5 TABLET ORAL at 12:09

## 2021-03-07 RX ADMIN — MIDODRINE HYDROCHLORIDE 10 MG: 5 TABLET ORAL at 16:55

## 2021-03-07 RX ADMIN — CEFEPIME HYDROCHLORIDE 2000 MG: 2 INJECTION, POWDER, FOR SOLUTION INTRAVENOUS at 09:29

## 2021-03-07 RX ADMIN — DOBUTAMINE IN DEXTROSE 5 MCG/KG/MIN: 200 INJECTION, SOLUTION INTRAVENOUS at 23:30

## 2021-03-07 ASSESSMENT — PAIN SCALES - GENERAL
PAINLEVEL_OUTOF10: 0

## 2021-03-07 ASSESSMENT — PAIN DESCRIPTION - PROGRESSION: CLINICAL_PROGRESSION: NOT CHANGED

## 2021-03-07 ASSESSMENT — PAIN DESCRIPTION - DESCRIPTORS: DESCRIPTORS: ACHING

## 2021-03-07 ASSESSMENT — PAIN DESCRIPTION - FREQUENCY: FREQUENCY: CONTINUOUS

## 2021-03-07 ASSESSMENT — PAIN DESCRIPTION - PAIN TYPE: TYPE: ACUTE PAIN

## 2021-03-07 NOTE — PROGRESS NOTES
Updated Dr. Dorothy Mckay regarding pt's urine output of 300ml for this shift. No new orders at this time.

## 2021-03-07 NOTE — PROGRESS NOTES
Dr. Williams Montoya at bedside for rounds. Updated on patient status overnight. No new orders at this time. Patient was febrile overnight, but fever has currently broken @ 97.9, post administration of tylenol and a cool sponge bath.

## 2021-03-07 NOTE — PROGRESS NOTES
PATIENT NAME: Janine Phlegm    TODAY'S DATE: 03/07/21    Reason for visit: Status post AVR CABG maze with fevers    SUBJECTIVE:    Pt is still having low-grade fevers and confusion but both of these are slowly starting to improve. He is sleepy this morning but easily arousable. OBJECTIVE:   VITALS:    Vitals:    03/07/21 0759   BP:    Pulse:    Resp:    Temp:    SpO2: 94%     INTAKE/OUTPUT:    Date 03/07/21 0000 - 03/07/21 2359   Shift 5512-4377 0416-3280 0369-6471 24 Hour Total   INTAKE   P.O. 250   250   I. V.(mL/kg/hr) 331(0.4)   331   Shift Total(mL/kg) 581(5.3)   581(5.3)   OUTPUT   Urine(mL/kg/hr) 835(1)   835   Shift Total(mL/kg) 835(7.7)   835(7.7)   Weight (kg) 109 109 109 109        EXAM:  Blood pressure (!) 85/52, pulse 74, temperature 100.5 °F (38.1 °C), temperature source Oral, resp. rate 19, height 5' 10\" (1.778 m), weight 240 lb 4.8 oz (109 kg), SpO2 94 %. General appearance: No apparent distress, appears stated age and cooperative. Skin: unremarkable  HEENT Normocephalic, atraumatic without obvious deformity. Neck: Supple, Trachea midline   Lungs: Good respiratory effort.  Clear to auscultation, bilaterally  Heart: Regular rate/ rhythm sternum stable inc c/d/i, pacemaker site is stable  Abdomen: Soft, non-tender or non-distended   Extremities: 2+ lower extremity edema warm well perfused  Neurologic: Alert, grossly intact  Mental status: normal affect      Data:  CBC:   Recent Labs     03/06/21  0415 03/06/21  1145 03/07/21  0440   WBC 15.0* 16.0* 17.0*   HGB 7.5* 7.2* 7.7*   HCT 25.2* 23.1* 24.8*    169 189     BMP:    Recent Labs     03/06/21  0415 03/06/21  1145 03/07/21  0440   * 133* 131*   K 4.3 3.6 4.2   CL 94* 96* 94*   CO2 25 29 26   BUN 90* 52* 69*   CREATININE 2.3* 1.3 2.0*   GLUCOSE 74 129* 139*     Hepatic:   Recent Labs     03/05/21  1200 03/06/21  0415 03/07/21  0440   * 88* 53*   * 107* 84*   BILITOT 1.0 0.9 0.8   ALKPHOS 259* 216* 195*     Mag: Recent Labs     03/05/21  1200   MG 2.5*      Phos:     Recent Labs     03/05/21  0715 03/06/21  0415 03/07/21  0440   PHOS 4.2 4.5 2.9      INR:   Recent Labs     03/06/21  0415   INR 1.54     Radiology Review:      ASSESSMENT AND PLAN:  S/P AVR CABG maze admitted with fevers and sepsis      Patient Active Problem List   Diagnosis    Type 2 diabetes mellitus without complication, without long-term current use of insulin [facility])    Ulcer of other part of lower limb    Venous hypertension, chronic, with ulcer ([address]    Ulcer of other part of foot    Pneumonia    Atrial fibrillation ([facility])    Sinus pause    PA[facility]ox[facility]mal [facility] fibrillation[facility]    DM (diabetes mellitus) ([address]    DMITRIY on CPAP    Hyperlipidemia    Status post incision and drainage    CKD (chronic kidney disease) stage 3, GFR 30-59 ml/min ([facility])    Hyperpotassemia    Arthritis    PVD (peripheral vascular disease) ([facility]    Hematoma    Cardiac pacemaker in situ    Coronary artery stenosis    Essential hypertension    Gout    Diabetic neuropathy associated with type 2 diabetes mellitus ([facility])    Adenomatous polyp of sigmoid colon    Iron deficiency anemia due to chronic blood loss    Erythropoietin deficiency anemia    VHD (valvular heart disease)    Abnormal fractional flow reserve (FFR) on cardiac catheterization    Carotid stenosis, left    Aortic stenosis, severe    CAD in native artery    Displacement of atrial pacemaker leads    Pacemaker lead malfunction    SOB (shortness of breath)    Pleural effusion       1. Cardio: Continues on dobutamine at 5 and midodrine, anticoagulation has been held due to sepsis and concern for further procedures needed   Current drips: Dobutamine at 5 and midodrine  2. Pulm:He has been on 2 L nasal cannula and on his home CPAP machine overnight. He has a right pleural effusion that was present on the x-ray yesterday. We will repeat an x-ray in the morning  3.  GI: No signs of GI bleed and patient starting to eat slightly more. LFTs are improving. GI is following  4. ID: Continues on broad-spectrum antibiotics including vancomycin and cefepime. White blood cell count slowly rising with continued low-grade fevers. Probable need for infectious disease consult tomorrow  5. Heme: Hemoglobin remained stable, platelets are stable  6. Neuro: Head CT showed old changes, his confusion is slightly better as he is alert and oriented x3 though difficult to arouse. 7. Renal: Nephrology following. Holding off on dialysis today. 8. Activity: PT/OT -out of bed today.   Electronically signed by Nesha Mccartney MD on 3/7/2021 at 8:35 AM

## 2021-03-07 NOTE — PROGRESS NOTES
Clinically looks much better but still febrile and WBC slightly higher- though LFT's continue to slowly improve since Vanco started  H/H stable  Abdomen soft, non-tender, non-distended  ? Sepsis- if so, ? Source  Suggest:   1) follow up CRP and procalcitonin ordered   2) agree with ID consult    .

## 2021-03-07 NOTE — PROGRESS NOTES
Nephrology Progress Note  3/7/2021 8:04 AM  Subjective:      Interval History: Naima Dobson is a 66 y.o. male   Is still with fever and confusion is there but startnig to improve    Data:   Scheduled Meds:   vancomycin  1,250 mg Intravenous Once    albumin human  25 g Intravenous Once    cefepime  2,000 mg Intravenous Q12H    [Held by provider] apixaban  5 mg Oral BID    sodium chloride flush  10 mL Intravenous 2 times per day    iron sucrose  200 mg Intravenous Q24H    pantoprazole  40 mg Intravenous BID    midodrine  10 mg Oral TID WC    insulin lispro  0-6 Units Subcutaneous 2 times per day    aspirin  81 mg Oral Daily    Dulaglutide  1.5 mg Subcutaneous Weekly    [Held by provider] atorvastatin  10 mg Oral Daily    insulin lispro  0-12 Units Subcutaneous TID WC     Continuous Infusions:   sodium chloride      DOBUTamine 5 mcg/kg/min (03/07/21 0652)           CBC:   Recent Labs     03/06/21  0415 03/06/21  1145 03/07/21  0440   WBC 15.0* 16.0* 17.0*   HGB 7.5* 7.2* 7.7*    169 189     BMP:    Recent Labs     03/06/21  0415 03/06/21  1145 03/07/21  0440   * 133* 131*   K 4.3 3.6 4.2   CL 94* 96* 94*   CO2 25 29 26   BUN 90* 52* 69*   CREATININE 2.3* 1.3 2.0*   GLUCOSE 74 129* 139*       Renal Labs  Albumin:    Lab Results   Component Value Date    LABALBU 3.2 03/07/2021    LABALBU 3.2 03/07/2021     Calcium:    Lab Results   Component Value Date    CALCIUM 7.8 03/07/2021     Phosphorus:    Lab Results   Component Value Date    PHOS 2.9 03/07/2021     U/A:    Lab Results   Component Value Date    NITRU NEGATIVE 03/01/2021    NITRU Negative 11/24/2020    COLORU YELLOW 03/01/2021    PHUR 6.5 11/24/2020    LABCAST NONE SEEN 06/15/2016    LABCAST NONE SEEN 06/15/2016    WBCUA <1 03/01/2021    RBCUA NONE SEEN 03/01/2021    MUCUS RARE 02/12/2021    TRICHOMONAS NONE SEEN 03/01/2021    BACTERIA NEGATIVE 03/01/2021    CLARITYU CLEAR 03/01/2021    SPECGRAV 1.009 03/01/2021    UROBILINOGEN NEGATIVE 03/01/2021    BILIRUBINUR NEGATIVE 03/01/2021    BLOODU NEGATIVE 03/01/2021    GLUCOSEU 500 11/24/2020    KETUA NEGATIVE 03/01/2021           Objective:   I/O: 03/06 0701 - 03/07 0700  In: 2384 [P.O.:1215; I.V.:669]  Out: 1867 [Urine:1210]  Vitals: BP (!) 85/52   Pulse 74   Temp 100.5 °F (38.1 °C) (Oral)   Resp 19   Ht 5' 10\" (1.778 m)   Wt 240 lb 4.8 oz (109 kg)   SpO2 94%   BMI 34.48 kg/m²   General appearance: awake weak  HEENT: Head: Normal, normocephalic, atraumatic. Neck: supple, symmetrical, trachea midline  Lungs: diminished breath sounds bilaterally  Heart: S1, S2 normal  Abdomen: abnormal findings:  soft nt  Extremities: edema +  Neurologic: Mental status: alertness: less confused and able to direct better today        Assessment and Plan:      IMP:  1 ckd 3B from htn and dm2  2 Dm2 controlled  3 cad sp cabg with chest pain  4 leukocytosis with right pleural effusion  5 hyperkalemia  6 hypotension  7 moderate protein malnutrtion  8 hyponatremia  9 anemia    Plan     1 renal monitor with 1.3 L uop and maintain diuresis and no dialysis for today  2 ssi and careful low glucose  3 post cabg and monitor  4 stil with fever spike ? Viral, give 1.25 gram vanco today and level in am,. Goal about 18. rec ID consult in am and ? Wbc scan if no better answer.    5 K stable  6 bp low monitor with proamatine and pressor as need  7 encourage protein intake  8Na low monitor  9 hb 7.7 no active bleed and monitor, reload with iron         Abdias Flores MD

## 2021-03-07 NOTE — PROGRESS NOTES
progress Note( Dr. Amena Villaseñor)  3/7/2021  Subjective:   Admit Date: 3/1/2021  PCP: Salvador Denton MD    Admitted For :Shortness of breath leg swelling    Consulted For:  Better control of blood glucose    Interval History: Patient seemed to be more alert today able to communicate properly  CT of brain done No acute injury  Had hemodialysis done yesterday      Denies any chest pains,   Yes SOB . Denies nausea or vomiting. Not eating much  No new bowel or bladder symptoms. Intake/Output Summary (Last 24 hours) at 3/7/2021 1553  Last data filed at 3/7/2021 1200  Gross per 24 hour   Intake 1894 ml   Output 1190 ml   Net 704 ml       DATA    CBC:   Recent Labs     03/06/21  0415 03/06/21  1145 03/07/21  0440   WBC 15.0* 16.0* 17.0*   HGB 7.5* 7.2* 7.7*    169 189    CMP:  Recent Labs     03/05/21  1200 03/05/21  1200 03/06/21  0415 03/06/21  1145 03/07/21  0440   *   < > 130* 133* 131*   K 4.4   < > 4.3 3.6 4.2   CL 90*   < > 94* 96* 94*   CO2 23   < > 25 29 26   *   < > 90* 52* 69*   CREATININE 2.4*   < > 2.3* 1.3 2.0*   CALCIUM 7.7*   < > 7.9* 7.6* 7.8*   PROT 5.2*  --  5.0*  --  5.1*   LABALBU 3.3*  --  3.0*  3.0*  --  3.2*  3.2*   BILITOT 1.0  --  0.9  --  0.8   ALKPHOS 259*  --  216*  --  195*   *  --  88*  --  53*   *  --  107*  --  84*    < > = values in this interval not displayed.      Lipids:   Lab Results   Component Value Date    CHOL 91 12/18/2020    HDL 43 12/18/2020    TRIG 66 12/18/2020     Glucose:  Recent Labs     03/06/21  1647 03/06/21  2016 03/07/21  0927   POCGLU 195* 215* 104*     [id number]:  Lab Results   Component Value Date    LABA1C 6.1 02/20/2021     High Sensitivity TSH:   Lab Results   Component Value Date    TSHHS 2.620 03/03/2021     Free T3: No results found for: FT3  Free T4:  Lab Results   Component Value Date    T4FREE 1.5 07/07/2020       Echocardiogram Limited    Result Date: 3/2/2021  Transthoracic Echocardiography Report (TTE) Demographics   Patient Name       Jayla TATE    Date of Study       03/02/2021   Date of Birth      1950         Gender              Male   Age                66 year(s)         Race                   Patient Number     [id number]         Room Number         2101   Visit Number       [id number]   Corporate ID       [id number]   Accession Number   [id number]         23 Asha Perry RVT   Ordering Physician Kayleen Felty, Sayed Natividad Bodily       Physician           MD  Procedure Type of Study   TTE procedure:ECHOCARDIOGRAM LIMITED. Procedure Date Date: 03/02/2021 Start: 11:53 AM Study Location: Portable Technical Quality: Adequate visualization Indications:S/P CABG. Patient Status: Routine Height: 70 inches Weight: 230 pounds BSA: 2.22 m2 BMI: 33 kg/m2 HR: 91 bpm BP: 91/45 mmHg  Conclusions   Summary  This is a limited echocardiogram.  Left ventricular systolic function is normal.  Ejection fraction is visually estimated at 55-60%. Bilateral atrial enlargement. S/p AVR with a 27 mm Medtronic Mosaic. Moderate mitral regurgitation. Moderate tricuspid regurgitation; RVSP: 50 mmHg. Severe PHTN. No evidence of any pericardial effusion. Signature   ------------------------------------------------------------------  Electronically signed by Beatriz Womack MD (Interpreting  physician) on 03/02/2021 at 05:06 PM  -    Ct Chest Wo Contrast    Result Date: 3/1/2021  EXAMINATION: CT OF THE CHEST WITHOUT CONTRAST 3/1/2021 3:57 pm T    Patient status post midline sternotomy. Immediately posterior to the sternotomy defect, there is a small gas and fluid collection which is nonspecific. It is not necessarily outside normal limits for a sternotomy that was performed 2 weeks ago.   However, sterility cannot be ascertained with imaging, and therefore a small developing abscess is considered as well. No evidence of osteolysis of the sternotomy defect to suggest osteomyelitis. Interval development of a moderate to large right and a moderate left pleural effusion. Adjacent airspace disease is some combination of atelectasis, pneumonia, and/or edema. Xr Chest Portable    Result Date: 3/3/2021  EXAMINATION: ONE XRAY VIEW OF THE CHEST 3/3/2021 5:34 am COMPARISON: 03/02/2021 HISTORY: ORDERING SYSTEM PROVIDED HISTORY: heart failure TECHNOLOGIST PROVIDED HISTORY: Reason for exam:->heart failure Reason for Exam: heart failure Acuity: Acute Type of Exam: Subsequent/Follow-up FINDINGS: Status post median sternotomy and left-sided transvenous pacer placement. There is stable cardiomegaly. The chest films taken shallow degree of inspiration accentuating heart size and bronchovascular structures. There is a small right pleural effusion. No significant left effusion is seen. The patient is undergone clipping of the left atrial appendage. There is bibasilar atelectasis. Stable exam     Xr Chest Portable    Result Date: 3/2/2021  EXAMINATION: ONE XRAY VIEW OF THE CHEST 3/2/2021 9:38 am COMPARISON: 03/01/2021 HISTORY: ORDERING SYSTEM PROVIDED HISTORY: post bilateral thoracentesis   . Patient has undergone thoracentesis. The volume of fluid in the right pleural space has decreased and/or shifted. There is some persistent right basilar atelectasis. No significant pleural effusion on the left is seen. Minimal left basilar atelectasis is noted. No pneumothorax is seen. Bilateral shoulder arthritis is noted. Pleural effusions right greater than left with bibasilar atelectasis right worse than left. No pneumothorax is seen.      Xr Chest Portable    Result Date: 3/1/2021  EXAMINATION: ONE XRAY VIEW OF THE CHEST 3/1/2021 5:12 pm COMPARISON: 02/23/2021 HISTORY: ORDERING SYSTEM PROVIDED HISTORY: chest pain, shorntess of breath TECHNOLOGIST PROVIDED HISTORY: Reason for exam:->chest pain, shorntess of breath Reason for Exam: chest pain   sob   leg swelling Acuity: Unknown Type of Exam: Unknown Relevant Medical/Surgical History: afib    cad    diabetes FINDINGS: Status post median sternotomy. Transvenous pacer remains place. Stable cardiomegaly. Right-sided pleural effusion. Bibasilar hypoaeration. Right-sided pleural effusion Bibasilar hypoaeration     Vl Dup Lower Extremity Venous Bilateral    Result Date: 3/3/2021  EXAMINATION: DUPLEX VENOUS ULTRASOUND OF THE BILATERAL LOWER EXTREMITIES, 3/3/2021 9:01 am TECHNIQUE: Duplex ultrasound using B-mode/gray scaled imaging and Doppler spectral analysis and color flow was obtained of the bilateral lower extremities. COMPARISON: None. HISTORY: ORDERING SYSTEM PROVIDED HISTORY: fevers TECHNOLOGIST PROVIDED HISTORY: Reason for exam:->fevers Reason for Exam: edema FINDINGS: The visualized veins of the bilateral lower extremities are patent and free of echogenic thrombus. The veins demonstrate good compressibility with normal color flow study and spectral analysis. No evidence of DVT in either lower extremity. Us Chest Including Mediastinum    Result Date: 3/3/2021  EXAMINATION: ULTRASOUND OF THE CHEST 3/3/2021 9:02 am COMPARISON: Chest radiograph performed earlier in the same day HISTORY: ORDERING SYSTEM PROVIDED HISTORY: ASSESS FOR PLEURAL EFFUSION TECHNOLOGIST PROVIDED HISTORY: RIGHT LUNG Reason for exam:->ASSESS FOR PLEURAL EFFUSION Reason for Exam: pleural effusion FINDINGS: Moderate right pleural effusion is present. Right pleural effusion is present. Ir Guided Thoracentesis Pleural    Result Date: 3/2/2021  PROCEDURE: ULTRASOUNDGUIDED Bilateral THORACENTESIS 3/2/2021 HISTORY: ORDERING SYSTEM PROVIDED HISTORY: Bilateral pleural effusion. T   The patient tolerated the procedure well. FINDINGS: A total of 800 ml serosanguinous fluid from left and 1050 ml serosanguineous fluid from right  was removed.      Successful ultrasound guided bilateral thoracentesis. Scheduled Medicines   Medications:    cefepime  2,000 mg Intravenous Q12H    [Held by provider] apixaban  5 mg Oral BID    sodium chloride flush  10 mL Intravenous 2 times per day    pantoprazole  40 mg Intravenous BID    midodrine  10 mg Oral TID WC    insulin lispro  0-6 Units Subcutaneous 2 times per day    aspirin  81 mg Oral Daily    Dulaglutide  1.5 mg Subcutaneous Weekly    [Held by provider] atorvastatin  10 mg Oral Daily    insulin lispro  0-12 Units Subcutaneous TID       Infusions:    sodium chloride      DOBUTamine 5 mcg/kg/min (03/07/21 0151)         Objective:   Vitals: BP (!) 103/57   Pulse 70   Temp 98.9 °F (37.2 °C) (Oral)   Resp 20   Ht 5' 10\" (1.778 m)   Wt 240 lb 4.8 oz (109 kg)   SpO2 98%   BMI 34.48 kg/m²   General appearance: alert and cooperative with exam  Neck: no JVD or bruit  Thyroid : Normal lobes   Lungs: Has Vesicular Breath sounds there is breath sounds bilateral pleural effusion tapped both sides noted below  Heart:  regular rate and rhythm  Abdomen: soft, non-tender; bowel sounds normal; no masses,  no organomegaly  Musculoskeletal: Normal  Extremities: extremities normal, , no edema  Neurologic:  Awake, alert, oriented to name, place and time. Cranial nerves II-XII are grossly intact. Motor is  intact. Sensory is intact. ,  and gait is normal.    Assessment:     Patient Active Problem List:     Type 2 diabetes mellitus without complication, without long-term current use of insulin ([facility])     Ulcer of other part of lower limb     Venous hypertension, chronic, with ulcer ([address]     Ulcer of other part of foot     Pneumonia     Atrial fibrillation (HCC)     Sinus pause     PAF (paroxysmal atrial fibrillation) ([facility])     DM (diabetes mellitus) ([facility])     DMITRIY on CPAP     Hyperlipidemia     Status post incision and drainage     CKD (chronic kidney disease) stage 3, GFR 30-59 ml/min ([facility])     Hyperpotassemia     Arthritis     PVD (peripheral vascular disease) ([facility]     Hematoma     Cardiac pacemaker in situ     Coronary artery stenosis     Essential hypertension     Gout     Diabetic neuropathy associated with type 2 diabetes mellitus (HCC)     Adenomatous polyp of sigmoid colon     Iron deficiency anemia due to chronic blood loss     Erythropoietin deficiency anemia     VHD (valvular heart disease)     Abnormal fractional flow reserve (FFR) on cardiac catheterization     Carotid stenosis, left     Aortic stenosis, severe     CAD in native artery     Displacement of atrial pacemaker leads     Pacemaker lead malfunction     SOB (shortness of breath)      ? ? Sepsis      Plan:     1. Reviewed POC blood glucose . Labs and X ray results   2. Reviewed Current Medicines   3. On meal/ Correction bolus Humalog/ Basal Lantus Insulin regime   4. Monitor Blood glucose frequently   5. Modified  the dose of Insulin/ other medicines as needed   6. Future hemodialysis at discretion of nephrology  7. Patient was started on antibiotics source of infection is not clear at this point  8. Is being investigated for cause in place of sepsis  9. Will not re start Trulicity at this time  10. Will follow     .      Coy Snellen, MD

## 2021-03-08 LAB
ALBUMIN SERPL-MCNC: 3 GM/DL (ref 3.4–5)
ALBUMIN SERPL-MCNC: 3 GM/DL (ref 3.4–5)
ALP BLD-CCNC: 184 IU/L (ref 40–129)
ALT SERPL-CCNC: 65 U/L (ref 10–40)
ANION GAP SERPL CALCULATED.3IONS-SCNC: 10 MMOL/L (ref 4–16)
AST SERPL-CCNC: 37 IU/L (ref 15–37)
BILIRUB SERPL-MCNC: 0.9 MG/DL (ref 0–1)
BILIRUBIN DIRECT: 0.6 MG/DL (ref 0–0.3)
BILIRUBIN, INDIRECT: 0.3 MG/DL (ref 0–0.7)
BUN BLDV-MCNC: 82 MG/DL (ref 6–23)
CALCIUM SERPL-MCNC: 7.7 MG/DL (ref 8.3–10.6)
CHLORIDE BLD-SCNC: 94 MMOL/L (ref 99–110)
CO2: 26 MMOL/L (ref 21–32)
CREAT SERPL-MCNC: 2.5 MG/DL (ref 0.9–1.3)
CULTURE: NORMAL
DOSE AMOUNT: NORMAL
DOSE TIME: NORMAL
GFR AFRICAN AMERICAN: 31 ML/MIN/1.73M2
GFR NON-AFRICAN AMERICAN: 25 ML/MIN/1.73M2
GLUCOSE BLD-MCNC: 118 MG/DL (ref 70–99)
GLUCOSE BLD-MCNC: 145 MG/DL (ref 70–99)
GLUCOSE BLD-MCNC: 183 MG/DL (ref 70–99)
GLUCOSE BLD-MCNC: 244 MG/DL (ref 70–99)
GLUCOSE BLD-MCNC: 277 MG/DL (ref 70–99)
GLUCOSE BLD-MCNC: 317 MG/DL (ref 70–99)
GRAM SMEAR: NORMAL
HCT VFR BLD CALC: 24.9 % (ref 42–52)
HEMOGLOBIN: 7.4 GM/DL (ref 13.5–18)
HIGH SENSITIVE C-REACTIVE PROTEIN: 438.5 MG/L
Lab: NORMAL
MCH RBC QN AUTO: 23.8 PG (ref 27–31)
MCHC RBC AUTO-ENTMCNC: 29.7 % (ref 32–36)
MCV RBC AUTO: 80.1 FL (ref 78–100)
PDW BLD-RTO: 20.6 % (ref 11.7–14.9)
PHOSPHORUS: 3 MG/DL (ref 2.5–4.9)
PLATELET # BLD: 171 K/CU MM (ref 140–440)
PMV BLD AUTO: 9.9 FL (ref 7.5–11.1)
POTASSIUM SERPL-SCNC: 4.1 MMOL/L (ref 3.5–5.1)
RBC # BLD: 3.11 M/CU MM (ref 4.6–6.2)
SODIUM BLD-SCNC: 130 MMOL/L (ref 135–145)
SPECIMEN: NORMAL
TOTAL PROTEIN: 5.1 GM/DL (ref 6.4–8.2)
VANCOMYCIN RANDOM: 22.1 UG/ML
WBC # BLD: 14.9 K/CU MM (ref 4–10.5)

## 2021-03-08 PROCEDURE — 87205 SMEAR GRAM STAIN: CPT

## 2021-03-08 PROCEDURE — 6360000002 HC RX W HCPCS: Performed by: INTERNAL MEDICINE

## 2021-03-08 PROCEDURE — 80053 COMPREHEN METABOLIC PANEL: CPT

## 2021-03-08 PROCEDURE — 94761 N-INVAS EAR/PLS OXIMETRY MLT: CPT

## 2021-03-08 PROCEDURE — 87070 CULTURE OTHR SPECIMN AEROBIC: CPT

## 2021-03-08 PROCEDURE — 6370000000 HC RX 637 (ALT 250 FOR IP): Performed by: PHYSICIAN ASSISTANT

## 2021-03-08 PROCEDURE — 6370000000 HC RX 637 (ALT 250 FOR IP): Performed by: NURSE PRACTITIONER

## 2021-03-08 PROCEDURE — 6360000002 HC RX W HCPCS: Performed by: THORACIC SURGERY (CARDIOTHORACIC VASCULAR SURGERY)

## 2021-03-08 PROCEDURE — 86141 C-REACTIVE PROTEIN HS: CPT

## 2021-03-08 PROCEDURE — 82962 GLUCOSE BLOOD TEST: CPT

## 2021-03-08 PROCEDURE — C9113 INJ PANTOPRAZOLE SODIUM, VIA: HCPCS | Performed by: SPECIALIST

## 2021-03-08 PROCEDURE — 84100 ASSAY OF PHOSPHORUS: CPT

## 2021-03-08 PROCEDURE — APPSS60 APP SPLIT SHARED TIME 46-60 MINUTES: Performed by: NURSE PRACTITIONER

## 2021-03-08 PROCEDURE — 92526 ORAL FUNCTION THERAPY: CPT | Performed by: SPEECH-LANGUAGE PATHOLOGIST

## 2021-03-08 PROCEDURE — 82248 BILIRUBIN DIRECT: CPT

## 2021-03-08 PROCEDURE — 2580000003 HC RX 258: Performed by: INTERNAL MEDICINE

## 2021-03-08 PROCEDURE — 2000000000 HC ICU R&B

## 2021-03-08 PROCEDURE — 97116 GAIT TRAINING THERAPY: CPT

## 2021-03-08 PROCEDURE — 82308 ASSAY OF CALCITONIN: CPT

## 2021-03-08 PROCEDURE — 99223 1ST HOSP IP/OBS HIGH 75: CPT | Performed by: INTERNAL MEDICINE

## 2021-03-08 PROCEDURE — 97110 THERAPEUTIC EXERCISES: CPT

## 2021-03-08 PROCEDURE — 80202 ASSAY OF VANCOMYCIN: CPT

## 2021-03-08 PROCEDURE — 6360000002 HC RX W HCPCS: Performed by: PHYSICIAN ASSISTANT

## 2021-03-08 PROCEDURE — 97530 THERAPEUTIC ACTIVITIES: CPT

## 2021-03-08 PROCEDURE — 2580000003 HC RX 258: Performed by: PHYSICIAN ASSISTANT

## 2021-03-08 PROCEDURE — 90935 HEMODIALYSIS ONE EVALUATION: CPT

## 2021-03-08 PROCEDURE — 6370000000 HC RX 637 (ALT 250 FOR IP): Performed by: THORACIC SURGERY (CARDIOTHORACIC VASCULAR SURGERY)

## 2021-03-08 PROCEDURE — 85027 COMPLETE CBC AUTOMATED: CPT

## 2021-03-08 PROCEDURE — 6360000002 HC RX W HCPCS: Performed by: SPECIALIST

## 2021-03-08 RX ORDER — MINOCYCLINE HYDROCHLORIDE 100 MG/1
100 CAPSULE ORAL 2 TIMES DAILY
Status: DISCONTINUED | OUTPATIENT
Start: 2021-03-08 | End: 2021-03-15 | Stop reason: HOSPADM

## 2021-03-08 RX ORDER — HEPARIN SODIUM 1000 [USP'U]/ML
1000 INJECTION, SOLUTION INTRAVENOUS; SUBCUTANEOUS
Status: ACTIVE | OUTPATIENT
Start: 2021-03-08 | End: 2021-03-08

## 2021-03-08 RX ORDER — HEPARIN SODIUM 1000 [USP'U]/ML
2600 INJECTION, SOLUTION INTRAVENOUS; SUBCUTANEOUS
Status: COMPLETED | OUTPATIENT
Start: 2021-03-08 | End: 2021-03-08

## 2021-03-08 RX ORDER — MINOCYCLINE HYDROCHLORIDE 100 MG/1
200 CAPSULE ORAL ONCE
Status: COMPLETED | OUTPATIENT
Start: 2021-03-08 | End: 2021-03-08

## 2021-03-08 RX ADMIN — HEPARIN SODIUM 2600 UNITS: 1000 INJECTION INTRAVENOUS; SUBCUTANEOUS at 21:34

## 2021-03-08 RX ADMIN — INSULIN LISPRO 10 UNITS: 100 INJECTION, SOLUTION INTRAVENOUS; SUBCUTANEOUS at 12:46

## 2021-03-08 RX ADMIN — MINOCYCLINE HYDROCHLORIDE 100 MG: 100 CAPSULE ORAL at 21:36

## 2021-03-08 RX ADMIN — PANTOPRAZOLE SODIUM 40 MG: 40 INJECTION, POWDER, FOR SOLUTION INTRAVENOUS at 08:12

## 2021-03-08 RX ADMIN — MIDODRINE HYDROCHLORIDE 10 MG: 5 TABLET ORAL at 08:12

## 2021-03-08 RX ADMIN — MIDODRINE HYDROCHLORIDE 10 MG: 5 TABLET ORAL at 13:20

## 2021-03-08 RX ADMIN — DOBUTAMINE IN DEXTROSE 5 MCG/KG/MIN: 200 INJECTION, SOLUTION INTRAVENOUS at 16:42

## 2021-03-08 RX ADMIN — ASPIRIN 81 MG: 81 TABLET, CHEWABLE ORAL at 08:12

## 2021-03-08 RX ADMIN — INSULIN LISPRO 4 UNITS: 100 INJECTION, SOLUTION INTRAVENOUS; SUBCUTANEOUS at 12:44

## 2021-03-08 RX ADMIN — BISACODYL 10 MG: 10 SUPPOSITORY RECTAL at 10:31

## 2021-03-08 RX ADMIN — MINOCYCLINE HYDROCHLORIDE 200 MG: 100 CAPSULE ORAL at 16:42

## 2021-03-08 RX ADMIN — PANTOPRAZOLE SODIUM 40 MG: 40 INJECTION, POWDER, FOR SOLUTION INTRAVENOUS at 21:36

## 2021-03-08 RX ADMIN — SODIUM CHLORIDE, PRESERVATIVE FREE 10 ML: 5 INJECTION INTRAVENOUS at 21:36

## 2021-03-08 RX ADMIN — VANCOMYCIN HYDROCHLORIDE 1000 MG: 1 INJECTION, POWDER, LYOPHILIZED, FOR SOLUTION INTRAVENOUS at 16:42

## 2021-03-08 RX ADMIN — CEFEPIME HYDROCHLORIDE 2000 MG: 2 INJECTION, POWDER, FOR SOLUTION INTRAVENOUS at 08:11

## 2021-03-08 RX ADMIN — SODIUM CHLORIDE, PRESERVATIVE FREE 10 ML: 5 INJECTION INTRAVENOUS at 08:12

## 2021-03-08 RX ADMIN — ACETAMINOPHEN 650 MG: 325 TABLET ORAL at 09:00

## 2021-03-08 ASSESSMENT — PAIN SCALES - GENERAL
PAINLEVEL_OUTOF10: 0

## 2021-03-08 NOTE — PROGRESS NOTES
Physical Therapy  Attempted at 0940, nursing states pt wants a few minutes rest before therapy, returned at [phone], pt just received nursing care and is not available for therapy yet. Will cont. Yvonne Hanks. [facility]      Physical Therapy Treatment Note  Name: Neela Espinoza MRN: [id number] :   1950   Date:  3/8/2021   Admission Date: 3/1/2021 Room:  -A   Restrictions/Precautions:        Sternal, no pushing, pulling or lifting more than 5 pounds for the first 2 weeks and then 10 pounds up to 3 months,   Arms are to support chest when changing position, coughing or sneezing. No lifting arms above 90 degrees except with the ex's  Communication with other providers:  Brooke Kay RN states pt is ok to see for therapy  Subjective:  Patient states:  He wants to do ex but has had a suppository  Pain:   Location, Type, Intensity (0/10 to 10/10):  0/10  Objective:    Observation:  Pt was sitting up in the chair with his c-pap on  Treatment, including education/measures:  Vital Signs  HR: 78, B/P 108/58, O2 95% on 2 L incorporated into his c-pap  Education:  Pt was instructed in Sternal Precautions, Purpose of Exercise Program, Ambar Scale and Signs and Symptoms of Exercise Intolerance per Cardiac Protocol  Pt was instructed in Intermediate Cardiac ex program:sitting  Overhead Side Stretch x 10  Ankle Pumps/ Heel Raises x 10  Hip abd x 10  Forward Arm Raises x 10  Pt stated he had to go to the bathroom  Transfers with line management of IV's, O2, zaragoza and tele  Scooting :mod a of 1 and VC's for sternal precautions  Sit to stand :mod a of 1 from chair and toilet and VC's for sternal precautions  Stand to sit :mod A of 1 to chair and toilet and VC's for sternal precautions  Gait:  Pt amb with CW for 6 ft x 2 with min A of 1 and assist for equipment  Pt needed VC's for pathway  Safety  Patient left safely in the chair, with ELLINGTON in the room. Gait belt was used for transfers and gait.   Assessment / Impression: Patient's tolerance of treatment:  Good, pt is very willing to participate, needs rests in between ex's   Adverse Reaction: none  Significant change in status and impact:  none  Barriers to improvement:  Weakness,   Plan for Next Session:    Will cont to progress ex's and gt per cardiac protocol and educate pt on sternal precautions, S & S of ex intolerance, purpose of ex's, progression of ex's and gt at home. Time in:  1050  Time out:  1145  Timed treatment minutes:  55  Total treatment time:  54  Previously filed items:     Short term goals  Time Frame for Short term goals: 1 week  Short term goal 1: Pt will perform sit><supine Gaby  Short term goal 2: Pt will transfer Gaby  Short term goal 3: Pt will ambulate 100ft with LRAD Gaby  Short term goal 4: Pt will perform standing light dynamic activity with single UE support Gaby x 3 minutes  Short term goal 5: Pt will ascend/descend 2 steps single rail Gaby     Electronically signed by:     Michelle Gibson PTA  3/8/2021, 11:46 AM

## 2021-03-08 NOTE — PLAN OF CARE
Problem: Falls - Risk of:  Goal: Will remain free from falls  Description: Will remain free from falls  3/7/2021 1926 by Es Shelby RN  Outcome: Ongoing  3/7/2021 1252 by Murphy Vargas RN  Outcome: Ongoing  Goal: Absence of physical injury  Description: Absence of physical injury  3/7/2021 1926 by Es Shelby RN  Outcome: Ongoing  3/7/2021 1252 by Murphy Vargas RN  Outcome: Ongoing     Problem: Pain:  Goal: Pain level will decrease  Description: Pain level will decrease  3/7/2021 1926 by Es Shelby RN  Outcome: Ongoing  3/7/2021 1252 by Murphy Vargas RN  Outcome: Ongoing  Goal: Control of acute pain  Description: Control of acute pain  3/7/2021 1926 by Es Shelby RN  Outcome: Ongoing  3/7/2021 1252 by Murphy Vargas RN  Outcome: Ongoing  Goal: Control of chronic pain  Description: Control of chronic pain  3/7/2021 1926 by Es Shelby RN  Outcome: Ongoing  3/7/2021 1252 by Murphy Vargas RN  Outcome: Ongoing  Goal: Patient's pain/discomfort is manageable  Description: Patient's pain/discomfort is manageable  3/7/2021 1926 by Es Shelby RN  Outcome: Ongoing  3/7/2021 1252 by Murphy Vargas RN  Outcome: Ongoing     Problem: Skin Integrity:  Goal: Will show no infection signs and symptoms  Description: Will show no infection signs and symptoms  3/7/2021 1926 by Es Shelby RN  Outcome: Ongoing  3/7/2021 1252 by Murphy Vargas RN  Outcome: Ongoing  Goal: Absence of new skin breakdown  Description: Absence of new skin breakdown  3/7/2021 1926 by Es Shelby RN  Outcome: Ongoing  3/7/2021 1252 by Murphy Vargas RN  Outcome: Ongoing     Problem: Infection:  Goal: Will remain free from infection  Description: Will remain free from infection  3/7/2021 1926 by Es Shelby RN  Outcome: Ongoing  3/7/2021 1252 by Murphy Vargas RN  Outcome: Ongoing     Problem: Safety:  Goal: Free from accidental physical injury  Description: Free from accidental physical injury  3/7/2021 1926 by Prema Malave RN  Outcome: Ongoing  3/7/2021 1252 by Ranjit Owen RN  Outcome: Ongoing  Goal: Free from intentional harm  Description: Free from intentional harm  3/7/2021 1926 by Prema Malave RN  Outcome: Ongoing  3/7/2021 1252 by Ranjit Owen RN  Outcome: Ongoing     Problem: Daily Care:  Goal: Daily care needs are met  Description: Daily care needs are met  3/7/2021 1926 by Prema Malave RN  Outcome: Ongoing  3/7/2021 1252 by Ranjit Owen RN  Outcome: Ongoing     Problem: Skin Integrity:  Goal: Skin integrity will stabilize  Description: Skin integrity will stabilize  3/7/2021 1926 by Prema Malave RN  Outcome: Ongoing  3/7/2021 1252 by Ranjit Owen RN  Outcome: Ongoing     Problem: Discharge Planning:  Goal: Patients continuum of care needs are met  Description: Patients continuum of care needs are met  3/7/2021 1926 by Prema Malave RN  Outcome: Ongoing  3/7/2021 1252 by Ranjit Owen RN  Outcome: Ongoing

## 2021-03-08 NOTE — CARE COORDINATION
ARU pre-cert is pending at this time. Pending reference [id number]. Sent updated therapy notes. Will continue to follow for determination.

## 2021-03-08 NOTE — PROGRESS NOTES
[address] [facility]  consulted by Dr. Elham Anderson for monitoring and adjustment. Indication for treatment: Pneumonia  Goal trough: 15 mcg/mL    Pertinent Laboratory Values:   Temp Readings from Last 3 Encounters:   03/08/21 99 °F (37.2 °C) (Oral)   02/24/21 97.3 °F (36.3 °C) (Axillary)   02/12/21 97.8 °F (36.6 °C) (Temporal)     Recent Labs     03/06/21  0415 03/06/21  1145 03/07/21  0440 03/08/21  0445   WBC 15.0* 16.0* 17.0* 14.9*   LACTATE 0.8  --   --   --      Recent Labs     03/06/21  1145 03/07/21  0440 03/08/21  0445   BUN 52* 69* 82*   CREATININE 1.3 2.0* 2.5*     Estimated Creatinine Clearance: 32 mL/min (A) (based on SCr of 2.5 mg/dL (H)). Intake/Output Summary (Last 24 hours) at 3/8/2021 1608  Last data filed at 3/8/2021 1400  Gross per 24 hour   Intake 1729 ml   Output 955 ml   Net 774 ml     Pertinent Cultures:  Date    Source    Results  3/8   MRSA Screen   Ordered    Vancomycin level:   TROUGH:  No results for input(s): VANCOTROUGH in the last 72 hours. RANDOM:    Recent Labs     03/06/21  1645 03/08/21  0445   VANCORANDOM 16.4 22.1       Assessment:  · WBC and temperature: WBC trending down/febrile  · SCr, BUN, and urine output: HD today   · Day(s) of therapy: 7 (day 1 of pharmacy consult)  · Vancomycin concentration: 22.1    Plan:  · Vancomycin 1000mg x 1   · Random tomorrow AM prior to HD  · Pharmacy will continue to monitor patient and adjust therapy as indicated    Rosana 3 3/9 @0600    Thank you for the consult.   Brandy Figueroa, [address]   3/8/2021 4:08 PM

## 2021-03-08 NOTE — PROGRESS NOTES
PATIENT NAME: Janine Harris    TODAY'S DATE: 03/08/21    SUBJECTIVE:    Pt is s/p CABG x 2, AVR, maze, LA clip. Pt is feeling well today. He has minimal SOB. He has been working with therapy but has been weak. He has ambulated in his room. OBJECTIVE:   VITALS:    Vitals:    03/08/21 1241   BP: (!) 98/55   Pulse: 70   Resp: 20   Temp: 99 °F (37.2 °C)   SpO2: 96%     INTAKE/OUTPUT:    Date 03/08/21 0000 - 03/08/21 2359   Shift 2440-2283 6553-0728 4931-9704 24 Hour Total   INTAKE   P.O. 300 850  1150   I. V.(mL/kg/hr) 233(0.3)   233   Shift Total(mL/kg) 533(4.9) 850(7.9)  1383(12.8)   OUTPUT   Urine(mL/kg/hr) 575(0.7) 50  625   Shift Total(mL/kg) 575(5.3) 50(0.5)  625(5.8)   Weight (kg) 108. 2 108. 2 108. 2 108.2      Patient Vitals for the past 96 hrs (Last 3 readings):   Weight   03/08/21 0600 238 lb 8.6 oz (108.2 kg)   03/06/21 1136 240 lb 4.8 oz (109 kg)   03/06/21 0905 244 lb 4.3 oz (110.8 kg)       EXAM:  Blood pressure (!) 98/55, pulse 70, temperature 99 °F (37.2 °C), temperature source Oral, resp. rate 20, height 5' 10\" (1.778 m), weight 238 lb 8.6 oz (108.2 kg), SpO2 96 %. General appearance: No apparent distress, appears stated age and cooperative. Skin: unremarkable  HEENT Normocephalic, atraumatic without obvious deformity. Neck: Supple, Trachea midline   Lungs: Good respiratory effort.  Clear to auscultation, bilaterally  Heart: Regular rate/ rhythm inc c/d/i  Abdomen: Soft, non-tender or non-distended   Extremities: 1+ edema warm well perfused  Neurologic: Alert, grossly intact  Mental status: normal affect      Data:  CBC:   Recent Labs     03/06/21  1145 03/07/21  0440 03/08/21  0445   WBC 16.0* 17.0* 14.9*   HGB 7.2* 7.7* 7.4*   HCT 23.1* 24.8* 24.9*    189 171     BMP:    Recent Labs     03/06/21  1145 03/07/21  0440 03/08/21  0445   * 131* 130*   K 3.6 4.2 4.1   CL 96* 94* 94*   CO2 29 26 26   BUN 52* 69* 82*   CREATININE 1.3 2.0* 2.5*   GLUCOSE 129* 139* 145*     Hepatic: work with pt today. Poor PO intake but states he does not like his mechanically altered diet. He is not confused today, hopefully will be able to advance. LFTs trending down. Renal: creatinine stable 2.5, BUN 82. Doing HD today. Appreciate nephrology input. Heme: hgb stable 7.4  ID: WBC stable 15. Urine cx citrobacter farmeri, sputum cx GNB. Having ongoing low grade fevers Tmax 100.1. on cefepime/vanc. Consult ID.      Erickson Castrejon PA-C

## 2021-03-08 NOTE — PROGRESS NOTES
Pt has a temperature of 100.1, clarified with Dr. Trudy Ornelas if tylenol can be given since patient has had elevated LFT's. Dr. Trudy Ornelas ok, to give tylenol.

## 2021-03-08 NOTE — PROGRESS NOTES
Dr. Severa Pike at bedside this morning for rounds. Updated on patient status. No new orders at this time. Will continue to monitor.

## 2021-03-08 NOTE — PROGRESS NOTES
03/01/2021    BILIRUBINUR NEGATIVE 03/01/2021    BLOODU NEGATIVE 03/01/2021    GLUCOSEU 500 11/24/2020    KETUA NEGATIVE 03/01/2021           Objective:   I/O: 03/07 0701 - 03/08 0700  In: 6343 [P.O.:720; I.V.:429]  Out: 905 [Urine:905]  Vitals: BP (!) 108/58   Pulse 78   Temp 100.1 °F (37.8 °C) (Oral)   Resp 21   Ht 5' 10\" (1.778 m)   Wt 238 lb 8.6 oz (108.2 kg)   SpO2 93%   BMI 34.23 kg/m²   General appearance: awake weak slight confused  HEENT: Head: Normal, normocephalic, atraumatic.   Neck: supple, symmetrical, trachea midline  Lungs: diminished breath sounds bilaterally  Heart: S1, S2 normal  Abdomen: abnormal findings:  soft nt  Extremities: edema +  Neurologic: Mental status: alertness: larousable tremor        Assessment and Plan:      IMP:  1 ckd 3B from htn and dm2  2 Dm2 controlled  3 cad sp cabg with chest pain  4 leukocytosis with right pleural effusion  5 hyperkalemia  6 hypotension  7 moderate protein malnutrtion  8 hyponatremia  9 anemia    Plan     1 dw wife and pt more awake  2 renal not yet improving and do hd today and uf with hd 1-2 kg  3 ssi stable  4 still spiking fever and rec ID consult, vanco level 22.1   5 K stable  6 bp low monitor  7 try work on oral intake  8 na correct with hd  9 replete iron and epo  Will follow           Ayleen Coley MD

## 2021-03-08 NOTE — PLAN OF CARE
Problem: Falls - Risk of:  Goal: Will remain free from falls  Description: Will remain free from falls  Outcome: Ongoing  Goal: Absence of physical injury  Description: Absence of physical injury  Outcome: Ongoing     Problem: Pain:  Goal: Pain level will decrease  Description: Pain level will decrease  Outcome: Ongoing  Goal: Control of acute pain  Description: Control of acute pain  Outcome: Ongoing  Goal: Control of chronic pain  Description: Control of chronic pain  Outcome: Ongoing  Goal: Patient's pain/discomfort is manageable  Description: Patient's pain/discomfort is manageable  Outcome: Ongoing     Problem: Skin Integrity:  Goal: Will show no infection signs and symptoms  Description: Will show no infection signs and symptoms  Outcome: Ongoing  Goal: Absence of new skin breakdown  Description: Absence of new skin breakdown  Outcome: Ongoing

## 2021-03-08 NOTE — PROGRESS NOTES
Pt not eating lunch, wife says he didn't like the yogurt, but really like the Glucerna. This nurse spoke with Iraj Whitehead with what can be given to patient. She will add glucerna and a vanilla magic cup.

## 2021-03-08 NOTE — PROGRESS NOTES
Wife at bedside, updated with patient status and plan for the day. All questions answered at this time.

## 2021-03-08 NOTE — CONSULTS
Infectious Disease Consult Note  3/8/2021   Patient Name: Arnav Gonzales : 1950   Impression  Sepsis Secondary Acute Hypoxic Respiratory Failure from  Possible Multifocal Pneumonia:   Fever max 103 F 3/2, trending down to low grade  Leukocytosis max 17.0 on 3/6   3/3-Covid-19 Rapid Negative  3/6-RDP Negative  Blood cultures 3/2-0-NGTD  Blood cultures 3/5-02-NGTD  3/1-Urine culture: Citrobacter Farmeri 50,000, UA WBC <1, RBC none   3/5-Urine culture: NGTD  Pct 3/5-0.900, .6.  3/8: .5  3/5-CT Chest WO Contrast: imp of GGO along the upper lobes extending inferiorly, would represent atelectasis or early pneumonia. Recent CABG/AVR/MAZE with PPM:  21 Per Dr. Tracey Silver    Erythropoietin Deficiency Anemia    COVID-19 Pneumonia: 2020    PPM/AF  GIB:  Dr. Mendoza Back onboard    Recurring Right Pleural Effusion    CKD3:  Dr. Quintanilla [facility] onboard  On HD    [facility]    HTN    DMITRIY    Multi-morbidity: per PMHx:  PPM, CABG/AVR , AF, CKD3, DMII, Gout, HTN, HTN, DMITRIY on CPAP, Left CEA, total left hip, stents BLE    Plan:  DC IV cefepime   Start IV vancomycin, pharmacy to dose  Start IV meropenem 1 gm q12h  Start minocycline, 200 mg po loading dose followed by 100 mg po bid  Trend CRP and Pct, ordered  Sputum culture, ordered  MRSA screen, ordered    Thank you for allowing me to consult in the care of this patient.  ------------------------  REASON FOR CONSULT: Infective syndrome     Requested by: Dr. Angelica Lindsay is a 66 y.o.  male who was admitted 3/1/2021 for further evaluation and management of worsening weakness, swelling and SOB. He was a recent CABG/AVR/MAZE/LA clip on 21 per Dr. Jolene Phoenix. His postop course was complicated by hypoxia and severe hypotension. He was given a new atrial lead with a PPM.  He was DCd on 21 in stable condition.         Infectious diseases service was consulted to evaluate the pt, and recommend further investigative and therapeutic measures. ROS: Other systems reviewed Including eyes, ENT, respiratory, cardiovascular, GI, , dermatologic, neurologic, psych, hem/lymphatic, musculoskeletal and endocrine were negative other than what is mentioned above.      Patient Active Problem List    Diagnosis Date Noted    Cardiac pacemaker in situ      Priority: High    Coronary artery stenosis      Priority: High    Essential hypertension      Priority: High    Type 2 diabetes mellitus without complication, without long-term current use of insulin (HCC) 12/12/2012     Priority: High     Class: Chronic    Ulcer of other part of lower limb 12/12/2012     Priority: High     Class: Chronic    Venous hypertension, chronic, with ulcer ([facility] 12/12/2012     Priority: High     Class: Chronic    Ulcer of other part of foot 12/12/2012     Priority: High     Class: Chronic    Pleural effusion 03/04/2021    SOB (shortness of breath) 03/01/2021    Pacemaker lead malfunction     CAD in native artery 02/16/2021    Displacement of atrial pacemaker leads     Aortic stenosis, severe 02/09/2021    Carotid stenosis, left 01/29/2021    Abnormal fractional flow reserve (FFR) on cardiac catheterization     VHD (valvular heart disease)     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    Iron deficiency anemia due to chronic blood loss 09/01/2020    Adenomatous polyp of sigmoid colon 09/20/2019    Gout 04/23/2019    Diabetic neuropathy associated with type 2 diabetes mellitus ([facility] 04/23/2019    Hematoma 12/01/2017    CKD (chronic kidney disease) stage 3, GFR 30-59 ml/min ([facility] 07/07/2016    Hyperpotassemia 07/07/2016    Arthritis 07/07/2016    PVD (peripheral vascular disease) ([facility] 07/07/2016    Status post incision and drainage     PAF (paroxysmal atrial fibrillation) ([facility] 08/19/2013    DM (diabetes mellitus) ([facility] 08/19/2013    DMITRIY on CPAP 08/19/2013    Hyperlipidemia 08/19/2013    Atrial fibrillation ([address] 01/01/2013    Sinus pause 01/01/2013    Pneumonia 12/29/2012     Past Medical History:   Diagnosis Date    Arrhythmia     Pacemaker placed aprox 5 years ago for A Fib per patient    Arthritis 12/2013    rt wrist    Atrial fibrillation ([facility]     on Xarelto - Dr. John Nichole CAD (coronary artery disease) 06/18/2014    see dr Niya Villarreal kidney disease, stage III (moderate) 07/07/2016    Diabetes mellitus ([facility]     dx 2004    Diabetic neuropathy associated with type 2 diabetes mellitus ([facility] 04/23/2019    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    Gout 04/2019    \"got gout when had pacer put in because they did not give me my medication for gout \"    H/O 24 hour EKG monitoring 10/03/2013    no afib noted, sinsus rhythm    H/O cardiovascular stress test 05/12/2014    cardiolite- mild ischemia RCA EF50%    H/O echocardiogram 12/01/2020    EF 55-60% severe aortic stenosis mild to mod aortic regurg mod to severe tricuspid regurg severe pulm htn significant changes since 2018 echo.  H/O right and left heart catheterization 12/10/2020    DIFFUSE LAD DISEASE, Mild ECA Disease, Severe AS, Milf Pul HTN on RHC.  H/O transesophageal echocardiography (MABLE) for monitoring 08/05/2013    normal LV function and normal LA appendage without any clot    History of blood transfusion 12/2020    d/t anemia    History of transesophageal echocardiography (MABLE) 12/15/2020    Severe aortic stenosis (ANNA by planimetry: 0.778 cm sq). Mild AR.    Ramona (hard of hearing)     hearing tonya aides    Hx of Doppler echocardiogram 05/21/2018    EF 50%  Mild LV hypertrophy. Mildly enlarged RA. Mod aortic valve calcification with mod AS. Mitral annular calcification is present. Mild AR, MR and TR. Mild pulmonary htn.     Hyperlipidemia     Hypertension     Follows with PCP & Dr. Marcus Allan Other disorders of kidney and ureter     Pacemaker     [employer]tronic, implanted 2014    Pneumonia 12/29/2012    Sleep apnea     dx 2013- has c-pap    Type II or unspecified type diabetes mellitus with other specified manifestations, uncontrolled 12/12/2012    Venous hypertension, chronic, with ulcer ([address] 12/12/2012    resolved      Past Surgical History:   Procedure Laterality Date    CABG WITH AORTIC VALVE REPLACEMENT N/A 2/16/2021    CABG CORONARY ARTERY BYPASS X2 WITH LIMA, AORTIC VALVE REPLACEMENT AND AORTIC ROOT REPAIR, INTRAOPERATIVE MABLE, INDUCED HYPOTHERMIA, LEFT LEG ENDOVEIN HARVEST, LEFT ATRIAL CLIP, AND CRYO PROCEDURE performed by Praveen Vega MD at [facility]  12/14    at [address] Left 1/29/2021    LEFT CAROTID ENDARTERECTOMY performed by Neena Sousa MD at [id number]  2017    COLONOSCOPY  2011    COLONOSCOPY N/A 11/19/2019    COLONOSCOPY DIAGNOSTIC performed by Guilherme Vega MD at [place] [address]  09/30/2020    POSSIBLE CECAL avms, SIGMOID DIVERTICULOSIS, INTERNAL HEMORRHOIDS GRADE 1    COLONOSCOPY N/A 9/30/2020    COLONOSCOPY CONTROL HEMORRHAGE WITH APC performed by Guilherme Vega MD at [facility]  2014    \"2 stents put in \"    IR NONTUNNELED VASCULAR CATHETER  3/5/2021    IR NONTUNNELED VASCULAR CATHETER 3/5/2021 [facility] SPECIAL PROCEDURES    JOINT REPLACEMENT  2004    total left hip    OTHER SURGICAL HISTORY Right 12/02/2017    I&D; evacuation of hematoma right hip    OTHER SURGICAL HISTORY  09/17/2020    enteroscopy    PACEMAKER INSERTION N/A 2/23/2021    PACEMAKER GENERATOR LEAD REVISION performed by Praveen Vega MD at [facility]      9/18/14 Status post remote permanent pacemaker with atrial lead dislodgement.  7/24/14 PPM Implant    UPPER GASTROINTESTINAL ENDOSCOPY N/A 9/17/2020    ENTEROSCOPY PUSH BIOPSY performed by Guilherme Vega MD at [place] [address] N/A 3/4/2021    EGD DIAGNOSTIC ONLY performed by Guilherme Vega MD at [address] [place] [address] 2012    \"have stents in both legs- done in [place]      Family History   Problem Relation Age of Onset    High Blood Pressure Mother     Arthritis Mother     Diabetes Mother     Heart Disease Mother     High Blood Pressure Father     Heart Disease Father     Kidney Disease Father       Infectious disease related family history - not contibutory. SOCIAL HISTORY  Social History     Tobacco Use    Smoking status: Never Smoker    Smokeless tobacco: Never Used   Substance Use Topics    Alcohol use: Yes     Alcohol/week: 2.0 standard drinks     Types: 2 Cans of beer per week     Comment: average \"one time per week\"      Born:[place], [place]  Lives:[place] with wife  Occupation:retired  No recent travel of significance. No recent unusual exposures. NO pets     ALLERGIES  Allergies   Allergen Reactions    Spironolactone      CAUSES INCREASED K+    Tape Shima Saras Tape] Rash     SURGICAL TAPE      MEDICATIONS  Reviewed and are per the chart/EMR. Antibiotics:   Present:  Cefepime 3/5-    Past:  Ceftriaxone 3/3-5  Vancomycin 3/1-7     -------------------------------------------------------------------------------------------------------------------    Vital Signs:  Vitals:    03/08/21 0900   BP: (!) 108/58   Pulse: 78   Resp: 21   Temp:    SpO2:          Exam:    VS: noted; wt 238 lb (108.2 kg) 5'10\" Height  Gen: alert and oriented X3, no distress  Skin: no stigmata of endocarditis  Wounds: C/D/I coccyx ulceration, small area, no drainage. midsternal wound well approximated, no drainage, erythema or edema. HEMT: AT/NC Oropharynx pink, moist, and without lesions or exudates; dentition in good state of repair  Eyes: PERRLA, EOMI, conjunctiva pink, sclera anicteric. Neck: Supple. Trachea midline. No LAD. Chest: no distress and CTA. Diminished breath sounds posteriorly, Vapotherm for oxygenation. Heart: RRR and no MRG. Abd: soft, non-distended, no tenderness, no hepatomegaly.  Normoactive bowel 3/3/21 VL Dup BLE:  Impression   No evidence of DVT in either lower extremity.         3/4/21 IR Guided Thoracentesis Pleural:  FINDINGS:   A total of 1250 ml serosanguinous fluid  was removed. 3/4/21 XR Chest Portable:  Impression   No pneumothorax status post right thoracentesis       Right basilar opacity could represent a combination of pleural fluid and   atelectasis           3/5/21 CT Head WO Contrast:  Impression   Motion limited exam, without gross acute intracranial process.       Left posterior parietal scalp soft tissue swelling.  2 mm calcification   versus foreign body within the soft tissues of the frontal scalp. 3/5/21 IR Nontunneled Vascath:  Impression   Successful ultrasound and fluoroscopy guided non-tunneled dialysis catheter   placement.  The catheter is ready to use. 3/5/21 XR Chest Portable:  Impression   Right internal jugular dialysis catheter with catheter tip cavoatrial   junction.       Cardiomegaly with vascular congestion and moderate right pleural effusion   with underlying edema or atelectasis.       3/5/21 CT Chest WO Contrast:  Impression   Postop changes along the mediastinum and sternum with slowly resolving gas   and fluid posterior to the sternum which probably just represents resolving   postop changes.  Recommend clinical follow-up.       Status post placement of a right subclavian catheter in good position with no   change in the right pacemaker.       Mild-to-moderate bibasilar pleural effusions and associated moderate   atelectasis and consolidation posteriorly which is more prominent on the   right and appears to have decreased since the prior study.       Mild loculated fluid along the right upper chest which is more prominent. Recommend follow-up.       Hazy ground-glass opacities along the upper lobes extending inferiorly which   is more prominent and could represent atelectasis or early pneumonia.    Recommend follow-up.       Borderline enlarged lymph nodes in the mediastinum which are unchanged. 3/5/21 CT Abdomen Pelvis WO Contrast:  Impression   Limited noncontrast examination.       Scattered colonic diverticulosis without evidence of acute diverticulitis   seen.       Nonspecific small amount of free fluid in the pelvis.       Diffuse soft tissue anasarca.         3/8/21 XR Chest Portable:  Impression   Cardiomegaly with stable pulmonary edema and right pleural effusion likely   related to CHF.  Stable exam.               I have examined this patient and available medical records on this date and have made the above observations, conclusions and recommendations. Electronically signed by: Electronically signed by Bj Diggs.  JUSTIN Parker CNP on 3/8/2021 at 10:28 AM

## 2021-03-08 NOTE — PROGRESS NOTES
03/07/21 2225   NIV Type   Equipment Type home cpap   Mode Auto-PAP   Mask Type Nasal prongs   Settings/Measurements   Resp 21   O2 Flow Rate (L/min) 2 L/min   Comfort Level Good   SpO2 96   Oxygen Therapy/Pulse Ox   SpO2 100 %

## 2021-03-08 NOTE — PROGRESS NOTES
Occupational Therapy    Attempted at 1035 hrs c nurse Clifton Torres reporting pt has just had suppository and is not appropriate for Tx at this time. Plan is to re-attempt later this AM as schedule allows. Occupational Therapy Treatment Note  Name: Ria Rasheed MRN: [id number] :   1950   Date:  3/8/2021   Admission Date: 3/1/2021 Room:  -A   Restrictions/Precautions:    General precautions; Fall risk, Sternal Precautions    Communication with other providers:  Per chart review, patient is appropriate for therapeutic intervention. PTA Yoli    Subjective:  Patient states:  Pt agreeable to complete cardiac exercises c this therapist. PTA Alan Vázquez reports pt just completed toileting. Pain:   Location, Type, Intensity (0/10 to 10/10):  010, denies pain    Objective:    Observation:  Pt received settling into chair c PT. Objective Measures:  2L O2 CPAP, [place] Catheter    Treatment, including education:  Therapeutic Exercise:  Cues were given for technique, safety, recruitment, and rationale. Cues were verbal and/or tactile. Education provided for role of OT and rationale for therapeutic intervention. Vital Signs  HR: 65,  O2 94% on 2L at rest, desat to 85% c activity. Education:  Pt was instructed in Sternal Precautions, Purpose of Exercise Program, Ambar Scale and Signs and Symptoms of Exercise Intolerance per Cardiac Protocol  Pt was instructed in Intermediate Cardiac ex program:  Overhead Side Stretch x 10  Ankle Pumps/ Heel Raises x 10  Side Arm Raises x 10  Arm Crosses x 10  Arm Circles Forwards and Backwards x 10  SittingTrunkTwist x 10  Pt required increased time for rest break and PLB techniques to recover O2 sats>94%. All therapeutic intervention performed c emphasis on cardiac exercises and use of PLB techniques and rest breaks PRN to inc strength, endurance and act tolerance for inc Indep c ADL tasks, func transfers / mobility.      Safety  Patient safely in bedside chair at end of session, with call light/phone in reach, and nursing aware. Spouse present. Assessment / Impression:        Patient's tolerance of treatment:  Well   Adverse Reaction: None  Significant change in status and impact:  None  Barriers to improvement:  Decreased endurance, cardiac precautions    Plan for Next Session:    Continue per OT POC per patient's tolerance. Time in:  1135  Time out:  1200  Timed treatment minutes:  25  Total treatment time:  25    Electronically signed by:    BYRON Reid  3/8/2021, 9:48 AM    Previously filed values:       Goals:  1. Pt will complete all aspects of bed mobility for EOB/OOB ADLs min A/good adherence to sternal precautions  2. Pt will complete UB/LB bathing min A with setup  3. Pt will complete all aspects of LB dressing mod A with setup  4. Pt will complete all functional transfers to and from bed, chair, toilet, shower chair CGA/good adherence to sternal precautions  5. Pt will ambulate HH distance to bathroom for toileting CGA using LRAD  6. Pt will complete all aspects of toileting task CGA  7. Pt will complete oral hygiene/grooming routine in standing at sink SBA with setup/no seated rest breaks  8.  Pt will complete ther ex/ther act with focus on cardiac rehab exercises

## 2021-03-08 NOTE — PROGRESS NOTES
[facility]  DEPARTMENT OF SPEECH/LANGUAGE PATHOLOGY  DAILY PROGRESS NOTE  Marco Antonio Cline  3/8/2021  [id number]  Hyperkalemia [E87.5]  SOB (shortness of breath) [R06.02]  Pleural effusion [J90]  General weakness [R53.1]  Respiratory failure, unspecified chronicity, unspecified whether with hypoxia or hypercapnia (HCC) [J96.90]  Allergies   Allergen Reactions    Spironolactone      CAUSES INCREASED K+    Tape Yue Panning Tape] Rash     SURGICAL TAPE         Pt was seen this date for dysphagia treatment. IMPRESSION AND RECOMMENDATIONS: Marco Antonio Cline was seen for trials for diet advancement this date per/pt and spouse request.  Per/RN Emerald, pt is not eating, c/o Dysphagia 2 level diet and states that nothing tastes good. Pt is s/p recent CABG and was received seated in recliner on C-PAP. Per/RN C-PAP removed and placed on 2L O2 via nc and repositioned to 90 degrees upright. Pt was alert and cooperative and accepted PO trials of thins by straw, pureed, soft/moist, and regular solids. The oral phase of the swallow was [facility]ly [facility] with mildly slow mastication/a-p transit and functional clearance. Suspected premature pharyngeal entry during mastication with reduced awareness, pharyngeal delay with solids, intact laryngeal elevation and 0 s/s aspiration. Discussed likelihood of fatigue with mastication of regular solids with pt and wife and recommended diet advancement to Regular but with avoidance of those solids that would require increased mastication time such as raw vegetable salads, whole meats. Recommend liberalize diet for improved intake to Regular/continue Thin liquids. Order soft solids initially. Assist with feeding. SLP will continue to follow.     GOALS (current status in bold):  Short-term Goals  Timeframe for Short-term Goals: LOS or until goals are met  Goal 1: Pt will tolerate dysphagia 2 diet/thin liquids without clinical evidence of aspiration 100% Discontinue, per/nsg pt not eating  New Goal 1:  Pt will tolerate Regular diet/Thin liquids (spouse to order soft foods) without clinical evidence for aspiration 100%  Goal 2: Pt will particiapte in advanced trials for possible safe diet level advancement 10/10 trials Met, Discontinue, advanced to Regular diet  Goal 3: pt/caregivers will demonstrate comprehension of recommendations/POC Meeting, recommendations discussed in detail with wife, RN    EDUCATION: as above    PAIN RATING (0-10 Scale): 0/denies pain  Time in/Time out: SLP Individual Minutes  Time In: 1515  Time Out: 700 Manish  Minutes: 30    Visit number: 3      Abraham Johnson MA CCC-SLP  3/8/2021  3:44 PM

## 2021-03-08 NOTE — PROGRESS NOTES
03/08/21 0240   NIV Type   Equipment Type home cpap   Mode Auto-PAP   Mask Type Nasal prongs   Settings/Measurements   Resp 30   SpO2 97   Oxygen Therapy/Pulse Ox   SpO2 97 %

## 2021-03-08 NOTE — PROGRESS NOTES
Appears to be improving slowly  Alert, walked some yesterday  Abdomen soft, non-tender, non-distended  LFT's not back yet

## 2021-03-08 NOTE — PROGRESS NOTES
Pt tolerated 3 hr HD treatment well, removing 1.5 L. No complaints. Both pports heparin locked and capped per policy        Patient Name: Janine Trangm  Patient : 1950  MRN: [id number]     Acct: [de-identified]  Date of Admission: 3/1/2021  Room/Bed: -A  Code Status:  Full Code  Allergies:    Allergies   Allergen Reactions    Spironolactone      CAUSES INCREASED K+    Tape [place] Tape] Rash     SURGICAL TAPE     Diagnosis:    Patient Active Problem List   Diagnosis    Type 2 diabetes mellitus without complication, without long-term current use of insulin ([facility])    Ulcer of other part of lower limb    Venous hypertension, chronic, with ulcer ([facility])    Ulcer of other part of foot    Pneumonia    Atrial fibrillation ([facility])    Sinus pause    PAF (paroxysmal atrial fibrillation) ([facility])    DM (diabetes mellitus) ([facility]    DMITRIY on CPAP    Hyperlipidemia    Status post incision and drainage    CKD (chronic kidney disease) stage 3, GFR 30-59 ml/min ([facility])    Hyperkalemia    Arthritis    PVD (peripheral vascular disease) ([facility])    Hematoma    Cardiac pacemaker in situ    Coronary artery stenosis    Essential hypertension    Gout    Diabetic neuropathy associated with type 2 diabetes mellitus ([facility])    Adenomatous polyp of sigmoid colon    Iron deficiency anemia due to chronic blood loss    Erythropoietin deficiency anemia    VHD (valvular heart disease)    Abnormal fractional flow reserve (FFR) on cardiac catheterization    Carotid stenosis, left    Aortic stenosis, severe    CAD in native artery    Displacement of atrial pacemaker leads    Pacemaker lead malfunction    SOB (shortness of breath)    Pleural effusion         Treatment:  Hemodialysis 1:1  Priority: Routine  Location: ICU    Diabetic: Yes  NPO: No  Isolation Precautions: Dialysis     Consent for Treatment Verified: Yes  Blood Consent Verified: Not Applicable     Safety Verified: Identify (I), Consent (C), Equipment (E), HepB Status (B), Orders Complete (O), Access Verified (A) and Timeliness (T)  Time out performed prior to access at 1813 hours. Report Received from Primary RN at 1800 hours. Primary RN (First Initial, Last Name, Title): HERON Traore RN  Incapacitated Nurse Education Completed: Yes     HBsAg ONLY:  Date Drawn: March 5, 2021       Results: Negative  HBsAb:  Date Drawn:  March 5, 2021       Results: Immune >10    Order  Dialysis Bath  K+ (Potassium): 3  Ca+ (Calcium): 2.5  Na+ (Sodium): 138  HCO3 (Bicarb): 35     Na+ Modeling: Not Applicable  Dialyzer: [id number]  Dialysate Temperature (C):  36  Blood Flow Rate (BFR):  300   Dialysate Flow Rate (DFR):   600        Access to be Utilized   Access: Non-tunneled Catheter  Location: Internal Jugular  Side: Right   Needle gauge:  Not Applicable  + Bruit/Thrill: Not Applicable    First Use X-ray Verified: Not Applicable  OK to use line order: Not Applicable    Site Assessment:  Signs and Symptoms of Infection/Inflammation: None  If yes: Not Applicable  Dressing: Dry and Intact  Site Prep: Medical Aseptic Technique  Dressing Changed this Treatment: No  If yes, by whom: NA - not changed today  Date of Last Dressing Change: March 5, 2021  Antimicrobial Patch in place?: Yes  Red Alcohol Caps in place?: Yes  Gauze Dressing?: No  Non Dialysis Use?: No  Comment:    Flows: Good, Patent  If access problem, who was notified:     Pre and Post-Assessment  Patient Vitals for the past 8 hrs:   Level of Consciousness Heart Rhythm Respiratory Quality/Effort O2 Device Bilateral Breath Sounds Skin Color Skin Condition/Temp Abdomen Inspection Bowel Sounds (All Quadrants) Edema RUE Edema LUE Edema RLE Edema LLE Edema Comments Pain Level   03/08/21 1700 Alert (0) -- Dyspnea with exertion Nasal cannula -- Appropriate for ethnicity Warm Soft;Rounded -- Right upper extremity; Left upper extremity;Right lower extremity; Left lower extremity;Generalized +3;Pitting +3;Pitting +2;Pitting +2;Pitting -- 0 03/08/21 1823 Alert (0) Regular Dyspnea with exertion Nasal cannula Clear Appropriate for ethnicity Dry; Warm Soft;Rounded Active Right upper extremity; Left upper extremity;Right lower extremity; Left lower extremity;Generalized +3;Pitting +3;Pitting +2;Pitting +2;Pitting consent verified 0   03/08/21 1930 -- -- -- Nasal cannula -- -- -- -- -- -- -- -- -- -- -- --   03/08/21 2100 Alert (0) -- Dyspnea with exertion PAP (positive airway pressure) -- Appropriate for ethnicity Dry; Warm Soft;Rounded -- Right upper extremity; Left upper extremity;Right lower extremity; Left lower extremity;Generalized +2;Pitting +2;Pitting +1;Pitting +1;Pitting -- --   03/08/21 2123 Alert (0) Regular Dyspnea with exertion PAP (positive airway pressure) Clear Appropriate for ethnicity Dry; Warm Soft;Rounded Active Right upper extremity; Left upper extremity;Right lower extremity; Left lower extremity;Generalized +2;Pitting +2;Pitting +1;Pitting +1;Pitting -- 0     Labs  Recent Labs     03/06/21  1145 03/07/21  0440 03/08/21 0445   WBC 16.0* 17.0* 14.9*   HGB 7.2* 7.7* 7.4*   HCT 23.1* 24.8* 24.9*    189 171                                                                  Recent Labs     03/06/21  1145 03/07/21  0440 03/08/21  0445   * 131* 130*   K 3.6 4.2 4.1   CL 96* 94* 94*   CO2 29 26 26   BUN 52* 69* 82*   CREATININE 1.3 2.0* 2.5*   GLUCOSE 129* 139* 145*     IV Drips and Rate/Dose   sodium chloride      DOBUTamine 5 mcg/kg/min (03/08/21 1642)      Safety - Before each treatment:   Dialysis Machine No.: [id number]  RO Machine No.: [id number]  Dialyzer Lot No.: [id number]  RO Machine Log Sheet Completed: Yes  Machine Alarm Self Test: Completed; Passed (03/08/21 1823)  Machine Autotest: Completed, Passed  Air Foam Detector: Tested, Proper Function, pH Reading  Extracorporeal Circuit Tested for Integrity: Yes  Machine Conductivity: 13.7  Manual Conductivity: 13.8     Bicarbonate Concentrate Lot No.: [id number]  Acid Concentrate Lot No.: [id number]  Manual Ph: 7.5  Bleach Test (Neg): Yes  Bath Temperature: 96.8 °F (36 °C)  Tubing Lot#: [id number]  Conductivity Meter Serial #: [id number]  All Connections Secure?: Yes  Venous Parameters Set?: Yes  Arterial Parameters Set?: Yes  Saline Line Double Clamped?: Yes  Air Foam Detector Engaged?: Yes  Machine Functioning Alarm Free?  Yes  Prime Given: 200ml    Chlorine Testing - Before each treatment and every 4 hours:   Treatment  Treatment Number: 2  Time On: 1823  Time Off: 2123  Additional Hours: 2.5  Treatment Goal: 1.5-2.5kg  Weight: 238 lb 8.6 oz (108.2 kg) (03/08/21 0600)  1st check: less than 0.1 ppm at: 1755 hours  2nd check: less than 0.1 ppm at: n/a  3rd check: Not Applicable  (if greater than 0.1 ppm, then check every 30 minutes from secondary)    Access Flows and Pressures  Patient Vitals for the past 8 hrs:   Blood Flow Rate (ml/min) Ultrafiltration Rate (ml/hr) Ultrafiltration Total Arterial Pressure (mmHg) Venous Pressure (mmHg) TMP Hemodialysis Conductivity DFR Comments Access Visible   03/08/21 1823 300 ml/min 670 ml/hr 0 ml -60 mmHg 90 60 -- 600 ace sarah md notified Yes   03/08/21 1830 300 ml/min 670 ml/hr 120 ml -60 mmHg 80 60 -- 600 Floor RN put pt on bipap Yes   03/08/21 1845 300 ml/min 670 ml/hr 254 ml -60 mmHg 80 60 -- 600 resting quietly Yes   03/08/21 1900 300 ml/min 670 ml/hr 432 ml -60 mmHg 80 60 -- 600 eating dinner Yes   03/08/21 1915 300 ml/min 670 ml/hr 589 ml -70 mmHg 100 60 -- 600 eating dinner Yes   03/08/21 1930 300 ml/min 670 ml/hr 742 ml -70 mmHg 100 70 -- 600 resting quietly Yes   03/08/21 1945 300 ml/min 670 ml/hr 925 ml -70 mmHg 100 70 -- 600 denies needs Yes   03/08/21 2000 300 ml/min 670 ml/hr 1098 ml -70 mmHg 100 70 -- 600 Dr. Camilla Walker at bedside Yes   03/08/21 2015 300 ml/min 670 ml/hr 1250 ml -70 mmHg 100 70 13.8 600 bicarb changed Yes   03/08/21 2030 300 ml/min 670 ml/hr 1411 ml -70 mmHg 100 70 -- 600 no changes Yes   03/08/21 2045 300 ml/min 670 ml/hr 1576 ml -70 mmHg 100 70 -- 600 resting quietly Yes   03/08/21 2100 300 ml/min 670 ml/hr 1740 ml -70 mmHg 100 70 -- 600 floor rn at bedside Yes   03/08/21 2115 300 ml/min 670 ml/hr 1938 ml -70 mmHg 100 70 -- 600 resting quietly Yes   03/08/21 2123 -- -- 2000 ml -- -- -- -- -- tx ended Yes     Vital Signs  Patient Vitals for the past 8 hrs:   BP Temp Pulse Resp SpO2   03/08/21 1530 115/84 -- 66 17 --   03/08/21 1600 125/88 -- 65 17 --   03/08/21 1630 128/82 -- 73 22 97 %   03/08/21 1700 (!) 105/55 97.9 °F (36.6 °C) 66 14 100 %   03/08/21 1730 -- -- 69 15 --   03/08/21 1823 (!) 98/55 98 °F (36.7 °C) 68 21 91 %   03/08/21 1830 (!) 105/34 -- 69 18 98 %   03/08/21 1845 (!) 98/48 -- 67 19 97 %   03/08/21 1900 (!) 106/53 -- 69 22 95 %   03/08/21 1915 (!) 113/41 -- 74 18 --   03/08/21 1930 (!) 120/51 -- 71 29 95 %   03/08/21 1945 98/71 -- 68 22 --   03/08/21 2000 95/76 -- 74 22 --   03/08/21 2015 102/60 -- 76 22 --   03/08/21 2030 (!) 112/55 -- 83 23 --   03/08/21 2045 100/69 -- 78 23 --   03/08/21 2100 99/60 98.2 °F (36.8 °C) 77 24 96 %   03/08/21 2115 (!) 96/51 -- 75 26 96 %   03/08/21 2123 100/74 98.2 °F (36.8 °C) 79 26 95 %   03/08/21 2200 (!) 88/56 -- 77 22 94 %     Post-Dialysis  Arterial Catheter Locking Solution: Heparin (1000units:1ml)    Volume (ml): 1.3  Venous Catheter Locking Solution: Heparin (1000units:1ml)    Volume (ml): 1.3  Post-Treatment Procedures: Blood returned, Catheter capped, clamped and heparinized x 2 ports  Machine Disinfection Process: Acid/Vinegar Clean, Heat Disinfect, Exterior Machine Disinfection  Rinseback Volume (ml): 300 ml  Total Liters Processed (l/min): 52.9 l/min  Dialyzer Clearance: Lightly streaked  Duration of Treatment (minutes): 180 minutes  Heparin amount administered during treatment (units): 0 units  Hemodialysis Intake (ml): 500 ml  Hemodialysis Output (ml): 2000 ml  NET Removed (ml): 1500 ml  Tolerated Treatment: Good  Patient Response to Treatment: no complaints  Physician Notified?: Yes       Provider Notification  Provider Notification  Reason for Communication: Evaluate (03/06/21 0950)  Provider Name: Dr Viviana Aldrich (03/06/21 [id number])  Provider Notification: Physician (03/06/21 0950)  Method of Communication: Other (Comment)(Text) (03/06/21 0950)  Response: Other (Comment)(No UF, only dialysis. Give Albumin) (03/06/21 0950)  Notification Time: 4887 (03/06/21 0950)     Handoff complete and report given to Primary RN at 2130 hours. Primary RN (First Initial, Last Name, Title):  CLAUDIA Benavides RN     Education  Person Educated: Patient   Knowledge Base: Minimal  Barriers to Learning?: None  Preferred method of Learning: Oral  Topic(s): Access Care, Signs and Symptoms of Infection and Procedural   Teaching Tools: Explanation   Response to Education: Verbalized Understanding     Electronically signed by Juan Ramirez RN on 3/8/2021 at 10:13 PM

## 2021-03-09 LAB
ALBUMIN SERPL-MCNC: 2.7 GM/DL (ref 3.4–5)
ALBUMIN SERPL-MCNC: 2.7 GM/DL (ref 3.4–5)
ALP BLD-CCNC: 220 IU/L (ref 40–129)
ALT SERPL-CCNC: 56 U/L (ref 10–40)
ANION GAP SERPL CALCULATED.3IONS-SCNC: 9 MMOL/L (ref 4–16)
AST SERPL-CCNC: 30 IU/L (ref 15–37)
BILIRUB SERPL-MCNC: 1 MG/DL (ref 0–1)
BILIRUBIN DIRECT: 0.6 MG/DL (ref 0–0.3)
BILIRUBIN, INDIRECT: 0.4 MG/DL (ref 0–0.7)
BUN BLDV-MCNC: 59 MG/DL (ref 6–23)
CALCIUM SERPL-MCNC: 7.6 MG/DL (ref 8.3–10.6)
CHLORIDE BLD-SCNC: 92 MMOL/L (ref 99–110)
CO2: 28 MMOL/L (ref 21–32)
CREAT SERPL-MCNC: 2.1 MG/DL (ref 0.9–1.3)
DOSE AMOUNT: NORMAL
DOSE TIME: NORMAL
EKG ATRIAL RATE: 60 BPM
EKG DIAGNOSIS: NORMAL
EKG P-R INTERVAL: 174 MS
EKG Q-T INTERVAL: 500 MS
EKG QRS DURATION: 180 MS
EKG QTC CALCULATION (BAZETT): 500 MS
EKG R AXIS: -62 DEGREES
EKG T AXIS: 85 DEGREES
EKG VENTRICULAR RATE: 60 BPM
GFR AFRICAN AMERICAN: 38 ML/MIN/1.73M2
GFR NON-AFRICAN AMERICAN: 31 ML/MIN/1.73M2
GLUCOSE BLD-MCNC: 186 MG/DL (ref 70–99)
GLUCOSE BLD-MCNC: 200 MG/DL (ref 70–99)
GLUCOSE BLD-MCNC: 224 MG/DL (ref 70–99)
GLUCOSE BLD-MCNC: 231 MG/DL (ref 70–99)
GLUCOSE BLD-MCNC: 242 MG/DL (ref 70–99)
GLUCOSE BLD-MCNC: 278 MG/DL (ref 70–99)
HCT VFR BLD CALC: 25 % (ref 42–52)
HEMOGLOBIN: 7.4 GM/DL (ref 13.5–18)
HIGH SENSITIVE C-REACTIVE PROTEIN: >300 MG/L
MCH RBC QN AUTO: 24.1 PG (ref 27–31)
MCHC RBC AUTO-ENTMCNC: 29.6 % (ref 32–36)
MCV RBC AUTO: 81.4 FL (ref 78–100)
PDW BLD-RTO: 21.2 % (ref 11.7–14.9)
PHOSPHORUS: 2.3 MG/DL (ref 2.5–4.9)
PLATELET # BLD: 168 K/CU MM (ref 140–440)
PMV BLD AUTO: 10.4 FL (ref 7.5–11.1)
POTASSIUM SERPL-SCNC: 4.2 MMOL/L (ref 3.5–5.1)
PROCALCITONIN: 4.93
RBC # BLD: 3.07 M/CU MM (ref 4.6–6.2)
SODIUM BLD-SCNC: 129 MMOL/L (ref 135–145)
TOTAL PROTEIN: 4.8 GM/DL (ref 6.4–8.2)
VANCOMYCIN RANDOM: 24.2 UG/ML
WBC # BLD: 15.2 K/CU MM (ref 4–10.5)

## 2021-03-09 PROCEDURE — 2500000003 HC RX 250 WO HCPCS: Performed by: INTERNAL MEDICINE

## 2021-03-09 PROCEDURE — 92526 ORAL FUNCTION THERAPY: CPT | Performed by: SPEECH-LANGUAGE PATHOLOGIST

## 2021-03-09 PROCEDURE — 84100 ASSAY OF PHOSPHORUS: CPT

## 2021-03-09 PROCEDURE — 97535 SELF CARE MNGMENT TRAINING: CPT

## 2021-03-09 PROCEDURE — 6360000002 HC RX W HCPCS: Performed by: THORACIC SURGERY (CARDIOTHORACIC VASCULAR SURGERY)

## 2021-03-09 PROCEDURE — 2580000003 HC RX 258: Performed by: NURSE PRACTITIONER

## 2021-03-09 PROCEDURE — 6360000002 HC RX W HCPCS: Performed by: NURSE PRACTITIONER

## 2021-03-09 PROCEDURE — 85027 COMPLETE CBC AUTOMATED: CPT

## 2021-03-09 PROCEDURE — 97530 THERAPEUTIC ACTIVITIES: CPT

## 2021-03-09 PROCEDURE — 86141 C-REACTIVE PROTEIN HS: CPT

## 2021-03-09 PROCEDURE — 86922 COMPATIBILITY TEST ANTIGLOB: CPT

## 2021-03-09 PROCEDURE — 99233 SBSQ HOSP IP/OBS HIGH 50: CPT | Performed by: NURSE PRACTITIONER

## 2021-03-09 PROCEDURE — 6370000000 HC RX 637 (ALT 250 FOR IP): Performed by: NURSE PRACTITIONER

## 2021-03-09 PROCEDURE — 80053 COMPREHEN METABOLIC PANEL: CPT

## 2021-03-09 PROCEDURE — 82962 GLUCOSE BLOOD TEST: CPT

## 2021-03-09 PROCEDURE — 80202 ASSAY OF VANCOMYCIN: CPT

## 2021-03-09 PROCEDURE — 94761 N-INVAS EAR/PLS OXIMETRY MLT: CPT

## 2021-03-09 PROCEDURE — 86900 BLOOD TYPING SEROLOGIC ABO: CPT

## 2021-03-09 PROCEDURE — 2000000000 HC ICU R&B

## 2021-03-09 PROCEDURE — 97116 GAIT TRAINING THERAPY: CPT

## 2021-03-09 PROCEDURE — P9016 RBC LEUKOCYTES REDUCED: HCPCS

## 2021-03-09 PROCEDURE — 6360000002 HC RX W HCPCS: Performed by: SPECIALIST

## 2021-03-09 PROCEDURE — 2580000003 HC RX 258: Performed by: INTERNAL MEDICINE

## 2021-03-09 PROCEDURE — 36430 TRANSFUSION BLD/BLD COMPNT: CPT

## 2021-03-09 PROCEDURE — 86850 RBC ANTIBODY SCREEN: CPT

## 2021-03-09 PROCEDURE — 82248 BILIRUBIN DIRECT: CPT

## 2021-03-09 PROCEDURE — P9047 ALBUMIN (HUMAN), 25%, 50ML: HCPCS | Performed by: INTERNAL MEDICINE

## 2021-03-09 PROCEDURE — C9113 INJ PANTOPRAZOLE SODIUM, VIA: HCPCS | Performed by: SPECIALIST

## 2021-03-09 PROCEDURE — 6360000002 HC RX W HCPCS: Performed by: INTERNAL MEDICINE

## 2021-03-09 PROCEDURE — 6370000000 HC RX 637 (ALT 250 FOR IP): Performed by: THORACIC SURGERY (CARDIOTHORACIC VASCULAR SURGERY)

## 2021-03-09 PROCEDURE — 6370000000 HC RX 637 (ALT 250 FOR IP): Performed by: PHYSICIAN ASSISTANT

## 2021-03-09 PROCEDURE — 86901 BLOOD TYPING SEROLOGIC RH(D): CPT

## 2021-03-09 PROCEDURE — 97110 THERAPEUTIC EXERCISES: CPT

## 2021-03-09 PROCEDURE — 84145 PROCALCITONIN (PCT): CPT

## 2021-03-09 RX ORDER — SODIUM CHLORIDE 9 MG/ML
INJECTION, SOLUTION INTRAVENOUS PRN
Status: DISCONTINUED | OUTPATIENT
Start: 2021-03-09 | End: 2021-03-15 | Stop reason: HOSPADM

## 2021-03-09 RX ORDER — ALBUMIN (HUMAN) 12.5 G/50ML
25 SOLUTION INTRAVENOUS ONCE
Status: COMPLETED | OUTPATIENT
Start: 2021-03-09 | End: 2021-03-09

## 2021-03-09 RX ADMIN — INSULIN LISPRO 10 UNITS: 100 INJECTION, SOLUTION INTRAVENOUS; SUBCUTANEOUS at 18:14

## 2021-03-09 RX ADMIN — INSULIN LISPRO 4 UNITS: 100 INJECTION, SOLUTION INTRAVENOUS; SUBCUTANEOUS at 13:39

## 2021-03-09 RX ADMIN — INSULIN LISPRO 10 UNITS: 100 INJECTION, SOLUTION INTRAVENOUS; SUBCUTANEOUS at 10:29

## 2021-03-09 RX ADMIN — MIDODRINE HYDROCHLORIDE 10 MG: 5 TABLET ORAL at 18:22

## 2021-03-09 RX ADMIN — MINOCYCLINE HYDROCHLORIDE 100 MG: 100 CAPSULE ORAL at 20:27

## 2021-03-09 RX ADMIN — ALBUMIN (HUMAN) 25 G: 0.25 INJECTION, SOLUTION INTRAVENOUS at 13:33

## 2021-03-09 RX ADMIN — I.V. FAT EMULSION 250 ML: 20 EMULSION INTRAVENOUS at 18:10

## 2021-03-09 RX ADMIN — INSULIN LISPRO 4 UNITS: 100 INJECTION, SOLUTION INTRAVENOUS; SUBCUTANEOUS at 10:27

## 2021-03-09 RX ADMIN — SODIUM CHLORIDE, PRESERVATIVE FREE 10 ML: 5 INJECTION INTRAVENOUS at 20:28

## 2021-03-09 RX ADMIN — PANTOPRAZOLE SODIUM 40 MG: 40 INJECTION, POWDER, FOR SOLUTION INTRAVENOUS at 08:17

## 2021-03-09 RX ADMIN — DOBUTAMINE IN DEXTROSE 2.5 MCG/KG/MIN: 200 INJECTION, SOLUTION INTRAVENOUS at 15:23

## 2021-03-09 RX ADMIN — ASPIRIN 81 MG: 81 TABLET, CHEWABLE ORAL at 08:18

## 2021-03-09 RX ADMIN — INSULIN LISPRO 4 UNITS: 100 INJECTION, SOLUTION INTRAVENOUS; SUBCUTANEOUS at 18:15

## 2021-03-09 RX ADMIN — MEROPENEM 1000 MG: 1 INJECTION, POWDER, FOR SOLUTION INTRAVENOUS at 18:09

## 2021-03-09 RX ADMIN — CALCIUM GLUCONATE: 98 INJECTION, SOLUTION INTRAVENOUS at 18:11

## 2021-03-09 RX ADMIN — ACETAMINOPHEN 650 MG: 325 TABLET ORAL at 18:22

## 2021-03-09 RX ADMIN — SODIUM CHLORIDE, PRESERVATIVE FREE 10 ML: 5 INJECTION INTRAVENOUS at 08:16

## 2021-03-09 RX ADMIN — PANTOPRAZOLE SODIUM 40 MG: 40 INJECTION, POWDER, FOR SOLUTION INTRAVENOUS at 20:28

## 2021-03-09 RX ADMIN — MIDODRINE HYDROCHLORIDE 10 MG: 5 TABLET ORAL at 08:18

## 2021-03-09 RX ADMIN — MINOCYCLINE HYDROCHLORIDE 100 MG: 100 CAPSULE ORAL at 08:18

## 2021-03-09 RX ADMIN — MEROPENEM 1000 MG: 1 INJECTION, POWDER, FOR SOLUTION INTRAVENOUS at 06:14

## 2021-03-09 RX ADMIN — INSULIN LISPRO 10 UNITS: 100 INJECTION, SOLUTION INTRAVENOUS; SUBCUTANEOUS at 13:42

## 2021-03-09 RX ADMIN — MIDODRINE HYDROCHLORIDE 10 MG: 5 TABLET ORAL at 13:44

## 2021-03-09 ASSESSMENT — PAIN SCALES - GENERAL: PAINLEVEL_OUTOF10: 3

## 2021-03-09 NOTE — PROGRESS NOTES
Infectious Disease Progress Note  3/9/2021   Patient Name: Freeman Duffy : 1950   Impression  · Sepsis Secondary to Acute Hypoxic Respiratory Failure secondary to Possible Multifocal Pneumonia:   § Fever max 103 F 3/2, trending down to low grade  § Leukocytosis max 17.0 on 3/6   § 3/3-Covid-19 Rapid Negative  § 3/6-RDP Negative  § Blood cultures 3/2-0-NGTD  § Blood cultures 3/5-0/2-NGTD  § 3/1-Urine culture: Citrobacter Farmeri 50,000, UA WBC <1, RBC none   § 3/5-Urine culture: NGTD  § 3/8-MRSA/MSSA screen pending  § 3/8-Respiratory culture pending  § 3/5-CT Chest WO Contrast: imp of GGO along the upper lobes extending inferiorly, would represent atelectasis or early pneumonia. · Recent CABG/AVR/MAZE with PPM:  21 Per Dr. Jerold Kanner     ? Erythropoietin Deficiency Anemia     ? COVID-19 Pneumonia: 2020     ? PPM/AF    ? GIB:  § Dr. Joshua Amaya onboard     ? Recurring Right Pleural Effusion     ? CKD3:  § Dr. Wilfred Toussaint onboard  § Had x3 HD, new this admission, last 3/8     ? DMII:  § Dr. Harshal Whyte onboard     ? HTN     ? DMITRIY     · Multi-morbidity: per PMHx:  PPM, CABG/AVR , AF, CKD3, DMII, Gout, HTN, HTN, DMITRIY on CPAP, Left CEA, total left hip, stents BLE     Plan:  ? Continue IV vancomycin, pharmacy to dose  ? Continue IV meropenem 1 gm q12h  ? Continue minocycline 100 mg po bid  · Trend CRP and Pct, Pct elevated, CRP elevated and trending down  ? Await Sputum culture 3/8  ? Await MRSA screen 3/8    Ongoing Antimicrobial Therapy  Cefepime 3/5-  Vancomycin 3/1-, 8-  Minocycline 3/8- ? Completed Antimicrobial Therapy  Ceftriaxone 3/3-  Cefepime 3/5-  ? History:? Interval history noted. Chief complaint: sepsis secondary to possible multifocal pneumonia. Denies n/v/d/f or untoward effects of antibiotics. Resting quietly.      Physical Exam:  Vital Signs: BP (!) 90/45   Pulse 74   Temp 98.8 °F (37.1 °C) (Oral)   Resp 22   Ht 5' 10\" (1.778 m)   Wt 252 lb 3.3 oz (114.4 kg)   SpO2 96%   BMI 36.19 kg/m²     Gen: alert and oriented X3, no distress  Skin: no stigmata of endocarditis  Wounds: C/D/I coccyx ulceration, small area, no drainage. midsternal wound well approximated, no drainage, erythema or edema. HEMT: AT/NC Oropharynx pink, moist, and without lesions or exudates; dentition in good state of repair  Eyes: PERRLA, EOMI, conjunctiva pink, sclera anicteric. Neck: Supple. Trachea midline. No LAD. Chest: no distress and CTA. Diminished breath sounds posteriorly, Vapotherm for oxygenation. Heart: RRR and no MRG. Abd: soft, non-distended, no tenderness, no hepatomegaly. Normoactive bowel sounds. Ext: no clubbing, cyanosis, or edema  Catheter Site: without erythema or tenderness draining clear yellow urine. Midline: Left basilic intact without erythema or edema. Vascath right: without erythema or edema at site. Neuro: Mental status intact.  CN 2-12 intact and no focal sensory or motor deficits     Radiologic / Imaging / TESTING  3/1/21 XR Chest Portable:  Impression   Right-sided pleural effusion       Bibasilar hypoaeration      3/1/21 CT Chest WO Contrast:  Impression   Patient status post midline sternotomy.  Immediately posterior to the   sternotomy defect, there is a small gas and fluid collection which is   nonspecific.  It is not necessarily outside normal limits for a sternotomy   that was performed 2 weeks ago. Tara Lint, sterility cannot be ascertained   with imaging, and therefore a small developing abscess is considered as well.       No evidence of osteolysis of the sternotomy defect to suggest osteomyelitis.       Interval development of a moderate to large right and a moderate left pleural   effusion.  Adjacent airspace disease is some combination of atelectasis,   pneumonia, and/or edema.      3/2/21 XR Chest Portable:  Impression   Pleural effusions right greater than left with bibasilar atelectasis right   worse than left.  No pneumothorax is seen.      3/2/21 IR Guided Thoracentesis Pleural:      FINDINGS:   A total of 800 ml serosanguinous fluid from left and 1050 ml serosanguineous   fluid from right  was removed.                3/4/21 IR Guided Thoracentesis:  FINDINGS:   A total of 1250 ml serosanguinous fluid  was removed.      3/4/21 XR Chest Portable:  ?  Impression   No pneumothorax status post right thoracentesis       Right basilar opacity could represent a combination of pleural fluid and   atelectasis      3/5/21 XR Chest Portable:  Impression   Stable ground-glass opacification in the right mid and lower lung zone.      3/3/21 XR Chest Portable:  Impression   Stable exam      3/3/21 VL Dup BLE:  Impression   No evidence of DVT in either lower extremity.          3/4/21 IR Guided Thoracentesis Pleural:  FINDINGS:   A total of 1250 ml serosanguinous fluid  was removed.      3/4/21 XR Chest Portable:  Impression   No pneumothorax status post right thoracentesis       Right basilar opacity could represent a combination of pleural fluid and   atelectasis             3/5/21 CT Head WO Contrast:  Impression   Motion limited exam, without gross acute intracranial process.       Left posterior parietal scalp soft tissue swelling.  2 mm calcification   versus foreign body within the soft tissues of the frontal scalp.      3/5/21 IR Nontunneled Vascath:  Impression   Successful ultrasound and fluoroscopy guided non-tunneled dialysis catheter   placement.  The catheter is ready to use.      3/5/21 XR Chest Portable:  Impression   Right internal jugular dialysis catheter with catheter tip cavoatrial   junction.       Cardiomegaly with vascular congestion and moderate right pleural effusion   with underlying edema or atelectasis.          3/5/21 CT Chest WO Contrast:  Impression   Postop changes along the mediastinum and sternum with slowly resolving gas   and fluid posterior to the sternum which probably just represents resolving   postop changes.  Recommend clinical follow-up.     Status post placement of a right subclavian catheter in good position with no   change in the right pacemaker.       Mild-to-moderate bibasilar pleural effusions and associated moderate   atelectasis and consolidation posteriorly which is more prominent on the   right and appears to have decreased since the prior study.       Mild loculated fluid along the right upper chest which is more prominent. Recommend follow-up.       Hazy ground-glass opacities along the upper lobes extending inferiorly which   is more prominent and could represent atelectasis or early pneumonia.    Recommend follow-up.       Borderline enlarged lymph nodes in the mediastinum which are unchanged.      3/5/21 CT Abdomen Pelvis WO Contrast:  Impression   Limited noncontrast examination.       Scattered colonic diverticulosis without evidence of acute diverticulitis   seen.       Nonspecific small amount of free fluid in the pelvis.       Diffuse soft tissue anasarca.          3/8/21 XR Chest Portable:  Impression   Cardiomegaly with stable pulmonary edema and right pleural effusion likely   related to CHF.  Stable exam.              Labs:    Recent Results (from the past 24 hour(s))   POCT Glucose    Collection Time: 03/08/21 12:42 PM   Result Value Ref Range    POC Glucose 244 (H) 70 - 99 MG/DL   POCT Glucose    Collection Time: 03/08/21  6:59 PM   Result Value Ref Range    POC Glucose 277 (H) 70 - 99 MG/DL   POCT Glucose    Collection Time: 03/08/21  9:34 PM   Result Value Ref Range    POC Glucose 183 (H) 70 - 99 MG/DL   POCT Glucose    Collection Time: 03/09/21  3:44 AM   Result Value Ref Range    POC Glucose 224 (H) 70 - 99 MG/DL   Renal Function Panel    Collection Time: 03/09/21  4:00 AM   Result Value Ref Range    Sodium 129 (L) 135 - 145 MMOL/L    Potassium 4.2 3.5 - 5.1 MMOL/L    Chloride 92 (L) 99 - 110 mMol/L    CO2 28 21 - 32 MMOL/L    Anion Gap 9 4 - 16    BUN 59 (H) 6 - 23 MG/DL    CREATININE 2.1 (H) 0.9 - 1.3 MG/DL Glucose 186 (H) 70 - 99 MG/DL    Calcium 7.6 (L) 8.3 - 10.6 MG/DL    GFR Non- 31 (L) >60 mL/min/1.73m2    GFR  38 (L) >60 mL/min/1.73m2    Albumin 2.7 (L) 3.4 - 5.0 GM/DL    Phosphorus 2.3 (L) 2.5 - 4.9 MG/DL   Hepatic Function Panel    Collection Time: 03/09/21  4:00 AM   Result Value Ref Range    Albumin 2.7 (L) 3.4 - 5.0 GM/DL    Total Bilirubin 1.0 0.0 - 1.0 MG/DL    Bilirubin, Direct 0.6 (H) 0.0 - 0.3 MG/DL    Bilirubin, Indirect 0.4 0 - 0.7 MG/DL    Alkaline Phosphatase 220 (H) 40 - 129 IU/L    AST 30 15 - 37 IU/L    ALT 56 (H) 10 - 40 U/L    Total Protein 4.8 (L) 6.4 - 8.2 GM/DL   C-Reactive Protein    Collection Time: 03/09/21  4:00 AM   Result Value Ref Range    CRP, High Sensitivity >300.0 mg/L   Procalcitonin    Collection Time: 03/09/21  4:00 AM   Result Value Ref Range    Procalcitonin 4.93    Vancomycin, random    Collection Time: 03/09/21  4:00 AM   Result Value Ref Range    Vancomycin Rm 24.2 UG/ML    DOSE AMOUNT DOSE AMT.  GIVEN - 1000     DOSE TIME DOSE TIME GIVEN - 1600      CULTURE results: Invalid input(s): BLOOD CULTURE,  URINE CULTURE, SURGICAL CULTURE    Diagnosis:  Patient Active Problem List   Diagnosis    Type 2 diabetes mellitus without complication, without long-term current use of insulin (HCC)    Ulcer of other part of lower limb    Venous hypertension, chronic, with ulcer ([address]    Ulcer of other part of foot    Pneumonia    Atrial fibrillation (HCC)    Sinus pause    PAF (paroxysmal atrial fibrillation) ([address]    DM (diabetes mellitus) ([address]    DMITRIY on CPAP    Hyperlipidemia    Status post incision and drainage    CKD (chronic kidney disease) stage 3, GFR 30-59 ml/min ([facility])    Hyperkalemia    Arthritis    PVD (peripheral vascular disease) ([address]    Hematoma    Cardiac pacemaker in situ    Coronary artery stenosis    Essential hypertension    Gout    Diabetic neuropathy associated with type 2 diabetes mellitus ([address]    Adenomatous polyp of sigmoid colon    Iron deficiency anemia due to chronic blood loss    Erythropoietin deficiency anemia    VHD (valvular heart disease)    Abnormal fractional flow reserve (FFR) on cardiac catheterization    Carotid stenosis, left    Aortic stenosis, severe    CAD in native artery    Displacement of atrial pacemaker leads    Pacemaker lead malfunction    SOB (shortness of breath)    Pleural effusion       Active Problems  Active Problems:    Hyperkalemia    SOB (shortness of breath)    Pleural effusion  Resolved Problems:    * No resolved hospital problems. *    Electronically signed by: Electronically signed by Ike Beyer.  JUSTIN Parker CNP on 3/9/2021 at 9:44 AM

## 2021-03-09 NOTE — PLAN OF CARE
Problem: Falls - Risk of:  Goal: Will remain free from falls  Description: Will remain free from falls  3/8/2021 2215 by Babak Castillo RN  Outcome: Ongoing  3/8/2021 0954 by Madison Vázquez RN  Outcome: Ongoing  Goal: Absence of physical injury  Description: Absence of physical injury  3/8/2021 2215 by Babak Castillo RN  Outcome: Ongoing  3/8/2021 0954 by Madison Vázquez RN  Outcome: Ongoing     Problem: Pain:  Description: Pain management should include both nonpharmacologic and pharmacologic interventions.   Goal: Pain level will decrease  Description: Pain level will decrease  3/8/2021 2215 by Babak Castillo RN  Outcome: Ongoing  3/8/2021 0954 by Madison Vázquez RN  Outcome: Ongoing  Goal: Control of acute pain  Description: Control of acute pain  3/8/2021 2215 by Babak Castillo RN  Outcome: Ongoing  3/8/2021 0954 by Madison Vázquez RN  Outcome: Ongoing  Goal: Control of chronic pain  Description: Control of chronic pain  3/8/2021 2215 by Babak Castillo RN  Outcome: Ongoing  3/8/2021 0954 by Madison Vázquez RN  Outcome: Ongoing  Goal: Patient's pain/discomfort is manageable  Description: Patient's pain/discomfort is manageable  3/8/2021 2215 by Babak Castillo RN  Outcome: Ongoing  3/8/2021 0954 by Madison Vázquez RN  Outcome: Ongoing

## 2021-03-09 NOTE — PROGRESS NOTES
Patient Number     [id number]         Room Number         2101   Visit Number       [id number]   Corporate ID       [id number]   Accession Number   [id number]         [address]   Ordering Physician Latasha Odell PA-C       Physician           MD  Procedure Type of Study   TTE procedure:ECHOCARDIOGRAM LIMITED. Procedure Date Date: 03/02/2021 Start: 11:53 AM Study Location: Portable Technical Quality: Adequate visualization Indications:S/P CABG. Patient Status: Routine Height: 70 inches Weight: 230 pounds BSA: 2.22 m2 BMI: 33 kg/m2 HR: 91 bpm BP: 91/45 mmHg  Conclusions   Summary  This is a limited echocardiogram.  Left ventricular systolic function is normal.  Ejection fraction is visually estimated at 55-60%. Bilateral atrial enlargement. S/p AVR with a 27 mm Medtronic Mosaic. Moderate mitral regurgitation. Moderate tricuspid regurgitation; RVSP: 50 mmHg. Severe PHTN. No evidence of any pericardial effusion. Signature   ------------------------------------------------------------------  Electronically signed by Dakota Matthews MD (Interpreting  physician) on 03/02/2021 at 05:06 PM  -    Ct Chest Wo Contrast    Result Date: 3/1/2021  EXAMINATION: CT OF THE CHEST WITHOUT CONTRAST 3/1/2021 3:57 pm T    Patient status post midline sternotomy. Immediately posterior to the sternotomy defect, there is a small gas and fluid collection which is nonspecific. It is not necessarily outside normal limits for a sternotomy that was performed 2 weeks ago. However, sterility cannot be ascertained with imaging, and therefore a small developing abscess is considered as well. No evidence of osteolysis of the sternotomy defect to suggest osteomyelitis. Interval development of a moderate to large right and a moderate left pleural effusion.   Adjacent airspace disease is some combination of atelectasis, pneumonia, and/or edema. Xr Chest Portable    Result Date: 3/3/2021  EXAMINATION: ONE XRAY VIEW OF THE CHEST 3/3/2021 5:34 am COMPARISON: 03/02/2021 HISTORY: ORDERING SYSTEM PROVIDED HISTORY: heart failure TECHNOLOGIST PROVIDED HISTORY: Reason for exam:->heart failure Reason for Exam: heart failure Acuity: Acute Type of Exam: Subsequent/Follow-up FINDINGS: Status post median sternotomy and left-sided transvenous pacer placement. There is stable cardiomegaly. The chest films taken shallow degree of inspiration accentuating heart size and bronchovascular structures. There is a small right pleural effusion. No significant left effusion is seen. The patient is undergone clipping of the left atrial appendage. There is bibasilar atelectasis. Stable exam     Xr Chest Portable    Result Date: 3/2/2021  EXAMINATION: ONE XRAY VIEW OF THE CHEST 3/2/2021 9:38 am COMPARISON: 03/01/2021 HISTORY: ORDERING SYSTEM PROVIDED HISTORY: post bilateral thoracentesis   . Patient has undergone thoracentesis. The volume of fluid in the right pleural space has decreased and/or shifted. There is some persistent right basilar atelectasis. No significant pleural effusion on the left is seen. Minimal left basilar atelectasis is noted. No pneumothorax is seen. Bilateral shoulder arthritis is noted. Pleural effusions right greater than left with bibasilar atelectasis right worse than left. No pneumothorax is seen.      Xr Chest Portable    Result Date: 3/1/2021  EXAMINATION: ONE XRAY VIEW OF THE CHEST 3/1/2021 5:12 pm COMPARISON: 02/23/2021 HISTORY: ORDERING SYSTEM PROVIDED HISTORY: chest pain, shorntess of breath TECHNOLOGIST PROVIDED HISTORY: Reason for exam:->chest pain, shorntess of breath Reason for Exam: chest pain   sob   leg swelling Acuity: Unknown Type of Exam: Unknown Relevant Medical/Surgical History: afib    cad    diabetes FINDINGS: Status post median sternotomy. Transvenous pacer remains place. Stable cardiomegaly. Right-sided pleural effusion. Bibasilar hypoaeration. Right-sided pleural effusion Bibasilar hypoaeration     Vl Dup Lower Extremity Venous Bilateral    Result Date: 3/3/2021  EXAMINATION: DUPLEX VENOUS ULTRASOUND OF THE BILATERAL LOWER EXTREMITIES, 3/3/2021 9:01 am TECHNIQUE: Duplex ultrasound using B-mode/gray scaled imaging and Doppler spectral analysis and color flow was obtained of the bilateral lower extremities. COMPARISON: None. HISTORY: ORDERING SYSTEM PROVIDED HISTORY: fevers TECHNOLOGIST PROVIDED HISTORY: Reason for exam:->fevers Reason for Exam: edema FINDINGS: The visualized veins of the bilateral lower extremities are patent and free of echogenic thrombus. The veins demonstrate good compressibility with normal color flow study and spectral analysis. No evidence of DVT in either lower extremity. Us Chest Including Mediastinum    Result Date: 3/3/2021  EXAMINATION: ULTRASOUND OF THE CHEST 3/3/2021 9:02 am COMPARISON: Chest radiograph performed earlier in the same day HISTORY: ORDERING SYSTEM PROVIDED HISTORY: ASSESS FOR PLEURAL EFFUSION TECHNOLOGIST PROVIDED HISTORY: RIGHT LUNG Reason for exam:->ASSESS FOR PLEURAL EFFUSION Reason for Exam: pleural effusion FINDINGS: Moderate right pleural effusion is present. Right pleural effusion is present. Ir Guided Thoracentesis Pleural    Result Date: 3/2/2021  PROCEDURE: ULTRASOUNDGUIDED Bilateral THORACENTESIS 3/2/2021 HISTORY: ORDERING SYSTEM PROVIDED HISTORY: Bilateral pleural effusion. T   The patient tolerated the procedure well. FINDINGS: A total of 800 ml serosanguinous fluid from left and 1050 ml serosanguineous fluid from right  was removed. Successful ultrasound guided bilateral thoracentesis.        Scheduled Medicines   Medications:    insulin lispro  10 Units Subcutaneous TID WC    meropenem  1,000 mg Intravenous Q12H    minocycline  100 mg Oral BID    vancomycin (VANCOCIN) intermittent dosing (placeholder)   Other RX Placeholder    [Held by provider] apixaban  5 mg Oral BID    sodium chloride flush  10 mL Intravenous 2 times per day    pantoprazole  40 mg Intravenous BID    midodrine  10 mg Oral TID WC    insulin lispro  0-6 Units Subcutaneous 2 times per day    aspirin  81 mg Oral Daily    Dulaglutide  1.5 mg Subcutaneous Weekly    [Held by provider] atorvastatin  10 mg Oral Daily    insulin lispro  0-12 Units Subcutaneous TID WC      Infusions:    sodium chloride      DOBUTamine 5 mcg/kg/min (03/08/21 1642)         Objective:   Vitals: BP (!) 90/45   Pulse 74   Temp 98.8 °F (37.1 °C) (Oral)   Resp 22   Ht 5' 10\" (1.778 m)   Wt 252 lb 3.3 oz (114.4 kg)   SpO2 96%   BMI 36.19 kg/m²   General appearance: alert and cooperative with exam  Neck: no JVD or bruit  Thyroid : Normal lobes   Lungs: Has Vesicular Breath sounds there is breath sounds bilateral pleural effusion tapped both sides noted below  Heart:  regular rate and rhythm  Abdomen: soft, non-tender; bowel sounds normal; no masses,  no organomegaly  Musculoskeletal: Normal  Extremities: extremities normal, , no edema  Neurologic:  Awake, alert, oriented to name, place and time. Cranial nerves II-XII are grossly intact. Motor is  intact. Sensory is intact. ,  and gait is normal.    Assessment:     Patient Active Problem List:     Type 2 diabetes mellitus without complication, without long-term current use of insulin ([facility])     Ulcer of other part of lower limb     Venous hypertension, chronic, with ulcer ([address]     Ulcer of other part of foot     Pneumonia     Atrial fibrillation (HCC)     Sinus pause     PAF (paroxysmal atrial fibrillation) ([facility])     DM (diabetes mellitus) ([facility])     DMITRIY on CPAP     Hyperlipidemia     Status post incision and drainage     CKD (chronic kidney disease) stage 3, GFR 30-59 ml/min ([facility])     Hyperpotassemia     Arthritis     PVD (peripheral vascular disease) ([facility] [address]     Hematoma     Cardiac pacemaker in situ     Coronary artery stenosis     Essential hypertension     Gout     Diabetic neuropathy associated with type 2 diabetes mellitus (HCC)     Adenomatous polyp of sigmoid colon     Iron deficiency anemia due to chronic blood loss     Erythropoietin deficiency anemia     VHD (valvular heart disease)     Abnormal fractional flow reserve (FFR) on cardiac catheterization     Carotid stenosis, left     Aortic stenosis, severe     CAD in native artery     Displacement of atrial pacemaker leads     Pacemaker lead malfunction     SOB (shortness of breath)      ? ? Sepsis      Plan:     1. Reviewed POC blood glucose . Labs and X ray results   2. Reviewed Current Medicines   3. On meal/ Correction bolus Humalog/ Basal Lantus Insulin regime   4. Monitor Blood glucose frequently   5. Modified  the dose of Insulin/ other medicines as needed   6. Future hemodialysis at discretion of nephrology  7. Patient was started on antibiotics source of infection is not clear at this point  8. Is being investigated for cause in place of sepsis  9. Will not re start Trulicity at this time  10. Will follow     .      Jovon Gonzalez MD

## 2021-03-09 NOTE — PROGRESS NOTES
Physical Therapy    Physical Therapy Treatment Note  Name: Negrito Mantilla MRN: [id number] :   1950   Date:  3/9/2021   Admission Date: 3/1/2021 Room:  -A   Restrictions/Precautions:        Sternal, no pushing, pulling or lifting more than 5 pounds for the first 2 weeks and then 10 pounds up to 3 months,   Arms are to support chest when changing position, coughing or sneezing. No lifting arms above 90 degrees except with the ex's  Communication with other providers:  Pravin Jarrell RN states pt is ok to see for therapy  Subjective:  Patient states:  He is feeling ok today  Pain:   Location, Type, Intensity (0/10 to 10/10):  2/10 all over  Objective:    Observation:  Pt was in bed with nursing in the room  Treatment, including education/measures:  Vital Signs  HR: 70, B/P 111/62, O2 94%  Education:  Pt was instructed in Sternal Precautions, Purpose of Exercise Program, Ambar Scale and Signs and Symptoms of Exercise Intolerance per Cardiac Protocol  Pt was given Initial Cardiac Exercises in Supine: rest between each ex  Ankle Pumps x 10  Hip Abduction x 10  Heel Slides x 10  Arm Circles x 10  Head Turns x 10  Front Arm Raises x 10   Side Arm Raises x 10  Arm Crosses x 10  Transfers with line management of IV's, anaya zaragoza  Supine to sit :depend of 2 and VC's for sternal precautions  Scooting :dependant of 2 and VC's for sternal precautions  Rolling :depend of 2  Sit to stand :min A of 2 from bed and VC's for sternal precautions mod a of 2 from toilet  Stand to sit :max A of 1 to toilet d/t pt haste and need to use the BR and VC's for sternal precautions, mod A of 2 to chair  Gait:  Pt amb with CW for 10 ft x 2t with min A of 2  Pt needed max VC's for posture, PLB, directions and pathway  Attempted to get pt to take steps in place and pt was able to do 3 reps then stop and had trouble re-initiating movments  Safety  Patient left safely in the chair, with call light/phone in reach.  Gait belt and mask were used for transfers and gait. Assessment / Impression:    Patient's tolerance of treatment:  Fair, very sleepy with ex's and delayed reactions with trans/gt. Pt is [place], pt appeared to understand what was asked of him but was unable to do activity   Adverse Reaction: none  Significant change in status and impact:  none  Barriers to improvement:  Mentation, weakness,   Plan for Next Session:    Will cont to progress ex's and gt per cardiac protocol and educate pt on sternal precautions, S & S of ex intolerance, purpose of ex's, progression of ex's and gt at home. Time in:  7669+3346  Time out:  2636+2336  Timed treatment minutes:  30+38  Total treatment time:  76  Previously filed items:   Short term goals  Time Frame for Short term goals: 1 week  Short term goal 1: Pt will perform sit><supine Gaby  Short term goal 2: Pt will transfer Gaby  Short term goal 3: Pt will ambulate 100ft with LRAD Gaby  Short term goal 4: Pt will perform standing light dynamic activity with single UE support Gaby x 3 minutes  Short term goal 5: Pt will ascend/descend 2 steps single rail Gaby   Electronically signed by:     Yoav Hein PTA  3/9/2021, 9:00 AM

## 2021-03-09 NOTE — PROGRESS NOTES
PATIENT NAME: Emi Mckenzie    TODAY'S DATE: 03/09/21    SUBJECTIVE:    Pt is s/p CABG x 2, AVR, maze, LA clip. Pt has minimal complaints. He has been very sleep this am and had a hard time keeping his eyes open during my exam. He had HD late last night. OBJECTIVE:   VITALS:    Vitals:    03/09/21 0700   BP: (!) 90/45   Pulse: 74   Resp: 22   Temp:    SpO2: 96%     INTAKE/OUTPUT:    Date 03/09/21 0000 - 03/09/21 2359   Shift 8819-7014 4931-2109 1624-0765 24 Hour Total   INTAKE   P.O. 120   120   I. V.(mL/kg/hr) 190(0.2)   190   IV Piggyback 221   221   Shift Total(mL/kg) 531(4.6)   531(4.6)   OUTPUT   Urine(mL/kg/hr) 24(0)   24   Shift Total(mL/kg) 24(0.2)   24(0.2)   Weight (kg) 114.4 114.4 114.4 114.4      Patient Vitals for the past 96 hrs (Last 3 readings):   Weight   03/09/21 0600 252 lb 3.3 oz (114.4 kg)   03/08/21 0600 238 lb 8.6 oz (108.2 kg)   03/06/21 1136 240 lb 4.8 oz (109 kg)       EXAM:  Blood pressure (!) 90/45, pulse 74, temperature 98.8 °F (37.1 °C), temperature source Oral, resp. rate 22, height 5' 10\" (1.778 m), weight 252 lb 3.3 oz (114.4 kg), SpO2 96 %. General appearance: No apparent distress, appears stated age and cooperative. Skin: unremarkable  HEENT Normocephalic, atraumatic without obvious deformity. Neck: Supple, Trachea midline   Lungs: Good respiratory effort.  Clear to auscultation, bilaterally  Heart: Regular rate/ rhythm inc c/d/i  Abdomen: Soft, non-tender or non-distended   Extremities: 1+ edema warm well perfused  Neurologic: Alert, grossly intact  Mental status: normal affect      Data:  CBC:   Recent Labs     03/06/21  1145 03/07/21  0440 03/08/21  0445   WBC 16.0* 17.0* 14.9*   HGB 7.2* 7.7* 7.4*   HCT 23.1* 24.8* 24.9*    189 171     BMP:    Recent Labs     03/07/21  0440 03/08/21  0445 03/09/21  0400   * 130* 129*   K 4.2 4.1 4.2   CL 94* 94* 92*   CO2 26 26 28   BUN 69* 82* 59*   CREATININE 2.0* 2.5* 2.1*   GLUCOSE 139* 145* 186*     Hepatic: Recent Labs     03/07/21  0440 03/08/21  0445 03/09/21  0400   AST 53* 37 30   ALT 84* 65* 56*   BILITOT 0.8 0.9 1.0   ALKPHOS 195* 184* 220*     Mag:    No results for input(s): MG in the last 72 hours. Phos:     Recent Labs     03/07/21  0440 03/08/21  0445 03/09/21  0400   PHOS 2.9 3.0 2.3*      INR:   No results for input(s): INR in the last 72 hours. Radiology Review:  CXR  Impression:     Cardiomegaly with stable pulmonary edema and right pleural effusion likely   related to CHF.  Stable exam.            ASSESSMENT AND PLAN:    Patient Active Problem List   Diagnosis    Type 2 diabetes mellitus without complication, without long-term current use of insulin ([address]    Ulcer of other part of lower limb    Venous hypertension, chronic, with ulcer ([address]    Ulcer of other part of foot    Pneumonia    Atrial fibrillation (HCC)    Sinus pause    PAF (paroxysmal atrial fibrillation) ([address]    DM (diabetes mellitus) ([address]    DMITRIY on CPAP    Hyperlipidemia    Status post incision and drainage    CKD (chronic kidney disease) stage 3, GFR 30-59 ml/min ([facility])    Hyperkalemia    Arthritis    PVD (peripheral vascular disease) ([address]    Hematoma    Cardiac pacemaker in situ    Coronary artery stenosis    Essential hypertension    Gout    Diabetic neuropathy associated with type 2 diabetes mellitus (HCC)    Adenomatous polyp of sigmoid colon    Iron deficiency anemia due to chronic blood loss    Erythropoietin deficiency anemia    VHD (valvular heart disease)    Abnormal fractional flow reserve (FFR) on cardiac catheterization    Carotid stenosis, left    Aortic stenosis, severe    CAD in native artery    Displacement of atrial pacemaker leads    Pacemaker lead malfunction    SOB (shortness of breath)    Pleural effusion       S/P CABG x 2, AVR, maze, LA clip. Cardio: stable, wean dobutamine to 2.5, continue proamatine 10 TID. Holding AC. Pulm: stable on 1L NC, CPAP. Encourage IS. GI: PO intake improving. Had a BM today. Will start TPN today for volume. Renal: creatinine stable 2.1, BUN 55. Marginal UOP. No HD today. Appreciate nephrology input. Heme: cbc pending  ID:  Urine cx citrobacter farmeri, sputum cx GNB. No fevers last 24 hrs. on vanc/merrem/minocycline per ID.      Korin Dutta PA-C

## 2021-03-09 NOTE — PROGRESS NOTES
[address] [facility]  consulted by Dr. Balbina Lal for monitoring and adjustment. Indication for treatment: Pneumonia  Goal trough: 15 mcg/mL, -600     Pertinent Laboratory Values:   Temp Readings from Last 3 Encounters:   03/09/21 98.8 °F (37.1 °C) (Oral)   02/24/21 97.3 °F (36.3 °C) (Axillary)   02/12/21 97.8 °F (36.6 °C) (Temporal)     Recent Labs     03/06/21  1145 03/07/21  0440 03/08/21  0445   WBC 16.0* 17.0* 14.9*     Recent Labs     03/07/21  0440 03/08/21  0445 03/09/21  0400   BUN 69* 82* 59*   CREATININE 2.0* 2.5* 2.1*     Estimated Creatinine Clearance: 40 mL/min (A) (based on SCr of 2.1 mg/dL (H)). Intake/Output Summary (Last 24 hours) at 3/9/2021 1134  Last data filed at 3/9/2021 7811  Gross per 24 hour   Intake 1581 ml   Output 2249 ml   Net -668 ml     Pertinent Cultures:  Date    Source    Results  3/5   Blood    NGTD  3/8   MRSA Screen   Ordered    Vancomycin level:   TROUGH:  No results for input(s): VANCOTROUGH in the last 72 hours. RANDOM:    Recent Labs     03/06/21  1645 03/08/21  0445 03/09/21  0400   VANCORANDOM 16.4 22.1 24.2     Assessment:  · WBC and temperature: WBC trending down/afebrile  · SCr, BUN, and urine output: hold HD today  · Day(s) of therapy: 7 (day 1 of pharmacy consult)  · Vancomycin concentration: 24.2, hold     Plan:  · Pulse dosing based on levels 2/2 RRT   · Received vancomycin 1000 mg x 1 yesterday   · Level is elevated this AM, no HD today  · Likely re-dose in 2 days or sooner if HD again   · Pharmacy will continue to monitor patient and adjust therapy as indicated    Thank you for the consult.   Rebekah Walker, PharmD, BCPS   3/9/2021 11:34 AM

## 2021-03-09 NOTE — PROGRESS NOTES
Dr. Pau Osorio updated on hemoglobin and poor ambulation noted today. Dobutrex gtt only able to be weaned to 2.5 mcg/kg/hr. Pt is very weak and only able to walk in room today. Eating better though.

## 2021-03-09 NOTE — PROGRESS NOTES
progress Note( Dr. Victor Running)  3/9/2021  Subjective:   Admit Date: 3/1/2021  PCP: Aleyda Almodovar MD    Admitted For :Shortness of breath leg swelling    Consulted For:  Better control of blood glucose    Interval History: Patient seemed to be more alert today able to communicate properly  CT of brain done No acute injury  Had hemodialysis done yesterday and future dialysis RN discretion of nephrology    LFTs are still climbing is being followed up by GI  Review input from  infectious disease    Denies any chest pains,   Yes SOB . Denies nausea or vomiting. Not eating much  No new bowel or bladder symptoms.        Intake/Output Summary (Last 24 hours) at 3/9/2021 0724  Last data filed at 3/9/2021 7528  Gross per 24 hour   Intake 1881 ml   Output 2274 ml   Net -393 ml       DATA    CBC:   Recent Labs     03/06/21  1145 03/07/21  0440 03/08/21  0445   WBC 16.0* 17.0* 14.9*   HGB 7.2* 7.7* 7.4*    189 171    CMP:  Recent Labs     03/07/21  0440 03/08/21  0445 03/09/21  0400   * 130* 129*   K 4.2 4.1 4.2   CL 94* 94* 92*   CO2 26 26 28   BUN 69* 82* 59*   CREATININE 2.0* 2.5* 2.1*   CALCIUM 7.8* 7.7* 7.6*   PROT 5.1* 5.1* 4.8*   LABALBU 3.2*  3.2* 3.0*  3.0* 2.7*  2.7*   BILITOT 0.8 0.9 1.0   ALKPHOS 195* 184* 220*   AST 53* 37 30   ALT 84* 65* 56*     Lipids:   Lab Results   Component Value Date    CHOL 91 12/18/2020    HDL 43 12/18/2020    TRIG 66 12/18/2020     Glucose:  Recent Labs     03/08/21  1859 03/08/21  2134 03/09/21  0344   POCGLU 277* 183* 224*     [id number]:  Lab Results   Component Value Date    LABA1C 6.1 02/20/2021     High Sensitivity TSH:   Lab Results   Component Value Date    TSHHS 2.620 03/03/2021     Free T3: No results found for: FT3  Free T4:  Lab Results   Component Value Date    T4FREE 1.5 07/07/2020       Echocardiogram Limited    Result Date: 3/2/2021  Transthoracic Echocardiography Report (TTE)  Demographics   Patient Name       RASHAD TATE    Date of Study 03/02/2021   Date of Birth      1950         Gender              Male   Age                66 year(s)         Race                   Patient Number     [id number]         Room Number         2101   Visit Number       [id number]   Corporate ID       [id number]   Accession Number   [id number]         [address] Asha Perry RVT   Ordering Physician Buddy Mendez PA-C       Physician           MD  Procedure Type of Study   TTE procedure:ECHOCARDIOGRAM LIMITED. Procedure Date Date: 03/02/2021 Start: 11:53 AM Study Location: Portable Technical Quality: Adequate visualization Indications:S/P CABG. Patient Status: Routine Height: 70 inches Weight: 230 pounds BSA: 2.22 m2 BMI: 33 kg/m2 HR: 91 bpm BP: 91/45 mmHg  Conclusions   Summary  This is a limited echocardiogram.  Left ventricular systolic function is normal.  Ejection fraction is visually estimated at 55-60%. Bilateral atrial enlargement. S/p AVR with a 27 mm Medtronic Mosaic. Moderate mitral regurgitation. Moderate tricuspid regurgitation; RVSP: 50 mmHg. Severe PHTN. No evidence of any pericardial effusion. Signature   ------------------------------------------------------------------  Electronically signed by Evi Sepulveda MD (Interpreting  physician) on 03/02/2021 at 05:06 PM  -    Ct Chest Wo Contrast    Result Date: 3/1/2021  EXAMINATION: CT OF THE CHEST WITHOUT CONTRAST 3/1/2021 3:57 pm T    Patient status post midline sternotomy. Immediately posterior to the sternotomy defect, there is a small gas and fluid collection which is nonspecific. It is not necessarily outside normal limits for a sternotomy that was performed 2 weeks ago. However, sterility cannot be ascertained with imaging, and therefore a small developing abscess is considered as well.  No evidence of osteolysis of the sternotomy defect to suggest osteomyelitis. Interval development of a moderate to large right and a moderate left pleural effusion. Adjacent airspace disease is some combination of atelectasis, pneumonia, and/or edema. Xr Chest Portable    Result Date: 3/3/2021  EXAMINATION: ONE XRAY VIEW OF THE CHEST 3/3/2021 5:34 am COMPARISON: 03/02/2021 HISTORY: ORDERING SYSTEM PROVIDED HISTORY: heart failure TECHNOLOGIST PROVIDED HISTORY: Reason for exam:->heart failure Reason for Exam: heart failure Acuity: Acute Type of Exam: Subsequent/Follow-up FINDINGS: Status post median sternotomy and left-sided transvenous pacer placement. There is stable cardiomegaly. The chest films taken shallow degree of inspiration accentuating heart size and bronchovascular structures. There is a small right pleural effusion. No significant left effusion is seen. The patient is undergone clipping of the left atrial appendage. There is bibasilar atelectasis. Stable exam     Xr Chest Portable    Result Date: 3/2/2021  EXAMINATION: ONE XRAY VIEW OF THE CHEST 3/2/2021 9:38 am COMPARISON: 03/01/2021 HISTORY: ORDERING SYSTEM PROVIDED HISTORY: post bilateral thoracentesis   . Patient has undergone thoracentesis. The volume of fluid in the right pleural space has decreased and/or shifted. There is some persistent right basilar atelectasis. No significant pleural effusion on the left is seen. Minimal left basilar atelectasis is noted. No pneumothorax is seen. Bilateral shoulder arthritis is noted. Pleural effusions right greater than left with bibasilar atelectasis right worse than left. No pneumothorax is seen.      Xr Chest Portable    Result Date: 3/1/2021  EXAMINATION: ONE XRAY VIEW OF THE CHEST 3/1/2021 5:12 pm COMPARISON: 02/23/2021 HISTORY: ORDERING SYSTEM PROVIDED HISTORY: chest pain, shorntess of breath TECHNOLOGIST PROVIDED HISTORY: Reason for exam:->chest pain, shorntess of breath Reason for Exam: chest pain   sob   leg swelling Acuity: Unknown Type of Exam: Unknown Relevant Medical/Surgical History: afib    cad    diabetes FINDINGS: Status post median sternotomy. Transvenous pacer remains place. Stable cardiomegaly. Right-sided pleural effusion. Bibasilar hypoaeration. Right-sided pleural effusion Bibasilar hypoaeration     Vl Dup Lower Extremity Venous Bilateral    Result Date: 3/3/2021  EXAMINATION: DUPLEX VENOUS ULTRASOUND OF THE BILATERAL LOWER EXTREMITIES, 3/3/2021 9:01 am TECHNIQUE: Duplex ultrasound using B-mode/gray scaled imaging and Doppler spectral analysis and color flow was obtained of the bilateral lower extremities. COMPARISON: None. HISTORY: ORDERING SYSTEM PROVIDED HISTORY: fevers TECHNOLOGIST PROVIDED HISTORY: Reason for exam:->fevers Reason for Exam: edema FINDINGS: The visualized veins of the bilateral lower extremities are patent and free of echogenic thrombus. The veins demonstrate good compressibility with normal color flow study and spectral analysis. No evidence of DVT in either lower extremity. Us Chest Including Mediastinum    Result Date: 3/3/2021  EXAMINATION: ULTRASOUND OF THE CHEST 3/3/2021 9:02 am COMPARISON: Chest radiograph performed earlier in the same day HISTORY: ORDERING SYSTEM PROVIDED HISTORY: ASSESS FOR PLEURAL EFFUSION TECHNOLOGIST PROVIDED HISTORY: RIGHT LUNG Reason for exam:->ASSESS FOR PLEURAL EFFUSION Reason for Exam: pleural effusion FINDINGS: Moderate right pleural effusion is present. Right pleural effusion is present. Ir Guided Thoracentesis Pleural    Result Date: 3/2/2021  PROCEDURE: ULTRASOUNDGUIDED Bilateral THORACENTESIS 3/2/2021 HISTORY: ORDERING SYSTEM PROVIDED HISTORY: Bilateral pleural effusion. T   The patient tolerated the procedure well. FINDINGS: A total of 800 ml serosanguinous fluid from left and 1050 ml serosanguineous fluid from right  was removed. Successful ultrasound guided bilateral thoracentesis.        Scheduled Medicines Medications:    insulin lispro  10 Units Subcutaneous TID     meropenem  1,000 mg Intravenous Q12H    minocycline  100 mg Oral BID    vancomycin (VANCOCIN) intermittent dosing (placeholder)   Other RX Placeholder    [Held by provider] apixaban  5 mg Oral BID    sodium chloride flush  10 mL Intravenous 2 times per day    pantoprazole  40 mg Intravenous BID    midodrine  10 mg Oral TID     insulin lispro  0-6 Units Subcutaneous 2 times per day    aspirin  81 mg Oral Daily    Dulaglutide  1.5 mg Subcutaneous Weekly    [Held by provider] atorvastatin  10 mg Oral Daily    insulin lispro  0-12 Units Subcutaneous TID       Infusions:    sodium chloride      DOBUTamine 5 mcg/kg/min (03/08/21 1642)         Objective:   Vitals: BP (!) 90/45   Pulse 74   Temp 98.8 °F (37.1 °C) (Oral)   Resp 22   Ht 5' 10\" (1.778 m)   Wt 252 lb 3.3 oz (114.4 kg)   SpO2 96%   BMI 36.19 kg/m²   General appearance: alert and cooperative with exam  Neck: no JVD or bruit  Thyroid : Normal lobes   Lungs: Has Vesicular Breath sounds there is breath sounds bilateral pleural effusion tapped both sides noted below  Heart:  regular rate and rhythm  Abdomen: soft, non-tender; bowel sounds normal; no masses,  no organomegaly  Musculoskeletal: Normal  Extremities: extremities normal, , no edema  Neurologic:  Awake, alert, oriented to name, place and time. Cranial nerves II-XII are grossly intact. Motor is  intact. Sensory is intact. ,  and gait is normal.    Assessment:     Patient Active Problem List:     Type 2 diabetes mellitus without complication, without long-term current use of insulin (HCC)     Ulcer of other part of lower limb     Venous hypertension, chronic, with ulcer ([address]     Ulcer of other part of foot     Pneumonia     Atrial fibrillation (HCC)     Sinus pause     PAF (paroxysmal atrial fibrillation) (HCC)     DM (diabetes mellitus) ([address]     DMITRIY on CPAP     Hyperlipidemia     Status post incision and drainage     CKD (chronic kidney disease) stage 3, GFR 30-59 ml/min (HCC)     Hyperpotassemia     Arthritis     PVD (peripheral vascular disease) ([facility])     Hematoma     Cardiac pacemaker in situ     Coronary artery stenosis     Essential hypertension     Gout     Diabetic neuropathy associated with type 2 diabetes mellitus ([facility])     Adenomatous polyp of sigmoid colon     Iron deficiency anemia due to chronic blood loss     Erythropoietin deficiency anemia     VHD (valvular heart disease)     Abnormal fractional flow reserve (FFR) on cardiac catheterization     Carotid stenosis, left     Aortic stenosis, severe     CAD in native artery     Displacement of atrial pacemaker leads     Pacemaker lead malfunction     SOB (shortness of breath)      ? ? Sepsis      Plan:     1. Reviewed POC blood glucose . Labs and X ray results   2. Reviewed Current Medicines   3. On meal/ Correction bolus Humalog/ Basal Lantus Insulin regime   4. Monitor Blood glucose frequently   5. Modified  the dose of Insulin/ other medicines as needed   6. Future hemodialysis at discretion of nephrology  7. Patient was started on antibiotics source of infection is not clear at this point  8. Is being investigated for cause in place of sepsis  9. Will not re start Trulicity at this time  10. Will follow     .      Elisabeth Delgado MD

## 2021-03-09 NOTE — PROGRESS NOTES
[id number] [place] OF SPEECH/LANGUAGE PATHOLOGY  DAILY PROGRESS NOTE  Negrito Mantilla  3/9/2021  [id number]  Hyperkalemia [E87.5]  SOB (shortness of breath) [R06.02]  Pleural effusion [J90]  General weakness [R53.1]  Respiratory failure, unspecified chronicity, unspecified whether with hypoxia or hypercapnia (HCC) [J96.90]  Allergies   Allergen Reactions    Spironolactone      CAUSES INCREASED K+    Tape Trenia [place] Tape] Rash     SURGICAL TAPE         Pt was seen this date for dysphagia treatment. IMPRESSION AND RECOMMENDATIONS: Negrito Mantilla was seen for dysphagia f/u after diet advancement to Regular solids. Pt's wife and RN Jaclyn Cuellar. Report greatly improved/100% intake for meals today. They report pt had difficulty with mastication for grapes/skins. Pt lying in bed resting and alert, reports no difficulty swallowing. He accepted PO trials of thins by straw x 4 with 0 s/s aspiration. Laryngeal elevation and swallow timing appeared WNL. DNT solids at this time as pt wished to rest.  Spoke with pt and wife regarding impressions. Recommend continue Regular/continue Thin liquids. Order soft solids as needed. Assist with feeding. No further needs.     GOALS (current status in bold):  Short-term Goals  Timeframe for Short-term Goals: LOS or until goals are met  Goal 1:  Pt will tolerate Regular diet/Thin liquids (spouse to order soft foods) without clinical evidence for aspiration 100% Met, Discontinue  Goal 3: pt/caregivers will demonstrate comprehension of recommendations/POC Met, Discontinue, RN monitoring intake    EDUCATION: as above    PAIN RATING (0-10 Scale): 0/denies pain  Time in/Time out: SLP Individual Minutes  Time In: 1400  Time Out: 1415  Minutes: 15    Visit number: [facility]  3/9/2021  2:09 PM

## 2021-03-09 NOTE — PROGRESS NOTES
Nephrology Progress Note  3/9/2021 8:59 AM  Subjective: Interval History: Stephanie Kirby is a 66 y.o. male  Who is weak but more awake today and had hd last night.          Data:   Scheduled Meds:   insulin lispro  10 Units Subcutaneous TID WC    meropenem  1,000 mg Intravenous Q12H    minocycline  100 mg Oral BID    vancomycin (VANCOCIN) intermittent dosing (placeholder)   Other RX Placeholder    [Held by provider] apixaban  5 mg Oral BID    sodium chloride flush  10 mL Intravenous 2 times per day    pantoprazole  40 mg Intravenous BID    midodrine  10 mg Oral TID WC    insulin lispro  0-6 Units Subcutaneous 2 times per day    aspirin  81 mg Oral Daily    Dulaglutide  1.5 mg Subcutaneous Weekly    [Held by provider] atorvastatin  10 mg Oral Daily    insulin lispro  0-12 Units Subcutaneous TID WC     Continuous Infusions:   sodium chloride      DOBUTamine 5 mcg/kg/min (03/08/21 1642)           CBC:   Recent Labs     03/06/21  1145 03/07/21  0440 03/08/21  0445   WBC 16.0* 17.0* 14.9*   HGB 7.2* 7.7* 7.4*    189 171     BMP:    Recent Labs     03/07/21  0440 03/08/21  0445 03/09/21  0400   * 130* 129*   K 4.2 4.1 4.2   CL 94* 94* 92*   CO2 26 26 28   BUN 69* 82* 59*   CREATININE 2.0* 2.5* 2.1*   GLUCOSE 139* 145* 186*       Renal Labs  Albumin:    Lab Results   Component Value Date    LABALBU 2.7 03/09/2021    LABALBU 2.7 03/09/2021     Calcium:    Lab Results   Component Value Date    CALCIUM 7.6 03/09/2021     Phosphorus:    Lab Results   Component Value Date    PHOS 2.3 03/09/2021     U/A:    Lab Results   Component Value Date    NITRU NEGATIVE 03/01/2021    NITRU Negative 11/24/2020    COLORU YELLOW 03/01/2021    PHUR 6.5 11/24/2020    LABCAST NONE SEEN 06/15/2016    LABCAST NONE SEEN 06/15/2016    WBCUA <1 03/01/2021    RBCUA NONE SEEN 03/01/2021    MUCUS RARE 02/12/2021    TRICHOMONAS NONE SEEN 03/01/2021    BACTERIA NEGATIVE 03/01/2021    CLARITYU CLEAR 03/01/2021    SPECGRAV 1.009 03/01/2021    UROBILINOGEN NEGATIVE 03/01/2021    BILIRUBINUR NEGATIVE 03/01/2021    BLOODU NEGATIVE 03/01/2021    GLUCOSEU 500 11/24/2020    KETUA NEGATIVE 03/01/2021           Objective:   I/O: 03/08 0701 - 03/09 0700  In: 3297 [P.O.:970; I.V.:190]  Out: 2274 [Urine:274]  Vitals: BP (!) 90/45   Pulse 74   Temp 98.8 °F (37.1 °C) (Oral)   Resp 22   Ht 5' 10\" (1.778 m)   Wt 252 lb 3.3 oz (114.4 kg)   SpO2 96%   BMI 36.19 kg/m²   General appearance: awake weakmore awake  HEENT: Head: Normal, normocephalic, atraumatic.   Neck: supple, symmetrical, trachea midline  Lungs: diminished breath sounds bilaterally  Heart: S1, S2 normal  Abdomen: abnormal findings:  soft nt  Extremities: edema +  Neurologic: Mental status: alertness:  awake        Assessment and Plan:      IMP:  1 ckd 3B from htn and dm2  2 Dm2 controlled  3 cad sp cabg with chest pain  4 leukocytosis with right pleural effusion  5 hyperkalemia  6 hypotension  7 moderate protein malnutrtion  8 hyponatremia  9 anemia    Plan     1 hold on dialysis today and monitor  2 ssi and monitor  3 cardiac stable post surgery  4 abx adjusted ID on meropenem and vanco with demeclocyline  5 K improved monitor  6 bp low monitor with proaamitne stop dobutrex give iv albumin  7 start tpn watch na  8 monitor hb and epo as need  Will follow  dw and updated wife             Jim Rowe MD

## 2021-03-09 NOTE — PROGRESS NOTES
Occupational Therapy  . Occupational Therapy Treatment Note  Name: Siri Mayer MRN: [id number] :   1950   Date:  3/9/2021   Admission Date: 3/1/2021 Room:  -A   Restrictions/Precautions:    General Precautions, Fall Risk, Sternal Precautions    Communication with other providers:  Per chart review and Sally Horan, patient is appropriate for therapeutic intervention. Co-Tx c PTA [place]. Nurse Ronnie Unger present during Tx. Subjective:  Patient states:  Pt agreeable to Tx session. \"I have to use the toilet. \"  Pain:   Location, Type, Intensity (0/10 to 10/10):  0/10, denies pain    Objective:    Observation:  Pt received in semi-fowlers, exhibits decreased arousal, requires cues for attention, loud voice w/o hearing aids. Pt requiring increased time for command follow. Spouse present / supportive. . Pt is [place] and was not wearing hearing aids. Objective Measures:  Telemetry, HR 75, O2 sat 93% on room air, RR 24    Treatment, including education:  Therapeutic Activity Training:   Therapeutic activity training was instructed today. Educational review for sternal precautions and exertion levels during functional transfers and toileting. Cues were given for safety, sequence, UE/LE placement, awareness, and balance. Activities performed today included bed mobility training, sup-sit, sit-stand, SPT. Supine to sit: Dependent x2 + verbal / tactile cues for sternal precautions  Scooting: Dep x2 + cues for safe body positioning  Rolling: Dep x2  Sit to stands: Varies c surface height, Min A x2 from edge of bed + cues for sternal precautions, Mod A x2 from toilet height  Stand to sit: Mod A x2 to bedside chair  Standing balance / tolerance: Min A x2 + verbal / tactile cues for posture c increased need to simultaneous cueing from both therapists for posture as pt exhibited fatigue, tolerated two stands ~2 minutes each.   Functional Mobility: Min A x2 c CW ~10 ft from EOB to toilet and ~10 inside room c chair follow. Pt exhibited s/s of decreased activity tolerance following toileting ADL, required max verbal / tactile cues for posture and marching in place vs continued functional mobility prior to return to seated position. Self Care Training:   Cues were given for safety, sequence, UE/LE placement, visual cues, and balance. Activities performed today included toileting. Toilet Transfer: Varied, required Max A x1 for stand to sit c verbal / tactile cues for stand to sit portion to Mod A x2 c CW for sit to stand portion, including max verbal / tactile cues for safe body positioning / sternal precautions. Toileting: Dep for clothing mgmt before / after and hygiene as well as second person assist for steadying during stand. All therapeutic intervention performed c emphasis on toileting and dynamic balance / standing tolerance to inc strength, endurance and act tolerance for inc Indep c ADL tasks, func transfers / mobility. Safety  Patient safely in bedside chair + alarm placed at end of session, with call light/phone in reach, and nursing aware. Gait belt was used for func transfers / mobility. Spouse present. Assessment / Impression:      Patient's tolerance of treatment:  Fair   Adverse Reaction: None  Significant change in status and impact:  Appears to have increased fatigue, decreased arousal this Tx session  Barriers to improvement:  Decreased arousal, decreased endurance / balance    Plan for Next Session:    Continue per OT POC per patient's tolerance    Time in:  0950  Time out:  1028  Timed treatment minutes:  38  Total treatment time:  38    Electronically signed by:    BYRON Walsh  3/9/2021, 9:30 AM    Previously filed values:    Goals:  1. Pt will complete all aspects of bed mobility for EOB/OOB ADLs min A/good adherence to sternal precautions  2. Pt will complete UB/LB bathing min A with setup  3. Pt will complete all aspects of LB dressing mod A with setup  4.  Pt will complete all functional transfers to and from bed, chair, toilet, shower chair CGA/good adherence to sternal precautions  5. Pt will ambulate HH distance to bathroom for toileting CGA using LRAD  6. Pt will complete all aspects of toileting task CGA  7. Pt will complete oral hygiene/grooming routine in standing at sink SBA with setup/no seated rest breaks  8.  Pt will complete ther ex/ther act with focus on cardiac rehab exercises

## 2021-03-10 PROBLEM — R74.8 ELEVATED LIVER ENZYMES: Status: ACTIVE | Noted: 2021-03-10

## 2021-03-10 LAB
ABO/RH: NORMAL
ALBUMIN SERPL-MCNC: 3.1 GM/DL (ref 3.4–5)
ALBUMIN SERPL-MCNC: 3.1 GM/DL (ref 3.4–5)
ALP BLD-CCNC: 239 IU/L (ref 40–129)
ALT SERPL-CCNC: 48 U/L (ref 10–40)
ANION GAP SERPL CALCULATED.3IONS-SCNC: 11 MMOL/L (ref 4–16)
ANTIBODY SCREEN: NEGATIVE
AST SERPL-CCNC: 27 IU/L (ref 15–37)
BILIRUB SERPL-MCNC: 1.1 MG/DL (ref 0–1)
BILIRUBIN DIRECT: 0.7 MG/DL (ref 0–0.3)
BILIRUBIN, INDIRECT: 0.4 MG/DL (ref 0–0.7)
BUN BLDV-MCNC: 73 MG/DL (ref 6–23)
CALCITONIN LEVEL: 10.8 PG/ML (ref 0–7.5)
CALCIUM SERPL-MCNC: 7.8 MG/DL (ref 8.3–10.6)
CHLORIDE BLD-SCNC: 93 MMOL/L (ref 99–110)
CO2: 26 MMOL/L (ref 21–32)
COMPONENT: NORMAL
CREAT SERPL-MCNC: 3.1 MG/DL (ref 0.9–1.3)
CROSSMATCH RESULT: NORMAL
CULTURE: NORMAL
GFR AFRICAN AMERICAN: 24 ML/MIN/1.73M2
GFR NON-AFRICAN AMERICAN: 20 ML/MIN/1.73M2
GLUCOSE BLD-MCNC: 219 MG/DL (ref 70–99)
GLUCOSE BLD-MCNC: 236 MG/DL (ref 70–99)
GLUCOSE BLD-MCNC: 240 MG/DL (ref 70–99)
GLUCOSE BLD-MCNC: 247 MG/DL (ref 70–99)
GLUCOSE BLD-MCNC: 266 MG/DL (ref 70–99)
GLUCOSE BLD-MCNC: 277 MG/DL (ref 70–99)
GRAM SMEAR: NORMAL
HCT VFR BLD CALC: 26.3 % (ref 42–52)
HEMOGLOBIN: 8.1 GM/DL (ref 13.5–18)
HIGH SENSITIVE C-REACTIVE PROTEIN: 178.4 MG/L
Lab: NORMAL
MAGNESIUM: 2.5 MG/DL (ref 1.8–2.4)
MCH RBC QN AUTO: 25.4 PG (ref 27–31)
MCHC RBC AUTO-ENTMCNC: 30.8 % (ref 32–36)
MCV RBC AUTO: 82.4 FL (ref 78–100)
PDW BLD-RTO: 21.1 % (ref 11.7–14.9)
PHOSPHORUS: 2.4 MG/DL (ref 2.5–4.9)
PLATELET # BLD: 154 K/CU MM (ref 140–440)
PMV BLD AUTO: 10.3 FL (ref 7.5–11.1)
POTASSIUM SERPL-SCNC: 4.2 MMOL/L (ref 3.5–5.1)
PROCALCITONIN: 4
RBC # BLD: 3.19 M/CU MM (ref 4.6–6.2)
SODIUM BLD-SCNC: 130 MMOL/L (ref 135–145)
SPECIMEN: NORMAL
STATUS: NORMAL
TOTAL PROTEIN: 5.2 GM/DL (ref 6.4–8.2)
TRANSFUSION STATUS: NORMAL
TRIGL SERPL-MCNC: 73 MG/DL
UNIT DIVISION: 0
UNIT NUMBER: NORMAL
WBC # BLD: 14.5 K/CU MM (ref 4–10.5)

## 2021-03-10 PROCEDURE — C9113 INJ PANTOPRAZOLE SODIUM, VIA: HCPCS | Performed by: SPECIALIST

## 2021-03-10 PROCEDURE — 82962 GLUCOSE BLOOD TEST: CPT

## 2021-03-10 PROCEDURE — 80053 COMPREHEN METABOLIC PANEL: CPT

## 2021-03-10 PROCEDURE — 6370000000 HC RX 637 (ALT 250 FOR IP): Performed by: NURSE PRACTITIONER

## 2021-03-10 PROCEDURE — 6370000000 HC RX 637 (ALT 250 FOR IP): Performed by: THORACIC SURGERY (CARDIOTHORACIC VASCULAR SURGERY)

## 2021-03-10 PROCEDURE — 6370000000 HC RX 637 (ALT 250 FOR IP): Performed by: INTERNAL MEDICINE

## 2021-03-10 PROCEDURE — 6360000002 HC RX W HCPCS: Performed by: NURSE PRACTITIONER

## 2021-03-10 PROCEDURE — 2500000003 HC RX 250 WO HCPCS: Performed by: INTERNAL MEDICINE

## 2021-03-10 PROCEDURE — 99233 SBSQ HOSP IP/OBS HIGH 50: CPT | Performed by: NURSE PRACTITIONER

## 2021-03-10 PROCEDURE — 83735 ASSAY OF MAGNESIUM: CPT

## 2021-03-10 PROCEDURE — 84100 ASSAY OF PHOSPHORUS: CPT

## 2021-03-10 PROCEDURE — 2000000000 HC ICU R&B

## 2021-03-10 PROCEDURE — 2580000003 HC RX 258: Performed by: NURSE PRACTITIONER

## 2021-03-10 PROCEDURE — 86141 C-REACTIVE PROTEIN HS: CPT

## 2021-03-10 PROCEDURE — 85027 COMPLETE CBC AUTOMATED: CPT

## 2021-03-10 PROCEDURE — 6370000000 HC RX 637 (ALT 250 FOR IP): Performed by: PHYSICIAN ASSISTANT

## 2021-03-10 PROCEDURE — 6360000002 HC RX W HCPCS: Performed by: SPECIALIST

## 2021-03-10 PROCEDURE — 90935 HEMODIALYSIS ONE EVALUATION: CPT

## 2021-03-10 PROCEDURE — 84145 PROCALCITONIN (PCT): CPT

## 2021-03-10 PROCEDURE — 94761 N-INVAS EAR/PLS OXIMETRY MLT: CPT

## 2021-03-10 PROCEDURE — 84478 ASSAY OF TRIGLYCERIDES: CPT

## 2021-03-10 PROCEDURE — 2580000003 HC RX 258: Performed by: INTERNAL MEDICINE

## 2021-03-10 PROCEDURE — 97110 THERAPEUTIC EXERCISES: CPT

## 2021-03-10 PROCEDURE — 82248 BILIRUBIN DIRECT: CPT

## 2021-03-10 RX ORDER — INSULIN GLARGINE 100 [IU]/ML
20 INJECTION, SOLUTION SUBCUTANEOUS NIGHTLY
Status: DISCONTINUED | OUTPATIENT
Start: 2021-03-10 | End: 2021-03-10

## 2021-03-10 RX ORDER — TRAZODONE HYDROCHLORIDE 50 MG/1
50 TABLET ORAL NIGHTLY PRN
Status: DISCONTINUED | OUTPATIENT
Start: 2021-03-10 | End: 2021-03-15 | Stop reason: HOSPADM

## 2021-03-10 RX ORDER — HEPARIN SODIUM 1000 [USP'U]/ML
1000 INJECTION, SOLUTION INTRAVENOUS; SUBCUTANEOUS ONCE
Status: DISCONTINUED | OUTPATIENT
Start: 2021-03-10 | End: 2021-03-15 | Stop reason: HOSPADM

## 2021-03-10 RX ORDER — INSULIN GLARGINE 100 [IU]/ML
25 INJECTION, SOLUTION SUBCUTANEOUS NIGHTLY
Status: DISCONTINUED | OUTPATIENT
Start: 2021-03-10 | End: 2021-03-12

## 2021-03-10 RX ADMIN — MIDODRINE HYDROCHLORIDE 10 MG: 5 TABLET ORAL at 17:10

## 2021-03-10 RX ADMIN — ASPIRIN 81 MG: 81 TABLET, CHEWABLE ORAL at 09:14

## 2021-03-10 RX ADMIN — INSULIN LISPRO 10 UNITS: 100 INJECTION, SOLUTION INTRAVENOUS; SUBCUTANEOUS at 09:12

## 2021-03-10 RX ADMIN — INSULIN LISPRO 10 UNITS: 100 INJECTION, SOLUTION INTRAVENOUS; SUBCUTANEOUS at 12:13

## 2021-03-10 RX ADMIN — INSULIN LISPRO 4 UNITS: 100 INJECTION, SOLUTION INTRAVENOUS; SUBCUTANEOUS at 12:13

## 2021-03-10 RX ADMIN — MIDODRINE HYDROCHLORIDE 10 MG: 5 TABLET ORAL at 09:10

## 2021-03-10 RX ADMIN — MINOCYCLINE HYDROCHLORIDE 100 MG: 100 CAPSULE ORAL at 09:10

## 2021-03-10 RX ADMIN — PANTOPRAZOLE SODIUM 40 MG: 40 INJECTION, POWDER, FOR SOLUTION INTRAVENOUS at 09:11

## 2021-03-10 RX ADMIN — INSULIN GLARGINE 25 UNITS: 100 INJECTION, SOLUTION SUBCUTANEOUS at 23:58

## 2021-03-10 RX ADMIN — MEROPENEM 1000 MG: 1 INJECTION, POWDER, FOR SOLUTION INTRAVENOUS at 15:58

## 2021-03-10 RX ADMIN — INSULIN LISPRO 4 UNITS: 100 INJECTION, SOLUTION INTRAVENOUS; SUBCUTANEOUS at 09:11

## 2021-03-10 RX ADMIN — SODIUM CHLORIDE, PRESERVATIVE FREE 10 ML: 5 INJECTION INTRAVENOUS at 12:12

## 2021-03-10 RX ADMIN — INSULIN LISPRO 10 UNITS: 100 INJECTION, SOLUTION INTRAVENOUS; SUBCUTANEOUS at 17:10

## 2021-03-10 RX ADMIN — ACETAMINOPHEN 650 MG: 325 TABLET ORAL at 21:12

## 2021-03-10 RX ADMIN — INSULIN LISPRO 6 UNITS: 100 INJECTION, SOLUTION INTRAVENOUS; SUBCUTANEOUS at 17:08

## 2021-03-10 RX ADMIN — SODIUM CHLORIDE, PRESERVATIVE FREE 10 ML: 5 INJECTION INTRAVENOUS at 21:24

## 2021-03-10 RX ADMIN — PANTOPRAZOLE SODIUM 40 MG: 40 INJECTION, POWDER, FOR SOLUTION INTRAVENOUS at 21:23

## 2021-03-10 RX ADMIN — MINOCYCLINE HYDROCHLORIDE 100 MG: 100 CAPSULE ORAL at 21:12

## 2021-03-10 RX ADMIN — MIDODRINE HYDROCHLORIDE 10 MG: 5 TABLET ORAL at 12:11

## 2021-03-10 RX ADMIN — TRAZODONE HYDROCHLORIDE 50 MG: 50 TABLET ORAL at 21:12

## 2021-03-10 RX ADMIN — CALCIUM GLUCONATE: 98 INJECTION, SOLUTION INTRAVENOUS at 17:55

## 2021-03-10 RX ADMIN — MEROPENEM 1000 MG: 1 INJECTION, POWDER, FOR SOLUTION INTRAVENOUS at 04:25

## 2021-03-10 RX ADMIN — ACETAMINOPHEN 650 MG: 325 TABLET ORAL at 00:33

## 2021-03-10 ASSESSMENT — PAIN DESCRIPTION - FREQUENCY
FREQUENCY: INTERMITTENT
FREQUENCY: CONTINUOUS

## 2021-03-10 ASSESSMENT — PAIN DESCRIPTION - DESCRIPTORS: DESCRIPTORS: ACHING

## 2021-03-10 ASSESSMENT — PAIN DESCRIPTION - PROGRESSION: CLINICAL_PROGRESSION: NOT CHANGED

## 2021-03-10 ASSESSMENT — PAIN - FUNCTIONAL ASSESSMENT
PAIN_FUNCTIONAL_ASSESSMENT: ACTIVITIES ARE NOT PREVENTED
PAIN_FUNCTIONAL_ASSESSMENT: PREVENTS OR INTERFERES SOME ACTIVE ACTIVITIES AND ADLS

## 2021-03-10 ASSESSMENT — PAIN SCALES - GENERAL
PAINLEVEL_OUTOF10: 5
PAINLEVEL_OUTOF10: 0

## 2021-03-10 ASSESSMENT — PAIN DESCRIPTION - PAIN TYPE
TYPE: ACUTE PAIN
TYPE: CHRONIC PAIN;SURGICAL PAIN

## 2021-03-10 ASSESSMENT — PAIN DESCRIPTION - DIRECTION: RADIATING_TOWARDS: NO WHERE

## 2021-03-10 ASSESSMENT — PAIN DESCRIPTION - ONSET: ONSET: AWAKENED FROM SLEEP

## 2021-03-10 NOTE — PROGRESS NOTES
Physician Progress Note      Chasidy Martinez  CSN #:                  [id number]  :                       1950  ADMIT DATE:       3/1/2021 2:37 PM  [address] [place] DATE:  RESPONDING  PROVIDER #:        Marcio Stevens PA-C          QUERY TEXT:    Dear DR Aly Arellano    Patient admitted with Hyperkalemia, noted to have WBC on admission of 25.8. Documentation from Infectious Disease x 3/9 If possible, please document in   progress notes and discharge summary if you are evaluating and/or treating any   of the following: The medical record reflects the following:  Risk Factors: Iron deficiency anemia due to chronic blood loss , possible GI   bleed  Clinical Indicators: WBC on admission 25,8, Segs 76.1-now 13.5, Segs 80.3, HGB    8.-5.6 , Hypotension 88/37, MAP 53 resp 23, Hypoxia, SOB\" 3/3 Fevers   overnight tmax 103, but leukocytosis improving. Cultures pending\" per 3/3 pn,,  \"Sepsis Secondary to Acute Hypoxic Respiratory Failure secondary to Possible   Multifocal Pneumonia: Fever max 103 F 3/2, trending down to low grade    Leukocytosis max 17.0 on 3/6 3/3-Covid-19 Rapid Negative 3/6-RDP Negative   Blood cultures 3/2-0/2-NGTD Blood cultures 3/5-0/2-NGTD 3/1-Urine culture:   Citrobacter Farmeri 50,000, UA WBC <1, RBC none\" Per ID CX note  Treatment: 2 Units PRBC, Dobutamine ,Albumin, Lasix, Labs, Continuous   monitoring CTS, GI cx , Nephrology CX, Vancomycin, Rocephin,    Thank you Gagandeep Gutierrez RN, CDS ([phone])  Options provided:  -- Sepsis, present on admission  -- Sepsis, not present on admission  -- No Sepsis, pneumonia only  -- Sepsis was ruled out  -- Defer to ID consultant documentation regarding Sepsis  POA  -- Other - I will add my own diagnosis  -- Disagree - Not applicable / Not valid  -- Disagree - Clinically unable to determine / Unknown  -- Refer to Clinical Documentation Reviewer    PROVIDER RESPONSE TEXT:    This patient has sepsis which was present on admission.     Query created by: Roxanne Grace on 3/9/2021 2:04 PM      Electronically signed by:  Shima Griffith PA-C 3/10/2021 1:25 PM

## 2021-03-10 NOTE — PROGRESS NOTES
Dr. Tolbert Slight called and updated. Order received and read back to transfuse 1 unit of PRBC's tonight please. Will need a new type and screen sent.

## 2021-03-10 NOTE — PROGRESS NOTES
Feels like he is getting better  LFT's mildly elevated and stable- mainly alk phosph  H/H stable  Abdomen soft, non-tender, non-distended  Impression:    1) occult GI bleed- stable- possible small bowel origin and suspect angiodysplasia    2) elevated LFT's- prob secondary to sepsis- slowly improving         Plan:   1) continue same

## 2021-03-10 NOTE — PROGRESS NOTES
PATIENT NAME: Dahlia Mathis    TODAY'S DATE: 03/10/21    SUBJECTIVE:    Pt is s/p CABG x 2, AVR, maze, LA clip. Pt had HD this am. He is resting comfortably. Per his wife, he has been much more alert today and even read the newspaper. OBJECTIVE:   VITALS:    Vitals:    03/10/21 1300   BP: 107/61   Pulse: 61   Resp: 18   Temp:    SpO2:      INTAKE/OUTPUT:    Date 03/10/21 0000 - 03/10/21 2359   Shift 6309-3959 3155-7660 5082-0394 24 Hour Total   INTAKE   P.O.  180  180   I. V.(mL/kg/hr) 250(0.3)   250   Blood 362.5   362.5   Other 350   350   IV Piggyback 200   200      854   Shift Total(mL/kg) 2016.5(17.6) 180(1.6)  2196.5(19.1)   OUTPUT   Urine(mL/kg/hr) 85(0.1) 100  185   Shift Total(mL/kg) 85(0.7) 100(0.9)  185(1.6)   Weight (kg) 114.9 114.9 114.9 114.9      Patient Vitals for the past 96 hrs (Last 3 readings):   Weight   03/10/21 0641 253 lb 4.9 oz (114.9 kg)   03/09/21 0600 252 lb 3.3 oz (114.4 kg)   03/08/21 0600 238 lb 8.6 oz (108.2 kg)       EXAM:  Blood pressure 107/61, pulse 61, temperature 96.8 °F (36 °C), temperature source Axillary, resp. rate 18, height 5' 10\" (1.778 m), weight 253 lb 4.9 oz (114.9 kg), SpO2 100 %. General appearance: No apparent distress, appears stated age and cooperative. Skin: unremarkable  HEENT Normocephalic, atraumatic without obvious deformity. Neck: Supple, Trachea midline   Lungs: Good respiratory effort.  Clear to auscultation, bilaterally  Heart: Regular rate/ rhythm inc c/d/i  Abdomen: Soft, non-tender or non-distended   Extremities: 1+ edema warm well perfused  Neurologic: Alert, grossly intact  Mental status: normal affect      Data:  CBC:   Recent Labs     03/08/21  0445 03/09/21  1318 03/10/21  0440   WBC 14.9* 15.2* 14.5*   HGB 7.4* 7.4* 8.1*   HCT 24.9* 25.0* 26.3*    168 154     BMP:    Recent Labs     03/08/21  0445 03/09/21  0400 03/10/21  0440   * 129* 130*   K 4.1 4.2 4.2   CL 94* 92* 93*   CO2 26 28 26   BUN 82* 59* 73* CREATININE 2.5* 2.1* 3.1*   GLUCOSE 145* 186* 219*     Hepatic:   Recent Labs     03/08/21  0445 03/09/21  0400 03/10/21  0440   AST 37 30 27   ALT 65* 56* 48*   BILITOT 0.9 1.0 1.1*   ALKPHOS 184* 220* 239*     Mag:      Recent Labs     03/10/21  0440   MG 2.5*      Phos:     Recent Labs     03/08/21  0445 03/09/21  0400 03/10/21  0440   PHOS 3.0 2.3* 2.4*      INR:   No results for input(s): INR in the last 72 hours. Radiology Review:  CXR  Impression:     Cardiomegaly with stable pulmonary edema and right pleural effusion likely   related to CHF.  Stable exam.            ASSESSMENT AND PLAN:    Patient Active Problem List   Diagnosis    Type 2 diabetes mellitus without complication, without long-term current use of insulin ([address]    Ulcer of other part of lower limb    Venous hypertension, chronic, with ulcer ([address]    Ulcer of other part of foot    Pneumonia    Atrial fibrillation (HCC)    Sinus pause    PAF (paroxysmal atrial fibrillation) ([address]    DM (diabetes mellitus) ([address]    DMITRIY on CPAP    Hyperlipidemia    Status post incision and drainage    CKD (chronic kidney disease) stage 3, GFR 30-59 ml/min (HCC)    Hyperkalemia    Arthritis    PVD (peripheral vascular disease) ([address]    Hematoma    Cardiac pacemaker in situ    Coronary artery stenosis    Essential hypertension    Gout    Diabetic neuropathy associated with type 2 diabetes mellitus (HCC)    Adenomatous polyp of sigmoid colon    Iron deficiency anemia due to chronic blood loss    Erythropoietin deficiency anemia    VHD (valvular heart disease)    Abnormal fractional flow reserve (FFR) on cardiac catheterization    Carotid stenosis, left    Aortic stenosis, severe    CAD in native artery    Displacement of atrial pacemaker leads    Pacemaker lead malfunction    SOB (shortness of breath)    Pleural effusion    Elevated liver enzymes       S/P CABG x 2, AVR, maze, LA clip.       Cardio: stable, dobutamine off,

## 2021-03-10 NOTE — PROGRESS NOTES
[id number] [facility]  consulted by Dr. Lexi Torres for monitoring and adjustment. Indication for treatment: Pneumonia  Goal trough: 15 mcg/mL, -600     Pertinent Laboratory Values:   Temp Readings from Last 3 Encounters:   03/10/21 97.4 °F (36.3 °C) (Oral)   02/24/21 97.3 °F (36.3 °C) (Axillary)   02/12/21 97.8 °F (36.6 °C) (Temporal)     Recent Labs     03/08/21  0445 03/09/21  1318 03/10/21  0440   WBC 14.9* 15.2* 14.5*     Recent Labs     03/08/21  0445 03/09/21  0400 03/10/21  0440   BUN 82* 59* 73*   CREATININE 2.5* 2.1* 3.1*     Estimated Creatinine Clearance: 27 mL/min (A) (based on SCr of 3.1 mg/dL (H)). Intake/Output Summary (Last 24 hours) at 3/10/2021 1008  Last data filed at 3/10/2021 0600  Gross per 24 hour   Intake 2486.5 ml   Output 195 ml   Net 2291.5 ml     Pertinent Cultures:  Date    Source    Results  3/5   Blood    NGTD  3/8   MRSA Screen   Ordered    Vancomycin level:   TROUGH:  No results for input(s): VANCOTROUGH in the last 72 hours. RANDOM:    Recent Labs     03/08/21  0445 03/09/21  0400   VANCORANDOM 22.1 24.2     Assessment:  · WBC and temperature: WBC trending down/afebrile  · SCr, BUN, and urine output: HD today  · Day(s) of therapy: 8   · Vancomycin concentration: 24.2, yesterday    Plan:  · Pulse dosing based on levels 2/2 RRT   · Received vancomycin 1000 mg x 1 on 3/8  · Level was elevated yesterday morning   · Plan for HD today which should clear vancomycin   · Re-dose with 1000 mg x 1 tomorrow morning   · Future levels and dosing dependent on RRT plans   · Pharmacy will continue to monitor patient and adjust therapy as indicated    Thank you for the consult.   Ishan Angel, PharmD, BCPS   3/10/2021 10:08 AM

## 2021-03-10 NOTE — PROGRESS NOTES
Infectious Disease Progress Note  3/10/2021   Patient Name: Warren Munoz : 1950   Impression  · Sepsis Secondary to Acute Hypoxic Respiratory Failure secondary to Multifocal Pneumonia:   § Fever max 103 F 3/2, trending down to low grade  § Leukocytosis max 17.0 on 3/6   § 3/3-Covid-19 Rapid Negative  § 3/6-RDP Negative  § Blood cultures 3/2-0-NGTD  § Blood cultures 3/5-02-NGTD  § 3/1-Urine culture: Citrobacter Farmeri 50,000, UA WBC <1, RBC none   § 3/5-Urine culture: NGTD  § 3/8-MRSA/MSSA screen pending  § 3/8-Respiratory culture: Gram smear: GNB, GPC  § 3/5-CT Chest WO Contrast: imp of GGO along the upper lobes extending inferiorly, would represent atelectasis or early pneumonia. · Recent CABG/AVR/MAZE with PPM:  21 Per Dr. Lu Lot     ? Erythropoietin Deficiency Anemia     ? COVID-19 Pneumonia: 2020     ? PPM/AF    ? GIB:  § Dr. Larry Kirby onboard     ? Recurring Right Pleural Effusion     ? CKD3:  § Dr. Asia Forrester onboard  § Had x4 HD, new this admission, last 3/8     ? DMII:  § Dr. Susan Frederick onboard     ? HTN     ? DMITRIY     · Multi-morbidity: per PMHx:  PPM, CABG/AVR , AF, CKD3, DMII, Gout, HTN, HTN, DMITRIY on CPAP, Left CEA, total left hip, stents BLE     Plan:  ? Continue IV vancomycin, pharmacy to dose  ? Continue IV meropenem 1 gm q12h  ? Continue minocycline 100 mg po bid  · Trend CRP and Pct, elevated but trending down  ? Await MRSA screen 3/8  ? Patient is improving, off Vapotherm, now on NC oxygen, HD today. Ongoing Antimicrobial Therapy  Cefepime 3/5-  Vancomycin 3/1-, 8-  Minocycline 3/8- ? Completed Antimicrobial Therapy  Ceftriaxone 3/3-  Cefepime 3/5-  ? History:? Interval history noted. Chief complaint: sepsis secondary to possible multifocal pneumonia. Denies n/v/d/f or untoward effects of antibiotics. Resting quietly.      Physical Exam:  Vital Signs: BP 99/62   Pulse 62   Temp 97.7 °F (36.5 °C) (Oral)   Resp 21   Ht 5' 10\" (1.778 m)   Wt 253 lb 4.9 oz (114.9 kg) follow-up.       Status post placement of a right subclavian catheter in good position with no   change in the right pacemaker.       Mild-to-moderate bibasilar pleural effusions and associated moderate   atelectasis and consolidation posteriorly which is more prominent on the   right and appears to have decreased since the prior study.       Mild loculated fluid along the right upper chest which is more prominent. Recommend follow-up.       Hazy ground-glass opacities along the upper lobes extending inferiorly which   is more prominent and could represent atelectasis or early pneumonia.    Recommend follow-up.       Borderline enlarged lymph nodes in the mediastinum which are unchanged.      3/5/21 CT Abdomen Pelvis WO Contrast:  Impression   Limited noncontrast examination.       Scattered colonic diverticulosis without evidence of acute diverticulitis   seen.       Nonspecific small amount of free fluid in the pelvis.       Diffuse soft tissue anasarca.          3/8/21 XR Chest Portable:  Impression   Cardiomegaly with stable pulmonary edema and right pleural effusion likely   related to CHF.  Stable exam.              Labs:    Recent Results (from the past 24 hour(s))   POCT Glucose    Collection Time: 03/09/21 10:26 AM   Result Value Ref Range    POC Glucose 200 (H) 70 - 99 MG/DL   POCT Glucose    Collection Time: 03/09/21 12:22 PM   Result Value Ref Range    POC Glucose 231 (H) 70 - 99 MG/DL   CBC    Collection Time: 03/09/21  1:18 PM   Result Value Ref Range    WBC 15.2 (H) 4.0 - 10.5 K/CU MM    RBC 3.07 (L) 4.6 - 6.2 M/CU MM    Hemoglobin 7.4 (L) 13.5 - 18.0 GM/DL    Hematocrit 25.0 (L) 42 - 52 %    MCV 81.4 78 - 100 FL    MCH 24.1 (L) 27 - 31 PG    MCHC 29.6 (L) 32.0 - 36.0 %    RDW 21.2 (H) 11.7 - 14.9 %    Platelets 805 769 - 747 K/CU MM    MPV 10.4 7.5 - 11.1 FL   POCT Glucose    Collection Time: 03/09/21  6:02 PM   Result Value Ref Range    POC Glucose 242 (H) 70 - 99 MG/DL   TYPE AND SCREEN Collection Time: 03/09/21  7:35 PM   Result Value Ref Range    ABO/Rh O POSITIVE     Antibody Screen NEGATIVE     Unit Number [id number]     Component LEUKO-POOR RED CELLS     Unit Divison 00     Status ISSUED,FINAL     Transfusion Status OK TO TRANSFUSE     Crossmatch Result COMPATIBLE    POCT Glucose    Collection Time: 03/09/21  8:39 PM   Result Value Ref Range    POC Glucose 278 (H) 70 - 99 MG/DL   POCT Glucose    Collection Time: 03/10/21  2:11 AM   Result Value Ref Range    POC Glucose 240 (H) 70 - 99 MG/DL   Renal Function Panel    Collection Time: 03/10/21  4:40 AM   Result Value Ref Range    Sodium 130 (L) 135 - 145 MMOL/L    Potassium 4.2 3.5 - 5.1 MMOL/L    Chloride 93 (L) 99 - 110 mMol/L    CO2 26 21 - 32 MMOL/L    Anion Gap 11 4 - 16    BUN 73 (H) 6 - 23 MG/DL    CREATININE 3.1 (H) 0.9 - 1.3 MG/DL    Glucose 219 (H) 70 - 99 MG/DL    Calcium 7.8 (L) 8.3 - 10.6 MG/DL    GFR Non-African American 20 (L) >60 mL/min/1.73m2    GFR  24 (L) >60 mL/min/1.73m2    Albumin 3.1 (L) 3.4 - 5.0 GM/DL    Phosphorus 2.4 (L) 2.5 - 4.9 MG/DL   Hepatic Function Panel    Collection Time: 03/10/21  4:40 AM   Result Value Ref Range    Albumin 3.1 (L) 3.4 - 5.0 GM/DL    Total Bilirubin 1.1 (H) 0.0 - 1.0 MG/DL    Bilirubin, Direct 0.7 (H) 0.0 - 0.3 MG/DL    Bilirubin, Indirect 0.4 0 - 0.7 MG/DL    Alkaline Phosphatase 239 (H) 40 - 129 IU/L    AST 27 15 - 37 IU/L    ALT 48 (H) 10 - 40 U/L    Total Protein 5.2 (L) 6.4 - 8.2 GM/DL   C-Reactive Protein    Collection Time: 03/10/21  4:40 AM   Result Value Ref Range    CRP, High Sensitivity 178.4 mg/L   Procalcitonin    Collection Time: 03/10/21  4:40 AM   Result Value Ref Range    Procalcitonin 4.00    Triglyceride    Collection Time: 03/10/21  4:40 AM   Result Value Ref Range    Triglycerides 73 <150 MG/DL   Magnesium    Collection Time: 03/10/21  4:40 AM   Result Value Ref Range    Magnesium 2.5 (H) 1.8 - 2.4 mg/dl   CBC    Collection Time: 03/10/21  4:40 AM Result Value Ref Range    WBC 14.5 (H) 4.0 - 10.5 K/CU MM    RBC 3.19 (L) 4.6 - 6.2 M/CU MM    Hemoglobin 8.1 (L) 13.5 - 18.0 GM/DL    Hematocrit 26.3 (L) 42 - 52 %    MCV 82.4 78 - 100 FL    MCH 25.4 (L) 27 - 31 PG    MCHC 30.8 (L) 32.0 - 36.0 %    RDW 21.1 (H) 11.7 - 14.9 %    Platelets 035 148 - 519 K/CU MM    MPV 10.3 7.5 - 11.1 FL     CULTURE results: Invalid input(s): BLOOD CULTURE,  URINE CULTURE, SURGICAL CULTURE    Diagnosis:  Patient Active Problem List   Diagnosis    Type 2 diabetes mellitus without complication, without long-term current use of insulin (HCC)    Ulcer of other part of lower limb    Venous hypertension, chronic, with ulcer (HCC)    Ulcer of other part of foot    Pneumonia    Atrial fibrillation (HCC)    Sinus pause    PAF (paroxysmal atrial fibrillation) (HCC)    DM (diabetes mellitus) (HCC)    DMITRIY on CPAP    Hyperlipidemia    Status post incision and drainage    CKD (chronic kidney disease) stage 3, GFR 30-59 ml/min (HCC)    Hyperkalemia    Arthritis    PVD (peripheral vascular disease) (HCC)    Hematoma    Cardiac pacemaker in situ    Coronary artery stenosis    Essential hypertension    Gout    Diabetic neuropathy associated with type 2 diabetes mellitus (HCC)    Adenomatous polyp of sigmoid colon    Iron deficiency anemia due to chronic blood loss    Erythropoietin deficiency anemia    VHD (valvular heart disease)    Abnormal fractional flow reserve (FFR) on cardiac catheterization    Carotid stenosis, left    Aortic stenosis, severe    CAD in native artery    Displacement of atrial pacemaker leads    Pacemaker lead malfunction    SOB (shortness of breath)    Pleural effusion    Elevated liver enzymes       Active Problems  Active Problems:    Hyperkalemia    SOB (shortness of breath)    Pleural effusion    Elevated liver enzymes  Resolved Problems:    * No resolved hospital problems.  *    Electronically signed by: Electronically signed by Fernandez Torre JUSTIN Ozuna - CNP on 3/10/2021 at 7:54 AM

## 2021-03-10 NOTE — PROGRESS NOTES
Physical Therapy    Physical Therapy Treatment Note  Name: Diane Bucio MRN: [id number] :   1950   Date:  3/10/2021   Admission Date: 3/1/2021 Room:  A   Restrictions/Precautions:        Sternal, no pushing, pulling or lifting more than 5 pounds for the first 2 weeks and then 10 pounds up to 3 months,   Arms are to support chest when changing position, coughing or sneezing. No lifting arms above 90 degrees except with the ex's  Communication with other providers:  Celia Quintanilla states pt is ok to see for therapy, pt was just walked with the CW in the hallway also had dialysis this am  Subjective:  Patient states:  He is sleepy but will ex  Pain:   Location, Type, Intensity (0/10 to 10/10):  0/10  Objective:    Observation:  Pt was sleeping in the chair  Treatment, including education/measures:  Vital Signs  HR: 62, B/P 105/53, O2 100% on 2 L of NC  Pt was instructed in Intermediate Cardiac ex program:sitting  Ankle Pumps/ Heel Raises x 10  Marching Seated x 10  Forward Arm Raises x 10  LAQ's x 10  Arm Crosses x 10  Arm Circles Forwards and Backwards x 10  Safety  Patient left safely in the chair, with call light/phone in reach. Gait belt and mask were used for transfers and gait. Assessment / Impression:    Patient's tolerance of treatment:  Fair to good, pt needed VC's to stay awake   Adverse Reaction: none  Significant change in status and impact:  none  Barriers to improvement:  Weakness, sleepiness  Plan for Next Session:    Will cont to progress ex's and gt per cardiac protocol and educate pt on sternal precautions, S & S of ex intolerance, purpose of ex's, progression of ex's and gt at home.    Time in:  1515  Time out:  1538  Timed treatment minutes:  23  Total treatment time:  23  Previously filed items:   Short term goals  Time Frame for Short term goals: 1 week  Short term goal 1: Pt will perform sit><supine Gaby  Short term goal 2: Pt will transfer Gaby  Short term goal 3: Pt will ambulate 100ft with LRAD Gaby  Short term goal 4: Pt will perform standing light dynamic activity with single UE support Gaby x 3 minutes  Short term goal 5: Pt will ascend/descend 2 steps single rail Gaby     Electronically signed by:     Cesilia Glasgow PTA  3/10/2021, 3:27 PM

## 2021-03-10 NOTE — PROGRESS NOTES
Nephrology Progress Note  3/10/2021 8:03 AM  Subjective: Interval History: Erasmo Lan is a 66 y.o. male   Is more awake and interactive today, only 200cc uop and got 1 unit prbc.        Data:   Scheduled Meds:   heparin (porcine)  1,000 Units Intracatheter Once    fat emulsion  250 mL Intravenous Once per day on Mon Tue Thu Fri    insulin lispro  10 Units Subcutaneous TID WC    meropenem  1,000 mg Intravenous Q12H    minocycline  100 mg Oral BID    vancomycin (VANCOCIN) intermittent dosing (placeholder)   Other RX Placeholder    [Held by provider] apixaban  5 mg Oral BID    sodium chloride flush  10 mL Intravenous 2 times per day    pantoprazole  40 mg Intravenous BID    midodrine  10 mg Oral TID WC    insulin lispro  0-6 Units Subcutaneous 2 times per day    aspirin  81 mg Oral Daily    Dulaglutide  1.5 mg Subcutaneous Weekly    [Held by provider] atorvastatin  10 mg Oral Daily    insulin lispro  0-12 Units Subcutaneous TID WC     Continuous Infusions:   PN-Adult Premix 5/15 - Central 50 mL/hr at 03/09/21 1811    sodium chloride      sodium chloride      sodium chloride      DOBUTamine Stopped (03/09/21 2353)           CBC:   Recent Labs     03/08/21  0445 03/09/21  1318 03/10/21  0440   WBC 14.9* 15.2* 14.5*   HGB 7.4* 7.4* 8.1*    168 154     BMP:    Recent Labs     03/08/21  0445 03/09/21  0400 03/10/21  0440   * 129* 130*   K 4.1 4.2 4.2   CL 94* 92* 93*   CO2 26 28 26   BUN 82* 59* 73*   CREATININE 2.5* 2.1* 3.1*   GLUCOSE 145* 186* 219*       Renal Labs  Albumin:    Lab Results   Component Value Date    LABALBU 3.1 03/10/2021    LABALBU 3.1 03/10/2021     Calcium:    Lab Results   Component Value Date    CALCIUM 7.8 03/10/2021     Phosphorus:    Lab Results   Component Value Date    PHOS 2.4 03/10/2021     U/A:    Lab Results   Component Value Date    NITRU NEGATIVE 03/01/2021    NITRU Negative 11/24/2020    COLORU YELLOW 03/01/2021    PHUR 6.5 11/24/2020    LABCAST NONE SEEN 06/15/2016    LABCAST NONE SEEN 06/15/2016    WBCUA <1 03/01/2021    RBCUA NONE SEEN 03/01/2021    MUCUS RARE 02/12/2021    TRICHOMONAS NONE SEEN 03/01/2021    BACTERIA NEGATIVE 03/01/2021    CLARITYU CLEAR 03/01/2021    SPECGRAV 1.009 03/01/2021    UROBILINOGEN NEGATIVE 03/01/2021    BILIRUBINUR NEGATIVE 03/01/2021    BLOODU NEGATIVE 03/01/2021    GLUCOSEU 500 11/24/2020    KETUA NEGATIVE 03/01/2021           Objective:   I/O: 03/09 0701 - 03/10 0700  In: 2886.5 [P.O.:520; I.V.:250]  Out: 245 [Urine:245]  Vitals: BP 99/62   Pulse 62   Temp 97.7 °F (36.5 °C) (Oral)   Resp 21   Ht 5' 10\" (1.778 m)   Wt 253 lb 4.9 oz (114.9 kg)   SpO2 99%   BMI 36.35 kg/m²   General appearance: awake weak  HEENT: Head: Normal, normocephalic, atraumatic.   Neck: supple, symmetrical, trachea midline  Lungs: diminished breath sounds bilaterally  Heart: S1, S2 normal  Abdomen: abnormal findings:  soft nt  Extremities: edema + trace  Neurologic: Mental status: alertness:  awake        Assessment and Plan:      IMP:  1 ckd 3B from htn and dm2  2 Dm2 controlled  3 cad sp cabg with chest pain  4 leukocytosis with right pleural effusion  5 hyperkalemia  6 hypotension  7 moderate protein malnutrtion  8 hyponatremia  9 anemia    Plan     1 renal not recover yet and do hd 2.5 hr once today and 1 kg off and then try watch off dialysis   2 ssi and try control  3 sp cabg and imrpoved  4 with abx adjsuted the wbc and fever improved  5 K stable  6 bp low stable off dopamine  7 on tpn and maintain and increase na in tpn- plan stop as intake better  8 got 1 unit prbc  Will mnonitor and slowly improved             Khari Santos MD

## 2021-03-10 NOTE — PROGRESS NOTES
Pt ambulated in hallway 100 feet with minimal rest periods, Pt tolerated very well. Pt legs strong and pt VSS throughout walk. Pt up to chair at this time. Will continue to monitor closely.

## 2021-03-11 LAB
ALBUMIN SERPL-MCNC: 3 GM/DL (ref 3.4–5)
ALBUMIN SERPL-MCNC: 3 GM/DL (ref 3.4–5)
ALP BLD-CCNC: 250 IU/L (ref 40–129)
ALT SERPL-CCNC: 48 U/L (ref 10–40)
ANION GAP SERPL CALCULATED.3IONS-SCNC: 12 MMOL/L (ref 4–16)
AST SERPL-CCNC: 34 IU/L (ref 15–37)
BILIRUB SERPL-MCNC: 0.7 MG/DL (ref 0–1)
BILIRUBIN DIRECT: 0.4 MG/DL (ref 0–0.3)
BILIRUBIN, INDIRECT: 0.3 MG/DL (ref 0–0.7)
BUN BLDV-MCNC: 66 MG/DL (ref 6–23)
CALCIUM SERPL-MCNC: 7.7 MG/DL (ref 8.3–10.6)
CHLORIDE BLD-SCNC: 96 MMOL/L (ref 99–110)
CO2: 24 MMOL/L (ref 21–32)
CREAT SERPL-MCNC: 2.7 MG/DL (ref 0.9–1.3)
CULTURE: NORMAL
CULTURE: NORMAL
GFR AFRICAN AMERICAN: 28 ML/MIN/1.73M2
GFR NON-AFRICAN AMERICAN: 23 ML/MIN/1.73M2
GLUCOSE BLD-MCNC: 169 MG/DL (ref 70–99)
GLUCOSE BLD-MCNC: 180 MG/DL (ref 70–99)
GLUCOSE BLD-MCNC: 199 MG/DL (ref 70–99)
GLUCOSE BLD-MCNC: 219 MG/DL (ref 70–99)
GLUCOSE BLD-MCNC: 281 MG/DL (ref 70–99)
GLUCOSE BLD-MCNC: 286 MG/DL (ref 70–99)
HCT VFR BLD CALC: 29.8 % (ref 42–52)
HEMOGLOBIN: 8.4 GM/DL (ref 13.5–18)
HIGH SENSITIVE C-REACTIVE PROTEIN: 106.6 MG/L
Lab: NORMAL
Lab: NORMAL
MAGNESIUM: 2.6 MG/DL (ref 1.8–2.4)
MCH RBC QN AUTO: 24.3 PG (ref 27–31)
MCHC RBC AUTO-ENTMCNC: 28.2 % (ref 32–36)
MCV RBC AUTO: 86.4 FL (ref 78–100)
PDW BLD-RTO: 21.8 % (ref 11.7–14.9)
PHOSPHORUS: 2.5 MG/DL (ref 2.5–4.9)
PLATELET # BLD: 179 K/CU MM (ref 140–440)
PMV BLD AUTO: 11.2 FL (ref 7.5–11.1)
POTASSIUM SERPL-SCNC: 4.3 MMOL/L (ref 3.5–5.1)
PROCALCITONIN: 2.58
RBC # BLD: 3.45 M/CU MM (ref 4.6–6.2)
SODIUM BLD-SCNC: 132 MMOL/L (ref 135–145)
SPECIMEN: NORMAL
SPECIMEN: NORMAL
TOTAL PROTEIN: 5.2 GM/DL (ref 6.4–8.2)
WBC # BLD: 14.8 K/CU MM (ref 4–10.5)

## 2021-03-11 PROCEDURE — 97110 THERAPEUTIC EXERCISES: CPT

## 2021-03-11 PROCEDURE — 6370000000 HC RX 637 (ALT 250 FOR IP): Performed by: NURSE PRACTITIONER

## 2021-03-11 PROCEDURE — 6360000002 HC RX W HCPCS: Performed by: NURSE PRACTITIONER

## 2021-03-11 PROCEDURE — 6370000000 HC RX 637 (ALT 250 FOR IP): Performed by: INTERNAL MEDICINE

## 2021-03-11 PROCEDURE — 84145 PROCALCITONIN (PCT): CPT

## 2021-03-11 PROCEDURE — 80053 COMPREHEN METABOLIC PANEL: CPT

## 2021-03-11 PROCEDURE — C9113 INJ PANTOPRAZOLE SODIUM, VIA: HCPCS | Performed by: SPECIALIST

## 2021-03-11 PROCEDURE — 82248 BILIRUBIN DIRECT: CPT

## 2021-03-11 PROCEDURE — 6370000000 HC RX 637 (ALT 250 FOR IP): Performed by: THORACIC SURGERY (CARDIOTHORACIC VASCULAR SURGERY)

## 2021-03-11 PROCEDURE — 86141 C-REACTIVE PROTEIN HS: CPT

## 2021-03-11 PROCEDURE — 2500000003 HC RX 250 WO HCPCS: Performed by: INTERNAL MEDICINE

## 2021-03-11 PROCEDURE — 84100 ASSAY OF PHOSPHORUS: CPT

## 2021-03-11 PROCEDURE — 99233 SBSQ HOSP IP/OBS HIGH 50: CPT | Performed by: NURSE PRACTITIONER

## 2021-03-11 PROCEDURE — 6370000000 HC RX 637 (ALT 250 FOR IP): Performed by: PHYSICIAN ASSISTANT

## 2021-03-11 PROCEDURE — 97530 THERAPEUTIC ACTIVITIES: CPT

## 2021-03-11 PROCEDURE — 6360000002 HC RX W HCPCS: Performed by: SPECIALIST

## 2021-03-11 PROCEDURE — 94761 N-INVAS EAR/PLS OXIMETRY MLT: CPT

## 2021-03-11 PROCEDURE — 97116 GAIT TRAINING THERAPY: CPT

## 2021-03-11 PROCEDURE — 83735 ASSAY OF MAGNESIUM: CPT

## 2021-03-11 PROCEDURE — 82962 GLUCOSE BLOOD TEST: CPT

## 2021-03-11 PROCEDURE — 2580000003 HC RX 258: Performed by: NURSE PRACTITIONER

## 2021-03-11 PROCEDURE — 85027 COMPLETE CBC AUTOMATED: CPT

## 2021-03-11 PROCEDURE — 2000000000 HC ICU R&B

## 2021-03-11 PROCEDURE — 2580000003 HC RX 258: Performed by: INTERNAL MEDICINE

## 2021-03-11 RX ORDER — ROPINIROLE 0.25 MG/1
0.25 TABLET, FILM COATED ORAL 3 TIMES DAILY
Status: DISCONTINUED | OUTPATIENT
Start: 2021-03-11 | End: 2021-03-15 | Stop reason: HOSPADM

## 2021-03-11 RX ADMIN — INSULIN LISPRO 4 UNITS: 100 INJECTION, SOLUTION INTRAVENOUS; SUBCUTANEOUS at 12:13

## 2021-03-11 RX ADMIN — I.V. FAT EMULSION 250 ML: 20 EMULSION INTRAVENOUS at 17:13

## 2021-03-11 RX ADMIN — INSULIN LISPRO 6 UNITS: 100 INJECTION, SOLUTION INTRAVENOUS; SUBCUTANEOUS at 17:09

## 2021-03-11 RX ADMIN — MEROPENEM 1000 MG: 1 INJECTION, POWDER, FOR SOLUTION INTRAVENOUS at 07:46

## 2021-03-11 RX ADMIN — SODIUM CHLORIDE, PRESERVATIVE FREE 10 ML: 5 INJECTION INTRAVENOUS at 22:00

## 2021-03-11 RX ADMIN — ROPINIROLE HYDROCHLORIDE 0.25 MG: 0.25 TABLET, FILM COATED ORAL at 20:40

## 2021-03-11 RX ADMIN — PANTOPRAZOLE SODIUM 40 MG: 40 INJECTION, POWDER, FOR SOLUTION INTRAVENOUS at 07:54

## 2021-03-11 RX ADMIN — TRAZODONE HYDROCHLORIDE 50 MG: 50 TABLET ORAL at 20:40

## 2021-03-11 RX ADMIN — MINOCYCLINE HYDROCHLORIDE 100 MG: 100 CAPSULE ORAL at 09:43

## 2021-03-11 RX ADMIN — PANTOPRAZOLE SODIUM 40 MG: 40 INJECTION, POWDER, FOR SOLUTION INTRAVENOUS at 21:09

## 2021-03-11 RX ADMIN — ROPINIROLE HYDROCHLORIDE 0.25 MG: 0.25 TABLET, FILM COATED ORAL at 14:25

## 2021-03-11 RX ADMIN — INSULIN GLARGINE 25 UNITS: 100 INJECTION, SOLUTION SUBCUTANEOUS at 20:41

## 2021-03-11 RX ADMIN — MIDODRINE HYDROCHLORIDE 10 MG: 5 TABLET ORAL at 12:20

## 2021-03-11 RX ADMIN — MIDODRINE HYDROCHLORIDE 10 MG: 5 TABLET ORAL at 16:30

## 2021-03-11 RX ADMIN — MEROPENEM 1000 MG: 1 INJECTION, POWDER, FOR SOLUTION INTRAVENOUS at 16:30

## 2021-03-11 RX ADMIN — MINOCYCLINE HYDROCHLORIDE 100 MG: 100 CAPSULE ORAL at 20:40

## 2021-03-11 RX ADMIN — VANCOMYCIN HYDROCHLORIDE 1000 MG: 1 INJECTION, POWDER, LYOPHILIZED, FOR SOLUTION INTRAVENOUS at 10:02

## 2021-03-11 RX ADMIN — ACETAMINOPHEN 650 MG: 325 TABLET ORAL at 20:40

## 2021-03-11 RX ADMIN — CALCIUM GLUCONATE: 98 INJECTION, SOLUTION INTRAVENOUS at 17:13

## 2021-03-11 RX ADMIN — INSULIN LISPRO 10 UNITS: 100 INJECTION, SOLUTION INTRAVENOUS; SUBCUTANEOUS at 12:16

## 2021-03-11 RX ADMIN — INSULIN LISPRO 10 UNITS: 100 INJECTION, SOLUTION INTRAVENOUS; SUBCUTANEOUS at 07:59

## 2021-03-11 RX ADMIN — SODIUM CHLORIDE, PRESERVATIVE FREE 10 ML: 5 INJECTION INTRAVENOUS at 07:55

## 2021-03-11 RX ADMIN — ASPIRIN 81 MG: 81 TABLET, CHEWABLE ORAL at 07:54

## 2021-03-11 RX ADMIN — INSULIN LISPRO 10 UNITS: 100 INJECTION, SOLUTION INTRAVENOUS; SUBCUTANEOUS at 17:11

## 2021-03-11 RX ADMIN — INSULIN LISPRO 2 UNITS: 100 INJECTION, SOLUTION INTRAVENOUS; SUBCUTANEOUS at 07:58

## 2021-03-11 RX ADMIN — MIDODRINE HYDROCHLORIDE 10 MG: 5 TABLET ORAL at 07:54

## 2021-03-11 ASSESSMENT — PAIN DESCRIPTION - PROGRESSION
CLINICAL_PROGRESSION: NOT CHANGED

## 2021-03-11 ASSESSMENT — PAIN SCALES - GENERAL
PAINLEVEL_OUTOF10: 3
PAINLEVEL_OUTOF10: 4
PAINLEVEL_OUTOF10: 0

## 2021-03-11 ASSESSMENT — PAIN DESCRIPTION - ONSET: ONSET: GRADUAL

## 2021-03-11 ASSESSMENT — PAIN DESCRIPTION - DESCRIPTORS: DESCRIPTORS: ACHING;DULL

## 2021-03-11 ASSESSMENT — PAIN DESCRIPTION - LOCATION: LOCATION: ARM;CHEST

## 2021-03-11 ASSESSMENT — PAIN - FUNCTIONAL ASSESSMENT: PAIN_FUNCTIONAL_ASSESSMENT: PREVENTS OR INTERFERES SOME ACTIVE ACTIVITIES AND ADLS

## 2021-03-11 NOTE — PROGRESS NOTES
Infectious Disease Progress Note  3/11/2021   Patient Name: Siri Mayer : 1950   Impression  · Sepsis Secondary to Acute Hypoxic Respiratory Failure secondary to Multifocal Pneumonia:   § Fever max 103 F 3/2, trending down to low grade  § Leukocytosis max 17.0 on 3/6   § 3/3-Covid-19 Rapid Negative  § 3/6-RDP Negative  § Blood cultures 3/2-0-NGTD  § Blood cultures 3/5-02-NGTD  § 3/1-Urine culture: Citrobacter Farmeri 50,000, UA WBC <1, RBC none   § 3/5-Urine culture: NGTD  § 3/8-MRSA/MSSA screen pending  § 3/8-Respiratory culture: Gram smear: GNB, GPC  § 3/5-CT Chest WO Contrast: imp of GGO along the upper lobes extending inferiorly, would represent atelectasis or early pneumonia. · Recent CABG/AVR/MAZE with PPM:  21 Per Dr. Osbaldo Lott     ? Erythropoietin Deficiency Anemia     ? COVID-19 Pneumonia: 2020     ? PPM/AF    ? GIB:  § Dr. Donna Osullivan onboard     ? Recurring Right Pleural Effusion     ? CKD3:  § Dr. Lala Alfonso onboard  § Had x4 HD, new this admission, last 3/8     ? DMII:  § Dr. Dina Hernández onboard     ? HTN     ? DMITRIY     · Multi-morbidity: per PMHx:  PPM, CABG/AVR , AF, CKD3, DMII, Gout, HTN, HTN, DMITRIY on CPAP, Left CEA, total left hip, stents BLE     Plan:  ? Continue IV vancomycin, pharmacy to dose  ? Continue IV meropenem 1 gm q12h  ? Continue minocycline 100 mg po bid  · Trend CRP and Pct, trending down well  ? Await MRSA screen 3/8  ? Patient is improving, on room air and ambulating in hallway     Ongoing Antimicrobial Therapy  Cefepime 3/5-  Vancomycin 3/1-, 8-  Minocycline 3/8- ? Completed Antimicrobial Therapy  Ceftriaxone 3/3-  Cefepime 3/5-  ? History:? Interval history noted. Chief complaint: sepsis secondary to possible multifocal pneumonia. Denies n/v/d/f or untoward effects of antibiotics. Resting quietly, wife at side.      Physical Exam:  Vital Signs: BP (!) 104/58   Pulse 63   Temp 97.9 °F (36.6 °C) (Oral)   Resp 15   Ht 5' 10\" (1.778 m)   Wt 253 lb 4.9 oz (114.9 kg)   SpO2 97%   BMI 36.35 kg/m²     Gen: alert and oriented X3, no distress  Skin: no stigmata of endocarditis  Wounds: C/D/I coccyx ulceration, small area, no drainage. midsternal wound well approximated, no drainage, erythema or edema. HEMT: AT/NC Oropharynx pink, moist, and without lesions or exudates; dentition in good state of repair  Eyes: PERRLA, EOMI, conjunctiva pink, sclera anicteric. Neck: Supple. Trachea midline. No LAD. Chest: no distress and CTA. Diminished breath sounds posteriorly, oxygen per NC. Heart: RRR and no MRG. Abd: soft, non-distended, no tenderness, no hepatomegaly. Normoactive bowel sounds. Ext: no clubbing, cyanosis, or edema  Catheter Site: without erythema or tenderness draining clear yellow urine. Midline: Left basilic intact without erythema or edema. Vascath right: without erythema or edema at site. Neuro: Mental status intact.  CN 2-12 intact and no focal sensory or motor deficits     Radiologic / Imaging / TESTING  3/1/21 XR Chest Portable:  Impression   Right-sided pleural effusion       Bibasilar hypoaeration      3/1/21 CT Chest WO Contrast:  Impression   Patient status post midline sternotomy.  Immediately posterior to the   sternotomy defect, there is a small gas and fluid collection which is   nonspecific.  It is not necessarily outside normal limits for a sternotomy   that was performed 2 weeks ago. [facility], sterility cannot be ascertained   with imaging, and therefore a small developing abscess is considered as well.       No evidence of osteolysis of the sternotomy defect to suggest osteomyelitis.       Interval development of a moderate to large right and a moderate left pleural   effusion.  Adjacent airspace disease is some combination of atelectasis,   pneumonia, and/or edema.      3/2/21 XR Chest Portable:  Impression   Pleural effusions right greater than left with bibasilar atelectasis right   worse than left.  No pneumothorax is seen.      3/2/21 IR Guided Thoracentesis Pleural:      FINDINGS:   A total of 800 ml serosanguinous fluid from left and 1050 ml serosanguineous   fluid from right  was removed.                3/4/21 IR Guided Thoracentesis:  FINDINGS:   A total of 1250 ml serosanguinous fluid  was removed.      3/4/21 XR Chest Portable:  ?  Impression   No pneumothorax status post right thoracentesis       Right basilar opacity could represent a combination of pleural fluid and   atelectasis      3/5/21 XR Chest Portable:  Impression   Stable ground-glass opacification in the right mid and lower lung zone.      3/3/21 XR Chest Portable:  Impression   Stable exam      3/3/21 VL Dup BLE:  Impression   No evidence of DVT in either lower extremity.          3/4/21 IR Guided Thoracentesis Pleural:  FINDINGS:   A total of 1250 ml serosanguinous fluid  was removed.      3/4/21 XR Chest Portable:  Impression   No pneumothorax status post right thoracentesis       Right basilar opacity could represent a combination of pleural fluid and   atelectasis             3/5/21 CT Head WO Contrast:  Impression   Motion limited exam, without gross acute intracranial process.       Left posterior parietal scalp soft tissue swelling.  2 mm calcification   versus foreign body within the soft tissues of the frontal scalp.      3/5/21 IR Nontunneled Vascath:  Impression   Successful ultrasound and fluoroscopy guided non-tunneled dialysis catheter   placement.  The catheter is ready to use.      3/5/21 XR Chest Portable:  Impression   Right internal jugular dialysis catheter with catheter tip cavoatrial   junction.       Cardiomegaly with vascular congestion and moderate right pleural effusion   with underlying edema or atelectasis.          3/5/21 CT Chest WO Contrast:  Impression   Postop changes along the mediastinum and sternum with slowly resolving gas   and fluid posterior to the sternum which probably just represents resolving   postop changes.  Recommend clinical follow-up.       Status post placement of a right subclavian catheter in good position with no   change in the right pacemaker.       Mild-to-moderate bibasilar pleural effusions and associated moderate   atelectasis and consolidation posteriorly which is more prominent on the   right and appears to have decreased since the prior study.       Mild loculated fluid along the right upper chest which is more prominent. Recommend follow-up.       Hazy ground-glass opacities along the upper lobes extending inferiorly which   is more prominent and could represent atelectasis or early pneumonia.    Recommend follow-up.       Borderline enlarged lymph nodes in the mediastinum which are unchanged.      3/5/21 CT Abdomen Pelvis WO Contrast:  Impression   Limited noncontrast examination.       Scattered colonic diverticulosis without evidence of acute diverticulitis   seen.       Nonspecific small amount of free fluid in the pelvis.       Diffuse soft tissue anasarca.          3/8/21 XR Chest Portable:  Impression   Cardiomegaly with stable pulmonary edema and right pleural effusion likely   related to CHF.  Stable exam.              Labs:    Recent Results (from the past 24 hour(s))   POCT Glucose    Collection Time: 03/10/21  9:05 AM   Result Value Ref Range    POC Glucose 247 (H) 70 - 99 MG/DL   POCT Glucose    Collection Time: 03/10/21 12:03 PM   Result Value Ref Range    POC Glucose 236 (H) 70 - 99 MG/DL   POCT Glucose    Collection Time: 03/10/21  5:07 PM   Result Value Ref Range    POC Glucose 277 (H) 70 - 99 MG/DL   POCT Glucose    Collection Time: 03/10/21  8:55 PM   Result Value Ref Range    POC Glucose 266 (H) 70 - 99 MG/DL   POCT Glucose    Collection Time: 03/11/21  2:13 AM   Result Value Ref Range    POC Glucose 199 (H) 70 - 99 MG/DL   Renal Function Panel    Collection Time: 03/11/21  4:40 AM   Result Value Ref Range    Sodium 132 (L) 135 - 145 MMOL/L    Potassium 4.3 3.5 - 5.1 MMOL/L    Chloride 96 (L) 99 - 110 mMol/L    CO2 24 21 - 32 MMOL/L    Anion Gap 12 4 - 16    BUN 66 (H) 6 - 23 MG/DL    CREATININE 2.7 (H) 0.9 - 1.3 MG/DL    Glucose 169 (H) 70 - 99 MG/DL    Calcium 7.7 (L) 8.3 - 10.6 MG/DL    GFR Non- 23 (L) >60 mL/min/1.73m2    GFR  28 (L) >60 mL/min/1.73m2    Albumin 3.0 (L) 3.4 - 5.0 GM/DL    Phosphorus 2.5 2.5 - 4.9 MG/DL   Hepatic Function Panel    Collection Time: 03/11/21  4:40 AM   Result Value Ref Range    Albumin 3.0 (L) 3.4 - 5.0 GM/DL    Total Bilirubin 0.7 0.0 - 1.0 MG/DL    Bilirubin, Direct 0.4 (H) 0.0 - 0.3 MG/DL    Bilirubin, Indirect 0.3 0 - 0.7 MG/DL    Alkaline Phosphatase 250 (H) 40 - 129 IU/L    AST 34 15 - 37 IU/L    ALT 48 (H) 10 - 40 U/L    Total Protein 5.2 (L) 6.4 - 8.2 GM/DL   Magnesium    Collection Time: 03/11/21  4:40 AM   Result Value Ref Range    Magnesium 2.6 (H) 1.8 - 2.4 mg/dl   CBC    Collection Time: 03/11/21  4:40 AM   Result Value Ref Range    WBC 14.8 (H) 4.0 - 10.5 K/CU MM    RBC 3.45 (L) 4.6 - 6.2 M/CU MM    Hemoglobin 8.4 (L) 13.5 - 18.0 GM/DL    Hematocrit 29.8 (L) 42 - 52 %    MCV 86.4 78 - 100 FL    MCH 24.3 (L) 27 - 31 PG    MCHC 28.2 (L) 32.0 - 36.0 %    RDW 21.8 (H) 11.7 - 14.9 %    Platelets 854 677 - 525 K/CU MM    MPV 11.2 (H) 7.5 - 11.1 FL   Procalcitonin    Collection Time: 03/11/21  4:40 AM   Result Value Ref Range    Procalcitonin 2.58    C-Reactive Protein    Collection Time: 03/11/21  4:40 AM   Result Value Ref Range    CRP, High Sensitivity 106.6 mg/L     CULTURE results: Invalid input(s): BLOOD CULTURE,  URINE CULTURE, SURGICAL CULTURE    Diagnosis:  Patient Active Problem List   Diagnosis    Type 2 diabetes mellitus without complication, without long-term current use of insulin (HCC)    Ulcer of other part of lower limb    Venous hypertension, chronic, with ulcer (HCC)    Ulcer of other part of foot    Pneumonia    Atrial fibrillation (HCC)    Sinus pause    [facility] [address]    DM [facility]diabetes mellitus) [facility] [address]    DMITRIY on CPAP    Hyperlipidemia    Status post incision and drainage    CKD (chronic kidney disease) stage 3, GFR 30-59 ml/min (HCC)    Hyperkalemia    Arthritis    PVD (peripheral vascular disease) (HCC)    Hematoma    Cardiac pacemaker in situ    Coronary artery stenosis    Essential hypertension    Gout    Diabetic neuropathy associated with type 2 diabetes mellitus (HCC)    Adenomatous polyp of sigmoid colon    Iron deficiency anemia due to chronic blood loss    Erythropoietin deficiency anemia    VHD (valvular heart disease)    Abnormal fractional flow reserve (FFR) on cardiac catheterization    Carotid stenosis, left    Aortic stenosis, severe    CAD in native artery    Displacement of atrial pacemaker leads    Pacemaker lead malfunction    SOB (shortness of breath)    Pleural effusion    Elevated liver enzymes       Active Problems  Active Problems:    Hyperkalemia    SOB (shortness of breath)    Pleural effusion    Elevated liver enzymes  Resolved Problems:    * No resolved hospital problems. *    Electronically signed by: Electronically signed by JUSTIN Garg CNP on 3/11/2021 at 7:37 AM

## 2021-03-11 NOTE — CARE COORDINATION
Received call from [facility] with ARU and pt was denied, sent PS to [facility] to see if they would like to complete fast track appeal.     Spoke with Dr. Tracey Silver and he signed fast track appeal and I gave to [facility] to submit.

## 2021-03-11 NOTE — PROGRESS NOTES
Nephrology Progress Note  3/11/2021 12:23 PM  Subjective:      Interval History: Siri Mayer is a 66 y.o. male  Is doing better and more awake and less sob but hard sleep night    Data:   Scheduled Meds:   heparin (porcine)  1,000 Units Intracatheter Once    insulin glargine  25 Units Subcutaneous Nightly    fat emulsion  250 mL Intravenous Once per day on Mon Tue Thu Fri    insulin lispro  10 Units Subcutaneous TID WC    meropenem  1,000 mg Intravenous Q12H    minocycline  100 mg Oral BID    vancomycin (VANCOCIN) intermittent dosing (placeholder)   Other RX Placeholder    [Held by provider] apixaban  5 mg Oral BID    sodium chloride flush  10 mL Intravenous 2 times per day    pantoprazole  40 mg Intravenous BID    midodrine  10 mg Oral TID WC    insulin lispro  0-6 Units Subcutaneous 2 times per day    aspirin  81 mg Oral Daily    Dulaglutide  1.5 mg Subcutaneous Weekly    [Held by provider] atorvastatin  10 mg Oral Daily    insulin lispro  0-12 Units Subcutaneous TID WC     Continuous Infusions:   PN-Adult Premix 5/15 - Central      PN-Adult Premix 5/15 - Central 50 mL/hr at 03/10/21 1755    sodium chloride      sodium chloride      sodium chloride      DOBUTamine Stopped (03/09/21 2353)           CBC:   Recent Labs     03/09/21  1318 03/10/21  0440 03/11/21  0440   WBC 15.2* 14.5* 14.8*   HGB 7.4* 8.1* 8.4*    154 179     BMP:    Recent Labs     03/09/21  0400 03/10/21  0440 03/11/21  0440   * 130* 132*   K 4.2 4.2 4.3   CL 92* 93* 96*   CO2 28 26 24   BUN 59* 73* 66*   CREATININE 2.1* 3.1* 2.7*   GLUCOSE 186* 219* 169*       Renal Labs  Albumin:    Lab Results   Component Value Date    LABALBU 3.0 03/11/2021    LABALBU 3.0 03/11/2021     Calcium:    Lab Results   Component Value Date    CALCIUM 7.7 03/11/2021     Phosphorus:    Lab Results   Component Value Date    PHOS 2.5 03/11/2021     U/A:    Lab Results   Component Value Date    NITRU NEGATIVE 03/01/2021    NITRU

## 2021-03-11 NOTE — PROGRESS NOTES
Occupational Therapy  . Occupational Therapy Treatment Note  Name: Carolee Carey MRN: [id number] :   1950   Date:  3/11/2021   Admission Date: 3/1/2021 Room:  -A   Restrictions/Precautions:    General precautions; Fall risk; Sternal Precautions    Communication with other providers:  Per chart review and Nurse Preston Galicia, patient is appropriate for therapeutic intervention. PTA Yoli    Subjective:  Patient states:  Pt agreeable. \"I want to get in the bed when we're done. \"   Pain:   Location, Type, Intensity (0/10 to 10/10): No complaint of pain    Objective:    Observation:  Received seated in bedside chair. Objective Measures:  Telemetry, HR 67, /75, O2 sat 97% on room air. Treatment, including education:  Therapeutic Activity Training:   Therapeutic activity training was instructed today. Cues were given for safety, sequence, UE/LE placement, awareness, and balance. Activities performed today included bed mobility training, sup-sit, sit-stand, SPT. Mod A x2 + cues for sternal precautions for sit to stands. Min A x2 + cues for safe body positioning for stand to sits. Functional Mobility: Varied Min A to Mod A x2 c CW ~110 ft in hallway + chair follow c pt requiring standing rest breaks x4 + cues for PLB technique to manage activity tolerance. This therapist provided max cues for posture, including tactile to facilitate trunk extension. Sitting balance / tolerance: SBA seated in unsupported sitting, tolerated 5 minutes  Sit to supine: Mod A x2 + cues for sternal precautions    All therapeutic intervention performed c emphasis on dynamic balance / standing tolerance to inc strength, endurance and act tolerance for inc Indep c ADL tasks, func transfers / mobility. Safety  Patient safely in bed at end of session, with call light/phone in reach, and nursing aware. Gait belt was used for func transfers / mobility. Spouse present.      Assessment / Impression:        Patient's tolerance of treatment:  Well, pt appears motivated and actively participated c use of rest breaks / PLB technique to manage activity tolerance   Adverse Reaction: None  Significant change in status and impact:  Progressing mobility  Barriers to improvement:  Posture, strength    Plan for Next Session:    Continue per OT POC per patient's tolerance    Time in:  1015  Time out:  1040  Timed treatment minutes:  25  Total treatment time:  25    Electronically signed by:    BYRON Cole  3/11/2021, 11:52 AM    Previously filed values:    Goals:  1. Pt will complete all aspects of bed mobility for EOB/OOB ADLs min A/good adherence to sternal precautions  2. Pt will complete UB/LB bathing min A with setup  3. Pt will complete all aspects of LB dressing mod A with setup  4. Pt will complete all functional transfers to and from bed, chair, toilet, shower chair CGA/good adherence to sternal precautions  5. Pt will ambulate HH distance to bathroom for toileting CGA using LRAD  6. Pt will complete all aspects of toileting task CGA  7. Pt will complete oral hygiene/grooming routine in standing at sink SBA with setup/no seated rest breaks  8.  Pt will complete ther ex/ther act with focus on cardiac rehab exercises

## 2021-03-11 NOTE — CARE COORDINATION
ARU pre-cert pending with [facility] Medicare at this time. Will continue to follow for determination.

## 2021-03-11 NOTE — PROGRESS NOTES
Physical Therapy    Physical Therapy Treatment Note  Name: Janine Harris MRN: [id number] :   1950   Date:  3/11/2021   Admission Date: 3/1/2021 Room:  -A   Restrictions/Precautions:        Sternal, no pushing, pulling or lifting more than 5 pounds for the first 2 weeks and then 10 pounds up to 3 months,   Arms are to support chest when changing position, coughing or sneezing. No lifting arms above 90 degrees except with the ex's  Communication with other providers:  Betty Alfred states pt is ok to see for therapy, co-treat with OT for pt safety with increased amount of assist for activity  Subjective:  Patient states:  He has been up since 0200  Pain:   Location, Type, Intensity (0/10 to 10/10):  No c/o  Objective:    Observation:  Pt was sittingup in the chair  Treatment, including education/measures:  Vital Signs  HR: 67, B/P 103/75, O2 67% on room air  Education:  Pt was instructed in Sternal Precautions, Purpose of Exercise Program, Ambar Scale and Signs and Symptoms of Exercise Intolerance per Cardiac Protocol  Pt was instructed in Intermediate Cardiac ex program:sitting  Ankle Pumps/ Heel Raises x 10  LAQ's x 10  Forward Arm Raises x 10  Transfers with line management of zaragoza,IV's and tele  Sit to supine :mod A of 2 and VC's for sternal precautions  Scooting :SBA and VC's for sternal precautions  Rolling :mod A of 2  Sit to stand :mod of 2 and VC's for sternal precautions  Stand to sit :min A of 2 and VC's for sternal precautions  Gait:  Pt amb with CW for 110 ft with min to mod a of 2 with chair follow  Pt needed TC's and VC's for posture, PLB  Safety  Patient left safely in the chair, with call light/phone in reach. Gait belt and mask were used for transfers and gait.   Assessment / Impression:    Patient's tolerance of treatment:  Good, making good improvements, pt is very motivated, more awake and able to increase his activity   Adverse Reaction: none  Significant change in status and impact:  none  Barriers to improvement:  Posture, weakness  Plan for Next Session:    Will cont to progress ex's and gt per cardiac protocol and educate pt on sternal precautions, S & S of ex intolerance, purpose of ex's, progression of ex's and gt at home. Time in:  1010  Time out:  1048  Timed treatment minutes:  38  Total treatment time:  45  Previously filed items:   Short term goals  Time Frame for Short term goals: 1 week  Short term goal 1: Pt will perform sit><supine Gaby  Short term goal 2: Pt will transfer Gaby  Short term goal 3: Pt will ambulate 100ft with LRAD Gaby  Short term goal 4: Pt will perform standing light dynamic activity with single UE support Gaby x 3 minutes  Short term goal 5: Pt will ascend/descend 2 steps single rail Gaby     Electronically signed by:     Manjit Uribe PTA  3/11/2021, 10:35 AM

## 2021-03-11 NOTE — PROGRESS NOTES
[address] [facility]  consulted by Dr. Elham Anderson for monitoring and adjustment. Indication for treatment: Pneumonia  Goal trough: 15 mcg/mL, -600     Pertinent Laboratory Values:   Temp Readings from Last 3 Encounters:   03/11/21 98.3 °F (36.8 °C) (Oral)   02/24/21 97.3 °F (36.3 °C) (Axillary)   02/12/21 97.8 °F (36.6 °C) (Temporal)     Recent Labs     03/09/21  1318 03/10/21  0440 03/11/21  0440   WBC 15.2* 14.5* 14.8*     Recent Labs     03/09/21  0400 03/10/21  0440 03/11/21  0440   BUN 59* 73* 66*   CREATININE 2.1* 3.1* 2.7*     Estimated Creatinine Clearance: 31 mL/min (A) (based on SCr of 2.7 mg/dL (H)). Intake/Output Summary (Last 24 hours) at 3/11/2021 0931  Last data filed at 3/11/2021 0800  Gross per 24 hour   Intake 1914 ml   Output 800 ml   Net 1114 ml     Pertinent Cultures:  Date    Source    Results  3/5   Blood    NGTD  3/8   MRSA Screen   Ordered    Vancomycin level:   TROUGH:  No results for input(s): VANCOTROUGH in the last 72 hours. RANDOM:    Recent Labs     03/09/21  0400   VANCORANDOM 24.2     Assessment:  · WBC and temperature: WBC trending down/afebrile  · SCr, BUN, and urine output: no HD today   · Day(s) of therapy: 9  · Vancomycin concentration: 24.2 two days ago    Plan:  · Pulse dosing based on levels 2/2 RRT   · Received vancomycin 1000 mg x 1 on 3/8  · Level was elevated yesterday morning   · Plan for HD today which should clear vancomycin   · Re-dose with 1000 mg x 1 today  · Future levels and dosing dependent on RRT plans   · Pharmacy will continue to monitor patient and adjust therapy as indicated    Thank you for the consult.   Jess Malloy, PharmD, BCPS   3/11/2021 9:31 AM

## 2021-03-11 NOTE — PROGRESS NOTES
PATIENT NAME: Jose Anderson    TODAY'S DATE: 03/11/21    SUBJECTIVE:    Pt is s/p CABG x 2, AVR, maze, LA clip. Pt feels well today. He is getting stronger and walked in the halls very well today. OBJECTIVE:   VITALS:    Vitals:    03/11/21 1400   BP: (!) 108/59   Pulse: 65   Resp: 17   Temp:    SpO2: 98%     INTAKE/OUTPUT:    Date 03/11/21 0000 - 03/11/21 2359   Shift 3142-2036 5901-2842 2474-3121 24 Hour Total   INTAKE   P.O. 100 180  280      598   Shift Total(mL/kg) 698(6.1) 180(1.6)  878(7.6)   OUTPUT   Urine(mL/kg/hr) 610(0.7) 180  790   Shift Total(mL/kg) 610(5.3) 180(1.6)  790(6.9)   Weight (kg) 114.9 114.9 114.9 114.9      Patient Vitals for the past 96 hrs (Last 3 readings):   Weight   03/10/21 0641 253 lb 4.9 oz (114.9 kg)   03/09/21 0600 252 lb 3.3 oz (114.4 kg)   03/08/21 0600 238 lb 8.6 oz (108.2 kg)       EXAM:  Blood pressure (!) 108/59, pulse 65, temperature 97.6 °F (36.4 °C), temperature source Oral, resp. rate 17, height 5' 10\" (1.778 m), weight 253 lb 4.9 oz (114.9 kg), SpO2 98 %. General appearance: No apparent distress, appears stated age and cooperative. Skin: unremarkable  HEENT Normocephalic, atraumatic without obvious deformity. Neck: Supple, Trachea midline   Lungs: Good respiratory effort.  Clear to auscultation, bilaterally  Heart: Regular rate/ rhythm inc c/d/i  Abdomen: Soft, non-tender or non-distended   Extremities: 1+ edema warm well perfused  Neurologic: Alert, grossly intact  Mental status: normal affect      Data:  CBC:   Recent Labs     03/09/21  1318 03/10/21  0440 03/11/21  0440   WBC 15.2* 14.5* 14.8*   HGB 7.4* 8.1* 8.4*   HCT 25.0* 26.3* 29.8*    154 179     BMP:    Recent Labs     03/09/21  0400 03/10/21  0440 03/11/21 0440   * 130* 132*   K 4.2 4.2 4.3   CL 92* 93* 96*   CO2 28 26 24   BUN 59* 73* 66*   CREATININE 2.1* 3.1* 2.7*   GLUCOSE 186* 219* 169*     Hepatic:   Recent Labs     03/09/21  0400 03/10/21  0440 03/11/21 0440   AST 30 27 34   ALT 56* 48* 48*   BILITOT 1.0 1.1* 0.7   ALKPHOS 220* 239* 250*     Mag:      Recent Labs     03/10/21  0440 03/11/21  0440   MG 2.5* 2.6*      Phos:     Recent Labs     03/09/21  0400 03/10/21  0440 03/11/21  0440   PHOS 2.3* 2.4* 2.5      INR:   No results for input(s): INR in the last 72 hours. Radiology Review:  CXR  Impression:     Cardiomegaly with stable pulmonary edema and right pleural effusion likely   related to CHF.  Stable exam.            ASSESSMENT AND PLAN:    Patient Active Problem List   Diagnosis    Type 2 diabetes mellitus without complication, without long-term current use of insulin ([address]    Ulcer of other part of lower limb    Venous hypertension, chronic, with ulcer ([address]    Ulcer of other part of foot    Pneumonia    Atrial fibrillation (HCC)    Sinus pause    PAF (paroxysmal atrial fibrillation) ([address]    DM (diabetes mellitus) ([address]    DMITRIY on CPAP    Hyperlipidemia    Status post incision and drainage    CKD (chronic kidney disease) stage 3, GFR 30-59 ml/min (HCC)    Hyperkalemia    Arthritis    PVD (peripheral vascular disease) ([address]    Hematoma    Cardiac pacemaker in situ    Coronary artery stenosis    Essential hypertension    Gout    Diabetic neuropathy associated with type 2 diabetes mellitus (HCC)    Adenomatous polyp of sigmoid colon    Iron deficiency anemia due to chronic blood loss    Erythropoietin deficiency anemia    VHD (valvular heart disease)    Abnormal fractional flow reserve (FFR) on cardiac catheterization    Carotid stenosis, left    Aortic stenosis, severe    CAD in native artery    Displacement of atrial pacemaker leads    Pacemaker lead malfunction    SOB (shortness of breath)    Pleural effusion    Elevated liver enzymes       S/P CABG x 2, AVR, maze, LA clip. Cardio: stable, NSR rate 60s. continue proamatine 10 TID. Holding AC due to GI bleed. Pulm: stable on RA, CPAP during sleep. Encourage IS.    GI: PO intake improving now that he can have solid food. Plan to DC TPN tomorrow. Renal: creatinine stable 2.7. no HD today. Appreciate nephrology input. Heme: hgb stable 8.4  ID:  WBC stable 14, Urine cx citrobacter farmeri, sputum cx GNB. No fevers last 72 hrs. on vanc/merrem/minocycline per ID. ARU denied, filed expedited appeal today.      Elizabeth Irizarry PA-C

## 2021-03-11 NOTE — PROGRESS NOTES
Beni Dunlap BSN RN clinical  for [facility] SURGICAL [facility] RN students 07-19:00 this date.

## 2021-03-12 LAB
ALBUMIN SERPL-MCNC: 2.9 GM/DL (ref 3.4–5)
ANION GAP SERPL CALCULATED.3IONS-SCNC: 10 MMOL/L (ref 4–16)
BUN BLDV-MCNC: 80 MG/DL (ref 6–23)
CALCIUM SERPL-MCNC: 7.9 MG/DL (ref 8.3–10.6)
CHLORIDE BLD-SCNC: 94 MMOL/L (ref 99–110)
CO2: 26 MMOL/L (ref 21–32)
CREAT SERPL-MCNC: 2.8 MG/DL (ref 0.9–1.3)
GFR AFRICAN AMERICAN: 27 ML/MIN/1.73M2
GFR NON-AFRICAN AMERICAN: 22 ML/MIN/1.73M2
GLUCOSE BLD-MCNC: 126 MG/DL (ref 70–99)
GLUCOSE BLD-MCNC: 170 MG/DL (ref 70–99)
GLUCOSE BLD-MCNC: 184 MG/DL (ref 70–99)
GLUCOSE BLD-MCNC: 237 MG/DL (ref 70–99)
GLUCOSE BLD-MCNC: 259 MG/DL (ref 70–99)
GLUCOSE BLD-MCNC: 326 MG/DL (ref 70–99)
HCT VFR BLD CALC: 28 % (ref 42–52)
HEMOGLOBIN: 8.3 GM/DL (ref 13.5–18)
HIGH SENSITIVE C-REACTIVE PROTEIN: 62.6 MG/L
MAGNESIUM: 2.6 MG/DL (ref 1.8–2.4)
MCH RBC QN AUTO: 24.7 PG (ref 27–31)
MCHC RBC AUTO-ENTMCNC: 29.6 % (ref 32–36)
MCV RBC AUTO: 83.3 FL (ref 78–100)
PDW BLD-RTO: 21.9 % (ref 11.7–14.9)
PHOSPHORUS: 2.7 MG/DL (ref 2.5–4.9)
PLATELET # BLD: 199 K/CU MM (ref 140–440)
PMV BLD AUTO: 11.5 FL (ref 7.5–11.1)
POTASSIUM SERPL-SCNC: 4.1 MMOL/L (ref 3.5–5.1)
PROCALCITONIN: 1.54
RBC # BLD: 3.36 M/CU MM (ref 4.6–6.2)
SODIUM BLD-SCNC: 130 MMOL/L (ref 135–145)
WBC # BLD: 13.4 K/CU MM (ref 4–10.5)

## 2021-03-12 PROCEDURE — 6360000002 HC RX W HCPCS: Performed by: NURSE PRACTITIONER

## 2021-03-12 PROCEDURE — 84145 PROCALCITONIN (PCT): CPT

## 2021-03-12 PROCEDURE — 86141 C-REACTIVE PROTEIN HS: CPT

## 2021-03-12 PROCEDURE — 99233 SBSQ HOSP IP/OBS HIGH 50: CPT | Performed by: NURSE PRACTITIONER

## 2021-03-12 PROCEDURE — C9113 INJ PANTOPRAZOLE SODIUM, VIA: HCPCS | Performed by: SPECIALIST

## 2021-03-12 PROCEDURE — 87071 CULTURE AEROBIC QUANT OTHER: CPT

## 2021-03-12 PROCEDURE — 83735 ASSAY OF MAGNESIUM: CPT

## 2021-03-12 PROCEDURE — 6370000000 HC RX 637 (ALT 250 FOR IP): Performed by: NURSE PRACTITIONER

## 2021-03-12 PROCEDURE — 6370000000 HC RX 637 (ALT 250 FOR IP): Performed by: THORACIC SURGERY (CARDIOTHORACIC VASCULAR SURGERY)

## 2021-03-12 PROCEDURE — 94761 N-INVAS EAR/PLS OXIMETRY MLT: CPT

## 2021-03-12 PROCEDURE — 2580000003 HC RX 258: Performed by: INTERNAL MEDICINE

## 2021-03-12 PROCEDURE — 6360000002 HC RX W HCPCS: Performed by: SPECIALIST

## 2021-03-12 PROCEDURE — 2709999900 HC NON-CHARGEABLE SUPPLY

## 2021-03-12 PROCEDURE — 97110 THERAPEUTIC EXERCISES: CPT

## 2021-03-12 PROCEDURE — 6370000000 HC RX 637 (ALT 250 FOR IP): Performed by: PHYSICIAN ASSISTANT

## 2021-03-12 PROCEDURE — 2000000000 HC ICU R&B

## 2021-03-12 PROCEDURE — 82962 GLUCOSE BLOOD TEST: CPT

## 2021-03-12 PROCEDURE — 80069 RENAL FUNCTION PANEL: CPT

## 2021-03-12 PROCEDURE — 97116 GAIT TRAINING THERAPY: CPT

## 2021-03-12 PROCEDURE — 85027 COMPLETE CBC AUTOMATED: CPT

## 2021-03-12 PROCEDURE — 6370000000 HC RX 637 (ALT 250 FOR IP): Performed by: INTERNAL MEDICINE

## 2021-03-12 PROCEDURE — 97530 THERAPEUTIC ACTIVITIES: CPT

## 2021-03-12 PROCEDURE — 97535 SELF CARE MNGMENT TRAINING: CPT

## 2021-03-12 PROCEDURE — 2580000003 HC RX 258: Performed by: NURSE PRACTITIONER

## 2021-03-12 RX ORDER — INSULIN GLARGINE 100 [IU]/ML
35 INJECTION, SOLUTION SUBCUTANEOUS NIGHTLY
Status: DISCONTINUED | OUTPATIENT
Start: 2021-03-12 | End: 2021-03-14

## 2021-03-12 RX ORDER — TORSEMIDE 20 MG/1
20 TABLET ORAL 2 TIMES DAILY
Status: DISCONTINUED | OUTPATIENT
Start: 2021-03-12 | End: 2021-03-15 | Stop reason: HOSPADM

## 2021-03-12 RX ADMIN — ROPINIROLE HYDROCHLORIDE 0.25 MG: 0.25 TABLET, FILM COATED ORAL at 15:25

## 2021-03-12 RX ADMIN — TORSEMIDE 20 MG: 20 TABLET ORAL at 09:27

## 2021-03-12 RX ADMIN — MINOCYCLINE HYDROCHLORIDE 100 MG: 100 CAPSULE ORAL at 21:18

## 2021-03-12 RX ADMIN — ROPINIROLE HYDROCHLORIDE 0.25 MG: 0.25 TABLET, FILM COATED ORAL at 09:27

## 2021-03-12 RX ADMIN — TORSEMIDE 20 MG: 20 TABLET ORAL at 21:18

## 2021-03-12 RX ADMIN — MEROPENEM 1000 MG: 1 INJECTION, POWDER, FOR SOLUTION INTRAVENOUS at 15:33

## 2021-03-12 RX ADMIN — MEROPENEM 1000 MG: 1 INJECTION, POWDER, FOR SOLUTION INTRAVENOUS at 04:30

## 2021-03-12 RX ADMIN — PANTOPRAZOLE SODIUM 40 MG: 40 INJECTION, POWDER, FOR SOLUTION INTRAVENOUS at 09:30

## 2021-03-12 RX ADMIN — ASPIRIN 81 MG: 81 TABLET, CHEWABLE ORAL at 09:27

## 2021-03-12 RX ADMIN — PANTOPRAZOLE SODIUM 40 MG: 40 INJECTION, POWDER, FOR SOLUTION INTRAVENOUS at 21:19

## 2021-03-12 RX ADMIN — ROPINIROLE HYDROCHLORIDE 0.25 MG: 0.25 TABLET, FILM COATED ORAL at 21:18

## 2021-03-12 RX ADMIN — INSULIN LISPRO 10 UNITS: 100 INJECTION, SOLUTION INTRAVENOUS; SUBCUTANEOUS at 12:21

## 2021-03-12 RX ADMIN — SODIUM CHLORIDE, PRESERVATIVE FREE 10 ML: 5 INJECTION INTRAVENOUS at 09:00

## 2021-03-12 RX ADMIN — INSULIN LISPRO 10 UNITS: 100 INJECTION, SOLUTION INTRAVENOUS; SUBCUTANEOUS at 18:26

## 2021-03-12 RX ADMIN — MIDODRINE HYDROCHLORIDE 10 MG: 5 TABLET ORAL at 17:38

## 2021-03-12 RX ADMIN — INSULIN LISPRO 10 UNITS: 100 INJECTION, SOLUTION INTRAVENOUS; SUBCUTANEOUS at 09:41

## 2021-03-12 RX ADMIN — INSULIN GLARGINE 35 UNITS: 100 INJECTION, SOLUTION SUBCUTANEOUS at 21:28

## 2021-03-12 RX ADMIN — MIDODRINE HYDROCHLORIDE 10 MG: 5 TABLET ORAL at 12:25

## 2021-03-12 RX ADMIN — MINOCYCLINE HYDROCHLORIDE 100 MG: 100 CAPSULE ORAL at 09:27

## 2021-03-12 RX ADMIN — MIDODRINE HYDROCHLORIDE 10 MG: 5 TABLET ORAL at 09:27

## 2021-03-12 RX ADMIN — APIXABAN 5 MG: 5 TABLET, FILM COATED ORAL at 21:19

## 2021-03-12 RX ADMIN — SODIUM CHLORIDE, PRESERVATIVE FREE 10 ML: 5 INJECTION INTRAVENOUS at 21:26

## 2021-03-12 ASSESSMENT — PAIN SCALES - GENERAL
PAINLEVEL_OUTOF10: 0

## 2021-03-12 NOTE — PROGRESS NOTES
LARGE IR PROCEDURES      PROCEDURE PERFORMED: remove temp cath and culture tip.      STERILE DRESSINGS:  Sterile dressing applied    SPECIMENS:  Tip sent to lab for cultures     COMPLICATIONS:  none    STAFF PRESENT DURING PROCEDURE:  Carmela Augustin RT, Jesenia QURESHI     REPORT CALLED TO: Lila Landaverde RN

## 2021-03-12 NOTE — PROGRESS NOTES
PATIENT NAME: Bladimir Degroot    TODAY'S DATE: 03/12/21    SUBJECTIVE:    Pt is s/p CABG x 2, AVR, maze, LA clip. Pt continues to improve. He is feeling stronger but still needs cardiac walker for ambulation. OBJECTIVE:   VITALS:    Vitals:    03/12/21 0800   BP: 108/61   Pulse: 70   Resp: 16   Temp:    SpO2: 100%     INTAKE/OUTPUT:    Date 03/12/21 0000 - 03/12/21 2359   Shift 2159-9680 7202-5793 3117-5826 24 Hour Total   INTAKE   P.O. 50   50   IV Piggyback 100   100      978   Shift Total(mL/kg) 1128(9.8)   1128(9.8)   OUTPUT   Urine(mL/kg/hr) 350(0.4)   350   Shift Total(mL/kg) 350(3)   350(3)   Weight (kg) 114.9 114.9 114.9 114.9      Patient Vitals for the past 96 hrs (Last 3 readings):   Weight   03/10/21 0641 253 lb 4.9 oz (114.9 kg)   03/09/21 0600 252 lb 3.3 oz (114.4 kg)       EXAM:  Blood pressure 108/61, pulse 70, temperature 98 °F (36.7 °C), temperature source Oral, resp. rate 16, height 5' 10\" (1.778 m), weight 253 lb 4.9 oz (114.9 kg), SpO2 100 %. General appearance: No apparent distress, appears stated age and cooperative. Skin: unremarkable  HEENT Normocephalic, atraumatic without obvious deformity. Neck: Supple, Trachea midline   Lungs: Good respiratory effort.  Clear to auscultation, bilaterally  Heart: Regular rate/ rhythm inc c/d/i  Abdomen: Soft, non-tender or non-distended   Extremities: 1+ edema warm well perfused  Neurologic: Alert, grossly intact  Mental status: normal affect      Data:  CBC:   Recent Labs     03/10/21  0440 03/11/21  0440 03/12/21  0505   WBC 14.5* 14.8* 13.4*   HGB 8.1* 8.4* 8.3*   HCT 26.3* 29.8* 28.0*    179 199     BMP:    Recent Labs     03/10/21  0440 03/11/21  0440 03/12/21  0505   * 132* 130*   K 4.2 4.3 4.1   CL 93* 96* 94*   CO2 26 24 26   BUN 73* 66* 80*   CREATININE 3.1* 2.7* 2.8*   GLUCOSE 219* 169* 259*     Hepatic:   Recent Labs     03/10/21  0440 03/11/21  0440   AST 27 34   ALT 48* 48*   BILITOT 1.1* 0.7   ALKPHOS 239* 250*     Mag:      Recent Labs     03/10/21  0440 03/11/21  0440 03/12/21  0505   MG 2.5* 2.6* 2.6*      Phos:     Recent Labs     03/10/21  0440 03/11/21  0440 03/12/21  0505   PHOS 2.4* 2.5 2.7      INR:   No results for input(s): INR in the last 72 hours. Radiology Review:  CXR  Impression:     Cardiomegaly with stable pulmonary edema and right pleural effusion likely   related to CHF.  Stable exam.            ASSESSMENT AND PLAN:    Patient Active Problem List   Diagnosis    Type 2 diabetes mellitus without complication, without long-term current use of insulin ([address]    Ulcer of other part of lower limb    Venous hypertension, chronic, with ulcer ([address]    Ulcer of other part of foot    Pneumonia    Atrial fibrillation (HCC)    Sinus pause    PAF (paroxysmal atrial fibrillation) ([address]    DM (diabetes mellitus) ([address]    DMITRIY on CPAP    Hyperlipidemia    Status post incision and drainage    CKD (chronic kidney disease) stage 3, GFR 30-59 ml/min (HCC)    Hyperkalemia    Arthritis    PVD (peripheral vascular disease) ([address]    Hematoma    Cardiac pacemaker in situ    Coronary artery stenosis    Essential hypertension    Gout    Diabetic neuropathy associated with type 2 diabetes mellitus (HCC)    Adenomatous polyp of sigmoid colon    Iron deficiency anemia due to chronic blood loss    Erythropoietin deficiency anemia    VHD (valvular heart disease)    Abnormal fractional flow reserve (FFR) on cardiac catheterization    Carotid stenosis, left    Aortic stenosis, severe    CAD in native artery    Displacement of atrial pacemaker leads    Pacemaker lead malfunction    SOB (shortness of breath)    Pleural effusion    Elevated liver enzymes       S/P CABG x 2, AVR, maze, LA clip. Cardio: stable, NSR rate 60s. continue proamatine 10 TID. Holding AC due to GI bleed. Pulm: stable on RA, CPAP during sleep. Encourage IS. GI: DC TPN. Tolerating PO diet.  Will discuss with GI if okay to restart AC. Renal: creatinine stable 2.8. demedex BID, adequate UOP. Appreciate nephrology input. Heme: hgb stable 8.4  ID:  WBC stable 13, Urine cx citrobacter farmeri, sputum cx GNB. No fevers. on vanc/merrem/minocycline per ID. ARU denied, filed expedited appeal yesterday. Pt still req cardiac walker to ambulate, would really benefit from IP rehab .      Chino Vieyra PA-C

## 2021-03-12 NOTE — PROGRESS NOTES
Dr. Lauri Burns and Germán COVARRUBIAS at bedside. Pt examined. ARU denied per insurance. Appeal in process. Pt will remain inpatient until appeal completed.

## 2021-03-12 NOTE — PROGRESS NOTES
Occupational Therapy  . Occupational Therapy Treatment Note  Name: Negrito Mantilla MRN: [id number] :   1950   Date:  3/12/2021   Admission Date: 3/1/2021 Room:  -A   Restrictions/Precautions:    General Precautions; Fall Risk, Sternal Precautions    Communication with other providers:  Per chart review and Nurse Kemar Mcbride, patient is appropriate for therapeutic intervention. Notified Nurse of pt's RUE edema below BP cuff and nurse recommended placement to RLE. Subjective:  Patient states:  \"I am starting to feel better. \" Pt agreeable to OT Tx session. Pain:   Location, Type, Intensity (0/10 to 10/10):  0/10, denies    Objective:    Observation:  Pt received in semi-fowlers, spouse present and encouraging to patient's active participation, exhibited appropriate support throughout. One episode of O2 desat to 85% c initial bed mobility to sit EOB, recovered c cues for PLB technique and rest break, remained >93% throughout remainder of Tx session. RUE noted to be edematous below BP cuff on R upper arm and this was removed. Pt exhibited decreased AROM and initially required AAROM c improvement over Tx session. Objective Measures:  Telemetry, Ellsworth catheter, room air    Treatment, including education:  Therapeutic Activity Training:   Therapeutic activity training was instructed today. Cues were given for safety, sequence, UE/LE placement, awareness, and balance. Activities performed today included bed mobility training, sup-sit, sit-stand, SPT. Supine<>sit: Max A x1 + increased time and effort, use of HOB raised and occasional cue for compliance to sternal precautions  Scooting: Min A for L hip + cues for lateral weight shifts to scoot to EOB  Sit to stands: Mod A x1 c CW and without, min cues for sternal precautions.   Stand to sits: Min A / CGA, varied c fatigue, min cues for safe body positioning  Functional Mobility: Min A / CGA c CW in hallway ~110 ft  c pt identifying need for two standing patient's request) at end of session, with call light/phone in reach, and nursing aware. Gait belt was used for func transfers / mobility. Assessment / Impression:        Patient's tolerance of treatment:  Well   Adverse Reaction: None  Significant change in status and impact: N/A  Barriers to improvement:  Balance / Strength / Endurance    Plan for Next Session:    Continue per OT POC per patient's tolerance. Time in:  1343  Time out:  1445  Timed treatment minutes:  62  Total treatment time:  62    Electronically signed by:    BYRON Edwards  3/12/2021, 1:39 PM    Previously filed values:    Goals:  1. Pt will complete all aspects of bed mobility for EOB/OOB ADLs min A/good adherence to sternal precautions  2. Pt will complete UB/LB bathing min A with setup  3. Pt will complete all aspects of LB dressing mod A with setup  4. Pt will complete all functional transfers to and from bed, chair, toilet, shower chair CGA/good adherence to sternal precautions  5. Pt will ambulate HH distance to bathroom for toileting CGA using LRAD  6. Pt will complete all aspects of toileting task CGA  7. Pt will complete oral hygiene/grooming routine in standing at sink SBA with setup/no seated rest breaks  8.  Pt will complete ther ex/ther act with focus on cardiac rehab exercises

## 2021-03-12 NOTE — PROGRESS NOTES
Physical Therapy    Physical Therapy Treatment Note  Name: Jose Anderson MRN: [id number] :   1950   Date:  3/12/2021   Admission Date: 3/1/2021 Room:  -A   Restrictions/Precautions:        Sternal, no pushing, pulling or lifting more than 5 pounds for the first 2 weeks and then 10 pounds up to 3 months,   Arms are to support chest when changing position, coughing or sneezing. No lifting arms above 90 degrees except with the ex's  Communication with other providers:  Garrett West RN states pt is ok to see for therapy  Subjective:  Patient states:  He was up since 630  Pain:   Location, Type, Intensity (0/10 to 10/10):  0/10  Objective:    Observation:  Pt was up in the chair  Treatment, including education/measures:  Vital Signs  HR: 67, B/P 111/62, O2 100%  Education:  Pt was instructed in Sternal Precautions, Purpose of Exercise Program, Ambar Scale and Signs and Symptoms of Exercise Intolerance per Cardiac Protocol  Pt was instructed in Intermediate Cardiac ex program:sitting  Overhead Side Stretch x 10  Ankle Pumps/ Heel Raises x 10  Marching Seated x 10  Forward Arm Raises x 10  Side Arm Raises x 10  Arm Crosses x 10  Arm Circles Forwards and Backwards x 10  SittingTrunkTwist x 10  Transfers with line management of IV, zaragoza and tele  Scooting :SBA and VC's for sternal precautions  Sit to stand :min A of 1 and VC's for sternal precautions  Stand to sit :mod a of 2 and VC's for sternal precautions  Gait:  Pt amb with CW for 110 ft with min A of 1, pt needed 4 standing rest breaks  Pt needed VC's for posture, PLB. Pt is unable to maintain posture with gt, he has a forward hip flex with walking yet can stand straight with rest breaks  Safety  Patient left safely in the chair, with call light/phone in reach. Gait belt and mask were used for transfers and gait.   Assessment / Impression:    Patient's tolerance of treatment:  Good, pt is still weak and having difficulty with trans but is showing consistent progress, pt able to do all ex's and walk this date   Adverse Reaction: none  Significant change in status and impact:  none  Barriers to improvement:  Nutrition, weakness   Plan for Next Session:    Will cont to progress ex's and gt per cardiac protocol and educate pt on sternal precautions, S & S of ex intolerance, purpose of ex's, progression of ex's and gt at home. Time in:  0830  Time out:  0930  Timed treatment minutes:  60  Total treatment time:  60  Previously filed items:   Short term goals  Time Frame for Short term goals: 1 week  Short term goal 1: Pt will perform sit><supine Gaby  Short term goal 2: Pt will transfer Gaby  Short term goal 3: Pt will ambulate 100ft with LRAD Gaby  Short term goal 4: Pt will perform standing light dynamic activity with single UE support Gaby x 3 minutes  Short term goal 5: Pt will ascend/descend 2 steps single rail Gaby     Electronically signed by:     Fox Torres PTA  3/12/2021, 9:22 AM

## 2021-03-12 NOTE — PROGRESS NOTES
Infectious Disease Progress Note  3/12/2021   Patient Name: Dejuan Worley : 1950   Impression  · Sepsis Secondary to Acute Hypoxic Respiratory Failure secondary to Multifocal Pneumonia:   § Afebrile  § Leukocytosis trending down  § 3/3-Covid-19 Rapid Negative  § 3/6-RDP Negative  § Blood cultures 3/2-0/2-NGTD  § Blood cultures 3/5-0/2-NGTD  § 3/1-Urine culture: Citrobacter Farmeri 50,000, UA WBC <1, RBC none   § 3/5-Urine culture: NGTD  § 3/8-MRSA/MSSA screen pending  § 3/8-Respiratory culture: Gram smear: GNB, GPC  § 3/5-CT Chest WO Contrast: imp of GGO along the upper lobes extending inferiorly, would represent atelectasis or early pneumonia. · Recent CABG/AVR/MAZE with PPM:  21 Per Dr. Cooper      ? Erythropoietin Deficiency Anemia     ? COVID-19 Pneumonia: 2020     ? PPM/AF    ? GIB:  § Dr. Dameon Donovan onboard     ? Recurring Right Pleural Effusion     ? CKD3:  § Dr. Eve Atkinson onboard  § Had x4 HD, new this admission, last 3/8     ? DMII:  § Dr. Ngoc Snyder onboard     ? HTN     ? DMITRIY     · Multi-morbidity: per PMHx:  PPM, CABG/AVR , AF, CKD3, DMII, Gout, HTN, HTN, DMITRIY on CPAP, Left CEA, total left hip, stents BLE     Plan:  ? Continue IV vancomycin, pharmacy to dose (end date 3/17/21)  ? Continue IV meropenem 1 gm q12h x 10 days (end date 3/17/21)  ? Continue minocycline 100 mg po bid  (end date 3/17/21)  · Trend CRP and Pct, continuing to trend down well  ? Await MRSA screen 3/8  ? Patient continuing to improve, off oxygen and increasing activity  ? HD catheter removed 3/12, culture pending  ? Awaiting ARU placement for DC    Ongoing Antimicrobial Therapy  meropenem 3/8-  Vancomycin 3/1-, 8-  Minocycline 3/8- ? Completed Antimicrobial Therapy  Ceftriaxone 3/3-5  Cefepime 3/5-  ? History:? Interval history noted. Chief complaint: sepsis secondary to possible multifocal pneumonia. Denies n/v/d/f or untoward effects of antibiotics.  Resting quietly up in the chair shaving, states dyspnea has subsided, wife at side. Physical Exam:  Vital Signs: /68   Pulse 63   Temp 98 °F (36.7 °C) (Oral)   Resp 15   Ht 5' 10\" (1.778 m)   Wt 253 lb 4.9 oz (114.9 kg)   SpO2 93%   BMI 36.35 kg/m²     Gen: alert and oriented X3, no distress  Skin: no stigmata of endocarditis  Wounds: C/D/I coccyx ulceration, small area, no drainage. midsternal wound well approximated, no drainage, erythema or edema. HEMT: AT/NC Oropharynx pink, moist, and without lesions or exudates; dentition in good state of repair  Eyes: PERRLA, EOMI, conjunctiva pink, sclera anicteric. Neck: Supple. Trachea midline. No LAD. Chest: no distress and CTA. Diminished breath sounds posteriorly, oxygen per NC. Heart: RRR and no MRG. Abd: soft, non-distended, no tenderness, no hepatomegaly. Normoactive bowel sounds. Ext: no clubbing, cyanosis, or edema  Catheter Site: without erythema or tenderness draining clear yellow urine. Midline: Left basilic intact without erythema or edema. Neuro: Mental status intact.  CN 2-12 intact and no focal sensory or motor deficits     Radiologic / Imaging / TESTING  3/1/21 XR Chest Portable:  Impression   Right-sided pleural effusion       Bibasilar hypoaeration      3/1/21 CT Chest WO Contrast:  Impression   Patient status post midline sternotomy.  Immediately posterior to the   sternotomy defect, there is a small gas and fluid collection which is   nonspecific.  It is not necessarily outside normal limits for a sternotomy   that was performed 2 weeks ago. Zaira Solomonr, sterility cannot be ascertained   with imaging, and therefore a small developing abscess is considered as well.       No evidence of osteolysis of the sternotomy defect to suggest osteomyelitis.       Interval development of a moderate to large right and a moderate left pleural   effusion.  Adjacent airspace disease is some combination of atelectasis,   pneumonia, and/or edema.      3/2/21 XR Chest Portable:  Impression   Pleural effusions right greater than left with bibasilar atelectasis right   worse than left.  No pneumothorax is seen.      3/2/21 IR Guided Thoracentesis Pleural:      FINDINGS:   A total of 800 ml serosanguinous fluid from left and 1050 ml serosanguineous   fluid from right  was removed.                3/4/21 IR Guided Thoracentesis:  FINDINGS:   A total of 1250 ml serosanguinous fluid  was removed.      3/4/21 XR Chest Portable:  ?  Impression   No pneumothorax status post right thoracentesis       Right basilar opacity could represent a combination of pleural fluid and   atelectasis      3/5/21 XR Chest Portable:  Impression   Stable ground-glass opacification in the right mid and lower lung zone.      3/3/21 XR Chest Portable:  Impression   Stable exam      3/3/21 VL Dup BLE:  Impression   No evidence of DVT in either lower extremity.          3/4/21 IR Guided Thoracentesis Pleural:  FINDINGS:   A total of 1250 ml serosanguinous fluid  was removed.      3/4/21 XR Chest Portable:  Impression   No pneumothorax status post right thoracentesis       Right basilar opacity could represent a combination of pleural fluid and   atelectasis             3/5/21 CT Head WO Contrast:  Impression   Motion limited exam, without gross acute intracranial process.       Left posterior parietal scalp soft tissue swelling.  2 mm calcification   versus foreign body within the soft tissues of the frontal scalp.      3/5/21 IR Nontunneled Vascath:  Impression   Successful ultrasound and fluoroscopy guided non-tunneled dialysis catheter   placement.  The catheter is ready to use.      3/5/21 XR Chest Portable:  Impression   Right internal jugular dialysis catheter with catheter tip cavoatrial   junction.       Cardiomegaly with vascular congestion and moderate right pleural effusion   with underlying edema or atelectasis.          3/5/21 CT Chest WO Contrast:  Impression   Postop changes along the mediastinum and sternum with slowly resolving gas   and fluid posterior to the sternum which probably just represents resolving   postop changes.  Recommend clinical follow-up.       Status post placement of a right subclavian catheter in good position with no   change in the right pacemaker.       Mild-to-moderate bibasilar pleural effusions and associated moderate   atelectasis and consolidation posteriorly which is more prominent on the   right and appears to have decreased since the prior study.       Mild loculated fluid along the right upper chest which is more prominent. Recommend follow-up.       Hazy ground-glass opacities along the upper lobes extending inferiorly which   is more prominent and could represent atelectasis or early pneumonia.    Recommend follow-up.       Borderline enlarged lymph nodes in the mediastinum which are unchanged.      3/5/21 CT Abdomen Pelvis WO Contrast:  Impression   Limited noncontrast examination.       Scattered colonic diverticulosis without evidence of acute diverticulitis   seen.       Nonspecific small amount of free fluid in the pelvis.       Diffuse soft tissue anasarca.          3/8/21 XR Chest Portable:  Impression   Cardiomegaly with stable pulmonary edema and right pleural effusion likely   related to CHF.  Stable exam.              Labs:    Recent Results (from the past 24 hour(s))   POCT Glucose    Collection Time: 03/11/21 11:34 AM   Result Value Ref Range    POC Glucose 219 (H) 70 - 99 MG/DL   POCT Glucose    Collection Time: 03/11/21  5:09 PM   Result Value Ref Range    POC Glucose 281 (H) 70 - 99 MG/DL   POCT Glucose    Collection Time: 03/11/21  8:37 PM   Result Value Ref Range    POC Glucose 286 (H) 70 - 99 MG/DL   POCT Glucose    Collection Time: 03/12/21  1:59 AM   Result Value Ref Range    POC Glucose 326 (H) 70 - 99 MG/DL   Renal Function Panel    Collection Time: 03/12/21  5:05 AM   Result Value Ref Range    Sodium 130 (L) 135 - 145 MMOL/L    Potassium 4.1 3.5 - 5.1 MMOL/L    Chloride 94 (L) 99 - 110 mMol/L    CO2 26 21 - 32 MMOL/L    Anion Gap 10 4 - 16    BUN 80 (H) 6 - 23 MG/DL    CREATININE 2.8 (H) 0.9 - 1.3 MG/DL    Glucose 259 (H) 70 - 99 MG/DL    Calcium 7.9 (L) 8.3 - 10.6 MG/DL    GFR Non- 22 (L) >60 mL/min/1.73m2    GFR  27 (L) >60 mL/min/1.73m2    Albumin 2.9 (L) 3.4 - 5.0 GM/DL    Phosphorus 2.7 2.5 - 4.9 MG/DL   Magnesium    Collection Time: 03/12/21  5:05 AM   Result Value Ref Range    Magnesium 2.6 (H) 1.8 - 2.4 mg/dl   CBC    Collection Time: 03/12/21  5:05 AM   Result Value Ref Range    WBC 13.4 (H) 4.0 - 10.5 K/CU MM    RBC 3.36 (L) 4.6 - 6.2 M/CU MM    Hemoglobin 8.3 (L) 13.5 - 18.0 GM/DL    Hematocrit 28.0 (L) 42 - 52 %    MCV 83.3 78 - 100 FL    MCH 24.7 (L) 27 - 31 PG    MCHC 29.6 (L) 32.0 - 36.0 %    RDW 21.9 (H) 11.7 - 14.9 %    Platelets 084 679 - 142 K/CU MM    MPV 11.5 (H) 7.5 - 11.1 FL   Procalcitonin    Collection Time: 03/12/21  5:05 AM   Result Value Ref Range    Procalcitonin 1.54    C-Reactive Protein    Collection Time: 03/12/21  5:05 AM   Result Value Ref Range    CRP, High Sensitivity 62.6 mg/L     CULTURE results: Invalid input(s): BLOOD CULTURE,  URINE CULTURE, SURGICAL CULTURE    Diagnosis:  Patient Active Problem List   Diagnosis    Type 2 diabetes mellitus without complication, without long-term current use of insulin (HCC)    Ulcer of other part of lower limb    Venous hypertension, chronic, with ulcer (HCC)    Ulcer of other part of foot    Pneumonia    Atrial fibrillation (HCC)    Sinus pause    PAF (paroxysmal atrial fibrillation) (HCC)    DM (diabetes mellitus) ([facility])    DMITRIY on CPAP    Hyperlipidemia    Status post incision and drainage    CKD (chronic kidney disease) stage 3, GFR 30-59 ml/min (HCC)    Hyperkalemia    Arthritis    PVD (peripheral vascular disease) (HCC)    Hematoma    Cardiac pacemaker in situ    Coronary artery stenosis    Essential hypertension    Gout    Diabetic neuropathy associated with type 2 diabetes mellitus (HCC)    Adenomatous polyp of sigmoid colon    Iron deficiency anemia due to chronic blood loss    Erythropoietin deficiency anemia    VHD (valvular heart disease)    Abnormal fractional flow reserve (FFR) on cardiac catheterization    Carotid stenosis, left    Aortic stenosis, severe    CAD in native artery    Displacement of atrial pacemaker leads    Pacemaker lead malfunction    SOB (shortness of breath)    Pleural effusion    Elevated liver enzymes       Active Problems  Active Problems:    Hyperkalemia    SOB (shortness of breath)    Pleural effusion    Elevated liver enzymes  Resolved Problems:    * No resolved hospital problems. *    Electronically signed by: Electronically signed by Misti Cannon.  JUSTIN Parker CNP on 3/12/2021 at 8:56 AM

## 2021-03-13 LAB
ALBUMIN SERPL-MCNC: 3 GM/DL (ref 3.4–5)
ANION GAP SERPL CALCULATED.3IONS-SCNC: 12 MMOL/L (ref 4–16)
BUN BLDV-MCNC: 87 MG/DL (ref 6–23)
CALCIUM SERPL-MCNC: 8.3 MG/DL (ref 8.3–10.6)
CHLORIDE BLD-SCNC: 96 MMOL/L (ref 99–110)
CO2: 25 MMOL/L (ref 21–32)
CREAT SERPL-MCNC: 2.6 MG/DL (ref 0.9–1.3)
DOSE AMOUNT: NORMAL
DOSE TIME: NORMAL
GFR AFRICAN AMERICAN: 29 ML/MIN/1.73M2
GFR NON-AFRICAN AMERICAN: 24 ML/MIN/1.73M2
GLUCOSE BLD-MCNC: 121 MG/DL (ref 70–99)
GLUCOSE BLD-MCNC: 123 MG/DL (ref 70–99)
GLUCOSE BLD-MCNC: 149 MG/DL (ref 70–99)
GLUCOSE BLD-MCNC: 160 MG/DL (ref 70–99)
GLUCOSE BLD-MCNC: 85 MG/DL (ref 70–99)
HCT VFR BLD CALC: 29.4 % (ref 42–52)
HEMOGLOBIN: 8.8 GM/DL (ref 13.5–18)
HIGH SENSITIVE C-REACTIVE PROTEIN: 44.5 MG/L
MAGNESIUM: 2.5 MG/DL (ref 1.8–2.4)
MCH RBC QN AUTO: 24.8 PG (ref 27–31)
MCHC RBC AUTO-ENTMCNC: 29.9 % (ref 32–36)
MCV RBC AUTO: 82.8 FL (ref 78–100)
PDW BLD-RTO: 22.4 % (ref 11.7–14.9)
PHOSPHORUS: 4.4 MG/DL (ref 2.5–4.9)
PLATELET # BLD: 243 K/CU MM (ref 140–440)
PMV BLD AUTO: 11.1 FL (ref 7.5–11.1)
POTASSIUM SERPL-SCNC: 4.1 MMOL/L (ref 3.5–5.1)
PROCALCITONIN: 1.02
RBC # BLD: 3.55 M/CU MM (ref 4.6–6.2)
SODIUM BLD-SCNC: 133 MMOL/L (ref 135–145)
VANCOMYCIN RANDOM: 17.4 UG/ML
WBC # BLD: 11.7 K/CU MM (ref 4–10.5)

## 2021-03-13 PROCEDURE — 97116 GAIT TRAINING THERAPY: CPT

## 2021-03-13 PROCEDURE — 86141 C-REACTIVE PROTEIN HS: CPT

## 2021-03-13 PROCEDURE — 6370000000 HC RX 637 (ALT 250 FOR IP): Performed by: INTERNAL MEDICINE

## 2021-03-13 PROCEDURE — C9113 INJ PANTOPRAZOLE SODIUM, VIA: HCPCS | Performed by: SPECIALIST

## 2021-03-13 PROCEDURE — 84145 PROCALCITONIN (PCT): CPT

## 2021-03-13 PROCEDURE — 2580000003 HC RX 258: Performed by: INTERNAL MEDICINE

## 2021-03-13 PROCEDURE — 36415 COLL VENOUS BLD VENIPUNCTURE: CPT

## 2021-03-13 PROCEDURE — 2580000003 HC RX 258: Performed by: NURSE PRACTITIONER

## 2021-03-13 PROCEDURE — 80069 RENAL FUNCTION PANEL: CPT

## 2021-03-13 PROCEDURE — 6360000002 HC RX W HCPCS: Performed by: NURSE PRACTITIONER

## 2021-03-13 PROCEDURE — 85027 COMPLETE CBC AUTOMATED: CPT

## 2021-03-13 PROCEDURE — 6370000000 HC RX 637 (ALT 250 FOR IP): Performed by: THORACIC SURGERY (CARDIOTHORACIC VASCULAR SURGERY)

## 2021-03-13 PROCEDURE — 6370000000 HC RX 637 (ALT 250 FOR IP): Performed by: PHYSICIAN ASSISTANT

## 2021-03-13 PROCEDURE — 80202 ASSAY OF VANCOMYCIN: CPT

## 2021-03-13 PROCEDURE — 83735 ASSAY OF MAGNESIUM: CPT

## 2021-03-13 PROCEDURE — 97110 THERAPEUTIC EXERCISES: CPT

## 2021-03-13 PROCEDURE — 97530 THERAPEUTIC ACTIVITIES: CPT

## 2021-03-13 PROCEDURE — 2000000000 HC ICU R&B

## 2021-03-13 PROCEDURE — 82962 GLUCOSE BLOOD TEST: CPT

## 2021-03-13 PROCEDURE — 6370000000 HC RX 637 (ALT 250 FOR IP): Performed by: NURSE PRACTITIONER

## 2021-03-13 PROCEDURE — 6360000002 HC RX W HCPCS: Performed by: SPECIALIST

## 2021-03-13 PROCEDURE — 6360000002 HC RX W HCPCS: Performed by: INTERNAL MEDICINE

## 2021-03-13 PROCEDURE — 94761 N-INVAS EAR/PLS OXIMETRY MLT: CPT

## 2021-03-13 RX ADMIN — APIXABAN 5 MG: 5 TABLET, FILM COATED ORAL at 08:50

## 2021-03-13 RX ADMIN — MEROPENEM 1000 MG: 1 INJECTION, POWDER, FOR SOLUTION INTRAVENOUS at 16:04

## 2021-03-13 RX ADMIN — MEROPENEM 1000 MG: 1 INJECTION, POWDER, FOR SOLUTION INTRAVENOUS at 03:58

## 2021-03-13 RX ADMIN — INSULIN LISPRO 10 UNITS: 100 INJECTION, SOLUTION INTRAVENOUS; SUBCUTANEOUS at 08:57

## 2021-03-13 RX ADMIN — MIDODRINE HYDROCHLORIDE 10 MG: 5 TABLET ORAL at 16:04

## 2021-03-13 RX ADMIN — PANTOPRAZOLE SODIUM 40 MG: 40 INJECTION, POWDER, FOR SOLUTION INTRAVENOUS at 20:59

## 2021-03-13 RX ADMIN — PANTOPRAZOLE SODIUM 40 MG: 40 INJECTION, POWDER, FOR SOLUTION INTRAVENOUS at 08:49

## 2021-03-13 RX ADMIN — MIDODRINE HYDROCHLORIDE 10 MG: 5 TABLET ORAL at 08:50

## 2021-03-13 RX ADMIN — ROPINIROLE HYDROCHLORIDE 0.25 MG: 0.25 TABLET, FILM COATED ORAL at 13:34

## 2021-03-13 RX ADMIN — INSULIN GLARGINE 35 UNITS: 100 INJECTION, SOLUTION SUBCUTANEOUS at 21:33

## 2021-03-13 RX ADMIN — INSULIN LISPRO 10 UNITS: 100 INJECTION, SOLUTION INTRAVENOUS; SUBCUTANEOUS at 13:27

## 2021-03-13 RX ADMIN — MINOCYCLINE HYDROCHLORIDE 100 MG: 100 CAPSULE ORAL at 13:35

## 2021-03-13 RX ADMIN — ROPINIROLE HYDROCHLORIDE 0.25 MG: 0.25 TABLET, FILM COATED ORAL at 21:00

## 2021-03-13 RX ADMIN — MIDODRINE HYDROCHLORIDE 10 MG: 5 TABLET ORAL at 13:34

## 2021-03-13 RX ADMIN — ASPIRIN 81 MG: 81 TABLET, CHEWABLE ORAL at 08:49

## 2021-03-13 RX ADMIN — MINOCYCLINE HYDROCHLORIDE 100 MG: 100 CAPSULE ORAL at 21:00

## 2021-03-13 RX ADMIN — VANCOMYCIN HYDROCHLORIDE 1000 MG: 1 INJECTION, POWDER, LYOPHILIZED, FOR SOLUTION INTRAVENOUS at 14:32

## 2021-03-13 RX ADMIN — TORSEMIDE 20 MG: 20 TABLET ORAL at 08:50

## 2021-03-13 RX ADMIN — SODIUM CHLORIDE, PRESERVATIVE FREE 10 ML: 5 INJECTION INTRAVENOUS at 21:00

## 2021-03-13 RX ADMIN — APIXABAN 5 MG: 5 TABLET, FILM COATED ORAL at 21:00

## 2021-03-13 RX ADMIN — SODIUM CHLORIDE, PRESERVATIVE FREE 10 ML: 5 INJECTION INTRAVENOUS at 08:50

## 2021-03-13 RX ADMIN — ROPINIROLE HYDROCHLORIDE 0.25 MG: 0.25 TABLET, FILM COATED ORAL at 08:51

## 2021-03-13 RX ADMIN — TORSEMIDE 20 MG: 20 TABLET ORAL at 21:00

## 2021-03-13 RX ADMIN — INSULIN LISPRO 10 UNITS: 100 INJECTION, SOLUTION INTRAVENOUS; SUBCUTANEOUS at 17:38

## 2021-03-13 ASSESSMENT — PAIN SCALES - GENERAL
PAINLEVEL_OUTOF10: 0

## 2021-03-13 NOTE — PROGRESS NOTES
progress Note( Dr. Skylar Noel)  3/13/2021  Subjective:   Admit Date: 3/1/2021  PCP: Judi Berrios MD    Admitted For :Shortness of breath leg swelling    Consulted For:  Better control of blood glucose    Interval History: Patient seemed to be more alert today able to communicate properly  Uses CPAP at night for sleep apnea    Had hemodialysis  Being done at discretion of nephrology  Is more ambulatory with help    LFTs are still high is being followed up by GI  Review input from  infectious disease    Denies any chest pains,   Yes SOB . Seem to be better  Denies nausea or vomiting. Not eating much  No new bowel or bladder symptoms.        Intake/Output Summary (Last 24 hours) at 3/13/2021 1828  Last data filed at 3/13/2021 1743  Gross per 24 hour   Intake 1530 ml   Output 6700 ml   Net -5170 ml       DATA    CBC:   Recent Labs     03/11/21  0440 03/12/21  0505 03/13/21  0630   WBC 14.8* 13.4* 11.7*   HGB 8.4* 8.3* 8.8*    199 243    CMP:  Recent Labs     03/11/21  0440 03/12/21  0505 03/13/21  0630   * 130* 133*   K 4.3 4.1 4.1   CL 96* 94* 96*   CO2 24 26 25   BUN 66* 80* 87*   CREATININE 2.7* 2.8* 2.6*   CALCIUM 7.7* 7.9* 8.3   PROT 5.2*  --   --    LABALBU 3.0*  3.0* 2.9* 3.0*   BILITOT 0.7  --   --    ALKPHOS 250*  --   --    AST 34  --   --    ALT 48*  --   --      Lipids:   Lab Results   Component Value Date    CHOL 91 12/18/2020    HDL 43 12/18/2020    TRIG 73 03/10/2021     Glucose:  Recent Labs     03/13/21  0356 03/13/21  1244 03/13/21  1705   POCGLU 121* 149* 123*     [id number]:  Lab Results   Component Value Date    LABA1C 6.1 02/20/2021     High Sensitivity TSH:   Lab Results   Component Value Date    TSHHS 2.620 03/03/2021     Free T3: No results found for: FT3  Free T4:  Lab Results   Component Value Date    T4FREE 1.5 07/07/2020       Echocardiogram Limited    Result Date: 3/2/2021  Transthoracic Echocardiography Report (TTE)  Demographics   Patient Name       RASHAD TATE Date of Study       03/02/2021   Date of Birth      1950         Gender              Male   Age                66 year(s)         Race                   Patient Number     [id number]         Room Number         2101   Visit Number       [id number]   Corporate ID       [id number]   Accession Number   [id number]         [address] [place]   Ordering Physician Pita Werner PA-C       Physician           MD  Procedure Type of Study   TTE procedure:ECHOCARDIOGRAM LIMITED. Procedure Date Date: 03/02/2021 Start: 11:53 AM Study Location: Portable Technical Quality: Adequate visualization Indications:S/P CABG. Patient Status: Routine Height: 70 inches Weight: 230 pounds BSA: 2.22 m2 BMI: 33 kg/m2 HR: 91 bpm BP: 91/45 mmHg  Conclusions   Summary  This is a limited echocardiogram.  Left ventricular systolic function is normal.  Ejection fraction is visually estimated at 55-60%. Bilateral atrial enlargement. S/p AVR with a 27 mm Medtronic Mosaic. Moderate mitral regurgitation. Moderate tricuspid regurgitation; RVSP: 50 mmHg. Severe PHTN. No evidence of any pericardial effusion. Signature   ------------------------------------------------------------------  Electronically signed by Dari Taylor MD (Interpreting  physician) on 03/02/2021 at 05:06 PM  -    Ct Chest Wo Contrast    Result Date: 3/1/2021  EXAMINATION: CT OF THE CHEST WITHOUT CONTRAST 3/1/2021 3:57 pm T    Patient status post midline sternotomy. Immediately posterior to the sternotomy defect, there is a small gas and fluid collection which is nonspecific. It is not necessarily outside normal limits for a sternotomy that was performed 2 weeks ago. However, sterility cannot be ascertained with imaging, and therefore a small developing abscess is considered as well.  No evidence of osteolysis of the sternotomy defect to suggest osteomyelitis. Interval development of a moderate to large right and a moderate left pleural effusion. Adjacent airspace disease is some combination of atelectasis, pneumonia, and/or edema. Xr Chest Portable    Result Date: 3/3/2021  EXAMINATION: ONE XRAY VIEW OF THE CHEST 3/3/2021 5:34 am COMPARISON: 03/02/2021 HISTORY: ORDERING SYSTEM PROVIDED HISTORY: heart failure TECHNOLOGIST PROVIDED HISTORY: Reason for exam:->heart failure Reason for Exam: heart failure Acuity: Acute Type of Exam: Subsequent/Follow-up FINDINGS: Status post median sternotomy and left-sided transvenous pacer placement. There is stable cardiomegaly. The chest films taken shallow degree of inspiration accentuating heart size and bronchovascular structures. There is a small right pleural effusion. No significant left effusion is seen. The patient is undergone clipping of the left atrial appendage. There is bibasilar atelectasis. Stable exam     Xr Chest Portable    Result Date: 3/2/2021  EXAMINATION: ONE XRAY VIEW OF THE CHEST 3/2/2021 9:38 am COMPARISON: 03/01/2021 HISTORY: ORDERING SYSTEM PROVIDED HISTORY: post bilateral thoracentesis   . Patient has undergone thoracentesis. The volume of fluid in the right pleural space has decreased and/or shifted. There is some persistent right basilar atelectasis. No significant pleural effusion on the left is seen. Minimal left basilar atelectasis is noted. No pneumothorax is seen. Bilateral shoulder arthritis is noted. Pleural effusions right greater than left with bibasilar atelectasis right worse than left. No pneumothorax is seen.      Xr Chest Portable    Result Date: 3/1/2021  EXAMINATION: ONE XRAY VIEW OF THE CHEST 3/1/2021 5:12 pm COMPARISON: 02/23/2021 HISTORY: ORDERING SYSTEM PROVIDED HISTORY: chest pain, shorntess of breath TECHNOLOGIST PROVIDED HISTORY: Reason for exam:->chest pain, shorntess of breath Reason for Exam: chest pain   sob   leg swelling Acuity: Unknown Type of Exam: Unknown Relevant Medical/Surgical History: afib    cad    diabetes FINDINGS: Status post median sternotomy. Transvenous pacer remains place. Stable cardiomegaly. Right-sided pleural effusion. Bibasilar hypoaeration. Right-sided pleural effusion Bibasilar hypoaeration     Vl Dup Lower Extremity Venous Bilateral    Result Date: 3/3/2021  EXAMINATION: DUPLEX VENOUS ULTRASOUND OF THE BILATERAL LOWER EXTREMITIES, 3/3/2021 9:01 am TECHNIQUE: Duplex ultrasound using B-mode/gray scaled imaging and Doppler spectral analysis and color flow was obtained of the bilateral lower extremities. COMPARISON: None. HISTORY: ORDERING SYSTEM PROVIDED HISTORY: fevers TECHNOLOGIST PROVIDED HISTORY: Reason for exam:->fevers Reason for Exam: edema FINDINGS: The visualized veins of the bilateral lower extremities are patent and free of echogenic thrombus. The veins demonstrate good compressibility with normal color flow study and spectral analysis. No evidence of DVT in either lower extremity. Us Chest Including Mediastinum    Result Date: 3/3/2021  EXAMINATION: ULTRASOUND OF THE CHEST 3/3/2021 9:02 am COMPARISON: Chest radiograph performed earlier in the same day HISTORY: ORDERING SYSTEM PROVIDED HISTORY: ASSESS FOR PLEURAL EFFUSION TECHNOLOGIST PROVIDED HISTORY: RIGHT LUNG Reason for exam:->ASSESS FOR PLEURAL EFFUSION Reason for Exam: pleural effusion FINDINGS: Moderate right pleural effusion is present. Right pleural effusion is present. Ir Guided Thoracentesis Pleural    Result Date: 3/2/2021  PROCEDURE: ULTRASOUNDGUIDED Bilateral THORACENTESIS 3/2/2021 HISTORY: ORDERING SYSTEM PROVIDED HISTORY: Bilateral pleural effusion. T   The patient tolerated the procedure well. FINDINGS: A total of 800 ml serosanguinous fluid from left and 1050 ml serosanguineous fluid from right  was removed. Successful ultrasound guided bilateral thoracentesis.        Scheduled

## 2021-03-13 NOTE — PROGRESS NOTES
[id number] [facility]  consulted by Dr. Elham Anderson for monitoring and adjustment. Indication for treatment: Pneumonia  Goal trough: 15 mcg/mL, -600     Pertinent Laboratory Values:   Temp Readings from Last 3 Encounters:   03/13/21 98 °F (36.7 °C) (Oral)   02/24/21 97.3 °F (36.3 °C) (Axillary)   02/12/21 97.8 °F (36.6 °C) (Temporal)     Recent Labs     03/11/21  0440 03/12/21  0505 03/13/21  0630   WBC 14.8* 13.4* 11.7*     Recent Labs     03/11/21  0440 03/12/21  0505 03/13/21  0630   BUN 66* 80* 87*   CREATININE 2.7* 2.8* 2.6*     Estimated Creatinine Clearance: 31 mL/min (A) (based on SCr of 2.6 mg/dL (H)). Intake/Output Summary (Last 24 hours) at 3/13/2021 1306  Last data filed at 3/13/2021 0917  Gross per 24 hour   Intake 966 ml   Output 6470 ml   Net -5504 ml     Pertinent Cultures:  Date    Source    Results  3/5   Blood    Negative  3/8   MRSA Screen   Ordered  3/11                             IV Catheter tip   Pending    Vancomycin level:   TROUGH:  No results for input(s): VANCOTROUGH in the last 72 hours. RANDOM:    Recent Labs     03/13/21  0630   VANCORANDOM 17.4     Assessment:  · WBC and temperature: WBC trending down/afebrile  · SCr, BUN, and urine output: no HD today   · Day(s) of therapy: 12  · Vancomycin concentration: 17.4, ok to re-dose     Plan:  · Pulse dosing based on levels 2/2 renal function changes and RRT   · Received vancomycin 1000 mg x 1 on 3/11  · No future HD at this time   · Re-dose with 1000 mg x 1 and random level Monday AM   · Pharmacy will continue to monitor patient and adjust therapy as indicated    Thank you for the consult.   Shahana Koch, PharmD, BCPS   3/13/2021  1:06 PM

## 2021-03-13 NOTE — PROGRESS NOTES
Nephrology Progress Note  3/13/2021 12:46 PM        Subjective:   Admit Date: 3/1/2021  PCP: Luiza Urbina MD    Interval History: pt seen in early am , this is a late entry     Diet: better    ROS:   Massive  UOP with po loop 6.82 l/d   No overt sob   No f/C/R     Data:     Current med's:    torsemide  20 mg Oral BID    insulin lispro  0-18 Units Subcutaneous 2 times per day    insulin glargine  35 Units Subcutaneous Nightly    insulin lispro  0-18 Units Subcutaneous TID WC    insulin NPH  10 Units Subcutaneous Once    rOPINIRole  0.25 mg Oral TID    heparin (porcine)  1,000 Units Intracatheter Once    insulin lispro  10 Units Subcutaneous TID WC    meropenem  1,000 mg Intravenous Q12H    minocycline  100 mg Oral BID    vancomycin (VANCOCIN) intermittent dosing (placeholder)   Other RX Placeholder    apixaban  5 mg Oral BID    sodium chloride flush  10 mL Intravenous 2 times per day    pantoprazole  40 mg Intravenous BID    midodrine  10 mg Oral TID WC    aspirin  81 mg Oral Daily    [Held by provider] atorvastatin  10 mg Oral Daily      sodium chloride      sodium chloride      sodium chloride           I/O last 3 completed shifts:   In: 2654 [P.O.:980; I.V.:316]  Out: 6820 [Urine:6820]    CBC:   Recent Labs     03/11/21  0440 03/12/21  0505 03/13/21  0630   WBC 14.8* 13.4* 11.7*   HGB 8.4* 8.3* 8.8*    199 243          Recent Labs     03/11/21  0440 03/12/21  0505 03/13/21  0630   * 130* 133*   K 4.3 4.1 4.1   CL 96* 94* 96*   CO2 24 26 25   BUN 66* 80* 87*   CREATININE 2.7* 2.8* 2.6*   GLUCOSE 169* 259* 85       Lab Results   Component Value Date    CALCIUM 8.3 03/13/2021    PHOS 4.4 03/13/2021       Objective:     Vitals: /72   Pulse 64   Temp 98 °F (36.7 °C) (Oral)   Resp 23   Ht 5' 10\" (1.778 m)   Wt 238 lb 12.1 oz (108.3 kg)   SpO2 100%   BMI 34.26 kg/m²     General appearance:  No ac distress- his HOB elevated 75 deg in am - CPAP at his bed side   HEENT:  + conj pallor  Neck:  Supple- Rt Ij HD cath   Lungs:  Coarse crackle son posterior exam b/l   Heart:  Seems RRR with AIDA - wound  from recent sternotomy healing well - Lt chest wall cardiac device   Abdomen: soft  Extremities:  +++  thigh edema B/l   Has nik       Problem List :         Impression :     1. AKI_ CKD stage 3 a a1- good UOP_ S/p several KRT - last HD wed - 3/11/14 mainly from ATI seems recovering  2. CAD S/p CABG with fluid overload- with loop  3. Also valvular dz S/O AVR and also MAZE- LA clip   4. Low Na from hypervolemia   5. underlying DM   6. unlikely cystitis - without  pyuria     Recommendation/Plan  :     1. So keep po loop   2. No KRT now   3. Hopefully he will continue to recover kidney Fx  4. Watch for iatrogenic and nosocomial  complication  5.  Goo glycemic control   6. follow clinically       Damien Bermudez MD

## 2021-03-13 NOTE — PROGRESS NOTES
progress Note( Dr. Ion Mancia)  3/12/2021  Subjective:   Admit Date: 3/1/2021  PCP: Amanda Mireles MD    Admitted For :Shortness of breath leg swelling    Consulted For:  Better control of blood glucose    Interval History: Patient seemed to be more alert today able to communicate properly  CT of brain done No acute injury    Had hemodialysis  Being done at discretion of nephrology    LFTs are still climbing is being followed up by GI  Review input from  infectious disease    Denies any chest pains,   Yes SOB . Denies nausea or vomiting. Not eating much  No new bowel or bladder symptoms.        Intake/Output Summary (Last 24 hours) at 3/12/2021 2308  Last data filed at 3/12/2021 2000  Gross per 24 hour   Intake 2424 ml   Output 3220 ml   Net -796 ml       DATA    CBC:   Recent Labs     03/10/21  0440 03/11/21  0440 03/12/21  0505   WBC 14.5* 14.8* 13.4*   HGB 8.1* 8.4* 8.3*    179 199    CMP:  Recent Labs     03/10/21  0440 03/11/21  0440 03/12/21  0505   * 132* 130*   K 4.2 4.3 4.1   CL 93* 96* 94*   CO2 26 24 26   BUN 73* 66* 80*   CREATININE 3.1* 2.7* 2.8*   CALCIUM 7.8* 7.7* 7.9*   PROT 5.2* 5.2*  --    LABALBU 3.1*  3.1* 3.0*  3.0* 2.9*   BILITOT 1.1* 0.7  --    ALKPHOS 239* 250*  --    AST 27 34  --    ALT 48* 48*  --      Lipids:   Lab Results   Component Value Date    CHOL 91 12/18/2020    HDL 43 12/18/2020    TRIG 73 03/10/2021     Glucose:  Recent Labs     03/12/21  1156 03/12/21  1728 03/12/21  2124   POCGLU 184* 126* 170*     AaeaseajtiB7I:  Lab Results   Component Value Date    LABA1C 6.1 02/20/2021     High Sensitivity TSH:   Lab Results   Component Value Date    TSHHS 2.620 03/03/2021     Free T3: No results found for: FT3  Free T4:  Lab Results   Component Value Date    T4FREE 1.5 07/07/2020       Echocardiogram Limited    Result Date: 3/2/2021  Transthoracic Echocardiography Report (TTE)  Demographics   Patient Name       RASHAD TATE    Date of Study       03/02/2021   Date of Birth      1950         Gender              Male   Age                66 year(s)         Race                   Patient Number     [id number]         Room Number         2101   Visit Number       [id number]   Corporate ID       [id number]   Accession Number   [id number]         [address]   Ordering Physician Mary Summers PA-C       Physician           MD  Procedure Type of Study   TTE procedure:ECHOCARDIOGRAM LIMITED. Procedure Date Date: 03/02/2021 Start: 11:53 AM Study Location: Portable Technical Quality: Adequate visualization Indications:S/P CABG. Patient Status: Routine Height: 70 inches Weight: 230 pounds BSA: 2.22 m2 BMI: 33 kg/m2 HR: 91 bpm BP: 91/45 mmHg  Conclusions   Summary  This is a limited echocardiogram.  Left ventricular systolic function is normal.  Ejection fraction is visually estimated at 55-60%. Bilateral atrial enlargement. S/p AVR with a 27 mm Medtronic Mosaic. Moderate mitral regurgitation. Moderate tricuspid regurgitation; RVSP: 50 mmHg. Severe PHTN. No evidence of any pericardial effusion. Signature   ------------------------------------------------------------------  Electronically signed by Bari Braun MD (Interpreting  physician) on 03/02/2021 at 05:06 PM  -    Ct Chest Wo Contrast    Result Date: 3/1/2021  EXAMINATION: CT OF THE CHEST WITHOUT CONTRAST 3/1/2021 3:57 pm T    Patient status post midline sternotomy. Immediately posterior to the sternotomy defect, there is a small gas and fluid collection which is nonspecific. It is not necessarily outside normal limits for a sternotomy that was performed 2 weeks ago. However, sterility cannot be ascertained with imaging, and therefore a small developing abscess is considered as well.  No evidence of osteolysis of the sternotomy defect to suggest osteomyelitis. Interval development of a moderate to large right and a moderate left pleural effusion. Adjacent airspace disease is some combination of atelectasis, pneumonia, and/or edema. Xr Chest Portable    Result Date: 3/3/2021  EXAMINATION: ONE XRAY VIEW OF THE CHEST 3/3/2021 5:34 am COMPARISON: 03/02/2021 HISTORY: ORDERING SYSTEM PROVIDED HISTORY: heart failure TECHNOLOGIST PROVIDED HISTORY: Reason for exam:->heart failure Reason for Exam: heart failure Acuity: Acute Type of Exam: Subsequent/Follow-up FINDINGS: Status post median sternotomy and left-sided transvenous pacer placement. There is stable cardiomegaly. The chest films taken shallow degree of inspiration accentuating heart size and bronchovascular structures. There is a small right pleural effusion. No significant left effusion is seen. The patient is undergone clipping of the left atrial appendage. There is bibasilar atelectasis. Stable exam     Xr Chest Portable    Result Date: 3/2/2021  EXAMINATION: ONE XRAY VIEW OF THE CHEST 3/2/2021 9:38 am COMPARISON: 03/01/2021 HISTORY: ORDERING SYSTEM PROVIDED HISTORY: post bilateral thoracentesis   . Patient has undergone thoracentesis. The volume of fluid in the right pleural space has decreased and/or shifted. There is some persistent right basilar atelectasis. No significant pleural effusion on the left is seen. Minimal left basilar atelectasis is noted. No pneumothorax is seen. Bilateral shoulder arthritis is noted. Pleural effusions right greater than left with bibasilar atelectasis right worse than left. No pneumothorax is seen.      Xr Chest Portable    Result Date: 3/1/2021  EXAMINATION: ONE XRAY VIEW OF THE CHEST 3/1/2021 5:12 pm COMPARISON: 02/23/2021 HISTORY: ORDERING SYSTEM PROVIDED HISTORY: chest pain, shorntess of breath TECHNOLOGIST PROVIDED HISTORY: Reason for exam:->chest pain, shorntess of breath Reason for Exam: chest pain   sob   leg swelling Acuity: Unknown Type of Exam: Unknown Relevant Medical/Surgical History: afib    cad    diabetes FINDINGS: Status post median sternotomy. Transvenous pacer remains place. Stable cardiomegaly. Right-sided pleural effusion. Bibasilar hypoaeration. Right-sided pleural effusion Bibasilar hypoaeration     Vl Dup Lower Extremity Venous Bilateral    Result Date: 3/3/2021  EXAMINATION: DUPLEX VENOUS ULTRASOUND OF THE BILATERAL LOWER EXTREMITIES, 3/3/2021 9:01 am TECHNIQUE: Duplex ultrasound using B-mode/gray scaled imaging and Doppler spectral analysis and color flow was obtained of the bilateral lower extremities. COMPARISON: None. HISTORY: ORDERING SYSTEM PROVIDED HISTORY: fevers TECHNOLOGIST PROVIDED HISTORY: Reason for exam:->fevers Reason for Exam: edema FINDINGS: The visualized veins of the bilateral lower extremities are patent and free of echogenic thrombus. The veins demonstrate good compressibility with normal color flow study and spectral analysis. No evidence of DVT in either lower extremity. Us Chest Including Mediastinum    Result Date: 3/3/2021  EXAMINATION: ULTRASOUND OF THE CHEST 3/3/2021 9:02 am COMPARISON: Chest radiograph performed earlier in the same day HISTORY: ORDERING SYSTEM PROVIDED HISTORY: ASSESS FOR PLEURAL EFFUSION TECHNOLOGIST PROVIDED HISTORY: RIGHT LUNG Reason for exam:->ASSESS FOR PLEURAL EFFUSION Reason for Exam: pleural effusion FINDINGS: Moderate right pleural effusion is present. Right pleural effusion is present. Ir Guided Thoracentesis Pleural    Result Date: 3/2/2021  PROCEDURE: ULTRASOUNDGUIDED Bilateral THORACENTESIS 3/2/2021 HISTORY: ORDERING SYSTEM PROVIDED HISTORY: Bilateral pleural effusion. T   The patient tolerated the procedure well. FINDINGS: A total of 800 ml serosanguinous fluid from left and 1050 ml serosanguineous fluid from right  was removed. Successful ultrasound guided bilateral thoracentesis.        Scheduled Medicines   Medications:    torsemide  20 mg Oral BID    insulin lispro  0-18 Units Subcutaneous 2 times per day    insulin glargine  35 Units Subcutaneous Nightly    insulin lispro  0-18 Units Subcutaneous TID WC    insulin NPH  10 Units Subcutaneous Once    rOPINIRole  0.25 mg Oral TID    heparin (porcine)  1,000 Units Intracatheter Once    insulin lispro  10 Units Subcutaneous TID     meropenem  1,000 mg Intravenous Q12H    minocycline  100 mg Oral BID    vancomycin (VANCOCIN) intermittent dosing (placeholder)   Other RX Placeholder    apixaban  5 mg Oral BID    sodium chloride flush  10 mL Intravenous 2 times per day    pantoprazole  40 mg Intravenous BID    midodrine  10 mg Oral TID     aspirin  81 mg Oral Daily    [Held by provider] atorvastatin  10 mg Oral Daily      Infusions:    sodium chloride      sodium chloride      sodium chloride           Objective:   Vitals: /76   Pulse 61   Temp 97.7 °F (36.5 °C) (Oral)   Resp 17   Ht 5' 10\" (1.778 m)   Wt 253 lb 4.9 oz (114.9 kg)   SpO2 97%   BMI 36.35 kg/m²   General appearance: alert and cooperative with exam  Neck: no JVD or bruit  Thyroid : Normal lobes   Lungs: Has Vesicular Breath sounds there is breath sounds bilateral pleural effusion tapped both sides noted below  Heart:  regular rate and rhythm  Abdomen: soft, non-tender; bowel sounds normal; no masses,  no organomegaly  Musculoskeletal: Normal  Extremities: extremities normal, , no edema  Neurologic:  Awake, alert, oriented to name, place and time. Cranial nerves II-XII are grossly intact. Motor is  intact. Sensory is intact. ,  and gait is normal.    Assessment:     Patient Active Problem List:     Type 2 diabetes mellitus without complication, without long-term current use of insulin (HCC)     Ulcer of other part of lower limb     Venous hypertension, chronic, with ulcer ([facility] [address]     Ulcer of other part of foot     Pneumonia     Atrial fibrillation (HCC)     Sinus pause     PAF (paroxysmal atrial fibrillation) ([facility] [address]     DM (diabetes mellitus) ([facility] [address]     DMITRIY on CPAP     Hyperlipidemia     Status post incision and drainage     CKD (chronic kidney disease) stage 3, GFR 30-59 ml/min ([facility])     Hyperpotassemia     Arthritis     PVD (peripheral vascular disease) ([facility])     Hematoma     Cardiac pacemaker in situ     Coronary artery stenosis     Essential hypertension     Gout     Diabetic neuropathy associated with type 2 diabetes mellitus ([facility])     Adenomatous polyp of sigmoid colon     Iron deficiency anemia due to chronic blood loss     Erythropoietin deficiency anemia     VHD (valvular heart disease)     Abnormal fractional flow reserve (FFR) on cardiac catheterization     Carotid stenosis, left     Aortic stenosis, severe     CAD in native artery     Displacement of atrial pacemaker leads     Pacemaker lead malfunction     SOB (shortness of breath)      ? ? Sepsis      Plan:     1. Reviewed POC blood glucose . Labs and X ray results   2. Reviewed Current Medicines   3. On meal/ Correction bolus Humalog/ Basal Lantus Insulin regime   4. Monitor Blood glucose frequently   5. Modified  the dose of Insulin/ other medicines as needed   6. Future hemodialysis at discretion of nephrology  7. Patient was started on antibiotics source of infection is not clear at this point  8. Is being investigated for cause in place of sepsis  9. Will not re start Trulicity at this time  10. Will follow     .      Elisabeth Delgado MD

## 2021-03-13 NOTE — PROGRESS NOTES
progress Note( Dr. Susan Frederick)  3/12/2021  Subjective:   Admit Date: 3/1/2021  PCP: Karo Alba MD    Admitted For :Shortness of breath leg swelling    Consulted For:  Better control of blood glucose    Interval History: Patient seemed to be more alert today able to communicate properly  CT of brain done No acute injury  Had hemodialysis done at  discretion of nephrology    LFTs are still climbing is being followed up by GI  Review input from  infectious disease    Denies any chest pains,   Yes SOB . Denies nausea or vomiting. Not eating much  No new bowel or bladder symptoms.        Intake/Output Summary (Last 24 hours) at 3/12/2021 2310  Last data filed at 3/12/2021 2000  Gross per 24 hour   Intake 2424 ml   Output 3220 ml   Net -796 ml       DATA    CBC:   Recent Labs     03/10/21  0440 03/11/21  0440 03/12/21  0505   WBC 14.5* 14.8* 13.4*   HGB 8.1* 8.4* 8.3*    179 199    CMP:  Recent Labs     03/10/21  0440 03/11/21  0440 03/12/21  0505   * 132* 130*   K 4.2 4.3 4.1   CL 93* 96* 94*   CO2 26 24 26   BUN 73* 66* 80*   CREATININE 3.1* 2.7* 2.8*   CALCIUM 7.8* 7.7* 7.9*   PROT 5.2* 5.2*  --    LABALBU 3.1*  3.1* 3.0*  3.0* 2.9*   BILITOT 1.1* 0.7  --    ALKPHOS 239* 250*  --    AST 27 34  --    ALT 48* 48*  --      Lipids:   Lab Results   Component Value Date    CHOL 91 12/18/2020    HDL 43 12/18/2020    TRIG 73 03/10/2021     Glucose:  Recent Labs     03/12/21  1156 03/12/21  1728 03/12/21  2124   POCGLU 184* 126* 170*     ApkrszohmcB9H:  Lab Results   Component Value Date    LABA1C 6.1 02/20/2021     High Sensitivity TSH:   Lab Results   Component Value Date    TSHHS 2.620 03/03/2021     Free T3: No results found for: FT3  Free T4:  Lab Results   Component Value Date    T4FREE 1.5 07/07/2020       Echocardiogram Limited    Result Date: 3/2/2021  Transthoracic Echocardiography Report (TTE)  Demographics   Patient Name       RASHAD TATE    Date of Study       03/02/2021   Date of Birth 1950         Gender              Male   Age                66 year(s)         Race                   Patient Number     [id number]         Room Number         2101   Visit Number       [id number]   Corporate ID       [id number]   Accession Number   [id number]         [address]   Ordering Physician Diego Hanks PA-C       Physician           MD  Procedure Type of Study   TTE procedure:ECHOCARDIOGRAM LIMITED. Procedure Date Date: 03/02/2021 Start: 11:53 AM Study Location: Portable Technical Quality: Adequate visualization Indications:S/P CABG. Patient Status: Routine Height: 70 inches Weight: 230 pounds BSA: 2.22 m2 BMI: 33 kg/m2 HR: 91 bpm BP: 91/45 mmHg  Conclusions   Summary  This is a limited echocardiogram.  Left ventricular systolic function is normal.  Ejection fraction is visually estimated at 55-60%. Bilateral atrial enlargement. S/p AVR with a 27 mm Medtronic Mosaic. Moderate mitral regurgitation. Moderate tricuspid regurgitation; RVSP: 50 mmHg. Severe PHTN. No evidence of any pericardial effusion. Signature   ------------------------------------------------------------------  Electronically signed by Denny Bobby MD (Interpreting  physician) on 03/02/2021 at 05:06 PM  -    Ct Chest Wo Contrast    Result Date: 3/1/2021  EXAMINATION: CT OF THE CHEST WITHOUT CONTRAST 3/1/2021 3:57 pm T    Patient status post midline sternotomy. Immediately posterior to the sternotomy defect, there is a small gas and fluid collection which is nonspecific. It is not necessarily outside normal limits for a sternotomy that was performed 2 weeks ago. However, sterility cannot be ascertained with imaging, and therefore a small developing abscess is considered as well.  No evidence of osteolysis of the sternotomy defect to suggest osteomyelitis. Interval development of a moderate to large right and a moderate left pleural effusion. Adjacent airspace disease is some combination of atelectasis, pneumonia, and/or edema. Xr Chest Portable    Result Date: 3/3/2021  EXAMINATION: ONE XRAY VIEW OF THE CHEST 3/3/2021 5:34 am COMPARISON: 03/02/2021 HISTORY: ORDERING SYSTEM PROVIDED HISTORY: heart failure TECHNOLOGIST PROVIDED HISTORY: Reason for exam:->heart failure Reason for Exam: heart failure Acuity: Acute Type of Exam: Subsequent/Follow-up FINDINGS: Status post median sternotomy and left-sided transvenous pacer placement. There is stable cardiomegaly. The chest films taken shallow degree of inspiration accentuating heart size and bronchovascular structures. There is a small right pleural effusion. No significant left effusion is seen. The patient is undergone clipping of the left atrial appendage. There is bibasilar atelectasis. Stable exam     Xr Chest Portable    Result Date: 3/2/2021  EXAMINATION: ONE XRAY VIEW OF THE CHEST 3/2/2021 9:38 am COMPARISON: 03/01/2021 HISTORY: ORDERING SYSTEM PROVIDED HISTORY: post bilateral thoracentesis   . Patient has undergone thoracentesis. The volume of fluid in the right pleural space has decreased and/or shifted. There is some persistent right basilar atelectasis. No significant pleural effusion on the left is seen. Minimal left basilar atelectasis is noted. No pneumothorax is seen. Bilateral shoulder arthritis is noted. Pleural effusions right greater than left with bibasilar atelectasis right worse than left. No pneumothorax is seen.      Xr Chest Portable    Result Date: 3/1/2021  EXAMINATION: ONE XRAY VIEW OF THE CHEST 3/1/2021 5:12 pm COMPARISON: 02/23/2021 HISTORY: ORDERING SYSTEM PROVIDED HISTORY: chest pain, shorntess of breath TECHNOLOGIST PROVIDED HISTORY: Reason for exam:->chest pain, shorntess of breath Reason for Exam: chest pain   sob   leg swelling Acuity: Unknown Type of Exam: Unknown Relevant Medical/Surgical History: afib    cad    diabetes FINDINGS: Status post median sternotomy. Transvenous pacer remains place. Stable cardiomegaly. Right-sided pleural effusion. Bibasilar hypoaeration. Right-sided pleural effusion Bibasilar hypoaeration     Vl Dup Lower Extremity Venous Bilateral    Result Date: 3/3/2021  EXAMINATION: DUPLEX VENOUS ULTRASOUND OF THE BILATERAL LOWER EXTREMITIES, 3/3/2021 9:01 am TECHNIQUE: Duplex ultrasound using B-mode/gray scaled imaging and Doppler spectral analysis and color flow was obtained of the bilateral lower extremities. COMPARISON: None. HISTORY: ORDERING SYSTEM PROVIDED HISTORY: fevers TECHNOLOGIST PROVIDED HISTORY: Reason for exam:->fevers Reason for Exam: edema FINDINGS: The visualized veins of the bilateral lower extremities are patent and free of echogenic thrombus. The veins demonstrate good compressibility with normal color flow study and spectral analysis. No evidence of DVT in either lower extremity. Us Chest Including Mediastinum    Result Date: 3/3/2021  EXAMINATION: ULTRASOUND OF THE CHEST 3/3/2021 9:02 am COMPARISON: Chest radiograph performed earlier in the same day HISTORY: ORDERING SYSTEM PROVIDED HISTORY: ASSESS FOR PLEURAL EFFUSION TECHNOLOGIST PROVIDED HISTORY: RIGHT LUNG Reason for exam:->ASSESS FOR PLEURAL EFFUSION Reason for Exam: pleural effusion FINDINGS: Moderate right pleural effusion is present. Right pleural effusion is present. Ir Guided Thoracentesis Pleural    Result Date: 3/2/2021  PROCEDURE: ULTRASOUNDGUIDED Bilateral THORACENTESIS 3/2/2021 HISTORY: ORDERING SYSTEM PROVIDED HISTORY: Bilateral pleural effusion. T   The patient tolerated the procedure well. FINDINGS: A total of 800 ml serosanguinous fluid from left and 1050 ml serosanguineous fluid from right  was removed. Successful ultrasound guided bilateral thoracentesis.        Scheduled Medicines   Medications:    torsemide  20 mg Oral BID    insulin lispro  0-18 Units Subcutaneous 2 times per day    insulin glargine  35 Units Subcutaneous Nightly    insulin lispro  0-18 Units Subcutaneous TID WC    insulin NPH  10 Units Subcutaneous Once    rOPINIRole  0.25 mg Oral TID    heparin (porcine)  1,000 Units Intracatheter Once    insulin lispro  10 Units Subcutaneous TID     meropenem  1,000 mg Intravenous Q12H    minocycline  100 mg Oral BID    vancomycin (VANCOCIN) intermittent dosing (placeholder)   Other RX Placeholder    apixaban  5 mg Oral BID    sodium chloride flush  10 mL Intravenous 2 times per day    pantoprazole  40 mg Intravenous BID    midodrine  10 mg Oral TID     aspirin  81 mg Oral Daily    [Held by provider] atorvastatin  10 mg Oral Daily      Infusions:    sodium chloride      sodium chloride      sodium chloride           Objective:   Vitals: /76   Pulse 61   Temp 97.7 °F (36.5 °C) (Oral)   Resp 17   Ht 5' 10\" (1.778 m)   Wt 253 lb 4.9 oz (114.9 kg)   SpO2 97%   BMI 36.35 kg/m²   General appearance: alert and cooperative with exam  Neck: no JVD or bruit  Thyroid : Normal lobes   Lungs: Has Vesicular Breath sounds there is breath sounds bilateral pleural effusion tapped both sides noted below  Heart:  regular rate and rhythm  Abdomen: soft, non-tender; bowel sounds normal; no masses,  no organomegaly  Musculoskeletal: Normal  Extremities: extremities normal, , no edema  Neurologic:  Awake, alert, oriented to name, place and time. Cranial nerves II-XII are grossly intact. Motor is  intact. Sensory is intact. ,  and gait is normal.    Assessment:     Patient Active Problem List:     Type 2 diabetes mellitus without complication, without long-term current use of insulin (HCC)     Ulcer of other part of lower limb     Venous hypertension, chronic, with ulcer ([facility] [address]     Ulcer of other part of foot     Pneumonia     Atrial fibrillation (HCC)     Sinus pause     PAF (paroxysmal atrial fibrillation) ([facility] [address]     DM (diabetes mellitus) ([facility] [address]     DMITRIY on CPAP     Hyperlipidemia     Status post incision and drainage     CKD (chronic kidney disease) stage 3, GFR 30-59 ml/min ([facility])     Hyperpotassemia     Arthritis     PVD (peripheral vascular disease) ([facility])     Hematoma     Cardiac pacemaker in situ     Coronary artery stenosis     Essential hypertension     Gout     Diabetic neuropathy associated with type 2 diabetes mellitus ([facility])     Adenomatous polyp of sigmoid colon     Iron deficiency anemia due to chronic blood loss     Erythropoietin deficiency anemia     VHD (valvular heart disease)     Abnormal fractional flow reserve (FFR) on cardiac catheterization     Carotid stenosis, left     Aortic stenosis, severe     CAD in native artery     Displacement of atrial pacemaker leads     Pacemaker lead malfunction     SOB (shortness of breath)      ? ? Sepsis      Plan:     1. Reviewed POC blood glucose . Labs and X ray results   2. Reviewed Current Medicines   3. On meal/ Correction bolus Humalog/ Basal Lantus Insulin regime   4. Monitor Blood glucose frequently   5. Modified  the dose of Insulin/ other medicines as needed   6. Future hemodialysis at discretion of nephrology  7. Patient was started on antibiotics source of infection is not clear at this point  8. Is being investigated for cause in place of sepsis  9. Will not re start Trulicity at this time  10. Will follow     .      Rc Roblero MD

## 2021-03-13 NOTE — PROGRESS NOTES
PATIENT NAME: Tanisha Mendez    TODAY'S DATE: 03/13/21    SUBJECTIVE:    Pt is s/p CABG x 2, AVR, maze, LA clip. Pt continues to improve. He is feeling stronger but still needs cardiac walker for ambulation. OBJECTIVE:   VITALS:    Vitals:    03/13/21 1100   BP: 116/72   Pulse: 64   Resp: 23   Temp: 98 °F (36.7 °C)   SpO2: 100%     INTAKE/OUTPUT:    Date 03/13/21 0000 - 03/13/21 2359   Shift 4381-8752 1373-0874 8857-0265 24 Hour Total   INTAKE   P.O.  420  420   Shift Total(mL/kg)  420(3.9)  420(3.9)   OUTPUT   Urine(mL/kg/hr) 3950(4.6)   3950   Shift Total(mL/kg) 3950(36.5)   3950(36.5)   Weight (kg) 108. 3 108. 3 108. 3 108. 3      Patient Vitals for the past 96 hrs (Last 3 readings):   Weight   03/13/21 0600 238 lb 12.1 oz (108.3 kg)   03/10/21 0641 253 lb 4.9 oz (114.9 kg)       EXAM:  Blood pressure 116/72, pulse 64, temperature 98 °F (36.7 °C), temperature source Oral, resp. rate 23, height 5' 10\" (1.778 m), weight 238 lb 12.1 oz (108.3 kg), SpO2 100 %. General appearance: No apparent distress, appears stated age and cooperative. Skin: unremarkable  HEENT Normocephalic, atraumatic without obvious deformity. Neck: Supple, Trachea midline   Lungs: Good respiratory effort.  Clear to auscultation, bilaterally  Heart: Regular rate/ rhythm inc c/d/i  Abdomen: Soft, non-tender or non-distended   Extremities: 1+ edema warm well perfused  Neurologic: Alert, grossly intact  Mental status: normal affect      Data:  CBC:   Recent Labs     03/11/21  0440 03/12/21  0505 03/13/21  0630   WBC 14.8* 13.4* 11.7*   HGB 8.4* 8.3* 8.8*   HCT 29.8* 28.0* 29.4*    199 243     BMP:    Recent Labs     03/11/21  0440 03/12/21  0505 03/13/21  0630   * 130* 133*   K 4.3 4.1 4.1   CL 96* 94* 96*   CO2 24 26 25   BUN 66* 80* 87*   CREATININE 2.7* 2.8* 2.6*   GLUCOSE 169* 259* 85     Hepatic:   Recent Labs     03/11/21 0440   AST 34   ALT 48*   BILITOT 0.7   ALKPHOS 250*     Mag:      Recent Labs     03/11/21 0440 03/12/21  0505 03/13/21  0630   MG 2.6* 2.6* 2.5*      Phos:     Recent Labs     03/11/21  0440 03/12/21  0505 03/13/21  0630   PHOS 2.5 2.7 4.4      INR:   No results for input(s): INR in the last 72 hours. Radiology Review:  CXR                ASSESSMENT AND PLAN:    Patient Active Problem List   Diagnosis    Type 2 diabetes mellitus without complication, without long-term current use of insulin (HCC)    Ulcer of other part of lower limb    Venous hypertension, chronic, with ulcer (HCC)    Ulcer of other part of foot    Pneumonia    Atrial fibrillation (HCC)    Sinus pause    [facility] [facility]    DM (diabetes mellitus) [facility] [address]    DMITRIY on CPAP    Hyperlipidemia    Status post incision and drainage    CKD (chronic kidney disease) stage 3, GFR 30-59 ml/min (HCC)    Hyperkalemia    Arthritis    PVD (peripheral vascular disease) [facility] [address]    Hematoma    Cardiac pacemaker in situ    Coronary artery stenosis    Essential hypertension    Gout    Diabetic neuropathy associated with type 2 diabetes mellitus (HCC)    Adenomatous polyp of sigmoid colon    Iron deficiency anemia due to chronic blood loss    Erythropoietin deficiency anemia    VHD (valvular heart disease)    Abnormal fractional flow reserve (FFR) on cardiac catheterization    Carotid stenosis, left    Aortic stenosis, severe    CAD in native artery    Displacement of atrial pacemaker leads    Pacemaker lead malfunction    SOB (shortness of breath)    Pleural effusion    Elevated liver enzymes       S/P CABG x 2, AVR, maze, LA clip. Cardio: stable, NSR rate 60s. continue proamatine 10 TID. Holding AC due to GI bleed. Pulm: stable on RA, CPAP during sleep. Encourage IS. GI: DC TPN. Tolerating PO diet. Will discuss with GI if okay to restart AC. Renal: creatinine improving 2.6. demedex BID, adequate UOP. Appreciate nephrology input.    Heme: hgb stable 8.8  ID:  WBC simproving 11.7, Urine cx citrobacter farmeri, sputum cx GNB. No fevers. on vanc/merrem/minocycline per ID. ARU denied, expedited appeal pending. Pt still req cardiac walker to ambulate, would really benefit from IP rehab .

## 2021-03-13 NOTE — PROGRESS NOTES
Physical Therapy  . Physical Therapy Treatment Note  Name: Blanquita Horowitz MRN: [id number] :   1950   Date:  3/13/2021   Admission Date: 3/1/2021 Room:  -A     Restrictions/Precautions:        cardiac precautions, general precautions    Communication with other providers:  KIERA Deshpande cleared patient for physical therapy. Subjective:  Patient states:  Agreeable to therapy. Pain:   Location, Type, Intensity (0/10 to 10/10): Denies pain. Objective:    Observation:  Patient seated upright in chair upon therapist arrival. Tele. [place]. A&O x4  Vitals: Hr 65, 02 100% on room air, RR 22, /72    Treatment, including education/measures:  Transfers  Supine to sit :NT  Sit to supine :maximal assistance  Scooting :moderate assistance  Rolling :NT  Sit to stand :moderate assistance x2  Stand to sit :minimal assistance x1  SPT:minimal assistance x2 from chair > BSC    Therapeutic Exercise:  Therapeutic exercises were instructed today. Instructions were given for technique, safety, recruitment, and rationale. Instructions were verbal and/or tactile. Education:  Pt was instructed in Sternal Precautions, Purpose of Exercise Program, Ambar Scale and Signs and Symptoms of Exercise Intolerance per Cardiac Protocol    Overhead Side Stretch x 10  Ankle Pumps/ Heel Raises x 10  Marching Seated x 10  Forward Arm Raises x 10  Side Arm Raises x 10  Patient became to fatigue and SOB, exercises stopped. Gait:  Patient ambulated with cardiac walekr for 125 ft with CGA. Patient presents with forward flexed posture and shuffle gait. Patient required x4 standing rest breaks secondary to c/o SOB. Patient required verbal instructions for postural awareness and safety. Safety  Patient left safely in the bed, with call light/phone in reach with alarm applied. Gait belt was used for transfers and gait.       Assessment / Impression:    Patient's tolerance of treatment:  Well    Adverse Reaction: none  Significant change in status and impact:  none  Barriers to improvement:  none  Discharge plan: pending     Plan for Next Session:    Per POC    Time in:  1015  Time out:  1055  Timed treatment minutes:  40  Total treatment time:  40    Previously filed items:     Short term goals  Time Frame for Short term goals: 1 week  Short term goal 1: Pt will perform sit><supine Gaby  Short term goal 2: Pt will transfer Gaby  Short term goal 3: Pt will ambulate 100ft with LRAD Gaby  Short term goal 4: Pt will perform standing light dynamic activity with single UE support Gaby x 3 minutes  Short term goal 5: Pt will ascend/descend 2 steps single rail Gaby       Electronically signed by:    Kee Larsen PTA [id number]

## 2021-03-13 NOTE — PROGRESS NOTES
progress Note( Dr. Skylar Noel)  3/12/2021  Subjective:   Admit Date: 3/1/2021  PCP: Judi Berrios MD    Admitted For :Shortness of breath leg swelling    Consulted For:  Better control of blood glucose    Interval History: Patient seemed to be more alert today able to communicate properly  CT of brain done No acute injury  Had hemodialysis done at  discretion of nephrology    LFTs are still climbing is being followed up by GI  Review input from  infectious disease    Denies any chest pains,   Yes SOB . Denies nausea or vomiting. Not eating much  No new bowel or bladder symptoms.        Intake/Output Summary (Last 24 hours) at 3/12/2021 2311  Last data filed at 3/12/2021 2000  Gross per 24 hour   Intake 2424 ml   Output 3220 ml   Net -796 ml       DATA    CBC:   Recent Labs     03/10/21  0440 03/11/21  0440 03/12/21  0505   WBC 14.5* 14.8* 13.4*   HGB 8.1* 8.4* 8.3*    179 199    CMP:  Recent Labs     03/10/21  0440 03/11/21  0440 03/12/21  0505   * 132* 130*   K 4.2 4.3 4.1   CL 93* 96* 94*   CO2 26 24 26   BUN 73* 66* 80*   CREATININE 3.1* 2.7* 2.8*   CALCIUM 7.8* 7.7* 7.9*   PROT 5.2* 5.2*  --    LABALBU 3.1*  3.1* 3.0*  3.0* 2.9*   BILITOT 1.1* 0.7  --    ALKPHOS 239* 250*  --    AST 27 34  --    ALT 48* 48*  --      Lipids:   Lab Results   Component Value Date    CHOL 91 12/18/2020    HDL 43 12/18/2020    TRIG 73 03/10/2021     Glucose:  Recent Labs     03/12/21  1156 03/12/21  1728 03/12/21  2124   POCGLU 184* 126* 170*     [id number]:  Lab Results   Component Value Date    LABA1C 6.1 02/20/2021     High Sensitivity TSH:   Lab Results   Component Value Date    TSHHS 2.620 03/03/2021     Free T3: No results found for: FT3  Free T4:  Lab Results   Component Value Date    T4FREE 1.5 07/07/2020       Echocardiogram Limited    Result Date: 3/2/2021  Transthoracic Echocardiography Report (TTE)  Demographics   Patient Name       RASAHD TATE    Date of Study       03/02/2021   Date of Birth 1950         Gender              Male   Age                66 year(s)         Race                   Patient Number     [id number]         Room Number         2101   Visit Number       [id number]   Corporate ID       [id number]   Accession Number   [id number]         [address] [facility]   Ordering Physician Sebastián Cheatham PA-C       Physician           MD  Procedure Type of Study   TTE procedure:ECHOCARDIOGRAM LIMITED. Procedure Date Date: 03/02/2021 Start: 11:53 AM Study Location: Portable Technical Quality: Adequate visualization Indications:S/P CABG. Patient Status: Routine Height: 70 inches Weight: 230 pounds BSA: 2.22 m2 BMI: 33 kg/m2 HR: 91 bpm BP: 91/45 mmHg  Conclusions   Summary  This is a limited echocardiogram.  Left ventricular systolic function is normal.  Ejection fraction is visually estimated at 55-60%. Bilateral atrial enlargement. S/p AVR with a 27 mm Medtronic Mosaic. Moderate mitral regurgitation. Moderate tricuspid regurgitation; RVSP: 50 mmHg. Severe PHTN. No evidence of any pericardial effusion. Signature   ------------------------------------------------------------------  Electronically signed by Neelima Stallworth MD (Interpreting  physician) on 03/02/2021 at 05:06 PM  -    Ct Chest Wo Contrast    Result Date: 3/1/2021  EXAMINATION: CT OF THE CHEST WITHOUT CONTRAST 3/1/2021 3:57 pm T    Patient status post midline sternotomy. Immediately posterior to the sternotomy defect, there is a small gas and fluid collection which is nonspecific. It is not necessarily outside normal limits for a sternotomy that was performed 2 weeks ago. However, sterility cannot be ascertained with imaging, and therefore a small developing abscess is considered as well.  No evidence of osteolysis of the sternotomy defect to suggest osteomyelitis. Interval development of a moderate to large right and a moderate left pleural effusion. Adjacent airspace disease is some combination of atelectasis, pneumonia, and/or edema. Xr Chest Portable    Result Date: 3/3/2021  EXAMINATION: ONE XRAY VIEW OF THE CHEST 3/3/2021 5:34 am COMPARISON: 03/02/2021 HISTORY: ORDERING SYSTEM PROVIDED HISTORY: heart failure TECHNOLOGIST PROVIDED HISTORY: Reason for exam:->heart failure Reason for Exam: heart failure Acuity: Acute Type of Exam: Subsequent/Follow-up FINDINGS: Status post median sternotomy and left-sided transvenous pacer placement. There is stable cardiomegaly. The chest films taken shallow degree of inspiration accentuating heart size and bronchovascular structures. There is a small right pleural effusion. No significant left effusion is seen. The patient is undergone clipping of the left atrial appendage. There is bibasilar atelectasis. Stable exam     Xr Chest Portable    Result Date: 3/2/2021  EXAMINATION: ONE XRAY VIEW OF THE CHEST 3/2/2021 9:38 am COMPARISON: 03/01/2021 HISTORY: ORDERING SYSTEM PROVIDED HISTORY: post bilateral thoracentesis   . Patient has undergone thoracentesis. The volume of fluid in the right pleural space has decreased and/or shifted. There is some persistent right basilar atelectasis. No significant pleural effusion on the left is seen. Minimal left basilar atelectasis is noted. No pneumothorax is seen. Bilateral shoulder arthritis is noted. Pleural effusions right greater than left with bibasilar atelectasis right worse than left. No pneumothorax is seen.      Xr Chest Portable    Result Date: 3/1/2021  EXAMINATION: ONE XRAY VIEW OF THE CHEST 3/1/2021 5:12 pm COMPARISON: 02/23/2021 HISTORY: ORDERING SYSTEM PROVIDED HISTORY: chest pain, shorntess of breath TECHNOLOGIST PROVIDED HISTORY: Reason for exam:->chest pain, shorntess of breath Reason for Exam: chest pain   sob   leg swelling Acuity: Unknown Type of Exam: Unknown Relevant Medical/Surgical History: afib    cad    diabetes FINDINGS: Status post median sternotomy. Transvenous pacer remains place. Stable cardiomegaly. Right-sided pleural effusion. Bibasilar hypoaeration. Right-sided pleural effusion Bibasilar hypoaeration     Vl Dup Lower Extremity Venous Bilateral    Result Date: 3/3/2021  EXAMINATION: DUPLEX VENOUS ULTRASOUND OF THE BILATERAL LOWER EXTREMITIES, 3/3/2021 9:01 am TECHNIQUE: Duplex ultrasound using B-mode/gray scaled imaging and Doppler spectral analysis and color flow was obtained of the bilateral lower extremities. COMPARISON: None. HISTORY: ORDERING SYSTEM PROVIDED HISTORY: fevers TECHNOLOGIST PROVIDED HISTORY: Reason for exam:->fevers Reason for Exam: edema FINDINGS: The visualized veins of the bilateral lower extremities are patent and free of echogenic thrombus. The veins demonstrate good compressibility with normal color flow study and spectral analysis. No evidence of DVT in either lower extremity. Us Chest Including Mediastinum    Result Date: 3/3/2021  EXAMINATION: ULTRASOUND OF THE CHEST 3/3/2021 9:02 am COMPARISON: Chest radiograph performed earlier in the same day HISTORY: ORDERING SYSTEM PROVIDED HISTORY: ASSESS FOR PLEURAL EFFUSION TECHNOLOGIST PROVIDED HISTORY: RIGHT LUNG Reason for exam:->ASSESS FOR PLEURAL EFFUSION Reason for Exam: pleural effusion FINDINGS: Moderate right pleural effusion is present. Right pleural effusion is present. Ir Guided Thoracentesis Pleural    Result Date: 3/2/2021  PROCEDURE: ULTRASOUNDGUIDED Bilateral THORACENTESIS 3/2/2021 HISTORY: ORDERING SYSTEM PROVIDED HISTORY: Bilateral pleural effusion. T   The patient tolerated the procedure well. FINDINGS: A total of 800 ml serosanguinous fluid from left and 1050 ml serosanguineous fluid from right  was removed. Successful ultrasound guided bilateral thoracentesis.        Scheduled Medicines   Medications:    torsemide  20 mg Oral BID    insulin lispro  0-18 Units Subcutaneous 2 times per day    insulin glargine  35 Units Subcutaneous Nightly    insulin lispro  0-18 Units Subcutaneous TID WC    insulin NPH  10 Units Subcutaneous Once    rOPINIRole  0.25 mg Oral TID    heparin (porcine)  1,000 Units Intracatheter Once    insulin lispro  10 Units Subcutaneous TID     meropenem  1,000 mg Intravenous Q12H    minocycline  100 mg Oral BID    vancomycin (VANCOCIN) intermittent dosing (placeholder)   Other RX Placeholder    apixaban  5 mg Oral BID    sodium chloride flush  10 mL Intravenous 2 times per day    pantoprazole  40 mg Intravenous BID    midodrine  10 mg Oral TID     aspirin  81 mg Oral Daily    [Held by provider] atorvastatin  10 mg Oral Daily      Infusions:    sodium chloride      sodium chloride      sodium chloride           Objective:   Vitals: /76   Pulse 61   Temp 97.7 °F (36.5 °C) (Oral)   Resp 17   Ht 5' 10\" (1.778 m)   Wt 253 lb 4.9 oz (114.9 kg)   SpO2 97%   BMI 36.35 kg/m²   General appearance: alert and cooperative with exam  Neck: no JVD or bruit  Thyroid : Normal lobes   Lungs: Has Vesicular Breath sounds there is breath sounds bilateral pleural effusion tapped both sides noted below  Heart:  regular rate and rhythm  Abdomen: soft, non-tender; bowel sounds normal; no masses,  no organomegaly  Musculoskeletal: Normal  Extremities: extremities normal, , no edema  Neurologic:  Awake, alert, oriented to name, place and time. Cranial nerves II-XII are grossly intact. Motor is  intact. Sensory is intact. ,  and gait is normal.    Assessment:     Patient Active Problem List:     Type 2 diabetes mellitus without complication, without long-term current use of insulin (HCC)     Ulcer of other part of lower limb     Venous hypertension, chronic, with ulcer ([facility] [address]     Ulcer of other part of foot     Pneumonia     Atrial fibrillation (HCC)     Sinus pause     PAF (paroxysmal atrial fibrillation) ([facility] [address]     DM (diabetes mellitus) ([facility] [address]     DMITRIY on CPAP     Hyperlipidemia     Status post incision and drainage     CKD (chronic kidney disease) stage 3, GFR 30-59 ml/min ([facility])     Hyperpotassemia     Arthritis     PVD (peripheral vascular disease) ([facility])     Hematoma     Cardiac pacemaker in situ     Coronary artery stenosis     Essential hypertension     Gout     Diabetic neuropathy associated with type 2 diabetes mellitus ([facility])     Adenomatous polyp of sigmoid colon     Iron deficiency anemia due to chronic blood loss     Erythropoietin deficiency anemia     VHD (valvular heart disease)     Abnormal fractional flow reserve (FFR) on cardiac catheterization     Carotid stenosis, left     Aortic stenosis, severe     CAD in native artery     Displacement of atrial pacemaker leads     Pacemaker lead malfunction     SOB (shortness of breath)      ? ? Sepsis      Plan:     1. Reviewed POC blood glucose . Labs and X ray results   2. Reviewed Current Medicines   3. On meal/ Correction bolus Humalog/ Basal Lantus Insulin regime   4. Monitor Blood glucose frequently   5. Modified  the dose of Insulin/ other medicines as needed   6. Future hemodialysis at discretion of nephrology  7. Patient was started on antibiotics source of infection is not clear at this point  8. Is being investigated for cause in place of sepsis  9. Will not re start Trulicity at this time  10. Will follow     .      Demond Salomon MD

## 2021-03-13 NOTE — PROGRESS NOTES
Comprehensive Nutrition Assessment    Type and Reason for Visit:  Reassess    Nutrition Recommendations/Plan:   · Continue current diet and supplements    Nutrition Assessment:  Pt is consuming 100% of meals and is at low risk at this time    Malnutrition Assessment:  Malnutrition Status: At risk for malnutrition (Comment)    Context:  Acute Illness       Estimated Daily Nutrient Needs:  Energy (kcal):  1628-7286; Weight Used for Energy Requirements:  Current     Protein (g):  91; Weight Used for Protein Requirements:  Ideal        Fluid (ml/day):  2859-7436; Method Used for Fluid Requirements:  1 ml/kcal       Wounds:  Diabetic Ulcer       Current Nutrition Therapies:    Dietary Nutrition Supplements: Diabetic Oral Supplement  Dietary Nutrition Supplements: Frozen Oral Supplement  DIET CARB CONTROL; Carb Control: 4 carb choices (60 gms)/meal; Low Sodium (2 GM); Daily Fluid Restriction: 1500 ml; Low Potassium    Anthropometric Measures:  · Height: 5' 10\" (177.8 cm)  · Current Body Weight: 230 lb (104.3 kg)   · Admission Body Weight: 230 lb (104.3 kg)    · Usual Body Weight: 220 lb (99.8 kg)     · Ideal Body Weight: 166 lbs; % Ideal Body Weight 138.6 %   · BMI: 33   · BMI Categories: Obese Class 1 (BMI 30.0-34. 9)       Nutrition Diagnosis:   · Increased nutrient needs related to endocrine dysfuntion as evidenced by wounds      Nutrition Interventions:   Food and/or Nutrient Delivery:  Continue Current Diet, Start Oral Nutrition Supplement  Nutrition Education/Counseling:  No recommendation at this time   Coordination of Nutrition Care:  Continue to monitor while inpatient    Goals:  pt will consume greater 75% of his meals and supplements       Nutrition Monitoring and Evaluation:   Behavioral-Environmental Outcomes:  None Identified   Food/Nutrient Intake Outcomes:  Food and Nutrient Intake, Supplement Intake  Physical Signs/Symptoms Outcomes:  Biochemical Data, Skin, Weight     Discharge Planning:     Too soon to determine     Electronically signed by Sonal York RD, LD on 0/12/78 at 8:13 PM EST    Contact: [phone]

## 2021-03-14 ENCOUNTER — APPOINTMENT (OUTPATIENT)
Dept: GENERAL RADIOLOGY | Age: 71
DRG: 871 | End: 2021-03-14
Payer: MEDICARE

## 2021-03-14 LAB
ALBUMIN SERPL-MCNC: 2.8 GM/DL (ref 3.4–5)
ANION GAP SERPL CALCULATED.3IONS-SCNC: 12 MMOL/L (ref 4–16)
BUN BLDV-MCNC: 90 MG/DL (ref 6–23)
CALCIUM SERPL-MCNC: 8.4 MG/DL (ref 8.3–10.6)
CHLORIDE BLD-SCNC: 95 MMOL/L (ref 99–110)
CO2: 28 MMOL/L (ref 21–32)
CREAT SERPL-MCNC: 2.3 MG/DL (ref 0.9–1.3)
GFR AFRICAN AMERICAN: 34 ML/MIN/1.73M2
GFR NON-AFRICAN AMERICAN: 28 ML/MIN/1.73M2
GLUCOSE BLD-MCNC: 112 MG/DL (ref 70–99)
GLUCOSE BLD-MCNC: 168 MG/DL (ref 70–99)
GLUCOSE BLD-MCNC: 251 MG/DL (ref 70–99)
GLUCOSE BLD-MCNC: 55 MG/DL (ref 70–99)
GLUCOSE BLD-MCNC: 70 MG/DL (ref 70–99)
GLUCOSE BLD-MCNC: 76 MG/DL (ref 70–99)
HCT VFR BLD CALC: 30.4 % (ref 42–52)
HEMOGLOBIN: 9 GM/DL (ref 13.5–18)
MAGNESIUM: 2.2 MG/DL (ref 1.8–2.4)
MCH RBC QN AUTO: 24.7 PG (ref 27–31)
MCHC RBC AUTO-ENTMCNC: 29.6 % (ref 32–36)
MCV RBC AUTO: 83.3 FL (ref 78–100)
PDW BLD-RTO: 23.2 % (ref 11.7–14.9)
PHOSPHORUS: 5.2 MG/DL (ref 2.5–4.9)
PLATELET # BLD: 324 K/CU MM (ref 140–440)
PMV BLD AUTO: 11.5 FL (ref 7.5–11.1)
POTASSIUM SERPL-SCNC: 4.3 MMOL/L (ref 3.5–5.1)
PROCALCITONIN: 0.69
RBC # BLD: 3.65 M/CU MM (ref 4.6–6.2)
SODIUM BLD-SCNC: 135 MMOL/L (ref 135–145)
WBC # BLD: 11.1 K/CU MM (ref 4–10.5)

## 2021-03-14 PROCEDURE — 84145 PROCALCITONIN (PCT): CPT

## 2021-03-14 PROCEDURE — 6370000000 HC RX 637 (ALT 250 FOR IP): Performed by: NURSE PRACTITIONER

## 2021-03-14 PROCEDURE — 6370000000 HC RX 637 (ALT 250 FOR IP): Performed by: INTERNAL MEDICINE

## 2021-03-14 PROCEDURE — 6370000000 HC RX 637 (ALT 250 FOR IP): Performed by: PHYSICIAN ASSISTANT

## 2021-03-14 PROCEDURE — 82962 GLUCOSE BLOOD TEST: CPT

## 2021-03-14 PROCEDURE — 85027 COMPLETE CBC AUTOMATED: CPT

## 2021-03-14 PROCEDURE — 2580000003 HC RX 258: Performed by: NURSE PRACTITIONER

## 2021-03-14 PROCEDURE — 82248 BILIRUBIN DIRECT: CPT

## 2021-03-14 PROCEDURE — 6360000002 HC RX W HCPCS: Performed by: NURSE PRACTITIONER

## 2021-03-14 PROCEDURE — 6360000002 HC RX W HCPCS: Performed by: SPECIALIST

## 2021-03-14 PROCEDURE — 2000000000 HC ICU R&B

## 2021-03-14 PROCEDURE — 2580000003 HC RX 258: Performed by: INTERNAL MEDICINE

## 2021-03-14 PROCEDURE — 94761 N-INVAS EAR/PLS OXIMETRY MLT: CPT

## 2021-03-14 PROCEDURE — 80053 COMPREHEN METABOLIC PANEL: CPT

## 2021-03-14 PROCEDURE — 84100 ASSAY OF PHOSPHORUS: CPT

## 2021-03-14 PROCEDURE — 71045 X-RAY EXAM CHEST 1 VIEW: CPT

## 2021-03-14 PROCEDURE — C9113 INJ PANTOPRAZOLE SODIUM, VIA: HCPCS | Performed by: SPECIALIST

## 2021-03-14 PROCEDURE — 6370000000 HC RX 637 (ALT 250 FOR IP): Performed by: THORACIC SURGERY (CARDIOTHORACIC VASCULAR SURGERY)

## 2021-03-14 PROCEDURE — 83735 ASSAY OF MAGNESIUM: CPT

## 2021-03-14 PROCEDURE — 86141 C-REACTIVE PROTEIN HS: CPT

## 2021-03-14 RX ORDER — INSULIN GLARGINE 100 [IU]/ML
15 INJECTION, SOLUTION SUBCUTANEOUS NIGHTLY
Status: DISCONTINUED | OUTPATIENT
Start: 2021-03-14 | End: 2021-03-15

## 2021-03-14 RX ADMIN — ASPIRIN 81 MG: 81 TABLET, CHEWABLE ORAL at 08:26

## 2021-03-14 RX ADMIN — PANTOPRAZOLE SODIUM 40 MG: 40 INJECTION, POWDER, FOR SOLUTION INTRAVENOUS at 08:27

## 2021-03-14 RX ADMIN — ROPINIROLE HYDROCHLORIDE 0.25 MG: 0.25 TABLET, FILM COATED ORAL at 08:27

## 2021-03-14 RX ADMIN — MIDODRINE HYDROCHLORIDE 10 MG: 5 TABLET ORAL at 08:26

## 2021-03-14 RX ADMIN — SODIUM CHLORIDE, PRESERVATIVE FREE 10 ML: 5 INJECTION INTRAVENOUS at 21:28

## 2021-03-14 RX ADMIN — MEROPENEM 1000 MG: 1 INJECTION, POWDER, FOR SOLUTION INTRAVENOUS at 04:18

## 2021-03-14 RX ADMIN — APIXABAN 5 MG: 5 TABLET, FILM COATED ORAL at 21:26

## 2021-03-14 RX ADMIN — MINOCYCLINE HYDROCHLORIDE 100 MG: 100 CAPSULE ORAL at 15:50

## 2021-03-14 RX ADMIN — PANTOPRAZOLE SODIUM 40 MG: 40 INJECTION, POWDER, FOR SOLUTION INTRAVENOUS at 21:26

## 2021-03-14 RX ADMIN — MEROPENEM 1000 MG: 1 INJECTION, POWDER, FOR SOLUTION INTRAVENOUS at 15:50

## 2021-03-14 RX ADMIN — ROPINIROLE HYDROCHLORIDE 0.25 MG: 0.25 TABLET, FILM COATED ORAL at 21:26

## 2021-03-14 RX ADMIN — ROPINIROLE HYDROCHLORIDE 0.25 MG: 0.25 TABLET, FILM COATED ORAL at 16:57

## 2021-03-14 RX ADMIN — INSULIN GLARGINE 15 UNITS: 100 INJECTION, SOLUTION SUBCUTANEOUS at 21:33

## 2021-03-14 RX ADMIN — TORSEMIDE 20 MG: 20 TABLET ORAL at 08:26

## 2021-03-14 RX ADMIN — SODIUM CHLORIDE, PRESERVATIVE FREE 10 ML: 5 INJECTION INTRAVENOUS at 08:27

## 2021-03-14 RX ADMIN — MINOCYCLINE HYDROCHLORIDE 100 MG: 100 CAPSULE ORAL at 21:26

## 2021-03-14 RX ADMIN — MIDODRINE HYDROCHLORIDE 10 MG: 5 TABLET ORAL at 17:19

## 2021-03-14 RX ADMIN — APIXABAN 5 MG: 5 TABLET, FILM COATED ORAL at 08:27

## 2021-03-14 RX ADMIN — TORSEMIDE 20 MG: 20 TABLET ORAL at 21:26

## 2021-03-14 ASSESSMENT — PAIN SCALES - GENERAL
PAINLEVEL_OUTOF10: 0

## 2021-03-14 NOTE — PROGRESS NOTES
[address] [facility]  consulted by Dr. Balbina Lal for monitoring and adjustment. Indication for treatment: Pneumonia  Goal trough: 15 mcg/mL, -600     Pertinent Laboratory Values:   Temp Readings from Last 3 Encounters:   03/14/21 98 °F (36.7 °C) (Oral)   02/24/21 97.3 °F (36.3 °C) (Axillary)   02/12/21 97.8 °F (36.6 °C) (Temporal)     Recent Labs     03/12/21  0505 03/13/21  0630 03/14/21  0510   WBC 13.4* 11.7* 11.1*     Recent Labs     03/12/21  0505 03/13/21  0630 03/14/21  0510   BUN 80* 87* 90*   CREATININE 2.8* 2.6* 2.3*     Estimated Creatinine Clearance: 35 mL/min (A) (based on SCr of 2.3 mg/dL (H)). Intake/Output Summary (Last 24 hours) at 3/14/2021 1323  Last data filed at 3/14/2021 1204  Gross per 24 hour   Intake 1150 ml   Output 7050 ml   Net -5900 ml     Pertinent Cultures:  Date    Source    Results  3/5   Blood    Negative  3/8   MRSA Screen   Ordered  3/11                             IV Catheter tip   Negative    Vancomycin level:   TROUGH:  No results for input(s): VANCOTROUGH in the last 72 hours. RANDOM:    Recent Labs     03/13/21  0630   VANCORANDOM 17.4     Assessment:  · WBC and temperature: WBC trending down/afebrile  · SCr, BUN, and urine output: no HD today   · Day(s) of therapy: 13  · Vancomycin concentration: repeat to be collected    Plan:  · Pulse dosing based on levels 2/2 renal function changes and RRT   · Received vancomycin 1000 mg x 1 on 3/13  · No future HD at this time   · Random level Monday AM (48h post-dose)  · Pharmacy will continue to monitor patient and adjust therapy as indicated    Thank you for the consult.   Chito Ballard, AkashD, BCPS   3/14/2021  1:23 PM

## 2021-03-14 NOTE — CARE COORDINATION
Received call from [facility]. Patient denial was overturned by expedited appeal. He is able to admit to ARU when medically ready and bed is available. Bed availability should be 3/15/21. Spoke to patient and spouse at bedside. Discussed successful appeal. Plan ARU on 3/15. Corazon Dahl RN

## 2021-03-14 NOTE — PROGRESS NOTES
Patient's glucose is 70. Patient is alert and oriented, states he \"feels fine\". No orders for PRN dextrose at this time. Patient is currently having a snack. Will recheck blood sugar.

## 2021-03-14 NOTE — PROGRESS NOTES
progress Note( Dr. Rufina Yeboah)  3/14/2021  Subjective:   Admit Date: 3/1/2021  PCP: Vanessa Guillen MD    Admitted For :Shortness of breath leg swelling    Consulted For:  Better control of blood glucose    Interval History: Patient seemed to be more alert today able to communicate properly  Uses CPAP at night for sleep apnea    Had hemodialysis  Being done at discretion of nephrology  Is more ambulatory with help    LFTs are still high is being followed up by GI  Review input from  infectious disease    Denies any chest pains,   Yes SOB . Seem to be better  Denies nausea or vomiting. Not eating much  No new bowel or bladder symptoms.        Intake/Output Summary (Last 24 hours) at 3/14/2021 1543  Last data filed at 3/14/2021 1527  Gross per 24 hour   Intake 1540 ml   Output 7850 ml   Net -6310 ml       DATA    CBC:   Recent Labs     03/12/21  0505 03/13/21  0630 03/14/21  0510   WBC 13.4* 11.7* 11.1*   HGB 8.3* 8.8* 9.0*    243 324    CMP:  Recent Labs     03/12/21  0505 03/13/21  0630 03/14/21  0510   * 133* 135   K 4.1 4.1 4.3   CL 94* 96* 95*   CO2 26 25 28   BUN 80* 87* 90*   CREATININE 2.8* 2.6* 2.3*   CALCIUM 7.9* 8.3 8.4   LABALBU 2.9* 3.0* 2.8*     Lipids:   Lab Results   Component Value Date    CHOL 91 12/18/2020    HDL 43 12/18/2020    TRIG 73 03/10/2021     Glucose:  Recent Labs     03/13/21  2132 03/14/21  0503 03/14/21  1252   POCGLU 160* 76 168*     [id number]:  Lab Results   Component Value Date    LABA1C 6.1 02/20/2021     High Sensitivity TSH:   Lab Results   Component Value Date    TSHHS 2.620 03/03/2021     Free T3: No results found for: FT3  Free T4:  Lab Results   Component Value Date    T4FREE 1.5 07/07/2020       Echocardiogram Limited    Result Date: 3/2/2021  Transthoracic Echocardiography Report (TTE)  Demographics   Patient Name       RASHAD TATE    Date of Study       03/02/2021   Date of Birth      1950         Gender              Male   Age                66 year(s)         Race                   Patient Number     [id number]         Room Number         2101   Visit Number       [id number]   Corporate ID       [id number]   Accession Number   [id number]         23 Asha Perry T   Ordering Physician Latasha Garcia       Physician           MD  Procedure Type of Study   TTE procedure:ECHOCARDIOGRAM LIMITED. Procedure Date Date: 03/02/2021 Start: 11:53 AM Study Location: Portable Technical Quality: Adequate visualization Indications:S/P CABG. Patient Status: Routine Height: 70 inches Weight: 230 pounds BSA: 2.22 m2 BMI: 33 kg/m2 HR: 91 bpm BP: 91/45 mmHg  Conclusions   Summary  This is a limited echocardiogram.  Left ventricular systolic function is normal.  Ejection fraction is visually estimated at 55-60%. Bilateral atrial enlargement. S/p AVR with a 27 mm Medtronic Mosaic. Moderate mitral regurgitation. Moderate tricuspid regurgitation; RVSP: 50 mmHg. Severe PHTN. No evidence of any pericardial effusion. Signature   ------------------------------------------------------------------  Electronically signed by Raguel Saint MD (Interpreting  physician) on 03/02/2021 at 05:06 PM  -    Ct Chest Wo Contrast    Result Date: 3/1/2021  EXAMINATION: CT OF THE CHEST WITHOUT CONTRAST 3/1/2021 3:57 pm T    Patient status post midline sternotomy. Immediately posterior to the sternotomy defect, there is a small gas and fluid collection which is nonspecific. It is not necessarily outside normal limits for a sternotomy that was performed 2 weeks ago. However, sterility cannot be ascertained with imaging, and therefore a small developing abscess is considered as well. No evidence of osteolysis of the sternotomy defect to suggest osteomyelitis.  Interval development of a moderate to large right and a moderate left pleural effusion. Adjacent airspace disease is some combination of atelectasis, pneumonia, and/or edema. Xr Chest Portable    Result Date: 3/3/2021  EXAMINATION: ONE XRAY VIEW OF THE CHEST 3/3/2021 5:34 am COMPARISON: 03/02/2021 HISTORY: ORDERING SYSTEM PROVIDED HISTORY: heart failure TECHNOLOGIST PROVIDED HISTORY: Reason for exam:->heart failure Reason for Exam: heart failure Acuity: Acute Type of Exam: Subsequent/Follow-up FINDINGS: Status post median sternotomy and left-sided transvenous pacer placement. There is stable cardiomegaly. The chest films taken shallow degree of inspiration accentuating heart size and bronchovascular structures. There is a small right pleural effusion. No significant left effusion is seen. The patient is undergone clipping of the left atrial appendage. There is bibasilar atelectasis. Stable exam     Xr Chest Portable    Result Date: 3/2/2021  EXAMINATION: ONE XRAY VIEW OF THE CHEST 3/2/2021 9:38 am COMPARISON: 03/01/2021 HISTORY: ORDERING SYSTEM PROVIDED HISTORY: post bilateral thoracentesis   . Patient has undergone thoracentesis. The volume of fluid in the right pleural space has decreased and/or shifted. There is some persistent right basilar atelectasis. No significant pleural effusion on the left is seen. Minimal left basilar atelectasis is noted. No pneumothorax is seen. Bilateral shoulder arthritis is noted. Pleural effusions right greater than left with bibasilar atelectasis right worse than left. No pneumothorax is seen.      Xr Chest Portable    Result Date: 3/1/2021  EXAMINATION: ONE XRAY VIEW OF THE CHEST 3/1/2021 5:12 pm COMPARISON: 02/23/2021 HISTORY: ORDERING SYSTEM PROVIDED HISTORY: chest pain, shorntess of breath TECHNOLOGIST PROVIDED HISTORY: Reason for exam:->chest pain, shorntess of breath Reason for Exam: chest pain   sob   leg swelling Acuity: Unknown Type of Exam: Unknown Relevant Medical/Surgical History: afib    cad    diabetes FINDINGS: Status post median sternotomy. Transvenous pacer remains place. Stable cardiomegaly. Right-sided pleural effusion. Bibasilar hypoaeration. Right-sided pleural effusion Bibasilar hypoaeration     Vl Dup Lower Extremity Venous Bilateral    Result Date: 3/3/2021  EXAMINATION: DUPLEX VENOUS ULTRASOUND OF THE BILATERAL LOWER EXTREMITIES, 3/3/2021 9:01 am TECHNIQUE: Duplex ultrasound using B-mode/gray scaled imaging and Doppler spectral analysis and color flow was obtained of the bilateral lower extremities. COMPARISON: None. HISTORY: ORDERING SYSTEM PROVIDED HISTORY: fevers TECHNOLOGIST PROVIDED HISTORY: Reason for exam:->fevers Reason for Exam: edema FINDINGS: The visualized veins of the bilateral lower extremities are patent and free of echogenic thrombus. The veins demonstrate good compressibility with normal color flow study and spectral analysis. No evidence of DVT in either lower extremity. Us Chest Including Mediastinum    Result Date: 3/3/2021  EXAMINATION: ULTRASOUND OF THE CHEST 3/3/2021 9:02 am COMPARISON: Chest radiograph performed earlier in the same day HISTORY: ORDERING SYSTEM PROVIDED HISTORY: ASSESS FOR PLEURAL EFFUSION TECHNOLOGIST PROVIDED HISTORY: RIGHT LUNG Reason for exam:->ASSESS FOR PLEURAL EFFUSION Reason for Exam: pleural effusion FINDINGS: Moderate right pleural effusion is present. Right pleural effusion is present. Ir Guided Thoracentesis Pleural    Result Date: 3/2/2021  PROCEDURE: ULTRASOUNDGUIDED Bilateral THORACENTESIS 3/2/2021 HISTORY: ORDERING SYSTEM PROVIDED HISTORY: Bilateral pleural effusion. T   The patient tolerated the procedure well. FINDINGS: A total of 800 ml serosanguinous fluid from left and 1050 ml serosanguineous fluid from right  was removed. Successful ultrasound guided bilateral thoracentesis.        Scheduled Medicines   Medications:    insulin glargine  15 Units Subcutaneous Nightly    insulin lispro  10 Units Subcutaneous TID WC (chronic kidney disease) stage 3, GFR 30-59 ml/min (HCC)     Hyperpotassemia     Arthritis     PVD (peripheral vascular disease) ([facility])     Hematoma     Cardiac pacemaker in situ     Coronary artery stenosis     Essential hypertension     Gout     Diabetic neuropathy associated with type 2 diabetes mellitus ([facility])     Adenomatous polyp of sigmoid colon     Iron deficiency anemia due to chronic blood loss     Erythropoietin deficiency anemia     VHD (valvular heart disease)     Abnormal fractional flow reserve (FFR) on cardiac catheterization     Carotid stenosis, left     Aortic stenosis, severe     CAD in native artery     Displacement of atrial pacemaker leads     Pacemaker lead malfunction     SOB (shortness of breath)      ? ? Sepsis      Plan:     1. Reviewed POC blood glucose . Labs and X ray results   2. Reviewed Current Medicines   3. On meal/ Correction bolus Humalog/ Basal Lantus Insulin regime   4. Monitor Blood glucose frequently   5. Modified  the dose of Insulin/ other medicines as needed   6. Will not re start Trulicity at this time  7. Continue on his placement to skilled nursing home  8. Prefer [facility] acute rehab unit so far insulin has not approved him  9. Cardiac surgeon is appealing the decision  10. Patient's wife and the patient interested not to take insulin but to go back on the previous lead taken oral hypoglycemic drugs and Trulicity. Explained to them that patient not a candidate for this drug due to his kidney failure. He simply understood this time continue on insulin  11. Will follow     .      Elisabeth Delgado MD

## 2021-03-14 NOTE — PLAN OF CARE
Problem: Falls - Risk of:  Goal: Will remain free from falls  Description: Will remain free from falls  3/13/2021 1933 by Feli Campos RN  Outcome: Ongoing  3/13/2021 0940 by Vasyl Keane RN  Outcome: Ongoing  3/13/2021 0939 by Vasyl Keane RN  Outcome: Met This Shift  Goal: Absence of physical injury  Description: Absence of physical injury  3/13/2021 1933 by Feli Campos RN  Outcome: Ongoing  3/13/2021 0940 by Vasyl Keane RN  Outcome: Ongoing  3/13/2021 0939 by Vasyl Keane RN  Outcome: Met This Shift     Problem: Pain:  Goal: Pain level will decrease  Description: Pain level will decrease  3/13/2021 1933 by Feli Campos RN  Outcome: Ongoing  3/13/2021 0940 by Vasyl Keane RN  Outcome: Ongoing  3/13/2021 0939 by Vasyl Keane RN  Outcome: Met This Shift  Goal: Control of acute pain  Description: Control of acute pain  3/13/2021 1933 by Feli Campos RN  Outcome: Ongoing  3/13/2021 0940 by Vasyl Keane RN  Outcome: Ongoing  3/13/2021 0939 by Vasyl Keane RN  Outcome: Met This Shift  Goal: Control of chronic pain  Description: Control of chronic pain  3/13/2021 1933 by Feli Campos RN  Outcome: Ongoing  3/13/2021 0940 by Vasyl Keane RN  Outcome: Ongoing  3/13/2021 0939 by Vasyl Keane RN  Outcome: Met This Shift  Goal: Patient's pain/discomfort is manageable  Description: Patient's pain/discomfort is manageable  3/13/2021 1933 by Feli Campos RN  Outcome: Ongoing  3/13/2021 0940 by Vasyl Keane RN  Outcome: Ongoing  3/13/2021 0939 by Vasyl Keane RN  Outcome: Met This Shift     Problem: Skin Integrity:  Goal: Will show no infection signs and symptoms  Description: Will show no infection signs and symptoms  3/13/2021 1933 by Feli Campos RN  Outcome: Ongoing  3/13/2021 0940 by Lorriane [place], RN  Outcome: Ongoing  3/13/2021 0939 by Vasyl Keane RN  Outcome: Met This Shift  Goal: Absence of new skin breakdown  Description: Absence of new skin breakdown  3/13/2021 1933 by Dk Rodrigues RN  Outcome: Ongoing  3/13/2021 0940 by Brett Padilla RN  Outcome: Ongoing  3/13/2021 0939 by Brett Padilla RN  Outcome: Met This Shift     Problem: Infection:  Goal: Will remain free from infection  Description: Will remain free from infection  3/13/2021 1933 by Dk Rodrigues RN  Outcome: Ongoing  3/13/2021 0940 by Brett Padilla RN  Outcome: Ongoing  3/13/2021 0939 by Brett Padilla RN  Outcome: Met This Shift     Problem: Safety:  Goal: Free from accidental physical injury  Description: Free from accidental physical injury  3/13/2021 1933 by Dk Rodrigues RN  Outcome: Ongoing  3/13/2021 0940 by Brett Padilla RN  Outcome: Ongoing  3/13/2021 0939 by Brett Padilla RN  Outcome: Met This Shift  Goal: Free from intentional harm  Description: Free from intentional harm  3/13/2021 1933 by Dk Rodrigues RN  Outcome: Ongoing  3/13/2021 0940 by Brett Padilla RN  Outcome: Ongoing  3/13/2021 0939 by Brett Padilla RN  Outcome: Met This Shift     Problem: Daily Care:  Goal: Daily care needs are met  Description: Daily care needs are met  3/13/2021 1933 by Dk Rodrigues RN  Outcome: Ongoing  3/13/2021 0940 by Brett Padilla RN  Outcome: Ongoing  3/13/2021 0939 by Brett Padilla RN  Outcome: Met This Shift     Problem: Skin Integrity:  Goal: Skin integrity will stabilize  Description: Skin integrity will stabilize  3/13/2021 1933 by Dk Rodrigues RN  Outcome: Ongoing  3/13/2021 0940 by Brett Padilla RN  Outcome: Ongoing  3/13/2021 0939 by Brett Padilla RN  Outcome: Met This Shift     Problem: Discharge Planning:  Goal: Patients continuum of care needs are met  Description: Patients continuum of care needs are met  3/13/2021 1933 by Dk Rodrigues RN  Outcome: Ongoing  3/13/2021 0940 by Brett Padilla RN  Outcome: Ongoing  3/13/2021 0939 by Brett Padilla RN  Outcome: Met This Shift     Problem: Discharge Planning:  Goal: Discharged to appropriate level of care  Description: Discharged to appropriate level of care  3/13/2021 1933 by Erasto Thomason RN  Outcome: Ongoing  3/13/2021 0940 by Cata Cody RN  Outcome: Ongoing  3/13/2021 0939 by Cata Cody RN  Outcome: Met This Shift     Problem: Infection, Septic Shock:  Goal: Will show no infection signs and symptoms  Description: Will show no infection signs and symptoms  3/13/2021 1933 by Erasto Thomason RN  Outcome: Ongoing  3/13/2021 0940 by Cata Cody RN  Outcome: Ongoing  3/13/2021 0939 by Cata Cody RN  Outcome: Met This Shift     Problem: Serum Glucose Level - Abnormal:  Goal: Ability to maintain appropriate glucose levels will improve  Description: Ability to maintain appropriate glucose levels will improve  3/13/2021 1933 by Erasto Thomason RN  Outcome: Ongoing  3/13/2021 0940 by Cata Cody RN  Outcome: Ongoing  3/13/2021 0939 by Cata Cody RN  Outcome: Met This Shift     Problem: Tissue Perfusion, Altered:  Goal: Circulatory function within specified parameters  Description: Circulatory function within specified parameters  3/13/2021 1933 by Erasto Thomason RN  Outcome: Ongoing  3/13/2021 0940 by Cata Cody RN  Outcome: Ongoing  3/13/2021 0939 by Cata Cody RN  Outcome: Met This Shift     Problem: Venous Thromboembolism:  Goal: Will show no signs or symptoms of venous thromboembolism  Description: Will show no signs or symptoms of venous thromboembolism  3/13/2021 1933 by Erasto Thomason RN  Outcome: Ongoing  3/13/2021 0940 by Cata Cody RN  Outcome: Ongoing  3/13/2021 0939 by Cata Cody RN  Outcome: Met This Shift  Goal: Absence of signs or symptoms of impaired coagulation  Description: Absence of signs or symptoms of impaired coagulation  3/13/2021 1933 by Erasto Thomason RN  Outcome: Ongoing  3/13/2021 0940 by Cata Cody RN  Outcome: Ongoing  3/13/2021 0939 by Cory Rivero RN  Outcome: Met This Shift

## 2021-03-14 NOTE — PROGRESS NOTES
PATIENT NAME: Vinayak Alonso    TODAY'S DATE: 03/14/21    SUBJECTIVE:    Pt is s/p CABG x 2, AVR, maze, LA clip. Pt continues to improve. He is feeling stronger but still needs cardiac walker for ambulation. Eating better. OBJECTIVE:   VITALS:    Vitals:    03/14/21 1200   BP: 118/76   Pulse: 69   Resp: 18   Temp: 98 °F (36.7 °C)   SpO2:      INTAKE/OUTPUT:    Date 03/14/21 0000 - 03/14/21 2359   Shift 3872-6393 1206-8825 5112-3328 24 Hour Total   INTAKE   P.O.  420  420   Shift Total(mL/kg)  420(3.9)  420(3.9)   OUTPUT   Urine(mL/kg/hr) 3400(3.9) 400  3800   Shift Total(mL/kg) 3400(31.4) 400(3.7)  3800(35.1)   Weight (kg) 108. 3 108. 3 108. 3 108. 3      Patient Vitals for the past 96 hrs (Last 3 readings):   Weight   03/13/21 0600 238 lb 12.1 oz (108.3 kg)       EXAM:  Blood pressure 118/76, pulse 69, temperature 98 °F (36.7 °C), temperature source Oral, resp. rate 18, height 5' 10\" (1.778 m), weight 238 lb 12.1 oz (108.3 kg), SpO2 97 %. General appearance: No apparent distress, appears stated age and cooperative. Skin: unremarkable  HEENT Normocephalic, atraumatic without obvious deformity. Neck: Supple, Trachea midline   Lungs: Good respiratory effort. Clear to auscultation, bilaterally  Heart: Regular rate/ rhythm inc c/d/i  Abdomen: Soft, non-tender or non-distended   Extremities: 1+ edema warm well perfused  Neurologic: Alert, grossly intact  Mental status: normal affect      Data:  CBC:   Recent Labs     03/12/21  0505 03/13/21  0630 03/14/21  0510   WBC 13.4* 11.7* 11.1*   HGB 8.3* 8.8* 9.0*   HCT 28.0* 29.4* 30.4*    243 324     BMP:    Recent Labs     03/12/21  0505 03/13/21  0630 03/14/21  0510   * 133* 135   K 4.1 4.1 4.3   CL 94* 96* 95*   CO2 26 25 28   BUN 80* 87* 90*   CREATININE 2.8* 2.6* 2.3*   GLUCOSE 259* 85 55*     Hepatic:   No results for input(s): AST, ALT, ALB, BILITOT, ALKPHOS in the last 72 hours.   Mag:      Recent Labs     03/12/21  0505 03/13/21  0630 03/14/21  0510   MG 2.6* 2.5* 2.2      Phos:     Recent Labs     03/12/21  0505 03/13/21  0630 03/14/21  0510   PHOS 2.7 4.4 5.2*      INR:   No results for input(s): INR in the last 72 hours. Radiology Review:  CXR  R pleural effusion      ASSESSMENT AND PLAN:    Patient Active Problem List   Diagnosis    Type 2 diabetes mellitus without complication, without long-term current use of insulin (HCC)    Ulcer of other part of lower limb    Venous hypertension, chronic, with ulcer (HCC)    Ulcer of other part of foot    Pneumonia    Atrial fibrillation (HCC)    Sinus pause    PAF (paroxysmal atrial fibrillation) ([address]    DM (diabetes mellitus) ([address]    DMITRIY on CPAP    Hyperlipidemia    Status post incision and drainage    CKD (chronic kidney disease) stage 3, GFR 30-59 ml/min (HCC)    Hyperkalemia    Arthritis    [facility]    Hematoma    Cardiac pacemaker in situ    Coronary artery stenosis    Essential hypertension    Gout    Diabetic neuropathy associated with type 2 diabetes mellitus (HCC)    Adenomatous polyp of sigmoid colon    Iron deficiency anemia due to chronic blood loss    Erythropoietin deficiency anemia    VHD (valvular heart disease)    Abnormal fractional flow reserve (FFR) on cardiac catheterization    Carotid stenosis, left    Aortic stenosis, severe    CAD in native artery    Displacement of atrial pacemaker leads    Pacemaker lead malfunction    SOB (shortness of breath)    Pleural effusion    Elevated liver enzymes       S/P CABG x 2, AVR, maze, LA clip. Cardio: stable, NSR rate 60s. continue proamatine 10 TID. Holding AC due to GI bleed. Pulm: stable on RA, CPAP during sleep. Encourage IS. Thoracentesis orderd for AM  GI: Tolerating PO diet. Restart AC after thoracentesis if [id number] Christiane Garber with GI  Renal: creatinine improving 2.3. demedex BID, adequate UOP. Appreciate nephrology input.    Heme: hgb stable 9.0  ID:  WBC simproving 11.1, Urine cx citrobacter farmeri, sputum cx GNB. No fevers. on vanc/merrem/minocycline per ID. ARU denied, expedited appeal pending. Pt still req cardiac walker to ambulate, would really benefit from IP rehab. Plan for [facility] if appeal denied.

## 2021-03-14 NOTE — PROGRESS NOTES
Nephrology Progress Note  3/14/2021 12:12 PM        Subjective:   Admit Date: 3/1/2021  PCP: Adriana Collins MD    Interval History: doing very well     Diet: better    ROS:  sitting in chair- good UOP with zaragoza   UOP 6.6 l/d   No sob     Data:     Current meds:    insulin glargine  15 Units Subcutaneous Nightly    insulin lispro  10 Units Subcutaneous TID WC    torsemide  20 mg Oral BID    insulin lispro  0-18 Units Subcutaneous 2 times per day    insulin lispro  0-18 Units Subcutaneous TID WC    rOPINIRole  0.25 mg Oral TID    heparin (porcine)  1,000 Units Intracatheter Once    meropenem  1,000 mg Intravenous Q12H    minocycline  100 mg Oral BID    vancomycin (VANCOCIN) intermittent dosing (placeholder)   Other RX Placeholder    apixaban  5 mg Oral BID    sodium chloride flush  10 mL Intravenous 2 times per day    pantoprazole  40 mg Intravenous BID    midodrine  10 mg Oral TID WC    aspirin  81 mg Oral Daily    [Held by provider] atorvastatin  10 mg Oral Daily      sodium chloride      sodium chloride      sodium chloride           I/O last 3 completed shifts:   In: 3839 [P.O.:1180; IV Piggyback:350]  Out: 6650 [Urine:6650]    CBC:   Recent Labs     03/12/21  0505 03/13/21  0630 03/14/21  0510   WBC 13.4* 11.7* 11.1*   HGB 8.3* 8.8* 9.0*    243 324          Recent Labs     03/12/21  0505 03/13/21  0630 03/14/21  0510   * 133* 135   K 4.1 4.1 4.3   CL 94* 96* 95*   CO2 26 25 28   BUN 80* 87* 90*   CREATININE 2.8* 2.6* 2.3*   GLUCOSE 259* 85 55*       Lab Results   Component Value Date    CALCIUM 8.4 03/14/2021    PHOS 5.2 (H) 03/14/2021       Objective:     Vitals: /76   Pulse 69   Temp 98 °F (36.7 °C) (Oral)   Resp 18   Ht 5' 10\" (1.778 m)   Wt 238 lb 12.1 oz (108.3 kg)   SpO2 97%   BMI 34.26 kg/m²     General appearance:  sitting in chair   HEENT:  + conj pallor  Neck:  Supple- Rt IJ HD cath removed   Lungs:  + adv BS- Lt chest wall cardiac device - sternotomy wound healing  well   Heart:  RRR with AIDA   Abdomen: soft  Extremities:  +++ thigh and ++ LE edema   Has zaragoza   Also LUE PICC       Problem List :         Impression :     1. PRIYANK- CKD stage 3 a a1- excellent UOP_ sec to ATI- renal vein congestion- recovering had 2 HD last HD on 3/11/21   2. Low na mainly from access TBW- will get better  with good diuresis  little  With  Loop   3. CAD S.p CABG also had AVR and MAZE  4. Fluid overload- getting better   5. Ch dz DM-     Recommendation/Plan  :     1. Keep po loop   2. He also likely has post ATI diuresis   3. Daily wt'  4. Low salt  5. Watch UOP  6.  Follow clinically and bio chemically       Malik Miller MD

## 2021-03-14 NOTE — CARE COORDINATION
Received call from [facility] Medicare/[facility] expedited appeals that the appeal for denial of ARU services was overturned and the patient is now approved for ARU. Patient may admit once a bed is available. Yessenia PEREZ made aware. There is not a bed available will today. Approval # [id number]  Approved for 7 days of ARU from the date of admission. Phone number for [facility] Case Management is [phone]   Fax: [phone].

## 2021-03-15 ENCOUNTER — HOSPITAL ENCOUNTER (INPATIENT)
Age: 71
LOS: 9 days | Discharge: HOME HEALTH | DRG: 091 | End: 2021-03-24
Attending: PHYSICAL MEDICINE & REHABILITATION | Admitting: PHYSICAL MEDICINE & REHABILITATION
Payer: MEDICARE

## 2021-03-15 VITALS
OXYGEN SATURATION: 98 % | SYSTOLIC BLOOD PRESSURE: 124 MMHG | DIASTOLIC BLOOD PRESSURE: 68 MMHG | TEMPERATURE: 97.8 F | WEIGHT: 238.76 LBS | HEART RATE: 65 BPM | BODY MASS INDEX: 34.18 KG/M2 | RESPIRATION RATE: 17 BRPM | HEIGHT: 70 IN

## 2021-03-15 PROBLEM — G72.81 CRITICAL ILLNESS MYOPATHY: Status: ACTIVE | Noted: 2021-03-15

## 2021-03-15 LAB
ALBUMIN SERPL-MCNC: 2.7 GM/DL (ref 3.4–5)
ANION GAP SERPL CALCULATED.3IONS-SCNC: 13 MMOL/L (ref 4–16)
BUN BLDV-MCNC: 88 MG/DL (ref 6–23)
CALCIUM SERPL-MCNC: 8.3 MG/DL (ref 8.3–10.6)
CHLORIDE BLD-SCNC: 96 MMOL/L (ref 99–110)
CO2: 30 MMOL/L (ref 21–32)
CREAT SERPL-MCNC: 1.9 MG/DL (ref 0.9–1.3)
CULTURE: NORMAL
DOSE AMOUNT: NORMAL
DOSE TIME: NORMAL
GFR AFRICAN AMERICAN: 42 ML/MIN/1.73M2
GFR NON-AFRICAN AMERICAN: 35 ML/MIN/1.73M2
GLUCOSE BLD-MCNC: 117 MG/DL (ref 70–99)
GLUCOSE BLD-MCNC: 129 MG/DL (ref 70–99)
GLUCOSE BLD-MCNC: 177 MG/DL (ref 70–99)
GLUCOSE BLD-MCNC: 181 MG/DL (ref 70–99)
GLUCOSE BLD-MCNC: 199 MG/DL (ref 70–99)
GLUCOSE BLD-MCNC: 99 MG/DL (ref 70–99)
HCT VFR BLD CALC: 28.5 % (ref 42–52)
HEMOGLOBIN: 8.7 GM/DL (ref 13.5–18)
HIGH SENSITIVE C-REACTIVE PROTEIN: 29 MG/L
HIGH SENSITIVE C-REACTIVE PROTEIN: 36.9 MG/L
Lab: NORMAL
MAGNESIUM: 2 MG/DL (ref 1.8–2.4)
MCH RBC QN AUTO: 25.1 PG (ref 27–31)
MCHC RBC AUTO-ENTMCNC: 30.5 % (ref 32–36)
MCV RBC AUTO: 82.1 FL (ref 78–100)
PDW BLD-RTO: 23.6 % (ref 11.7–14.9)
PHOSPHORUS: 5.6 MG/DL (ref 2.5–4.9)
PLATELET # BLD: 297 K/CU MM (ref 140–440)
PMV BLD AUTO: 10.8 FL (ref 7.5–11.1)
POTASSIUM SERPL-SCNC: 4.2 MMOL/L (ref 3.5–5.1)
PROCALCITONIN: 0.47
RBC # BLD: 3.47 M/CU MM (ref 4.6–6.2)
SODIUM BLD-SCNC: 139 MMOL/L (ref 135–145)
SPECIMEN: NORMAL
VANCOMYCIN RANDOM: 16 UG/ML
WBC # BLD: 8.7 K/CU MM (ref 4–10.5)

## 2021-03-15 PROCEDURE — 6370000000 HC RX 637 (ALT 250 FOR IP): Performed by: NURSE PRACTITIONER

## 2021-03-15 PROCEDURE — 80202 ASSAY OF VANCOMYCIN: CPT

## 2021-03-15 PROCEDURE — 82962 GLUCOSE BLOOD TEST: CPT

## 2021-03-15 PROCEDURE — 97110 THERAPEUTIC EXERCISES: CPT

## 2021-03-15 PROCEDURE — 6370000000 HC RX 637 (ALT 250 FOR IP): Performed by: THORACIC SURGERY (CARDIOTHORACIC VASCULAR SURGERY)

## 2021-03-15 PROCEDURE — 6360000002 HC RX W HCPCS: Performed by: PHYSICIAN ASSISTANT

## 2021-03-15 PROCEDURE — 94761 N-INVAS EAR/PLS OXIMETRY MLT: CPT

## 2021-03-15 PROCEDURE — 99223 1ST HOSP IP/OBS HIGH 75: CPT | Performed by: PHYSICAL MEDICINE & REHABILITATION

## 2021-03-15 PROCEDURE — 2580000003 HC RX 258: Performed by: NURSE PRACTITIONER

## 2021-03-15 PROCEDURE — 6360000002 HC RX W HCPCS: Performed by: NURSE PRACTITIONER

## 2021-03-15 PROCEDURE — 85027 COMPLETE CBC AUTOMATED: CPT

## 2021-03-15 PROCEDURE — 6370000000 HC RX 637 (ALT 250 FOR IP): Performed by: PHYSICAL MEDICINE & REHABILITATION

## 2021-03-15 PROCEDURE — 6360000002 HC RX W HCPCS: Performed by: SPECIALIST

## 2021-03-15 PROCEDURE — 97530 THERAPEUTIC ACTIVITIES: CPT

## 2021-03-15 PROCEDURE — 6370000000 HC RX 637 (ALT 250 FOR IP): Performed by: INTERNAL MEDICINE

## 2021-03-15 PROCEDURE — 84145 PROCALCITONIN (PCT): CPT

## 2021-03-15 PROCEDURE — 86141 C-REACTIVE PROTEIN HS: CPT

## 2021-03-15 PROCEDURE — 99233 SBSQ HOSP IP/OBS HIGH 50: CPT | Performed by: NURSE PRACTITIONER

## 2021-03-15 PROCEDURE — 1280000000 HC REHAB R&B

## 2021-03-15 PROCEDURE — 51702 INSERT TEMP BLADDER CATH: CPT

## 2021-03-15 PROCEDURE — 6370000000 HC RX 637 (ALT 250 FOR IP): Performed by: PHYSICIAN ASSISTANT

## 2021-03-15 PROCEDURE — C9113 INJ PANTOPRAZOLE SODIUM, VIA: HCPCS | Performed by: SPECIALIST

## 2021-03-15 PROCEDURE — 2580000003 HC RX 258: Performed by: PHYSICIAN ASSISTANT

## 2021-03-15 PROCEDURE — 2580000003 HC RX 258: Performed by: INTERNAL MEDICINE

## 2021-03-15 PROCEDURE — C9113 INJ PANTOPRAZOLE SODIUM, VIA: HCPCS | Performed by: PHYSICIAN ASSISTANT

## 2021-03-15 PROCEDURE — 83735 ASSAY OF MAGNESIUM: CPT

## 2021-03-15 PROCEDURE — 97116 GAIT TRAINING THERAPY: CPT

## 2021-03-15 PROCEDURE — 80069 RENAL FUNCTION PANEL: CPT

## 2021-03-15 RX ORDER — ATORVASTATIN CALCIUM 10 MG/1
10 TABLET, FILM COATED ORAL DAILY
Status: DISCONTINUED | OUTPATIENT
Start: 2021-03-16 | End: 2021-03-15

## 2021-03-15 RX ORDER — TRAZODONE HYDROCHLORIDE 50 MG/1
50 TABLET ORAL NIGHTLY PRN
Status: DISCONTINUED | OUTPATIENT
Start: 2021-03-15 | End: 2021-03-24 | Stop reason: HOSPADM

## 2021-03-15 RX ORDER — ACETAMINOPHEN 325 MG/1
650 TABLET ORAL EVERY 6 HOURS PRN
Status: CANCELLED | OUTPATIENT
Start: 2021-03-15

## 2021-03-15 RX ORDER — MINOCYCLINE HYDROCHLORIDE 100 MG/1
100 CAPSULE ORAL 2 TIMES DAILY
Status: DISCONTINUED | OUTPATIENT
Start: 2021-03-15 | End: 2021-03-24

## 2021-03-15 RX ORDER — MIDODRINE HYDROCHLORIDE 5 MG/1
5 TABLET ORAL
Status: DISCONTINUED | OUTPATIENT
Start: 2021-03-15 | End: 2021-03-15 | Stop reason: HOSPADM

## 2021-03-15 RX ORDER — ACETAMINOPHEN 325 MG/1
650 TABLET ORAL EVERY 4 HOURS PRN
Status: DISCONTINUED | OUTPATIENT
Start: 2021-03-15 | End: 2021-03-24 | Stop reason: HOSPADM

## 2021-03-15 RX ORDER — DEXTROSE MONOHYDRATE 50 MG/ML
100 INJECTION, SOLUTION INTRAVENOUS PRN
Status: DISCONTINUED | OUTPATIENT
Start: 2021-03-15 | End: 2021-03-24 | Stop reason: HOSPADM

## 2021-03-15 RX ORDER — NICOTINE POLACRILEX 4 MG
15 LOZENGE BUCCAL PRN
Status: CANCELLED | OUTPATIENT
Start: 2021-03-15

## 2021-03-15 RX ORDER — BISACODYL 10 MG
10 SUPPOSITORY, RECTAL RECTAL DAILY PRN
Status: DISCONTINUED | OUTPATIENT
Start: 2021-03-15 | End: 2021-03-24 | Stop reason: HOSPADM

## 2021-03-15 RX ORDER — INSULIN GLARGINE 100 [IU]/ML
10 INJECTION, SOLUTION SUBCUTANEOUS NIGHTLY
Status: CANCELLED | OUTPATIENT
Start: 2021-03-15

## 2021-03-15 RX ORDER — ROPINIROLE 0.25 MG/1
0.25 TABLET, FILM COATED ORAL 3 TIMES DAILY
Status: CANCELLED | OUTPATIENT
Start: 2021-03-15

## 2021-03-15 RX ORDER — DEXTROSE MONOHYDRATE 50 MG/ML
100 INJECTION, SOLUTION INTRAVENOUS PRN
Status: CANCELLED | OUTPATIENT
Start: 2021-03-15

## 2021-03-15 RX ORDER — PANTOPRAZOLE SODIUM 40 MG/10ML
40 INJECTION, POWDER, LYOPHILIZED, FOR SOLUTION INTRAVENOUS 2 TIMES DAILY
Status: CANCELLED | OUTPATIENT
Start: 2021-03-15

## 2021-03-15 RX ORDER — MIDODRINE HYDROCHLORIDE 5 MG/1
5 TABLET ORAL
Status: CANCELLED | OUTPATIENT
Start: 2021-03-15

## 2021-03-15 RX ORDER — TORSEMIDE 20 MG/1
20 TABLET ORAL 2 TIMES DAILY
Status: CANCELLED | OUTPATIENT
Start: 2021-03-15

## 2021-03-15 RX ORDER — POLYETHYLENE GLYCOL 3350 17 G/17G
17 POWDER, FOR SOLUTION ORAL DAILY PRN
Status: DISCONTINUED | OUTPATIENT
Start: 2021-03-15 | End: 2021-03-24 | Stop reason: HOSPADM

## 2021-03-15 RX ORDER — ATORVASTATIN CALCIUM 10 MG/1
10 TABLET, FILM COATED ORAL NIGHTLY
Status: DISCONTINUED | OUTPATIENT
Start: 2021-03-15 | End: 2021-03-24 | Stop reason: HOSPADM

## 2021-03-15 RX ORDER — INSULIN GLARGINE 100 [IU]/ML
10 INJECTION, SOLUTION SUBCUTANEOUS NIGHTLY
Status: DISCONTINUED | OUTPATIENT
Start: 2021-03-15 | End: 2021-03-15 | Stop reason: HOSPADM

## 2021-03-15 RX ORDER — TRAZODONE HYDROCHLORIDE 50 MG/1
50 TABLET ORAL NIGHTLY PRN
Status: CANCELLED | OUTPATIENT
Start: 2021-03-15

## 2021-03-15 RX ORDER — ROPINIROLE 0.25 MG/1
0.25 TABLET, FILM COATED ORAL 3 TIMES DAILY
Status: DISCONTINUED | OUTPATIENT
Start: 2021-03-15 | End: 2021-03-24 | Stop reason: HOSPADM

## 2021-03-15 RX ORDER — DEXTROSE MONOHYDRATE 25 G/50ML
12.5 INJECTION, SOLUTION INTRAVENOUS PRN
Status: CANCELLED | OUTPATIENT
Start: 2021-03-15

## 2021-03-15 RX ORDER — NICOTINE POLACRILEX 4 MG
15 LOZENGE BUCCAL PRN
Status: DISCONTINUED | OUTPATIENT
Start: 2021-03-15 | End: 2021-03-24 | Stop reason: HOSPADM

## 2021-03-15 RX ORDER — MIDODRINE HYDROCHLORIDE 5 MG/1
5 TABLET ORAL
Status: DISCONTINUED | OUTPATIENT
Start: 2021-03-15 | End: 2021-03-22

## 2021-03-15 RX ORDER — MINOCYCLINE HYDROCHLORIDE 100 MG/1
100 CAPSULE ORAL 2 TIMES DAILY
Status: CANCELLED | OUTPATIENT
Start: 2021-03-15

## 2021-03-15 RX ORDER — ASPIRIN 81 MG/1
81 TABLET, CHEWABLE ORAL DAILY
Status: CANCELLED | OUTPATIENT
Start: 2021-03-16

## 2021-03-15 RX ORDER — PANTOPRAZOLE SODIUM 40 MG/10ML
40 INJECTION, POWDER, LYOPHILIZED, FOR SOLUTION INTRAVENOUS 2 TIMES DAILY
Status: DISCONTINUED | OUTPATIENT
Start: 2021-03-15 | End: 2021-03-24 | Stop reason: HOSPADM

## 2021-03-15 RX ORDER — ASPIRIN 81 MG/1
81 TABLET, CHEWABLE ORAL DAILY
Status: DISCONTINUED | OUTPATIENT
Start: 2021-03-16 | End: 2021-03-24 | Stop reason: HOSPADM

## 2021-03-15 RX ORDER — BISACODYL 10 MG
10 SUPPOSITORY, RECTAL RECTAL DAILY PRN
Status: CANCELLED | OUTPATIENT
Start: 2021-03-15

## 2021-03-15 RX ORDER — DEXTROSE MONOHYDRATE 25 G/50ML
12.5 INJECTION, SOLUTION INTRAVENOUS PRN
Status: DISCONTINUED | OUTPATIENT
Start: 2021-03-15 | End: 2021-03-24 | Stop reason: HOSPADM

## 2021-03-15 RX ORDER — INSULIN GLARGINE 100 [IU]/ML
10 INJECTION, SOLUTION SUBCUTANEOUS NIGHTLY
Status: DISCONTINUED | OUTPATIENT
Start: 2021-03-15 | End: 2021-03-24 | Stop reason: HOSPADM

## 2021-03-15 RX ORDER — ATORVASTATIN CALCIUM 10 MG/1
10 TABLET, FILM COATED ORAL DAILY
Status: CANCELLED | OUTPATIENT
Start: 2021-03-16

## 2021-03-15 RX ORDER — TORSEMIDE 20 MG/1
20 TABLET ORAL 2 TIMES DAILY
Status: DISCONTINUED | OUTPATIENT
Start: 2021-03-15 | End: 2021-03-24 | Stop reason: HOSPADM

## 2021-03-15 RX ORDER — ACETAMINOPHEN 325 MG/1
650 TABLET ORAL EVERY 6 HOURS PRN
Status: DISCONTINUED | OUTPATIENT
Start: 2021-03-15 | End: 2021-03-24

## 2021-03-15 RX ADMIN — TRAZODONE HYDROCHLORIDE 50 MG: 50 TABLET ORAL at 20:26

## 2021-03-15 RX ADMIN — ROPINIROLE HYDROCHLORIDE 0.25 MG: 0.25 TABLET, FILM COATED ORAL at 08:45

## 2021-03-15 RX ADMIN — MIDODRINE HYDROCHLORIDE 5 MG: 5 TABLET ORAL at 13:00

## 2021-03-15 RX ADMIN — MIDODRINE HYDROCHLORIDE 5 MG: 5 TABLET ORAL at 08:45

## 2021-03-15 RX ADMIN — MINOCYCLINE HYDROCHLORIDE 100 MG: 100 CAPSULE ORAL at 20:26

## 2021-03-15 RX ADMIN — APIXABAN 5 MG: 5 TABLET, FILM COATED ORAL at 08:43

## 2021-03-15 RX ADMIN — PANTOPRAZOLE SODIUM 40 MG: 40 INJECTION, POWDER, FOR SOLUTION INTRAVENOUS at 20:25

## 2021-03-15 RX ADMIN — TORSEMIDE 20 MG: 20 TABLET ORAL at 08:43

## 2021-03-15 RX ADMIN — PANTOPRAZOLE SODIUM 40 MG: 40 INJECTION, POWDER, FOR SOLUTION INTRAVENOUS at 08:43

## 2021-03-15 RX ADMIN — ROPINIROLE HYDROCHLORIDE 0.25 MG: 0.25 TABLET, FILM COATED ORAL at 20:26

## 2021-03-15 RX ADMIN — MEROPENEM 1000 MG: 1 INJECTION, POWDER, FOR SOLUTION INTRAVENOUS at 17:00

## 2021-03-15 RX ADMIN — MINOCYCLINE HYDROCHLORIDE 100 MG: 100 CAPSULE ORAL at 09:55

## 2021-03-15 RX ADMIN — MEROPENEM 1000 MG: 1 INJECTION, POWDER, FOR SOLUTION INTRAVENOUS at 04:00

## 2021-03-15 RX ADMIN — ASPIRIN 81 MG: 81 TABLET, CHEWABLE ORAL at 08:43

## 2021-03-15 RX ADMIN — VANCOMYCIN HYDROCHLORIDE 1000 MG: 1 INJECTION, POWDER, LYOPHILIZED, FOR SOLUTION INTRAVENOUS at 13:04

## 2021-03-15 RX ADMIN — TORSEMIDE 20 MG: 20 TABLET ORAL at 19:05

## 2021-03-15 RX ADMIN — SODIUM CHLORIDE, PRESERVATIVE FREE 10 ML: 5 INJECTION INTRAVENOUS at 08:45

## 2021-03-15 RX ADMIN — INSULIN GLARGINE 5 UNITS: 100 INJECTION, SOLUTION SUBCUTANEOUS at 20:32

## 2021-03-15 RX ADMIN — ATORVASTATIN CALCIUM 10 MG: 10 TABLET, FILM COATED ORAL at 20:26

## 2021-03-15 ASSESSMENT — PAIN SCALES - GENERAL
PAINLEVEL_OUTOF10: 0

## 2021-03-15 NOTE — PLAN OF CARE
until discharge. Current Treatment Recommendations: Strengthening, Gait Training, Patient/Caregiver Education & Training, Stair training, IADL Training, Equipment Evaluation, Education, & procurement, Balance Training, Neuromuscular Re-education, Pain Management, Functional Mobility Training, Endurance Training, Home Exercise Program, Transfer Training, Safety Education & Training, Positioning community reintegration,animal assisted therapy, and concurrent/group therapy.     PT IRF-HEENA scores and goals for initial assessment:   Bed Mobility:   Sit to Lying  Assistance Needed: Partial/moderate assistance  Comment: min assist  CARE Score: 3  Discharge Goal: Independent  Roll Left and Right  Assistance Needed: Partial/moderate assistance  Comment: mod assist  CARE Score: 3  Discharge Goal: Independent  Lying to Sitting on Side of Bed  Assistance Needed: Partial/moderate assistance  Comment: mod assist  CARE Score: 3  Discharge Goal: Independent    Transfers:    Sit to Stand  Assistance Needed: Partial/moderate assistance  Comment: from higher surface, CG. from w/c height and bed height min assist  CARE Score: 3  Discharge Goal: Independent  Chair/Bed-to-Chair Transfer  Assistance Needed: Partial/moderate assistance  Comment: min assist  CARE Score: 3  Discharge Goal: Independent     Car Transfer  Assistance Needed: Partial/moderate assistance  Comment: min assist for LEs into car, mod assist to shift to center of seat  CARE Score: 3  Discharge Goal: Supervision or touching assistance    Ambulation:    Walking Ability  Does the Patient Walk?: Yes     Walk 10 Feet  Assistance Needed: Supervision or touching assistance  Comment: CG with 2ww  CARE Score: 4  Discharge Goal: Independent     Walk 50 Feet with Two Turns  Assistance Needed: Partial/moderate assistance  Comment: min assist with 2ww  CARE Score: 3  Discharge Goal: Supervision or touching assistance     Walk 150 Feet  Assistance Needed: Partial/moderate assistance  Comment: min assist with 2ww  CARE Score: 3  Discharge Goal: Supervision or touching assistance     Walking 10 Feet on Uneven Surfaces  Assistance Needed: Partial/moderate assistance  Comment: min assist  CARE Score: 3  Discharge Goal: Independent     1 Step (Curb)  Assistance Needed: Partial/moderate assistance  Comment: min assist with 2ww, 6\"  CARE Score: 3  Discharge Goal: Supervision or touching assistance     4 Steps  Reason if not Attempted: Not applicable  CARE Score: 9  Discharge Goal: Not Applicable     12 Steps  Reason if not Attempted: Not applicable  CARE Score: 9  Discharge Goal: Not Applicable    Wheelchair:  w/c Ability: Wheelchair Ability  Uses a Wheelchair and/or Scooter?: No                Balance:        Object: Picking Up Object  Comment: Pt too fatigued to complete  Reason if not Attempted: Not attempted due to medical condition or safety concerns  CARE Score: 88  Discharge Goal: Independent  OCCUPATIONAL THERAPY:  Goals:             Short term goals  Time Frame for Short term goals: STGs=LTGs :  Long term goals  Time Frame for Long term goals : ~10 days or until d/c  Long term goal 1: Pt will complete grooming tasks Ind  Long term goal 2: Pt will complete total body bathing c S  Long term goal 3: Pt will complete UB dressing Ind  Long term goal 4: Pt will complete LB dressing mod I using AE PRN  Long term goal 5: Pt will doff/don footwear mod I using AE PRN  Long term goals 6: Pt will complete toileting mod I  Long term goal 7: Pt will complete functional transfers (bed, chair, toilet) c DME PRN and mod I; shower transfer c S  Long term goal 8: Pt will perform therex/therax to facilitate increased strength/endurance/ax tolerance (c emphasis on dynamic standing balance/tolerance >8 mins) c SBA  Long term goal 9: Pt will complete simple homemaking tasks c DME PRN and S :    These goals were reviewed with this patient at the time of assessment and Fiona TATE Santina Moritz is in agreement    Plan of Care:  Pt to be seen 5 days per week for a minimum of 60 minutes for 10 days. Plan  Times per day: Daily  Current Treatment Recommendations: Strengthening, Balance Training, Functional Mobility Training, Endurance Training, Safety Education & Training, Patient/Caregiver Education & Training, Equipment Evaluation, Education, & procurement, Self-Care / ADL, Home Management Training, Cognitive/Perceptual Training         cognitive training, home management, energy conservation training, community reintegration, splint fabrication, patient/caregiver education and training, animal assisted therapy, and concurrent and/or group therapy. OT IRF-HEENA scores and goals for initial assessment:    ADLs:    Eating: Eating  Assistance Needed: Independent  CARE Score: 6  Discharge Goal: Independent       Oral Hygiene: Oral Hygiene  Assistance Needed: Setup or clean-up assistance  Comment: seated to perform oral care 2* fatigue  CARE Score: 5  Discharge Goal: Independent    UB/LB Bathing: Shower/Bathe Self  Assistance Needed: Partial/moderate assistance  Comment: to bathe bottoms of B feet, cues to perform lateral lean seated to bathe bottom instead of standing 2* sternal precautions (pt unsafe to attempt sit>stand mid shower this date)  CARE Score: 3  Discharge Goal: Supervision or touching assistance    UB Dressing: Upper Body Dressing  Assistance Needed: Supervision or touching assistance  Comment: to don pullover T shirt  CARE Score: 4  Discharge Goal: Independent         LB Dressing: Lower Body Dressing  Assistance Needed: Partial/moderate assistance  Comment: able to thread RLE in brief and BLEs in pants, required assist to thread LLE in brief (and catheter);  CGA while performing pants management up  CARE Score: 3  Discharge Goal: Independent    Donning and Tennyson Footwear: Putting On/Taking Off Footwear  Assistance Needed: Substantial/maximal assistance  Comment: able to doff R sock, required partial assist with L and assist to don both socks  CARE Score: 2  Discharge Goal: Independent      Toileting: Toileting Hygiene  Comment: denied need during OT eval; has indwelling catheter  Reason if not Attempted: Not attempted due to medical condition or safety concerns  CARE Score: 88  Discharge Goal: Independent      Toilet Transfers: Toilet Transfer  Assistance Needed: Partial/moderate assistance  Comment: Min A  CARE Score: 3  Discharge Goal: Independent      SPEECH THERAPY: (If ordered)  Plan of Care and Goals:   LTG                                                      LTG:                           Treatments may include speech/language/communication therapy, cognitive training, animal assisted therapy, group therapy, education, and/or dysphagia therapy based on the above goals. Co-treats where appropriate with PT or OT to facilitate patient goals in functional tasks. These goals were reviewed with this patient at the time of assessment and Diane Bucio is in agreement. CASE MANAGEMENT:  Goals:   Assist patient/family with discharge planning, patient/family counseling, assistance in obtaining recommended equipment and other services, and coordination with insurance during ARU stay. Patient Goals: Return to maximum level of independent function. Nutrition Goals: Pt will consume greater than 75% of his meals and supplements    Activities Prior to Admit:   Homemaking Responsibilities: Yes  Active : Yes(prior to CABG, still on precaution)  Mode of Transportation: [employer] or [employer])  Occupation: Retired  Leisure & Hobbies: ana in house and yard, online research, golf         Intensity of [address] will be seen a minimum of 3 hours of therapy per day/a minimum of 5 out of 7 days per week.     [] In this rare instance due to the nature of this patient's medical involvement, this patient will be seen 15 hours per week (900 minutes within a 7 day period). Treatments may include therapeutic exercises, gait training, neuromuscular re-ed, transfer training, community reintegration, bed mobility, w/c mobility and training, self care, home mgmt, cognitive training, energy conservation,dysphagia tx, speech/language/communication therapy, group therapy, and patient/family education. In addition, dietician/nutritionist may monitor calorie count as well as intake and collaboratively work with SLP on dietary upgrades. Neuropsychology/Psychology may evaluate and provide necessary support. Group therapy as appropriate to facilitate improved endurance, STR, COORD, function, safety, transfers, awareness and insight into deficits, problem solving, memory, and social interaction and engagement. Medical issues being managed closely and that require 24 hour availability of a physician:   [x] Swallowing Precautions                                     [] Weight bearing precautions   [] Wound Care                             [x] Infection Prevention   [x] DVT Prophylaxis/assessment              [x] Monitoring for complications    [x] Fall Precautions/Prevention                         [x] Fluid/Electrolyte/Nutrition Balance   [x] Voice Protection                           [x] Medication Management   [x] Respiratory                   [x] Pain Mgmt   [x] Bowel/Bladder Fx    Medical Prognosis: [] Good  [x] Fair    [] Guarded   Total expected IRF days 16                                            Physician anticipated functional outcomes:  FWW and HHC PT/OT and supervision.   Rehab Goals:   [] Return to premorbid function of_______________________________.    [] Independent   [] Mod I  [x] Supervision  [] CGA   [] Min A   [] Mod A  Level for ambulation []without assistive device  [x] with assistive device        [] Independent   [] Mod I  [x] Supervision [] CGA   [] Min A   [] Mod A  Level for transfers []without assistive device  [x] with assistive device         [] Independent   [] Mod I  [x] Supervision [] CGA   [] Min A   [] Mod A Level with ADL's []without assistive device   [x] with assistive device     ___________________     Level with cognitive skills requiring [] No assist [x] Supervision  [] Active Assist/Cues     [] Maximize level of mobility and ADL's to decrease burden on caregiver    IPOC brief synthesis of Preadmission Screen, Post-Admission Evaluation, and Therapy Evaluations: Acute inpatient rehabilitation with occupational and physical therapy 180 minutes 5 out of every 7 days. Will address basic and  advancing mobility with self-care instruction and adaptive equipment training. Caregiver education will be offered. Expected length of stay  prior to a supervised level of function for discharge home with a walker and HHC OT/PT is 2-1/2 weeks.     Additional recommendation:     1. Critical illness myopathy: Patient requires daily occupational and physical therapy. We must provide pulmonary hygiene, DVT prophylaxis, nutritional support and bowel and bladder retraining (eventually a voiding trial). Must emphasize control of his blood sugar and blood pressure while providing hydration and nutrition orally. He is scheduled for a thoracentesis tomorrow. He requires adaptive equipment training. Caregiver education likely would be valuable. 2. DVT prophylaxis: The Eliquis he requires for his atrial fibrillation will protect him against new DVT. I must periodically monitor his hemoglobin while on this medication. Weightbearing activities will be pursued daily. GI prophylaxis offered. 3. Hospital-acquired pneumonia: The patient requires aggressive pulmonary hygiene while monitoring O2 saturations at rest and with activity. He remains on vancomycin, meropenem and minocycline through 3/17/2021. Currently does not require oxygen supplementation but saturations will be monitored regularly.   4. Acute kidney injury on chronic kidney disease 3B: Nephrology is monitoring the patient's recovery. Avoiding nephrotoxic medications when possible (cautious use of vancomycin). Avoiding drops in blood pressure with ProAmatine. Periodic monitoring of his chemistries. 5. Uncontrolled diabetes type 2 with peripheral neuropathy: Patient requires a diet modified for carbohydrates. He is on scheduled Lantus and Humalog as well as a Humalog sliding scale. Should his creatinine returned to normal, it is likely he will resume his prior home medication regimen for his diabetes (Invokana, Trulicity, Amaryl and Metformin). 6. Paroxysmal atrial fibrillation: Anticoagulation with Eliquis. Rate is controlled without medications. Daily weights to detect any decompensation of CHF. 7. Hypertension: Demadex for cautious diuresis. ProAmatine to avoid drops in blood pressure. Vital signs are checked at rest and with activity. Moderate protein calorie malnutrition: Encouraging consistent oral intake. Consult dietary for oral supplementation guidance. Anticipated discharge destination:    [] Home Independently   [x] Home with supervision    []SNF     [] Other       This plan has been reviewed with me in a language I understand.  I have had the opportunity to include my input with my therapy team.    ________________________________________________   ______________________  Patient/Significant Other      Date    I have reviewed this initial plan of care and agree with its contents:    Title   Name    Date    Time    Physician: Rachel Morning 3/18/2021 12:31 PM    Case Mgmt: Kee Moran MSW, LSW 03/16/21 0900    OT: Leah Tan MS, OTR/L 03/16/2021 1639     PT: Harini Avendano, PT 3/16/2021 Jeison 21    RN: Jennie Robles RN 3/15/21    ST:    Dietician:

## 2021-03-15 NOTE — PROGRESS NOTES
Infectious Disease Progress Note  3/15/2021   Patient Name: Jose M Bucio : 1950   Impression  Sepsis Secondary to Acute Hypoxic Respiratory Failure secondary to Multifocal Pneumonia:   Afebrile, no leukocytosis  3/3-Covid-19 Rapid Negative  3/6-RDP Negative  Blood cultures 3/2-0/2-NGTD  Blood cultures 3/5-0/2-NGTD  3/1-Urine culture: Citrobacter Farmeri 50,000, UA WBC <1, RBC none   3/5-Urine culture: NGTD  3/8-MRSA/MSSA screen pending  3/12-CVC tip NGTD  3/8-Respiratory culture: Gram smear: GNB, GPC  3/5-CT Chest WO Contrast: imp of GGO along the upper lobes extending inferiorly, would represent atelectasis or early pneumonia. 3/15-tentative right thoracentesis for moderate right pleural effusion     Recent CABG/AVR/MAZE with PPM:  21 Per Dr. King Mcgrath     Erythropoietin Deficiency Anemia     COVID-19 Pneumonia: 2020     PPM/AF    GIB:  Dr. Elvira River onboard     Recurring Right Pleural Effusion     CKD3:  Dr. Loco Boyce onboard  Had x4 HD, new this admission, last 3/8     DMII:  Dr. Skylar Noel onboard     HTN     DMITRIY     Multi-morbidity: per PMHx:  PPM, CABG/AVR , AF, CKD3, DMII, Gout, HTN, HTN, DMITRIY on CPAP, Left CEA, total left hip, stents BLE     Plan:  Continue IV vancomycin, pharmacy to dose (end date 3/17/21)  Continue IV meropenem 1 gm q12h x 10 days (end date 3/17/21)  Continue minocycline 100 mg po bid  (end date 3/17/21)  Trend CRP and Pct, trending down  Await MRSA screen 3/8  Planning right thoracentesis 3/16  Patient continuing to improve, off oxygen and increasing activity  Awaiting ARU placement for DC    Ongoing Antimicrobial Therapy  meropenem 3/8-  Vancomycin 3/1-, 8-  Minocycline 3/8-    Completed Antimicrobial Therapy  Ceftriaxone 3/3-  Cefepime 3/5-     History: Interval history noted. Chief complaint: sepsis secondary to possible multifocal pneumonia. Denies n/v/d/f or untoward effects of antibiotics.  Ambulating with PT, wife at side, states is feeling so much better, breathing better. Physical Exam:  Vital Signs: BP (!) 147/83   Pulse 76   Temp 97.6 °F (36.4 °C) (Oral)   Resp (!) 31   Ht 5' 10\" (1.778 m)   Wt 238 lb 12.1 oz (108.3 kg)   SpO2 100%   BMI 34.26 kg/m²     Gen: alert and oriented X3, no distress  Skin: no stigmata of endocarditis  Wounds: C/D/I coccyx ulceration, small area, no drainage. midsternal wound well approximated, no drainage, erythema or edema. HEMT: AT/NC Oropharynx pink, moist, and without lesions or exudates; dentition in good state of repair  Eyes: PERRLA, EOMI, conjunctiva pink, sclera anicteric. Neck: Supple. Trachea midline. No LAD. Chest: no distress and CTA. Diminished breath sounds posteriorly. Room air. Heart: RRR and no MRG. Abd: soft, non-distended, no tenderness, no hepatomegaly. Normoactive bowel sounds. Ext: no clubbing, cyanosis, or edema  Catheter Site: without erythema or tenderness draining clear yellow urine. Midline: Left basilic intact without erythema or edema. Neuro: Mental status intact.  CN 2-12 intact and no focal sensory or motor deficits     Radiologic / Imaging / TESTING  3/1/21 XR Chest Portable:  Impression   Right-sided pleural effusion       Bibasilar hypoaeration      3/1/21 CT Chest WO Contrast:  Impression   Patient status post midline sternotomy.  Immediately posterior to the   sternotomy defect, there is a small gas and fluid collection which is   nonspecific.  It is not necessarily outside normal limits for a sternotomy   that was performed 2 weeks ago. Humble Hanson, sterility cannot be ascertained   with imaging, and therefore a small developing abscess is considered as well.       No evidence of osteolysis of the sternotomy defect to suggest osteomyelitis.       Interval development of a moderate to large right and a moderate left pleural   effusion.  Adjacent airspace disease is some combination of atelectasis,   pneumonia, and/or edema.      3/2/21 XR Chest Portable:  Impression   Pleural effusions right greater than left with bibasilar atelectasis right   worse than left.  No pneumothorax is seen.      3/2/21 IR Guided Thoracentesis Pleural:      FINDINGS:   A total of 800 ml serosanguinous fluid from left and 1050 ml serosanguineous   fluid from right  was removed.                3/4/21 IR Guided Thoracentesis:  FINDINGS:   A total of 1250 ml serosanguinous fluid  was removed.      3/4/21 XR Chest Portable:     Impression   No pneumothorax status post right thoracentesis       Right basilar opacity could represent a combination of pleural fluid and   atelectasis      3/5/21 XR Chest Portable:  Impression   Stable ground-glass opacification in the right mid and lower lung zone.      3/3/21 XR Chest Portable:  Impression   Stable exam      3/3/21 VL Dup BLE:  Impression   No evidence of DVT in either lower extremity.          3/4/21 IR Guided Thoracentesis Pleural:  FINDINGS:   A total of 1250 ml serosanguinous fluid  was removed.      3/4/21 XR Chest Portable:  Impression   No pneumothorax status post right thoracentesis       Right basilar opacity could represent a combination of pleural fluid and   atelectasis             3/5/21 CT Head WO Contrast:  Impression   Motion limited exam, without gross acute intracranial process.       Left posterior parietal scalp soft tissue swelling.  2 mm calcification   versus foreign body within the soft tissues of the frontal scalp.      3/5/21 IR Nontunneled Vascath:  Impression   Successful ultrasound and fluoroscopy guided non-tunneled dialysis catheter   placement.  The catheter is ready to use.      3/5/21 XR Chest Portable:  Impression   Right internal jugular dialysis catheter with catheter tip cavoatrial   junction.       Cardiomegaly with vascular congestion and moderate right pleural effusion   with underlying edema or atelectasis.          3/5/21 CT Chest WO Contrast:  Impression   Postop changes along the mediastinum and sternum with slowly resolving gas   and fluid posterior to the sternum which probably just represents resolving   postop changes.  Recommend clinical follow-up.       Status post placement of a right subclavian catheter in good position with no   change in the right pacemaker.       Mild-to-moderate bibasilar pleural effusions and associated moderate   atelectasis and consolidation posteriorly which is more prominent on the   right and appears to have decreased since the prior study.       Mild loculated fluid along the right upper chest which is more prominent. Recommend follow-up.       Hazy ground-glass opacities along the upper lobes extending inferiorly which   is more prominent and could represent atelectasis or early pneumonia. Recommend follow-up.       Borderline enlarged lymph nodes in the mediastinum which are unchanged.      3/5/21 CT Abdomen Pelvis WO Contrast:  Impression   Limited noncontrast examination.       Scattered colonic diverticulosis without evidence of acute diverticulitis   seen.       Nonspecific small amount of free fluid in the pelvis.       Diffuse soft tissue anasarca.          3/8/21 XR Chest Portable:  Impression   Cardiomegaly with stable pulmonary edema and right pleural effusion likely   related to CHF.  Stable exam.         3/14/21 XR Chest Portable:  Impression   Moderate right pleural effusion with adjacent atelectasis, mildly improved   from 8 days ago.           Labs:    Recent Results (from the past 24 hour(s))   POCT Glucose    Collection Time: 03/14/21 12:52 PM   Result Value Ref Range    POC Glucose 168 (H) 70 - 99 MG/DL   POCT Glucose    Collection Time: 03/14/21  4:47 PM   Result Value Ref Range    POC Glucose 112 (H) 70 - 99 MG/DL   POCT Glucose    Collection Time: 03/14/21  9:32 PM   Result Value Ref Range    POC Glucose 251 (H) 70 - 99 MG/DL   POCT Glucose    Collection Time: 03/15/21  2:14 AM   Result Value Ref Range    POC Glucose 117 (H) 70 - 99 MG/DL   Renal Function Panel    Collection Time: 03/15/21  4:45 AM   Result Value Ref Range    Sodium 139 135 - 145 MMOL/L    Potassium 4.2 3.5 - 5.1 MMOL/L    Chloride 96 (L) 99 - 110 mMol/L    CO2 30 21 - 32 MMOL/L    Anion Gap 13 4 - 16    BUN 88 (H) 6 - 23 MG/DL    CREATININE 1.9 (H) 0.9 - 1.3 MG/DL    Glucose 99 70 - 99 MG/DL    Calcium 8.3 8.3 - 10.6 MG/DL    GFR Non-African American 35 (L) >60 mL/min/1.73m2    GFR  42 (L) >60 mL/min/1.73m2    Albumin 2.7 (L) 3.4 - 5.0 GM/DL    Phosphorus 5.6 (H) 2.5 - 4.9 MG/DL   Magnesium    Collection Time: 03/15/21  4:45 AM   Result Value Ref Range    Magnesium 2.0 1.8 - 2.4 mg/dl   CBC    Collection Time: 03/15/21  4:45 AM   Result Value Ref Range    WBC 8.7 4.0 - 10.5 K/CU MM    RBC 3.47 (L) 4.6 - 6.2 M/CU MM    Hemoglobin 8.7 (L) 13.5 - 18.0 GM/DL    Hematocrit 28.5 (L) 42 - 52 %    MCV 82.1 78 - 100 FL    MCH 25.1 (L) 27 - 31 PG    MCHC 30.5 (L) 32.0 - 36.0 %    RDW 23.6 (H) 11.7 - 14.9 %    Platelets 203 360 - 302 K/CU MM    MPV 10.8 7.5 - 11.1 FL   Procalcitonin    Collection Time: 03/15/21  4:45 AM   Result Value Ref Range    Procalcitonin 0.466    Vancomycin, random    Collection Time: 03/15/21  4:45 AM   Result Value Ref Range    Vancomycin Rm 16.0 UG/ML    DOSE AMOUNT DOSE AMT.  GIVEN - =     DOSE TIME DOSE TIME GIVEN - =    POCT Glucose    Collection Time: 03/15/21  8:38 AM   Result Value Ref Range    POC Glucose 129 (H) 70 - 99 MG/DL     CULTURE results: Invalid input(s): BLOOD CULTURE,  URINE CULTURE, SURGICAL CULTURE    Diagnosis:  Patient Active Problem List   Diagnosis    Type 2 diabetes mellitus without complication, without long-term current use of insulin (HCC)    Ulcer of other part of lower limb    Venous hypertension, chronic, with ulcer (HCC)    Ulcer of other part of foot    Pneumonia    Atrial fibrillation (HCC)    Sinus pause    PAF (paroxysmal atrial fibrillation) (HCC)    [facility]di[facility]    DMITRIY on CPAP    Hyperlipidemia    Status post incision and drainage    CKD (chronic kidney disease) stage 3, GFR 30-59 ml/min (HCC)    Hyperkalemia    Arthritis    PVD (peripheral vascular disease) (HCC)    Hematoma    Cardiac pacemaker in situ    Coronary artery stenosis    Essential hypertension    Gout    Diabetic neuropathy associated with type 2 diabetes mellitus (HCC)    Adenomatous polyp of sigmoid colon    Iron deficiency anemia due to chronic blood loss    Erythropoietin deficiency anemia    VHD (valvular heart disease)    Abnormal fractional flow reserve (FFR) on cardiac catheterization    Carotid stenosis, left    Aortic stenosis, severe    CAD in native artery    Displacement of atrial pacemaker leads    Pacemaker lead malfunction    SOB (shortness of breath)    Pleural effusion    Elevated liver enzymes       Active Problems  Active Problems:    Hyperkalemia    SOB (shortness of breath)    Pleural effusion    Elevated liver enzymes  Resolved Problems:    * No resolved hospital problems. *    Electronically signed by: Electronically signed by Jami Tee.  JUSTIN Parker CNP on 3/15/2021 at 10:53 AM

## 2021-03-15 NOTE — CARE COORDINATION
CM called and left a  for Kasandra regarding ARU availability.   0902 CM received a call from [employer]. Pt is able to go to ARU. Discharge plan is for this afternoon. Pt will not need another covid. Nursing to call [phone] to check on bed availability.   CM collaborated with Nursing and updated. CM informed that physician is aware.  [facility]

## 2021-03-15 NOTE — PROGRESS NOTES
Admitted to unit from ICU. Awake and alert, lying in bed talking with wife. VS within normal limits. Resp even and unlabored on room air. abd soft/non-tender. No complaints of pain or discomfort. Admission assessment completed. No needs voiced.

## 2021-03-15 NOTE — PROGRESS NOTES
Nephrology Progress Note  3/15/2021 8:58 AM  Subjective:      Interval History: Bladimir Degroot is a 66 y.o. male  Who is weak but improved with stable uop and no distress await aru    Data:   Scheduled Meds:   insulin glargine  10 Units Subcutaneous Nightly    midodrine  5 mg Oral TID WC    insulin lispro  10 Units Subcutaneous TID WC    torsemide  20 mg Oral BID    insulin lispro  0-18 Units Subcutaneous 2 times per day    insulin lispro  0-18 Units Subcutaneous TID WC    rOPINIRole  0.25 mg Oral TID    heparin (porcine)  1,000 Units Intracatheter Once    meropenem  1,000 mg Intravenous Q12H    minocycline  100 mg Oral BID    vancomycin (VANCOCIN) intermittent dosing (placeholder)   Other RX Placeholder    apixaban  5 mg Oral BID    sodium chloride flush  10 mL Intravenous 2 times per day    pantoprazole  40 mg Intravenous BID    aspirin  81 mg Oral Daily    [Held by provider] atorvastatin  10 mg Oral Daily     Continuous Infusions:   sodium chloride      sodium chloride      sodium chloride             CBC:   Recent Labs     03/13/21  0630 03/14/21  0510 03/15/21  0445   WBC 11.7* 11.1* 8.7   HGB 8.8* 9.0* 8.7*    324 297     BMP:    Recent Labs     03/13/21  0630 03/14/21  0510 03/15/21  0445   * 135 139   K 4.1 4.3 4.2   CL 96* 95* 96*   CO2 25 28 30   BUN 87* 90* 88*   CREATININE 2.6* 2.3* 1.9*   GLUCOSE 85 55* 99       Renal Labs  Albumin:    Lab Results   Component Value Date    LABALBU 2.7 03/15/2021     Calcium:    Lab Results   Component Value Date    CALCIUM 8.3 03/15/2021     Phosphorus:    Lab Results   Component Value Date    PHOS 5.6 03/15/2021     U/A:    Lab Results   Component Value Date    NITRU NEGATIVE 03/01/2021    NITRU Negative 11/24/2020    COLORU YELLOW 03/01/2021    PHUR 6.5 11/24/2020    LABCAST NONE SEEN 06/15/2016    LABCAST NONE SEEN 06/15/2016    WBCUA <1 03/01/2021    RBCUA NONE SEEN 03/01/2021    MUCUS RARE 02/12/2021    TRICHOMONAS NONE SEEN 03/01/2021    BACTERIA NEGATIVE 03/01/2021    CLARITYU CLEAR 03/01/2021    SPECGRAV 1.009 03/01/2021    UROBILINOGEN NEGATIVE 03/01/2021    BILIRUBINUR NEGATIVE 03/01/2021    BLOODU NEGATIVE 03/01/2021    GLUCOSEU 500 11/24/2020    KETUA NEGATIVE 03/01/2021           Objective:   I/O: 03/14 0701 - 03/15 0700  In: 1640 [P.O.:1540]  Out: 7000 [Urine:7000]  Vitals: BP (!) 147/83   Pulse 76   Temp 97.6 °F (36.4 °C) (Oral)   Resp (!) 31   Ht 5' 10\" (1.778 m)   Wt 238 lb 12.1 oz (108.3 kg)   SpO2 100%   BMI 34.26 kg/m²   General appearance: awake weak  HEENT: Head: Normal, normocephalic, atraumatic.   Neck: supple, symmetrical, trachea midline  Lungs: diminished breath sounds bilaterally  Heart: S1, S2 normal  Abdomen: abnormal findings:  soft nt  Extremities: edema +   Neurologic: Mental status: alertness:  awake        Assessment and Plan:      IMP:  1 ckd 3B from htn and dm2  2 Dm2 controlled  3 cad sp cabg with chest pain  4 leukocytosis with right pleural effusion  5 hyperkalemia  6 htn  7 moderate protein malnutrtion  8 hyponatremia  9 anemia    Plan     1 sig uop 7 L and post atn diuresis and off dialysis will monitor with ckd 3  2 ssi and improved  3 sp cabg and recovering  4 on abx per id and no fever  5 K stable  6 bp monitor  7 more protein with diet  8 na stable  9 hb low stable  Overall improved vanco level 16 and monitor    Can dc nik when go aru  Await for bed aru and can go today   Updated wife and she is aware             Shabbir Luis MD

## 2021-03-15 NOTE — CARE COORDINATION
Met with patient and wife regarding approval for ARU. Per patient and spouse goal is still to come to ARU and discharge home at d/c from ARU. Answered all guidelines and concerns. Patient approved for ARU, denial overturned. Discussed admission with [facility] CM. Patient will not need new COVID test.  COVID negative on 3/3.

## 2021-03-15 NOTE — PROGRESS NOTES
Physical Therapy Treatment Note  Name: Thressa Oppenheim MRN: [id number] :   1950   Date:  3/15/2021   Admission Date: 3/1/2021 Room:  -A   Restrictions/Precautions:        Sternal, no pushing, pulling or lifting more than 5 pounds for the first 2 weeks and then 10 pounds up to 3 months,   Arms are to support chest when changing position, coughing or sneezing. No lifting arms above 90 degrees except with the ex's  Communication with other providers:  Julio Goyal RN states pt is ok to see for therapy  Subjective:  Patient states:  He has walked 2 x 's this morning but will go again  Pain:   Location, Type, Intensity (0/10 to 10/10):  No c/o  Objective:    Observation:  Pt was sitting up in the bed with his wife sitting next to him  Treatment, including education/measures:  Vital Signs  HR: 76, B/P 147/83, O2 100 %  Education:  Pt was instructed in Sternal Precautions, Purpose of Exercise Program, Ambar Scale and Signs and Symptoms of Exercise Intolerance per Cardiac Protocol  Pt was given Initial Cardiac Exercises in Supine: Ankle Pumps x 10  Hip Abduction x 10  Heel Slides x 10  Shoulder Rolls x 10  Head Turns x 10  Front Arm Raises x 10   Side Arm Raises x 10  Arm Crosses x 10  Transfers with line management of tele and zaragoza  Supine to sit :mod A of 1 and VC's for sternal precautions  Sit to supine :min A of 1 and VC's for sternal precautions  Scooting :min a of 1 and VC's for sternal precautions  Rolling :mod a of 1  Sit to stand :min A of 1 and VC's for sternal precautions  Stand to sit :min A of 1 and VC's for sternal precautions  Gait:  Pt amb with CW for 110 ft with CGA and no rest breaks  Pt needed VC's for posture and PLB  Safety  Patient left safely in the bed, with call light/phone in reach. Gait belt and mask were used for transfers and gait.   Assessment / Impression:    Patient's tolerance of treatment:  Good, cont to improve with increase of activities   Adverse Reaction: none  Significant

## 2021-03-15 NOTE — H&P
Jose M Bucio    : 1950  Acct #: [de-identified]  MRN: [id number]              History and physical      Admitting diagnosis: Critical illness myopathy ( [address])    Comorbid diagnoses impacting rehabilitation: Generalized weakness, paroxysmal atrial fibrillation, recurrent right pleural effusion, status post coronary artery bypass graft surgery/maze procedure/AVR on 2021, COVID-19 pneumonia (2020), HAP, chronic kidney disease stage IIIb, uncontrolled diabetes type 2 with peripheral neuropathy, essential hypertension, moderate protein calorie malnutrition    Chief complaint: Shortness of breath and some positional dizziness. History of present illness: Patient is a 75-year-old right-hand-dominant male who is progressive congestive heart failure, chest pain and dyspnea led to a coronary artery bypass graft surgery with Maze procedure and AVR on 2021 with Dr. King Mcgrath. He was discharged to home after a standard period of time. Unfortunately during the 2 weeks he was at home he developed increasing weakness, dyspnea and malaise. On 3/2/2021 he presented to our ED with increasing shortness of breath and a significant right pleural effusion. He underwent thoracentesis with transient benefit. He also developed acute kidney injury and required 2 episodes of hemodialysis. He has had atrial fibrillation following the procedure, hypotension and malnutrition. Blood sugars have been fluctuating significantly. He has also dealt with acute respiratory failure with pneumonia and has been placed on multiple antibiotics. He requires another thoracentesis of the right chest and this has been scheduled for the second day of rehabilitation. He has been unable to do his own toileting, transfers and self-care recently. He has had generalized weakness proximally and distally. He requires inpatient rehabilitation at this time.     Review of systems: Poor appetite, shortness of breath with exertion and lower limb numbness, tingling and cramping with his neuropathy. Infrequent bowel movements. Ellsworth catheter was used for urinary retention. No nausea or recent emesis. He denied a change to vision, speech or swallowing. He has been very confused recently but this is improving. The remainder of their review of systems was negative except as mentioned in the history of present illness. Social History: The patient is  and lives with his wife in a 2 step entry 1 level home with a finished basement. He has a walk-in shower and handicap height commode. There are grab bars in the bathroom. He has access to a cane but typically ambulates without assistive device. He drives locally. He has been independent with his own medication administration, personal hygiene and light homemaking. He reports that he has never smoked. He has never used smokeless tobacco. He reports current alcohol use of about 2.0 standard drinks of alcohol per week. He reports that he does not use drugs. Prior (baseline) level of function: Independent with mobility and self-care prior to his open heart surgery mid February. Current level of function: Moderate verbal cues and moderate physical assistance needed for mobility and self-care.     Allergies:  Spironolactone and Tape Buel Clementine tape]    Past Medical History:   Past Medical History:   Diagnosis Date    Arrhythmia     Pacemaker placed aprox 5 years ago for A Fib per patient    Arthritis 12/2013    rt wrist    Atrial fibrillation ([address]     on Xarelto - Dr. Mabel Kehr CAD (coronary artery disease) 06/18/2014    see dr Yael Valladares kidney disease, stage III (moderate) 07/07/2016    Critical illness myopathy 3/15/2021    Diabetes mellitus ([address]     dx 2004    Diabetic neuropathy associated with type 2 diabetes mellitus ([address] 04/23/2019    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    Gout 04/2019    \"got gout when had pacer put in because they did not give me my medication for gout \"    H/O 24 hour EKG monitoring 10/03/2013    no afib noted, sinsus rhythm    H/O cardiovascular stress test 05/12/2014    cardiolite- mild ischemia RCA EF50%    H/O echocardiogram 12/01/2020    EF 55-60% severe aortic stenosis mild to mod aortic regurg mod to severe tricuspid regurg severe pulm htn significant changes since 2018 echo.  H/O right and left heart catheterization 12/10/2020    DIFFUSE LAD DISEASE, Mild ECA Disease, Severe AS, Milf Pul HTN on RHC.  H/O transesophageal echocardiography (MABLE) for monitoring 08/05/2013    normal LV function and normal LA appendage without any clot    History of blood transfusion 12/2020    d/t anemia    History of transesophageal echocardiography (MABLE) 12/15/2020    Severe aortic stenosis (ANNA by planimetry: 0.778 cm sq). Mild AR.    [place] hearing[place]     hearing tonya aides    Hx of Doppler echocardiogram 05/21/2018    EF 50%  Mild LV hypertrophy. Mildly enlarged RA. Mod aortic valve calcification with mod AS. Mitral annular calcification is present. Mild AR, MR and TR. Mild pulmonary htn.     Hyperlipidemia     Hypertension     Follows with PCP & Dr. Marcus Allan Other disorders of kidney and ureter     Pacemaker     Medtronic, implanted 2014    Pneumonia 12/29/2012    Sleep apnea     dx 2013- has c-pap    Type II or unspecified type diabetes mellitus with other specified manifestations, uncontrolled 12/12/2012    Venous hypertension, chronic, with ulcer ([address] 12/12/2012    resolved        Past Surgical History:     Past Surgical History:   Procedure Laterality Date    CABG WITH AORTIC VALVE REPLACEMENT N/A 2/16/2021    CABG CORONARY ARTERY BYPASS X2 WITH LIMA, AORTIC VALVE REPLACEMENT AND AORTIC ROOT REPAIR, INTRAOPERATIVE MABLE, INDUCED HYPOTHERMIA, LEFT LEG ENDOVEIN HARVEST, LEFT ATRIAL CLIP, AND CRYO PROCEDURE performed by Ryan Hays MD at [address] [place] [address]  12/14    at [address] Left 1/29/2021 LEFT CAROTID ENDARTERECTOMY performed by Russel Hernández MD at [id number]  2017    COLONOSCOPY  2011    COLONOSCOPY N/A 11/19/2019    COLONOSCOPY DIAGNOSTIC performed by Omi Morales MD at [facility]  09/30/2020    POSSIBLE CECAL avms, SIGMOID DIVERTICULOSIS, INTERNAL HEMORRHOIDS GRADE 1    COLONOSCOPY N/A 9/30/2020    COLONOSCOPY CONTROL HEMORRHAGE WITH APC performed by Omi Morales MD at [address] [facility]  2014    \"2 stents put in \"    IR NONTUNNELED VASCULAR CATHETER  3/5/2021    IR NONTUNNELED VASCULAR CATHETER 3/5/2021 [address], Susan. \Hospital [facility] a New [facility] and Outlook.\"" SPECIAL PROCEDURES    JOINT REPLACEMENT  2004    total left hip    OTHER SURGICAL HISTORY Right 12/02/2017    I&D; evacuation of hematoma right hip    OTHER SURGICAL HISTORY  09/17/2020    enteroscopy    PACEMAKER INSERTION N/A 2/23/2021    PACEMAKER GENERATOR LEAD REVISION performed by Rd Jimenez MD at [facility]      9/18/14 Status post remote permanent pacemaker with atrial lead dislodgement.  7/24/14 PPM Implant    UPPER GASTROINTESTINAL ENDOSCOPY N/A 9/17/2020    ENTEROSCOPY PUSH BIOPSY performed by Omi Morales MD at [place] [address] N/A 3/4/2021    EGD DIAGNOSTIC ONLY performed by Omi Morales MD at [address] [place] [address]  2012    \"have stents in both legs- done in [place]       Current Medications:     Current Facility-Administered Medications:     dextrose 5 % solution, 100 mL/hr, Intravenous, PRN, [place] Ayaan, PA-C    dextrose 50 % IV solution, 12.5 g, Intravenous, PRN, Virgin Ayaan, PA-C    glucagon (rDNA) injection 1 mg, 1 mg, Intramuscular, PRN, Virgin Ayaan, PA-C    glucose (GLUTOSE) 40 % oral gel 15 g, 15 g, Oral, PRN, Virgin Ayaan, PA-C    acetaminophen (TYLENOL) tablet 650 mg, 650 mg, Oral, Q6H PRN, [place] Ayaan, PA-C    [Held by provider] apixaban (ELIQUIS) tablet 5 mg, 5 mg, Oral, BID, [facility] requires for his atrial fibrillation will protect him against new DVT. I must periodically monitor his hemoglobin while on this medication. Weightbearing activities will be pursued daily. GI prophylaxis offered. 3. Hospital-acquired pneumonia: The patient requires aggressive pulmonary hygiene while monitoring O2 saturations at rest and with activity. He remains on vancomycin, meropenem and minocycline through 3/17/2021. Currently does not require oxygen supplementation but saturations will be monitored regularly. 4. Acute kidney injury on chronic kidney disease 3B: Nephrology is monitoring the patient's recovery. Avoiding nephrotoxic medications when possible (cautious use of vancomycin). Avoiding drops in blood pressure with ProAmatine. Periodic monitoring of his chemistries. 5. Uncontrolled diabetes type 2 with peripheral neuropathy: Patient requires a diet modified for carbohydrates. He is on scheduled Lantus and Humalog as well as a Humalog sliding scale. Should his creatinine returned to normal, it is likely he will resume his prior home medication regimen for his diabetes (Invokana, Trulicity, Amaryl and Metformin). 6. Paroxysmal atrial fibrillation: Anticoagulation with Eliquis. Rate is controlled without medications. Daily weights to detect any decompensation of CHF. 7. Hypertension: Demadex for cautious diuresis. ProAmatine to avoid drops in blood pressure. Vital signs are checked at rest and with activity. 8. Moderate protein calorie malnutrition: Encouraging consistent oral intake. Consult dietary for oral supplementation guidance. I personally performed a history and physical on this patient within 24 hours of admission to the rehab unit. I have reviewed the preadmission screening and concur with its findings without change. A detailed plan of care will be established by hospital day 4 and I attest the patient is appropriate for inpatient rehabilitation at this time.   I have compared the patient's current functional status noted during my history and physical with that of the preadmission screen and I have found no significant differences.

## 2021-03-15 NOTE — PROGRESS NOTES
[address]  consulted by Dr. Kimberly Bosch for monitoring and adjustment. Indication for treatment: Pneumonia  Goal trough: 15 mcg/mL, -600     Pertinent Laboratory Values:   Temp Readings from Last 3 Encounters:   03/15/21 97.6 °F (36.4 °C) (Oral)   02/24/21 97.3 °F (36.3 °C) (Axillary)   02/12/21 97.8 °F (36.6 °C) (Temporal)     Recent Labs     03/13/21  0630 03/14/21  0510 03/15/21  0445   WBC 11.7* 11.1* 8.7     Recent Labs     03/13/21  0630 03/14/21  0510 03/15/21  0445   BUN 87* 90* 88*   CREATININE 2.6* 2.3* 1.9*     Estimated Creatinine Clearance: 43 mL/min (A) (based on SCr of 1.9 mg/dL (H)). Intake/Output Summary (Last 24 hours) at 3/15/2021 1157  Last data filed at 3/15/2021 0600  Gross per 24 hour   Intake 1220 ml   Output 6800 ml   Net -5580 ml     Pertinent Cultures:  Date    Source    Results  3/5   Blood    Negative  3/8   MRSA Screen   Ordered  3/11                             IV Catheter tip   Negative    Vancomycin level:   TROUGH:  No results for input(s): VANCOTROUGH in the last 72 hours. RANDOM:    Recent Labs     03/13/21  0630 03/15/21  0445   VANCORANDOM 17.4 16.0     Assessment:  · WBC and temperature: WBC trending down/afebrile  · SCr, BUN, and urine output: no HD today   · Day(s) of therapy: 14  · Vancomycin concentration: 16.0, therapeutic     Plan:  · Pulse dosing based on levels 2/2 renal function changes and RRT   · Received vancomycin 1000 mg x 1 on 3/13  · No future HD at this time   · Random level therapeutic 48h post-dose  · Re-dose with 1000 mg x 1 and level in 3/17   · Pharmacy will continue to monitor patient and adjust therapy as indicated    Thank you for the consult.   Manasa Ryan, AkashD, BCPS   3/15/2021  11:57 AM

## 2021-03-16 ENCOUNTER — APPOINTMENT (OUTPATIENT)
Dept: GENERAL RADIOLOGY | Age: 71
DRG: 091 | End: 2021-03-16
Attending: PHYSICAL MEDICINE & REHABILITATION
Payer: MEDICARE

## 2021-03-16 LAB
ALBUMIN SERPL-MCNC: 2.8 GM/DL (ref 3.4–5)
ALBUMIN SERPL-MCNC: 2.9 GM/DL (ref 3.4–4.8)
ALP BLD-CCNC: 303 IU/L (ref 40–129)
ALT SERPL-CCNC: 53 U/L (ref 10–40)
ANION GAP SERPL CALCULATED.3IONS-SCNC: 9 MMOL/L (ref 4–16)
APTT: 42.4 SECONDS (ref 25.1–37.1)
AST SERPL-CCNC: 44 IU/L (ref 15–37)
BILIRUB SERPL-MCNC: 0.5 MG/DL (ref 0–1)
BILIRUBIN DIRECT: 0.2 MG/DL (ref 0–0.3)
BILIRUBIN, INDIRECT: 0.3 MG/DL (ref 0–0.7)
BUN BLDV-MCNC: 85 MG/DL (ref 6–23)
CALCIUM SERPL-MCNC: 8.3 MG/DL (ref 8.3–10.6)
CHLORIDE BLD-SCNC: 97 MMOL/L (ref 99–110)
CO2: 32 MMOL/L (ref 21–32)
CREAT SERPL-MCNC: 1.7 MG/DL (ref 0.9–1.3)
GFR AFRICAN AMERICAN: 48 ML/MIN/1.73M2
GFR NON-AFRICAN AMERICAN: 40 ML/MIN/1.73M2
GLUCOSE BLD-MCNC: 120 MG/DL (ref 70–99)
GLUCOSE BLD-MCNC: 133 MG/DL (ref 70–99)
GLUCOSE BLD-MCNC: 142 MG/DL (ref 70–99)
GLUCOSE BLD-MCNC: 147 MG/DL (ref 70–99)
GLUCOSE BLD-MCNC: 209 MG/DL (ref 70–99)
GLUCOSE BLD-MCNC: 249 MG/DL (ref 70–99)
HCT VFR BLD CALC: 28 % (ref 42–52)
HEMOGLOBIN: 8.4 GM/DL (ref 13.5–18)
INR BLD: 1.51 INDEX
MCH RBC QN AUTO: 24.9 PG (ref 27–31)
MCHC RBC AUTO-ENTMCNC: 30 % (ref 32–36)
MCV RBC AUTO: 83.1 FL (ref 78–100)
PDW BLD-RTO: 23.4 % (ref 11.7–14.9)
PHOSPHORUS: 5.2 MG/DL (ref 2.5–4.9)
PLATELET # BLD: 280 K/CU MM (ref 140–440)
PMV BLD AUTO: 10.2 FL (ref 7.5–11.1)
POTASSIUM SERPL-SCNC: 4.1 MMOL/L (ref 3.5–5.1)
PROTHROMBIN TIME: 18.3 SECONDS (ref 11.7–14.5)
RBC # BLD: 3.37 M/CU MM (ref 4.6–6.2)
SODIUM BLD-SCNC: 138 MMOL/L (ref 135–145)
TOTAL PROTEIN: 6.2 GM/DL (ref 6.4–8.2)
WBC # BLD: 7.6 K/CU MM (ref 4–10.5)

## 2021-03-16 PROCEDURE — 36415 COLL VENOUS BLD VENIPUNCTURE: CPT

## 2021-03-16 PROCEDURE — 97116 GAIT TRAINING THERAPY: CPT

## 2021-03-16 PROCEDURE — 6360000002 HC RX W HCPCS: Performed by: PHYSICAL MEDICINE & REHABILITATION

## 2021-03-16 PROCEDURE — 97530 THERAPEUTIC ACTIVITIES: CPT

## 2021-03-16 PROCEDURE — 6370000000 HC RX 637 (ALT 250 FOR IP): Performed by: INTERNAL MEDICINE

## 2021-03-16 PROCEDURE — 80069 RENAL FUNCTION PANEL: CPT

## 2021-03-16 PROCEDURE — 82962 GLUCOSE BLOOD TEST: CPT

## 2021-03-16 PROCEDURE — 97163 PT EVAL HIGH COMPLEX 45 MIN: CPT

## 2021-03-16 PROCEDURE — 97535 SELF CARE MNGMENT TRAINING: CPT

## 2021-03-16 PROCEDURE — C1729 CATH, DRAINAGE: HCPCS

## 2021-03-16 PROCEDURE — 6370000000 HC RX 637 (ALT 250 FOR IP): Performed by: PHYSICAL MEDICINE & REHABILITATION

## 2021-03-16 PROCEDURE — 32555 ASPIRATE PLEURA W/ IMAGING: CPT

## 2021-03-16 PROCEDURE — 85730 THROMBOPLASTIN TIME PARTIAL: CPT

## 2021-03-16 PROCEDURE — 6370000000 HC RX 637 (ALT 250 FOR IP): Performed by: PHYSICIAN ASSISTANT

## 2021-03-16 PROCEDURE — 0W993ZZ DRAINAGE OF RIGHT PLEURAL CAVITY, PERCUTANEOUS APPROACH: ICD-10-PCS | Performed by: RADIOLOGY

## 2021-03-16 PROCEDURE — 99232 SBSQ HOSP IP/OBS MODERATE 35: CPT | Performed by: PHYSICAL MEDICINE & REHABILITATION

## 2021-03-16 PROCEDURE — C9113 INJ PANTOPRAZOLE SODIUM, VIA: HCPCS | Performed by: PHYSICIAN ASSISTANT

## 2021-03-16 PROCEDURE — 94761 N-INVAS EAR/PLS OXIMETRY MLT: CPT

## 2021-03-16 PROCEDURE — 2709999900 HC NON-CHARGEABLE SUPPLY

## 2021-03-16 PROCEDURE — 85610 PROTHROMBIN TIME: CPT

## 2021-03-16 PROCEDURE — 97167 OT EVAL HIGH COMPLEX 60 MIN: CPT

## 2021-03-16 PROCEDURE — 85027 COMPLETE CBC AUTOMATED: CPT

## 2021-03-16 PROCEDURE — 6360000002 HC RX W HCPCS: Performed by: PHYSICIAN ASSISTANT

## 2021-03-16 PROCEDURE — 71045 X-RAY EXAM CHEST 1 VIEW: CPT

## 2021-03-16 PROCEDURE — 1280000000 HC REHAB R&B

## 2021-03-16 PROCEDURE — 2580000003 HC RX 258: Performed by: PHYSICIAN ASSISTANT

## 2021-03-16 RX ADMIN — INSULIN GLARGINE 10 UNITS: 100 INJECTION, SOLUTION SUBCUTANEOUS at 21:03

## 2021-03-16 RX ADMIN — PANTOPRAZOLE SODIUM 40 MG: 40 INJECTION, POWDER, FOR SOLUTION INTRAVENOUS at 08:20

## 2021-03-16 RX ADMIN — TORSEMIDE 20 MG: 20 TABLET ORAL at 21:16

## 2021-03-16 RX ADMIN — MINOCYCLINE HYDROCHLORIDE 100 MG: 100 CAPSULE ORAL at 21:15

## 2021-03-16 RX ADMIN — ATORVASTATIN CALCIUM 10 MG: 10 TABLET, FILM COATED ORAL at 21:14

## 2021-03-16 RX ADMIN — INSULIN LISPRO 4 UNITS: 100 INJECTION, SOLUTION INTRAVENOUS; SUBCUTANEOUS at 12:02

## 2021-03-16 RX ADMIN — ACETAMINOPHEN 650 MG: 325 TABLET ORAL at 02:37

## 2021-03-16 RX ADMIN — APIXABAN 5 MG: 5 TABLET, FILM COATED ORAL at 21:14

## 2021-03-16 RX ADMIN — ROPINIROLE HYDROCHLORIDE 0.25 MG: 0.25 TABLET, FILM COATED ORAL at 14:48

## 2021-03-16 RX ADMIN — MEROPENEM 1000 MG: 1 INJECTION, POWDER, FOR SOLUTION INTRAVENOUS at 04:00

## 2021-03-16 RX ADMIN — MINOCYCLINE HYDROCHLORIDE 100 MG: 100 CAPSULE ORAL at 09:37

## 2021-03-16 RX ADMIN — PANTOPRAZOLE SODIUM 40 MG: 40 INJECTION, POWDER, FOR SOLUTION INTRAVENOUS at 21:15

## 2021-03-16 RX ADMIN — ROPINIROLE HYDROCHLORIDE 0.25 MG: 0.25 TABLET, FILM COATED ORAL at 21:15

## 2021-03-16 RX ADMIN — TORSEMIDE 20 MG: 20 TABLET ORAL at 08:20

## 2021-03-16 RX ADMIN — MEROPENEM 1000 MG: 1 INJECTION, POWDER, FOR SOLUTION INTRAVENOUS at 17:17

## 2021-03-16 RX ADMIN — ASPIRIN 81 MG CHEWABLE TABLET 81 MG: 81 TABLET CHEWABLE at 08:20

## 2021-03-16 RX ADMIN — ENOXAPARIN SODIUM 30 MG: 30 INJECTION SUBCUTANEOUS at 08:20

## 2021-03-16 RX ADMIN — ROPINIROLE HYDROCHLORIDE 0.25 MG: 0.25 TABLET, FILM COATED ORAL at 08:30

## 2021-03-16 RX ADMIN — INSULIN LISPRO 2 UNITS: 100 INJECTION, SOLUTION INTRAVENOUS; SUBCUTANEOUS at 17:23

## 2021-03-16 RX ADMIN — INSULIN LISPRO 2 UNITS: 100 INJECTION, SOLUTION INTRAVENOUS; SUBCUTANEOUS at 09:34

## 2021-03-16 ASSESSMENT — PAIN SCALES - GENERAL: PAINLEVEL_OUTOF10: 3

## 2021-03-16 NOTE — PROGRESS NOTES
progress Note( Dr. Gena Coburn)  3/15/2021  Subjective:   Admit Date: 3/15/2021  PCP: Shamika Schultz MD    Admitted For :Shortness of breath leg swelling    Consulted For:  Better control of blood glucose    Interval History: Patient seemed to be more alert today able to communicate properly  Uses CPAP at night for sleep apnea    Had hemodialysis  Being done at discretion of nephrology  Is more ambulatory with help    Patient was transferred to acute rehab unit on 3/15/2021    Denies any chest pains,   Yes SOB . Seem to be better  Denies nausea or vomiting. Not eating much  No new bowel or bladder symptoms.        Intake/Output Summary (Last 24 hours) at 3/15/2021 2335  Last data filed at 3/15/2021 1828  Gross per 24 hour   Intake --   Output 2500 ml   Net -2500 ml       DATA    CBC:   Recent Labs     03/13/21  0630 03/14/21  0510 03/15/21  0445   WBC 11.7* 11.1* 8.7   HGB 8.8* 9.0* 8.7*    324 297    CMP:  Recent Labs     03/13/21  0630 03/14/21  0510 03/15/21  0445   * 135 139   K 4.1 4.3 4.2   CL 96* 95* 96*   CO2 25 28 30   BUN 87* 90* 88*   CREATININE 2.6* 2.3* 1.9*   CALCIUM 8.3 8.4 8.3   PROT  --  6.2*  --    LABALBU 3.0* 2.8* 2.7*   BILITOT  --  0.5  --    ALKPHOS  --  303*  --    AST  --  44*  --    ALT  --  53*  --      Lipids:   Lab Results   Component Value Date    CHOL 91 12/18/2020    HDL 43 12/18/2020    TRIG 73 03/10/2021     Glucose:  Recent Labs     03/15/21  1247 03/15/21  1649 03/15/21  2031   POCGLU 181* 199* 177*     EpxyahhonxG2L:  Lab Results   Component Value Date    LABA1C 6.1 02/20/2021     High Sensitivity TSH:   Lab Results   Component Value Date    TSHHS 2.620 03/03/2021     Free T3: No results found for: FT3  Free T4:  Lab Results   Component Value Date    T4FREE 1.5 07/07/2020       Echocardiogram Limited    Result Date: 3/2/2021  Transthoracic Echocardiography Report (TTE)  Demographics   Patient Name       RASHAD TATE    Date of Study       03/02/2021   Date of osteomyelitis. Interval development of a moderate to large right and a moderate left pleural effusion. Adjacent airspace disease is some combination of atelectasis, pneumonia, and/or edema. Xr Chest Portable    Result Date: 3/3/2021  EXAMINATION: ONE XRAY VIEW OF THE CHEST 3/3/2021 5:34 am COMPARISON: 03/02/2021 HISTORY: ORDERING SYSTEM PROVIDED HISTORY: heart failure TECHNOLOGIST PROVIDED HISTORY: Reason for exam:->heart failure Reason for Exam: heart failure Acuity: Acute Type of Exam: Subsequent/Follow-up FINDINGS: Status post median sternotomy and left-sided transvenous pacer placement. There is stable cardiomegaly. The chest films taken shallow degree of inspiration accentuating heart size and bronchovascular structures. There is a small right pleural effusion. No significant left effusion is seen. The patient is undergone clipping of the left atrial appendage. There is bibasilar atelectasis. Stable exam     Xr Chest Portable    Result Date: 3/2/2021  EXAMINATION: ONE XRAY VIEW OF THE CHEST 3/2/2021 9:38 am COMPARISON: 03/01/2021 HISTORY: ORDERING SYSTEM PROVIDED HISTORY: post bilateral thoracentesis   . Patient has undergone thoracentesis. The volume of fluid in the right pleural space has decreased and/or shifted. There is some persistent right basilar atelectasis. No significant pleural effusion on the left is seen. Minimal left basilar atelectasis is noted. No pneumothorax is seen. Bilateral shoulder arthritis is noted. Pleural effusions right greater than left with bibasilar atelectasis right worse than left. No pneumothorax is seen.      Xr Chest Portable    Result Date: 3/1/2021  EXAMINATION: ONE XRAY VIEW OF THE CHEST 3/1/2021 5:12 pm COMPARISON: 02/23/2021 HISTORY: ORDERING SYSTEM PROVIDED HISTORY: chest pain, shorntess of breath TECHNOLOGIST PROVIDED HISTORY: Reason for exam:->chest pain, shorntess of breath Reason for Exam: chest pain   sob   leg swelling Acuity: Unknown Type of Exam: Unknown Relevant Medical/Surgical History: afib    cad    diabetes FINDINGS: Status post median sternotomy. Transvenous pacer remains place. Stable cardiomegaly. Right-sided pleural effusion. Bibasilar hypoaeration. Right-sided pleural effusion Bibasilar hypoaeration     Vl Dup Lower Extremity Venous Bilateral    Result Date: 3/3/2021  EXAMINATION: DUPLEX VENOUS ULTRASOUND OF THE BILATERAL LOWER EXTREMITIES, 3/3/2021 9:01 am TECHNIQUE: Duplex ultrasound using B-mode/gray scaled imaging and Doppler spectral analysis and color flow was obtained of the bilateral lower extremities. COMPARISON: None. HISTORY: ORDERING SYSTEM PROVIDED HISTORY: fevers TECHNOLOGIST PROVIDED HISTORY: Reason for exam:->fevers Reason for Exam: edema FINDINGS: The visualized veins of the bilateral lower extremities are patent and free of echogenic thrombus. The veins demonstrate good compressibility with normal color flow study and spectral analysis. No evidence of DVT in either lower extremity. Us Chest Including Mediastinum    Result Date: 3/3/2021  EXAMINATION: ULTRASOUND OF THE CHEST 3/3/2021 9:02 am COMPARISON: Chest radiograph performed earlier in the same day HISTORY: ORDERING SYSTEM PROVIDED HISTORY: ASSESS FOR PLEURAL EFFUSION TECHNOLOGIST PROVIDED HISTORY: RIGHT LUNG Reason for exam:->ASSESS FOR PLEURAL EFFUSION Reason for Exam: pleural effusion FINDINGS: Moderate right pleural effusion is present. Right pleural effusion is present. Ir Guided Thoracentesis Pleural    Result Date: 3/2/2021  PROCEDURE: ULTRASOUNDGUIDED Bilateral THORACENTESIS 3/2/2021 HISTORY: ORDERING SYSTEM PROVIDED HISTORY: Bilateral pleural effusion. T   The patient tolerated the procedure well. FINDINGS: A total of 800 ml serosanguinous fluid from left and 1050 ml serosanguineous fluid from right  was removed. Successful ultrasound guided bilateral thoracentesis.        Scheduled Medicines   Medications:    [Held by provider] apixaban  5 mg Oral BID    [START ON 3/16/2021] aspirin  81 mg Oral Daily    insulin glargine  10 Units Subcutaneous Nightly    insulin lispro  0-18 Units Subcutaneous 2 times per day    insulin lispro  0-18 Units Subcutaneous TID WC    insulin lispro  10 Units Subcutaneous TID     meropenem  1,000 mg Intravenous Q12H    midodrine  5 mg Oral TID WC    minocycline  100 mg Oral BID    pantoprazole  40 mg Intravenous BID    rOPINIRole  0.25 mg Oral TID    torsemide  20 mg Oral BID    [START ON 3/9/2022] vancomycin (VANCOCIN) intermittent dosing (placeholder)   Other RX Placeholder    atorvastatin  10 mg Oral Nightly    [START ON 3/16/2021] enoxaparin  30 mg Subcutaneous Daily      Infusions:    dextrose           Objective:   Vitals: /60   Pulse 60   Temp 97.6 °F (36.4 °C) (Oral)   Resp 18   Ht 5' 10\" (1.778 m)   Wt 238 lb (108 kg) Comment: Simultaneous filing. User may not have seen previous data. SpO2 96%   BMI 34.15 kg/m²   General appearance: alert and cooperative with exam  Neck: no JVD or bruit  Thyroid : Normal lobes   Lungs: Has Vesicular Breath sounds there is breath sounds bilateral pleural effusion tapped both sides noted below  Heart:  regular rate and rhythm  Abdomen: soft, non-tender; bowel sounds normal; no masses,  no organomegaly  Musculoskeletal: Normal  Extremities: extremities normal, , no edema  Neurologic:  Awake, alert, oriented to name, place and time. Cranial nerves II-XII are grossly intact. Motor is  intact. Sensory is intact. ,  and gait is normal.    Assessment:     Patient Active Problem List:     Type 2 diabetes mellitus without complication, without long-term current use of insulin (HCC)     Ulcer of other part of lower limb     Venous hypertension, chronic, with ulcer ([address]     Ulcer of other part of foot     Pneumonia     Atrial fibrillation (HCC)     Sinus pause     PAF (paroxysmal atrial fibrillation) ([facility])     DM (diabetes mellitus) ([address]     DMITRIY on CPAP     Hyperlipidemia     Status post incision and drainage     CKD (chronic kidney disease) stage 3, GFR 30-59 ml/min (HCC)     Hyperpotassemia     Arthritis     PVD (peripheral vascular disease) (HCC)     Hematoma     Cardiac pacemaker in situ     Coronary artery stenosis     Essential hypertension     Gout     Diabetic neuropathy associated with type 2 diabetes mellitus (HCC)     Adenomatous polyp of sigmoid colon     Iron deficiency anemia due to chronic blood loss     Erythropoietin deficiency anemia     VHD (valvular heart disease)     Abnormal fractional flow reserve (FFR) on cardiac catheterization     Carotid stenosis, left     Aortic stenosis, severe     CAD in native artery     Displacement of atrial pacemaker leads     Pacemaker lead malfunction     SOB (shortness of breath)      ? ? Sepsis      Plan:     1. Reviewed POC blood glucose . Labs and X ray results   2. Reviewed Current Medicines   3. On meal/ Correction bolus Humalog/ Basal Lantus Insulin regime   4. Monitor Blood glucose frequently   5. Modified  the dose of Insulin/ other medicines as needed   6. Will not re start Trulicity at this time  7. Ultimately patient was transferred to acute rehab unit on 3/15/2021 as insurance approved for 7 days in this unit   8. Will follow     .      Lucian Vargas MD

## 2021-03-16 NOTE — PROGRESS NOTES
Physical Therapy  [facility] ARU PHYSICAL THERAPY EVALUATION    Chart Review:  Past Medical History:   Diagnosis Date    Arrhythmia     Pacemaker placed aprox 5 years ago for A Fib per patient    Arthritis 12/2013    rt wrist    Atrial fibrillation ([facility]     on Xarelto - Dr. 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  H/O right and left heart catheterization 12/10/2020    DIFFUSE LAD DISEASE, Mild ECA Disease, Severe AS, Milf Pul HTN on RHC.  H/O transesophageal echocardiography (MABLE) for monitoring 08/05/2013    normal LV function and normal LA appendage without any clot    History of blood transfusion 12/2020    d/t anemia    History of transesophageal echocardiography (MABLE) 12/15/2020    Severe aortic stenosis (ANNA by planimetry: 0.778 cm sq). Mild AR.    [place] (hard of hearing)     hearing tonya aides    Hx of Doppler echocardiogram 05/21/2018    EF 50%  Mild LV hypertrophy. Mildly enlarged RA. Mod aortic valve calcification with mod AS. Mitral annular calcification is present. Mild AR, MR and TR. Mild pulmonary htn.     Hyperlipidemia     Hypertension     Follows with PCP & Dr. Alisha Rivas Other disorders of kidney and ureter     Pacemaker     Medtronic, implanted 2014    Pneumonia 12/29/2012    Sleep apnea     dx 2013- has c-pap    Type II or unspecified type diabetes mellitus with other specified manifestations, uncontrolled 12/12/2012    Venous hypertension, chronic, with ulcer ([address] 12/12/2012    resolved     Past Surgical History:   Procedure Laterality Date    CABG WITH AORTIC VALVE REPLACEMENT N/A 2/16/2021    CABG CORONARY ARTERY BYPASS X2 WITH LIMA, AORTIC VALVE REPLACEMENT AND AORTIC ROOT REPAIR, INTRAOPERATIVE MABLE, INDUCED HYPOTHERMIA, LEFT LEG ENDOVEIN HARVEST, LEFT ATRIAL CLIP, AND CRYO PROCEDURE performed by Scotty Ghosh MD at [facility]  12/14    at [address] Left 1/29/2021    LEFT CAROTID ENDARTERECTOMY performed by Page Gardner MD at [id number]  2017    COLONOSCOPY  2011    COLONOSCOPY N/A 11/19/2019    COLONOSCOPY DIAGNOSTIC performed by Get Flores MD at [facility]  09/30/2020    POSSIBLE CECAL avms, SIGMOID DIVERTICULOSIS, INTERNAL HEMORRHOIDS GRADE 1    COLONOSCOPY N/A 9/30/2020    COLONOSCOPY CONTROL HEMORRHAGE WITH APC performed by Get Flores MD at [facility]  2014    \"2 stents put in \"    IR NONTUNNELED VASCULAR CATHETER  3/5/2021    IR NONTUNNELED VASCULAR CATHETER 3/5/2021 [facility] SPECIAL PROCEDURES    JOINT REPLACEMENT  2004    total left hip    OTHER SURGICAL HISTORY Right 12/02/2017    I&D; evacuation of hematoma right hip    OTHER SURGICAL HISTORY  09/17/2020    enteroscopy    PACEMAKER INSERTION N/A 2/23/2021    PACEMAKER GENERATOR LEAD REVISION performed by Scotty Ghosh MD at [facility]      9/18/14 Status post remote permanent pacemaker with atrial lead dislodgement.  7/24/14 PPM Implant    UPPER GASTROINTESTINAL ENDOSCOPY N/A 9/17/2020    ENTEROSCOPY PUSH BIOPSY performed by Get Flores MD at [address] [place] [address] 3/4/2021    EGD DIAGNOSTIC ONLY performed by Aparna Matamoros MD at [address] [facility]  2012    \"have stents in both legs- done in [place]   Pt reports his disability from [employer] is due to [employer] exposure. Fall History: pt had one fall out of chair during his bout with Covid with no injury, but called squad to get him up  Social History:  Social/Functional History  Lives With: Spouse  Type of Home: House  Home Layout: One level  Home Access: Stairs to enter with rails  Entrance Stairs - Number of Steps: 1  Entrance Stairs - Rails: Right  Bathroom Shower/Tub: Walk-in shower(built in seat)  Bathroom Toilet: Handicap height  Bathroom Equipment: Grab bars in shower, Built-in shower seat, Grab bars around toilet, Hand-held shower  Bathroom Accessibility: Walker accessible  Home Equipment: Cane, Rolling walker, Grab bars  Receives Help From: Family  ADL Assistance: Independent  Homemaking Assistance: Independent  Homemaking Responsibilities: Yes  Meal Prep Responsibility: Secondary  Cleaning Responsibility: Secondary  Health Care Management: Primary  Ambulation Assistance: Independent(cane occasionaly for balance)  Transfer Assistance: Independent  Active : Yes(prior to CABG, still on precaution)  Mode of Transportation: "[employer]([facility] or [address])  Occupation: Retired  Leisure & Hobbies: ana in house and yard, online research, golf  Additional Comments: one fall without injury in August, but had to call squad to get up. Sleeps in regular flat bed.     Restrictions:  Restrictions/Precautions  Restrictions/Precautions: General Precautions, Fall Risk, Surgical Protocols(sternal precautions(2/16 surgery date))  Position Activity Restriction  Sternal Precautions: No Pushing, No Pulling, 10# Lifting Restrictions(\"milk carton\")         Pain Level: 0       Objective:  Orientation  Overall Orientation Status: Within Normal Limits        Vision  Vision: Impaired  Vision Exceptions: Wears glasses for Walking 10 Feet on Uneven Surfaces  Assistance Needed: Partial/moderate assistance  Comment: min assist  CARE Score: 3  Discharge Goal: Independent     1 Step (Curb)  Assistance Needed: Partial/moderate assistance  Comment: min assist with 2ww, 6\"  CARE Score: 3  Discharge Goal: Supervision or touching assistance     4 Steps  Reason if not Attempted: Not applicable  CARE Score: 9  Discharge Goal: Not Applicable     12 Steps  Reason if not Attempted: Not applicable  CARE Score: 9  Discharge Goal: Not Applicable    Gait Deviations: []None [x]Slow leno  [x] Increased NETTA  [] Staggers [x]Deviated Path  [x] Decreased step length  [x] Decreased step height  []Decreased arm swing  [] Shuffles  [] Decreased head and trunk rotation  [x]other: neuropathic gait pattern with foot placement due to decreased sensation, variable step width and length       Wheelchair:  w/c Ability: Wheelchair Ability  Uses a Wheelchair and/or Scooter?: No                Balance:        Object: Picking Up Object  Comment: Pt too fatigued to complete  Reason if not Attempted: Not attempted due to medical condition or safety concerns  CARE Score: 88  Discharge Goal: Independent    Assessment:   The patient is a 66year old male admitted onto ARU after hospitalization for AMS, sepsis 2* pneumonia, anemia requiring 2 PRBCs, d/t kidney function required HD briefly, and pleural effusion requiring multiple thoracentesis on 03/02 and 03/04; pt had discharged home for less than a week after CABG+ AVR+ MAZE procedure on 02/16 and then a pacemaker generator lead revision on 02/23. Pt had Hgb this date of 8.4 and had another thorocentecis with 1100mL  Drawn from R lung(this affected therapy time this date). Pt also states he is on disability with [employer] due to [employer] exposure. Prior to initial surgery in Feb, pt was independent, using cane occasionally, only negotiated step into the home.  This date pt requires min to mod assist for mobiltiy and gait with decreased activity tolerance and increased rest. Pt also demonstrated slight cognitive decrease, but followed directions well. Pt will benefit from ARU PT to address deficits as noted to return home at mod I to supervision level with 2ww.         Body structures, Functions, Activity limitations: Decreased functional mobility , Decreased cognition, Decreased high-level IADLs, Decreased endurance, Decreased ADL status, Decreased ROM, Decreased sensation, Decreased coordination, Decreased strength, Decreased balance, Increased pain     Prognosis: Good  Decision Making: High Complexity  Clinical Presentation: unpredicatable characteristics      Patient education:   ARU schedule, ARU expectations for participation, plan of care, sternal precautions  Treatment Initiated:  Functional mobility training, gait training, patient education  Barriers to Improvement: Pt has some mild cognitive issues and has had multiple medical issues in past 3 months  Discharge Recommendations:  Home with wife  Equipment Recommendations:  2ww    Goals:  Patient Goals   Patient goals : return home and able to walk on his own  Short term goals  Time Frame for Short term goals: 10 days STG=LTG  Short term goal 1: pt will perform all bed mobility with mod I  Short term goal 2: pt will perform sit tostand and pivot transfers with mod I, car transfers with CGA for LEs  Short term goal 3: pt will ambulate on level and unlevel surfaces with 2ww with mod I , level surfaces 200' with supervision  Short term goal 4: Pt will ascend/descend 1 step with 2ww with supervision  Short term goal 5: pt will retrieve light item from floor with reacher and 2ww with mod I     Plan:    REQUIRES PT FOLLOW UP: Yes  Pt will be seen at least 60 minutes per day for a minimum of 5 days per week, plus group therapy as appropriate  Plan  Current Treatment Recommendations: Strengthening, Gait Training, Patient/Caregiver Education & Training, Stair training, IADL Training, Equipment Evaluation, Education, & procurement, Balance Training, Neuromuscular Re-education, Pain Management, Functional Mobility Training, Endurance Training, Home Exercise Program, Transfer Training, Safety Education & Training, Positioning    PT Individual Minutes  Time In: 0716  Time Out: 1450  Minutes: 80                 PT Evaluation 15   Gait Training 35   Therapeutic Exercise    Neuro Re-Ed    Therapeutic Activity 35   Wheelchair Propulsion    Group    Other:    TOTAL 85       Electronically signed by:    Maria Alejandra Aquino, PT   3/16/2021,

## 2021-03-16 NOTE — CONSULTS
Comprehensive Nutrition Assessment    Type and Reason for Visit:  Initial, Consult    Nutrition Recommendations/Plan:   · Liberalize diet as able medically  · Modify diabetic oral nutrition supplement to low francisca high protein, between meals recommended    Nutrition Assessment:  Pt admitted to ARU with critical illness myopathy s/p CABG x 2, AVR, maze, LA clip. Eating well on fairly restrictive diet. Consuming 76% to all of meals, food from home and oral supplements. Spoke with SO as pt working with therapy during rom visit. Provided/reviewed Heart Healthy Consistent Carbohydrate Nutrition Therapy and Potassium Content of Foods (Nutrition Care Manual). Will modify diabetic supplement to low francisca high protein for more protein, fewer carbs. Will continue to monitor as moderate nutrition risk. Malnutrition Assessment:  Malnutrition Status: At risk for malnutrition    Context:  Acute Illness       Estimated Daily Nutrient Needs:  Energy (kcal):  [phone] (Stanton-St Jeor); Weight Used for Energy Requirements:  Current     Protein (g):   (1.2-1.5 g/kg IBW); Weight Used for Protein Requirements:  Ideal        Fluid (ml/day):  5236-4067; Method Used for Fluid Requirements:  1 ml/kcal      Nutrition Related Findings:  K 4.1, Cr 1.7, A1C 6.1      Wounds:  Multiple, Pressure Injury, Unstageable, Skin Tears, Surgical Incision, Diabetic Ulcer       Current Nutrition Therapies:    DIET CARB CONTROL; Carb Control: 4 carb choices (60 gms)/meal; Low Sodium (2 GM);  Daily Fluid Restriction: 1500 ml; Low Potassium  Dietary Nutrition Supplements: Diabetic Oral Supplement  Dietary Nutrition Supplements: Frozen Oral Supplement    Anthropometric Measures:  · Height: 5' 10\" (177.8 cm)  · Current Body Weight: 226 lb 11.2 oz (102.8 kg)   · Admission Body Weight: 226 lb 11.2 oz (102.8 kg)    · Usual Body Weight: 201 lb (91.2 kg)(9/30/20)     · Ideal Body Weight: 166 lbs; % Ideal Body Weight 136.6 %   · BMI: 32.5  · BMI Categories: Obese Class 1 (BMI 30.0-34. 9)       Nutrition Diagnosis:   · Predicted inadequate nutrient intake related to increase demand for energy/nutrients as evidenced by wounds    Nutrition Interventions:   Food and/or Nutrient Delivery:  Continue Current Diet, Modify Oral Nutrition Supplement  Nutrition Education/Counseling:  Education completed   Coordination of Nutrition Care:  Continue to monitor while inpatient    Goals:  Pt will consume greater than 75% of his meals and supplements       Nutrition Monitoring and Evaluation:   Behavioral-Environmental Outcomes:  Knowledge or Skill, Readiness for Change   Food/Nutrient Intake Outcomes:  Food and Nutrient Intake, Supplement Intake  Physical Signs/Symptoms Outcomes:  Biochemical Data, GI Status, Fluid Status or Edema, Skin, Weight     Discharge Planning:    No discharge needs at this time     Electronically signed by Nate Hamilton RD, LD on 3/16/21 at 10:55 AM EDT    Contact: [phone]

## 2021-03-16 NOTE — PROGRESS NOTES
PROCEDURE PERFORMED: Right thoracentesis    PRIMARY INDICATION FOR PROCEDURE:  Right Pleural Effusion    INFORMED CONSENT:  PT ALERT & ORIENTED and verbalizes understanding of procedure. Pt signed consent with Dr. Susu Dhaliwal prior to procedure. Consent placed in chart. TIME OUT COMPLETE: 1030    BARRIER PRECAUTIONS & STERILE TECHNIQUE  Pt arrived to Endo Bronch room in bed and will stay in the bed for the procedure Warm blankets given. Pt placed on Cardiac Monitor. Pt in the supine position. CHLORHEXADINE scrubbed and draped in a sterile fashion by Terrance BOLANOS    PAIN/LOCAL ANESTHESIA/SEDATION MANAGEMENT:  No sedation medications were used. Lidocaine was used to numb the skin and surrounding tissues    INTRAOPERATIVE:    ACCESS TIME: 1035  US/FLUORO: 3 images  WIRE USED:  SHEATH USED:   CATHETER USED:  FINAL IMAGE TAKEN TO CONFIRM PLACEMENT OF:   CONTRAST/CC:     STERILE DRESSINGS:  Dry sterile dressing applied by Terrance BOLANOS    SPECIMENS:  No orders to send fluids.   1100cc was collected from the right pleural space    FOLLOW- UP X-RAY:  STAT chest X-Ray ordered    COMPLICATIONS:  Pt tolerated procedure well without any complications    STAFF PRESENT DURING PROCEDURE:   Kelsey BOLANOS    REPORT CALLED TO: Report called to Via Dave Kulkarni RN in ARU

## 2021-03-16 NOTE — PROGRESS NOTES
PATIENT NAME: Pierce Roman    TODAY'S DATE: 03/16/21    SUBJECTIVE:    Pt is s/p CABG x 2, AVR, maze, LA clip. Pt had thoracentesis this am, 1100 cc removed. He denies SOB. OBJECTIVE:   VITALS:    Vitals:    03/16/21 1230   BP: (!) 115/56   Pulse:    Resp:    Temp:    SpO2:      INTAKE/OUTPUT:    Date 03/16/21 0000 - 03/16/21 2359   Shift 5904-3223 5152-5546 0874-7287 24 Hour Total   INTAKE   P.O. 100 120  220   IV Piggyback 100   100   Shift Total(mL/kg) 200(1.9) 120(1.2)  320(3.1)   OUTPUT   Urine(mL/kg/hr) 200(0.2) 200  400   Emesis/NG output 0   0   Stool 0   0   Shift Total(mL/kg) 200(1.9) 200(1.9)  400(3.9)   Weight (kg) 102.8 102.8 102.8 102.8      Patient Vitals for the past 96 hrs (Last 3 readings):   Weight   03/16/21 0421 226 lb 11.2 oz (102.8 kg)   03/15/21 1600 238 lb (108 kg)       EXAM:  Blood pressure (!) 115/56, pulse 65, temperature 96.6 °F (35.9 °C), resp. rate 16, height 5' 10\" (1.778 m), weight 226 lb 11.2 oz (102.8 kg), SpO2 94 %. General appearance: No apparent distress, appears stated age and cooperative. Skin: unremarkable  HEENT Normocephalic, atraumatic without obvious deformity. Neck: Supple, Trachea midline   Lungs: Good respiratory effort.  Clear to auscultation, bilaterally  Heart: Regular rate/ rhythm inc c/d/i  Abdomen: Soft, non-tender or non-distended   Extremities: 1+ edema warm well perfused  Neurologic: Alert, grossly intact  Mental status: normal affect      Data:  CBC:   Recent Labs     03/14/21  0510 03/15/21  0445 03/16/21  0701   WBC 11.1* 8.7 7.6   HGB 9.0* 8.7* 8.4*   HCT 30.4* 28.5* 28.0*    297 280     BMP:    Recent Labs     03/14/21  0510 03/15/21  0445 03/16/21  0701    139 138   K 4.3 4.2 4.1   CL 95* 96* 97*   CO2 28 30 32   BUN 90* 88* 85*   CREATININE 2.3* 1.9* 1.7*   GLUCOSE 55* 99 133*     Hepatic:   Recent Labs     03/14/21 0510   AST 44*   ALT 53*   BILITOT 0.5   ALKPHOS 303*     Mag:      Recent Labs     03/14/21 0510 03/15/21  0445   MG 2.2 2.0      Phos:     Recent Labs     03/14/21  0510 03/15/21  0445 03/16/21  0701   PHOS 5.2* 5.6* 5.2*      INR:   Recent Labs     03/16/21  0815   INR 1.51       Radiology Review:  CXR  Impression:     Status post right thoracentesis without significant change in the right   basilar opacification.  No pneumothorax. ASSESSMENT AND PLAN:    Patient Active Problem List   Diagnosis    Type 2 diabetes mellitus without complication, without long-term current use of insulin (HCC)    Ulcer of other part of lower limb    Venous hypertension, chronic, with ulcer ([address]    Ulcer of other part of foot    Pneumonia    Atrial fibrillation (HCC)    Sinus pause    PAF (paroxysmal atrial fibrillation) ([address]    DM (diabetes mellitus) ([address]    DMITRIY on CPAP    Hyperlipidemia    Status post incision and drainage    CKD (chronic kidney disease) stage 3, GFR 30-59 ml/min ([facility])    Hyperkalemia    Arthritis    PVD (peripheral vascular disease) ([address]    Hematoma    Cardiac pacemaker in situ    Coronary artery stenosis    Essential hypertension    Gout    Diabetic neuropathy associated with type 2 diabetes mellitus (HCC)    Adenomatous polyp of sigmoid colon    Iron deficiency anemia due to chronic blood loss    Erythropoietin deficiency anemia    VHD (valvular heart disease)    Abnormal fractional flow reserve (FFR) on cardiac catheterization    Carotid stenosis, left    Aortic stenosis, severe    CAD in native artery    Displacement of atrial pacemaker leads    Pacemaker lead malfunction    SOB (shortness of breath)    Pleural effusion    Elevated liver enzymes    Critical illness myopathy    Respiratory failure (HCC)       S/P CABG x 2, AVR, maze, LA clip. R thoracentesis this am, 1100 cc removed. Encourage IS. Creatinine/BUN trending down.  hgb stable. Making good progress. Will continue to follow.      Reno Baez PA-C

## 2021-03-16 NOTE — PROGRESS NOTES
Occupational Therapy                              [facility] ARU OCCUPATIONAL THERAPY EVALUATION    Chart Review:  Past Medical History:   Diagnosis Date    Arrhythmia     Pacemaker placed aprox 5 years ago for A Fib per patient    Arthritis 12/2013    rt wrist    Atrial fibrillation ([facility] [address]     on Xarelto - Dr. 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  H/O right and left heart catheterization 12/10/2020    DIFFUSE LAD DISEASE, Mild ECA Disease, Severe AS, Milf Pul HTN on RHC.  H/O transesophageal echocardiography (MABLE) for monitoring 08/05/2013    normal LV function and normal LA appendage without any clot    History of blood transfusion 12/2020    d/t anemia    History of transesophageal echocardiography (MABLE) 12/15/2020    Severe aortic stenosis (ANNA by planimetry: 0.778 cm sq). Mild AR.    [place] (hard of hearing)     hearing tonya aides    Hx of Doppler echocardiogram 05/21/2018    EF 50%  Mild LV hypertrophy. Mildly enlarged RA. Mod aortic valve calcification with mod AS. Mitral annular calcification is present. Mild AR, MR and TR. Mild pulmonary htn.     Hyperlipidemia     Hypertension     Follows with PCP & Dr. Carolee Nunez Other disorders of kidney and ureter     Pacemaker Medtronic, implanted 2014    Pneumonia 12/29/2012    Sleep apnea     dx 2013- has c-pap    Type II or unspecified type diabetes mellitus with other specified manifestations, uncontrolled 12/12/2012    Venous hypertension, chronic, with ulcer ([address] 12/12/2012    resolved     Past Surgical History:   Procedure Laterality Date    CABG WITH AORTIC VALVE REPLACEMENT N/A 2/16/2021    CABG CORONARY ARTERY BYPASS X2 WITH LIMA, AORTIC VALVE REPLACEMENT AND AORTIC ROOT REPAIR, INTRAOPERATIVE MABLE, INDUCED HYPOTHERMIA, LEFT LEG ENDOVEIN HARVEST, LEFT ATRIAL CLIP, AND CRYO PROCEDURE performed by Christianne Wooten MD at [facility]  12/14    at [facility] Left 1/29/2021    LEFT CAROTID ENDARTERECTOMY performed by Brionna Lopez MD at [id number]  2017    COLONOSCOPY  2011    COLONOSCOPY N/A 11/19/2019    COLONOSCOPY DIAGNOSTIC performed by Shruthi Leonard MD at [place] [address]  09/30/2020    POSSIBLE CECAL avms, SIGMOID DIVERTICULOSIS, INTERNAL HEMORRHOIDS GRADE 1    COLONOSCOPY N/A 9/30/2020    COLONOSCOPY CONTROL HEMORRHAGE WITH APC performed by Shruthi Leonard MD at [facility]  2014    \"2 stents put in \"    IR NONTUNNELED VASCULAR CATHETER  3/5/2021    IR NONTUNNELED VASCULAR CATHETER 3/5/2021 [facility] SPECIAL PROCEDURES    JOINT REPLACEMENT  2004    total left hip    OTHER SURGICAL HISTORY Right 12/02/2017    I&D; evacuation of hematoma right hip    OTHER SURGICAL HISTORY  09/17/2020    enteroscopy    PACEMAKER INSERTION N/A 2/23/2021    PACEMAKER GENERATOR LEAD REVISION performed by Christianne Wooten MD at [address] [address]      9/18/14 Status post remote permanent pacemaker with atrial lead dislodgement.  7/24/14 PPM Implant    UPPER GASTROINTESTINAL ENDOSCOPY N/A 9/17/2020    ENTEROSCOPY PUSH BIOPSY performed by Shruthi Leonard MD at [address] [place] [address] 3/4/2021    EGD DIAGNOSTIC ONLY performed by Jo[facility] Padgett MD [facility] [place]  2012    \"have stents in both legs- done in [address] History:  Social/Functional History  Lives With: Spouse  Type of Home: House  Home Layout: One level  Home Access: Stairs to enter with rails  Entrance Stairs - Number of Steps: 1  Entrance Stairs - Rails: Right  Bathroom Shower/Tub: Walk-in shower(built in seat)  Bathroom Toilet: Handicap height  Bathroom Equipment: Grab bars in shower, Built-in shower seat, Grab bars around toilet, Hand-held shower  Bathroom Accessibility: Walker accessible  Home Equipment: Cane, Rolling walker, Grab bars  Receives Help From: Family  ADL Assistance: Independent  Homemaking Assistance: Independent  Homemaking Responsibilities: Yes  Meal Prep Responsibility: Secondary  Cleaning Responsibility: Secondary  Health Care Management: Primary  Ambulation Assistance: Independent(cane occasionaly for balance)  Transfer Assistance: Independent  Active : Yes(prior to CABG, still on precaution)  Mode of Transportation: [email or web address] or [employer])  Occupation: Retired  Leisure & Hobbies: ana in house and yard, online research, golf  Additional Comments: one fall without injury in August, but had to call squad to get up. Sleeps in regular flat bed.     Restrictions:  Restrictions/Precautions  Restrictions/Precautions: General Precautions, Fall Risk, Surgical Protocols(sternal precautions(2/16 surgery date))        Position Activity Restriction  Sternal Precautions: No Pushing, No Pulling, 10# Lifting Restrictions(\"milk carton\")         Pain Level: 0       Objective:  Observation/Palpation  Posture: Fair  Observation: L tibial quad insertion point with calcium deposit  Edema: Mild in BUEs, pt reports this has significantly improved  Scar: Scattered bruising and scabbing, dressing on LUE             Vision  Vision: Impaired  Vision Exceptions: Wears glasses for reading  Vision - Basic Assessment  Prior Vision: Wears glasses only for reading  Patient Visual Report: No visual complaint reported. Hearing  Hearing: Exceptions to [facility]  Hearing Exceptions: Hard of hearing/hearing concerns    ROM:      LUE AROM (degrees)  LUE General AROM: Shoulder flexion only performed to 90* 2* precautions (need to clarify with MD)     Left Hand AROM (degrees)  Left Hand AROM: WFL     RUE AROM (degrees)  RUE General AROM: Shoulder flexion only performed to 90* 2* precautions (need to clarify with MD)     Right Hand AROM (degrees)  Right Hand AROM: WFL    Strength:    LUE Strength  Gross LUE Strength: WFL  L Hand General: 4+/5  LUE Strength Comment: Observed functionally 2* sternal precautions  RUE Strength  Gross RUE Strength: WFL  R Hand General: 4/5  RUE Strength Comment: Observed functionally 2* sternal precautions, pt reports R hand is mildly weaker from recent disuse from extreme swelling, pt required min encouragement to engage RUE during ADLs    Quality of Movement:   Tone RUE  RUE Tone: Normotonic  Tone LUE  LUE Tone: Normotonic  Coordination  Movements Are Fluid And Coordinated: Yes       Sensation:    Sensation  Overall Sensation Status: Impaired(reports reynauds syndrome in hands, neuropathy LEs)  Light Touch: Severe deficits in the LLE, Severe deficits in the RLE     ADLs:  Eating: Eating  Assistance Needed: Independent  CARE Score: 6  Discharge Goal: Independent       Oral Hygiene: Oral Hygiene  Assistance Needed: Setup or clean-up assistance  Comment: seated to perform oral care 2* fatigue  CARE Score: 5  Discharge Goal: Independent    UB/LB Bathing: Shower/Bathe Self  Assistance Needed: Partial/moderate assistance  Comment: to bathe bottoms of B feet, cues to perform lateral lean seated to bathe bottom instead of standing 2* sternal precautions (pt unsafe to attempt sit>stand mid shower this date)  CARE Score: 3  Discharge Goal: Supervision or touching assistance    UB Dressing: Exceptions  Following Commands: Follows one step commands consistently  Memory: Decreased recall of precautions  Safety Judgement: Decreased awareness of need for safety  Problem Solving: Assistance required to identify errors made, Assistance required to correct errors made  Cognition Comment: Decreased safety judgement for how to comply with sternal precautions during ADLs    Perception:  Perception  Overall Perceptual Status: WFL      Assessment:     Performance deficits / Impairments: Decreased functional mobility , Decreased ADL status, Decreased strength, Decreased cognition, Decreased safe awareness, Decreased endurance, Decreased sensation, Decreased balance, Decreased high-level IADLs, Decreased posture    The patient is a 66year old male admitted onto ARU after re-hospitalization for AMS, sepsis 2* pneumonia, anemia requiring 2 PRBCs, d/t kidney function required HD briefly, and pleural effusion requiring multiple thoracentesis on 03/02 and 03/04; pt had discharged home for less than a week after CABG+ AVR+ MAZE procedure on 02/16 and then a pacemaker generator lead revision on 02/23. Pt required another thoracentesis today which is why OT eval was reschedule for PM.   Prior to cardiac surgery, pt lives c wife and is typically Ind c ADLs, IADLs, and transfers/mobility. Today, pt presents with decreased strength and endurance requiring min-mod A for transfers, as well as min cognitive deficits requiring cues for sternal precautions. The QI, MMT, and ROM standardized assessments were used this date to determine the above performance deficits, which compromise pt's ability to safely complete ADLs/IADLs/mobility. Pt will benefit from ARU OT services to increase functional performance and return to PLOF.                Decision Making: High Complexity  Clinical Presentation:  Evolving c unstable characteristics (I.e. balance)  Patient education:   ARU procotol, Role of O.T., O.T. plan of care  []   Patient goal was established and reviewed in Rehabtracker with patient and/or family this date.   REQUIRES OT FOLLOW UP: Yes  Discharge Recommendations:  Home c assist from family, [facility]  Equipment Recommendations:  Likely none    Goals:     Short term goals  Time Frame for Short term goals: STGs=LTGs  Long term goals  Time Frame for Long term goals : ~10 days or until d/c  Long term goal 1: Pt will complete grooming tasks Ind  Long term goal 2: Pt will complete total body bathing c S  Long term goal 3: Pt will complete UB dressing Ind  Long term goal 4: Pt will complete LB dressing mod I using AE PRN  Long term goal 5: Pt will doff/don footwear mod I using AE PRN  Long term goals 6: Pt will complete toileting mod I  Long term goal 7: Pt will complete functional transfers (bed, chair, toilet) c DME PRN and mod I; shower transfer c S  Long term goal 8: Pt will perform therex/therax to facilitate increased strength/endurance/ax tolerance (c emphasis on dynamic standing balance/tolerance >8 mins) c SBA  Long term goal 9: Pt will complete simple homemaking tasks c DME PRN and S    Plan:    Pt will be seen at least 60 minutes per day for a minimum of 5 days per week, plus group therapy as appropriate  Current Treatment Recommendations: Strengthening, Balance Training, Functional Mobility Training, Endurance Training, Safety Education & Training, Patient/Caregiver Education & Training, Equipment Evaluation, Education, & procurement, Self-Care / ADL, Home Management Training, Cognitive/Perceptual Training    OT Individual Minutes  Time In: 1450  Time Out: 4456  Minutes: 75                Number of Minutes/Billable Intervention      OT Evaluation 25   Therapeutic Exercise    ADL Self-care 50   Neuro Re-Ed    Therapeutic Activity    Group    Other:    TOTAL 75: Variance of 15 mins as OT eval had to be rescheduled for PM; pt was unavailable in AM d/t thoracentesis     Electronically signed by:    Fausto Weiss MS, OTR/L  License #[id number]  3/16/2021, 4:31 PM

## 2021-03-16 NOTE — PROGRESS NOTES
Samreen Gomez    : 1950  Acct #: [de-identified]  MRN: [id number]              PM&R Progress Note      Admitting diagnosis: Critical illness myopathy ( [address])     Comorbid diagnoses impacting rehabilitation: Generalized weakness, paroxysmal atrial fibrillation, recurrent right pleural effusion, status post coronary artery bypass graft surgery/maze procedure/AVR on 2021, COVID-19 pneumonia (2020), HAP, chronic kidney disease stage IIIb, uncontrolled diabetes type 2 with peripheral neuropathy, essential hypertension, moderate protein calorie malnutrition     Chief complaint: Not sleeping well due to his foot paresthesias and pain. He is requesting gabapentin (his wife reported that nephrology stopped this due to his renal dysfunction). Prior (baseline) level of function: Independent. Current level of function:         Current  IRF-HEENA and Goals:   Occupational Therapy:      :     :                                       Eating:         Oral Hygiene:      UB/LB Bathing:      UB Dressing:           LB Dressing:      [place] Footwear: Toileting: Toilet Transfers: Toilet Transfer  Assistance Needed: Partial/moderate assistance  CARE Score: 3    Physical Therapy:                Bed Mobility:              Transfers:                  Ambulation:                                                      Wheelchair:  w/c Ability:                  Balance:        Object:      I      Exam:    Blood pressure (!) 115/56, pulse 65, temperature 96.6 °F (35.9 °C), resp. rate 16, height 5' 10\" (1.778 m), weight 226 lb 11.2 oz (102.8 kg), SpO2 94 %. General: Patient is observed sitting up in a wheelchair. He was able to propel it some with his legs. Alert. Oriented to person place but not situation. Follows one-step directions fair. Hard of hearing. HEENT: Mucous membranes were moist.  Neck supple. Speech is fairly intelligible. No JVD.     Pulmonary: Improving aeration into the right base following thoracentesis. Cardiac: Pansystolic murmur. Occasional premature beat. The rate is controlled. Abdomen: Patient's abdomen is soft and nondistended. Bowel sounds were present throughout. There was no rebound, guarding or masses noted. Upper extremities: Cautiously brings both hands up to meet mine. Fair  strength. Weakness proximally and distally. Diminished sensation in the hands. Lower extremities: No deep tendon reflexes. No signs of DVT. Calf soft. Incomplete ankle dorsiflexion bilaterally. 3+/5 strength across the hips. Sitting balance was fair. Standing balance was poor. Lab Results   Component Value Date    WBC 7.6 03/16/2021    HGB 8.4 (L) 03/16/2021    HCT 28.0 (L) 03/16/2021    MCV 83.1 03/16/2021     03/16/2021     Lab Results   Component Value Date    INR 1.51 03/16/2021    INR 1.54 03/06/2021    INR 1.84 03/03/2021    PROTIME 18.3 (H) 03/16/2021    PROTIME 18.7 (H) 03/06/2021    PROTIME 22.4 (H) 03/03/2021     Lab Results   Component Value Date    CREATININE 1.7 (H) 03/16/2021    BUN 85 (H) 03/16/2021     03/16/2021    K 4.1 03/16/2021    CL 97 (L) 03/16/2021    CO2 32 03/16/2021     Lab Results   Component Value Date    ALT 53 (H) 03/14/2021    AST 44 (H) 03/14/2021    ALKPHOS 303 (H) 03/14/2021    BILITOT 0.5 03/14/2021       Expected length of stay  prior to a supervised level of function for discharge home with a walker and C OT/PT is 2-1/2 weeks. Recommendations:    1. Critical illness myopathy: Developing the routine for his daily occupational and physical therapy. Ongoing pulmonary hygiene, DVT prophylaxis, nutritional support and bowel and bladder retraining (eventually a voiding trial). Emphasizing control of his blood sugar and blood pressure while providing hydration and nutrition orally. He feels less short of breath following his thoracentesis. No chest wall pain after the procedure.   Planning for adaptive

## 2021-03-16 NOTE — CARE COORDINATION
Follows with PCP & Dr. Teresa Huff Other disorders of kidney and ureter     Pacemaker     Medtronic, implanted 2014    Pneumonia 12/29/2012    Sleep apnea     dx 2013- has c-pap    Type II or unspecified type diabetes mellitus with other specified manifestations, uncontrolled 12/12/2012    Venous hypertension, chronic, with ulcer ([address] 12/12/2012    resolved     Past Surgical History:   Procedure Laterality Date    CABG WITH AORTIC VALVE REPLACEMENT N/A 2/16/2021    CABG CORONARY ARTERY BYPASS X2 WITH LIMA, AORTIC VALVE REPLACEMENT AND AORTIC ROOT REPAIR, INTRAOPERATIVE MABLE, INDUCED HYPOTHERMIA, LEFT LEG ENDOVEIN HARVEST, LEFT ATRIAL CLIP, AND CRYO PROCEDURE performed by Seda Moseley MD at [facility]  12/14    at [address] Left 1/29/2021    LEFT CAROTID ENDARTERECTOMY performed by Coletta Bloch, MD at [address] [facility] [address]  2017    COLONOSCOPY  2011    COLONOSCOPY N/A 11/19/2019    COLONOSCOPY DIAGNOSTIC performed by Wendy Hare MD at [address]  09/30/2020    POSSIBLE CECAL avms, SIGMOID DIVERTICULOSIS, INTERNAL HEMORRHOIDS GRADE 1    COLONOSCOPY N/A 9/30/2020    COLONOSCOPY CONTROL HEMORRHAGE WITH APC performed by Wendy Hare MD at [address] [facility]  2014    \"2 stents put in \"    IR NONTUNNELED VASCULAR CATHETER  3/5/2021    IR NONTUNNELED VASCULAR CATHETER 3/5/2021 [facility] SPECIAL PROCEDURES    JOINT REPLACEMENT  2004    total left hip    OTHER SURGICAL HISTORY Right 12/02/2017    I&D; evacuation of hematoma right hip    OTHER SURGICAL HISTORY  09/17/2020    enteroscopy    PACEMAKER INSERTION N/A 2/23/2021    PACEMAKER GENERATOR LEAD REVISION performed by Seda Moseley MD at [facility]      9/18/14 Status post remote permanent pacemaker with atrial lead dislodgement.  7/24/14 PPM Implant    UPPER GASTROINTESTINAL ENDOSCOPY N/A 9/17/2020    [facility] P[facility] N/A 3/4/2021    EGD DIAGNOSTIC ONLY performed by Janet Chavarria MD at [address] [place] [address]  2012    \"have stents in both legs- done in [place]     Allergies   Allergen Reactions    Spironolactone      CAUSES INCREASED K+    Tape Embudo Camps Tape] Rash     SURGICAL TAPE     Precautions: falls    Date of Admit: 3/15/2021  Room #: 1020/1020-A      Current functional status at time of admit:        Home Living/DME Available:                             IADL Hx:                       Spouse: Wife Aarti Feliciano,  31 years. Family: 2 sisters, 1 brother local.  Kids. Comments: LSW met with patient for admission assessment. Patient lives with wife Aarti Feliciano in a 1-level home in [place]. There is 1 step to enter with rail (from the garage) and no steps inside (except down to basement, which patient does not use). Patient reports a walk-in shower with rails and built-in seat for bathing and bedroom and bathroom are on the main level. Patient has PCP, was independent in ADLs PTA, and uses [employer] [facility] [employer] in [place] for prescriptions. Patient states he has a walker (couldn't recall if it has 2 wheels or 4), cane, toilet raiser with rails, toilet paper roll grab bar, and CPAP machine at home and no pre-existing HHC. Patient reports access to food, utilities, and shelter and feels safe in his environment. BIMS completed in initial assessment. Plan is to D/C home with wife, with Yamil Hitchcock and DME as recommended by therapy staff. F/U to be set with Dr. Ninoska Lainez ([phone]) and family will transport home.     Ezio Ferrera, 3/16/2021, 9:45 AM

## 2021-03-17 LAB
ALBUMIN SERPL-MCNC: 2.8 GM/DL (ref 3.4–5)
ANION GAP SERPL CALCULATED.3IONS-SCNC: 7 MMOL/L (ref 4–16)
BUN BLDV-MCNC: 81 MG/DL (ref 6–23)
CALCIUM SERPL-MCNC: 8.5 MG/DL (ref 8.3–10.6)
CHLORIDE BLD-SCNC: 97 MMOL/L (ref 99–110)
CO2: 34 MMOL/L (ref 21–32)
CREAT SERPL-MCNC: 1.6 MG/DL (ref 0.9–1.3)
GFR AFRICAN AMERICAN: 52 ML/MIN/1.73M2
GFR NON-AFRICAN AMERICAN: 43 ML/MIN/1.73M2
GLUCOSE BLD-MCNC: 106 MG/DL (ref 70–99)
GLUCOSE BLD-MCNC: 131 MG/DL (ref 70–99)
GLUCOSE BLD-MCNC: 151 MG/DL (ref 70–99)
GLUCOSE BLD-MCNC: 157 MG/DL (ref 70–99)
GLUCOSE BLD-MCNC: 159 MG/DL (ref 70–99)
GLUCOSE BLD-MCNC: 217 MG/DL (ref 70–99)
HCT VFR BLD CALC: 29.5 % (ref 42–52)
HEMOGLOBIN: 8.9 GM/DL (ref 13.5–18)
MCH RBC QN AUTO: 25.3 PG (ref 27–31)
MCHC RBC AUTO-ENTMCNC: 30.2 % (ref 32–36)
MCV RBC AUTO: 83.8 FL (ref 78–100)
PDW BLD-RTO: 23.3 % (ref 11.7–14.9)
PHOSPHORUS: 4.8 MG/DL (ref 2.5–4.9)
PLATELET # BLD: 291 K/CU MM (ref 140–440)
PMV BLD AUTO: 10.3 FL (ref 7.5–11.1)
POTASSIUM SERPL-SCNC: 4.1 MMOL/L (ref 3.5–5.1)
RBC # BLD: 3.52 M/CU MM (ref 4.6–6.2)
SODIUM BLD-SCNC: 138 MMOL/L (ref 135–145)
WBC # BLD: 6.2 K/CU MM (ref 4–10.5)

## 2021-03-17 PROCEDURE — 97542 WHEELCHAIR MNGMENT TRAINING: CPT

## 2021-03-17 PROCEDURE — 6370000000 HC RX 637 (ALT 250 FOR IP): Performed by: PHYSICIAN ASSISTANT

## 2021-03-17 PROCEDURE — 6360000002 HC RX W HCPCS: Performed by: PHYSICIAN ASSISTANT

## 2021-03-17 PROCEDURE — 6370000000 HC RX 637 (ALT 250 FOR IP): Performed by: PHYSICAL MEDICINE & REHABILITATION

## 2021-03-17 PROCEDURE — 2580000003 HC RX 258: Performed by: PHYSICAL MEDICINE & REHABILITATION

## 2021-03-17 PROCEDURE — 97530 THERAPEUTIC ACTIVITIES: CPT

## 2021-03-17 PROCEDURE — C9113 INJ PANTOPRAZOLE SODIUM, VIA: HCPCS | Performed by: PHYSICIAN ASSISTANT

## 2021-03-17 PROCEDURE — 97110 THERAPEUTIC EXERCISES: CPT

## 2021-03-17 PROCEDURE — 97116 GAIT TRAINING THERAPY: CPT

## 2021-03-17 PROCEDURE — 2580000003 HC RX 258: Performed by: PHYSICIAN ASSISTANT

## 2021-03-17 PROCEDURE — 1280000000 HC REHAB R&B

## 2021-03-17 PROCEDURE — 80069 RENAL FUNCTION PANEL: CPT

## 2021-03-17 PROCEDURE — 85027 COMPLETE CBC AUTOMATED: CPT

## 2021-03-17 PROCEDURE — 99232 SBSQ HOSP IP/OBS MODERATE 35: CPT | Performed by: PHYSICAL MEDICINE & REHABILITATION

## 2021-03-17 PROCEDURE — 6360000002 HC RX W HCPCS: Performed by: PHYSICAL MEDICINE & REHABILITATION

## 2021-03-17 PROCEDURE — 94761 N-INVAS EAR/PLS OXIMETRY MLT: CPT

## 2021-03-17 PROCEDURE — 36415 COLL VENOUS BLD VENIPUNCTURE: CPT

## 2021-03-17 PROCEDURE — 99213 OFFICE O/P EST LOW 20 MIN: CPT

## 2021-03-17 PROCEDURE — 82962 GLUCOSE BLOOD TEST: CPT

## 2021-03-17 RX ADMIN — APIXABAN 5 MG: 5 TABLET, FILM COATED ORAL at 10:05

## 2021-03-17 RX ADMIN — INSULIN LISPRO 2 UNITS: 100 INJECTION, SOLUTION INTRAVENOUS; SUBCUTANEOUS at 17:17

## 2021-03-17 RX ADMIN — PANTOPRAZOLE SODIUM 40 MG: 40 INJECTION, POWDER, FOR SOLUTION INTRAVENOUS at 10:01

## 2021-03-17 RX ADMIN — MINOCYCLINE HYDROCHLORIDE 100 MG: 100 CAPSULE ORAL at 10:01

## 2021-03-17 RX ADMIN — MINOCYCLINE HYDROCHLORIDE 100 MG: 100 CAPSULE ORAL at 22:39

## 2021-03-17 RX ADMIN — VANCOMYCIN HYDROCHLORIDE 1000 MG: 1 INJECTION, POWDER, LYOPHILIZED, FOR SOLUTION INTRAVENOUS at 15:21

## 2021-03-17 RX ADMIN — MIDODRINE HYDROCHLORIDE 5 MG: 5 TABLET ORAL at 16:57

## 2021-03-17 RX ADMIN — MEROPENEM 1000 MG: 1 INJECTION, POWDER, FOR SOLUTION INTRAVENOUS at 04:34

## 2021-03-17 RX ADMIN — APIXABAN 5 MG: 5 TABLET, FILM COATED ORAL at 22:05

## 2021-03-17 RX ADMIN — ASPIRIN 81 MG CHEWABLE TABLET 81 MG: 81 TABLET CHEWABLE at 10:01

## 2021-03-17 RX ADMIN — ROPINIROLE HYDROCHLORIDE 0.25 MG: 0.25 TABLET, FILM COATED ORAL at 10:02

## 2021-03-17 RX ADMIN — ROPINIROLE HYDROCHLORIDE 0.25 MG: 0.25 TABLET, FILM COATED ORAL at 22:39

## 2021-03-17 RX ADMIN — MEROPENEM 1000 MG: 1 INJECTION, POWDER, FOR SOLUTION INTRAVENOUS at 16:57

## 2021-03-17 RX ADMIN — ROPINIROLE HYDROCHLORIDE 0.25 MG: 0.25 TABLET, FILM COATED ORAL at 12:52

## 2021-03-17 RX ADMIN — INSULIN LISPRO 4 UNITS: 100 INJECTION, SOLUTION INTRAVENOUS; SUBCUTANEOUS at 10:23

## 2021-03-17 RX ADMIN — TORSEMIDE 20 MG: 20 TABLET ORAL at 10:00

## 2021-03-17 RX ADMIN — TORSEMIDE 20 MG: 20 TABLET ORAL at 22:05

## 2021-03-17 RX ADMIN — ATORVASTATIN CALCIUM 10 MG: 10 TABLET, FILM COATED ORAL at 22:05

## 2021-03-17 RX ADMIN — PANTOPRAZOLE SODIUM 40 MG: 40 INJECTION, POWDER, FOR SOLUTION INTRAVENOUS at 22:06

## 2021-03-17 ASSESSMENT — PAIN SCALES - GENERAL: PAINLEVEL_OUTOF10: 0

## 2021-03-17 NOTE — PROGRESS NOTES
Occupational Therapy  Physical Rehabilitation: OCCUPATIONAL THERAPY     [x] daily progress note       [] discharge       Patient Name:  Bladimir Degroot   :  1950 MRN: [id number]  Room:  [address] [place] [address] Date of Admission: 3/15/2021  Rehabilitation Diagnosis:   Critical illness myopathy [G72.81]  Critical illness myopathy [G72.81]       Date 3/17/2021       Day of ARU Week:  3   Time IN/OUT 1305/1405   Individual Tx Minutes 60   Group Tx Minutes    Co-Treat Minutes    Concurrent Tx Minutes    TOTAL Tx Time Mins 60   Variance Time    Variance Time []   Refusal due to:     []   Medical hold/reason:    []   Illness   []   Off Unit for test/procedure  []   Extra time needed to complete task  []   Therapeutic need  []   Other (specify):   Restrictions Restrictions/Precautions: General Precautions, Fall Risk, Surgical Protocols(sternal precautions( surgery date))         Communication with other providers: [x]   OK to see per nursing:     [x]   Spoke with Dr Pernell Salvador regarding conflicting charting regarding pt's precautions; pt is cleared to follow typical sternal precautions and no additional pacemaker precautions are required     Subjective observations and cognitive status: Pt in bed on approach, pleasant, mildly confused, agreeable to tx session. Pain level/location:    /10       Location: Denied   Discharge recommendations  Anticipated discharge date:  TBD  Destination: []home alone   []home alone w assist prn   [x] home w/ family    [] Continuous supervision       []SNF    [] Assisted living     [] Other:   Continued therapy: [x]HHC OT  []OUTPATIENT  OT   [] No Further OT  Equipment needs: None     Toileting: Mod A, required assist for pants management down 2* urgency.   Pt mildly self limiting during hygiene requiring encouragement to perform; was able to complete pants management up       Toilet Transfers:   Min A, min cues for sternal precautions  Device Used:    []   Standard Toilet         []   Grab flexion, pt required 2 rest breaks 2* RUE fatigue     []   Standing Ther Ex (reps/sets):     []   Other:      Comments: All intervention performed to increase pt's endurance, ax tolerance, balance,  and I c ADLs/IADLs and functional transfers/mobility. Patient/Caregiver Education and Training:   []   [employer] Equipment Use  [x]   Bed Mobility/Transfer Technique/Safety  []   Energy Conservation Tips  []   Family training  [x]   Postural Awareness  [x]   Safety During Functional Activities  []   Reinforced Patient's Precautions   []   Progress was updated and reviewed in Rehabtracker with patient and/or family this         date. Treatment Plan for Next Session: Continue OT POC      Assessment: This pt demonstrated a positive   response to today's treatment as evidenced by improving ax tolerance. The patient is making  progress toward established goals as evidenced by QI scores. Ongoing deficits are observed in the areas of endurance/balance and continued focus on this is recommended.        Treatment/Activity Tolerance:   [x] Tolerated treatment with no adverse effects    [] Patient limited by fatigue  [] Patient limited by pain   [] Patient limited by medical complications:    [] Adverse reaction to Tx:   [] Significant change in status    Safety:       []  bed alarm set    []  chair alarm set    []  Pt refused alarms                []  Telesitter activated      [x]  Gait belt used during tx session      [x]other:   Handoff to PTA    Number of Minutes/Billable Intervention  Therapeutic Exercise 20   ADL Self-care    Neuro Re-Ed    Therapeutic Activity 40   Group    Other:    TOTAL 60       Social History  Social/Functional History  Lives With: Spouse  Type of Home: House  Home Layout: One level  Home Access: Stairs to enter with rails  Entrance Stairs - Number of Steps: 1  Entrance Stairs - Rails: Right  Bathroom Shower/Tub: Walk-in shower(built in seat)  Bathroom Toilet: Handicap height  Bathroom Equipment: Grab bars in shower, Built-in shower seat, Grab bars around toilet, Hand-held shower  Bathroom Accessibility: Walker accessible  Home Equipment: Cane, Rolling walker, Grab bars  Receives Help From: Family  ADL Assistance: Independent  Homemaking Assistance: Independent  Homemaking Responsibilities: Yes  Meal Prep Responsibility: Secondary  Cleaning Responsibility: Secondary  Health Care Management: Primary  Ambulation Assistance: Independent(cane occasionaly for balance)  Transfer Assistance: Independent  Active : Yes(prior to CABG, still on precaution)  Mode of Transportation: [employer]  Occupation: Retired  Leisure & Hobbies: ana in house and yard, online research, golf  Additional Comments: one fall without injury in August, but had to call squad to get up. Sleeps in regular flat bed.     Objective                                                                                    Goals:  (Update in navigator)  Short term goals  Time Frame for Short term goals: STGs=LTGs:  Long term goals  Time Frame for Long term goals : ~10 days or until d/c  Long term goal 1: Pt will complete grooming tasks Ind  Long term goal 2: Pt will complete total body bathing c S  Long term goal 3: Pt will complete UB dressing Ind  Long term goal 4: Pt will complete LB dressing mod I using AE PRN  Long term goal 5: Pt will doff/don footwear mod I using AE PRN  Long term goals 6: Pt will complete toileting mod I  Long term goal 7: Pt will complete functional transfers (bed, chair, toilet) c DME PRN and mod I; shower transfer c S  Long term goal 8: Pt will perform therex/therax to facilitate increased strength/endurance/ax tolerance (c emphasis on dynamic standing balance/tolerance >8 mins) c SBA  Long term goal 9: Pt will complete simple homemaking tasks c DME PRN and S:        Plan of Care                                                                              Times per week: 5 days per week for a minimum of 60 minutes/day plus group as appropriate for 60 minutes. Treatment to include Plan  Times per day: Daily  Current Treatment Recommendations: Strengthening, Balance Training, Functional Mobility Training, Endurance Training, Safety Education & Training, Patient/Caregiver Education & Training, Equipment Evaluation, Education, & procurement, Self-Care / ADL, Home Management Training, Cognitive/Perceptual Training    Electronically signed by   Lobo Felix MS, OTR/L  License #[id number]  3/17/2021, 8:18 AM

## 2021-03-17 NOTE — PROGRESS NOTES
progress Note( Dr. Rufina Yeboah)  3/17/2021  Subjective:   Admit Date: 3/15/2021  PCP: Vanessa Guillen MD    Admitted For :Shortness of breath leg swelling    Consulted For:  Better control of blood glucose    Interval History: Patient seemed to be more alert today able to communicate properly  Uses CPAP at night for sleep apnea    Had hemodialysis  Being done at discretion of nephrology  Is more ambulatory with help    Patient had right-sided thoracentesis more around 1100 cc fluid noted below    Denies any chest pains,   Yes SOB . Seem to be better  Denies nausea or vomiting. Not eating much  No new bowel or bladder symptoms.        Intake/Output Summary (Last 24 hours) at 3/17/2021 0714  Last data filed at 3/17/2021 0513  Gross per 24 hour   Intake 600 ml   Output 3450 ml   Net -2850 ml       DATA    CBC:   Recent Labs     03/15/21  0445 03/16/21  0701   WBC 8.7 7.6   HGB 8.7* 8.4*    280    CMP:  Recent Labs     03/15/21  0445 03/16/21  0701    138   K 4.2 4.1   CL 96* 97*   CO2 30 32   BUN 88* 85*   CREATININE 1.9* 1.7*   CALCIUM 8.3 8.3   LABALBU 2.7* 2.8*     Lipids:   Lab Results   Component Value Date    CHOL 91 12/18/2020    HDL 43 12/18/2020    TRIG 73 03/10/2021     Glucose:  Recent Labs     03/16/21  1700 03/16/21  2101 03/17/21  0219   POCGLU 142* 249* 151*     [id number]:  Lab Results   Component Value Date    LABA1C 6.1 02/20/2021     High Sensitivity TSH:   Lab Results   Component Value Date    TSHHS 2.620 03/03/2021     Free T3: No results found for: FT3  Free T4:  Lab Results   Component Value Date    T4FREE 1.5 07/07/2020          Right-sided thoracentesis on 3/16/2021  A total of 1100 serosanguineous fluid was removed.                 Echocardiogram Limited    Result Date: 3/2/2021  Transthoracic Echocardiography Report (TTE)  Demographics   Patient Name       RASHAD TATE    Date of Study       03/02/2021   Date of Birth      1950         Gender              Male   Age 66 year(s)         Race                   Patient Number     [id number]         Room Number         2101   Visit Number       [id number]   Corporate ID       [id number]   Accession Number   [id number]         [address]   Ordering Physician Latasha Higginbotham       Physician           MD  Procedure Type of Study   TTE procedure:ECHOCARDIOGRAM LIMITED. Procedure Date Date: 03/02/2021 Start: 11:53 AM Study Location: Portable Technical Quality: Adequate visualization Indications:S/P CABG. Patient Status: Routine Height: 70 inches Weight: 230 pounds BSA: 2.22 m2 BMI: 33 kg/m2 HR: 91 bpm BP: 91/45 mmHg  Conclusions   Summary  This is a limited echocardiogram.  Left ventricular systolic function is normal.  Ejection fraction is visually estimated at 55-60%. Bilateral atrial enlargement. S/p AVR with a 27 mm Medtronic Mosaic. Moderate mitral regurgitation. Moderate tricuspid regurgitation; RVSP: 50 mmHg. Severe PHTN. No evidence of any pericardial effusion. Signature   ------------------------------------------------------------------  Electronically signed by Loreto Amanda MD (Interpreting  physician) on 03/02/2021 at 05:06 PM  -    Ct Chest Wo Contrast    Result Date: 3/1/2021  EXAMINATION: CT OF THE CHEST WITHOUT CONTRAST 3/1/2021 3:57 pm T    Patient status post midline sternotomy. Immediately posterior to the sternotomy defect, there is a small gas and fluid collection which is nonspecific. It is not necessarily outside normal limits for a sternotomy that was performed 2 weeks ago. However, sterility cannot be ascertained with imaging, and therefore a small developing abscess is considered as well. No evidence of osteolysis of the sternotomy defect to suggest osteomyelitis.  Interval development of a moderate to large right and a moderate left pleural effusion. Adjacent airspace disease is some combination of atelectasis, pneumonia, and/or edema. Xr Chest Portable    Result Date: 3/3/2021  EXAMINATION: ONE XRAY VIEW OF THE CHEST 3/3/2021 5:34 am COMPARISON: 03/02/2021 HISTORY: ORDERING SYSTEM PROVIDED HISTORY: heart failure TECHNOLOGIST PROVIDED HISTORY: Reason for exam:->heart failure Reason for Exam: heart failure Acuity: Acute Type of Exam: Subsequent/Follow-up FINDINGS: Status post median sternotomy and left-sided transvenous pacer placement. There is stable cardiomegaly. The chest films taken shallow degree of inspiration accentuating heart size and bronchovascular structures. There is a small right pleural effusion. No significant left effusion is seen. The patient is undergone clipping of the left atrial appendage. There is bibasilar atelectasis. Stable exam     Xr Chest Portable    Result Date: 3/2/2021  EXAMINATION: ONE XRAY VIEW OF THE CHEST 3/2/2021 9:38 am COMPARISON: 03/01/2021 HISTORY: ORDERING SYSTEM PROVIDED HISTORY: post bilateral thoracentesis   . Patient has undergone thoracentesis. The volume of fluid in the right pleural space has decreased and/or shifted. There is some persistent right basilar atelectasis. No significant pleural effusion on the left is seen. Minimal left basilar atelectasis is noted. No pneumothorax is seen. Bilateral shoulder arthritis is noted. Pleural effusions right greater than left with bibasilar atelectasis right worse than left. No pneumothorax is seen.      Xr Chest Portable    Result Date: 3/1/2021  EXAMINATION: ONE XRAY VIEW OF THE CHEST 3/1/2021 5:12 pm COMPARISON: 02/23/2021 HISTORY: ORDERING SYSTEM PROVIDED HISTORY: chest pain, shorntess of breath TECHNOLOGIST PROVIDED HISTORY: Reason for exam:->chest pain, shorntess of breath Reason for Exam: chest pain   sob   leg swelling Acuity: Unknown Type of Exam: Unknown Relevant Medical/Surgical History: afib cad    diabetes FINDINGS: Status post median sternotomy. Transvenous pacer remains place. Stable cardiomegaly. Right-sided pleural effusion. Bibasilar hypoaeration. Right-sided pleural effusion Bibasilar hypoaeration     Vl Dup Lower Extremity Venous Bilateral    Result Date: 3/3/2021  EXAMINATION: DUPLEX VENOUS ULTRASOUND OF THE BILATERAL LOWER EXTREMITIES, 3/3/2021 9:01 am TECHNIQUE: Duplex ultrasound using B-mode/gray scaled imaging and Doppler spectral analysis and color flow was obtained of the bilateral lower extremities. COMPARISON: None. HISTORY: ORDERING SYSTEM PROVIDED HISTORY: fevers TECHNOLOGIST PROVIDED HISTORY: Reason for exam:->fevers Reason for Exam: edema FINDINGS: The visualized veins of the bilateral lower extremities are patent and free of echogenic thrombus. The veins demonstrate good compressibility with normal color flow study and spectral analysis. No evidence of DVT in either lower extremity. Us Chest Including Mediastinum    Result Date: 3/3/2021  EXAMINATION: ULTRASOUND OF THE CHEST 3/3/2021 9:02 am COMPARISON: Chest radiograph performed earlier in the same day HISTORY: ORDERING SYSTEM PROVIDED HISTORY: ASSESS FOR PLEURAL EFFUSION TECHNOLOGIST PROVIDED HISTORY: RIGHT LUNG Reason for exam:->ASSESS FOR PLEURAL EFFUSION Reason for Exam: pleural effusion FINDINGS: Moderate right pleural effusion is present. Right pleural effusion is present. Ir Guided Thoracentesis Pleural    Result Date: 3/2/2021  PROCEDURE: ULTRASOUNDGUIDED Bilateral THORACENTESIS 3/2/2021 HISTORY: ORDERING SYSTEM PROVIDED HISTORY: Bilateral pleural effusion. T   The patient tolerated the procedure well. FINDINGS: A total of 800 ml serosanguinous fluid from left and 1050 ml serosanguineous fluid from right  was removed. Successful ultrasound guided bilateral thoracentesis.        Scheduled Medicines   Medications:    insulin lispro  0-12 Units Subcutaneous 2 times per day    insulin lispro  0-12 Units Subcutaneous TID     apixaban  5 mg Oral BID    aspirin  81 mg Oral Daily    insulin glargine  10 Units Subcutaneous Nightly    insulin lispro  10 Units Subcutaneous TID     meropenem  1,000 mg Intravenous Q12H    midodrine  5 mg Oral TID WC    minocycline  100 mg Oral BID    pantoprazole  40 mg Intravenous BID    rOPINIRole  0.25 mg Oral TID    torsemide  20 mg Oral BID    [START ON 3/9/2022] vancomycin (VANCOCIN) intermittent dosing (placeholder)   Other RX Placeholder    atorvastatin  10 mg Oral Nightly      Infusions:    dextrose           Objective:   Vitals: BP (!) 120/59   Pulse 59   Temp 97.6 °F (36.4 °C) (Oral)   Resp 16   Ht 5' 10\" (1.778 m)   Wt 223 lb 8 oz (101.4 kg)   SpO2 96%   BMI 32.07 kg/m²   General appearance: alert and cooperative with exam  Neck: no JVD or bruit  Thyroid : Normal lobes   Lungs: Has Vesicular Breath sounds there is breath sounds bilateral pleural effusion tapped both sides noted below  Heart:  regular rate and rhythm  Abdomen: soft, non-tender; bowel sounds normal; no masses,  no organomegaly  Musculoskeletal: Normal  Extremities: extremities normal, , no edema  Neurologic:  Awake, alert, oriented to name, place and time. Cranial nerves II-XII are grossly intact. Motor is  intact. Sensory is intact. ,  and gait is normal.    Assessment:     Patient Active Problem List:     Type 2 diabetes mellitus without complication, without long-term current use of insulin ([facility])     Ulcer of other part of lower limb     Venous hypertension, chronic, with ulcer ([facility] [address]     Ulcer of other part of foot     Pneumonia     Atrial fibrillation ([facility])     Sinus pause     PAF (paroxysmal atrial fibrillation) ([facility])     DM (diabetes mellitus) ([facility])     DMITRIY on CPAP     Hyperlipidemia     Status post incision and drainage     CKD (chronic kidney disease) stage 3, GFR 30-59 ml/min ([facility])     Hyperpotassemia     Arthritis     PVD (peripheral vascular disease) ([facility])     Hematoma Cardiac pacemaker in situ     Coronary artery stenosis     Essential hypertension     Gout     Diabetic neuropathy associated with type 2 diabetes mellitus ([facility])     Adenomatous polyp of sigmoid colon     Iron deficiency anemia due to chronic blood loss     Erythropoietin deficiency anemia     VHD (valvular heart disease)     Abnormal fractional flow reserve (FFR) on cardiac catheterization     Carotid stenosis, left     Aortic stenosis, severe     CAD in native artery     Displacement of atrial pacemaker leads     Pacemaker lead malfunction     SOB (shortness of breath)      ? ? Sepsis      Plan:     1. Reviewed POC blood glucose . Labs and X ray results   2. Reviewed Current Medicines   3. On meal/ Correction bolus Humalog/ Basal Lantus Insulin regime   4. Monitor Blood glucose frequently   5. Modified  the dose of Insulin/ other medicines as needed   6. Will not re start Trulicity at this time  7. Ultimately patient was transferred to acute rehab unit on 3/15/2021 as insurance approved for 7 days in this unit   8. Patient is participating in physical activity programs acute rehab unit  9. Will follow     .      Ayan Arnold MD

## 2021-03-17 NOTE — CONSULTS
planimetry: 0.778 cm sq). Mild AR.    [place] (hard of hearing)     hearing tonya aides    Hx of Doppler echocardiogram 05/21/2018    EF 50%  Mild LV hypertrophy. Mildly enlarged RA. Mod aortic valve calcification with mod AS. Mitral annular calcification is present. Mild AR, MR and TR. Mild pulmonary htn.     Hyperlipidemia     Hypertension     Follows with PCP & Dr. Kera Newby Other disorders of kidney and ureter     Pacemaker     [employer]tr[employer], implanted 2014    Pneumonia 12/29/2012    Sleep apnea     dx 2013- has c-pap    Type II or unspecified type diabetes mellitus with other specified manifestations, uncontrolled 12/12/2012    Venous hypertension, chronic, with ulcer ([address] 12/12/2012    resolved       PAST SURGICAL HISTORY    Past Surgical History:   Procedure Laterality Date    CABG WITH AORTIC VALVE REPLACEMENT N/A 2/16/2021    CABG CORONARY ARTERY BYPASS X2 WITH LIMA, AORTIC VALVE REPLACEMENT AND AORTIC ROOT REPAIR, INTRAOPERATIVE MABLE, INDUCED HYPOTHERMIA, LEFT LEG ENDOVEIN HARVEST, LEFT ATRIAL CLIP, AND CRYO PROCEDURE performed by Km Marinelli MD at [address] [place] [address]  12/14    at [facility] Left 1/29/2021    LEFT CAROTID ENDARTERECTOMY performed by Ciara Reyes MD at [id number] [place] [address]    COLONOSCOPY  2011    COLONOSCOPY N/A 11/19/2019    COLONOSCOPY DIAGNOSTIC performed by Elinor Garcia MD at [address] [facility]  09/30/2020    POSSIBLE CECAL avms, SIGMOID DIVERTICULOSIS, INTERNAL HEMORRHOIDS GRADE 1    COLONOSCOPY N/A 9/30/2020    COLONOSCOPY CONTROL HEMORRHAGE WITH APC performed by Elinor Garcia MD at [address] [facility]  2014    \"2 stents put in \"    IR NONTUNNELED VASCULAR CATHETER  3/5/2021    IR NONTUNNELED VASCULAR CATHETER 3/5/2021 [facility] SPECIAL PROCEDURES    JOINT REPLACEMENT  2004    total left hip    OTHER SURGICAL HISTORY Right 12/02/2017    I&D; evacuation of hematoma right hip    OTHER SURGICAL HISTORY  09/17/2020    enteroscopy    PACEMAKER INSERTION N/A 2/23/2021    PACEMAKER GENERATOR LEAD REVISION performed by Adrienne Marrero MD at [facility]      9/18/14 Status post remote permanent pacemaker with atrial lead dislodgement. 7/24/14 PPM Implant    UPPER GASTROINTESTINAL ENDOSCOPY N/A 9/17/2020    ENTEROSCOPY PUSH BIOPSY performed by Colleen Hennessy MD at [place] [id number] N/A 3/4/2021    EGD DIAGNOSTIC ONLY performed by Colleen Hennessy MD at [address]  2012    \"have stents in both legs- done in [address] [facility] [address]    Family History   Problem Relation Age of Onset    High Blood Pressure Mother     Arthritis Mother     Diabetes Mother     Heart Disease Mother     High Blood Pressure Father     Heart Disease Father     Kidney Disease Father        SOCIAL HISTORY    Social History     Tobacco Use    Smoking status: Never Smoker    Smokeless tobacco: Never Used   Substance Use Topics    Alcohol use: Yes     Alcohol/week: 2.0 standard drinks     Types: 2 Cans of beer per week     Comment: average \"one time per week\"    Drug use: No       ALLERGIES    Allergies   Allergen Reactions    Spironolactone      CAUSES INCREASED K+    Tape Glenice Elver Tape] Rash     SURGICAL TAPE       MEDICATIONS    No current facility-administered medications on file prior to encounter.       Current Outpatient Medications on File Prior to Encounter   Medication Sig Dispense Refill    amiodarone (CORDARONE) 200 MG tablet Take 1 tablet by mouth daily 30 tablet 1    ferrous sulfate (IRON 325) 325 (65 Fe) MG tablet Take 1 tablet by mouth 2 times daily 180 tablet 1    allopurinol (ZYLOPRIM) 100 MG tablet Take 1 tablet by mouth daily (Patient taking differently: Take 100 mg by mouth nightly ) 90 tablet 1    canagliflozin (INVOKANA) 300 MG TABS tablet Take 1 tablet by mouth every morning (before breakfast) 90 tablet 1    Dulaglutide (TRULICITY) 1.5 BM/2.7VD SOPN Inject 1.5 mg into the skin once a week 12 pen 1    gabapentin (NEURONTIN) 600 MG tablet Take 1 tablet by mouth 3 times daily for 270 days.  90 tablet 1    glimepiride (AMARYL) 4 MG tablet Take 1 tablet by mouth daily 90 tablet 1    metFORMIN (GLUCOPHAGE) 1000 MG tablet TAKE 1 TABLET TWICE DAILY  WITH MEALS 180 tablet 1    simvastatin (ZOCOR) 20 MG tablet Pt takes 10 mg daily 90 tablet 1    rivaroxaban (XARELTO) 20 MG TABS tablet TAKE 1 TABLET DAILY WITH   BREAKFAST 90 tablet 3    acetaminophen (AMINOFEN) 325 MG tablet Take 2 tablets by mouth every 6 hours as needed for Pain 30 tablet 0    Cholecalciferol (VITAMIN D) 50 MCG (2000 UT) CAPS capsule Take by mouth nightly       furosemide (LASIX) 20 MG tablet Take 1 tablet by mouth 2 times daily May take an extra Lasix if has increased swelling 180 tablet 3    aspirin 81 MG tablet Take 81 mg by mouth daily           Objective:      BP (!) 121/59   Pulse 66   Temp 97.6 °F (36.4 °C) (Oral)   Resp 16   Ht 5' 10\" (1.778 m)   Wt 223 lb 8 oz (101.4 kg)   SpO2 96%   BMI 32.07 kg/m²   Dani Risk Score: Dani Scale Score: 16    LABS    CBC:   Lab Results   Component Value Date    WBC 6.2 03/17/2021    RBC 3.52 03/17/2021    HGB 8.9 03/17/2021    HCT 29.5 03/17/2021    MCV 83.8 03/17/2021    MCH 25.3 03/17/2021    MCHC 30.2 03/17/2021    RDW 23.3 03/17/2021     03/17/2021    MPV 10.3 03/17/2021     CMP:    Lab Results   Component Value Date     03/17/2021    K 4.1 03/17/2021    CL 97 03/17/2021    CO2 34 03/17/2021    BUN 81 03/17/2021    CREATININE 1.6 03/17/2021    GFRAA 52 03/17/2021    AGRATIO 1.8 11/24/2020    LABGLOM 43 03/17/2021    LABGLOM 51 06/15/2016    GLUCOSE 106 03/17/2021    PROT 6.2 03/14/2021    PROT 7.3 12/27/2012    LABALBU 2.8 03/17/2021    CALCIUM 8.5 03/17/2021    BILITOT 0.5 03/14/2021    ALKPHOS 303 03/14/2021    AST 44 03/14/2021    ALT 53 03/14/2021     Albumin:    Lab Results Component Value Date    LABALBU 2.8 03/17/2021     PT/INR:    Lab Results   Component Value Date    PROTIME 18.3 03/16/2021    INR 1.51 03/16/2021     HgBA1c:    Lab Results   Component Value Date    LABA1C 6.1 02/20/2021         Assessment:     Patient Active Problem List   Diagnosis    Type 2 diabetes mellitus without complication, without long-term current use of insulin (HCC)    Ulcer of other part of lower limb    Venous hypertension, chronic, with ulcer ([address]    Ulcer of other part of foot    Hospital-acquired pneumonia    Atrial fibrillation (HCC)    Sinus pause    PAF (paroxysmal atrial fibrillation) ([address]    DM (diabetes mellitus) [facility]    DMITRIY on CPAP    Hyperlipidemia    Status post incision and drainage    Chronic kidney disease, stage 3b    Hyperkalemia    Arthritis    PVD (peripheral vascular disease) ([facility]    Hematoma    Cardiac pacemaker in situ    Coronary artery stenosis    Essential hypertension    Gout    Diabetic neuropathy associated with type 2 diabetes mellitus ([facility]    Adenomatous polyp of sigmoid colon    Iron deficiency anemia due to chronic blood loss    Erythropoietin deficiency anemia    VHD (valvular heart disease)    Abnormal fractional flow reserve (FFR) on cardiac catheterization    Carotid stenosis, left    Aortic stenosis, severe    CAD in native artery    Displacement of atrial pacemaker leads    Pacemaker lead malfunction    SOB (shortness of breath)    Recurrent right pleural effusion    Elevated liver enzymes    Critical illness myopathy    Respiratory failure (HCC)    Generalized weakness    Status post coronary artery bypass graft    Pneumonia due to COVID-19 virus    Moderate malnutrition (HCC)       Measurements:  Wound 02/18/21 Toe (Comment  which one) Right 2nd toe anterior (Active)   Wound Etiology Diabetic 03/17/21 0845   Dressing Status Other (Comment) 03/17/21 0845   Wound Cleansed Cleansed with saline 03/17/21 0845 Dressing/Treatment Betadine swabs/povidone iodine 03/17/21 0845   Wound Length (cm) 0.5 cm 03/17/21 0845   Wound Width (cm) 0.5 cm 03/17/21 0845   Wound Depth (cm) 0.1 cm 03/17/21 0845   Wound Surface Area (cm^2) 0.25 cm^2 03/17/21 0845   Change in Wound Size % (l*w) 0 03/17/21 0845   Wound Volume (cm^3) 0.02 cm^3 03/17/21 0845   Distance Tunneling (cm) 0 cm 03/17/21 0845   Tunneling Position ___ O'Clock 0 03/17/21 0845   Undermining Starts ___ O'Clock 0 03/17/21 0845   Undermining Ends___ O'Clock 0 03/17/21 0845   Undermining Maxium Distance (cm) 0 03/17/21 0845   Wound Assessment Eschar dry 03/17/21 0845   Drainage Amount None 03/17/21 0845   Odor None 03/17/21 0845   Angela-wound Assessment Dry/flaky 03/17/21 0845   Margins Attached edges 03/17/21 0845   Number of days: 26       Wound 02/18/21 Toe (Comment  which one) Anterior; Left 3rd toe (Active)   Wound Etiology Diabetic 03/17/21 0845   Dressing Status New dressing applied 03/17/21 0845   Wound Cleansed Cleansed with saline 03/17/21 0845   Dressing/Treatment Betadine swabs/povidone iodine;Open to air 03/17/21 0845   Wound Length (cm) 1 cm 03/17/21 0845   Wound Width (cm) 0.6 cm 03/17/21 0845   Wound Depth (cm) 0.1 cm 03/17/21 0845   Wound Surface Area (cm^2) 0.6 cm^2 03/17/21 0845   Change in Wound Size % (l*w) -140 03/17/21 0845   Wound Volume (cm^3) 0.06 cm^3 03/17/21 0845   Distance Tunneling (cm) 0 cm 03/17/21 0845   Tunneling Position ___ O'Clock 0 03/17/21 0845   Undermining Starts ___ O'Clock 0 03/17/21 0845   Undermining Ends___ O'Clock 0 03/17/21 0845   Undermining Maxium Distance (cm) 0 03/17/21 0845   Wound Assessment Eschar dry 03/17/21 0845   Drainage Amount None 03/17/21 0845   Odor None 03/17/21 0845   Angela-wound Assessment Dry/flaky 03/17/21 0845   Margins Attached edges 03/17/21 0845   Number of days: 26       Wound 03/06/21 Arm Left; Lower (Active)   Wound Etiology Skin Tear 03/17/21 0845   Dressing Status New dressing applied 03/17/21 0845 Wound Cleansed Cleansed with saline 03/17/21 0845   Dressing/Treatment Dry dressing 03/16/21 2111   Dressing Change Due 03/17/21 03/16/21 1637   Wound Length (cm) 4.5 cm 03/17/21 0845   Wound Width (cm) 1.5 cm 03/17/21 0845   Wound Depth (cm) 0.1 cm 03/17/21 0845   Wound Surface Area (cm^2) 6.75 cm^2 03/17/21 0845   Wound Volume (cm^3) 0.68 cm^3 03/17/21 0845   Distance Tunneling (cm) 0 cm 03/17/21 0845   Tunneling Position ___ O'Clock 0 03/17/21 0845   Undermining Starts ___ O'Clock 0 03/17/21 0845   Undermining Ends___ O'Clock 0 03/17/21 0845   Undermining Maxium Distance (cm) 00 03/17/21 0845   Wound Assessment Pink/red 03/17/21 0845   Drainage Amount Small 03/17/21 0845   Drainage Description Serosanguinous 03/17/21 0845   Odor None 03/17/21 0845   Angela-wound Assessment Fragile;Ecchymosis 03/17/21 0845   Margins Defined edges 03/17/21 0845   Wound Thickness Description not for Pressure Injury Partial thickness 03/17/21 0845   Number of days: 11       Wound 03/06/21 Sacrum Mid;Left cluster (Active)   Wound Etiology Pressure Stage  2 03/17/21 0845   Dressing Status New dressing applied 03/17/21 0845   Wound Cleansed Cleansed with saline 03/17/21 0845   Dressing/Treatment Collagen;Silicone border 32/44/39 0845   Dressing Change Due 03/18/21 03/16/21 1637   Wound Length (cm) 3 cm 03/17/21 0845   Wound Width (cm) 3.2 cm 03/17/21 0845   Wound Depth (cm) 0.2 cm 03/17/21 0845   Wound Surface Area (cm^2) 9.6 cm^2 03/17/21 0845   Wound Volume (cm^3) 1.92 cm^3 03/17/21 0845   Distance Tunneling (cm) 0 cm 03/17/21 0845   Tunneling Position ___ O'Clock 0 03/17/21 0845   Undermining Starts ___ O'Clock 0 03/17/21 0845   Undermining Ends___ O'Clock 0 03/17/21 0845   Undermining Maxium Distance (cm) 0 03/17/21 0845   Wound Assessment Pink/red 03/17/21 0845   Drainage Amount Scant 03/17/21 0845   Drainage Description Serosanguinous 03/17/21 0845   Odor None 03/17/21 0845   Angela-wound Assessment Blanchable erythema 03/17/21 0845 Margins Defined edges 03/17/21 0845   Wound Thickness Description not for Pressure Injury Partial thickness 03/17/21 0845   Number of days: 11       Wound 03/15/21 Ear Left small laceration on pinna of left ear (Active)   Dressing Status Other (Comment) 03/16/21 2111   Wound Cleansed Soap and water 03/16/21 1637   Dressing/Treatment Other (comment) 03/16/21 1637   Wound Assessment Pink/red 03/16/21 1637   Drainage Amount None 03/16/21 1637   Odor None 03/16/21 1637   Angela-wound Assessment Intact 03/16/21 1637   Margins Attached edges 03/16/21 1637   Number of days: 1       Wound 03/17/21 Pretibial Distal;Left;Posterior (Active)   Wound Etiology Arterial 03/17/21 0845   Dressing Status Other (Comment) 03/17/21 0845   Wound Cleansed Cleansed with saline 03/17/21 0845   Dressing/Treatment Betadine swabs/povidone iodine 03/17/21 0845   Wound Length (cm) 1.5 cm 03/17/21 0845   Wound Width (cm) 0.6 cm 03/17/21 0845   Wound Depth (cm) 0.1 cm 03/17/21 0845   Wound Surface Area (cm^2) 0.9 cm^2 03/17/21 0845   Wound Volume (cm^3) 0.09 cm^3 03/17/21 0845   Distance Tunneling (cm) 0 cm 03/17/21 0845   Tunneling Position ___ O'Clock 0 03/17/21 0845   Undermining Starts ___ O'Clock 0 03/17/21 0845   Undermining Ends___ O'Clock 0 03/17/21 0845   Undermining Maxium Distance (cm) 0 03/17/21 0845   Wound Assessment Eschar dry 03/17/21 0845   Drainage Amount None 03/17/21 0845   Odor None 03/17/21 0845   Angela-wound Assessment Dry/flaky 03/17/21 0845   Margins Attached edges 03/17/21 0845   Number of days: 0       Wound 03/17/21 Buttocks Right;Upper (Active)   Wound Etiology Deep tissue/Injury 03/17/21 0845   Dressing Status New dressing applied 03/17/21 0845   Wound Cleansed Cleansed with saline 03/17/21 0845   Dressing/Treatment Silicone border 21/73/39 0845   Wound Length (cm) 2.5 cm 03/17/21 0845   Wound Width (cm) 2.5 cm 03/17/21 0845   Wound Depth (cm) 0 cm 03/17/21 0845   Wound Surface Area (cm^2) 6.25 cm^2 03/17/21 0845 Wound Volume (cm^3) 0 cm^3 03/17/21 0845   Distance Tunneling (cm) 0 cm 03/17/21 0845   Tunneling Position ___ O'Clock 0 03/17/21 0845   Undermining Starts ___ O'Clock 0 03/17/21 0845   Undermining Ends___ O'Clock 0 03/17/21 0845   Undermining Maxium Distance (cm) 0 03/17/21 0845   Wound Assessment Purple/maroon 03/17/21 0845   Drainage Amount None 03/17/21 0845   Odor None 03/17/21 0845   Angela-wound Assessment Intact 03/17/21 0845   Margins Attached edges 03/17/21 0845   Number of days: 0       Response to treatment:  Well tolerated by patient. Pain Assessment:  Severity:  none  Quality of pain: na  Wound Pain Timing/Severity: na  Premedicated: no    Plan:     Plan of Care: Wound 03/15/21 Ear Left small laceration on pinna of left ear-Dressing/Treatment: Other (comment)(open to air)  Wound 03/17/21 Pretibial Distal;Left;Posterior-Dressing/Treatment: Betadine swabs/povidone iodine  Wound 03/17/21 Buttocks Right;Upper-Dressing/Treatment: Silicone border  Wound 02/18/21 Toe (Comment  which one) Right 2nd toe anterior-Dressing/Treatment: Betadine swabs/povidone iodine  Wound 02/18/21 Toe (Comment  which one) Anterior; Left 3rd toe-Dressing/Treatment: Betadine swabs/povidone iodine, Open to air  [REMOVED] Wound 02/21/21 Arm Right; Anterior bleeding skin tear-Dressing/Treatment: Open to air  Wound 03/06/21 Arm Left; Lower-Dressing/Treatment: (Versatel, collagen, abd, conform)  Wound 03/06/21 Sacrum Mid;Left cluster-Dressing/Treatment: Collagen, Silicone border     Pt in bed. Agreeable to wound assessment. Stated has been to podiatry at [facility] in past and possibly wound care clinic but no notes found. Dry stable eschar noted to right 2nd toe, left 3rd toe, and left posterior leg. Diabetic/arterial. Skin tear noted to left arm. Stage 2/DTI cluster noted to sacrum and right buttock. Pictures and measurements taken. Eschar painted with betadine and left ABIMBOLA. Recommend followup at outpatient wound clinic after discharge.  Pt agreeable. Heels intact. Atmos air pump applied to mattress. Foam cushion already in chair. Positioned to left side with heels floated with pillow support. Pt is at mild risk for skin breakdown AEB Dani. Follow Dani orders. Specialty Bed Required : yes  [] Low Air Loss   [x] Pressure Redistribution  [] Fluid Immersion  [] Bariatric  [] Total Pressure Relief  [] Other:     Discharge Plan:  Placement for patient upon discharge: tbd  Hospice Care: no  Patient appropriate for Outpatient [address]: yes-referral sent    Patient/Caregiver Teaching:  Level of patient/caregiver understanding able to:   Voiced understanding.         Electronically signed by Haley Rodriguez RN,  on 3/17/2021 at 12:02 PM

## 2021-03-17 NOTE — PROGRESS NOTES
[address]  consulted by Dr. Sayra Rogers for monitoring and adjustment. Indication for treatment: Pneumonia  Goal trough: 15 mcg/mL, -600     Pertinent Laboratory Values:   Temp Readings from Last 3 Encounters:   03/17/21 97.6 °F (36.4 °C) (Oral)   03/15/21 97.8 °F (36.6 °C) (Oral)   02/24/21 97.3 °F (36.3 °C) (Axillary)     Recent Labs     03/15/21  0445 03/16/21  0701 03/17/21  0629   WBC 8.7 7.6 6.2     Recent Labs     03/15/21  0445 03/16/21  0701 03/17/21  0629   BUN 88* 85* 81*   CREATININE 1.9* 1.7* 1.6*     Estimated Creatinine Clearance: 49 mL/min (A) (based on SCr of 1.6 mg/dL (H)). Intake/Output Summary (Last 24 hours) at 3/17/2021 1432  Last data filed at 3/17/2021 0718  Gross per 24 hour   Intake 480 ml   Output 2700 ml   Net -2220 ml     Pertinent Cultures:  Date    Source    Results  3/5   Blood    Negative  3/8   MRSA Screen   Ordered  3/11                             IV Catheter tip   Negative    Vancomycin level:   TROUGH:  No results for input(s): VANCOTROUGH in the last 72 hours. RANDOM:    Recent Labs     03/15/21  0445   VANCORANDOM 16.0     Assessment:  · WBC and temperature: WBC trending down/afebrile  · SCr, BUN, and urine output: no HD today   · Day(s) of therapy: 16  · Vancomycin concentration: 16.0, therapeutic     Plan:  · Pulse dosing based on levels 2/2 renal function changes and RRT   · Received vancomycin 1000 mg x 1 on 3/13  · No future HD at this time   · Random level therapeutic 48h post-dose  · Re-dose with 1000 mg q48h with last dose to be completed 3/17/21   · Pharmacy will continue to monitor patient and adjust therapy as indicated    Thank you for the consult.   Yasir Lizarraga, PharmD, BCPS   3/17/2021  2:32 PM

## 2021-03-17 NOTE — PROGRESS NOTES
progress Note( Dr. Tamiko Wise)  3/16/2021  Subjective:   Admit Date: 3/15/2021  PCP: Gibran Vergara MD    Admitted For :Shortness of breath leg swelling    Consulted For:  Better control of blood glucose    Interval History: Patient seemed to be more alert today able to communicate properly  Uses CPAP at night for sleep apnea    Had hemodialysis  Being done at discretion of nephrology  Is more ambulatory with help    Patient had right-sided thoracentesis more around 1100 cc fluid noted below    Denies any chest pains,   Yes SOB . Seem to be better  Denies nausea or vomiting. Not eating much  No new bowel or bladder symptoms.        Intake/Output Summary (Last 24 hours) at 3/16/2021 2344  Last data filed at 3/16/2021 2111  Gross per 24 hour   Intake 800 ml   Output 1750 ml   Net -950 ml       DATA    CBC:   Recent Labs     03/14/21  0510 03/15/21  0445 03/16/21  0701   WBC 11.1* 8.7 7.6   HGB 9.0* 8.7* 8.4*    297 280    CMP:  Recent Labs     03/14/21  0510 03/15/21  0445 03/16/21  0701    139 138   K 4.3 4.2 4.1   CL 95* 96* 97*   CO2 28 30 32   BUN 90* 88* 85*   CREATININE 2.3* 1.9* 1.7*   CALCIUM 8.4 8.3 8.3   PROT 6.2*  --   --    LABALBU 2.9*  2.8* 2.7* 2.8*   BILITOT 0.5  --   --    ALKPHOS 303*  --   --    AST 44*  --   --    ALT 53*  --   --      Lipids:   Lab Results   Component Value Date    CHOL 91 12/18/2020    HDL 43 12/18/2020    TRIG 73 03/10/2021     Glucose:  Recent Labs     03/16/21  1158 03/16/21  1700 03/16/21  2101   POCGLU 209* 142* 249*     AjjysizakiA7B:  Lab Results   Component Value Date    LABA1C 6.1 02/20/2021     High Sensitivity TSH:   Lab Results   Component Value Date    TSHHS 2.620 03/03/2021     Free T3: No results found for: FT3  Free T4:  Lab Results   Component Value Date    T4FREE 1.5 07/07/2020          Right-sided thoracentesis on 3/16/2021  A total of 1100 serosanguineous fluid was removed.                 Echocardiogram Limited    Result Date: 3/2/2021  Transthoracic Echocardiography Report (TTE)  Demographics   Patient Name       RASHAD TATE    Date of Study       03/02/2021   Date of Birth      1950         Gender              Male   Age                66 year(s)         Race                   Patient Number     [id number]         Room Number         2101   Visit Number       [id number]   Corporate ID       [id number]   Accession Number   [id number]         23 Asha Perry RVT   Ordering Physician Latasha Olvera       Physician           MD  Procedure Type of Study   TTE procedure:ECHOCARDIOGRAM LIMITED. Procedure Date Date: 03/02/2021 Start: 11:53 AM Study Location: Portable Technical Quality: Adequate visualization Indications:S/P CABG. Patient Status: Routine Height: 70 inches Weight: 230 pounds BSA: 2.22 m2 BMI: 33 kg/m2 HR: 91 bpm BP: 91/45 mmHg  Conclusions   Summary  This is a limited echocardiogram.  Left ventricular systolic function is normal.  Ejection fraction is visually estimated at 55-60%. Bilateral atrial enlargement. S/p AVR with a 27 mm Medtronic Mosaic. Moderate mitral regurgitation. Moderate tricuspid regurgitation; RVSP: 50 mmHg. Severe PHTN. No evidence of any pericardial effusion. Signature   ------------------------------------------------------------------  Electronically signed by Konrad Ganser MD (Interpreting  physician) on 03/02/2021 at 05:06 PM  -    Ct Chest Wo Contrast    Result Date: 3/1/2021  EXAMINATION: CT OF THE CHEST WITHOUT CONTRAST 3/1/2021 3:57 pm T    Patient status post midline sternotomy. Immediately posterior to the sternotomy defect, there is a small gas and fluid collection which is nonspecific. It is not necessarily outside normal limits for a sternotomy that was performed 2 weeks ago.   However, sterility cannot be TECHNOLOGIST PROVIDED HISTORY: Reason for exam:->chest pain, shorntess of breath Reason for Exam: chest pain   sob   leg swelling Acuity: Unknown Type of Exam: Unknown Relevant Medical/Surgical History: afib    cad    diabetes FINDINGS: Status post median sternotomy. Transvenous pacer remains place. Stable cardiomegaly. Right-sided pleural effusion. Bibasilar hypoaeration. Right-sided pleural effusion Bibasilar hypoaeration     Vl Dup Lower Extremity Venous Bilateral    Result Date: 3/3/2021  EXAMINATION: DUPLEX VENOUS ULTRASOUND OF THE BILATERAL LOWER EXTREMITIES, 3/3/2021 9:01 am TECHNIQUE: Duplex ultrasound using B-mode/gray scaled imaging and Doppler spectral analysis and color flow was obtained of the bilateral lower extremities. COMPARISON: None. HISTORY: ORDERING SYSTEM PROVIDED HISTORY: fevers TECHNOLOGIST PROVIDED HISTORY: Reason for exam:->fevers Reason for Exam: edema FINDINGS: The visualized veins of the bilateral lower extremities are patent and free of echogenic thrombus. The veins demonstrate good compressibility with normal color flow study and spectral analysis. No evidence of DVT in either lower extremity. Us Chest Including Mediastinum    Result Date: 3/3/2021  EXAMINATION: ULTRASOUND OF THE CHEST 3/3/2021 9:02 am COMPARISON: Chest radiograph performed earlier in the same day HISTORY: ORDERING SYSTEM PROVIDED HISTORY: ASSESS FOR PLEURAL EFFUSION TECHNOLOGIST PROVIDED HISTORY: RIGHT LUNG Reason for exam:->ASSESS FOR PLEURAL EFFUSION Reason for Exam: pleural effusion FINDINGS: Moderate right pleural effusion is present. Right pleural effusion is present. Ir Guided Thoracentesis Pleural    Result Date: 3/2/2021  PROCEDURE: ULTRASOUNDGUIDED Bilateral THORACENTESIS 3/2/2021 HISTORY: ORDERING SYSTEM PROVIDED HISTORY: Bilateral pleural effusion. T   The patient tolerated the procedure well.  FINDINGS: A total of 800 ml serosanguinous fluid from left and 1050 ml serosanguineous fluid from right  was removed. Successful ultrasound guided bilateral thoracentesis. Scheduled Medicines   Medications:    insulin lispro  0-12 Units Subcutaneous 2 times per day    insulin lispro  0-12 Units Subcutaneous TID     apixaban  5 mg Oral BID    aspirin  81 mg Oral Daily    insulin glargine  10 Units Subcutaneous Nightly    insulin lispro  10 Units Subcutaneous TID     meropenem  1,000 mg Intravenous Q12H    midodrine  5 mg Oral TID WC    minocycline  100 mg Oral BID    pantoprazole  40 mg Intravenous BID    rOPINIRole  0.25 mg Oral TID    torsemide  20 mg Oral BID    [START ON 3/9/2022] vancomycin (VANCOCIN) intermittent dosing (placeholder)   Other RX Placeholder    atorvastatin  10 mg Oral Nightly      Infusions:    dextrose           Objective:   Vitals: /60   Pulse 59   Temp 98.1 °F (36.7 °C) (Oral)   Resp 16   Ht 5' 10\" (1.778 m)   Wt 226 lb 11.2 oz (102.8 kg)   SpO2 96%   BMI 32.53 kg/m²   General appearance: alert and cooperative with exam  Neck: no JVD or bruit  Thyroid : Normal lobes   Lungs: Has Vesicular Breath sounds there is breath sounds bilateral pleural effusion tapped both sides noted below  Heart:  regular rate and rhythm  Abdomen: soft, non-tender; bowel sounds normal; no masses,  no organomegaly  Musculoskeletal: Normal  Extremities: extremities normal, , no edema  Neurologic:  Awake, alert, oriented to name, place and time. Cranial nerves II-XII are grossly intact. Motor is  intact. Sensory is intact. ,  and gait is normal.    Assessment:     Patient Active Problem List:     Type 2 diabetes mellitus without complication, without long-term current use of insulin (HCC)     Ulcer of other part of lower limb     Venous hypertension, chronic, with ulcer ([address]     Ulcer of other part of foot     Pneumonia     Atrial fibrillation (HCC)     Sinus pause     PAF (paroxysmal atrial fibrillation) ([facility])     DM (diabetes mellitus) ([address]     DMITRIY on CPAP Hyperlipidemia     Status post incision and drainage     CKD (chronic kidney disease) stage 3, GFR 30-59 ml/min (HCC)     Hyperpotassemia     Arthritis     PVD (peripheral vascular disease) ([facility])     Hematoma     Cardiac pacemaker in situ     Coronary artery stenosis     Essential hypertension     Gout     Diabetic neuropathy associated with type 2 diabetes mellitus ([facility])     Adenomatous polyp of sigmoid colon     Iron deficiency anemia due to chronic blood loss     Erythropoietin deficiency anemia     VHD (valvular heart disease)     Abnormal fractional flow reserve (FFR) on cardiac catheterization     Carotid stenosis, left     Aortic stenosis, severe     CAD in native artery     Displacement of atrial pacemaker leads     Pacemaker lead malfunction     SOB (shortness of breath)      ? ? Sepsis      Plan:     1. Reviewed POC blood glucose . Labs and X ray results   2. Reviewed Current Medicines   3. On meal/ Correction bolus Humalog/ Basal Lantus Insulin regime   4. Monitor Blood glucose frequently   5. Modified  the dose of Insulin/ other medicines as needed   6. Will not re start Trulicity at this time  7. Ultimately patient was transferred to acute rehab unit on 3/15/2021 as insurance approved for 7 days in this unit   8. Patient is participating in physical activity programs acute rehab unit  9. Will follow     .      Joey Anderson MD

## 2021-03-17 NOTE — PATIENT CARE CONFERENCE
ACUTE REHAB TEAM CONFERENCE SUMMARY   [facility]    NAME: Blanquita Horowitz  : 1950 ADMIT DATE: 3/15/2021    Rehab Admitting Dx:Critical illness myopathy [G72.81]  Critical illness myopathy [G72.81]  Patient Comorbid Conditions: Active Hospital Problems    Diagnosis Date Noted    Generalized weakness [R53.1]     Status post coronary artery bypass graft [Z95.1]     Pneumonia due to COVID-19 virus [U07.1, J12.82]     Moderate malnutrition (HCC) [E44.0]     Critical illness myopathy [G72.81] 03/15/2021    Recurrent right pleural effusion [J90] 2021    Chronic kidney disease, stage 3b [N18.32] 2016    Hospital-acquired pneumonia [J18.9, Y95] 2012     Date: 3/18/2021    CASE MANAGEMENT  Current issues/needs regarding patient and family discharge status: Patient lives with wife Jack in [address], 1 [address] w/ rail (from garage) home. Additional support includes local siblings and additional family. Patient reports have a W (unsure if it is a RW or R), C, TR, grab bars, and CPAP machine. Plan is to discharge home with wife. Faviola Amanda     PHYSICAL THERAPY (Updated in QI)  Short term goals  Time Frame for Short term goals: 10 days STG=LTG  Short term goal 1: pt will perform all bed mobility with mod I  Short term goal 2: pt will perform sit tostand and pivot transfers with mod I, car transfers with CGA for LEs  Short term goal 3: pt will ambulate on level and unlevel surfaces with 2ww with mod I , level surfaces 200' with supervision  Short term goal 4: Pt will ascend/descend 1 step with 2ww with supervision  Short term goal 5: pt will retrieve light item from floor with reacher and 2ww with mod I          Impairments/deficits, barriers:  Cognition  Body structures, Functions, Activity limitations: Decreased functional mobility , Decreased cognition, Decreased high-level IADLs, Decreased endurance, Decreased ADL status, Decreased ROM, Decreased sensation, Decreased coordination, Decreased strength, Decreased balance, Increased pain     Prognosis: Good  Decision Making: High Complexity  Clinical Presentation: unpredicatable characteristics  Equipment needed at discharge:2ww      PT IRF-HEENA scores since initial assessment  Bed Mobility:   Sit to Lying  Assistance Needed: Partial/moderate assistance  Comment: Min-Mod A for LE's , demonstrates sternal precautions without cueing  CARE Score: 3  Discharge Goal: Independent    Roll Left and Right  Assistance Needed: Partial/moderate assistance  Comment: mod assist  CARE Score: 3  Discharge Goal: Independent    Lying to Sitting on Side of Bed  Assistance Needed: Partial/moderate assistance  Comment: mod assist  CARE Score: 3  Discharge Goal: Independent    Transfers:    Sit to Stand  Assistance Needed: Partial/moderate assistance  Comment: Min A from recliner, Mod-CGA from , improved with seat built up and cues for COG fwd using rocking method; demonstrates sternal precautions  CARE Score: 3  Discharge Goal: Independent    Chair/Bed-to-Chair Transfer  Assistance Needed: Supervision or touching assistance  Comment: CGA wtih RW  CARE Score: 4  Discharge Goal: Independent         Car Transfer  Assistance Needed: Partial/moderate assistance  Comment: min assist for LEs into car, mod assist to shift to center of seat  CARE Score: 3  Discharge Goal: Supervision or touching assistance    Ambulation:    Walking Ability  Does the Patient Walk?: Yes     Walk 10 Feet  Assistance Needed: Supervision or touching assistance  Comment: CG with 2ww  CARE Score: 4  Discharge Goal: Independent     Walk 50 Feet with Two Turns  Assistance Needed: Partial/moderate assistance  Comment: min assist with 2ww  CARE Score: 3  Discharge Goal: Supervision or touching assistance     Walk 150 Feet  Assistance Needed: Partial/moderate assistance  Comment: recip pattern with RW, occasional standing rest breaks, mod UE support on AD;  Amb 152'+130'+140'+138' with mild SOB however O2 sats WNL.   CARE Score: 3  Discharge Goal: Supervision or touching assistance     Walking 10 Feet on Uneven Surfaces  Assistance Needed: Partial/moderate assistance  Comment: min assist  CARE Score: 3  Discharge Goal: Independent     1 Step (Curb)  Assistance Needed: Partial/moderate assistance  Comment: min assist with 2ww, 6\"  CARE Score: 3  Discharge Goal: Supervision or touching assistance     4 Steps  Reason if not Attempted: Not applicable  CARE Score: 9  Discharge Goal: Not Applicable     12 Steps  Reason if not Attempted: Not applicable  CARE Score: 9  Discharge Goal: Not Applicable         Wheelchair:  w/c Ability: Wheelchair Ability  Uses a Wheelchair and/or Scooter?: No         Wheel 150 Feet  Assistance Needed: Supervision or touching assistance  Comment: SBA propelling wC bkwd with focus on quad strenthening 158' using B LEs only, req extra time to complete distance,  CARE Score: 4              Balance:        Object: Picking Up Object  Comment: Pt too fatigued to complete  Reason if not Attempted: Not attempted due to medical condition or safety concerns  CARE Score: 88  Discharge Goal: Independent    Fall Risk: [x]  Yes  []  No    OCCUPATIONAL THERAPY  (Updated in QI)  Short term goals  Time Frame for Short term goals: STGs=LTGs :   Long term goals  Time Frame for Long term goals : ~10 days or until d/c  Long term goal 1: Pt will complete grooming tasks Ind  Long term goal 2: Pt will complete total body bathing c S  Long term goal 3: Pt will complete UB dressing Ind  Long term goal 4: Pt will complete LB dressing mod I using AE PRN  Long term goal 5: Pt will doff/don footwear mod I using AE PRN  Long term goals 6: Pt will complete toileting mod I  Long term goal 7: Pt will complete functional transfers (bed, chair, toilet) c DME PRN and mod I; shower transfer c S  Long term goal 8: Pt will perform therex/therax to facilitate increased strength/endurance/ax tolerance (c emphasis on dynamic standing balance/tolerance >8 mins) c SBA  Long term goal 9: Pt will complete simple homemaking tasks c DME PRN and S :                                       OT IRF-HEENA scores and goals since initial assessment:    ADLs:    Eating: Eating  Assistance Needed: Independent  CARE Score: 6  Discharge Goal: Independent       Oral Hygiene: Oral Hygiene  Assistance Needed: Independent  Comment: seated to perform oral care 2* fatigue  CARE Score: 6  Discharge Goal: Independent    UB/LB Bathing: Shower/Bathe Self  Assistance Needed: Supervision or touching assistance  Comment: CGA to bathe UB/LB, Pt able to perform lateral lean seated to bathe posterior perineal area. CARE Score: 4  Discharge Goal: Supervision or touching assistance    UB Dressing: Upper Body Dressing  Assistance Needed: Supervision or touching assistance  Comment: supervision to don/doff shirt  CARE Score: 4  Discharge Goal: Independent         LB Dressing: Lower Body Dressing  Assistance Needed: Partial/moderate assistance  Comment: Educated pt on threading zaragoza bag through brief and pants, required min A to thread zaragoza bag through brief, able to thread through pants; Pt able to thread BLE through brief and pants using reacher; CGA to manage over hips  CARE Score: 3  Discharge Goal: Independent    Donning and [place] Footwear: Putting On/Taking Off Footwear  Assistance Needed: Substantial/maximal assistance  Comment: able to doff both socks, required max to don both socks  CARE Score: 2  Discharge Goal: Independent      Toileting: Toileting Hygiene  Assistance Needed: Partial/moderate assistance  Comment: Pt able to manage pants c CGA to steady; to complete toilet hygiene c min A to check for throughness  Reason if not Attempted: Not attempted due to medical condition or safety concerns  CARE Score: 3  Discharge Goal: Independent      Toilet Transfers:    Toilet Transfer  Assistance Needed: Supervision or touching assistance  Comment: CGA  CARE Score: Nephrology following. Team goals for next treatment period/Intervention for current barriers:   [x] Pt will increase activity tolerance for daily tasks. [x] Pt will improve bed mobility with reduced assist.  [x] Pt will improve safety in fx tasks with reduced cues/assist  [x] Pt will improve transfers with reduced assist  [x] Pt will improve toileting with reduced assist  [x] Pt will improve ADL's with use of adaptive equipment with reduced assist  [] Pt will improve pain mgmt for maximum participation in tx program  [] Pt will improve communication to get basic needs met on unit  [] Pt will improve swallowing for safe diet advancement with use of strategies  []  Plan for discharge to home. Patient Strengths: Motivated, Cooperative and Pleasant    Justification for Continued Stay  Based on my medical assessment of the patient and review of information from the interdisciplinary team as part of this weekly team conference, the patient continues to meet the following criteria for IRF level of care:   The patient requires active and ongoing intervention of multiple therapy disciplines   The patient requires and intensive rehabilitation therapy program   The patient requires continued physician supervision by a rehabilitation physician   The patient requires 24 hours rehab nursing care   The patient requires an intensive and coordinated interdisciplinary team approach to the delivery of rehabilitative care.      Assessment/Plan   []  The patient is making good progression towards their long term goals and is actively participating in and has a reasonable expectation to continue to benefit from the intensive rehabilitation therapy program   []  The estimated discharge date has been changed from initial team conference due to:   []  The estimated discharge destination has been changed from initial team conference due to:         Ongoing tx following discharge: [x]HHC PT OT  NSG  []OUTPATIENT     [] No Further Treatment     [] Family/Caregiver Training  []  Transitional Living Arrangement   [] Home Assessment (date  )     [] Family Conference   []  Therapeutic Pass       []  Other: (specify)    Estimated Discharge Date: 3/24/21    Estimated Discharge Destination: []home alone   []home alone with assist prn  []Continuous supervision [x]Return home with spouse/family   [] Assisted living  []SNF     Team members participating in today's conference. [x] Darien Ventura, Medical Director  [x] Daina Price,   [x] Amena Ahn, Nurse Mgr     []  Hugo Lomeli, PT   [] Jayleen Hearn, OT   [x]  Bettie Marks, PT  [x] Harpreet Monroe, OT      [x] Dawit Mcdermott, SLP     []  Solitario Giles RD     [x] Christian Childers,     [x]Juli Barnes,    [] Theresa Caceres RN    [x] Helio Anderson RN  [] Zuleika Hernandez RN    [] Dez Pryor, RN    [] Monica Pillai RN      [] Israel John, RN    I have led this Team Conference and agree with the plan, Astrid Linder MD, 3/18/2021, 12:51 PM  Goals have been updated to reflect recent status.     Team conference note transcribed this date by: Cary Seo, Rehab Therapy Aide

## 2021-03-17 NOTE — PROGRESS NOTES
Physical Therapy      [x] daily progress note       [] discharge       Patient Name:  Vinayak Alonso   :  1950 MRN: [id number]  Room:  [address] [place] [address] Date of Admission: 3/15/2021  Rehabilitation Diagnosis:   Critical illness myopathy [G72.81]  Critical illness myopathy [G72.81]       Date 3/17/2021       Day of ARU Week:  3   Time IN/OUT 7251-0311  2916-2575   Individual Tx Minutes 50+70   TOTAL Tx Time Mins 120   Variance Time    Variance Time []   Refusal due to:     []   Medical hold/reason:    []   Illness   []   Off Unit for test/procedure  []   Extra time needed to complete task  []   Therapeutic need  []   Other (specify):   Restrictions Restrictions/Precautions  Restrictions/Precautions: General Precautions, Fall Risk, Surgical Protocols(sternal precautions (sx ) 1500mL fluid restriction)  Position Activity Restriction  Sternal Precautions: No Pushing, No Pulling, 10# Lifting Restrictions(\"milk carton\")   Communication with other providers: []   OK to see per nursing:     []   Spoke with team member regarding:      Subjective observations and cognitive status: AM: Pt up in recliner with spouse Gene Older present in room. VSS at rest and after ax. PM: Pt in [facility] just finishing OT session. Pt reports fatigued but states \"I'll do what I can\". .   VSS in PM session. Req frequent rest breaks due to max fatigue with little ax. Pain level/location:   0 /10       Location: No reports of pain   Discharge recommendations  Anticipated discharge date:  TBD  Destination: []home alone   []home alone with assist PRN     [] home w/ family      [] Continuous supervision  []SNF    [] Assisted living     [] Other:   Continued therapy: []HHC PT  []OUTPATIENT  PT   [] No Further PT  Equipment needs: YANCY Alonso requires the assistance of a wheeled walker to successfully ambulate from room to room at home to allow completion of daily living tasks such as: bathing, toileting, dressing and grooming.   A wheeled walker is necessary due to the patient's unsteady gait, upper body weakness, inability to  a standard walker. This patient can ambulate only by pushing a walker instead of using a lesser assistive device such as a cane or crutch. Bed Mobility:         []   Pt received out of bed     Sit to Lying  Assistance Needed: Partial/moderate assistance  Comment: Min-Mod A for LE's , demonstrates sternal precautions without cueing  CARE Score: 3  Discharge Goal: Independent      Transfers:    Sit to Stand  Assistance Needed: Partial/moderate assistance  Comment: Min A from recliner, Mod-CGA from WC, improved with seat built up and cues for COG fwd using rocking method; demonstrates sternal precautions  CARE Score: 3  Discharge Goal: Independent    Chair/Bed-to-Chair Transfer  Assistance Needed: Supervision or touching assistance  Comment: CGA wtih RW  CARE Score: 4  Discharge Goal: Independent      Ambulation:    Walking Ability  Does the Patient Walk?: Yes     Walk 150 Feet  Assistance Needed: Partial/moderate assistance  Comment: recip pattern with RW, occasional standing rest breaks, mod UE support on AD; Amb 152'+130'+140'+138' with mild SOB however O2 sats WNL. CARE Score: 3  Discharge Goal: Supervision or touching assistance        Wheelchair:  Propelled WC bkwd 158' with focus on quad strengthening using B LEs only. Req extra time to complete distance. Additional Therapeutic activities/exercises completed this date:     []   Nu-step:  Time:        Level:         #Steps:       []   Rebounder:    []  Seated     []  Standing        []   Balance training         []   Postural training    []   Supine ther ex (reps/sets):     []   Seated ther ex (reps/sets):     [x]   Standing ther ex (reps/sets): B LE with B UE support  10 reps x 2 sets for heel raises, marching, mini squats, hip abd with CGA for balance.  Req seated rest break after each ex due to fatigue       [x]   Other: Verbal education provided regarding PLB, energy conservation, and exercise progression. Pt able to demonstrate awareness of taking appropriate rest breaks, however needs cues for PLB. []   Other:   []   Other:      Patient/Caregiver Education and Training:   [x]   Bed Mobility/Transfer technique/safety  [x]   Gait technique/sequencing  [x]   Proper use of assistive device  []   Advanced mobility safety and technique  [x]   Reinforced patient's precautions/mobility while maintaining precautions  [x]   Postural awareness during ambulation with AD  []   Family training  []   Progress was updated and reviewed in Rehabtracker with patient and/or family this date.     Specific instructions for next treatment:     Treatment/Activity Tolerance:   [] Tolerated treatment with no adverse effects    [x] Patient limited by fatigue, little in PM.  [] Patient limited by pain   [] Patient limited by medical complications:    [] Adverse reaction to Tx:   [] Significant change in status    Safety:       [x]  bed alarm set    []  chair alarm set    []  Pt refused alarms                []  Telesitter activated      [x]  Gait belt used during tx session      []other:         Number of Minutes/Billable Intervention  Gait Training 40   Therapeutic Exercise 20   Neuro Re-Ed    Therapeutic Activity 40   Wheelchair Propulsion 20   Group    Other:    TOTAL 120         Social History  Social/Functional History  Lives With: Spouse  Type of Home: House  Home Layout: One level  Home Access: Stairs to enter with rails  Entrance Stairs - Number of Steps: 1  Entrance Stairs - Rails: Right  Bathroom Shower/Tub: Walk-in shower(built in seat)  Bathroom Toilet: Handicap height  Bathroom Equipment: Grab bars in shower, Built-in shower seat, Grab bars around toilet, Hand-held shower  Bathroom Accessibility: Walker accessible  Home Equipment: Cane, Rolling walker, Grab bars  Receives Help From: Family  ADL Assistance: Independent  Homemaking Assistance: Independent  Homemaking Responsibilities: Yes  Meal Prep Responsibility: Secondary  Cleaning Responsibility: Secondary  Health Care Management: Primary  Ambulation Assistance: Independent(cane jamal for balance)  Transfer Assistance: Independent  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 Sleeps in regular flat bed. Objective                                                                                    Goals:  (Update in navigator)  Short term goals  Time Frame for Short term goals: 10 days STG=LTG  Short term goal 1: pt will perform all bed mobility with mod I  Short term goal 2: pt will perform sit tostand and pivot transfers with mod I, car transfers with CGA for LEs  Short term goal 3: pt will ambulate on level and unlevel surfaces with 2ww with mod I , level surfaces 200' with supervision  Short term goal 4: Pt will ascend/descend 1 step with 2ww with supervision  Short term goal 5: pt will retrieve light item from floor with reacher and 2ww with mod I:   :        Plan of Care                                                                              Times per week: 5 days per week for a minimum of 60 minutes/day plus group as appropriate for 60 minutes.   Treatment to include Current Treatment Recommendations: Strengthening, Gait Training, Patient/Caregiver Education & Training, Stair training, IADL Training, Equipment Evaluation, Education, & procurement, Balance Training, Neuromuscular Re-education, Pain Management, Functional Mobility Training, Endurance Training, Home Exercise Program, Transfer Training, Safety Education & Training, Positioning    Electronically signed by   Flo Watson, PTA #[id number]  3/17/2021, 4:35 PM

## 2021-03-18 LAB
ALBUMIN SERPL-MCNC: 2.9 GM/DL (ref 3.4–5)
ANION GAP SERPL CALCULATED.3IONS-SCNC: 12 MMOL/L (ref 4–16)
BUN BLDV-MCNC: 75 MG/DL (ref 6–23)
CALCIUM SERPL-MCNC: 8.3 MG/DL (ref 8.3–10.6)
CHLORIDE BLD-SCNC: 95 MMOL/L (ref 99–110)
CO2: 29 MMOL/L (ref 21–32)
CREAT SERPL-MCNC: 1.5 MG/DL (ref 0.9–1.3)
GFR AFRICAN AMERICAN: 56 ML/MIN/1.73M2
GFR NON-AFRICAN AMERICAN: 46 ML/MIN/1.73M2
GLUCOSE BLD-MCNC: 103 MG/DL (ref 70–99)
GLUCOSE BLD-MCNC: 142 MG/DL (ref 70–99)
GLUCOSE BLD-MCNC: 144 MG/DL (ref 70–99)
GLUCOSE BLD-MCNC: 154 MG/DL (ref 70–99)
GLUCOSE BLD-MCNC: 156 MG/DL (ref 70–99)
GLUCOSE BLD-MCNC: 280 MG/DL (ref 70–99)
PHOSPHORUS: 4 MG/DL (ref 2.5–4.9)
POTASSIUM SERPL-SCNC: 4.4 MMOL/L (ref 3.5–5.1)
SODIUM BLD-SCNC: 136 MMOL/L (ref 135–145)

## 2021-03-18 PROCEDURE — 97542 WHEELCHAIR MNGMENT TRAINING: CPT

## 2021-03-18 PROCEDURE — 6370000000 HC RX 637 (ALT 250 FOR IP): Performed by: PHYSICIAN ASSISTANT

## 2021-03-18 PROCEDURE — 6370000000 HC RX 637 (ALT 250 FOR IP): Performed by: PHYSICAL MEDICINE & REHABILITATION

## 2021-03-18 PROCEDURE — 97110 THERAPEUTIC EXERCISES: CPT

## 2021-03-18 PROCEDURE — 1280000000 HC REHAB R&B

## 2021-03-18 PROCEDURE — 94150 VITAL CAPACITY TEST: CPT

## 2021-03-18 PROCEDURE — 99233 SBSQ HOSP IP/OBS HIGH 50: CPT | Performed by: PHYSICAL MEDICINE & REHABILITATION

## 2021-03-18 PROCEDURE — 2580000003 HC RX 258: Performed by: PHYSICIAN ASSISTANT

## 2021-03-18 PROCEDURE — 6360000002 HC RX W HCPCS: Performed by: PHYSICIAN ASSISTANT

## 2021-03-18 PROCEDURE — 97116 GAIT TRAINING THERAPY: CPT

## 2021-03-18 PROCEDURE — 94761 N-INVAS EAR/PLS OXIMETRY MLT: CPT

## 2021-03-18 PROCEDURE — 97530 THERAPEUTIC ACTIVITIES: CPT

## 2021-03-18 PROCEDURE — 82962 GLUCOSE BLOOD TEST: CPT

## 2021-03-18 PROCEDURE — C9113 INJ PANTOPRAZOLE SODIUM, VIA: HCPCS | Performed by: PHYSICIAN ASSISTANT

## 2021-03-18 PROCEDURE — 80069 RENAL FUNCTION PANEL: CPT

## 2021-03-18 PROCEDURE — 36415 COLL VENOUS BLD VENIPUNCTURE: CPT

## 2021-03-18 PROCEDURE — 97535 SELF CARE MNGMENT TRAINING: CPT

## 2021-03-18 RX ADMIN — MEROPENEM 1000 MG: 1 INJECTION, POWDER, FOR SOLUTION INTRAVENOUS at 16:56

## 2021-03-18 RX ADMIN — INSULIN LISPRO 2 UNITS: 100 INJECTION, SOLUTION INTRAVENOUS; SUBCUTANEOUS at 13:02

## 2021-03-18 RX ADMIN — ATORVASTATIN CALCIUM 10 MG: 10 TABLET, FILM COATED ORAL at 20:28

## 2021-03-18 RX ADMIN — TORSEMIDE 20 MG: 20 TABLET ORAL at 08:10

## 2021-03-18 RX ADMIN — INSULIN GLARGINE 10 UNITS: 100 INJECTION, SOLUTION SUBCUTANEOUS at 20:42

## 2021-03-18 RX ADMIN — MEROPENEM 1000 MG: 1 INJECTION, POWDER, FOR SOLUTION INTRAVENOUS at 04:05

## 2021-03-18 RX ADMIN — ROPINIROLE HYDROCHLORIDE 0.25 MG: 0.25 TABLET, FILM COATED ORAL at 20:29

## 2021-03-18 RX ADMIN — POLYETHYLENE GLYCOL (3350) 17 G: 17 POWDER, FOR SOLUTION ORAL at 20:39

## 2021-03-18 RX ADMIN — TORSEMIDE 20 MG: 20 TABLET ORAL at 20:29

## 2021-03-18 RX ADMIN — PANTOPRAZOLE SODIUM 40 MG: 40 INJECTION, POWDER, FOR SOLUTION INTRAVENOUS at 20:29

## 2021-03-18 RX ADMIN — TRAZODONE HYDROCHLORIDE 50 MG: 50 TABLET ORAL at 20:39

## 2021-03-18 RX ADMIN — ROPINIROLE HYDROCHLORIDE 0.25 MG: 0.25 TABLET, FILM COATED ORAL at 08:14

## 2021-03-18 RX ADMIN — ROPINIROLE HYDROCHLORIDE 0.25 MG: 0.25 TABLET, FILM COATED ORAL at 13:43

## 2021-03-18 RX ADMIN — ACETAMINOPHEN 650 MG: 325 TABLET ORAL at 05:58

## 2021-03-18 RX ADMIN — PANTOPRAZOLE SODIUM 40 MG: 40 INJECTION, POWDER, FOR SOLUTION INTRAVENOUS at 08:11

## 2021-03-18 RX ADMIN — MINOCYCLINE HYDROCHLORIDE 100 MG: 100 CAPSULE ORAL at 08:15

## 2021-03-18 RX ADMIN — MINOCYCLINE HYDROCHLORIDE 100 MG: 100 CAPSULE ORAL at 21:23

## 2021-03-18 RX ADMIN — INSULIN LISPRO 2 UNITS: 100 INJECTION, SOLUTION INTRAVENOUS; SUBCUTANEOUS at 08:22

## 2021-03-18 ASSESSMENT — PAIN SCALES - GENERAL
PAINLEVEL_OUTOF10: 0

## 2021-03-18 NOTE — CARE COORDINATION
LSW met with patient following Care Conference. Wife Shantel Daily present when discussing discharge plan. LSW informed patient of recommendations for RW. Patient agreeable. LSW then informed of recommendation for [facility] PT, OT, and Nursing and patient is agreeable to all. Patient verbalized understanding and stated they would like [facility] as this is the agency they were originally referred to. D/C Plan:  Date:  Mar. 24th  DME:  RW (Agency TBD)  HHC:  PT, OT, Nursing [facility])  To:   Home with wife (family will transport)

## 2021-03-18 NOTE — PROGRESS NOTES
Occupational Therapy    Physical Rehabilitation: OCCUPATIONAL THERAPY     [x] daily progress note       [] discharge       Patient Name:  Janine Harris   :  1950 MRN: [id number]  Room:  [address] [place] [address] Date of Admission: 3/15/2021  Rehabilitation Diagnosis:   Critical illness myopathy [G72.81]  Critical illness myopathy [G72.81]       Date 3/18/2021       Day of ARU Week:  4   Time IN/OUT 2021-8257  1472-9665   Individual Tx Minutes 75+60   Group Tx Minutes    Co-Treat Minutes    Concurrent Tx Minutes    TOTAL Tx Time Mins 135   Variance Time    Variance Time []   Refusal due to:     []   Medical hold/reason:    []   Illness   []   Off Unit for test/procedure  []   Extra time needed to complete task  []   Therapeutic need  []   Other (specify):   Restrictions Restrictions/Precautions: General Precautions, Fall Risk, Surgical Protocols(sternal precautions (sx 2/16) 1500mL fluid restriction)         Communication with other providers: [x]   OK to see per nursing:     []   Spoke with team member regarding:      Subjective observations and cognitive status: AM: Pt resting in bed upon arrival, pleasant and agreeable therapy. PM: Pt resting in bed; agreeable to tx session. Pain level/location:    /10       Location:    Discharge recommendations  Anticipated discharge date: 3/24/2021  Destination: []home alone   []home alone w assist prn   [x] home w/ family    [] Continuous supervision       []SNF    [] Assisted living     [] Other:   Continued therapy: []HHC OT  []OUTPATIENT  OT   [] No Further OT  Equipment needs: TBD       ADLs:    Grooming: Pt performed grooming task of shaving seated in w/c at sink c mod I.      Oral Hygiene: Oral Hygiene  Assistance Needed: Independent  Comment: seated to perform oral care 2* fatigue  CARE Score: 6  Discharge Goal: Independent    UB/LB Bathing: Shower/Bathe Self  Assistance Needed: Supervision or touching assistance  Comment: CGA to bathe UB/LB, Pt able to perform []   [employer]       []   4-Wheeled Gabriela Minor         []   Cardiac Walker       []   Other:          Additional Therapeutic activities/exercises completed this date:     [x]   ADL Training   [x]   Balance/Postural training     [x]   Bed/Transfer Training   []   Endurance Training   []   Neuromuscular Re-ed   []   Nu-step:  Time:        Level:         #Steps:       []   Rebounder:    []  Seated     []  Standing        []   Supine Ther Ex (reps/sets):     [x]   Seated Ther Ex (reps/sets): Vital Signs  HR: 74, B/P 128/68, O2 94  Education:  Pt was instructed in Sternal Precautions, Purpose of Exercise Program, Ambar Scale and Signs and Symptoms of Exercise Intolerance per Cardiac Protocol  Pt was instructed in Intermediate Cardiac ex program:  Overhead Side Stretch x 10  Ankle Pumps/ Heel Raises x 10  Marching Seated x 10  Forward Arm Raises x 10  Side Arm Raises x 10  Arm Crosses x 10  Arm Circles Forwards and Backwards x 10  SittingTrunkTwist x 10      []   Standing Ther Ex (reps/sets):     []   Other:      Comments:      Patient/Caregiver Education and Training:   [x]   [employer] Equipment Use  []   Bed Mobility/Transfer Technique/Safety  [x]   Energy Conservation Tips  []   Family training  []   Postural Awareness  []   Safety During Functional Activities  []   Reinforced Patient's Precautions   []   Progress was updated and reviewed in Rehabtracker with patient and/or family this         date. Treatment Plan for Next Session: Continue OT POC     Assessment: This pt demonstrated a positive response to today's treatment as evidenced by improved ADLs. The patient is making good progress toward established goals as evidenced by QI scores. Ongoing deficits are observed in the areas of standing balance and continued focus on this is recommended.        Treatment/Activity Tolerance:   [x] Tolerated treatment with no adverse effects    [x] Patient limited by fatigue  [] Patient limited by pain   [] Patient limited by medical complications:    [] Adverse reaction to Tx:   [] Significant change in status    Safety:       [x]  bed alarm set    []  chair alarm set    []  Pt refused alarms                []  Telesitter activated      [x]  Gait belt used during tx session      []other:       Number of Minutes/Billable Intervention  Therapeutic Exercise 30   ADL Self-care 60+15   Neuro Re-Ed    Therapeutic Activity 15+15   Group    Other: Total: 135       Social History  Social/Functional History  Lives With: Spouse  Type of Home: House  Home Layout: One level  Home Access: Stairs to enter with rails  Entrance Stairs - Number of Steps: 1  Entrance Stairs - Rails: Right  Bathroom Shower/Tub: Walk-in shower(built in seat)  Bathroom Toilet: Handicap height  Bathroom Equipment: Grab bars in shower, Built-in shower seat, Grab bars around toilet, Hand-held shower  Bathroom Accessibility: Walker accessible  Home Equipment: Cane, Rolling walker, Grab bars  Receives Help From: Family  ADL Assistance: Independent  Homemaking Assistance: Independent  Homemaking Responsibilities: Yes  Meal Prep Responsibility: Secondary  Cleaning Responsibility: Secondary  Health Care Management: Primary  Ambulation Assistance: Independent(cane occasionaly for balance)  Transfer Assistance: Independent  Active : Yes(prior to CABG, still on precaution)  [facility] [place]  Occupation: Retired  Leisure & Hobbies: ana in house and yard, online research, golf  Additional Comments: one fall without injury in August, but had to call squad to get up. Sleeps in regular flat bed.     Objective                                                                                    Goals:  (Update in navigator)  Short term goals  Time Frame for Short term goals: STGs=LTGs:  Long term goals  Time Frame for Long term goals : ~10 days or until d/c  Long term goal 1: Pt will complete grooming tasks Ind  Long term goal 2: Pt will complete total body bathing c S  Long term goal 3: Pt will complete UB dressing Ind  Long term goal 4: Pt will complete LB dressing mod I using AE PRN  Long term goal 5: Pt will doff/don footwear mod I using AE PRN  Long term goals 6: Pt will complete toileting mod I  Long term goal 7: Pt will complete functional transfers (bed, chair, toilet) c DME PRN and mod I; shower transfer c S  Long term goal 8: Pt will perform therex/therax to facilitate increased strength/endurance/ax tolerance (c emphasis on dynamic standing balance/tolerance >8 mins) c SBA  Long term goal 9: Pt will complete simple homemaking tasks c DME PRN and S:        Plan of Care                                                                              Times per week: 5 days per week for a minimum of 60 minutes/day plus group as appropriate for 60 minutes.   Treatment to include Plan  Times per day: Daily  Current Treatment Recommendations: Strengthening, Balance Training, Functional Mobility Training, Endurance Training, Safety Education & Training, Patient/Caregiver Education & Training, Equipment Evaluation, Education, & procurement, Self-Care / ADL, Home Management Training, Cognitive/Perceptual Training    Electronically signed by   BYRON Hernandes   3/18/2021, 10:45 AM

## 2021-03-18 NOTE — PROGRESS NOTES
Jarred Talbot    : 1950  Acct #: [de-identified]  MRN: [id number]              PM&R Progress Note      Admitting diagnosis: Critical illness myopathy ( [address])     Comorbid diagnoses impacting rehabilitation: Generalized weakness, paroxysmal atrial fibrillation, recurrent right pleural effusion, status post coronary artery bypass graft surgery/maze procedure/AVR on 2021, COVID-19 pneumonia (2020), HAP, chronic kidney disease stage IIIb, uncontrolled diabetes type 2 with peripheral neuropathy, essential hypertension, moderate protein calorie malnutrition     Chief complaint: Some a.m. nausea without emesis. Dizziness with position changes. Prior (baseline) level of function: Independent.     Current level of function:         Current  IRF-HEENA and Goals:   Occupational Therapy:    Short term goals  Time Frame for Short term goals: STGs=LTGs :   Long term goals  Time Frame for Long term goals : ~10 days or until d/c  Long term goal 1: Pt will complete grooming tasks Ind  Long term goal 2: Pt will complete total body bathing c S  Long term goal 3: Pt will complete UB dressing Ind  Long term goal 4: Pt will complete LB dressing mod I using AE PRN  Long term goal 5: Pt will doff/don footwear mod I using AE PRN  Long term goals 6: Pt will complete toileting mod I  Long term goal 7: Pt will complete functional transfers (bed, chair, toilet) c DME PRN and mod I; shower transfer c S  Long term goal 8: Pt will perform therex/therax to facilitate increased strength/endurance/ax tolerance (c emphasis on dynamic standing balance/tolerance >8 mins) c SBA  Long term goal 9: Pt will complete simple homemaking tasks c DME PRN and S :                                       Eating: Eating  Assistance Needed: Independent  CARE Score: 6  Discharge Goal: Independent       Oral Hygiene: Oral Hygiene  Assistance Needed: Setup or clean-up assistance  Comment: seated to perform oral care 2* fatigue  CARE Score: 5  Discharge Goal: Independent    UB/LB Bathing: Shower/Bathe Self  Assistance Needed: Partial/moderate assistance  Comment: to bathe bottoms of B feet, cues to perform lateral lean seated to bathe bottom instead of standing 2* sternal precautions (pt unsafe to attempt sit>stand mid shower this date)  CARE Score: 3  Discharge Goal: Supervision or touching assistance    UB Dressing: Upper Body Dressing  Assistance Needed: Supervision or touching assistance  Comment: to don pullover T shirt  CARE Score: 4  Discharge Goal: Independent         LB Dressing: Lower Body Dressing  Assistance Needed: Partial/moderate assistance  Comment: able to thread RLE in brief and BLEs in pants, required assist to thread LLE in brief (and catheter); CGA while performing pants management up  CARE Score: 3  Discharge Goal: Independent    Donning and Thruston Footwear: Putting On/Taking Off Footwear  Assistance Needed: Substantial/maximal assistance  Comment: able to doff R sock, required partial assist with L and assist to don both socks  CARE Score: 2  Discharge Goal: Independent      Toileting: Toileting Hygiene  Comment: denied need during OT eval; has indwelling catheter  Reason if not Attempted: Not attempted due to medical condition or safety concerns  CARE Score: 88  Discharge Goal: Independent      Toilet Transfers:   Toilet Transfer  Assistance Needed: Partial/moderate assistance  Comment: Min A  CARE Score: 3  Discharge Goal: Independent    Physical Therapy:   Short term goals  Time Frame for Short term goals: 10 days STG=LTG  Short term goal 1: pt will perform all bed mobility with mod I  Short term goal 2: pt will perform sit tostand and pivot transfers with mod I, car transfers with CGA for LEs  Short term goal 3: pt will ambulate on level and unlevel surfaces with 2ww with mod I , level surfaces 200' with supervision  Short term goal 4: Pt will ascend/descend 1 step with 2ww with supervision  Short term goal 5: pt will retrieve light item from floor with reacher and 2ww with mod I            Bed Mobility:   Sit to Lying  Assistance Needed: Partial/moderate assistance  Comment: Min-Mod A for LE's , demonstrates sternal precautions without cueing  CARE Score: 3  Discharge Goal: Independent  Roll Left and Right  Assistance Needed: Partial/moderate assistance  Comment: mod assist  CARE Score: 3  Discharge Goal: Independent  Lying to Sitting on Side of Bed  Assistance Needed: Partial/moderate assistance  Comment: mod assist  CARE Score: 3  Discharge Goal: Independent    Transfers:    Sit to Stand  Assistance Needed: Partial/moderate assistance  Comment: Min A from recliner, Mod-CGA from , improved with seat built up and cues for COG fwd using rocking method; demonstrates sternal precautions  CARE Score: 3  Discharge Goal: Independent  Chair/Bed-to-Chair Transfer  Assistance Needed: Supervision or touching assistance  Comment: CGA wtih RW  CARE Score: 4  Discharge Goal: Independent     Car Transfer  Assistance Needed: Partial/moderate assistance  Comment: min assist for LEs into car, mod assist to shift to center of seat  CARE Score: 3  Discharge Goal: Supervision or touching assistance    Ambulation:    Walking Ability  Does the Patient Walk?: Yes     Walk 10 Feet  Assistance Needed: Supervision or touching assistance  Comment: CG with 2ww  CARE Score: 4  Discharge Goal: Independent     Walk 50 Feet with Two Turns  Assistance Needed: Partial/moderate assistance  Comment: min assist with 2ww  CARE Score: 3  Discharge Goal: Supervision or touching assistance     Walk 150 Feet  Assistance Needed: Partial/moderate assistance  Comment: recip pattern with RW, occasional standing rest breaks, mod UE support on AD; Amb 152'+130'+140'+138' with mild SOB however O2 sats WNL.   CARE Score: 3  Discharge Goal: Supervision or touching assistance     Walking 10 Feet on Uneven Surfaces  Assistance Needed: Partial/moderate assistance  Comment: min assist  CARE Score: 3  Discharge Goal: Independent     1 Step (Curb)  Assistance Needed: Partial/moderate assistance  Comment: min assist with 2ww, 6\"  CARE Score: 3  Discharge Goal: Supervision or touching assistance     4 Steps  Reason if not Attempted: Not applicable  CARE Score: 9  Discharge Goal: Not Applicable     12 Steps  Reason if not Attempted: Not applicable  CARE Score: 9  Discharge Goal: Not Applicable       Wheelchair:  w/c Ability: Wheelchair Ability  Uses a Wheelchair and/or Scooter?: No     Wheel 150 Feet  Assistance Needed: Supervision or touching assistance  Comment: SBA propelling wC bkwd with focus on quad strenthening 158' using B LEs only, req extra time to complete distance,  CARE Score: 4          Balance:        Object: Picking Up Object  Comment: Pt too fatigued to complete  Reason if not Attempted: Not attempted due to medical condition or safety concerns  CARE Score: 88  Discharge Goal: Independent    I      Exam:    Blood pressure (!) 112/57, pulse 63, temperature 97.6 °F (36.4 °C), resp. rate 16, height 5' 10\" (1.778 m), weight 223 lb 8 oz (101.4 kg), SpO2 96 %. General: Semireclined in bed. Oxygen per nasal cannula. Alert but easily distracted. Uncomfortable in general.    HEENT: Neck supple. MMM. No JVD. Pulmonary: Shallow respirations with blunted breath sounds in the bases. Cardiac: Occasional premature beat. Rate controlled. Sternal incision clean and dry. Pacer site tender. Abdomen: Patient's abdomen is soft and nondistended. Bowel sounds were present throughout. There was no rebound, guarding or masses noted. Upper extremities: Cautious movement of both upper limbs with weakness proximally and distally. Lower extremities: No new bruising or signs of DVT. 3/5 strength proximally distally. Sitting balance was fair+. Standing balance was poor.     Lab Results   Component Value Date    WBC 6.2 03/17/2021    HGB 8.9 (L) 03/17/2021    HCT 29.5 (L) 03/17/2021    MCV 83.8 03/17/2021     03/17/2021     Lab Results   Component Value Date    INR 1.51 03/16/2021    INR 1.54 03/06/2021    INR 1.84 03/03/2021    PROTIME 18.3 (H) 03/16/2021    PROTIME 18.7 (H) 03/06/2021    PROTIME 22.4 (H) 03/03/2021     Lab Results   Component Value Date    CREATININE 1.6 (H) 03/17/2021    BUN 81 (H) 03/17/2021     03/17/2021    K 4.1 03/17/2021    CL 97 (L) 03/17/2021    CO2 34 (H) 03/17/2021     Lab Results   Component Value Date    ALT 53 (H) 03/14/2021    AST 44 (H) 03/14/2021    ALKPHOS 303 (H) 03/14/2021    BILITOT 0.5 03/14/2021       Expected length of stay  prior to a supervised level of function for discharge home with a walker and C OT/PT is 2 weeks. Recommendations:    1. Critical illness myopathy:  Following verbal cues to engage in the daily occupational and physical therapy. Teaching him self-care mobility tasks following sternal precautions. Providing him pulmonary hygiene, DVT prophylaxis, nutritional support and bowel and bladder retraining (eventually a voiding trial).  Tolerated the thoracentesis without complications. No chest wall pain after the procedure. Planning for adaptive equipment training and caregiver education eventually. Moderate verbal cues and minimal physical assistance needed for transfers today. 2. DVT prophylaxis: The Eliquis he requires for his atrial fibrillation  will protect him against new DVT.  I must periodically monitor his hemoglobin while on this medication.  Weightbearing activities are pursued daily.  GI prophylaxis offered. No new bruising or swelling. 3. Hospital-acquired pneumonia: Ongoing aggressive pulmonary hygiene while monitoring O2 saturations at rest and with activity.  He remains on vancomycin, meropenem and minocycline for the time being. Currently does not require oxygen supplementation but saturations are monitored regularly.   4. Acute kidney injury on chronic kidney disease 3B: Nephrology is monitoring the patient's recovery.  Avoiding nephrotoxic medications when possible (cautious use of vancomycin).  Avoiding drops in blood pressure with ProAmatine.  Periodic monitoring of his chemistries. 5. Uncontrolled diabetes type 2 with peripheral neuropathy: Patient requires a diet modified for carbohydrates.  He is on scheduled Lantus and Humalog as well as a Humalog sliding scale.  Should his creatinine returned to normal, it is likely he will resume his prior home medication regimen for his diabetes (Invokana, Trulicity, Amaryl and Metformin). 6. Paroxysmal atrial fibrillation: Anticoagulation with Eliquis.  Rate is controlled without medications.  Daily weights to detect any decompensation of CHF. 7. Hypertension: Demadex for cautious diuresis.  ProAmatine to avoid drops in blood pressure.  Vital signs are checked at rest and with activity. Blood pressure just below target range today.   8. Moderate protein calorie malnutrition: Encouraging consistent oral intake.  Consult dietary for oral supplementation guidance.

## 2021-03-18 NOTE — PLAN OF CARE
Problem: Infection:  Goal: Will remain free from infection  Description: Will remain free from infection  Outcome: Ongoing     Problem: Safety:  Goal: Free from accidental physical injury  Description: Free from accidental physical injury  Outcome: Ongoing  Goal: Free from intentional harm  Description: Free from intentional harm  Outcome: Ongoing     Problem: Daily Care:  Goal: Daily care needs are met  Description: Daily care needs are met  Outcome: Ongoing     Problem: Skin Integrity:  Goal: Skin integrity will stabilize  Description: Skin integrity will stabilize  Outcome: Ongoing     Problem: Pain:  Goal: Patient's pain/discomfort is manageable  Description: Patient's pain/discomfort is manageable  Outcome: Ongoing

## 2021-03-18 NOTE — PROGRESS NOTES
PATIENT NAME: Jose M Bucio    TODAY'S DATE: 03/18/21    Reason for today's visit: s/p CABG AVR    SUBJECTIVE:    Pt is feeling well, working with therapy. OBJECTIVE:   VITALS:    Vitals:    03/18/21 0800   BP: 123/60   Pulse: 66   Resp: 16   Temp: 97.3 °F (36.3 °C)   SpO2: 97%     INTAKE/OUTPUT:    Date 03/18/21 0000 - 03/18/21 2359   Shift 5094-2669 9641-1302 8848-6295 24 Hour Total   INTAKE   P.O.  240  240   Shift Total(mL/kg)  240(2.4)  240(2.4)   OUTPUT   Urine(mL/kg/hr) 1200(1.5)   1200   Shift Total(mL/kg) 1200(11.8)   1200(11.8)   Weight (kg) 101.6 101.6 101.6 101.6      Patient Vitals for the past 96 hrs (Last 3 readings):   Weight   03/18/21 0300 224 lb (101.6 kg)   03/17/21 0513 223 lb 8 oz (101.4 kg)   03/16/21 0421 226 lb 11.2 oz (102.8 kg)       EXAM:  Constitutional: Blood pressure 123/60, pulse 66, temperature 97.3 °F (36.3 °C), temperature source Oral, resp. rate 16, height 5' 10\" (1.778 m), weight 224 lb (101.6 kg), SpO2 97 %. No apparent distress, appears stated age and cooperative. Neurologic: follows commands, no focal weakness noted   Lungs: Good respiratory effort. Clear to auscultation,   CV: Regular rate/ rhythm ,min peripheral edema, feet warm and well perfused  GI: Soft, non-tender in all four quadrants, non-distended, + bowel sounds, spleen and liver no palpable masses   : bladder nondistended   MSK: no obvious deformity   Skin: warm, pink and dry       Data:  CBC:   Recent Labs     03/16/21  0701 03/17/21  0629   WBC 7.6 6.2   HGB 8.4* 8.9*   HCT 28.0* 29.5*    291     BMP:    Recent Labs     03/16/21  0701 03/17/21  0629    138   K 4.1 4.1   CL 97* 97*   CO2 32 34*   BUN 85* 81*   CREATININE 1.7* 1.6*   GLUCOSE 133* 106*     Hepatic: No results for input(s): AST, ALT, ALB, BILITOT, ALKPHOS in the last 72 hours. Mag:    No results for input(s): MG in the last 72 hours.    Phos:     Recent Labs     03/16/21  0701 03/17/21  0629   PHOS 5.2* 4.8      INR:   Recent Labs     03/16/21  0815   INR 1.51       Radiology Review:        ASSESSMENT:  Patient Active Problem List   Diagnosis    Type 2 diabetes mellitus without complication, without long-term current use of insulin (HCC)    Ulcer of other part of lower limb    Venous hypertension, chronic, with ulcer ([address]    Ulcer of other part of foot    Hospital-acquired pneumonia    Atrial fibrillation (HCC)    Sinus pause    PAF (paroxysmal atrial fibrillation) ([address]    DM (diabetes mellitus) ([address]    DMITRIY on CPAP    Hyperlipidemia    Status post incision and drainage    Chronic kidney disease, stage 3b    Hyperkalemia    Arthritis    PVD (peripheral vascular disease) ([address]    Hematoma    Cardiac pacemaker in situ    Coronary artery stenosis    Essential hypertension    Gout    Diabetic neuropathy associated with type 2 diabetes mellitus ([address]    Adenomatous polyp of sigmoid colon    Iron deficiency anemia due to chronic blood loss    Erythropoietin deficiency anemia    VHD (valvular heart disease)    Abnormal fractional flow reserve (FFR) on cardiac catheterization    Carotid stenosis, left    Aortic stenosis, severe    CAD in native artery    Displacement of atrial pacemaker leads    Pacemaker lead malfunction    SOB (shortness of breath)    Recurrent right pleural effusion    Elevated liver enzymes    Critical illness myopathy    Respiratory failure (HCC)    Generalized weakness    Status post coronary artery bypass graft    Pneumonia due to COVID-19 virus    Moderate malnutrition ([address]       S/p CABG AVR    PLAN:     Making good progress. Check labs tomorrow.      Brain Liang PA-C

## 2021-03-18 NOTE — PROGRESS NOTES
Pt is found to have small nose bleed. Pt stated it happens sometimes with his cpap at night. Pt has been cleaned up and is resting comfortably.

## 2021-03-18 NOTE — PROGRESS NOTES
Further PT  Equipment needs: RW  Neela Espinoza requires the assistance of a wheeled walker to successfully ambulate from room to room at home to allow completion of daily living tasks such as: bathing, toileting, dressing and grooming. A wheeled walker is necessary due to the patient's unsteady gait, upper body weakness, inability to  a standard walker. This patient can ambulate only by pushing a walker instead of using a lesser assistive device such as a cane or crutch. Bed Mobility:             Lying --> Sit:  Min A - Mod A for Upper body to sit EOB keeping sternal precautions. Supervision with BLE's OOB (pt able to slide BLE's OOB). Sit --> lying:  Min A - Mod A for Upper body and for BLE's keeping sternal precautions. Pt demonstrates sternal precautions without cueing. Transfers:    Sit--> Stand: SBA - CGA vc's for forward weight shift for balance. Stand --> Sit:  SBA required vc's to reach back to assure chair is present before sitting for safety. Chair-->Bed/Bed --> Chair:  CGA  Assistive device required for transfer:   RW  Pt required consistent set up and assistance with zaragoza catheter bag. Gait:    Distance:  364 ft with 2 turns  Assistance:  SBA following closely with w/c  Device:  RW  Gait Quality:  Slow reciprocal pattern taking frequent standing rest breaks, Min UE support on AD, mild SOB with vc's for PLB throughout. Wheelchair Propulsion:  Distance:  80 ft with 2 turns  Assistance: SBA vc's to continue through door threshold for safety. Extremities Used:   BLE's only propelling forward  Type:    [x]  Manual        []  Electric  Pt required frequent rest breaks d/t LE fatigue. Uneven Surfaces:       Assistance:    CGA vc's for AD placement over threshold.     Device:    RW  Surfaces Completed:   []  Green mat with bean bags beneath      []  Throw rugs       []  Ramp       []  Outdoor pavements        []  Grass             []  Loose gravel [x]  Other:   Carpet    Additional Therapeutic activities/exercises completed this date:     []   Nu-step:  Time:        Level:         #Steps:       []   Rebounder:    []  Seated     []  Standing        []   Balance training         []   Postural training    []   Supine ther ex (reps/sets):     []   Seated ther ex (reps/sets):     []   Standing ther ex (reps/sets):     [x]   Picking up object from floor (standing): CGA with vc's and visual demonstration for AD placement. []   Reacher used   [x]   Other: Educated patient on PLB throughout session requiring cues d/t mild SOB. []   Other:    Comments:      Patient/Caregiver Education and Training:   [x]   Bed Mobility/Transfer technique/safety  [x]   Gait technique/sequencing  [x]   Proper use of assistive device  []   Advanced mobility safety and technique  [x]   Reinforced patient's precautions/mobility while maintaining precautions  [x]   Postural awareness  []   Family training  [x]   Progress was updated and reviewed in Rehabtracker with patient and/or family this date. Treatment Plan for Next Session: Gait, endurance, stairs, LE strengthening. Assessment: This pt demonstrated a positive response to today's treatment as evidenced by requiring frequent and long rest breaks in between tasks . The patient is making progress toward established goals as evidenced by QI scores. Ongoing deficits are observed in the areas of mild SOB and decreased endurance and strength and continued focus on motivation, education and encouragement is recommended.      Treatment/Activity Tolerance:   [] Tolerated treatment with no adverse effects    [x] Patient limited by fatigue  [] Patient limited by pain   [] Patient limited by medical complications:    [] Adverse reaction to Tx:   [] Significant change in status    Safety:       [x]  bed alarm set    []  chair alarm set    []  Pt refused alarms                []  Telesitter activated      [x]  Gait belt used during tx session      [x]other:  Pt left in room with nurse aide at end of session. Number of Minutes/Billable Intervention  Gait Training 20   Therapeutic Exercise    Neuro Re-Ed    Therapeutic Activity 30   Wheelchair Propulsion 20   Group    Other:    TOTAL 70         Social History  Social/Functional History  Lives With: Spouse  Type of Home: House  Home Layout: One level  Home Access: Stairs to enter with rails  Entrance Stairs - Number of Steps: 1  Entrance Stairs - Rails: Right  Bathroom Shower/Tub: Walk-in shower(built in seat)  Bathroom Toilet: Handicap height  Bathroom Equipment: Grab bars in shower, Built-in shower seat, Grab bars around toilet, Hand-held shower  Bathroom Accessibility: Walker accessible  Home Equipment: Cane, Rolling walker, Grab bars  Receives Help From: Family  ADL Assistance: Independent  Homemaking Assistance: Independent  Homemaking Responsibilities: Yes  Meal Prep Responsibility: Secondary  Cleaning Responsibility: Secondary  Health Care Management: Primary  Ambulation Assistance: Independent(cane occasionaly for balance)  Transfer Assistance: Independent  Active : Yes(prior to CABG, still on precaution)  Mode of Transportation: [place](Spontaneously or [employer])  Occupation: Retired  Leisure & Hobbies: ana in house and yard, online research, golf  Additional Comments: one fall without injury in August, but had to call squad to get up. Sleeps in regular flat bed.     Objective                                                                                    Goals:  (Update in navigator)  Short term goals  Time Frame for Short term goals: 10 days STG=LTG  Short term goal 1: pt will perform all bed mobility with mod I  Short term goal 2: pt will perform sit tostand and pivot transfers with mod I, car transfers with CGA for LEs  Short term goal 3: pt will ambulate on level and unlevel surfaces with 2ww with mod I , level surfaces 200' with supervision  Short term goal 4: Pt will ascend/descend 1 step with 2ww with supervision  Short term goal 5: pt will retrieve light item from floor with reacher and 2ww with mod I:   :        Plan of Care                                                                              Times per week: 5 days per week for a minimum of 60 minutes/day plus group as appropriate for 60 minutes.   Treatment to include Current Treatment Recommendations: Strengthening, Gait Training, Patient/Caregiver Education & Training, Stair training, IADL Training, Equipment Evaluation, Education, & procurement, Balance Training, Neuromuscular Re-education, Pain Management, Functional Mobility Training, Endurance Training, Home Exercise Program, Transfer Training, Safety Education & Training, Positioning    Electronically signed by   Alan Miller PTA  3/18/2021, 8:24 AM

## 2021-03-18 NOTE — PROGRESS NOTES
progress Note( Dr. Richi Cheema)  3/18/2021  Subjective:   Admit Date: 3/15/2021  PCP: Jamari Carter MD    Admitted For :Shortness of breath leg swelling    Consulted For:  Better control of blood glucose    Interval History: Patient seemed to be more alert today able to communicate properly  Uses CPAP at night for sleep apnea    Is more ambulatory with help    Patient had right-sided thoracentesis more around 1100 cc fluid noted below    Denies any chest pains,   Yes SOB . Seem to be better  Denies nausea or vomiting. Not eating much  No new bowel or bladder symptoms.        Intake/Output Summary (Last 24 hours) at 3/18/2021 0716  Last data filed at 3/18/2021 0300  Gross per 24 hour   Intake 480 ml   Output 3600 ml   Net -3120 ml       DATA    CBC:   Recent Labs     03/16/21  0701 03/17/21  0629   WBC 7.6 6.2   HGB 8.4* 8.9*    291    CMP:  Recent Labs     03/16/21  0701 03/17/21  0629    138   K 4.1 4.1   CL 97* 97*   CO2 32 34*   BUN 85* 81*   CREATININE 1.7* 1.6*   CALCIUM 8.3 8.5   LABALBU 2.8* 2.8*     Lipids:   Lab Results   Component Value Date    CHOL 91 12/18/2020    HDL 43 12/18/2020    TRIG 73 03/10/2021     Glucose:  Recent Labs     03/17/21  1711 03/17/21  2022 03/18/21  0132   POCGLU 159* 131* 154*     [id number]:  Lab Results   Component Value Date    LABA1C 6.1 02/20/2021     High Sensitivity TSH:   Lab Results   Component Value Date    TSHHS 2.620 03/03/2021     Free T3: No results found for: FT3  Free T4:  Lab Results   Component Value Date    T4FREE 1.5 07/07/2020          Right-sided thoracentesis on 3/16/2021  A total of 1100 serosanguineous fluid was removed.                 Echocardiogram Limited    Result Date: 3/2/2021  Transthoracic Echocardiography Report (TTE)  Demographics   Patient Name       RASHAD TATE    Date of Study       03/02/2021   Date of Birth      1950         Gender              Male   Age                66 year(s)         Race    Patient Number     [id number]         Room Number         2101   Visit Number       [id number]   Corporate ID       [id number]   Accession Number   [id number]         [address] T   Ordering Physician Latasha Lorenzana PA-C       Physician           MD  Procedure Type of Study   TTE procedure:ECHOCARDIOGRAM LIMITED. Procedure Date Date: 03/02/2021 Start: 11:53 AM Study Location: Portable Technical Quality: Adequate visualization Indications:S/P CABG. Patient Status: Routine Height: 70 inches Weight: 230 pounds BSA: 2.22 m2 BMI: 33 kg/m2 HR: 91 bpm BP: 91/45 mmHg  Conclusions   Summary  This is a limited echocardiogram.  Left ventricular systolic function is normal.  Ejection fraction is visually estimated at 55-60%. Bilateral atrial enlargement. S/p AVR with a 27 mm Medtronic Mosaic. Moderate mitral regurgitation. Moderate tricuspid regurgitation; RVSP: 50 mmHg. Severe PHTN. No evidence of any pericardial effusion. Signature   ------------------------------------------------------------------  Electronically signed by Valerie Yan MD (Interpreting  physician) on 03/02/2021 at 05:06 PM  -    Ct Chest Wo Contrast    Result Date: 3/1/2021  EXAMINATION: CT OF THE CHEST WITHOUT CONTRAST 3/1/2021 3:57 pm T    Patient status post midline sternotomy. Immediately posterior to the sternotomy defect, there is a small gas and fluid collection which is nonspecific. It is not necessarily outside normal limits for a sternotomy that was performed 2 weeks ago. However, sterility cannot be ascertained with imaging, and therefore a small developing abscess is considered as well. No evidence of osteolysis of the sternotomy defect to suggest osteomyelitis. Interval development of a moderate to large right and a moderate left pleural effusion.   Adjacent airspace disease is some combination of atelectasis, pneumonia, and/or edema. Xr Chest Portable    Result Date: 3/3/2021  EXAMINATION: ONE XRAY VIEW OF THE CHEST 3/3/2021 5:34 am COMPARISON: 03/02/2021 HISTORY: ORDERING SYSTEM PROVIDED HISTORY: heart failure TECHNOLOGIST PROVIDED HISTORY: Reason for exam:->heart failure Reason for Exam: heart failure Acuity: Acute Type of Exam: Subsequent/Follow-up FINDINGS: Status post median sternotomy and left-sided transvenous pacer placement. There is stable cardiomegaly. The chest films taken shallow degree of inspiration accentuating heart size and bronchovascular structures. There is a small right pleural effusion. No significant left effusion is seen. The patient is undergone clipping of the left atrial appendage. There is bibasilar atelectasis. Stable exam     Xr Chest Portable    Result Date: 3/2/2021  EXAMINATION: ONE XRAY VIEW OF THE CHEST 3/2/2021 9:38 am COMPARISON: 03/01/2021 HISTORY: ORDERING SYSTEM PROVIDED HISTORY: post bilateral thoracentesis   . Patient has undergone thoracentesis. The volume of fluid in the right pleural space has decreased and/or shifted. There is some persistent right basilar atelectasis. No significant pleural effusion on the left is seen. Minimal left basilar atelectasis is noted. No pneumothorax is seen. Bilateral shoulder arthritis is noted. Pleural effusions right greater than left with bibasilar atelectasis right worse than left. No pneumothorax is seen.      Xr Chest Portable    Result Date: 3/1/2021  EXAMINATION: ONE XRAY VIEW OF THE CHEST 3/1/2021 5:12 pm COMPARISON: 02/23/2021 HISTORY: ORDERING SYSTEM PROVIDED HISTORY: chest pain, shorntess of breath TECHNOLOGIST PROVIDED HISTORY: Reason for exam:->chest pain, shorntess of breath Reason for Exam: chest pain   sob   leg swelling Acuity: Unknown Type of Exam: Unknown Relevant Medical/Surgical History: afib    cad    diabetes FINDINGS: Status post median sternotomy. Transvenous pacer remains place. Stable cardiomegaly. Right-sided pleural effusion. Bibasilar hypoaeration. Right-sided pleural effusion Bibasilar hypoaeration     Vl Dup Lower Extremity Venous Bilateral    Result Date: 3/3/2021  EXAMINATION: DUPLEX VENOUS ULTRASOUND OF THE BILATERAL LOWER EXTREMITIES, 3/3/2021 9:01 am TECHNIQUE: Duplex ultrasound using B-mode/gray scaled imaging and Doppler spectral analysis and color flow was obtained of the bilateral lower extremities. COMPARISON: None. HISTORY: ORDERING SYSTEM PROVIDED HISTORY: fevers TECHNOLOGIST PROVIDED HISTORY: Reason for exam:->fevers Reason for Exam: edema FINDINGS: The visualized veins of the bilateral lower extremities are patent and free of echogenic thrombus. The veins demonstrate good compressibility with normal color flow study and spectral analysis. No evidence of DVT in either lower extremity. Us Chest Including Mediastinum    Result Date: 3/3/2021  EXAMINATION: ULTRASOUND OF THE CHEST 3/3/2021 9:02 am COMPARISON: Chest radiograph performed earlier in the same day HISTORY: ORDERING SYSTEM PROVIDED HISTORY: ASSESS FOR PLEURAL EFFUSION TECHNOLOGIST PROVIDED HISTORY: RIGHT LUNG Reason for exam:->ASSESS FOR PLEURAL EFFUSION Reason for Exam: pleural effusion FINDINGS: Moderate right pleural effusion is present. Right pleural effusion is present. Ir Guided Thoracentesis Pleural    Result Date: 3/2/2021  PROCEDURE: ULTRASOUNDGUIDED Bilateral THORACENTESIS 3/2/2021 HISTORY: ORDERING SYSTEM PROVIDED HISTORY: Bilateral pleural effusion. T   The patient tolerated the procedure well. FINDINGS: A total of 800 ml serosanguinous fluid from left and 1050 ml serosanguineous fluid from right  was removed. Successful ultrasound guided bilateral thoracentesis.        Scheduled Medicines   Medications:    insulin lispro  0-12 Units Subcutaneous 2 times per day    insulin lispro  0-12 Units Subcutaneous TID WC    apixaban  5 mg Oral BID    aspirin  81 mg Oral Daily    insulin glargine  10 Units Subcutaneous Nightly    insulin lispro  10 Units Subcutaneous TID     meropenem  1,000 mg Intravenous Q12H    midodrine  5 mg Oral TID WC    minocycline  100 mg Oral BID    pantoprazole  40 mg Intravenous BID    rOPINIRole  0.25 mg Oral TID    torsemide  20 mg Oral BID    [START ON 3/9/2022] vancomycin (VANCOCIN) intermittent dosing (placeholder)   Other RX Placeholder    atorvastatin  10 mg Oral Nightly      Infusions:    dextrose           Objective:   Vitals: BP (!) 117/56   Pulse 63   Temp 97.8 °F (36.6 °C) (Oral)   Resp 16   Ht 5' 10\" (1.778 m)   Wt 224 lb (101.6 kg)   SpO2 95%   BMI 32.14 kg/m²   General appearance: alert and cooperative with exam  Neck: no JVD or bruit  Thyroid : Normal lobes   Lungs: Has Vesicular Breath sounds there is breath sounds bilateral pleural effusion tapped both sides noted below  Heart:  regular rate and rhythm  Abdomen: soft, non-tender; bowel sounds normal; no masses,  no organomegaly  Musculoskeletal: Normal  Extremities: extremities normal, , no edema  Neurologic:  Awake, alert, oriented to name, place and time. Cranial nerves II-XII are grossly intact. Motor is  intact. Sensory is intact. ,  and gait is normal.    Assessment:     Patient Active Problem List:     Type 2 diabetes mellitus without complication, without long-term current use of insulin ([facility])     Ulcer of other part of lower limb     Venous hypertension, chronic, with ulcer ([address]     Ulcer of other part of foot     Pneumonia     Atrial fibrillation ([facility])     Sinus pause     PAF (paroxysmal atrial fibrillation) ([facility])     DM (diabetes mellitus) ([facility])     DMITRIY on CPAP     Hyperlipidemia     Status post incision and drainage     CKD (chronic kidney disease) stage 3, GFR 30-59 ml/min ([facility])     Hyperpotassemia     Arthritis     PVD (peripheral vascular disease) ([facility])     Hematoma     Cardiac pacemaker in situ     Coronary artery stenosis     Essential hypertension     Gout     Diabetic neuropathy associated with type 2 diabetes mellitus ([facility])     Adenomatous polyp of sigmoid colon     Iron deficiency anemia due to chronic blood loss     Erythropoietin deficiency anemia     VHD (valvular heart disease)     Abnormal fractional flow reserve (FFR) on cardiac catheterization     Carotid stenosis, left     Aortic stenosis, severe     CAD in native artery     Displacement of atrial pacemaker leads     Pacemaker lead malfunction     SOB (shortness of breath)      ? ? Sepsis      Plan:     1. Reviewed POC blood glucose . Labs and X ray results   2. Reviewed Current Medicines   3. On meal/ Correction bolus Humalog/ Basal Lantus Insulin regime   4. Monitor Blood glucose frequently   5. Modified  the dose of Insulin/ other medicines as needed   6. Will not re start Trulicity at this time  7. Ultimately patient was transferred to acute rehab unit on 3/15/2021 as insurance approved for 7 days in this unit   8. Patient is participating in physical activity programs acute rehab unit  9. Will follow     .      Tanvi Villa MD

## 2021-03-19 LAB
ALBUMIN SERPL-MCNC: 3.2 GM/DL (ref 3.4–5)
ANION GAP SERPL CALCULATED.3IONS-SCNC: 10 MMOL/L (ref 4–16)
BUN BLDV-MCNC: 70 MG/DL (ref 6–23)
CALCIUM SERPL-MCNC: 8.6 MG/DL (ref 8.3–10.6)
CHLORIDE BLD-SCNC: 96 MMOL/L (ref 99–110)
CO2: 36 MMOL/L (ref 21–32)
CREAT SERPL-MCNC: 1.6 MG/DL (ref 0.9–1.3)
GFR AFRICAN AMERICAN: 52 ML/MIN/1.73M2
GFR NON-AFRICAN AMERICAN: 43 ML/MIN/1.73M2
GLUCOSE BLD-MCNC: 123 MG/DL (ref 70–99)
GLUCOSE BLD-MCNC: 130 MG/DL (ref 70–99)
GLUCOSE BLD-MCNC: 133 MG/DL (ref 70–99)
GLUCOSE BLD-MCNC: 133 MG/DL (ref 70–99)
GLUCOSE BLD-MCNC: 155 MG/DL (ref 70–99)
GLUCOSE BLD-MCNC: 171 MG/DL (ref 70–99)
GLUCOSE BLD-MCNC: 174 MG/DL (ref 70–99)
HCT VFR BLD CALC: 31 % (ref 42–52)
HEMOGLOBIN: 9.1 GM/DL (ref 13.5–18)
MCH RBC QN AUTO: 25.1 PG (ref 27–31)
MCHC RBC AUTO-ENTMCNC: 29.4 % (ref 32–36)
MCV RBC AUTO: 85.4 FL (ref 78–100)
PDW BLD-RTO: 22.6 % (ref 11.7–14.9)
PHOSPHORUS: 4.1 MG/DL (ref 2.5–4.9)
PLATELET # BLD: 279 K/CU MM (ref 140–440)
PMV BLD AUTO: 9.8 FL (ref 7.5–11.1)
POTASSIUM SERPL-SCNC: 4 MMOL/L (ref 3.5–5.1)
RBC # BLD: 3.63 M/CU MM (ref 4.6–6.2)
SODIUM BLD-SCNC: 142 MMOL/L (ref 135–145)
WBC # BLD: 5.5 K/CU MM (ref 4–10.5)

## 2021-03-19 PROCEDURE — 94150 VITAL CAPACITY TEST: CPT

## 2021-03-19 PROCEDURE — 1280000000 HC REHAB R&B

## 2021-03-19 PROCEDURE — 97530 THERAPEUTIC ACTIVITIES: CPT

## 2021-03-19 PROCEDURE — 36415 COLL VENOUS BLD VENIPUNCTURE: CPT

## 2021-03-19 PROCEDURE — 6370000000 HC RX 637 (ALT 250 FOR IP): Performed by: PHYSICAL MEDICINE & REHABILITATION

## 2021-03-19 PROCEDURE — 6370000000 HC RX 637 (ALT 250 FOR IP): Performed by: INTERNAL MEDICINE

## 2021-03-19 PROCEDURE — 6360000002 HC RX W HCPCS: Performed by: PHYSICIAN ASSISTANT

## 2021-03-19 PROCEDURE — 97110 THERAPEUTIC EXERCISES: CPT

## 2021-03-19 PROCEDURE — 99232 SBSQ HOSP IP/OBS MODERATE 35: CPT | Performed by: PHYSICAL MEDICINE & REHABILITATION

## 2021-03-19 PROCEDURE — 97116 GAIT TRAINING THERAPY: CPT

## 2021-03-19 PROCEDURE — 80069 RENAL FUNCTION PANEL: CPT

## 2021-03-19 PROCEDURE — 85027 COMPLETE CBC AUTOMATED: CPT

## 2021-03-19 PROCEDURE — C9113 INJ PANTOPRAZOLE SODIUM, VIA: HCPCS | Performed by: PHYSICIAN ASSISTANT

## 2021-03-19 PROCEDURE — 82962 GLUCOSE BLOOD TEST: CPT

## 2021-03-19 PROCEDURE — 6370000000 HC RX 637 (ALT 250 FOR IP): Performed by: PHYSICIAN ASSISTANT

## 2021-03-19 PROCEDURE — 94761 N-INVAS EAR/PLS OXIMETRY MLT: CPT

## 2021-03-19 PROCEDURE — 2580000003 HC RX 258: Performed by: PHYSICIAN ASSISTANT

## 2021-03-19 RX ORDER — INSULIN GLARGINE 100 [IU]/ML
5 INJECTION, SOLUTION SUBCUTANEOUS ONCE
Status: COMPLETED | OUTPATIENT
Start: 2021-03-19 | End: 2021-03-19

## 2021-03-19 RX ADMIN — MINOCYCLINE HYDROCHLORIDE 100 MG: 100 CAPSULE ORAL at 20:55

## 2021-03-19 RX ADMIN — INSULIN GLARGINE 5 UNITS: 100 INJECTION, SOLUTION SUBCUTANEOUS at 21:05

## 2021-03-19 RX ADMIN — TORSEMIDE 20 MG: 20 TABLET ORAL at 20:54

## 2021-03-19 RX ADMIN — MINOCYCLINE HYDROCHLORIDE 100 MG: 100 CAPSULE ORAL at 09:04

## 2021-03-19 RX ADMIN — ROPINIROLE HYDROCHLORIDE 0.25 MG: 0.25 TABLET, FILM COATED ORAL at 20:54

## 2021-03-19 RX ADMIN — ROPINIROLE HYDROCHLORIDE 0.25 MG: 0.25 TABLET, FILM COATED ORAL at 09:04

## 2021-03-19 RX ADMIN — MEROPENEM 1000 MG: 1 INJECTION, POWDER, FOR SOLUTION INTRAVENOUS at 16:33

## 2021-03-19 RX ADMIN — MIDODRINE HYDROCHLORIDE 5 MG: 5 TABLET ORAL at 16:32

## 2021-03-19 RX ADMIN — ROPINIROLE HYDROCHLORIDE 0.25 MG: 0.25 TABLET, FILM COATED ORAL at 13:57

## 2021-03-19 RX ADMIN — MIDODRINE HYDROCHLORIDE 5 MG: 5 TABLET ORAL at 12:09

## 2021-03-19 RX ADMIN — PANTOPRAZOLE SODIUM 40 MG: 40 INJECTION, POWDER, FOR SOLUTION INTRAVENOUS at 20:55

## 2021-03-19 RX ADMIN — TORSEMIDE 20 MG: 20 TABLET ORAL at 09:03

## 2021-03-19 RX ADMIN — ACETAMINOPHEN 650 MG: 325 TABLET ORAL at 06:41

## 2021-03-19 RX ADMIN — MIDODRINE HYDROCHLORIDE 5 MG: 5 TABLET ORAL at 09:03

## 2021-03-19 RX ADMIN — MEROPENEM 1000 MG: 1 INJECTION, POWDER, FOR SOLUTION INTRAVENOUS at 04:42

## 2021-03-19 RX ADMIN — ATORVASTATIN CALCIUM 10 MG: 10 TABLET, FILM COATED ORAL at 20:54

## 2021-03-19 RX ADMIN — INSULIN LISPRO 2 UNITS: 100 INJECTION, SOLUTION INTRAVENOUS; SUBCUTANEOUS at 12:11

## 2021-03-19 RX ADMIN — PANTOPRAZOLE SODIUM 40 MG: 40 INJECTION, POWDER, FOR SOLUTION INTRAVENOUS at 09:35

## 2021-03-19 ASSESSMENT — PAIN SCALES - GENERAL
PAINLEVEL_OUTOF10: 0
PAINLEVEL_OUTOF10: 0

## 2021-03-19 NOTE — PROGRESS NOTES
Occupational Therapy  Physical Rehabilitation: OCCUPATIONAL THERAPY     [x] daily progress note       [] discharge       Patient Name:  Jarred Talbot   :  1950 MRN: [id number]  Room:  [address] [place] [address] Date of Admission: 3/15/2021  Rehabilitation Diagnosis:   Critical illness myopathy [G72.81]  Critical illness myopathy [G72.81]       Date 3/19/2021       Day of ARU Week:  5   Time IN/OUT 1503/1603   Individual Tx Minutes 60   Group Tx Minutes    Co-Treat Minutes    Concurrent Tx Minutes    TOTAL Tx Time Mins 60   Variance Time    Variance Time []   Refusal due to:     []   Medical hold/reason:    []   Illness   []   Off Unit for test/procedure  []   Extra time needed to complete task  []   Therapeutic need  []   Other (specify):   Restrictions Restrictions/Precautions: General Precautions, Fall Risk, Surgical Protocols(sternal precautions (sx 2/16) 1500mL fluid restriction)         Communication with other providers: [x]   OK to see per nursing:     []   Spoke with team member regarding:      Subjective observations and cognitive status: Pt in bed on approach, agreeable to tx session.   Continues to require cues for sternal precautions     Pain level/location:    /10       Location: Denied   Discharge recommendations  Anticipated discharge date: 2021  Destination: []home alone   []home alone w assist prn   [] home w/ family    [x] Continuous supervision       []SNF    [] Assisted living     [] Other:   Continued therapy: [x]C OT  []OUTPATIENT  OT   [] No Further OT  Equipment needs: None     Toileting:   CGA during pants management up/down, cue to pull pants up to knees before standing up      Toilet Transfers:   CGA c RW, cues for sternal precautions as pt reaches for grab bar  Device Used:    []   Standard Toilet         []   Grab Bars           []  Bedside Commode       []   Elevated Toilet          []   Other:         Bed Mobility:           []   Pt received out of bed   Supine --> Sit:  Mod A  Sit Awareness  [x]   Safety During Functional Activities  []   Reinforced Patient's Precautions   []   Progress was updated and reviewed in Rehabtracker with patient and/or family this         date. Treatment Plan for Next Session: Continue OT POC      Assessment: This pt demonstrated a  Positive response to today's treatment as evidenced by improved standing tolerance. The patient is making progress toward established goals as evidenced by QI scores. Ongoing deficits are observed in the areas of recall of sternal precautions and continued focus on this is recommended.        Treatment/Activity Tolerance:   [x] Tolerated treatment with no adverse effects    [] Patient limited by fatigue  [] Patient limited by pain   [] Patient limited by medical complications:    [] Adverse reaction to Tx:   [] Significant change in status    Safety:       [x]  bed alarm set    []  chair alarm set    []  Pt refused alarms                []  Telesitter activated      [x]  Gait belt used during tx session      []other:       Number of Minutes/Billable Intervention  Therapeutic Exercise 20   ADL Self-care    Neuro Re-Ed    Therapeutic Activity 40   Group    Other:    TOTAL 60       Social History  Social/Functional History  Lives With: Spouse  Type of Home: House  Home Layout: One level  Home Access: Stairs to enter with rails  Entrance Stairs - Number of Steps: 1  Entrance Stairs - Rails: Right  Bathroom Shower/Tub: Walk-in shower(built in seat)  Bathroom Toilet: Handicap height  Bathroom Equipment: Grab bars in shower, Built-in shower seat, Grab bars around toilet, Hand-held shower  Bathroom Accessibility: Walker accessible  Home Equipment: Cane, Rolling walker, Grab bars  Receives Help From: Family  ADL Assistance: Independent  Homemaking Assistance: Independent  Homemaking Responsibilities: Yes  Meal Prep Responsibility: Secondary  Cleaning Responsibility: Secondary  Health Care Management: Primary  Ambulation Assistance: Independent(cane amritay for balance)  Transfer Assistance: Independent  Active : Yes(prior to CABG, still on precaution)  Mode of Transportation: SUV([employer] or [email or web address])  Occupation: Retired  Leisure & Hobbies: ana in house and yard, [employer] research, golf  Additional Comments: one fall without injury in August, but had to call squad to get up. Sleeps in regular flat bed. Objective                                                                                    Goals:  (Update in navigator)  Short term goals  Time Frame for Short term goals: STGs=LTGs:  Long term goals  Time Frame for Long term goals : ~10 days or until d/c  Long term goal 1: Pt will complete grooming tasks Ind  Long term goal 2: Pt will complete total body bathing c S  Long term goal 3: Pt will complete UB dressing Ind  Long term goal 4: Pt will complete LB dressing mod I using AE PRN  Long term goal 5: Pt will doff/don footwear mod I using AE PRN  Long term goals 6: Pt will complete toileting mod I  Long term goal 7: Pt will complete functional transfers (bed, chair, toilet) c DME PRN and mod I; shower transfer c S  Long term goal 8: Pt will perform therex/therax to facilitate increased strength/endurance/ax tolerance (c emphasis on dynamic standing balance/tolerance >8 mins) c SBA  Long term goal 9: Pt will complete simple homemaking tasks c DME PRN and S:        Plan of Care                                                                              Times per week: 5 days per week for a minimum of 60 minutes/day plus group as appropriate for 60 minutes.   Treatment to include Plan  Times per day: Daily  Current Treatment Recommendations: Strengthening, Balance Training, Functional Mobility Training, Endurance Training, Safety Education & Training, Patient/Caregiver Education & Training, Equipment Evaluation, Education, & procurement, Self-Care / ADL, Home Management Training, Cognitive/Perceptual Training    Electronically signed by   Kyung Poon MS, OTR/L  License #[id number]  3/19/2021, 3:14 PM

## 2021-03-19 NOTE — PROGRESS NOTES
Physical Therapy      [x] daily progress note       [] discharge       Patient Name:  Jose Anderson   :  1950 MRN: [id number]  Room:  [address] [place] [address] Date of Admission: 3/15/2021  Rehabilitation Diagnosis:   Critical illness myopathy [G72.81]  Critical illness myopathy [G72.81]       Date 3/19/2021       Day of ARU Week:  5   Time IN/OUT 5765-9414  [id number]   Individual Tx Minutes 75+45   TOTAL Tx Time Mins 120   Variance Time    Variance Time []   Refusal due to:     []   Medical hold/reason:    []   Illness   []   Off Unit for test/procedure  []   Extra time needed to complete task  []   Therapeutic need  []   Other (specify):   Restrictions Restrictions/Precautions  Restrictions/Precautions: General Precautions, Fall Risk, Surgical Protocols(sternal precautions (sx 2) 1500mL fluid restriction)  Position Activity Restriction  Sternal Precautions: No Pushing, No Pulling, 10# Lifting Restrictions(\"milk carton\")   Communication with other providers: [x]   OK to see per nursing:     []   Spoke with team member regarding:      Subjective observations and cognitive status: Pt resting in bed, feeling \"ok\", willing to participate. VS at rest: /58, HR 60, O2 sats 97%; With ax: /63, HR 64, O2 sats 97. PM: Pt resting in bed, agreeable to session. Pt and spouse wanting pt to practice steps due to having 2+ 6 steps on 2 level back deck, wanting to be able to use outside. Reports has rails on both sides, can reach both. VS in PM: /59, HR 63, O2 sats 97%   Pain level/location:  4  /10       Location: \"aching\" pain in proximal R UE, states \"it feels like it's in the muscle, was aching all night. \"  Reports Tylenol received. Discharge recommendations  Anticipated discharge date:  3/24  Destination: []??home alone   []? ?home alone with assist PRN     []? ? home w/ family      []? ? Continuous supervision  []? ?SNF    []? ? Assisted living     []? ? Other:   Continued therapy: []??C PT  []??OUTPATIENT  PT   []? ? No Further PT  Equipment needs: RW  Roselyn Mejia requires the assistance of a wheeled walker to successfully ambulate from room to room at home to allow completion of daily living tasks such as: bathing, toileting, dressing and grooming.  A wheeled walker is necessary due to the patient's unsteady gait, upper body weakness, inability to  a standard walker.  This patient can ambulate only by pushing a walker instead of using a lesser assistive device such as a cane or crutch.         Bed Mobility:         []   Pt received out of bed     Roll Left and Right  Assistance Needed: Supervision or touching assistance  Comment: SBA with cues to R without bed features  CARE Score: 4  Discharge Goal: Independent    Lying to Sitting on Side of Bed  Assistance Needed: Partial/moderate assistance  Comment: Min A from R SL without bed features  CARE Score: 3  Discharge Goal: Independent    Transfers:    Sit to Stand  Assistance Needed: Supervision or touching assistance  Comment: SB-CGA from [facility], bed; cues to \"hug chest\" before sitting vs leaving hands on AD  CARE Score: 4  Discharge Goal: Independent    Chair/Bed-to-Chair Transfer  Assistance Needed: Supervision or touching assistance  Comment: SBA with RW  CARE Score: 4  Discharge Goal: Independent       Ambulation:    Walking Ability  Does the Patient Walk?: Yes     Walk 150 Feet  Assistance Needed: Supervision or touching assistance  Comment: SBA with RW in recip pattern, takes appropriate standing rest breaks; amb 201'+207'+205'+152'  CARE Score: 4  Discharge Goal: Supervision or touching assistance     1 Step (Curb)  Assistance Needed: Supervision or touching assistance  Comment: SBA 3 trials, CGA one trial on descent due to one mild LOB on descent, min cues for staying close to AD  CARE Score: 4  Discharge Goal: Supervision or touching assistance     4 Steps  Assistance Needed: Supervision or touching assistance  Comment: CG-SBA 5 steps in non-recip pattern, ablet to demonstrate min UE support on B rails. Reason if not Attempted: Not applicable  CARE Score: 4  Discharge Goal: Not Applicable      Additional Therapeutic activities/exercises completed this date:     [x]   Nu-step:  Time: 5' 23\"       Level: 3        #Steps: 241 using B LEs only      []   Rebounder:    []  Seated     []  Standing        []   Balance training         []   Postural training    []   Supine ther ex (reps/sets):     [x]   Seated ther ex (reps/sets): Pt was instructed in Intermediate Cardiac ex program: cues for PLB and ex technique  Overhead Side Stretch x 10  Ankle Pumps/ Heel Raises x 10  Marching Seated x 10  Forward Arm Raises x 10  Side Arm Raises x 10  Arm Crosses x 10  Arm Circles Forwards and Backwards x 10  SittingTrunkTwist x 10   []   Standing ther ex (reps/sets):     []   Other:                []   Other:   []   Other:      Patient/Caregiver Education and Training:   [x]   Bed Mobility/Transfer technique/safety  [x]   Gait technique/sequencing  [x]   Proper use of assistive device  [x]   Advanced mobility safety and technique  [x]   Reinforced patient's precautions/mobility while maintaining precautions  []   Postural awareness  []   Family training  [x]   Progress was updated and reviewed in Rehabtracker with patient and/or family this date. Specific instructions for next treatment: car transfer, cardiac ed/ex, gait progression including uneven surfaces, obstacle management including side stepping through small spaces, quad and glut strengthening.     Treatment/Activity Tolerance:   [x] Tolerated treatment with no adverse effects    [] Patient limited by fatigue  [] Patient limited by pain   [] Patient limited by medical complications:    [] Adverse reaction to Tx:   [] Significant change in status    Safety:       []  bed alarm set    [x]  chair alarm set    []  Pt refused alarms                []  Telesitter activated      [x]  Gait belt used during tx session      []other:         Number of Minutes/Billable Intervention  Gait Training 60   Therapeutic Exercise 30   Neuro Re-Ed    Therapeutic Activity 30   Wheelchair Propulsion    Group    Other:    TOTAL 120         Social History  Social/Functional History  Lives With: Spouse  Type of Home: House  Home Layout: One level  Home Access: Stairs to enter with rails  Entrance Stairs - Number of Steps: 1  Entrance Stairs - Rails: Right  Bathroom Shower/Tub: Walk-in shower(built in seat)  Bathroom Toilet: Handicap height  Bathroom Equipment: Grab bars in shower, Built-in shower seat, Grab bars around toilet, Hand-held shower  Bathroom Accessibility: Walker accessible  Home Equipment: Cane, Rolling walker, Grab bars  Receives Help From: Family  ADL Assistance: Independent  Homemaking Assistance: Independent  Homemaking Responsibilities: Yes  Meal Prep Responsibility: Secondary  Cleaning Responsibility: Secondary  Health Care Management: Primary  Ambulation Assistance: Independent(cane occasionaly for balance)  Transfer Assistance: Independent  Active : Yes(prior to CABG, still on precaution)  Mode of Transportation: [email or web address]  Occupation: Retired  Leisure & Hobbies: ana in house and yard, online research, golf  Additional Comments: one fall without injury in August, but had to call squad to get up. Sleeps in regular flat bed.     Objective                                                                                    Goals:  (Update in navigator)  Short term goals  Time Frame for Short term goals: 10 days STG=LTG  Short term goal 1: pt will perform all bed mobility with mod I  Short term goal 2: pt will perform sit tostand and pivot transfers with mod I, car transfers with CGA for LEs  Short term goal 3: pt will ambulate on level and unlevel surfaces with 2ww with mod I , level surfaces 200' with supervision  Short term goal 4: Pt will ascend/descend 1 step with 2ww with supervision  Short term goal 5: pt will retrieve light item from floor with reacher and 2ww with mod I:   :        Plan of Care                                                                              Times per week: 5 days per week for a minimum of 60 minutes/day plus group as appropriate for 60 minutes.   Treatment to include Current Treatment Recommendations: Strengthening, Gait Training, Patient/Caregiver Education & Training, Stair training, IADL Training, Equipment Evaluation, Education, & procurement, Balance Training, Neuromuscular Re-education, Pain Management, Functional Mobility Training, Endurance Training, Home Exercise Program, Transfer Training, Safety Education & Training, Positioning    Electronically signed by   Mark Argueta PTA #[id number]  3/19/2021, 4:13 PM

## 2021-03-19 NOTE — PLAN OF CARE
Problem: Infection:  Goal: Will remain free from infection  Description: Will remain free from infection  3/19/2021 0327 by Ijeoma Osorio RN  Outcome: Ongoing  3/18/2021 1425 by Clinton Albert RN  Outcome: Ongoing     Problem: Safety:  Goal: Free from accidental physical injury  Description: Free from accidental physical injury  3/19/2021 0327 by Ijeoma Osorio RN  Outcome: Ongoing  3/18/2021 1425 by Clinton Albert RN  Outcome: Ongoing  Goal: Free from intentional harm  Description: Free from intentional harm  3/19/2021 0327 by Ijeoma Osorio RN  Outcome: Ongoing  3/18/2021 1425 by Clinton Albert RN  Outcome: Ongoing     Problem: Daily Care:  Goal: Daily care needs are met  Description: Daily care needs are met  3/19/2021 0327 by Ijeoma Osorio RN  Outcome: Ongoing  3/18/2021 1425 by Clinton Albert RN  Outcome: Ongoing     Problem: Pain:  Goal: Patient's pain/discomfort is manageable  Description: Patient's pain/discomfort is manageable  3/19/2021 0327 by Ijeoma Osorio RN  Outcome: Ongoing  3/18/2021 1425 by Clinton Albert RN  Outcome: Ongoing     Problem: Skin Integrity:  Goal: Skin integrity will stabilize  Description: Skin integrity will stabilize  3/19/2021 0327 by Ijeoma Osorio RN  Outcome: Ongoing  3/18/2021 1425 by Clinton Albert RN  Outcome: Ongoing     Problem: Discharge Planning:  Goal: Patients continuum of care needs are met  Description: Patients continuum of care needs are met  3/19/2021 0327 by Ijeoma Osorio RN  Outcome: Ongoing  3/18/2021 1425 by Clinton Albert RN  Outcome: Ongoing     Problem: Discharge Planning:  Goal: Discharged to appropriate level of care  Description: Discharged to appropriate level of care  3/19/2021 0327 by Ijeoma Osorio RN  Outcome: Ongoing  3/18/2021 1425 by Clinton Albert RN  Outcome: Ongoing     Problem: Serum Glucose Level - Abnormal:  Goal: Ability to maintain appropriate glucose levels will improve  Description: Ability to maintain appropriate glucose levels will improve  3/19/2021 0327 by Paulino Chavez RN  Outcome: Ongoing  3/18/2021 1425 by Bennie Thacker RN  Outcome: Ongoing     Problem: Sensory Perception - Impaired:  Goal: Ability to maintain a stable neurologic state will improve  Description: Ability to maintain a stable neurologic state will improve  3/19/2021 0327 by Paulino Chavez RN  Outcome: Ongoing  3/18/2021 1425 by Bennie Thacker RN  Outcome: Ongoing     Problem: Falls - Risk of:  Goal: Will remain free from falls  Description: Will remain free from falls  3/19/2021 0327 by Paulino Chavez RN  Outcome: Ongoing  3/18/2021 1425 by Bennie Thacker RN  Outcome: Ongoing  Goal: Absence of physical injury  Description: Absence of physical injury  3/19/2021 0327 by Paulino Chavez RN  Outcome: Ongoing  3/18/2021 1425 by Bennie Thacker RN  Outcome: Ongoing     Problem: Skin Integrity:  Goal: Will show no infection signs and symptoms  Description: Will show no infection signs and symptoms  3/19/2021 0327 by Paulino Chavez RN  Outcome: Ongoing  3/18/2021 1425 by Bennie Thacker RN  Outcome: Ongoing  Goal: Absence of new skin breakdown  Description: Absence of new skin breakdown  3/19/2021 0327 by Paulino Chavez RN  Outcome: Ongoing  3/18/2021 1425 by Bennie Thacker RN  Outcome: Ongoing

## 2021-03-19 NOTE — PLAN OF CARE
Problem: Infection:  Goal: Will remain free from infection  Description: Will remain free from infection  3/19/2021 1031 by Madelaine Bill LPN  Outcome: Ongoing  3/19/2021 0327 by Dmitry Garcia RN  Outcome: Ongoing     Problem: Safety:  Goal: Free from accidental physical injury  Description: Free from accidental physical injury  3/19/2021 1031 by Madelaine Bill LPN  Outcome: Ongoing  3/19/2021 0327 by Dmitry Garcia RN  Outcome: Ongoing  Goal: Free from intentional harm  Description: Free from intentional harm  3/19/2021 1031 by Madelaine Bill LPN  Outcome: Ongoing  3/19/2021 0327 by Dmitry Garcia RN  Outcome: Ongoing     Problem: Daily Care:  Goal: Daily care needs are met  Description: Daily care needs are met  3/19/2021 1031 by Madelaine Bill LPN  Outcome: Ongoing  3/19/2021 0327 by Dmitry Garcia RN  Outcome: Ongoing     Problem: Pain:  Goal: Patient's pain/discomfort is manageable  Description: Patient's pain/discomfort is manageable  3/19/2021 1031 by Madelaine Bill LPN  Outcome: Ongoing  3/19/2021 0327 by Dmitry Garcia RN  Outcome: Ongoing     Problem: Skin Integrity:  Goal: Skin integrity will stabilize  Description: Skin integrity will stabilize  3/19/2021 1031 by Madelaine Bill LPN  Outcome: Ongoing  3/19/2021 0327 by Dmitry Garcia RN  Outcome: Ongoing     Problem: Discharge Planning:  Goal: Patients continuum of care needs are met  Description: Patients continuum of care needs are met  3/19/2021 1031 by Madelaine Bill LPN  Outcome: Ongoing  3/19/2021 0327 by Dmitry Garcia RN  Outcome: Ongoing     Problem: Discharge Planning:  Goal: Discharged to appropriate level of care  Description: Discharged to appropriate level of care  3/19/2021 1031 by Madelaine Bill LPN  Outcome: Ongoing  3/19/2021 0327 by Dmitry Garcia RN  Outcome: Ongoing     Problem: Serum Glucose Level - Abnormal:  Goal: Ability to maintain appropriate glucose levels will improve  Description: Ability to maintain appropriate glucose levels will improve  3/19/2021 1031 by Spencer Venegas LPN  Outcome: Ongoing  3/19/2021 0327 by Mane Navarrete RN  Outcome: Ongoing     Problem: Sensory Perception - Impaired:  Goal: Ability to maintain a stable neurologic state will improve  Description: Ability to maintain a stable neurologic state will improve  3/19/2021 1031 by Spencer Venegas LPN  Outcome: Ongoing  3/19/2021 0327 by Mane Navarrete RN  Outcome: Ongoing     Problem: Falls - Risk of:  Goal: Will remain free from falls  Description: Will remain free from falls  3/19/2021 1031 by Spencer Venegas LPN  Outcome: Ongoing  3/19/2021 0327 by Mane Navarrete RN  Outcome: Ongoing  Goal: Absence of physical injury  Description: Absence of physical injury  3/19/2021 1031 by Spencer Venegas LPN  Outcome: Ongoing  3/19/2021 0327 by Mane Navarrete RN  Outcome: Ongoing     Problem: Skin Integrity:  Goal: Will show no infection signs and symptoms  Description: Will show no infection signs and symptoms  3/19/2021 1031 by Spencer Venegas LPN  Outcome: Ongoing  3/19/2021 0327 by Mane Navarrete RN  Outcome: Ongoing  Goal: Absence of new skin breakdown  Description: Absence of new skin breakdown  3/19/2021 1031 by Spencer Venegas LPN  Outcome: Ongoing  3/19/2021 0327 by Mane Navarrete RN  Outcome: Ongoing

## 2021-03-19 NOTE — PROGRESS NOTES
RRT had set-up pt's CPAP machine earlier in the evening. RRT checking on pt'  Pt is on CPAP with nasal pillows, Cpap #10.0    Pt tolerating well.

## 2021-03-19 NOTE — PROGRESS NOTES
6  Discharge Goal: Independent    UB/LB Bathing: Shower/Bathe Self  Assistance Needed: Supervision or touching assistance  Comment: CGA to bathe UB/LB, Pt able to perform lateral lean seated to bathe posterior perineal area. CARE Score: 4  Discharge Goal: Supervision or touching assistance    UB Dressing: Upper Body Dressing  Assistance Needed: Supervision or touching assistance  Comment: supervision to don/doff shirt  CARE Score: 4  Discharge Goal: Independent         LB Dressing: Lower Body Dressing  Assistance Needed: Partial/moderate assistance  Comment: Educated pt on threading zaragoza bag through brief and pants, required min A to thread zaragoza bag through brief, able to thread through pants; Pt able to thread BLE through brief and pants using reacher; CGA to manage over hips  CARE Score: 3  Discharge Goal: Independent    Donning and [place] Footwear: Putting On/Taking Off Footwear  Assistance Needed: Substantial/maximal assistance  Comment: able to doff both socks, required max to don both socks  CARE Score: 2  Discharge Goal: Independent      Toileting: Toileting Hygiene  Assistance Needed: Partial/moderate assistance  Comment: Pt able to manage pants c CGA to steady; to complete toilet hygiene c min A to check for throughness  Reason if not Attempted: Not attempted due to medical condition or safety concerns  CARE Score: 3  Discharge Goal: Independent      Toilet Transfers:   Toilet Transfer  Assistance Needed: Supervision or touching assistance  Comment: CGA  CARE Score: 4  Discharge Goal: Independent    Physical Therapy:   Short term goals  Time Frame for Short term goals: 10 days STG=LTG  Short term goal 1: pt will perform all bed mobility with mod I  Short term goal 2: pt will perform sit tostand and pivot transfers with mod I, car transfers with CGA for LEs  Short term goal 3: pt will ambulate on level and unlevel surfaces with 2ww with mod I , level surfaces 200' with supervision  Short term goal 4: Pt 03/17/2021    HGB 8.9 (L) 03/17/2021    HCT 29.5 (L) 03/17/2021    MCV 83.8 03/17/2021     03/17/2021     Lab Results   Component Value Date    INR 1.51 03/16/2021    INR 1.54 03/06/2021    INR 1.84 03/03/2021    PROTIME 18.3 (H) 03/16/2021    PROTIME 18.7 (H) 03/06/2021    PROTIME 22.4 (H) 03/03/2021     Lab Results   Component Value Date    CREATININE 1.5 (H) 03/18/2021    BUN 75 (H) 03/18/2021     03/18/2021    K 4.4 03/18/2021    CL 95 (L) 03/18/2021    CO2 29 03/18/2021     Lab Results   Component Value Date    ALT 53 (H) 03/14/2021    AST 44 (H) 03/14/2021    ALKPHOS 303 (H) 03/14/2021    BILITOT 0.5 03/14/2021       Expected length of stay  prior to a supervised level of function for discharge home with a walker and [address] OT/PT is 3/24/2021. Recommendations:    1. Critical illness myopathy:   Demonstrating some benefit from participating in the daily occupational and physical therapy.     Reviewing techniques for self-care mobility tasks following sternal precautions. Providing him pulmonary hygiene, DVT prophylaxis, nutritional support and bowel and bladder retraining (eventually a voiding trial).  Planning for adaptive equipment training and caregiver education eventually.  Moderate verbal cues and CGA-minimal physical assistance needed for transfers today. 2. DVT prophylaxis: The Eliquis he requires for his atrial fibrillation is protective against new DVT.  I must periodically monitor his hemoglobin while on this medication.  Weightbearing activities are slowly improving daily.  GI prophylaxis offered.  No clinical signs of blood loss. 3. Hospital-acquired pneumonia: Ongoing aggressive pulmonary hygiene while monitoring O2 saturations at rest and with activity.  He remains on vancomycin, meropenem and minocycline for the time being. Currently does not require oxygen supplementation but saturations are monitored regularly. No coughing.   4. Acute kidney injury on chronic kidney disease 3B: Nephrology is monitoring the patient's recovery.  Avoiding nephrotoxic medications when possible (cautious use of vancomycin).  Avoiding drops in blood pressure with ProAmatine.  Periodic monitoring of his chemistries. 5. Uncontrolled diabetes type 2 with peripheral neuropathy: Patient requires a diet modified for carbohydrates.  He is on scheduled Lantus and Humalog as well as a Humalog sliding scale.  Should his creatinine returned to normal, it is likely he will resume his prior home medication regimen for his diabetes (Invokana, Trulicity, Amaryl and Metformin). 6. Paroxysmal atrial fibrillation: Anticoagulation with Eliquis.  Rate is controlled without medications.  Daily weights to detect any decompensation of CHF. 7. Hypertension: Demadex for cautious diuresis.  Tolerating ProAmatine and avoiding drops in blood pressure.  Vital signs are checked at rest and with activity.  Blood pressure just below target range today. 8. Moderate protein calorie malnutrition: Encouraging consistent oral intake.  Consult dietary for oral supplementation guidance.      Counseling and Coordination of Care: In care conference today I met with the patient's OT, PT, RN and . We discussed the patient's problems, progress and prognosis. Disposition issues were clarified and plans were established for ongoing rehabilitation efforts beyond the ARU stay. I reviewed this information with the patient during a second distinct visit with the patient. More than half of the total time of 36 minutes spent with the patient involved counseling and coordination of care.

## 2021-03-19 NOTE — PROGRESS NOTES
progress Note( Dr. Espinal Settler)  3/19/2021  Subjective:   Admit Date: 3/15/2021  PCP: Dk Pyle MD    Admitted For :Shortness of breath leg swelling    Consulted For:  Better control of blood glucose    Interval History: Patient seemed to be more alert today able to communicate properly  Uses CPAP at night for sleep apnea    Is more ambulatory with help    Patient had right-sided thoracentesis more around 1100 cc fluid noted below    Denies any chest pains,   Yes SOB . Seem to be better  Denies nausea or vomiting. Not eating much  No new bowel or bladder symptoms.        Intake/Output Summary (Last 24 hours) at 3/19/2021 0654  Last data filed at 3/18/2021 2104  Gross per 24 hour   Intake 837 ml   Output 2200 ml   Net -1363 ml       DATA    CBC:   Recent Labs     03/16/21  0701 03/17/21  0629 03/19/21  0632   WBC 7.6 6.2 5.5   HGB 8.4* 8.9* 9.1*    291 279    CMP:  Recent Labs     03/16/21  0701 03/17/21  0629 03/18/21  1520    138 136   K 4.1 4.1 4.4   CL 97* 97* 95*   CO2 32 34* 29   BUN 85* 81* 75*   CREATININE 1.7* 1.6* 1.5*   CALCIUM 8.3 8.5 8.3   LABALBU 2.8* 2.8* 2.9*     Lipids:   Lab Results   Component Value Date    CHOL 91 12/18/2020    HDL 43 12/18/2020    TRIG 73 03/10/2021     Glucose:  Recent Labs     03/18/21  1701 03/18/21  2009 03/19/21  0242   POCGLU 103* 280* 133*     [id number]:  Lab Results   Component Value Date    LABA1C 6.1 02/20/2021     High Sensitivity TSH:   Lab Results   Component Value Date    TSHHS 2.620 03/03/2021     Free T3: No results found for: FT3  Free T4:  Lab Results   Component Value Date    T4FREE 1.5 07/07/2020          Right-sided thoracentesis on 3/16/2021  A total of 1100 serosanguineous fluid was removed.                 Echocardiogram Limited    Result Date: 3/2/2021  Transthoracic Echocardiography Report (TTE)  Demographics   Patient Name       RASHAD TATE    Date of Study       03/02/2021   Date of Birth      1950         Gender moderate to large right and a moderate left pleural effusion. Adjacent airspace disease is some combination of atelectasis, pneumonia, and/or edema. Xr Chest Portable    Result Date: 3/3/2021  EXAMINATION: ONE XRAY VIEW OF THE CHEST 3/3/2021 5:34 am COMPARISON: 03/02/2021 HISTORY: ORDERING SYSTEM PROVIDED HISTORY: heart failure TECHNOLOGIST PROVIDED HISTORY: Reason for exam:->heart failure Reason for Exam: heart failure Acuity: Acute Type of Exam: Subsequent/Follow-up FINDINGS: Status post median sternotomy and left-sided transvenous pacer placement. There is stable cardiomegaly. The chest films taken shallow degree of inspiration accentuating heart size and bronchovascular structures. There is a small right pleural effusion. No significant left effusion is seen. The patient is undergone clipping of the left atrial appendage. There is bibasilar atelectasis. Stable exam     Xr Chest Portable    Result Date: 3/2/2021  EXAMINATION: ONE XRAY VIEW OF THE CHEST 3/2/2021 9:38 am COMPARISON: 03/01/2021 HISTORY: ORDERING SYSTEM PROVIDED HISTORY: post bilateral thoracentesis   . Patient has undergone thoracentesis. The volume of fluid in the right pleural space has decreased and/or shifted. There is some persistent right basilar atelectasis. No significant pleural effusion on the left is seen. Minimal left basilar atelectasis is noted. No pneumothorax is seen. Bilateral shoulder arthritis is noted. Pleural effusions right greater than left with bibasilar atelectasis right worse than left. No pneumothorax is seen.      Xr Chest Portable    Result Date: 3/1/2021  EXAMINATION: ONE XRAY VIEW OF THE CHEST 3/1/2021 5:12 pm COMPARISON: 02/23/2021 HISTORY: ORDERING SYSTEM PROVIDED HISTORY: chest pain, shorntess of breath TECHNOLOGIST PROVIDED HISTORY: Reason for exam:->chest pain, shorntess of breath Reason for Exam: chest pain   sob   leg swelling Acuity: Unknown Type of Exam: Unknown Relevant Medical/Surgical History: afib    cad    diabetes FINDINGS: Status post median sternotomy. Transvenous pacer remains place. Stable cardiomegaly. Right-sided pleural effusion. Bibasilar hypoaeration. Right-sided pleural effusion Bibasilar hypoaeration     Vl Dup Lower Extremity Venous Bilateral    Result Date: 3/3/2021  EXAMINATION: DUPLEX VENOUS ULTRASOUND OF THE BILATERAL LOWER EXTREMITIES, 3/3/2021 9:01 am TECHNIQUE: Duplex ultrasound using B-mode/gray scaled imaging and Doppler spectral analysis and color flow was obtained of the bilateral lower extremities. COMPARISON: None. HISTORY: ORDERING SYSTEM PROVIDED HISTORY: fevers TECHNOLOGIST PROVIDED HISTORY: Reason for exam:->fevers Reason for Exam: edema FINDINGS: The visualized veins of the bilateral lower extremities are patent and free of echogenic thrombus. The veins demonstrate good compressibility with normal color flow study and spectral analysis. No evidence of DVT in either lower extremity. Us Chest Including Mediastinum    Result Date: 3/3/2021  EXAMINATION: ULTRASOUND OF THE CHEST 3/3/2021 9:02 am COMPARISON: Chest radiograph performed earlier in the same day HISTORY: ORDERING SYSTEM PROVIDED HISTORY: ASSESS FOR PLEURAL EFFUSION TECHNOLOGIST PROVIDED HISTORY: RIGHT LUNG Reason for exam:->ASSESS FOR PLEURAL EFFUSION Reason for Exam: pleural effusion FINDINGS: Moderate right pleural effusion is present. Right pleural effusion is present. Ir Guided Thoracentesis Pleural    Result Date: 3/2/2021  PROCEDURE: ULTRASOUNDGUIDED Bilateral THORACENTESIS 3/2/2021 HISTORY: ORDERING SYSTEM PROVIDED HISTORY: Bilateral pleural effusion. T   The patient tolerated the procedure well. FINDINGS: A total of 800 ml serosanguinous fluid from left and 1050 ml serosanguineous fluid from right  was removed. Successful ultrasound guided bilateral thoracentesis.        Scheduled Medicines   Medications:    insulin lispro  0-12 Units Subcutaneous 2 times per day    insulin lispro  0-12 Units Subcutaneous TID     [Held by provider] apixaban  5 mg Oral BID    [Held by provider] aspirin  81 mg Oral Daily    insulin glargine  10 Units Subcutaneous Nightly    insulin lispro  10 Units Subcutaneous TID     meropenem  1,000 mg Intravenous Q12H    midodrine  5 mg Oral TID WC    minocycline  100 mg Oral BID    pantoprazole  40 mg Intravenous BID    rOPINIRole  0.25 mg Oral TID    torsemide  20 mg Oral BID    atorvastatin  10 mg Oral Nightly      Infusions:    dextrose           Objective:   Vitals: BP (!) 126/59   Pulse 65   Temp 97.7 °F (36.5 °C) (Oral)   Resp 16   Ht 5' 10\" (1.778 m)   Wt 215 lb 12.8 oz (97.9 kg)   SpO2 97%   BMI 30.96 kg/m²   General appearance: alert and cooperative with exam  Neck: no JVD or bruit  Thyroid : Normal lobes   Lungs: Has Vesicular Breath sounds there is breath sounds bilateral pleural effusion tapped both sides noted below  Heart:  regular rate and rhythm  Abdomen: soft, non-tender; bowel sounds normal; no masses,  no organomegaly  Musculoskeletal: Normal  Extremities: extremities normal, , no edema  Neurologic:  Awake, alert, oriented to name, place and time. Cranial nerves II-XII are grossly intact. Motor is  intact. Sensory is intact. ,  and gait is normal.    Assessment:     Patient Active Problem List:     Type 2 diabetes mellitus without complication, without long-term current use of insulin ([facility])     Ulcer of other part of lower limb     Venous hypertension, chronic, with ulcer ([address]     Ulcer of other part of foot     Pneumonia     Atrial fibrillation ([facility])     Sinus pause     PAF (paroxysmal atrial fibrillation) ([facility])     DM (diabetes mellitus) ([facility])     DMITRIY on CPAP     Hyperlipidemia     Status post incision and drainage     CKD (chronic kidney disease) stage 3, GFR 30-59 ml/min ([facility])     Hyperpotassemia     Arthritis     PVD (peripheral vascular disease) ([address]     Hematoma     Cardiac pacemaker in situ     Coronary artery stenosis     Essential hypertension     Gout     Diabetic neuropathy associated with type 2 diabetes mellitus ([facility])     Adenomatous polyp of sigmoid colon     Iron deficiency anemia due to chronic blood loss     Erythropoietin deficiency anemia     VHD (valvular heart disease)     Abnormal fractional flow reserve (FFR) on cardiac catheterization     Carotid stenosis, left     Aortic stenosis, severe     CAD in native artery     Displacement of atrial pacemaker leads     Pacemaker lead malfunction     SOB (shortness of breath)      ? ? Sepsis      Plan:     1. Reviewed POC blood glucose . Labs and X ray results   2. Reviewed Current Medicines   3. On meal/ Correction bolus Humalog/ Basal Lantus Insulin regime   4. Monitor Blood glucose frequently   5. Modified  the dose of Insulin/ other medicines as needed   6. Will not re start Trulicity at this time  7. Ultimately patient was transferred to acute rehab unit on 3/15/2021 as insurance approved for 7 days in this unit   8. Patient is participating in physical activity programs acute rehab unit  9. Will follow     .      Joey Anderson MD

## 2021-03-20 LAB
ALBUMIN SERPL-MCNC: 3.2 GM/DL (ref 3.4–5)
ANION GAP SERPL CALCULATED.3IONS-SCNC: 6 MMOL/L (ref 4–16)
BUN BLDV-MCNC: 67 MG/DL (ref 6–23)
CALCIUM SERPL-MCNC: 8.8 MG/DL (ref 8.3–10.6)
CHLORIDE BLD-SCNC: 95 MMOL/L (ref 99–110)
CO2: 38 MMOL/L (ref 21–32)
CREAT SERPL-MCNC: 1.7 MG/DL (ref 0.9–1.3)
GFR AFRICAN AMERICAN: 48 ML/MIN/1.73M2
GFR NON-AFRICAN AMERICAN: 40 ML/MIN/1.73M2
GLUCOSE BLD-MCNC: 113 MG/DL (ref 70–99)
GLUCOSE BLD-MCNC: 122 MG/DL (ref 70–99)
GLUCOSE BLD-MCNC: 145 MG/DL (ref 70–99)
GLUCOSE BLD-MCNC: 149 MG/DL (ref 70–99)
GLUCOSE BLD-MCNC: 197 MG/DL (ref 70–99)
GLUCOSE BLD-MCNC: 215 MG/DL (ref 70–99)
HCT VFR BLD CALC: 31.7 % (ref 42–52)
HEMOGLOBIN: 9.1 GM/DL (ref 13.5–18)
MCH RBC QN AUTO: 24.9 PG (ref 27–31)
MCHC RBC AUTO-ENTMCNC: 28.7 % (ref 32–36)
MCV RBC AUTO: 86.6 FL (ref 78–100)
PDW BLD-RTO: 22.6 % (ref 11.7–14.9)
PHOSPHORUS: 3.8 MG/DL (ref 2.5–4.9)
PLATELET # BLD: 283 K/CU MM (ref 140–440)
PMV BLD AUTO: 10.3 FL (ref 7.5–11.1)
POTASSIUM SERPL-SCNC: 3.8 MMOL/L (ref 3.5–5.1)
RBC # BLD: 3.66 M/CU MM (ref 4.6–6.2)
SODIUM BLD-SCNC: 139 MMOL/L (ref 135–145)
WBC # BLD: 6 K/CU MM (ref 4–10.5)

## 2021-03-20 PROCEDURE — 97110 THERAPEUTIC EXERCISES: CPT

## 2021-03-20 PROCEDURE — 97530 THERAPEUTIC ACTIVITIES: CPT

## 2021-03-20 PROCEDURE — 1280000000 HC REHAB R&B

## 2021-03-20 PROCEDURE — 6370000000 HC RX 637 (ALT 250 FOR IP): Performed by: PHYSICAL MEDICINE & REHABILITATION

## 2021-03-20 PROCEDURE — 94761 N-INVAS EAR/PLS OXIMETRY MLT: CPT

## 2021-03-20 PROCEDURE — 2580000003 HC RX 258: Performed by: PHYSICIAN ASSISTANT

## 2021-03-20 PROCEDURE — 6360000002 HC RX W HCPCS: Performed by: PHYSICIAN ASSISTANT

## 2021-03-20 PROCEDURE — 97535 SELF CARE MNGMENT TRAINING: CPT

## 2021-03-20 PROCEDURE — 80069 RENAL FUNCTION PANEL: CPT

## 2021-03-20 PROCEDURE — C9113 INJ PANTOPRAZOLE SODIUM, VIA: HCPCS | Performed by: PHYSICIAN ASSISTANT

## 2021-03-20 PROCEDURE — 6370000000 HC RX 637 (ALT 250 FOR IP): Performed by: PHYSICIAN ASSISTANT

## 2021-03-20 PROCEDURE — 97116 GAIT TRAINING THERAPY: CPT

## 2021-03-20 PROCEDURE — 82962 GLUCOSE BLOOD TEST: CPT

## 2021-03-20 PROCEDURE — 94664 DEMO&/EVAL PT USE INHALER: CPT

## 2021-03-20 PROCEDURE — 94150 VITAL CAPACITY TEST: CPT

## 2021-03-20 PROCEDURE — 36415 COLL VENOUS BLD VENIPUNCTURE: CPT

## 2021-03-20 PROCEDURE — 85027 COMPLETE CBC AUTOMATED: CPT

## 2021-03-20 RX ADMIN — APIXABAN 5 MG: 5 TABLET, FILM COATED ORAL at 20:10

## 2021-03-20 RX ADMIN — MEROPENEM 1000 MG: 1 INJECTION, POWDER, FOR SOLUTION INTRAVENOUS at 04:34

## 2021-03-20 RX ADMIN — INSULIN LISPRO 2 UNITS: 100 INJECTION, SOLUTION INTRAVENOUS; SUBCUTANEOUS at 17:01

## 2021-03-20 RX ADMIN — ROPINIROLE HYDROCHLORIDE 0.25 MG: 0.25 TABLET, FILM COATED ORAL at 20:11

## 2021-03-20 RX ADMIN — MINOCYCLINE HYDROCHLORIDE 100 MG: 100 CAPSULE ORAL at 21:16

## 2021-03-20 RX ADMIN — TORSEMIDE 20 MG: 20 TABLET ORAL at 08:26

## 2021-03-20 RX ADMIN — ROPINIROLE HYDROCHLORIDE 0.25 MG: 0.25 TABLET, FILM COATED ORAL at 13:53

## 2021-03-20 RX ADMIN — TORSEMIDE 20 MG: 20 TABLET ORAL at 20:10

## 2021-03-20 RX ADMIN — ACETAMINOPHEN 650 MG: 325 TABLET ORAL at 06:04

## 2021-03-20 RX ADMIN — PANTOPRAZOLE SODIUM 40 MG: 40 INJECTION, POWDER, FOR SOLUTION INTRAVENOUS at 20:11

## 2021-03-20 RX ADMIN — INSULIN LISPRO 4 UNITS: 100 INJECTION, SOLUTION INTRAVENOUS; SUBCUTANEOUS at 12:06

## 2021-03-20 RX ADMIN — INSULIN GLARGINE 10 UNITS: 100 INJECTION, SOLUTION SUBCUTANEOUS at 21:17

## 2021-03-20 RX ADMIN — MINOCYCLINE HYDROCHLORIDE 100 MG: 100 CAPSULE ORAL at 08:26

## 2021-03-20 RX ADMIN — PANTOPRAZOLE SODIUM 40 MG: 40 INJECTION, POWDER, FOR SOLUTION INTRAVENOUS at 09:33

## 2021-03-20 RX ADMIN — ROPINIROLE HYDROCHLORIDE 0.25 MG: 0.25 TABLET, FILM COATED ORAL at 08:26

## 2021-03-20 RX ADMIN — MEROPENEM 1000 MG: 1 INJECTION, POWDER, FOR SOLUTION INTRAVENOUS at 16:37

## 2021-03-20 RX ADMIN — ATORVASTATIN CALCIUM 10 MG: 10 TABLET, FILM COATED ORAL at 20:11

## 2021-03-20 ASSESSMENT — PAIN SCALES - GENERAL: PAINLEVEL_OUTOF10: 0

## 2021-03-20 NOTE — PLAN OF CARE
Problem: Infection:  Goal: Will remain free from infection  Description: Will remain free from infection  3/20/2021 1054 by Charlene Barclay LPN  Outcome: Ongoing  3/20/2021 0321 by Justus Tapia RN  Outcome: Ongoing     Problem: Safety:  Goal: Free from accidental physical injury  Description: Free from accidental physical injury  3/20/2021 1054 by Charlene Barclay LPN  Outcome: Ongoing  3/20/2021 0321 by Justus Tapia RN  Outcome: Ongoing  Goal: Free from intentional harm  Description: Free from intentional harm  3/20/2021 1054 by Charlene Barclay LPN  Outcome: Ongoing  3/20/2021 0321 by Justus Tapia RN  Outcome: Ongoing     Problem: Daily Care:  Goal: Daily care needs are met  Description: Daily care needs are met  3/20/2021 1054 by Charlene Barclay LPN  Outcome: Ongoing  3/20/2021 0321 by Justus Tapia RN  Outcome: Ongoing     Problem: Pain:  Goal: Patient's pain/discomfort is manageable  Description: Patient's pain/discomfort is manageable  3/20/2021 1054 by Charlene Barclay LPN  Outcome: Ongoing  3/20/2021 0321 by Justus Tapia RN  Outcome: Ongoing     Problem: Skin Integrity:  Goal: Skin integrity will stabilize  Description: Skin integrity will stabilize  3/20/2021 1054 by Charlene Barclay LPN  Outcome: Ongoing  3/20/2021 0321 by Justus Tapia RN  Outcome: Ongoing     Problem: Discharge Planning:  Goal: Patients continuum of care needs are met  Description: Patients continuum of care needs are met  3/20/2021 1054 by Charlene Barclay LPN  Outcome: Ongoing  3/20/2021 0321 by Justus Tapia RN  Outcome: Ongoing     Problem: Discharge Planning:  Goal: Discharged to appropriate level of care  Description: Discharged to appropriate level of care  3/20/2021 1054 by Charlene Barclay LPN  Outcome: Ongoing  3/20/2021 0321 by Justus Tapia RN  Outcome: Ongoing     Problem: Serum Glucose Level - Abnormal:  Goal: Ability to maintain appropriate glucose levels will improve  Description: Ability to maintain appropriate glucose levels will improve  3/20/2021 1054 by Lyle Beaulieu LPN  Outcome: Ongoing  3/20/2021 0321 by Paul Walsh RN  Outcome: Ongoing     Problem: Sensory Perception - Impaired:  Goal: Ability to maintain a stable neurologic state will improve  Description: Ability to maintain a stable neurologic state will improve  3/20/2021 1054 by Lyle Beaulieu LPN  Outcome: Ongoing  3/20/2021 0321 by Paul Walsh RN  Outcome: Ongoing     Problem: Falls - Risk of:  Goal: Will remain free from falls  Description: Will remain free from falls  3/20/2021 1054 by Lyle Beaulieu LPN  Outcome: Ongoing  3/20/2021 0321 by Paul Walsh RN  Outcome: Ongoing  Goal: Absence of physical injury  Description: Absence of physical injury  3/20/2021 1054 by Lyle Beaulieu LPN  Outcome: Ongoing  3/20/2021 0321 by Paul Walsh RN  Outcome: Ongoing     Problem: Skin Integrity:  Goal: Will show no infection signs and symptoms  Description: Will show no infection signs and symptoms  3/20/2021 1054 by Lyle Beaulieu LPN  Outcome: Ongoing  3/20/2021 0321 by Paul Walsh RN  Outcome: Ongoing  Goal: Absence of new skin breakdown  Description: Absence of new skin breakdown  3/20/2021 1054 by Lyle Beaulieu LPN  Outcome: Ongoing  3/20/2021 0321 by Paul Walsh RN  Outcome: Ongoing

## 2021-03-20 NOTE — PLAN OF CARE
Problem: Infection:  Goal: Will remain free from infection  Description: Will remain free from infection  Outcome: Ongoing     Problem: Safety:  Goal: Free from accidental physical injury  Description: Free from accidental physical injury  Outcome: Ongoing  Goal: Free from intentional harm  Description: Free from intentional harm  Outcome: Ongoing     Problem: Daily Care:  Goal: Daily care needs are met  Description: Daily care needs are met  Outcome: Ongoing     Problem: Pain:  Goal: Patient's pain/discomfort is manageable  Description: Patient's pain/discomfort is manageable  Outcome: Ongoing     Problem: Skin Integrity:  Goal: Skin integrity will stabilize  Description: Skin integrity will stabilize  Outcome: Ongoing     Problem: Discharge Planning:  Goal: Patients continuum of care needs are met  Description: Patients continuum of care needs are met  Outcome: Ongoing     Problem: Discharge Planning:  Goal: Discharged to appropriate level of care  Description: Discharged to appropriate level of care  Outcome: Ongoing     Problem: Serum Glucose Level - Abnormal:  Goal: Ability to maintain appropriate glucose levels will improve  Description: Ability to maintain appropriate glucose levels will improve  Outcome: Ongoing     Problem: Sensory Perception - Impaired:  Goal: Ability to maintain a stable neurologic state will improve  Description: Ability to maintain a stable neurologic state will improve  Outcome: Ongoing     Problem: Falls - Risk of:  Goal: Will remain free from falls  Description: Will remain free from falls  Outcome: Ongoing  Goal: Absence of physical injury  Description: Absence of physical injury  Outcome: Ongoing     Problem: Skin Integrity:  Goal: Will show no infection signs and symptoms  Description: Will show no infection signs and symptoms  Outcome: Ongoing  Goal: Absence of new skin breakdown  Description: Absence of new skin breakdown  Outcome: Ongoing

## 2021-03-20 NOTE — PROGRESS NOTES
Bed Mobility:           Rolling R/L:  SBA  Scooting:  DNT  Lying --> Sit:  Min A  Sit --> lying:  CGA to SBA    Transfers:    Sit--> Stand:  SBA to Supervision  Stand --> Sit:   SBA to Supervision  Chair-->Bed/Bed --> Chair:   DNT    Gait:    Distance:  80'  and 40' with 3 standing rest breaks for fatigue and mild SOB. Assistance:  Supervision  Device:  FWW  Gait Quality:  Good with no need for VC's for increased step clearance and stride length. Additional Therapeutic activities/exercises completed this date:     [x]   Nu-step:  Time:  10 min      Level:  3       #Steps:   532    []   Rebounder:    []  Seated     []  Standing        []   Balance training         []   Postural training    []   Supine ther ex (reps/sets):     []   Seated ther ex (reps/sets):     [x]   Standing ther ex (reps/sets):  Heel raises, kicks forward/backwards/sideways, marches, squats 2 x 10   []   Picking up object from floor (standing):                   []   Reacher used   []   Other:   []   Other:    Comments:      Patient/Caregiver Education and Training:   [x]   Bed Mobility/Transfer technique/safety  []   Gait technique/sequencing  []   Proper use of assistive device  []   Advanced mobility safety and technique  [x]   Reinforced patient's precautions/mobility while maintaining precautions  []   Postural awareness  []   Family training  []   Progress was updated and reviewed in Rehabtracker with patient and/or family this date. Treatment Plan for Next Session: Continue PT to focus on endurance training to increase safety and functional activity tolerance. Assessment: This pt demonstrated a increased tolerance to Nu-step on this date at 10 min, however required therapeutic rest breaks during ambulation down to the gym. The patient is making good progress toward established goals as evidenced by QI scores.      Treatment/Activity Tolerance:   [] Tolerated treatment with no adverse effects    [x] Patient limited by fatigue  [] Patient limited by pain   [] Patient limited by medical complications:    [] Adverse reaction to Tx:   [] Significant change in status    Safety:       [x]  bed alarm set    []  chair alarm set    []  Pt refused alarms                []  Telesitter activated      [x]  Gait belt used during tx session      [x]other:  Mask for covid precautions. Number of Minutes/Billable Intervention  Gait Training 30   Therapeutic Exercise 30   Neuro Re-Ed    Therapeutic Activity    Wheelchair Propulsion    Group    Other:    TOTAL 60         Social History  Social/Functional History  Lives With: Spouse  Type of Home: House  Home Layout: One level  Home Access: Stairs to enter with rails  Entrance Stairs - Number of Steps: 1  Entrance Stairs - Rails: Right  Bathroom Shower/Tub: Walk-in shower(built in seat)  Bathroom Toilet: Handicap height  Bathroom Equipment: Grab bars in shower, Built-in shower seat, Grab bars around toilet, Hand-held shower  Bathroom Accessibility: Walker accessible  Home Equipment: Cane, Rolling walker, Grab bars  Receives Help From: Family  ADL Assistance: Independent  Homemaking Assistance: Independent  Homemaking Responsibilities: Yes  Meal Prep Responsibility: Secondary  Cleaning Responsibility: Secondary  Health Care Management: Primary  Ambulation Assistance: Independent(cane occasionaly for balance)  Transfer Assistance: Independent  Active : Yes(prior to CABG, still on precaution)  [facility] [employer] [facility] [address] [place] [address])  Occupation: Retired  Leisure & Hobbies: ana in house and yard, online research, golf  Additional Comments: one fall without injury in August, but had to call squad to get up. Sleeps in regular flat bed.     Objective                                                                                    Goals:  (Update in navigator)  Short term goals  Time Frame for Short term goals: 10 days STG=LTG  Short term goal 1: pt will perform all bed mobility with mod I  Short term goal 2: pt will perform sit tostand and pivot transfers with mod I, car transfers with CGA for LEs  Short term goal 3: pt will ambulate on level and unlevel surfaces with 2ww with mod I , level surfaces 200' with supervision  Short term goal 4: Pt will ascend/descend 1 step with 2ww with supervision  Short term goal 5: pt will retrieve light item from floor with reacher and 2ww with mod I:   :        Plan of Care                                                                              Times per week: 5 days per week for a minimum of 60 minutes/day plus group as appropriate for 60 minutes.   Treatment to include Current Treatment Recommendations: Strengthening, Gait Training, Patient/Caregiver Education & Training, Stair training, IADL Training, Equipment Evaluation, Education, & procurement, Balance Training, Neuromuscular Re-education, Pain Management, Functional Mobility Training, Endurance Training, Home Exercise Program, Transfer Training, Safety Education & Training, Positioning    Electronically signed by   Yessenia Knight,  3/20/2021, 8:33 AM

## 2021-03-20 NOTE — PROGRESS NOTES
-01 progress Note( Dr. Victor Running)  3/20/2021  Subjective:   Admit Date: 3/15/2021  PCP: Aleyda Almodovar MD    Admitted For :Shortness of breath leg swelling    Consulted For:  Better control of blood glucose    Interval History: Patient seemed to be more alert today able to communicate properly  Uses CPAP at night for sleep apnea    Is more ambulatory with help    Patient had right-sided thoracentesis more around 1100 cc fluid noted below    Denies any chest pains,   Yes SOB . Seem to be better  Denies nausea or vomiting. Not eating much  No new bowel or bladder symptoms.        Intake/Output Summary (Last 24 hours) at 3/20/2021 0814  Last data filed at 3/20/2021 0531  Gross per 24 hour   Intake 1080 ml   Output 3975 ml   Net -2895 ml       DATA    CBC:   Recent Labs     03/19/21  0632 03/20/21  0502   WBC 5.5 6.0   HGB 9.1* 9.1*    283    CMP:  Recent Labs     03/18/21  1520 03/19/21  0632 03/20/21  0502    142 139   K 4.4 4.0 3.8   CL 95* 96* 95*   CO2 29 36* 38*   BUN 75* 70* 67*   CREATININE 1.5* 1.6* 1.7*   CALCIUM 8.3 8.6 8.8   LABALBU 2.9* 3.2* 3.2*     Lipids:   Lab Results   Component Value Date    CHOL 91 12/18/2020    HDL 43 12/18/2020    TRIG 73 03/10/2021     Glucose:  Recent Labs     03/19/21  2050 03/20/21  0216 03/20/21  0722   POCGLU 171* 145* 122*     OcstepxopdD8S:  Lab Results   Component Value Date    LABA1C 6.1 02/20/2021     High Sensitivity TSH:   Lab Results   Component Value Date    TSHHS 2.620 03/03/2021     Free T3: No results found for: FT3  Free T4:  Lab Results   Component Value Date    T4FREE 1.5 07/07/2020          Right-sided thoracentesis on 3/16/2021  A total of 1100 serosanguineous fluid was removed.                 Echocardiogram Limited    Result Date: 3/2/2021  Transthoracic Echocardiography Report (TTE)  Demographics   Patient Name       RASHAD TATE    Date of Study       03/02/2021   Date of Birth      1950         Gender              Male   Age 66 year(s)         Race                   Patient Number     [id number]         Room Number         2101   Visit Number       [id number]   Corporate ID       [id number]   Accession Number   [id number]         23 Asha Perry RVT   Ordering Physician Latasha Odell       Physician           MD  Procedure Type of Study   TTE procedure:ECHOCARDIOGRAM LIMITED. Procedure Date Date: 03/02/2021 Start: 11:53 AM Study Location: Portable Technical Quality: Adequate visualization Indications:S/P CABG. Patient Status: Routine Height: 70 inches Weight: 230 pounds BSA: 2.22 m2 BMI: 33 kg/m2 HR: 91 bpm BP: 91/45 mmHg  Conclusions   Summary  This is a limited echocardiogram.  Left ventricular systolic function is normal.  Ejection fraction is visually estimated at 55-60%. Bilateral atrial enlargement. S/p AVR with a 27 mm Medtronic Mosaic. Moderate mitral regurgitation. Moderate tricuspid regurgitation; RVSP: 50 mmHg. Severe PHTN. No evidence of any pericardial effusion. Signature   ------------------------------------------------------------------  Electronically signed by Dakota Matthews MD (Interpreting  physician) on 03/02/2021 at 05:06 PM  -    Ct Chest Wo Contrast    Result Date: 3/1/2021  EXAMINATION: CT OF THE CHEST WITHOUT CONTRAST 3/1/2021 3:57 pm T    Patient status post midline sternotomy. Immediately posterior to the sternotomy defect, there is a small gas and fluid collection which is nonspecific. It is not necessarily outside normal limits for a sternotomy that was performed 2 weeks ago. However, sterility cannot be ascertained with imaging, and therefore a small developing abscess is considered as well. No evidence of osteolysis of the sternotomy defect to suggest osteomyelitis.  Interval development of a moderate to large right and a moderate left pleural effusion. Adjacent airspace disease is some combination of atelectasis, pneumonia, and/or edema. Xr Chest Portable    Result Date: 3/3/2021  EXAMINATION: ONE XRAY VIEW OF THE CHEST 3/3/2021 5:34 am COMPARISON: 03/02/2021 HISTORY: ORDERING SYSTEM PROVIDED HISTORY: heart failure TECHNOLOGIST PROVIDED HISTORY: Reason for exam:->heart failure Reason for Exam: heart failure Acuity: Acute Type of Exam: Subsequent/Follow-up FINDINGS: Status post median sternotomy and left-sided transvenous pacer placement. There is stable cardiomegaly. The chest films taken shallow degree of inspiration accentuating heart size and bronchovascular structures. There is a small right pleural effusion. No significant left effusion is seen. The patient is undergone clipping of the left atrial appendage. There is bibasilar atelectasis. Stable exam     Xr Chest Portable    Result Date: 3/2/2021  EXAMINATION: ONE XRAY VIEW OF THE CHEST 3/2/2021 9:38 am COMPARISON: 03/01/2021 HISTORY: ORDERING SYSTEM PROVIDED HISTORY: post bilateral thoracentesis   . Patient has undergone thoracentesis. The volume of fluid in the right pleural space has decreased and/or shifted. There is some persistent right basilar atelectasis. No significant pleural effusion on the left is seen. Minimal left basilar atelectasis is noted. No pneumothorax is seen. Bilateral shoulder arthritis is noted. Pleural effusions right greater than left with bibasilar atelectasis right worse than left. No pneumothorax is seen.      Xr Chest Portable    Result Date: 3/1/2021  EXAMINATION: ONE XRAY VIEW OF THE CHEST 3/1/2021 5:12 pm COMPARISON: 02/23/2021 HISTORY: ORDERING SYSTEM PROVIDED HISTORY: chest pain, shorntess of breath TECHNOLOGIST PROVIDED HISTORY: Reason for exam:->chest pain, shorntess of breath Reason for Exam: chest pain   sob   leg swelling Acuity: Unknown Type of Exam: Unknown Relevant Medical/Surgical History: afib cad    diabetes FINDINGS: Status post median sternotomy. Transvenous pacer remains place. Stable cardiomegaly. Right-sided pleural effusion. Bibasilar hypoaeration. Right-sided pleural effusion Bibasilar hypoaeration     Vl Dup Lower Extremity Venous Bilateral    Result Date: 3/3/2021  EXAMINATION: DUPLEX VENOUS ULTRASOUND OF THE BILATERAL LOWER EXTREMITIES, 3/3/2021 9:01 am TECHNIQUE: Duplex ultrasound using B-mode/gray scaled imaging and Doppler spectral analysis and color flow was obtained of the bilateral lower extremities. COMPARISON: None. HISTORY: ORDERING SYSTEM PROVIDED HISTORY: fevers TECHNOLOGIST PROVIDED HISTORY: Reason for exam:->fevers Reason for Exam: edema FINDINGS: The visualized veins of the bilateral lower extremities are patent and free of echogenic thrombus. The veins demonstrate good compressibility with normal color flow study and spectral analysis. No evidence of DVT in either lower extremity. Us Chest Including Mediastinum    Result Date: 3/3/2021  EXAMINATION: ULTRASOUND OF THE CHEST 3/3/2021 9:02 am COMPARISON: Chest radiograph performed earlier in the same day HISTORY: ORDERING SYSTEM PROVIDED HISTORY: ASSESS FOR PLEURAL EFFUSION TECHNOLOGIST PROVIDED HISTORY: RIGHT LUNG Reason for exam:->ASSESS FOR PLEURAL EFFUSION Reason for Exam: pleural effusion FINDINGS: Moderate right pleural effusion is present. Right pleural effusion is present. Ir Guided Thoracentesis Pleural    Result Date: 3/2/2021  PROCEDURE: ULTRASOUNDGUIDED Bilateral THORACENTESIS 3/2/2021 HISTORY: ORDERING SYSTEM PROVIDED HISTORY: Bilateral pleural effusion. T   The patient tolerated the procedure well. FINDINGS: A total of 800 ml serosanguinous fluid from left and 1050 ml serosanguineous fluid from right  was removed. Successful ultrasound guided bilateral thoracentesis.        Scheduled Medicines   Medications:    insulin lispro  0-12 Units Subcutaneous 2 times per day    insulin lispro  0-12 Units Subcutaneous TID     [Held by provider] apixaban  5 mg Oral BID    [Held by provider] aspirin  81 mg Oral Daily    insulin glargine  10 Units Subcutaneous Nightly    insulin lispro  10 Units Subcutaneous TID     meropenem  1,000 mg Intravenous Q12H    midodrine  5 mg Oral TID     minocycline  100 mg Oral BID    pantoprazole  40 mg Intravenous BID    rOPINIRole  0.25 mg Oral TID    torsemide  20 mg Oral BID    atorvastatin  10 mg Oral Nightly      Infusions:    dextrose           Objective:   Vitals: /67   Pulse 62   Temp 97.5 °F (36.4 °C) (Oral)   Resp 18   Ht 5' 10\" (1.778 m)   Wt 212 lb 3.2 oz (96.3 kg)   SpO2 97%   BMI 30.45 kg/m²   General appearance: alert and cooperative with exam  Neck: no JVD or bruit  Thyroid : Normal lobes   Lungs: Has Vesicular Breath sounds there is breath sounds bilateral pleural effusion tapped both sides noted below  Heart:  regular rate and rhythm  Abdomen: soft, non-tender; bowel sounds normal; no masses,  no organomegaly  Musculoskeletal: Normal  Extremities: extremities normal, , no edema  Neurologic:  Awake, alert, oriented to name, place and time. Cranial nerves II-XII are grossly intact. Motor is  intact. Sensory is intact. ,  and gait is normal.    Assessment:     Patient Active Problem List:     Type 2 diabetes mellitus without complication, without long-term current use of insulin ([facility])     Ulcer of other part of lower limb     Venous hypertension, chronic, with ulcer ([address]     Ulcer of other part of foot     Pneumonia     Atrial fibrillation ([facility])     Sinus pause     PAF (paroxysmal atrial fibrillation) ([facility])     DM (diabetes mellitus) ([facility])     DMITRIY on CPAP     Hyperlipidemia     Status post incision and drainage     CKD (chronic kidney disease) stage 3, GFR 30-59 ml/min ([facility])     Hyperpotassemia     Arthritis     PVD (peripheral vascular disease) ([facility])     Hematoma     Cardiac pacemaker in situ     Coronary artery stenosis

## 2021-03-20 NOTE — PLAN OF CARE
Problem: Infection:  Goal: Will remain free from infection  Description: Will remain free from infection  3/20/2021 1055 by Stanislaw Alicea LPN  Outcome: Ongoing  3/20/2021 1054 by Stanislaw Alicea LPN  Outcome: Ongoing  3/20/2021 0321 by Rishi Felipe RN  Outcome: Ongoing     Problem: Safety:  Goal: Free from accidental physical injury  Description: Free from accidental physical injury  3/20/2021 1055 by Stanislaw Alicea LPN  Outcome: Ongoing  3/20/2021 1054 by Stanislaw Alicea LPN  Outcome: Ongoing  3/20/2021 0321 by Rishi Felipe RN  Outcome: Ongoing  Goal: Free from intentional harm  Description: Free from intentional harm  3/20/2021 1055 by Stanislaw Alicea LPN  Outcome: Ongoing  3/20/2021 1054 by Stanislaw Alicea LPN  Outcome: Ongoing  3/20/2021 0321 by Rishi Felipe RN  Outcome: Ongoing     Problem: Daily Care:  Goal: Daily care needs are met  Description: Daily care needs are met  3/20/2021 1055 by Stanislaw Alicea LPN  Outcome: Ongoing  3/20/2021 1054 by Stanislaw Alicea LPN  Outcome: Ongoing  3/20/2021 0321 by Rishi Felipe RN  Outcome: Ongoing     Problem: Pain:  Goal: Patient's pain/discomfort is manageable  Description: Patient's pain/discomfort is manageable  3/20/2021 1055 by Stanislaw Alicea LPN  Outcome: Ongoing  3/20/2021 1054 by Stanislaw Alicea LPN  Outcome: Ongoing  3/20/2021 0321 by Rishi Felipe RN  Outcome: Ongoing     Problem: Skin Integrity:  Goal: Skin integrity will stabilize  Description: Skin integrity will stabilize  3/20/2021 1055 by Stanislaw Alicea LPN  Outcome: Ongoing  3/20/2021 1054 by Stanislaw Alicea LPN  Outcome: Ongoing  3/20/2021 0321 by Rishi Felipe RN  Outcome: Ongoing     Problem: Discharge Planning:  Goal: Patients continuum of care needs are met  Description: Patients continuum of care needs are met  3/20/2021 1055 by Stanislaw Alicea LPN  Outcome: Ongoing  3/20/2021 1054 by Stanislaw Alicea LPN  Outcome: Ongoing  3/20/2021 0321 by Rishi Felipe RN  Outcome: Ongoing     Problem: Discharge Planning:  Goal: Discharged to appropriate level of care  Description: Discharged to appropriate level of care  3/20/2021 1055 by Stacey Aly LPN  Outcome: Ongoing  3/20/2021 1054 by Stacey Aly LPN  Outcome: Ongoing  3/20/2021 0321 by Riley Duverney, RN  Outcome: Ongoing     Problem: Serum Glucose Level - Abnormal:  Goal: Ability to maintain appropriate glucose levels will improve  Description: Ability to maintain appropriate glucose levels will improve  3/20/2021 1055 by Stacey Aly LPN  Outcome: Ongoing  3/20/2021 1054 by Stacey Aly LPN  Outcome: Ongoing  3/20/2021 0321 by Riley Duverney, RN  Outcome: Ongoing     Problem: Sensory Perception - Impaired:  Goal: Ability to maintain a stable neurologic state will improve  Description: Ability to maintain a stable neurologic state will improve  3/20/2021 1055 by Stacey Aly LPN  Outcome: Ongoing  3/20/2021 1054 by Stacey Aly LPN  Outcome: Ongoing  3/20/2021 0321 by Riley Duverney, RN  Outcome: Ongoing     Problem: Falls - Risk of:  Goal: Will remain free from falls  Description: Will remain free from falls  3/20/2021 1055 by Stacey Aly LPN  Outcome: Ongoing  3/20/2021 1054 by Stacey Aly LPN  Outcome: Ongoing  3/20/2021 0321 by Riley Duverney, RN  Outcome: Ongoing  Goal: Absence of physical injury  Description: Absence of physical injury  3/20/2021 1055 by Stacey Aly LPN  Outcome: Ongoing  3/20/2021 1054 by Stacey Aly LPN  Outcome: Ongoing  3/20/2021 0321 by Riley Duverney, RN  Outcome: Ongoing     Problem: Skin Integrity:  Goal: Will show no infection signs and symptoms  Description: Will show no infection signs and symptoms  3/20/2021 1055 by Stacey Aly LPN  Outcome: Ongoing  3/20/2021 1054 by Stacey Aly LPN  Outcome: Ongoing  3/20/2021 0321 by Riley Duverney, RN  Outcome: Ongoing  Goal: Absence of new skin breakdown  Description: Absence of new skin breakdown  3/20/2021 1055 by Stacey Aly LPN  Outcome: Ongoing  3/20/2021 1054 by Stacey Aly LPN  Outcome: Ongoing  3/20/2021 0321 by Riley Duverney, RN  Outcome: Ongoing

## 2021-03-20 NOTE — PROGRESS NOTES
Occupational Therapy  Physical Rehabilitation: OCCUPATIONAL THERAPY     [x] daily progress note       [] discharge       Patient Name:  Rosalina Garcia   :  1950 MRN: [id number]  Room:  [address] [place] [address] Date of Admission: 3/15/2021  Rehabilitation Diagnosis:   Critical illness myopathy [G72.81]  Critical illness myopathy [G72.81]       Date 3/20/2021       Day of ARU Week:  6   Time IN/OUT 1000/1100  1300/1400   Individual Tx Minutes 60+60   Group Tx Minutes    Co-Treat Minutes    Concurrent Tx Minutes    TOTAL Tx Time Mins 120   Variance Time    Variance Time []   Refusal due to:     []   Medical hold/reason:    []   Illness   []   Off Unit for test/procedure  []   Extra time needed to complete task  []   Therapeutic need  []   Other (specify):   Restrictions Restrictions/Precautions  Restrictions/Precautions: General Precautions, Fall Risk, Surgical Protocols(sternal precautions (sx 2/16) 1500mL fluid restriction)  Position Activity Restriction  Sternal Precautions: No Pushing, No Pulling, 10# Lifting Restrictions(\"milk carton\")   Communication with other providers: [x]   OK to see per nursing:     []   Spoke with team member regarding:      Subjective observations and cognitive status: Pt compliant with OT tx. Pain level/location:    /10       Location:    Discharge recommendations  Anticipated discharge date: 2021  Destination: []?home alone   []?home alone w assist prn   []? home w/ family    [x]? Continuous supervision       []? SNF    []? Assisted living     []? Other:   Continued therapy: [x]? Yamil Hitchcock OT  []? OUTPATIENT  OT   []? No Further OT  Equipment needs: None   (HIT F2 to transition between stars)      Grooming:  SBA  Oral Hygiene: SBA      Bathing:   SBA      Dressing:      Upper Body Dressing:  SBA  Lower Body Dressing: SBA with use of reacher and min verb cues for donning zaragoza cath bag. Footwear: Max A to thread eduar hose and footie socks.     Toileting:  SBA to doff/don clothes over hips x2 attempts. Pt able to move bowels on second attempt. Pt completed hygiene and clothing management with SBA. Toilet Transfers: SBA  Device Used:    []   Standard Toilet         [x]   Franck Annalee           []  Bedside Commode       []   Elevated Toilet          []   Other:        Tub/Shower Transfer:   CGA with fww for support. Device Used:    [x]   Shower Bench      []   Shower Chair      []   Tub Transfer Bench           []   Bathtub    []   Shower         []   Other:         Bed Mobility:           []   Pt received out of bed   Rolling R/L:  SBA  Scooting:  SBA  Supine --> Sit:  Min A for trunk support  Sit --> Supine:  Min A for LEs    Transfers:    Sit--> Stand:  SBA  Stand --> Sit:   SBA  Stand-Pivot:  SBA  Other:    Assistive device required for transfer:  fww      Functional Mobility:    Assistance: Fx mobility to toilet>shower>bed with SBA. During 2nd session pt completed fx mobility to/from therapy gym. (partial mobility on the way back to room)  Device:   []   Larence Flavin     []   Standard Gabriela Castellani []   Wheelchair        []   [place]       []   Ho [place]ams         []   Cardiac Gabriela Castellani       []   Other:        Homemaking Tasks:   N/A    Additional Therapeutic activities/exercises completed this date:     [x]   ADL Training: Pt wife present during session. [x]   Balance/Postural training     []   Bed/Transfer Training   [x]   Endurance Training: Pt completed stand tasks and completed GMC movements to increase stand tolerance and balance. Pt completed UE restorator to increase activity tolerance at static stand level to increase stand tolerance/balance with ADL/IADL. Pt noted with stand tolerance x2-3 min x3 trials with SBA.s.     []   Neuromuscular Re-ed   []   Nu-step:  Time:        Level:         #Steps:       []   Rebounder:    []  Seated     []  Standing        []   Supine Ther Ex (reps/sets):     []   Seated Ther Ex (reps/sets):     []   Standing Ther Ex (reps/sets):     []   Other:      Comments: Patient/Caregiver Education and Training:   [x]   Adaptive Equipment Use  [x]   Bed Mobility/Transfer Technique/Safety  [x]   Energy Conservation Tips  []   Family training  []   Postural Awareness  []   Safety During Functional Activities  []   Reinforced Patient's Precautions   []   Progress was updated and reviewed in Rehabtracker with patient and/or family this         date. Treatment Plan for Next Session:Cont POC as able. Assessment: This pt demonstrated a positive response to today's treatment as evidenced by pt compliant, tolerant, and agreeable to OT tx. The patient is making steady progress toward established goals as evidenced by QI scores. Ongoing deficits are observed in the areas of tolerance and continued focus on strength and tolerance is recommended.        Treatment/Activity Tolerance:   [x] Tolerated treatment with no adverse effects    [] Patient limited by fatigue  [] Patient limited by pain   [] Patient limited by medical complications:    [] Adverse reaction to Tx:   [] Significant change in status    Safety:       [x]  bed alarm set    []  chair alarm set    []  Pt refused alarms                []  Telesitter activated      [x]  Gait belt used during tx session      []other:       Number of Minutes/Billable Intervention  Therapeutic Exercise    ADL Self-care 75   Neuro Re-Ed    Therapeutic Activity 45   Group    Other:    TOTAL 120       Social History  Social/Functional History  Lives With: Spouse  Type of Home: House  Home Layout: One level  Home Access: Stairs to enter with rails  Entrance Stairs - Number of Steps: 1  Entrance Stairs - Rails: Right  Bathroom Shower/Tub: Walk-in shower(built in seat)  Bathroom Toilet: Handicap height  Bathroom Equipment: Grab bars in shower, Built-in shower seat, Grab bars around toilet, Hand-held shower  Bathroom Accessibility: Walker accessible  Home Equipment: Cane, Rolling walker, Grab bars  Receives Help From: Family  ADL Assistance: Independent  Homemaking Assistance: Independent  Homemaking Responsibilities: Yes  Meal Prep Responsibility: Secondary  Cleaning Responsibility: Secondary  Health Care Management: Primary  Ambulation Assistance: Independent(cane jamal for balance)  Transfer Assistance: Independent  Active : Yes(prior to CABG, still on precaution)  Mode of Transportation: SUV([employer] or [employer])  Occupation: Retired  Leisure & Hobbies: ana in house and yard, online research, golf  Additional Comments: one fall without injury in August, but had to call squad to get up. Sleeps in regular flat bed. Objective                                                                                    Goals:  (Update in navigator)  Short term goals  Time Frame for Short term goals: STGs=LTGs:  Long term goals  Time Frame for Long term goals : ~10 days or until d/c  Long term goal 1: Pt will complete grooming tasks Ind  Long term goal 2: Pt will complete total body bathing c S  Long term goal 3: Pt will complete UB dressing Ind  Long term goal 4: Pt will complete LB dressing mod I using AE PRN  Long term goal 5: Pt will doff/don footwear mod I using AE PRN  Long term goals 6: Pt will complete toileting mod I  Long term goal 7: Pt will complete functional transfers (bed, chair, toilet) c DME PRN and mod I; shower transfer c S  Long term goal 8: Pt will perform therex/therax to facilitate increased strength/endurance/ax tolerance (c emphasis on dynamic standing balance/tolerance >8 mins) c SBA  Long term goal 9: Pt will complete simple homemaking tasks c DME PRN and S:        Plan of Care                                                                              Times per week: 5 days per week for a minimum of 60 minutes/day plus group as appropriate for 60 minutes.   Treatment to include Plan  Times per day: Daily  Current Treatment Recommendations: Strengthening, Balance Training, Functional Mobility Training, Endurance Training, Safety Education & Training, Patient/Caregiver Education & Training, Equipment Evaluation, Education, & procurement, Self-Care / ADL, Home Management Training, Cognitive/Perceptual Training    Electronically signed by   Stanley MATTHEWS,  3/20/2021, 10:47 AM

## 2021-03-20 NOTE — PROGRESS NOTES
Self  Assistance Needed: Supervision or touching assistance  Comment: CGA to bathe UB/LB, Pt able to perform lateral lean seated to bathe posterior perineal area. CARE Score: 4  Discharge Goal: Supervision or touching assistance    UB Dressing: Upper Body Dressing  Assistance Needed: Supervision or touching assistance  Comment: supervision to don/doff shirt  CARE Score: 4  Discharge Goal: Independent         LB Dressing: Lower Body Dressing  Assistance Needed: Partial/moderate assistance  Comment: Educated pt on threading zaragoza bag through brief and pants, required min A to thread zaragoza bag through brief, able to thread through pants; Pt able to thread BLE through brief and pants using reacher; CGA to manage over hips  CARE Score: 3  Discharge Goal: Independent    Donning and Mize Footwear: Putting On/Taking Off Footwear  Assistance Needed: Substantial/maximal assistance  Comment: able to doff both socks, required max to don both socks  CARE Score: 2  Discharge Goal: Independent      Toileting: Toileting Hygiene  Assistance Needed: Partial/moderate assistance  Comment: Pt able to manage pants c CGA to steady; to complete toilet hygiene c min A to check for throughness  Reason if not Attempted: Not attempted due to medical condition or safety concerns  CARE Score: 3  Discharge Goal: Independent      Toilet Transfers:   Toilet Transfer  Assistance Needed: Supervision or touching assistance  Comment: CGA  CARE Score: 4  Discharge Goal: Independent    Physical Therapy:   Short term goals  Time Frame for Short term goals: 10 days STG=LTG  Short term goal 1: pt will perform all bed mobility with mod I  Short term goal 2: pt will perform sit tostand and pivot transfers with mod I, car transfers with CGA for LEs  Short term goal 3: pt will ambulate on level and unlevel surfaces with 2ww with mod I , level surfaces 200' with supervision  Short term goal 4: Pt will ascend/descend 1 step with 2ww with supervision  Short term goal 5: pt will retrieve light item from floor with reacher and 2ww with mod I            Bed Mobility:   Sit to Lying  Assistance Needed: Partial/moderate assistance  Comment: Min-Mod A for LE's , demonstrates sternal precautions without cueing  CARE Score: 3  Discharge Goal: Independent  Roll Left and Right  Assistance Needed: Supervision or touching assistance  Comment: SBA with cues to R without bed features  CARE Score: 4  Discharge Goal: Independent  Lying to Sitting on Side of Bed  Assistance Needed: Partial/moderate assistance  Comment: Min A from R SL without bed features  CARE Score: 3  Discharge Goal: Independent    Transfers:    Sit to Stand  Assistance Needed: Supervision or touching assistance  Comment: SB-CGA from [facility], bed; cues to \"hug chest\" before sitting vs leaving hands on AD  CARE Score: 4  Discharge Goal: Independent  Chair/Bed-to-Chair Transfer  Assistance Needed: Supervision or touching assistance  Comment: SBA with RW  CARE Score: 4  Discharge Goal: Independent     Car Transfer  Assistance Needed: Partial/moderate assistance  Comment: min assist for LEs into car, mod assist to shift to center of seat  CARE Score: 3  Discharge Goal: Supervision or touching assistance    Ambulation:    Walking Ability  Does the Patient Walk?: Yes     Walk 10 Feet  Assistance Needed: Supervision or touching assistance  Comment: CG with 2ww  CARE Score: 4  Discharge Goal: Independent     Walk 50 Feet with Two Turns  Assistance Needed: Partial/moderate assistance  Comment: min assist with 2ww  CARE Score: 3  Discharge Goal: Supervision or touching assistance     Walk 150 Feet  Assistance Needed: Supervision or touching assistance  Comment: SBA with RW in recip pattern, takes appropriate standing rest breaks; amb 201'+207'+205'  CARE Score: 4  Discharge Goal: Supervision or touching assistance     Walking 10 Feet on Uneven Surfaces  Assistance Needed: Partial/moderate assistance  Comment: min assist  CARE Score: 3  Discharge Goal: Independent     1 Step (Curb)  Assistance Needed: Supervision or touching assistance  Comment: SBA 3 trials, CGA one trial on descent due to one mild LOB on descent, min cues for staying close to AD  CARE Score: 4  Discharge Goal: Supervision or touching assistance     4 Steps  Assistance Needed: Supervision or touching assistance  Comment: CG-SBA 5 steps in non-recip pattern, ablet to demonstrate min UE support on B rails. Reason if not Attempted: Not applicable  CARE Score: 4  Discharge Goal: Not Applicable     12 Steps  Reason if not Attempted: Not applicable  CARE Score: 9  Discharge Goal: Not Applicable       Wheelchair:  w/c Ability: Wheelchair Ability  Uses a Wheelchair and/or Scooter?: No     Wheel 150 Feet  Assistance Needed: Supervision or touching assistance  Comment: SBA propelling wC bkwd with focus on quad strenthening 158' using B LEs only, req extra time to complete distance,  CARE Score: 4          Balance:        Object: Picking Up Object  Comment: Pt too fatigued to complete  Reason if not Attempted: Not attempted due to medical condition or safety concerns  CARE Score: 88  Discharge Goal: Independent    I      Exam:    Blood pressure 133/64, pulse 65, temperature 98 °F (36.7 °C), temperature source Oral, resp. rate 18, height 5' 10\" (1.778 m), weight 215 lb 12.8 oz (97.9 kg), SpO2 97 %. General: Up in a bedside chair. Wife present. Good conversation about discharge planning. HEENT: MMM. Neck supple. Off oxygen. Pulmonary: Clear and unlabored. Cardiac: RRR. Pacer site nontender. Abdomen: Patient's abdomen is soft and nondistended. Bowel sounds were present throughout. There was no rebound, guarding or masses noted. Upper extremities: More spontaneous arm movements. Stronger . No bruising. Lower extremities: 4 -/5 strength proximally and distally. Calf soft. Sitting balance was good.   Standing balance was fair-.    Lab Results Component Value Date    WBC 5.5 03/19/2021    HGB 9.1 (L) 03/19/2021    HCT 31.0 (L) 03/19/2021    MCV 85.4 03/19/2021     03/19/2021     Lab Results   Component Value Date    INR 1.51 03/16/2021    INR 1.54 03/06/2021    INR 1.84 03/03/2021    PROTIME 18.3 (H) 03/16/2021    PROTIME 18.7 (H) 03/06/2021    PROTIME 22.4 (H) 03/03/2021     Lab Results   Component Value Date    CREATININE 1.6 (H) 03/19/2021    BUN 70 (H) 03/19/2021     03/19/2021    K 4.0 03/19/2021    CL 96 (L) 03/19/2021    CO2 36 (H) 03/19/2021     Lab Results   Component Value Date    ALT 53 (H) 03/14/2021    AST 44 (H) 03/14/2021    ALKPHOS 303 (H) 03/14/2021    BILITOT 0.5 03/14/2021       Expected length of stay  prior to a supervised level of function for discharge home with a walker and [address] OT/PT is 3/24/2021. Recommendations:    1. Critical illness myopathy:    Continues to progress in building strength and balance during his daily occupational and physical therapy.       Ongoing practice of techniques for self-care mobility tasks following sternal precautions. [facility] has benefited from pulmonary hygiene, DVT prophylaxis, nutritional support and bowel and bladder retraining. Planning a voiding trial this weekend. Caregiver training scheduled with his wife for after the weekend. Moderate verbal cues and CGA needed for transfers today. 2. DVT prophylaxis: The Eliquis he requires for his atrial fibrillation is protective against new DVT.  I must periodically monitor his hemoglobin while on this medication.  Weightbearing activities are slowly improving daily.  GI prophylaxis offered.  No new bruising or swelling. 3. Hospital-acquired pneumonia: Ongoing aggressive pulmonary hygiene while monitoring O2 saturations at rest and with activity.  He remains on vancomycin, meropenem and minocycline through the weekend. No need for oxygen supplementation. No coughing.   4. Acute kidney injury on chronic kidney disease 3B: Nephrology is monitoring the patient's recovery.  Avoiding nephrotoxic medications when possible (cautious use of vancomycin).  Avoiding drops in blood pressure with ProAmatine.  Periodic monitoring of his chemistries. 5. Uncontrolled diabetes type 2 with peripheral neuropathy: Patient requires a diet modified for carbohydrates.  He is on scheduled Lantus and Humalog as well as a Humalog sliding scale.  Should his creatinine returned to normal, it is likely he will resume his prior home medication regimen for his diabetes (Invokana, Trulicity, Amaryl and Metformin). 6. Paroxysmal atrial fibrillation: Anticoagulation with Eliquis.  Rate is controlled without medications.  Daily weights to detect any decompensation of CHF. 7. Hypertension: Demadex for cautious diuresis.  Tolerating ProAmatine and avoiding drops in blood pressure.  Vital signs are checked at rest and with activity.  Blood pressure just below target range today. 8. Moderate protein calorie malnutrition: Encouraging consistent oral intake.  Consult dietary for oral supplementation guidance.

## 2021-03-21 LAB
ALBUMIN SERPL-MCNC: 3.4 GM/DL (ref 3.4–5)
ANION GAP SERPL CALCULATED.3IONS-SCNC: 7 MMOL/L (ref 4–16)
BUN BLDV-MCNC: 66 MG/DL (ref 6–23)
CALCIUM SERPL-MCNC: 8.7 MG/DL (ref 8.3–10.6)
CHLORIDE BLD-SCNC: 98 MMOL/L (ref 99–110)
CO2: 39 MMOL/L (ref 21–32)
CREAT SERPL-MCNC: 1.5 MG/DL (ref 0.9–1.3)
GFR AFRICAN AMERICAN: 56 ML/MIN/1.73M2
GFR NON-AFRICAN AMERICAN: 46 ML/MIN/1.73M2
GLUCOSE BLD-MCNC: 120 MG/DL (ref 70–99)
GLUCOSE BLD-MCNC: 122 MG/DL (ref 70–99)
GLUCOSE BLD-MCNC: 123 MG/DL (ref 70–99)
GLUCOSE BLD-MCNC: 128 MG/DL (ref 70–99)
GLUCOSE BLD-MCNC: 172 MG/DL (ref 70–99)
GLUCOSE BLD-MCNC: 279 MG/DL (ref 70–99)
HCT VFR BLD CALC: 31.7 % (ref 42–52)
HEMOGLOBIN: 9.2 GM/DL (ref 13.5–18)
MCH RBC QN AUTO: 25 PG (ref 27–31)
MCHC RBC AUTO-ENTMCNC: 29 % (ref 32–36)
MCV RBC AUTO: 86.1 FL (ref 78–100)
PDW BLD-RTO: 22.4 % (ref 11.7–14.9)
PHOSPHORUS: 3.4 MG/DL (ref 2.5–4.9)
PLATELET # BLD: 294 K/CU MM (ref 140–440)
PMV BLD AUTO: 10.7 FL (ref 7.5–11.1)
POTASSIUM SERPL-SCNC: 3.8 MMOL/L (ref 3.5–5.1)
RBC # BLD: 3.68 M/CU MM (ref 4.6–6.2)
SODIUM BLD-SCNC: 144 MMOL/L (ref 135–145)
WBC # BLD: 5.9 K/CU MM (ref 4–10.5)

## 2021-03-21 PROCEDURE — 2580000003 HC RX 258: Performed by: PHYSICIAN ASSISTANT

## 2021-03-21 PROCEDURE — 80069 RENAL FUNCTION PANEL: CPT

## 2021-03-21 PROCEDURE — 6360000002 HC RX W HCPCS: Performed by: PHYSICIAN ASSISTANT

## 2021-03-21 PROCEDURE — 6370000000 HC RX 637 (ALT 250 FOR IP): Performed by: PHYSICAL MEDICINE & REHABILITATION

## 2021-03-21 PROCEDURE — 94150 VITAL CAPACITY TEST: CPT

## 2021-03-21 PROCEDURE — 51798 US URINE CAPACITY MEASURE: CPT

## 2021-03-21 PROCEDURE — 6370000000 HC RX 637 (ALT 250 FOR IP): Performed by: PHYSICIAN ASSISTANT

## 2021-03-21 PROCEDURE — 82962 GLUCOSE BLOOD TEST: CPT

## 2021-03-21 PROCEDURE — 51700 IRRIGATION OF BLADDER: CPT

## 2021-03-21 PROCEDURE — 1280000000 HC REHAB R&B

## 2021-03-21 PROCEDURE — 85027 COMPLETE CBC AUTOMATED: CPT

## 2021-03-21 PROCEDURE — C9113 INJ PANTOPRAZOLE SODIUM, VIA: HCPCS | Performed by: PHYSICIAN ASSISTANT

## 2021-03-21 PROCEDURE — 94761 N-INVAS EAR/PLS OXIMETRY MLT: CPT

## 2021-03-21 PROCEDURE — 36415 COLL VENOUS BLD VENIPUNCTURE: CPT

## 2021-03-21 RX ADMIN — ROPINIROLE HYDROCHLORIDE 0.25 MG: 0.25 TABLET, FILM COATED ORAL at 21:07

## 2021-03-21 RX ADMIN — APIXABAN 5 MG: 5 TABLET, FILM COATED ORAL at 21:07

## 2021-03-21 RX ADMIN — TORSEMIDE 20 MG: 20 TABLET ORAL at 21:06

## 2021-03-21 RX ADMIN — PANTOPRAZOLE SODIUM 40 MG: 40 INJECTION, POWDER, FOR SOLUTION INTRAVENOUS at 21:06

## 2021-03-21 RX ADMIN — ATORVASTATIN CALCIUM 10 MG: 10 TABLET, FILM COATED ORAL at 21:07

## 2021-03-21 RX ADMIN — APIXABAN 5 MG: 5 TABLET, FILM COATED ORAL at 08:25

## 2021-03-21 RX ADMIN — ASPIRIN 81 MG CHEWABLE TABLET 81 MG: 81 TABLET CHEWABLE at 08:25

## 2021-03-21 RX ADMIN — TORSEMIDE 20 MG: 20 TABLET ORAL at 08:25

## 2021-03-21 RX ADMIN — MEROPENEM 1000 MG: 1 INJECTION, POWDER, FOR SOLUTION INTRAVENOUS at 16:39

## 2021-03-21 RX ADMIN — PANTOPRAZOLE SODIUM 40 MG: 40 INJECTION, POWDER, FOR SOLUTION INTRAVENOUS at 09:57

## 2021-03-21 RX ADMIN — MEROPENEM 1000 MG: 1 INJECTION, POWDER, FOR SOLUTION INTRAVENOUS at 05:16

## 2021-03-21 RX ADMIN — MINOCYCLINE HYDROCHLORIDE 100 MG: 100 CAPSULE ORAL at 21:07

## 2021-03-21 RX ADMIN — ROPINIROLE HYDROCHLORIDE 0.25 MG: 0.25 TABLET, FILM COATED ORAL at 08:25

## 2021-03-21 RX ADMIN — INSULIN LISPRO 2 UNITS: 100 INJECTION, SOLUTION INTRAVENOUS; SUBCUTANEOUS at 12:00

## 2021-03-21 RX ADMIN — ROPINIROLE HYDROCHLORIDE 0.25 MG: 0.25 TABLET, FILM COATED ORAL at 14:22

## 2021-03-21 RX ADMIN — INSULIN LISPRO 4 UNITS: 100 INJECTION, SOLUTION INTRAVENOUS; SUBCUTANEOUS at 17:02

## 2021-03-21 RX ADMIN — MINOCYCLINE HYDROCHLORIDE 100 MG: 100 CAPSULE ORAL at 08:25

## 2021-03-21 ASSESSMENT — PAIN SCALES - GENERAL: PAINLEVEL_OUTOF10: 0

## 2021-03-21 NOTE — PLAN OF CARE
Problem: Infection:  Goal: Will remain free from infection  Description: Will remain free from infection  3/21/2021 1041 by Sd Garza LPN  Outcome: Ongoing  3/21/2021 0051 by Larissa Chicas RN  Outcome: Ongoing     Problem: Safety:  Goal: Free from accidental physical injury  Description: Free from accidental physical injury  3/21/2021 1041 by Sd Garza LPN  Outcome: Ongoing  3/21/2021 0051 by Larissa Chicas RN  Outcome: Ongoing  Goal: Free from intentional harm  Description: Free from intentional harm  3/21/2021 1041 by Sd Garza LPN  Outcome: Ongoing  3/21/2021 0051 by Larissa Chiacs RN  Outcome: Ongoing     Problem: Daily Care:  Goal: Daily care needs are met  Description: Daily care needs are met  3/21/2021 1041 by Sd Garza LPN  Outcome: Ongoing  3/21/2021 0051 by Larissa Chicas RN  Outcome: Ongoing     Problem: Pain:  Goal: Patient's pain/discomfort is manageable  Description: Patient's pain/discomfort is manageable  3/21/2021 1041 by Sd Garza LPN  Outcome: Ongoing  3/21/2021 0051 by Larissa Chicas RN  Outcome: Ongoing     Problem: Skin Integrity:  Goal: Skin integrity will stabilize  Description: Skin integrity will stabilize  3/21/2021 1041 by Sd Garza LPN  Outcome: Ongoing  3/21/2021 0051 by Larissa Chicas RN  Outcome: Ongoing     Problem: Discharge Planning:  Goal: Patients continuum of care needs are met  Description: Patients continuum of care needs are met  3/21/2021 1041 by Sd Garza LPN  Outcome: Ongoing  3/21/2021 0051 by Larissa Chicas RN  Outcome: Ongoing     Problem: Discharge Planning:  Goal: Discharged to appropriate level of care  Description: Discharged to appropriate level of care  3/21/2021 1041 by Sd Garza LPN  Outcome: Ongoing  3/21/2021 0051 by Larissa Chicas RN  Outcome: Ongoing     Problem: Serum Glucose Level - Abnormal:  Goal: Ability to maintain appropriate glucose levels will improve  Description: Ability to maintain appropriate glucose levels will improve  3/21/2021 1041 by Nicki Mukherjee LPN  Outcome: Ongoing  3/21/2021 0051 by Serjio Dior RN  Outcome: Ongoing     Problem: Sensory Perception - Impaired:  Goal: Ability to maintain a stable neurologic state will improve  Description: Ability to maintain a stable neurologic state will improve  3/21/2021 1041 by Nicki Mukherjee LPN  Outcome: Ongoing  3/21/2021 0051 by Serjio Dior RN  Outcome: Ongoing     Problem: Falls - Risk of:  Goal: Will remain free from falls  Description: Will remain free from falls  3/21/2021 1041 by Nicki Mukherjee LPN  Outcome: Ongoing  3/21/2021 0051 by Serjio Dior RN  Outcome: Ongoing  Goal: Absence of physical injury  Description: Absence of physical injury  3/21/2021 1041 by Nicki Mukherjee LPN  Outcome: Ongoing  3/21/2021 0051 by Serjio Dior RN  Outcome: Ongoing     Problem: Skin Integrity:  Goal: Will show no infection signs and symptoms  Description: Will show no infection signs and symptoms  3/21/2021 1041 by Nicki Mukherjee LPN  Outcome: Ongoing  3/21/2021 0051 by Serjio Dior RN  Outcome: Ongoing  Goal: Absence of new skin breakdown  Description: Absence of new skin breakdown  3/21/2021 1041 by Nicki Mukherjee LPN  Outcome: Ongoing  3/21/2021 0051 by Serjio Dior RN  Outcome: Ongoing

## 2021-03-21 NOTE — PLAN OF CARE
Problem: Infection:  Goal: Will remain free from infection  Description: Will remain free from infection  3/21/2021 0051 by Chino Mi RN  Outcome: Ongoing  3/20/2021 1055 by Flora Landon LPN  Outcome: Ongoing  3/20/2021 1054 by Flora Landon LPN  Outcome: Ongoing     Problem: Safety:  Goal: Free from accidental physical injury  Description: Free from accidental physical injury  3/21/2021 0051 by Chino Mi RN  Outcome: Ongoing  3/20/2021 1055 by Flora Landon LPN  Outcome: Ongoing  3/20/2021 1054 by Flora Landon LPN  Outcome: Ongoing  Goal: Free from intentional harm  Description: Free from intentional harm  3/21/2021 0051 by Chino Mi RN  Outcome: Ongoing  3/20/2021 1055 by Flora Landon LPN  Outcome: Ongoing  3/20/2021 1054 by Flora Landon LPN  Outcome: Ongoing     Problem: Daily Care:  Goal: Daily care needs are met  Description: Daily care needs are met  3/21/2021 0051 by Chino Mi RN  Outcome: Ongoing  3/20/2021 1055 by Flora Landon LPN  Outcome: Ongoing  3/20/2021 1054 by Flora Landon LPN  Outcome: Ongoing     Problem: Pain:  Goal: Patient's pain/discomfort is manageable  Description: Patient's pain/discomfort is manageable  3/21/2021 0051 by Chino Mi RN  Outcome: Ongoing  3/20/2021 1055 by Flora Landon LPN  Outcome: Ongoing  3/20/2021 1054 by Flora Landon LPN  Outcome: Ongoing     Problem: Skin Integrity:  Goal: Skin integrity will stabilize  Description: Skin integrity will stabilize  3/21/2021 0051 by Chino Mi RN  Outcome: Ongoing  3/20/2021 1055 by Flora Landon LPN  Outcome: Ongoing  3/20/2021 1054 by Flora Landon LPN  Outcome: Ongoing     Problem: Discharge Planning:  Goal: Patients continuum of care needs are met  Description: Patients continuum of care needs are met  3/21/2021 0051 by Chino Mi RN  Outcome: Ongoing  3/20/2021 1055 by Flora Landon LPN  Outcome: Ongoing  3/20/2021 1054 by Flora Landon LPN  Outcome: Ongoing     Problem: Serum Glucose Level - Abnormal:  Goal: Ability to maintain appropriate glucose levels will improve  Description: Ability to maintain appropriate glucose levels will improve  3/21/2021 0051 by Jose Alberto Maciel RN  Outcome: Ongoing  3/20/2021 1055 by Stanislaw Alicea LPN  Outcome: Ongoing  3/20/2021 1054 by Stanislaw Alicea LPN  Outcome: Ongoing

## 2021-03-21 NOTE — PROGRESS NOTES
-01 progress Note( Dr. Evans Alamo)  3/21/2021  Subjective:   Admit Date: 3/15/2021  PCP: Tanja Galdamez MD    Admitted For :Shortness of breath leg swelling    Consulted For:  Better control of blood glucose    Interval History: Patient seemed to be more alert today able to communicate properly  Uses CPAP at night for sleep apnea    Is more ambulatory with help    Patient had right-sided thoracentesis more around 1100 cc fluid noted below    Denies any chest pains,   Yes SOB . Seem to be better  Denies nausea or vomiting. Not eating much  No new bowel or bladder symptoms.        Intake/Output Summary (Last 24 hours) at 3/21/2021 1615  Last data filed at 3/21/2021 1200  Gross per 24 hour   Intake 940 ml   Output 3450 ml   Net -2510 ml       DATA    CBC:   Recent Labs     03/19/21  0632 03/20/21  0502 03/21/21  0637   WBC 5.5 6.0 5.9   HGB 9.1* 9.1* 9.2*    283 294    CMP:  Recent Labs     03/19/21  0632 03/20/21  0502 03/21/21  0637    139 144   K 4.0 3.8 3.8   CL 96* 95* 98*   CO2 36* 38* 39*   BUN 70* 67* 66*   CREATININE 1.6* 1.7* 1.5*   CALCIUM 8.6 8.8 8.7   LABALBU 3.2* 3.2* 3.4     Lipids:   Lab Results   Component Value Date    CHOL 91 12/18/2020    HDL 43 12/18/2020    TRIG 73 03/10/2021     Glucose:  Recent Labs     03/21/21  0259 03/21/21  0714 03/21/21  1137   POCGLU 128* 122* 172*     [id number]:  Lab Results   Component Value Date    LABA1C 6.1 02/20/2021     High Sensitivity TSH:   Lab Results   Component Value Date    TSHHS 2.620 03/03/2021     Free T3: No results found for: FT3  Free T4:  Lab Results   Component Value Date    T4FREE 1.5 07/07/2020          Right-sided thoracentesis on 3/16/2021  A total of 1100 serosanguineous fluid was removed.                 Echocardiogram Limited    Result Date: 3/2/2021  Transthoracic Echocardiography Report (TTE)  Demographics   Patient Name       RASHAD TATE    Date of Study       03/02/2021   Date of Birth      1950 Gender              Male   Age                66 year(s)         Race                   Patient Number     [id number]         Room Number         2101   Visit Number       [id number]   Corporate ID       [id number]   Accession Number   [id number]         23 Asha Perry RVT   Ordering Physician Mary Summers PA-C       Physician           MD  Procedure Type of Study   TTE procedure:ECHOCARDIOGRAM LIMITED. Procedure Date Date: 03/02/2021 Start: 11:53 AM Study Location: Portable Technical Quality: Adequate visualization Indications:S/P CABG. Patient Status: Routine Height: 70 inches Weight: 230 pounds BSA: 2.22 m2 BMI: 33 kg/m2 HR: 91 bpm BP: 91/45 mmHg  Conclusions   Summary  This is a limited echocardiogram.  Left ventricular systolic function is normal.  Ejection fraction is visually estimated at 55-60%. Bilateral atrial enlargement. S/p AVR with a 27 mm Medtronic Mosaic. Moderate mitral regurgitation. Moderate tricuspid regurgitation; RVSP: 50 mmHg. Severe PHTN. No evidence of any pericardial effusion. Signature   ------------------------------------------------------------------  Electronically signed by Bari Braun MD (Interpreting  physician) on 03/02/2021 at 05:06 PM  -    Ct Chest Wo Contrast    Result Date: 3/1/2021  EXAMINATION: CT OF THE CHEST WITHOUT CONTRAST 3/1/2021 3:57 pm T    Patient status post midline sternotomy. Immediately posterior to the sternotomy defect, there is a small gas and fluid collection which is nonspecific. It is not necessarily outside normal limits for a sternotomy that was performed 2 weeks ago. However, sterility cannot be ascertained with imaging, and therefore a small developing abscess is considered as well. No evidence of osteolysis of the sternotomy defect to suggest osteomyelitis.  Interval development of a moderate to large right and a moderate left pleural effusion. Adjacent airspace disease is some combination of atelectasis, pneumonia, and/or edema. Xr Chest Portable    Result Date: 3/3/2021  EXAMINATION: ONE XRAY VIEW OF THE CHEST 3/3/2021 5:34 am COMPARISON: 03/02/2021 HISTORY: ORDERING SYSTEM PROVIDED HISTORY: heart failure TECHNOLOGIST PROVIDED HISTORY: Reason for exam:->heart failure Reason for Exam: heart failure Acuity: Acute Type of Exam: Subsequent/Follow-up FINDINGS: Status post median sternotomy and left-sided transvenous pacer placement. There is stable cardiomegaly. The chest films taken shallow degree of inspiration accentuating heart size and bronchovascular structures. There is a small right pleural effusion. No significant left effusion is seen. The patient is undergone clipping of the left atrial appendage. There is bibasilar atelectasis. Stable exam     Xr Chest Portable    Result Date: 3/2/2021  EXAMINATION: ONE XRAY VIEW OF THE CHEST 3/2/2021 9:38 am COMPARISON: 03/01/2021 HISTORY: ORDERING SYSTEM PROVIDED HISTORY: post bilateral thoracentesis   . Patient has undergone thoracentesis. The volume of fluid in the right pleural space has decreased and/or shifted. There is some persistent right basilar atelectasis. No significant pleural effusion on the left is seen. Minimal left basilar atelectasis is noted. No pneumothorax is seen. Bilateral shoulder arthritis is noted. Pleural effusions right greater than left with bibasilar atelectasis right worse than left. No pneumothorax is seen.      Xr Chest Portable    Result Date: 3/1/2021  EXAMINATION: ONE XRAY VIEW OF THE CHEST 3/1/2021 5:12 pm COMPARISON: 02/23/2021 HISTORY: ORDERING SYSTEM PROVIDED HISTORY: chest pain, shorntess of breath TECHNOLOGIST PROVIDED HISTORY: Reason for exam:->chest pain, shorntess of breath Reason for Exam: chest pain   sob   leg swelling Acuity: Unknown Type of Exam: Unknown Relevant Medical/Surgical History: afib    cad    diabetes FINDINGS: Status post median sternotomy. Transvenous pacer remains place. Stable cardiomegaly. Right-sided pleural effusion. Bibasilar hypoaeration. Right-sided pleural effusion Bibasilar hypoaeration     Vl Dup Lower Extremity Venous Bilateral    Result Date: 3/3/2021  EXAMINATION: DUPLEX VENOUS ULTRASOUND OF THE BILATERAL LOWER EXTREMITIES, 3/3/2021 9:01 am TECHNIQUE: Duplex ultrasound using B-mode/gray scaled imaging and Doppler spectral analysis and color flow was obtained of the bilateral lower extremities. COMPARISON: None. HISTORY: ORDERING SYSTEM PROVIDED HISTORY: fevers TECHNOLOGIST PROVIDED HISTORY: Reason for exam:->fevers Reason for Exam: edema FINDINGS: The visualized veins of the bilateral lower extremities are patent and free of echogenic thrombus. The veins demonstrate good compressibility with normal color flow study and spectral analysis. No evidence of DVT in either lower extremity. Us Chest Including Mediastinum    Result Date: 3/3/2021  EXAMINATION: ULTRASOUND OF THE CHEST 3/3/2021 9:02 am COMPARISON: Chest radiograph performed earlier in the same day HISTORY: ORDERING SYSTEM PROVIDED HISTORY: ASSESS FOR PLEURAL EFFUSION TECHNOLOGIST PROVIDED HISTORY: RIGHT LUNG Reason for exam:->ASSESS FOR PLEURAL EFFUSION Reason for Exam: pleural effusion FINDINGS: Moderate right pleural effusion is present. Right pleural effusion is present. Ir Guided Thoracentesis Pleural    Result Date: 3/2/2021  PROCEDURE: ULTRASOUNDGUIDED Bilateral THORACENTESIS 3/2/2021 HISTORY: ORDERING SYSTEM PROVIDED HISTORY: Bilateral pleural effusion. T   The patient tolerated the procedure well. FINDINGS: A total of 800 ml serosanguinous fluid from left and 1050 ml serosanguineous fluid from right  was removed. Successful ultrasound guided bilateral thoracentesis.        Scheduled Medicines   Medications:    insulin lispro  0-12 Units Subcutaneous 2 times per day    insulin lispro  0-12 Units Subcutaneous TID     apixaban  5 mg Oral BID    aspirin  81 mg Oral Daily    insulin glargine  10 Units Subcutaneous Nightly    insulin lispro  10 Units Subcutaneous TID     meropenem  1,000 mg Intravenous Q12H    midodrine  5 mg Oral TID WC    minocycline  100 mg Oral BID    pantoprazole  40 mg Intravenous BID    rOPINIRole  0.25 mg Oral TID    torsemide  20 mg Oral BID    atorvastatin  10 mg Oral Nightly      Infusions:    dextrose           Objective:   Vitals: BP (!) 145/65   Pulse 62   Temp 98.7 °F (37.1 °C) (Oral)   Resp 17   Ht 5' 10\" (1.778 m)   Wt 212 lb 3.2 oz (96.3 kg)   SpO2 98%   BMI 30.45 kg/m²   General appearance: alert and cooperative with exam  Neck: no JVD or bruit  Thyroid : Normal lobes   Lungs: Has Vesicular Breath sounds there is breath sounds bilateral pleural effusion tapped both sides noted below  Heart:  regular rate and rhythm  Abdomen: soft, non-tender; bowel sounds normal; no masses,  no organomegaly  Musculoskeletal: Normal  Extremities: extremities normal, , no edema  Neurologic:  Awake, alert, oriented to name, place and time. Cranial nerves II-XII are grossly intact. Motor is  intact. Sensory is intact. ,  and gait is normal.    Assessment:     Patient Active Problem List:     Type 2 diabetes mellitus without complication, without long-term current use of insulin ([facility])     Ulcer of other part of lower limb     Venous hypertension, chronic, with ulcer ([address]     Ulcer of other part of foot     Pneumonia     Atrial fibrillation ([facility])     Sinus pause     PAF (paroxysmal atrial fibrillation) ([facility])     DM (diabetes mellitus) ([facility])     DMITRIY on CPAP     Hyperlipidemia     Status post incision and drainage     CKD (chronic kidney disease) stage 3, GFR 30-59 ml/min ([facility])     Hyperpotassemia     Arthritis     PVD (peripheral vascular disease) ([facility])     Hematoma     Cardiac pacemaker in situ     Coronary artery stenosis Essential hypertension     Gout     Diabetic neuropathy associated with type 2 diabetes mellitus ([facility])     Adenomatous polyp of sigmoid colon     Iron deficiency anemia due to chronic blood loss     Erythropoietin deficiency anemia     VHD (valvular heart disease)     Abnormal fractional flow reserve (FFR) on cardiac catheterization     Carotid stenosis, left     Aortic stenosis, severe     CAD in native artery     Displacement of atrial pacemaker leads     Pacemaker lead malfunction     SOB (shortness of breath)      ? ? Sepsis      Plan:     1. Reviewed POC blood glucose . Labs and X ray results   2. Reviewed Current Medicines   3. On meal/ Correction bolus Humalog/ Basal Lantus Insulin regime   4. Monitor Blood glucose frequently   5. Modified  the dose of Insulin/ other medicines as needed   6. Will not re start Trulicity at this time  7. Ultimately patient was transferred to acute rehab unit on 3/15/2021 as insurance approved for 7 days in this unit   8. Patient is participating in physical activity programs acute rehab unit  9. Will follow     .      Lissett Huff MD

## 2021-03-22 LAB
ALBUMIN SERPL-MCNC: 3.1 GM/DL (ref 3.4–5)
ANION GAP SERPL CALCULATED.3IONS-SCNC: 8 MMOL/L (ref 4–16)
BUN BLDV-MCNC: 60 MG/DL (ref 6–23)
CALCIUM SERPL-MCNC: 8.6 MG/DL (ref 8.3–10.6)
CHLORIDE BLD-SCNC: 96 MMOL/L (ref 99–110)
CO2: 35 MMOL/L (ref 21–32)
CREAT SERPL-MCNC: 1.4 MG/DL (ref 0.9–1.3)
GFR AFRICAN AMERICAN: >60 ML/MIN/1.73M2
GFR NON-AFRICAN AMERICAN: 50 ML/MIN/1.73M2
GLUCOSE BLD-MCNC: 139 MG/DL (ref 70–99)
GLUCOSE BLD-MCNC: 150 MG/DL (ref 70–99)
GLUCOSE BLD-MCNC: 193 MG/DL (ref 70–99)
GLUCOSE BLD-MCNC: 194 MG/DL (ref 70–99)
GLUCOSE BLD-MCNC: 197 MG/DL (ref 70–99)
GLUCOSE BLD-MCNC: 197 MG/DL (ref 70–99)
GLUCOSE BLD-MCNC: 96 MG/DL (ref 70–99)
HCT VFR BLD CALC: 31.3 % (ref 42–52)
HEMOGLOBIN: 9.2 GM/DL (ref 13.5–18)
MCH RBC QN AUTO: 25.4 PG (ref 27–31)
MCHC RBC AUTO-ENTMCNC: 29.4 % (ref 32–36)
MCV RBC AUTO: 86.5 FL (ref 78–100)
PDW BLD-RTO: 22.4 % (ref 11.7–14.9)
PHOSPHORUS: 2.8 MG/DL (ref 2.5–4.9)
PLATELET # BLD: 283 K/CU MM (ref 140–440)
PMV BLD AUTO: 10.6 FL (ref 7.5–11.1)
POTASSIUM SERPL-SCNC: 3.6 MMOL/L (ref 3.5–5.1)
RBC # BLD: 3.62 M/CU MM (ref 4.6–6.2)
SODIUM BLD-SCNC: 139 MMOL/L (ref 135–145)
WBC # BLD: 5.3 K/CU MM (ref 4–10.5)

## 2021-03-22 PROCEDURE — 2580000003 HC RX 258: Performed by: PHYSICIAN ASSISTANT

## 2021-03-22 PROCEDURE — 97116 GAIT TRAINING THERAPY: CPT

## 2021-03-22 PROCEDURE — 85027 COMPLETE CBC AUTOMATED: CPT

## 2021-03-22 PROCEDURE — 94761 N-INVAS EAR/PLS OXIMETRY MLT: CPT

## 2021-03-22 PROCEDURE — 6370000000 HC RX 637 (ALT 250 FOR IP): Performed by: PHYSICIAN ASSISTANT

## 2021-03-22 PROCEDURE — 6360000002 HC RX W HCPCS: Performed by: PHYSICIAN ASSISTANT

## 2021-03-22 PROCEDURE — 97530 THERAPEUTIC ACTIVITIES: CPT

## 2021-03-22 PROCEDURE — 6370000000 HC RX 637 (ALT 250 FOR IP): Performed by: PHYSICAL MEDICINE & REHABILITATION

## 2021-03-22 PROCEDURE — 97110 THERAPEUTIC EXERCISES: CPT

## 2021-03-22 PROCEDURE — 97535 SELF CARE MNGMENT TRAINING: CPT

## 2021-03-22 PROCEDURE — 99232 SBSQ HOSP IP/OBS MODERATE 35: CPT | Performed by: PHYSICAL MEDICINE & REHABILITATION

## 2021-03-22 PROCEDURE — 82962 GLUCOSE BLOOD TEST: CPT

## 2021-03-22 PROCEDURE — 36415 COLL VENOUS BLD VENIPUNCTURE: CPT

## 2021-03-22 PROCEDURE — 1280000000 HC REHAB R&B

## 2021-03-22 PROCEDURE — 80069 RENAL FUNCTION PANEL: CPT

## 2021-03-22 PROCEDURE — C9113 INJ PANTOPRAZOLE SODIUM, VIA: HCPCS | Performed by: PHYSICIAN ASSISTANT

## 2021-03-22 PROCEDURE — 94150 VITAL CAPACITY TEST: CPT

## 2021-03-22 RX ADMIN — ATORVASTATIN CALCIUM 10 MG: 10 TABLET, FILM COATED ORAL at 20:57

## 2021-03-22 RX ADMIN — MEROPENEM 1000 MG: 1 INJECTION, POWDER, FOR SOLUTION INTRAVENOUS at 16:49

## 2021-03-22 RX ADMIN — APIXABAN 5 MG: 5 TABLET, FILM COATED ORAL at 20:56

## 2021-03-22 RX ADMIN — ACETAMINOPHEN 650 MG: 325 TABLET ORAL at 06:51

## 2021-03-22 RX ADMIN — APIXABAN 5 MG: 5 TABLET, FILM COATED ORAL at 08:42

## 2021-03-22 RX ADMIN — MINOCYCLINE HYDROCHLORIDE 100 MG: 100 CAPSULE ORAL at 11:00

## 2021-03-22 RX ADMIN — ROPINIROLE HYDROCHLORIDE 0.25 MG: 0.25 TABLET, FILM COATED ORAL at 20:57

## 2021-03-22 RX ADMIN — PANTOPRAZOLE SODIUM 40 MG: 40 INJECTION, POWDER, FOR SOLUTION INTRAVENOUS at 20:57

## 2021-03-22 RX ADMIN — ROPINIROLE HYDROCHLORIDE 0.25 MG: 0.25 TABLET, FILM COATED ORAL at 08:44

## 2021-03-22 RX ADMIN — TRAZODONE HYDROCHLORIDE 50 MG: 50 TABLET ORAL at 20:56

## 2021-03-22 RX ADMIN — INSULIN LISPRO 2 UNITS: 100 INJECTION, SOLUTION INTRAVENOUS; SUBCUTANEOUS at 08:55

## 2021-03-22 RX ADMIN — PANTOPRAZOLE SODIUM 40 MG: 40 INJECTION, POWDER, FOR SOLUTION INTRAVENOUS at 08:44

## 2021-03-22 RX ADMIN — ROPINIROLE HYDROCHLORIDE 0.25 MG: 0.25 TABLET, FILM COATED ORAL at 14:25

## 2021-03-22 RX ADMIN — INSULIN GLARGINE 10 UNITS: 100 INJECTION, SOLUTION SUBCUTANEOUS at 21:09

## 2021-03-22 RX ADMIN — TORSEMIDE 20 MG: 20 TABLET ORAL at 20:56

## 2021-03-22 RX ADMIN — MEROPENEM 1000 MG: 1 INJECTION, POWDER, FOR SOLUTION INTRAVENOUS at 04:07

## 2021-03-22 RX ADMIN — TORSEMIDE 20 MG: 20 TABLET ORAL at 08:42

## 2021-03-22 RX ADMIN — ASPIRIN 81 MG CHEWABLE TABLET 81 MG: 81 TABLET CHEWABLE at 08:42

## 2021-03-22 RX ADMIN — MINOCYCLINE HYDROCHLORIDE 100 MG: 100 CAPSULE ORAL at 21:10

## 2021-03-22 RX ADMIN — INSULIN LISPRO 2 UNITS: 100 INJECTION, SOLUTION INTRAVENOUS; SUBCUTANEOUS at 12:13

## 2021-03-22 ASSESSMENT — PAIN SCALES - GENERAL
PAINLEVEL_OUTOF10: 0
PAINLEVEL_OUTOF10: 0

## 2021-03-22 NOTE — PROGRESS NOTES
Comprehensive Nutrition Assessment    Type and Reason for Visit:  Reassess    Nutrition Recommendations/Plan:   Remove low potassium diet modifier, noted K+ wnl  Add wound healing ONS BID   Encourage po intake as able  Will closely monitor po intake, nutrition status, weight trends, poc    Nutrition Assessment:  s/p CABG AVR, pt currently on carb control/2 gm sodium/1500 ml fluid restriction/low potassium diet w/ frozen and low calorie high protein ONS, pt consuming % of meals documented in the past 72 hr, weight loss noted, wounds noted, pt at moderate nutrition risk    Malnutrition Assessment:  Malnutrition Status: At risk for malnutrition (Comment)    Context:  Acute Illness(pt unavailable at time of visit attempt)       Estimated Daily Nutrient Needs:  Energy (kcal):  1276-4116 ([place]); Weight Used for Energy Requirements:  Current     Protein (g):   (1.2-1.5 g/kg IBW); Weight Used for Protein Requirements:  Ideal        Fluid (ml/day):  9977-3129; Method Used for Fluid Requirements:  1 ml/kcal      Nutrition Related Findings:  POC glucose 139, 150      Wounds:  Diabetic Ulcer, Surgical Incision, Pressure Injury, Deep Tissue Injury, Stage II, Skin Tears       Current Nutrition Therapies:    DIET CARB CONTROL; Carb Control: 4 carb choices (60 gms)/meal; Low Sodium (2 GM); Daily Fluid Restriction: 1500 ml; Low Potassium  Dietary Nutrition Supplements: Frozen Oral Supplement  Dietary Nutrition Supplements: Low Calorie High Protein Supplement    Anthropometric Measures:  · Height: 5' 10\" (177.8 cm)  · Current Body Weight: 208 lb 5.4 oz (94.5 kg)   · Admission Body Weight: 238 lb 1.6 oz (108 kg)((3/15/21) first measured weight)    · Usual Body Weight: 201 lb (91.2 kg)(9/30/20)     · Ideal Body Weight: 166 lbs; % Ideal Body Weight 125.5 %   · BMI: 29.9  · BMI Categories: Overweight (BMI 25.0-29. 9)       Nutrition Diagnosis:   · Predicted inadequate energy intake related to increase demand for energy/nutrients as evidenced by wounds    Nutrition Interventions:   Food and/or Nutrient Delivery:  Modify Current Diet, Modify Oral Nutrition Supplement  Nutrition Education/Counseling:  No recommendation at this time   Coordination of Nutrition Care:  Continue to monitor while inpatient    Goals:  pt will consume greater yanique 75% of meals and supplements       Nutrition Monitoring and Evaluation:   Behavioral-Environmental Outcomes:  None Identified   Food/Nutrient Intake Outcomes:  Supplement Intake, Food and Nutrient Intake  Physical Signs/Symptoms Outcomes:  Biochemical Data, Weight, GI Status, Hemodynamic Status, Skin     Discharge Planning:     Too soon to determine     Electronically signed by Homero Gibson MS, RD, LD on 3/22/21 at 10:19 AM EDT    Contact: [phone]

## 2021-03-22 NOTE — PLAN OF CARE
Problem: Infection:  Goal: Will remain free from infection  Description: Will remain free from infection  3/21/2021 2336 by Joshua De La Torre RN  Outcome: Ongoing  3/21/2021 1041 by Sim Salas LPN  Outcome: Ongoing     Problem: Safety:  Goal: Free from accidental physical injury  Description: Free from accidental physical injury  3/21/2021 2336 by Joshua De La Torre RN  Outcome: Ongoing  3/21/2021 1041 by Sim Salas LPN  Outcome: Ongoing  Goal: Free from intentional harm  Description: Free from intentional harm  3/21/2021 2336 by Joshua De La Torre RN  Outcome: Ongoing  3/21/2021 1041 by Sim Salas LPN  Outcome: Ongoing     Problem: Daily Care:  Goal: Daily care needs are met  Description: Daily care needs are met  3/21/2021 2336 by Joshua De La Torre RN  Outcome: Ongoing  3/21/2021 1041 by Sim Salas LPN  Outcome: Ongoing     Problem: Pain:  Goal: Patient's pain/discomfort is manageable  Description: Patient's pain/discomfort is manageable  3/21/2021 2336 by Joshua De La Torre RN  Outcome: Ongoing  3/21/2021 1041 by Sim Salas LPN  Outcome: Ongoing     Problem: Skin Integrity:  Goal: Skin integrity will stabilize  Description: Skin integrity will stabilize  3/21/2021 2336 by Joshua De La Torre RN  Outcome: Ongoing  3/21/2021 1041 by Sim Salas LPN  Outcome: Ongoing     Problem: Discharge Planning:  Goal: Patients continuum of care needs are met  Description: Patients continuum of care needs are met  3/21/2021 2336 by Joshua De La Torre RN  Outcome: Ongoing  3/21/2021 1041 by Sim Salas LPN  Outcome: Ongoing     Problem: Discharge Planning:  Goal: Discharged to appropriate level of care  Description: Discharged to appropriate level of care  3/21/2021 2336 by Joshua De La Torre RN  Outcome: Ongoing  3/21/2021 1041 by Sim Salas LPN  Outcome: Ongoing     Problem: Serum Glucose Level - Abnormal:  Goal: Ability to maintain appropriate glucose levels will improve  Description: Ability to maintain appropriate glucose levels will improve  3/21/2021 2336 by Halina Beltran RN  Outcome: Ongoing  3/21/2021 1041 by Trell Tejeda LPN  Outcome: Ongoing     Problem: Sensory Perception - Impaired:  Goal: Ability to maintain a stable neurologic state will improve  Description: Ability to maintain a stable neurologic state will improve  3/21/2021 2336 by Halina Beltran RN  Outcome: Ongoing  3/21/2021 1041 by Trell Tejeda LPN  Outcome: Ongoing     Problem: Falls - Risk of:  Goal: Will remain free from falls  Description: Will remain free from falls  3/21/2021 2336 by Halina Beltran RN  Outcome: Ongoing  3/21/2021 1041 by Trell Tejeda LPN  Outcome: Ongoing  Goal: Absence of physical injury  Description: Absence of physical injury  3/21/2021 2336 by Halina Beltran RN  Outcome: Ongoing  3/21/2021 1041 by Trell Tejeda LPN  Outcome: Ongoing     Problem: Skin Integrity:  Goal: Will show no infection signs and symptoms  Description: Will show no infection signs and symptoms  3/21/2021 2336 by Halina Beltran RN  Outcome: Ongoing  3/21/2021 1041 by Trell Tejeda LPN  Outcome: Ongoing  Goal: Absence of new skin breakdown  Description: Absence of new skin breakdown  3/21/2021 2336 by Halina Beltran RN  Outcome: Ongoing  3/21/2021 1041 by Trell Tejeda LPN  Outcome: Ongoing

## 2021-03-22 NOTE — PROGRESS NOTES
Occupational Therapy    Physical Rehabilitation: OCCUPATIONAL THERAPY     [x] daily progress note       [] discharge       Patient Name:  Dahlia Mathis   :  1950 MRN: [id number]  Room:  [address] [place] [address] Date of Admission: 3/15/2021  Rehabilitation Diagnosis:   Critical illness myopathy [G72.81]  Critical illness myopathy [G72.81]       Date 3/22/2021       Day of ARU Week:  1   Time IN/OUT 3851-9049   Individual Tx Minutes 60   Group Tx Minutes    Co-Treat Minutes    Concurrent Tx Minutes    TOTAL Tx Time Mins 60   Variance Time    Variance Time []   Refusal due to:     []   Medical hold/reason:    []   Illness   []   Off Unit for test/procedure  []   Extra time needed to complete task  []   Therapeutic need  []   Other (specify):   Restrictions Restrictions/Precautions: General Precautions, Fall Risk, Surgical Protocols(sternal precautions (sx 2/16) 1500mL fluid restriction)         Communication with other providers: [x]   OK to see per nursing:     []   Spoke with team member regarding:      Subjective observations and cognitive status: Pt resting in bed upon arrival. Pt pleasant and agreeable to therapy. Pain level/location:    /10       Location: none    Discharge recommendations  Anticipated discharge date: 2021  Destination: []??home alone   []? ?home alone w assist prn   []? ? home w/ family    [x]? ? Continuous supervision       []? ?SNF    []? ? Assisted living     []? ? Other:   Continued therapy: [x]? ?[facility]  []??OUTPATIENT  OT   []?? No Further OT  Equipment needs: None       ADLs:    Eating: Eating  Assistance Needed: Independent  Comment: x  CARE Score: 6  Discharge Goal: Independent       Oral Hygiene: Oral Hygiene  Assistance Needed: Independent  Comment: seated in w/c at sink  CARE Score: 6  Discharge Goal: Independent    UB/LB Bathing: Shower/Bathe Self  Assistance Needed: Supervision or touching assistance  Comment: Supervision to bathe UB/LB, Pt able to perform lateral lean seated to bathe posterior perineal area  CARE Score: 4  Discharge Goal: Supervision or touching assistance    UB Dressing: Upper Body Dressing  Assistance Needed: Independent  Comment: Mod I to don/doff pull over shirt  CARE Score: 6  Discharge Goal: Independent         LB Dressing: Lower Body Dressing  Assistance Needed: Supervision or touching assistance  Comment: Supervision to don brief and pants using no AE; pt able to stand and manage brief and pants over hips  CARE Score: 4  Discharge Goal: Independent    Donning and [place] Footwear: Putting On/Taking Off Footwear  Assistance Needed: Supervision or touching assistance  Comment: supervision to doff/don hospital  CARE Score: 4  Discharge Goal: Independent      Toileting:  Toileting Hygiene  Assistance Needed: Supervision or touching assistance  Comment: Pt able to manage pants c CGA to steady; to complete toilet hygiene c min A to check for throughness  Reason if not Attempted: Not attempted due to medical condition or safety concerns  CARE Score: 4  Discharge Goal: Independent      Toilet Transfers:  supervision Toilet Transfer  Assistance Needed: Supervision or touching assistance  Comment: Supervision following sternal precautions  CARE Score: 4  Discharge Goal: Independent  Device Used:    []   Standard Toilet         []   Grab Bars           []  Bedside Commode       []   Elevated Toilet          []   Other:        Bed Mobility:           []   Pt received out of bed   Supine --> Sit:  Supervision  Sit --> Supine:  Supervision    Transfers:    Sit--> Stand:  Supervision c cues for sternal precautions   Stand --> Sit:   Supervision c cues for sternal precautions   Stand-Pivot:   Supervision   Other:    Assistive device required for transfer:   RW       Functional Mobility:   Throughout room   Assistance:  Supervision   Device:   [x]   Rolling Walker     []   Standard Walker []   Wheelchair        []   [employer]       []   4-Wheeled Sera Sexton         []   Cardiac Sera Sexton []   Other:      Additional Therapeutic activities/exercises completed this date:     [x]   ADL Training   []   Balance/Postural training     [x]   Bed/Transfer Training   []   Endurance Training   []   Neuromuscular Re-ed   []   Nu-step:  Time:        Level:         #Steps:       []   Rebounder:    []  Seated     []  Standing        []   Supine Ther Ex (reps/sets):     []   Seated Ther Ex (reps/sets):     []   Standing Ther Ex (reps/sets):     []   Other:      Comments:      Patient/Caregiver Education and Training:   []   [employer] Equipment Use  []   Bed Mobility/Transfer Technique/Safety  []   Energy Conservation Tips  []   Family training  []   Postural Awareness  []   Safety During Functional Activities  []   Reinforced Patient's Precautions   []   Progress was updated and reviewed in Rehabtracker with patient and/or family this         date. Treatment Plan for Next Session: continue OT POC       Assessment: This pt demonstrated a positive response to today's treatment as evidenced by improved ADLs. The patient is making Good progress toward established goals as evidenced by QI scores. Ongoing deficits are observed in the areas of recall of sternal precautions  and continued focus on this is recommended.        Treatment/Activity Tolerance:   [x] Tolerated treatment with no adverse effects    [] Patient limited by fatigue  [] Patient limited by pain   [] Patient limited by medical complications:    [] Adverse reaction to Tx:   [] Significant change in status    Safety:       [x]  bed alarm set    []  chair alarm set    []  Pt refused alarms                []  Telesitter activated      [x]  Gait belt used during tx session      []other:       Number of Minutes/Billable Intervention  Therapeutic Exercise    ADL Self-care 45   Neuro Re-Ed    Therapeutic Activity 15   Group    Other:    TOTAL 60       Social History  Social/Functional History  Lives With: Spouse  Type of Home: House  Home Layout: One level  Home Access: Stairs to enter with rails  Entrance Stairs - Number of Steps: 1  Entrance Stairs - Rails: Right  Bathroom Shower/Tub: Walk-in shower(built in seat)  Bathroom Toilet: Handicap height  Bathroom Equipment: Grab bars in shower, Built-in shower seat, Grab bars around toilet, Hand-held shower  Bathroom Accessibility: Walker accessible  Home Equipment: Cane, Rolling walker, Grab bars  Receives Help From: Family  ADL Assistance: [address]: Independent  Homemaking Responsibilities: Yes  Meal Prep Responsibility: Secondary  Cleaning Responsibility: Secondary  Health Care Management: Primary  Ambulation Assistance: Independent(cane occasionaly for balance)  Transfer Assistance: Independent  Active : Yes(prior to CABG, 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 Sleeps in regular flat bed.     Objective                                                                                    Goals:  (Update in navigator)  Short term goals  Time Frame for Short term goals: STGs=LTGs:  Long term goals  Time Frame for Long term goals : ~10 days or until d/c  Long term goal 1: Pt will complete grooming tasks Ind  Long term goal 2: Pt will complete total body bathing c S  Long term goal 3: Pt will complete UB dressing Ind  Long term goal 4: Pt will complete LB dressing mod I using AE PRN  Long term goal 5: Pt will doff/don footwear mod I using AE PRN  Long term goals 6: Pt will complete toileting mod I  Long term goal 7: Pt will complete functional transfers (bed, chair, toilet) c DME PRN and mod I; shower transfer c S  Long term goal 8: Pt will perform therex/therax to facilitate increased strength/endurance/ax tolerance (c emphasis on dynamic standing balance/tolerance >8 mins) c SBA  Long term goal 9: Pt will complete simple homemaking tasks c DME PRN and S:        Plan of Care                                                                              Times per week: 5 days per week for a minimum of 60 minutes/day plus group as appropriate for 60 minutes.   Treatment to include Plan  Times per day: Daily  Current Treatment Recommendations: Strengthening, Balance Training, Functional Mobility Training, Endurance Training, Safety Education & Training, Patient/Caregiver Education & Training, Equipment Evaluation, Education, & procurement, Self-Care / ADL, Home Management Training, Cognitive/Perceptual Training    Electronically signed by   Yuni Ayers   3/22/2021, 4:24 PM

## 2021-03-22 NOTE — CARE COORDINATION
DME order for RW sent to [facility] at this time. 3:25 PM  LSW called patient's wife Shantel Daily ([phone]) to discuss discharge plan. Shantel Daily states she will provide transport home at discharge. Shantel Daily also states they would still like to go with [facility] for patient's [facility].  Shantel Daily verbalized understanding with discharge plan and will call with any additional concerns.

## 2021-03-22 NOTE — PROGRESS NOTES
Physical Therapy      [x] daily progress note       [] discharge       Patient Name:  Diane Bucio   :  1950 MRN: [id number]  Room:  [address] [place] [address] Date of Admission: 3/15/2021  Rehabilitation Diagnosis:   Critical illness myopathy [G72.81]  Critical illness myopathy [G72.81]       Date 3/22/2021       Day of ARU Week:  1   Time IN/OUT 9182-5166   Individual Tx Minutes 60   TOTAL Tx Time Mins 60   Variance Time    Variance Time []   Refusal due to:     []   Medical hold/reason:    []   Illness   []   Off Unit for test/procedure  []   Extra time needed to complete task  []   Therapeutic need  []   Other (specify):   Restrictions Restrictions/Precautions  Restrictions/Precautions: General Precautions, Fall Risk, Surgical Protocols(sternal precautions (sx 2) 1500mL fluid restriction)  Position Activity Restriction  Sternal Precautions: No Pushing, No Pulling, 10# Lifting Restrictions(\"milk carton\")   Communication with other providers:  [x]   OK to see per nursing:     []   Spoke with team member regarding:      Subjective observations and cognitive status: Pt resting in bed, agreeable to session, reports looking forward to going home on Wednesday, states \"I'm ready and I'm not looking back. \"  VSS during session   Pain level/location: 0/10       Location:    Discharge recommendations  Anticipated discharge date:  3/24  Destination: []???home alone   []? ??home alone with assist PRN     []? ?? home w/ family      []? ?? Continuous supervision  []? ??SNF    []??? Assisted living     []? ?? Other:   Continued therapy: []???C PT  []???OUTPATIENT  PT   []??? No Further PT  Equipment needs: YANCY Marrufoton requires the assistance of a wheeled walker to successfully ambulate from room to room at home to allow completion of daily living tasks such as: bathing, toileting, dressing and grooming.  A wheeled walker is necessary due to the patient's unsteady gait, upper body weakness, inability to  a standard walker.  This patient can ambulate only by pushing a walker instead of using a lesser assistive device such as a cane or crutch.      Bed Mobility:           []   Pt received out of bed   Rolling R/L:  Indep to R  Scooting:  Indep  Supine --> Sit:  Supervision, cues to roll first due to pt getting legs on floor then unable to sit up; ed regarding sternal precautions with getting OOB. Pt able to follow through. Transfers:    Sit--> Stand:  SB-Supervision  Stand --> Sit:   SB-Supervision, cues to \"hug chest\" vs leaving hands on AD  Assistive device required for transfer:   RW    Gait:    Distance:  365'+ 338'  Assistance:  Supervision  Device:  RW  Gait Quality:   steady recip pattern, took 3 short standing rest breaks, O2 sats WNL.     Stairs   # Completed:   8 (max)  Assistance:    SBA, recip pattern ascending, non-recip descending  Supportive Device:  B rails, demonstrates light UE support  Height:   6\"        Additional Therapeutic activities/exercises completed this date:     []   Nu-step:  Time:        Level:         #Steps:       []   Rebounder:    []  Seated     []  Standing        []   Balance training         []   Postural training    []   Supine ther ex (reps/sets):     [x]   Seated ther ex (reps/sets):   Pt was instructed in Intermediate Cardiac ex program:   In sitting  Overhead Side Stretch x 10  Ankle Pumps/ Heel Raises x 10  Marching Seated x 10  Forward Arm Raises x 10  Side Arm Raises x 10  Arm Crosses x 10  Arm Circles Forwards and Backwards x 10  SittingTrunkTwist x 10       []   Standing ther ex (reps/sets):     []   Picked up object from floor                       []   Reacher used   []   Other:   []   Other:   []   Other:      Patient/Caregiver Education and Training:   [x]   Bed Mobility/Transfer technique/safety  [x]   Gait technique/sequencing  [x]   Proper use of assistive device  [x]   Advanced mobility safety and technique  [x]   Reinforced patient's precautions with mobility/functional tasks  []   Postural awareness  []   Family training  [x]   Progress was updated and reviewed in Rehabtracker with patient and/or family this date.     Treatment Plan for Next Session: QI next session    Treatment/Activity Tolerance:   [x] Tolerated treatment with no adverse effects    [] Patient limited by fatigue  [] Patient limited by pain   [] Patient limited by medical complications:    [] Adverse reaction to Tx:   [] Significant change in status    Safety:       []  bed alarm set    [x]  chair alarm set    []  Pt refused alarms                []  Telesitter activated      [x]  Gait belt used during tx session      []other:         Number of Minutes/Billable Intervention  Gait Training 35   Therapeutic Exercise 15   Neuro Re-Ed    Therapeutic Activity 10   Wheelchair Propulsion    Group    Other:    TOTAL 60         Social History  Social/Functional History  Lives With: Spouse  Type of Home: House  Home Layout: One level  Home Access: Stairs to enter with rails  Entrance Stairs - Number of Steps: 1  Entrance Stairs - Rails: Right  Bathroom Shower/Tub: Walk-in shower(built in seat)  Bathroom Toilet: Handicap height  Bathroom Equipment: Grab bars in shower, Built-in shower seat, Grab bars around toilet, Hand-held shower  Bathroom Accessibility: Walker accessible  Home Equipment: Cane, Rolling walker, Grab bars  Receives Help From: Family  ADL Assistance: Independent  Homemaking Assistance: Independent  Homemaking Responsibilities: Yes  Meal Prep Responsibility: Secondary  Cleaning Responsibility: Secondary  Health Care Management: Primary  Ambulation Assistance: Independent(cane occasionaly for balance)  Transfer Assistance: Independent  Active : Yes(prior to CABG, still on precaution)  Mode of Transportation: [employer]([facility] or [address] [place] [address])  Occupation: Retired  Leisure & Hobbies: ana in house and yard, online research, golf  Additional Comments: one fall without injury in August, but had to call squad to get up. Sleeps in regular flat bed. Objective                                                                                    Goals:  (Update in navigator)  Short term goals  Time Frame for Short term goals: 10 days STG=LTG  Short term goal 1: pt will perform all bed mobility with mod I  Short term goal 2: pt will perform sit tostand and pivot transfers with mod I, car transfers with CGA for LEs  Short term goal 3: pt will ambulate on level and unlevel surfaces with 2ww with mod I , level surfaces 200' with supervision  Short term goal 4: Pt will ascend/descend 1 step with 2ww with supervision  Short term goal 5: pt will retrieve light item from floor with reacher and 2ww with mod I:   :        Plan of Care                                                                              Times per week: 5 days per week for a minimum of 60 minutes/day plus group as appropriate for 60 minutes.   Treatment to include Current Treatment Recommendations: Strengthening, Gait Training, Patient/Caregiver Education & Training, Stair training, IADL Training, Equipment Evaluation, Education, & procurement, Balance Training, Neuromuscular Re-education, Pain Management, Functional Mobility Training, Endurance Training, Home Exercise Program, Transfer Training, Safety Education & Training, Positioning    Electronically signed by   Alexander Mitchell, PTA #[id number]  3/22/2021, 8:42 AM

## 2021-03-22 NOTE — PROGRESS NOTES
precautions  Stand --> Sit:   Supervision, min cues for sternal precautions  Stand-Pivot:   Supervision  Other:    Assistive device required for transfer:    RW      Functional Mobility:    Assistance:  Supervision ~175 ft + 75 ft  Device:   [x]   Rolling Walker     []   Standard Walker []   Wheelchair        []   [employer]       []   4-Wheeled Baronmaritza Oviedonce         []   Cardiac Walker       []   Other:        Homemaking Tasks:   Educated pt on [employer] during kitchen tasks, however pt and wife report that wife will be assisting with all IADLs initially at discharge so pt declined to complete a simple meal prep ax today      Additional Therapeutic activities/exercises completed this date:     [x]   ADL Training   [x]   Balance/Postural training: Pt stood x 5.5 min at RW with SBA while completing dynamic stand task while reaching outside base of support to facilitate increased balance for ADL tasks and transfers. Pt stood on blue stability trainers for increased balance challenge; was able to maintain balance c 0 UE support ~45 secs but required 1 UE support remainder of time       [x]   Bed/Transfer Training   [x]   Endurance Training   []   Neuromuscular Re-ed   []   Nu-step:  Time:        Level:         #Steps:       []   Rebounder:    []  Seated     []  Standing        []   Supine Ther Ex (reps/sets):     []   Seated Ther Ex (reps/sets):     [x]   Standing Ther Ex (reps/sets) pt able to stand x6 mins: Vital Signs   HR: 70,   O2 99  Education:  Pt was instructed in Sternal Precautions, Purpose of Exercise Program, Ambar Scale and Signs and Symptoms of Exercise Intolerance per Cardiac Protocol  Pt was instructed in Intermediate Cardiac ex program:   Overhead Side Stretch x 10  Ankle Pumps/ Heel Raises x 20 seated  Marching x 10  Forward Arm Raises x 10  Side Arm Raises x 10  Arm Crosses x 10  Arm Circles Forwards x10  SittingTrunkTwist x 10               Comments:   All intervention performed to increase pt's endurance, ax tolerance, balance,   and I c ADLs/IADLs and functional transfers/mobility. Patient/Caregiver Education and Training:   []   [employer] Equipment Use  [x]   Bed Mobility/Transfer Technique/Safety  []   Energy Conservation Tips  []   Family training  [x]   Postural Awareness  [x]   Safety During Functional Activities  []   Reinforced Patient's Precautions   []   Progress was updated and reviewed in Rehabtracker with patient and/or family this         date. Treatment Plan for Next Session: Continue OT POC     Assessment: This pt demonstrated a  positive response to today's treatment as evidenced by improved sit<>stand transfers and standing endurance  The patient is making  progress toward established goals as evidenced by QI scores. Ongoing deficits are observed in the areas of recall of sternal precautions and continued focus on this is recommended.        Treatment/Activity Tolerance:   [x] Tolerated treatment with no adverse effects    [] Patient limited by fatigue  [] Patient limited by pain   [] Patient limited by medical complications:    [] Adverse reaction to Tx:   [] Significant change in status    Safety:       [x]  bed alarm set    []  chair alarm set    []  Pt refused alarms                []  Telesitter activated      [x]  Gait belt used during tx session      []other:       Number of Minutes/Billable Intervention  Therapeutic Exercise 15   ADL Self-care    Neuro Re-Ed    Therapeutic Activity 45   Group    Other:    TOTAL 60       Social History  Social/Functional History  Lives With: Spouse  Type of Home: House  Home Layout: One level  Home Access: Stairs to enter with rails  Entrance Stairs - Number of Steps: 1  Entrance Stairs - Rails: Right  Bathroom Shower/Tub: Walk-in shower(built in seat)  Bathroom Toilet: Handicap height  Bathroom Equipment: Grab bars in shower, Built-in shower seat, Grab bars around toilet, Hand-held shower  Bathroom Accessibility: Walker accessible  Home Equipment: Juhi Estes Strengthening, Balance Training, Functional Mobility Training, Endurance Training, Safety Education & Training, Patient/Caregiver Education & Training, Equipment Evaluation, Education, & procurement, Self-Care / ADL, Home Management Training, Cognitive/Perceptual Training    Electronically signed by   Ольга Monk MS, OTR/L  License #[id number]  3/22/2021, 12:49 PM

## 2021-03-23 ENCOUNTER — TELEPHONE (OUTPATIENT)
Dept: CARDIOLOGY CLINIC | Age: 71
End: 2021-03-23

## 2021-03-23 LAB
ALBUMIN SERPL-MCNC: 3.1 GM/DL (ref 3.4–5)
ANION GAP SERPL CALCULATED.3IONS-SCNC: 7 MMOL/L (ref 4–16)
BUN BLDV-MCNC: 58 MG/DL (ref 6–23)
CALCIUM SERPL-MCNC: 8.6 MG/DL (ref 8.3–10.6)
CHLORIDE BLD-SCNC: 98 MMOL/L (ref 99–110)
CO2: 35 MMOL/L (ref 21–32)
CREAT SERPL-MCNC: 1.5 MG/DL (ref 0.9–1.3)
GFR AFRICAN AMERICAN: 56 ML/MIN/1.73M2
GFR NON-AFRICAN AMERICAN: 46 ML/MIN/1.73M2
GLUCOSE BLD-MCNC: 114 MG/DL (ref 70–99)
GLUCOSE BLD-MCNC: 125 MG/DL (ref 70–99)
GLUCOSE BLD-MCNC: 126 MG/DL (ref 70–99)
GLUCOSE BLD-MCNC: 134 MG/DL (ref 70–99)
GLUCOSE BLD-MCNC: 184 MG/DL (ref 70–99)
GLUCOSE BLD-MCNC: 184 MG/DL (ref 70–99)
HCT VFR BLD CALC: 33.1 % (ref 42–52)
HEMOGLOBIN: 9.5 GM/DL (ref 13.5–18)
MCH RBC QN AUTO: 25.4 PG (ref 27–31)
MCHC RBC AUTO-ENTMCNC: 28.7 % (ref 32–36)
MCV RBC AUTO: 88.5 FL (ref 78–100)
PDW BLD-RTO: 22.1 % (ref 11.7–14.9)
PHOSPHORUS: 3.2 MG/DL (ref 2.5–4.9)
PLATELET # BLD: 267 K/CU MM (ref 140–440)
PMV BLD AUTO: 10.8 FL (ref 7.5–11.1)
POTASSIUM SERPL-SCNC: 3.5 MMOL/L (ref 3.5–5.1)
RBC # BLD: 3.74 M/CU MM (ref 4.6–6.2)
SODIUM BLD-SCNC: 140 MMOL/L (ref 135–145)
WBC # BLD: 5.2 K/CU MM (ref 4–10.5)

## 2021-03-23 PROCEDURE — 6360000002 HC RX W HCPCS: Performed by: PHYSICIAN ASSISTANT

## 2021-03-23 PROCEDURE — 97110 THERAPEUTIC EXERCISES: CPT

## 2021-03-23 PROCEDURE — 97530 THERAPEUTIC ACTIVITIES: CPT

## 2021-03-23 PROCEDURE — 94150 VITAL CAPACITY TEST: CPT

## 2021-03-23 PROCEDURE — 82962 GLUCOSE BLOOD TEST: CPT

## 2021-03-23 PROCEDURE — 6370000000 HC RX 637 (ALT 250 FOR IP): Performed by: PHYSICIAN ASSISTANT

## 2021-03-23 PROCEDURE — 80069 RENAL FUNCTION PANEL: CPT

## 2021-03-23 PROCEDURE — 6370000000 HC RX 637 (ALT 250 FOR IP): Performed by: PHYSICAL MEDICINE & REHABILITATION

## 2021-03-23 PROCEDURE — C9113 INJ PANTOPRAZOLE SODIUM, VIA: HCPCS | Performed by: PHYSICIAN ASSISTANT

## 2021-03-23 PROCEDURE — 97116 GAIT TRAINING THERAPY: CPT

## 2021-03-23 PROCEDURE — 85027 COMPLETE CBC AUTOMATED: CPT

## 2021-03-23 PROCEDURE — 99232 SBSQ HOSP IP/OBS MODERATE 35: CPT | Performed by: PHYSICAL MEDICINE & REHABILITATION

## 2021-03-23 PROCEDURE — 1280000000 HC REHAB R&B

## 2021-03-23 PROCEDURE — 94761 N-INVAS EAR/PLS OXIMETRY MLT: CPT

## 2021-03-23 PROCEDURE — 97535 SELF CARE MNGMENT TRAINING: CPT

## 2021-03-23 PROCEDURE — 36415 COLL VENOUS BLD VENIPUNCTURE: CPT

## 2021-03-23 PROCEDURE — 2580000003 HC RX 258: Performed by: PHYSICIAN ASSISTANT

## 2021-03-23 RX ADMIN — ACETAMINOPHEN 650 MG: 325 TABLET ORAL at 00:15

## 2021-03-23 RX ADMIN — APIXABAN 5 MG: 5 TABLET, FILM COATED ORAL at 08:20

## 2021-03-23 RX ADMIN — APIXABAN 5 MG: 5 TABLET, FILM COATED ORAL at 20:56

## 2021-03-23 RX ADMIN — ROPINIROLE HYDROCHLORIDE 0.25 MG: 0.25 TABLET, FILM COATED ORAL at 20:56

## 2021-03-23 RX ADMIN — INSULIN LISPRO 2 UNITS: 100 INJECTION, SOLUTION INTRAVENOUS; SUBCUTANEOUS at 12:28

## 2021-03-23 RX ADMIN — MEROPENEM 1000 MG: 1 INJECTION, POWDER, FOR SOLUTION INTRAVENOUS at 04:26

## 2021-03-23 RX ADMIN — ASPIRIN 81 MG CHEWABLE TABLET 81 MG: 81 TABLET CHEWABLE at 08:11

## 2021-03-23 RX ADMIN — TORSEMIDE 20 MG: 20 TABLET ORAL at 08:12

## 2021-03-23 RX ADMIN — ROPINIROLE HYDROCHLORIDE 0.25 MG: 0.25 TABLET, FILM COATED ORAL at 14:11

## 2021-03-23 RX ADMIN — PANTOPRAZOLE SODIUM 40 MG: 40 INJECTION, POWDER, FOR SOLUTION INTRAVENOUS at 20:53

## 2021-03-23 RX ADMIN — PANTOPRAZOLE SODIUM 40 MG: 40 INJECTION, POWDER, FOR SOLUTION INTRAVENOUS at 08:12

## 2021-03-23 RX ADMIN — INSULIN GLARGINE 10 UNITS: 100 INJECTION, SOLUTION SUBCUTANEOUS at 20:53

## 2021-03-23 RX ADMIN — MINOCYCLINE HYDROCHLORIDE 100 MG: 100 CAPSULE ORAL at 10:06

## 2021-03-23 RX ADMIN — TORSEMIDE 20 MG: 20 TABLET ORAL at 20:56

## 2021-03-23 RX ADMIN — MEROPENEM 1000 MG: 1 INJECTION, POWDER, FOR SOLUTION INTRAVENOUS at 15:58

## 2021-03-23 RX ADMIN — ATORVASTATIN CALCIUM 10 MG: 10 TABLET, FILM COATED ORAL at 20:56

## 2021-03-23 RX ADMIN — ROPINIROLE HYDROCHLORIDE 0.25 MG: 0.25 TABLET, FILM COATED ORAL at 10:05

## 2021-03-23 RX ADMIN — MINOCYCLINE HYDROCHLORIDE 100 MG: 100 CAPSULE ORAL at 21:37

## 2021-03-23 ASSESSMENT — PAIN SCALES - GENERAL
PAINLEVEL_OUTOF10: 0
PAINLEVEL_OUTOF10: 4

## 2021-03-23 NOTE — PROGRESS NOTES
-01 progress Note( Dr. Skylar Noel)    Subjective:   Admit Date: 3/15/2021  PCP: Judi Berrios MD    Admitted For :Shortness of breath leg swelling    Consulted For:  Better control of blood glucose    Interval History: Patient seemed to be more alert today able to communicate properly  Uses CPAP at night for sleep apnea    Is more ambulatory with help    Patient had right-sided thoracentesis more around 1100 cc fluid noted below    Denies any chest pains,   Yes SOB . Seem to be better  Denies nausea or vomiting. Not eating much  No new bowel or bladder symptoms.        Intake/Output Summary (Last 24 hours) at 3/23/2021 0612  Last data filed at 3/22/2021 2007  Gross per 24 hour   Intake 720 ml   Output 200 ml   Net 520 ml       DATA    CBC:   Recent Labs     03/21/21  0637 03/22/21  1143   WBC 5.9 5.3   HGB 9.2* 9.2*    283    CMP:  Recent Labs     03/21/21  0637 03/22/21  1143    139   K 3.8 3.6   CL 98* 96*   CO2 39* 35*   BUN 66* 60*   CREATININE 1.5* 1.4*   CALCIUM 8.7 8.6   LABALBU 3.4 3.1*     Lipids:   Lab Results   Component Value Date    CHOL 91 12/18/2020    HDL 43 12/18/2020    TRIG 73 03/10/2021     Glucose:  Recent Labs     03/22/21  2054 03/22/21  2100 03/23/21  0209   POCGLU 197* 194* 134*     [id number]:  Lab Results   Component Value Date    LABA1C 6.1 02/20/2021     High Sensitivity TSH:   Lab Results   Component Value Date    TSHHS 2.620 03/03/2021     Free T3: No results found for: FT3  Free T4:  Lab Results   Component Value Date    T4FREE 1.5 07/07/2020          Right-sided thoracentesis on 3/16/2021  A total of 1100 serosanguineous fluid was removed.                 Echocardiogram Limited    Result Date: 3/2/2021  Transthoracic Echocardiography Report (TTE)  Demographics   Patient Name       RASHAD TATE    Date of Study       03/02/2021   Date of Birth      1950         Gender              Male   Age                66 year(s)         Race                 Patient Number     [id number]         Room Number         2101   Visit Number       [id number]   Corporate ID       [id number]   Accession Number   [id number]         23 Asha Perry, T   Ordering Physician Latasha Shultz PA-C       Physician           MD  Procedure Type of Study   TTE procedure:ECHOCARDIOGRAM LIMITED. Procedure Date Date: 03/02/2021 Start: 11:53 AM Study Location: Portable Technical Quality: Adequate visualization Indications:S/P CABG. Patient Status: Routine Height: 70 inches Weight: 230 pounds BSA: 2.22 m2 BMI: 33 kg/m2 HR: 91 bpm BP: 91/45 mmHg  Conclusions   Summary  This is a limited echocardiogram.  Left ventricular systolic function is normal.  Ejection fraction is visually estimated at 55-60%. Bilateral atrial enlargement. S/p AVR with a 27 mm Medtronic Mosaic. Moderate mitral regurgitation. Moderate tricuspid regurgitation; RVSP: 50 mmHg. Severe PHTN. No evidence of any pericardial effusion. Signature   ------------------------------------------------------------------  Electronically signed by Shamar Alberts MD (Interpreting  physician) on 03/02/2021 at 05:06 PM  -    Ct Chest Wo Contrast    Result Date: 3/1/2021  EXAMINATION: CT OF THE CHEST WITHOUT CONTRAST 3/1/2021 3:57 pm T    Patient status post midline sternotomy. Immediately posterior to the sternotomy defect, there is a small gas and fluid collection which is nonspecific. It is not necessarily outside normal limits for a sternotomy that was performed 2 weeks ago. However, sterility cannot be ascertained with imaging, and therefore a small developing abscess is considered as well. No evidence of osteolysis of the sternotomy defect to suggest osteomyelitis. Interval development of a moderate to large right and a moderate left pleural effusion.   Adjacent airspace Transvenous pacer remains place. Stable cardiomegaly. Right-sided pleural effusion. Bibasilar hypoaeration. Right-sided pleural effusion Bibasilar hypoaeration     Vl Dup Lower Extremity Venous Bilateral    Result Date: 3/3/2021  EXAMINATION: DUPLEX VENOUS ULTRASOUND OF THE BILATERAL LOWER EXTREMITIES, 3/3/2021 9:01 am TECHNIQUE: Duplex ultrasound using B-mode/gray scaled imaging and Doppler spectral analysis and color flow was obtained of the bilateral lower extremities. COMPARISON: None. HISTORY: ORDERING SYSTEM PROVIDED HISTORY: fevers TECHNOLOGIST PROVIDED HISTORY: Reason for exam:->fevers Reason for Exam: edema FINDINGS: The visualized veins of the bilateral lower extremities are patent and free of echogenic thrombus. The veins demonstrate good compressibility with normal color flow study and spectral analysis. No evidence of DVT in either lower extremity. Us Chest Including Mediastinum    Result Date: 3/3/2021  EXAMINATION: ULTRASOUND OF THE CHEST 3/3/2021 9:02 am COMPARISON: Chest radiograph performed earlier in the same day HISTORY: ORDERING SYSTEM PROVIDED HISTORY: ASSESS FOR PLEURAL EFFUSION TECHNOLOGIST PROVIDED HISTORY: RIGHT LUNG Reason for exam:->ASSESS FOR PLEURAL EFFUSION Reason for Exam: pleural effusion FINDINGS: Moderate right pleural effusion is present. Right pleural effusion is present. Ir Guided Thoracentesis Pleural    Result Date: 3/2/2021  PROCEDURE: ULTRASOUNDGUIDED Bilateral THORACENTESIS 3/2/2021 HISTORY: ORDERING SYSTEM PROVIDED HISTORY: Bilateral pleural effusion. T   The patient tolerated the procedure well. FINDINGS: A total of 800 ml serosanguinous fluid from left and 1050 ml serosanguineous fluid from right  was removed. Successful ultrasound guided bilateral thoracentesis.        Scheduled Medicines   Medications:    insulin lispro  0-12 Units Subcutaneous 2 times per day    insulin lispro  0-12 Units Subcutaneous TID WC    apixaban  5 mg Oral BID    colon     Iron deficiency anemia due to chronic blood loss     Erythropoietin deficiency anemia     VHD (valvular heart disease)     Abnormal fractional flow reserve (FFR) on cardiac catheterization     Carotid stenosis, left     Aortic stenosis, severe     CAD in native artery     Displacement of atrial pacemaker leads     Pacemaker lead malfunction     SOB (shortness of breath)      ? ? Sepsis      Plan:     1. Reviewed POC blood glucose . Labs and X ray results   2. Reviewed Current Medicines   3. On meal/ Correction bolus Humalog/ Basal Lantus Insulin regime   4. Monitor Blood glucose frequently   5. Modified  the dose of Insulin/ other medicines as needed   6. Will not re start Trulicity at this time  7. Ultimately patient was transferred to acute rehab unit on 3/15/2021 as insurance approved for 7 days in this unit   8. Patient is participating in physical activity programs acute rehab unit  9. Will follow     .      Rc Roblero MD

## 2021-03-23 NOTE — PROGRESS NOTES
[facility] Liaison spoke with Moira Adams pt's wife and is aware of discharge tomorrow & will re-initiate [facility].

## 2021-03-23 NOTE — PROGRESS NOTES
Physical Therapy      [x] daily progress note       [] discharge       Patient Name:  Neela Espinoza   :  1950 MRN: [id number]  Room:  [address] [place] [address] Date of Admission: 3/15/2021  Rehabilitation Diagnosis:   Critical illness myopathy [G72.81]  Critical illness myopathy [G72.81]       Date 3/23/2021       Day of ARU Week:  2   Time IN/OUT 8975-3589   Individual Tx Minutes 60   TOTAL Tx Time Mins 60   Variance Time    Variance Time []   Refusal due to:     []   Medical hold/reason:    []   Illness   []   Off Unit for test/procedure  []   Extra time needed to complete task  []   Therapeutic need  []   Other (specify):   Restrictions Restrictions/Precautions  Restrictions/Precautions: General Precautions, Fall Risk, Surgical Protocols(sternal precautions (sx 2/) 1500mL fluid restriction)  Position Activity Restriction  Sternal Precautions: No Pushing, No Pulling, 10# Lifting Restrictions(\"milk carton\")   Communication with other providers: [x]   OK to see per nursing:     []   Spoke with team member regarding:      Subjective observations and cognitive status: Pt sitting EOB, agreeable to session  VS at rest: /72, HR 60, O2 sats 96%. WNL with ax   Pain level/location:  0  /10       Location:    Discharge recommendations  Anticipated discharge date:  3/24  Destination: []? ???home alone   []? ???home alone with assist PRN     []???? home w/ family      []???? Continuous supervision  []????SNF    []???? Assisted living     []???? Other:   Continued therapy: []????[facility] PT  []????OUTPATIENT  PT   []???? No Further PT  Equipment needs: YANCY Whitaker LEA Meija requires the assistance of a wheeled walker to successfully ambulate from room to room at home to allow completion of daily living tasks such as: bathing, toileting, dressing and grooming.  A wheeled walker is necessary due to the patient's unsteady gait, upper body weakness, inability to  a standard walker.  This patient can ambulate only by pushing a walker instead of using a lesser assistive device such as a cane or crutch.        Bed Mobility:         []   Pt received out of bed     Roll Left and Right  Assistance Needed: Independent  Comment: Indep without bed features  CARE Score: 6  Discharge Goal: Independent    Sit to Lying  Assistance Needed: Independent  Comment: Indep without bed features or cueing  CARE Score: 6  Discharge Goal: Independent    Lying to Sitting on Side of Bed  Assistance Needed: Supervision or touching assistance  Comment: Supervision with min cues to roll on side before getting up, req extra time and effort to get into sitting position  CARE Score: 4  Discharge Goal: Independent    Transfers:    Sit to Stand  Assistance Needed: Independent  Comment: Mod I with RW, demonstrates safety with sternal precautions  CARE Score: 6  Discharge Goal: Independent    Chair/Bed-to-Chair Transfer  Assistance Needed: Supervision or touching assistance  Comment: Supervision with RW  CARE Score: 4  Discharge Goal: Independent         Car Transfer  Assistance Needed: Supervision or touching assistance  Comment: Supervision for balance with approach with RW, able to lift LEs in/out of car without A  CARE Score: 4  Discharge Goal: Supervision or touching assistance           Object: Picking Up Object  Assistance Needed: Independent  Comment: Mod I at [address] [place] [address] with use of AE  Reason if not Attempted: Not attempted due to medical condition or safety concerns  CARE Score: 6  Discharge Goal: Independent    Ambulation:    Walking Ability  Does the Patient Walk?: Yes     Walk 10 Feet  Assistance Needed: Independent  Comment:  Mod I with RW, demonstrates safety  CARE Score: 6  Discharge Goal: Independent     Walk 50 Feet with Two Turns  Assistance Needed: Supervision or touching assistance  Comment: Supervision with RW for balance  CARE Score: 4  Discharge Goal: Supervision or touching assistance     Walk 150 Feet  Assistance Needed: Supervision or touching activated      [x]  Gait belt used during tx session      []other:         Number of Minutes/Billable Intervention  Gait Training 30   Therapeutic Exercise    Neuro Re-Ed    Therapeutic Activity 30   Wheelchair Propulsion    Group    Other:    TOTAL 60         Social History  Social/Functional History  Lives With: Spouse  Type of Home: House  Home Layout: One level  Home Access: Stairs to enter with rails  Entrance Stairs - Number of Steps: 1  Entrance Stairs - Rails: Right  Bathroom Shower/Tub: Walk-in shower(built in seat)  Bathroom Toilet: Handicap height  Bathroom Equipment: Grab bars in shower, Built-in shower seat, Grab bars around toilet, Hand-held shower  Bathroom Accessibility: Walker accessible  Home Equipment: Cane, Rolling walker, Grab bars  Receives Help From: Family  ADL Assistance: Independent  Homemaking Assistance: Independent  Homemaking Responsibilities: Yes  Meal Prep Responsibility: Secondary  Cleaning Responsibility: Secondary  Health Care Management: Primary  Ambulation Assistance: Independent(cane occasionaly for balance)  Transfer Assistance: Independent  Active : Yes(prior to CABG, still on precaution)  Mode of Transportation: [employer] or [employer])  Occupation: Retired  Leisure & Hobbies: ana in house and yard, online research, golf  Additional Comments: one fall without injury in August, but had to call [employer] to get up. Sleeps in regular flat bed.     Objective                                                                                    Goals:  (Update in navigator)  Short term goals  Time Frame for Short term goals: 10 days STG=LTG  Short term goal 1: pt will perform all bed mobility with mod I  Short term goal 2: pt will perform sit tostand and pivot transfers with mod I, car transfers with CGA for LEs  Short term goal 3: pt will ambulate on level and unlevel surfaces with 2ww with mod I , level surfaces 200' with supervision  Short term goal 4: Pt will ascend/descend 1 step with 2ww with supervision  Short term goal 5: pt will retrieve light item from floor with reacher and 2ww with mod I:   :        Plan of Care                                                                              Times per week: 5 days per week for a minimum of 60 minutes/day plus group as appropriate for 60 minutes.   Treatment to include Current Treatment Recommendations: Strengthening, Gait Training, Patient/Caregiver Education & Training, Stair training, IADL Training, Equipment Evaluation, Education, & procurement, Balance Training, Neuromuscular Re-education, Pain Management, Functional Mobility Training, Endurance Training, Home Exercise Program, Transfer Training, Safety Education & Training, Positioning    Electronically signed by   Saira Gaines, PTA #[id number]  3/23/2021, 12:28 PM

## 2021-03-23 NOTE — PROGRESS NOTES
Jimbo Martinez    : 1950  Acct #: [de-identified]  MRN: [id number]              PM&R Progress Note      Admitting diagnosis: Critical illness myopathy ( [address])     Comorbid diagnoses impacting rehabilitation: Generalized weakness, paroxysmal atrial fibrillation, recurrent right pleural effusion, status post coronary artery bypass graft surgery/maze procedure/AVR on 2021, COVID-19 pneumonia (2020), HAP, chronic kidney disease stage IIIb, uncontrolled diabetes type 2 with peripheral neuropathy, essential hypertension, moderate protein calorie malnutrition    Chief complaint: Looking forward to discharge. Voiding well without discomfort. Prior (baseline) level of function: Independent.     Current level of function:         Current  IRF-HEENA and Goals:   Occupational Therapy:    Short term goals  Time Frame for Short term goals: STGs=LTGs :   Long term goals  Time Frame for Long term goals : ~10 days or until d/c  Long term goal 1: Pt will complete grooming tasks Ind  Long term goal 2: Pt will complete total body bathing c S  Long term goal 3: Pt will complete UB dressing Ind  Long term goal 4: Pt will complete LB dressing mod I using AE PRN  Long term goal 5: Pt will doff/don footwear mod I using AE PRN  Long term goals 6: Pt will complete toileting mod I  Long term goal 7: Pt will complete functional transfers (bed, chair, toilet) c DME PRN and mod I; shower transfer c S  Long term goal 8: Pt will perform therex/therax to facilitate increased strength/endurance/ax tolerance (c emphasis on dynamic standing balance/tolerance >8 mins) c SBA  Long term goal 9: Pt will complete simple homemaking tasks c DME PRN and S :                                       Eating: Eating  Assistance Needed: Independent  Comment: x  CARE Score: 6  Discharge Goal: Independent       Oral Hygiene: Oral Hygiene  Assistance Needed: Independent  Comment: seated in w/c at sink  CARE Score: 6  Discharge Goal: Independent    UB/LB Bathing: Shower/Bathe Self  Assistance Needed: Supervision or touching assistance  Comment: Supervision to bathe UB/LB, Pt able to perform lateral lean seated to bathe posterior perineal area  CARE Score: 4  Discharge Goal: Supervision or touching assistance    UB Dressing: Upper Body Dressing  Assistance Needed: Independent  Comment: Mod I to don/doff pull over shirt  CARE Score: 6  Discharge Goal: Independent         LB Dressing: Lower Body Dressing  Assistance Needed: Supervision or touching assistance  Comment: Supervision to don brief and pants using no AE; pt able to stand and manage brief and pants over hips  CARE Score: 4  Discharge Goal: Independent    Donning and [place] Footwear: Putting On/Taking Off Footwear  Assistance Needed: Supervision or touching assistance  Comment: supervision to doff/don hospital  CARE Score: 4  Discharge Goal: Independent      Toileting: Toileting Hygiene  Assistance Needed: Supervision or touching assistance  Comment: Pt able to manage pants c CGA to steady; to complete toilet hygiene c min A to check for throughness  Reason if not Attempted: Not attempted due to medical condition or safety concerns  CARE Score: 4  Discharge Goal: Independent      Toilet Transfers:   Toilet Transfer  Assistance Needed: Supervision or touching assistance  Comment: no assisitance needed   CARE Score: 4  Discharge Goal: Independent    Physical Therapy:   Short term goals  Time Frame for Short term goals: 10 days STG=LTG  Short term goal 1: pt will perform all bed mobility with mod I  Short term goal 2: pt will perform sit tostand and pivot transfers with mod I, car transfers with CGA for LEs  Short term goal 3: pt will ambulate on level and unlevel surfaces with 2ww with mod I , level surfaces 200' with supervision  Short term goal 4: Pt will ascend/descend 1 step with 2ww with supervision  Short term goal 5: pt will retrieve light item from floor with reacher and 2ww with mod I            Bed Mobility:   Sit to Lying  Assistance Needed: Partial/moderate assistance  Comment: Min-Mod A for LE's , demonstrates sternal precautions without cueing  CARE Score: 3  Discharge Goal: Independent  Roll Left and Right  Assistance Needed: Supervision or touching assistance  Comment: SBA with cues to R without bed features  CARE Score: 4  Discharge Goal: Independent  Lying to Sitting on Side of Bed  Assistance Needed: Partial/moderate assistance  Comment: Min A from R SL without bed features  CARE Score: 3  Discharge Goal: Independent    Transfers:    Sit to Stand  Assistance Needed: Supervision or touching assistance  Comment: SB-CGA from [facility], bed; cues to \"hug chest\" before sitting vs leaving hands on AD  CARE Score: 4  Discharge Goal: Independent  Chair/Bed-to-Chair Transfer  Assistance Needed: Supervision or touching assistance  Comment: SBA with RW  CARE Score: 4  Discharge Goal: Independent     Car Transfer  Assistance Needed: Partial/moderate assistance  Comment: min assist for LEs into car, mod assist to shift to center of seat  CARE Score: 3  Discharge Goal: Supervision or touching assistance    Ambulation:    Walking Ability  Does the Patient Walk?: Yes     Walk 10 Feet  Assistance Needed: Supervision or touching assistance  Comment: CG with 2ww  CARE Score: 4  Discharge Goal: Independent     Walk 50 Feet with Two Turns  Assistance Needed: Partial/moderate assistance  Comment: min assist with 2ww  CARE Score: 3  Discharge Goal: Supervision or touching assistance     Walk 150 Feet  Assistance Needed: Supervision or touching assistance  Comment: SBA with RW in recip pattern, takes appropriate standing rest breaks; amb 201'+207'+205'  CARE Score: 4  Discharge Goal: Supervision or touching assistance     Walking 10 Feet on Uneven Surfaces  Assistance Needed: Partial/moderate assistance  Comment: min assist  CARE Score: 3  Discharge Goal: Independent     1 Step (Curb)  Assistance Needed: Supervision or touching assistance  Comment: SBA 3 trials, CGA one trial on descent due to one mild LOB on descent, min cues for staying close to AD  CARE Score: 4  Discharge Goal: Supervision or touching assistance     4 Steps  Assistance Needed: Supervision or touching assistance  Comment: CG-SBA 5 steps in non-recip pattern, ablet to demonstrate min UE support on B rails. Reason if not Attempted: Not applicable  CARE Score: 4  Discharge Goal: Not Applicable     12 Steps  Reason if not Attempted: Not applicable  CARE Score: 9  Discharge Goal: Not Applicable       Wheelchair:  w/c Ability: Wheelchair Ability  Uses a Wheelchair and/or Scooter?: No     Wheel 150 Feet  Assistance Needed: Supervision or touching assistance  Comment: SBA propelling wC bkwd with focus on quad strenthening 158' using B LEs only, req extra time to complete distance,  CARE Score: 4          Balance:        Object: Picking Up Object  Comment: Pt too fatigued to complete  Reason if not Attempted: Not attempted due to medical condition or safety concerns  CARE Score: 88  Discharge Goal: Independent    I      Exam:    Blood pressure (!) 150/72, pulse 65, temperature 97.7 °F (36.5 °C), temperature source Oral, resp. rate 18, height 5' 10\" (1.778 m), weight 208 lb 6.4 oz (94.5 kg), SpO2 94 %. General: Observed standing in a front wheeled walker. Gazing right and left. Fair posture. HEENT: Clear speech. MMM. No JVD. Pulmonary: Symmetric air exchange without coughing. Cardiac: Regular rate and rhythm. Abdomen: Patient's abdomen is soft and nondistended. Bowel sounds were present throughout. There was no rebound, guarding or masses noted. Upper extremities: Managing the walker  well. Lower extremities: Wide base of support with stance. Sitting balance was good.   Standing balance was fair-.    Lab Results   Component Value Date    WBC 5.3 03/22/2021    HGB 9.2 (L) 03/22/2021    HCT 31.3 (L) 03/22/2021    MCV 86.5 03/22/2021    PLT 283 03/22/2021     Lab Results   Component Value Date    INR 1.51 03/16/2021    INR 1.54 03/06/2021    INR 1.84 03/03/2021    PROTIME 18.3 (H) 03/16/2021    PROTIME 18.7 (H) 03/06/2021    PROTIME 22.4 (H) 03/03/2021     Lab Results   Component Value Date    CREATININE 1.4 (H) 03/22/2021    BUN 60 (H) 03/22/2021     03/22/2021    K 3.6 03/22/2021    CL 96 (L) 03/22/2021    CO2 35 (H) 03/22/2021     Lab Results   Component Value Date    ALT 53 (H) 03/14/2021    AST 44 (H) 03/14/2021    ALKPHOS 303 (H) 03/14/2021    BILITOT 0.5 03/14/2021       Expected length of stay  prior to a supervised level of function for discharge home with a walker and [address] OT/PT is 3/24/2021. Recommendations:    1. Critical illness myopathy:     Demonstrating benefit from participating in the daily occupational and physical therapy.       Ongoing practice of techniques for self-care mobility tasks following sternal precautions. Jeanette Joe has benefited from pulmonary hygiene, DVT prophylaxis, nutritional support and bowel and bladder retraining. Planning a voiding trial this weekend. Caregiver training scheduled with his wife for after the weekend. Minimal verbal cues and SBA-CGA needed for transfers today. 2. DVT prophylaxis: The Eliquis he requires for his atrial fibrillation is protective against new DVT.  I must periodically monitor his hemoglobin while on this medication.  Weightbearing activities are slowly improving daily.  GI prophylaxis offered.  No signs of blood loss.   3. Hospital-acquired pneumonia: Ongoing aggressive pulmonary hygiene while monitoring O2 saturations at rest and with activity.  Stopping vancomycin, meropenem and minocycline tomorrow. No need for oxygen supplementation.  No coughing.   4. Acute kidney injury on chronic kidney disease 3B: Nephrology is monitoring the patient's recovery.  Avoiding nephrotoxic medications when possible (cautious use of vancomycin).  Avoiding drops in blood pressure with

## 2021-03-23 NOTE — PROGRESS NOTES
-01 progress Note( Dr. Uriel Franco)  3/23/2021  Subjective:   Admit Date: 3/15/2021  PCP: Toni Alba MD    Admitted For :Shortness of breath leg swelling    Consulted For:  Better control of blood glucose    Interval History: Patient seemed to be more alert today able to communicate properly  Uses CPAP at night for sleep apnea    Is more ambulatory with help    Patient had right-sided thoracentesis more around 1100 cc fluid noted below    Denies any chest pains,   Yes SOB . Seem to be better  Denies nausea or vomiting. Not eating much  No new bowel or bladder symptoms.        Intake/Output Summary (Last 24 hours) at 3/23/2021 0612  Last data filed at 3/22/2021 2007  Gross per 24 hour   Intake 720 ml   Output 200 ml   Net 520 ml       DATA    CBC:   Recent Labs     03/21/21  0637 03/22/21  1143   WBC 5.9 5.3   HGB 9.2* 9.2*    283    CMP:  Recent Labs     03/21/21  0637 03/22/21  1143    139   K 3.8 3.6   CL 98* 96*   CO2 39* 35*   BUN 66* 60*   CREATININE 1.5* 1.4*   CALCIUM 8.7 8.6   LABALBU 3.4 3.1*     Lipids:   Lab Results   Component Value Date    CHOL 91 12/18/2020    HDL 43 12/18/2020    TRIG 73 03/10/2021     Glucose:  Recent Labs     03/22/21  2054 03/22/21  2100 03/23/21  0209   POCGLU 197* 194* 134*     [id number]:  Lab Results   Component Value Date    LABA1C 6.1 02/20/2021     High Sensitivity TSH:   Lab Results   Component Value Date    TSHHS 2.620 03/03/2021     Free T3: No results found for: FT3  Free T4:  Lab Results   Component Value Date    T4FREE 1.5 07/07/2020          Right-sided thoracentesis on 3/16/2021  A total of 1100 serosanguineous fluid was removed.                 Echocardiogram Limited    Result Date: 3/2/2021  Transthoracic Echocardiography Report (TTE)  Demographics   Patient Name       RASHAD TATE    Date of Study       03/02/2021   Date of Birth      1950         Gender              Male   Age                66 year(s)         Race    Patient Number     [id number]         Room Number         2101   Visit Number       [id number]   Corporate ID       [id number]   Accession Number   [id number]         [address] Ash[address] T   Ordering Physician Latasha Shultz PA-C       Physician           MD  Procedure Type of Study   TTE procedure:ECHOCARDIOGRAM LIMITED. Procedure Date Date: 03/02/2021 Start: 11:53 AM Study Location: Portable Technical Quality: Adequate visualization Indications:S/P CABG. Patient Status: Routine Height: 70 inches Weight: 230 pounds BSA: 2.22 m2 BMI: 33 kg/m2 HR: 91 bpm BP: 91/45 mmHg  Conclusions   Summary  This is a limited echocardiogram.  Left ventricular systolic function is normal.  Ejection fraction is visually estimated at 55-60%. Bilateral atrial enlargement. S/p AVR with a 27 mm Medtronic Mosaic. Moderate mitral regurgitation. Moderate tricuspid regurgitation; RVSP: 50 mmHg. Severe PHTN. No evidence of any pericardial effusion. Signature   ------------------------------------------------------------------  Electronically signed by Shamar Alberts MD (Interpreting  physician) on 03/02/2021 at 05:06 PM  -    Ct Chest Wo Contrast    Result Date: 3/1/2021  EXAMINATION: CT OF THE CHEST WITHOUT CONTRAST 3/1/2021 3:57 pm T    Patient status post midline sternotomy. Immediately posterior to the sternotomy defect, there is a small gas and fluid collection which is nonspecific. It is not necessarily outside normal limits for a sternotomy that was performed 2 weeks ago. However, sterility cannot be ascertained with imaging, and therefore a small developing abscess is considered as well. No evidence of osteolysis of the sternotomy defect to suggest osteomyelitis. Interval development of a moderate to large right and a moderate left pleural effusion.   Adjacent airspace disease is some combination of atelectasis, pneumonia, and/or edema. Xr Chest Portable    Result Date: 3/3/2021  EXAMINATION: ONE XRAY VIEW OF THE CHEST 3/3/2021 5:34 am COMPARISON: 03/02/2021 HISTORY: ORDERING SYSTEM PROVIDED HISTORY: heart failure TECHNOLOGIST PROVIDED HISTORY: Reason for exam:->heart failure Reason for Exam: heart failure Acuity: Acute Type of Exam: Subsequent/Follow-up FINDINGS: Status post median sternotomy and left-sided transvenous pacer placement. There is stable cardiomegaly. The chest films taken shallow degree of inspiration accentuating heart size and bronchovascular structures. There is a small right pleural effusion. No significant left effusion is seen. The patient is undergone clipping of the left atrial appendage. There is bibasilar atelectasis. Stable exam     Xr Chest Portable    Result Date: 3/2/2021  EXAMINATION: ONE XRAY VIEW OF THE CHEST 3/2/2021 9:38 am COMPARISON: 03/01/2021 HISTORY: ORDERING SYSTEM PROVIDED HISTORY: post bilateral thoracentesis   . Patient has undergone thoracentesis. The volume of fluid in the right pleural space has decreased and/or shifted. There is some persistent right basilar atelectasis. No significant pleural effusion on the left is seen. Minimal left basilar atelectasis is noted. No pneumothorax is seen. Bilateral shoulder arthritis is noted. Pleural effusions right greater than left with bibasilar atelectasis right worse than left. No pneumothorax is seen.      Xr Chest Portable    Result Date: 3/1/2021  EXAMINATION: ONE XRAY VIEW OF THE CHEST 3/1/2021 5:12 pm COMPARISON: 02/23/2021 HISTORY: ORDERING SYSTEM PROVIDED HISTORY: chest pain, shorntess of breath TECHNOLOGIST PROVIDED HISTORY: Reason for exam:->chest pain, shorntess of breath Reason for Exam: chest pain   sob   leg swelling Acuity: Unknown Type of Exam: Unknown Relevant Medical/Surgical History: afib    cad    diabetes FINDINGS: Status post median sternotomy. Transvenous pacer remains place. Stable cardiomegaly. Right-sided pleural effusion. Bibasilar hypoaeration. Right-sided pleural effusion Bibasilar hypoaeration     Vl Dup Lower Extremity Venous Bilateral    Result Date: 3/3/2021  EXAMINATION: DUPLEX VENOUS ULTRASOUND OF THE BILATERAL LOWER EXTREMITIES, 3/3/2021 9:01 am TECHNIQUE: Duplex ultrasound using B-mode/gray scaled imaging and Doppler spectral analysis and color flow was obtained of the bilateral lower extremities. COMPARISON: None. HISTORY: ORDERING SYSTEM PROVIDED HISTORY: fevers TECHNOLOGIST PROVIDED HISTORY: Reason for exam:->fevers Reason for Exam: edema FINDINGS: The visualized veins of the bilateral lower extremities are patent and free of echogenic thrombus. The veins demonstrate good compressibility with normal color flow study and spectral analysis. No evidence of DVT in either lower extremity. Us Chest Including Mediastinum    Result Date: 3/3/2021  EXAMINATION: ULTRASOUND OF THE CHEST 3/3/2021 9:02 am COMPARISON: Chest radiograph performed earlier in the same day HISTORY: ORDERING SYSTEM PROVIDED HISTORY: ASSESS FOR PLEURAL EFFUSION TECHNOLOGIST PROVIDED HISTORY: RIGHT LUNG Reason for exam:->ASSESS FOR PLEURAL EFFUSION Reason for Exam: pleural effusion FINDINGS: Moderate right pleural effusion is present. Right pleural effusion is present. Ir Guided Thoracentesis Pleural    Result Date: 3/2/2021  PROCEDURE: ULTRASOUNDGUIDED Bilateral THORACENTESIS 3/2/2021 HISTORY: ORDERING SYSTEM PROVIDED HISTORY: Bilateral pleural effusion. T   The patient tolerated the procedure well. FINDINGS: A total of 800 ml serosanguinous fluid from left and 1050 ml serosanguineous fluid from right  was removed. Successful ultrasound guided bilateral thoracentesis.        Scheduled Medicines   Medications:    insulin lispro  0-12 Units Subcutaneous 2 times per day    insulin lispro  0-12 Units Subcutaneous TID WC    apixaban  5 mg Oral BID    aspirin  81 mg Oral Daily    insulin glargine  10 Units Subcutaneous Nightly    insulin lispro  10 Units Subcutaneous TID     meropenem  1,000 mg Intravenous Q12H    minocycline  100 mg Oral BID    pantoprazole  40 mg Intravenous BID    rOPINIRole  0.25 mg Oral TID    torsemide  20 mg Oral BID    atorvastatin  10 mg Oral Nightly      Infusions:    dextrose           Objective:   Vitals: /66   Pulse 63   Temp 98.2 °F (36.8 °C) (Oral)   Resp 18   Ht 5' 10\" (1.778 m)   Wt 215 lb 8 oz (97.8 kg)   SpO2 94%   BMI 30.92 kg/m²   General appearance: alert and cooperative with exam  Neck: no JVD or bruit  Thyroid : Normal lobes   Lungs: Has Vesicular Breath sounds there is breath sounds bilateral pleural effusion tapped both sides noted below  Heart:  regular rate and rhythm  Abdomen: soft, non-tender; bowel sounds normal; no masses,  no organomegaly  Musculoskeletal: Normal  Extremities: extremities normal, , no edema  Neurologic:  Awake, alert, oriented to name, place and time. Cranial nerves II-XII are grossly intact. Motor is  intact. Sensory is intact. ,  and gait is normal.    Assessment:     Patient Active Problem List:     Type 2 diabetes mellitus without complication, without long-term current use of insulin ([facility])     Ulcer of other part of lower limb     Venous hypertension, chronic, with ulcer ([address]     Ulcer of other part of foot     Pneumonia     Atrial fibrillation ([facility])     Sinus pause     PAF (paroxysmal atrial fibrillation) ([facility])     DM (diabetes mellitus) ([facility])     DMITRIY on CPAP     Hyperlipidemia     Status post incision and drainage     CKD (chronic kidney disease) stage 3, GFR 30-59 ml/min ([facility])     Hyperpotassemia     Arthritis     PVD (peripheral vascular disease) ([facility])     Hematoma     Cardiac pacemaker in situ     Coronary artery stenosis     Essential hypertension     Gout     Diabetic neuropathy associated with type 2 diabetes mellitus ([facility])     Adenomatous polyp of sigmoid colon     Iron deficiency anemia due to chronic blood loss     Erythropoietin deficiency anemia     VHD (valvular heart disease)     Abnormal fractional flow reserve (FFR) on cardiac catheterization     Carotid stenosis, left     Aortic stenosis, severe     CAD in native artery     Displacement of atrial pacemaker leads     Pacemaker lead malfunction     SOB (shortness of breath)      ? ? Sepsis      Plan:     1. Reviewed POC blood glucose . Labs and X ray results   2. Reviewed Current Medicines   3. On meal/ Correction bolus Humalog/ Basal Lantus Insulin regime   4. Monitor Blood glucose frequently   5. Modified  the dose of Insulin/ other medicines as needed   6. Will not re start Trulicity at this time  7. Ultimately patient was transferred to acute rehab unit on 3/15/2021 as insurance approved for 7 days in this unit   8. Patient is participating in physical activity programs acute rehab unit  9. Will follow     .      Coy Snellen, MD

## 2021-03-23 NOTE — PROGRESS NOTES
Occupational Therapy    Physical Rehabilitation: OCCUPATIONAL THERAPY     [] daily progress note       [x] discharge       Patient Name:  Davie Bose   :  1950 MRN: [id number]  Room:  [address] [place] [address] Date of Admission: 3/15/2021  Rehabilitation Diagnosis:   Critical illness myopathy [G72.81]  Critical illness myopathy [G72.81]       Date 3/23/2021       Day of ARU Week:  3   Time IN/OUT [id number]   Individual Tx Minutes 60+60   Group Tx Minutes    Co-Treat Minutes    Concurrent Tx Minutes    TOTAL Tx Time Mins 120   Variance Time    Variance Time []   Refusal due to:     []   Medical hold/reason:    []   Illness   []   Off Unit for test/procedure  []   Extra time needed to complete task  []   Therapeutic need  []   Other (specify):   Restrictions Restrictions/Precautions: General Precautions, Fall Risk, Surgical Protocols(sternal precautions (sx 2/16) 1500mL fluid restriction)         Communication with other providers: [x]   OK to see per nursing:     []   Spoke with team member regarding:      Subjective observations and cognitive status: AM: Pt resting in bed; pleasant and agreeable to therapy. After shower, therapist was removing shower guard from patients Left arm and pt's skin ripped and began to bleed. Nsg was notified and came in to assess. Nsg bandaged L arm. Pm:  Pt resting in bed; Pleasant and agreeable to tx session. Pain level/location:    /10       Location: none    Discharge recommendations  Anticipated discharge date: 2021  Destination: []???home alone   []? ??home alone w assist prn   [x]? ?? home w/ family    []? ?? Continuous supervision       []? ??SNF    []??? Assisted living     []? ?? Other:   Continued therapy: [x]? ? ? [facility] [address] OT  []???OUTPATIENT  OT   []??? No Further OT  Equipment needs: None       ADLs:    Eating: Eating  Assistance Needed: Independent  Comment: x  CARE Score: 6  Discharge Goal: Independent       Oral Hygiene: Oral Hygiene  Assistance Needed: Independent  Comment: seated at sink  CARE Score: 6  Discharge Goal: Independent    UB/LB Bathing: Shower/Bathe Self  Assistance Needed: Independent  Comment: mod I to bath UB/LB; Pt able to perform lateral lean seated to bath posterior perineal area  CARE Score: 6  Discharge Goal: Supervision or touching assistance    UB Dressing: Upper Body Dressing  Assistance Needed: Independent  Comment: Pt able to retrieve clothing; mod I to don/doff pull over shirt  CARE Score: 6  Discharge Goal: Independent         LB Dressing: Lower Body Dressing  Assistance Needed: Independent  Comment: mod I to doff/don pants, brief  CARE Score: 6  Discharge Goal: Independent    Donning and Boley Footwear: Putting On/Taking Off Footwear  Assistance Needed: Independent  Comment: mod I to don socks, shoes  CARE Score: 6  Discharge Goal: Independent      Toileting: Toileting Hygiene  Assistance Needed: Independent  Comment: Mod I for pants management and hygiene  Reason if not Attempted: Not attempted due to medical condition or safety concerns  CARE Score: 6  Discharge Goal: Independent      Toilet Transfers: Mod I Toilet Transfer  Assistance Needed: Independent  Comment: mod I using sternal precautions  CARE Score: 6  Discharge Goal: Independent  Device Used:    []   Standard Toilet         []   Grab Bars           []  Bedside Commode       []   Elevated Toilet          []   Other:        Bed Mobility:           []   Pt received out of bed   Supine --> Sit:  Supervision, cue not to hold breath  Sit --> Supine:  Supervision, cue not to hold breath    Transfers:    Sit--> Stand: Mod I   Stand --> Sit:   Mod   Stand-Pivot:    Mod I   Other:    Assistive device required for transfer:   RW       Functional Mobility:  146ft + 100 ft on uneven surface   Assistance:  Supervision for walker safety   Device:   []   Shruthi Sullivan     []   Standard Walker []   Wheelchair        []   Innohub       []   4-Wheeled Dulcie Pila         []   Cardiac Dulcie Pila []   Other:        Homemaking Tasks:   Pt engaged in kitchen safety education regarding sternal precautions. Pt demo'd good understanding and carryover. Additional Therapeutic activities/exercises completed this date:     [x]   ADL Training   [x]   Balance/Postural training     [x]   Bed/Transfer Training   []   Endurance Training   []   Neuromuscular Re-ed   []   Nu-step:  Time:        Level:         #Steps:       []   Rebounder:    []  Seated     []  Standing        []   Supine Ther Ex (reps/sets):     [x]   Seated Ther Ex (reps/sets):  Pt was instructed in Intermediate Cardiac ex program:  Overhead Side Stretch x 10  Ankle Pumps/ Heel Raises x 10  Marching Seated x 10  Forward Arm Raises x 10  Side Arm Raises x 10  Arm Crosses x 10  Arm Circles Forwards and Backwards x 10  SittingTrunkTwist x 10       []   Standing Ther Ex (reps/sets):     []   Other:      Comments:      Patient/Caregiver Education and Training:   []   [employer] Equipment Use  []   Bed Mobility/Transfer Technique/Safety  [x]   Energy Conservation Tips  []   Family training  []   Postural Awareness  []   Safety During Functional Activities  []   Reinforced Patient's Precautions   []   Progress was updated and reviewed in Rehabtracker with patient and/or family this         date. Treatment Plan for Next Session: D/C 03/24/2021      Assessment: This pt demonstrated a positive response to today's treatment as evidenced by improved ADL performance. The patient is making good progress toward established goals as evidenced by QI scores.         Treatment/Activity Tolerance:   [x] Tolerated treatment with no adverse effects    [] Patient limited by fatigue  [] Patient limited by pain   [] Patient limited by medical complications:    [] Adverse reaction to Tx:   [] Significant change in status    Safety:       [x]  bed alarm set    []  chair alarm set    []  Pt refused alarms                []  Telesitter activated      [x]  Gait belt used during tx session      []other:       Number of Minutes/Billable Intervention  Therapeutic Exercise 30   ADL Self-care 45   Neuro Re-Ed    Therapeutic Activity 15+30   Group    Other:    TOTAL 120       Social History  Social/Functional History  Lives With: Spouse  Type of Home: House  Home Layout: One level  Home Access: Stairs to enter with rails  Entrance Stairs - Number of Steps: 1  Entrance Stairs - Rails: Right  Bathroom Shower/Tub: Walk-in shower(built in seat)  Bathroom Toilet: Handicap height  Bathroom Equipment: Grab bars in shower, Built-in shower seat, Grab bars around toilet, Hand-held shower  Bathroom Accessibility: Walker accessible  Home Equipment: Cane, Rolling walker, Grab bars  Receives Help From: Family  ADL Assistance: Independent  Homemaking Assistance: Independent  Homemaking Responsibilities: Yes  Meal Prep Responsibility: Secondary  Cleaning Responsibility: Secondary  Health Care Management: Primary  Ambulation Assistance: Independent(cane occasionaly for balance)  Transfer Assistance: Independent  Active : Yes(prior to CABG, still on precaution)  Mode of Transportation: [employer] or [employer])  Occupation: Retired  Leisure & Hobbies: ana in house and yard, online research, golf  Additional Comments: one fall without injury in August, but had to call [email or web address] to get up. Sleeps in regular flat bed.     Objective                                                                                    Goals:  (Update in navigator)  Short term goals  Time Frame for Short term goals: STGs=LTGs:  Long term goals  Time Frame for Long term goals : ~10 days or until d/c  Long term goal 1: Pt will complete grooming tasks Ind- Met   Long term goal 2: Pt will complete total body bathing c S- met   Long term goal 3: Pt will complete UB dressing Ind- met   Long term goal 4: Pt will complete LB dressing mod I using AE PRN-met   Long term goal 5: Pt will doff/don footwear mod I using AE PRN-met Long term goals 6: Pt will complete toileting mod I- met   Long term goal 7: Pt will complete functional transfers (bed, chair, toilet) c DME PRN and mod I; shower transfer c S-met   Long term goal 8: Pt will perform therex/therax to facilitate increased strength/endurance/ax tolerance (c emphasis on dynamic standing balance/tolerance >8 mins) c SBA- met   Long term goal 9: Pt will complete simple homemaking tasks c DME PRN and S:    Met     Plan of Care                                                                              Times per week: 5 days per week for a minimum of 60 minutes/day plus group as appropriate for 60 minutes.   Treatment to include Plan  Times per day: Daily  Current Treatment Recommendations: Strengthening, Balance Training, Functional Mobility Training, Endurance Training, Safety Education & Training, Patient/Caregiver Education & Training, Equipment Evaluation, Education, & procurement, Self-Care / ADL, Home Management Training, Cognitive/Perceptual Training    Electronically signed by   BYRON Yoder   3/23/2021, 3:36 PM

## 2021-03-23 NOTE — CARE COORDINATION
[facility] with [facility] for PT and OT.

## 2021-03-24 ENCOUNTER — PROCEDURE VISIT (OUTPATIENT)
Dept: CARDIOLOGY CLINIC | Age: 71
End: 2021-03-24
Payer: MEDICARE

## 2021-03-24 VITALS
DIASTOLIC BLOOD PRESSURE: 68 MMHG | WEIGHT: 212.1 LBS | SYSTOLIC BLOOD PRESSURE: 144 MMHG | RESPIRATION RATE: 16 BRPM | HEART RATE: 62 BPM | TEMPERATURE: 96.4 F | HEIGHT: 70 IN | OXYGEN SATURATION: 95 % | BODY MASS INDEX: 30.37 KG/M2

## 2021-03-24 DIAGNOSIS — Z95.0 CARDIAC PACEMAKER IN SITU: Primary | ICD-10-CM

## 2021-03-24 LAB
ALBUMIN SERPL-MCNC: 3.4 GM/DL (ref 3.4–5)
ANION GAP SERPL CALCULATED.3IONS-SCNC: 10 MMOL/L (ref 4–16)
BUN BLDV-MCNC: 58 MG/DL (ref 6–23)
CALCIUM SERPL-MCNC: 8.7 MG/DL (ref 8.3–10.6)
CHLORIDE BLD-SCNC: 98 MMOL/L (ref 99–110)
CO2: 33 MMOL/L (ref 21–32)
CREAT SERPL-MCNC: 1.4 MG/DL (ref 0.9–1.3)
GFR AFRICAN AMERICAN: >60 ML/MIN/1.73M2
GFR NON-AFRICAN AMERICAN: 50 ML/MIN/1.73M2
GLUCOSE BLD-MCNC: 107 MG/DL (ref 70–99)
GLUCOSE BLD-MCNC: 107 MG/DL (ref 70–99)
GLUCOSE BLD-MCNC: 114 MG/DL (ref 70–99)
GLUCOSE BLD-MCNC: 186 MG/DL (ref 70–99)
HCT VFR BLD CALC: 32.9 % (ref 42–52)
HEMOGLOBIN: 9.8 GM/DL (ref 13.5–18)
MCH RBC QN AUTO: 25.7 PG (ref 27–31)
MCHC RBC AUTO-ENTMCNC: 29.8 % (ref 32–36)
MCV RBC AUTO: 86.1 FL (ref 78–100)
PDW BLD-RTO: 22 % (ref 11.7–14.9)
PHOSPHORUS: 3.3 MG/DL (ref 2.5–4.9)
PLATELET # BLD: 240 K/CU MM (ref 140–440)
PMV BLD AUTO: 10.4 FL (ref 7.5–11.1)
POTASSIUM SERPL-SCNC: 3.5 MMOL/L (ref 3.5–5.1)
RBC # BLD: 3.82 M/CU MM (ref 4.6–6.2)
SODIUM BLD-SCNC: 141 MMOL/L (ref 135–145)
WBC # BLD: 5 K/CU MM (ref 4–10.5)

## 2021-03-24 PROCEDURE — 93296 REM INTERROG EVL PM/IDS: CPT | Performed by: INTERNAL MEDICINE

## 2021-03-24 PROCEDURE — 6370000000 HC RX 637 (ALT 250 FOR IP): Performed by: PHYSICIAN ASSISTANT

## 2021-03-24 PROCEDURE — 2580000003 HC RX 258: Performed by: PHYSICIAN ASSISTANT

## 2021-03-24 PROCEDURE — 6360000002 HC RX W HCPCS: Performed by: PHYSICIAN ASSISTANT

## 2021-03-24 PROCEDURE — 36415 COLL VENOUS BLD VENIPUNCTURE: CPT

## 2021-03-24 PROCEDURE — 99239 HOSP IP/OBS DSCHRG MGMT >30: CPT | Performed by: PHYSICAL MEDICINE & REHABILITATION

## 2021-03-24 PROCEDURE — 93294 REM INTERROG EVL PM/LDLS PM: CPT | Performed by: INTERNAL MEDICINE

## 2021-03-24 PROCEDURE — 80069 RENAL FUNCTION PANEL: CPT

## 2021-03-24 PROCEDURE — 85027 COMPLETE CBC AUTOMATED: CPT

## 2021-03-24 PROCEDURE — C9113 INJ PANTOPRAZOLE SODIUM, VIA: HCPCS | Performed by: PHYSICIAN ASSISTANT

## 2021-03-24 PROCEDURE — 82962 GLUCOSE BLOOD TEST: CPT

## 2021-03-24 RX ORDER — TORSEMIDE 20 MG/1
20 TABLET ORAL 2 TIMES DAILY
Qty: 60 TABLET | Refills: 0 | Status: SHIPPED | OUTPATIENT
Start: 2021-03-24 | End: 2021-04-05 | Stop reason: SDUPTHER

## 2021-03-24 RX ORDER — ROPINIROLE 0.25 MG/1
0.25 TABLET, FILM COATED ORAL 3 TIMES DAILY
Qty: 90 TABLET | Refills: 0 | Status: SHIPPED | OUTPATIENT
Start: 2021-03-24 | End: 2021-04-05 | Stop reason: SDUPTHER

## 2021-03-24 RX ADMIN — ROPINIROLE HYDROCHLORIDE 0.25 MG: 0.25 TABLET, FILM COATED ORAL at 09:29

## 2021-03-24 RX ADMIN — ACETAMINOPHEN 650 MG: 325 TABLET ORAL at 07:22

## 2021-03-24 RX ADMIN — MINOCYCLINE HYDROCHLORIDE 100 MG: 100 CAPSULE ORAL at 09:29

## 2021-03-24 RX ADMIN — PANTOPRAZOLE SODIUM 40 MG: 40 INJECTION, POWDER, FOR SOLUTION INTRAVENOUS at 09:29

## 2021-03-24 RX ADMIN — MEROPENEM 1000 MG: 1 INJECTION, POWDER, FOR SOLUTION INTRAVENOUS at 04:55

## 2021-03-24 RX ADMIN — TORSEMIDE 20 MG: 20 TABLET ORAL at 07:23

## 2021-03-24 RX ADMIN — INSULIN LISPRO 2 UNITS: 100 INJECTION, SOLUTION INTRAVENOUS; SUBCUTANEOUS at 12:28

## 2021-03-24 RX ADMIN — APIXABAN 5 MG: 5 TABLET, FILM COATED ORAL at 09:28

## 2021-03-24 RX ADMIN — ASPIRIN 81 MG CHEWABLE TABLET 81 MG: 81 TABLET CHEWABLE at 09:28

## 2021-03-24 RX ADMIN — ROPINIROLE HYDROCHLORIDE 0.25 MG: 0.25 TABLET, FILM COATED ORAL at 13:50

## 2021-03-24 ASSESSMENT — PAIN SCALES - GENERAL: PAINLEVEL_OUTOF10: 0

## 2021-03-24 NOTE — PLAN OF CARE
Problem: Infection:  Goal: Will remain free from infection  Description: Will remain free from infection  3/24/2021 1327 by Mere Jama RN  Outcome: Ongoing  3/24/2021 0239 by Rj Garrett RN  Outcome: Ongoing

## 2021-03-24 NOTE — PLAN OF CARE
Problem: Infection:  Goal: Will remain free from infection  Description: Will remain free from infection  3/24/2021 1327 by Willem Shaver RN  Outcome: Ongoing  3/24/2021 0239 by Valeria Ng RN  Outcome: Ongoing

## 2021-03-24 NOTE — CARE COORDINATION
Hospital follow-up set with patient's PCP, Dr. Leif Watson ([phone]), for 04/02/21 at 9 AM.  Follow-up with Dr. Uriel Franco ([phone]) set for 04/01/21 at 10:15 AM.  Follow-up with Dr. Eitan Rothman ([phone]) set for 04/07/21 at 12:50 PM.  AVS updated to reflect. Patient follow-ups set. DME order sent to [facility] and RW has been delivered to patient room. HHC order sent to [facility] for PT, OT, and Nursing. Patient's spouse to provide transport home and nursing is aware. Patient is set for discharge from case management standpoint. 1:40 PM  LSW informed by Dr. Albert Cushing that patient also needs follow-up with Dr. Juan Mann. LSW called Dr. Tammy Gandhi office ([phone]) and wound re-check is set for 04/05/21 at 2 PM.  AVS updated to reflect.

## 2021-03-24 NOTE — PROGRESS NOTES
Pt/ Spouse  signed and verbalized acknowledgement of discharge orders. All belongings taken including pt's home med- [employer]. Transport by private vehicle to home. Midline IV in L upper arm removed and dressing on lower L arm cleansed ; clean , dry non adherant dressing applied. None known

## 2021-03-24 NOTE — PROGRESS NOTES
Elias Ayers    : 1950  Acct #: [de-identified]  MRN: [id number]              PM&R Progress Note      Admitting diagnosis:Critical illness myopathy ( [address])     Comorbid diagnoses impacting rehabilitation: Generalized weakness, paroxysmal atrial fibrillation, recurrent right pleural effusion, status post coronary artery bypass graft surgery/maze procedure/AVR on 2021, COVID-19 pneumonia (2020), HAP, chronic kidney disease stage IIIb, uncontrolled diabetes type 2 with peripheral neuropathy, essential hypertension, moderate protein calorie malnutrition    Chief complaint: Anxious to get home tomorrow. Caregiver training is going well with his spouse. Prior (baseline) level of function: Independent.     Current level of function:         Current  IRF-HEENA and Goals:   Occupational Therapy:    Short term goals  Time Frame for Short term goals: STGs=LTGs :   Long term goals  Time Frame for Long term goals : ~10 days or until d/c  Long term goal 1: Pt will complete grooming tasks Ind  Long term goal 2: Pt will complete total body bathing c S  Long term goal 3: Pt will complete UB dressing Ind  Long term goal 4: Pt will complete LB dressing mod I using AE PRN  Long term goal 5: Pt will doff/don footwear mod I using AE PRN  Long term goals 6: Pt will complete toileting mod I  Long term goal 7: Pt will complete functional transfers (bed, chair, toilet) c DME PRN and mod I; shower transfer c S  Long term goal 8: Pt will perform therex/therax to facilitate increased strength/endurance/ax tolerance (c emphasis on dynamic standing balance/tolerance >8 mins) c SBA  Long term goal 9: Pt will complete simple homemaking tasks c DME PRN and S :                                       Eating: Eating  Assistance Needed: Independent  Comment: x  CARE Score: 6  Discharge Goal: Independent       Oral Hygiene: Oral Hygiene  Assistance Needed: Independent  Comment: seated at sink  CARE Score: 6  Discharge Goal: Independent    UB/LB Bathing: Shower/Bathe Self  Assistance Needed: Independent  Comment: mod I to bath UB/LB; Pt able to perform lateral lean seated to bath posterior perineal area  CARE Score: 6  Discharge Goal: Supervision or touching assistance    UB Dressing: Upper Body Dressing  Assistance Needed: Independent  Comment: Pt able to retrieve clothing; mod I to don/doff pull over shirt  CARE Score: 6  Discharge Goal: Independent         LB Dressing: Lower Body Dressing  Assistance Needed: Independent  Comment: mod I to doff/don pants, brief  CARE Score: 6  Discharge Goal: Independent    Donning and [place] Footwear: Putting On/Taking Off Footwear  Assistance Needed: Independent  Comment: mod I to don socks, shoes  CARE Score: 6  Discharge Goal: Independent      Toileting: Toileting Hygiene  Assistance Needed: Supervision or touching assistance  Comment: Pt able to manage pants c CGA to steady; to complete toilet hygiene c min A to check for throughness  Reason if not Attempted: Not attempted due to medical condition or safety concerns  CARE Score: 4  Discharge Goal: Independent      Toilet Transfers:   Toilet Transfer  Assistance Needed: Independent  Comment: mod I using sternal precautions  CARE Score: 6  Discharge Goal: Independent    Physical Therapy:   Short term goals  Time Frame for Short term goals: 10 days STG=LTG  Short term goal 1: pt will perform all bed mobility with mod I  Short term goal 2: pt will perform sit tostand and pivot transfers with mod I, car transfers with CGA for LEs  Short term goal 3: pt will ambulate on level and unlevel surfaces with 2ww with mod I , level surfaces 200' with supervision  Short term goal 4: Pt will ascend/descend 1 step with 2ww with supervision  Short term goal 5: pt will retrieve light item from floor with reacher and 2ww with mod I            Bed Mobility:   Sit to Lying  Assistance Needed: Independent  Comment: Indep without bed features or cueing  CARE Score: Goal: Supervision or touching assistance     4 Steps  Assistance Needed: Supervision or touching assistance  Comment: SB-Supervision using B rails in non-recip pattern  Reason if not Attempted: Not applicable  CARE Score: 4  Discharge Goal: Not Applicable     12 Steps  Assistance Needed: Supervision or touching assistance  Comment: SBA using B rails in recip pattern, demonstrates light UE support on rails  Reason if not Attempted: Not applicable  CARE Score: 4  Discharge Goal: Not Applicable       Wheelchair:  w/c Ability: Wheelchair Ability  Uses a Wheelchair and/or Scooter?: No     Wheel 150 Feet  Assistance Needed: Supervision or touching assistance  Comment: SBA propelling wC bkwd with focus on quad strenthening 158' using B LEs only, req extra time to complete distance,  CARE Score: 4          Balance:        Object: Picking Up Object  Assistance Needed: Independent  Comment: Mod I at [facility] with use of AE  Reason if not Attempted: Not attempted due to medical condition or safety concerns  CARE Score: 6  Discharge Goal: Independent    I      Exam:    Blood pressure (!) 143/67, pulse 62, temperature 98.3 °F (36.8 °C), temperature source Oral, resp. rate 17, height 5' 10\" (1.778 m), weight 215 lb 8 oz (97.8 kg), SpO2 97 %. General: Up in a wheelchair. Alert and talkative. Oriented x3. HEENT: MMM. Neck supple. No JVD. Pulmonary: Clear without rhonchi or rales. Cardiac: RRR. Abdomen: Patient's abdomen is soft and nondistended. Bowel sounds were present throughout. There was no rebound, guarding or masses noted. Upper extremities: Able to raise his hands overhead. Pacer site minimally tender. Stronger . Lower extremities: 4 -/5 strength throughout. No signs of DVT. Sitting balance was good.   Standing balance was fair-.    Lab Results   Component Value Date    WBC 5.2 03/23/2021    HGB 9.5 (L) 03/23/2021    HCT 33.1 (L) 03/23/2021    MCV 88.5 03/23/2021     03/23/2021 Lab Results   Component Value Date    INR 1.51 03/16/2021    INR 1.54 03/06/2021    INR 1.84 03/03/2021    PROTIME 18.3 (H) 03/16/2021    PROTIME 18.7 (H) 03/06/2021    PROTIME 22.4 (H) 03/03/2021     Lab Results   Component Value Date    CREATININE 1.5 (H) 03/23/2021    BUN 58 (H) 03/23/2021     03/23/2021    K 3.5 03/23/2021    CL 98 (L) 03/23/2021    CO2 35 (H) 03/23/2021     Lab Results   Component Value Date    ALT 53 (H) 03/14/2021    AST 44 (H) 03/14/2021    ALKPHOS 303 (H) 03/14/2021    BILITOT 0.5 03/14/2021       Expected length of stay  prior to a supervised level of function for discharge home with a walker and [facility] OT/PT is 3/24/2021. Recommendations:    1. Critical illness myopathy: Completing caregiver training today during the daily occupational and physical therapy.   Showing better insight into the techniques for self-care mobility tasks following sternal precautions. Yvonne Mitchell has benefited from pulmonary hygiene, DVT prophylaxis, nutritional support and bowel and bladder retraining.  Planning a voiding trial this weekend. [employer] training completed with his wife.    Minimal verbal cues and SBA needed for transfers today. 2. DVT prophylaxis: The Eliquis he requires for his atrial fibrillation is protective against new DVT.  I must periodically monitor his hemoglobin while on this medication.  Weightbearing activities are slowly improving daily.  GI prophylaxis offered.  No new bruising or swelling. 3. Hospital-acquired pneumonia: Ongoing aggressive pulmonary hygiene while monitoring O2 saturations at rest and with activity.  Stopped vancomycin, meropenem and minocycline.  No need for oxygen supplementation.  No coughing.   4. Acute kidney injury on chronic kidney disease 3B: Nephrology is monitoring the patient's recovery.  Avoiding nephrotoxic medications when possible (cautious use of vancomycin).  Avoiding drops in blood pressure with ProAmatine.  Periodic monitoring of his chemistries. 5. Uncontrolled diabetes type 2 with peripheral neuropathy: Patient requires a diet modified for carbohydrates.  He is on scheduled Lantus and Humalog as well as a Humalog sliding scale.  Should his creatinine returned to normal, it is likely he will resume his prior home medication regimen for his diabetes (Invokana, Trulicity, Amaryl and Metformin). 6. Paroxysmal atrial fibrillation: Anticoagulation with Eliquis.  Rate is controlled without medications.  Daily weights do not reveal any decompensation of CHF. 7. Hypertension: Demadex for cautious diuresis.  Tolerating ProAmatine and avoiding drops in blood pressure.  Vital signs are checked at rest and with activity.  Blood pressure just below target range today.   8. Moderate protein calorie malnutrition: Encouraging consistent oral intake.  Consult dietary for oral supplementation guidance.

## 2021-03-25 ENCOUNTER — CARE COORDINATION (OUTPATIENT)
Dept: CASE MANAGEMENT | Age: 71
End: 2021-03-25

## 2021-03-25 NOTE — DISCHARGE SUMMARY
been placed on multiple antibiotics. He required another thoracentesis of the right chest and this took place on the second day of rehabilitation. Prior (baseline) level of function: Independent.     Current level of function:         Current  IRF-HEENA and Goals:   Occupational Therapy:    Short term goals  Time Frame for Short term goals: STGs=LTGs :   Long term goals  Time Frame for Long term goals : ~10 days or until d/c  Long term goal 1: Pt will complete grooming tasks Ind  Long term goal 2: Pt will complete total body bathing c S  Long term goal 3: Pt will complete UB dressing Ind  Long term goal 4: Pt will complete LB dressing mod I using AE PRN  Long term goal 5: Pt will doff/don footwear mod I using AE PRN  Long term goals 6: Pt will complete toileting mod I  Long term goal 7: Pt will complete functional transfers (bed, chair, toilet) c DME PRN and mod I; shower transfer c S  Long term goal 8: Pt will perform therex/therax to facilitate increased strength/endurance/ax tolerance (c emphasis on dynamic standing balance/tolerance >8 mins) c SBA  Long term goal 9: Pt will complete simple homemaking tasks c DME PRN and S :                                       Eating: Eating  Assistance Needed: Independent  Comment: x  CARE Score: 6  Discharge Goal: Independent       Oral Hygiene: Oral Hygiene  Assistance Needed: Independent  Comment: seated at sink  CARE Score: 6  Discharge Goal: Independent    UB/LB Bathing: Shower/Bathe Self  Assistance Needed: Independent  Comment: mod I to bath UB/LB; Pt able to perform lateral lean seated to bath posterior perineal area  CARE Score: 6  Discharge Goal: Supervision or touching assistance    UB Dressing: Upper Body Dressing  Assistance Needed: Independent  Comment: Pt able to retrieve clothing; mod I to don/doff pull over shirt  CARE Score: 6  Discharge Goal: Independent         LB Dressing: Lower Body Dressing  Assistance Needed: Independent  Comment: mod I to doff/don pants, brief  CARE Score: 6  Discharge Goal: Independent    Donning and [place] Footwear: Putting On/Taking Off Footwear  Assistance Needed: Independent  Comment: mod I to don socks, shoes  CARE Score: 6  Discharge Goal: Independent      Toileting: Toileting Hygiene  Assistance Needed: Independent  Comment: Mod I for pants management and hygiene  Reason if not Attempted: Not attempted due to medical condition or safety concerns  CARE Score: 6  Discharge Goal: Independent      Toilet Transfers: Toilet Transfer  Assistance Needed: Independent  Comment: mod I using sternal precautions  CARE Score: 6  Discharge Goal: Independent    Physical Therapy:   Short term goals  Time Frame for Short term goals: 10 days STG=LTG  Short term goal 1: pt will perform all bed mobility with mod I  Short term goal 2: pt will perform sit tostand and pivot transfers with mod I, car transfers with CGA for LEs  Short term goal 3: pt will ambulate on level and unlevel surfaces with 2ww with mod I , level surfaces 200' with supervision  Short term goal 4: Pt will ascend/descend 1 step with 2ww with supervision  Short term goal 5: pt will retrieve light item from floor with reacher and 2ww with mod I            Bed Mobility:   Sit to Lying  Assistance Needed: Independent  Comment: Indep without bed features or cueing  CARE Score: 6  Discharge Goal: Independent  Roll Left and Right  Assistance Needed: Independent  Comment: Indep without bed features  CARE Score: 6  Discharge Goal: Independent  Lying to Sitting on Side of Bed  Assistance Needed: Supervision or touching assistance  Comment: Supervision with min cues to roll on side before getting up, req extra time and effort to get into sitting position  CARE Score: 4  Discharge Goal: Independent    Transfers:    Sit to Stand  Assistance Needed: Independent  Comment:  Mod I with RW, demonstrates safety with sternal precautions  CARE Score: 6  Discharge Goal: Independent  Chair/Bed-to-Chair Transfer  Assistance Needed: Supervision or touching assistance  Comment: Supervision with RW  CARE Score: 4  Discharge Goal: Independent     Car Transfer  Assistance Needed: Supervision or touching assistance  Comment: Supervision for balance with approach with RW, able to lift LEs in/out of car without A  CARE Score: 4  Discharge Goal: Supervision or touching assistance    Ambulation:    Walking Ability  Does the Patient Walk?: Yes     Walk 10 Feet  Assistance Needed: Independent  Comment: Mod I with RW, demonstrates safety  CARE Score: 6  Discharge Goal: Independent     Walk 50 Feet with Two Turns  Assistance Needed: Supervision or touching assistance  Comment: Supervision with RW for balance  CARE Score: 4  Discharge Goal: Supervision or touching assistance     Walk 150 Feet  Assistance Needed: Supervision or touching assistance  Comment: Supervision for balance with RW  CARE Score: 4  Discharge Goal: Supervision or touching assistance     Walking 10 Feet on Uneven Surfaces  Assistance Needed: Supervision or touching assistance  Comment: Mod I using RW over carpet, demonstrates safety with transition over carpet edge.   CARE Score: 4  Discharge Goal: Independent     1 Step (Curb)  Assistance Needed: Supervision or touching assistance  Comment: SB-Supervision for balance with RW  CARE Score: 4  Discharge Goal: Supervision or touching assistance     4 Steps  Assistance Needed: Supervision or touching assistance  Comment: SB-Supervision using B rails in non-recip pattern  Reason if not Attempted: Not applicable  CARE Score: 4  Discharge Goal: Not Applicable     12 Steps  Assistance Needed: Supervision or touching assistance  Comment: SBA using B rails in recip pattern, demonstrates light UE support on rails  Reason if not Attempted: Not applicable  CARE Score: 4  Discharge Goal: Not Applicable       Wheelchair:  w/c Ability: Wheelchair Ability  Uses a Wheelchair and/or Scooter?: No     Wheel [address] [facility] Needed: Supervision or touching assistance  Comment: SBA propelling wC bkwd with focus on quad strenthening 158' using B LEs only, req extra time to complete distance,  CARE Score: 4          Balance:        Object: Picking Up Object  Assistance Needed: Independent  Comment: Mod I at [facility] with use of AE  Reason if not Attempted: Not attempted due to medical condition or safety concerns  CARE Score: 6  Discharge Goal: Independent    I      Exam:    Blood pressure (!) 144/68, pulse 62, temperature 96.4 °F (35.8 °C), temperature source Oral, resp. rate 16, height 5' 10\" (1.778 m), weight 212 lb 1.6 oz (96.2 kg), SpO2 95 %. General: Sitting up in bed. Alert and oriented. Please to be going home. No distress. Hard of hearing. HEENT: Clear speech. MMM. Neck supple. No JVD. Pulmonary: Unlabored breathing with symmetric air exchange. Cardiac: Pansystolic murmur. Occasional premature beat. The rate is controlled. Abdomen: Patient's abdomen is soft and nondistended. Bowel sounds were present throughout. There was no rebound, guarding or masses noted. Upper extremities: Able to raise his hands overhead. 4/5 strength proximally and distally. No new edema or bruising. Lower extremities: Calves soft. No new swelling or signs of DVT.  4 -/5 strength throughout. Sitting balance was good. Standing balance was fair.     Lab Results   Component Value Date    WBC 5.0 03/24/2021    HGB 9.8 (L) 03/24/2021    HCT 32.9 (L) 03/24/2021    MCV 86.1 03/24/2021     03/24/2021     Lab Results   Component Value Date    INR 1.51 03/16/2021    INR 1.54 03/06/2021    INR 1.84 03/03/2021    PROTIME 18.3 (H) 03/16/2021    PROTIME 18.7 (H) 03/06/2021    PROTIME 22.4 (H) 03/03/2021     Lab Results   Component Value Date    CREATININE 1.4 (H) 03/24/2021    BUN 58 (H) 03/24/2021     03/24/2021    K 3.5 03/24/2021    CL 98 (L) 03/24/2021    CO2 33 (H) 03/24/2021     Lab Results   Component Value Date    ALT 53 (H) 03/14/2021    AST 44 (H) 03/14/2021    ALKPHOS 303 (H) 03/14/2021    BILITOT 0.5 03/14/2021           The patient presented to the ARU with the above history requiring a multidisciplinary treatment plan including close medical supervision by the Richard Thorpe Director. The patient participated in the prescribed therapy treatment plan with reasonable compliance and progressive tolerance. They avoided significant medical complications. By the time of discharge the patient had become safer with adaptive equipment to transfer and toilet with a FWW with the supervision of family/caregivers. The patient was tolerating an oral diet without choking/coughing and was back to their baseline with regards to bowel and bladder control. Discharge instructions were reviewed with the patient and his wife. The patient is to follow up with the PCP in 1 week, cardiology and cardiothoracic surgery in 2 weeks. He is planning to see the endocrinologist for diabetic management as well. No driving. [facility] PT/OT/RN will be arranged.      [place] [facility]\"   Home Medication Instructions ZPE:[id number]    Printed on:03/25/21 0800   Medication Information                      acetaminophen (AMINOFEN) 325 MG tablet  Take 2 tablets by mouth every 6 hours as needed for Pain             apixaban (ELIQUIS) 5 MG TABS tablet  Take 1 tablet by mouth 2 times daily             aspirin 81 MG tablet  Take 81 mg by mouth daily             canagliflozin (INVOKANA) 300 MG TABS tablet  Take 1 tablet by mouth every morning (before breakfast)             Cholecalciferol (VITAMIN D) 50 MCG (2000 UT) CAPS capsule  Take by mouth nightly              Dulaglutide (TRULICITY) 1.5 QA/1.3JK SOPN  Inject 1.5 mg into the skin once a week             glimepiride (AMARYL) 4 MG tablet  Take 1 tablet by mouth daily             rOPINIRole (REQUIP) 0.25 MG tablet  Take 1 tablet by mouth 3 times daily             simvastatin (ZOCOR) 20 MG tablet  Pt takes 10 mg daily             torsemide (DEMADEX) 20 MG tablet  Take 1 tablet by mouth 2 times daily                 CONDITION ON DISCHARGE: Stable. The prognosis is fair for further improvements in ADL's and safety with adapted gait/transfers. Record review, patient exam, discharge instructions, medication reconciliation and summary for this discharge visit took more than 30 minutes.

## 2021-03-25 NOTE — CARE COORDINATION
[facility] Transitions Initial Follow Up Call    Call within 2 business days of discharge: Yes    Patient: Bladimir Degroot Patient : 1950   MRN: [id number]  Reason for Admission: Generalized weakness  Discharge Date: 3/24/21 RARS: Readmission Risk Score: 28      Last Discharge [facility]       Complaint Diagnosis Description Type Department Provider    3/15/21   Admission (Discharged) Alton Davey MD           Spoke with:   patient      Non-face-to-face services provided:  Education of patient/family/caregiver/guardian to support self-management-1     Patient contacted regarding COVID-19 Risk. COVID-19:  Not detected 3/6/21    CTN contacted the patient by telephone to perform post discharge assessment. Call within 2 business days of discharge: Yes. Verified name and  with patient as identifiers. Provided introduction to self, and explanation of the CTN role, and reason for call due to risk factors for infection and/or exposure to COVID-19. Symptoms reviewed with patient who verbalized the following symptoms: general fatigue. Denies CP, increased SOB, palpitations, dizziness or light headedness. Instructed on s/s to report to MD.    Patient denies fever, chills, N/V/D , body aches or COVID related symptoms. Instructed on concern for COVID risk/ reviewed infection prevention measures. Instructed on s/s COVID to report to MD.  Patient confirms that he has completed his vaccination series. Due to no new onset of symptoms encounter was not routed to provider for escalation. Discussed follow-up appointments.  If no appointment was previously scheduled, appointment scheduling offered:   Yes  [facility] follow up appointment(s):   Future Appointments   Date Time Provider Melissa Russell   2021  9:00 AM Caden Lopez MD [facility]PS MMA   2021  2:00 PM Nick Duarte MD [facility] SPR HRT  AFL SPR HRT   2021  3:00 PM Antonio Ward MD [facility]   2021 12:50 PM Rosita Schlatter, MD [facility]   4/8/2021  2:30 PM Gibran Vergara MD [facility] FPS MMA   11/30/2021  2:00 PM Bar Smith MD AFLADVNPHHTN AFL ADV NEPH     [facility] follow up appointment(s):        Advance Care Planning:   Does patient have an Advance Directive:  yes. Confirmed patient spouse as HCDM. Patient has following risk factors of: Critical illness myopathy, Atrial Fib. CAD. CTN reviewed discharge instructions, medical action plan and red flags such as increased shortness of breath, increasing fever and signs of decompensation with patient who verbalized understanding. Discussed exposure protocols and quarantine with CDC Guidelines What to do if you are sick with coronavirus disease 2019.  Patient  was given an opportunity for questions and concerns. The patient agrees to contact the [facility] exposure line [phone], local health department  and PCP office for questions related to their healthcare. CTN provided contact information for future needs. Reviewed and educated patient on any new and changed medications related to discharge diagnosis. Patient confirms that he has all of his medications and denies any barriers to obtaining patient medications. Patient/family/caregiver given information for [employer] and agrees to enroll :  yes  Patient's preferred e-mail: [email or web address]  Patient's preferred phone number:  [phone]  Based on Loop alert triggers, patient will be contacted by nurse care manager for worsening symptoms. Patient denies any questions, equipment or resource needs. Patient will be further monitored by the Marlen Dang  Team as monitored based on severity of symptoms and risk factors. Spoke with [address] Confirmed services and plan for [place] [address] follow up.     Care Transitions 24 Hour Call    Do you have a copy of your discharge instructions?: Yes  Do you have all of your prescriptions and are they filled?: Yes  Have you been contacted by a [facility] Pharmacist?: No  Have you scheduled your follow up appointment?: Yes  How are you going to get to your appointment?: Car - family or friend to transport  Were you discharged with any Home Care or Post Acute Services: Yes  Post Acute Services: Home Health (Comment: [facility])  Do you feel like you have everything you need to keep you well at home?: Yes  Care Transitions Interventions   Home Care Waiver: Completed        DME Assistance: Declined          Future Appointments   Date Time Provider Melissa Russell   4/2/2021  9:00 AM Bee Portillo MD [facility]   4/5/2021  2:00 PM Vidal Mclean MD [facility] SPR HRT  AFL SPR HRT   4/5/2021  3:00 PM Cathy French MD [facility]NPHHTN AFL [facility]   4/7/2021 12:50 PM Tina Bolaños MD [facility]   4/8/2021  2:30 PM Bee Portillo MD [facility]   11/30/2021  2:00 PM Bar Luu MD [facility]NPHHTN AFL [facility]       Chris Melton RN

## 2021-03-29 ENCOUNTER — TELEPHONE (OUTPATIENT)
Dept: FAMILY MEDICINE CLINIC | Age: 71
End: 2021-03-29

## 2021-03-29 NOTE — TELEPHONE ENCOUNTER
Will from [facility] called and stated BS running low 50 and 60 please revie DM meds and see if they can be cut back any   Glimepiride 4 mg,Invocona 921 daily, Trulicity 1.5 weekly  Please call Will and advise

## 2021-03-30 ENCOUNTER — HOSPITAL ENCOUNTER (OUTPATIENT)
Age: 71
Discharge: HOME | End: 2021-03-30
Payer: MEDICARE

## 2021-03-30 LAB
ALBUMIN SERPL-MCNC: 4.3 GM/DL (ref 3.4–5)
ANION GAP SERPL CALCULATED.3IONS-SCNC: 16 MMOL/L (ref 4–16)
BACTERIA: NEGATIVE /HPF
BASOPHILS ABSOLUTE: 0.1 K/CU MM
BASOPHILS RELATIVE PERCENT: 2.9 % (ref 0–1)
BILIRUBIN URINE: NEGATIVE MG/DL
BLOOD, URINE: ABNORMAL
BUN BLDV-MCNC: 37 MG/DL (ref 6–23)
CALCIUM SERPL-MCNC: 9.4 MG/DL (ref 8.3–10.6)
CHLORIDE BLD-SCNC: 93 MMOL/L (ref 99–110)
CLARITY: CLEAR
CO2: 29 MMOL/L (ref 21–32)
COLOR: YELLOW
CREAT SERPL-MCNC: 1.5 MG/DL (ref 0.9–1.3)
DIFFERENTIAL TYPE: ABNORMAL
EOSINOPHILS ABSOLUTE: 0.6 K/CU MM
EOSINOPHILS RELATIVE PERCENT: 13.5 % (ref 0–3)
GFR AFRICAN AMERICAN: 56 ML/MIN/1.73M2
GFR NON-AFRICAN AMERICAN: 46 ML/MIN/1.73M2
GLUCOSE BLD-MCNC: 105 MG/DL (ref 70–99)
GLUCOSE, URINE: >500 MG/DL
HCT VFR BLD CALC: 38.7 % (ref 42–52)
HEMOGLOBIN: 11.7 GM/DL (ref 13.5–18)
IMMATURE NEUTROPHIL %: 0.4 % (ref 0–0.43)
KETONES, URINE: NEGATIVE MG/DL
LEUKOCYTE ESTERASE, URINE: NEGATIVE
LYMPHOCYTES ABSOLUTE: 1 K/CU MM
LYMPHOCYTES RELATIVE PERCENT: 21.6 % (ref 24–44)
MCH RBC QN AUTO: 25.9 PG (ref 27–31)
MCHC RBC AUTO-ENTMCNC: 30.2 % (ref 32–36)
MCV RBC AUTO: 85.8 FL (ref 78–100)
MONOCYTES ABSOLUTE: 0.9 K/CU MM
MONOCYTES RELATIVE PERCENT: 19.2 % (ref 0–4)
MUCUS: ABNORMAL HPF
NITRITE URINE, QUANTITATIVE: NEGATIVE
NUCLEATED RBC %: 0 %
PDW BLD-RTO: 20.7 % (ref 11.7–14.9)
PH, URINE: 7 (ref 5–8)
PHOSPHORUS: 4.1 MG/DL (ref 2.5–4.9)
PLATELET # BLD: 220 K/CU MM (ref 140–440)
PMV BLD AUTO: 11 FL (ref 7.5–11.1)
POTASSIUM SERPL-SCNC: 3.7 MMOL/L (ref 3.5–5.1)
PROTEIN UA: 100 MG/DL
RBC # BLD: 4.51 M/CU MM (ref 4.6–6.2)
RBC URINE: 1 /HPF (ref 0–3)
SEGMENTED NEUTROPHILS ABSOLUTE COUNT: 1.9 K/CU MM
SEGMENTED NEUTROPHILS RELATIVE PERCENT: 42.4 % (ref 36–66)
SODIUM BLD-SCNC: 138 MMOL/L (ref 135–145)
SPECIFIC GRAVITY UA: 1.01 (ref 1–1.03)
SQUAMOUS EPITHELIAL: <1 /HPF
TOTAL IMMATURE NEUTOROPHIL: 0.02 K/CU MM
TOTAL NUCLEATED RBC: 0 K/CU MM
TRICHOMONAS: ABNORMAL /HPF
UROBILINOGEN, URINE: NEGATIVE MG/DL (ref 0.2–1)
WBC # BLD: 4.5 K/CU MM (ref 4–10.5)
WBC UA: <1 /HPF (ref 0–2)

## 2021-03-30 PROCEDURE — 85025 COMPLETE CBC W/AUTO DIFF WBC: CPT

## 2021-03-30 PROCEDURE — 81001 URINALYSIS AUTO W/SCOPE: CPT

## 2021-03-30 PROCEDURE — 36415 COLL VENOUS BLD VENIPUNCTURE: CPT

## 2021-03-30 PROCEDURE — 80069 RENAL FUNCTION PANEL: CPT

## 2021-04-02 ENCOUNTER — OFFICE VISIT (OUTPATIENT)
Dept: FAMILY MEDICINE CLINIC | Age: 71
End: 2021-04-02
Payer: MEDICARE

## 2021-04-02 VITALS
WEIGHT: 187.4 LBS | HEIGHT: 70 IN | BODY MASS INDEX: 26.83 KG/M2 | OXYGEN SATURATION: 99 % | DIASTOLIC BLOOD PRESSURE: 80 MMHG | HEART RATE: 63 BPM | SYSTOLIC BLOOD PRESSURE: 115 MMHG | TEMPERATURE: 97 F

## 2021-04-02 DIAGNOSIS — I10 ESSENTIAL HYPERTENSION: ICD-10-CM

## 2021-04-02 DIAGNOSIS — Z95.0 CARDIAC PACEMAKER IN SITU: ICD-10-CM

## 2021-04-02 DIAGNOSIS — Z95.1 STATUS POST CORONARY ARTERY BYPASS GRAFT: ICD-10-CM

## 2021-04-02 DIAGNOSIS — E11.49 OTHER DIABETIC NEUROLOGICAL COMPLICATION ASSOCIATED WITH TYPE 2 DIABETES MELLITUS (HCC): ICD-10-CM

## 2021-04-02 DIAGNOSIS — Z86.79 STATUS POST MAZE OPERATION FOR ATRIAL FIBRILLATION: ICD-10-CM

## 2021-04-02 DIAGNOSIS — I48.91 ATRIAL FIBRILLATION, UNSPECIFIED TYPE (HCC): ICD-10-CM

## 2021-04-02 DIAGNOSIS — N18.32 CHRONIC KIDNEY DISEASE, STAGE 3B (HCC): Primary | ICD-10-CM

## 2021-04-02 DIAGNOSIS — I35.0 AORTIC STENOSIS, SEVERE: ICD-10-CM

## 2021-04-02 DIAGNOSIS — I25.10 CORONARY ARTERY STENOSIS: ICD-10-CM

## 2021-04-02 DIAGNOSIS — Z98.890 STATUS POST MAZE OPERATION FOR ATRIAL FIBRILLATION: ICD-10-CM

## 2021-04-02 DIAGNOSIS — D50.0 IRON DEFICIENCY ANEMIA DUE TO CHRONIC BLOOD LOSS: Chronic | ICD-10-CM

## 2021-04-02 PROCEDURE — G8427 DOCREV CUR MEDS BY ELIG CLIN: HCPCS | Performed by: FAMILY MEDICINE

## 2021-04-02 PROCEDURE — 1111F DSCHRG MED/CURRENT MED MERGE: CPT | Performed by: FAMILY MEDICINE

## 2021-04-02 PROCEDURE — 3017F COLORECTAL CA SCREEN DOC REV: CPT | Performed by: FAMILY MEDICINE

## 2021-04-02 PROCEDURE — 1036F TOBACCO NON-USER: CPT | Performed by: FAMILY MEDICINE

## 2021-04-02 PROCEDURE — 3044F HG A1C LEVEL LT 7.0%: CPT | Performed by: FAMILY MEDICINE

## 2021-04-02 PROCEDURE — 99214 OFFICE O/P EST MOD 30 MIN: CPT | Performed by: FAMILY MEDICINE

## 2021-04-02 PROCEDURE — 4040F PNEUMOC VAC/ADMIN/RCVD: CPT | Performed by: FAMILY MEDICINE

## 2021-04-02 PROCEDURE — 1123F ACP DISCUSS/DSCN MKR DOCD: CPT | Performed by: FAMILY MEDICINE

## 2021-04-02 PROCEDURE — G8417 CALC BMI ABV UP PARAM F/U: HCPCS | Performed by: FAMILY MEDICINE

## 2021-04-02 PROCEDURE — 2022F DILAT RTA XM EVC RTNOPTHY: CPT | Performed by: FAMILY MEDICINE

## 2021-04-02 RX ORDER — GLIMEPIRIDE 4 MG/1
4 TABLET ORAL DAILY
Qty: 90 TABLET | Refills: 1 | Status: CANCELLED | OUTPATIENT
Start: 2021-04-02

## 2021-04-02 RX ORDER — CARBOXYMETHYLCELLULOSE SODIUM 5 MG/ML
SOLUTION/ DROPS OPHTHALMIC PRN
COMMUNITY
Start: 2020-12-16

## 2021-04-02 ASSESSMENT — PATIENT HEALTH QUESTIONNAIRE - PHQ9
SUM OF ALL RESPONSES TO PHQ QUESTIONS 1-9: 0
SUM OF ALL RESPONSES TO PHQ QUESTIONS 1-9: 0
1. LITTLE INTEREST OR PLEASURE IN DOING THINGS: 0
SUM OF ALL RESPONSES TO PHQ9 QUESTIONS 1 & 2: 0
2. FEELING DOWN, DEPRESSED OR HOPELESS: 0

## 2021-04-02 NOTE — PROGRESS NOTES
2021     Cesar Vera      Chief Complaint   Patient presents with    Follow-Up from Hospital     pt states that he went to the hosp  for open heart surgery . Pt states he was in there a month and also had pneumonia . Pt states he is doing well and off of some of his meds and states he feels good .  Medication Problem     pt would like to discuss his glimipride dosage due to sugar in the morning  is 52 or 67        HPI      Jacinto Anaya is a 66 y.o. male who presents today with the followin. Chronic kidney disease, stage 3b    2. Essential hypertension    3. Cardiac pacemaker in situ    4. Other diabetic neurological complication associated with type 2 diabetes mellitus ([address]    5. Status post coronary artery bypass graft    6. Iron deficiency anemia due to chronic blood loss    7. Coronary artery stenosis    8. Aortic stenosis, severe    9.  Atrial fibrillation, unspecified type [facility])      Patient has had significant health changes since I last saw him  He had severe aortic stenosis was admitted to the hospital and had a porcine aortic valve but at same time had coronary bypass surgery of 2 lesions  Hospital course was complicated by renal failure to the point where the dialysis was being considered  Patient was discharged in the hospital but readmitted within a week with bacterial pneumonia  He is finally recovered from that  So had a Maze procedure for atrial fibrillation    REVIEW OF SYMPTOMS    Review of Systems   Constitutional:        The patient is lost about 40 pounds in the past 6 months due to multiple illnesses and surgeries   Endocrine:        Type 2 diabetes  The patient is lost about 40 pounds due to illness and now is getting hypoglycemic  He is on Trulicity/Invokana/sulfonylurea  Last recorded A1c was 6.2     Genitourinary:        Patient had a history of a stage III kidney disease but recently during another illness EEG shut down to the point where they thought they would have to put him on dialysis       PAST MEDICAL HISTORY  Past Medical History:   Diagnosis Date    Arrhythmia     Pacemaker placed aprox 5 years ago for A Fib per patient    Arthritis 12/2013    rt wrist    Atrial fibrillation (HCC)     on Xarelto - Dr. Woody Powers CAD (coronary artery disease) 06/18/2014    see dr Inderjit Ocampo kidney disease, stage III (moderate) 07/07/2016    Critical illness myopathy 3/15/2021    Diabetes mellitus ([facility]     dx 2004    Diabetic neuropathy associated with type 2 diabetes mellitus ([facility] 04/23/2019    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    Gout 04/2019    \"got gout when had pacer put in because they did not give me my medication for gout \"    H/O 24 hour EKG monitoring 10/03/2013    no afib noted, sinsus rhythm    H/O cardiovascular stress test 05/12/2014    cardiolite- mild ischemia RCA EF50%    H/O echocardiogram 12/01/2020    EF 55-60% severe aortic stenosis mild to mod aortic regurg mod to severe tricuspid regurg severe pulm htn significant changes since 2018 echo.  H/O right and left heart catheterization 12/10/2020    DIFFUSE LAD DISEASE, Mild ECA Disease, Severe AS, Milf Pul HTN on RHC.  H/O transesophageal echocardiography (MABLE) for monitoring 08/05/2013    normal LV function and normal LA appendage without any clot    History of blood transfusion 12/2020    d/t anemia    History of transesophageal echocardiography (MABLE) 12/15/2020    Severe aortic stenosis (ANNA by planimetry: 0.778 cm sq). Mild AR.    [place] (hard of hearing)     hearing tonya aides    Hx of Doppler echocardiogram 05/21/2018    EF 50%  Mild LV hypertrophy. Mildly enlarged RA. Mod aortic valve calcification with mod AS. Mitral annular calcification is present. Mild AR, MR and TR. Mild pulmonary htn.     Hyperlipidemia     Hypertension     Follows with PCP & Dr. Livier Baker Other disorders of kidney and ureter     Pacemaker     Medtronic, implanted 2014    Pneumonia 12/29/2012    Sleep apnea     dx 2013- has c-pap    Type II or unspecified type diabetes mellitus with other specified manifestations, uncontrolled 12/12/2012    Venous hypertension, chronic, with ulcer ([facility] 12/12/2012    resolved       FAMILY HISTORY  Family History   Problem Relation Age of Onset    High Blood Pressure Mother     Arthritis Mother     Diabetes Mother     Heart Disease Mother     High Blood Pressure Father     Heart Disease Father     Kidney Disease Father        SOCIAL HISTORY  Social History     Socioeconomic History    Marital status:      Spouse name: None    Number of children: None    Years of education: None    Highest education level: None   Occupational History    None   Social Needs    Financial resource strain: None    Food insecurity     Worry: None     Inability: None    Transportation needs     Medical: None     Non-medical: None   Tobacco Use    Smoking status: Never Smoker    Smokeless tobacco: Never Used   Substance and Sexual Activity    Alcohol use:  Yes     Alcohol/week: 2.0 standard drinks     Types: 2 Cans of beer per week     Comment: average \"one time per week\"    Drug use: No    Sexual activity: Yes     Partners: Female     Comment:    Lifestyle    Physical activity     Days per week: None     Minutes per session: None    Stress: None   Relationships    Social connections     Talks on phone: None     Gets together: None     Attends [employer] service: None     Active member of club or organization: None     Attends meetings of clubs or organizations: None     Relationship status: None    Intimate partner violence     Fear of current or ex partner: None     Emotionally abused: None     Physically abused: None     Forced sexual activity: None   Other Topics Concern    None   Social History Narrative    None        SURGICAL HISTORY  Past Surgical History:   Procedure Laterality Date    CABG WITH AORTIC VALVE REPLACEMENT N/A 2/16/2021    CABG CORONARY ARTERY BYPASS X2 WITH LIMA, AORTIC VALVE REPLACEMENT AND AORTIC ROOT REPAIR, INTRAOPERATIVE MABLE, INDUCED HYPOTHERMIA, LEFT LEG ENDOVEIN HARVEST, LEFT ATRIAL CLIP, AND CRYO PROCEDURE performed by Jean Kat MD at [address] [facility],  [address]  12/14    at [address] Left 1/29/2021    LEFT CAROTID ENDARTERECTOMY performed by Ricco Yates MD at [id number]  2017    COLONOSCOPY  2011    COLONOSCOPY N/A 11/19/2019    COLONOSCOPY DIAGNOSTIC performed by Ketty Hernadez MD at [facility]  09/30/2020    POSSIBLE CECAL avms, SIGMOID DIVERTICULOSIS, INTERNAL HEMORRHOIDS GRADE 1    COLONOSCOPY N/A 9/30/2020    COLONOSCOPY CONTROL HEMORRHAGE WITH APC performed by Ketty Hernadez MD at [address] [facility]  2014    \"2 stents put in \"    IR NONTUNNELED VASCULAR CATHETER  3/5/2021    IR NONTUNNELED VASCULAR CATHETER 3/5/2021 [facility]    JOINT REPLACEMENT  2004    total left hip    OTHER SURGICAL HISTORY Right 12/02/2017    I&D; evacuation of hematoma right hip    OTHER SURGICAL HISTORY  09/17/2020    enteroscopy    PACEMAKER INSERTION N/A 2/23/2021    PACEMAKER GENERATOR LEAD REVISION performed by Jean Kat MD at [facility]      9/18/14 Status post remote permanent pacemaker with atrial lead dislodgement.  7/24/14 PPM Implant    UPPER GASTROINTESTINAL ENDOSCOPY N/A 9/17/2020    ENTEROSCOPY PUSH BIOPSY performed by Ketty Hernadez MD at [address] 3/4/2021    EGD DIAGNOSTIC ONLY performed by Ketty Hernadez MD at [address]  2012    \"have stents in both legs- done in [address]  Current Outpatient Medications   Medication Sig Dispense Refill    carboxymethylcellulose (REFRESH PLUS) 0.5 % SOLN ophthalmic solution INSTILL 1 DROP IN BOTH EYES THREE TIMES A DAY      canagliflozin (INVOKANA) 300 MG TABS tablet Take 1 tablet by mouth every morning (before breakfast) 90 tablet 1    apixaban (ELIQUIS) 5 MG TABS tablet Take 1 tablet by mouth 2 times daily 60 tablet 0    rOPINIRole (REQUIP) 0.25 MG tablet Take 1 tablet by mouth 3 times daily 90 tablet 0    torsemide (DEMADEX) 20 MG tablet Take 1 tablet by mouth 2 times daily 60 tablet 0    Dulaglutide (TRULICITY) 1.5 XJ/1.9VY SOPN Inject 1.5 mg into the skin once a week 12 pen 1    simvastatin (ZOCOR) 20 MG tablet Pt takes 10 mg daily 90 tablet 1    acetaminophen (AMINOFEN) 325 MG tablet Take 2 tablets by mouth every 6 hours as needed for Pain 30 tablet 0    Cholecalciferol (VITAMIN D) 50 MCG (2000 UT) CAPS capsule Take by mouth nightly       aspirin 81 MG tablet Take 81 mg by mouth daily       No current facility-administered medications for this visit. ALLERGIES  Allergies   Allergen Reactions    Spironolactone      CAUSES INCREASED K+    Tape Jeannette Carter Tape] Rash     SURGICAL TAPE       PHYSICAL EXAM    /80   Pulse 63   Temp 97 °F (36.1 °C)   Ht 5' 10\" (1.778 m)   Wt 187 lb 6.4 oz (85 kg)   SpO2 99%   BMI 26.89 kg/m²     Physical Exam  Vitals signs and nursing note reviewed. Constitutional:       Appearance: Normal appearance. Cardiovascular:      Rate and Rhythm: Normal rate. Pulmonary:      Effort: Pulmonary effort is normal.   Neurological:      General: No focal deficit present. Reviewed his hospitalization reports  Reviewed most recent labs  Reconciled medications  Reviewed immunizations  GFR is now back in the fifties    ASSESSMENT and Gabriella Haines was seen today for follow-up from hospital and medication problem.     Diagnoses and all orders for this visit:    Chronic kidney disease, stage 3b    Essential hypertension    Cardiac pacemaker in situ    Other diabetic neurological complication associated with type 2 diabetes mellitus ([address]    Status post coronary artery bypass graft    Iron deficiency anemia due to chronic blood loss    Coronary artery stenosis    Aortic stenosis, severe    Atrial fibrillation, unspecified type ([address]    Other orders  -     canagliflozin (INVOKANA) 300 MG TABS tablet; Take 1 tablet by mouth every morning (before breakfast)    Patient is recovering well  He is currently stable  He is anticoagulated long-term with Eliquis for atrial fibrillation  Go to stop the sulfonylurea as he is getting hypoglycemic  Closely monitor his blood sugar  Follow-up here in about 3 months    Return in about 3 months (around 7/2/2021). Electronically signed by Alli Condon MD on 4/2/2021    Please note that this chart was generated using dragon dictation software. Although every effort was made to ensure the accuracy of this automated transcription, some errors in transcription may have occurred.

## 2021-04-05 PROBLEM — N18.31 STAGE 3A CHRONIC KIDNEY DISEASE (HCC): Status: ACTIVE | Noted: 2021-04-05

## 2021-04-07 ENCOUNTER — OFFICE VISIT (OUTPATIENT)
Dept: CARDIOLOGY CLINIC | Age: 71
End: 2021-04-07
Payer: MEDICARE

## 2021-04-07 VITALS
TEMPERATURE: 97.7 F | BODY MASS INDEX: 27.2 KG/M2 | SYSTOLIC BLOOD PRESSURE: 112 MMHG | DIASTOLIC BLOOD PRESSURE: 62 MMHG | WEIGHT: 190 LBS | HEIGHT: 70 IN | HEART RATE: 68 BPM

## 2021-04-07 DIAGNOSIS — I25.10 CORONARY ARTERY DISEASE INVOLVING NATIVE HEART WITHOUT ANGINA PECTORIS, UNSPECIFIED VESSEL OR LESION TYPE: Primary | ICD-10-CM

## 2021-04-07 PROCEDURE — 99213 OFFICE O/P EST LOW 20 MIN: CPT | Performed by: INTERNAL MEDICINE

## 2021-04-07 PROCEDURE — G8427 DOCREV CUR MEDS BY ELIG CLIN: HCPCS | Performed by: INTERNAL MEDICINE

## 2021-04-07 PROCEDURE — 1111F DSCHRG MED/CURRENT MED MERGE: CPT | Performed by: INTERNAL MEDICINE

## 2021-04-07 PROCEDURE — G8417 CALC BMI ABV UP PARAM F/U: HCPCS | Performed by: INTERNAL MEDICINE

## 2021-04-07 PROCEDURE — 3017F COLORECTAL CA SCREEN DOC REV: CPT | Performed by: INTERNAL MEDICINE

## 2021-04-07 PROCEDURE — 1036F TOBACCO NON-USER: CPT | Performed by: INTERNAL MEDICINE

## 2021-04-07 PROCEDURE — 1123F ACP DISCUSS/DSCN MKR DOCD: CPT | Performed by: INTERNAL MEDICINE

## 2021-04-07 PROCEDURE — 4040F PNEUMOC VAC/ADMIN/RCVD: CPT | Performed by: INTERNAL MEDICINE

## 2021-04-07 PROCEDURE — 93000 ELECTROCARDIOGRAM COMPLETE: CPT | Performed by: INTERNAL MEDICINE

## 2021-04-07 RX ORDER — SIMVASTATIN 20 MG
TABLET ORAL
Qty: 90 TABLET | Refills: 1 | Status: CANCELLED | OUTPATIENT
Start: 2021-04-07

## 2021-04-07 RX ORDER — SIMVASTATIN 20 MG
20 TABLET ORAL DAILY
Qty: 90 TABLET | Refills: 1 | Status: SHIPPED | OUTPATIENT
Start: 2021-04-07 | End: 2021-07-28 | Stop reason: SDUPTHER

## 2021-04-07 NOTE — PATIENT INSTRUCTIONS
Please hold on to these instructions the  will call you within 1-9 business days when we receive authorization from your insurance. Treadmill Stress test    WHAT TO EXPECT:     The exercise stress test is a test used to provide information about how the heart responds to exertion. It involves walking on a treadmill at increasing levels of difficulty, while electrocardiogram, heart rate, and blood pressure are monitored. ? This test will take approximately 1 hours: Please arrive at the office 5-10 min before the scheduled testing time. ? Once you are taken back to the stress lab you will be asked to read and sign a consent before proceeding with the test. At this time feel free to ask any question that you may have as the procedure is explained to you.   ? You will be attached to the EKG monitoring equipment, your blood pressure will be taken, and you will begin walking on the treadmill. The treadmill starts off slowly and every 3 minutes the treadmill speeds up and the elevation increases. The average person usually walks for a period of 6-8min. PREPARATION FOR TEST:    ? Eat a light meal such as juice and toast at least 2 hours prior to the procedure. ? AVOID CAFFEINE 24 HOURS PRIOR TO THE TEST: Including coffee, Tea, Jessica and other soft drinks even those labeled  caffeine free or decaffeinated. ? Please wear loose comfortable clothing and comfortable walking shoes. Please wear a short sleeved shirt. ? Please shower or bath and do not apply powder or lotion to the skin prior to testing, as the electrodes will adhere better giving us a clearer visual EKG recording.

## 2021-04-07 NOTE — LETTER
[address]  1950  [id number]    Have you had any Chest Pain that is not new? - No    Have you had any Shortness of Breath - No    Have you had any dizziness - No    Have you had any palpitations that are not new?  - No    Do you have any edema - swelling in No      Do you have a surgery or procedure scheduled in the near future - No

## 2021-04-07 NOTE — PROGRESS NOTES
Willie West  is a  Established patient  ,66 y.o.   male here for evaluation of the following chief complaint(s):    cad        SUBJECTIVE/OBJECTIVE:  HPI : h/o  Cad, vhd, PPM  now here  Post CABG, doing better has no CP, SOB    Review of Systems    neg    Vitals:    04/07/21 1301   BP: 112/62   Site: Left Upper Arm   Position: Sitting   Cuff Size: Medium Adult   Pulse: 68   Temp: 97.7 °F (36.5 °C)   TempSrc: Temporal   Weight: 190 lb (86.2 kg)   Height: 5' 10\" (1.778 m)     /62 (Site: Left Upper Arm, Position: Sitting, Cuff Size: Medium Adult)   Pulse 68   Temp 97.7 °F (36.5 °C) (Temporal)   Ht 5' 10\" (1.778 m)   Wt 190 lb (86.2 kg)   BMI 27.26 kg/m²   Patient-Reported Vitals 9/1/2020   Patient-Reported Weight 196lb   Patient-Reported Height 71\"   Patient-Reported Systolic 533   Patient-Reported Diastolic 55   Patient-Reported Pulse 62     Wt Readings from Last 3 Encounters:   04/07/21 190 lb (86.2 kg)   04/05/21 189 lb (85.7 kg)   04/05/21 190 lb (86.2 kg)     Body mass index is 27.26 kg/m². Physical Exam     Neck: JVD  neg    Lungs : clear    Cardio : Si and S2 audilble  no    Ext: edema  no    All pertinent data reviewed      Meds : reviewed         Tests ordered    ETT and tehab    ASSESSMENT/PLAN:               -     CORONARY ARTERY DISEASE:  asymptomatic     All available  tests in chart reviewed. Management discussed . Testing ordered  no                                 -  LIPID MANAGEMENT:  Importance of lipid levels discussed with patient   and patient was given dietary advice. NCEP- ATP III guidelines reviewed with patient. -   Changes  in medicines made: No       - ckd       stable             - ppm  On carelink    - Atrial fibrillation, pt is  compliant with meds. Patient does not have symptoms from atrial fibrillation         An electronic signature was used to authenticate this note.     --Adrián Gorman MD

## 2021-04-08 ENCOUNTER — VIRTUAL VISIT (OUTPATIENT)
Dept: FAMILY MEDICINE CLINIC | Age: 71
End: 2021-04-08
Payer: MEDICARE

## 2021-04-08 DIAGNOSIS — Z99.89 OSA ON CPAP: ICD-10-CM

## 2021-04-08 DIAGNOSIS — E11.9 TYPE 2 DIABETES MELLITUS WITHOUT COMPLICATION, WITHOUT LONG-TERM CURRENT USE OF INSULIN (HCC): Primary | ICD-10-CM

## 2021-04-08 DIAGNOSIS — G47.33 OSA ON CPAP: ICD-10-CM

## 2021-04-08 PROCEDURE — 4040F PNEUMOC VAC/ADMIN/RCVD: CPT | Performed by: FAMILY MEDICINE

## 2021-04-08 PROCEDURE — 3044F HG A1C LEVEL LT 7.0%: CPT | Performed by: FAMILY MEDICINE

## 2021-04-08 PROCEDURE — 1123F ACP DISCUSS/DSCN MKR DOCD: CPT | Performed by: FAMILY MEDICINE

## 2021-04-08 PROCEDURE — 99213 OFFICE O/P EST LOW 20 MIN: CPT | Performed by: FAMILY MEDICINE

## 2021-04-08 PROCEDURE — 3017F COLORECTAL CA SCREEN DOC REV: CPT | Performed by: FAMILY MEDICINE

## 2021-04-08 PROCEDURE — 1111F DSCHRG MED/CURRENT MED MERGE: CPT | Performed by: FAMILY MEDICINE

## 2021-04-08 PROCEDURE — 2022F DILAT RTA XM EVC RTNOPTHY: CPT | Performed by: FAMILY MEDICINE

## 2021-04-08 PROCEDURE — G8427 DOCREV CUR MEDS BY ELIG CLIN: HCPCS | Performed by: FAMILY MEDICINE

## 2021-04-08 RX ORDER — SILDENAFIL 100 MG/1
TABLET, FILM COATED ORAL
Qty: 90 TABLET | Refills: 1 | Status: SHIPPED | OUTPATIENT
Start: 2021-04-08 | End: 2021-10-13

## 2021-04-08 NOTE — PROGRESS NOTES
2021    TELEHEALTH EVALUATION -- Audio/Visual (During [id number] public health emergency)    HPI:    Anaid Atkins (:  1950) has requested an audio/video evaluation for the following concern(s):    Virtual visit for CPAP authorization  The patient's been on CPAP therapy religiously since   He benefits greatly from this he has more energy is less sleepy he sleeps better while he has been on  He wants to continue therapy long-term  He is in compliance he uses it regularly  He has many comorbid conditions including diabetes heart disease chronic kidney disease  I definitely recommend the patient stay on CPAP indefinitely  Review of Systems    Prior to Visit Medications    Medication Sig Taking?  Authorizing Provider   simvastatin (ZOCOR) 20 MG tablet Take 1 tablet by mouth daily Yes Adrián Gorman MD   apixaban (ELIQUIS) 5 MG TABS tablet Take 1 tablet by mouth 2 times daily Yes Adrián Gorman MD   torsemide (DEMADEX) 20 MG tablet Take 1 tablet by mouth 2 times daily Yes Bar Nichole MD   rOPINIRole (REQUIP) 0.25 MG tablet Take 1 tablet by mouth 3 times daily Yes Bar Nichole MD   carboxymethylcellulose (REFRESH PLUS) 0.5 % SOLN ophthalmic solution INSTILL 1 DROP IN BOTH EYES THREE TIMES A DAY Yes Historical Provider, MD   canagliflozin (INVOKANA) 300 MG TABS tablet Take 1 tablet by mouth every morning (before breakfast) Yes Yvonne Jameson MD   Dulaglutide (TRULICITY) 1.5 QQ/9.7CQ SOPN Inject 1.5 mg into the skin once a week Yes Ana Wall PA-C   acetaminophen (AMINOFEN) 325 MG tablet Take 2 tablets by mouth every 6 hours as needed for Pain Yes Abel Ramey MD   Cholecalciferol (VITAMIN D) 50 MCG ( UT) CAPS capsule Take by mouth nightly  Yes Historical Provider, MD   aspirin 81 MG tablet Take 81 mg by mouth daily Yes Historical Provider, MD       Social History     Tobacco Use    Smoking status: Never Smoker    Smokeless tobacco: Never Used   Substance Use Topics    Alcohol use: Verbal consent to proceed has been obtained within the past 12 months. The visit was conducted pursuant to the emergency declaration under the [address] [place] [address] and the [employer] Act. Patient identification was verified, and a caregiver was present when appropriate. The patient was located in a state where the provider was credentialed to provide care. Total time spent on this encounter: Not billed by time    --Tamera Ferreira MD on 4/8/2021 at 3:04 PM    An electronic signature was used to authenticate this note.

## 2021-04-13 ENCOUNTER — PROCEDURE VISIT (OUTPATIENT)
Dept: CARDIOLOGY CLINIC | Age: 71
End: 2021-04-13
Payer: MEDICARE

## 2021-04-13 DIAGNOSIS — I25.10 CORONARY ARTERY DISEASE INVOLVING NATIVE HEART WITHOUT ANGINA PECTORIS, UNSPECIFIED VESSEL OR LESION TYPE: ICD-10-CM

## 2021-04-13 DIAGNOSIS — Z95.1 STATUS POST CORONARY ARTERY BYPASS GRAFT: ICD-10-CM

## 2021-04-13 PROCEDURE — 93015 CV STRESS TEST SUPVJ I&R: CPT | Performed by: INTERNAL MEDICINE

## 2021-04-16 NOTE — DISCHARGE SUMMARY
Discharge Summary     Patient ID  Samreen Gomez   1950  [id number]      Primary Care Doctor:  Last Lizama MD    Admit date: 3/1/2021   Discharge date: 4/16/2021      Admitting Physician: Kerri Vincent MD   Discharge Physician: Reno Baez PA-C    Discharge Diagnoses: Active Hospital Problems    Diagnosis Date Noted    Respiratory failure ([address] [J96.90]     Elevated liver enzymes [R74.8] 03/10/2021    Recurrent right pleural effusion [J90] 03/04/2021    SOB (shortness of breath) [R06.02] 03/01/2021    Hyperkalemia [E87.5] 07/07/2016     Discharged Condition: stable. Hospital Course:  Patient was admitted for CHF s/p CABG/AVR, maze, LA clip.   monitored rhythm, O2 sat, I/O, Labs, CXRs serially  Neuro status , BP and vital signs monitored  Found to have GI bleed, managed by GI. Had EGD which was negative. Aggressively diuresed with dobutamine and lasix. Also received HD. Started on diet and activity. At discharge, pt weak. He was admitted to ARU for rehab.      Special concerns: renal failure, occult GI bleed   Further plans after discharge: f/u with Dr. Rodolfo Del Toro 1 week after DC from ARU    VS at discharge   Vitals:    03/15/21 1400   BP: 124/68   Pulse: 65   Resp: 17   Temp:    SpO2:        Consults: ID, GI, nephrology and endocrinology    Disposition: ARU    Patient Instructions:      Medication List      ASK your doctor about these medications    acetaminophen 325 MG tablet  Commonly known as: Aminofen  Take 2 tablets by mouth every 6 hours as needed for Pain     allopurinol 100 MG tablet  Commonly known as: ZYLOPRIM  Take 1 tablet by mouth daily     amiodarone 200 MG tablet  Commonly known as: CORDARONE  Take 1 tablet by mouth daily     aspirin 81 MG tablet     ferrous sulfate 325 (65 Fe) MG tablet  Commonly known as: IRON 325  Take 1 tablet by mouth 2 times daily     furosemide 20 MG tablet  Commonly known as: LASIX  Take 1 tablet by mouth 2 times daily May take an extra Lasix if has increased swelling     gabapentin 600 MG tablet  Commonly known as: Neurontin  Take 1 tablet by mouth 3 times daily for 270 days.      glimepiride 4 MG tablet  Commonly known as: AMARYL  Take 1 tablet by mouth daily     metFORMIN 1000 MG tablet  Commonly known as: GLUCOPHAGE  TAKE 1 TABLET TWICE DAILY  WITH MEALS     rivaroxaban 20 MG Tabs tablet  Commonly known as: Xarelto  TAKE 1 TABLET DAILY WITH   BREAKFAST     Trulicity 1.5 GL/9.5BE Sopn  Generic drug: Dulaglutide  Inject 1.5 mg into the skin once a week     vitamin D 50 MCG (2000 UT) Caps capsule          Activity: activity as tolerated and no lifting, Driving, or Strenuous exercise until seen by physician in office  Diet: cardiac diet  Wound Care: as directed    Follow-up as directed at discharge    Signed: Dion Israel    Time spent on discharge 35 minutes

## 2021-04-20 ENCOUNTER — TELEPHONE (OUTPATIENT)
Dept: CARDIOLOGY CLINIC | Age: 71
End: 2021-04-20

## 2021-04-28 ENCOUNTER — HOSPITAL ENCOUNTER (OUTPATIENT)
Dept: CARDIAC REHAB | Age: 71
Setting detail: THERAPIES SERIES
Discharge: HOME | End: 2021-04-28
Payer: MEDICARE

## 2021-04-28 PROCEDURE — 93798 PHYS/QHP OP CAR RHAB W/ECG: CPT

## 2021-05-03 ENCOUNTER — HOSPITAL ENCOUNTER (OUTPATIENT)
Dept: CARDIAC REHAB | Age: 71
Setting detail: THERAPIES SERIES
Discharge: HOME | End: 2021-05-03
Payer: MEDICARE

## 2021-05-03 PROCEDURE — 93798 PHYS/QHP OP CAR RHAB W/ECG: CPT

## 2021-05-04 ENCOUNTER — HOSPITAL ENCOUNTER (OUTPATIENT)
Dept: CARDIAC REHAB | Age: 71
Setting detail: THERAPIES SERIES
Discharge: HOME | End: 2021-05-04
Payer: MEDICARE

## 2021-05-04 PROCEDURE — 93798 PHYS/QHP OP CAR RHAB W/ECG: CPT

## 2021-05-06 ENCOUNTER — HOSPITAL ENCOUNTER (OUTPATIENT)
Dept: CARDIAC REHAB | Age: 71
Setting detail: THERAPIES SERIES
Discharge: HOME | End: 2021-05-06
Payer: MEDICARE

## 2021-05-06 PROCEDURE — 93798 PHYS/QHP OP CAR RHAB W/ECG: CPT

## 2021-05-10 ENCOUNTER — HOSPITAL ENCOUNTER (OUTPATIENT)
Dept: CARDIAC REHAB | Age: 71
Setting detail: THERAPIES SERIES
Discharge: HOME | End: 2021-05-10
Payer: MEDICARE

## 2021-05-10 PROCEDURE — 93798 PHYS/QHP OP CAR RHAB W/ECG: CPT

## 2021-05-11 ENCOUNTER — HOSPITAL ENCOUNTER (OUTPATIENT)
Dept: CARDIAC REHAB | Age: 71
Setting detail: THERAPIES SERIES
Discharge: HOME | End: 2021-05-11
Payer: MEDICARE

## 2021-05-11 PROCEDURE — G0423 INTENS CARDIAC REHAB NO EXER: HCPCS

## 2021-05-11 PROCEDURE — G0422 INTENS CARDIAC REHAB W/EXERC: HCPCS

## 2021-05-11 PROCEDURE — 93798 PHYS/QHP OP CAR RHAB W/ECG: CPT

## 2021-05-11 RX ORDER — ROPINIROLE 0.25 MG/1
0.25 TABLET, FILM COATED ORAL 3 TIMES DAILY
Qty: 90 TABLET | Refills: 0 | Status: SHIPPED | OUTPATIENT
Start: 2021-05-11 | End: 2021-06-04

## 2021-05-11 NOTE — TELEPHONE ENCOUNTER
LV 4-8-21  NA 6-28-21  [facility]   Patient was taken off the gabapentin while in the hospital and Ropinirole was the replacement.   Send a 90 day supply with refills

## 2021-05-13 ENCOUNTER — HOSPITAL ENCOUNTER (OUTPATIENT)
Dept: CARDIAC REHAB | Age: 71
Setting detail: THERAPIES SERIES
Discharge: HOME | End: 2021-05-13
Payer: MEDICARE

## 2021-05-13 PROCEDURE — 93798 PHYS/QHP OP CAR RHAB W/ECG: CPT

## 2021-05-24 ENCOUNTER — OFFICE VISIT (OUTPATIENT)
Dept: FAMILY MEDICINE CLINIC | Age: 71
End: 2021-05-24
Payer: MEDICARE

## 2021-05-24 ENCOUNTER — TELEPHONE (OUTPATIENT)
Dept: FAMILY MEDICINE CLINIC | Age: 71
End: 2021-05-24

## 2021-05-24 VITALS
WEIGHT: 193.7 LBS | SYSTOLIC BLOOD PRESSURE: 112 MMHG | BODY MASS INDEX: 27.73 KG/M2 | HEIGHT: 70 IN | DIASTOLIC BLOOD PRESSURE: 78 MMHG | HEART RATE: 75 BPM | OXYGEN SATURATION: 100 %

## 2021-05-24 DIAGNOSIS — Z91.81 STATUS POST FALL: Primary | ICD-10-CM

## 2021-05-24 DIAGNOSIS — K59.00 CONSTIPATION, UNSPECIFIED CONSTIPATION TYPE: ICD-10-CM

## 2021-05-24 DIAGNOSIS — T14.8XXA HEMATOMA: ICD-10-CM

## 2021-05-24 PROCEDURE — 99213 OFFICE O/P EST LOW 20 MIN: CPT | Performed by: STUDENT IN AN ORGANIZED HEALTH CARE EDUCATION/TRAINING PROGRAM

## 2021-05-24 RX ORDER — POLYETHYLENE GLYCOL 3350 17 G/17G
17 POWDER, FOR SOLUTION ORAL DAILY PRN
Qty: 255 G | Refills: 0 | Status: SHIPPED | OUTPATIENT
Start: 2021-05-24 | End: 2021-06-23

## 2021-05-24 NOTE — PROGRESS NOTES
type 2 diabetes mellitus ([facility] [address] 04/23/2019    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    Gout 04/2019    \"got gout when had pacer put in because they did not give me my medication for gout \"    H/O 24 hour EKG monitoring 10/03/2013    no afib noted, sinsus rhythm    H/O cardiovascular stress test 05/12/2014    cardiolite- mild ischemia RCA EF50%    H/O echocardiogram 12/01/2020    EF 55-60% severe aortic stenosis mild to mod aortic regurg mod to severe tricuspid regurg severe pulm htn significant changes since 2018 echo.  H/O right and left heart catheterization 12/10/2020    DIFFUSE LAD DISEASE, Mild ECA Disease, Severe AS, Milf Pul HTN on RHC.  H/O transesophageal echocardiography (MABLE) for monitoring 08/05/2013    normal LV function and normal LA appendage without any clot    History of blood transfusion 12/2020    d/t anemia    History of transesophageal echocardiography (MABLE) 12/15/2020    Severe aortic stenosis (ANNA by planimetry: 0.778 cm sq). Mild AR.    [place] (hard of hearing)     hearing tonya aides    Hx of Doppler echocardiogram 05/21/2018    EF 50%  Mild LV hypertrophy. Mildly enlarged RA. Mod aortic valve calcification with mod AS. Mitral annular calcification is present. Mild AR, MR and TR. Mild pulmonary htn.     Hyperlipidemia     Hypertension     Follows with PCP & Dr. Shahida Red Other disorders of kidney and ureter     Pacemaker     [employer]tr[employer], implanted 2014    Pneumonia 12/29/2012    Pneumonia due to COVID-19 virus 08/2020    Sleep apnea     dx 2013- has c-pap    Type II or unspecified type diabetes mellitus with other specified manifestations, uncontrolled 12/12/2012    Venous hypertension, chronic, with ulcer ([facility] [address] 12/12/2012    resolved       FAMILY HISTORY  Family History   Problem Relation Age of Onset    High Blood Pressure Mother     Arthritis Mother     Diabetes Mother     Heart Disease Mother     High Blood Pressure Father     Heart Disease Father    [place] Kidney Disease Father        SOCIAL HISTORY  Social History     Socioeconomic History    Marital status:      Spouse name: None    Number of children: None    Years of education: None    Highest education level: None   Occupational History    None   Tobacco Use    Smoking status: Never Smoker    Smokeless tobacco: Never Used   Vaping Use    Vaping Use: Never used   Substance and Sexual Activity    Alcohol use: Yes     Alcohol/week: 2.0 standard drinks     Types: 2 Cans of beer per week     Comment: average \"one time per week\"/ Caffiene: 1 cup of coffee daily    Drug use: No    Sexual activity: Yes     Partners: Female     Comment:    Other Topics Concern    None   Social History Narrative    None     Social Determinants of Health     Financial Resource Strain:     Difficulty of Paying Living Expenses:    Food Insecurity:     Worried About Running Out of Food in the Last Year:     Ran Out of Food in the Last Year:    Transportation Needs:     Lack of Transportation (Medical):      Lack of Transportation (Non-Medical):    Physical Activity:     Days of Exercise per Week:     Minutes of Exercise per Session:    Stress:     Feeling of Stress :    Social Connections:     Frequency of Communication with Friends and Family:     Frequency of Social Gatherings with Friends and Family:     Attends Hindu Services:     Active Member of Clubs or Organizations:     Attends Club or Organization Meetings:     Marital Status:    Intimate Partner Violence:     Fear of Current or Ex-Partner:     Emotionally Abused:     Physically Abused:     Sexually Abused:         SURGICAL HISTORY  Past Surgical History:   Procedure Laterality Date    CABG WITH AORTIC VALVE REPLACEMENT N/A 2/16/2021    CABG CORONARY ARTERY BYPASS X2 WITH LIMA, AORTIC VALVE REPLACEMENT AND AORTIC ROOT REPAIR, INTRAOPERATIVE MABLE, INDUCED HYPOTHERMIA, LEFT LEG ENDOVEIN HARVEST, LEFT ATRIAL CLIP, AND CRYO PROCEDURE performed by Ney Epps MD at [address]  12/14    at [address] Left 1/29/2021    LEFT CAROTID ENDARTERECTOMY performed by Brandon Becker MD at [address]  2017    COLONOSCOPY  2011    COLONOSCOPY N/A 11/19/2019    COLONOSCOPY DIAGNOSTIC performed by Baldo Vivas MD at [facility]  09/30/2020    POSSIBLE CECAL avms, SIGMOID DIVERTICULOSIS, INTERNAL HEMORRHOIDS GRADE 1    COLONOSCOPY N/A 9/30/2020    COLONOSCOPY CONTROL HEMORRHAGE WITH APC performed by Baldo Vivas MD at [facility]  2014    \"2 stents put in \"    IR NONTUNNELED VASCULAR CATHETER  3/5/2021    IR NONTUNNELED VASCULAR CATHETER 3/5/2021 [facility] SPECIAL PROCEDURES    JOINT REPLACEMENT  2004    total left hip    OTHER SURGICAL HISTORY Right 12/02/2017    I&D; evacuation of hematoma right hip    OTHER SURGICAL HISTORY  09/17/2020    enteroscopy    PACEMAKER INSERTION N/A 2/23/2021    PACEMAKER GENERATOR LEAD REVISION performed by Ney Epps MD at [facility]      9/18/14 Status post remote permanent pacemaker with atrial lead dislodgement.  7/24/14 PPM Implant    UPPER GASTROINTESTINAL ENDOSCOPY N/A 9/17/2020    ENTEROSCOPY PUSH BIOPSY performed by Baldo Vivas MD at [facility] 3/4/2021    EGD DIAGNOSTIC ONLY performed by Baldo Vivas MD at [facility]  2012    \"have stents in both legs- done in [facility]  Current Outpatient Medications   Medication Sig Dispense Refill    polyethylene glycol (GLYCOLAX) 17 GM/SCOOP powder Take 17 g by mouth daily as needed (constipation) 255 g 0    rOPINIRole (REQUIP) 0.25 MG tablet Take 1 tablet by mouth 3 times daily 90 tablet 0    torsemide (DEMADEX) 20 MG tablet Take 1 tablet by mouth 2 times daily 180 tablet 3    glimepiride (AMARYL) 2 MG tablet Take 2 mg by mouth every morning (before breakfast)      apixaban (ELIQUIS) 5 MG TABS tablet Take 1 tablet by mouth 2 times daily 180 tablet 3    apixaban (ELIQUIS) 5 MG TABS tablet Take 1 tablet by mouth 2 times daily 28 tablet 0    sildenafil (VIAGRA) 100 MG tablet TAKE 1 TABLET DAILY AS     NEEDED FOR ERECTILE        DYSFUNCTION 90 tablet 1    simvastatin (ZOCOR) 20 MG tablet Take 1 tablet by mouth daily 90 tablet 1    carboxymethylcellulose (REFRESH PLUS) 0.5 % SOLN ophthalmic solution INSTILL 1 DROP IN BOTH EYES THREE TIMES A DAY      canagliflozin (INVOKANA) 300 MG TABS tablet Take 1 tablet by mouth every morning (before breakfast) 90 tablet 1    Dulaglutide (TRULICITY) 1.5 CZ/7.9EB SOPN Inject 1.5 mg into the skin once a week 12 pen 1    Cholecalciferol (VITAMIN D) 50 MCG (2000 UT) CAPS capsule Take by mouth nightly       aspirin 81 MG tablet Take 81 mg by mouth daily      acetaminophen (AMINOFEN) 325 MG tablet Take 2 tablets by mouth every 6 hours as needed for Pain 30 tablet 0     No current facility-administered medications for this visit. ALLERGIES  Allergies   Allergen Reactions    Spironolactone      CAUSES INCREASED K+    Tape Blase Raven Tape] Rash     SURGICAL TAPE       PHYSICAL EXAM    /78   Pulse 75   Ht 5' 10\" (1.778 m)   Wt 193 lb 11.2 oz (87.9 kg)   SpO2 100%   BMI 27.79 kg/m²     Physical Exam  Constitutional:       Appearance: Normal appearance. HENT:      Head: Normocephalic and atraumatic. Eyes:      Extraocular Movements: Extraocular movements intact. Pupils: Pupils are equal, round, and reactive to light. Cardiovascular:      Rate and Rhythm: Normal rate and regular rhythm. Pulses: Normal pulses. Heart sounds: No murmur heard. No friction rub. No gallop. Pulmonary:      Effort: Pulmonary effort is normal. No respiratory distress. Breath sounds: Normal breath sounds. No wheezing.    Musculoskeletal:      Comments: ttp right gluteal around 1.5cm in diameter   Skin:     General: Skin is warm and dry. Neurological:      General: No focal deficit present. Mental Status: He is alert. Psychiatric:         Mood and Affect: Mood normal.         Behavior: Behavior normal.         ASSESSMENT & PLAN    1. Status post fall  -u/s reviewed, mild hematoma per patient improving slowly. Ok to continue blood thinners, if not improving consider drainage  - US EXTREMITY LEFT NON VASC COMPLETE; Future    2. Hematoma  -stable  - US EXTREMITY LEFT NON VASC COMPLETE; Future  - US EXTREMITY LEFT NON VASCULAR LIMITED; Future    3. Constipation, unspecified constipation type  -start glycolax PRN, increase fiber consider addding colace  - polyethylene glycol (GLYCOLAX) 17 GM/SCOOP powder; Take 17 g by mouth daily as needed (constipation)  Dispense: 255 g; Refill: 0      Return for as scheduled.          Electronically signed by Allyn Bishop DO on 5/31/2021

## 2021-05-24 NOTE — TELEPHONE ENCOUNTER
Central sched called and the us that was put into pt chart today needs to be   US EXTREMITY LEFT NON VASC LIMITED NOT COMPLETE

## 2021-05-25 ENCOUNTER — TELEPHONE (OUTPATIENT)
Dept: FAMILY MEDICINE CLINIC | Age: 71
End: 2021-05-25

## 2021-05-25 DIAGNOSIS — T14.8XXA HEMATOMA: Primary | ICD-10-CM

## 2021-05-25 NOTE — TELEPHONE ENCOUNTER
Patient needs the US Extremity RIGHT SIDE NON VASCULAR LIMITED   PLEASE GENERATE ORDER SO PATIENT CAN HAVE THIS DONE

## 2021-05-25 NOTE — TELEPHONE ENCOUNTER
Patient notified and voiced understanding and will wait for [facility] to call him to schedule

## 2021-05-27 ENCOUNTER — HOSPITAL ENCOUNTER (OUTPATIENT)
Dept: ULTRASOUND IMAGING | Age: 71
Discharge: HOME | End: 2021-05-27
Payer: MEDICARE

## 2021-05-27 DIAGNOSIS — T14.8XXA HEMATOMA: ICD-10-CM

## 2021-05-27 PROCEDURE — 76882 US LMTD JT/FCL EVL NVASC XTR: CPT

## 2021-05-28 ENCOUNTER — TELEPHONE (OUTPATIENT)
Dept: FAMILY MEDICINE CLINIC | Age: 71
End: 2021-05-28

## 2021-05-28 DIAGNOSIS — T14.8XXA HEMATOMA: Primary | ICD-10-CM

## 2021-05-28 NOTE — TELEPHONE ENCOUNTER
[place] [facility] called and wanted to make sure that we had his results from his Us of lower extremity. Results are in the chart.

## 2021-05-31 ASSESSMENT — ENCOUNTER SYMPTOMS
SHORTNESS OF BREATH: 0
SORE THROAT: 0
WHEEZING: 0
ABDOMINAL PAIN: 0
NAUSEA: 0

## 2021-06-01 ENCOUNTER — HOSPITAL ENCOUNTER (OUTPATIENT)
Age: 71
Discharge: HOME | End: 2021-06-01
Payer: MEDICARE

## 2021-06-01 ENCOUNTER — TELEPHONE (OUTPATIENT)
Dept: FAMILY MEDICINE CLINIC | Age: 71
End: 2021-06-01

## 2021-06-01 ENCOUNTER — HOSPITAL ENCOUNTER (OUTPATIENT)
Dept: CARDIAC REHAB | Age: 71
Setting detail: THERAPIES SERIES
Discharge: HOME | End: 2021-06-01
Payer: MEDICARE

## 2021-06-01 DIAGNOSIS — I48.91 ATRIAL FIBRILLATION, UNSPECIFIED TYPE (HCC): ICD-10-CM

## 2021-06-01 DIAGNOSIS — E44.0 MODERATE MALNUTRITION (HCC): ICD-10-CM

## 2021-06-01 DIAGNOSIS — Z95.1 STATUS POST CORONARY ARTERY BYPASS GRAFT: ICD-10-CM

## 2021-06-01 DIAGNOSIS — N18.31 STAGE 3A CHRONIC KIDNEY DISEASE (HCC): ICD-10-CM

## 2021-06-01 DIAGNOSIS — D50.0 IRON DEFICIENCY ANEMIA DUE TO CHRONIC BLOOD LOSS: Chronic | ICD-10-CM

## 2021-06-01 DIAGNOSIS — I10 ESSENTIAL HYPERTENSION: ICD-10-CM

## 2021-06-01 DIAGNOSIS — E87.5 HYPERKALEMIA: ICD-10-CM

## 2021-06-01 LAB
ALBUMIN SERPL-MCNC: 4.4 GM/DL (ref 3.4–5)
ANION GAP SERPL CALCULATED.3IONS-SCNC: 13 MMOL/L (ref 4–16)
BACTERIA: NEGATIVE /HPF
BASOPHILS ABSOLUTE: 0.1 K/CU MM
BASOPHILS RELATIVE PERCENT: 1.3 % (ref 0–1)
BILIRUBIN URINE: NEGATIVE MG/DL
BLOOD, URINE: NEGATIVE
BUN BLDV-MCNC: 33 MG/DL (ref 6–23)
CALCIUM SERPL-MCNC: 9.5 MG/DL (ref 8.3–10.6)
CHLORIDE BLD-SCNC: 99 MMOL/L (ref 99–110)
CLARITY: CLEAR
CO2: 28 MMOL/L (ref 21–32)
COLOR: ABNORMAL
CREAT SERPL-MCNC: 1.2 MG/DL (ref 0.9–1.3)
CREATININE URINE: 33.1 MG/DL (ref 39–259)
DIFFERENTIAL TYPE: ABNORMAL
EOSINOPHILS ABSOLUTE: 0.3 K/CU MM
EOSINOPHILS RELATIVE PERCENT: 5.1 % (ref 0–3)
GFR AFRICAN AMERICAN: >60 ML/MIN/1.73M2
GFR NON-AFRICAN AMERICAN: 59 ML/MIN/1.73M2
GLUCOSE BLD-MCNC: 118 MG/DL (ref 70–99)
GLUCOSE, URINE: >500 MG/DL
HCT VFR BLD CALC: 33.5 % (ref 42–52)
HEMOGLOBIN: 10.1 GM/DL (ref 13.5–18)
IMMATURE NEUTROPHIL %: 0.3 % (ref 0–0.43)
KETONES, URINE: NEGATIVE MG/DL
LEUKOCYTE ESTERASE, URINE: NEGATIVE
LYMPHOCYTES ABSOLUTE: 1 K/CU MM
LYMPHOCYTES RELATIVE PERCENT: 16.7 % (ref 24–44)
MCH RBC QN AUTO: 26.3 PG (ref 27–31)
MCHC RBC AUTO-ENTMCNC: 30.1 % (ref 32–36)
MCV RBC AUTO: 87.2 FL (ref 78–100)
MONOCYTES ABSOLUTE: 0.7 K/CU MM
MONOCYTES RELATIVE PERCENT: 12.2 % (ref 0–4)
NITRITE URINE, QUANTITATIVE: NEGATIVE
NUCLEATED RBC %: 0 %
PDW BLD-RTO: 15.6 % (ref 11.7–14.9)
PH, URINE: 6 (ref 5–8)
PHOSPHORUS: 3.4 MG/DL (ref 2.5–4.9)
PLATELET # BLD: 238 K/CU MM (ref 140–440)
PMV BLD AUTO: 9.5 FL (ref 7.5–11.1)
POTASSIUM SERPL-SCNC: 3.8 MMOL/L (ref 3.5–5.1)
PROT/CREAT RATIO, UR: 0.9
PROTEIN UA: 30 MG/DL
RBC # BLD: 3.84 M/CU MM (ref 4.6–6.2)
RBC URINE: <1 /HPF (ref 0–3)
SEGMENTED NEUTROPHILS ABSOLUTE COUNT: 3.9 K/CU MM
SEGMENTED NEUTROPHILS RELATIVE PERCENT: 64.4 % (ref 36–66)
SODIUM BLD-SCNC: 140 MMOL/L (ref 135–145)
SPECIFIC GRAVITY UA: 1.01 (ref 1–1.03)
TOTAL IMMATURE NEUTOROPHIL: 0.02 K/CU MM
TOTAL NUCLEATED RBC: 0 K/CU MM
TRICHOMONAS: ABNORMAL /HPF
URINE TOTAL PROTEIN: 29.8 MG/DL
UROBILINOGEN, URINE: NEGATIVE MG/DL (ref 0.2–1)
WBC # BLD: 6.1 K/CU MM (ref 4–10.5)
WBC UA: <1 /HPF (ref 0–2)

## 2021-06-01 PROCEDURE — 82040 ASSAY OF SERUM ALBUMIN: CPT

## 2021-06-01 PROCEDURE — 80048 BASIC METABOLIC PNL TOTAL CA: CPT

## 2021-06-01 PROCEDURE — 93798 PHYS/QHP OP CAR RHAB W/ECG: CPT

## 2021-06-01 PROCEDURE — 82570 ASSAY OF URINE CREATININE: CPT

## 2021-06-01 PROCEDURE — 85025 COMPLETE CBC W/AUTO DIFF WBC: CPT

## 2021-06-01 PROCEDURE — 81001 URINALYSIS AUTO W/SCOPE: CPT

## 2021-06-01 PROCEDURE — 36415 COLL VENOUS BLD VENIPUNCTURE: CPT

## 2021-06-01 PROCEDURE — 84156 ASSAY OF PROTEIN URINE: CPT

## 2021-06-01 PROCEDURE — 84100 ASSAY OF PHOSPHORUS: CPT

## 2021-06-01 NOTE — TELEPHONE ENCOUNTER
Initial Treatment Date: 09-  Occupation: Sarah at [employer]  Referred by:  Self-referral  Primary Care Physician:  Lis Mustafa NP  Date informed consent signed:  09-  Visit Number of Current Episode: 8  Length of Visit: 15 min.    Subjective:   Ofelia is a 53 year old female, sarah, who presents today for continued treatment of neck and upper back pain and lower back pain.  The mid and lower back pain is frequent, moderate, dull, sharp and aching. The neck and upper back pain is frequent, moderate, tight, sore and achy with less numbness tingling nerve sensations radiating into the bilateral upper extremities from the shoulders into all fingers.  Overall still feeling up to 60% improvement, but fluctuates in between treatment.    Objective findings   Mild to moderate palpatory tenderness is noted over the cervical and upper thoracic spine and over the lumbar spine and bilateral sacroiliac joints at the PSIS and PI IS levels.  Mild to moderate grade joint restrictions are noted at the following levels:  C0-C2, C6-T5, T9-L2, L5-S1 and at the bilateral sacroiliac joints. Muscle hypertonicity is graded at 2-3 or mild to moderate and is contributing to restricted mobility in the involved areas. These areas include the cervical and thoracic paraspinal musculature as well as the lumbar paraspinals, hamstrings, anterior pelvic and gluteal musculature.      September 10, 2020 cervical spine x-ray: There is slight straightening of the cervical lordosis. Minimal possible 1 mm anterolisthesis at C6-C7 does not change on flexion or extension. The disc spaces are preserved. There is normal osseous mineralization. There is mild and facet arthropathy throughout the cervical spine but the neural foramina appear patent    Assessment:  Segmental dysfunction cervical, thoracic, lumbar and sacral areas with bilateral cervical radiculopathy.  Patient responding favorably.    Complicating  Patient improving, discussed U/S likely hematoma, overall decreasing in size Factors/Co-morbidities:   Mild degenerative disc and joint disease cervical spine     Working diagnoses:     1. Segmental dysfunction of cervical region    2. Segmental dysfunction of thoracic region    3. Segmental dysfunction lumbar    4.     Segmental dysfunction sacral  5.     Cervical radiculopathy    Treatment today:   All joint restrictions in the cervical, thoracic and lumbar spine and at the bilateral sacroiliac joints were treated today and the exact levels are listed in the objective section. These areas were adjusted today utilizing a gentle diversified technique to correct aberrant joint function and reduce scar tissue formation. This procedure was done without incident at the site(s) of restriction.    Unattended interferential current was applied to the cervical, thoracic and lumbar sites as noted in the objective section for 15 minutes at an intensity level that was comfortable for the patient. This modality was performed to reduce pain and gently decongest tissues to help the patient's condition heal. Patient tolerated the procedure well.    *Patient has an up-to-date, signed, commercial waiver form that is on the file as of visit date September 10, 2020 and is valid through September 10, 2021. Patient understands the electric stimulation, ultrasound therapy and therapeutic exercise treatment are a non-covered service(s) through Medicaid and agrees to be financially responsible for this service.*    Plan:  Patient was advised to continue the treatment plan as outlined September 10, 2020. Also add to our treatment plan lower back treatment as well as reduce Oswestry low back pain questionnaire from 50% to less than or equal to 5%.     Patient Instruction/Education:   Patient advised to utilize ice or cold compresses over the involved regions at home to help reduce pain and inflammation as needed. Patient stated understanding of, and was in agreement with, the discussed instructions.    Other  treatment options discussed with patient:  Further recommendations will be guided, in part, by the outcome of care. No further recommendations or referrals will be needed unless patient fails to adequately respond to conservative care.

## 2021-06-03 ENCOUNTER — HOSPITAL ENCOUNTER (OUTPATIENT)
Dept: CARDIAC REHAB | Age: 71
Setting detail: THERAPIES SERIES
Discharge: HOME | End: 2021-06-03
Payer: MEDICARE

## 2021-06-03 PROCEDURE — 93798 PHYS/QHP OP CAR RHAB W/ECG: CPT

## 2021-06-04 RX ORDER — ROPINIROLE 0.25 MG/1
TABLET, FILM COATED ORAL
Qty: 90 TABLET | Refills: 0 | Status: SHIPPED | OUTPATIENT
Start: 2021-06-04 | End: 2021-06-09 | Stop reason: SDUPTHER

## 2021-06-07 ENCOUNTER — HOSPITAL ENCOUNTER (OUTPATIENT)
Dept: CARDIAC REHAB | Age: 71
Setting detail: THERAPIES SERIES
Discharge: HOME | End: 2021-06-07
Payer: MEDICARE

## 2021-06-07 PROCEDURE — 93798 PHYS/QHP OP CAR RHAB W/ECG: CPT

## 2021-06-08 ENCOUNTER — HOSPITAL ENCOUNTER (OUTPATIENT)
Dept: CARDIAC REHAB | Age: 71
Setting detail: THERAPIES SERIES
Discharge: HOME | End: 2021-06-08
Payer: MEDICARE

## 2021-06-08 PROCEDURE — 93798 PHYS/QHP OP CAR RHAB W/ECG: CPT

## 2021-06-09 ENCOUNTER — OFFICE VISIT (OUTPATIENT)
Dept: FAMILY MEDICINE CLINIC | Age: 71
End: 2021-06-09
Payer: MEDICARE

## 2021-06-09 VITALS
DIASTOLIC BLOOD PRESSURE: 62 MMHG | SYSTOLIC BLOOD PRESSURE: 117 MMHG | HEIGHT: 70 IN | BODY MASS INDEX: 28.63 KG/M2 | WEIGHT: 200 LBS | RESPIRATION RATE: 16 BRPM | HEART RATE: 63 BPM | OXYGEN SATURATION: 93 %

## 2021-06-09 DIAGNOSIS — L98.9 SKIN LESION: Primary | ICD-10-CM

## 2021-06-09 DIAGNOSIS — E11.49 OTHER DIABETIC NEUROLOGICAL COMPLICATION ASSOCIATED WITH TYPE 2 DIABETES MELLITUS (HCC): ICD-10-CM

## 2021-06-09 DIAGNOSIS — E11.9 TYPE 2 DIABETES MELLITUS WITHOUT COMPLICATION, WITHOUT LONG-TERM CURRENT USE OF INSULIN (HCC): ICD-10-CM

## 2021-06-09 DIAGNOSIS — M20.42 HAMMER TOE OF LEFT FOOT: ICD-10-CM

## 2021-06-09 DIAGNOSIS — T14.8XXA HEMATOMA: ICD-10-CM

## 2021-06-09 PROCEDURE — 99213 OFFICE O/P EST LOW 20 MIN: CPT | Performed by: STUDENT IN AN ORGANIZED HEALTH CARE EDUCATION/TRAINING PROGRAM

## 2021-06-09 SDOH — ECONOMIC STABILITY: FOOD INSECURITY: WITHIN THE PAST 12 MONTHS, THE FOOD YOU BOUGHT JUST DIDN'T LAST AND YOU DIDN'T HAVE MONEY TO GET MORE.: NEVER TRUE

## 2021-06-09 SDOH — ECONOMIC STABILITY: FOOD INSECURITY: WITHIN THE PAST 12 MONTHS, YOU WORRIED THAT YOUR FOOD WOULD RUN OUT BEFORE YOU GOT MONEY TO BUY MORE.: NEVER TRUE

## 2021-06-09 ASSESSMENT — SOCIAL DETERMINANTS OF HEALTH (SDOH): HOW HARD IS IT FOR YOU TO PAY FOR THE VERY BASICS LIKE FOOD, HOUSING, MEDICAL CARE, AND HEATING?: NOT HARD AT ALL

## 2021-06-09 ASSESSMENT — ENCOUNTER SYMPTOMS
NAUSEA: 0
WHEEZING: 0
SORE THROAT: 0
ABDOMINAL PAIN: 0
SHORTNESS OF BREATH: 0

## 2021-06-09 NOTE — PROGRESS NOTES
6/10/2021    Martinamatilde Collins    Chief Complaint   Patient presents with   [facility] Skin Lesion     Presenting for possibl lesion.  Other     Needs diabetic shoe RX.  Hematoma     Hematoma still present but smaller per pt. HPI  History was obtained from patient. Marion Sharpe is a 66 y.o. male with a PMHx as listed below who presents today for skin lesion right lateral leg, need for therapeutic/diabetic shoes. Skin lesion noticed over 2 weeks ago. No change since noticing. Has been scabbing, no bleed/discharge, no growth, no change in color. Patient has appointment with [address] [place] [address] Dermatology later this month. Hematoma much improved, tolerating putting pressure on wound site. Denies any bleeding       1. Skin lesion    2. Hematoma    3. Type 2 diabetes mellitus without complication, without long-term current use of insulin ([address]    4. Hammer toe of left foot    5. Other diabetic neurological complication associated with type 2 diabetes mellitus ([address]             REVIEW OF SYMPTOMS    Review of Systems   Constitutional: Negative for chills and fatigue. HENT: Negative for congestion and sore throat. Respiratory: Negative for shortness of breath and wheezing. Cardiovascular: Negative for chest pain and palpitations. Gastrointestinal: Negative for abdominal pain and nausea. Genitourinary: Negative for frequency and urgency. Musculoskeletal: Negative for arthralgias and back pain. Skin: Negative for pallor. +skin lesion right leg  +hematoma   Neurological: Negative for light-headedness.        PAST MEDICAL HISTORY  Past Medical History:   Diagnosis Date    Arrhythmia     Pacemaker placed aprox 5 years ago for A Fib per patient    Arthritis 12/2013    rt wrist    Atrial fibrillation ([address]     on Xarelto - Dr. Thania Knapp CAD (coronary artery disease) 06/18/2014    see dr Thania Knapp Chronic kidney disease, stage III (moderate) ([address] 07/07/2016    Critical illness myopathy 03/15/2021    Diabetes mellitus ([facility]     dx 2004    Diabetic neuropathy associated with type 2 diabetes mellitus ([facility] 04/23/2019    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    Gout 04/2019    \"got gout when had pacer put in because they did not give me my medication for gout \"    H/O 24 hour EKG monitoring 10/03/2013    no afib noted, sinsus rhythm    H/O cardiovascular stress test 05/12/2014    cardiolite- mild ischemia RCA EF50%    H/O echocardiogram 12/01/2020    EF 55-60% severe aortic stenosis mild to mod aortic regurg mod to severe tricuspid regurg severe pulm htn significant changes since 2018 echo.  H/O right and left heart catheterization 12/10/2020    DIFFUSE LAD DISEASE, Mild ECA Disease, Severe AS, Milf Pul HTN on RHC.  H/O transesophageal echocardiography (MABLE) for monitoring 08/05/2013    normal LV function and normal LA appendage without any clot    History of blood transfusion 12/2020    d/t anemia    History of transesophageal echocardiography (MABLE) 12/15/2020    Severe aortic stenosis (ANNA by planimetry: 0.778 cm sq). Mild AR.    Samish (hard of hearing)     hearing tonya aides    Hx of Doppler echocardiogram 05/21/2018    EF 50%  Mild LV hypertrophy. Mildly enlarged RA. Mod aortic valve calcification with mod AS. Mitral annular calcification is present. Mild AR, MR and TR. Mild pulmonary htn.     Hyperlipidemia     Hypertension     Follows with PCP & Dr. Jonathan Almeida Other disorders of kidney and ureter     Pacemaker     Medtronic, implanted 2014    Pneumonia 12/29/2012    Pneumonia due to COVID-19 virus 08/2020    Sleep apnea     dx 2013- has c-pap    Type II or unspecified type diabetes mellitus with other specified manifestations, uncontrolled 12/12/2012    Venous hypertension, chronic, with ulcer ([facility] 12/12/2012    resolved       FAMILY HISTORY  Family History   Problem Relation Age of Onset    High Blood Pressure Mother     Arthritis Mother     Diabetes Mother     Heart Disease Mother     High Blood Pressure Father     Heart Disease Father     Kidney Disease Father        SOCIAL HISTORY  Social History     Socioeconomic History    Marital status:      Spouse name: None    Number of children: None    Years of education: None    Highest education level: None   Occupational History    None   Tobacco Use    Smoking status: Never Smoker    Smokeless tobacco: Never Used   Vaping Use    Vaping Use: Never used   Substance and Sexual Activity    Alcohol use: Yes     Alcohol/week: 2.0 standard drinks     Types: 2 Cans of beer per week     Comment: average \"one time per week\"/ Caffiene: 1 cup of coffee daily    Drug use: No    Sexual activity: Yes     Partners: Female     Comment:    Other Topics Concern    None   Social History Narrative    None     Social Determinants of Health     Financial Resource Strain: Low Risk     Difficulty of Paying Living Expenses: Not hard at all   Food Insecurity: No Food Insecurity    Worried About Running Out of Food in the Last Year: Never true    Kiko of Food in the Last Year: Never true   Transportation Needs:     Lack of Transportation (Medical):      Lack of Transportation (Non-Medical):    Physical Activity:     Days of Exercise per Week:     Minutes of Exercise per Session:    Stress:     Feeling of Stress :    Social Connections:     Frequency of Communication with Friends and Family:     Frequency of Social Gatherings with Friends and Family:     Attends [employer] Services:     Active Member of Clubs or Organizations:     Attends Club or Organization Meetings:     Marital Status:    Intimate Partner Violence:     Fear of Current or Ex-Partner:     Emotionally Abused:     Physically Abused:     Sexually Abused:         SURGICAL HISTORY  Past Surgical History:   Procedure Laterality Date    CABG WITH AORTIC VALVE REPLACEMENT N/A 2/16/2021    CABG CORONARY ARTERY BYPASS X2 WITH FRIEND, AORTIC VALVE REPLACEMENT AND AORTIC ROOT REPAIR, INTRAOPERATIVE MABLE, INDUCED HYPOTHERMIA, LEFT LEG ENDOVEIN HARVEST, LEFT ATRIAL CLIP, AND CRYO PROCEDURE performed by Magnus Palmer MD at [address]  12/14    at [address] [facility] [address] Left 1/29/2021    LEFT CAROTID ENDARTERECTOMY performed by Jarrod Arnold MD at [address]  2017    COLONOSCOPY  2011    COLONOSCOPY N/A 11/19/2019    COLONOSCOPY DIAGNOSTIC performed by Leandro Church MD at [facility]  09/30/2020    POSSIBLE CECAL avms, SIGMOID DIVERTICULOSIS, INTERNAL HEMORRHOIDS GRADE 1    COLONOSCOPY N/A 9/30/2020    COLONOSCOPY CONTROL HEMORRHAGE WITH APC performed by Leandro Church MD at [address] [facility]  2014    \"2 stents put in \"    IR NONTUNNELED VASCULAR CATHETER  3/5/2021    IR NONTUNNELED VASCULAR CATHETER 3/5/2021 [facility] SPECIAL PROCEDURES    JOINT REPLACEMENT  2004    total left hip    OTHER SURGICAL HISTORY Right 12/02/2017    I&D; evacuation of hematoma right hip    OTHER SURGICAL HISTORY  09/17/2020    enteroscopy    PACEMAKER INSERTION N/A 2/23/2021    PACEMAKER GENERATOR LEAD REVISION performed by Magnus Palmer MD at [facility]      9/18/14 Status post remote permanent pacemaker with atrial lead dislodgement.  7/24/14 PPM Implant    UPPER GASTROINTESTINAL ENDOSCOPY N/A 9/17/2020    ENTEROSCOPY PUSH BIOPSY performed by Leandro Church MD at [facility] 3/4/2021    EGD DIAGNOSTIC ONLY performed by Leandro Church MD at [facility]  2012    \"have stents in both legs- done in [facility]  Current Outpatient Medications   Medication Sig Dispense Refill    rOPINIRole (REQUIP) 0.25 MG tablet TAKE 1 TABLET 3 TIMES A  tablet 1    polyethylene glycol (GLYCOLAX) 17 GM/SCOOP powder Take 17 g by mouth daily as needed (constipation) 255 g 0  torsemide (DEMADEX) 20 MG tablet Take 1 tablet by mouth 2 times daily 180 tablet 3    glimepiride (AMARYL) 2 MG tablet Take 2 mg by mouth every morning (before breakfast)      apixaban (ELIQUIS) 5 MG TABS tablet Take 1 tablet by mouth 2 times daily 28 tablet 0    sildenafil (VIAGRA) 100 MG tablet TAKE 1 TABLET DAILY AS     NEEDED FOR ERECTILE        DYSFUNCTION 90 tablet 1    simvastatin (ZOCOR) 20 MG tablet Take 1 tablet by mouth daily 90 tablet 1    carboxymethylcellulose (REFRESH PLUS) 0.5 % SOLN ophthalmic solution INSTILL 1 DROP IN BOTH EYES THREE TIMES A DAY      canagliflozin (INVOKANA) 300 MG TABS tablet Take 1 tablet by mouth every morning (before breakfast) 90 tablet 1    Dulaglutide (TRULICITY) 1.5 EB/5.6IO SOPN Inject 1.5 mg into the skin once a week 12 pen 1    Cholecalciferol (VITAMIN D) 50 MCG (2000 UT) CAPS capsule Take by mouth nightly       aspirin 81 MG tablet Take 81 mg by mouth daily       No current facility-administered medications for this visit. ALLERGIES  Allergies   Allergen Reactions    Spironolactone      CAUSES INCREASED K+    Tape Jamel Shines Tape] Rash     SURGICAL TAPE       PHYSICAL EXAM    /62   Pulse 63   Resp 16   Ht 5' 10\" (1.778 m)   Wt 200 lb (90.7 kg)   SpO2 93%   BMI 28.70 kg/m²     Physical Exam  Constitutional:       Appearance: Normal appearance. HENT:      Head: Normocephalic and atraumatic. Eyes:      Extraocular Movements: Extraocular movements intact. Pupils: Pupils are equal, round, and reactive to light. Cardiovascular:      Rate and Rhythm: Normal rate and regular rhythm. Pulses: Normal pulses. Heart sounds: No murmur heard. No friction rub. No gallop. Pulmonary:      Effort: Pulmonary effort is normal.      Breath sounds: Normal breath sounds. Abdominal:      General: Abdomen is flat. Bowel sounds are normal.      Palpations: Abdomen is soft. Musculoskeletal:      Cervical back: Neck supple.    Feet: Comments: Onychomycosis  Hammer toe left foot 2nd digit    Skin:     General: Skin is warm and dry. Comments: Left leg skin lesion with central scabbing around 0.5 cm in diameter    Unable to palpate right gluteal hematoma   Neurological:      General: No focal deficit present. Mental Status: He is alert. Psychiatric:         Mood and Affect: Mood normal.         Behavior: Behavior normal.     ASSESSMENT & PLAN    1. Skin lesion  -Plan to follow up with [facility], appt 6/21/21  -Would consider biopsy, no change in lesion since noticing    2. Hematoma  -much improved, near resolved, unable to palpate today    3. Type 2 diabetes mellitus without complication, without long-term current use of insulin ([facility]  -patient requires therapeutic/diabetic shoes for poor circulation, pes planus, history of ulcer. He is being treated for diabetes with good control    4. Hammer toe of left foot  -discussed at risk for pressure ulcer, plan for therapeutic shoes and pressure pads    5. Other diabetic neurological complication associated with type 2 diabetes mellitus (HCC)  - rOPINIRole (REQUIP) 0.25 MG tablet; TAKE 1 TABLET 3 TIMES A DAY  Dispense: 270 tablet; Refill: 1    No follow-ups on file.          Electronically signed by Silvano Davis DO on 6/10/2021

## 2021-06-10 ENCOUNTER — HOSPITAL ENCOUNTER (OUTPATIENT)
Dept: CARDIAC REHAB | Age: 71
Setting detail: THERAPIES SERIES
Discharge: HOME | End: 2021-06-10
Payer: MEDICARE

## 2021-06-10 DIAGNOSIS — E11.49 OTHER DIABETIC NEUROLOGICAL COMPLICATION ASSOCIATED WITH TYPE 2 DIABETES MELLITUS (HCC): ICD-10-CM

## 2021-06-10 PROCEDURE — 93798 PHYS/QHP OP CAR RHAB W/ECG: CPT

## 2021-06-10 RX ORDER — ROPINIROLE 0.25 MG/1
TABLET, FILM COATED ORAL
Qty: 270 TABLET | Refills: 1 | Status: SHIPPED | OUTPATIENT
Start: 2021-06-10 | End: 2021-06-10 | Stop reason: SDUPTHER

## 2021-06-10 ASSESSMENT — ENCOUNTER SYMPTOMS: BACK PAIN: 0

## 2021-06-10 NOTE — DISCHARGE SUMMARY
tablet  Commonly known as: Aminofen  Take 2 tablets by mouth every 6 hours as needed for Pain     aspirin 81 MG tablet     canagliflozin 300 MG Tabs tablet  Commonly known as: INVOKANA  Take 1 tablet by mouth every morning (before breakfast)     ferrous sulfate 325 (65 Fe) MG tablet  Commonly known as: IRON 325  Take 1 tablet by mouth 2 times daily     furosemide 20 MG tablet  Commonly known as: LASIX  Take 1 tablet by mouth 2 times daily May take an extra Lasix if has increased swelling     gabapentin 600 MG tablet  Commonly known as: Neurontin  Take 1 tablet by mouth 3 times daily for 270 days.      glimepiride 4 MG tablet  Commonly known as: AMARYL  Take 1 tablet by mouth daily     metFORMIN 1000 MG tablet  Commonly known as: GLUCOPHAGE  TAKE 1 TABLET TWICE DAILY  WITH MEALS     rivaroxaban 20 MG Tabs tablet  Commonly known as: Xarelto  TAKE 1 TABLET DAILY WITH   BREAKFAST     simvastatin 20 MG tablet  Commonly known as: ZOCOR  Pt takes 10 mg daily     Trulicity 1.5 NI/0.7KK Sopn  Generic drug: Dulaglutide  Inject 1.5 mg into the skin once a week     vitamin D 50 MCG (2000 UT) Caps capsule        STOP taking these medications    metoprolol succinate 25 MG extended release tablet  Commonly known as: TOPROL XL     sildenafil 100 MG tablet  Commonly known as: VIAGRA     valsartan 40 MG tablet  Commonly known as: DIOVAN           Where to Get Your Medications      These medications were sent to [facility] [facility] [address] [facility]. - P [phone] - F [phone]  [address], [facility] [address]    Phone: [phone]   · amiodarone 200 MG tablet       Activity: activity as tolerated and no lifting, Driving, or Strenuous exercise until seen by physician in office  Diet: cardiac diet  Wound Care: as directed    Follow-up as directed at discharge    Signed: Ksenia Tanner    Time spent on discharge 35 minutes glasses

## 2021-06-10 NOTE — TELEPHONE ENCOUNTER
This has to be re-sent to [facility]---      LV  6/10/21    [place]  6/28/21    [facility] [address] [facility]

## 2021-06-13 RX ORDER — ROPINIROLE 0.25 MG/1
TABLET, FILM COATED ORAL
Qty: 270 TABLET | Refills: 1 | Status: SHIPPED | OUTPATIENT
Start: 2021-06-13 | End: 2021-06-28 | Stop reason: SDUPTHER

## 2021-06-14 ENCOUNTER — HOSPITAL ENCOUNTER (OUTPATIENT)
Dept: CARDIAC REHAB | Age: 71
Setting detail: THERAPIES SERIES
Discharge: HOME | End: 2021-06-14
Payer: MEDICARE

## 2021-06-14 LAB — DIABETIC RETINOPATHY: NEGATIVE

## 2021-06-14 PROCEDURE — 93798 PHYS/QHP OP CAR RHAB W/ECG: CPT

## 2021-06-15 ENCOUNTER — HOSPITAL ENCOUNTER (OUTPATIENT)
Dept: CARDIAC REHAB | Age: 71
Setting detail: THERAPIES SERIES
Discharge: HOME | End: 2021-06-15
Payer: MEDICARE

## 2021-06-15 PROCEDURE — 93798 PHYS/QHP OP CAR RHAB W/ECG: CPT

## 2021-06-17 ENCOUNTER — HOSPITAL ENCOUNTER (OUTPATIENT)
Dept: CARDIAC REHAB | Age: 71
Setting detail: THERAPIES SERIES
Discharge: HOME | End: 2021-06-17
Payer: MEDICARE

## 2021-06-17 ENCOUNTER — TELEPHONE (OUTPATIENT)
Dept: FAMILY MEDICINE CLINIC | Age: 71
End: 2021-06-17

## 2021-06-17 DIAGNOSIS — E11.9 TYPE 2 DIABETES MELLITUS WITHOUT COMPLICATION, WITH LONG-TERM CURRENT USE OF INSULIN (HCC): Primary | ICD-10-CM

## 2021-06-17 DIAGNOSIS — Z79.4 TYPE 2 DIABETES MELLITUS WITHOUT COMPLICATION, WITH LONG-TERM CURRENT USE OF INSULIN (HCC): Primary | ICD-10-CM

## 2021-06-17 PROCEDURE — 93798 PHYS/QHP OP CAR RHAB W/ECG: CPT

## 2021-06-21 ENCOUNTER — HOSPITAL ENCOUNTER (OUTPATIENT)
Dept: CARDIAC REHAB | Age: 71
Setting detail: THERAPIES SERIES
Discharge: HOME | End: 2021-06-21
Payer: MEDICARE

## 2021-06-21 PROCEDURE — 93798 PHYS/QHP OP CAR RHAB W/ECG: CPT

## 2021-06-22 ENCOUNTER — HOSPITAL ENCOUNTER (OUTPATIENT)
Dept: CARDIAC REHAB | Age: 71
Setting detail: THERAPIES SERIES
Discharge: HOME | End: 2021-06-22
Payer: MEDICARE

## 2021-06-22 PROCEDURE — 93798 PHYS/QHP OP CAR RHAB W/ECG: CPT

## 2021-06-24 ENCOUNTER — HOSPITAL ENCOUNTER (OUTPATIENT)
Dept: CARDIAC REHAB | Age: 71
Setting detail: THERAPIES SERIES
Discharge: HOME | End: 2021-06-24
Payer: MEDICARE

## 2021-06-24 PROCEDURE — 93798 PHYS/QHP OP CAR RHAB W/ECG: CPT

## 2021-06-28 ENCOUNTER — OFFICE VISIT (OUTPATIENT)
Dept: FAMILY MEDICINE CLINIC | Age: 71
End: 2021-06-28
Payer: MEDICARE

## 2021-06-28 ENCOUNTER — HOSPITAL ENCOUNTER (OUTPATIENT)
Dept: CARDIAC REHAB | Age: 71
Setting detail: THERAPIES SERIES
Discharge: HOME | End: 2021-06-28
Payer: MEDICARE

## 2021-06-28 ENCOUNTER — PROCEDURE VISIT (OUTPATIENT)
Dept: CARDIOLOGY CLINIC | Age: 71
End: 2021-06-28
Payer: MEDICARE

## 2021-06-28 VITALS
SYSTOLIC BLOOD PRESSURE: 132 MMHG | OXYGEN SATURATION: 96 % | BODY MASS INDEX: 26.92 KG/M2 | RESPIRATION RATE: 16 BRPM | DIASTOLIC BLOOD PRESSURE: 72 MMHG | WEIGHT: 188 LBS | HEIGHT: 70 IN | HEART RATE: 70 BPM

## 2021-06-28 VITALS
WEIGHT: 188 LBS | DIASTOLIC BLOOD PRESSURE: 72 MMHG | HEIGHT: 70 IN | RESPIRATION RATE: 16 BRPM | HEART RATE: 70 BPM | SYSTOLIC BLOOD PRESSURE: 132 MMHG | BODY MASS INDEX: 26.92 KG/M2

## 2021-06-28 DIAGNOSIS — E11.49 OTHER DIABETIC NEUROLOGICAL COMPLICATION ASSOCIATED WITH TYPE 2 DIABETES MELLITUS (HCC): ICD-10-CM

## 2021-06-28 DIAGNOSIS — E11.9 TYPE 2 DIABETES MELLITUS WITHOUT COMPLICATION, WITHOUT LONG-TERM CURRENT USE OF INSULIN (HCC): Primary | ICD-10-CM

## 2021-06-28 DIAGNOSIS — E11.9 TYPE 2 DIABETES MELLITUS WITHOUT COMPLICATION, WITHOUT LONG-TERM CURRENT USE OF INSULIN (HCC): ICD-10-CM

## 2021-06-28 DIAGNOSIS — Z95.0 CARDIAC PACEMAKER IN SITU: Primary | ICD-10-CM

## 2021-06-28 DIAGNOSIS — I10 ESSENTIAL HYPERTENSION: ICD-10-CM

## 2021-06-28 DIAGNOSIS — Z00.00 ROUTINE GENERAL MEDICAL EXAMINATION AT A HEALTH CARE FACILITY: Primary | ICD-10-CM

## 2021-06-28 DIAGNOSIS — I48.91 ATRIAL FIBRILLATION, UNSPECIFIED TYPE (HCC): ICD-10-CM

## 2021-06-28 PROCEDURE — G0439 PPPS, SUBSEQ VISIT: HCPCS | Performed by: STUDENT IN AN ORGANIZED HEALTH CARE EDUCATION/TRAINING PROGRAM

## 2021-06-28 PROCEDURE — 93798 PHYS/QHP OP CAR RHAB W/ECG: CPT

## 2021-06-28 PROCEDURE — 99213 OFFICE O/P EST LOW 20 MIN: CPT | Performed by: STUDENT IN AN ORGANIZED HEALTH CARE EDUCATION/TRAINING PROGRAM

## 2021-06-28 RX ORDER — ROPINIROLE 0.25 MG/1
TABLET, FILM COATED ORAL
Qty: 270 TABLET | Refills: 1 | Status: SHIPPED | OUTPATIENT
Start: 2021-06-28 | End: 2021-08-31 | Stop reason: SDUPTHER

## 2021-06-28 ASSESSMENT — PATIENT HEALTH QUESTIONNAIRE - PHQ9
SUM OF ALL RESPONSES TO PHQ QUESTIONS 1-9: 0
1. LITTLE INTEREST OR PLEASURE IN DOING THINGS: 0
SUM OF ALL RESPONSES TO PHQ QUESTIONS 1-9: 0
1. LITTLE INTEREST OR PLEASURE IN DOING THINGS: 0
SUM OF ALL RESPONSES TO PHQ QUESTIONS 1-9: 0
SUM OF ALL RESPONSES TO PHQ QUESTIONS 1-9: 0
2. FEELING DOWN, DEPRESSED OR HOPELESS: 0
2. FEELING DOWN, DEPRESSED OR HOPELESS: 0
SUM OF ALL RESPONSES TO PHQ9 QUESTIONS 1 & 2: 0
SUM OF ALL RESPONSES TO PHQ9 QUESTIONS 1 & 2: 0
SUM OF ALL RESPONSES TO PHQ QUESTIONS 1-9: 0
SUM OF ALL RESPONSES TO PHQ QUESTIONS 1-9: 0

## 2021-06-28 NOTE — PROGRESS NOTES
7/1/2021    [facility]    Chief Complaint   Patient presents with    3 Month Follow-Up     Presenting for three month follow up visit. HPI  History was obtained from patient. Morse Boxer is a 66 y.o. male with a PMHx as listed below who presents today for follow up on chronic conditions. No acute complaints. Compliant with medications. Hematoma now resolved  Prior a1c reviewed well controlled      1. Type 2 diabetes mellitus without complication, without long-term current use of insulin ([address]    2. Essential hypertension    3. Atrial fibrillation, unspecified type ([address]    4. Other diabetic neurological complication associated with type 2 diabetes mellitus ([address]             REVIEW OF SYMPTOMS    Review of Systems   Constitutional: Negative for chills and fatigue. HENT: Negative for congestion and sore throat. Respiratory: Negative for shortness of breath and wheezing. Cardiovascular: Negative for chest pain and palpitations. Gastrointestinal: Negative for abdominal pain and nausea. Genitourinary: Negative for frequency and urgency. Musculoskeletal: Positive for arthralgias (chronic). Neurological: Negative for light-headedness.        PAST MEDICAL HISTORY  Past Medical History:   Diagnosis Date    Arrhythmia     Pacemaker placed aprox 5 years ago for A Fib per patient    Arthritis 12/2013    rt wrist    Atrial fibrillation ([address]     on Xarelto - Dr. 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    Diabetes mellitus ([address]     dx 2004    Diabetic neuropathy associated with type 2 diabetes mellitus ([address] 04/23/2019    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    Gout 04/2019    \"got gout when had pacer put in because they did not give me my medication for gout \"    H/O 24 hour EKG monitoring 10/03/2013    no afib noted, sinsus rhythm    H/O cardiovascular stress test 05/12/2014    cardiolite- mild ischemia RCA EF50%    H/O echocardiogram 12/01/2020    EF 55-60% severe aortic stenosis mild to mod aortic regurg mod to severe tricuspid regurg severe pulm htn significant changes since 2018 echo.  H/O right and left heart catheterization 12/10/2020    DIFFUSE LAD DISEASE, Mild ECA Disease, Severe AS, Milf Pul HTN on RHC.  H/O transesophageal echocardiography (MABLE) for monitoring 08/05/2013    normal LV function and normal LA appendage without any clot    History of blood transfusion 12/2020    d/t anemia    History of transesophageal echocardiography (MABLE) 12/15/2020    Severe aortic stenosis (ANNA by planimetry: 0.778 cm sq). Mild AR.    [place] (hard of hearing)     hearing tonya aides    Hx of Doppler echocardiogram 05/21/2018    EF 50%  Mild LV hypertrophy. Mildly enlarged RA. Mod aortic valve calcification with mod AS. Mitral annular calcification is present. Mild AR, MR and TR. Mild pulmonary htn.     Hyperlipidemia     Hypertension     Follows with PCP & Dr. Jose Ray Other disorders of kidney and ureter     Pacemaker     Medtronic, implanted 2014    Pneumonia 12/29/2012    Pneumonia due to COVID-19 virus 08/2020    Sleep apnea     dx 2013- has c-pap    Type II or unspecified type diabetes mellitus with other specified manifestations, uncontrolled 12/12/2012    Venous hypertension, chronic, with ulcer ([address] 12/12/2012    resolved       FAMILY HISTORY  Family History   Problem Relation Age of Onset    High Blood Pressure Mother     Arthritis Mother     Diabetes Mother     Heart Disease Mother     High Blood Pressure Father     Heart Disease Father     Kidney Disease Father        SOCIAL HISTORY  Social History     Socioeconomic History    Marital status:      Spouse name: None    Number of children: None    Years of education: None    Highest education level: None   Occupational History    None   Tobacco Use    Smoking status: Never Smoker    Smokeless tobacco: Never Used   Vaping Use    Vaping Use: Never used   Substance and Sexual Activity    Alcohol use: Yes     Alcohol/week: 2.0 standard drinks     Types: 2 Cans of beer per week     Comment: average \"one time per week\"/ Caffiene: 1 cup of coffee daily    Drug use: No    Sexual activity: Yes     Partners: Female     Comment:    Other Topics Concern    None   Social History Narrative    None     Social Determinants of Health     Financial Resource Strain: Low Risk     Difficulty of Paying Living Expenses: Not hard at all   Food Insecurity: No Food Insecurity    Worried About Running Out of Food in the Last Year: Never true    Kiko of Food in the Last Year: Never true   Transportation Needs:     Lack of Transportation (Medical):      Lack of Transportation (Non-Medical):    Physical Activity:     Days of Exercise per Week:     Minutes of Exercise per Session:    Stress:     Feeling of Stress :    Social Connections:     Frequency of Communication with Friends and Family:     Frequency of Social Gatherings with Friends and Family:     Attends [employer] Services:     Active Member of Clubs or Organizations:     Attends Club or Organization Meetings:     Marital Status:    Intimate Partner Violence:     Fear of Current or Ex-Partner:     Emotionally Abused:     Physically Abused:     Sexually Abused:         SURGICAL HISTORY  Past Surgical History:   Procedure Laterality Date    CABG WITH AORTIC VALVE REPLACEMENT N/A 2/16/2021    CABG CORONARY ARTERY BYPASS X2 WITH [place], AORTIC VALVE REPLACEMENT AND AORTIC ROOT REPAIR, INTRAOPERATIVE MABLE, INDUCED HYPOTHERMIA, LEFT LEG ENDOVEIN HARVEST, LEFT ATRIAL CLIP, AND CRYO PROCEDURE performed by Ye De La Garza MD at [address] [place] [address]  12/14    at [address] [facility] Left 1/29/2021    LEFT CAROTID ENDARTERECTOMY performed by Maria Victoria Helton MD at [address] [place] [address]    COLONOSCOPY 2011    COLONOSCOPY N/A 11/19/2019    COLONOSCOPY DIAGNOSTIC performed by Finn Galvez MD at [address]  09/30/2020    POSSIBLE CECAL avms, SIGMOID DIVERTICULOSIS, INTERNAL HEMORRHOIDS GRADE 1    COLONOSCOPY N/A 9/30/2020    COLONOSCOPY CONTROL HEMORRHAGE WITH APC performed by Finn Galvez MD at [facility]  2014    \"2 stents put in \"    IR NONTUNNELED VASCULAR CATHETER  3/5/2021    IR NONTUNNELED VASCULAR CATHETER 3/5/2021 [facility] SPECIAL PROCEDURES    JOINT REPLACEMENT  2004    total left hip    OTHER SURGICAL HISTORY Right 12/02/2017    I&D; evacuation of hematoma right hip    OTHER SURGICAL HISTORY  09/17/2020    enteroscopy    PACEMAKER INSERTION N/A 2/23/2021    PACEMAKER GENERATOR LEAD REVISION performed by Skyla Estes MD at [facility]      9/18/14 Status post remote permanent pacemaker with atrial lead dislodgement.  7/24/14 PPM Implant    UPPER GASTROINTESTINAL ENDOSCOPY N/A 9/17/2020    ENTEROSCOPY PUSH BIOPSY performed by Finn Galvez MD at [address] [facility] 3/4/2021    EGD DIAGNOSTIC ONLY performed by Finn Galvez MD at [facility]  2012    \"have stents in both legs- done in [facility] 18  Current Outpatient Medications   Medication Sig Dispense Refill    rOPINIRole (REQUIP) 0.25 MG tablet TAKE 1 TABLET 3 TIMES A  tablet 1    torsemide (DEMADEX) 20 MG tablet Take 1 tablet by mouth 2 times daily 180 tablet 3    glimepiride (AMARYL) 2 MG tablet Take 2 mg by mouth every morning (before breakfast)      apixaban (ELIQUIS) 5 MG TABS tablet Take 1 tablet by mouth 2 times daily 28 tablet 0    sildenafil (VIAGRA) 100 MG tablet TAKE 1 TABLET DAILY AS     NEEDED FOR ERECTILE        DYSFUNCTION 90 tablet 1    simvastatin (ZOCOR) 20 MG tablet Take 1 tablet by mouth daily 90 tablet 1    carboxymethylcellulose (REFRESH PLUS) 0.5 % SOLN ophthalmic solution INSTILL 1 DROP IN BOTH EYES THREE TIMES A DAY      canagliflozin (INVOKANA) 300 MG TABS tablet Take 1 tablet by mouth every morning (before breakfast) 90 tablet 1    Dulaglutide (TRULICITY) 1.5 LV/1.7MQ SOPN Inject 1.5 mg into the skin once a week 12 pen 1    Cholecalciferol (VITAMIN D) 50 MCG (2000 UT) CAPS capsule Take by mouth nightly       aspirin 81 MG tablet Take 81 mg by mouth daily       No current facility-administered medications for this visit. ALLERGIES  Allergies   Allergen Reactions    Spironolactone      CAUSES INCREASED K+    Tape Ali Yo Tape] Rash     SURGICAL TAPE       PHYSICAL EXAM    /72   Pulse 70   Resp 16   Ht 5' 10\" (1.778 m)   Wt 188 lb (85.3 kg)   SpO2 96%   BMI 26.98 kg/m²     Physical Exam  Constitutional:       Appearance: Normal appearance. HENT:      Head: Normocephalic and atraumatic. Mouth/Throat:      Mouth: Mucous membranes are moist.   Eyes:      Extraocular Movements: Extraocular movements intact. Pupils: Pupils are equal, round, and reactive to light. Cardiovascular:      Rate and Rhythm: Normal rate and regular rhythm. Pulses: Normal pulses. Heart sounds: No murmur heard. No friction rub. No gallop. Pulmonary:      Effort: No respiratory distress. Breath sounds: Normal breath sounds. No wheezing. Skin:     General: Skin is warm and dry. Neurological:      General: No focal deficit present. Mental Status: He is alert. Psychiatric:         Mood and Affect: Mood normal.         Behavior: Behavior normal.         ASSESSMENT & PLAN    1. Type 2 diabetes mellitus without complication, without long-term current use of insulin ([facility])  -continue current regimen, well controlled plan to repeat a1c prior to next appt    2. Essential hypertension  -stable    3.  Atrial fibrillation, unspecified type ([address]  -stable following with Cardiology  Continue eliquis high chadsvasc  -currently not on rate control medication    4. Other diabetic neurological complication associated with type 2 diabetes mellitus (HCC)  - rOPINIRole (REQUIP) 0.25 MG tablet; TAKE 1 TABLET 3 TIMES A DAY  Dispense: 270 tablet; Refill: 1      Return in about 4 months (around 10/28/2021).          Electronically signed by Deep Cortez DO on 7/1/2021

## 2021-06-28 NOTE — PROGRESS NOTES
Medicare Annual Wellness Visit  Name: Sanjuana Chatman Date: 2021   MRN: [id number] Sex: Male   Age: 66 y.o. Ethnicity: Non-/Non    : 1950 Race: Ej Mccallum is here for Medicare AWV (Presenting for medicare wellness visit. )    Screenings for behavioral, psychosocial and functional/safety risks, and cognitive dysfunction are all negative except as indicated below. These results, as well as other patient data from the [address] [facility] [address] form, are documented in Flowsheets linked to this Encounter. Allergies   Allergen Reactions    Spironolactone      CAUSES INCREASED K+    Tape Carmen Peaks Tape] Rash     SURGICAL TAPE         Prior to Visit Medications    Medication Sig Taking?  Authorizing Provider   rOPINIRole (REQUIP) 0.25 MG tablet TAKE 1 TABLET 3 TIMES A DAY  Kaleb Mitchell DO   torsemide (DEMADEX) 20 MG tablet Take 1 tablet by mouth 2 times daily  Bar Phyllistisi Voss MD   glimepiride (AMARYL) 2 MG tablet Take 2 mg by mouth every morning (before breakfast)  Historical Provider, MD   apixaban (ELIQUIS) 5 MG TABS tablet Take 1 tablet by mouth 2 times daily  Flor Wills MD   sildenafil (VIAGRA) 100 MG tablet TAKE 1 TABLET DAILY AS     NEEDED FOR ERECTILE        DYSFUNCTION  Baldo Leonard MD   simvastatin (ZOCOR) 20 MG tablet Take 1 tablet by mouth daily  Flor Wills MD   carboxymethylcellulose (REFRESH PLUS) 0.5 % SOLN ophthalmic solution INSTILL 1 DROP IN BOTH EYES THREE TIMES A DAY  Historical Provider, MD   canagliflozin (INVOKANA) 300 MG TABS tablet Take 1 tablet by mouth every morning (before breakfast)  Baldo Leonard MD   Dulaglutide (TRULICITY) 1.5 ZN/8.0MW SOPN Inject 1.5 mg into the skin once a week  Shireen Leblanc PA-C   Cholecalciferol (VITAMIN D) 50 MCG ( UT) CAPS capsule Take by mouth nightly   Historical Provider, MD   aspirin 81 MG tablet Take 81 mg by mouth daily  Historical Provider, MD         Past Medical History: Diagnosis Date    Arrhythmia     Pacemaker placed aprox 5 years ago for A Fib per patient    Arthritis 12/2013    rt wrist    Atrial fibrillation (HCC)     on Xarelto - Dr. Arnoldo Bender CAD (coronary artery disease) 06/18/2014    see dr Jae Munoz kidney disease, stage III (moderate) ([facility] [address] 07/07/2016    Critical illness myopathy 03/15/2021    Diabetes mellitus ([facility] [address]     dx 2004    Diabetic neuropathy associated with type 2 diabetes mellitus ([facility] [address] 04/23/2019    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    Gout 04/2019    \"got gout when had pacer put in because they did not give me my medication for gout \"    H/O 24 hour EKG monitoring 10/03/2013    no afib noted, sinsus rhythm    H/O cardiovascular stress test 05/12/2014    cardiolite- mild ischemia RCA EF50%    H/O echocardiogram 12/01/2020    EF 55-60% severe aortic stenosis mild to mod aortic regurg mod to severe tricuspid regurg severe pulm htn significant changes since 2018 echo.  H/O right and left heart catheterization 12/10/2020    DIFFUSE LAD DISEASE, Mild ECA Disease, Severe AS, Milf Pul HTN on RHC.  H/O transesophageal echocardiography (MABLE) for monitoring 08/05/2013    normal LV function and normal LA appendage without any clot    History of blood transfusion 12/2020    d/t anemia    History of transesophageal echocardiography (MABLE) 12/15/2020    Severe aortic stenosis (ANNA by planimetry: 0.778 cm sq). Mild AR.    [place] (hard of hearing)     hearing tonya aides    Hx of Doppler echocardiogram 05/21/2018    EF 50%  Mild LV hypertrophy. Mildly enlarged RA. Mod aortic valve calcification with mod AS. Mitral annular calcification is present. Mild AR, MR and TR. Mild pulmonary htn.     Hyperlipidemia     Hypertension     Follows with PCP & Dr. Jocelynn Hough Other disorders of kidney and ureter     Pacemaker     Medtronic, implanted 2014    Pneumonia 12/29/2012    Pneumonia due to COVID-19 virus 08/2020    Sleep apnea     dx 2013- has c-pap    Type II or unspecified type diabetes mellitus with other specified manifestations, uncontrolled 12/12/2012    Venous hypertension, chronic, with ulcer ([address] 12/12/2012    resolved       Past Surgical History:   Procedure Laterality Date    CABG WITH AORTIC VALVE REPLACEMENT N/A 2/16/2021    CABG CORONARY ARTERY BYPASS X2 WITH LIMA, AORTIC VALVE REPLACEMENT AND AORTIC ROOT REPAIR, INTRAOPERATIVE MABLE, INDUCED HYPOTHERMIA, LEFT LEG ENDOVEIN HARVEST, LEFT ATRIAL CLIP, AND CRYO PROCEDURE performed by Rich Foy MD at [address]  12/14    at [address] Left 1/29/2021    LEFT CAROTID ENDARTERECTOMY performed by Cherrie Boggs MD at [id number] [facility]  2017    COLONOSCOPY  2011    COLONOSCOPY N/A 11/19/2019    COLONOSCOPY DIAGNOSTIC performed by Skye Simms MD at [place] [id number]  09/30/2020    POSSIBLE CECAL avms, SIGMOID DIVERTICULOSIS, INTERNAL HEMORRHOIDS GRADE 1    COLONOSCOPY N/A 9/30/2020    COLONOSCOPY CONTROL HEMORRHAGE WITH APC performed by Skye Simms MD at [address] [facility]  2014    \"2 stents put in \"    IR NONTUNNELED VASCULAR CATHETER  3/5/2021    IR NONTUNNELED VASCULAR CATHETER 3/5/2021 [facility] SPECIAL PROCEDURES    JOINT REPLACEMENT  2004    total left hip    OTHER SURGICAL HISTORY Right 12/02/2017    I&D; evacuation of hematoma right hip    OTHER SURGICAL HISTORY  09/17/2020    enteroscopy    PACEMAKER INSERTION N/A 2/23/2021    PACEMAKER GENERATOR LEAD REVISION performed by Rich Foy MD at [facility]      9/18/14 Status post remote permanent pacemaker with atrial lead dislodgement.  7/24/14 PPM Implant    UPPER GASTROINTESTINAL ENDOSCOPY N/A 9/17/2020    ENTEROSCOPY PUSH BIOPSY performed by Skye Simms MD at [place] [address] N/A 3/4/2021    EGD DIAGNOSTIC ONLY performed by Skye Simms MD at [facility] ENDOSCOPY  VASCULAR SURGERY  2012    \"have stents in both legs- done in [place]         Family History   Problem Relation Age of Onset    High Blood Pressure Mother     Arthritis Mother     Diabetes Mother     Heart Disease Mother     High Blood Pressure Father     Heart Disease Father     Kidney Disease Father        CareTeam (Including outside providers/suppliers regularly involved in providing care):   Patient Care Team:  Eva Sotomayor DO as PCP - General (Family Medicine)  Eva Sotomayor DO [facility]CP [facility]  Niki Higginbotham MD as Consulting Physician (Cardiology)    Wt Readings from Last 3 Encounters:   06/28/21 188 lb (85.3 kg)   06/28/21 188 lb (85.3 kg)   06/09/21 200 lb (90.7 kg)     Vitals:    06/28/21 1037   BP: 132/72   Pulse: 70   Resp: 16   Weight: 188 lb (85.3 kg)   Height: 5' 10\" (1.778 m)     Body mass index is 26.98 kg/m². Based upon direct observation of the patient, evaluation of cognition reveals recent and remote memory intact.     General Appearance: alert and oriented to person, place and time, well developed and well- nourished, in no acute distress  Skin: warm and dry, no rash or erythema  Head: normocephalic and atraumatic  Eyes: pupils equal, round, and reactive to light, extraocular eye movements intact, conjunctivae normal  Neck: supple and non-tender without mass, no thyromegaly or thyroid nodules, no cervical lymphadenopathy  Pulmonary/Chest: clear to auscultation bilaterally- no wheezes, rales or rhonchi, normal air movement, no respiratory distress  Cardiovascular: normal rate, regular rhythm, normal S1 and S2, no murmurs, rubs, clicks, or gallops, distal pulses intact, no carotid bruits  Abdomen: soft, non-tender, non-distended, normal bowel sounds, no masses or organomegaly  Extremities: no cyanosis, clubbing or edema  Musculoskeletal: normal range of motion, no joint swelling, deformity or tenderness    Patient's complete Health Risk Assessment and screening values have been reviewed and are found in Flowsheets. The following problems were reviewed today and where indicated follow up appointments were made and/or referrals ordered. Positive Risk Factor Screenings with Interventions:     Fall Risk:  Timed Up and Go Test > 12 seconds?  (Complete if either Fall Risk answers are Yes): no  2 or more falls in past year?: (!) yes  Fall with injury in past year?: no  Fall Risk Interventions:    · Home exercises provided to promote strength and balance             Personalized Preventive Plan   Current Health Maintenance Status  Immunization History   Administered Date(s) Administered    COVID-19, Seth Peter, PF, 30mcg/0.3mL 02/04/2021, 03/26/2021    Influenza Vaccine, unspecified formulation 10/27/2017    Influenza Virus Vaccine 09/20/2012, 11/09/2015, 10/27/2017    Influenza, High Dose (Fluzone 65 yrs and older) 10/11/2018    Influenza, Quadv, adjuvanted, 65 yrs +, IM, PF (Fluad) 09/29/2020    Influenza, Triv, inactivated, subunit, adjuvanted, IM (Fluad 65 yrs and older) 09/20/2019    Pneumococcal Conjugate 13-valent ([id number]) 10/30/2014    Pneumococcal Polysaccharide ([id number]) 01/01/2008, 06/22/2020    Pneumococcal Vaccine 11/05/2007    Tdap (Boostrix, Adacel) 11/09/2015        Health Maintenance   Topic Date Due    Shingles Vaccine (1 of 2) Never done   [employer] Visit (AWV)  Never done    Diabetic microalbuminuria test  05/08/2020    Diabetic foot exam  06/22/2021    Flu vaccine (1) 09/01/2021    Lipid screen  12/18/2021    A1C test (Diabetic or Prediabetic)  02/20/2022    Potassium monitoring  06/01/2022    Creatinine monitoring  06/01/2022    Diabetic retinal exam  06/14/2023    Colon cancer screen colonoscopy  09/30/2025    DTaP/Tdap/Td vaccine (2 - Td or Tdap) 11/09/2025    Pneumococcal 65+ years Vaccine  Completed    COVID-19 Vaccine  Completed    Hepatitis C screen  Completed    Hepatitis A vaccine  Aged Out    Hib vaccine  Aged Out    Meningococcal (ACWY) vaccine  Aged Out     Recommendations for Preventive Services Due: see orders and patient instructions/AVS.  . Recommended screening schedule for the next 5-10 years is provided to the patient in written form: see Patient Instructions/AVS.    Linda Juan was seen today for medicare awv. Diagnoses and all orders for this visit:    Routine general medical examination at a health care facility    Type 2 diabetes mellitus without complication, without long-term current use of insulin ([facility] [address]  -     Hemoglobin A1C; Future                 Cardiovascular Disease Risk Counseling: Assessed the patient's risk to develop cardiovascular disease and reviewed main risk factors. Reviewed steps to reduce disease risk including:   · Quitting tobacco use, reducing amount smoked, or not starting the habit  · Making healthy food choices  · Being physically active and gradualy increasing activity levels   · Reduce weight and determine a healthy BMI goal  · Monitor blood pressure and treat if higher than 140/90 mmHg  · Maintain blood total cholesterol levels under 5 mmol/l or 190 mg/dl  · Maintain LDL cholesterol levels under 3.0 mmol/l or 115 mg/dl   · Control blood glucose levels  · Consider taking aspirin (75 mg daily), once blood pressure is controlled   Provided a follow up plan.   Time spent (minutes): 5

## 2021-06-28 NOTE — PATIENT INSTRUCTIONS
Personalized Preventive Plan for Shelly Crowley - 6/28/2021  Medicare offers a range of preventive health benefits. Some of the tests and screenings are paid in full while other may be subject to a deductible, co-insurance, and/or copay. Some of these benefits include a comprehensive review of your medical history including lifestyle, illnesses that may run in your family, and various assessments and screenings as appropriate. After reviewing your medical record and screening and assessments performed today your provider may have ordered immunizations, labs, imaging, and/or referrals for you. A list of these orders (if applicable) as well as your Preventive Care list are included within your After Visit Summary for your review. Other Preventive Recommendations:    · A preventive eye exam performed by an eye specialist is recommended every 1-2 years to screen for glaucoma; cataracts, macular degeneration, and other eye disorders. · A preventive dental visit is recommended every 6 months. · Try to get at least 150 minutes of exercise per week or 10,000 steps per day on a pedometer . · Order or download the FREE \"Exercise & Physical Activity: Your Everyday Guide\" from [employer]. Call 8-173.152.6676 or search [employer] online. · You need 0998-7778 mg of calcium and 2482-5327 IU of vitamin D per day. It is possible to meet your calcium requirement with diet alone, but a vitamin D supplement is usually necessary to meet this goal.  · When exposed to the sun, use a sunscreen that protects against both UVA and UVB radiation with an SPF of 30 or greater. Reapply every 2 to 3 hours or after sweating, drying off with a towel, or swimming. · Always wear a seat belt when traveling in a car. Always wear a helmet when riding a bicycle or motorcycle.

## 2021-06-29 ENCOUNTER — HOSPITAL ENCOUNTER (OUTPATIENT)
Dept: CARDIAC REHAB | Age: 71
Setting detail: THERAPIES SERIES
Discharge: HOME | End: 2021-06-29
Payer: MEDICARE

## 2021-06-29 PROCEDURE — 93294 REM INTERROG EVL PM/LDLS PM: CPT | Performed by: INTERNAL MEDICINE

## 2021-06-29 PROCEDURE — 93798 PHYS/QHP OP CAR RHAB W/ECG: CPT

## 2021-06-29 PROCEDURE — 93296 REM INTERROG EVL PM/IDS: CPT | Performed by: INTERNAL MEDICINE

## 2021-07-01 ENCOUNTER — HOSPITAL ENCOUNTER (OUTPATIENT)
Dept: CARDIAC REHAB | Age: 71
Setting detail: THERAPIES SERIES
Discharge: HOME | End: 2021-07-01
Payer: MEDICARE

## 2021-07-01 PROCEDURE — 93798 PHYS/QHP OP CAR RHAB W/ECG: CPT

## 2021-07-01 ASSESSMENT — ENCOUNTER SYMPTOMS
WHEEZING: 0
SHORTNESS OF BREATH: 0
SORE THROAT: 0
ABDOMINAL PAIN: 0
NAUSEA: 0

## 2021-07-06 ENCOUNTER — HOSPITAL ENCOUNTER (OUTPATIENT)
Dept: CARDIAC REHAB | Age: 71
Setting detail: THERAPIES SERIES
Discharge: HOME | End: 2021-07-06
Payer: MEDICARE

## 2021-07-06 PROCEDURE — 93798 PHYS/QHP OP CAR RHAB W/ECG: CPT

## 2021-07-08 ENCOUNTER — HOSPITAL ENCOUNTER (OUTPATIENT)
Dept: CARDIAC REHAB | Age: 71
Setting detail: THERAPIES SERIES
Discharge: HOME | End: 2021-07-08
Payer: MEDICARE

## 2021-07-08 PROCEDURE — 93798 PHYS/QHP OP CAR RHAB W/ECG: CPT

## 2021-07-12 ENCOUNTER — HOSPITAL ENCOUNTER (OUTPATIENT)
Dept: CARDIAC REHAB | Age: 71
Setting detail: THERAPIES SERIES
Discharge: HOME | End: 2021-07-12
Payer: MEDICARE

## 2021-07-12 PROCEDURE — 93798 PHYS/QHP OP CAR RHAB W/ECG: CPT

## 2021-07-13 ENCOUNTER — HOSPITAL ENCOUNTER (OUTPATIENT)
Dept: CARDIAC REHAB | Age: 71
Setting detail: THERAPIES SERIES
Discharge: HOME | End: 2021-07-13
Payer: MEDICARE

## 2021-07-13 PROCEDURE — 93798 PHYS/QHP OP CAR RHAB W/ECG: CPT

## 2021-07-15 ENCOUNTER — HOSPITAL ENCOUNTER (OUTPATIENT)
Dept: CARDIAC REHAB | Age: 71
Setting detail: THERAPIES SERIES
Discharge: HOME | End: 2021-07-15
Payer: MEDICARE

## 2021-07-15 PROCEDURE — 93798 PHYS/QHP OP CAR RHAB W/ECG: CPT

## 2021-07-19 ENCOUNTER — HOSPITAL ENCOUNTER (OUTPATIENT)
Dept: CARDIAC REHAB | Age: 71
Setting detail: THERAPIES SERIES
Discharge: HOME | End: 2021-07-19
Payer: MEDICARE

## 2021-07-19 PROCEDURE — 93798 PHYS/QHP OP CAR RHAB W/ECG: CPT

## 2021-07-20 ENCOUNTER — HOSPITAL ENCOUNTER (OUTPATIENT)
Dept: CARDIAC REHAB | Age: 71
Setting detail: THERAPIES SERIES
Discharge: HOME | End: 2021-07-20
Payer: MEDICARE

## 2021-07-20 PROCEDURE — 93798 PHYS/QHP OP CAR RHAB W/ECG: CPT

## 2021-07-22 ENCOUNTER — HOSPITAL ENCOUNTER (OUTPATIENT)
Dept: CARDIAC REHAB | Age: 71
Setting detail: THERAPIES SERIES
Discharge: HOME | End: 2021-07-22
Payer: MEDICARE

## 2021-07-22 PROCEDURE — 93798 PHYS/QHP OP CAR RHAB W/ECG: CPT

## 2021-07-26 ENCOUNTER — HOSPITAL ENCOUNTER (OUTPATIENT)
Dept: CARDIAC REHAB | Age: 71
Setting detail: THERAPIES SERIES
Discharge: HOME | End: 2021-07-26
Payer: MEDICARE

## 2021-07-26 PROCEDURE — 93798 PHYS/QHP OP CAR RHAB W/ECG: CPT

## 2021-07-27 ENCOUNTER — HOSPITAL ENCOUNTER (OUTPATIENT)
Dept: CARDIAC REHAB | Age: 71
Setting detail: THERAPIES SERIES
Discharge: HOME | End: 2021-07-27
Payer: MEDICARE

## 2021-07-27 PROCEDURE — 93798 PHYS/QHP OP CAR RHAB W/ECG: CPT

## 2021-07-28 ENCOUNTER — OFFICE VISIT (OUTPATIENT)
Dept: CARDIOLOGY CLINIC | Age: 71
End: 2021-07-28
Payer: MEDICARE

## 2021-07-28 VITALS
HEART RATE: 64 BPM | WEIGHT: 202.2 LBS | BODY MASS INDEX: 28.31 KG/M2 | HEIGHT: 71 IN | SYSTOLIC BLOOD PRESSURE: 130 MMHG | DIASTOLIC BLOOD PRESSURE: 74 MMHG

## 2021-07-28 DIAGNOSIS — Z95.0 CARDIAC PACEMAKER IN SITU: ICD-10-CM

## 2021-07-28 DIAGNOSIS — E78.2 MIXED HYPERLIPIDEMIA: ICD-10-CM

## 2021-07-28 DIAGNOSIS — I38 VHD (VALVULAR HEART DISEASE): ICD-10-CM

## 2021-07-28 DIAGNOSIS — I10 ESSENTIAL HYPERTENSION: ICD-10-CM

## 2021-07-28 DIAGNOSIS — I25.10 CAD IN NATIVE ARTERY: ICD-10-CM

## 2021-07-28 DIAGNOSIS — I48.0 PAF (PAROXYSMAL ATRIAL FIBRILLATION) (HCC): Primary | ICD-10-CM

## 2021-07-28 PROCEDURE — G8417 CALC BMI ABV UP PARAM F/U: HCPCS | Performed by: NURSE PRACTITIONER

## 2021-07-28 PROCEDURE — 1036F TOBACCO NON-USER: CPT | Performed by: NURSE PRACTITIONER

## 2021-07-28 PROCEDURE — 99214 OFFICE O/P EST MOD 30 MIN: CPT | Performed by: NURSE PRACTITIONER

## 2021-07-28 PROCEDURE — G8427 DOCREV CUR MEDS BY ELIG CLIN: HCPCS | Performed by: NURSE PRACTITIONER

## 2021-07-28 PROCEDURE — 3017F COLORECTAL CA SCREEN DOC REV: CPT | Performed by: NURSE PRACTITIONER

## 2021-07-28 PROCEDURE — 1123F ACP DISCUSS/DSCN MKR DOCD: CPT | Performed by: NURSE PRACTITIONER

## 2021-07-28 PROCEDURE — 4040F PNEUMOC VAC/ADMIN/RCVD: CPT | Performed by: NURSE PRACTITIONER

## 2021-07-28 RX ORDER — SIMVASTATIN 20 MG
20 TABLET ORAL DAILY
Qty: 90 TABLET | Refills: 2 | Status: SHIPPED | OUTPATIENT
Start: 2021-07-28 | End: 2022-03-21 | Stop reason: SDUPTHER

## 2021-07-28 ASSESSMENT — ENCOUNTER SYMPTOMS
SHORTNESS OF BREATH: 0
ORTHOPNEA: 0

## 2021-07-28 NOTE — LETTER
[address]  1950  [id number]    Have you had any Chest Pain that is not new? - No       Have you had any Shortness of Breath - No  If Yes - When     Have you had any dizziness - No       Have you had any palpitations that are not new? - No       Do you have any edema - swelling in legs and ankles  If Yes - CHECK TO SEE IF THE EDEMA IS PITTING  How long have they been having edema - Months  If Yes - Have they worn compression stockings Yes  If they have worn compression stockings 1 Weeks at a time x 2 years    Vein \"LEG PROBLEM Questionnaire\"  1. Do you have prominent leg veins? No   2. Do you have any skin discoloration? Yes  3. Do you have any healed or active sores? Yes, toe on left foot  4. Do you have swelling of the legs? Yes  5. Do you have a family history of varicose veins? No  6. Does your profession involve pro-longed        standing or heavy lifting? No  7. Have you been fighting overweight problems? No  8. Do you have restless legs? Yes  9. Do you have any night time cramps? No  10. Do you have any of the following in your legs:        NONE     When did you have your last labs drawn 6/1//21, 6/28/21, 12/18/2020 in epic      If we do not have these labs you are retrieve these labs for these providers!     Do you have a surgery or procedure scheduled in the near future - No  If Yes- DO EKG

## 2021-07-28 NOTE — PROGRESS NOTES
7/28/2021  Primary cardiologist: Dr. Felicitas Andrews  is an established 66 y.o.  male here for follow-up on   1. PAF (paroxysmal atrial fibrillation) ([address]    2. CAD in native artery    3. VHD (valvular heart disease)    4. Essential hypertension    5. Mixed hyperlipidemia    6. Cardiac pacemaker in situ          SUBJECTIVE/OBJECTIVE:    HPI : Rola Echevarria is a pleasant 20-year-old gentleman with a history of paroxysmal atrial fibrillation, CAD, s/p PCI and CABG, valvular heart disease, hypertension, hyperlipidemia, carotid artery disease s/p Left CEA, CKD, anemia and pacemaker. In February 2021 Rola Echevarria underwent CABG x 2 with LIMA to LAD and SVG to distal RCA, AVR with a  27 mm Medtronic Mosaic bio prosthetic prosthesis, aortic root enlargement with CorMatrix patch, left atrial Maze procedure occluding pulmonary vein isolation using bipolar and epicardial roof and floor line and a 45 mm left atrial clip placement. He had a vilma post up course with readmission due to respiratory failure. At that time ee was noted to have a GI bleed and underwent endoscopy. He also required hemodialysis during his stay. Of note, he also underwent replacement of atrial lead. Dean Tisha reports he is feeling much better. He is attending cardiac rehab and tolerating activity well. He was actually able to play 18 holes of golf recently and did well. He did not walk course however he tolerated playing the game without difficulty. He reports his shortness of breath has resolved. At times he does feel tired. Review of Systems   Constitutional: Negative for diaphoresis and malaise/fatigue. Cardiovascular: Negative for chest pain, claudication, dyspnea on exertion, irregular heartbeat, leg swelling, near-syncope, orthopnea, palpitations and paroxysmal nocturnal dyspnea. Respiratory: Negative for shortness of breath. Neurological: Negative for dizziness and light-headedness.        Vitals:    07/28/21 0942   BP: 130/74   Site: Left Upper Arm   Position: Sitting   Cuff Size: Medium Adult   Pulse: 64   Weight: 202 lb 3.2 oz (91.7 kg)   Height: 5' 11\" (1.803 m)     Patient-Reported Vitals 4/8/2021   Patient-Reported Weight 185   Patient-Reported Height 5'11\"   Patient-Reported Systolic 793   Patient-Reported Diastolic 66   Patient-Reported Pulse 67   Patient-Reported Temperature 98.2     Wt Readings from Last 3 Encounters:   07/28/21 202 lb 3.2 oz (91.7 kg)   06/28/21 188 lb (85.3 kg)   06/28/21 188 lb (85.3 kg)     Body mass index is 28.2 kg/m². Physical Exam  Vitals reviewed. Constitutional:       Appearance: Normal appearance. HENT:      Head: Normocephalic and atraumatic. Eyes:      Extraocular Movements: Extraocular movements intact. Pupils: Pupils are equal, round, and reactive to light. Cardiovascular:      Rate and Rhythm: Normal rate and regular rhythm. Pulses: Normal pulses. Heart sounds: Murmur heard. Comments: Left sided pacer pocket intact   Pulmonary:      Effort: Pulmonary effort is normal.      Breath sounds: Normal breath sounds. Abdominal:      General: There is no distension. Palpations: Abdomen is soft. Tenderness: There is no abdominal tenderness. Musculoskeletal:         General: Normal range of motion. Cervical back: Normal range of motion and neck supple. Right lower leg: No edema. Left lower leg: No edema. Skin:     General: Skin is warm. Capillary Refill: Capillary refill takes less than 2 seconds. Findings: Bruising present. Comments: Lower legs with pigmentation changes    Neurological:      General: No focal deficit present. Mental Status: He is alert and oriented to person, place, and time.    Psychiatric:         Mood and Affect: Mood normal.         Behavior: Behavior normal.                Current Outpatient Medications   Medication Sig Dispense Refill    apixaban (ELIQUIS) 5 MG TABS tablet Take 1 tablet by mouth 2 times daily goal  Continue simvastatin    6. Cardiac pacemaker in situ  Analysis reviewed from 06/29/2021:  Pacer analysis is reviewed is consistent with normal dual-chamber MRI safe Medtronic Advisa pacer function with stable leads and appropriate battery status for the age of the device. Remaining average battery life is 1.5 years. Device is programmed to DDD mode lower rate of 60 bpm 97% pacing in the ventricle.  Patient is atrial fibrillation 60% of time since last interrogation on March 24, 2021 and on Eliquis for anticoagulation therapy  Stable remote monitoring noted. Continue with Q3 month monitoring      H/o carotid disease  S/p Left CEA    H/o DM     Medications reviewed and confirmed with patient     Tests ordered:  none      Follow-up with Dr Homero Maciel in 3 months. Signed:  JUSTIN Carlson CNP, 7/28/2021, 10:04 AM    An electronic signature was used to authenticate this note.

## 2021-07-29 ENCOUNTER — HOSPITAL ENCOUNTER (OUTPATIENT)
Dept: CARDIAC REHAB | Age: 71
Setting detail: THERAPIES SERIES
Discharge: HOME | End: 2021-07-29
Payer: MEDICARE

## 2021-07-29 PROCEDURE — 93798 PHYS/QHP OP CAR RHAB W/ECG: CPT

## 2021-08-02 ENCOUNTER — TELEPHONE (OUTPATIENT)
Dept: FAMILY MEDICINE CLINIC | Age: 71
End: 2021-08-02

## 2021-08-02 ENCOUNTER — HOSPITAL ENCOUNTER (OUTPATIENT)
Dept: CARDIAC REHAB | Age: 71
Setting detail: THERAPIES SERIES
Discharge: HOME | End: 2021-08-02
Payer: MEDICARE

## 2021-08-02 DIAGNOSIS — E11.9 TYPE 2 DIABETES MELLITUS WITHOUT COMPLICATION, WITHOUT LONG-TERM CURRENT USE OF INSULIN (HCC): Primary | ICD-10-CM

## 2021-08-02 PROCEDURE — 93798 PHYS/QHP OP CAR RHAB W/ECG: CPT

## 2021-08-02 NOTE — TELEPHONE ENCOUNTER
PATIENT CAME TO THE WINDOW TO ORDER GLIMEPIRIDE 4 MG TO [facility] FOR 90 DAY SUPPLY. ALSO TO DROP OFF PAPERWORK FOR DIABETIC SHOES. PLEASE CALL WHEN READY.     LAST VISIT 06/28/21   NEXT VISIT 10/25/21

## 2021-08-03 ENCOUNTER — HOSPITAL ENCOUNTER (OUTPATIENT)
Dept: CARDIAC REHAB | Age: 71
Setting detail: THERAPIES SERIES
Discharge: HOME | End: 2021-08-03
Payer: MEDICARE

## 2021-08-03 PROCEDURE — 93798 PHYS/QHP OP CAR RHAB W/ECG: CPT

## 2021-08-04 RX ORDER — CANAGLIFLOZIN 300 MG/1
TABLET, FILM COATED ORAL
Qty: 90 TABLET | Refills: 1 | OUTPATIENT
Start: 2021-08-04

## 2021-08-05 ENCOUNTER — HOSPITAL ENCOUNTER (OUTPATIENT)
Dept: CARDIAC REHAB | Age: 71
Setting detail: THERAPIES SERIES
Discharge: HOME | End: 2021-08-05
Payer: MEDICARE

## 2021-08-05 PROCEDURE — 93798 PHYS/QHP OP CAR RHAB W/ECG: CPT

## 2021-08-06 RX ORDER — DULAGLUTIDE 1.5 MG/.5ML
INJECTION, SOLUTION SUBCUTANEOUS
Qty: 12 PEN | Refills: 1 | Status: SHIPPED | OUTPATIENT
Start: 2021-08-06 | End: 2021-10-21 | Stop reason: DRUGHIGH

## 2021-08-08 RX ORDER — GLIMEPIRIDE 4 MG/1
4 TABLET ORAL
Qty: 90 TABLET | Refills: 1 | Status: SHIPPED | OUTPATIENT
Start: 2021-08-08 | End: 2021-11-05 | Stop reason: SDUPTHER

## 2021-08-09 ENCOUNTER — HOSPITAL ENCOUNTER (OUTPATIENT)
Dept: CARDIAC REHAB | Age: 71
Setting detail: THERAPIES SERIES
Discharge: HOME | End: 2021-08-09
Payer: MEDICARE

## 2021-08-09 PROCEDURE — 93798 PHYS/QHP OP CAR RHAB W/ECG: CPT

## 2021-08-16 LAB
CREATININE, URINE: NORMAL
MICROALBUMIN/CREAT 24H UR: 214 MG/G{CREAT}
MICROALBUMIN/CREAT UR-RTO: 700.7

## 2021-08-17 ENCOUNTER — TELEPHONE (OUTPATIENT)
Dept: FAMILY MEDICINE CLINIC | Age: 71
End: 2021-08-17

## 2021-08-17 DIAGNOSIS — E11.9 TYPE 2 DIABETES MELLITUS WITHOUT COMPLICATION, WITHOUT LONG-TERM CURRENT USE OF INSULIN (HCC): Primary | ICD-10-CM

## 2021-08-30 ENCOUNTER — TELEPHONE (OUTPATIENT)
Dept: FAMILY MEDICINE CLINIC | Age: 71
End: 2021-08-30

## 2021-08-30 NOTE — TELEPHONE ENCOUNTER
----- Message from Rocky Adairrles sent at 8/30/2021 10:27 AM EDT -----  Subject: Appointment Request    Reason for Call: Urgent (Patient Request) Existing Condition Follow    QUESTIONS  Type of Appointment? Established Patient  Reason for appointment request? Requested Provider unavailable - Dr. Kalia Salmon  Additional Information for Provider? Pt requesting to be seen as soon as   possible for an open sore on his foot that Dr. Kalia Salmon has seen him prior   regarding. Appointments offered sooner with care team this week but pt   requested a message be sent to see if he can be seen asap since he has   diabetes and thinks he may have to go to wound care again for this.  ---------------------------------------------------------------------------  --------------  [facility]  What is the best way for the office to contact you? OK to leave message on   voicemail  Preferred Call Back Phone Number? [phone]  ---------------------------------------------------------------------------  --------------  SCRIPT ANSWERS  Relationship to Patient? Self  (Is the patient requesting to be seen urgently for their symptoms?)? Yes  Is this follow up request related to routine Diabetes Management? No  Are you having any new concerns about your existing condition? No  Have you been diagnosed with, awaiting test results for, or told that you   are suspected of having COVID-19 (Coronavirus)? (If patient has tested   negative or was tested as a requirement for work, school, or travel and   not based on symptoms, answer no)? No  Do you currently have flu-like symptoms including fever or chills, cough,   shortness of breath, difficulty breathing, or new loss of taste or smell? No  Have you had close contact with someone with COVID-19 in the last 14 days? No  (Service Expert  click yes below to proceed with [employer] As Usual   Scheduling)?  Yes

## 2021-08-30 NOTE — PROGRESS NOTES
Called wife, Sarah Sawyer. Updates given to her, questions answered.  She verbalized understanding Eye Clamp Note Details: An eye clamp was used during the procedure.

## 2021-08-31 ENCOUNTER — OFFICE VISIT (OUTPATIENT)
Dept: FAMILY MEDICINE CLINIC | Age: 71
End: 2021-08-31
Payer: MEDICARE

## 2021-08-31 VITALS
HEIGHT: 71 IN | OXYGEN SATURATION: 93 % | BODY MASS INDEX: 27.9 KG/M2 | DIASTOLIC BLOOD PRESSURE: 84 MMHG | SYSTOLIC BLOOD PRESSURE: 124 MMHG | WEIGHT: 199.3 LBS | HEART RATE: 60 BPM

## 2021-08-31 DIAGNOSIS — L08.9 TOE INFECTION: Primary | ICD-10-CM

## 2021-08-31 DIAGNOSIS — E11.49 OTHER DIABETIC NEUROLOGICAL COMPLICATION ASSOCIATED WITH TYPE 2 DIABETES MELLITUS (HCC): ICD-10-CM

## 2021-08-31 PROCEDURE — 99214 OFFICE O/P EST MOD 30 MIN: CPT | Performed by: PHYSICIAN ASSISTANT

## 2021-08-31 RX ORDER — CEPHALEXIN 250 MG/1
CAPSULE ORAL
COMMUNITY
Start: 2021-07-29 | End: 2021-08-31 | Stop reason: ALTCHOICE

## 2021-08-31 RX ORDER — ROPINIROLE 0.5 MG/1
TABLET, FILM COATED ORAL
Qty: 270 TABLET | Refills: 1 | Status: SHIPPED | OUTPATIENT
Start: 2021-08-31 | End: 2021-11-05 | Stop reason: SDUPTHER

## 2021-08-31 RX ORDER — CEPHALEXIN 500 MG/1
500 CAPSULE ORAL 4 TIMES DAILY
Qty: 28 CAPSULE | Refills: 0 | Status: SHIPPED | OUTPATIENT
Start: 2021-08-31 | End: 2021-09-16 | Stop reason: ALTCHOICE

## 2021-08-31 RX ORDER — SULFAMETHOXAZOLE AND TRIMETHOPRIM 800; 160 MG/1; MG/1
1 TABLET ORAL 2 TIMES DAILY
Qty: 14 TABLET | Refills: 0 | Status: SHIPPED | OUTPATIENT
Start: 2021-08-31 | End: 2021-09-07

## 2021-08-31 NOTE — PROGRESS NOTES
8/31/2021    Ritchie Larson    Chief Complaint   Patient presents with    Foot Injury     sore toe, on going for about 2 months, was playing golf and noticed a blister on it that busted, wife has put iodine on it, would like a referral to wound clinic since they seem to help.  Discuss Medications     would like to discuss upping his ropinirole, was helping but seems to not be helping any more. HPI  History was obtained from pt. Aureliano Britton is a 66 y.o. male with a PMHx as listed below who presents today for toe sore on the left middle toe. Started as a blister two months ago which busted as he was being treated by the [facility] and did an antibiotic but it still seems to be bothering him. In the past he has been referred to the wound clinic for foot infections. There is minimal pain and pt denies systemic symptoms. Doesn't feel any effect from the requip - pt is still on the initial dose of 0.25 mg TID. Wants to up dose. 1. Toe infection    2.  Other diabetic neurological complication associated with type 2 diabetes mellitus ([address]           REVIEW OF SYMPTOMS    Review of Systems    PAST MEDICAL HISTORY  Past Medical History:   Diagnosis Date    Arrhythmia     Pacemaker placed aprox 5 years ago for A Fib per patient    Arthritis 12/2013    rt wrist    Atrial fibrillation ([address]     on Xabeto - Dr. Christopher Leigh CAD (coronary artery disease) 06/18/2014    see dr Tyesha Fonseca kidney disease, stage III (moderate) ([address] 07/07/2016    Critical illness myopathy 03/15/2021    Diabetes mellitus ([address]     dx 2004    Diabetic neuropathy associated with type 2 diabetes mellitus ([address] 04/23/2019    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    Gout 04/2019    \"got gout when had pacer put in because they did not give me my medication for gout \"    H/O 24 hour EKG monitoring 10/03/2013    no afib noted, sinsus rhythm    H/O cardiovascular stress test 05/12/2014    cardiolite- mild ischemia RCA EF50%  H/O echocardiogram 12/01/2020    EF 55-60% severe aortic stenosis mild to mod aortic regurg mod to severe tricuspid regurg severe pulm htn significant changes since 2018 echo.  H/O right and left heart catheterization 12/10/2020    DIFFUSE LAD DISEASE, Mild ECA Disease, Severe AS, Milf Pul HTN on RHC.  H/O transesophageal echocardiography (MABLE) for monitoring 08/05/2013    normal LV function and normal LA appendage without any clot    History of blood transfusion 12/2020    d/t anemia    History of transesophageal echocardiography (MABLE) 12/15/2020    Severe aortic stenosis (ANNA by planimetry: 0.778 cm sq). Mild AR.    [place] (hard of hearing)     hearing tonya aides    Hx of Doppler echocardiogram 05/21/2018    EF 50%  Mild LV hypertrophy. Mildly enlarged RA. Mod aortic valve calcification with mod AS. Mitral annular calcification is present. Mild AR, MR and TR. Mild pulmonary htn.     Hyperlipidemia     Hypertension     Follows with PCP & Dr. Demetrice Coekr Other disorders of kidney and ureter     Pacemaker     Medtronic, implanted 2014    Pneumonia 12/29/2012    Pneumonia due to COVID-19 virus 08/2020    Sleep apnea     dx 2013- has c-pap    Type II or unspecified type diabetes mellitus with other specified manifestations, uncontrolled 12/12/2012    Venous hypertension, chronic, with ulcer ([address] 12/12/2012    resolved       FAMILY HISTORY  Family History   Problem Relation Age of Onset    High Blood Pressure Mother     Arthritis Mother     Diabetes Mother     Heart Disease Mother     High Blood Pressure Father     Heart Disease Father     Kidney Disease Father        SOCIAL HISTORY  Social History     Socioeconomic History    Marital status:      Spouse name: Not on file    Number of children: Not on file    Years of education: Not on file    Highest education level: Not on file   Occupational History    Not on file   Tobacco Use    Smoking status: Never Smoker    Smokeless tobacco: Never Used   Vaping Use    Vaping Use: Never used   Substance and Sexual Activity    Alcohol use: Yes     Alcohol/week: 2.0 standard drinks     Types: 2 Cans of beer per week     Comment: average \"one time per week\"/ Caffiene: 1 cup of coffee daily    Drug use: No    Sexual activity: Yes     Partners: Female     Comment:    Other Topics Concern    Not on file   Social History Narrative    Not on file     Social Determinants of Health     Financial Resource Strain: Low Risk     Difficulty of Paying Living Expenses: Not hard at all   Food Insecurity: No Food Insecurity    Worried About Running Out of Food in the Last Year: Never true    Kiko of Food in the Last Year: Never true   Transportation Needs:     Lack of Transportation (Medical):      Lack of Transportation (Non-Medical):    Physical Activity:     Days of Exercise per Week:     Minutes of Exercise per Session:    Stress:     Feeling of Stress :    Social Connections:     Frequency of Communication with Friends and Family:     Frequency of Social Gatherings with Friends and Family:     Attends Confucianism Services:     Active Member of Clubs or Organizations:     Attends Club or Organization Meetings:     Marital Status:    Intimate Partner Violence:     Fear of Current or Ex-Partner:     Emotionally Abused:     Physically Abused:     Sexually Abused:         SURGICAL HISTORY  Past Surgical History:   Procedure Laterality Date    CABG WITH AORTIC VALVE REPLACEMENT N/A 2/16/2021    CABG CORONARY ARTERY BYPASS X2 WITH LIMA, AORTIC VALVE REPLACEMENT AND AORTIC ROOT REPAIR, INTRAOPERATIVE MABLE, INDUCED HYPOTHERMIA, LEFT LEG ENDOVEIN HARVEST, LEFT ATRIAL CLIP, AND CRYO PROCEDURE performed by Orlando Aguilar MD at [address] [place] [address]  12/14    at [address] Left 1/29/2021    LEFT CAROTID ENDARTERECTOMY performed by Olga Vega MD at [id number] [place] [address]    COLONOSCOPY  2011    COLONOSCOPY N/A 11/19/2019    COLONOSCOPY DIAGNOSTIC performed by Jo Ann Hope MD at [address]  09/30/2020    POSSIBLE CECAL avms, SIGMOID DIVERTICULOSIS, INTERNAL HEMORRHOIDS GRADE 1    COLONOSCOPY N/A 9/30/2020    COLONOSCOPY CONTROL HEMORRHAGE WITH APC performed by Jo Ann Hope MD at [address] [facility]  2014    \"2 stents put in \"    IR NONTUNNELED VASCULAR CATHETER  3/5/2021    IR NONTUNNELED VASCULAR CATHETER 3/5/2021 [facility] SPECIAL PROCEDURES    JOINT REPLACEMENT  2004    total left hip    OTHER SURGICAL HISTORY Right 12/02/2017    I&D; evacuation of hematoma right hip    OTHER SURGICAL HISTORY  09/17/2020    enteroscopy    PACEMAKER INSERTION N/A 2/23/2021    PACEMAKER GENERATOR LEAD REVISION performed by Camacho Diaz MD at [facility]      9/18/14 Status post remote permanent pacemaker with atrial lead dislodgement.  7/24/14 PPM Implant    UPPER GASTROINTESTINAL ENDOSCOPY N/A 9/17/2020    ENTEROSCOPY PUSH BIOPSY performed by Jo Ann Hope MD at [address] 3/4/2021    EGD DIAGNOSTIC ONLY performed by Jo Ann Hope MD at [address]  2012    \"have stents in both legs- done in [facility]  Current Outpatient Medications   Medication Sig Dispense Refill    cephALEXin (KEFLEX) 500 MG capsule Take 1 capsule by mouth 4 times daily 28 capsule 0    sulfamethoxazole-trimethoprim (BACTRIM DS;SEPTRA DS) 800-160 MG per tablet Take 1 tablet by mouth 2 times daily for 7 days 14 tablet 0    rOPINIRole (REQUIP) 0.5 MG tablet TAKE 1 TABLET 3 TIMES A  tablet 1    canagliflozin (INVOKANA) 300 MG TABS tablet Take 1 tablet by mouth every morning (before breakfast) 90 tablet 1    glimepiride (AMARYL) 4 MG tablet Take 1 tablet by mouth every morning (before breakfast) 90 tablet 1    TRULICITY 1.5 PI/2.4DU SOPN INJECT 0.5ML (=1.5MG) SUBCUTANEOUSLY ONCE WEEKLY 12 pen 1    apixaban (ELIQUIS) 5 MG TABS tablet Take 1 tablet by mouth 2 times daily 180 tablet 3    simvastatin (ZOCOR) 20 MG tablet Take 1 tablet by mouth daily 90 tablet 2    torsemide (DEMADEX) 20 MG tablet Take 1 tablet by mouth 2 times daily 180 tablet 3    apixaban (ELIQUIS) 5 MG TABS tablet Take 1 tablet by mouth 2 times daily 28 tablet 0    sildenafil (VIAGRA) 100 MG tablet TAKE 1 TABLET DAILY AS     NEEDED FOR ERECTILE        DYSFUNCTION 90 tablet 1    carboxymethylcellulose (REFRESH PLUS) 0.5 % SOLN ophthalmic solution INSTILL 1 DROP IN BOTH EYES THREE TIMES A DAY      Cholecalciferol (VITAMIN D) 50 MCG (2000 UT) CAPS capsule Take by mouth nightly       aspirin 81 MG tablet Take 81 mg by mouth daily       No current facility-administered medications for this visit. ALLERGIES  Allergies   Allergen Reactions    Spironolactone      CAUSES INCREASED K+    Tape Maryjo Blonder Tape] Rash     SURGICAL TAPE       PHYSICAL EXAM    /84 (Site: Left Upper Arm, Position: Sitting, Cuff Size: Medium Adult)   Pulse 60   Ht 5' 11\" (1.803 m)   Wt 199 lb 4.8 oz (90.4 kg)   SpO2 93%   BMI 27.80 kg/m²     Physical Exam    ASSESSMENT & PLAN    1. Toe infection  Minor infection, needs wound care, had toe xray from [place]  - cephALEXin (KEFLEX) 500 MG capsule; Take 1 capsule by mouth 4 times daily  Dispense: 28 capsule; Refill: 0  - sulfamethoxazole-trimethoprim (BACTRIM DS;SEPTRA DS) 800-160 MG per tablet; Take 1 tablet by mouth 2 times daily for 7 days  Dispense: 14 tablet; Refill: 0  - [address]    2. Other diabetic neurological complication associated with type 2 diabetes mellitus (HCC)  Increase dose of requip - pt educated on proper titration technique, will increase as tolerated by 0.25 mg per dose per week until 1mg TID is reached. - rOPINIRole (REQUIP) 0.5 MG tablet; TAKE 1 TABLET 3 TIMES A DAY  Dispense: 270 tablet;  Refill: 1      Return if symptoms worsen or fail to improve. Electronically signed by SATISH Raya on 8/31/2021      Comment: Please note this report has been produced using speech recognition software and may contain errors related to that system including errors in grammar, punctuation, and spelling, as well as words and phrases that may be inappropriate. If there are any questions or concerns please feel free to contact the dictating provider for clarification.

## 2021-09-03 ENCOUNTER — HOSPITAL ENCOUNTER (OUTPATIENT)
Dept: WOUND CARE | Age: 71
Discharge: HOME | End: 2021-09-03
Payer: MEDICARE

## 2021-09-03 VITALS
SYSTOLIC BLOOD PRESSURE: 138 MMHG | TEMPERATURE: 98.3 F | HEART RATE: 60 BPM | RESPIRATION RATE: 16 BRPM | DIASTOLIC BLOOD PRESSURE: 57 MMHG

## 2021-09-03 DIAGNOSIS — L97.522 DIABETIC ULCER OF TOE OF LEFT FOOT ASSOCIATED WITH TYPE 2 DIABETES MELLITUS, WITH FAT LAYER EXPOSED (HCC): ICD-10-CM

## 2021-09-03 DIAGNOSIS — L97.912 NON-PRESSURE ULCER OF RIGHT LOWER EXTREMITY WITH FAT LAYER EXPOSED (HCC): ICD-10-CM

## 2021-09-03 DIAGNOSIS — E11.621 DIABETIC ULCER OF TOE OF LEFT FOOT ASSOCIATED WITH TYPE 2 DIABETES MELLITUS, WITH FAT LAYER EXPOSED (HCC): ICD-10-CM

## 2021-09-03 PROCEDURE — 11042 DBRDMT SUBQ TIS 1ST 20SQCM/<: CPT

## 2021-09-03 PROCEDURE — 99213 OFFICE O/P EST LOW 20 MIN: CPT

## 2021-09-03 PROCEDURE — 87075 CULTR BACTERIA EXCEPT BLOOD: CPT

## 2021-09-03 PROCEDURE — 87070 CULTURE OTHR SPECIMN AEROBIC: CPT

## 2021-09-03 PROCEDURE — 99203 OFFICE O/P NEW LOW 30 MIN: CPT | Performed by: NURSE PRACTITIONER

## 2021-09-03 PROCEDURE — 11042 DBRDMT SUBQ TIS 1ST 20SQCM/<: CPT | Performed by: NURSE PRACTITIONER

## 2021-09-03 RX ORDER — LIDOCAINE HYDROCHLORIDE 20 MG/ML
JELLY TOPICAL ONCE
Status: CANCELLED | OUTPATIENT
Start: 2021-09-03 | End: 2021-09-03

## 2021-09-03 RX ORDER — GENTAMICIN SULFATE 1 MG/G
OINTMENT TOPICAL
Qty: 30 G | Refills: 1 | Status: SHIPPED | OUTPATIENT
Start: 2021-09-03 | End: 2021-09-10

## 2021-09-03 RX ORDER — GENTAMICIN SULFATE 1 MG/G
OINTMENT TOPICAL ONCE
Status: CANCELLED | OUTPATIENT
Start: 2021-09-03 | End: 2021-09-03

## 2021-09-03 RX ORDER — LIDOCAINE 50 MG/G
OINTMENT TOPICAL ONCE
Status: CANCELLED | OUTPATIENT
Start: 2021-09-03 | End: 2021-09-03

## 2021-09-03 RX ORDER — CLOBETASOL PROPIONATE 0.5 MG/G
OINTMENT TOPICAL ONCE
Status: CANCELLED | OUTPATIENT
Start: 2021-09-03 | End: 2021-09-03

## 2021-09-03 RX ORDER — GINSENG 100 MG
CAPSULE ORAL ONCE
Status: CANCELLED | OUTPATIENT
Start: 2021-09-03 | End: 2021-09-03

## 2021-09-03 RX ORDER — BACITRACIN, NEOMYCIN, POLYMYXIN B 400; 3.5; 5 [USP'U]/G; MG/G; [USP'U]/G
OINTMENT TOPICAL ONCE
Status: CANCELLED | OUTPATIENT
Start: 2021-09-03 | End: 2021-09-03

## 2021-09-03 RX ORDER — BACITRACIN ZINC AND POLYMYXIN B SULFATE 500; 1000 [USP'U]/G; [USP'U]/G
OINTMENT TOPICAL ONCE
Status: CANCELLED | OUTPATIENT
Start: 2021-09-03 | End: 2021-09-03

## 2021-09-03 RX ORDER — LIDOCAINE HYDROCHLORIDE 40 MG/ML
SOLUTION TOPICAL ONCE
Status: CANCELLED | OUTPATIENT
Start: 2021-09-03 | End: 2021-09-03

## 2021-09-03 RX ORDER — LIDOCAINE 40 MG/G
CREAM TOPICAL ONCE
Status: CANCELLED | OUTPATIENT
Start: 2021-09-03 | End: 2021-09-03

## 2021-09-03 RX ORDER — BETAMETHASONE DIPROPIONATE 0.05 %
OINTMENT (GRAM) TOPICAL ONCE
Status: CANCELLED | OUTPATIENT
Start: 2021-09-03 | End: 2021-09-03

## 2021-09-03 NOTE — PROGRESS NOTES
[facility] Initial Visit      Lisandra Mcconnell  AGE: 66 y.o. GENDER: male  : 1950  EPISODE DATE:  9/3/2021   Referred by: PCP    Subjective:     CHIEF COMPLAINT nonhealing wound to left 2nd toe and abrasion to right lower extremity     HISTORY of PRESENT ILLNESS      Lisandra Mcconnell is a 66 y.o. male who presents to the [address] [place] [address] for an initial visit for evaluation and treatment of Chronic diabetic and traumatic  ulcer(s) of  R lower leg anterior, toes left, 2nd toe. The condition is of moderate severity. The toe ulcer has been present for 6 weeks, and the abrasion for 2 weeks. The underlying cause is thought to be diabetes and trauma. He first noticed his toe wound as a blister, that eventually popped and has not been healing. The patients care to date has included a trip to his PCP where he was given bactrim and keflex. He has also been using iodine at home. He got an XR of his toe earlier this week at the [place]. The patient has significant underlying medical conditions as below. Patient is a diabetic who takes weekly injections and checks his BG regularly.  He is on eliquis, and is not  smoker    Wound Pain Timing/Severity: none  Quality of pain: N/A  Severity of pain:  0 / 10   Modifying Factors: diabetes  Associated Signs/Symptoms: drainage        PAST MEDICAL HISTORY        Diagnosis Date    Arrhythmia     Pacemaker placed aprox 5 years ago for A Fib per patient    Arthritis 2013    rt wrist    Atrial fibrillation ([address]     on Xarelto - Dr. 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    Diabetes mellitus ([address]     dx     Diabetic neuropathy associated with type 2 diabetes mellitus ([address] 2019    Erythropoietin deficiency anemia 2020    Gout 2019    \"got gout when had pacer put in because they did not give me my medication for gout \"  H/O 24 hour EKG monitoring 10/03/2013    no afib noted, sinsus rhythm    H/O cardiovascular stress test 05/12/2014    cardiolite- mild ischemia RCA EF50%    H/O echocardiogram 12/01/2020    EF 55-60% severe aortic stenosis mild to mod aortic regurg mod to severe tricuspid regurg severe pulm htn significant changes since 2018 echo.  H/O right and left heart catheterization 12/10/2020    DIFFUSE LAD DISEASE, Mild ECA Disease, Severe AS, Milf Pul HTN on RHC.  H/O transesophageal echocardiography (MABLE) for monitoring 08/05/2013    normal LV function and normal LA appendage without any clot    History of blood transfusion 12/2020    d/t anemia    History of transesophageal echocardiography (MABLE) 12/15/2020    Severe aortic stenosis (ANNA by planimetry: 0.778 cm sq). Mild AR.    [place] (hard of hearing)     hearing tonya aides    Hx of Doppler echocardiogram 05/21/2018    EF 50%  Mild LV hypertrophy. Mildly enlarged RA. Mod aortic valve calcification with mod AS. Mitral annular calcification is present. Mild AR, MR and TR. Mild pulmonary htn.     Hyperlipidemia     Hypertension     Follows with PCP & Dr. Jeyson Velez Other disorders of kidney and ureter     Pacemaker     Medtronic, implanted 2014    Pneumonia 12/29/2012    Pneumonia due to COVID-19 virus 08/2020    Sleep apnea     dx 2013- has c-pap    Type II or unspecified type diabetes mellitus with other specified manifestations, uncontrolled 12/12/2012    Venous hypertension, chronic, with ulcer ([address] 12/12/2012    resolved       PAST SURGICAL HISTORY    Past Surgical History:   Procedure Laterality Date    CABG WITH AORTIC VALVE REPLACEMENT N/A 2/16/2021    CABG CORONARY ARTERY BYPASS X2 WITH LIMA, AORTIC VALVE REPLACEMENT AND AORTIC ROOT REPAIR, INTRAOPERATIVE MABLE, INDUCED HYPOTHERMIA, LEFT LEG ENDOVEIN HARVEST, LEFT ATRIAL CLIP, AND CRYO PROCEDURE performed by Moreno Jose MD at [facility]  12/14    at [address] [facility] ENDARTERECTOMY Left 1/29/2021    LEFT CAROTID ENDARTERECTOMY performed by Ankit Izquierdo MD at [id number]  2017    COLONOSCOPY  2011    COLONOSCOPY N/A 11/19/2019    COLONOSCOPY DIAGNOSTIC performed by Alessia Edwards MD at [facility]  09/30/2020    POSSIBLE CECAL avms, SIGMOID DIVERTICULOSIS, INTERNAL HEMORRHOIDS GRADE 1    COLONOSCOPY N/A 9/30/2020    COLONOSCOPY CONTROL HEMORRHAGE WITH APC performed by Alessia Edwards MD at [facility]  2014    \"2 stents put in \"    IR NONTUNNELED VASCULAR CATHETER  3/5/2021    IR NONTUNNELED VASCULAR CATHETER 3/5/2021 [facility] SPECIAL PROCEDURES    JOINT REPLACEMENT  2004    total left hip    OTHER SURGICAL HISTORY Right 12/02/2017    I&D; evacuation of hematoma right hip    OTHER SURGICAL HISTORY  09/17/2020    enteroscopy    PACEMAKER INSERTION N/A 2/23/2021    PACEMAKER GENERATOR LEAD REVISION performed by Melisa Roldan MD at [facility]      9/18/14 Status post remote permanent pacemaker with atrial lead dislodgement. 7/24/14 PPM Implant    UPPER GASTROINTESTINAL ENDOSCOPY N/A 9/17/2020    ENTEROSCOPY PUSH BIOPSY performed by Alessia Edwards MD at [address] [facility] N/A 3/4/2021    EGD DIAGNOSTIC ONLY performed by Alessia Edwards MD at [facility] [address]  2012    \"have stents in both legs- done in [facility]    Family History   Problem Relation Age of Onset    High Blood Pressure Mother     Arthritis Mother     Diabetes Mother     Heart Disease Mother     High Blood Pressure Father     Heart Disease Father     Kidney Disease Father        SOCIAL HISTORY    Social History     Tobacco Use    Smoking status: Never Smoker    Smokeless tobacco: Never Used   Vaping Use    Vaping Use: Never used   Substance Use Topics    Alcohol use:  Yes     Alcohol/week: 2.0 standard drinks     Types: 2 Cans of beer per week     Comment: average \"one time per week\"/ Caffiene: 1 cup of coffee daily    Drug use: No       ALLERGIES    Allergies   Allergen Reactions    Spironolactone      CAUSES INCREASED K+    Tape Loyce Priya Tape] Rash     SURGICAL TAPE       MEDICATIONS    Current Outpatient Medications on File Prior to Encounter   Medication Sig Dispense Refill    cephALEXin (KEFLEX) 500 MG capsule Take 1 capsule by mouth 4 times daily 28 capsule 0    sulfamethoxazole-trimethoprim (BACTRIM DS;SEPTRA DS) 800-160 MG per tablet Take 1 tablet by mouth 2 times daily for 7 days 14 tablet 0    rOPINIRole (REQUIP) 0.5 MG tablet TAKE 1 TABLET 3 TIMES A  tablet 1    canagliflozin (INVOKANA) 300 MG TABS tablet Take 1 tablet by mouth every morning (before breakfast) 90 tablet 1    glimepiride (AMARYL) 4 MG tablet Take 1 tablet by mouth every morning (before breakfast) 90 tablet 1    TRULICITY 1.5 KZ/6.8AQ SOPN INJECT 0.5ML (=1.5MG)      SUBCUTANEOUSLY ONCE WEEKLY 12 pen 1    simvastatin (ZOCOR) 20 MG tablet Take 1 tablet by mouth daily 90 tablet 2    torsemide (DEMADEX) 20 MG tablet Take 1 tablet by mouth 2 times daily 180 tablet 3    apixaban (ELIQUIS) 5 MG TABS tablet Take 1 tablet by mouth 2 times daily 28 tablet 0    carboxymethylcellulose (REFRESH PLUS) 0.5 % SOLN ophthalmic solution INSTILL 1 DROP IN BOTH EYES THREE TIMES A DAY      Cholecalciferol (VITAMIN D) 50 MCG (2000 UT) CAPS capsule Take by mouth nightly       aspirin 81 MG tablet Take 81 mg by mouth daily      sildenafil (VIAGRA) 100 MG tablet TAKE 1 TABLET DAILY AS     NEEDED FOR ERECTILE        DYSFUNCTION 90 tablet 1     No current facility-administered medications on file prior to encounter.        PROBLEM LIST    Patient Active Problem List   Diagnosis    Type 2 diabetes mellitus without complication, without long-term current use of insulin (HCC)    Ulcer of other part of lower limb    Venous hypertension, chronic, with ulcer [facility]    Ulcer of other part of foot    Hospital-acquired pneumonia    Atrial fibrillation (HCC)    Sinus pause    PAF (paroxysmal atrial fibrillation) (HCC)    DM (diabetes mellitus) [facility]    DMITRIY on CPAP    Hyperlipidemia    Status post incision and drainage    Hyperkalemia    Arthritis    PVD (peripheral vascular disease) (HCC)    Hematoma    Cardiac pacemaker in situ    Coronary artery stenosis    Essential hypertension    Gout    Diabetic neuropathy associated with type 2 diabetes mellitus (HCC)    Adenomatous polyp of sigmoid colon    Iron deficiency anemia due to chronic blood loss    Erythropoietin deficiency anemia    VHD (valvular heart disease)    Abnormal fractional flow reserve (FFR) on cardiac catheterization    Carotid stenosis, left    Aortic stenosis, severe    CAD in native artery    Displacement of atrial pacemaker leads    Pacemaker lead malfunction    SOB (shortness of breath)    Recurrent right pleural effusion    Elevated liver enzymes    Critical illness myopathy    Respiratory failure (HCC)    Generalized weakness    Status post coronary artery bypass graft    Pneumonia due to COVID-19 virus    Moderate malnutrition (HCC)    Stage 3a chronic kidney disease ([facility]    WD-Diabetic ulcer of left foot with fat layer exposed ([facility]    WD-Non-pressure ulcer of right lower extremity with fat layer exposed ([facility]       REVIEW OF SYSTEMS    Constitutional: negative for anorexia, chills, fatigue, fevers, malaise, sweats and weight loss  Respiratory: negative for pneumonia, shortness of breath, sputum, stridor and wheezing  Cardiovascular: negative for near-syncope, orthopnea, palpitations, paroxysmal nocturnal dyspnea, syncope and tachypnea  Integument/breast: positive for skin lesion(s)      Objective:      BP (!) 138/57   Pulse 60   Temp 98.3 °F (36.8 °C) (Temporal)   Resp 16     PHYSICAL EXAM  General Appearance: alert and oriented to person, place and time, well-developed and well-nourished, in no acute distress  Pulmonary/Chest: clear to auscultation bilaterally- no wheezes, rales or rhonchi, normal air movement, no respiratory distress  Cardiovascular: normal rate, normal S1 and S2, no gallops, intact distal pulses and no carotid bruits  Dermatologic exam: Visual inspection of the periwound reveals the skin to be normal in turgor and texture, dry and coarse  Wound exam: see wound description below in procedure note      Assessment:       Renetta Dumas  appears to have a non-healing wound of the left 2nd toe and right anterior lower leg. The etiology of the wound is felt to be diabetic and traumatic. There are multiple complicating factors including diabetes. A comprehensive wound management program would be helpful to heal this wound. Assessments completed include fall risk and nutritional, functional,and psychological status. At this time appropriate care would include: periodic debridement and wound care as below. Problem List Items Addressed This Visit     WD-Diabetic ulcer of left foot with fat layer exposed ([address]    Relevant Orders    Culture, Wound    WD-Non-pressure ulcer of right lower extremity with fat layer exposed ([address]    Relevant Orders    Culture, Wound            Procedure Note    Indications:  Based on my examination of this patient's wound(s) today, sharp excision into necrotic subcutaneous tissue is required to promote healing and evaluate the extent of previous healing. Performed by: JUSTIN Morton CNP    Consent obtained: Yes    Time out taken:  Yes    Pain Control: N/a       Debridement:Excisional Debridement    Using curette the wound(s) was/were sharply debrided down through and including the removal of subcutaneous tissue.         Devitalized Tissue Debrided:  fibrin, biofilm, slough, exudate and callus    Pre Debridement Measurements:  Are located in the Wound Documentation Flow Sheet    All active wounds listed below with today's date are Continuing wound care orders and information:                Residence:  Home              Continue home health care with:    Your wound-care supplies will be provided by: [facility]    Wound cleansing:     Do not scrub or use excessive force. Wash hands with soap and water before and after dressing changes. Prior to applying a clean dressing, cleanse wound with normal saline, wound cleanser, or mild soap and water. Ask the physician or nurse before getting the wound(s) wet in a shower    Daily Wound management:   Keep weight off wounds and reposition every 2 hours. Avoid standing for long periods of time. Apply wraps/stockings in AM and remove at bedtime. If swelling is present, elevate legs to the level of the heart or above for 30 minutes 4-5 times a day and/or when sitting. When taking antibiotics take entire prescription as ordered by physician do not stop taking until medicine is all gone. Orders for this week: 9/3/21       Left 3rd Toe cultured 9/3/21    Rx: [facility] on [address] - pickup Gentamicin 9/3/21    Left 3rd Toe - Wash with soap and water, pat dry. Apply Gentamicin to wound bed. Cover with rajat and ABD pad. Wrap with conform and secure with tape (may use border gauze for Right shin). Change daily. Follow up with Benji Renee CNP in 1 week in the wound care center  Call [phone] for any questions or concerns. Date__________   Time____________          Treatment Note Wound 09/03/21 Toe (Comment  which one) Anterior; Left #1-Dressing/Treatment: ABD, Collagen (gentamycin)  Wound 09/03/21 Pretibial Right #2 anterior leg-Dressing/Treatment:  (bordered gauze)    Written Patient Dismissal Instructions Given          Electronically signed by JUSTIN Elkins CNP on 9/3/2021 at 9:05 AM

## 2021-09-08 LAB
CULTURE: NORMAL
Lab: NORMAL
SPECIMEN: NORMAL

## 2021-09-10 ENCOUNTER — HOSPITAL ENCOUNTER (OUTPATIENT)
Dept: WOUND CARE | Age: 71
Discharge: HOME | End: 2021-09-10
Payer: MEDICARE

## 2021-09-10 DIAGNOSIS — L97.522 DIABETIC ULCER OF TOE OF LEFT FOOT ASSOCIATED WITH TYPE 2 DIABETES MELLITUS, WITH FAT LAYER EXPOSED (HCC): Primary | ICD-10-CM

## 2021-09-10 DIAGNOSIS — L97.912 NON-PRESSURE ULCER OF RIGHT LOWER EXTREMITY WITH FAT LAYER EXPOSED (HCC): ICD-10-CM

## 2021-09-10 DIAGNOSIS — E11.621 DIABETIC ULCER OF TOE OF LEFT FOOT ASSOCIATED WITH TYPE 2 DIABETES MELLITUS, WITH FAT LAYER EXPOSED (HCC): Primary | ICD-10-CM

## 2021-09-10 PROCEDURE — 11042 DBRDMT SUBQ TIS 1ST 20SQCM/<: CPT

## 2021-09-10 PROCEDURE — 11042 DBRDMT SUBQ TIS 1ST 20SQCM/<: CPT | Performed by: NURSE PRACTITIONER

## 2021-09-10 RX ORDER — LIDOCAINE HYDROCHLORIDE 40 MG/ML
SOLUTION TOPICAL ONCE
Status: CANCELLED | OUTPATIENT
Start: 2021-09-10 | End: 2021-09-10

## 2021-09-10 RX ORDER — CLOBETASOL PROPIONATE 0.5 MG/G
OINTMENT TOPICAL ONCE
Status: CANCELLED | OUTPATIENT
Start: 2021-09-10 | End: 2021-09-10

## 2021-09-10 RX ORDER — BACITRACIN ZINC AND POLYMYXIN B SULFATE 500; 1000 [USP'U]/G; [USP'U]/G
OINTMENT TOPICAL ONCE
Status: CANCELLED | OUTPATIENT
Start: 2021-09-10 | End: 2021-09-10

## 2021-09-10 RX ORDER — LIDOCAINE HYDROCHLORIDE 20 MG/ML
JELLY TOPICAL ONCE
Status: CANCELLED | OUTPATIENT
Start: 2021-09-10 | End: 2021-09-10

## 2021-09-10 RX ORDER — GINSENG 100 MG
CAPSULE ORAL ONCE
Status: CANCELLED | OUTPATIENT
Start: 2021-09-10 | End: 2021-09-10

## 2021-09-10 RX ORDER — LIDOCAINE 50 MG/G
OINTMENT TOPICAL ONCE
Status: CANCELLED | OUTPATIENT
Start: 2021-09-10 | End: 2021-09-10

## 2021-09-10 RX ORDER — LIDOCAINE 40 MG/G
CREAM TOPICAL ONCE
Status: CANCELLED | OUTPATIENT
Start: 2021-09-10 | End: 2021-09-10

## 2021-09-10 RX ORDER — BACITRACIN, NEOMYCIN, POLYMYXIN B 400; 3.5; 5 [USP'U]/G; MG/G; [USP'U]/G
OINTMENT TOPICAL ONCE
Status: CANCELLED | OUTPATIENT
Start: 2021-09-10 | End: 2021-09-10

## 2021-09-10 RX ORDER — GENTAMICIN SULFATE 1 MG/G
OINTMENT TOPICAL ONCE
Status: DISCONTINUED | OUTPATIENT
Start: 2021-09-10 | End: 2021-09-11 | Stop reason: HOSPADM

## 2021-09-10 RX ORDER — LIDOCAINE 50 MG/G
OINTMENT TOPICAL ONCE
Status: DISCONTINUED | OUTPATIENT
Start: 2021-09-10 | End: 2021-09-11 | Stop reason: HOSPADM

## 2021-09-10 RX ORDER — GENTAMICIN SULFATE 1 MG/G
OINTMENT TOPICAL ONCE
Status: CANCELLED | OUTPATIENT
Start: 2021-09-10 | End: 2021-09-10

## 2021-09-10 RX ORDER — BETAMETHASONE DIPROPIONATE 0.05 %
OINTMENT (GRAM) TOPICAL ONCE
Status: CANCELLED | OUTPATIENT
Start: 2021-09-10 | End: 2021-09-10

## 2021-09-10 NOTE — PROGRESS NOTES
Wound Care Center Progress Note With Procedure    Melisa Gonzalez  AGE: 66 y.o. GENDER: male  : 1950  EPISODE DATE:  9/10/2021     Subjective:     Chief Complaint   Patient presents with    Wound Check     left foot          HISTORY of PRESENT ILLNESS      Melisa Gonzalez is a 66 y.o. male who presents to the [address] [place] [address] for an initial visit for evaluation and treatment of Chronic diabetic and traumatic  ulcer(s) of  R lower leg anterior, toes left, 2nd toe. The condition is of moderate severity. The toe ulcer has been present for 6 weeks, and the abrasion for 2 weeks. The underlying cause is thought to be diabetes and trauma. He first noticed his toe wound as a blister, that eventually popped and has not been healing. The patients care to date has included a trip to his PCP where he was given bactrim and keflex. He has also been using iodine at home. He got an XR of his toe earlier this week at the [facility]. The patient has significant underlying medical conditions as below. Patient is a diabetic who takes weekly injections and checks his BG regularly.  He is on eliquis, and is not  smoker.     Wound Pain Timing/Severity: none  Quality of pain: N/A  Severity of pain:  0 / 10   Modifying Factors: diabetes  Associated Signs/Symptoms: drainage        PAST MEDICAL HISTORY        Diagnosis Date    Arrhythmia     Pacemaker placed aprox 5 years ago for A Fib per patient    Arthritis 2013    rt wrist    Atrial fibrillation ([address]     on Xarelto - Dr. Codie Sanchez CAD (coronary artery disease) 2014    see dr Britney Pierson kidney disease, stage III (moderate) ([address] 2016    Critical illness myopathy 03/15/2021    Diabetes mellitus ([address]     dx     Diabetic neuropathy associated with type 2 diabetes mellitus ([address] 2019    Erythropoietin deficiency anemia 2020    Gout 2019    \"got gout when had pacer put in because they did not give me my medication for gout \"    H/O 24 hour EKG monitoring 10/03/2013    no afib noted, sinsus rhythm    H/O cardiovascular stress test 05/12/2014    cardiolite- mild ischemia RCA EF50%    H/O echocardiogram 12/01/2020    EF 55-60% severe aortic stenosis mild to mod aortic regurg mod to severe tricuspid regurg severe pulm htn significant changes since 2018 echo.  H/O right and left heart catheterization 12/10/2020    DIFFUSE LAD DISEASE, Mild ECA Disease, Severe AS, Milf Pul HTN on RHC.  H/O transesophageal echocardiography (MABLE) for monitoring 08/05/2013    normal LV function and normal LA appendage without any clot    History of blood transfusion 12/2020    d/t anemia    History of transesophageal echocardiography (MABLE) 12/15/2020    Severe aortic stenosis (ANNA by planimetry: 0.778 cm sq). Mild AR.    Saxman (hard of hearing)     hearing tonya aides    Hx of Doppler echocardiogram 05/21/2018    EF 50%  Mild LV hypertrophy. Mildly enlarged RA. Mod aortic valve calcification with mod AS. Mitral annular calcification is present. Mild AR, MR and TR. Mild pulmonary htn.     Hyperlipidemia     Hypertension     Follows with PCP & Dr. Blaine Marsh Other disorders of kidney and ureter     Pacemaker     [employer]tr[employer], implanted 2014    Pneumonia 12/29/2012    Pneumonia due to COVID-19 virus 08/2020    Sleep apnea     dx 2013- has c-pap    Type II or unspecified type diabetes mellitus with other specified manifestations, uncontrolled 12/12/2012    Venous hypertension, chronic, with ulcer ([facility] 12/12/2012    resolved       PAST SURGICAL HISTORY    Past Surgical History:   Procedure Laterality Date    CABG WITH AORTIC VALVE REPLACEMENT N/A 2/16/2021    CABG CORONARY ARTERY BYPASS X2 WITH LIMA, AORTIC VALVE REPLACEMENT AND AORTIC ROOT REPAIR, INTRAOPERATIVE MABLE, INDUCED HYPOTHERMIA, LEFT LEG ENDOVEIN HARVEST, LEFT ATRIAL CLIP, AND CRYO PROCEDURE performed by Skyla Estes MD at [address] [place] [address]  12/14    at [address] Left 1/29/2021    LEFT CAROTID ENDARTERECTOMY performed by Arden Torre MD at [address]  2017    COLONOSCOPY  2011    COLONOSCOPY N/A 11/19/2019    COLONOSCOPY DIAGNOSTIC performed by Jose Leon MD at [place] [address]  09/30/2020    POSSIBLE CECAL avms, SIGMOID DIVERTICULOSIS, INTERNAL HEMORRHOIDS GRADE 1    COLONOSCOPY N/A 9/30/2020    COLONOSCOPY CONTROL HEMORRHAGE WITH APC performed by Jose Leon MD at [facility]  2014    \"2 stents put in \"    IR NONTUNNELED VASCULAR CATHETER  3/5/2021    IR NONTUNNELED VASCULAR CATHETER 3/5/2021 [facility] SPECIAL PROCEDURES    JOINT REPLACEMENT  2004    total left hip    OTHER SURGICAL HISTORY Right 12/02/2017    I&D; evacuation of hematoma right hip    OTHER SURGICAL HISTORY  09/17/2020    enteroscopy    PACEMAKER INSERTION N/A 2/23/2021    PACEMAKER GENERATOR LEAD REVISION performed by Enmanuel Preciado [facility] at [facility]      9/18/14 Status post remote permanent pacemaker with atrial lead dislodgement. 7/24/14 PPM Implant    UPPER GASTROINTESTINAL ENDOSCOPY N/A 9/17/2020    ENTEROSCOPY PUSH BIOPSY performed by Jose Leon MD at [facility] N/A 3/4/2021    EGD DIAGNOSTIC ONLY performed by Jose Leon MD at [address] [place] [address]  2012    \"have stents in both legs- done in [address] [place] [address]    Family History   Problem Relation Age of Onset    High Blood Pressure Mother     Arthritis Mother     Diabetes Mother     Heart Disease Mother     High Blood Pressure Father     Heart Disease Father     Kidney Disease Father        SOCIAL HISTORY    Social History     Tobacco Use    Smoking status: Never Smoker    Smokeless tobacco: Never Used   Vaping Use    Vaping Use: Never used   Substance Use Topics    Alcohol use:  Yes     Alcohol/week: 2.0 standard drinks     Types: 2 Cans of beer per week Comment: average \"one time per week\"/ Caffiene: 1 cup of coffee daily    Drug use: No       ALLERGIES    Allergies   Allergen Reactions    Spironolactone      CAUSES INCREASED K+    Tape Lenora Bergamo Tape] Rash     SURGICAL TAPE       MEDICATIONS    Current Outpatient Medications on File Prior to Encounter   Medication Sig Dispense Refill    gentamicin (GARAMYCIN) 0.1 % ointment Apply to wound beds daily and as needed 30 g 1    cephALEXin (KEFLEX) 500 MG capsule Take 1 capsule by mouth 4 times daily 28 capsule 0    rOPINIRole (REQUIP) 0.5 MG tablet TAKE 1 TABLET 3 TIMES A  tablet 1    canagliflozin (INVOKANA) 300 MG TABS tablet Take 1 tablet by mouth every morning (before breakfast) 90 tablet 1    glimepiride (AMARYL) 4 MG tablet Take 1 tablet by mouth every morning (before breakfast) 90 tablet 1    TRULICITY 1.5 MP/0.1IP SOPN INJECT 0.5ML (=1.5MG)      SUBCUTANEOUSLY ONCE WEEKLY 12 pen 1    simvastatin (ZOCOR) 20 MG tablet Take 1 tablet by mouth daily 90 tablet 2    torsemide (DEMADEX) 20 MG tablet Take 1 tablet by mouth 2 times daily 180 tablet 3    apixaban (ELIQUIS) 5 MG TABS tablet Take 1 tablet by mouth 2 times daily 28 tablet 0    sildenafil (VIAGRA) 100 MG tablet TAKE 1 TABLET DAILY AS     NEEDED FOR ERECTILE        DYSFUNCTION 90 tablet 1    carboxymethylcellulose (REFRESH PLUS) 0.5 % SOLN ophthalmic solution INSTILL 1 DROP IN BOTH EYES THREE TIMES A DAY      Cholecalciferol (VITAMIN D) 50 MCG (2000 UT) CAPS capsule Take by mouth nightly       aspirin 81 MG tablet Take 81 mg by mouth daily       No current facility-administered medications on file prior to encounter. REVIEW OF SYSTEMS    Pertinent items are noted in HPI. Constitutional: Negative for systemic symptoms including fever, chills and malaise. Objective: There were no vitals taken for this visit. PHYSICAL EXAM      General: The patient is in no acute distress.     Mental status:  Patient is appropriate, is oriented to place and plan of care. 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 ([address] - Primary    Relevant Medications    lidocaine (XYLOCAINE) 5 % ointment (Start on 9/10/2021  8:45 AM)    gentamicin (GARAMYCIN) 0.1 % ointment (Start on 9/10/2021  9:00 AM)    Other Relevant Orders    Initiate Outpatient Wound Care Protocol    WD-Non-pressure ulcer of right lower extremity with fat layer exposed ([address]    Relevant Medications    lidocaine (XYLOCAINE) 5 % ointment (Start on 9/10/2021  8:45 AM)    gentamicin (GARAMYCIN) 0.1 % ointment (Start on 9/10/2021  9:00 AM)    Other Relevant Orders    Initiate Outpatient Wound Care Protocol        Procedure Note    Indications:  Based on my examination of this patient's wound(s) today, sharp excision into necrotic subcutaneous tissue is required to promote healing and evaluate the extent of previous healing. Performed by: JUSTIN Prince CNP    Consent obtained: Yes    Time out taken:  Yes    Pain Control: Anesthetic  Anesthetic: 5% Lidocaine Ointment Topical     Debridement:Excisional Debridement    Using curette the wound(s) was/were sharply debrided down through and including the removal of subcutaneous tissue. Devitalized Tissue Debrided:  fibrin, biofilm, slough and callus    Pre Debridement Measurements:  Are located in the Wound Documentation Flow Sheet     All active wounds listed below with today's date are evaluated  Wound(s)    debrided this date include # : 1 and 2     Post  Debridement Measurements:  Wound 02/18/21 Toe (Comment  which one) Right 2nd toe anterior (Active)   Number of days: 203       Wound 02/18/21 Toe (Comment  which one) Anterior; Left 3rd toe (Active)   Number of days: 203       Wound 03/06/21 Arm Left; Lower (Active)   Number of days: 188       Wound 03/06/21 Sacrum Mid;Left cluster (Active)   Number of days: 188       Wound 03/15/21 Ear Left small laceration on pinna of left ear (Active)   Number of days: 178       Wound 03/17/21 Pretibial Distal;Left;Posterior (Active)   Number of days: 176       Wound 03/17/21 Buttocks Right;Upper (Active)   Number of days: 176       Wound 09/03/21 Toe (Comment  which one) Anterior; Left #1 (Active)   Wound Image   09/03/21 0817   Wound Etiology Diabetic 09/10/21 0817   Dressing Status New dressing applied 09/03/21 0901   Wound Cleansed Soap and water 09/10/21 0817   Dressing/Treatment ABD;Collagen 09/03/21 0901   Offloading for Diabetic Foot Ulcers Other (comment) 09/10/21 0817   Wound Length (cm) 0.5 cm 09/10/21 0817   Wound Width (cm) 0.3 cm 09/10/21 0817   Wound Depth (cm) 0.1 cm 09/10/21 0817   Wound Surface Area (cm^2) 0.15 cm^2 09/10/21 0817   Change in Wound Size % (l*w) 40 09/10/21 0817   Wound Volume (cm^3) 0.015 cm^3 09/10/21 0817   Wound Healing % 40 09/10/21 0817   Post-Procedure Length (cm) 0.5 cm 09/10/21 0830   Post-Procedure Width (cm) 0.3 cm 09/10/21 0830   Post-Procedure Depth (cm) 0.1 cm 09/10/21 0830   Post-Procedure Surface Area (cm^2) 0.15 cm^2 09/10/21 0830   Post-Procedure Volume (cm^3) 0.015 cm^3 09/10/21 0830   Distance Tunneling (cm) 0 cm 09/10/21 0817   Tunneling Position ___ O'Clock 0 09/10/21 0817   Undermining Starts ___ O'Clock 0 09/10/21 0817   Undermining Ends___ O'Clock 0 09/10/21 0817   Undermining Maxium Distance (cm) 0 09/10/21 0817   Wound Assessment Erythema;Pink/red 09/10/21 0817   Drainage Amount Moderate 09/10/21 0817   Drainage Description Serosanguinous 09/10/21 0817   Odor None 09/10/21 0817   Angela-wound Assessment Hyperpigmented 09/10/21 0817   Margins Defined edges 09/10/21 0817   Wound Thickness Description not for Pressure Injury Full thickness 09/10/21 0817   Number of days: 7       Wound 09/03/21 Pretibial Right #2 anterior leg (Active)   Wound Image   09/03/21 0817   Wound Etiology Diabetic 09/10/21 0817   Dressing Status New dressing applied 09/03/21 0901   Wound Cleansed Irrigated with saline 09/10/21 0817   Offloading for Diabetic Foot Ulcers No offloading required 09/10/21 0817   Wound Length (cm) 2 cm 09/10/21 0817   Wound Width (cm) 0.1 cm 09/10/21 0817   Wound Depth (cm) 0.1 cm 09/10/21 0817   Wound Surface Area (cm^2) 0.2 cm^2 09/10/21 0817   Change in Wound Size % (l*w) 96.17 09/10/21 0817   Wound Volume (cm^3) 0.02 cm^3 09/10/21 0817   Wound Healing % 96 09/10/21 0817   Post-Procedure Length (cm) 2 cm 09/10/21 0830   Post-Procedure Width (cm) 0.1 cm 09/10/21 0830   Post-Procedure Depth (cm) 0.1 cm 09/10/21 0830   Post-Procedure Surface Area (cm^2) 0.2 cm^2 09/10/21 0830   Post-Procedure Volume (cm^3) 0.02 cm^3 09/10/21 0830   Distance Tunneling (cm) 0 cm 09/10/21 0817   Tunneling Position ___ O'Clock 0 09/10/21 0817   Undermining Starts ___ O'Clock 0 09/10/21 0817   Undermining Ends___ O'Clock 0 09/10/21 0817   Undermining Maxium Distance (cm) 0 09/10/21 0817   Wound Assessment Dry 09/10/21 0817   Drainage Amount None 09/10/21 0817   Drainage Description Serosanguinous 09/03/21 0817   Odor None 09/10/21 0817   Angela-wound Assessment Fragile 09/10/21 0817   Margins Defined edges 09/10/21 0817   Wound Thickness Description not for Pressure Injury Full thickness 09/03/21 0817   Number of days: 7       Percent of Wound(s) Debrided: approximately 100%    Total  Area  Debrided:  0.35 sq cm     Bleeding:  Minimal    Hemostasis Achieved:  by pressure and by silver nitrate stick    Procedural Pain:  0  / 10     Post Procedural Pain:  0 / 10     Response to treatment:  Well tolerated by patient. Status of wound progress and description from last visit:   Slightly improved. Culture reviewed and was unremarkable. We will have patient fill out a release of info to get his XR.   No issue changing dressing at home. Continue plan of care for now and follow up 1 week. Plan:       Discharge Instructions       PHYSICIAN ORDERS AND DISCHARGE INSTRUCTIONS     NOTE: Upon discharge from the [address], you will receive a patient experience survey. We would be grateful if you would take the time to fill this survey out.     Wound care order history:                 YESSENIA's   Right  0.93     Left  0.55           Date:  9/3/21              Cultures:  Left 3rd Toe cultured 9/3/21              Grafts:                Antibiotics:            Continuing wound care orders and information:                 Residence:  Home              Continue home health care with:               Your wound-care supplies will be provided by: [facility]     Wound cleansing:               Do not scrub or use excessive force. Wash hands with soap and water before and after dressing changes. Prior to applying a clean dressing, cleanse wound with normal saline, wound cleanser, or mild soap and water. Ask the physician or nurse before getting the wound(s) wet in a shower     Daily Wound management:              Keep weight off wounds and reposition every 2 hours. Avoid standing for long periods of time. Apply wraps/stockings in AM and remove at bedtime. If swelling is present, elevate legs to the level of the heart or above for 30 minutes 4-5 times a day and/or when sitting. When taking antibiotics take entire prescription as ordered by physician do not stop taking until medicine is all gone.                                                           Orders for this week: 9/10/21                  Left 3rd Toe cultured 9/3/21     Rx: [facility] on [address] - pickup Gentamicin 9/3/21     Left 3rd Toe and Right Lai - Wash with soap and water, pat dry. Apply Gentamicin to wound bed. Cover with rajat and ABD pad.  Wrap with conform and secure with tape (may use border gauze for Right shin). Change daily.        Follow up with Patti Vinson CNP in 1 week in the wound care center  Call [phone] for any questions or concerns.   Date__________   Time____________        Treatment Note      Written Patient Dismissal Instructions Given            Electronically signed by JUSTIN Hinson CNP on 9/10/2021 at 8:37 AM

## 2021-09-16 ENCOUNTER — HOSPITAL ENCOUNTER (OUTPATIENT)
Dept: WOUND CARE | Age: 71
Discharge: HOME | End: 2021-09-16
Payer: MEDICARE

## 2021-09-16 VITALS
DIASTOLIC BLOOD PRESSURE: 60 MMHG | HEART RATE: 59 BPM | SYSTOLIC BLOOD PRESSURE: 137 MMHG | TEMPERATURE: 98.5 F | RESPIRATION RATE: 16 BRPM

## 2021-09-16 DIAGNOSIS — L97.522 DIABETIC ULCER OF TOE OF LEFT FOOT ASSOCIATED WITH TYPE 2 DIABETES MELLITUS, WITH FAT LAYER EXPOSED (HCC): Primary | ICD-10-CM

## 2021-09-16 DIAGNOSIS — L97.912 NON-PRESSURE ULCER OF RIGHT LOWER EXTREMITY WITH FAT LAYER EXPOSED (HCC): ICD-10-CM

## 2021-09-16 DIAGNOSIS — E11.621 DIABETIC ULCER OF TOE OF LEFT FOOT ASSOCIATED WITH TYPE 2 DIABETES MELLITUS, WITH FAT LAYER EXPOSED (HCC): Primary | ICD-10-CM

## 2021-09-16 PROCEDURE — 11719 TRIM NAIL(S) ANY NUMBER: CPT

## 2021-09-16 PROCEDURE — 11042 DBRDMT SUBQ TIS 1ST 20SQCM/<: CPT | Performed by: NURSE PRACTITIONER

## 2021-09-16 PROCEDURE — 11042 DBRDMT SUBQ TIS 1ST 20SQCM/<: CPT

## 2021-09-16 RX ORDER — BACITRACIN ZINC AND POLYMYXIN B SULFATE 500; 1000 [USP'U]/G; [USP'U]/G
OINTMENT TOPICAL ONCE
Status: CANCELLED | OUTPATIENT
Start: 2021-09-16 | End: 2021-09-16

## 2021-09-16 RX ORDER — LIDOCAINE HYDROCHLORIDE 20 MG/ML
JELLY TOPICAL ONCE
Status: CANCELLED | OUTPATIENT
Start: 2021-09-16 | End: 2021-09-16

## 2021-09-16 RX ORDER — LIDOCAINE 40 MG/G
CREAM TOPICAL ONCE
Status: CANCELLED | OUTPATIENT
Start: 2021-09-16 | End: 2021-09-16

## 2021-09-16 RX ORDER — GENTAMICIN SULFATE 1 MG/G
OINTMENT TOPICAL ONCE
Status: DISCONTINUED | OUTPATIENT
Start: 2021-09-16 | End: 2021-09-17 | Stop reason: HOSPADM

## 2021-09-16 RX ORDER — LIDOCAINE 50 MG/G
OINTMENT TOPICAL ONCE
Status: CANCELLED | OUTPATIENT
Start: 2021-09-16 | End: 2021-09-16

## 2021-09-16 RX ORDER — BETAMETHASONE DIPROPIONATE 0.05 %
OINTMENT (GRAM) TOPICAL ONCE
Status: CANCELLED | OUTPATIENT
Start: 2021-09-16 | End: 2021-09-16

## 2021-09-16 RX ORDER — LIDOCAINE HYDROCHLORIDE 40 MG/ML
SOLUTION TOPICAL ONCE
Status: CANCELLED | OUTPATIENT
Start: 2021-09-16 | End: 2021-09-16

## 2021-09-16 RX ORDER — GINSENG 100 MG
CAPSULE ORAL ONCE
Status: CANCELLED | OUTPATIENT
Start: 2021-09-16 | End: 2021-09-16

## 2021-09-16 RX ORDER — BACITRACIN, NEOMYCIN, POLYMYXIN B 400; 3.5; 5 [USP'U]/G; MG/G; [USP'U]/G
OINTMENT TOPICAL ONCE
Status: CANCELLED | OUTPATIENT
Start: 2021-09-16 | End: 2021-09-16

## 2021-09-16 RX ORDER — LIDOCAINE 50 MG/G
OINTMENT TOPICAL ONCE
Status: DISCONTINUED | OUTPATIENT
Start: 2021-09-16 | End: 2021-09-17 | Stop reason: HOSPADM

## 2021-09-16 RX ORDER — GENTAMICIN SULFATE 1 MG/G
OINTMENT TOPICAL ONCE
Status: CANCELLED | OUTPATIENT
Start: 2021-09-16 | End: 2021-09-16

## 2021-09-16 RX ORDER — CLOBETASOL PROPIONATE 0.5 MG/G
OINTMENT TOPICAL ONCE
Status: CANCELLED | OUTPATIENT
Start: 2021-09-16 | End: 2021-09-16

## 2021-09-16 NOTE — PROGRESS NOTES
Wound Care Center Progress Note With Procedure    Randall Alcazar  AGE: 66 y.o. GENDER: male  : 1950  EPISODE DATE:  2021     Subjective:     Chief Complaint   Patient presents with    Wound Check     left foot         HISTORY of PRESENT ILLNESS     Gena Mejia is a 66 y. o. 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 The condition is of moderate severity. The toe ulcer has been present for approximately 6 weeks, and the abrasion for approximately 2 weeks at initial wound clinic visit 9/10/21. Right anterior lower leg is healed. The underlying cause is thought to be diabetes and trauma. He first noticed his toe wound as a blister, that eventually popped and has not been healing.  The patients care to date has included evaluation by his PCP, he was given bactrim and keflex. He has also been using iodine at home. [facility] The patient has significant underlying medical conditions as below.      Wound Pain Timing/Severity: none  Quality of pain: N/A  Severity of pain:  0 / 10   Modifying Factors: diabetes  Associated Signs/Symptoms: drainage  Diabetes: Yes, on an oral and Trulicity regimen, last A1c 6.1 on 21  Smoking:No  Obesity: No  Anticoagulant therapy: Eliquis and baby aspirin  Immunosuppression: No    Patient educated on the 6 essential components necessary for wound healing: Circulation, Debridements, Proper Dressings and Topical Wound Products, Infection Control, Edema Control and Offloading. Patient educated on those factors that negatively effect or impact wound healing: smoking, obesity, uncontrolled diabetes, anticoagulant and immunosuppressive regimens, inadequate nutrition, untreated arterial and venous disease if applicable and measures to manage edema.          PAST MEDICAL HISTORY        Diagnosis Date    Arrhythmia     Pacemaker placed aprox 5 years other specified manifestations, uncontrolled 12/12/2012    Venous hypertension, chronic, with ulcer ([address] 12/12/2012    resolved       PAST SURGICAL HISTORY    Past Surgical History:   Procedure Laterality Date    CABG WITH AORTIC VALVE REPLACEMENT N/A 2/16/2021    CABG CORONARY ARTERY BYPASS X2 WITH LIMA, AORTIC VALVE REPLACEMENT AND AORTIC ROOT REPAIR, INTRAOPERATIVE MABLE, INDUCED HYPOTHERMIA, LEFT LEG ENDOVEIN HARVEST, LEFT ATRIAL CLIP, AND CRYO PROCEDURE performed by Danilo Bowen MD at [address]  12/14    at [address] Left 1/29/2021    LEFT CAROTID ENDARTERECTOMY performed by Paramjit Bernal MD at [address]  2017    COLONOSCOPY  2011    COLONOSCOPY N/A 11/19/2019    COLONOSCOPY DIAGNOSTIC performed by Bertrand Meigs, MD at [facility]  09/30/2020    POSSIBLE CECAL avms, SIGMOID DIVERTICULOSIS, INTERNAL HEMORRHOIDS GRADE 1    COLONOSCOPY N/A 9/30/2020    COLONOSCOPY CONTROL HEMORRHAGE WITH APC performed by Bertrand Meigs, MD at [address] [facility]  2014    \"2 stents put in \"    IR NONTUNNELED VASCULAR CATHETER  3/5/2021    IR NONTUNNELED VASCULAR CATHETER 3/5/2021 [facility] SPECIAL PROCEDURES    JOINT REPLACEMENT  2004    total left hip    OTHER SURGICAL HISTORY Right 12/02/2017    I&D; evacuation of hematoma right hip    OTHER SURGICAL HISTORY  09/17/2020    enteroscopy    PACEMAKER INSERTION N/A 2/23/2021    PACEMAKER GENERATOR LEAD REVISION performed by Danilo Bowen MD at [facility]      9/18/14 Status post remote permanent pacemaker with atrial lead dislodgement.  7/24/14 PPM Implant    UPPER GASTROINTESTINAL ENDOSCOPY N/A 9/17/2020    ENTEROSCOPY PUSH BIOPSY performed by Bertrand Meigs, MD at [place] [address] N/A 3/4/2021    EGD DIAGNOSTIC ONLY performed by Bertrand Meigs, MD at [address] [place] [address]  2012    \"have stents in both legs- done in [address] [place] [address]    Family History   Problem Relation Age of Onset    High Blood Pressure Mother     Arthritis Mother     Diabetes Mother     Heart Disease Mother     High Blood Pressure Father     Heart Disease Father     Kidney Disease Father        SOCIAL HISTORY    Social History     Tobacco Use    Smoking status: Never Smoker    Smokeless tobacco: Never Used   Vaping Use    Vaping Use: Never used   Substance Use Topics    Alcohol use:  Yes     Alcohol/week: 2.0 standard drinks     Types: 2 Cans of beer per week     Comment: average \"one time per week\"/ Caffiene: 1 cup of coffee daily    Drug use: No       ALLERGIES    Allergies   Allergen Reactions    Spironolactone      CAUSES INCREASED K+    Tape Loyce Priay Tape] Rash     SURGICAL TAPE       MEDICATIONS    Current Outpatient Medications on File Prior to Encounter   Medication Sig Dispense Refill    rOPINIRole (REQUIP) 0.5 MG tablet TAKE 1 TABLET 3 TIMES A  tablet 1    canagliflozin (INVOKANA) 300 MG TABS tablet Take 1 tablet by mouth every morning (before breakfast) 90 tablet 1    glimepiride (AMARYL) 4 MG tablet Take 1 tablet by mouth every morning (before breakfast) 90 tablet 1    TRULICITY 1.5 GZ/2.6ZM SOPN INJECT 0.5ML (=1.5MG)      SUBCUTANEOUSLY ONCE WEEKLY 12 pen 1    simvastatin (ZOCOR) 20 MG tablet Take 1 tablet by mouth daily 90 tablet 2    torsemide (DEMADEX) 20 MG tablet Take 1 tablet by mouth 2 times daily 180 tablet 3    apixaban (ELIQUIS) 5 MG TABS tablet Take 1 tablet by mouth 2 times daily 28 tablet 0    carboxymethylcellulose (REFRESH PLUS) 0.5 % SOLN ophthalmic solution INSTILL 1 DROP IN BOTH EYES THREE TIMES A DAY      Cholecalciferol (VITAMIN D) 50 MCG (2000 UT) CAPS capsule Take by mouth nightly       aspirin 81 MG tablet Take 81 mg by mouth daily      sildenafil (VIAGRA) 100 MG tablet TAKE 1 TABLET DAILY AS     NEEDED FOR ERECTILE        DYSFUNCTION 90 tablet 1     No current facility-administered medications on file prior to encounter. REVIEW OF SYSTEMS    Pertinent items are noted in HPI. Constitutional: Negative for systemic symptoms including fever, chills and malaise. Objective:      /60   Pulse 59   Temp 98.5 °F (36.9 °C) (Temporal)   Resp 16     PHYSICAL EXAM    General: The patient is in no acute distress. Mental status:  Patient is appropriate, is  oriented to place and plan of care. 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 ([address] - Primary    Relevant Medications    lidocaine (XYLOCAINE) 5 % ointment    gentamicin (GARAMYCIN) 0.1 % ointment    Other Relevant Orders    Initiate Outpatient Wound Care Protocol    WD-Non-pressure ulcer of right lower extremity with fat layer exposed ([address]    Relevant Medications    lidocaine (XYLOCAINE) 5 % ointment    gentamicin (GARAMYCIN) 0.1 % ointment    Other Relevant Orders    Initiate Outpatient Wound Care Protocol        Procedure Note    Indications:  Based on my examination of this patient's wound(s) today, sharp excision into necrotic subcutaneous tissue is required to promote healing and evaluate the extent of previous healing. Performed by: JUSTIN Melgoza - CNP    Consent obtained: Yes    Time out taken:  Yes    Pain Control: Anesthetic  Anesthetic: 5% Lidocaine Ointment Topical     Debridement:Excisional Debridement    Using curette the wound(s) was/were sharply debrided down through and including the removal of subcutaneous tissue.         Devitalized Tissue Debrided:  slough and exudate    Pre Debridement Measurements:  Are located in the Wound Documentation Flow Sheet    All active wounds listed below with today's date are evaluated  Wound(s)    debrided this date include # : 1     Post  Debridement Measurements:  Wound 09/03/21 Toe (Comment  which one) Left #1 Left Second Toe Plantar (Active)   Wound Image   09/16/21 0846   Wound Etiology Diabetic 09/16/21 0846   Dressing Status Clean;Dry; Intact; New dressing applied 09/16/21 0955   Wound Cleansed Wound cleanser 09/16/21 0846   Dressing/Treatment Collagen;Alginate 09/16/21 0955   Offloading for Diabetic Foot Ulcers No offloading required 09/16/21 0846   Wound Length (cm) 0.5 cm 09/16/21 0846   Wound Width (cm) 0.3 cm 09/16/21 0846   Wound Depth (cm) 0.1 cm 09/16/21 0846   Wound Surface Area (cm^2) 0.15 cm^2 09/16/21 0846   Change in Wound Size % (l*w) 40 09/16/21 0846   Wound Volume (cm^3) 0.015 cm^3 09/16/21 0846   Wound Healing % 40 09/16/21 0846   Post-Procedure Length (cm) 0.5 cm 09/16/21 0927   Post-Procedure Width (cm) 0.3 cm 09/16/21 0927   Post-Procedure Depth (cm) 0.1 cm 09/16/21 0927   Post-Procedure Surface Area (cm^2) 0.15 cm^2 09/16/21 0927   Post-Procedure Volume (cm^3) 0.015 cm^3 09/16/21 0927   Distance Tunneling (cm) 0 cm 09/16/21 0846   Tunneling Position ___ O'Clock 0 09/16/21 0846   Undermining Starts ___ O'Clock 0 09/16/21 0846   Undermining Ends___ O'Clock 0 09/16/21 0846   Undermining Maxium Distance (cm) 0 09/16/21 0846   Wound Assessment Pink/red 09/16/21 0846   Drainage Amount Moderate 09/16/21 0846   Drainage Description Yellow 09/16/21 0846   Odor None 09/16/21 0846   Angela-wound Assessment Intact 09/16/21 0846   Margins Defined edges; Unattached edges 09/16/21 0846   Wound Thickness Description not for Pressure Injury Full thickness 09/16/21 0846   Number of days: 13       Percent of Wound(s) Debrided: approximately 100%    Total  Area  Debrided:  0.15 sq cm     Bleeding:  Minimal    Hemostasis Achieved:  by pressure    Procedural Pain:  0  / 10     Post Procedural Pain:  0 / 10     Response to treatment:  Well tolerated by patient.      Status of wound progress and description from last visit:   Stable, continue regimen. Plan:       Discharge Instructions       PHYSICIAN ORDERS AND DISCHARGE INSTRUCTIONS     NOTE: Upon discharge from the [address], you will receive a patient experience survey. We would be grateful if you would take the time to fill this survey out.     Wound care order history:                 YESSENIA's   Right  0.93     Left  0.55           Date:  9/3/21              Cultures:  Left 3rd Toe cultured 9/3/21              Grafts:                Antibiotics:            Continuing wound care orders and information:                 Residence:  Home              Continue home health care with:               Your wound-care supplies will be provided by: [facility]     Wound cleansing:               VY not scrub or use excessive force.              Wash hands with soap and water before and after dressing changes.             HISHX to applying a clean dressing, cleanse wound with normal saline, wound cleanser, or mild soap and water.               Ask the physician or nurse before getting the wound(s) wet in a shower     Daily Wound management:              Keep weight off wounds and reposition every 2 hours.              Avoid standing for long periods of time.              Apply wraps/stockings in AM and remove at bedtime.              If swelling is present, elevate legs to the level of the heart or above for 30 minutes 4-5 times a day and/or when sitting.                                      When taking antibiotics take entire prescription as ordered by physician do not stop taking until medicine is all gone.                                                           Orders for this week: 9/16/21                  Left 3rd Toe cultured 9/3/21     Rx: [facility] on [address]      [place] toenails with betadine today in clinic (9/16/21) due to trimming nails.     Left 3rd Toe - Wash with soap and water, pat dry. Apply Gentamicin and stimulen to wound bed.  Cover with ca alginate from base of toe and on sides between toes. Wrap with conform and secure with tape. Change daily.        Follow up with Dany Rivers CNP in 1 week in the wound care center. Call [phone] for any questions or concerns.   Date__________   Time____________        Treatment Note Wound 09/03/21 Toe (Comment  which one) Left #1 Left Third Toe Plantar-Dressing/Treatment: Collagen, Alginate (gentamicin, stimulen, calcium alginate, conform, tape,)    Written Patient Dismissal Instructions Given            Electronically signed by JUSTIN Loya CNP on 9/16/2021 at 4:33 PM

## 2021-10-01 ENCOUNTER — HOSPITAL ENCOUNTER (OUTPATIENT)
Dept: WOUND CARE | Age: 71
Discharge: HOME | End: 2021-10-01
Payer: MEDICARE

## 2021-10-01 VITALS
DIASTOLIC BLOOD PRESSURE: 71 MMHG | TEMPERATURE: 97.7 F | HEART RATE: 81 BPM | SYSTOLIC BLOOD PRESSURE: 148 MMHG | RESPIRATION RATE: 16 BRPM

## 2021-10-01 DIAGNOSIS — E11.621 DIABETIC ULCER OF TOE OF LEFT FOOT ASSOCIATED WITH TYPE 2 DIABETES MELLITUS, WITH FAT LAYER EXPOSED (HCC): Primary | ICD-10-CM

## 2021-10-01 DIAGNOSIS — L97.522 DIABETIC ULCER OF TOE OF LEFT FOOT ASSOCIATED WITH TYPE 2 DIABETES MELLITUS, WITH FAT LAYER EXPOSED (HCC): Primary | ICD-10-CM

## 2021-10-01 PROCEDURE — 11042 DBRDMT SUBQ TIS 1ST 20SQCM/<: CPT | Performed by: NURSE PRACTITIONER

## 2021-10-01 PROCEDURE — 11042 DBRDMT SUBQ TIS 1ST 20SQCM/<: CPT

## 2021-10-01 RX ORDER — LIDOCAINE 40 MG/G
CREAM TOPICAL ONCE
Status: CANCELLED | OUTPATIENT
Start: 2021-10-01 | End: 2021-10-01

## 2021-10-01 RX ORDER — BETAMETHASONE DIPROPIONATE 0.05 %
OINTMENT (GRAM) TOPICAL ONCE
Status: CANCELLED | OUTPATIENT
Start: 2021-10-01 | End: 2021-10-01

## 2021-10-01 RX ORDER — LIDOCAINE 50 MG/G
OINTMENT TOPICAL ONCE
Status: CANCELLED | OUTPATIENT
Start: 2021-10-01 | End: 2021-10-01

## 2021-10-01 RX ORDER — LIDOCAINE HYDROCHLORIDE 40 MG/ML
SOLUTION TOPICAL ONCE
Status: CANCELLED | OUTPATIENT
Start: 2021-10-01 | End: 2021-10-01

## 2021-10-01 RX ORDER — BACITRACIN, NEOMYCIN, POLYMYXIN B 400; 3.5; 5 [USP'U]/G; MG/G; [USP'U]/G
OINTMENT TOPICAL ONCE
Status: CANCELLED | OUTPATIENT
Start: 2021-10-01 | End: 2021-10-01

## 2021-10-01 RX ORDER — GENTAMICIN SULFATE 1 MG/G
OINTMENT TOPICAL ONCE
Status: CANCELLED | OUTPATIENT
Start: 2021-10-01 | End: 2021-10-01

## 2021-10-01 RX ORDER — GINSENG 100 MG
CAPSULE ORAL ONCE
Status: CANCELLED | OUTPATIENT
Start: 2021-10-01 | End: 2021-10-01

## 2021-10-01 RX ORDER — LIDOCAINE HYDROCHLORIDE 20 MG/ML
JELLY TOPICAL ONCE
Status: CANCELLED | OUTPATIENT
Start: 2021-10-01 | End: 2021-10-01

## 2021-10-01 RX ORDER — BACITRACIN ZINC AND POLYMYXIN B SULFATE 500; 1000 [USP'U]/G; [USP'U]/G
OINTMENT TOPICAL ONCE
Status: CANCELLED | OUTPATIENT
Start: 2021-10-01 | End: 2021-10-01

## 2021-10-01 RX ORDER — CLOBETASOL PROPIONATE 0.5 MG/G
OINTMENT TOPICAL ONCE
Status: CANCELLED | OUTPATIENT
Start: 2021-10-01 | End: 2021-10-01

## 2021-10-01 NOTE — PROGRESS NOTES
type 2 diabetes mellitus ([facility] [address] 04/23/2019    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    Gout 04/2019    \"got gout when had pacer put in because they did not give me my medication for gout \"    H/O 24 hour EKG monitoring 10/03/2013    no afib noted, sinsus rhythm    H/O cardiovascular stress test 05/12/2014    cardiolite- mild ischemia RCA EF50%    H/O echocardiogram 12/01/2020    EF 55-60% severe aortic stenosis mild to mod aortic regurg mod to severe tricuspid regurg severe pulm htn significant changes since 2018 echo.  H/O right and left heart catheterization 12/10/2020    DIFFUSE LAD DISEASE, Mild ECA Disease, Severe AS, Milf Pul HTN on RHC.  H/O transesophageal echocardiography (MABLE) for monitoring 08/05/2013    normal LV function and normal LA appendage without any clot    History of blood transfusion 12/2020    d/t anemia    History of transesophageal echocardiography (MABLE) 12/15/2020    Severe aortic stenosis (ANNA by planimetry: 0.778 cm sq). Mild AR.    [place] (hard of hearing)     hearing tonya aides    Hx of Doppler echocardiogram 05/21/2018    EF 50%  Mild LV hypertrophy. Mildly enlarged RA. Mod aortic valve calcification with mod AS. Mitral annular calcification is present. Mild AR, MR and TR. Mild pulmonary htn.     Hyperlipidemia     Hypertension     Follows with PCP & Dr. Joe Jimenez Other disorders of kidney and ureter     Pacemaker     [employer]tr[employer], implanted 2014    Pneumonia 12/29/2012    Pneumonia due to COVID-19 virus 08/2020    Sleep apnea     dx 2013- has c-pap    Type II or unspecified type diabetes mellitus with other specified manifestations, uncontrolled 12/12/2012    Venous hypertension, chronic, with ulcer ([facility] [address] 12/12/2012    resolved       PAST SURGICAL HISTORY    Past Surgical History:   Procedure Laterality Date    CABG WITH AORTIC VALVE REPLACEMENT N/A 2/16/2021    CABG CORONARY ARTERY BYPASS X2 WITH LIMA, AORTIC VALVE REPLACEMENT AND AORTIC ROOT REPAIR, INTRAOPERATIVE MABLE, INDUCED HYPOTHERMIA, LEFT LEG ENDOVEIN HARVEST, LEFT ATRIAL CLIP, AND CRYO PROCEDURE performed by Micah Delvalle MD at [address] [facility]  12/14    at [address] Left 1/29/2021    LEFT CAROTID ENDARTERECTOMY performed by Mane Lopez MD at [id number]  2017    COLONOSCOPY  2011    COLONOSCOPY N/A 11/19/2019    COLONOSCOPY DIAGNOSTIC performed by Lauren Garcia MD at [address] [facility]  09/30/2020    POSSIBLE CECAL avms, SIGMOID DIVERTICULOSIS, INTERNAL HEMORRHOIDS GRADE 1    COLONOSCOPY N/A 9/30/2020    COLONOSCOPY CONTROL HEMORRHAGE WITH APC performed by Lauren Garcia MD at [address] [facility]  2014    \"2 stents put in \"    IR NONTUNNELED VASCULAR CATHETER  3/5/2021    IR NONTUNNELED VASCULAR CATHETER 3/5/2021 [facility] SPECIAL PROCEDURES    JOINT REPLACEMENT  2004    total left hip    OTHER SURGICAL HISTORY Right 12/02/2017    I&D; evacuation of hematoma right hip    OTHER SURGICAL HISTORY  09/17/2020    enteroscopy    PACEMAKER INSERTION N/A 2/23/2021    PACEMAKER GENERATOR LEAD REVISION performed by Micah Delvalle MD at [facility]      9/18/14 Status post remote permanent pacemaker with atrial lead dislodgement.  7/24/14 PPM Implant    UPPER GASTROINTESTINAL ENDOSCOPY N/A 9/17/2020    ENTEROSCOPY PUSH BIOPSY performed by Lauren Garcia MD at [address] N/A 3/4/2021    EGD DIAGNOSTIC ONLY performed by Lauren Garcia MD at [address] [facility]  2012    \"have stents in both legs- done in [facility]    Family History   Problem Relation Age of Onset    High Blood Pressure Mother     Arthritis Mother     Diabetes Mother     Heart Disease Mother     High Blood Pressure Father     Heart Disease Father     Kidney Disease Father        SOCIAL HISTORY    Social History     Tobacco Use    Smoking status: Never Smoker    Smokeless tobacco: Never Used   Vaping Use    Vaping Use: Never used   Substance Use Topics    Alcohol use: Yes     Alcohol/week: 2.0 standard drinks     Types: 2 Cans of beer per week     Comment: average \"one time per week\"/ Caffiene: 1 cup of coffee daily    Drug use: No       ALLERGIES    Allergies   Allergen Reactions    Spironolactone      CAUSES INCREASED K+    Tape Jacque Alan Tape] Rash     SURGICAL TAPE       MEDICATIONS    Current Outpatient Medications on File Prior to Encounter   Medication Sig Dispense Refill    rOPINIRole (REQUIP) 0.5 MG tablet TAKE 1 TABLET 3 TIMES A  tablet 1    canagliflozin (INVOKANA) 300 MG TABS tablet Take 1 tablet by mouth every morning (before breakfast) 90 tablet 1    glimepiride (AMARYL) 4 MG tablet Take 1 tablet by mouth every morning (before breakfast) 90 tablet 1    TRULICITY 1.5 AT/5.3PW SOPN INJECT 0.5ML (=1.5MG)      SUBCUTANEOUSLY ONCE WEEKLY 12 pen 1    simvastatin (ZOCOR) 20 MG tablet Take 1 tablet by mouth daily 90 tablet 2    torsemide (DEMADEX) 20 MG tablet Take 1 tablet by mouth 2 times daily 180 tablet 3    apixaban (ELIQUIS) 5 MG TABS tablet Take 1 tablet by mouth 2 times daily 28 tablet 0    sildenafil (VIAGRA) 100 MG tablet TAKE 1 TABLET DAILY AS     NEEDED FOR ERECTILE        DYSFUNCTION 90 tablet 1    carboxymethylcellulose (REFRESH PLUS) 0.5 % SOLN ophthalmic solution INSTILL 1 DROP IN BOTH EYES THREE TIMES A DAY      Cholecalciferol (VITAMIN D) 50 MCG (2000 UT) CAPS capsule Take by mouth nightly       aspirin 81 MG tablet Take 81 mg by mouth daily       No current facility-administered medications on file prior to encounter. REVIEW OF SYSTEMS    Pertinent items are noted in HPI. Constitutional: Negative for systemic symptoms including fever, chills and malaise.       Objective:      BP (!) 148/71   Pulse 81   Temp 97.7 °F (36.5 °C) (Temporal)   Resp 16     PHYSICAL EXAM      General: The patient is in no acute distress. Mental status:  Patient is appropriate, is  oriented to place and plan of care. 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 Performed by: JUSTIN Kinney CNP    Consent obtained: Yes    Time out taken:  Yes    Pain Control: Anesthetic  Anesthetic: 4% Lidocaine Liquid Topical     Debridement:Excisional Debridement    Using curette the wound(s) was/were sharply debrided down through and including the removal of subcutaneous tissue. Devitalized Tissue Debrided:  fibrin, biofilm, slough and callus    Pre Debridement Measurements:  Are located in the Wound Documentation Flow Sheet    All active wounds listed below with today's date are evaluated  Wound(s)    debrided this date include # : 1     Post  Debridement Measurements:  Wound 02/18/21 Toe (Comment  which one) Right 2nd toe anterior (Active)   Number of days: 224       Wound 02/18/21 Toe (Comment  which one) Anterior; Left 3rd toe (Active)   Number of days: 224       Wound 03/06/21 Arm Left; Lower (Active)   Number of days: 209       Wound 03/06/21 Sacrum Mid;Left cluster (Active)   Number of days: 209       Wound 03/15/21 Ear Left small laceration on pinna of left ear (Active)   Number of days: 199       Wound 03/17/21 Pretibial Distal;Left;Posterior (Active)   Number of days: 197       Wound 03/17/21 Buttocks Right;Upper (Active)   Number of days: 197       Wound 09/03/21 Toe (Comment  which one) Left #1 Left 3rd Toe Plantar (Active)   Wound Image   10/01/21 0823   Wound Etiology Diabetic 10/01/21 0823   Dressing Status Clean;Dry; Intact; New dressing applied 09/16/21 0955   Wound Cleansed Irrigated with saline 10/01/21 0823   Dressing/Treatment Collagen;Alginate 09/16/21 0955   Offloading for Diabetic Foot Ulcers No offloading required 09/16/21 0846   Wound Length (cm) 0.1 cm 10/01/21 0823   Wound Width (cm) 0.2 cm 10/01/21 0823   Wound Depth (cm) 0.1 cm 10/01/21 0823   Wound Surface Area (cm^2) 0.02 cm^2 10/01/21 0823   Change in Wound Size % (l*w) 92 10/01/21 0823   Wound Volume (cm^3) 0.002 cm^3 10/01/21 0823   Wound Healing % 92 10/01/21 0823   Post-Procedure Length (cm) 0.1 cm 10/01/21 0828   Post-Procedure Width (cm) 0.2 cm 10/01/21 0828   Post-Procedure Depth (cm) 0.1 cm 10/01/21 0828   Post-Procedure Surface Area (cm^2) 0.02 cm^2 10/01/21 0828   Post-Procedure Volume (cm^3) 0.002 cm^3 10/01/21 0828   Distance Tunneling (cm) 0 cm 10/01/21 0823   Tunneling Position ___ O'Clock 0 10/01/21 0823   Undermining Starts ___ O'Clock 0 10/01/21 0823   Undermining Ends___ O'Clock 0 10/01/21 0823   Undermining Maxium Distance (cm) 0 10/01/21 0823   Wound Assessment Pink/red 10/01/21 0823   Drainage Amount Moderate 10/01/21 0823   Drainage Description Yellow 10/01/21 0823   Odor None 10/01/21 0823   Angela-wound Assessment Hyperkeratosis (callous) 10/01/21 0823   Margins Defined edges; Unattached edges 10/01/21 0326   Wound Thickness Description not for Pressure Injury Full thickness 10/01/21 0823   Number of days: 28       Percent of Wound(s) Debrided: approximately 100%    Total  Area  Debrided:  0.02 sq cm     Bleeding:  Minimal    Hemostasis Achieved:  by pressure    Procedural Pain:  0  / 10     Post Procedural Pain:  0 / 10     Response to treatment:  Well tolerated by patient. Status of wound progress and description from last visit:   Much improved in size.  We will take off gentamycin this week to try to keep wound bed more dry. Continue to monitor and follow up 1 week. Plan:       Discharge Instructions       PHYSICIAN ORDERS AND DISCHARGE INSTRUCTIONS     NOTE: Upon discharge from the [address], you will receive a patient experience survey. We would be grateful if you would take the time to fill this survey out.     Wound care order history:                 YESSENIA's   Right  0.93     Left  0.55           Date:  9/3/21              Cultures:  Left 3rd Toe cultured 9/3/21              Grafts:                Antibiotics:            Continuing wound care orders and information:                 Residence:  Home              Continue home health care with:               Your wound-care supplies will be provided by: [facility]     Wound cleansing:               NC not scrub or use excessive force.              Wash hands with soap and water before and after dressing changes.             SIRWR to applying a clean dressing, cleanse wound with normal saline, wound cleanser, or mild soap and water.               Ask the physician or nurse before getting the wound(s) wet in a shower     Daily Wound management:              Keep weight off wounds and reposition every 2 hours.              Avoid standing for long periods of time.              Apply wraps/stockings in AM and remove at bedtime.              If swelling is present, elevate legs to the level of the heart or above for 30 minutes 4-5 times a day and/or when sitting.                                      When taking antibiotics take entire prescription as ordered by physician do not stop taking until medicine is all gone.                                                           Orders for this week: 10/1/21                  Left 3rd Toe cultured 9/3/21     Rx: [facility] on [address]        Left 3rd Toe - Wash with soap and water, pat dry. Apply stimulen to wound bed. Cover with ca alginate from base of toe and on sides between toes.  Wrap with conform and secure with tape. Change daily.        Follow up with Samantha Ballard CNP in 1 week in the wound care center. Call [phone] for any questions or concerns.   Date__________   Time____________        Treatment Note Wound 09/03/21 Toe (Comment  which one) Left #1 Left 3rd Toe Plantar-Dressing/Treatment:  (stimulen , ca alg, conform tape )    Written Patient Dismissal Instructions Given            Electronically signed by JUSTIN Schmid CNP on 10/1/2021 at 8:35 AM

## 2021-10-04 ENCOUNTER — PROCEDURE VISIT (OUTPATIENT)
Dept: CARDIOLOGY CLINIC | Age: 71
End: 2021-10-04
Payer: MEDICARE

## 2021-10-04 DIAGNOSIS — Z95.0 CARDIAC PACEMAKER IN SITU: Primary | ICD-10-CM

## 2021-10-04 PROCEDURE — 93296 REM INTERROG EVL PM/IDS: CPT | Performed by: INTERNAL MEDICINE

## 2021-10-04 PROCEDURE — 93294 REM INTERROG EVL PM/LDLS PM: CPT | Performed by: INTERNAL MEDICINE

## 2021-10-08 ENCOUNTER — HOSPITAL ENCOUNTER (OUTPATIENT)
Dept: WOUND CARE | Age: 71
Discharge: HOME | End: 2021-10-08
Payer: MEDICARE

## 2021-10-08 VITALS
TEMPERATURE: 98.6 F | HEART RATE: 59 BPM | SYSTOLIC BLOOD PRESSURE: 144 MMHG | DIASTOLIC BLOOD PRESSURE: 82 MMHG | RESPIRATION RATE: 20 BRPM

## 2021-10-08 DIAGNOSIS — L97.522 DIABETIC ULCER OF TOE OF LEFT FOOT ASSOCIATED WITH TYPE 2 DIABETES MELLITUS, WITH FAT LAYER EXPOSED (HCC): Primary | ICD-10-CM

## 2021-10-08 DIAGNOSIS — E11.621 DIABETIC ULCER OF TOE OF LEFT FOOT ASSOCIATED WITH TYPE 2 DIABETES MELLITUS, WITH FAT LAYER EXPOSED (HCC): Primary | ICD-10-CM

## 2021-10-08 PROCEDURE — 11042 DBRDMT SUBQ TIS 1ST 20SQCM/<: CPT | Performed by: NURSE PRACTITIONER

## 2021-10-08 PROCEDURE — 11042 DBRDMT SUBQ TIS 1ST 20SQCM/<: CPT

## 2021-10-08 RX ORDER — LIDOCAINE HYDROCHLORIDE 40 MG/ML
SOLUTION TOPICAL ONCE
Status: DISCONTINUED | OUTPATIENT
Start: 2021-10-08 | End: 2021-10-09 | Stop reason: HOSPADM

## 2021-10-08 RX ORDER — LIDOCAINE HYDROCHLORIDE 40 MG/ML
SOLUTION TOPICAL ONCE
Status: CANCELLED | OUTPATIENT
Start: 2021-10-08 | End: 2021-10-08

## 2021-10-08 RX ORDER — BACITRACIN ZINC AND POLYMYXIN B SULFATE 500; 1000 [USP'U]/G; [USP'U]/G
OINTMENT TOPICAL ONCE
Status: CANCELLED | OUTPATIENT
Start: 2021-10-08 | End: 2021-10-08

## 2021-10-08 RX ORDER — LIDOCAINE 50 MG/G
OINTMENT TOPICAL ONCE
Status: CANCELLED | OUTPATIENT
Start: 2021-10-08 | End: 2021-10-08

## 2021-10-08 RX ORDER — CLOBETASOL PROPIONATE 0.5 MG/G
OINTMENT TOPICAL ONCE
Status: CANCELLED | OUTPATIENT
Start: 2021-10-08 | End: 2021-10-08

## 2021-10-08 RX ORDER — BETAMETHASONE DIPROPIONATE 0.05 %
OINTMENT (GRAM) TOPICAL ONCE
Status: CANCELLED | OUTPATIENT
Start: 2021-10-08 | End: 2021-10-08

## 2021-10-08 RX ORDER — GENTAMICIN SULFATE 1 MG/G
OINTMENT TOPICAL ONCE
Status: CANCELLED | OUTPATIENT
Start: 2021-10-08 | End: 2021-10-08

## 2021-10-08 RX ORDER — LIDOCAINE HYDROCHLORIDE 20 MG/ML
JELLY TOPICAL ONCE
Status: CANCELLED | OUTPATIENT
Start: 2021-10-08 | End: 2021-10-08

## 2021-10-08 RX ORDER — GINSENG 100 MG
CAPSULE ORAL ONCE
Status: CANCELLED | OUTPATIENT
Start: 2021-10-08 | End: 2021-10-08

## 2021-10-08 RX ORDER — LIDOCAINE 40 MG/G
CREAM TOPICAL ONCE
Status: CANCELLED | OUTPATIENT
Start: 2021-10-08 | End: 2021-10-08

## 2021-10-08 RX ORDER — BACITRACIN, NEOMYCIN, POLYMYXIN B 400; 3.5; 5 [USP'U]/G; MG/G; [USP'U]/G
OINTMENT TOPICAL ONCE
Status: CANCELLED | OUTPATIENT
Start: 2021-10-08 | End: 2021-10-08

## 2021-10-08 NOTE — PROGRESS NOTES
Wound Care Center Progress Note With Procedure    Gilbert Scott  AGE: 66 y.o. GENDER: male  : 1950  EPISODE DATE:  10/8/2021     Subjective:     Chief Complaint   Patient presents with    Wound Check     left foot         HISTORY of PRESENT ILLNESS      Mayo Mejia is a 66 y. o. male who presents to the Wound Clinic for an initial visit for evaluation and treatment of Chronic diabetic and traumatic  ulcer(s) of  R lower leg anterior, toes left, 2nd toe.  The condition is of moderate severity. The toe ulcer has been present for 6 weeks, and the abrasion for 2 weeks.  The underlying cause is thought to be diabetes and trauma. He first noticed his toe wound as a blister, that eventually popped and has not been healing.  The patients care to date has included a trip to his PCP where he was given bactrim and keflex. He has also been using iodine at home. [facility] The patient has significant underlying medical conditions as below.   Patient is a diabetic who takes weekly injections and checks his BG regularly. He is on eliquis, and is not  smoker.     Wound Pain Timing/Severity: none  Quality of pain: N/A  Severity of pain:  0 / 10   Modifying Factors: diabetes  Associated Signs/Symptoms: drainage        PAST MEDICAL HISTORY        Diagnosis Date    Arrhythmia     Pacemaker placed aprox 5 years ago for A Fib per patient    Arthritis 2013    rt wrist    Atrial fibrillation ([address]     on [facility] CAD (coronary artery disease) 2014    see dr Cely Swartz kidney disease, stage III (moderate) ([address] 2016    Critical illness myopathy 03/15/2021    Diabetes mellitus ([address]     dx     Diabetic neuropathy associated with type 2 diabetes mellitus ([address] 2019    Erythropoietin deficiency anemia 2020    Gout 2019    \"got gout when had pacer put in because they did not give me my medication for gout \"    H/O 24 hour EKG monitoring 10/03/2013    no afib noted, sinsus rhythm    H/O cardiovascular stress test 05/12/2014    cardiolite- mild ischemia RCA EF50%    H/O echocardiogram 12/01/2020    EF 55-60% severe aortic stenosis mild to mod aortic regurg mod to severe tricuspid regurg severe pulm htn significant changes since 2018 echo.  H/O right and left heart catheterization 12/10/2020    DIFFUSE LAD DISEASE, Mild ECA Disease, Severe AS, Milf Pul HTN on RHC.  H/O transesophageal echocardiography (MABLE) for monitoring 08/05/2013    normal LV function and normal LA appendage without any clot    History of blood transfusion 12/2020    d/t anemia    History of transesophageal echocardiography (MABLE) 12/15/2020    Severe aortic stenosis (ANNA by planimetry: 0.778 cm sq). Mild AR.    [place] (hard of hearing)     hearing tonya aides    Hx of Doppler echocardiogram 05/21/2018    EF 50%  Mild LV hypertrophy. Mildly enlarged RA. Mod aortic valve calcification with mod AS. Mitral annular calcification is present. Mild AR, MR and TR. Mild pulmonary htn.     Hyperlipidemia     Hypertension     Follows with PCP & Dr. Oliva Bland Other disorders of kidney and ureter     Pacemaker     Medtronic, implanted 2014    Pneumonia 12/29/2012    Pneumonia due to COVID-19 virus 08/2020    Sleep apnea     dx 2013- has c-pap    Type II or unspecified type diabetes mellitus with other specified manifestations, uncontrolled 12/12/2012    Venous hypertension, chronic, with ulcer [facility] 12/12/2012    resolved       PAST SURGICAL HISTORY    Past Surgical History:   Procedure Laterality Date    CABG WITH AORTIC VALVE REPLACEMENT N/A 2/16/2021    CABG CORONARY ARTERY BYPASS X2 WITH LIMA, AORTIC VALVE REPLACEMENT AND AORTIC ROOT REPAIR, INTRAOPERATIVE MABLE, INDUCED HYPOTHERMIA, LEFT LEG ENDOVEIN HARVEST, LEFT ATRIAL CLIP, AND CRYO PROCEDURE performed by Tushar Stallworth MD at [address] [place] [address]  12/14    at [facility] Left 1/29/2021    LEFT CAROTID ENDARTERECTOMY performed by Suzi Suarez MD at [id number] [facility]  2017    COLONOSCOPY  2011    COLONOSCOPY N/A 11/19/2019    COLONOSCOPY DIAGNOSTIC performed by Mikey Amaya MD at [facility]  09/30/2020    POSSIBLE CECAL avms, SIGMOID DIVERTICULOSIS, INTERNAL HEMORRHOIDS GRADE 1    COLONOSCOPY N/A 9/30/2020    COLONOSCOPY CONTROL HEMORRHAGE WITH APC performed by Mikey Amaya MD at [facility]  2014    \"2 stents put in \"    IR NONTUNNELED VASCULAR CATHETER  3/5/2021    IR NONTUNNELED VASCULAR CATHETER 3/5/2021 [facility] SPECIAL PROCEDURES    JOINT REPLACEMENT  2004    total left hip    OTHER SURGICAL HISTORY Right 12/02/2017    I&D; evacuation of hematoma right hip    OTHER SURGICAL HISTORY  09/17/2020    enteroscopy    PACEMAKER INSERTION N/A 2/23/2021    PACEMAKER GENERATOR LEAD REVISION performed by Sandy Dos Santos MD at [facility]      9/18/14 Status post remote permanent pacemaker with atrial lead dislodgement. 7/24/14 PPM Implant    UPPER GASTROINTESTINAL ENDOSCOPY N/A 9/17/2020    ENTEROSCOPY PUSH BIOPSY performed by Mikey Amaya MD at [facility] N/A 3/4/2021    EGD DIAGNOSTIC ONLY performed by Mikey Amaya MD at [address] [place] [address]  2012    \"have stents in both legs- done in [facility]    Family History   Problem Relation Age of Onset    High Blood Pressure Mother     Arthritis Mother     Diabetes Mother     Heart Disease Mother     High Blood Pressure Father     Heart Disease Father     Kidney Disease Father        SOCIAL HISTORY    Social History     Tobacco Use    Smoking status: Never Smoker    Smokeless tobacco: Never Used   Vaping Use    Vaping Use: Never used   Substance Use Topics    Alcohol use:  Yes     Alcohol/week: 2.0 standard drinks     Types: 2 Cans of beer per week Comment: average \"one time per week\"/ Caffiene: 1 cup of coffee daily    Drug use: No       ALLERGIES    Allergies   Allergen Reactions    Spironolactone      CAUSES INCREASED K+    Tape Jacque Basgerald Tape] Rash     SURGICAL TAPE       MEDICATIONS    Current Outpatient Medications on File Prior to Encounter   Medication Sig Dispense Refill    rOPINIRole (REQUIP) 0.5 MG tablet TAKE 1 TABLET 3 TIMES A  tablet 1    canagliflozin (INVOKANA) 300 MG TABS tablet Take 1 tablet by mouth every morning (before breakfast) 90 tablet 1    glimepiride (AMARYL) 4 MG tablet Take 1 tablet by mouth every morning (before breakfast) 90 tablet 1    simvastatin (ZOCOR) 20 MG tablet Take 1 tablet by mouth daily 90 tablet 2    torsemide (DEMADEX) 20 MG tablet Take 1 tablet by mouth 2 times daily 180 tablet 3    apixaban (ELIQUIS) 5 MG TABS tablet Take 1 tablet by mouth 2 times daily 28 tablet 0    carboxymethylcellulose (REFRESH PLUS) 0.5 % SOLN ophthalmic solution INSTILL 1 DROP IN BOTH EYES THREE TIMES A DAY      Cholecalciferol (VITAMIN D) 50 MCG (2000 UT) CAPS capsule Take by mouth nightly       aspirin 81 MG tablet Take 81 mg by mouth daily      TRULICITY 1.5 JN/9.9NB SOPN INJECT 0.5ML (=1.5MG)      SUBCUTANEOUSLY ONCE WEEKLY 12 pen 1    sildenafil (VIAGRA) 100 MG tablet TAKE 1 TABLET DAILY AS     NEEDED FOR ERECTILE        DYSFUNCTION 90 tablet 1     No current facility-administered medications on file prior to encounter. REVIEW OF SYSTEMS    Pertinent items are noted in HPI. Constitutional: Negative for systemic symptoms including fever, chills and malaise. Objective:      BP (!) 144/82   Pulse 59   Temp 98.6 °F (37 °C) (Temporal)   Resp 20     PHYSICAL EXAM      General: The patient is in no acute distress. Mental status:  Patient is appropriate, is  oriented to place and plan of care.   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 Performed by: JUSTIN Monreal - CNP    Consent obtained: Yes    Time out taken:  Yes    Pain Control: N/A       Debridement:Excisional Debridement    Using curette the wound(s) was/were sharply debrided down through and including the removal of subcutaneous tissue. Devitalized Tissue Debrided:  callus    Pre Debridement Measurements:  Are located in the Wound Documentation Flow Sheet    All active wounds listed below with today's date are evaluated  Wound(s)    debrided this date include # : 1     Post  Debridement Measurements:  Wound 02/18/21 Toe (Comment  which one) Right 2nd toe anterior (Active)   Number of days: 231       Wound 02/18/21 Toe (Comment  which one) Anterior; Left 3rd toe (Active)   Number of days: 231       Wound 03/06/21 Arm Left; Lower (Active)   Number of days: 216       Wound 03/06/21 Sacrum Mid;Left cluster (Active)   Number of days: 216       Wound 03/15/21 Ear Left small laceration on pinna of left ear (Active)   Number of days: 206       Wound 03/17/21 Pretibial Distal;Left;Posterior (Active)   Number of days: 204       Wound 03/17/21 Buttocks Right;Upper (Active)   Number of days: 204       Wound 09/03/21 Toe (Comment  which one) Left #1 Left 3rd Toe Plantar (Active)   Wound Image   10/01/21 0823   Wound Etiology Diabetic 10/08/21 0825   Dressing Status New dressing applied 10/01/21 0894 Wound Cleansed Irrigated with saline 10/01/21 0823   Dressing/Treatment Collagen;Alginate 09/16/21 0955   Offloading for Diabetic Foot Ulcers Diabetic shoes/inserts 10/08/21 0825   Wound Length (cm) 0.2 cm 10/08/21 0825   Wound Width (cm) 0.2 cm 10/08/21 0825   Wound Depth (cm) 0.2 cm 10/08/21 0825   Wound Surface Area (cm^2) 0.04 cm^2 10/08/21 0825   Change in Wound Size % (l*w) 84 10/08/21 0825   Wound Volume (cm^3) 0.008 cm^3 10/08/21 0825   Wound Healing % 68 10/08/21 0825   Post-Procedure Length (cm) 0.2 cm 10/08/21 0838   Post-Procedure Width (cm) 0.2 cm 10/08/21 0838   Post-Procedure Depth (cm) 0.2 cm 10/08/21 0838   Post-Procedure Surface Area (cm^2) 0.04 cm^2 10/08/21 0838   Post-Procedure Volume (cm^3) 0.008 cm^3 10/08/21 0838   Distance Tunneling (cm) 0 cm 10/08/21 0825   Tunneling Position ___ O'Clock 0 10/08/21 0825   Undermining Starts ___ O'Clock 0 10/08/21 0825   Undermining Ends___ O'Clock 00 10/08/21 0825   Undermining Maxium Distance (cm) 0 10/08/21 0825   Wound Assessment Pink/red 10/08/21 0825   Drainage Amount Moderate 10/08/21 0825   Drainage Description Yellow 10/08/21 0825   Odor None 10/08/21 0825   Angela-wound Assessment Hyperkeratosis (callous) 10/08/21 0825   Margins Defined edges 10/08/21 0825   Wound Thickness Description not for Pressure Injury Full thickness 10/08/21 0825   Number of days: 35       Percent of Wound(s) Debrided: approximately 100%    Total  Area  Debrided:  0.04 sq cm     Bleeding:  Minimal    Hemostasis Achieved:  by pressure    Procedural Pain:  0  / 10     Post Procedural Pain:  0 / 10     Response to treatment:  Well tolerated by patient. Status of wound progress and description from last visit:   Cleaned a large amount of callus from toe to reveal larger wound bed. Continue plan of care for now and continue to monitor. Follow up 1 week.        Plan:       Discharge Instructions       PHYSICIAN ORDERS AND DISCHARGE INSTRUCTIONS     NOTE: Upon discharge from   Time____________        Treatment Note      Written Patient Dismissal Instructions Given            Electronically signed by JUSTIN Tellez CNP on 10/8/2021 at 8:41 AM

## 2021-10-13 ENCOUNTER — TELEPHONE (OUTPATIENT)
Dept: FAMILY MEDICINE CLINIC | Age: 71
End: 2021-10-13

## 2021-10-13 DIAGNOSIS — E11.9 TYPE 2 DIABETES MELLITUS WITHOUT COMPLICATION, WITHOUT LONG-TERM CURRENT USE OF INSULIN (HCC): Primary | ICD-10-CM

## 2021-10-13 RX ORDER — SILDENAFIL 100 MG/1
TABLET, FILM COATED ORAL
Qty: 30 TABLET | Refills: 1 | Status: SHIPPED | OUTPATIENT
Start: 2021-10-13 | End: 2021-11-05 | Stop reason: SDUPTHER

## 2021-10-13 NOTE — TELEPHONE ENCOUNTER
Ok to order the Trulicity do to the increase   [facility] Order

## 2021-10-15 ENCOUNTER — HOSPITAL ENCOUNTER (OUTPATIENT)
Dept: WOUND CARE | Age: 71
Discharge: HOME | End: 2021-10-15

## 2021-10-19 ENCOUNTER — HOSPITAL ENCOUNTER (OUTPATIENT)
Dept: WOUND CARE | Age: 71
Discharge: HOME | End: 2021-10-19
Payer: MEDICARE

## 2021-10-19 VITALS
DIASTOLIC BLOOD PRESSURE: 52 MMHG | RESPIRATION RATE: 19 BRPM | SYSTOLIC BLOOD PRESSURE: 133 MMHG | TEMPERATURE: 98.8 F | HEART RATE: 59 BPM

## 2021-10-19 DIAGNOSIS — E11.621 DIABETIC ULCER OF TOE OF LEFT FOOT ASSOCIATED WITH TYPE 2 DIABETES MELLITUS, WITH FAT LAYER EXPOSED (HCC): Primary | ICD-10-CM

## 2021-10-19 DIAGNOSIS — L97.522 DIABETIC ULCER OF TOE OF LEFT FOOT ASSOCIATED WITH TYPE 2 DIABETES MELLITUS, WITH FAT LAYER EXPOSED (HCC): Primary | ICD-10-CM

## 2021-10-19 PROCEDURE — 11042 DBRDMT SUBQ TIS 1ST 20SQCM/<: CPT | Performed by: NURSE PRACTITIONER

## 2021-10-19 PROCEDURE — 11042 DBRDMT SUBQ TIS 1ST 20SQCM/<: CPT

## 2021-10-19 RX ORDER — BACITRACIN, NEOMYCIN, POLYMYXIN B 400; 3.5; 5 [USP'U]/G; MG/G; [USP'U]/G
OINTMENT TOPICAL ONCE
Status: CANCELLED | OUTPATIENT
Start: 2021-10-19 | End: 2021-10-19

## 2021-10-19 RX ORDER — CLOBETASOL PROPIONATE 0.5 MG/G
OINTMENT TOPICAL ONCE
Status: CANCELLED | OUTPATIENT
Start: 2021-10-19 | End: 2021-10-19

## 2021-10-19 RX ORDER — GINSENG 100 MG
CAPSULE ORAL ONCE
Status: CANCELLED | OUTPATIENT
Start: 2021-10-19 | End: 2021-10-19

## 2021-10-19 RX ORDER — LIDOCAINE 50 MG/G
OINTMENT TOPICAL ONCE
Status: CANCELLED | OUTPATIENT
Start: 2021-10-19 | End: 2021-10-19

## 2021-10-19 RX ORDER — GENTAMICIN SULFATE 1 MG/G
OINTMENT TOPICAL ONCE
Status: CANCELLED | OUTPATIENT
Start: 2021-10-19 | End: 2021-10-19

## 2021-10-19 RX ORDER — LIDOCAINE 50 MG/G
OINTMENT TOPICAL ONCE
Status: DISCONTINUED | OUTPATIENT
Start: 2021-10-19 | End: 2021-10-20 | Stop reason: HOSPADM

## 2021-10-19 RX ORDER — LIDOCAINE 40 MG/G
CREAM TOPICAL ONCE
Status: CANCELLED | OUTPATIENT
Start: 2021-10-19 | End: 2021-10-19

## 2021-10-19 RX ORDER — LIDOCAINE HYDROCHLORIDE 20 MG/ML
JELLY TOPICAL ONCE
Status: CANCELLED | OUTPATIENT
Start: 2021-10-19 | End: 2021-10-19

## 2021-10-19 RX ORDER — BETAMETHASONE DIPROPIONATE 0.05 %
OINTMENT (GRAM) TOPICAL ONCE
Status: CANCELLED | OUTPATIENT
Start: 2021-10-19 | End: 2021-10-19

## 2021-10-19 RX ORDER — BACITRACIN ZINC AND POLYMYXIN B SULFATE 500; 1000 [USP'U]/G; [USP'U]/G
OINTMENT TOPICAL ONCE
Status: CANCELLED | OUTPATIENT
Start: 2021-10-19 | End: 2021-10-19

## 2021-10-19 RX ORDER — LIDOCAINE HYDROCHLORIDE 40 MG/ML
SOLUTION TOPICAL ONCE
Status: CANCELLED | OUTPATIENT
Start: 2021-10-19 | End: 2021-10-19

## 2021-10-19 NOTE — PROGRESS NOTES
Wound Care Center Progress Note With Procedure    Елена Reyes  AGE: 66 y.o. GENDER: male  : 1950  EPISODE DATE:  10/19/2021     Subjective:     Chief Complaint   Patient presents with    Wound Check     left foot          HISTORY of PRESENT ILLNESS     Nena Mejia is a 66 y. o. 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 The condition is of moderate severity. The toe ulcer has been present for approximately 6 weeks, and the abrasion for approximately 2 weeks at initial wound clinic visit 9/10/21. Right anterior lower leg is healed. The underlying cause is thought to be diabetes and trauma. He first noticed his toe wound as a blister, that eventually popped and has not been healing.  The patients care to date has included evaluation by his PCP, he was given bactrim and keflex. He has also been using iodine at home. [facility] The patient has significant underlying medical conditions as below.      Wound Pain Timing/Severity: none  Quality of pain: N/A  Severity of pain:  0 / 10   Modifying Factors: diabetes  Associated Signs/Symptoms: drainage  Diabetes: Yes, on an oral and Trulicity regimen, last A1c 6.1 on 21  Smoking:No  Obesity: No  Anticoagulant therapy: Eliquis and baby aspirin  Immunosuppression: No     Patient educated on the 6 essential components necessary for wound healing: Circulation, Debridements, Proper Dressings and Topical Wound Products, Infection Control, Edema Control and Offloading.     Patient educated on those factors that negatively effect or impact wound healing: smoking, obesity, uncontrolled diabetes, anticoagulant and immunosuppressive regimens, inadequate nutrition, untreated arterial and venous disease if applicable and measures to manage edema.          PAST MEDICAL HISTORY        Diagnosis Date    Arrhythmia     Pacemaker placed aprox 5 years ago for A Fib per patient    Arthritis 12/2013    rt wrist    Atrial fibrillation (HCC)     on Xarelto - Dr. Concepcion Fisher CAD (coronary artery disease) 06/18/2014    see dr Daryl Meigs kidney disease, stage III (moderate) ([facility] 07/07/2016    Critical illness myopathy 03/15/2021    Diabetes mellitus ([facility]     dx 2004    Diabetic neuropathy associated with type 2 diabetes mellitus ([facility] 04/23/2019    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    Gout 04/2019    \"got gout when had pacer put in because they did not give me my medication for gout \"    H/O 24 hour EKG monitoring 10/03/2013    no afib noted, sinsus rhythm    H/O cardiovascular stress test 05/12/2014    cardiolite- mild ischemia RCA EF50%    H/O echocardiogram 12/01/2020    EF 55-60% severe aortic stenosis mild to mod aortic regurg mod to severe tricuspid regurg severe pulm htn significant changes since 2018 echo.  H/O right and left heart catheterization 12/10/2020    DIFFUSE LAD DISEASE, Mild ECA Disease, Severe AS, Milf Pul HTN on RHC.  H/O transesophageal echocardiography (MABLE) for monitoring 08/05/2013    normal LV function and normal LA appendage without any clot    History of blood transfusion 12/2020    d/t anemia    History of transesophageal echocardiography (MABLE) 12/15/2020    Severe aortic stenosis (ANNA by planimetry: 0.778 cm sq). Mild AR.    [place] (hard of hearing)     hearing tonya aides    Hx of Doppler echocardiogram 05/21/2018    EF 50%  Mild LV hypertrophy. Mildly enlarged RA. Mod aortic valve calcification with mod AS. Mitral annular calcification is present. Mild AR, MR and TR. Mild pulmonary htn.     Hyperlipidemia     Hypertension     Follows with PCP & Dr. Didi Vidal Other disorders of kidney and ureter     Pacemaker     Medtronic, implanted 2014    Pneumonia 12/29/2012    Pneumonia due to COVID-19 virus 08/2020    Sleep apnea     dx 2013- has c-pap    Type II or unspecified type diabetes mellitus with other specified manifestations, uncontrolled 12/12/2012    Venous hypertension, chronic, with ulcer ([address] 12/12/2012    resolved       PAST SURGICAL HISTORY    Past Surgical History:   Procedure Laterality Date    CABG WITH AORTIC VALVE REPLACEMENT N/A 2/16/2021    CABG CORONARY ARTERY BYPASS X2 WITH LIMA, AORTIC VALVE REPLACEMENT AND AORTIC ROOT REPAIR, INTRAOPERATIVE MABLE, INDUCED HYPOTHERMIA, LEFT LEG ENDOVEIN HARVEST, LEFT ATRIAL CLIP, AND CRYO PROCEDURE performed by Fabiola Fothergill, MD at [address] [place] [address]  12/14    at [address] [facility] Left 1/29/2021    LEFT CAROTID ENDARTERECTOMY performed by Frankie Contreras MD at [id number]  2017    COLONOSCOPY  2011    COLONOSCOPY N/A 11/19/2019    COLONOSCOPY DIAGNOSTIC performed by Divina Mccain MD at [place] [address]  09/30/2020    POSSIBLE CECAL avms, SIGMOID DIVERTICULOSIS, INTERNAL HEMORRHOIDS GRADE 1    COLONOSCOPY N/A 9/30/2020    COLONOSCOPY CONTROL HEMORRHAGE WITH APC performed by Divina Mccain MD at [facility]  2014    \"2 stents put in \"    IR NONTUNNELED VASCULAR CATHETER  3/5/2021    IR NONTUNNELED VASCULAR CATHETER 3/5/2021 [facility] SPECIAL PROCEDURES    JOINT REPLACEMENT  2004    total left hip    OTHER SURGICAL HISTORY Right 12/02/2017    I&D; evacuation of hematoma right hip    OTHER SURGICAL HISTORY  09/17/2020    enteroscopy    PACEMAKER INSERTION N/A 2/23/2021    PACEMAKER GENERATOR LEAD REVISION performed by Fabiola Fothergill, MD at [facility]      9/18/14 Status post remote permanent pacemaker with atrial lead dislodgement.  7/24/14 PPM Implant    UPPER GASTROINTESTINAL ENDOSCOPY N/A 9/17/2020    ENTEROSCOPY PUSH BIOPSY performed by Divina Mccain MD at [place] [address] N/A 3/4/2021    EGD DIAGNOSTIC ONLY performed by Divina Mccain MD at [address] [place] [address] \"have stents in both legs- done in [address] History   Problem Relation Age of Onset    High Blood Pressure Mother     Arthritis Mother     Diabetes Mother     Heart Disease Mother     High Blood Pressure Father     Heart Disease Father     Kidney Disease Father        SOCIAL HISTORY    Social History     Tobacco Use    Smoking status: Never Smoker    Smokeless tobacco: Never Used   Vaping Use    Vaping Use: Never used   Substance Use Topics    Alcohol use:  Yes     Alcohol/week: 2.0 standard drinks     Types: 2 Cans of beer per week     Comment: average \"one time per week\"/ Caffiene: 1 cup of coffee daily    Drug use: No       ALLERGIES    Allergies   Allergen Reactions    Spironolactone      CAUSES INCREASED K+    Tape Harshal Blackbird Tape] Rash     SURGICAL TAPE       MEDICATIONS    Current Outpatient Medications on File Prior to Encounter   Medication Sig Dispense Refill    Dulaglutide 3 MG/0.5ML SOPN Inject 3 mg into the skin once a week 4 pen 2    sildenafil (VIAGRA) 100 MG tablet TAKE 1 TABLET DAILY AS     NEEDED FOR ERECTILE        DYSFUNCTION 30 tablet 1    rOPINIRole (REQUIP) 0.5 MG tablet TAKE 1 TABLET 3 TIMES A  tablet 1    canagliflozin (INVOKANA) 300 MG TABS tablet Take 1 tablet by mouth every morning (before breakfast) 90 tablet 1    glimepiride (AMARYL) 4 MG tablet Take 1 tablet by mouth every morning (before breakfast) 90 tablet 1    TRULICITY 1.5 LI/1.3KJ SOPN INJECT 0.5ML (=1.5MG)      SUBCUTANEOUSLY ONCE WEEKLY 12 pen 1    simvastatin (ZOCOR) 20 MG tablet Take 1 tablet by mouth daily 90 tablet 2    torsemide (DEMADEX) 20 MG tablet Take 1 tablet by mouth 2 times daily 180 tablet 3    apixaban (ELIQUIS) 5 MG TABS tablet Take 1 tablet by mouth 2 times daily 28 tablet 0    carboxymethylcellulose (REFRESH PLUS) 0.5 % SOLN ophthalmic solution INSTILL 1 DROP IN BOTH EYES THREE TIMES A DAY      Cholecalciferol (VITAMIN D) 50 MCG (2000 UT) CAPS capsule take the time to fill this survey out.     Wound care order history:                 YESSENIA's   Right  0.93     Left  0.55           Date:  9/3/21              Cultures:  Left 3rd Toe cultured 9/3/21              Grafts:                Antibiotics:            Continuing wound care orders and information:                 Residence:  Home              Continue home health care with:               Your wound-care supplies will be provided by: [facility]     Wound cleansing:               DM not scrub or use excessive force.              Wash hands with soap and water before and after dressing changes.             GQKOG to applying a clean dressing, cleanse wound with normal saline, wound cleanser, or mild soap and water.               Ask the physician or nurse before getting the wound(s) wet in a shower     Daily Wound management:              Keep weight off wounds and reposition every 2 hours.              Avoid standing for long periods of time.              Apply wraps/stockings in AM and remove at bedtime.              If swelling is present, elevate legs to the level of the heart or above for 30 minutes 4-5 times a day and/or when sitting.                                      When taking antibiotics take entire prescription as ordered by physician do not stop taking until medicine is all gone.                                                           Orders for this week: 10/19/21                  Left 3rd Toe cultured 9/3/21     [facility] [address]        Left 3rd Toe - Wash with soap and water, pat dry. Apply stimulen to wound bed. Cover with double thick layer of ca alginate from base of toe and on sides between toes. Wrap with conform and secure with tape. Change daily.        Follow up with Darya Dempsey CNP in 1 week in the wound care center. Call [phone] for any questions or concerns.   Date__________   Time____________        Treatment Note Wound 09/03/21 #1 Left 3rd Toe Plantar-Dressing/Treatment: (luis carlos gatica alg, conform tape )    Written Patient Dismissal Instructions Given            Electronically signed by JUSTIN Ramon CNP on 10/19/2021 at 1:37 PM

## 2021-10-21 ENCOUNTER — TELEPHONE (OUTPATIENT)
Dept: FAMILY MEDICINE CLINIC | Age: 71
End: 2021-10-21

## 2021-10-21 NOTE — TELEPHONE ENCOUNTER
----- Message from Milagroselkejoan Marie sent at 10/15/2021  2:20 PM EDT -----  Subject: Medication Problem    QUESTIONS  Name of Medication? TRULICITY 1.5 TE/2.4MI SOPN  Patient-reported dosage and instructions? N/A  What question or problem do you have with the medication? Pt stated that   his Trulicty should be increased, was waiting for change of med  [id number] [address], [address] [facility] [address]  Pharmacy phone number (if available)? [phone]  Additional Information for Provider?   ---------------------------------------------------------------------------  --------------  [address]  What is the best way for the office to contact you? OK to leave message on   voicemail  Preferred Call Back Phone Number? [phone]  ---------------------------------------------------------------------------  --------------  SCRIPT ANSWERS  Relationship to Patient?  Self

## 2021-10-21 NOTE — TELEPHONE ENCOUNTER
Called patient and made sure he got his 3mg trulicity. I verified for him that it is just 3mg not his old 1.5 mg at this time.

## 2021-11-02 ENCOUNTER — HOSPITAL ENCOUNTER (OUTPATIENT)
Dept: WOUND CARE | Age: 71
Discharge: HOME | End: 2021-11-02
Payer: MEDICARE

## 2021-11-02 DIAGNOSIS — E11.621 DIABETIC ULCER OF TOE OF LEFT FOOT ASSOCIATED WITH TYPE 2 DIABETES MELLITUS, WITH FAT LAYER EXPOSED (HCC): Primary | ICD-10-CM

## 2021-11-02 DIAGNOSIS — L97.522 DIABETIC ULCER OF TOE OF LEFT FOOT ASSOCIATED WITH TYPE 2 DIABETES MELLITUS, WITH FAT LAYER EXPOSED (HCC): Primary | ICD-10-CM

## 2021-11-02 DIAGNOSIS — L97.912 NON-PRESSURE ULCER OF RIGHT LOWER EXTREMITY WITH FAT LAYER EXPOSED (HCC): ICD-10-CM

## 2021-11-02 PROCEDURE — 11042 DBRDMT SUBQ TIS 1ST 20SQCM/<: CPT

## 2021-11-02 PROCEDURE — 11042 DBRDMT SUBQ TIS 1ST 20SQCM/<: CPT | Performed by: NURSE PRACTITIONER

## 2021-11-02 RX ORDER — BACITRACIN ZINC AND POLYMYXIN B SULFATE 500; 1000 [USP'U]/G; [USP'U]/G
OINTMENT TOPICAL ONCE
Status: CANCELLED | OUTPATIENT
Start: 2021-11-02 | End: 2021-11-02

## 2021-11-02 RX ORDER — BACITRACIN, NEOMYCIN, POLYMYXIN B 400; 3.5; 5 [USP'U]/G; MG/G; [USP'U]/G
OINTMENT TOPICAL ONCE
Status: CANCELLED | OUTPATIENT
Start: 2021-11-02 | End: 2021-11-02

## 2021-11-02 RX ORDER — GINSENG 100 MG
CAPSULE ORAL ONCE
Status: CANCELLED | OUTPATIENT
Start: 2021-11-02 | End: 2021-11-02

## 2021-11-02 RX ORDER — GENTAMICIN SULFATE 1 MG/G
OINTMENT TOPICAL ONCE
Status: CANCELLED | OUTPATIENT
Start: 2021-11-02 | End: 2021-11-02

## 2021-11-02 RX ORDER — LIDOCAINE HYDROCHLORIDE 40 MG/ML
SOLUTION TOPICAL ONCE
Status: DISCONTINUED | OUTPATIENT
Start: 2021-11-02 | End: 2021-11-03 | Stop reason: HOSPADM

## 2021-11-02 RX ORDER — LIDOCAINE 40 MG/G
CREAM TOPICAL ONCE
Status: CANCELLED | OUTPATIENT
Start: 2021-11-02 | End: 2021-11-02

## 2021-11-02 RX ORDER — LIDOCAINE HYDROCHLORIDE 40 MG/ML
SOLUTION TOPICAL ONCE
Status: CANCELLED | OUTPATIENT
Start: 2021-11-02 | End: 2021-11-02

## 2021-11-02 RX ORDER — BETAMETHASONE DIPROPIONATE 0.05 %
OINTMENT (GRAM) TOPICAL ONCE
Status: CANCELLED | OUTPATIENT
Start: 2021-11-02 | End: 2021-11-02

## 2021-11-02 RX ORDER — LIDOCAINE 50 MG/G
OINTMENT TOPICAL ONCE
Status: CANCELLED | OUTPATIENT
Start: 2021-11-02 | End: 2021-11-02

## 2021-11-02 RX ORDER — LIDOCAINE HYDROCHLORIDE 20 MG/ML
JELLY TOPICAL ONCE
Status: CANCELLED | OUTPATIENT
Start: 2021-11-02 | End: 2021-11-02

## 2021-11-02 RX ORDER — CLOBETASOL PROPIONATE 0.5 MG/G
OINTMENT TOPICAL ONCE
Status: CANCELLED | OUTPATIENT
Start: 2021-11-02 | End: 2021-11-02

## 2021-11-02 NOTE — PROGRESS NOTES
Wound Care Center Progress Note With Procedure    Leonard Duran  AGE: 66 y.o. GENDER: male  : 1950  EPISODE DATE:  2021     Subjective:     Chief Complaint   Patient presents with    Wound Check     Left 2nd toe          HISTORY of PRESENT ILLNESS     Tray Mejia is a 66 y. o. 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 The condition is of moderate severity.  The toe ulcer has been present for approximately 6 weeks, and the abrasion for approximately 2 weeks at initial wound clinic visit 9/10/21. Right anterior lower leg is healed. The underlying cause is thought to be diabetes and trauma. He first noticed his toe wound as a blister, that eventually popped and has not been healing.  The patients care to date has included evaluation by his PCP, he was given bactrim and keflex. He has also been using iodine at home. [facility] The patient has significant underlying medical conditions as below.      Wound Pain Timing/Severity: none  Quality of pain: N/A  Severity of pain:  0 / 10   Modifying Factors: diabetes  Associated Signs/Symptoms: drainage  Diabetes: Yes, on an oral and Trulicity regimen, last A1c 6.1 on 21  Smoking:No  Obesity: No  Anticoagulant therapy: Eliquis and baby aspirin  Immunosuppression: No     Patient educated on the 6 essential components necessary for wound healing: Circulation, Debridements, Proper Dressings and Topical Wound Products, Infection Control, Edema Control and Offloading.     Patient educated on those factors that negatively effect or impact wound healing: smoking, obesity, uncontrolled diabetes, anticoagulant and immunosuppressive regimens, inadequate nutrition, untreated arterial and venous disease if applicable and measures to manage edema.         PAST MEDICAL HISTORY        Diagnosis Date    Arrhythmia     Pacemaker placed aprox 5 mellitus with other specified manifestations, uncontrolled 12/12/2012    Venous hypertension, chronic, with ulcer ([address] 12/12/2012    resolved       PAST SURGICAL HISTORY    Past Surgical History:   Procedure Laterality Date    CABG WITH AORTIC VALVE REPLACEMENT N/A 2/16/2021    CABG CORONARY ARTERY BYPASS X2 WITH LIMA, AORTIC VALVE REPLACEMENT AND AORTIC ROOT REPAIR, INTRAOPERATIVE MABLE, INDUCED HYPOTHERMIA, LEFT LEG ENDOVEIN HARVEST, LEFT ATRIAL CLIP, AND CRYO PROCEDURE performed by Minta Hashimoto, MD at [facility]  12/14    at [address] Left 1/29/2021    LEFT CAROTID ENDARTERECTOMY performed by Aggie Ventura MD at [id number]  2017    COLONOSCOPY  2011    COLONOSCOPY N/A 11/19/2019    COLONOSCOPY DIAGNOSTIC performed by Kvng Recinos MD at [place] [address]  09/30/2020    POSSIBLE CECAL avms, SIGMOID DIVERTICULOSIS, INTERNAL HEMORRHOIDS GRADE 1    COLONOSCOPY N/A 9/30/2020    COLONOSCOPY CONTROL HEMORRHAGE WITH APC performed by Kvng Recinos MD at [address] [facility]  2014    \"2 stents put in \"    IR NONTUNNELED VASCULAR CATHETER  3/5/2021    IR NONTUNNELED VASCULAR CATHETER 3/5/2021 [facility] SPECIAL PROCEDURES    JOINT REPLACEMENT  2004    total left hip    OTHER SURGICAL HISTORY Right 12/02/2017    I&D; evacuation of hematoma right hip    OTHER SURGICAL HISTORY  09/17/2020    enteroscopy    PACEMAKER INSERTION N/A 2/23/2021    PACEMAKER GENERATOR LEAD REVISION performed by Minta Hashimoto, MD at [facility]      9/18/14 Status post remote permanent pacemaker with atrial lead dislodgement.  7/24/14 PPM Implant    UPPER GASTROINTESTINAL ENDOSCOPY N/A 9/17/2020    ENTEROSCOPY PUSH BIOPSY performed by Kvng Recinos MD at [place] [address] N/A 3/4/2021    EGD DIAGNOSTIC ONLY performed by Kvng Recinos MD at [address] [place] [address] \"have stents in both legs- done in [address] History   Problem Relation Age of Onset    High Blood Pressure Mother     Arthritis Mother     Diabetes Mother     Heart Disease Mother     High Blood Pressure Father     Heart Disease Father     Kidney Disease Father        SOCIAL HISTORY    Social History     Tobacco Use    Smoking status: Never Smoker    Smokeless tobacco: Never Used   Vaping Use    Vaping Use: Never used   Substance Use Topics    Alcohol use:  Yes     Alcohol/week: 2.0 standard drinks     Types: 2 Cans of beer per week     Comment: average \"one time per week\"/ Caffiene: 1 cup of coffee daily    Drug use: No       ALLERGIES    Allergies   Allergen Reactions    Spironolactone      CAUSES INCREASED K+    Tape Dietra Babcock Tape] Rash     SURGICAL TAPE       MEDICATIONS    Current Outpatient Medications on File Prior to Encounter   Medication Sig Dispense Refill    Dulaglutide 3 MG/0.5ML SOPN Inject 3 mg into the skin once a week 4 pen 2    sildenafil (VIAGRA) 100 MG tablet TAKE 1 TABLET DAILY AS     NEEDED FOR ERECTILE        DYSFUNCTION 30 tablet 1    rOPINIRole (REQUIP) 0.5 MG tablet TAKE 1 TABLET 3 TIMES A  tablet 1    canagliflozin (INVOKANA) 300 MG TABS tablet Take 1 tablet by mouth every morning (before breakfast) 90 tablet 1    glimepiride (AMARYL) 4 MG tablet Take 1 tablet by mouth every morning (before breakfast) 90 tablet 1    torsemide (DEMADEX) 20 MG tablet Take 1 tablet by mouth 2 times daily 180 tablet 3    apixaban (ELIQUIS) 5 MG TABS tablet Take 1 tablet by mouth 2 times daily 28 tablet 0    carboxymethylcellulose (REFRESH PLUS) 0.5 % SOLN ophthalmic solution INSTILL 1 DROP IN BOTH EYES THREE TIMES A DAY      Cholecalciferol (VITAMIN D) 50 MCG (2000 UT) CAPS capsule Take by mouth nightly       aspirin 81 MG tablet Take 81 mg by mouth daily      simvastatin (ZOCOR) 20 MG tablet Take 1 tablet by mouth daily 90 tablet 2     No current facility-administered medications on file prior to encounter. REVIEW OF SYSTEMS    Pertinent items are noted in HPI. Constitutional: Negative for systemic symptoms including fever, chills and malaise. Objective: There were no vitals taken for this visit. PHYSICAL EXAM    General: The patient is in no acute distress. Mental status:  Patient is appropriate, is  oriented to place and plan of care. 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 Performed by: JUSTIN Polk - CNP    Consent obtained: Yes    Time out taken:  Yes    Pain Control: Anesthetic  Anesthetic: 4% Lidocaine Liquid Topical     Debridement:Excisional Debridement    Using curette the wound(s) was/were sharply debrided down through and including the removal of subcutaneous tissue.         Devitalized Tissue Debrided:  slough and exudate    Pre Debridement Measurements:  Are located in the Wound Documentation Flow Sheet    All active wounds listed below with today's date are evaluated  Wound(s)    debrided this date include # : 1     Post  Debridement Measurements:  Wound 09/03/21 #1 Left 3rd Toe Plantar (Active)   Wound Image   10/19/21 1309   Wound Etiology Diabetic 10/19/21 1309   Dressing Status New dressing applied 10/19/21 1323   Wound if you would take the time to fill this survey out.     Wound care order history:                 YESSENIA's   Right  0.93     Left  0.55           Date:  9/3/21              Cultures:  Left 3rd Toe cultured 9/3/21              Grafts:                Antibiotics:            Continuing wound care orders and information:                 Residence:  Home              Continue home health care with:               Your wound-care supplies will be provided by: [facility]     Wound cleansing:               SL not scrub or use excessive force.              Wash hands with soap and water before and after dressing changes.             HXKBJ to applying a clean dressing, cleanse wound with normal saline, wound cleanser, or mild soap and water.               Ask the physician or nurse before getting the wound(s) wet in a shower     Daily Wound management:              Keep weight off wounds and reposition every 2 hours.              Avoid standing for long periods of time.              Apply wraps/stockings in AM and remove at bedtime.              If swelling is present, elevate legs to the level of the heart or above for 30 minutes 4-5 times a day and/or when sitting.                                      When taking antibiotics take entire prescription as ordered by physician do not stop taking until medicine is all gone.                                                           Orders for this week: 11/2/21                  Left 3rd Toe cultured 9/3/21     Rx: [facility] [address]        Left 3rd Toe - Wash with soap and water, pat dry. Apply stimulen to wound bed. Cover with double thick layer of ca alginate from base of toe and on sides between toes. Wrap with conform and secure with tape. Change daily.        Follow up with Geeta Jain CNP in 1 week in the wound care center. Call [phone] for any questions or concerns.   Date__________   Time____________          Treatment Note      Written Patient Dismissal Instructions Given Electronically signed by Moshe Landau, APRN - CNP on 11/2/2021 at 1:34 PM

## 2021-11-05 ENCOUNTER — OFFICE VISIT (OUTPATIENT)
Dept: FAMILY MEDICINE CLINIC | Age: 71
End: 2021-11-05
Payer: MEDICARE

## 2021-11-05 VITALS
WEIGHT: 196 LBS | OXYGEN SATURATION: 98 % | HEART RATE: 78 BPM | SYSTOLIC BLOOD PRESSURE: 132 MMHG | BODY MASS INDEX: 27.44 KG/M2 | DIASTOLIC BLOOD PRESSURE: 71 MMHG | HEIGHT: 71 IN | RESPIRATION RATE: 16 BRPM

## 2021-11-05 DIAGNOSIS — I25.118 ATHEROSCLEROTIC HEART DISEASE OF NATIVE CORONARY ARTERY WITH OTHER FORMS OF ANGINA PECTORIS (HCC): ICD-10-CM

## 2021-11-05 DIAGNOSIS — G62.9 PERIPHERAL POLYNEUROPATHY: ICD-10-CM

## 2021-11-05 DIAGNOSIS — E11.9 TYPE 2 DIABETES MELLITUS WITHOUT COMPLICATION, WITHOUT LONG-TERM CURRENT USE OF INSULIN (HCC): Primary | ICD-10-CM

## 2021-11-05 DIAGNOSIS — I10 ESSENTIAL HYPERTENSION: ICD-10-CM

## 2021-11-05 DIAGNOSIS — E11.49 OTHER DIABETIC NEUROLOGICAL COMPLICATION ASSOCIATED WITH TYPE 2 DIABETES MELLITUS (HCC): ICD-10-CM

## 2021-11-05 DIAGNOSIS — L97.909 VENOUS HYPERTENSION, CHRONIC, WITH ULCER (HCC): ICD-10-CM

## 2021-11-05 DIAGNOSIS — I25.10 CORONARY ARTERY STENOSIS: ICD-10-CM

## 2021-11-05 DIAGNOSIS — N52.9 VASCULOGENIC ERECTILE DYSFUNCTION, UNSPECIFIED VASCULOGENIC ERECTILE DYSFUNCTION TYPE: ICD-10-CM

## 2021-11-05 DIAGNOSIS — I87.319 VENOUS HYPERTENSION, CHRONIC, WITH ULCER (HCC): ICD-10-CM

## 2021-11-05 DIAGNOSIS — I27.20 PULMONARY HYPERTENSION, UNSPECIFIED (HCC): ICD-10-CM

## 2021-11-05 PROCEDURE — 3051F HG A1C>EQUAL 7.0%<8.0%: CPT | Performed by: STUDENT IN AN ORGANIZED HEALTH CARE EDUCATION/TRAINING PROGRAM

## 2021-11-05 PROCEDURE — 99214 OFFICE O/P EST MOD 30 MIN: CPT | Performed by: STUDENT IN AN ORGANIZED HEALTH CARE EDUCATION/TRAINING PROGRAM

## 2021-11-05 RX ORDER — ROPINIROLE 0.5 MG/1
TABLET, FILM COATED ORAL
Qty: 270 TABLET | Refills: 1 | Status: SHIPPED | OUTPATIENT
Start: 2021-11-05 | End: 2022-03-21

## 2021-11-05 RX ORDER — SILDENAFIL 100 MG/1
TABLET, FILM COATED ORAL
Qty: 30 TABLET | Refills: 1 | Status: SHIPPED | OUTPATIENT
Start: 2021-11-05 | End: 2022-05-09 | Stop reason: SDUPTHER

## 2021-11-05 RX ORDER — GABAPENTIN 300 MG/1
300 CAPSULE ORAL 3 TIMES DAILY
Qty: 90 CAPSULE | Refills: 2 | Status: SHIPPED | OUTPATIENT
Start: 2021-11-05 | End: 2021-11-23

## 2021-11-05 RX ORDER — GLIMEPIRIDE 4 MG/1
4 TABLET ORAL
Qty: 90 TABLET | Refills: 1 | Status: SHIPPED | OUTPATIENT
Start: 2021-11-05 | End: 2022-04-15

## 2021-11-05 NOTE — PROGRESS NOTES
11/15/2021    Mikey Capone    Chief Complaint   Patient presents with    3 Month Follow-Up     Presenting for three month follow up visit.  Discuss Medications     Ropinirole questions       HPI  History was obtained from patient. Aida Jj is a 66 y.o. male with a PMHx as listed below who presents today for 3 month follow up on chronic conditions. No acute complaints    Complaining of restless legs. Requip not helping as much  Monitors glucose at home levels between 110-112. Following with wound maagement, improving    Notes loss of smell certain foods post covid  No significant memory issues  Capable of all ADLs, IADLs. 1. Type 2 diabetes mellitus without complication, without long-term current use of insulin ([address]    2. Other diabetic neurological complication associated with type 2 diabetes mellitus ([address]    3. Coronary artery stenosis    4. Pulmonary hypertension, unspecified ([address]    5. Venous hypertension, chronic, with ulcer ([address]    6. Atherosclerotic heart disease of native coronary artery with other forms of angina pectoris ([address]    7. Essential hypertension    8. Peripheral polyneuropathy    9. Vasculogenic erectile dysfunction, unspecified vasculogenic erectile dysfunction type             REVIEW OF SYMPTOMS    Review of Systems   Constitutional: Negative for chills and fatigue. HENT: Negative for congestion and sore throat. Respiratory: Negative for shortness of breath and wheezing. Cardiovascular: Negative for chest pain and palpitations. Gastrointestinal: Negative for abdominal pain and nausea. Genitourinary: Negative for frequency and urgency. Neurological: Negative for light-headedness.        PAST MEDICAL HISTORY  Past Medical History:   Diagnosis Date    Arrhythmia     Pacemaker placed aprox 5 years ago for A Fib per patient    Arthritis 12/2013    rt wrist    Atrial fibrillation ([address]     on [facility] - [facility] CAD (coronary artery disease) 06/18/2014    see  Problem Relation Age of Onset    High Blood Pressure Mother     Arthritis Mother     Diabetes Mother     Heart Disease Mother     High Blood Pressure Father     Heart Disease Father     Kidney Disease Father        SOCIAL HISTORY  Social History     Socioeconomic History    Marital status:      Spouse name: None    Number of children: None    Years of education: None    Highest education level: None   Occupational History    None   Tobacco Use    Smoking status: Never Smoker    Smokeless tobacco: Never Used   Vaping Use    Vaping Use: Never used   Substance and Sexual Activity    Alcohol use: Yes     Alcohol/week: 2.0 standard drinks     Types: 2 Cans of beer per week     Comment: average \"one time per week\"/ Caffiene: 1 cup of coffee daily    Drug use: No    Sexual activity: Yes     Partners: Female     Comment:    Other Topics Concern    None   Social History Narrative    None     Social Determinants of Health     Financial Resource Strain: Low Risk     Difficulty of Paying Living Expenses: Not hard at all   Food Insecurity: No Food Insecurity    Worried About Running Out of Food in the Last Year: Never true    Kiko of Food in the Last Year: Never true   Transportation Needs:     Lack of Transportation (Medical): Not on file    Lack of Transportation (Non-Medical):  Not on file   Physical Activity:     Days of Exercise per Week: Not on file    Minutes of Exercise per Session: Not on file   Stress:     Feeling of Stress : Not on file   Social Connections:     Frequency of Communication with Friends and Family: Not on file    Frequency of Social Gatherings with Friends and Family: Not on file    Attends Hinduism Services: Not on file    Active Member of Clubs or Organizations: Not on file    Attends Club or Organization Meetings: Not on file    Marital Status: Not on file   Intimate Partner Violence:     Fear of Current or Ex-Partner: Not on file   [employer] Abused: Not on file    Physically Abused: Not on file    Sexually Abused: Not on file   Housing Stability:     Unable to Pay for Housing in the Last Year: Not on file    Number of Places Lived in the Last Year: Not on file    Unstable Housing in the Last Year: Not on file        SURGICAL HISTORY  Past Surgical History:   Procedure Laterality Date    CABG WITH AORTIC VALVE REPLACEMENT N/A 2/16/2021    CABG CORONARY ARTERY BYPASS X2 WITH LIMA, AORTIC VALVE REPLACEMENT AND AORTIC ROOT REPAIR, INTRAOPERATIVE MABLE, INDUCED HYPOTHERMIA, LEFT LEG ENDOVEIN HARVEST, LEFT ATRIAL CLIP, AND CRYO PROCEDURE performed by Merissa Morrell MD at [address] [place] [address]  12/14    at [address] [facility] [address] Left 1/29/2021    LEFT CAROTID ENDARTERECTOMY performed by Steph Robbins MD at [address]  2017    COLONOSCOPY  2011    COLONOSCOPY N/A 11/19/2019    COLONOSCOPY DIAGNOSTIC performed by Yolanda Cheney MD at [place] [address]  09/30/2020    POSSIBLE CECAL avms, SIGMOID DIVERTICULOSIS, INTERNAL HEMORRHOIDS GRADE 1    COLONOSCOPY N/A 9/30/2020    COLONOSCOPY CONTROL HEMORRHAGE WITH APC performed by Yolanda Cheney MD at [facility]  2014    \"2 stents put in \"    IR NONTUNNELED VASCULAR CATHETER  3/5/2021    IR NONTUNNELED VASCULAR CATHETER 3/5/2021 [facility] SPECIAL PROCEDURES    JOINT REPLACEMENT  2004    total left hip    OTHER SURGICAL HISTORY Right 12/02/2017    I&D; evacuation of hematoma right hip    OTHER SURGICAL HISTORY  09/17/2020    enteroscopy    PACEMAKER INSERTION N/A 2/23/2021    PACEMAKER GENERATOR LEAD REVISION performed by Mer[facility]rell MD at [facility]      9/18/14 Status post remote permanent pacemaker with atrial lead dislodgement.  7/24/14 PPM Implant    UPPER GASTROINTESTINAL ENDOSCOPY N/A 9/17/2020    ENTEROSCOPY PUSH BIOPSY performed by Yolanda Cheney MD at [facility] ENDOSCOPY N/A 3/4/2021    EGD DIAGNOSTIC ONLY performed by Bhanu Davis MD at [address]  2012    \"have stents in both legs- done in [facility]  Current Outpatient Medications   Medication Sig Dispense Refill    canagliflozin (INVOKANA) 300 MG TABS tablet Take 1 tablet by mouth every morning (before breakfast) 90 tablet 1    Dulaglutide 3 MG/0.5ML SOPN Inject 3 mg into the skin once a week 4 pen 2    glimepiride (AMARYL) 4 MG tablet Take 1 tablet by mouth every morning (before breakfast) 90 tablet 1    rOPINIRole (REQUIP) 0.5 MG tablet TAKE 1 TABLET 3 TIMES A  tablet 1    sildenafil (VIAGRA) 100 MG tablet TAKE 1 TABLET DAILY AS     NEEDED FOR ERECTILE        DYSFUNCTION 30 tablet 1    gabapentin (NEURONTIN) 300 MG capsule Take 1 capsule by mouth 3 times daily for 90 days. 90 capsule 2    torsemide (DEMADEX) 20 MG tablet Take 1 tablet by mouth 2 times daily 180 tablet 3    apixaban (ELIQUIS) 5 MG TABS tablet Take 1 tablet by mouth 2 times daily 28 tablet 0    carboxymethylcellulose (REFRESH PLUS) 0.5 % SOLN ophthalmic solution INSTILL 1 DROP IN BOTH EYES THREE TIMES A DAY      Cholecalciferol (VITAMIN D) 50 MCG (2000 UT) CAPS capsule Take by mouth nightly       aspirin 81 MG tablet Take 81 mg by mouth daily      simvastatin (ZOCOR) 20 MG tablet Take 1 tablet by mouth daily 90 tablet 2     No current facility-administered medications for this visit. ALLERGIES  Allergies   Allergen Reactions    Spironolactone      CAUSES INCREASED K+    Tape Claude Erm Tape] Rash     SURGICAL TAPE       PHYSICAL EXAM    /71   Pulse 78   Resp 16   Ht 5' 11\" (1.803 m)   Wt 196 lb (88.9 kg)   SpO2 98%   BMI 27.34 kg/m²     Physical Exam  Constitutional:       Appearance: Normal appearance. HENT:      Head: Normocephalic and atraumatic. Eyes:      Extraocular Movements: Extraocular movements intact.       Pupils: Pupils are equal, round, and reactive to light. Cardiovascular:      Rate and Rhythm: Normal rate and regular rhythm. Pulses: Normal pulses. Heart sounds: No murmur heard. No friction rub. No gallop. Skin:     General: Skin is warm and dry. Neurological:      General: No focal deficit present. Mental Status: He is alert. Psychiatric:         Mood and Affect: Mood normal.         Behavior: Behavior normal.         ASSESSMENT & PLAN    1. Type 2 diabetes mellitus without complication, without long-term current use of insulin ([facility])  a1c 7.5, goal <7 otherwise patient has been more compliant with medicaitons recently will hold on adjusting until next visit    - canagliflozin (INVOKANA) 300 MG TABS tablet; Take 1 tablet by mouth every morning (before breakfast)  Dispense: 90 tablet; Refill: 1  - Dulaglutide 3 MG/0.5ML SOPN; Inject 3 mg into the skin once a week  Dispense: 4 pen; Refill: 2  - glimepiride (AMARYL) 4 MG tablet; Take 1 tablet by mouth every morning (before breakfast)  Dispense: 90 tablet; Refill: 1  - Microalbumin / Creatinine Urine Ratio; Future  - Hemoglobin A1C; Future  - Lipid, Fasting; Future    2. Other diabetic neurological complication associated with type 2 diabetes mellitus ([facility]  -increase requip, patient has had improvement on this condition in the past   - rOPINIRole (REQUIP) 0.5 MG tablet; TAKE 1 TABLET 3 TIMES A DAY  Dispense: 270 tablet; Refill: 1    3. Coronary artery stenosis  -continue current regimen following with [facility]    4. Pulmonary hypertension, unspecified ([address]  -stable    5. Venous hypertension, chronic, with ulcer ([address]  -improving continue current management    6. Atherosclerotic heart disease of native coronary artery with other forms of angina pectoris ([address]  -as above    7. Essential hypertension  stable    8. Peripheral polyneuropathy  No signs of misuse if pain not improving consider increasing gabapentin/ consider further imaging given hx.  CV disesase  - gabapentin (NEURONTIN) 300 MG capsule; Take 1 capsule by mouth 3 times daily for 90 days. Dispense: 90 capsule; Refill: 2    9. Vasculogenic erectile dysfunction, unspecified vasculogenic erectile dysfunction type  -likely multifactorial, cv disease/psychologic, stable with current regimen  - sildenafil (VIAGRA) 100 MG tablet; TAKE 1 TABLET DAILY AS     NEEDED FOR ERECTILE        DYSFUNCTION  Dispense: 30 tablet; Refill: 1      Return in about 3 months (around 2/5/2022).          Electronically signed by Karen Xavier DO on 11/15/2021

## 2021-11-09 ENCOUNTER — HOSPITAL ENCOUNTER (OUTPATIENT)
Dept: WOUND CARE | Age: 71
Discharge: HOME | End: 2021-11-09
Payer: MEDICARE

## 2021-11-09 VITALS
SYSTOLIC BLOOD PRESSURE: 130 MMHG | HEART RATE: 113 BPM | DIASTOLIC BLOOD PRESSURE: 80 MMHG | TEMPERATURE: 98 F | RESPIRATION RATE: 18 BRPM

## 2021-11-09 DIAGNOSIS — L97.522 DIABETIC ULCER OF TOE OF LEFT FOOT ASSOCIATED WITH TYPE 2 DIABETES MELLITUS, WITH FAT LAYER EXPOSED (HCC): Primary | ICD-10-CM

## 2021-11-09 DIAGNOSIS — E11.621 DIABETIC ULCER OF TOE OF LEFT FOOT ASSOCIATED WITH TYPE 2 DIABETES MELLITUS, WITH FAT LAYER EXPOSED (HCC): Primary | ICD-10-CM

## 2021-11-09 PROCEDURE — 11042 DBRDMT SUBQ TIS 1ST 20SQCM/<: CPT

## 2021-11-09 PROCEDURE — 11042 DBRDMT SUBQ TIS 1ST 20SQCM/<: CPT | Performed by: NURSE PRACTITIONER

## 2021-11-09 RX ORDER — LIDOCAINE HYDROCHLORIDE 40 MG/ML
SOLUTION TOPICAL ONCE
Status: CANCELLED | OUTPATIENT
Start: 2021-11-09 | End: 2021-11-09

## 2021-11-09 RX ORDER — BETAMETHASONE DIPROPIONATE 0.05 %
OINTMENT (GRAM) TOPICAL ONCE
Status: CANCELLED | OUTPATIENT
Start: 2021-11-09 | End: 2021-11-09

## 2021-11-09 RX ORDER — GENTAMICIN SULFATE 1 MG/G
OINTMENT TOPICAL ONCE
Status: CANCELLED | OUTPATIENT
Start: 2021-11-09 | End: 2021-11-09

## 2021-11-09 RX ORDER — LIDOCAINE 50 MG/G
OINTMENT TOPICAL ONCE
Status: CANCELLED | OUTPATIENT
Start: 2021-11-09 | End: 2021-11-09

## 2021-11-09 RX ORDER — LIDOCAINE HYDROCHLORIDE 20 MG/ML
JELLY TOPICAL ONCE
Status: CANCELLED | OUTPATIENT
Start: 2021-11-09 | End: 2021-11-09

## 2021-11-09 RX ORDER — GENTAMICIN SULFATE 1 MG/G
OINTMENT TOPICAL ONCE
Status: DISCONTINUED | OUTPATIENT
Start: 2021-11-09 | End: 2021-11-10 | Stop reason: HOSPADM

## 2021-11-09 RX ORDER — BACITRACIN ZINC AND POLYMYXIN B SULFATE 500; 1000 [USP'U]/G; [USP'U]/G
OINTMENT TOPICAL ONCE
Status: CANCELLED | OUTPATIENT
Start: 2021-11-09 | End: 2021-11-09

## 2021-11-09 RX ORDER — LIDOCAINE 40 MG/G
CREAM TOPICAL ONCE
Status: CANCELLED | OUTPATIENT
Start: 2021-11-09 | End: 2021-11-09

## 2021-11-09 RX ORDER — LIDOCAINE HYDROCHLORIDE 40 MG/ML
SOLUTION TOPICAL ONCE
Status: DISCONTINUED | OUTPATIENT
Start: 2021-11-09 | End: 2021-11-10 | Stop reason: HOSPADM

## 2021-11-09 RX ORDER — GINSENG 100 MG
CAPSULE ORAL ONCE
Status: CANCELLED | OUTPATIENT
Start: 2021-11-09 | End: 2021-11-09

## 2021-11-09 RX ORDER — BACITRACIN, NEOMYCIN, POLYMYXIN B 400; 3.5; 5 [USP'U]/G; MG/G; [USP'U]/G
OINTMENT TOPICAL ONCE
Status: CANCELLED | OUTPATIENT
Start: 2021-11-09 | End: 2021-11-09

## 2021-11-09 RX ORDER — CLOBETASOL PROPIONATE 0.5 MG/G
OINTMENT TOPICAL ONCE
Status: CANCELLED | OUTPATIENT
Start: 2021-11-09 | End: 2021-11-09

## 2021-11-10 ENCOUNTER — TELEPHONE (OUTPATIENT)
Dept: FAMILY MEDICINE CLINIC | Age: 71
End: 2021-11-10

## 2021-11-10 NOTE — TELEPHONE ENCOUNTER
Patient called and Stated the Medication that he just got Refilled Gabapentin/Eliquis. Patient was supposed to be a 90 day Supply. Patient only got 30 day. Patient states all of his Medications are supposed to be 90 day supply. Patient needs this to be changed on his Medication Orders. Patient states he pays a lot more Money for 30 day supply Vs 90 Day.

## 2021-11-11 DIAGNOSIS — E11.9 TYPE 2 DIABETES MELLITUS WITHOUT COMPLICATION, WITHOUT LONG-TERM CURRENT USE OF INSULIN (HCC): ICD-10-CM

## 2021-11-11 DIAGNOSIS — G62.9 PERIPHERAL POLYNEUROPATHY: ICD-10-CM

## 2021-11-11 RX ORDER — GLIMEPIRIDE 4 MG/1
4 TABLET ORAL
Qty: 90 TABLET | Refills: 1 | OUTPATIENT
Start: 2021-11-11 | End: 2022-05-10

## 2021-11-12 NOTE — PROGRESS NOTES
Wound Care Center Progress Note With Procedure    Carolyn Arredondo  AGE: 66 y.o. GENDER: male  : 1950  EPISODE DATE:  2021     Subjective:     Chief Complaint   Patient presents with    Wound Check     left toe         HISTORY of PRESENT ILLNESS     Ya Mejia is a 66 y. o. 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 The condition is of moderate severity.  The toe ulcer has been present for approximately 6 weeks, and the abrasion for approximately 2 weeks at initial wound clinic visit 9/10/21. Right anterior lower leg is healed. The underlying cause is thought to be diabetes and trauma. He first noticed his toe wound as a blister, that eventually popped and has not been healing.  The patients care to date has included evaluation by his PCP, he was given bactrim and keflex. He has also been using iodine at home. [facility] The patient has significant underlying medical conditions as below.      Wound Pain Timing/Severity: none  Quality of pain: N/A  Severity of pain:  0 / 10   Modifying Factors: diabetes  Associated Signs/Symptoms: drainage  Diabetes: Yes, on an oral and Trulicity regimen, last A1c 6.1 on 21  Smoking:No  Obesity: No  Anticoagulant therapy: Eliquis and baby aspirin  Immunosuppression: No     Patient educated on the 6 essential components necessary for wound healing: Circulation, Debridements, Proper Dressings and Topical Wound Products, Infection Control, Edema Control and Offloading.     Patient educated on those factors that negatively effect or impact wound healing: smoking, obesity, uncontrolled diabetes, anticoagulant and immunosuppressive regimens, inadequate nutrition, untreated arterial and venous disease if applicable and measures to manage edema.         PAST MEDICAL HISTORY        Diagnosis Date    Arrhythmia     Pacemaker placed aprox 5 years ago for A Fib per patient    Arthritis 12/2013    rt wrist    Atrial fibrillation (HCC)     on Xarelto - Dr. Tod Gonzalez CAD (coronary artery disease) 06/18/2014    see dr Nnamdi Zhao kidney disease, stage III (moderate) ([facility] [address] 07/07/2016    Critical illness myopathy 03/15/2021    Diabetes mellitus ([facility] [address]     dx 2004    Diabetic neuropathy associated with type 2 diabetes mellitus ([facility] [address] 04/23/2019    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    Gout 04/2019    \"got gout when had pacer put in because they did not give me my medication for gout \"    H/O 24 hour EKG monitoring 10/03/2013    no afib noted, sinsus rhythm    H/O cardiovascular stress test 05/12/2014    cardiolite- mild ischemia RCA EF50%    H/O echocardiogram 12/01/2020    EF 55-60% severe aortic stenosis mild to mod aortic regurg mod to severe tricuspid regurg severe pulm htn significant changes since 2018 echo.  H/O right and left heart catheterization 12/10/2020    DIFFUSE LAD DISEASE, Mild ECA Disease, Severe AS, Milf Pul HTN on RHC.  H/O transesophageal echocardiography (MABLE) for monitoring 08/05/2013    normal LV function and normal LA appendage without any clot    History of blood transfusion 12/2020    d/t anemia    History of transesophageal echocardiography (MABLE) 12/15/2020    Severe aortic stenosis (ANNA by planimetry: 0.778 cm sq). Mild AR.    [place] (hard of hearing)     hearing tonya aides    Hx of Doppler echocardiogram 05/21/2018    EF 50%  Mild LV hypertrophy. Mildly enlarged RA. Mod aortic valve calcification with mod AS. Mitral annular calcification is present. Mild AR, MR and TR. Mild pulmonary htn.     Hyperlipidemia     Hypertension     Follows with PCP & Dr. Fran Childs Other disorders of kidney and ureter     Pacemaker     [employer]tr[employer], implanted 2014    Pneumonia 12/29/2012    Pneumonia due to COVID-19 virus 08/2020    Sleep apnea     dx 2013- has c-pap    Type II or unspecified type diabetes mellitus with other specified manifestations, uncontrolled 12/12/2012    Venous hypertension, chronic, with ulcer ([address] 12/12/2012    resolved       PAST SURGICAL HISTORY    Past Surgical History:   Procedure Laterality Date    CABG WITH AORTIC VALVE REPLACEMENT N/A 2/16/2021    CABG CORONARY ARTERY BYPASS X2 WITH LIMA, AORTIC VALVE REPLACEMENT AND AORTIC ROOT REPAIR, INTRAOPERATIVE MABLE, INDUCED HYPOTHERMIA, LEFT LEG ENDOVEIN HARVEST, LEFT ATRIAL CLIP, AND CRYO PROCEDURE performed by Marizol Craig MD at [facility]  12/14    at [address] Left 1/29/2021    LEFT CAROTID ENDARTERECTOMY performed by Michelle Daily MD at [address] [place] [address]  2017    COLONOSCOPY  2011    COLONOSCOPY N/A 11/19/2019    COLONOSCOPY DIAGNOSTIC performed by Mata Salmeron MD at [facility]  09/30/2020    POSSIBLE CECAL avms, SIGMOID DIVERTICULOSIS, INTERNAL HEMORRHOIDS GRADE 1    COLONOSCOPY N/A 9/30/2020    COLONOSCOPY CONTROL HEMORRHAGE WITH APC performed by Mata Salmeron MD at [facility]  2014    \"2 stents put in \"    IR NONTUNNELED VASCULAR CATHETER  3/5/2021    IR NONTUNNELED VASCULAR CATHETER 3/5/2021 [facility] SPECIAL PROCEDURES    JOINT REPLACEMENT  2004    total left hip    OTHER SURGICAL HISTORY Right 12/02/2017    I&D; evacuation of hematoma right hip    OTHER SURGICAL HISTORY  09/17/2020    enteroscopy    PACEMAKER INSERTION N/A 2/23/2021    PACEMAKER GENERATOR LEAD REVISION performed by Marizol Craig MD at [facility]      9/18/14 Status post remote permanent pacemaker with atrial lead dislodgement.  7/24/14 PPM Implant    UPPER GASTROINTESTINAL ENDOSCOPY N/A 9/17/2020    ENTEROSCOPY PUSH BIOPSY performed by Mata Salmeron MD at [place] [address] N/A 3/4/2021    EGD DIAGNOSTIC ONLY performed by Mata Salmeron MD at [address] [place] [address]  2012    \"have stents in both legs- done in [address] [place] [address]    Family History   Problem Relation Age of Onset    High Blood Pressure Mother     Arthritis Mother     Diabetes Mother     Heart Disease Mother     High Blood Pressure Father     Heart Disease Father     Kidney Disease Father        SOCIAL HISTORY    Social History     Tobacco Use    Smoking status: Never Smoker    Smokeless tobacco: Never Used   Vaping Use    Vaping Use: Never used   Substance Use Topics    Alcohol use: Yes     Alcohol/week: 2.0 standard drinks     Types: 2 Cans of beer per week     Comment: average \"one time per week\"/ Caffiene: 1 cup of coffee daily    Drug use: No       ALLERGIES    Allergies   Allergen Reactions    Spironolactone      CAUSES INCREASED K+    Tape Harshal [place]bird Tape] Rash     SURGICAL TAPE       MEDICATIONS    Current Outpatient Medications on File Prior to Encounter   Medication Sig Dispense Refill    canagliflozin (INVOKANA) 300 MG TABS tablet Take 1 tablet by mouth every morning (before breakfast) 90 tablet 1    Dulaglutide 3 MG/0.5ML SOPN Inject 3 mg into the skin once a week 4 pen 2    glimepiride (AMARYL) 4 MG tablet Take 1 tablet by mouth every morning (before breakfast) 90 tablet 1    rOPINIRole (REQUIP) 0.5 MG tablet TAKE 1 TABLET 3 TIMES A  tablet 1    sildenafil (VIAGRA) 100 MG tablet TAKE 1 TABLET DAILY AS     NEEDED FOR ERECTILE        DYSFUNCTION 30 tablet 1    gabapentin (NEURONTIN) 300 MG capsule Take 1 capsule by mouth 3 times daily for 90 days.  90 capsule 2    torsemide (DEMADEX) 20 MG tablet Take 1 tablet by mouth 2 times daily 180 tablet 3    apixaban (ELIQUIS) 5 MG TABS tablet Take 1 tablet by mouth 2 times daily 28 tablet 0    carboxymethylcellulose (REFRESH PLUS) 0.5 % SOLN ophthalmic solution INSTILL 1 DROP IN BOTH EYES THREE TIMES A DAY      Cholecalciferol (VITAMIN D) 50 MCG (2000 UT) CAPS capsule Take by mouth nightly       aspirin 81 MG tablet Take 81 mg by mouth daily      simvastatin (ZOCOR) 20 MG tablet Take 1 tablet by mouth daily 90 tablet 2     No current facility-administered medications on file prior to encounter. REVIEW OF SYSTEMS    Pertinent items are noted in HPI. Constitutional: Negative for systemic symptoms including fever, chills and malaise. Objective:      /80   Pulse 113   Temp 98 °F (36.7 °C) (Temporal)   Resp 18     PHYSICAL EXAM    General: The patient is in no acute distress. Mental status:  Patient is appropriate, is  oriented to place and plan of care. 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 Performed by: JUSTIN Carbajal - CNP    Consent obtained: Yes    Time out taken:  Yes    Pain Control: Anesthetic  Anesthetic: 4% Lidocaine Liquid Topical     Debridement:Excisional Debridement    Using curette the wound(s) was/were sharply debrided down through and including the removal of subcutaneous tissue.         Devitalized Tissue Debrided:  slough, exudate and callus    Pre Debridement Measurements:  Are located in the Wound Documentation Flow Sheet    All active wounds listed below with today's date are evaluated  Wound(s)    debrided this date include # : 1     Post  Debridement Measurements:  Wound 09/03/21 #1 Left 3rd Toe Plantar (Active)   Wound Image   10/19/21 1309   Wound Etiology Diabetic 11/09/21 1326   Dressing Status New dressing applied 11/09/21 1350   Wound Cleansed Soap and water; Vashe 11/09/21 1326   Dressing/Treatment Collagen; Alginate 09/16/21 9195 Offloading for Diabetic Foot Ulcers Diabetic shoes/inserts 11/09/21 1350   Wound Length (cm) 0.3 cm 11/09/21 1326   Wound Width (cm) 0.3 cm 11/09/21 1326   Wound Depth (cm) 0.2 cm 11/09/21 1326   Wound Surface Area (cm^2) 0.09 cm^2 11/09/21 1326   Change in Wound Size % (l*w) 64 11/09/21 1326   Wound Volume (cm^3) 0.018 cm^3 11/09/21 1326   Wound Healing % 28 11/09/21 1326   Post-Procedure Length (cm) 0.2 cm 11/02/21 1327   Post-Procedure Width (cm) 0.2 cm 11/02/21 1327   Post-Procedure Depth (cm) 0.1 cm 11/02/21 1327   Post-Procedure Surface Area (cm^2) 0.04 cm^2 11/02/21 1327   Post-Procedure Volume (cm^3) 0.004 cm^3 11/02/21 1327   Distance Tunneling (cm) 0 cm 11/09/21 1326   Tunneling Position ___ O'Clock 0 11/09/21 1326   Undermining Starts ___ O'Clock 12 11/09/21 1326   Undermining Ends___ O'Clock 12 11/09/21 1326   Undermining Maxium Distance (cm) 0.4 11/09/21 1326   Wound Assessment [place]/red; [facility] 11/09/21 1326   Drainage Amount Moderate 11/09/21 1326   Drainage Description Serous; Yellow 11/09/21 1326   Odor None 11/09/21 1326   Angela-wound Assessment Hyperkeratosis (callous) 11/09/21 1326   Margins Defined edges 11/09/21 1326   Wound Thickness Description not for Pressure Injury Full thickness 11/09/21 1326   Number of days: 70       Percent of Wound(s) Debrided: approximately 100%    Total  Area  Debrided:  0.09 sq cm     Bleeding:  Minimal    Hemostasis Achieved:  by pressure    Procedural Pain:  0  / 10     Post Procedural Pain:  0 / 10     Response to treatment:  Well tolerated by patient. Status of wound progress and description from last visit: Stable, continue regimen. Plan:       Discharge Instructions           Discharge Instructions        PHYSICIAN ORDERS AND DISCHARGE INSTRUCTIONS     NOTE: Upon discharge from the [address], you will receive a patient experience survey.  We would be grateful if you would take the time to fill this survey out.     Wound care order history:                 YESSENIA's   Right  0.93     Left  0.55           Date:  9/3/21              Cultures:  Left 3rd Toe cultured 9/3/21              Grafts:                Antibiotics:            Continuing wound care orders and information:                 Residence:  Home              Continue home health care with:               Your wound-care supplies will be provided by: [facility]     Wound cleansing:               JR not scrub or use excessive force.              Wash hands with soap and water before and after dressing changes.             YCIEH to applying a clean dressing, cleanse wound with normal saline, wound cleanser, or mild soap and water.               Ask the physician or nurse before getting the wound(s) wet in a shower     Daily Wound management:              Keep weight off wounds and reposition every 2 hours.              Avoid standing for long periods of time.              Apply wraps/stockings in AM and remove at bedtime.              If swelling is present, elevate legs to the level of the heart or above for 30 minutes 4-5 times a day and/or when sitting.                                      When taking antibiotics take entire prescription as ordered by physician do not stop taking until medicine is all gone.                                                           Orders for this week: 11/9/21                  Left 3rd Toe cultured 9/3/21     Rx: [facility] on [address]        Left 3rd Toe - Wash with soap and water, pat dry. Apply Gentamicin and  stimulen to wound bed. Cover with double thick layer of ca alginate from base of toe and on sides between toes. Wrap with conform and secure with tape. Change daily.        Follow up with Lizeth Aly CNP in 1 week in the wound care center. Call [phone] for any questions or concerns.   Date__________   Time____________               Treatment Note Wound 09/03/21 #1 Left 3rd Toe Plantar-Dressing/Treatment:  (gentamicin, stimulen powder, ca alg,

## 2021-11-15 ASSESSMENT — ENCOUNTER SYMPTOMS
WHEEZING: 0
ABDOMINAL PAIN: 0
SHORTNESS OF BREATH: 0
SORE THROAT: 0
NAUSEA: 0

## 2021-11-16 ENCOUNTER — HOSPITAL ENCOUNTER (OUTPATIENT)
Dept: WOUND CARE | Age: 71
Discharge: HOME | End: 2021-11-16
Payer: MEDICARE

## 2021-11-16 VITALS
SYSTOLIC BLOOD PRESSURE: 140 MMHG | TEMPERATURE: 98 F | DIASTOLIC BLOOD PRESSURE: 73 MMHG | HEART RATE: 87 BPM | RESPIRATION RATE: 20 BRPM

## 2021-11-16 DIAGNOSIS — L97.522 DIABETIC ULCER OF TOE OF LEFT FOOT ASSOCIATED WITH TYPE 2 DIABETES MELLITUS, WITH FAT LAYER EXPOSED (HCC): Primary | ICD-10-CM

## 2021-11-16 DIAGNOSIS — E11.621 DIABETIC ULCER OF TOE OF LEFT FOOT ASSOCIATED WITH TYPE 2 DIABETES MELLITUS, WITH FAT LAYER EXPOSED (HCC): Primary | ICD-10-CM

## 2021-11-16 PROCEDURE — 11042 DBRDMT SUBQ TIS 1ST 20SQCM/<: CPT

## 2021-11-16 PROCEDURE — 11042 DBRDMT SUBQ TIS 1ST 20SQCM/<: CPT | Performed by: NURSE PRACTITIONER

## 2021-11-16 PROCEDURE — 11719 TRIM NAIL(S) ANY NUMBER: CPT

## 2021-11-16 RX ORDER — BACITRACIN ZINC AND POLYMYXIN B SULFATE 500; 1000 [USP'U]/G; [USP'U]/G
OINTMENT TOPICAL ONCE
Status: CANCELLED | OUTPATIENT
Start: 2021-11-16 | End: 2021-11-16

## 2021-11-16 RX ORDER — LIDOCAINE HYDROCHLORIDE 20 MG/ML
JELLY TOPICAL ONCE
Status: CANCELLED | OUTPATIENT
Start: 2021-11-16 | End: 2021-11-16

## 2021-11-16 RX ORDER — CLOBETASOL PROPIONATE 0.5 MG/G
OINTMENT TOPICAL ONCE
Status: CANCELLED | OUTPATIENT
Start: 2021-11-16 | End: 2021-11-16

## 2021-11-16 RX ORDER — LIDOCAINE HYDROCHLORIDE 40 MG/ML
SOLUTION TOPICAL ONCE
Status: CANCELLED | OUTPATIENT
Start: 2021-11-16 | End: 2021-11-16

## 2021-11-16 RX ORDER — BACITRACIN, NEOMYCIN, POLYMYXIN B 400; 3.5; 5 [USP'U]/G; MG/G; [USP'U]/G
OINTMENT TOPICAL ONCE
Status: CANCELLED | OUTPATIENT
Start: 2021-11-16 | End: 2021-11-16

## 2021-11-16 RX ORDER — GENTAMICIN SULFATE 1 MG/G
OINTMENT TOPICAL ONCE
Status: CANCELLED | OUTPATIENT
Start: 2021-11-16 | End: 2021-11-16

## 2021-11-16 RX ORDER — GENTAMICIN SULFATE 1 MG/G
OINTMENT TOPICAL ONCE
Status: DISCONTINUED | OUTPATIENT
Start: 2021-11-16 | End: 2021-11-17 | Stop reason: HOSPADM

## 2021-11-16 RX ORDER — LIDOCAINE 50 MG/G
OINTMENT TOPICAL ONCE
Status: DISCONTINUED | OUTPATIENT
Start: 2021-11-16 | End: 2021-11-17 | Stop reason: HOSPADM

## 2021-11-16 RX ORDER — GINSENG 100 MG
CAPSULE ORAL ONCE
Status: CANCELLED | OUTPATIENT
Start: 2021-11-16 | End: 2021-11-16

## 2021-11-16 RX ORDER — LIDOCAINE 40 MG/G
CREAM TOPICAL ONCE
Status: CANCELLED | OUTPATIENT
Start: 2021-11-16 | End: 2021-11-16

## 2021-11-16 RX ORDER — BETAMETHASONE DIPROPIONATE 0.05 %
OINTMENT (GRAM) TOPICAL ONCE
Status: CANCELLED | OUTPATIENT
Start: 2021-11-16 | End: 2021-11-16

## 2021-11-16 RX ORDER — LIDOCAINE 50 MG/G
OINTMENT TOPICAL ONCE
Status: CANCELLED | OUTPATIENT
Start: 2021-11-16 | End: 2021-11-16

## 2021-11-16 NOTE — PROGRESS NOTES
Wound Care Center Progress Note With Procedure    Case Walter  AGE: 66 y.o. GENDER: male  : 1950  EPISODE DATE:  2021     Subjective:     Chief Complaint   Patient presents with    Wound Check     left leg         HISTORY of PRESENT ILLNESS     Arron Mejia is a 66 y. o. 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 The condition is of moderate severity.  The toe ulcer has been present for approximately 6 weeks, and the abrasion for approximately 2 weeks at initial wound clinic visit 9/10/21. Right anterior lower leg is healed. The underlying cause is thought to be diabetes and trauma. He first noticed his toe wound as a blister, that eventually popped and has not been healing.  The patients care to date has included evaluation by his PCP, he was given bactrim and keflex. He has also been using iodine at home. [facility] The patient has significant underlying medical conditions as below.      Wound Pain Timing/Severity: none  Quality of pain: N/A  Severity of pain:  0 / 10   Modifying Factors: diabetes  Associated Signs/Symptoms: drainage  Diabetes: Yes, on an oral and Trulicity regimen, last A1c 6.1 on 21  Smoking:No  Obesity: No  Anticoagulant therapy: Eliquis and baby aspirin  Immunosuppression: No     Patient educated on the 6 essential components necessary for wound healing: Circulation, Debridements, Proper Dressings and Topical Wound Products, Infection Control, Edema Control and Offloading.     Patient educated on those factors that negatively effect or impact wound healing: smoking, obesity, uncontrolled diabetes, anticoagulant and immunosuppressive regimens, inadequate nutrition, untreated arterial and venous disease if applicable and measures to manage edema.         PAST MEDICAL HISTORY        Diagnosis Date    Arrhythmia     Pacemaker placed aprox 5 years ago for A Fib per patient    Arthritis 12/2013    rt wrist    Atrial fibrillation (HCC)     on Xarelto - Dr. Tona Durham CAD (coronary artery disease) 06/18/2014    see dr Fernandez [place] kidney disease, stage III (moderate) ([facility] 07/07/2016    Critical illness myopathy 03/15/2021    Diabetes mellitus ([facility]     dx 2004    Diabetic neuropathy associated with type 2 diabetes mellitus ([facility] 04/23/2019    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    Gout 04/2019    \"got gout when had pacer put in because they did not give me my medication for gout \"    H/O 24 hour EKG monitoring 10/03/2013    no afib noted, sinsus rhythm    H/O cardiovascular stress test 05/12/2014    cardiolite- mild ischemia RCA EF50%    H/O echocardiogram 12/01/2020    EF 55-60% severe aortic stenosis mild to mod aortic regurg mod to severe tricuspid regurg severe pulm htn significant changes since 2018 echo.  H/O right and left heart catheterization 12/10/2020    DIFFUSE LAD DISEASE, Mild ECA Disease, Severe AS, Milf Pul HTN on RHC.  H/O transesophageal echocardiography (MABLE) for monitoring 08/05/2013    normal LV function and normal LA appendage without any clot    History of blood transfusion 12/2020    d/t anemia    History of transesophageal echocardiography (MABLE) 12/15/2020    Severe aortic stenosis (ANNA by planimetry: 0.778 cm sq). Mild AR.    [place] (hard of hearing)     hearing tonya aides    Hx of Doppler echocardiogram 05/21/2018    EF 50%  Mild LV hypertrophy. Mildly enlarged RA. Mod aortic valve calcification with mod AS. Mitral annular calcification is present. Mild AR, MR and TR. Mild pulmonary htn.     Hyperlipidemia     Hypertension     Follows with PCP & Dr. Jin Gomez Other disorders of kidney and ureter     Pacemaker     Medtronic, implanted 2014    Pneumonia 12/29/2012    Pneumonia due to COVID-19 virus 08/2020    Sleep apnea     dx 2013- has c-pap    Type II or unspecified type diabetes mellitus with other specified manifestations, uncontrolled 12/12/2012    Venous hypertension, chronic, with ulcer ([address] 12/12/2012    resolved       PAST SURGICAL HISTORY    Past Surgical History:   Procedure Laterality Date    CABG WITH AORTIC VALVE REPLACEMENT N/A 2/16/2021    CABG CORONARY ARTERY BYPASS X2 WITH LIMA, AORTIC VALVE REPLACEMENT AND AORTIC ROOT REPAIR, INTRAOPERATIVE MABLE, INDUCED HYPOTHERMIA, LEFT LEG ENDOVEIN HARVEST, LEFT ATRIAL CLIP, AND CRYO PROCEDURE performed by Anatoly Hirsch MD at [address] [place] [address]  12/14    at [address] [facility] [address] Left 1/29/2021    LEFT CAROTID ENDARTERECTOMY performed by Jamal Ko MD at [id number]  2017    COLONOSCOPY  2011    COLONOSCOPY N/A 11/19/2019    COLONOSCOPY DIAGNOSTIC performed by Bhanu Davis MD at [facility]  09/30/2020    POSSIBLE CECAL avms, SIGMOID DIVERTICULOSIS, INTERNAL HEMORRHOIDS GRADE 1    COLONOSCOPY N/A 9/30/2020    COLONOSCOPY CONTROL HEMORRHAGE WITH APC performed by Bhanu Davis MD at [facility]  2014    \"2 stents put in \"    IR NONTUNNELED VASCULAR CATHETER  3/5/2021    IR NONTUNNELED VASCULAR CATHETER 3/5/2021 [facility] SPECIAL PROCEDURES    JOINT REPLACEMENT  2004    total left hip    OTHER SURGICAL HISTORY Right 12/02/2017    I&D; evacuation of hematoma right hip    OTHER SURGICAL HISTORY  09/17/2020    enteroscopy    PACEMAKER INSERTION N/A 2/23/2021    PACEMAKER GENERATOR LEAD REVISION performed by [facility]atoly Hirsch MD at [facility]      9/18/14 Status post remote permanent pacemaker with atrial lead dislodgement.  7/24/14 PPM Implant    UPPER GASTROINTESTINAL ENDOSCOPY N/A 9/17/2020    ENTEROSCOPY PUSH BIOPSY performed by Bhanu Davis MD at [facility] N/A 3/4/2021    EGD DIAGNOSTIC ONLY performed by Bhanu Davis MD at [address] [place] [address]  2012    \"have stents in both legs- done in [address] [place] [address]    Family History   Problem Relation Age of Onset    High Blood Pressure Mother     Arthritis Mother     Diabetes Mother     Heart Disease Mother     High Blood Pressure Father     Heart Disease Father     Kidney Disease Father        SOCIAL HISTORY    Social History     Tobacco Use    Smoking status: Never Smoker    Smokeless tobacco: Never Used   Vaping Use    Vaping Use: Never used   Substance Use Topics    Alcohol use: Yes     Alcohol/week: 2.0 standard drinks     Types: 2 Cans of beer per week     Comment: average \"one time per week\"/ Caffiene: 1 cup of coffee daily    Drug use: No       ALLERGIES    Allergies   Allergen Reactions    Spironolactone      CAUSES INCREASED K+    Tape Arely Walker Tape] Rash     SURGICAL TAPE       MEDICATIONS    Current Outpatient Medications on File Prior to Encounter   Medication Sig Dispense Refill    canagliflozin (INVOKANA) 300 MG TABS tablet Take 1 tablet by mouth every morning (before breakfast) 90 tablet 1    Dulaglutide 3 MG/0.5ML SOPN Inject 3 mg into the skin once a week 4 pen 2    glimepiride (AMARYL) 4 MG tablet Take 1 tablet by mouth every morning (before breakfast) 90 tablet 1    rOPINIRole (REQUIP) 0.5 MG tablet TAKE 1 TABLET 3 TIMES A  tablet 1    sildenafil (VIAGRA) 100 MG tablet TAKE 1 TABLET DAILY AS     NEEDED FOR ERECTILE        DYSFUNCTION 30 tablet 1    gabapentin (NEURONTIN) 300 MG capsule Take 1 capsule by mouth 3 times daily for 90 days.  90 capsule 2    torsemide (DEMADEX) 20 MG tablet Take 1 tablet by mouth 2 times daily 180 tablet 3    apixaban (ELIQUIS) 5 MG TABS tablet Take 1 tablet by mouth 2 times daily 28 tablet 0    carboxymethylcellulose (REFRESH PLUS) 0.5 % SOLN ophthalmic solution INSTILL 1 DROP IN BOTH EYES THREE TIMES A DAY      Cholecalciferol (VITAMIN D) 50 MCG (2000 UT) CAPS capsule Take by mouth nightly       aspirin 81 MG tablet Take 81 mg by mouth daily      simvastatin (ZOCOR) 20 MG tablet Take 1 tablet by mouth daily 90 tablet 2     No current facility-administered medications on file prior to encounter. REVIEW OF SYSTEMS    Pertinent items are noted in HPI. Constitutional: Negative for systemic symptoms including fever, chills and malaise. Objective:      BP (!) 140/73   Pulse 87   Temp 98 °F (36.7 °C) (Temporal)   Resp 20     PHYSICAL EXAM    General: The patient is in no acute distress. Mental status:  Patient is appropriate, is  oriented to place and plan of care. 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 Performed by: JUSTIN Palmer CNP    Consent obtained: Yes    Time out taken:  Yes    Pain Control: Anesthetic  Anesthetic: 4% Lidocaine Liquid Topical     Debridement:Excisional Debridement    Using curette the wound(s) was/were sharply debrided down through and including the removal of subcutaneous tissue.         Devitalized Tissue Debrided:  slough and exudate    Pre Debridement Measurements:  Are located in the Wound Documentation Flow Sheet    All active wounds listed below with today's date are evaluated  Wound(s)    debrided this date include # : 1     Post  Debridement Measurements:  Wound 09/03/21 #1 Left 3rd Toe Plantar (Active)   Wound Image   11/16/21 2908   Wound Etiology Diabetic 11/16/21 1336   Dressing Status New dressing applied 11/09/21 1350   Wound Cleansed Soap and water 11/16/21 1336   Dressing/Treatment Collagen; Alginate 09/16/21 0955   Offloading for Diabetic Foot Ulcers Forefoot offloading shoe 11/16/21 1349   Wound Length (cm) 0.2 cm 11/16/21 1336   Wound Width (cm) 0.2 cm 11/16/21 1336   Wound Depth (cm) 0.1 cm 11/16/21 1336   Wound Surface Area (cm^2) 0.04 cm^2 11/16/21 1336   Change in Wound Size % (l*w) 84 11/16/21 1336   Wound Volume (cm^3) 0.004 cm^3 11/16/21 1336   Wound Healing % 84 11/16/21 1336   Post-Procedure Length (cm) 0.2 cm 11/16/21 1349   Post-Procedure Width (cm) 0.2 cm 11/16/21 1349   Post-Procedure Depth (cm) 0.1 cm 11/16/21 1349   Post-Procedure Surface Area (cm^2) 0.04 cm^2 11/16/21 1349   Post-Procedure Volume (cm^3) 0.004 cm^3 11/16/21 1349   Distance Tunneling (cm) 0 cm 11/16/21 1336   Tunneling Position ___ O'Clock 0 11/16/21 1336   Undermining Starts ___ O'Clock 0 11/16/21 1336   Undermining Ends___ O'Clock 0 11/16/21 1336   Undermining Maxium Distance (cm) 0 11/16/21 1336   Wound Assessment Pink/red 11/16/21 1336   Drainage Amount Moderate 11/16/21 1336   Drainage Description Yellow 11/16/21 1336   Odor None 11/16/21 1336   Angela-wound Assessment Hyperkeratosis (callous) 11/16/21 1336   Margins Defined edges 11/16/21 1336   Wound Thickness Description not for Pressure Injury Full thickness 11/09/21 1326   Number of days: 74       Percent of Wound(s) Debrided: approximately 100%    Total  Area  Debrided:  0.04 sq cm     Bleeding:  Minimal    Hemostasis Achieved:  by pressure    Procedural Pain:  0  / 10     Post Procedural Pain:  0 / 10     Response to treatment:  Well tolerated by patient. Status of wound progress and description from last visit: Slightly smaller today. The patient is very active and off loading has been an issue. Discussed importance of  Off loading, will use off loading shoe.       Plan:       Discharge Instructions PHYSICIAN ORDERS AND DISCHARGE INSTRUCTIONS     NOTE: Upon discharge from the [address], you will receive a patient experience survey. We would be grateful if you would take the time to fill this survey out.     Wound care order history:                 YESSENIA's   Right  0.93     Left  0.55           Date:  9/3/21              Cultures:  Left 3rd Toe cultured 9/3/21              Grafts:                Antibiotics:            Continuing wound care orders and information:                 Residence:  Home              Continue home health care with:               Your wound-care supplies will be provided by: [facility]     Wound cleansing:               UH not scrub or use excessive force.              Wash hands with soap and water before and after dressing changes.             TFQVA to applying a clean dressing, cleanse wound with normal saline, wound cleanser, or mild soap and water.               Ask the physician or nurse before getting the wound(s) wet in a shower     Daily Wound management:              Keep weight off wounds and reposition every 2 hours.              Avoid standing for long periods of time.              Apply wraps/stockings in AM and remove at bedtime.              If swelling is present, elevate legs to the level of the heart or above for 30 minutes 4-5 times a day and/or when sitting.                                      When taking antibiotics take entire prescription as ordered by physician do not stop taking until medicine is all gone.                                                           Orders for this week: 11/16/21                  Left 3rd Toe cultured 9/3/21     Rx: [facility] on [address]        Left 3rd Toe - Wash with soap and water, pat dry. Apply Gentamicin and  stimulen to wound bed. Cover with double thick layer of ca alginate from base of toe and on sides between toes. Wrap with conform and secure with tape.  Change daily.        Follow up with Magaly Townsend CNP in 1 week in the

## 2021-11-17 ENCOUNTER — OFFICE VISIT (OUTPATIENT)
Dept: CARDIOLOGY CLINIC | Age: 71
End: 2021-11-17
Payer: MEDICARE

## 2021-11-17 VITALS
HEIGHT: 71 IN | BODY MASS INDEX: 27.02 KG/M2 | WEIGHT: 193 LBS | HEART RATE: 84 BPM | SYSTOLIC BLOOD PRESSURE: 118 MMHG | DIASTOLIC BLOOD PRESSURE: 82 MMHG

## 2021-11-17 DIAGNOSIS — Z95.0 CARDIAC PACEMAKER IN SITU: ICD-10-CM

## 2021-11-17 DIAGNOSIS — I25.10 ASCVD (ARTERIOSCLEROTIC CARDIOVASCULAR DISEASE): ICD-10-CM

## 2021-11-17 DIAGNOSIS — I38 VHD (VALVULAR HEART DISEASE): ICD-10-CM

## 2021-11-17 DIAGNOSIS — E78.2 MIXED HYPERLIPIDEMIA: ICD-10-CM

## 2021-11-17 DIAGNOSIS — I10 ESSENTIAL HYPERTENSION: ICD-10-CM

## 2021-11-17 DIAGNOSIS — I48.0 PAF (PAROXYSMAL ATRIAL FIBRILLATION) (HCC): Primary | ICD-10-CM

## 2021-11-17 PROCEDURE — G8427 DOCREV CUR MEDS BY ELIG CLIN: HCPCS | Performed by: NURSE PRACTITIONER

## 2021-11-17 PROCEDURE — 1036F TOBACCO NON-USER: CPT | Performed by: NURSE PRACTITIONER

## 2021-11-17 PROCEDURE — 3017F COLORECTAL CA SCREEN DOC REV: CPT | Performed by: NURSE PRACTITIONER

## 2021-11-17 PROCEDURE — G8484 FLU IMMUNIZE NO ADMIN: HCPCS | Performed by: NURSE PRACTITIONER

## 2021-11-17 PROCEDURE — G8417 CALC BMI ABV UP PARAM F/U: HCPCS | Performed by: NURSE PRACTITIONER

## 2021-11-17 PROCEDURE — 4040F PNEUMOC VAC/ADMIN/RCVD: CPT | Performed by: NURSE PRACTITIONER

## 2021-11-17 PROCEDURE — 99214 OFFICE O/P EST MOD 30 MIN: CPT | Performed by: NURSE PRACTITIONER

## 2021-11-17 PROCEDURE — 1123F ACP DISCUSS/DSCN MKR DOCD: CPT | Performed by: NURSE PRACTITIONER

## 2021-11-17 ASSESSMENT — ENCOUNTER SYMPTOMS: BLURRED VISION: 1

## 2021-11-17 NOTE — PROGRESS NOTES
11/17/2021  Primary cardiologist: Dr. Lourdes Burnette  is an established 66 y.o.  male here for follow-up on   1. PAF (paroxysmal atrial fibrillation) ([address]    2. VHD (valvular heart disease)    3. ASCVD (arteriosclerotic cardiovascular disease)    4. Essential hypertension    5. Mixed hyperlipidemia    6. Cardiac pacemaker in situ            SUBJECTIVE/OBJECTIVE:    HPI : 2013 Marquis Carias is a pleasant 73-year-old gentleman with a history of paroxysmal atrial fibrillation s/p MAZE, CAD, s/p PCI and CABG, valvular heart disease s/p AVR, hypertension, hyperlipidemia, carotid artery disease s/p Left CEA, CKD, anemia, pulmonary HTN  and dual chamber pacemaker.      In February 2021 [address] underwent CABG x 2 with LIMA to LAD and SVG to distal RCA, AVR with a #27 mm Medtronic Mosaic bio prosthetic prosthesis, aortic root enlargement with CorMatrix patch, left atrial Maze procedure occluding pulmonary vein isolation using bipolar and epicardial roof and floor line and a 45 mm left atrial clip placement. Andreas Pepe reports overall he is feeling fairly well. He attended cardiac rehab and is out doing activities such as golfing and walking. He is tolerating activity without difficulty. He has noted however the last week or 2 that he has had blurry vision along with feeling of fogginess. He describes it as a feeling of \"being there but not quite there\". Denies any facial drooping, difficulty swallowing or extremity weakness. Reports glucose is controlled. He has noted episodes of palpitations-notes palpitations are brief in nature lasting few seconds. Review of Systems   Eyes: Positive for blurred vision. Cardiovascular: Positive for palpitations. Negative for chest pain and dyspnea on exertion. Neurological: Positive for dizziness. Negative for focal weakness, headaches and light-headedness.        Vitals:    11/17/21 0944   BP: 118/82   Pulse: 84   Weight: 193 lb (87.5 kg)   Height: 5' 11\" (1.803 m)     Patient-Reported Vitals 4/8/2021   Patient-Reported Weight 185   Patient-Reported Height 5'11\"   Patient-Reported Systolic 567   Patient-Reported Diastolic 66   Patient-Reported Pulse 67   Patient-Reported Temperature 98.2     Wt Readings from Last 3 Encounters:   11/17/21 193 lb (87.5 kg)   11/05/21 196 lb (88.9 kg)   08/31/21 199 lb 4.8 oz (90.4 kg)     Body mass index is 26.92 kg/m². Physical Exam  Vitals reviewed. HENT:      Head: Normocephalic and atraumatic. Cardiovascular:      Rate and Rhythm: Normal rate and regular rhythm. Pulses: Normal pulses. Carotid pulses are on the right side with bruit. Heart sounds: Murmur heard. Pulmonary:      Effort: Pulmonary effort is normal.   Skin:     General: Skin is warm and dry. Capillary Refill: Capillary refill takes less than 2 seconds. Neurological:      General: No focal deficit present. Mental Status: He is oriented to person, place, and time. Current Outpatient Medications   Medication Sig Dispense Refill    canagliflozin (INVOKANA) 300 MG TABS tablet Take 1 tablet by mouth every morning (before breakfast) 90 tablet 1    glimepiride (AMARYL) 4 MG tablet Take 1 tablet by mouth every morning (before breakfast) 90 tablet 1    sildenafil (VIAGRA) 100 MG tablet TAKE 1 TABLET DAILY AS     NEEDED FOR ERECTILE        DYSFUNCTION 30 tablet 1    gabapentin (NEURONTIN) 300 MG capsule Take 1 capsule by mouth 3 times daily for 90 days.  90 capsule 2    simvastatin (ZOCOR) 20 MG tablet Take 1 tablet by mouth daily 90 tablet 2    torsemide (DEMADEX) 20 MG tablet Take 1 tablet by mouth 2 times daily 180 tablet 3    apixaban (ELIQUIS) 5 MG TABS tablet Take 1 tablet by mouth 2 times daily 28 tablet 0    Cholecalciferol (VITAMIN D) 50 MCG (2000 UT) CAPS capsule Take by mouth nightly       aspirin 81 MG tablet Take 81 mg by mouth daily      Dulaglutide 3 MG/0.5ML SOPN Inject 3 mg into the skin once a week (Patient not taking: Reported on 11/17/2021) 4 pen 2    rOPINIRole (REQUIP) 0.5 MG tablet TAKE 1 TABLET 3 TIMES A DAY (Patient not taking: Reported on 11/17/2021) 270 tablet 1    carboxymethylcellulose (REFRESH PLUS) 0.5 % SOLN ophthalmic solution INSTILL 1 DROP IN BOTH EYES THREE TIMES A DAY (Patient not taking: Reported on 11/17/2021)       No current facility-administered medications for this visit. All pertinent data reviewed and discussed with patient       ASSESSMENT/PLAN:    1. PAF (paroxysmal atrial fibrillation) (HCC)  PAF noted on pacemaker. PAF burden is 2.5%. Continue with anticoagulation-Eliquis 2.5 mg twice daily    [id number] Score for Atrial Fibrillation Stroke Risk   Risk   Factors  Component Value   C CHF No 0   H HTN Yes 1   A2 Age >= 75 No,  (76 y.o.) 0   D DM Yes 1   S2 Prior Stroke/TIA No 0   V Vascular Disease No 1   A Age 74-69 Yes,  (76 y.o.) 1   Sc Sex male 0    [id number]  Score  4         2. VHD (valvular heart disease)  H/o AVR with # 27 mm Medtroinic Mosaic lester   Moderate TR     3. ASCVD (arteriosclerotic cardiovascular disease)  H/o CABG  Stable- deneis chest pain or shortness of breath   Continue with risk factor modification including low-fat low-cholesterol diet and organized exercise. Continue with simvastatin and aspirin    4. Essential hypertension  Blood pressure stable    5. Mixed hyperlipidemia  No recent lipids available-has slip to have labs done  continue with atorvastatin  Goal HDL of 40 or greater  & LDL 70    6. Cardiac pacemaker in situ  Pacemaker analysis reviewed from October 4, 2021:  Pacer analysis is reviewed is consistent with normal dual-chamber MRI safe Medtronic Advisa pacer function with stable leads and appropriate battery status for the age of the device. Remaining average battery life is 16 months. Device is programmed to DDD mode at a lower rate of 60 bpm and 98.5% pacing in the ventricle.  PAF burden is 2.5% since last interrogation on June 28, 2021.

## 2021-11-17 NOTE — LETTER
[address]  1950  [id number]    Have you had any Chest Pain that is not new? - No       DO EKG IF: Patient has a Heart Rate above 100 or below 40     CAD (Coronary Artery Disease) patient should have one on file every 6 months        Have you had any Shortness of Breath - No      Have you had any dizziness - No    Have you had any palpitations that are not new? - No    Do you have any edema - swelling in No      When did you have your last labs drawn 11/05/2021  Where did you have them done   What doctor ordered Idania Jeronimo    If we do not have these labs you are retrieve these labs for these providers!     Do you have a surgery or procedure scheduled in the near future - No       Ask patient if they want to sign up for [facility] if they are not already signed up     Check to see if we have an E-MAIL on file for the patient     Check medication list thoroughly!!! AND RECONCILE OUTSIDE MEDICATIONS  If dose has changed change the entire order not just the MG  BE SURE TO ASK PATIENT IF THEY NEED MEDICATION REFILLS     At check out add to every patient's \"wrap up\" the following dot phrase AFTERHOURSEDUCATION and ensure we explain this to our patients

## 2021-11-18 LAB
CHOLESTEROL: 106 MG/DL
HDLC SERPL-MCNC: 36 MG/DL
LDL CHOLESTEROL CALCULATED: 50 MG/DL
TRIGL SERPL-MCNC: 102 MG/DL
VLDLC SERPL CALC-MCNC: 20 MG/DL (ref 4–38)

## 2021-11-23 ENCOUNTER — HOSPITAL ENCOUNTER (OUTPATIENT)
Dept: WOUND CARE | Age: 71
Discharge: HOME | End: 2021-11-23
Payer: MEDICARE

## 2021-11-23 ENCOUNTER — HOSPITAL ENCOUNTER (OUTPATIENT)
Age: 71
Discharge: HOME | End: 2021-11-23

## 2021-11-23 ENCOUNTER — HOSPITAL ENCOUNTER (OUTPATIENT)
Dept: GENERAL RADIOLOGY | Age: 71
Discharge: HOME | End: 2021-11-23
Payer: MEDICARE

## 2021-11-23 VITALS
SYSTOLIC BLOOD PRESSURE: 152 MMHG | RESPIRATION RATE: 18 BRPM | HEART RATE: 88 BPM | DIASTOLIC BLOOD PRESSURE: 81 MMHG | TEMPERATURE: 97.8 F

## 2021-11-23 DIAGNOSIS — L97.912 NON-PRESSURE ULCER OF RIGHT LOWER EXTREMITY WITH FAT LAYER EXPOSED (HCC): ICD-10-CM

## 2021-11-23 DIAGNOSIS — E11.9 TYPE 2 DIABETES MELLITUS WITHOUT COMPLICATION, WITHOUT LONG-TERM CURRENT USE OF INSULIN (HCC): ICD-10-CM

## 2021-11-23 DIAGNOSIS — L97.522 DIABETIC ULCER OF TOE OF LEFT FOOT ASSOCIATED WITH TYPE 2 DIABETES MELLITUS, WITH FAT LAYER EXPOSED (HCC): ICD-10-CM

## 2021-11-23 DIAGNOSIS — E11.621 DIABETIC ULCER OF TOE OF LEFT FOOT ASSOCIATED WITH TYPE 2 DIABETES MELLITUS, WITH FAT LAYER EXPOSED (HCC): Primary | ICD-10-CM

## 2021-11-23 DIAGNOSIS — G62.9 PERIPHERAL POLYNEUROPATHY: ICD-10-CM

## 2021-11-23 DIAGNOSIS — E11.621 DIABETIC ULCER OF TOE OF LEFT FOOT ASSOCIATED WITH TYPE 2 DIABETES MELLITUS, WITH FAT LAYER EXPOSED (HCC): ICD-10-CM

## 2021-11-23 DIAGNOSIS — L97.522 DIABETIC ULCER OF TOE OF LEFT FOOT ASSOCIATED WITH TYPE 2 DIABETES MELLITUS, WITH FAT LAYER EXPOSED (HCC): Primary | ICD-10-CM

## 2021-11-23 PROCEDURE — 11042 DBRDMT SUBQ TIS 1ST 20SQCM/<: CPT | Performed by: NURSE PRACTITIONER

## 2021-11-23 PROCEDURE — 73660 X-RAY EXAM OF TOE(S): CPT

## 2021-11-23 PROCEDURE — 11042 DBRDMT SUBQ TIS 1ST 20SQCM/<: CPT

## 2021-11-23 RX ORDER — LIDOCAINE 40 MG/G
CREAM TOPICAL ONCE
Status: CANCELLED | OUTPATIENT
Start: 2021-11-23 | End: 2021-11-23

## 2021-11-23 RX ORDER — LIDOCAINE HYDROCHLORIDE 20 MG/ML
JELLY TOPICAL ONCE
Status: CANCELLED | OUTPATIENT
Start: 2021-11-23 | End: 2021-11-23

## 2021-11-23 RX ORDER — BETAMETHASONE DIPROPIONATE 0.05 %
OINTMENT (GRAM) TOPICAL ONCE
Status: CANCELLED | OUTPATIENT
Start: 2021-11-23 | End: 2021-11-23

## 2021-11-23 RX ORDER — LIDOCAINE 50 MG/G
OINTMENT TOPICAL ONCE
Status: CANCELLED | OUTPATIENT
Start: 2021-11-23 | End: 2021-11-23

## 2021-11-23 RX ORDER — LIDOCAINE HYDROCHLORIDE 40 MG/ML
SOLUTION TOPICAL ONCE
Status: CANCELLED | OUTPATIENT
Start: 2021-11-23 | End: 2021-11-23

## 2021-11-23 RX ORDER — BACITRACIN ZINC AND POLYMYXIN B SULFATE 500; 1000 [USP'U]/G; [USP'U]/G
OINTMENT TOPICAL ONCE
Status: CANCELLED | OUTPATIENT
Start: 2021-11-23 | End: 2021-11-23

## 2021-11-23 RX ORDER — LIDOCAINE 50 MG/G
OINTMENT TOPICAL ONCE
Status: DISCONTINUED | OUTPATIENT
Start: 2021-11-23 | End: 2021-11-24 | Stop reason: HOSPADM

## 2021-11-23 RX ORDER — GINSENG 100 MG
CAPSULE ORAL ONCE
Status: CANCELLED | OUTPATIENT
Start: 2021-11-23 | End: 2021-11-23

## 2021-11-23 RX ORDER — GENTAMICIN SULFATE 1 MG/G
OINTMENT TOPICAL ONCE
Status: DISCONTINUED | OUTPATIENT
Start: 2021-11-23 | End: 2021-11-24 | Stop reason: HOSPADM

## 2021-11-23 RX ORDER — BACITRACIN, NEOMYCIN, POLYMYXIN B 400; 3.5; 5 [USP'U]/G; MG/G; [USP'U]/G
OINTMENT TOPICAL ONCE
Status: CANCELLED | OUTPATIENT
Start: 2021-11-23 | End: 2021-11-23

## 2021-11-23 RX ORDER — GABAPENTIN 300 MG/1
300 CAPSULE ORAL 3 TIMES DAILY
Qty: 270 CAPSULE | Refills: 0 | Status: SHIPPED | OUTPATIENT
Start: 2021-11-23 | End: 2022-02-11

## 2021-11-23 RX ORDER — GENTAMICIN SULFATE 1 MG/G
OINTMENT TOPICAL ONCE
Status: CANCELLED | OUTPATIENT
Start: 2021-11-23 | End: 2021-11-23

## 2021-11-23 RX ORDER — CLOBETASOL PROPIONATE 0.5 MG/G
OINTMENT TOPICAL ONCE
Status: CANCELLED | OUTPATIENT
Start: 2021-11-23 | End: 2021-11-23

## 2021-11-23 NOTE — TELEPHONE ENCOUNTER
Patient called and stated that the medication Gabapentin and Trulicity was to be sent for a 90 day supply and a 30 day supply was sent to pharmacy.  Patient will need refills on those, he goes to [place] for winter   Please send corrections to [facility]

## 2021-11-23 NOTE — PROGRESS NOTES
Total Contact Cast    NAME:  Gilbert Scott  YOB: 1950  MEDICAL RECORD NUMBER:  [id number]  DATE:  11/23/2021    Goal:  Patient will maintain integrity of cast, avoid mobility hazards, and report complications that may occur (foul odor, pain, numbness, cracked cast). Removed old contact cast if indicated and wash extremity with soap and water  Applied ordered dressing over wound(s)   Applied cast padding  Applied foam padding  Applied per Brisa Lara NP  Total Contact Cast was applied in the [address] [place] [address] Road to left lower leg  Applied cast material fiberglass  Applied cast material plaster   Allow cast to dry   Instructed patient to report to health care provider, including wound care center, any back pain, hip pain, or leg pain, numbness of toes, or any odor coming from the cast.   Instructed patient not to stick any foreign objects down into cast.  Instructed patient to utilize assistive devices(crutches, cane or walker) as ordered. Instructed patient to continue offloading as directed.      Applied cast per  Guidelines    Electronically signed by Glenis Graves LPN on 12/28/8001 at 9:52 AM

## 2021-11-23 NOTE — PROGRESS NOTES
Progress Note and application of total contact cast      Duglas Aviles  AGE: 66 y.o. GENDER: male  : 1950  EPISODE DATE:  2021     Subjective:     Chief Complaint   Patient presents with    Wound Check     left foot          HISTORY of PRESENT ILLNESS     Filipe Mejia is a 66 y. o. 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 The condition is of moderate severity. The toe ulcer has been present for approximately 6 weeks, and the abrasion for approximately 2 weeks at initial wound clinic visit 9/10/21. Right anterior lower leg is healed. The underlying cause is thought to be diabetes and trauma. He first noticed his toe wound as a blister, that eventually popped and has not been healing.  The patients care to date has included evaluation by his PCP, he was given bactrim and keflex. He has also been using iodine at home. [facility] The patient has significant underlying medical conditions as below.     Patient is having an issue with pressure. He is very active and can tell in the evening that the toe has been under the pressure of his shoe and walking, golfing, working outside etc.   He is leaving for [employer] for 2 months at the end of the year, and we would like to express to him that we can heal this by then if we pursue a more aggressive path.    He is agreeable to cast at this time.      Wound Pain Timing/Severity: none  Quality of pain: N/A  Severity of pain:  0 / 10   Modifying Factors: diabetes  Associated Signs/Symptoms: drainage        PAST MEDICAL HISTORY        Diagnosis Date    Arrhythmia     Pacemaker placed aprox 5 years ago for A Fib per patient    Arthritis 2013    rt wrist    Atrial fibrillation ([facility] [address]     on Xarelto - Dr. Janice Lagunas CAD (coronary artery disease) 2014    see dr Janice Lagunas Chronic kidney disease, stage III (moderate) ([address] 07/07/2016    Critical illness myopathy 03/15/2021    Diabetes mellitus ([facility] [address]     dx 2004    Diabetic neuropathy associated with type 2 diabetes mellitus ([facility] [address] 04/23/2019    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    Gout 04/2019    \"got gout when had pacer put in because they did not give me my medication for gout \"    H/O 24 hour EKG monitoring 10/03/2013    no afib noted, sinsus rhythm    H/O cardiovascular stress test 05/12/2014    cardiolite- mild ischemia RCA EF50%    H/O echocardiogram 12/01/2020    EF 55-60% severe aortic stenosis mild to mod aortic regurg mod to severe tricuspid regurg severe pulm htn significant changes since 2018 echo.  H/O right and left heart catheterization 12/10/2020    DIFFUSE LAD DISEASE, Mild ECA Disease, Severe AS, Milf Pul HTN on RHC.  H/O transesophageal echocardiography (MABLE) for monitoring 08/05/2013    normal LV function and normal LA appendage without any clot    History of blood transfusion 12/2020    d/t anemia    History of transesophageal echocardiography (MABLE) 12/15/2020    Severe aortic stenosis (ANNA by planimetry: 0.778 cm sq). Mild AR.    [place] (hard of hearing)     hearing tonya aides    Hx of Doppler echocardiogram 05/21/2018    EF 50%  Mild LV hypertrophy. Mildly enlarged RA. Mod aortic valve calcification with mod AS. Mitral annular calcification is present. Mild AR, MR and TR. Mild pulmonary htn.     Hyperlipidemia     Hypertension     Follows with PCP & Dr. Yannick Rice Other disorders of kidney and ureter     Pacemaker     Medtr[employer], implanted 2014    Pneumonia 12/29/2012    Pneumonia due to COVID-19 virus 08/2020    Sleep apnea     dx 2013- has c-pap    Type II or unspecified type diabetes mellitus with other specified manifestations, uncontrolled 12/12/2012    Venous hypertension, chronic, with ulcer ([facility] [address] 12/12/2012    resolved       PAST SURGICAL HISTORY    Past Surgical History:   Procedure Laterality Date    CABG WITH AORTIC VALVE REPLACEMENT N/A 2/16/2021    CABG CORONARY ARTERY BYPASS X2 WITH LIMA, AORTIC VALVE REPLACEMENT AND AORTIC ROOT REPAIR, INTRAOPERATIVE MABLE, INDUCED HYPOTHERMIA, LEFT LEG ENDOVEIN HARVEST, LEFT ATRIAL CLIP, AND CRYO PROCEDURE performed by Rafia Golden MD at [facility]  12/14    at [address] Left 1/29/2021    LEFT CAROTID ENDARTERECTOMY performed by Jimena Au MD at [facility]  2017    COLONOSCOPY  2011    COLONOSCOPY N/A 11/19/2019    COLONOSCOPY DIAGNOSTIC performed by Catarina Ramirez MD at [facility]  09/30/2020    POSSIBLE CECAL avms, SIGMOID DIVERTICULOSIS, INTERNAL HEMORRHOIDS GRADE 1    COLONOSCOPY N/A 9/30/2020    COLONOSCOPY CONTROL HEMORRHAGE WITH APC performed by Catarina Ramirez MD at [facility]  2014    \"2 stents put in \"    IR NONTUNNELED VASCULAR CATHETER  3/5/2021    IR NONTUNNELED VASCULAR CATHETER 3/5/2021 [facility] SPECIAL PROCEDURES    JOINT REPLACEMENT  2004    total left hip    OTHER SURGICAL HISTORY Right 12/02/2017    I&D; evacuation of hematoma right hip    OTHER SURGICAL HISTORY  09/17/2020    enteroscopy    PACEMAKER INSERTION N/A 2/23/2021    PACEMAKER GENERATOR LEAD REVISION performed by Rafia Golden MD at [facility]      9/18/14 Status post remote permanent pacemaker with atrial lead dislodgement.  7/24/14 PPM Implant    UPPER GASTROINTESTINAL ENDOSCOPY N/A 9/17/2020    ENTEROSCOPY PUSH BIOPSY performed by Catarina Ramirez MD at [facility] N/A 3/4/2021    EGD DIAGNOSTIC ONLY performed by Catarina Ramirez MD at [facility] [address]  2012    \"have stents in both legs- done in [facility]    Family History   Problem Relation Age of Onset    High Blood Pressure Mother     Arthritis Mother     Diabetes Mother     Heart Disease Mother     High Blood Pressure Father  Heart Disease Father     Kidney Disease Father        SOCIAL HISTORY    Social History     Tobacco Use    Smoking status: Never Smoker    Smokeless tobacco: Never Used   Vaping Use    Vaping Use: Never used   Substance Use Topics    Alcohol use: Yes     Alcohol/week: 2.0 standard drinks     Types: 2 Cans of beer per week     Comment: average \"one time per week\"/ Caffiene: 1 cup of coffee daily    Drug use: No       ALLERGIES    Allergies   Allergen Reactions    Spironolactone      CAUSES INCREASED K+    Tape Whitetop Nasreen Tape] Rash     SURGICAL TAPE       MEDICATIONS    Current Outpatient Medications on File Prior to Encounter   Medication Sig Dispense Refill    apixaban (ELIQUIS) 5 MG TABS tablet Take 1 tablet by mouth 2 times daily 28 tablet 0    apixaban (ELIQUIS) 5 MG TABS tablet Take 1 tablet by mouth 2 times daily 180 tablet 3    canagliflozin (INVOKANA) 300 MG TABS tablet Take 1 tablet by mouth every morning (before breakfast) 90 tablet 1    Dulaglutide 3 MG/0.5ML SOPN Inject 3 mg into the skin once a week 4 pen 2    glimepiride (AMARYL) 4 MG tablet Take 1 tablet by mouth every morning (before breakfast) 90 tablet 1    rOPINIRole (REQUIP) 0.5 MG tablet TAKE 1 TABLET 3 TIMES A  tablet 1    sildenafil (VIAGRA) 100 MG tablet TAKE 1 TABLET DAILY AS     NEEDED FOR ERECTILE        DYSFUNCTION 30 tablet 1    gabapentin (NEURONTIN) 300 MG capsule Take 1 capsule by mouth 3 times daily for 90 days.  90 capsule 2    torsemide (DEMADEX) 20 MG tablet Take 1 tablet by mouth 2 times daily 180 tablet 3    carboxymethylcellulose (REFRESH PLUS) 0.5 % SOLN ophthalmic solution INSTILL 1 DROP IN BOTH EYES THREE TIMES A DAY      Cholecalciferol (VITAMIN D) 50 MCG (2000 UT) CAPS capsule Take by mouth nightly       aspirin 81 MG tablet Take 81 mg by mouth daily      simvastatin (ZOCOR) 20 MG tablet Take 1 tablet by mouth daily 90 tablet 2     No current facility-administered medications on file prior to encounter. REVIEW OF SYSTEMS    Pertinent items are noted in HPI. Constitutional: Negative for systemic symptoms including fever, chills and malaise. Objective:      BP (!) 152/81   Pulse 88   Temp 97.8 °F (36.6 °C) (Temporal)   Resp 18     PHYSICAL EXAM      General: The patient is in no acute distress. Mental status:  Patient is appropriate, is  oriented to place and plan of care. 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 ([address] - Primary    Relevant Medications    lidocaine (XYLOCAINE) 5 % ointment (Start on 11/23/2021  9:15 AM)    gentamicin (GARAMYCIN) 0.1 % ointment (Start on 11/23/2021  9:30 AM)    Other Relevant Orders    Initiate Outpatient Wound Care Protocol    XR TOE LEFT (MIN 2 VIEWS)    WD-Non-pressure ulcer of right lower extremity with fat layer exposed ([address]    Relevant Orders    XR TOE LEFT (MIN 2 VIEWS)          Procedure Note    Indications:  Based on my examination of this patient's wound(s) today, sharp excision into necrotic subcutaneous tissue is required to promote healing and evaluate the extent of previous healing. Performed by: JUSTIN Kinney - CNP    Consent obtained: Yes    Time out taken:  Yes    Pain Control: Anesthetic  Anesthetic: 5% Lidocaine Ointment Topical     Debridement:Excisional Debridement    Using curette the wound(s) was/were sharply debrided down through and including the removal of subcutaneous tissue.         Devitalized Tissue Debrided:  fibrin, biofilm, slough, exudate and callus    Pre Debridement Measurements:  Are located in the Wound Documentation Flow Sheet    All active wounds listed below with today's date are evaluated  Wound(s)    debrided this date include # : 1     Post Debridement Measurements:  Wound 02/18/21 Toe (Comment  which one) Right 2nd toe anterior (Active)   Number of days: 277       Wound 02/18/21 Toe (Comment  which one) Anterior; Left 3rd toe (Active)   Number of days: 277       Wound 03/06/21 Arm Left; Lower (Active)   Number of days: 262       Wound 03/06/21 Sacrum Mid;Left cluster (Active)   Number of days: 262       Wound 03/15/21 Ear Left small laceration on pinna of left ear (Active)   Number of days: 252       Wound 03/17/21 Pretibial Distal;Left;Posterior (Active)   Number of days: 251       Wound 03/17/21 Buttocks Right;Upper (Active)   Number of days: 251       Wound 09/03/21 #1 Left 3rd Toe Plantar (Active)   Wound Image   11/16/21 1336   Wound Etiology Diabetic 11/23/21 0851   Dressing Status New dressing applied 11/09/21 1350   Wound Cleansed Soap and water 11/23/21 0851   Dressing/Treatment Collagen; Alginate 09/16/21 0955   Offloading for Diabetic Foot Ulcers Forefoot offloading shoe 11/23/21 0851   Wound Length (cm) 0.4 cm 11/23/21 0851   Wound Width (cm) 0.3 cm 11/23/21 0851   Wound Depth (cm) 0.1 cm 11/23/21 0851   Wound Surface Area (cm^2) 0.12 cm^2 11/23/21 0851   Change in Wound Size % (l*w) 52 11/23/21 0851   Wound Volume (cm^3) 0.012 cm^3 11/23/21 0851   Wound Healing % 52 11/23/21 0851   Post-Procedure Length (cm) 0.4 cm 11/23/21 0905   Post-Procedure Width (cm) 0.3 cm 11/23/21 0905   Post-Procedure Depth (cm) 0.1 cm 11/23/21 0905   Post-Procedure Surface Area (cm^2) 0.12 cm^2 11/23/21 0905   Post-Procedure Volume (cm^3) 0.012 cm^3 11/23/21 0905   Distance Tunneling (cm) 0 cm 11/23/21 0851   Tunneling Position ___ O'Clock 0 11/23/21 0851   Undermining Starts ___ O'Clock 0 11/23/21 0851   Undermining Ends___ O'Clock 0 11/23/21 0851   Undermining Maxium Distance (cm) 0 11/23/21 0851   Wound Assessment Pink/red 11/23/21 0851   Drainage Amount Moderate 11/23/21 0851   Drainage Description Yellow 11/23/21 0851   Odor None 11/23/21 0851   Angela-wound Assessment Hyperkeratosis (callous) 11/23/21 0851   Margins Defined edges 11/23/21 0851   Wound Thickness Description not for Pressure Injury Full thickness 11/23/21 0851   Number of days: 81       Percent of Wound(s) Debrided: approximately 100%    Total  Area  Debrided:  0.12 sq cm     Bleeding:  Minimal    Hemostasis Achieved:  by pressure and by silver nitrate stick    Procedural Pain:  0  / 10     Post Procedural Pain:  0 / 10     Response to treatment:  Well tolerated by patient. Status of wound progress and description from last visit:   Stable. Not much change or improvement in size. Application of cast:    With the indications of casting being present and no contraindications, information and  instructions were given to the patient and the patient consented to the treatment. After proper wound care and appropriate padding. A total contact cast was applied according to protocol. The patient tolerated the procedure well. Plan:       Discharge Instructions       PHYSICIAN ORDERS AND DISCHARGE INSTRUCTIONS     NOTE: Upon discharge from the [address], you will receive a patient experience survey. We would be grateful if you would take the time to fill this survey out.     Wound care order history:                 YESSENIA's   Right  0.93     Left  0.55           Date:  9/3/21              Cultures:  Left 3rd Toe cultured 9/3/21              Grafts:                Antibiotics:            Continuing wound care orders and information:                 Residence:  Home              Continue home health care with:               Your wound-care supplies will be provided by: [facility]     Wound cleansing:               PW not scrub or use excessive force.              Wash hands with soap and water before and after dressing changes.               AEOFM to applying a clean dressing, cleanse wound with normal saline, wound cleanser, or mild soap and water.               Ask the physician or nurse before getting the wound(s) wet in a shower     Daily Wound management:              Keep weight off wounds and reposition every 2 hours.              Avoid standing for long periods of time.              Apply wraps/stockings in AM and remove at bedtime.              If swelling is present, elevate legs to the level of the heart or above for 30 minutes 4-5 times a day and/or when sitting.                                      When taking antibiotics take entire prescription as ordered by physician do not stop taking until medicine is all gone.                                                           Orders for this week: 11/23/21                  Xray of Left 3rd Toe ordered 11/23/21    Left 3rd Toe cultured 9/3/21     Rx: [facility] on [address]        Left 3rd Toe - Wash with soap and water, pat dry. Apply Gentamicin and stimulen to wound bed. Cover with double thick layer of ca alginate from base of toe and on sides between toes. Wrap with conform and secure with tape. Change daily.        Follow up with Chavez Ta CNP in 1 week in the wound care center. Call [phone] for any questions or concerns.   Date__________   Time____________        Treatment Note      Written Patient Dismissal Instructions Given            Electronically signed by JUSTIN Rouse CNP on 11/23/2021 at 9:08 AM

## 2021-11-26 NOTE — PROGRESS NOTES
Patient Education    BRIGHT [employer] HANDOUT- PARENT  18 MONTH VISIT  Here are some suggestions from [employer] experts that may be of value to your family.     YOUR CHILD S BEHAVIOR  Expect your child to cling to you in new situations or to be anxious around strangers.  Play with your child each day by doing things she likes.  Be consistent in discipline and setting limits for your child.  Plan ahead for difficult situations and try things that can make them easier. Think about your day and your child s energy and mood.  Wait until your child is ready for toilet training. Signs of being ready for toilet training include  Staying dry for 2 hours  Knowing if she is wet or dry  Can pull pants down and up  Wanting to learn  Can tell you if she is going to have a bowel movement  Read books about toilet training with your child.  Praise sitting on the potty or toilet.  If you are expecting a new baby, you can read books about being a big brother or sister.  Recognize what your child is able to do. Don t ask her to do things she is not ready to do at this age.    YOUR CHILD AND TV  Do activities with your child such as reading, playing games, and singing.  Be active together as a family. Make sure your child is active at home, in , and with sitters.  If you choose to introduce media now,  Choose high-quality programs and apps.  Use them together.  Limit viewing to 1 hour or less each day.  Avoid using TV, tablets, or smartphones to keep your child busy.  Be aware of how much media you use.    TALKING AND HEARING  Read and sing to your child often.  Talk about and describe pictures in books.  Use simple words with your child.  Suggest words that describe emotions to help your child learn the language of feelings.  Ask your child simple questions, offer praise for answers, and explain simply.  Use simple, clear words to tell your child what you want him to do.    HEALTHY EATING  Offer your child a variety of  [address] HOSPITALIST PROGRESS NOTE  Date: 8/15/2020   Name: Toy Frey   MRN: [id number]   YOB: 1950  Chief Complaint   Patient presents with    Cough    Fever     fever off and on but today temp was >100. wife reports temp of 102.4 at home PTA. no meds given        Subjective/Interval Hx:     -he says he is feeling better today  -as per nurse, patient last night had a fever spike and was hypoxic to a saturation of 82%. He was placed on 4LNC. This morning he appeared to be coming off oxygen. ROS: negative unless mentioned above  Objective:   Physical Exam:   BP (!) 121/59   Pulse 61   Temp 98 °F (36.7 °C) (Oral)   Resp 21   Ht 5' 11\" (1.803 m)   Wt 219 lb 2.2 oz (99.4 kg)   SpO2 98%   BMI 30.56 kg/m²   CONSTITUTIONAL:  No acute distress, laying in bed  HENT:  NCAT  LUNGS:  cta b/l bs+  CARDIOVASCULAR:  s1s2 rrr  ABDOMEN:  Soft nt nd  MUSCULOSKELETAL/Extremities:  No edema, nontender  NEUROLOGIC:  No gross deficits, aao  SKIN:  No gross lesions    Assessment/Plan:       1. Suspected COVID-19 pneumonia. Need to exclude gram-positive/atypical bacterial infection. CT chest-multifocal groundglass opacities. Fever  Rocephin and azithromycin. Follow-up COVID-19 result. Procalcitonin 0.262. CRP 97. BNP 1.6K. R PCR negative. Urine Legionella and strep negative. - fever 103.2 at 2105 yesterday. Patient was on 3 L nasal cannula. Currently appears to be on room air. 2. Elevated troponin  EKG: Ventricular paced rhythm. Troponin  0.042.    -  Troponin 0.66. This may be due to CKD. Chronically appears to have elevated troponins. Monitor trend. 3. Mild hyponatremia  Given gentle fluid hydration for 12 hours. Improved. -Sodium improved to 134.  4. Anemia  -hemoglobin 9.5. Check anemia panel.     Other diagnoses, medications continued unless contraindicated  Type 2 diabetes mellitus- oral medications held.   Sliding scale insulin  CKD 3-stable BL 1.4  Hypertension  Hyperlipidemia  Atrial fibrillation- rate controlled. On chronic anticoagulation with apixaban. S/p permanent pacemaker  Sleep apnea-CPAP  Gout  Chronic iron deficiency anemia-stable. Suspected occult GI bleed- to continue outpatient work-up by gastroenterology. DVT Prophylaxis: Xarelto  Diet: DIET GENERAL;  Code Status: Full Code   Home O2: CPAP at night      Dispo:  -Okay to transfer to [facility] if COVID test is negative. Ambulate. Preeti Live MD  8/15/2020    Meds:   Meds:    aspirin  81 mg Oral Daily    ferrous sulfate  325 mg Oral Daily with breakfast    gabapentin  300 mg Oral TID    metoprolol succinate  25 mg Oral Daily    simvastatin  10 mg Oral QPM    sodium chloride flush  10 mL Intravenous 2 times per day    azithromycin  500 mg Intravenous Q24H    And    cefTRIAXone (ROCEPHIN) IV  1 g Intravenous Q24H    insulin lispro  0-6 Units Subcutaneous TID WC    insulin lispro  0-3 Units Subcutaneous QPM    rivaroxaban  20 mg Oral Daily    allopurinol  400 mg Oral Nightly    valsartan  40 mg Oral Daily      Infusions:    dextrose      sodium chloride 75 mL/hr at 08/14/20 1741     PRN Meds: sodium chloride flush, 10 mL, PRN  acetaminophen, 650 mg, Q6H PRN    Or  acetaminophen, 650 mg, Q6H PRN  polyethylene glycol, 17 g, Daily PRN  promethazine, 12.5 mg, Q6H PRN    Or  ondansetron, 4 mg, Q6H PRN  glucose, 15 g, PRN  dextrose, 12.5 g, PRN  glucagon (rDNA), 1 mg, PRN  dextrose, 100 mL/hr, PRN        Data/Labs:     Recent Labs     08/13/20  2000 08/15/20  0700   WBC 5.5 6.7   HGB 10.0* 9.5*   HCT 36.7* 33.7*    160      Recent Labs     08/13/20 2000 08/14/20  0202 08/15/20  0700   * 135 134*   K 4.4 4.1 4.5   CL 96* 100 101   CO2 23 22 22   BUN 40* 39* 40*   CREATININE 1.5* 1.4* 1.6*     Recent Labs     08/13/20 2000   AST 25   ALT 14   BILITOT 0.4   ALKPHOS 63     No results for input(s): INR in the last 72 hours.   Recent Labs     08/13/20 2000   TROPONINT healthy foods and snacks, especially vegetables, fruits, and lean protein.  Give one bigger meal and a few smaller snacks or meals each day.  Let your child decide how much to eat.  Give your child 16 to 24 oz of milk each day.  Know that you don t need to give your child juice. If you do, don t give more than 4 oz a day of 100% juice and serve it with meals.  Give your toddler many chances to try a new food. Allow her to touch and put new food into her mouth so she can learn about them.    SAFETY  Make sure your child s car safety seat is rear facing until he reaches the highest weight or height allowed by the car safety seat s . This will probably be after the second birthday.  Never put your child in the front seat of a vehicle that has a passenger airbag. The back seat is the safest.  Everyone should wear a seat belt in the car.  Keep poisons, medicines, and lawn and cleaning supplies in locked cabinets, out of your child s sight and reach.  Put the Poison Help number into all phones, including cell phones. Call if you are worried your child has swallowed something harmful. Do not make your child vomit.  When you go out, put a hat on your child, have him wear sun protection clothing, and apply sunscreen with SPF of 15 or higher on his exposed skin. Limit time outside when the sun is strongest (11:00 am-3:00 pm).  If it is necessary to keep a gun in your home, store it unloaded and locked with the ammunition locked separately.    WHAT TO EXPECT AT YOUR CHILD S 1 YEAR VISIT  We will talk about  Caring for your child, your family, and yourself  Handling your child s behavior  Supporting your talking child  Starting toilet training  Keeping your child safe at home, outside, and in the car        Helpful Resources: Poison Help Line:  679.260.9241  Information About Car Safety Seats: www.safercar.gov/parents  Toll-free Auto Safety Hotline: 888.553.9855  Consistent with Bright Futures: Guidelines for  0.042*     HgBA1c:   Lab Results   Component Value Date    LABA1C 7.3 07/07/2020     CALCIUM:  8.0/22 (08/15 0700)    I/O last 3 completed shifts:   In: 1200 [P.O.:360; I.V.:840]  Out: 0     Intake/Output Summary (Last 24 hours) at 8/15/2020 1253  Last data filed at 8/15/2020 0404  Gross per 24 hour   Intake 1200 ml   Output 0 ml   Net 1200 ml Health Supervision of Infants, Children, and Adolescents, 4th Edition  For more information, go to https://brightfutures.aap.org.         Would recommend doing trial off milk/ dairy for a couple weeks.   Would try an rice milk, oat, almond or possibly soy but sometimes cross reactivity with cow milk. Check to be sure not too high in sugar and has some vitamin D fortification.   Would want to see stools better, less of possible reflux symptoms, less painful episodes at night.   Might consider referral to GI to evaluate further.   Consider bringing in a stool as well to check for blood.   Fluoride Varnish Treatments and Your Child  What is fluoride varnish?    A dental treatment that prevents and slows tooth decay (cavities).    It is done by brushing a coating of fluoride on the surfaces of the teeth.  How does fluoride varnish help teeth?    Works with the tooth enamel, the hard coating on teeth, to make teeth stronger and more resistant to cavities.    Works with saliva to protect tooth enamel from plaque and sugar.    Prevents new cavities from forming.    Can slow down or stop decay from getting worse.  Is fluoride varnish safe?    It is quick, easy, and safe for children of all ages.    It does not hurt.    A very small amount is used, and it hardens fast. Almost no fluoride is swallowed.    Fluoride varnish is safe to use, even if your child gets fluoride from other sources, such as from drinking water, toothpaste, prescription fluoride, vitamins or formula.  How long does fluoride varnish last?    It lasts several months.    It works best when applied at every well-child visit.  Why is my clinic using fluoride varnish?  Your child's provider cares about their whole health, including their mouth and teeth. While your child should still see a dentist regularly, their provider can:    Provide fluoride varnish at well-child visits. This will help keep teeth healthy between dental visits.    Check the mouth for  "problems.    Refer you to a dentist if you don't have one.  What can I expect after treatment?    To protect the new fluoride coating:  ? Don't drink hot liquids or eat sticky or crunchy foods for 24 hours. It is okay to have soft foods and warm or cold liquids right away.  ? Don't brush or floss teeth until the next day.    Teeth may look a little yellow or dull for the next 24 to 48 hours.    Your child's teeth will still need regular brushing, flossing and dental checkups.    For informational purposes only. Not to replace the advice of your health care provider. Adapted from \"Fluoride Varnish Treatments and Your Child\" from the [place] Department of Health. Copyright   2020 [facility]. All rights reserved. Clinically reviewed by Pediatric Prevent[facility]. [employer] [id number] - 11/20.          "

## 2021-11-29 ENCOUNTER — HOSPITAL ENCOUNTER (OUTPATIENT)
Dept: WOUND CARE | Age: 71
Discharge: HOME | End: 2021-11-29
Payer: MEDICARE

## 2021-11-29 VITALS
DIASTOLIC BLOOD PRESSURE: 84 MMHG | SYSTOLIC BLOOD PRESSURE: 150 MMHG | TEMPERATURE: 99 F | RESPIRATION RATE: 18 BRPM | HEART RATE: 82 BPM

## 2021-11-29 DIAGNOSIS — L97.522 DIABETIC ULCER OF TOE OF LEFT FOOT ASSOCIATED WITH TYPE 2 DIABETES MELLITUS, WITH FAT LAYER EXPOSED (HCC): Primary | ICD-10-CM

## 2021-11-29 DIAGNOSIS — E11.621 DIABETIC ULCER OF TOE OF LEFT FOOT ASSOCIATED WITH TYPE 2 DIABETES MELLITUS, WITH FAT LAYER EXPOSED (HCC): Primary | ICD-10-CM

## 2021-11-29 DIAGNOSIS — L97.912 NON-PRESSURE ULCER OF RIGHT LOWER EXTREMITY WITH FAT LAYER EXPOSED (HCC): ICD-10-CM

## 2021-11-29 PROCEDURE — 11042 DBRDMT SUBQ TIS 1ST 20SQCM/<: CPT

## 2021-11-29 PROCEDURE — 11042 DBRDMT SUBQ TIS 1ST 20SQCM/<: CPT | Performed by: NURSE PRACTITIONER

## 2021-11-29 RX ORDER — BACITRACIN, NEOMYCIN, POLYMYXIN B 400; 3.5; 5 [USP'U]/G; MG/G; [USP'U]/G
OINTMENT TOPICAL ONCE
Status: CANCELLED | OUTPATIENT
Start: 2021-11-29 | End: 2021-11-29

## 2021-11-29 RX ORDER — LIDOCAINE HYDROCHLORIDE 20 MG/ML
JELLY TOPICAL ONCE
Status: CANCELLED | OUTPATIENT
Start: 2021-11-29 | End: 2021-11-29

## 2021-11-29 RX ORDER — BACITRACIN ZINC AND POLYMYXIN B SULFATE 500; 1000 [USP'U]/G; [USP'U]/G
OINTMENT TOPICAL ONCE
Status: CANCELLED | OUTPATIENT
Start: 2021-11-29 | End: 2021-11-29

## 2021-11-29 RX ORDER — LIDOCAINE 50 MG/G
OINTMENT TOPICAL ONCE
Status: CANCELLED | OUTPATIENT
Start: 2021-11-29 | End: 2021-11-29

## 2021-11-29 RX ORDER — GINSENG 100 MG
CAPSULE ORAL ONCE
Status: CANCELLED | OUTPATIENT
Start: 2021-11-29 | End: 2021-11-29

## 2021-11-29 RX ORDER — GENTAMICIN SULFATE 1 MG/G
OINTMENT TOPICAL ONCE
Status: CANCELLED | OUTPATIENT
Start: 2021-11-29 | End: 2021-11-29

## 2021-11-29 RX ORDER — BETAMETHASONE DIPROPIONATE 0.05 %
OINTMENT (GRAM) TOPICAL ONCE
Status: CANCELLED | OUTPATIENT
Start: 2021-11-29 | End: 2021-11-29

## 2021-11-29 RX ORDER — LIDOCAINE 50 MG/G
OINTMENT TOPICAL ONCE
Status: DISCONTINUED | OUTPATIENT
Start: 2021-11-29 | End: 2021-11-30 | Stop reason: HOSPADM

## 2021-11-29 RX ORDER — LIDOCAINE 40 MG/G
CREAM TOPICAL ONCE
Status: CANCELLED | OUTPATIENT
Start: 2021-11-29 | End: 2021-11-29

## 2021-11-29 RX ORDER — LIDOCAINE HYDROCHLORIDE 40 MG/ML
SOLUTION TOPICAL ONCE
Status: CANCELLED | OUTPATIENT
Start: 2021-11-29 | End: 2021-11-29

## 2021-11-29 RX ORDER — CLOBETASOL PROPIONATE 0.5 MG/G
OINTMENT TOPICAL ONCE
Status: CANCELLED | OUTPATIENT
Start: 2021-11-29 | End: 2021-11-29

## 2021-11-29 NOTE — PROGRESS NOTES
Progress Note and application of total contact cast      Maggie Urbina  AGE: 66 y.o. GENDER: male  : 1950  EPISODE DATE:  2021     Subjective:     Chief Complaint   Patient presents with    Wound Check     LLE          HISTORY of PRESENT ILLNESS     Vicenta Mejia is a 66 y. o. 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 The condition is of moderate severity. The toe ulcer has been present for approximately 6 weeks, and the abrasion for approximately 2 weeks at initial wound clinic visit 9/10/21. Right anterior lower leg is healed. The underlying cause is thought to be diabetes and trauma. He first noticed his toe wound as a blister, that eventually popped and has not been healing.  The patients care to date has included evaluation by his PCP, he was given bactrim and keflex. He has also been using iodine at home. [facility] The patient has significant underlying medical conditions as below.      Patient did well with his cast. Wound has changes shape but is improved overall. He is agreeable to being casted again until Friday.    No other issues at this time      Wound Pain Timing/Severity: none  Quality of pain: N/A  Severity of pain:  0 / 10   Modifying Factors: diabetes  Associated Signs/Symptoms: drainage        PAST MEDICAL HISTORY        Diagnosis Date    Arrhythmia     Pacemaker placed aprox 5 years ago for A Fib per patient    Arthritis 2013    rt wrist    Atrial fibrillation ([address]     on [place] - Dr. Angie Eldridge CAD (coronary artery disease) 2014    see dr Danita Jain kidney disease, stage III (moderate) ([address] 2016    Critical illness myopathy 03/15/2021    Diabetes mellitus ([address]     dx 2004    Diabetic neuropathy associated with type 2 diabetes mellitus ([address] 2019    Erythropoietin deficiency anemia 2020    Gout 2019 \"got gout when had pacer put in because they did not give me my medication for gout \"    H/O 24 hour EKG monitoring 10/03/2013    no afib noted, sinsus rhythm    H/O cardiovascular stress test 05/12/2014    cardiolite- mild ischemia RCA EF50%    H/O echocardiogram 12/01/2020    EF 55-60% severe aortic stenosis mild to mod aortic regurg mod to severe tricuspid regurg severe pulm htn significant changes since 2018 echo.  H/O right and left heart catheterization 12/10/2020    DIFFUSE LAD DISEASE, Mild ECA Disease, Severe AS, Milf Pul HTN on RHC.  H/O transesophageal echocardiography (MABLE) for monitoring 08/05/2013    normal LV function and normal LA appendage without any clot    History of blood transfusion 12/2020    d/t anemia    History of transesophageal echocardiography (MABLE) 12/15/2020    Severe aortic stenosis (ANNA by planimetry: 0.778 cm sq). Mild AR.    [place] (hard of hearing)     hearing tonya aides    Hx of Doppler echocardiogram 05/21/2018    EF 50%  Mild LV hypertrophy. Mildly enlarged RA. Mod aortic valve calcification with mod AS. Mitral annular calcification is present. Mild AR, MR and TR. Mild pulmonary htn.     Hyperlipidemia     Hypertension     Follows with PCP & Dr. Elise Ford Other disorders of kidney and ureter     Pacemaker     Medtronic, implanted 2014    Pneumonia 12/29/2012    Pneumonia due to COVID-19 virus 08/2020    Sleep apnea     dx 2013- has c-pap    Type II or unspecified type diabetes mellitus with other specified manifestations, uncontrolled 12/12/2012    Venous hypertension, chronic, with ulcer ([address] 12/12/2012    resolved       PAST SURGICAL HISTORY    Past Surgical History:   Procedure Laterality Date    CABG WITH AORTIC VALVE REPLACEMENT N/A 2/16/2021    CABG CORONARY ARTERY BYPASS X2 WITH LIMA, AORTIC VALVE REPLACEMENT AND AORTIC ROOT REPAIR, INTRAOPERATIVE MABLE, INDUCED HYPOTHERMIA, LEFT LEG ENDOVEIN HARVEST, LEFT ATRIAL CLIP, AND CRYO PROCEDURE performed by Jessica Saucedo MD at [facility]  12/14    at [address] Left 1/29/2021    LEFT CAROTID ENDARTERECTOMY performed by Trino Kent MD at [id number]  2017    COLONOSCOPY  2011    COLONOSCOPY N/A 11/19/2019    COLON[facility] DIAGNOSTIC [facility]  09/30/2020    POSSIBLE CECAL avms, SIGMOID DIVERTICULOSIS, INTERNAL HEMORRHOIDS GRADE 1    COLONOSCOPY N/A 9/30/2020    COLONOSCOPY CONTROL HEMORRHAGE WITH APC performed by Brooke Cleaning MD at [facility]  2014    \"2 stents put in \"    IR NONTUNNELED VASCULAR CATHETER  3/5/2021    IR NONTUNNELED VASCULAR CATHETER 3/5/2021 [facility] SPECIAL PROCEDURES    JOINT REPLACEMENT  2004    total left hip    OTHER SURGICAL HISTORY Right 12/02/2017    I&D; evacuation of hematoma right hip    OTHER SURGICAL HISTORY  09/17/2020    enteroscopy    PACEMAKER INSERTION N/A 2/23/2021    PACEMAKER GENERATOR LEAD REVISION performed by Jessica Saucedo MD at [facility]      9/18/14 Status post remote permanent pacemaker with atrial lead dislodgement.  7/24/14 PPM Implant    UPPER GASTROINTESTINAL ENDOSCOPY N/A 9/17/2020    ENTEROSCOPY PUSH BIOPSY performed by Brooke Cleaning MD at [facility] N/A 3/4/2021    EGD DIAGNOSTIC ONLY performed by Brooke Cleaning MD at [facility]  2012    \"have stents in both legs- done in [facility]    Family History   Problem Relation Age of Onset    High Blood Pressure Mother     Arthritis Mother     Diabetes Mother     Heart Disease Mother     High Blood Pressure Father     Heart Disease Father     Kidney Disease Father        SOCIAL HISTORY    Social History     Tobacco Use    Smoking status: Never Smoker    Smokeless tobacco: Never Used   Vaping Use    Vaping Use: Never used   Substance Use Topics    Alcohol use: Yes     Alcohol/week: 2.0 standard drinks     Types: 2 Cans of beer per week     Comment: average \"one time per week\"/ Caffiene: 1 cup of coffee daily    Drug use: No       ALLERGIES    Allergies   Allergen Reactions    Spironolactone      CAUSES INCREASED K+    Tape [place] Tape] Rash     SURGICAL TAPE       MEDICATIONS    Current Outpatient Medications on File Prior to Encounter   Medication Sig Dispense Refill    Dulaglutide 3 MG/0.5ML SOPN Inject 3 mg into the skin once a week 12 pen 0    gabapentin (NEURONTIN) 300 MG capsule Take 1 capsule by mouth 3 times daily for 90 days. 270 capsule 0    apixaban (ELIQUIS) 5 MG TABS tablet Take 1 tablet by mouth 2 times daily 28 tablet 0    apixaban (ELIQUIS) 5 MG TABS tablet Take 1 tablet by mouth 2 times daily 180 tablet 3    canagliflozin (INVOKANA) 300 MG TABS tablet Take 1 tablet by mouth every morning (before breakfast) 90 tablet 1    glimepiride (AMARYL) 4 MG tablet Take 1 tablet by mouth every morning (before breakfast) 90 tablet 1    rOPINIRole (REQUIP) 0.5 MG tablet TAKE 1 TABLET 3 TIMES A  tablet 1    sildenafil (VIAGRA) 100 MG tablet TAKE 1 TABLET DAILY AS     NEEDED FOR ERECTILE        DYSFUNCTION 30 tablet 1    torsemide (DEMADEX) 20 MG tablet Take 1 tablet by mouth 2 times daily 180 tablet 3    carboxymethylcellulose (REFRESH PLUS) 0.5 % SOLN ophthalmic solution INSTILL 1 DROP IN BOTH EYES THREE TIMES A DAY      Cholecalciferol (VITAMIN D) 50 MCG (2000 UT) CAPS capsule Take by mouth nightly       aspirin 81 MG tablet Take 81 mg by mouth daily      simvastatin (ZOCOR) 20 MG tablet Take 1 tablet by mouth daily 90 tablet 2     No current facility-administered medications on file prior to encounter. REVIEW OF SYSTEMS    Pertinent items are noted in HPI. Constitutional: Negative for systemic symptoms including fever, chills and malaise.     Objective:      BP (!) 150/84   Pulse 82   Temp 99 °F (37.2 °C) (Temporal) Resp 18     PHYSICAL EXAM      General: The patient is in no acute distress. Mental status:  Patient is appropriate, is  oriented to place and plan of care. 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 Performed by: JUSTIN Schmid - CNP    Consent obtained: Yes    Time out taken:  Yes    Pain Control: Anesthetic  Anesthetic: 5% Lidocaine Ointment Topical     Debridement:Excisional Debridement    Using curette the wound(s) was/were sharply debrided down through and including the removal of subcutaneous tissue. Devitalized Tissue Debrided:  fibrin, biofilm, slough and exudate    Pre Debridement Measurements:  Are located in the Wound Documentation Flow Sheet    All active wounds listed below with today's date are evaluated  Wound(s)    debrided this date include # : 1     Post  Debridement Measurements:  Wound 02/18/21 Toe (Comment  which one) Right 2nd toe anterior (Active)   Number of days: 287       Wound 02/18/21 Toe (Comment  which one) Anterior; Left 3rd toe (Active)   Number of days: 287       Wound 03/06/21 Arm Left; Lower (Active)   Number of days: 272       Wound 03/06/21 Sacrum Mid;Left cluster (Active)   Number of days: 272       Wound 03/15/21 Ear Left small laceration on pinna of left ear (Active)   Number of days: 262       Wound 03/17/21 Pretibial Distal;Left;Posterior (Active)   Number of days: 261       Wound 03/17/21 Buttocks Right;Upper (Active)   Number of days: 261       Wound 09/03/21 #1 Left 3rd Toe Plantar (Active)   Wound Image   11/16/21 1336   Wound Etiology Diabetic 11/23/21 0949   Dressing Status Clean; Dry; New dressing applied 11/29/21 1635   Wound Cleansed Soap and water; Wound cleanser; Cleansed with saline 11/29/21 1551   Dressing/Treatment Collagen; Alginate 09/16/21 0955   Offloading for Diabetic Foot Ulcers Total contact cast 11/29/21 1551   Wound Length (cm) 1.1 cm 11/29/21 1551   Wound Width (cm) 0.7 cm 11/29/21 1551   Wound Depth (cm) 0.1 cm 11/29/21 1551   Wound Surface Area (cm^2) 0.77 cm^2 11/29/21 1551   Change in Wound Size % (l*w) -208 11/29/21 1551   Wound Volume (cm^3) 0.077 cm^3 11/29/21 1551   Wound Healing % -208 11/29/21 1551   Post-Procedure Length (cm) 1.1 cm 11/29/21 1619   Post-Procedure Width (cm) 0.7 cm 11/29/21 1619   Post-Procedure Depth (cm) 0.1 cm 11/29/21 1619   Post-Procedure Surface Area (cm^2) 0.77 cm^2 11/29/21 1619   Post-Procedure Volume (cm^3) 0.077 cm^3 11/29/21 1619   Distance Tunneling (cm) 0 cm 11/29/21 1551   Tunneling Position ___ O'Clock 0 11/29/21 1551   Undermining Starts ___ O'Clock 0 11/29/21 1551   Undermining Ends___ O'Clock 0 11/29/21 1551   Undermining Max[place] Distance (cm) 0 11/29/21 1551   Wound Assessment Pink/red 11/29/21 1551   Drainage Amount Moderate 11/29/21 1551   Drainage Description Yellow 11/29/21 1551   Odor None 11/29/21 1551   Angela-wound Assessment Hyperkeratosis (callous);  Maceration 11/29/21 1551   Margins Defined edges 11/29/21 1551   Wound Thickness Description not for Pressure Injury Full thickness 11/29/21 1551   Number of days: 91       Percent of Wound(s) Debrided: approximately 100%    Total  Area  Debrided:  0.77 sq cm     Bleeding:  Minimal    Hemostasis Achieved:  by pressure    Procedural Pain:  0  / 10

## 2021-12-06 ENCOUNTER — HOSPITAL ENCOUNTER (OUTPATIENT)
Dept: WOUND CARE | Age: 71
Discharge: HOME | End: 2021-12-06
Payer: MEDICARE

## 2021-12-06 VITALS
HEART RATE: 98 BPM | TEMPERATURE: 98.7 F | RESPIRATION RATE: 20 BRPM | DIASTOLIC BLOOD PRESSURE: 78 MMHG | SYSTOLIC BLOOD PRESSURE: 134 MMHG

## 2021-12-06 DIAGNOSIS — E11.621 DIABETIC ULCER OF TOE OF LEFT FOOT ASSOCIATED WITH TYPE 2 DIABETES MELLITUS, WITH FAT LAYER EXPOSED (HCC): Primary | ICD-10-CM

## 2021-12-06 DIAGNOSIS — L97.522 DIABETIC ULCER OF TOE OF LEFT FOOT ASSOCIATED WITH TYPE 2 DIABETES MELLITUS, WITH FAT LAYER EXPOSED (HCC): Primary | ICD-10-CM

## 2021-12-06 PROCEDURE — 11042 DBRDMT SUBQ TIS 1ST 20SQCM/<: CPT | Performed by: NURSE PRACTITIONER

## 2021-12-06 PROCEDURE — 11042 DBRDMT SUBQ TIS 1ST 20SQCM/<: CPT

## 2021-12-06 RX ORDER — BETAMETHASONE DIPROPIONATE 0.05 %
OINTMENT (GRAM) TOPICAL ONCE
Status: CANCELLED | OUTPATIENT
Start: 2021-12-06 | End: 2021-12-06

## 2021-12-06 RX ORDER — LIDOCAINE 50 MG/G
OINTMENT TOPICAL ONCE
Status: CANCELLED | OUTPATIENT
Start: 2021-12-06 | End: 2021-12-06

## 2021-12-06 RX ORDER — LIDOCAINE HYDROCHLORIDE 20 MG/ML
JELLY TOPICAL ONCE
Status: CANCELLED | OUTPATIENT
Start: 2021-12-06 | End: 2021-12-06

## 2021-12-06 RX ORDER — BACITRACIN ZINC AND POLYMYXIN B SULFATE 500; 1000 [USP'U]/G; [USP'U]/G
OINTMENT TOPICAL ONCE
Status: CANCELLED | OUTPATIENT
Start: 2021-12-06 | End: 2021-12-06

## 2021-12-06 RX ORDER — CLOBETASOL PROPIONATE 0.5 MG/G
OINTMENT TOPICAL ONCE
Status: CANCELLED | OUTPATIENT
Start: 2021-12-06 | End: 2021-12-06

## 2021-12-06 RX ORDER — LIDOCAINE 40 MG/G
CREAM TOPICAL ONCE
Status: CANCELLED | OUTPATIENT
Start: 2021-12-06 | End: 2021-12-06

## 2021-12-06 RX ORDER — LIDOCAINE HYDROCHLORIDE 40 MG/ML
SOLUTION TOPICAL ONCE
Status: CANCELLED | OUTPATIENT
Start: 2021-12-06 | End: 2021-12-06

## 2021-12-06 RX ORDER — BACITRACIN, NEOMYCIN, POLYMYXIN B 400; 3.5; 5 [USP'U]/G; MG/G; [USP'U]/G
OINTMENT TOPICAL ONCE
Status: CANCELLED | OUTPATIENT
Start: 2021-12-06 | End: 2021-12-06

## 2021-12-06 RX ORDER — GENTAMICIN SULFATE 1 MG/G
OINTMENT TOPICAL ONCE
Status: CANCELLED | OUTPATIENT
Start: 2021-12-06 | End: 2021-12-06

## 2021-12-06 RX ORDER — GINSENG 100 MG
CAPSULE ORAL ONCE
Status: CANCELLED | OUTPATIENT
Start: 2021-12-06 | End: 2021-12-06

## 2021-12-06 NOTE — PROGRESS NOTES
Progress Note and application of total contact cast      Gilbert Scott  AGE: 66 y.o. GENDER: male  : 1950  EPISODE DATE:  2021     Subjective:     Chief Complaint   Patient presents with    Wound Check     left foot         HISTORY of PRESENT ILLNESS     Silvia Mejia is a 66 y. o. 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 The condition is of moderate severity.  The toe ulcer has been present for approximately  6 weeks, and the abrasion for approximately 2 weeks at initial wound clinic visit 9/10/21. Right anterior lower leg is healed. The underlying cause is thought to be diabetes and trauma. He first noticed his toe wound as a blister, that eventually popped and has not been healing.  The patients care to date has included evaluation by his PCP, he was given bactrim and keflex. He has also been using iodine at home. [facility] The patient has significant underlying medical conditions as below.      Wound Pain Timing/Severity: none  Quality of pain: N/A  Severity of pain:  0 / 10   Modifying Factors: diabetes  Associated Signs/Symptoms: drainage  Diabetes: Yes, on an oral and Trulicity regimen, last A1c 6.1 on 21  Smoking:No  Obesity: No  Anticoagulant therapy: Eliquis and baby aspirin  Immunosuppression: No     Patient educated on the 6 essential components necessary for wound healing: Circulation, Debridements, Proper Dressings and Topical Wound Products, Infection Control, Edema Control and Offloading.     Patient educated on those factors that negatively effect or impact wound healing: smoking, obesity, uncontrolled diabetes, anticoagulant and immunosuppressive regimens, inadequate nutrition, untreated arterial and venous disease if applicable and measures to manage edema.         PAST MEDICAL HISTORY        Diagnosis Date    Arrhythmia     Pacemaker placed aprox 5 years ago for A Fib per patient    Arthritis 12/2013    rt wrist    Atrial fibrillation (HCC)     on Xarelto - Dr. Zuhair Sheth CAD (coronary artery disease) 06/18/2014    see dr Chris Magaña kidney disease, stage III (moderate) ([facility] [address] 07/07/2016    Critical illness myopathy 03/15/2021    Diabetes mellitus ([facility] [address]     dx 2004    Diabetic neuropathy associated with type 2 diabetes mellitus ([facility] [address] 04/23/2019    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    Gout 04/2019    \"got gout when had pacer put in because they did not give me my medication for gout \"    H/O 24 hour EKG monitoring 10/03/2013    no afib noted, sinsus rhythm    H/O cardiovascular stress test 05/12/2014    cardiolite- mild ischemia RCA EF50%    H/O echocardiogram 12/01/2020    EF 55-60% severe aortic stenosis mild to mod aortic regurg mod to severe tricuspid regurg severe pulm htn significant changes since 2018 echo.  H/O right and left heart catheterization 12/10/2020    DIFFUSE LAD DISEASE, Mild ECA Disease, Severe AS, Milf Pul HTN on RHC.  H/O transesophageal echocardiography (MABLE) for monitoring 08/05/2013    normal LV function and normal LA appendage without any clot    History of blood transfusion 12/2020    d/t anemia    History of transesophageal echocardiography (MABLE) 12/15/2020    Severe aortic stenosis (ANNA by planimetry: 0.778 cm sq). Mild AR.    [place] (hard of hearing)     hearing tonya aides    Hx of Doppler echocardiogram 05/21/2018    EF 50%  Mild LV hypertrophy. Mildly enlarged RA. Mod aortic valve calcification with mod AS. Mitral annular calcification is present. Mild AR, MR and TR. Mild pulmonary htn.     Hyperlipidemia     Hypertension     Follows with PCP & Dr. Stefanie Zayas Other disorders of kidney and ureter     Pacemaker     Medtronic, implanted 2014    Pneumonia 12/29/2012    Pneumonia due to COVID-19 virus 08/2020    Sleep apnea     dx 2013- has c-pap    Type II or unspecified type diabetes mellitus with other specified manifestations, uncontrolled 12/12/2012    Venous hypertension, chronic, with ulcer ([address] 12/12/2012    resolved       PAST SURGICAL HISTORY    Past Surgical History:   Procedure Laterality Date    CABG WITH AORTIC VALVE REPLACEMENT N/A 2/16/2021    CABG CORONARY ARTERY BYPASS X2 WITH LIMA, AORTIC VALVE REPLACEMENT AND AORTIC ROOT REPAIR, INTRAOPERATIVE MABLE, INDUCED HYPOTHERMIA, LEFT LEG ENDOVEIN HARVEST, LEFT ATRIAL CLIP, AND CRYO PROCEDURE performed by Rafia Golden MD at [address] [facility]  12/14    at [address] Left 1/29/2021    LEFT CAROTID ENDARTERECTOMY performed by Jimena Au MD at [id number]  2017    COLONOSCOPY  2011    COLONOSCOPY N/A 11/19/2019    COLONOSCOPY DIAGNOSTIC performed by Catarina Ramirez MD at [place] [address]  09/30/2020    POSSIBLE CECAL avms, SIGMOID DIVERTICULOSIS, INTERNAL HEMORRHOIDS GRADE 1    COLONOSCOPY N/A 9/30/2020    COLONOSCOPY CONTROL HEMORRHAGE WITH APC performed by Catarina Ramirez MD at [address] [facility]  2014    \"2 stents put in \"    IR NONTUNNELED VASCULAR CATHETER  3/5/2021    IR NONTUNNELED VASCULAR CATHETER 3/5/2021 [facility] SPECIAL PROCEDURES    JOINT REPLACEMENT  2004    total left hip    OTHER SURGICAL HISTORY Right 12/02/2017    I&D; evacuation of hematoma right hip    OTHER SURGICAL HISTORY  09/17/2020    enteroscopy    PACEMAKER INSERTION N/A 2/23/2021    PACEMAKER GENERATOR LEAD REVISION performed by Rafia Goledn MD at [facility]      9/18/14 Status post remote permanent pacemaker with atrial lead dislodgement.  7/24/14 PPM Implant    UPPER GASTROINTESTINAL ENDOSCOPY N/A 9/17/2020    ENTEROSCOPY PUSH BIOPSY performed by Catarina Ramirez MD at [place] [address] N/A 3/4/2021    EGD DIAGNOSTIC ONLY performed by Catarina Ramirez MD at [address] [facility]  2012 \"have stents in both legs- done in [place] [address] History   Problem Relation Age of Onset    High Blood Pressure Mother     Arthritis Mother     Diabetes Mother     Heart Disease Mother     High Blood Pressure Father     Heart Disease Father     Kidney Disease Father        SOCIAL HISTORY    Social History     Tobacco Use    Smoking status: Never Smoker    Smokeless tobacco: Never Used   Vaping Use    Vaping Use: Never used   Substance Use Topics    Alcohol use: Yes     Alcohol/week: 2.0 standard drinks     Types: 2 Cans of beer per week     Comment: average \"one time per week\"/ Caffiene: 1 cup of coffee daily    Drug use: No       ALLERGIES    Allergies   Allergen Reactions    Spironolactone      CAUSES INCREASED K+    Tape Harshal Blackbird Tape] Rash     SURGICAL TAPE       MEDICATIONS    Current Outpatient Medications on File Prior to Encounter   Medication Sig Dispense Refill    Dulaglutide 3 MG/0.5ML SOPN Inject 3 mg into the skin once a week 12 pen 0    gabapentin (NEURONTIN) 300 MG capsule Take 1 capsule by mouth 3 times daily for 90 days.  270 capsule 0    apixaban (ELIQUIS) 5 MG TABS tablet Take 1 tablet by mouth 2 times daily 28 tablet 0    apixaban (ELIQUIS) 5 MG TABS tablet Take 1 tablet by mouth 2 times daily 180 tablet 3    canagliflozin (INVOKANA) 300 MG TABS tablet Take 1 tablet by mouth every morning (before breakfast) 90 tablet 1    glimepiride (AMARYL) 4 MG tablet Take 1 tablet by mouth every morning (before breakfast) 90 tablet 1    rOPINIRole (REQUIP) 0.5 MG tablet TAKE 1 TABLET 3 TIMES A  tablet 1    torsemide (DEMADEX) 20 MG tablet Take 1 tablet by mouth 2 times daily 180 tablet 3    carboxymethylcellulose (REFRESH PLUS) 0.5 % SOLN ophthalmic solution INSTILL 1 DROP IN BOTH EYES THREE TIMES A DAY      Cholecalciferol (VITAMIN D) 50 MCG (2000 UT) CAPS capsule Take by mouth nightly       aspirin 81 MG tablet Take 81 mg by mouth daily      sildenafil (VIAGRA) 100 MG tablet TAKE 1 TABLET DAILY AS     NEEDED FOR ERECTILE        DYSFUNCTION 30 tablet 1    simvastatin (ZOCOR) 20 MG tablet Take 1 tablet by mouth daily 90 tablet 2     No current facility-administered medications on file prior to encounter. REVIEW OF SYSTEMS    Pertinent items are noted in HPI. Constitutional: Negative for systemic symptoms including fever, chills and malaise. Objective:      /78   Pulse 98   Temp 98.7 °F (37.1 °C) (Temporal)   Resp 20     PHYSICAL EXAM    General: The patient is in no acute distress. Mental status:  Patient is appropriate, is  oriented to place and plan of care. 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 Debridement was required. Consent obtained: Yes    Time out taken: Yes    Using curette sharp Excisional Debridement of the wound(s) was/were performed down through and including the removal of subcutaneous tissue.      Devitalized Tissue Debrided:  slough, exudate and callus    Pre Debridement Measurements:  Are located in the Wound Documentation Flow Sheet    All active wounds listed below with today's date are evaluated  Wound(s)    debrided this date include # : 1     Post  Debridement Measurements:  Wound 09/03/21 #1 Left 3rd Toe Plantar (Active)   Wound Image   12/06/21 0904   Wound Etiology Diabetic 12/06/21 0904   Dressing Status New dressing applied 12/06/21 1030   Wound Cleansed Soap and water 12/06/21 0904   Dressing/Treatment Collagen; Alginate 09/16/21 0955   Offloading for Diabetic Foot Ulcers Total contact cast 12/06/21 0904   Wound Length (cm) 0.2 cm 12/06/21 0904   Wound Width (cm) 0.2 cm 12/06/21 0904   Wound Depth (cm) 0.1 cm 12/06/21 0904   Wound Surface Area (cm^2) 0.04 cm^2 12/06/21 0904   Change in Wound Size % (l*w) 84 12/06/21 0904   Wound Volume (cm^3) 0.004 cm^3 12/06/21 0904   Wound Healing % 84 12/06/21 0904   Post-Procedure Length (cm) 0.2 cm 12/06/21 0932   Post-Procedure Width (cm) 0.2 cm 12/06/21 0932   Post-Procedure Depth (cm) 0.1 cm 12/06/21 0932   Post-Procedure Surface Area (cm^2) 0.04 cm^2 12/06/21 0932   Post-Procedure Volume (cm^3) 0.004 cm^3 12/06/21 0932   Distance Tunneling (cm) 0 cm 12/06/21 0904   Tunneling Position ___ O'Clock 0 12/06/21 0904   Undermining Starts ___ O'Clock 0 12/06/21 0904   Undermining Ends___ O'Clock 0 12/06/21 0904   Undermining Maxium Distance (cm) 0 12/06/21 0904   Wound Assessment Pink/red 12/06/21 0904   Drainage Amount Moderate 12/06/21 0904   Drainage Description Yellow 12/06/21 0904   Odor Fecal 12/06/21 0904   Angela-wound Assessment Hyperkeratosis (callous) 12/06/21 0904   Margins Defined edges 12/06/21 0904   Wound Thickness Description not for Pressure Injury Full thickness 12/06/21 0904   Number of days: 94       Wound 12/06/21 Pretibial Distal; Left; Lateral #2 (Active)   Wound Image   12/06/21 0904   Wound Etiology Traumatic 12/06/21 0904   Wound Cleansed Wound cleanser 12/06/21 0904   Wound Length (cm) 0.2 cm 12/06/21 0904   Wound Width (cm) 0.2 cm 12/06/21 0904   Wound Depth (cm) 0.1 cm 12/06/21 0904   Wound Surface Area (cm^2) 0.04 cm^2 12/06/21 0904   Wound Volume (cm^3) 0.004 cm^3 12/06/21 0904   Distance Tunneling (cm) 0 cm 12/06/21 0904   Tunneling Position ___ O'Clock 0 12/06/21 0904   Undermining Starts ___ O'Clock 0 12/06/21 0904   Undermining Ends___ O'Clock 0 12/06/21 0904   Undermining Maxium Distance (cm) 0 12/06/21 0904   Wound Assessment Pink/red 12/06/21 0904   Drainage Amount None 12/06/21 0904   Odor None 12/06/21 0904 Angela-wound Assessment Intact 12/06/21 0904   Margins Defined edges 12/06/21 0904   Number of days: 0       Percent of Wound(s) Debrided: approximately 100%    Total  Area  Debrided[de-identified]  0.04 sq cm       Bleeding:  Minimal    Hemostasis Achieved:  by pressure    Procedural Pain:  0  / 10     Post Procedural Pain:  0 / 10     Application of cast:    With the indications of casting being present and no contraindications, information and  instructions were given to the patient and the patient consented to the treatment. After proper wound care and appropriate padding. A total contact cast was applied according to protocol. Fall prevention safety and instructions on using various supportive walking devices were discussed with the patient. The patient was also instructed on what to do and how to get help if the cast became uncomfortable. The patient expressed understanding of all of these issues. The patient tolerated the procedure well. Status of wound progress and description from last visit: Improving, continue TCC. Plan:       Discharge Instructions       PHYSICIAN ORDERS AND DISCHARGE INSTRUCTIONS     NOTE: Upon discharge from the [address], you will receive a patient experience survey. We would be grateful if you would take the time to fill this survey out.     Wound care order history:                 YESSENIA's   Right  0.93     Left  0.55           Date:  9/3/21              Cultures:  Left 3rd Toe cultured 9/3/21              Grafts:                Antibiotics:            Continuing wound care orders and information:                 Residence:  Home              Continue home health care with:               Your wound-care supplies will be provided by: [facility]     Wound cleansing:               ZU not scrub or use excessive force.              Wash hands with soap and water before and after dressing changes.               PEUXW to applying a clean dressing, cleanse wound with normal saline, wound cleanser, or mild soap and water.               Ask the physician or nurse before getting the wound(s) wet in a shower     Daily Wound management:              Keep weight off wounds and reposition every 2 hours.              Avoid standing for long periods of time.              Apply wraps/stockings in AM and remove at bedtime.              If swelling is present, elevate legs to the level of the heart or above for 30 minutes 4-5 times a day and/or when sitting.                                      When taking antibiotics take entire prescription as ordered by physician do not stop taking until medicine is all gone.                                                           Orders for this week: 11/29/21                  Xray of Left 3rd Toe ordered 11/23/21     Left 3rd Toe cultured 9/3/21  Cast Shoe given in 11/23/21     Rx: [facility] on [address]          Left 3rd Toe - Wash with soap and water, pat dry. Paint 2nd toe w Betadine. Foam Pad between 1st and 2nd toes and 3rd and 4th toes. Apply Betadine and stimulen to wound bed. Cover with Ag ca alginate from base of toe, beneath and on sides between toes. Wrap with conform and secure with tape. Apply Total Contact Cast and leave in place until follow up visit on Friday     Give Tubi F for right leg to wear at night to protect leg from cast.        Follow up with Albina Spaulding in 1 week. Call [phone] for any questions or concerns.   Date__________   Time____________        Treatment Note Wound 09/03/21 #1 Left 3rd Toe Plantar-Dressing/Treatment:  (betadine, stimulen, ag ca alg, Conform, tape, TCC)  Wound 12/06/21 Pretibial Distal; Left; Lateral #2-Dressing/Treatment:  (A&D)    Written Patient Dismissal Instructions Given            Electronically signed by JUSTIN Rogers CNP on 12/6/2021 at 10:33 AM

## 2021-12-06 NOTE — PROGRESS NOTES
Contact Cast    NAME:  Aye Perry  YOB: 1950  MEDICAL RECORD NUMBER:  [id number]  DATE:  12/6/2021    Goal:  Patient will maintain integrity of cast, avoid mobility hazards, and report complications that may occur (foul odor, pain, numbness, cracked cast).  [x] Removed old contact cast if indicated and wash extremity with soap and water.  [x] Applied ordered dressing.    Applied per Lilia   Applied to Left extremity   [x] Allow cast to dry.  [x] Instructed patient to report to health care provider, including wound care center, any back pain, hip pain, or leg pain, numbness of toes, or any odor  coming from the cast.    [x] Instructed patient not to stick any foreign objects down into cast.    [x] Instructed patient to utilize assistive devices(crutches, cane or walker) as ordered    [x] Instructed patient to continue offloading as directed.      Applied cast per  Guidelines    Electronically signed by Yudith Smith RN on 12/6/2021 at 10:31 AM

## 2021-12-06 NOTE — PLAN OF CARE
Problem: Wound:  Goal: Will show signs of wound healing; wound closure and no evidence of infection  Description: Will show signs of wound healing; wound closure and no evidence of infection  Outcome: Ongoing
Universal Safety Interventions

## 2021-12-09 LAB
ALBUMIN: 4.8 G/DL (ref 3.5–5.2)
BACTERIA, URINE: NORMAL /HPF
BILIRUBIN URINE: NEGATIVE MG/DL
BUN BLDV-MCNC: 44 MG/DL (ref 3–29)
BUN/CREAT BLD: 34 (ref 7–25)
CALCIUM SERPL-MCNC: 10 MG/DL (ref 8.5–10.5)
CHLORIDE BLD-SCNC: 99 MEQ/L (ref 96–110)
CLARITY: CLEAR
CO2: 31 MEQ/L (ref 19–32)
COLOR, URINE: YELLOW
CREAT SERPL-MCNC: 1.3 MG/DL (ref 0.5–1.4)
CREATININE URINE: 43.5 MG/DL
FASTING STATUS: ABNORMAL
GLOMERULAR FILTRATION RATE: 54 MLS/MIN/1.73M2
GLUCOSE BLD-MCNC: 169 MG/DL (ref 70–99)
GLUCOSE URINE: >1000 MG/DL
KETONES, URINE: NEGATIVE MG/DL
LEUKOCYTE ESTERASE, URINE: NEGATIVE
MAGNESIUM: 2.1 MG/DL (ref 1.4–2.5)
NITRATE, UA: NEGATIVE
PH, URINE: 6 (ref 4.5–8)
PHOSPHORUS: 4.4 MG/DL (ref 2.1–4.3)
POTASSIUM SERPL-SCNC: 3.9 MEQ/L (ref 3.4–5.3)
PROT/CREAT RATIO, UR: 1.4 RATIO (ref 5–170)
RBC URINE: NORMAL /HPF (ref 0–2)
SODIUM BLD-SCNC: 142 MEQ/L (ref 135–148)
SPECIFIC GRAVITY, URINE: 1.01 (ref 1–1.03)
SQUAMOUS EPITHELIAL CELLS: NORMAL /HPF (ref 0–5)
TOTAL PROTEIN, URINE: 63 MG/DL
TOTAL PROTEIN, URINE: 70 MG/DL
URINE HGB: NEGATIVE MG/DL
UROBILINOGEN, URINE: <2 MG/DL (ref 0–2)
WBC URINE: NORMAL /HPF (ref 0–5)

## 2021-12-13 ENCOUNTER — HOSPITAL ENCOUNTER (OUTPATIENT)
Dept: WOUND CARE | Age: 71
Discharge: HOME | End: 2021-12-13
Payer: MEDICARE

## 2021-12-13 DIAGNOSIS — E11.621 DIABETIC ULCER OF TOE OF LEFT FOOT ASSOCIATED WITH TYPE 2 DIABETES MELLITUS, WITH FAT LAYER EXPOSED (HCC): Primary | ICD-10-CM

## 2021-12-13 DIAGNOSIS — L97.522 DIABETIC ULCER OF TOE OF LEFT FOOT ASSOCIATED WITH TYPE 2 DIABETES MELLITUS, WITH FAT LAYER EXPOSED (HCC): Primary | ICD-10-CM

## 2021-12-13 PROCEDURE — 11042 DBRDMT SUBQ TIS 1ST 20SQCM/<: CPT

## 2021-12-13 PROCEDURE — 11042 DBRDMT SUBQ TIS 1ST 20SQCM/<: CPT | Performed by: NURSE PRACTITIONER

## 2021-12-13 RX ORDER — GINSENG 100 MG
CAPSULE ORAL ONCE
Status: CANCELLED | OUTPATIENT
Start: 2021-12-13 | End: 2021-12-13

## 2021-12-13 RX ORDER — LIDOCAINE 50 MG/G
OINTMENT TOPICAL ONCE
Status: DISCONTINUED | OUTPATIENT
Start: 2021-12-13 | End: 2021-12-14 | Stop reason: HOSPADM

## 2021-12-13 RX ORDER — GENTAMICIN SULFATE 1 MG/G
OINTMENT TOPICAL ONCE
Status: CANCELLED | OUTPATIENT
Start: 2021-12-13 | End: 2021-12-13

## 2021-12-13 RX ORDER — CLOBETASOL PROPIONATE 0.5 MG/G
OINTMENT TOPICAL ONCE
Status: CANCELLED | OUTPATIENT
Start: 2021-12-13 | End: 2021-12-13

## 2021-12-13 RX ORDER — LIDOCAINE 50 MG/G
OINTMENT TOPICAL ONCE
Status: CANCELLED | OUTPATIENT
Start: 2021-12-13 | End: 2021-12-13

## 2021-12-13 RX ORDER — BETAMETHASONE DIPROPIONATE 0.05 %
OINTMENT (GRAM) TOPICAL ONCE
Status: CANCELLED | OUTPATIENT
Start: 2021-12-13 | End: 2021-12-13

## 2021-12-13 RX ORDER — LIDOCAINE HYDROCHLORIDE 20 MG/ML
JELLY TOPICAL ONCE
Status: CANCELLED | OUTPATIENT
Start: 2021-12-13 | End: 2021-12-13

## 2021-12-13 RX ORDER — LIDOCAINE 40 MG/G
CREAM TOPICAL ONCE
Status: CANCELLED | OUTPATIENT
Start: 2021-12-13 | End: 2021-12-13

## 2021-12-13 RX ORDER — BACITRACIN ZINC AND POLYMYXIN B SULFATE 500; 1000 [USP'U]/G; [USP'U]/G
OINTMENT TOPICAL ONCE
Status: CANCELLED | OUTPATIENT
Start: 2021-12-13 | End: 2021-12-13

## 2021-12-13 RX ORDER — BACITRACIN, NEOMYCIN, POLYMYXIN B 400; 3.5; 5 [USP'U]/G; MG/G; [USP'U]/G
OINTMENT TOPICAL ONCE
Status: CANCELLED | OUTPATIENT
Start: 2021-12-13 | End: 2021-12-13

## 2021-12-13 RX ORDER — LIDOCAINE HYDROCHLORIDE 40 MG/ML
SOLUTION TOPICAL ONCE
Status: CANCELLED | OUTPATIENT
Start: 2021-12-13 | End: 2021-12-13

## 2021-12-13 NOTE — PROGRESS NOTES
Wound Care Center Progress Note With Procedure    Josiah Quintana  AGE: 66 y.o. GENDER: male  : 1950  EPISODE DATE:  2021     Subjective:     Chief Complaint   Patient presents with    Wound Check     left foot          HISTORY of PRESENT ILLNESS      Mayo Mejia is a 66 y. o. 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 The condition is of moderate severity.  The toe ulcer has been present for approximately  6 weeks, and the abrasion for approximately 2 weeks at initial wound clinic visit 9/10/21. Right anterior lower leg is healed. The underlying cause is thought to be diabetes and trauma. He first noticed his toe wound as a blister, that eventually popped and has not been healing.  The patients care to date has included evaluation by his PCP, he was given bactrim and keflex. He has also been using iodine at home. [facility] The patient has significant underlying medical conditions as below.      Wound Pain Timing/Severity: none  Quality of pain: N/A  Severity of pain:  0 / 10   Modifying Factors: diabetes  Associated Signs/Symptoms: drainage  Diabetes: Yes, on an oral and Trulicity regimen, last A1c 6.1 on 21  Smoking:No  Obesity: No  Anticoagulant therapy: Eliquis and baby aspirin  Immunosuppression: No     Patient educated on the 6 essential components necessary for wound healing: Circulation, Debridements, Proper Dressings and Topical Wound Products, Infection Control, Edema Control and Offloading.     Patient educated on those factors that negatively effect or impact wound healing: smoking, obesity, uncontrolled diabetes, anticoagulant and immunosuppressive regimens, inadequate nutrition, untreated arterial and venous disease if applicable and measures to manage edema.         PAST MEDICAL HISTORY        Diagnosis Date    Arrhythmia     Pacemaker placed aprox 5 years ago for A Fib per patient    Arthritis 12/2013    rt wrist    Atrial fibrillation (HCC)     on Xarelto - Dr. Carol Atkins CAD (coronary artery disease) 06/18/2014    see dr Ja Woods kidney disease, stage III (moderate) ([facility] [address] 07/07/2016    Critical illness myopathy 03/15/2021    Diabetes mellitus ([facility] [address]     dx 2004    Diabetic neuropathy associated with type 2 diabetes mellitus ([facility] [address] 04/23/2019    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    Gout 04/2019    \"got gout when had pacer put in because they did not give me my medication for gout \"    H/O 24 hour EKG monitoring 10/03/2013    no afib noted, sinsus rhythm    H/O cardiovascular stress test 05/12/2014    cardiolite- mild ischemia RCA EF50%    H/O echocardiogram 12/01/2020    EF 55-60% severe aortic stenosis mild to mod aortic regurg mod to severe tricuspid regurg severe pulm htn significant changes since 2018 echo.  H/O right and left heart catheterization 12/10/2020    DIFFUSE LAD DISEASE, Mild ECA Disease, Severe AS, Milf Pul HTN on RHC.  H/O transesophageal echocardiography (MABLE) for monitoring 08/05/2013    normal LV function and normal LA appendage without any clot    History of blood transfusion 12/2020    d/t anemia    History of transesophageal echocardiography (MABLE) 12/15/2020    Severe aortic stenosis (ANNA by planimetry: 0.778 cm sq). Mild AR.    [place] (hard of hearing)     hearing tonya aides    Hx of Doppler echocardiogram 05/21/2018    EF 50%  Mild LV hypertrophy. Mildly enlarged RA. Mod aortic valve calcification with mod AS. Mitral annular calcification is present. Mild AR, MR and TR. Mild pulmonary htn.     Hyperlipidemia     Hypertension     Follows with PCP & Dr. Kayy Escobar Other disorders of kidney and ureter     Pacemaker     Medtronic, implanted 2014    Pneumonia 12/29/2012    Pneumonia due to COVID-19 virus 08/2020    Sleep apnea     dx 2013- has c-pap    Type II or unspecified type diabetes mellitus with other specified manifestations, uncontrolled 12/12/2012    Venous hypertension, chronic, with ulcer ([address] 12/12/2012    resolved       PAST SURGICAL HISTORY    Past Surgical History:   Procedure Laterality Date    CABG WITH AORTIC VALVE REPLACEMENT N/A 2/16/2021    CABG CORONARY ARTERY BYPASS X2 WITH LIMA, AORTIC VALVE REPLACEMENT AND AORTIC ROOT REPAIR, INTRAOPERATIVE MABLE, INDUCED HYPOTHERMIA, LEFT LEG ENDOVEIN HARVEST, LEFT ATRIAL CLIP, AND CRYO PROCEDURE performed by Amado Lombard, MD at [facility]  12/14    at [address] Left 1/29/2021    LEFT CAROTID ENDARTERECTOMY performed by Esvin Loza MD at [id number]  2017    COLONOSCOPY  2011    COLONOSCOPY N/A 11/19/2019    COLONOSCOPY DIAGNOSTIC performed by Mirna Ornelas MD at [place] [address]  09/30/2020    POSSIBLE CECAL avms, SIGMOID DIVERTICULOSIS, INTERNAL HEMORRHOIDS GRADE 1    COLONOSCOPY N/A 9/30/2020    COLONOSCOPY CONTROL HEMORRHAGE WITH APC performed by Mirna Ornelas MD at [facility]  2014    \"2 stents put in \"    IR NONTUNNELED VASCULAR CATHETER  3/5/2021    IR NONTUNNELED VASCULAR CATHETER 3/5/2021 [facility] SPECIAL PROCEDURES    JOINT REPLACEMENT  2004    total left hip    OTHER SURGICAL HISTORY Right 12/02/2017    I&D; evacuation of hematoma right hip    OTHER SURGICAL HISTORY  09/17/2020    enteroscopy    PACEMAKER INSERTION N/A 2/23/2021    PACEMAKER GENERATOR LEAD REVISION performed by Amado Lombard, MD at [facility]      9/18/14 Status post remote permanent pacemaker with atrial lead dislodgement.  7/24/14 PPM Implant    UPPER GASTROINTESTINAL ENDOSCOPY N/A 9/17/2020    ENTEROSCOPY PUSH BIOPSY performed by Mirna Ornelas MD at [place] [id number] N/A 3/4/2021    EGD DIAGNOSTIC ONLY performed by Mirna Ornelas MD at [address] [place] [address] \"have stents in both legs- done in [address] History   Problem Relation Age of Onset    High Blood Pressure Mother     Arthritis Mother     Diabetes Mother     Heart Disease Mother     High Blood Pressure Father     Heart Disease Father     Kidney Disease Father        SOCIAL HISTORY    Social History     Tobacco Use    Smoking status: Never Smoker    Smokeless tobacco: Never Used   Vaping Use    Vaping Use: Never used   Substance Use Topics    Alcohol use: Yes     Alcohol/week: 2.0 standard drinks     Types: 2 Cans of beer per week     Comment: average \"one time per week\"/ Caffiene: 1 cup of coffee daily    Drug use: No       ALLERGIES    Allergies   Allergen Reactions    Spironolactone      CAUSES INCREASED K+    Tape Ermias Rita Tape] Rash     SURGICAL TAPE       MEDICATIONS    Current Outpatient Medications on File Prior to Encounter   Medication Sig Dispense Refill    Dulaglutide 3 MG/0.5ML SOPN Inject 3 mg into the skin once a week 12 pen 0    gabapentin (NEURONTIN) 300 MG capsule Take 1 capsule by mouth 3 times daily for 90 days.  270 capsule 0    apixaban (ELIQUIS) 5 MG TABS tablet Take 1 tablet by mouth 2 times daily 28 tablet 0    apixaban (ELIQUIS) 5 MG TABS tablet Take 1 tablet by mouth 2 times daily 180 tablet 3    canagliflozin (INVOKANA) 300 MG TABS tablet Take 1 tablet by mouth every morning (before breakfast) 90 tablet 1    glimepiride (AMARYL) 4 MG tablet Take 1 tablet by mouth every morning (before breakfast) 90 tablet 1    rOPINIRole (REQUIP) 0.5 MG tablet TAKE 1 TABLET 3 TIMES A  tablet 1    sildenafil (VIAGRA) 100 MG tablet TAKE 1 TABLET DAILY AS     NEEDED FOR ERECTILE        DYSFUNCTION 30 tablet 1    torsemide (DEMADEX) 20 MG tablet Take 1 tablet by mouth 2 times daily 180 tablet 3    carboxymethylcellulose (REFRESH PLUS) 0.5 % SOLN ophthalmic solution INSTILL 1 DROP IN BOTH EYES THREE TIMES A DAY      Cholecalciferol (VITAMIN D) 50 MCG (2000 UT) CAPS capsule Take by mouth nightly       aspirin 81 MG tablet Take 81 mg by mouth daily      simvastatin (ZOCOR) 20 MG tablet Take 1 tablet by mouth daily 90 tablet 2     No current facility-administered medications on file prior to encounter. REVIEW OF SYSTEMS    Pertinent items are noted in HPI. Constitutional: Negative for systemic symptoms including fever, chills and malaise. Objective: There were no vitals taken for this visit. PHYSICAL EXAM    General: The patient is in no acute distress. Mental status:  Patient is appropriate, is  oriented to place and plan of care. 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 Performed by: JUSTIN Byrd - CNP    Consent obtained: Yes    Time out taken:  Yes    Pain Control: Anesthetic  Anesthetic: 5% Lidocaine Ointment Topical     Debridement:Excisional Debridement    Using curette the wound(s) was/were sharply debrided down through and including the removal of subcutaneous tissue.         Devitalized Tissue Debrided:  slough and exudate    Pre Debridement Measurements:  Are located in the Wound Documentation Flow Sheet    All active wounds listed below with today's date are evaluated  Wound(s)    debrided this date include # : 1     Post  Debridement Measurements:  Wound 09/03/21 #1 Left 3rd Toe Plantar (Active)   Wound Image   12/06/21 3821   Wound Etiology Diabetic 12/13/21 0855   Dressing Status New dressing applied 12/13/21 0954   Wound Cleansed Soap and water 12/13/21 0855   Dressing/Treatment Collagen; Alginate 09/16/21 0955   Offloading for Diabetic Foot Ulcers Total contact cast 12/13/21 0855   Wound Length (cm) 0.2 cm 12/13/21 0855   Wound Width (cm) 0.2 cm 12/13/21 0855   Wound Depth (cm) 0.1 cm 12/13/21 0855   Wound Surface Area (cm^2) 0.04 cm^2 12/13/21 0855   Change in Wound Size % (l*w) 84 12/13/21 0855   Wound Volume (cm^3) 0.004 cm^3 12/13/21 0855   Wound Healing % 84 12/13/21 0855   Post-Procedure Length (cm) 0.2 cm 12/13/21 0917   Post-Procedure Width (cm) 0.2 cm 12/13/21 0917   Post-Procedure Depth (cm) 0.1 cm 12/13/21 0917   Post-Procedure Surface Area (cm^2) 0.04 cm^2 12/13/21 0917   Post-Procedure Volume (cm^3) 0.004 cm^3 12/13/21 0917   Distance Tunneling (cm) 0 cm 12/13/21 0855   Tunneling Position ___ O'Clock 0 12/13/21 0855   Undermining Starts ___ O'Clock 0 12/13/21 0855   Undermining Ends___ O'Clock 0 12/13/21 0855   Undermining Maxium Distance (cm) 0 12/13/21 0855   Wound Assessment Pink/red 12/13/21 0855   Drainage Amount Moderate 12/13/21 0855   Drainage Description Yellow 12/13/21 0855   Odor Fecal 12/13/21 0855   Angela-wound Assessment Hyperkeratosis (callous) 12/13/21 0855   Margins Defined edges 12/13/21 0855   Wound Thickness Description not for Pressure Injury Full thickness 12/13/21 0855   Number of days: 101       Percent of Wound(s) Debrided: approximately 100%    Total  Area  Debrided: 0.04 sq cm     Bleeding:  Minimal    Hemostasis Achieved:  by pressure    Procedural Pain:  0  / 10     Post Procedural Pain:  0 / 10     Response to treatment:  Well tolerated by patient. Status of wound progress and description from last visit: Almost healed, will stop casting today, he goes for arterial US today. He is to wear his off loading shoe.       Plan:       Discharge Instructions         PHYSICIAN ORDERS AND DISCHARGE INSTRUCTIONS     NOTE: Upon discharge from the [address], you will receive a patient experience survey. We would be grateful if you would take the time to fill this survey out.     Wound care order history:                 YESSENIA's   Right  0.93     Left  0.55           Date:  9/3/21              Cultures:  Left 3rd Toe cultured 9/3/21              Grafts:                Antibiotics:            Continuing wound care orders and information:                 Residence:  Home              Continue home health care with:               Your wound-care supplies will be provided by: [facility]     Wound cleansing:               IW not scrub or use excessive force.              Wash hands with soap and water before and after dressing changes.             CAKPU to applying a clean dressing, cleanse wound with normal saline, wound cleanser, or mild soap and water.               Ask the physician or nurse before getting the wound(s) wet in a shower     Daily Wound management:              Keep weight off wounds and reposition every 2 hours.              Avoid standing for long periods of time.              Apply wraps/stockings in AM and remove at bedtime.              If swelling is present, elevate legs to the level of the heart or above for 30 minutes 4-5 times a day and/or when sitting.                                      When taking antibiotics take entire prescription as ordered by physician do not stop taking until medicine is all gone.                                                           Orders for this week: 12/13/21                  Xray of Left 3rd Toe ordered 11/23/21     Left 3rd Toe cultured 9/3/21  Cast Shoe given in 11/23/21     Rx: [facility] on [address]           Left 3rd Toe - Wash with soap and water, pat dry. Paint 2nd toe w Betadine. Foam Pad between 1st and 2nd toes and 3rd and 4th toes. Apply Betadine and stimulen to wound bed.  Cover with Ag ca alginate from base of toe, beneath and on sides between toes. Wrap with conform and secure with tape. Change daily. (Held 12/13/21 due to arterial ultrasound at 1p with Dr Maribel Garcia and leave in place until follow up visit on Friday)     Give Tubi F for right leg to wear at night to protect leg from cast.        Follow up with Chelle Else in 1 week. Call [phone] for any questions or concerns.   Date__________   Time____________                   Treatment Note Wound 09/03/21 #1 Left 3rd Toe Plantar-Dressing/Treatment:  (Betadine, Stimulen, ag ca alg, conform, tape)    Written Patient Dismissal Instructions Given            Electronically signed by JUSTIN Munoz CNP on 12/13/2021 at 12:47 PM

## 2021-12-16 PROBLEM — R80.1 PERSISTENT PROTEINURIA: Status: ACTIVE | Noted: 2021-12-16

## 2021-12-20 ENCOUNTER — HOSPITAL ENCOUNTER (OUTPATIENT)
Age: 71
Discharge: HOME | End: 2021-12-20
Payer: MEDICARE

## 2021-12-20 ENCOUNTER — HOSPITAL ENCOUNTER (OUTPATIENT)
Dept: WOUND CARE | Age: 71
Discharge: HOME | End: 2021-12-20
Payer: MEDICARE

## 2021-12-20 VITALS
TEMPERATURE: 97.6 F | HEART RATE: 70 BPM | DIASTOLIC BLOOD PRESSURE: 70 MMHG | RESPIRATION RATE: 19 BRPM | SYSTOLIC BLOOD PRESSURE: 160 MMHG

## 2021-12-20 DIAGNOSIS — E11.621 DIABETIC ULCER OF TOE OF LEFT FOOT ASSOCIATED WITH TYPE 2 DIABETES MELLITUS, WITH FAT LAYER EXPOSED (HCC): Primary | ICD-10-CM

## 2021-12-20 DIAGNOSIS — L97.522 DIABETIC ULCER OF TOE OF LEFT FOOT ASSOCIATED WITH TYPE 2 DIABETES MELLITUS, WITH FAT LAYER EXPOSED (HCC): Primary | ICD-10-CM

## 2021-12-20 LAB
APTT: 39.2 SECONDS (ref 25.1–37.1)
BUN BLDV-MCNC: 34 MG/DL (ref 6–23)
CREAT SERPL-MCNC: 1.2 MG/DL (ref 0.9–1.3)
GFR AFRICAN AMERICAN: >60 ML/MIN/1.73M2
GFR NON-AFRICAN AMERICAN: 59 ML/MIN/1.73M2
INR BLD: 1.36 INDEX
PROTHROMBIN TIME: 17.6 SECONDS (ref 11.7–14.5)
SARS-COV-2: NOT DETECTED
SOURCE: NORMAL

## 2021-12-20 PROCEDURE — U0005 INFEC AGEN DETEC AMPLI PROBE: HCPCS

## 2021-12-20 PROCEDURE — 84520 ASSAY OF UREA NITROGEN: CPT

## 2021-12-20 PROCEDURE — 85730 THROMBOPLASTIN TIME PARTIAL: CPT

## 2021-12-20 PROCEDURE — 85610 PROTHROMBIN TIME: CPT

## 2021-12-20 PROCEDURE — 36415 COLL VENOUS BLD VENIPUNCTURE: CPT

## 2021-12-20 PROCEDURE — 11056 PARNG/CUTG B9 HYPRKR LES 2-4: CPT | Performed by: NURSE PRACTITIONER

## 2021-12-20 PROCEDURE — 11056 PARNG/CUTG B9 HYPRKR LES 2-4: CPT

## 2021-12-20 PROCEDURE — 82565 ASSAY OF CREATININE: CPT

## 2021-12-20 PROCEDURE — U0003 INFECTIOUS AGENT DETECTION BY NUCLEIC ACID (DNA OR RNA); SEVERE ACUTE RESPIRATORY SYNDROME CORONAVIRUS 2 (SARS-COV-2) (CORONAVIRUS DISEASE [COVID-19]), AMPLIFIED PROBE TECHNIQUE, MAKING USE OF HIGH THROUGHPUT TECHNOLOGIES AS DESCRIBED BY CMS-2020-01-R: HCPCS

## 2021-12-20 RX ORDER — LIDOCAINE HYDROCHLORIDE 40 MG/ML
SOLUTION TOPICAL ONCE
Status: CANCELLED | OUTPATIENT
Start: 2021-12-20 | End: 2021-12-20

## 2021-12-20 RX ORDER — BETAMETHASONE DIPROPIONATE 0.05 %
OINTMENT (GRAM) TOPICAL ONCE
Status: CANCELLED | OUTPATIENT
Start: 2021-12-20 | End: 2021-12-20

## 2021-12-20 RX ORDER — LIDOCAINE 50 MG/G
OINTMENT TOPICAL ONCE
Status: CANCELLED | OUTPATIENT
Start: 2021-12-20 | End: 2021-12-20

## 2021-12-20 RX ORDER — CLOBETASOL PROPIONATE 0.5 MG/G
OINTMENT TOPICAL ONCE
Status: CANCELLED | OUTPATIENT
Start: 2021-12-20 | End: 2021-12-20

## 2021-12-20 RX ORDER — LIDOCAINE 50 MG/G
OINTMENT TOPICAL ONCE
Status: DISCONTINUED | OUTPATIENT
Start: 2021-12-20 | End: 2021-12-21 | Stop reason: HOSPADM

## 2021-12-20 RX ORDER — LIDOCAINE HYDROCHLORIDE 20 MG/ML
JELLY TOPICAL ONCE
Status: CANCELLED | OUTPATIENT
Start: 2021-12-20 | End: 2021-12-20

## 2021-12-20 RX ORDER — GINSENG 100 MG
CAPSULE ORAL ONCE
Status: CANCELLED | OUTPATIENT
Start: 2021-12-20 | End: 2021-12-20

## 2021-12-20 RX ORDER — GENTAMICIN SULFATE 1 MG/G
OINTMENT TOPICAL ONCE
Status: CANCELLED | OUTPATIENT
Start: 2021-12-20 | End: 2021-12-20

## 2021-12-20 RX ORDER — LIDOCAINE 40 MG/G
CREAM TOPICAL ONCE
Status: CANCELLED | OUTPATIENT
Start: 2021-12-20 | End: 2021-12-20

## 2021-12-20 RX ORDER — BACITRACIN, NEOMYCIN, POLYMYXIN B 400; 3.5; 5 [USP'U]/G; MG/G; [USP'U]/G
OINTMENT TOPICAL ONCE
Status: CANCELLED | OUTPATIENT
Start: 2021-12-20 | End: 2021-12-20

## 2021-12-20 RX ORDER — BACITRACIN ZINC AND POLYMYXIN B SULFATE 500; 1000 [USP'U]/G; [USP'U]/G
OINTMENT TOPICAL ONCE
Status: CANCELLED | OUTPATIENT
Start: 2021-12-20 | End: 2021-12-20

## 2021-12-20 NOTE — PROGRESS NOTES
Wound Care Center Progress Note       Shaista Darnell  AGE: 66 y.o. GENDER: male  : 1950  TODAY'S DATE:  2021        Subjective:     Chief Complaint   Patient presents with    Wound Check     LLE         HISTORY of PRESENT ILLNESS    Terri Mejia is a 66 y. o. male who presents to the Wound Clinic for evaluation and treatment of Chronic diabetic and traumatic ulcer of left 3rd toe and blistering with pressure secons toe. The condition is now healed.  The toe ulcer has been present for approximately  6 weeks, and the abrasion for approximately 2 weeks at initial wound clinic visit 9/10/21. Right anterior lower leg is healed. The underlying cause is thought to be diabetes and trauma. He first noticed his toe wound as a blister, that eventually popped and has not been healing.  The patients care to date has included evaluation by his PCP, he was given bactrim and keflex. He has also been using iodine at home. [facility] The patient has significant underlying medical conditions as below.      Wound Pain Timing/Severity: none  Quality of pain: N/A  Severity of pain:  0 / 10   Modifying Factors: diabetes  Associated Signs/Symptoms: drainage  Diabetes: Yes, on an oral and Trulicity regimen, last A1c 6.1 on 21  Smoking:No  Obesity: No  Anticoagulant therapy: Eliquis and baby aspirin  Immunosuppression: No     Patient educated on the 6 essential components necessary for wound healing: Circulation, Debridements, Proper Dressings and Topical Wound Products, Infection Control, Edema Control and Offloading.     Patient educated on those factors that negatively effect or impact wound healing: smoking, obesity, uncontrolled diabetes, anticoagulant and immunosuppressive regimens, inadequate nutrition, untreated arterial and venous disease if applicable and measures to manage edema.         PAST MEDICAL HISTORY        Diagnosis Date    Arrhythmia     Pacemaker placed aprox 5 years ago for A Fib per patient    Arthritis 12/2013    rt wrist    Atrial fibrillation (HCC)     on Xarelto - Dr. 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  H/O right and left heart catheterization 12/10/2020    DIFFUSE LAD DISEASE, Mild ECA Disease, Severe AS, Milf Pul HTN on RHC.  H/O transesophageal echocardiography (MABLE) for monitoring 08/05/2013    normal LV function and normal LA appendage without any clot    History of blood transfusion 12/2020    d/t anemia    History of transesophageal echocardiography (MABLE) 12/15/2020    Severe aortic stenosis (ANNA by planimetry: 0.778 cm sq). Mild AR.    [place] (hard of hearing)     hearing tonya aides    Hx of Doppler echocardiogram 05/21/2018    EF 50%  Mild LV hypertrophy. Mildly enlarged RA. Mod aortic valve calcification with mod AS. Mitral annular calcification is present. Mild AR, MR and TR. Mild pulmonary htn.     Hyperlipidemia     Hypertension     Follows with PCP & Dr. Jin Gomez Other disorders of kidney and ureter     Pacemaker     Medtronic, implanted 2014    Pneumonia 12/29/2012    Pneumonia due to COVID-19 virus 08/2020    Sleep apnea     dx 2013- has c-pap    Type II or unspecified type diabetes mellitus with other specified manifestations, uncontrolled 12/12/2012    Venous hypertension, chronic, with ulcer ([address] 12/12/2012    resolved       PAST SURGICAL HISTORY    Past Surgical History:   Procedure Laterality Date    CABG WITH AORTIC VALVE REPLACEMENT N/A 2/16/2021    CABG CORONARY ARTERY BYPASS X2 WITH LIMA, AORTIC VALVE REPLACEMENT AND AORTIC ROOT REPAIR, INTRAOPERATIVE MABLE, INDUCED HYPOTHERMIA, LEFT LEG ENDOVEIN HARVEST, LEFT ATRIAL CLIP, AND CRYO PROCEDURE performed by Luiza Mustafa MD at [address] [place] [address]  12/14    at [address] Left 1/29/2021    LEFT CAROTID ENDARTERECTOMY performed by Brant Benson MD at [id number]  2017    COLONOSCOPY  2011    COLONOSCOPY N/A 11/19/2019    COLONOSCOPY DIAGNOSTIC performed by Davian Smith MD at [place] [address]  09/30/2020    POSSIBLE CECAL avms, SIGMOID DIVERTICULOSIS, INTERNAL HEMORRHOIDS GRADE 1    COLONOSCOPY N/A 9/30/2020    COLONOSCOPY CONTROL HEMORRHAGE WITH APC performed by Davian Smith MD at [address] [facility]  2014    \"2 stents put in \"    IR NONTUNNELED VASCULAR CATHETER  3/5/2021    IR NONTUNNELED VASCULAR CATHETER 3/5/2021 [facility] SPECIAL PROCEDURES    JOINT REPLACEMENT  2004    total left hip    OTHER SURGICAL HISTORY Right 12/02/2017    I&D; evacuation of hematoma right hip    OTHER SURGICAL HISTORY  09/17/2020    enteroscopy    PACEMAKER INSERTION N/A 2/23/2021    PACEMAKER GENERATOR LEAD REVISION performed by Luiza Mustafa MD at [facility]      9/18/14 Status post remote permanent pacemaker with atrial lead dislodgement.  7/24/14 PPM Implant    UPPER GASTROINTESTINAL ENDOSCOPY N/A 9/17/2020    ENTEROSCOPY PUSH BIOPSY performed by Davian Smith MD at [place] [address] N/A 3/4/2021    EGD DIAGNOSTIC ONLY performed by Davian Smith MD at [address] [place] [address] \"have stents in both legs- done in [address] History   Problem Relation Age of Onset    High Blood Pressure Mother     Arthritis Mother     Diabetes Mother     Heart Disease Mother     High Blood Pressure Father     Heart Disease Father     Kidney Disease Father        SOCIAL HISTORY    Social History     Tobacco Use    Smoking status: Never Smoker    Smokeless tobacco: Never Used   Vaping Use    Vaping Use: Never used   Substance Use Topics    Alcohol use: Yes     Alcohol/week: 2.0 standard drinks     Types: 2 Cans of beer per week     Comment: average \"one time per week\"/ Caffiene: 1 cup of coffee daily    Drug use: No       ALLERGIES    Allergies   Allergen Reactions    Spironolactone      CAUSES INCREASED K+    Tape Morena Constant Tape] Rash     SURGICAL TAPE       MEDICATIONS    Current Outpatient Medications on File Prior to Encounter   Medication Sig Dispense Refill    torsemide (DEMADEX) 20 MG tablet Take 1 tablet by mouth 2 times daily 180 tablet 3    Dulaglutide 3 MG/0.5ML SOPN Inject 3 mg into the skin once a week 12 pen 0    gabapentin (NEURONTIN) 300 MG capsule Take 1 capsule by mouth 3 times daily for 90 days.  270 capsule 0    apixaban (ELIQUIS) 5 MG TABS tablet Take 1 tablet by mouth 2 times daily 28 tablet 0    canagliflozin (INVOKANA) 300 MG TABS tablet Take 1 tablet by mouth every morning (before breakfast) 90 tablet 1    glimepiride (AMARYL) 4 MG tablet Take 1 tablet by mouth every morning (before breakfast) 90 tablet 1    rOPINIRole (REQUIP) 0.5 MG tablet TAKE 1 TABLET 3 TIMES A  tablet 1    carboxymethylcellulose (REFRESH PLUS) 0.5 % SOLN ophthalmic solution INSTILL 1 DROP IN BOTH EYES THREE TIMES A DAY      Cholecalciferol (VITAMIN D) 50 MCG (2000 UT) CAPS capsule Take by mouth nightly       aspirin 81 MG tablet Take 81 mg by mouth daily      sildenafil (VIAGRA) 100 MG tablet TAKE 1 TABLET DAILY AS     NEEDED FOR ERECTILE DYSFUNCTION 30 tablet 1    simvastatin (ZOCOR) 20 MG tablet Take 1 tablet by mouth daily 90 tablet 2     No current facility-administered medications on file prior to encounter. REVIEW OF SYSTEMS    Pertinent items are noted in HPI. Constitutional: Negative for systemic symptoms including fever, chills and malaise. Objective:      BP (!) 160/70   Pulse 70   Temp 97.6 °F (36.4 °C) (Temporal)   Resp 19     PHYSICAL EXAM    General: The patient is in no acute distress. Mental status:  Patient is appropriate, is  oriented to place and plan of care. Dermatologic exam: Visual inspection of the periwound reveals the skin to be coarse. 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 Will moisturize and pad and protect. The patient is following with Dr. Brenna Hubbard for arterial intervention. Will have the patient get gel toe protectors to help prevent pressure on the left second toe with a hammer toe resting on his left great toe and pressure to the tip of his third toe. The patient is to return if any further concerns. Hyperkeratotic tissue noted left second and third toe, paring of callous completed using curette. No wound present within the callous. Medical necessity includes advanced atrophic changes to include decrease hair growth lower legs, thickening, discolored toenails rubor, edema and burning. Dr Autumn Forrester is PCP last sen 11/5/21.     Plan:     Discharge Instructions       PHYSICIAN ORDERS AND DISCHARGE INSTRUCTIONS     NOTE: Upon discharge from the [address], you

## 2021-12-27 ENCOUNTER — HOSPITAL ENCOUNTER (OUTPATIENT)
Dept: CARDIAC CATH/INVASIVE PROCEDURES | Age: 71
Discharge: HOME | End: 2021-12-27
Attending: THORACIC SURGERY (CARDIOTHORACIC VASCULAR SURGERY) | Admitting: THORACIC SURGERY (CARDIOTHORACIC VASCULAR SURGERY)
Payer: MEDICARE

## 2021-12-27 VITALS
HEIGHT: 71 IN | WEIGHT: 195 LBS | RESPIRATION RATE: 20 BRPM | SYSTOLIC BLOOD PRESSURE: 123 MMHG | HEART RATE: 62 BPM | DIASTOLIC BLOOD PRESSURE: 67 MMHG | TEMPERATURE: 96.4 F | BODY MASS INDEX: 27.3 KG/M2 | OXYGEN SATURATION: 98 %

## 2021-12-27 LAB
ACTIVATED CLOTTING TIME, LOW RANGE: 177 SEC
ACTIVATED CLOTTING TIME, LOW RANGE: 215 SEC
ACTIVATED CLOTTING TIME, LOW RANGE: 223 SEC
ACTIVATED CLOTTING TIME, LOW RANGE: 261 SEC
GLUCOSE BLD-MCNC: 130 MG/DL (ref 70–99)

## 2021-12-27 PROCEDURE — 6370000000 HC RX 637 (ALT 250 FOR IP): Performed by: THORACIC SURGERY (CARDIOTHORACIC VASCULAR SURGERY)

## 2021-12-27 PROCEDURE — 2580000003 HC RX 258: Performed by: THORACIC SURGERY (CARDIOTHORACIC VASCULAR SURGERY)

## 2021-12-27 PROCEDURE — C1887 CATHETER, GUIDING: HCPCS

## 2021-12-27 PROCEDURE — 75710 ARTERY X-RAYS ARM/LEG: CPT | Performed by: THORACIC SURGERY (CARDIOTHORACIC VASCULAR SURGERY)

## 2021-12-27 PROCEDURE — 75625 CONTRAST EXAM ABDOMINL AORTA: CPT | Performed by: THORACIC SURGERY (CARDIOTHORACIC VASCULAR SURGERY)

## 2021-12-27 PROCEDURE — C1894 INTRO/SHEATH, NON-LASER: HCPCS

## 2021-12-27 PROCEDURE — 6370000000 HC RX 637 (ALT 250 FOR IP)

## 2021-12-27 PROCEDURE — 75774 ARTERY X-RAY EACH VESSEL: CPT

## 2021-12-27 PROCEDURE — C1769 GUIDE WIRE: HCPCS

## 2021-12-27 PROCEDURE — 2500000003 HC RX 250 WO HCPCS

## 2021-12-27 PROCEDURE — 75710 ARTERY X-RAYS ARM/LEG: CPT

## 2021-12-27 PROCEDURE — 85347 COAGULATION TIME ACTIVATED: CPT

## 2021-12-27 PROCEDURE — C1725 CATH, TRANSLUMIN NON-LASER: HCPCS

## 2021-12-27 PROCEDURE — 37229 PR REVSC OPN/PRQ TIB/PERO W/ATHRC/ANGIOP SM VSL: CPT | Performed by: THORACIC SURGERY (CARDIOTHORACIC VASCULAR SURGERY)

## 2021-12-27 PROCEDURE — 6360000002 HC RX W HCPCS

## 2021-12-27 PROCEDURE — C1724 CATH, TRANS ATHEREC,ROTATION: HCPCS

## 2021-12-27 PROCEDURE — 76937 US GUIDE VASCULAR ACCESS: CPT | Performed by: THORACIC SURGERY (CARDIOTHORACIC VASCULAR SURGERY)

## 2021-12-27 PROCEDURE — 2709999900 HC NON-CHARGEABLE SUPPLY

## 2021-12-27 PROCEDURE — 37232 PR REVSC OPN/PRQ TIB/PERO W/ANGIOPLASTY UNI EA VSL: CPT | Performed by: THORACIC SURGERY (CARDIOTHORACIC VASCULAR SURGERY)

## 2021-12-27 PROCEDURE — 82962 GLUCOSE BLOOD TEST: CPT

## 2021-12-27 PROCEDURE — 37232 HC TIB PER TERR ADDL PLASTY: CPT

## 2021-12-27 PROCEDURE — 37229 HC TIB PER TERRITORY ATHER: CPT

## 2021-12-27 PROCEDURE — 6360000004 HC RX CONTRAST MEDICATION

## 2021-12-27 RX ORDER — DIAZEPAM 5 MG/1
5 TABLET ORAL ONCE
Status: COMPLETED | OUTPATIENT
Start: 2021-12-27 | End: 2021-12-27

## 2021-12-27 RX ORDER — DIPHENHYDRAMINE HCL 25 MG
50 TABLET ORAL ONCE
Status: COMPLETED | OUTPATIENT
Start: 2021-12-27 | End: 2021-12-27

## 2021-12-27 RX ORDER — SODIUM CHLORIDE 9 MG/ML
INJECTION, SOLUTION INTRAVENOUS CONTINUOUS
Status: DISCONTINUED | OUTPATIENT
Start: 2021-12-27 | End: 2021-12-27 | Stop reason: HOSPADM

## 2021-12-27 RX ADMIN — SODIUM CHLORIDE: 9 INJECTION, SOLUTION INTRAVENOUS at 07:32

## 2021-12-27 RX ADMIN — DIPHENHYDRAMINE HYDROCHLORIDE 50 MG: 25 TABLET ORAL at 07:32

## 2021-12-27 RX ADMIN — DIAZEPAM 5 MG: 5 TABLET ORAL at 07:32

## 2021-12-27 NOTE — PROGRESS NOTES
ACT results 177   right femoral 5 fr. sheath removed. Manual pressure held per Almaz RN x 15 minutes. No bruising, drainage, or swelling noted. No hematoma. DStat/Tegederm applied to site. Pt. instructed on post sheath removal care/restrictions and verbalized an understanding.

## 2021-12-27 NOTE — FLOWSHEET NOTE
Pt to pt  in wheelchair per RN for d/c home in private vehicle with spouse.  Right groin free of bleeding/hematoma at time of d/c

## 2021-12-27 NOTE — PROCEDURES
[address] [place] [address], [address] [place] [address]                            CARDIAC CATHETERIZATION    PATIENT NAME: Jarocho Romero                    :        1950  MED REC NO:   [id number]                          ROOM:  ACCOUNT NO:   [de-identified]                           ADMIT DATE: 2021  PROVIDER:     Lenka Vivas MD    DATE OF PROCEDURE:  2021    PRE-PROCEDURE DIAGNOSIS:  Nonhealing toe wound. POSTPROCEDURE DIAGNOSIS:  Nonhealing toe wound. PROCEDURE:  1. Ultrasound-guided access of the right femoral artery with placement  of a 4-Khmer sheath. 2.  Catheter placement in the aorta, left common femoral, left SFA, left  peroneal, and left anterior tibial arteries. 3.  Orbital atherectomy of the tibioperoneal trunk into the peroneal  artery. 4.  PTA of the tibioperoneal trunk and peroneal arteries using a 3 x 40  balloon. 5.  PTA of the anterior tibial artery using a 2 x 80 balloon. 6.  Completion angiography. SURGEON:  Lenka Vivas MD    ESTIMATED BLOOD LOSS:  20 mL. PREOP:  This is a 64-year-old male who I know well. He has a history of  coronary artery disease along with carotid stenosis, has undergone a  carotid endarterectomy and coronary artery bypass grafting and aortic  valve replacement. He presented to the office for routine followup for  his carotid disease and had a boot in place due to a nonhealing toe  wound. After discussion with the Wound Clinic, we performed  ultrasonography which showed a decreased YESSENIA and significant disease,  and he was consented for angiography. The risks and benefits of the  procedure were discussed in detail with the patient. The patient  understood and agreed to proceed. FINDINGS:  Although we by ultrasound thought we were going to see an SFA  lesion, there was no SFA lesion. Aortography showed no significant  distal aortic stenosis.   There is a stent in the proximal left common  iliac artery. Distal to the stent is a moderate stenosis about 70%. There is high-grade stenosis of the proximal internal iliac artery. No  external iliac artery stenosis. No common femoral artery stenosis. No  profunda stenosis. No SFA stenosis. No popliteal artery stenosis. There was a high-grade stenosis of the tibioperoneal trunk with proximal  occlusion of the peroneal artery. There is occlusion of the anterior  tibial artery after its takeoff with reconstitution in the mid ankle. There is reconstitution in the peroneal artery as well in a similar  location. The posterior tibial artery is occluded at its ostium, and  there is reconstitution at the foot from collaterals from the peroneal  artery. Postprocedure angiogram showed excellent flow in the anterior  tibial artery and peroneal arteries to the foot with flow to the toes,  mild residual tibioperoneal trunk disease with the occlusion of the  posterior tibial artery that we did not treat. We also left the common  iliac artery stenosis as we did not feel that it was flow limiting. SEDATION:  No sedation was used during the procedure as the patient was  comfortable; local anesthetic was given. PROCEDURE:  The patient was identified, brought to the cath lab and laid  in supine position. He was prepped and draped in a normal sterile  fashion. Prior to beginning, a timeout was performed. Ultrasound-guided access to the right femoral artery was achieved. Ultrasound images were saved and were placed in the heart chart. The  common femoral artery was a smaller vessel. His common femoral vein  actually had some pulsatility to it. There was no significant stenosis  in the common femoral artery. Micro-Stick was used and this was  upgraded to a 4-Romansh sheath. We then placed a pigtail catheter over a  J-wire. Pelvic aortography was performed.   At this point, we then used  a rim to go up and over through the previous stent using a Glide  Advantage wire, we were able to pass this into the common femoral artery  and we did selective imaging down the left lower extremity. The wire  passed into the SFA without difficulty, but we could not get the rim to  cross in this location. We used a NaviCross which crossed easily  through both the stent and into the SFA. We completed our selective  imaging of the left lower extremity. We then placed our Glide Advantage  wire into the distal SFA-popliteal region, exchanged this sheath for a  570 up-and-over sheath. We watched this as it went through the stent  and did not have any difficulty crossing the stent in this location. At  this point, the patient was systemically heparinized. We worked across  the peroneal artery using an 0.018 TrailBlazer and 0.014 Command wire. We then exchanged the Command wire for a ViperWire and performed orbital  atherectomy using a 1.25 solid CSI device of the tibioperoneal trunk  into the peroneal artery. We followed this by ballooning the region  with a 3 x 40 balloon. There was an excellent result in this location. We then accessed the anterior tibial artery using an angled TrailBlazer  and Command wire, switched the angled TrailBlazer for an 0.018 straight  TrailBlazer, and we were able to get into the distal anterior tibial  artery with the wire. The TrailBlazer would not cross in this location,  therefore, we could not do an orbital atherectomy. We used a 2.0 x 80  balloon and able to cross the area where we were having difficulty. We  ballooned the entire anterior tibial artery using this balloon. There  was some residual disease in one location, and we treated this with  further ballooning. There was excellent flow throughout the entire  anterior tibial artery into the foot. A 200 mg of nitro was injected  into the anterior tibial artery with a good result.   At this point, we  exchanged the up-and-over sheath for a short 5-Greek sheath over a  Glide Advantage wire. The patient tolerated the procedure well. The  plan is to transport him to the recovery area.         Miguel Ángel Alba MD    D: 12/27/2021 11:49:46       T: 12/27/2021 11:55:41     [id number]  Job#: [id number]     Doc#: [id number]    CC:

## 2022-01-11 ENCOUNTER — TELEPHONE (OUTPATIENT)
Dept: CARDIOLOGY CLINIC | Age: 72
End: 2022-01-11

## 2022-01-12 ENCOUNTER — PROCEDURE VISIT (OUTPATIENT)
Dept: CARDIOLOGY CLINIC | Age: 72
End: 2022-01-12
Payer: MEDICARE

## 2022-01-12 ENCOUNTER — TELEPHONE (OUTPATIENT)
Dept: CARDIOLOGY CLINIC | Age: 72
End: 2022-01-12

## 2022-01-12 DIAGNOSIS — Z95.0 CARDIAC PACEMAKER IN SITU: Primary | ICD-10-CM

## 2022-01-12 PROCEDURE — 93294 REM INTERROG EVL PM/LDLS PM: CPT | Performed by: INTERNAL MEDICINE

## 2022-01-12 PROCEDURE — 93296 REM INTERROG EVL PM/IDS: CPT | Performed by: INTERNAL MEDICINE

## 2022-01-12 NOTE — TELEPHONE ENCOUNTER
Called and advised patient that his battery is low on pacemaker and he will be needing a device change soon. Advised patient that I put him on monthly transmissions and his next check at home is 2/08. Patient states that he is in [place] and would like battery change in march. I told him that we would see what device status is in February. He agreed.

## 2022-01-13 ENCOUNTER — TELEPHONE (OUTPATIENT)
Dept: CARDIOLOGY CLINIC | Age: 72
End: 2022-01-13

## 2022-01-13 NOTE — TELEPHONE ENCOUNTER
Per Dr. Alo Zuleta, patient needs appt with Dr. Mia Verma to discuss PPM battery replacement. Recommend follow-up visit with Dr. Mia Verma in the next 2 to 3 weeks to arrange for elective pacemaker generator replacement as he is dependent.

## 2022-01-14 ENCOUNTER — TELEPHONE (OUTPATIENT)
Dept: CARDIOLOGY CLINIC | Age: 72
End: 2022-01-14

## 2022-01-14 NOTE — TELEPHONE ENCOUNTER
Spoke with pt and discussed bringing him in . Told him I needed to clarify a couple things with Alicia Licea and I would call him back.

## 2022-01-17 ENCOUNTER — TELEPHONE (OUTPATIENT)
Dept: CARDIOLOGY CLINIC | Age: 72
End: 2022-01-17

## 2022-02-09 ENCOUNTER — TELEPHONE (OUTPATIENT)
Dept: CARDIOLOGY CLINIC | Age: 72
End: 2022-02-09

## 2022-03-04 ENCOUNTER — OFFICE VISIT (OUTPATIENT)
Dept: CARDIOLOGY CLINIC | Age: 72
End: 2022-03-04
Payer: MEDICARE

## 2022-03-04 ENCOUNTER — PROCEDURE VISIT (OUTPATIENT)
Dept: CARDIOLOGY CLINIC | Age: 72
End: 2022-03-04
Payer: MEDICARE

## 2022-03-04 VITALS
BODY MASS INDEX: 28.78 KG/M2 | RESPIRATION RATE: 16 BRPM | DIASTOLIC BLOOD PRESSURE: 76 MMHG | HEIGHT: 71 IN | SYSTOLIC BLOOD PRESSURE: 134 MMHG | OXYGEN SATURATION: 99 % | HEART RATE: 68 BPM | WEIGHT: 205.6 LBS

## 2022-03-04 DIAGNOSIS — I27.20 PULMONARY HYPERTENSION, UNSPECIFIED (HCC): ICD-10-CM

## 2022-03-04 DIAGNOSIS — I25.118 ATHEROSCLEROTIC HEART DISEASE OF NATIVE CORONARY ARTERY WITH OTHER FORMS OF ANGINA PECTORIS (HCC): ICD-10-CM

## 2022-03-04 DIAGNOSIS — I48.0 PAF (PAROXYSMAL ATRIAL FIBRILLATION) (HCC): ICD-10-CM

## 2022-03-04 DIAGNOSIS — L97.909 VENOUS HYPERTENSION, CHRONIC, WITH ULCER (HCC): ICD-10-CM

## 2022-03-04 DIAGNOSIS — I87.319 VENOUS HYPERTENSION, CHRONIC, WITH ULCER (HCC): ICD-10-CM

## 2022-03-04 DIAGNOSIS — T82.198A MALFUNCTION OF ELECTRODE LEAD OF CARDIAC PACEMAKER: Primary | ICD-10-CM

## 2022-03-04 DIAGNOSIS — R94.30 EJECTION FRACTION < 50%: Primary | ICD-10-CM

## 2022-03-04 PROCEDURE — G8427 DOCREV CUR MEDS BY ELIG CLIN: HCPCS | Performed by: INTERNAL MEDICINE

## 2022-03-04 PROCEDURE — G8417 CALC BMI ABV UP PARAM F/U: HCPCS | Performed by: INTERNAL MEDICINE

## 2022-03-04 PROCEDURE — 3017F COLORECTAL CA SCREEN DOC REV: CPT | Performed by: INTERNAL MEDICINE

## 2022-03-04 PROCEDURE — 1036F TOBACCO NON-USER: CPT | Performed by: INTERNAL MEDICINE

## 2022-03-04 PROCEDURE — 99214 OFFICE O/P EST MOD 30 MIN: CPT | Performed by: INTERNAL MEDICINE

## 2022-03-04 PROCEDURE — 1123F ACP DISCUSS/DSCN MKR DOCD: CPT | Performed by: INTERNAL MEDICINE

## 2022-03-04 PROCEDURE — G8484 FLU IMMUNIZE NO ADMIN: HCPCS | Performed by: INTERNAL MEDICINE

## 2022-03-04 PROCEDURE — 93308 TTE F-UP OR LMTD: CPT | Performed by: INTERNAL MEDICINE

## 2022-03-04 PROCEDURE — 4040F PNEUMOC VAC/ADMIN/RCVD: CPT | Performed by: INTERNAL MEDICINE

## 2022-03-04 RX ORDER — CEFDINIR 300 MG/1
300 CAPSULE ORAL 2 TIMES DAILY
COMMUNITY
End: 2022-03-24 | Stop reason: ALTCHOICE

## 2022-03-04 RX ORDER — DOXYCYCLINE HYCLATE 150 MG/1
150 TABLET ORAL 2 TIMES DAILY
COMMUNITY
End: 2022-03-24 | Stop reason: ALTCHOICE

## 2022-03-04 NOTE — PROGRESS NOTES
Electrophysiology Consult Note      Reason for consultation: atrial lead MALFUNCTION    Chief complaint : pacemaker battery replacement    Referring physician: Dr. Stephanie Westbrook      Primary care physician: Ezekiel Perez DO      History of Present Illness: This visit (3/11/2022)      Chief Complaint   Patient presents with    Follow-up     pt. here today to discuss PPM battery replacement. Pt. admits to some SOB, palpitations, and edema. Pt. denies chest pain. Pt. denies any tobacco use. Pt. admits to drinking a few beers weekly at times. Pt. states was exercising regularly before having issue with infection in toe. Previous visit:    Patient is a 78-year-old male with history of carotid artery disease s/p left carotid endarterectomy, severe aortic stenosis, renal insufficiency, coronary artery disease, diabetes mellitus type 2, s/p pacemaker, hypertension, persistent atrial fibrillation, obstructive sleep apnea on CPAP, hyperlipidemia presented for AVR and maze and two-vessel CABG. Postprocedure patient atrial lead noted to be not be capturing. Patient is currently intubated and sedated and is on pressors and unable to give much information so most of the history was obtained from the nurse and CT surgery.   EP was consulted for evaluation of the atrial lead on pacemaker     Pastmedical history:   Past Medical History:   Diagnosis Date    Arrhythmia     Pacemaker placed aprox 5 years ago for A Fib per patient    Arthritis 12/2013    rt wrist    Atrial fibrillation ([address]     on Shin - Dr. 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  H/O right and left heart catheterization 12/10/2020    DIFFUSE LAD DISEASE, Mild ECA Disease, Severe AS, Milf Pul HTN on RHC.  H/O transesophageal echocardiography (MABLE) for monitoring 08/05/2013    normal LV function and normal LA appendage without any clot    History of blood transfusion 12/2020    d/t anemia    History of transesophageal echocardiography (MABLE) 12/15/2020    Severe aortic stenosis (ANNA by planimetry: 0.778 cm sq). Mild AR.    Native (hard of hearing)     hearing tonya aides    Hx of Doppler echocardiogram 05/21/2018    EF 50%  Mild LV hypertrophy. Mildly enlarged RA. Mod aortic valve calcification with mod AS. Mitral annular calcification is present. Mild AR, MR and TR. Mild pulmonary htn.     Hyperlipidemia     Hypertension     Follows with PCP & Dr. Misti Noel Other disorders of kidney and ureter     Pacemaker     Medtronic, implanted 2014    Pneumonia 12/29/2012    Pneumonia due to COVID-19 virus 08/2020    Sleep apnea     dx 2013- has c-pap    Type II or unspecified type diabetes mellitus with other specified manifestations, uncontrolled 12/12/2012    Venous hypertension, chronic, with ulcer ([address] 12/12/2012    resolved       Surgical history :   Past Surgical History:   Procedure Laterality Date    CABG WITH AORTIC VALVE REPLACEMENT N/A 2/16/2021    CABG CORONARY ARTERY BYPASS X2 WITH LIMA, AORTIC VALVE REPLACEMENT AND AORTIC ROOT REPAIR, INTRAOPERATIVE MABLE, INDUCED HYPOTHERMIA, LEFT LEG ENDOVEIN HARVEST, LEFT ATRIAL CLIP, AND CRYO PROCEDURE performed by Jose Alberto Del Angel MD at [address]  12/14    at [address] Left 1/29/2021    LEFT CAROTID ENDARTERECTOMY performed by Sergio Pimentel MD at [id number]  2017    COLONOSCOPY  2011    COLONOSCOPY N/A 11/19/2019    [facility]STIC [facility]  09/30/2020    POSSIBLE CECAL avms, SIGMOID DIVERTICULOSIS, INTERNAL HEMORRHOIDS GRADE 1    COLONOSCOPY N/A 9/30/2020    COLONOSCOPY CONTROL HEMORRHAGE WITH APC performed by Bere Peterson MD at [facility]  2014    \"2 stents put in \"    IR NONTUNNELED VASCULAR CATHETER  3/5/2021    IR NONTUNNELED VASCULAR CATHETER 3/5/2021 [facility] SPECIAL PROCEDURES    JOINT REPLACEMENT  2004    total left hip    OTHER SURGICAL HISTORY Right 12/02/2017    I&D; evacuation of hematoma right hip    OTHER SURGICAL HISTORY  09/17/2020    enteroscopy    PACEMAKER INSERTION N/A 2/23/2021    PACEMAKER GENERATOR LEAD REVISION performed by Kerline Diane MD at [facility]      9/18/14 Status post remote permanent pacemaker with atrial lead dislodgement. 7/24/14 PPM Implant    UPPER GASTROINTESTINAL ENDOSCOPY N/A 9/17/2020    ENTEROSCOPY PUSH BIOPSY performed by Bere Peterson MD at [place] [address] N/A 3/4/2021    EGD DIAGNOSTIC ONLY performed by Bere Peterson MD at [facility]  2012    \"have stents in both legs- done in [place]       Family history:   Family History   Problem Relation Age of Onset    High Blood Pressure Mother     Arthritis Mother     Diabetes Mother     Heart Disease Mother     High Blood Pressure Father     Heart Disease Father     Kidney Disease Father        Social history :  reports that he has never smoked. He has never used smokeless tobacco. He reports current alcohol use of about 2.0 standard drinks of alcohol per week.  He reports that he does not use drugs. Allergies   Allergen Reactions    Spironolactone      CAUSES INCREASED K+    Tape Chema Rahul Tape] Rash     SURGICAL TAPE       Current Outpatient Medications on File Prior to Visit   Medication Sig Dispense Refill    Doxycycline Hyclate 150 MG TABS Take 150 mg by mouth in the morning and at bedtime      cefdinir (OMNICEF) 300 MG capsule Take 300 mg by mouth 2 times daily      gabapentin (NEURONTIN) 300 MG capsule TAKE 1 CAPSULE 3 TIMES A    capsule 0    torsemide (DEMADEX) 20 MG tablet Take 1 tablet by mouth 2 times daily 180 tablet 3    apixaban (ELIQUIS) 5 MG TABS tablet Take 1 tablet by mouth 2 times daily 28 tablet 0    canagliflozin (INVOKANA) 300 MG TABS tablet Take 1 tablet by mouth every morning (before breakfast) 90 tablet 1    glimepiride (AMARYL) 4 MG tablet Take 1 tablet by mouth every morning (before breakfast) 90 tablet 1    rOPINIRole (REQUIP) 0.5 MG tablet TAKE 1 TABLET 3 TIMES A  tablet 1    sildenafil (VIAGRA) 100 MG tablet TAKE 1 TABLET DAILY AS     NEEDED FOR ERECTILE        DYSFUNCTION 30 tablet 1    simvastatin (ZOCOR) 20 MG tablet Take 1 tablet by mouth daily 90 tablet 2    carboxymethylcellulose (REFRESH PLUS) 0.5 % SOLN ophthalmic solution INSTILL 1 DROP IN BOTH EYES THREE TIMES A DAY      Cholecalciferol (VITAMIN D) 50 MCG (2000 UT) CAPS capsule Take by mouth nightly       aspirin 81 MG tablet Take 81 mg by mouth daily       No current facility-administered medications on file prior to visit. Review of Systems:   Review of Systems   Constitutional: Positive for fatigue. Negative for activity change, chills and fever. HENT: Negative for congestion, ear pain and tinnitus. Eyes: Negative for photophobia, pain and visual disturbance. Respiratory: Positive for shortness of breath. Negative for cough, chest tightness and wheezing. Cardiovascular: Positive for palpitations and leg swelling. Negative for chest pain.    Gastrointestinal: Negative for abdominal pain, blood in stool, constipation, diarrhea, nausea and vomiting. Endocrine: Negative for cold intolerance and heat intolerance. Genitourinary: Negative for dysuria, flank pain and hematuria. Musculoskeletal: Positive for arthralgias. Negative for back pain, myalgias and neck stiffness. Skin: Negative for color change and rash. Allergic/Immunologic: Negative for food allergies. Neurological: Negative for dizziness, light-headedness, numbness and headaches. Hematological: Does not bruise/bleed easily. Psychiatric/Behavioral: Negative for agitation, behavioral problems and confusion. Examination:      Vitals:    03/04/22 1011   BP: 134/76   Pulse: 68   Resp: 16   SpO2: 99%   Weight: 205 lb 9.6 oz (93.3 kg)   Height: 5' 11\" (1.803 m)        Body mass index is 28.68 kg/m². Physical Exam  Constitutional:       General: He is not in acute distress. Appearance: Normal appearance. He is not ill-appearing. HENT:      Head: Normocephalic and atraumatic. Mouth/Throat:      Mouth: Mucous membranes are moist.   Eyes:      Conjunctiva/sclera: Conjunctivae normal.      Pupils: Pupils are equal, round, and reactive to light. Cardiovascular:      Rate and Rhythm: Normal rate and regular rhythm. Heart sounds: Murmur (grade 2/6 systolic murmur) heard. Pulmonary:      Effort: Pulmonary effort is normal.      Breath sounds: No rhonchi or rales. Abdominal:      General: Abdomen is flat. There is no distension. Palpations: Abdomen is soft. There is no mass. Musculoskeletal:         General: Normal range of motion. Right lower leg: No edema. Left lower leg: No edema. Skin:     General: Skin is warm and dry. Comments: Healing wound of the left third toe   Neurological:      General: No focal deficit present. Mental Status: He is alert and oriented to person, place, and time.            CBC:   Lab Results   Component Value Date    WBC 6.1 06/01/2021    HGB 10.1 06/01/2021    HCT 33.5 06/01/2021     06/01/2021     Lipids:  Lab Results   Component Value Date    CHOL 106 11/18/2021    TRIG 102 11/18/2021    HDL 36 (L) 11/18/2021    LDLCALC 50 11/18/2021    LDLDIRECT 62 07/22/2014     PT/INR:   Lab Results   Component Value Date    INR 1.36 12/20/2021        BMP:    Lab Results   Component Value Date     12/09/2021    K 3.9 12/09/2021    CL 99 12/09/2021    CO2 31 12/09/2021    BUN 34 (H) 12/20/2021     CMP:   Lab Results   Component Value Date    AST 44 (H) 03/14/2021    PROT 6.2 (L) 03/14/2021    BILITOT 0.5 03/14/2021    ALKPHOS 303 (H) 03/14/2021     TSH:    Lab Results   Component Value Date    TSH 0.99 07/07/2020       EKGINTERPRETATION - EKG Interpretation:  Ventricular paced    Device Assessment:      The device is Medtronic pacemaker - Dual Chamber chamber      MRI Compatible : yes    Device interrogation was performed. Mode: DDD    Atrial lead sensing is very low and thresholds are very high. RV lead is functioning within normal limits     Sensing is normal. Impedence is normal.  Threshold is normal.     There has not been interval changes. Estimated battery life is 1-4 months     The underlying rhythm is AP, .  60.5 % atrial paced; 94.8 % ventricular paced. Atrial Arrhythmia : PAF episodes and longest recorded was 6 days    Non sustained VT episodes : 12    Sustained VT episodes : No    Patient activity reported 3.6 hrs/day    The patient is pacemaker dependent. IMPRESSION / RECOMMENDATIONS:     Malfunctioning atrial lead  Pacemaker at end of battery life  Aortic stenosis s/p AVR  Cad sp post CABG -on aspirin  PAF sp maze procedure  DM-2  PAF on Eliquis  HLD on simvastatin   DMITRIY on CPAP  Infection of the left third toe on doxycycline      Patient had a lead revision in March last year by CT surgery. Today again atrial lead capture is 5 at 1 ms and bearly capturing and very low sensing. Patient is RV pacing dependent  - when I did VVI 30 he was still RV pacing and no atrial signals were noted and atrial sensitivity also has like 0.1 so I think the atrial lead is not working. I am unable to say if the patient has A. fib underlying at this point    Patient does have shortness of breath and tiredness and weakness     I am worried that if RV pacing for so long could be causing him to have LV dysfunction. Patient has already 2 atrial leads - one was abandoned and one was repositioned last year. The first device was done in 2013 patient said he had a revision that time too. Patient has not seen me since hospital in February 2021 and he is now referred because of pacemaker is near end of battery life    As patient is pacemaker is dependent we need to do a lead extraction and place a new wires pretty soon as device lost to end of life/  I would recommend to remove the 2 atrial leads and place a new atrial lead and if the LVEF is less than 50% then he may need BiV pacer. Will get an echo done on the patient to evaluate his LVEF    Patient could not get a recent MRI  because of the abandoned lead so by doing this he could get future MRIs    Discussed with patient in detail and all the risk with the procedure and patient agreeable with the plan          Jarred Muse MD, 3/4/2022 10:19 AM     Please note this report has been partially produced using speech recognition software and may contain errors related to that system including errors in grammar, punctuation, and spelling, as well as words and phrases that may be inappropriate. If there are any questions or concerns please feel free to contact the dictating provider for clarification.

## 2022-03-07 ENCOUNTER — TELEPHONE (OUTPATIENT)
Dept: CARDIOLOGY CLINIC | Age: 72
End: 2022-03-07

## 2022-03-07 DIAGNOSIS — Z95.0 CARDIAC PACEMAKER IN SITU: Primary | ICD-10-CM

## 2022-03-07 NOTE — TELEPHONE ENCOUNTER
Tried to reach patient to discuss procedure date. No answer. Message left asking patient to return phone call when available. Will need to schedule procedure on Monday 3/14/2022 at 1:00 pm. Patient scheduled to come to the office 3/10/2022 for procedure paperwork and covid testing.

## 2022-03-10 ENCOUNTER — HOSPITAL ENCOUNTER (OUTPATIENT)
Age: 72
Setting detail: SPECIMEN
Discharge: HOME | End: 2022-03-10
Payer: MEDICARE

## 2022-03-10 ENCOUNTER — HOSPITAL ENCOUNTER (OUTPATIENT)
Age: 72
Discharge: HOME | End: 2022-03-10
Payer: MEDICARE

## 2022-03-10 ENCOUNTER — NURSE ONLY (OUTPATIENT)
Dept: CARDIOLOGY CLINIC | Age: 72
End: 2022-03-10

## 2022-03-10 ENCOUNTER — HOSPITAL ENCOUNTER (OUTPATIENT)
Dept: GENERAL RADIOLOGY | Age: 72
Discharge: HOME | End: 2022-03-10
Payer: MEDICARE

## 2022-03-10 DIAGNOSIS — Z01.810 PRE-OPERATIVE CARDIOVASCULAR EXAMINATION: ICD-10-CM

## 2022-03-10 LAB
ANION GAP SERPL CALCULATED.3IONS-SCNC: 16 MMOL/L (ref 4–16)
APTT: 40.6 SECONDS (ref 25.1–37.1)
BASOPHILS ABSOLUTE: 0.1 K/CU MM
BASOPHILS RELATIVE PERCENT: 1.5 % (ref 0–1)
BUN BLDV-MCNC: 39 MG/DL (ref 6–23)
CALCIUM SERPL-MCNC: 9.4 MG/DL (ref 8.3–10.6)
CHLORIDE BLD-SCNC: 96 MMOL/L (ref 99–110)
CO2: 27 MMOL/L (ref 21–32)
CREAT SERPL-MCNC: 1.9 MG/DL (ref 0.9–1.3)
DIFFERENTIAL TYPE: ABNORMAL
EOSINOPHILS ABSOLUTE: 0.5 K/CU MM
EOSINOPHILS RELATIVE PERCENT: 6.5 % (ref 0–3)
GFR AFRICAN AMERICAN: 42 ML/MIN/1.73M2
GFR NON-AFRICAN AMERICAN: 35 ML/MIN/1.73M2
GLUCOSE BLD-MCNC: 150 MG/DL (ref 70–99)
HCT VFR BLD CALC: 46.8 % (ref 42–52)
HEMOGLOBIN: 13.6 GM/DL (ref 13.5–18)
IMMATURE NEUTROPHIL %: 0.4 % (ref 0–0.43)
INR BLD: 1.38 INDEX
LYMPHOCYTES ABSOLUTE: 1.5 K/CU MM
LYMPHOCYTES RELATIVE PERCENT: 20.5 % (ref 24–44)
MAGNESIUM: 2.2 MG/DL (ref 1.8–2.4)
MCH RBC QN AUTO: 23.9 PG (ref 27–31)
MCHC RBC AUTO-ENTMCNC: 29.1 % (ref 32–36)
MCV RBC AUTO: 82.2 FL (ref 78–100)
MONOCYTES ABSOLUTE: 0.9 K/CU MM
MONOCYTES RELATIVE PERCENT: 12.4 % (ref 0–4)
NUCLEATED RBC %: 0 %
PDW BLD-RTO: 17.1 % (ref 11.7–14.9)
PHOSPHORUS: 4.5 MG/DL (ref 2.5–4.9)
PLATELET # BLD: 172 K/CU MM (ref 140–440)
PMV BLD AUTO: 10.2 FL (ref 7.5–11.1)
POTASSIUM SERPL-SCNC: 4.2 MMOL/L (ref 3.5–5.1)
PROTHROMBIN TIME: 17.9 SECONDS (ref 11.7–14.5)
RBC # BLD: 5.69 M/CU MM (ref 4.6–6.2)
SARS-COV-2: NOT DETECTED
SEGMENTED NEUTROPHILS ABSOLUTE COUNT: 4.3 K/CU MM
SEGMENTED NEUTROPHILS RELATIVE PERCENT: 58.7 % (ref 36–66)
SODIUM BLD-SCNC: 139 MMOL/L (ref 135–145)
SOURCE: NORMAL
TOTAL IMMATURE NEUTOROPHIL: 0.03 K/CU MM
TOTAL NUCLEATED RBC: 0 K/CU MM
WBC # BLD: 7.3 K/CU MM (ref 4–10.5)

## 2022-03-10 PROCEDURE — 84100 ASSAY OF PHOSPHORUS: CPT

## 2022-03-10 PROCEDURE — 85610 PROTHROMBIN TIME: CPT

## 2022-03-10 PROCEDURE — 36415 COLL VENOUS BLD VENIPUNCTURE: CPT

## 2022-03-10 PROCEDURE — U0003 INFECTIOUS AGENT DETECTION BY NUCLEIC ACID (DNA OR RNA); SEVERE ACUTE RESPIRATORY SYNDROME CORONAVIRUS 2 (SARS-COV-2) (CORONAVIRUS DISEASE [COVID-19]), AMPLIFIED PROBE TECHNIQUE, MAKING USE OF HIGH THROUGHPUT TECHNOLOGIES AS DESCRIBED BY CMS-2020-01-R: HCPCS

## 2022-03-10 PROCEDURE — 83735 ASSAY OF MAGNESIUM: CPT

## 2022-03-10 PROCEDURE — U0005 INFEC AGEN DETEC AMPLI PROBE: HCPCS

## 2022-03-10 PROCEDURE — 80048 BASIC METABOLIC PNL TOTAL CA: CPT

## 2022-03-10 PROCEDURE — 71046 X-RAY EXAM CHEST 2 VIEWS: CPT

## 2022-03-10 PROCEDURE — 85025 COMPLETE CBC W/AUTO DIFF WBC: CPT

## 2022-03-10 PROCEDURE — 85730 THROMBOPLASTIN TIME PARTIAL: CPT

## 2022-03-10 NOTE — PROGRESS NOTES
Patient here in office and educated on Lead Extraction/Lead Replacement with Dual Chamber Generator Change, schedule for 3/14/2022 @ 1300, with arrival @ 1100, @ [facility]; risk explained; and consents signed. Also copy of orders given for labs and CXR due 3/10/2022 at [facility]. Instruction given to patient to :  NPO after midnight the night before procedure; call hospital at [phone] to pre-register. May take rest of morning meds of procedure except listed. Hold Eliquis 2 days prior to procedure. Hold ALL blood pressure medications morning of procedure. . Patient voiced understanding. Copies of consent & info scanned in chart. Patient informed of instructions and guidance for performing test.  Throat swab performed. Swab placed into labeled collection tube. Collection tube and lab order placed in plastic bag. Bag placed in biohazard bag and placed in fridge.  called for . Patient instructed to self quarantine until procedure.

## 2022-03-11 ENCOUNTER — TELEPHONE (OUTPATIENT)
Dept: CARDIOLOGY CLINIC | Age: 72
End: 2022-03-11

## 2022-03-11 ASSESSMENT — ENCOUNTER SYMPTOMS
COUGH: 0
CHEST TIGHTNESS: 0
NAUSEA: 0
VOMITING: 0
DIARRHEA: 0
WHEEZING: 0
COLOR CHANGE: 0
BACK PAIN: 0
SHORTNESS OF BREATH: 1
ABDOMINAL PAIN: 0
BLOOD IN STOOL: 0
EYE PAIN: 0
CONSTIPATION: 0
PHOTOPHOBIA: 0

## 2022-03-11 NOTE — TELEPHONE ENCOUNTER
Received peer to peer letter for cpt [id number], letter scanned into media manager. Clinicals re-faxed to [facility] (OV3/4/22, Echo, IR 2/8/22, CATH 12/27/21, ultrasound arteries, carotid ultrasound, recent lab)    To request per- Determination Consult regarding case. This discussion must take place before 3/28/2022. If you wish to speak to medical director, please call [phone], select option #3, and enter in reference number [id number].

## 2022-03-11 NOTE — TELEPHONE ENCOUNTER
[facility] called and requested Dr Barnard Forward do a peer to peer to approve CPT 30199. I have set up a time for today March 11, 2022 at 1:30. They will call and request Dr. Evon Urbano.

## 2022-03-17 ENCOUNTER — HOSPITAL ENCOUNTER (OUTPATIENT)
Age: 72
Setting detail: SPECIMEN
Discharge: HOME | End: 2022-03-17
Payer: MEDICARE

## 2022-03-17 ENCOUNTER — OFFICE VISIT (OUTPATIENT)
Dept: INFECTIOUS DISEASES | Age: 72
End: 2022-03-17
Payer: MEDICARE

## 2022-03-17 VITALS
DIASTOLIC BLOOD PRESSURE: 74 MMHG | RESPIRATION RATE: 18 BRPM | SYSTOLIC BLOOD PRESSURE: 148 MMHG | HEART RATE: 90 BPM | BODY MASS INDEX: 27.89 KG/M2 | TEMPERATURE: 97.1 F | WEIGHT: 200 LBS

## 2022-03-17 DIAGNOSIS — E11.9 TYPE 2 DIABETES MELLITUS WITHOUT COMPLICATION, WITHOUT LONG-TERM CURRENT USE OF INSULIN (HCC): ICD-10-CM

## 2022-03-17 DIAGNOSIS — E11.621 DIABETIC ULCER OF TOE OF LEFT FOOT ASSOCIATED WITH TYPE 2 DIABETES MELLITUS, WITH MUSCLE INVOLVEMENT WITHOUT EVIDENCE OF NECROSIS (HCC): Primary | ICD-10-CM

## 2022-03-17 DIAGNOSIS — L97.525 DIABETIC ULCER OF TOE OF LEFT FOOT ASSOCIATED WITH TYPE 2 DIABETES MELLITUS, WITH MUSCLE INVOLVEMENT WITHOUT EVIDENCE OF NECROSIS (HCC): Primary | ICD-10-CM

## 2022-03-17 DIAGNOSIS — G62.9 PERIPHERAL POLYNEUROPATHY: ICD-10-CM

## 2022-03-17 PROCEDURE — 87077 CULTURE AEROBIC IDENTIFY: CPT

## 2022-03-17 PROCEDURE — 3046F HEMOGLOBIN A1C LEVEL >9.0%: CPT | Performed by: INTERNAL MEDICINE

## 2022-03-17 PROCEDURE — 87076 CULTURE ANAEROBE IDENT EACH: CPT

## 2022-03-17 PROCEDURE — G8417 CALC BMI ABV UP PARAM F/U: HCPCS | Performed by: INTERNAL MEDICINE

## 2022-03-17 PROCEDURE — G8427 DOCREV CUR MEDS BY ELIG CLIN: HCPCS | Performed by: INTERNAL MEDICINE

## 2022-03-17 PROCEDURE — 87185 SC STD ENZYME DETCJ PER NZM: CPT

## 2022-03-17 PROCEDURE — 1036F TOBACCO NON-USER: CPT | Performed by: INTERNAL MEDICINE

## 2022-03-17 PROCEDURE — 3017F COLORECTAL CA SCREEN DOC REV: CPT | Performed by: INTERNAL MEDICINE

## 2022-03-17 PROCEDURE — G8484 FLU IMMUNIZE NO ADMIN: HCPCS | Performed by: INTERNAL MEDICINE

## 2022-03-17 PROCEDURE — 4040F PNEUMOC VAC/ADMIN/RCVD: CPT | Performed by: INTERNAL MEDICINE

## 2022-03-17 PROCEDURE — 87075 CULTR BACTERIA EXCEPT BLOOD: CPT

## 2022-03-17 PROCEDURE — 99215 OFFICE O/P EST HI 40 MIN: CPT | Performed by: INTERNAL MEDICINE

## 2022-03-17 PROCEDURE — 87070 CULTURE OTHR SPECIMN AEROBIC: CPT

## 2022-03-17 PROCEDURE — 2022F DILAT RTA XM EVC RTNOPTHY: CPT | Performed by: INTERNAL MEDICINE

## 2022-03-17 PROCEDURE — 1123F ACP DISCUSS/DSCN MKR DOCD: CPT | Performed by: INTERNAL MEDICINE

## 2022-03-17 RX ORDER — GLIMEPIRIDE 4 MG/1
4 TABLET ORAL
Qty: 90 TABLET | Refills: 1 | OUTPATIENT
Start: 2022-03-17 | End: 2022-09-13

## 2022-03-17 RX ORDER — GABAPENTIN 300 MG/1
CAPSULE ORAL
Qty: 270 CAPSULE | Refills: 0 | Status: SHIPPED | OUTPATIENT
Start: 2022-03-17 | End: 2022-05-09 | Stop reason: SDUPTHER

## 2022-03-17 RX ORDER — GABAPENTIN 300 MG/1
CAPSULE ORAL
Qty: 270 CAPSULE | Refills: 0 | Status: CANCELLED | OUTPATIENT
Start: 2022-03-17 | End: 2022-06-15

## 2022-03-17 NOTE — PROGRESS NOTES
3/18/2022         Referring Physician: No ref. provider found  Primary Care Physician: Lei Castrejon DO    Impression/Plan:   Diagnosis Orders   1. Diabetic ulcer of toe of left foot associated with type 2 diabetes mellitus, with muscle involvement without evidence of necrosis (HCC)  Culture, Wound    XR FOOT LEFT (MIN 3 VIEWS)    External Referral To Podiatry       Discussion:  Diabetic ulcer of toe of left foot associated with type 2 diabetes mellitus, with muscle involvement without evidence of necrosis ([address]  Diabetic foot ulcer on the dorsum of his left third toe, may present a portal of entry for infection. Concerned, that the patient may have chronic osteomyelitis in the toe. Obtain cx, left foot x-ray, podiatry referral. May ultimately need an amputation       Return in about 1 week (around 3/24/2022). 40 minutes was spent in the encounter; more than 50% of the face-to-face time was spent with the patient providing counseling and coordination of care. History: Lázaro Villalpando is a 74 y.o.  male presenting today for left foot middle toe ulcer. He has a medical history of type 2 diabetes mellitus, coronary artery disease, Charcot arthropathy, hammertoes. Patient reports that he never fractured his left foot third toe about 6 months ago. He also reports that he has had recurrent ulcers on that toe. He has previously healed, however he reported that he went golfing and the ulcer opened up again. He was seen at a hospital in [place]. There he was treated with daptomycin as Rocephin for about 4 weeks. He cannot recall any microbe that was isolated. At present, he has a pacemaker that is 2 for battery change within the month. Dr. Paula Roca would like the left third toe diabetic ulcer to be treated prior to the pacemaker change (to minimize the risk of infection). At present, the patient denies fever, chills, nausea or vomiting.     Review of Systems   All other systems reviewed and are negative.       Allergies   Allergen Reactions    Spironolactone      CAUSES INCREASED K+    Tape Kelsey Burger Tape] Rash     SURGICAL TAPE       Patient Active Problem List   Diagnosis    Type 2 diabetes mellitus without complication, without long-term current use of insulin (HCC)    Critical lower limb ischemia (HCC)    Venous hypertension, chronic, with ulcer ([address]    Ulcer of other part of foot    Hospital-acquired pneumonia    Atrial fibrillation (HCC)    Sinus pause    PAF [facility]oxysmal atrial fibrillation) ([facility]    DM (diabetes mellitus) ([address]    DMITRIY on CPAP    Hyperlipidemia    Status post incision and drainage    Hyperkalemia    Arthritis    PVD (peripheral vascular disease) ([facility])    Hematoma    Cardiac pacemaker in situ    ASCVD (arteriosclerotic cardiovascular disease)    Essential hypertension    Gout    Diabetic neuropathy associated with type 2 diabetes mellitus ([facility])    Adenomatous polyp of sigmoid colon    Iron deficiency anemia due to chronic blood loss    Erythropoietin deficiency anemia    VHD (valvular heart disease)    Abnormal fractional flow reserve (FFR) on cardiac catheterization    Carotid stenosis, left    Aortic stenosis, severe    Atherosclerotic heart disease of native coronary artery with other forms of angina pectoris (HCC)    Displacement of atrial pacemaker leads    Pacemaker lead malfunction    SOB (shortness of breath)    Recurrent right pleural effusion    Elevated liver enzymes    Critical illness myopathy    Respiratory failure (HCC)    Generalized weakness    Status post coronary artery bypass graft    Pneumonia due to COVID-19 virus    Moderate malnutrition (HCC)    Stage 3a chronic kidney disease ([address]    WD-Diabetic ulcer of left foot with fat layer exposed ([address]    WD-Non-pressure ulcer of right lower extremity with fat layer exposed ([address]    Pulmonary hypertension, unspecified ([address]    Persistent proteinuria    Diabetic ulcer of toe of left foot associated with type 2 diabetes mellitus, with muscle involvement without evidence of necrosis (HCC)       Current Outpatient Medications   Medication Sig Dispense Refill    Doxycycline Hyclate 150 MG TABS Take 150 mg by mouth in the morning and at bedtime      cefdinir (OMNICEF) 300 MG capsule Take 300 mg by mouth 2 times daily      torsemide (DEMADEX) 20 MG tablet Take 1 tablet by mouth 2 times daily 180 tablet 3    apixaban (ELIQUIS) 5 MG TABS tablet Take 1 tablet by mouth 2 times daily 28 tablet 0    canagliflozin (INVOKANA) 300 MG TABS tablet Take 1 tablet by mouth every morning (before breakfast) 90 tablet 1    glimepiride (AMARYL) 4 MG tablet Take 1 tablet by mouth every morning (before breakfast) 90 tablet 1    rOPINIRole (REQUIP) 0.5 MG tablet TAKE 1 TABLET 3 TIMES A  tablet 1    sildenafil (VIAGRA) 100 MG tablet TAKE 1 TABLET DAILY AS     NEEDED FOR ERECTILE        DYSFUNCTION 30 tablet 1    carboxymethylcellulose (REFRESH PLUS) 0.5 % SOLN ophthalmic solution INSTILL 1 DROP IN BOTH EYES THREE TIMES A DAY      Cholecalciferol (VITAMIN D) 50 MCG (2000 UT) CAPS capsule Take by mouth nightly       aspirin 81 MG tablet Take 81 mg by mouth daily      Dulaglutide 3 MG/0.5ML SOPN Inject 3 mg into the skin once a week 12 pen 0    gabapentin (NEURONTIN) 300 MG capsule TAKE 1 CAPSULE 3 TIMES A    capsule 0    simvastatin (ZOCOR) 20 MG tablet Take 1 tablet by mouth daily 90 tablet 2     No current facility-administered medications for this visit.        Past Medical History:   Diagnosis Date    Arrhythmia     Pacemaker placed aprox 5 years ago for A Fib per patient    Arthritis 12/2013    rt wrist    Atrial fibrillation ([facility]     on Xabeto - Dr. Kathy Valentine CAD (coronary artery disease) 06/18/2014    see dr Rhiannon West kidney disease, stage III (moderate) ([facility] 07/07/2016    Critical illness myopathy 03/15/2021    Diabetes mellitus ([facility]     dx 2004    Diabetic neuropathy associated with type 2 diabetes mellitus ([facility] 04/23/2019    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    Gout 04/2019    \"got gout when had pacer put in because they did not give me my medication for gout \"    H/O 24 hour EKG monitoring 10/03/2013    no afib noted, sinsus rhythm    H/O cardiovascular stress test 05/12/2014    cardiolite- mild ischemia RCA EF50%    H/O echocardiogram 12/01/2020    EF 55-60% severe aortic stenosis mild to mod aortic regurg mod to severe tricuspid regurg severe pulm htn significant changes since 2018 echo.  H/O right and left heart catheterization 12/10/2020    DIFFUSE LAD DISEASE, Mild ECA Disease, Severe AS, Milf Pul HTN on RHC.  H/O transesophageal echocardiography (MABLE) for monitoring 08/05/2013    normal LV function and normal LA appendage without any clot    History of blood transfusion 12/2020    d/t anemia    History of transesophageal echocardiography (MABLE) 12/15/2020    Severe aortic stenosis (ANNA by planimetry: 0.778 cm sq). Mild AR.    [place] (hard of hearing)     hearing tonya aides    Hx of Doppler echocardiogram 05/21/2018    EF 50%  Mild LV hypertrophy. Mildly enlarged RA. Mod aortic valve calcification with mod AS. Mitral annular calcification is present. Mild AR, MR and TR. Mild pulmonary htn.     Hyperlipidemia     Hypertension     Follows with PCP & Dr. Gloria Payan Other disorders of kidney and ureter     Pacemaker     Medtronic, implanted 2014    Pneumonia 12/29/2012    Pneumonia due to COVID-19 virus 08/2020    Sleep apnea     dx 2013- has c-pap    Type II or unspecified type diabetes mellitus with other specified manifestations, uncontrolled 12/12/2012    Venous hypertension, chronic, with ulcer ([facility] 12/12/2012    resolved       Past Surgical History:   Procedure Laterality Date    CABG WITH AORTIC VALVE REPLACEMENT N/A 2/16/2021    CABG CORONARY ARTERY BYPASS X2 WITH LIMA, AORTIC VALVE REPLACEMENT AND AORTIC ROOT REPAIR, INTRAOPERATIVE MABLE, INDUCED HYPOTHERMIA, LEFT LEG ENDOVEIN HARVEST, LEFT ATRIAL CLIP, AND CRYO PROCEDURE performed by Jonny Perez MD at [address] [place] [address]  12/14    at [address] [facility] [address] [address] Left 1/29/2021    LEFT CAROTID ENDARTERECTOMY performed by Lisandro Navarro MD at [id number]  2017    COLONOSCOPY  2011    COLONOSCOPY N/A 11/19/2019    COLONOSCOPY DIAGNOSTIC performed by Kim Cota MD at [facility]  09/30/2020    POSSIBLE CECAL avms, SIGMOID DIVERTICULOSIS, INTERNAL HEMORRHOIDS GRADE 1    COLONOSCOPY N/A 9/30/2020    COLONOSCOPY CONTROL HEMORRHAGE WITH APC performed by Kim Cota MD at [address] [facility]  2014    \"2 stents put in \"    IR NONTUNNELED VASCULAR CATHETER  3/5/2021    IR NONTUNNELED VASCULAR CATHETER 3/5/2021 [facility] SPECIAL PROCEDURES    JOINT REPLACEMENT  2004    total left hip    OTHER SURGICAL HISTORY Right 12/02/2017    I&D; evacuation of hematoma right hip    OTHER SURGICAL HISTORY  09/17/2020    enteroscopy    PACEMAKER INSERTION N/A 2/23/2021    PACEMAKER GENERATOR LEAD REVISION [facility]      9/18/14 Status post remote permanent pacemaker with atrial lead dislodgement.  7/24/14 PPM Implant    UPPER GASTROINTESTINAL ENDOSCOPY N/A 9/17/2020    ENTEROSCOPY PUSH BIOPSY performed by Kim Cota MD at [address] [facility] N/A 3/4/2021    EGD DIAGNOSTIC ONLY performed by Kim Cota MD at [address] [place] [address]  2012    \"have stents in both legs- done in [address] History     Socioeconomic History    Marital status:      Spouse name: Not on file    Number of children: Not on file    Years of education: Not on file    Highest education level: Not on file   Occupational History    Not on file   Tobacco Use    Smoking status: Never Smoker    Smokeless tobacco: Never Used   Vaping Use    Vaping Use: Never used   Substance and Sexual Activity    Alcohol use: Yes     Alcohol/week: 2.0 standard drinks     Types: 2 Cans of beer per week     Comment: average \"one time per week\"/ Caffiene: 1 cup of coffee daily    Drug use: No    Sexual activity: Yes     Partners: Female     Comment:    Other Topics Concern    Not on file   Social History Narrative    Not on file     Social Determinants of Health     Financial Resource Strain: Low Risk     Difficulty of Paying Living Expenses: Not hard at all   Food Insecurity: No Food Insecurity    Worried About Running Out of Food in the Last Year: Never true    Kiko of Food in the Last Year: Never true   Transportation Needs:     Lack of Transportation (Medical): Not on file    Lack of Transportation (Non-Medical):  Not on file   Physical Activity:     Days of Exercise per Week: Not on file    Minutes of Exercise per Session: Not on file   Stress:     Feeling of Stress : Not on file   Social Connections:     Frequency of Communication with Friends and Family: Not on file    Frequency of Social Gatherings with Friends and Family: Not on file    Attends Latter day Services: Not on file    Active Member of [address] [place] [address] [employer] or Organizations: Not on file    Attends Club or Organization Meetings: Not on file    Marital Status: Not on file   Intimate Partner Violence:     Fear of Current or Ex-Partner: Not on file    Emotionally Abused: Not on file    Physically Abused: Not on file    Sexually Abused: Not on file   Housing Stability:     Unable to Pay for Housing in the Last Year: Not on file    Number of [place] in the Last Year: Not on file    Unstable Housing in the Last Year: Not on file       Family History   Problem Relation Age of Onset    High Blood Pressure Mother     Arthritis Mother     Diabetes Mother     Heart Disease Mother     High Blood Pressure Father     Heart Disease Father     Kidney Disease Father Vital Signs:  Vitals:    03/17/22 1018   BP: (!) 148/74   Site: Left Upper Arm   Position: Sitting   Cuff Size: Medium Adult   Pulse: 90   Resp: 18   Temp: 97.1 °F (36.2 °C)   TempSrc: Infrared   Weight: 200 lb (90.7 kg)        Wt Readings from Last 3 Encounters:   03/17/22 200 lb (90.7 kg)   03/04/22 205 lb 9.6 oz (93.3 kg)   12/27/21 195 lb (88.5 kg)        Physical Exam:   Gen: alert and NAD  HEENT: sclera clear, pupils equal and reactive, extra ocular muscles intact, oropharynx clear, mucus membranes moist, tympanic membranes clear bilaterally, no cervical lymphadenopathy noted and neck supple  Neck: supple, no significant adenopathy  Chest: clear to auscultation, no wheezes, rales or rhonchi, symmetric air entry  Heart: regular rate and rhythm, no murmurs  ABD: abdomen is soft without significant tenderness, masses, organomegaly or guarding. EXT:peripheral pulses normal, no pedal edema, no clubbing or cyanosis  NEURO: alert, oriented, normal speech, no focal findings or movement disorder noted  Skin: well hydrated, no lesions, surgical site examined  Wounds: Left third toe dorsum also, surrounding swelling, no discharge.   Labs:   WBC   Date Value Ref Range Status   03/10/2022 7.3 4.0 - 10.5 K/CU MM Final   06/01/2021 6.1 4.0 - 10.5 K/CU MM Final   03/30/2021 4.5 4.0 - 10.5 K/CU MM Final     CREATININE   Date Value Ref Range Status   03/10/2022 1.9 (H) 0.9 - 1.3 MG/DL Final   12/20/2021 1.2 0.9 - 1.3 MG/DL Final   12/09/2021 1.3 0.5 - 1.4 MG/DL Final       Cultures:  Culture   Date Value Ref Range Status   09/03/2021   Final    Final Report Mixed skin yesica,  Light growth No further workup No anaerobes isolated   03/12/2021 Final Report No growth 60 to 72 hours  Final   03/08/2021 Final Report Normal respiratory yesica  Final       Imaging Studies:         Electronicallysigned by Arvind Dumont MD on 3/16/22 at 10:06 PM EDT

## 2022-03-18 ENCOUNTER — HOSPITAL ENCOUNTER (OUTPATIENT)
Dept: GENERAL RADIOLOGY | Age: 72
Discharge: HOME | End: 2022-03-18
Payer: MEDICARE

## 2022-03-18 ENCOUNTER — HOSPITAL ENCOUNTER (OUTPATIENT)
Age: 72
Discharge: HOME | End: 2022-03-18
Payer: MEDICARE

## 2022-03-18 DIAGNOSIS — L97.525 DIABETIC ULCER OF TOE OF LEFT FOOT ASSOCIATED WITH TYPE 2 DIABETES MELLITUS, WITH MUSCLE INVOLVEMENT WITHOUT EVIDENCE OF NECROSIS (HCC): ICD-10-CM

## 2022-03-18 DIAGNOSIS — E11.621 DIABETIC ULCER OF TOE OF LEFT FOOT ASSOCIATED WITH TYPE 2 DIABETES MELLITUS, WITH MUSCLE INVOLVEMENT WITHOUT EVIDENCE OF NECROSIS (HCC): ICD-10-CM

## 2022-03-18 PROCEDURE — 73630 X-RAY EXAM OF FOOT: CPT

## 2022-03-18 NOTE — ASSESSMENT & PLAN NOTE
Diabetic foot ulcer on the dorsum of his left third toe, may present a portal of entry for infection. Concerned, that the patient may have chronic osteomyelitis in the toe.  Obtain cx, left foot x-ray, podiatry referral. May ultimately need an amputation

## 2022-03-20 LAB
CULTURE: ABNORMAL
Lab: ABNORMAL
SPECIMEN: ABNORMAL

## 2022-03-21 ENCOUNTER — OFFICE VISIT (OUTPATIENT)
Dept: CARDIOLOGY CLINIC | Age: 72
End: 2022-03-21
Payer: MEDICARE

## 2022-03-21 VITALS
HEIGHT: 71 IN | HEART RATE: 88 BPM | BODY MASS INDEX: 27.86 KG/M2 | SYSTOLIC BLOOD PRESSURE: 138 MMHG | WEIGHT: 199 LBS | OXYGEN SATURATION: 98 % | DIASTOLIC BLOOD PRESSURE: 72 MMHG

## 2022-03-21 DIAGNOSIS — I10 ESSENTIAL HYPERTENSION: ICD-10-CM

## 2022-03-21 DIAGNOSIS — I25.10 ASCVD (ARTERIOSCLEROTIC CARDIOVASCULAR DISEASE): Primary | ICD-10-CM

## 2022-03-21 DIAGNOSIS — I65.22 CAROTID STENOSIS, LEFT: ICD-10-CM

## 2022-03-21 DIAGNOSIS — I38 VHD (VALVULAR HEART DISEASE): ICD-10-CM

## 2022-03-21 DIAGNOSIS — I48.0 PAF (PAROXYSMAL ATRIAL FIBRILLATION) (HCC): ICD-10-CM

## 2022-03-21 PROCEDURE — 1123F ACP DISCUSS/DSCN MKR DOCD: CPT | Performed by: NURSE PRACTITIONER

## 2022-03-21 PROCEDURE — G8484 FLU IMMUNIZE NO ADMIN: HCPCS | Performed by: NURSE PRACTITIONER

## 2022-03-21 PROCEDURE — 3017F COLORECTAL CA SCREEN DOC REV: CPT | Performed by: NURSE PRACTITIONER

## 2022-03-21 PROCEDURE — 1036F TOBACCO NON-USER: CPT | Performed by: NURSE PRACTITIONER

## 2022-03-21 PROCEDURE — G8427 DOCREV CUR MEDS BY ELIG CLIN: HCPCS | Performed by: NURSE PRACTITIONER

## 2022-03-21 PROCEDURE — 4040F PNEUMOC VAC/ADMIN/RCVD: CPT | Performed by: NURSE PRACTITIONER

## 2022-03-21 PROCEDURE — G8417 CALC BMI ABV UP PARAM F/U: HCPCS | Performed by: NURSE PRACTITIONER

## 2022-03-21 PROCEDURE — 99214 OFFICE O/P EST MOD 30 MIN: CPT | Performed by: NURSE PRACTITIONER

## 2022-03-21 RX ORDER — SIMVASTATIN 20 MG
20 TABLET ORAL DAILY
Qty: 90 TABLET | Refills: 3 | Status: SHIPPED | OUTPATIENT
Start: 2022-03-21 | End: 2022-05-31 | Stop reason: SDUPTHER

## 2022-03-21 ASSESSMENT — ENCOUNTER SYMPTOMS
POOR WOUND HEALING: 1
SHORTNESS OF BREATH: 0
ORTHOPNEA: 0

## 2022-03-21 NOTE — PROGRESS NOTES
3/21/2022  Primary cardiologist: Dr. Astrid Wang  is an established 74 y.o.  male here for follow-up on   1. ASCVD (arteriosclerotic cardiovascular disease)    2. Carotid stenosis, left    3. Essential hypertension    4. PAF (paroxysmal atrial fibrillation) ([facility]    5. VHD (valvular heart disease)            SUBJECTIVE/OBJECTIVE:    HPI : Fernando Mendoza is a pleasant 73 y/o gentleman with a history of paroxysmal atrial fibrillation s/p MAZE, CAD, s/p PCI and CABG, valvular heart disease s/p AVR, hypertension, hyperlipidemia, carotid artery disease s/p Left CEA, CKD, anemia, pulmonary HTN  and dual chamber pacemaker. In February 2021 Channing underwent CABG x 2 with LIMA to LAD and SVG to distal RCA, AVR with a #27 mm Medtronic Mosaic bio prosthetic prosthesis, aortic root enlargement with CorMatrix patch, left atrial Maze procedure occluding pulmonary vein isolation using bipolar and epicardial roof and floor line and a 45 mm left atrial clip placement. He has a diabetic ulcer to the left foot /toe- he is following up with Dr Janessa Young. His PPM is getting close to RRR and is following with JEFFERY [place] reports he is feeling more tired than his usual.  He feels as though he tires more easily as well. He denies episodes of chest pain or shortness of breath. Review of Systems   Constitutional: Positive for malaise/fatigue. Negative for diaphoresis. Cardiovascular: Negative for chest pain, claudication, dyspnea on exertion, irregular heartbeat, leg swelling, near-syncope, orthopnea, palpitations and paroxysmal nocturnal dyspnea. Respiratory: Negative for shortness of breath. Skin: Positive for poor wound healing. Neurological: Negative for dizziness and light-headedness.        Vitals:    03/21/22 1406   BP: 138/72   Site: Left Upper Arm   Position: Sitting   Cuff Size: Medium Adult   Pulse: 88   SpO2: 98%   Weight: 199 lb (90.3 kg)   Height: 5' 11\" (1.803 m)     Patient-Reported Vitals 4/8/2021 Patient-Reported Weight 185   Patient-Reported Height 5'11\"   Patient-Reported Systolic 082   Patient-Reported Diastolic 66   Patient-Reported Pulse 67   Patient-Reported Temperature 98.2     Wt Readings from Last 3 Encounters:   03/21/22 199 lb (90.3 kg)   03/17/22 200 lb (90.7 kg)   03/04/22 205 lb 9.6 oz (93.3 kg)     Body mass index is 27.75 kg/m². Physical Exam  Vitals reviewed. HENT:      Head: Normocephalic. Mouth/Throat:      Mouth: Mucous membranes are moist.   Neck:      Vascular: No carotid bruit. Cardiovascular:      Rate and Rhythm: Normal rate. Pulses: Normal pulses. Heart sounds: Normal heart sounds. Comments: Left-sided pacer pocket into  Pulmonary:      Effort: Pulmonary effort is normal.      Breath sounds: Normal breath sounds. No wheezing or rales. Abdominal:      General: There is no distension. Palpations: Abdomen is soft. Tenderness: There is no abdominal tenderness. Musculoskeletal:      Right lower leg: No edema. Left lower leg: No edema. Skin:     General: Skin is warm and dry. Capillary Refill: Capillary refill takes less than 2 seconds. Comments: Left foot with boot on   Neurological:      General: No focal deficit present. Mental Status: He is alert.    Psychiatric:         Mood and Affect: Mood normal.                Current Outpatient Medications   Medication Sig Dispense Refill    Dulaglutide 3 MG/0.5ML SOPN Inject 3 mg into the skin once a week 12 pen 0    gabapentin (NEURONTIN) 300 MG capsule TAKE 1 CAPSULE 3 TIMES A    capsule 0    Doxycycline Hyclate 150 MG TABS Take 150 mg by mouth in the morning and at bedtime      cefdinir (OMNICEF) 300 MG capsule Take 300 mg by mouth 2 times daily      torsemide (DEMADEX) 20 MG tablet Take 1 tablet by mouth 2 times daily 180 tablet 3    apixaban (ELIQUIS) 5 MG TABS tablet Take 1 tablet by mouth 2 times daily 28 tablet 0    canagliflozin (INVOKANA) 300 MG TABS tablet Take 1 tablet by mouth every morning (before breakfast) 90 tablet 1    glimepiride (AMARYL) 4 MG tablet Take 1 tablet by mouth every morning (before breakfast) 90 tablet 1    sildenafil (VIAGRA) 100 MG tablet TAKE 1 TABLET DAILY AS     NEEDED FOR ERECTILE        DYSFUNCTION 30 tablet 1    simvastatin (ZOCOR) 20 MG tablet Take 1 tablet by mouth daily 90 tablet 2    carboxymethylcellulose (REFRESH PLUS) 0.5 % SOLN ophthalmic solution INSTILL 1 DROP IN BOTH EYES THREE TIMES A DAY      Cholecalciferol (VITAMIN D) 50 MCG (2000 UT) CAPS capsule Take by mouth nightly       aspirin 81 MG tablet Take 81 mg by mouth daily       No current facility-administered medications for this visit. All pertinent data reviewed and discussed with patient       ASSESSMENT/PLAN:  Persistent atrial fibrillation  Noted have episodes of atrial fibrillation on pacemaker   Denies palpitations: On anticoagulation with Eliquis 5 mg for stroke prevention      ASCVD  H/o CABG  Stable- on ASA and simvastatin - tolerating medications- no changes in medications/ doses. Continue with healthy eating including a low-fat low-cholesterol diet      Valvular heart disease  H/o AVR with # 27 mm Medtronic Mosaic valve. Echocardiogram reviewed from March 2022 showed normal functioning prosthetic valve in aortic position: Has moderate MR appreciated -  Stable valves: continue with surveillance    Hypertension  Controlled:  Low-sodium diet    Hyperlipidemia  Lipids reviewed from November 2021 showing HDL 36 LDL 50: His lipids are at goal continue with simvastatin and low-fat low-cholesterol diet. Denies myalgia    Pacemaker  Pacemaker is at recommended replacement time with a malfunctioning atrial lead. Atrial threshold noted to be elevated: currently following with EP and is planning for replacement however he also has ongoing ulcer to left toe concerning for osteomyelitis:  This raises the concern for placement of new pacemaker and setting of infection. Continue to follow with EP/ID  He is ventricularly pacemaker dependent  He has noticed increase in fatigue ? If related to PPM dependence vs atrial fibrillation        Carotid artery disease status post left carotid endarterectomy  Stable  Without symptoms of TIA or CVA  Continue with simvastatin: Denies side effects or adverse reactions to medication    Medications reviewed and confirmed with patient     Tests ordered:  none      Follow-up  1 month or sooner if needed     Signed:  JUSTIN Parker CNP, 3/21/2022, 2:33 PM    An electronic signature was used to authenticate this note. Please note this report has been partially produced using speech recognition software and may contain errors related to that system including errors in grammar, punctuation, and spelling, as well as words and phrases that may be inappropriate. If there are any questions or concerns please feel free to contact the dictating provider for clarification.

## 2022-03-21 NOTE — LETTER
Charles Trevino [facility]  1950  [id number]    Have you had any Chest Pain that is not new? - No    Have you had any Shortness of Breath - No    Have you had any dizziness - No    Have you had any palpitations that are not new?  - No    Do you have any edema - swelling in feet    If Yes - CHECK TO SEE IF THE EDEMA IS PITTING  How long have they been having edema - Months  If Yes - Have they worn compression stockings Yes    When did you have your last labs drawn 3/10/22    Do you have a surgery or procedure scheduled in the near future - No

## 2022-03-21 NOTE — PATIENT INSTRUCTIONS
Please be informed that if you contact our office outside of normal business hours the physician on call cannot help with any scheduling or rescheduling issues, procedure instruction questions or any type of medication issue. We advise you for any urgent/emergency that you go to the nearest emergency room! PLEASE CALL OUR OFFICE DURING NORMAL BUSINESS HOURS    Monday - Friday   8 am to 5 pm    [place]: [address]: [phone]    [place]:  [phone]      **It is YOUR responsibilty to bring medication bottles and/or updated medication list to [address] [place] [address].  This will allow us to better serve you and all your healthcare needs**

## 2022-03-22 DIAGNOSIS — E11.9 TYPE 2 DIABETES MELLITUS WITHOUT COMPLICATION, WITHOUT LONG-TERM CURRENT USE OF INSULIN (HCC): ICD-10-CM

## 2022-03-23 RX ORDER — GLIMEPIRIDE 4 MG/1
TABLET ORAL
Qty: 90 TABLET | Refills: 1 | OUTPATIENT
Start: 2022-03-23

## 2022-03-24 ENCOUNTER — HOSPITAL ENCOUNTER (OUTPATIENT)
Age: 72
Setting detail: SPECIMEN
Discharge: HOME | End: 2022-03-24
Payer: MEDICARE

## 2022-03-24 ENCOUNTER — OFFICE VISIT (OUTPATIENT)
Dept: INFECTIOUS DISEASES | Age: 72
End: 2022-03-24
Payer: MEDICARE

## 2022-03-24 VITALS
HEART RATE: 70 BPM | WEIGHT: 206.4 LBS | DIASTOLIC BLOOD PRESSURE: 41 MMHG | TEMPERATURE: 97.2 F | BODY MASS INDEX: 28.79 KG/M2 | SYSTOLIC BLOOD PRESSURE: 139 MMHG | RESPIRATION RATE: 18 BRPM

## 2022-03-24 DIAGNOSIS — E11.621 DIABETIC ULCER OF TOE OF LEFT FOOT ASSOCIATED WITH TYPE 2 DIABETES MELLITUS, WITH MUSCLE INVOLVEMENT WITHOUT EVIDENCE OF NECROSIS (HCC): Primary | ICD-10-CM

## 2022-03-24 DIAGNOSIS — L97.511 DIABETIC ULCER OF TOE OF RIGHT FOOT ASSOCIATED WITH TYPE 2 DIABETES MELLITUS, LIMITED TO BREAKDOWN OF SKIN (HCC): ICD-10-CM

## 2022-03-24 DIAGNOSIS — E11.621 DIABETIC ULCER OF TOE OF RIGHT FOOT ASSOCIATED WITH TYPE 2 DIABETES MELLITUS, LIMITED TO BREAKDOWN OF SKIN (HCC): ICD-10-CM

## 2022-03-24 DIAGNOSIS — L97.525 DIABETIC ULCER OF TOE OF LEFT FOOT ASSOCIATED WITH TYPE 2 DIABETES MELLITUS, WITH MUSCLE INVOLVEMENT WITHOUT EVIDENCE OF NECROSIS (HCC): Primary | ICD-10-CM

## 2022-03-24 PROCEDURE — 1123F ACP DISCUSS/DSCN MKR DOCD: CPT | Performed by: INTERNAL MEDICINE

## 2022-03-24 PROCEDURE — 87070 CULTURE OTHR SPECIMN AEROBIC: CPT

## 2022-03-24 PROCEDURE — 87186 SC STD MICRODIL/AGAR DIL: CPT

## 2022-03-24 PROCEDURE — G8427 DOCREV CUR MEDS BY ELIG CLIN: HCPCS | Performed by: INTERNAL MEDICINE

## 2022-03-24 PROCEDURE — 2022F DILAT RTA XM EVC RTNOPTHY: CPT | Performed by: INTERNAL MEDICINE

## 2022-03-24 PROCEDURE — 1036F TOBACCO NON-USER: CPT | Performed by: INTERNAL MEDICINE

## 2022-03-24 PROCEDURE — 99214 OFFICE O/P EST MOD 30 MIN: CPT | Performed by: INTERNAL MEDICINE

## 2022-03-24 PROCEDURE — 87077 CULTURE AEROBIC IDENTIFY: CPT

## 2022-03-24 PROCEDURE — 87181 SC STD AGAR DILUTION PER AGT: CPT

## 2022-03-24 PROCEDURE — 87075 CULTR BACTERIA EXCEPT BLOOD: CPT

## 2022-03-24 PROCEDURE — 3046F HEMOGLOBIN A1C LEVEL >9.0%: CPT | Performed by: INTERNAL MEDICINE

## 2022-03-24 PROCEDURE — G8484 FLU IMMUNIZE NO ADMIN: HCPCS | Performed by: INTERNAL MEDICINE

## 2022-03-24 PROCEDURE — 4040F PNEUMOC VAC/ADMIN/RCVD: CPT | Performed by: INTERNAL MEDICINE

## 2022-03-24 PROCEDURE — G8417 CALC BMI ABV UP PARAM F/U: HCPCS | Performed by: INTERNAL MEDICINE

## 2022-03-24 PROCEDURE — 3017F COLORECTAL CA SCREEN DOC REV: CPT | Performed by: INTERNAL MEDICINE

## 2022-03-24 RX ORDER — GREEN TEA/HOODIA GORDONII 315-12.5MG
1 CAPSULE ORAL 2 TIMES DAILY
Qty: 60 TABLET | Refills: 0 | Status: ON HOLD | OUTPATIENT
Start: 2022-03-24 | End: 2022-04-14 | Stop reason: ALTCHOICE

## 2022-03-24 RX ORDER — AMOXICILLIN AND CLAVULANATE POTASSIUM 875; 125 MG/1; MG/1
1 TABLET, FILM COATED ORAL 2 TIMES DAILY
Qty: 28 TABLET | Refills: 0 | Status: SHIPPED | OUTPATIENT
Start: 2022-03-24 | End: 2022-04-07 | Stop reason: ALTCHOICE

## 2022-03-24 RX ORDER — LEVOFLOXACIN 750 MG/1
750 TABLET ORAL
Qty: 7 TABLET | Refills: 0 | Status: SHIPPED | OUTPATIENT
Start: 2022-03-24 | End: 2022-04-07 | Stop reason: SDUPTHER

## 2022-03-24 NOTE — ASSESSMENT & PLAN NOTE
Reports that he is scheduled for an amputation of the left third toe. Expect culture and histopathology. However, after this amputation, the patient's may undergo battery replacement of his AICD/pacemaker.

## 2022-03-24 NOTE — PATIENT INSTRUCTIONS
You may have your pacemaker battery changed after you undergo toe(s) amputation. Check your glucose level before and 2 hours after you eat, since concomitant use of levofloxacin and glimepiride may lead to low glucose level. IF this happens then stop your glimepiride while you are taking levofloxacin. Bone should be sent for cultures and histopathology.

## 2022-03-24 NOTE — ASSESSMENT & PLAN NOTE
slough at the base of the ulcer. Concerned about soft tissue infection, culture taken.   Levofloxacin and Augmentin started

## 2022-03-24 NOTE — PROGRESS NOTES
3/24/2022         Referring Physician: No ref. provider found  Primary Care Physician: Meño Hamilton DO    Impression/Plan:   Diagnosis Orders   1. Diabetic ulcer of toe of left foot associated with type 2 diabetes mellitus, with muscle involvement without evidence of necrosis (HCC)  levoFLOXacin (LEVAQUIN) 750 MG tablet    amoxicillin-clavulanate (AUGMENTIN) 875-125 MG per tablet    Probiotic Acidophilus (FLORANEX) TABS   2. Diabetic ulcer of toe of right foot associated with type 2 diabetes mellitus, limited to breakdown of skin ([address]  Culture, Wound       Discussion:  Diabetic ulcer of toe of left foot associated with type 2 diabetes mellitus, with muscle involvement without evidence of necrosis ([address]  Reports that he is scheduled for an amputation of the left third toe. Expect culture and histopathology. However, after this amputation, the patient's may undergo battery replacement of his AICD/pacemaker. Diabetic ulcer of toe of right foot associated with type 2 diabetes mellitus, limited to breakdown of skin (HCC)   slough at the base of the ulcer. Concerned about soft tissue infection, culture taken. Levofloxacin and Augmentin started      Return in about 2 weeks (around 4/7/2022). 40 minutes was spent in the encounter; more than 50% of the face-to-face time was spent with the patient providing counseling and coordination of care. History: Martina Collins is a 74 y.o.  male presenting today for left foot middle toe ulcer. He has a medical history of type 2 diabetes mellitus, coronary artery disease, Charcot arthropathy, hammertoes. Patient reports that he never fractured his left foot third toe about 6 months ago. He also reports that he has had recurrent ulcers on that toe. He has previously healed, however he reported that he went golfing and the ulcer opened up again. He was seen at a hospital in [place]. There he was treated with daptomycin as Rocephin for about 4 weeks.   He cannot recall any microbe that was isolated. At present, he has a pacemaker that is 2 for battery change within the month. Dr. Brittany Cruz would like the left third toe diabetic ulcer to be treated prior to the pacemaker change (to minimize the risk of infection). At present, the patient denies fever, chills, nausea or vomiting.  3/24/2022: Patient has seen Dr. Sung Cates, reports that he recommends an amputation of his left foot third toe. Also, it was noted that an ulcer had developed on the plantar surface of his right second toe. He denies fever, chills, nausea or vomiting. He is presently taking cefdinir and doxycycline. Review of Systems   All other systems reviewed and are negative.       Allergies   Allergen Reactions    Spironolactone      CAUSES INCREASED K+    Tape Adamaris Junior Tape] Rash     SURGICAL TAPE       Patient Active Problem List   Diagnosis    Type 2 diabetes mellitus without complication, without long-term current use of insulin (HCC)    Critical lower limb ischemia (HCC)    Venous hypertension, chronic, with ulcer ([address]    Ulcer of other part of foot    Hospital-acquired pneumonia    [facility] fibrillation ([facility]    Sinus pause    PAF (paroxysmal atrial fibrillation) ([facility])    DM (diabetes mellitus) ([address]    DMITRIY on CPAP    Hyperlipidemia    Status post incision and drainage    Hyperkalemia    Arthritis    PVD (peripheral vascular disease) ([facility])    Hematoma    Cardiac pacemaker in situ    ASCVD (arteriosclerotic cardiovascular disease)    Essential hypertension    Gout    Diabetic neuropathy associated with type 2 diabetes mellitus (HCC)    Adenomatous polyp of sigmoid colon    Iron deficiency anemia due to chronic blood loss    Erythropoietin deficiency anemia    VHD (valvular heart disease)    Abnormal fractional flow reserve (FFR) on cardiac catheterization    Carotid stenosis, left    Aortic stenosis, severe    Atherosclerotic heart disease of native coronary artery with other forms of angina pectoris ([facility]    Displacement of atrial pacemaker leads    Pacemaker lead malfunction    SOB (shortness of breath)    Recurrent right pleural effusion    Elevated liver enzymes    Critical illness myopathy    Respiratory failure (HCC)    Generalized weakness    Status post coronary artery bypass graft    Pneumonia due to COVID-19 virus    Moderate malnutrition (HCC)    Stage 3a chronic kidney disease ([facility]    WD-Diabetic ulcer of left foot with fat layer exposed ([facility]    WD-Non-pressure ulcer of right lower extremity with fat layer exposed ([facility]    Pulmonary hypertension, unspecified ([facility]    Persistent proteinuria    Diabetic ulcer of toe of left foot associated with type 2 diabetes mellitus, with muscle involvement without evidence of necrosis ([facility]    Diabetic ulcer of toe of right foot associated with type 2 diabetes mellitus, limited to breakdown of skin ([facility])       Current Outpatient Medications   Medication Sig Dispense Refill    levoFLOXacin (LEVAQUIN) 750 MG tablet Take 1 tablet by mouth every 48 hours for 14 days 7 tablet 0    amoxicillin-clavulanate (AUGMENTIN) 875-125 MG per tablet Take 1 tablet by mouth 2 times daily for 14 days 28 tablet 0    Probiotic Acidophilus (FLORANEX) TABS Take 1 tablet by mouth 2 times daily 60 tablet 0    simvastatin (ZOCOR) 20 MG tablet Take 1 tablet by mouth daily 90 tablet 3    apixaban (ELIQUIS) 5 MG TABS tablet Take 1 tablet by mouth 2 times daily 180 tablet 3    Dulaglutide 3 MG/0.5ML SOPN Inject 3 mg into the skin once a week 12 pen 0    gabapentin (NEURONTIN) 300 MG capsule TAKE 1 CAPSULE 3 TIMES A    capsule 0    torsemide (DEMADEX) 20 MG tablet Take 1 tablet by mouth 2 times daily 180 tablet 3    apixaban (ELIQUIS) 5 MG TABS tablet Take 1 tablet by mouth 2 times daily 28 tablet 0    canagliflozin (INVOKANA) 300 MG TABS tablet Take 1 tablet by mouth every morning (before breakfast) 90 tablet 1    glimepiride (AMARYL) 4 MG tablet Take 1 tablet by mouth every morning (before breakfast) 90 tablet 1    sildenafil (VIAGRA) 100 MG tablet TAKE 1 TABLET DAILY AS     NEEDED FOR ERECTILE        DYSFUNCTION 30 tablet 1    carboxymethylcellulose (REFRESH PLUS) 0.5 % SOLN ophthalmic solution INSTILL 1 DROP IN BOTH EYES THREE TIMES A DAY      Cholecalciferol (VITAMIN D) 50 MCG (2000 UT) CAPS capsule Take by mouth nightly       aspirin 81 MG tablet Take 81 mg by mouth daily       No current facility-administered medications for this visit. Past Medical History:   Diagnosis Date    Arrhythmia     Pacemaker placed aprox 5 years ago for A Fib per patient    Arthritis 12/2013    rt wrist    Atrial fibrillation ([facility]     on Xarelto - Dr. Mahogany Garcia CAD (coronary artery disease) 06/18/2014    see dr Jessica Glover kidney disease, stage III (moderate) ([facility] 07/07/2016    Critical illness myopathy 03/15/2021    Diabetes mellitus ([facility]     dx 2004    Diabetic neuropathy associated with type 2 diabetes mellitus ([facility] 04/23/2019    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    Gout 04/2019    \"got gout when had pacer put in because they did not give me my medication for gout \"    H/O 24 hour EKG monitoring 10/03/2013    no afib noted, sinsus rhythm    H/O cardiovascular stress test 05/12/2014    cardiolite- mild ischemia RCA EF50%    H/O echocardiogram 12/01/2020    EF 55-60% severe aortic stenosis mild to mod aortic regurg mod to severe tricuspid regurg severe pulm htn significant changes since 2018 echo.  H/O right and left heart catheterization 12/10/2020    DIFFUSE LAD DISEASE, Mild ECA Disease, Severe AS, Milf Pul HTN on RHC.     H/O transesophageal echocardiography (MABLE) for monitoring 08/05/2013    normal LV function and normal LA appendage without any clot    History of blood transfusion 12/2020    d/t anemia    History of transesophageal echocardiography (MABLE) 12/15/2020    Severe aortic stenosis (ANNA by planimetry: 0.778 cm sq). Mild AR.    [place] (hard of hearing)     hearing tonya aides    Hx of Doppler echocardiogram 05/21/2018    EF 50%  Mild LV hypertrophy. Mildly enlarged RA. Mod aortic valve calcification with mod AS. Mitral annular calcification is present. Mild AR, MR and TR. Mild pulmonary htn.     Hyperlipidemia     Hypertension     Follows with PCP & Dr. Franchesca Rachel Other disorders of kidney and ureter     Pacemaker     [employer]tronic, implanted 2014    Pneumonia 12/29/2012    Pneumonia due to COVID-19 virus 08/2020    Sleep apnea     dx 2013- has c-pap    Type II or unspecified type diabetes mellitus with other specified manifestations, uncontrolled 12/12/2012    Venous hypertension, chronic, with ulcer ([address] 12/12/2012    resolved       Past Surgical History:   Procedure Laterality Date    CABG WITH AORTIC VALVE REPLACEMENT N/A 2/16/2021    CABG CORONARY ARTERY BYPASS X2 WITH LIMA, AORTIC VALVE REPLACEMENT AND AORTIC ROOT REPAIR, INTRAOPERATIVE MABLE, INDUCED HYPOTHERMIA, LEFT LEG ENDOVEIN HARVEST, LEFT ATRIAL CLIP, AND CRYO PROCEDURE performed by Allen Armando MD at [facility]  12/14    at [facility] Left 1/29/2021    LEFT CAROTID ENDARTERECTOMY performed by Baldomero Looney MD at [facility]  2017    COLONOSCOPY  2011    COLONOSCOPY N/A 11/19/2019    COLONOSCOPY DIAGNOSTIC performed by Cody Mtz MD at [facility]  09/30/2020    POSSIBLE CECAL avms, SIGMOID DIVERTICULOSIS, INTERNAL HEMORRHOIDS GRADE 1    COLONOSCOPY N/A 9/30/2020    COLONOSCOPY CONTROL HEMORRHAGE WITH APC performed by Cody Mtz MD at [facility]  2014    \"2 stents put in \"    IR NONTUNNELED VASCULAR CATHETER  3/5/2021    IR NONTUNNELED VASCULAR CATHETER 3/5/2021 [facility] SPECIAL PROCEDURES    JOINT REPLACEMENT  2004    total left hip    OTHER SURGICAL HISTORY Right 12/02/2017    I&D; evacuation of hematoma right hip    OTHER SURGICAL HISTORY  09/17/2020    enteroscopy    PACEMAKER INSERTION N/A 2/23/2021    PACEMAKER GENERATOR LEAD REVISION performed by Kimberley Milan MD at [facility]      9/18/14 Status post remote permanent pacemaker with atrial lead dislodgement. 7/24/14 PPM Implant    UPPER GASTROINTESTINAL ENDOSCOPY N/A 9/17/2020    ENTEROSCOPY PUSH BIOPSY performed by Yuri Blanco MD at [place] [address] N/A 3/4/2021    EGD DIAGNOSTIC ONLY performed by Yuri Blanco MD at [address] [place] [address]  2012    \"have stents in both legs- done in [address] History     Socioeconomic History    Marital status:      Spouse name: Not on file    Number of children: Not on file    Years of education: Not on file    Highest education level: Not on file   Occupational History    Not on file   Tobacco Use    Smoking status: Never Smoker    Smokeless tobacco: Never Used   Vaping Use    Vaping Use: Never used   Substance and Sexual Activity    Alcohol use: Yes     Alcohol/week: 2.0 standard drinks     Types: 2 Cans of beer per week     Comment: average \"one time per week\"/ Caffiene: 1 cup of coffee daily    Drug use: No    Sexual activity: Yes     Partners: Female     Comment:    Other Topics Concern    Not on file   Social History Narrative    Not on file     Social Determinants of Health     Financial Resource Strain: Low Risk     Difficulty of Paying Living Expenses: Not hard at all   Food Insecurity: No Food Insecurity    Worried About Running Out of Food in the Last Year: Never true    Kiko of Food in the Last Year: Never true   Transportation Needs:     Lack of Transportation (Medical): Not on file    Lack of Transportation (Non-Medical):  Not on file   Physical Activity:     Days of Exercise per Week: Not on file    Minutes of Exercise per Session: Not on file   Stress:     Feeling of Stress : Not on file   Social Connections:     Frequency of Communication with Friends and Family: Not on file    Frequency of Social Gatherings with Friends and Family: Not on file    Attends Quaker Services: Not on file    Active Member of Clubs or Organizations: Not on file    Attends Club or Organization Meetings: Not on file    Marital Status: Not on file   Intimate Partner Violence:     Fear of Current or Ex-Partner: Not on file    Emotionally Abused: Not on file    Physically Abused: Not on file    Sexually Abused: Not on file   Housing Stability:     Unable to Pay for Housing in the Last Year: Not on file    Number of [place] in the Last Year: Not on file    Unstable Housing in the Last Year: Not on file       Family History   Problem Relation Age of Onset    High Blood Pressure Mother     Arthritis Mother     Diabetes Mother     Heart Disease Mother     High Blood Pressure Father     Heart Disease Father     Kidney Disease Father        Vital Signs:  Vitals:    03/24/22 1006   BP: (!) 139/41   Site: Left Upper Arm   Position: Sitting   Cuff Size: Medium Adult   Pulse: 70   Resp: 18   Temp: 97.2 °F (36.2 °C)   TempSrc: Infrared   Weight: 206 lb 6.4 oz (93.6 kg)        Wt Readings from Last 3 Encounters:   03/24/22 206 lb 6.4 oz (93.6 kg)   03/21/22 199 lb (90.3 kg)   03/17/22 200 lb (90.7 kg)        Physical Exam:   Gen: alert and NAD  HEENT: sclera clear, pupils equal and reactive, extra ocular muscles intact, oropharynx clear, mucus membranes moist, tympanic membranes clear bilaterally, no cervical lymphadenopathy noted and neck supple  Neck: supple, no significant adenopathy  Chest: clear to auscultation, no wheezes, rales or rhonchi, symmetric air entry  Heart: regular rate and rhythm, no murmurs  ABD: abdomen is soft without significant tenderness, masses, organomegaly or guarding.   EXT:peripheral pulses normal, no pedal edema, no clubbing or cyanosis  NEURO: alert, oriented, normal speech, no focal findings or movement disorder noted  Skin: well hydrated, no lesions, surgical site examined  Wounds: Left third toe dorsum also, surrounding swelling, no discharge. Right foot second toe plantar surface ulcer, slough at the base. Labs:   WBC   Date Value Ref Range Status   03/10/2022 7.3 4.0 - 10.5 K/CU MM Final   06/01/2021 6.1 4.0 - 10.5 K/CU MM Final   03/30/2021 4.5 4.0 - 10.5 K/CU MM Final     CREATININE   Date Value Ref Range Status   03/10/2022 1.9 (H) 0.9 - 1.3 MG/DL Final   12/20/2021 1.2 0.9 - 1.3 MG/DL Final   12/09/2021 1.3 0.5 - 1.4 MG/DL Final       Cultures:  Culture   Date Value Ref Range Status   03/17/2022 Final Report  Final   03/17/2022 (A)  Final    STAPHYLOCOCCUS EPIDERMIDIS Light growth No further workup   03/17/2022 (A)  Final    PREVOTELLA BIVIA Light growth Beta Lactamase POSITIVE. Sensitivities not routinely done.  Drugs of choice are: Metronidazole, Cefoxitin, or Piperacillin/Tazobactam.       Imaging Studies:         Electronicallysigned by Sammy Gordon MD on 3/16/22 at 10:06 PM EDT

## 2022-03-29 ENCOUNTER — HOSPITAL ENCOUNTER (OUTPATIENT)
Dept: NUCLEAR MEDICINE | Age: 72
Discharge: HOME | End: 2022-03-29
Payer: MEDICARE

## 2022-03-29 DIAGNOSIS — L97.512 RIGHT FOOT ULCER, WITH FAT LAYER EXPOSED (HCC): ICD-10-CM

## 2022-03-29 LAB
CULTURE: ABNORMAL
Lab: ABNORMAL
SPECIMEN: ABNORMAL

## 2022-03-29 PROCEDURE — 78315 BONE IMAGING 3 PHASE: CPT

## 2022-03-29 PROCEDURE — A9503 TC99M MEDRONATE: HCPCS | Performed by: STUDENT IN AN ORGANIZED HEALTH CARE EDUCATION/TRAINING PROGRAM

## 2022-03-29 PROCEDURE — 3430000000 HC RX DIAGNOSTIC RADIOPHARMACEUTICAL: Performed by: STUDENT IN AN ORGANIZED HEALTH CARE EDUCATION/TRAINING PROGRAM

## 2022-03-29 RX ORDER — TC 99M MEDRONATE 20 MG/10ML
28.4 INJECTION, POWDER, LYOPHILIZED, FOR SOLUTION INTRAVENOUS
Status: COMPLETED | OUTPATIENT
Start: 2022-03-29 | End: 2022-03-29

## 2022-03-29 RX ADMIN — TC 99M MEDRONATE 28.4 MILLICURIE: 20 INJECTION, POWDER, LYOPHILIZED, FOR SOLUTION INTRAVENOUS at 08:50

## 2022-03-29 NOTE — PROGRESS NOTES
Surgery is at [facility] on 3/31/22 . You will be called on 3/30/22  with times. 1. Do not eat or drink anything after midnight - unless instructed by your doctor prior to surgery. This includes no water, chewing gum or mints. 2. Follow your directions as prescribed by the doctor for your procedure and medications. 3. Check with your Doctor regarding stopping vitamins, supplements, blood thinners (Plavix, Coumadin, Lovenox, Effient, Pradaxa, Xarelto, Fragmin or other blood thinners) and follow their instructions. Stop all supplements and herbals until after surgery. Viagra - must be held a minimum of 2 day  prior to surgery. Eliquis and Asprin - start holding on 3/30/22. Morning of surgery take gabapentin with a sip of water. 4. Do not smoke, and do not drink any alcoholic beverages 24 hours prior to surgery. This includes NA Beer. 5. You may brush your teeth and gargle the morning of surgery. DO NOT SWALLOW WATER   6. You MUST make arrangements for a responsible adult to take you home after your surgery and be able to check on you every couple hours for the day. You will not be allowed to leave alone or drive yourself home. It is strongly suggested someone stay with you the first 24  hrs. Your surgery will be cancelled if you do not have a ride home. 7. Please wear simple, loose fitting clothing to the hospital.  [place] not bring valuables (money, credit cards, checkbooks, etc.) Do not wear any makeup (including no eye makeup) or nail polish on your fingers or toes. 8. DO NOT wear any jewelry or piercings on day of surgery. All body piercing jewelry must be removed. 9. If you have dentures, they will be removed before going to the OR; we will provide you a container. If you wear contact lenses or glasses,  they will be removed; please bring a case for them.            10. If you  have a Living Will and Durable Power of  for Healthcare, please bring in a copy. 11. Please bring picture ID,  insurance card, paperwork from the doctors office    (H & P, Consent, & card for implantable devices). 12. Take a shower the night before or morning of your procedure, do not apply any lotion, oil or powder. It is recommended to use Hibiclens or CHG wash during pre-op shower/bath.            13. Wear a mask covering your nose & mouth when entering the hospital.

## 2022-03-29 NOTE — PROGRESS NOTES
3/29/22  1263 I spoke to Mac Linares, the  and let her know that Dr. Tomasz Leahy had put on his \"Procedure Scheduling Request\" sheet Cold Pack and 2 g IV Ancef. I ask her to let Dr. Tomasz Leahy know that I can not put those orders in Epic because they are not written as orders. If Dr. Tomasz Leahy would like those put in by [facility] RN then he has to send us an official order or they can be put in morning of surgery if he writes the order for the  Jesus Rose RN, or last option is if he has access to Epic he can place the orders. Charmayne Nones  ask me to please send this request via fax ATTN: Dr. Tomasz Leahy per action 3/30/22. I sent the fax to Dr. Carmen Bailey office at 1.

## 2022-03-29 NOTE — PROGRESS NOTES
Patient has surgery on 3/31/22 and was not told anything in regard to taking or holding his Eliquis and aspirin for surgery. 3/29/22 0147 I spoke to Dr. Jessica Montelongo via phone and informed him of patients cardiac history, takes Eliquis and and aspirin, and surgery is under [facility] and ask what does he want done. Dr. Jessica Montelongo stated, \"it is up to the surgery to decide if he wants to hold or continue the patient eliquis and if he wants to perform surgery without eliquis being held. Dr. Jessica Montelongo told me to call Dr. Garth morelos to confirm what he wants. 3/29/22 1356 I called Dr. Garth Hunter office and spoke to [facility] and informed her of the situation. She said Dr. Mao Hawkins is out today, but she would reach out to the other physicians in the office and get back to me. I gave her my number. 3/29/22 1400 Aniyah Amezquita called me back and said she spoke to Dr. Hannah Velazquez and patient is to stop aspirin today and stop Eliquis tomorrow 3/30/22    3/29/22 1402 I called patient back and informed him that I spoke to Aniyah Amezquita in Dr. Indira White office and according to  Dr. Harini Welch said for patient is to hold Eliquis and aspirin starting 3/30/22.

## 2022-03-30 ENCOUNTER — ANESTHESIA EVENT (OUTPATIENT)
Dept: OPERATING ROOM | Age: 72
End: 2022-03-30
Payer: MEDICARE

## 2022-03-30 ASSESSMENT — ENCOUNTER SYMPTOMS: SHORTNESS OF BREATH: 1

## 2022-03-30 NOTE — PROGRESS NOTES
Spoke with patient and he will arrive at 0600 at [facility] on 3/31/2022 for his procedure at 0730.

## 2022-03-30 NOTE — ANESTHESIA PRE PROCEDURE
Department of Anesthesiology  Preprocedure Note       Name:  William Steven   Age:  74 y.o.  :  1950                                          MRN:  [id number]         Date:  3/30/2022      Surgeon: Timoteo Wong):  Miguel Portillo DPM    Procedure: Procedure(s):  LEFT 3RD  AND RIGHT 2ND TOES AMPUTATION  BILATERAL DEBRIEDMENT OF NON-VIABLE TISSUE & BONE    Medications prior to admission:   Prior to Admission medications    Medication Sig Start Date End Date Taking? Authorizing Provider   levoFLOXacin (LEVAQUIN) 750 MG tablet Take 1 tablet by mouth every 48 hours for 14 days 3/24/22 4/7/22  Erick Jaimes MD   amoxicillin-clavulanate (AUGMENTIN) 265-258 MG per tablet Take 1 tablet by mouth 2 times daily for 14 days 3/24/22 4/7/22  Erick Jaimes MD   Probiotic Acidophilus [facility]) TABS Take 1 tablet by mouth 2 times daily 3/24/22 4/23/22  Erick Jaimes MD   simvastatin (ZOCOR) 20 MG tablet Take 1 tablet by mouth daily 3/21/22 6/19/22  JUSTIN Paula CNP   apixaban (ELIQUIS) 5 MG TABS tablet Take 1 tablet by mouth 2 times daily 3/21/22   JUSTIN Paula CNP   Dulaglutide 3 MG/0.5ML SOPN Inject 3 mg into the skin once a week  Patient taking differently: Inject 3 mg into the skin once a week .  3/17/22 6/15/22  SATISH Chapin   gabapentin (NEURONTIN) 300 MG capsule TAKE 1 CAPSULE 3 TIMES A   DAY 3/17/22 6/17/22  SATISH Chapin   torsemide (DEMADEX) 20 MG tablet Take 1 tablet by mouth 2 times daily 21   Silvia Bills MD   apixaban (ELIQUIS) 5 MG TABS tablet Take 1 tablet by mouth 2 times daily 21   JUSTIN Paula CNP   canagliflozin (INVOKANA) 300 MG TABS tablet Take 1 tablet by mouth every morning (before breakfast) 21   Kaleb Mitchell DO   glimepiride (AMARYL) 4 MG tablet Take 1 tablet by mouth every morning (before breakfast) 21  Kaleb Mitchell,    sildenafil (VIAGRA) 100 MG tablet TAKE 1 TABLET DAILY AS     NEEDED FOR ERECTILE DYSFUNCTION 11/5/21   Kaleb Mitchell, DO   carboxymethylcellulose (REFRESH PLUS) 0.5 % SOLN ophthalmic solution as needed  12/16/20   Historical Provider, MD   Cholecalciferol (VITAMIN D) 50 MCG (2000 UT) CAPS capsule Take by mouth nightly     Historical Provider, MD   aspirin 81 MG tablet Take 81 mg by mouth daily    Historical Provider, MD       Current medications:    No current facility-administered medications for this encounter.      Current Outpatient Medications   Medication Sig Dispense Refill    levoFLOXacin (LEVAQUIN) 750 MG tablet Take 1 tablet by mouth every 48 hours for 14 days 7 tablet 0    amoxicillin-clavulanate (AUGMENTIN) 875-125 MG per tablet Take 1 tablet by mouth 2 times daily for 14 days 28 tablet 0    Probiotic Acidophilus (FLORANEX) TABS Take 1 tablet by mouth 2 times daily 60 tablet 0    simvastatin (ZOCOR) 20 MG tablet Take 1 tablet by mouth daily 90 tablet 3    apixaban (ELIQUIS) 5 MG TABS tablet Take 1 tablet by mouth 2 times daily 180 tablet 3    Dulaglutide 3 MG/0.5ML SOPN Inject 3 mg into the skin once a week (Patient taking differently: Inject 3 mg into the skin once a week Fridays.) 12 pen 0    gabapentin (NEURONTIN) 300 MG capsule TAKE 1 CAPSULE 3 TIMES A    capsule 0    torsemide (DEMADEX) 20 MG tablet Take 1 tablet by mouth 2 times daily 180 tablet 3    apixaban (ELIQUIS) 5 MG TABS tablet Take 1 tablet by mouth 2 times daily 28 tablet 0    canagliflozin (INVOKANA) 300 MG TABS tablet Take 1 tablet by mouth every morning (before breakfast) 90 tablet 1    glimepiride (AMARYL) 4 MG tablet Take 1 tablet by mouth every morning (before breakfast) 90 tablet 1    sildenafil (VIAGRA) 100 MG tablet TAKE 1 TABLET DAILY AS     NEEDED FOR ERECTILE        DYSFUNCTION 30 tablet 1    carboxymethylcellulose (REFRESH PLUS) 0.5 % SOLN ophthalmic solution as needed       Cholecalciferol (VITAMIN D) 50 MCG (2000 UT) CAPS capsule Take by mouth nightly       aspirin 81 MG tablet Take 81 mg by mouth daily         Allergies: Allergies   Allergen Reactions    Spironolactone      CAUSES INCREASED K+    Tape Burnard Leas Tape] Rash     SURGICAL TAPE       Problem List:    Patient Active Problem List   Diagnosis Code    Type 2 diabetes mellitus without complication, without long-term current use of insulin ([facility]) E11.9    Critical lower limb ischemia ([facility]) I70.229    Venous hypertension, chronic, with ulcer ([facility] I87.319, L97.909    Ulcer of other part of foot L97.509    Hospital-acquired pneumonia J18.9, Y95    Atrial fibrillation ([facility]) I48.91    Sinus pause I45.5    PAF (paroxysmal atrial fibrillation) ([facility]) I48.0    DM (diabetes mellitus) ([facility] E11.9    DMITRIY on CPAP G47.33, Z99.89    Hyperlipidemia E78.5    Status post incision and drainage Z98.890    Hyperkalemia E87.5    Arthritis M19.90    PVD (peripheral vascular disease) ([facility]) I73.9    Hematoma T14. 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    VHD (valvular heart disease) I38    Abnormal fractional flow reserve (FFR) on cardiac catheterization R94.39    Carotid stenosis, left I65.22    Aortic stenosis, severe I35.0    Atherosclerotic heart disease of native coronary artery with other forms of angina pectoris ([facility]) I25.118    Displacement of atrial pacemaker leads T82.120A    Pacemaker lead malfunction T82.110A    SOB (shortness of breath) R06.02    Recurrent right pleural effusion J90    Elevated liver enzymes R74.8    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    Respiratory failure ([facility]) J96.90    Generalized weakness R53.1    Status post coronary artery bypass graft Z95.1    Pneumonia due to COVID-19 virus U07.1, J12.82    Moderate malnutrition (HCC) E44.0    Stage 3a chronic kidney disease (HCC) N18.31    WD-Diabetic ulcer of left foot with fat layer exposed ([address] E11.621, L97.522    WD-Non-pressure ulcer of right lower extremity with fat layer exposed ([address] L97.912    Pulmonary hypertension, unspecified (HCC) I27.20    Persistent proteinuria R80.1    Diabetic ulcer of toe of left foot associated with type 2 diabetes mellitus, with muscle involvement without evidence of necrosis ([address] E11.621, L97.525    Diabetic ulcer of toe of right foot associated with type 2 diabetes mellitus, limited to breakdown of skin ([address] E11.621, L97.511       Past Medical History:        Diagnosis Date    Arrhythmia     Pacemaker placed aprox 5 years ago for A Fib per patient    Arthritis 12/2013    rt wrist    Atrial fibrillation (HCC)     on Xarelto - Dr. Quintana Roe CAD (coronary artery disease) 06/18/2014    see dr Agnieszka Morales kidney disease, stage III (moderate) ([address] 07/07/2016    Critical illness myopathy 03/15/2021    Diabetes mellitus ([facility]     dx 2004    Diabetic neuropathy associated with type 2 diabetes mellitus ([facility] 04/23/2019    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    Gout 04/2019    \"got gout when had pacer put in because they did not give me my medication for gout \"    H/O 24 hour EKG monitoring 10/03/2013    no afib noted, sinsus rhythm    H/O cardiovascular stress test 05/12/2014    cardiolite- mild ischemia RCA EF50%    H/O echocardiogram 12/01/2020    EF 55-60% severe aortic stenosis mild to mod aortic regurg mod to severe tricuspid regurg severe pulm htn significant changes since 2018 echo.  H/O right and left heart catheterization 12/10/2020    DIFFUSE LAD DISEASE, Mild ECA Disease, Severe AS, Milf Pul HTN on RHC.     H/O transesophageal echocardiography (MABLE) for monitoring 08/05/2013    normal LV function and normal LA appendage without any clot    History of blood transfusion 12/2020    d/t anemia    History of transesophageal echocardiography (MABLE) 12/15/2020    Severe aortic stenosis (ANNA by planimetry: 0.778 cm sq). Mild AR.    [place] (hard of hearing)     hearing tonya aides    Hx of Doppler echocardiogram 05/21/2018    EF 50%  Mild LV hypertrophy. Mildly enlarged RA. Mod aortic valve calcification with mod AS. Mitral annular calcification is present. Mild AR, MR and TR. Mild pulmonary htn.     Hyperlipidemia     Hypertension     Follows with PCP & Dr. Darian Shaw Other disorders of kidney and ureter     Pacemaker     [employer], implanted 2014    Pneumonia 12/29/2012    Pneumonia due to COVID-19 virus 08/2020    Sleep apnea     dx 2013- has c-pap    Type II or unspecified type diabetes mellitus with other specified manifestations, uncontrolled 12/12/2012    Venous hypertension, chronic, with ulcer ([address] 12/12/2012    resolved       Past Surgical History:        Procedure Laterality Date    CABG WITH AORTIC VALVE REPLACEMENT N/A 2/16/2021    CABG CORONARY ARTERY BYPASS X2 WITH LIMA, AORTIC VALVE REPLACEMENT AND AORTIC ROOT REPAIR, INTRAOPERATIVE MABLE, INDUCED HYPOTHERMIA, LEFT LEG ENDOVEIN HARVEST, LEFT ATRIAL CLIP, AND CRYO PROCEDURE performed by Jessica Goldstein MD at [address] [place] [address]  12/14    at [facility] Left 1/29/2021    LEFT CAROTID ENDARTERECTOMY performed by Spencer Alexander MD at [address] [place] [address]  2017    COLONOSCOPY  2011    COLONOSCOPY N/A 11/19/2019    COLONOSCOPY DIAGNOSTIC performed by Sanjeev Pereira MD at [facility]  09/30/2020    POSSIBLE CECAL avms, SIGMOID DIVERTICULOSIS, INTERNAL HEMORRHOIDS GRADE 1    COLONOSCOPY N/A 9/30/2020    COLONOSCOPY CONTROL HEMORRHAGE WITH APC performed by Sanjeev Pereira MD at [facility]  2014    \"2 stents put in \"    IR NONTUNNELED VASCULAR CATHETER  3/5/2021    IR NONTUNNELED VASCULAR CATHETER 3/5/2021 [facility] SPECIAL PROCEDURES    JOINT REPLACEMENT  2004    total left hip    OTHER SURGICAL HISTORY Right 12/02/2017    I&D; evacuation of hematoma right hip    OTHER SURGICAL HISTORY  09/17/2020    enteroscopy    PACEMAKER INSERTION N/A 2/23/2021    PACEMAKER GENERATOR LEAD REVISION performed by Damian Cobb MD at [address] [address]      9/18/14 Status post remote permanent pacemaker with atrial lead dislodgement. 7/24/14 PPM Implant    UPPER GASTROINTESTINAL ENDOSCOPY N/A 9/17/2020    ENTEROSCOPY PUSH BIOPSY performed by Ayaka Quach MD at [facility] N/A 3/4/2021    EGD DIAGNOSTIC ONLY performed by Ayaka Quach MD at [address] [place] [address] Road  2012    \"have stents in both legs- done in [address] [facility] [address] History:    Social History     Tobacco Use    Smoking status: Never Smoker    Smokeless tobacco: Never Used   Substance Use Topics    Alcohol use: Not Currently     Alcohol/week: 2.0 standard drinks     Types: 2 Cans of beer per week     Comment: average \"one time per week\"/ Caffiene: 1 cup of coffee daily                                Counseling given: Not Answered      Vital Signs (Current):   Vitals:    03/29/22 1332   Weight: 194 lb (88 kg)   Height: 5' 11\" (1.803 m)                                              BP Readings from Last 3 Encounters:   03/24/22 (!) 139/41   03/21/22 138/72   03/17/22 (!) 148/74       NPO Status:                                                                                 BMI:   Wt Readings from Last 3 Encounters:   03/24/22 206 lb 6.4 oz (93.6 kg)   03/21/22 199 lb (90.3 kg)   03/17/22 200 lb (90.7 kg)     Body mass index is 27.06 kg/m².     CBC:   Lab Results   Component Value Date    WBC 7.3 03/10/2022    RBC 5.69 03/10/2022    HGB 13.6 03/10/2022    HCT 46.8 03/10/2022    MCV 82.2 03/10/2022    RDW 17.1 03/10/2022     03/10/2022       CMP:   Lab Results   Component Value Date     03/10/2022    K 4.2 03/10/2022    CL 96 03/10/2022    CO2 27 03/10/2022    BUN 39 03/10/2022    CREATININE 1.9 03/10/2022    GFRAA 42 03/10/2022    AGRATIO 1.8 11/24/2020    LABGLOM 35 03/10/2022    LABGLOM 51 06/15/2016    GLUCOSE 150 03/10/2022    PROT 6.2 03/14/2021    PROT 7.3 12/27/2012    CALCIUM 9.4 03/10/2022    BILITOT 0.5 03/14/2021    ALKPHOS 303 03/14/2021    AST 44 03/14/2021    ALT 53 03/14/2021       POC Tests: No results for input(s): POCGLU, POCNA, POCK, POCCL, POCBUN, POCHEMO, POCHCT in the last 72 hours. Coags:   Lab Results   Component Value Date    PROTIME 17.9 03/10/2022    INR 1.38 03/10/2022    APTT 40.6 03/10/2022       HCG (If Applicable): No results found for: PREGTESTUR, PREGSERUM, HCG, HCGQUANT     ABGs:   Lab Results   Component Value Date    PO2ART 71 03/05/2021    NZO0VBG 32.0 03/05/2021    TEI2GWV 23.3 03/05/2021        Type & Screen (If Applicable):  No results found for: LABABO, LABRH    Drug/Infectious Status (If Applicable):  No results found for: HIV, HEPCAB    COVID-19 Screening (If Applicable):   Lab Results   Component Value Date    COVID19 NOT DETECTED 03/10/2022           Anesthesia Evaluation    Airway: Mallampati: I        Dental: normal exam         Pulmonary:normal exam    (+) pneumonia:  shortness of breath:  sleep apnea: on CPAP,                             Cardiovascular:    (+) hypertension:, valvular problems/murmurs: AS, MR and AI, angina:, pacemaker: pacemaker, CAD:, CABG/stent:, dysrhythmias: atrial fibrillation, pulmonary hypertension: mild, hyperlipidemia        Rhythm: regular  Rate: normal                    Neuro/Psych:               GI/Hepatic/Renal:   (+) renal disease: CRI,           Endo/Other:    (+) DiabetesType II DM, poorly controlled, , blood dyscrasia: anticoagulation therapy and anemia, arthritis: OA., electrolyte abnormalities, . Abdominal:             Vascular:           Other Findings:           Anesthesia Plan      general, MAC and regional     ASA 4       Induction: intravenous. Anesthetic plan and risks discussed with patient. Plan discussed with CRNA. Pre Anesthesia Assessment complete.  Chart reviewed on 3/30/2022        JUSTIN Vivas - CRNA   3/30/2022

## 2022-03-31 ENCOUNTER — HOSPITAL ENCOUNTER (OUTPATIENT)
Age: 72
Setting detail: OUTPATIENT SURGERY
Discharge: HOME | End: 2022-03-31
Attending: STUDENT IN AN ORGANIZED HEALTH CARE EDUCATION/TRAINING PROGRAM | Admitting: STUDENT IN AN ORGANIZED HEALTH CARE EDUCATION/TRAINING PROGRAM
Payer: MEDICARE

## 2022-03-31 ENCOUNTER — ANESTHESIA (OUTPATIENT)
Dept: OPERATING ROOM | Age: 72
End: 2022-03-31
Payer: MEDICARE

## 2022-03-31 VITALS
TEMPERATURE: 97 F | HEIGHT: 71 IN | WEIGHT: 194 LBS | SYSTOLIC BLOOD PRESSURE: 123 MMHG | DIASTOLIC BLOOD PRESSURE: 75 MMHG | OXYGEN SATURATION: 95 % | RESPIRATION RATE: 16 BRPM | BODY MASS INDEX: 27.16 KG/M2 | HEART RATE: 60 BPM

## 2022-03-31 VITALS
OXYGEN SATURATION: 98 % | SYSTOLIC BLOOD PRESSURE: 103 MMHG | RESPIRATION RATE: 18 BRPM | DIASTOLIC BLOOD PRESSURE: 54 MMHG

## 2022-03-31 DIAGNOSIS — M86.172 ACUTE OSTEOMYELITIS OF LEFT ANKLE OR FOOT (HCC): ICD-10-CM

## 2022-03-31 DIAGNOSIS — Z01.818 ENCOUNTER FOR PREADMISSION TESTING: ICD-10-CM

## 2022-03-31 DIAGNOSIS — L97.522 DIABETIC ULCER OF TOE OF LEFT FOOT ASSOCIATED WITH TYPE 2 DIABETES MELLITUS, WITH FAT LAYER EXPOSED (HCC): Primary | ICD-10-CM

## 2022-03-31 DIAGNOSIS — N18.31 STAGE 3A CHRONIC KIDNEY DISEASE (HCC): ICD-10-CM

## 2022-03-31 DIAGNOSIS — L97.512 RIGHT FOOT ULCER, WITH FAT LAYER EXPOSED (HCC): ICD-10-CM

## 2022-03-31 DIAGNOSIS — E11.40 TYPE 2 DIABETES, CONTROLLED, WITH NEUROPATHY (HCC): ICD-10-CM

## 2022-03-31 DIAGNOSIS — E11.621 DIABETIC ULCER OF TOE OF LEFT FOOT ASSOCIATED WITH TYPE 2 DIABETES MELLITUS, WITH FAT LAYER EXPOSED (HCC): Primary | ICD-10-CM

## 2022-03-31 DIAGNOSIS — L97.525 DIABETIC ULCER OF TOE OF LEFT FOOT ASSOCIATED WITH TYPE 2 DIABETES MELLITUS, WITH MUSCLE INVOLVEMENT WITHOUT EVIDENCE OF NECROSIS (HCC): ICD-10-CM

## 2022-03-31 DIAGNOSIS — L97.522 ULCER OF LEFT FOOT, WITH FAT LAYER EXPOSED (HCC): ICD-10-CM

## 2022-03-31 DIAGNOSIS — E11.621 DIABETIC ULCER OF TOE OF LEFT FOOT ASSOCIATED WITH TYPE 2 DIABETES MELLITUS, WITH MUSCLE INVOLVEMENT WITHOUT EVIDENCE OF NECROSIS (HCC): ICD-10-CM

## 2022-03-31 LAB
GLUCOSE BLD-MCNC: 126 MG/DL (ref 70–99)
POTASSIUM SERPL-SCNC: 4.1 MMOL/L (ref 3.5–5.1)

## 2022-03-31 PROCEDURE — 7100000010 HC PHASE II RECOVERY - FIRST 15 MIN: Performed by: STUDENT IN AN ORGANIZED HEALTH CARE EDUCATION/TRAINING PROGRAM

## 2022-03-31 PROCEDURE — 6360000002 HC RX W HCPCS: Performed by: NURSE ANESTHETIST, CERTIFIED REGISTERED

## 2022-03-31 PROCEDURE — 88305 TISSUE EXAM BY PATHOLOGIST: CPT

## 2022-03-31 PROCEDURE — 87186 SC STD MICRODIL/AGAR DIL: CPT

## 2022-03-31 PROCEDURE — 87076 CULTURE ANAEROBE IDENT EACH: CPT

## 2022-03-31 PROCEDURE — 7100000011 HC PHASE II RECOVERY - ADDTL 15 MIN: Performed by: STUDENT IN AN ORGANIZED HEALTH CARE EDUCATION/TRAINING PROGRAM

## 2022-03-31 PROCEDURE — 88311 DECALCIFY TISSUE: CPT

## 2022-03-31 PROCEDURE — 87077 CULTURE AEROBIC IDENTIFY: CPT

## 2022-03-31 PROCEDURE — 82962 GLUCOSE BLOOD TEST: CPT

## 2022-03-31 PROCEDURE — 84132 ASSAY OF SERUM POTASSIUM: CPT

## 2022-03-31 PROCEDURE — 6360000002 HC RX W HCPCS: Performed by: STUDENT IN AN ORGANIZED HEALTH CARE EDUCATION/TRAINING PROGRAM

## 2022-03-31 PROCEDURE — 2709999900 HC NON-CHARGEABLE SUPPLY: Performed by: STUDENT IN AN ORGANIZED HEALTH CARE EDUCATION/TRAINING PROGRAM

## 2022-03-31 PROCEDURE — 87070 CULTURE OTHR SPECIMN AEROBIC: CPT

## 2022-03-31 PROCEDURE — 87205 SMEAR GRAM STAIN: CPT

## 2022-03-31 PROCEDURE — 3700000001 HC ADD 15 MINUTES (ANESTHESIA): Performed by: STUDENT IN AN ORGANIZED HEALTH CARE EDUCATION/TRAINING PROGRAM

## 2022-03-31 PROCEDURE — 87075 CULTR BACTERIA EXCEPT BLOOD: CPT

## 2022-03-31 PROCEDURE — 3600000002 HC SURGERY LEVEL 2 BASE: Performed by: STUDENT IN AN ORGANIZED HEALTH CARE EDUCATION/TRAINING PROGRAM

## 2022-03-31 PROCEDURE — 2720000010 HC SURG SUPPLY STERILE: Performed by: STUDENT IN AN ORGANIZED HEALTH CARE EDUCATION/TRAINING PROGRAM

## 2022-03-31 PROCEDURE — 2500000003 HC RX 250 WO HCPCS: Performed by: STUDENT IN AN ORGANIZED HEALTH CARE EDUCATION/TRAINING PROGRAM

## 2022-03-31 PROCEDURE — 2580000003 HC RX 258: Performed by: ANESTHESIOLOGY

## 2022-03-31 PROCEDURE — 3600000012 HC SURGERY LEVEL 2 ADDTL 15MIN: Performed by: STUDENT IN AN ORGANIZED HEALTH CARE EDUCATION/TRAINING PROGRAM

## 2022-03-31 PROCEDURE — 3700000000 HC ANESTHESIA ATTENDED CARE: Performed by: STUDENT IN AN ORGANIZED HEALTH CARE EDUCATION/TRAINING PROGRAM

## 2022-03-31 RX ORDER — HYDROCODONE BITARTRATE AND ACETAMINOPHEN 5; 325 MG/1; MG/1
1 TABLET ORAL EVERY 4 HOURS PRN
Qty: 18 TABLET | Refills: 0 | Status: SHIPPED | OUTPATIENT
Start: 2022-03-31 | End: 2022-04-03

## 2022-03-31 RX ORDER — SODIUM CHLORIDE, SODIUM LACTATE, POTASSIUM CHLORIDE, CALCIUM CHLORIDE 600; 310; 30; 20 MG/100ML; MG/100ML; MG/100ML; MG/100ML
INJECTION, SOLUTION INTRAVENOUS CONTINUOUS
Status: DISCONTINUED | OUTPATIENT
Start: 2022-03-31 | End: 2022-03-31 | Stop reason: HOSPADM

## 2022-03-31 RX ORDER — PROPOFOL 10 MG/ML
INJECTION, EMULSION INTRAVENOUS PRN
Status: DISCONTINUED | OUTPATIENT
Start: 2022-03-31 | End: 2022-03-31 | Stop reason: SDUPTHER

## 2022-03-31 RX ORDER — PROPOFOL 10 MG/ML
INJECTION, EMULSION INTRAVENOUS CONTINUOUS PRN
Status: DISCONTINUED | OUTPATIENT
Start: 2022-03-31 | End: 2022-03-31 | Stop reason: SDUPTHER

## 2022-03-31 RX ORDER — BUPIVACAINE HYDROCHLORIDE 5 MG/ML
INJECTION, SOLUTION EPIDURAL; INTRACAUDAL
Status: COMPLETED | OUTPATIENT
Start: 2022-03-31 | End: 2022-03-31

## 2022-03-31 RX ORDER — CEFAZOLIN SODIUM 2 G/100ML
2000 INJECTION, SOLUTION INTRAVENOUS ONCE
Status: COMPLETED | OUTPATIENT
Start: 2022-03-31 | End: 2022-03-31

## 2022-03-31 RX ORDER — FENTANYL CITRATE 50 UG/ML
INJECTION, SOLUTION INTRAMUSCULAR; INTRAVENOUS PRN
Status: DISCONTINUED | OUTPATIENT
Start: 2022-03-31 | End: 2022-03-31 | Stop reason: SDUPTHER

## 2022-03-31 RX ADMIN — PROPOFOL 20 MG: 10 INJECTION, EMULSION INTRAVENOUS at 08:11

## 2022-03-31 RX ADMIN — PROPOFOL 40 MCG/KG/MIN: 10 INJECTION, EMULSION INTRAVENOUS at 08:01

## 2022-03-31 RX ADMIN — SODIUM CHLORIDE, POTASSIUM CHLORIDE, SODIUM LACTATE AND CALCIUM CHLORIDE: 600; 310; 30; 20 INJECTION, SOLUTION INTRAVENOUS at 06:51

## 2022-03-31 RX ADMIN — FENTANYL CITRATE 25 MCG: 50 INJECTION, SOLUTION INTRAMUSCULAR; INTRAVENOUS at 08:29

## 2022-03-31 RX ADMIN — FENTANYL CITRATE 25 MCG: 50 INJECTION, SOLUTION INTRAMUSCULAR; INTRAVENOUS at 08:03

## 2022-03-31 RX ADMIN — CEFAZOLIN SODIUM 2000 MG: 2 INJECTION, SOLUTION INTRAVENOUS at 08:06

## 2022-03-31 RX ADMIN — PROPOFOL 30 MG: 10 INJECTION, EMULSION INTRAVENOUS at 08:01

## 2022-03-31 ASSESSMENT — PULMONARY FUNCTION TESTS
PIF_VALUE: 0
PIF_VALUE: 1
PIF_VALUE: 1
PIF_VALUE: 0
PIF_VALUE: 0
PIF_VALUE: 1
PIF_VALUE: 0
PIF_VALUE: 1
PIF_VALUE: 0

## 2022-03-31 ASSESSMENT — PAIN SCALES - GENERAL: PAINLEVEL_OUTOF10: 0

## 2022-03-31 ASSESSMENT — PAIN - FUNCTIONAL ASSESSMENT: PAIN_FUNCTIONAL_ASSESSMENT: 0-10

## 2022-03-31 NOTE — OP NOTE
Operative Note      Patient: Lisandra Mcconnell  YOB: 1950  MRN: [id number]     Date of Procedure: 3/31/2022    Pre-Op Diagnosis: Acute osteomyelitis of left ankle or foot ([address] [M86.172] Ulcer of left foot, with fat layer exposed ([address] [L97.522] Type 2 diabetes, controlled, with neuropathy ([address] [E11.40] Right foot ulcer, with fat layer exposed ([address] [L97.512]    Post-Op Diagnosis: Same       Procedure(s):  LEFT 3RD TOE AMPUTATION  BILATERAL DEBRIEDMENT OF NON-VIABLE TISSUE & BONE    Surgeon(s):  Reba Munoz DPM    Assistant:   * No surgical staff found *    Anesthesia: Monitor Anesthesia Care    Estimated Blood Loss (mL): Minimal    Complications: None    Specimens:   ID Type Source Tests Collected by Time Destination   1 : left toe culture Specimen Toe CULTURE, SURGICAL Reba Munoz DPM 3/31/2022 0825    A : left 3rd toe Specimen Toe SURGICAL PATHOLOGY Reba Munoz DPM 3/31/2022 6072        Implants:  * No implants in log *      Drains: * No LDAs found *    Findings: All residual infection was removed. Good bleeding was noted to the surgical site. Remaining bone was noted to be of good quality. Detailed Description of Procedure: This is a patient who live started seeing my office couple weeks ago with concern for infection on the third toe of the left foot. A few months ago he was in [place] and was told that he had bone infection which was determined from a bone scan that he had. Patient has a pacemaker so he was unable to get an MRI. Was started on antibiotics at that time. Has since followed up with infectious disease here at [facility] who has him still on oral antibiotics. Discussed conservative and surgical treatment options in my office and patient elects to proceed with a third toe amputation with excisional debridement of all nonviable tissue and bone to the left foot. All risks, benefits, complications of the procedure were explained to the patient with full understanding.   No guarantees were given or implied. He has had nothing to eat or drink since midnight in anticipation for surgery today. I saw the patient in the preoperative holding area and discussed the plan again with him and his wife at bedside. Signed the correct surgical extremity and signed the DNR reversal with him. Patient was taken from the preoperative holding area to the operating room placed on the operating table in the supine position. 2 g IV Ancef was administered by the anesthesia team.  After MAC anesthesia by the anesthesia service the left lower extremity was marked prepped and draped in the usual sterile aseptic fashion. Timeout was then performed and the correct surgical extremity and procedure was confirmed. Everybody agreed. Attention was directed to the third toe of the left foot where a modified racquet incision was made over the third metatarsal phalangeal joint with a #15 blade down to level of bone. All soft tissue attachments were freed up from the third toe to disarticulate the toe from the foot. The toe was then passed to the back table. Deep soft tissue microbiology culture was then obtained at this time to guide antibiotic treatment in the future if needed. It did appear that all residual infection was removed. Good bleeding was noted to the residual tissues. Residual third metatarsal bone was noted to be adequate with no signs of abscess or deeper infection. Incision was then flushed with copious amounts of sterile saline. Skin edges were then reapproximated with 3-0 nylon suture attention was fashion. Dry sterile dressing consisting of Xeroform Betadine 4 x 4's Kerlix and Ace wrap was applied to the left foot. Patient tolerated the procedure and anesthesia well. He is taken from the operating room to the PACU with vital signs stable and neurovascular status intact. Patient be weightbearing as tolerated left foot in a surgical shoe.   Keep dressing clean dry and intact until I see him in the office. Any signs of concern or infection before I see him again he can let me know and I can see him in the office with no issues. Pain medication for Norco 5/325 since the patient's pharmacy. 1 tab as needed every 4 hours for possible postoperative pain. If patient has any issues before I see him again he is also to go to the emergency room for further evaluation if it is after hours. Told him to not hesitate to contact me with any questions. Did explain to the patient in depth that he may require return to the operating room in the near future for further infection management as he is at high risk for wound healing complications further bone infection a higher level amputation. He does understand this.       Pedro Sandoval DPM    [facility] [address] [place] [address] and Ankle Surgery      Electronically signed by Pedro Sandoval DPM on 3/31/2022 at 8:39 AM

## 2022-03-31 NOTE — ANESTHESIA POSTPROCEDURE EVALUATION
Department of Anesthesiology  Postprocedure Note    Patient: Lico Marquez  MRN: [id number]  YOB: 1950  Date of evaluation: 3/31/2022  Time:  8:45 AM     Procedure Summary     Date: 03/31/22 Room / Location: [place] [address] / [facility]    Anesthesia Start: 0800 Anesthesia Stop: 0845    Procedures:       LEFT 3RD TOE AMPUTATION (Bilateral )      BILATERAL DEBRIEDMENT OF NON-VIABLE TISSUE & BONE (Bilateral ) Diagnosis:       Acute osteomyelitis of left ankle or foot ([address]      Ulcer of left foot, with fat layer exposed ([address]      Type 2 diabetes, controlled, with neuropathy ([address]      Right foot ulcer, with fat layer exposed ([address]      (Acute osteomyelitis of left ankle or foot ([address] [M86.172] Ulcer of left foot, with fat layer exposed ([address] [L97.522] Type 2 diabetes, controlled, with neuropathy ([address] [E11.40] Right foot ulcer, with fat layer exposed ([address] [E75.845])    Surgeons: Ra Rapp DPM Responsible Provider: Araceli Mejias MD    Anesthesia Type: general, MAC, regional ASA Status: 4          Anesthesia Type: general, MAC, regional    Sanju Phase I:      Sanju Phase II: Sanju Score: 10    Last vitals: Reviewed and per EMR flowsheets.        Anesthesia Post Evaluation    Patient location during evaluation: bedside  Patient participation: complete - patient participated  Level of consciousness: awake and alert  Pain score: 0  Airway patency: patent  Nausea & Vomiting: no vomiting and no nausea  Complications: no  Cardiovascular status: blood pressure returned to baseline and hemodynamically stable  Respiratory status: acceptable, room air, spontaneous ventilation and nonlabored ventilation  Hydration status: stable

## 2022-03-31 NOTE — PROGRESS NOTES
0841 Pt. Brought back to unit, [facility]. Pt. Is A&O, vitals stable, surgical site is C/D/I. 0845 Good cap refilll and good peripherals. 0850 Pt. Provided with crackers and beverage of choice. Eduction provided on use of call light, call light in reach. Spouse at bedside. 0930 DC instructions reviewed with pt and spouse, both parties express understanding. Pt to DC home in private vehicle.

## 2022-03-31 NOTE — H&P
Podiatry H&P      CHIEF COMPLAINT:  No chief complaint on file. HISTORY OF PRESENT ILLNESS:      The patient is a 74 y.o. male who presents with significant past medical history who presents today for wound to the third toe of the left foot. Today for surgical amputation of the digit secondary to underlying bone infection. Has had nothing to eat or drink since midnight in anticipation for surgery today. .    Past Medical History:    Past Medical History:   Diagnosis Date    Arrhythmia     Pacemaker placed aprox 5 years ago for A Fib per patient    Arthritis 12/2013    rt wrist    Atrial fibrillation ([facility]     on Xarelto - Dr. Sabi Brown CAD (coronary artery disease) 06/18/2014    see dr Jimmye Crigler kidney disease, stage III (moderate) ([address] 07/07/2016    Critical illness myopathy 03/15/2021    Diabetes mellitus ([facility]     dx 2004    Diabetic neuropathy associated with type 2 diabetes mellitus ([facility] 04/23/2019    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    Gout 04/2019    \"got gout when had pacer put in because they did not give me my medication for gout \"    H/O 24 hour EKG monitoring 10/03/2013    no afib noted, sinsus rhythm    H/O cardiovascular stress test 05/12/2014    cardiolite- mild ischemia RCA EF50%    H/O echocardiogram 12/01/2020    EF 55-60% severe aortic stenosis mild to mod aortic regurg mod to severe tricuspid regurg severe pulm htn significant changes since 2018 echo.  H/O right and left heart catheterization 12/10/2020    DIFFUSE LAD DISEASE, Mild ECA Disease, Severe AS, Milf Pul HTN on RHC.  H/O transesophageal echocardiography (MABLE) for monitoring 08/05/2013    normal LV function and normal LA appendage without any clot    History of blood transfusion 12/2020    d/t anemia    History of transesophageal echocardiography (MABLE) 12/15/2020    Severe aortic stenosis (ANNA by planimetry: 0.778 cm sq).   Mild AR.    [place] (hard of hearing)     hearing tonya aides  Hx of Doppler echocardiogram 05/21/2018    EF 50%  Mild LV hypertrophy. Mildly enlarged RA. Mod aortic valve calcification with mod AS. Mitral annular calcification is present. Mild AR, MR and TR. Mild pulmonary htn.     Hyperlipidemia     Hypertension     Follows with PCP & Dr. Marquis Grayson Other disorders of kidney and ureter     Pacemaker     Medtronic, implanted 2014    Pneumonia 12/29/2012    Pneumonia due to COVID-19 virus 08/2020    Sleep apnea     dx 2013- has c-pap    Type II or unspecified type diabetes mellitus with other specified manifestations, uncontrolled 12/12/2012    Venous hypertension, chronic, with ulcer ([address] 12/12/2012    resolved      Past Surgical History:    Past Surgical History:   Procedure Laterality Date    CABG WITH AORTIC VALVE REPLACEMENT N/A 2/16/2021    CABG CORONARY ARTERY BYPASS X2 WITH LIMA, AORTIC VALVE REPLACEMENT AND AORTIC ROOT REPAIR, INTRAOPERATIVE MABLE, INDUCED HYPOTHERMIA, LEFT LEG ENDOVEIN HARVEST, LEFT ATRIAL CLIP, AND CRYO PROCEDURE performed by Jared Cervantes MD at [address] [facility]  12/14    at [address] [facility] [address] [address] Left 1/29/2021    LEFT CAROTID ENDARTERECTOMY performed by Katina Osman MD at [id number] [facility]  2017    COLONOSCOPY  2011    COLONOSCOPY N/A 11/19/2019    COLONOSCOPY DIAGNOSTIC performed by Cherry Troncoso MD at [facility]  09/30/2020    POSSIBLE CECAL avms, SIGMOID DIVERTICULOSIS, INTERNAL HEMORRHOIDS GRADE 1    COLONOSCOPY N/A 9/30/2020    COLONOSCOPY CONTROL HEMORRHAGE WITH APC performed by Cherry Troncoso MD at [address] [facility]  2014    \"2 stents put in \"    IR NONTUNNELED VASCULAR CATHETER  3/5/2021    IR NONTUNNELED VASCULAR CATHETER 3/5/2021 [address] REPLACEMENT  2004    total left hip    OTHER SURGICAL HISTORY Right 12/02/2017    I&D; evacuation of hematoma right hip    OTHER SURGICAL HISTORY  09/17/2020 enteroscopy    PACEMAKER INSERTION N/A 2/23/2021    PACEMAKER GENERATOR LEAD REVISION performed by Avel Davis MD at [place] [address]      9/18/14 Status post remote permanent pacemaker with atrial lead dislodgement.  7/24/14 PPM Implant    UPPER GASTROINTESTINAL ENDOSCOPY N/A 9/17/2020    ENTEROSCOPY PUSH BIOPSY performed by Hunter Bird MD at [address] [facility] N/A 3/4/2021    EGD DIAGNOSTIC ONLY performed by Hunter Bird MD at [address] [facility]  2012    \"have stents in both legs- done in [place]     Current Medications:   Current Facility-Administered Medications   Medication Dose Route Frequency Provider Last Rate Last Admin    lactated ringers infusion   IntraVENous Continuous Myesha Mercer  mL/hr at 03/31/22 0651 New Bag at 03/31/22 0651      Allergies: Spironolactone and Tape Easton Gaudencio tape]    Social History:   Social History     Socioeconomic History    Marital status:      Spouse name: Not on file    Number of children: Not on file    Years of education: Not on file    Highest education level: Not on file   Occupational History    Not on file   Tobacco Use    Smoking status: Never Smoker    Smokeless tobacco: Never Used   Vaping Use    Vaping Use: Never used   Substance and Sexual Activity    Alcohol use: Not Currently     Alcohol/week: 2.0 standard drinks     Types: 2 Cans of beer per week     Comment: average \"one time per week\"/ Caffiene: 1 cup of coffee daily    Drug use: No    Sexual activity: Yes     Partners: Female     Comment:    Other Topics Concern    Not on file   Social History Narrative    Not on file     Social Determinants of Health     Financial Resource Strain: Low Risk     Difficulty of Paying Living Expenses: Not hard at all   Food Insecurity: No Food Insecurity    Worried About Running Out of Food in the Last Year: Never true    Kiko of Food in the Last Year: Never true probe down to bone. Some slight erythema to this area  Musculoskeletal: No pain on palpation to affected areas of either foot      Assessment:   -Osteomyelitis third toe left foot  -Uncontrolled type 2 diabetes mellitus with peripheral neuropathy  -Peripheral vascular disease      Plan:    -Patient was seen this morning in the preoperative holding area with his wife  -Here for surgical amputation of third toe of the left foot with excisional debridement of all nonviable tissue and bone secondary to underlying bone infection that is confirmed clinically and with a bone scan  -Spoke with patient's cardiology team before surgery today. They recommend the surgery be done here at the [facility] given patient's history of pacemaker placement. If any cardiac issues to arise they would be better able to treat him with him being in house    -Surgery was discussed at length today with the patient. This included the potential risks, conditions as well as the postoperative course. The risks include but are not limited to: Pain, infection, swelling, worsening or no better. We rediscussed the risk for skin complications or wound complication/nerve related complications and/or bone related complications, hardware complications, bone healing complications, failure of procedure, recurrence, future surgery required, death, anesthesia risks. Blood clots as well as other numerous risks were discussed at length today. Patient understands all this and is agreeable. The postoperative course was explained in detail today as well as the weightbearing status, the need to keep the bandage clean, dry, and intact, and other post procedural specific care was all discussed at length. Postoperative instruction was given and reviewed with the patient. Surgical consents were reviewed today and signed appropriately. We discussed anticoagulation therapy and DVT prophylaxis. This was discussed at length.   Proper prescription to be given to patient based on our discussion today. We discussed the signs and symptoms of DVT and PE at length.    -Patient be weightbearing as tolerated postoperatively in a surgical shoe  -Continue with antibiotics per infectious disease team who you have been seeing outpatient  -Discussed in length with patient that he is at high risk for wound healing complications and worsening bone infection as well as more proximal amputation and below-knee amputation given his history of wounds and bone infection as well as uncontrolled diabetes and peripheral arterial disease. He does understand this.  -If any issues with healing of the skin incision are noted we will have patient return to vascular team for further assessment  -Patient will follow with me within 1 week of discharge for first follow-up visit.   Sooner if needed.  -Please contact any questions    vKng Castrejon DPM    [facility] in [address] [place] [address] and Ankle Surgery        Electronically signed by Kvng Castrejon DPM on 3/31/2022 at 7:22 AM

## 2022-04-03 LAB
CULTURE: ABNORMAL
Lab: ABNORMAL
SPECIMEN: ABNORMAL

## 2022-04-07 ENCOUNTER — OFFICE VISIT (OUTPATIENT)
Dept: INFECTIOUS DISEASES | Age: 72
End: 2022-04-07
Payer: MEDICARE

## 2022-04-07 VITALS
TEMPERATURE: 97.3 F | RESPIRATION RATE: 18 BRPM | DIASTOLIC BLOOD PRESSURE: 56 MMHG | WEIGHT: 201.4 LBS | SYSTOLIC BLOOD PRESSURE: 122 MMHG | HEART RATE: 82 BPM | BODY MASS INDEX: 28.09 KG/M2

## 2022-04-07 DIAGNOSIS — L97.511 DIABETIC ULCER OF TOE OF RIGHT FOOT ASSOCIATED WITH TYPE 2 DIABETES MELLITUS, LIMITED TO BREAKDOWN OF SKIN (HCC): Primary | ICD-10-CM

## 2022-04-07 DIAGNOSIS — E11.621 DIABETIC ULCER OF TOE OF LEFT FOOT ASSOCIATED WITH TYPE 2 DIABETES MELLITUS, WITH MUSCLE INVOLVEMENT WITHOUT EVIDENCE OF NECROSIS (HCC): ICD-10-CM

## 2022-04-07 DIAGNOSIS — L97.525 DIABETIC ULCER OF TOE OF LEFT FOOT ASSOCIATED WITH TYPE 2 DIABETES MELLITUS, WITH MUSCLE INVOLVEMENT WITHOUT EVIDENCE OF NECROSIS (HCC): ICD-10-CM

## 2022-04-07 DIAGNOSIS — E11.621 DIABETIC ULCER OF TOE OF RIGHT FOOT ASSOCIATED WITH TYPE 2 DIABETES MELLITUS, LIMITED TO BREAKDOWN OF SKIN (HCC): Primary | ICD-10-CM

## 2022-04-07 PROCEDURE — 2022F DILAT RTA XM EVC RTNOPTHY: CPT | Performed by: INTERNAL MEDICINE

## 2022-04-07 PROCEDURE — 1123F ACP DISCUSS/DSCN MKR DOCD: CPT | Performed by: INTERNAL MEDICINE

## 2022-04-07 PROCEDURE — 3017F COLORECTAL CA SCREEN DOC REV: CPT | Performed by: INTERNAL MEDICINE

## 2022-04-07 PROCEDURE — 99215 OFFICE O/P EST HI 40 MIN: CPT | Performed by: INTERNAL MEDICINE

## 2022-04-07 PROCEDURE — 3046F HEMOGLOBIN A1C LEVEL >9.0%: CPT | Performed by: INTERNAL MEDICINE

## 2022-04-07 PROCEDURE — 4040F PNEUMOC VAC/ADMIN/RCVD: CPT | Performed by: INTERNAL MEDICINE

## 2022-04-07 PROCEDURE — G8417 CALC BMI ABV UP PARAM F/U: HCPCS | Performed by: INTERNAL MEDICINE

## 2022-04-07 PROCEDURE — G8427 DOCREV CUR MEDS BY ELIG CLIN: HCPCS | Performed by: INTERNAL MEDICINE

## 2022-04-07 PROCEDURE — 1036F TOBACCO NON-USER: CPT | Performed by: INTERNAL MEDICINE

## 2022-04-07 RX ORDER — LEVOFLOXACIN 750 MG/1
750 TABLET ORAL
Qty: 7 TABLET | Refills: 0 | Status: ON HOLD | OUTPATIENT
Start: 2022-04-07 | End: 2022-04-14 | Stop reason: ALTCHOICE

## 2022-04-07 RX ORDER — LINEZOLID 600 MG/1
600 TABLET, FILM COATED ORAL 2 TIMES DAILY
Qty: 28 TABLET | Refills: 0 | Status: ON HOLD | OUTPATIENT
Start: 2022-04-07 | End: 2022-04-14 | Stop reason: ALTCHOICE

## 2022-04-07 NOTE — PROGRESS NOTES
4/10/2022         Referring Physician: No ref. provider found  Primary Care Physician: Haleigh Flowers DO    Impression/Plan:   Diagnosis Orders   1. Diabetic ulcer of toe of right foot associated with type 2 diabetes mellitus, limited to breakdown of skin (HCC)  linezolid (ZYVOX) 600 MG tablet    levoFLOXacin (LEVAQUIN) 750 MG tablet    CBC    Hemoglobin A1C   2. Diabetic ulcer of toe of left foot associated with type 2 diabetes mellitus, with muscle involvement without evidence of necrosis ([address]         Discussion:  Diabetic ulcer of toe of right foot associated with type 2 diabetes mellitus, limited to breakdown of skin ([address]  Culture positive for Pseudomonas aeruginosa and Staphylococcus hemolyticus. There is erythema around his right second toe. His podiatrist Dr. Trent Horowitz is planning on amputating the distal phalanx of the right second toe. At this time, levofloxacin and linezolid gordonii prescribed. Probably, this will be done before the need to change the battery of his atrial pacemaker. In the event end not done, the pacemaker battery to be replaced as the patient will be on antibiotics. Diabetic ulcer of toe of left foot associated with type 2 diabetes mellitus, with muscle involvement without evidence of necrosis ([address]  Underwent amputation of the left third toe on 3/31/2022       Return in about 2 weeks (around 4/21/2022). 40 minutes was spent in the encounter; more than 50% of the face-to-face time was spent with the patient providing counseling and coordination of care. History: Femi Pham is a 74 y.o.  male presenting today for left foot middle toe ulcer. He has a medical history of type 2 diabetes mellitus, coronary artery disease, Charcot arthropathy, hammertoes. Patient reports that he never fractured his left foot third toe about 6 months ago. He also reports that he has had recurrent ulcers on that toe.   He has previously healed, however he reported that he went ian and the ulcer opened up again. He was seen at a hospital in [place]. There he was treated with daptomycin as Rocephin for about 4 weeks. He cannot recall any microbe that was isolated. At present, he has a pacemaker that is 2 for battery change within the month. Dr. Nicole Stanton would like the left third toe diabetic ulcer to be treated prior to the pacemaker change (to minimize the risk of infection). At present, the patient denies fever, chills, nausea or vomiting.  3/24/2022: Patient has seen Dr. Hiram Cordova, reports that he recommends an amputation of his left foot third toe. Also, it was noted that an ulcer had developed on the plantar surface of his right second toe. He denies fever, chills, nausea or vomiting. He is presently taking cefdinir and doxycycline. 4/7/2022: At last visit, culture was taken from his right second toe-positive for Pseudomonas aeruginosa and Staphylococcus hemolyticus. .  Since his last visit he has undergone left third toe amputation, bilateral debridement of nonviable tissue and bone on 3/31/2022. Surgical culture was positive for Staph epidermidis and Finegoldia magna    Review of Systems   All other systems reviewed and are negative.       Allergies   Allergen Reactions    Spironolactone      CAUSES INCREASED K+    Tape Greg Samantha Tape] Rash     SURGICAL TAPE       Patient Active Problem List   Diagnosis    Type 2 diabetes mellitus without complication, without long-term current use of insulin ([facility]    Critical lower limb ischemia (HCC)    Venous hypertension, chronic, with ulcer ([address]    Ulcer of other part of foot    Hospital-acquired pneumonia    Atrial fibrillation (HCC)    Sinus pause    PAF (paroxysmal atrial fibrillation) ([facility])    DM (diabetes mellitus) ([address]    DMITRIY on CPAP    Hyperlipidemia    Status post incision and drainage    Hyperkalemia    Arthritis    PVD (peripheral vascular disease) ([facility])    Hematoma    Cardiac pacemaker in situ    ASCVD (arteriosclerotic cardiovascular disease)    Essential hypertension    Gout    Diabetic neuropathy associated with type 2 diabetes mellitus (HCC)    Adenomatous polyp of sigmoid colon    Iron deficiency anemia due to chronic blood loss    Erythropoietin deficiency anemia    VHD (valvular heart disease)    Abnormal fractional flow reserve (FFR) on cardiac catheterization    Carotid stenosis, left    Aortic stenosis, severe    Atherosclerotic heart disease of native coronary artery with other forms of angina pectoris (HCC)    Displacement of atrial pacemaker leads    Pacemaker lead malfunction    SOB (shortness of breath)    Recurrent right pleural effusion    Elevated liver enzymes    Critical illness myopathy    Respiratory failure (HCC)    Generalized weakness    Status post coronary artery bypass graft    Pneumonia due to COVID-19 virus    Moderate malnutrition (HCC)    Stage 3a chronic kidney disease ([address]    WD-Diabetic ulcer of left foot with fat layer exposed ([address]    WD-Non-pressure ulcer of right lower extremity with fat layer exposed ([address]    Pulmonary hypertension, unspecified ([address]    Persistent proteinuria    Diabetic ulcer of toe of left foot associated with type 2 diabetes mellitus, with muscle involvement without evidence of necrosis ([address]    Diabetic ulcer of toe of right foot associated with type 2 diabetes mellitus, limited to breakdown of skin ([facility])       Current Outpatient Medications   Medication Sig Dispense Refill    linezolid (ZYVOX) 600 MG tablet Take 1 tablet by mouth 2 times daily for 14 days 28 tablet 0    levoFLOXacin (LEVAQUIN) 750 MG tablet Take 1 tablet by mouth every 48 hours for 14 days 7 tablet 0    Probiotic Acidophilus (FLORANEX) TABS Take 1 tablet by mouth 2 times daily 60 tablet 0    simvastatin (ZOCOR) 20 MG tablet Take 1 tablet by mouth daily 90 tablet 3    apixaban (ELIQUIS) 5 MG TABS tablet Take 1 tablet by mouth 2 times daily 180 tablet 3  Dulaglutide 3 MG/0.5ML SOPN Inject 3 mg into the skin once a week (Patient taking differently: Inject 3 mg into the skin once a week Fridays.) 12 pen 0    gabapentin (NEURONTIN) 300 MG capsule TAKE 1 CAPSULE 3 TIMES A    capsule 0    torsemide (DEMADEX) 20 MG tablet Take 1 tablet by mouth 2 times daily 180 tablet 3    apixaban (ELIQUIS) 5 MG TABS tablet Take 1 tablet by mouth 2 times daily 28 tablet 0    canagliflozin (INVOKANA) 300 MG TABS tablet Take 1 tablet by mouth every morning (before breakfast) 90 tablet 1    glimepiride (AMARYL) 4 MG tablet Take 1 tablet by mouth every morning (before breakfast) 90 tablet 1    sildenafil (VIAGRA) 100 MG tablet TAKE 1 TABLET DAILY AS     NEEDED FOR ERECTILE        DYSFUNCTION 30 tablet 1    carboxymethylcellulose (REFRESH PLUS) 0.5 % SOLN ophthalmic solution as needed       Cholecalciferol (VITAMIN D) 50 MCG (2000 UT) CAPS capsule Take by mouth nightly       aspirin 81 MG tablet Take 81 mg by mouth daily       No current facility-administered medications for this visit.        Past Medical History:   Diagnosis Date    Arrhythmia     Pacemaker placed aprox 5 years ago for A Fib per patient    Arthritis 12/2013    rt wrist    Atrial fibrillation ([facility]     on Xarelto - Dr. 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    Diabetes mellitus ([facility]     dx 2004    Diabetic neuropathy associated with type 2 diabetes mellitus ([facility] 04/23/2019    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    Gout 04/2019    \"got gout when had pacer put in because they did not give me my medication for gout \"    H/O 24 hour EKG monitoring 10/03/2013    no afib noted, sinsus rhythm    H/O cardiovascular stress test 05/12/2014    cardiolite- mild ischemia RCA EF50%    H/O echocardiogram 12/01/2020    EF 55-60% severe aortic stenosis mild to mod aortic regurg mod to severe tricuspid regurg severe pulm htn significant changes since 2018 echo.  H/O right and left heart catheterization 12/10/2020    DIFFUSE LAD DISEASE, Mild ECA Disease, Severe AS, Milf Pul HTN on RHC.  History of blood transfusion 12/2020    d/t anemia    History of transesophageal echocardiography (MABLE) 12/15/2020    Severe aortic stenosis (ANNA by planimetry: 0.778 cm sq). Mild AR.    [place] (hard of hearing)     hearing tonya aides    Hx of Doppler echocardiogram 05/21/2018    EF 50%  Mild LV hypertrophy. Mildly enlarged RA. Mod aortic valve calcification with mod AS. Mitral annular calcification is present. Mild AR, MR and TR. Mild pulmonary htn.     Hyperlipidemia     Hypertension     Follows with PCP & Dr. Deyanira Jaimes Other disorders of kidney and ureter     Pacemaker     Medtronic, implanted 2014    Pneumonia 12/29/2012    Pneumonia due to COVID-19 virus 08/2020    Sleep apnea     dx 2013- has c-pap    Type II or unspecified type diabetes mellitus with other specified manifestations, uncontrolled 12/12/2012    Venous hypertension, chronic, with ulcer ([address] 12/12/2012    resolved       Past Surgical History:   Procedure Laterality Date    CABG WITH AORTIC VALVE REPLACEMENT N/A 2/16/2021    CABG CORONARY ARTERY BYPASS X2 WITH LIMA, AORTIC VALVE REPLACEMENT AND AORTIC ROOT REPAIR, INTRAOPERATIVE MABLE, INDUCED HYPOTHERMIA, LEFT LEG ENDOVEIN HARVEST, LEFT ATRIAL CLIP, AND CRYO PROCEDURE performed by Ye De La Garza MD at [address] [place] [address]  12/14    at [address] [facility] Left 1/29/2021    LEFT CAROTID ENDARTERECTOMY performed by Maria Victoria Helton MD at [address] [place] [address]  2017    COLONOSCOPY  2011    COLONOSCOPY N/A 11/19/2019    COLONOSCOPY DIAGNOSTIC performed by Adelaide Mann MD at [address] [facility]  09/30/2020    POSSIBLE CECAL avms, SIGMOID DIVERTICULOSIS, INTERNAL HEMORRHOIDS GRADE 1    COLONOSCOPY N/A 9/30/2020    COLONOSCOPY  Not on file   Social History Narrative    Not on file     Social Determinants of Health     Financial Resource Strain: Low Risk     Difficulty of Paying Living Expenses: Not hard at all   Food Insecurity: No Food Insecurity    Worried About Running Out of Food in the Last Year: Never true    [address] in the Last Year: Never true   Transportation Needs:     Lack of Transportation (Medical): Not on file    Lack of Transportation (Non-Medical):  Not on file   Physical Activity:     Days of Exercise per Week: Not on file    Minutes of Exercise per Session: Not on file   Stress:     Feeling of Stress : Not on file   Social Connections:     Frequency of Communication with Friends and Family: Not on file    Frequency of Social Gatherings with Friends and Family: Not on file    Attends Gnosticist Services: Not on file    Active Member of [address] [place] [address] [employer] or Organizations: Not on file    Attends Club or Organization Meetings: Not on file    Marital Status: Not on file   Intimate Partner Violence:     Fear of Current or Ex-Partner: Not on file    Emotionally Abused: Not on file    Physically Abused: Not on file    Sexually Abused: Not on file   Housing Stability:     Unable to Pay for Housing in the Last Year: Not on file    Number of [place] in the Last Year: Not on file    Unstable Housing in the Last Year: Not on file       Family History   Problem Relation Age of Onset    High Blood Pressure Mother     Arthritis Mother     Diabetes Mother     Heart Disease Mother     High Blood Pressure Father     Heart Disease Father     Kidney Disease Father        Vital Signs:  Vitals:    04/07/22 1003   BP: (!) 122/56   Site: Left Upper Arm   Position: Sitting   Cuff Size: Medium Adult   Pulse: 82   Resp: 18   Temp: 97.3 °F (36.3 °C)   TempSrc: Infrared   Weight: 201 lb 6.4 oz (91.4 kg)        Wt Readings from Last 3 Encounters:   04/07/22 201 lb 6.4 oz (91.4 kg)   03/29/22 194 lb (88 kg)   03/24/22 206 lb 6.4 oz (93.6 kg)        Physical Exam:   Gen: alert and NAD  HEENT: sclera clear, pupils equal and reactive, extra ocular muscles intact, oropharynx clear, mucus membranes moist, tympanic membranes clear bilaterally, no cervical lymphadenopathy noted and neck supple  Neck: supple, no significant adenopathy  Chest: clear to auscultation, no wheezes, rales or rhonchi, symmetric air entry  Heart: regular rate and rhythm, no murmurs  ABD: abdomen is soft without significant tenderness, masses, organomegaly or guarding. EXT:peripheral pulses normal, no pedal edema, no clubbing or cyanosis  NEURO: alert, oriented, normal speech, no focal findings or movement disorder noted  Skin: well hydrated, no lesions, surgical site examined  Wounds: Left third toe dorsum also, surrounding swelling, no discharge. Right foot second toe plantar surface ulcer, slough at the base.   Labs:   WBC   Date Value Ref Range Status   03/10/2022 7.3 4.0 - 10.5 K/CU MM Final   06/01/2021 6.1 4.0 - 10.5 K/CU MM Final   03/30/2021 4.5 4.0 - 10.5 K/CU MM Final     CREATININE   Date Value Ref Range Status   03/10/2022 1.9 (H) 0.9 - 1.3 MG/DL Final   12/20/2021 1.2 0.9 - 1.3 MG/DL Final   12/09/2021 1.3 0.5 - 1.4 MG/DL Final       Cultures:  Culture   Date Value Ref Range Status   03/31/2022 Final Report  Final   03/31/2022 STAPHYLOCOCCUS EPIDERMIDIS Rare growth (A)  Final   03/31/2022 FINEGOLDIA MAGNA Light growth No further workup (A)  Final   03/31/2022 DIPHTHEROIDS Light growth No further workup  Final       Imaging Studies:         Electronicallysigned by Trever Valdes MD on 3/16/22 at 10:06 PM EDT

## 2022-04-07 NOTE — ASSESSMENT & PLAN NOTE
Culture positive for Pseudomonas aeruginosa and Staphylococcus hemolyticus. There is erythema around his right second toe. His podiatrist Dr. Darya Stark is planning on amputating the distal phalanx of the right second toe. At this time, levofloxacin and linezolid gordonii prescribed. Probably, this will be done before the need to change the battery of his atrial pacemaker. In the event end not done, the pacemaker battery to be replaced as the patient will be on antibiotics.

## 2022-04-08 NOTE — PROGRESS NOTES
4/8/22 - LM concerning  surgery @ [facility] on  4/14/22. Please call the PAT Nurse for a phone assessment and surgery instructions.

## 2022-04-11 NOTE — PROGRESS NOTES
Patient will be called on 4/13/2022 with an arrival time for surgery on 4/14/2022 at [address] [place] [address]. 1. Do not eat or drink anything after midnight - unless instructed by your doctor prior to surgery. This includes                   no water, chewing gum or mints. 2. Follow your directions as prescribed by the doctor for your procedure and medications. 3. Check with your Doctor regarding stopping vitamins, supplements, blood thinners (Plavix, Coumadin, Lovenox, Effient, Pradaxa, Xarelto, Fragmin or                   other blood thinners) and follow their instructions. 4. Do not smoke, and do not drink any alcoholic beverages 24 hours prior to surgery. This includes NA Beer. 5. You may brush your teeth and gargle the morning of surgery. DO NOT SWALLOW WATER   6. You MUST make arrangements for a responsible adult to take you home after your surgery and be able to check on you every couple                   hours for the day. You will not be allowed to leave alone or drive yourself home. It is strongly suggested someone stay with you the first 24                   hrs. Your surgery will be cancelled if you do not have a ride home. 7. Please wear simple, loose fitting clothing to the hospital.  Avtar Howard not bring valuables (money, credit cards, checkbooks, etc.) Do not wear any                   makeup (including no eye makeup) or nail polish on your fingers or toes. 8. DO NOT wear any jewelry or piercings on day of surgery. All body piercing jewelry must be removed. 9. If you have dentures, they will be removed before going to the OR; we will provide you a container. If you wear contact lenses or glasses,                  they will be removed; please bring a case for them. 10. If you  have a Living Will and Durable Power of  for Healthcare, please bring in a copy.            11. Please bring picture ID,  insurance card, paperwork from the doctors office    (H & P, Consent, & card for implantable devices). 12. Take a shower the morning of your procedure with Hibiclens or an anti-bacterial soap. Do not apply any deodorant, lotion, oil or powder. Patient will take his gabapentin the morning of his procedure.

## 2022-04-12 ENCOUNTER — HOSPITAL ENCOUNTER (OUTPATIENT)
Age: 72
Discharge: HOME | End: 2022-04-12
Payer: MEDICARE

## 2022-04-12 DIAGNOSIS — L97.511 DIABETIC ULCER OF TOE OF RIGHT FOOT ASSOCIATED WITH TYPE 2 DIABETES MELLITUS, LIMITED TO BREAKDOWN OF SKIN (HCC): ICD-10-CM

## 2022-04-12 DIAGNOSIS — E11.621 DIABETIC ULCER OF TOE OF RIGHT FOOT ASSOCIATED WITH TYPE 2 DIABETES MELLITUS, LIMITED TO BREAKDOWN OF SKIN (HCC): ICD-10-CM

## 2022-04-12 LAB
ESTIMATED AVERAGE GLUCOSE: 177 MG/DL
HBA1C MFR BLD: 7.8 % (ref 4.2–6.3)
HCT VFR BLD CALC: 50.3 % (ref 42–52)
HEMOGLOBIN: 14.7 GM/DL (ref 13.5–18)
MCH RBC QN AUTO: 25 PG (ref 27–31)
MCHC RBC AUTO-ENTMCNC: 29.2 % (ref 32–36)
MCV RBC AUTO: 85.4 FL (ref 78–100)
PDW BLD-RTO: 17.6 % (ref 11.7–14.9)
PLATELET # BLD: 166 K/CU MM (ref 140–440)
PMV BLD AUTO: 10.2 FL (ref 7.5–11.1)
RBC # BLD: 5.89 M/CU MM (ref 4.6–6.2)
WBC # BLD: 5.6 K/CU MM (ref 4–10.5)

## 2022-04-12 PROCEDURE — 85027 COMPLETE CBC AUTOMATED: CPT

## 2022-04-12 PROCEDURE — 83036 HEMOGLOBIN GLYCOSYLATED A1C: CPT

## 2022-04-12 PROCEDURE — 36415 COLL VENOUS BLD VENIPUNCTURE: CPT

## 2022-04-13 ENCOUNTER — ANESTHESIA EVENT (OUTPATIENT)
Dept: OPERATING ROOM | Age: 72
End: 2022-04-13
Payer: MEDICARE

## 2022-04-13 ASSESSMENT — ENCOUNTER SYMPTOMS: SHORTNESS OF BREATH: 1

## 2022-04-13 NOTE — ANESTHESIA PRE PROCEDURE
Department of Anesthesiology  Preprocedure Note       Name:  Suraj Levy   Age:  74 y.o.  :  1950                                          MRN:  [id number]         Date:  2022      Surgeon: Yue Hanley):  Alexy Caballero DPM    Procedure: Procedure(s):  RIGHT 2ND TOE AMPUTATION  RIGHT FOOT DEBRIDEMENT INCISION AND DRAINAGE    Medications prior to admission:   Prior to Admission medications    Medication Sig Start Date End Date Taking? Authorizing Provider   linezolid (ZYVOX) 600 MG tablet Take 1 tablet by mouth 2 times daily for 14 days 22  Faustino Golden MD   levoFLOXacin (LEVAQUIN) 750 MG tablet Take 1 tablet by mouth every 48 hours for 14 days 22  Faustino Golden MD   [facility]ophilus [facility] TABS Take 1 tablet by mouth 2 times daily 3/24/22 4/23/22  Faustino Golden MD   simvastatin (ZOCOR) 20 MG tablet Take 1 tablet by mouth daily 3/21/22 6/19/22  JUSTIN Story CNP   apixaban (ELIQUIS) 5 MG TABS tablet Take 1 tablet by mouth 2 times daily 3/21/22   JUSTIN Story CNP   Dulaglutide 3 MG/0.5ML SOPN Inject 3 mg into the skin once a week  Patient taking differently: Inject 3 mg into the skin once a week .  3/17/22 6/15/22  SATISH Betts   gabapentin (NEURONTIN) 300 MG capsule TAKE 1 CAPSULE 3 TIMES A   DAY 3/17/22 6/17/22  SATISH Betts   torsemide (DEMADEX) 20 MG tablet Take 1 tablet by mouth 2 times daily 21   Roge Champion MD   apixaban (ELIQUIS) 5 MG TABS tablet Take 1 tablet by mouth 2 times daily 21   JUSTIN Story CNP   canagliflozin (INVOKANA) 300 MG TABS tablet Take 1 tablet by mouth every morning (before breakfast) 21   Kaleb Mitchell, DO   glimepiride (AMARYL) 4 MG tablet Take 1 tablet by mouth every morning (before breakfast) 21  Kaleb Mitchell, DO   sildenafil (VIAGRA) 100 MG tablet TAKE 1 TABLET DAILY AS     NEEDED FOR ERECTILE        DYSFUNCTION 21   Kaleb Mitchell, DO carboxymethylcellulose (REFRESH PLUS) 0.5 % SOLN ophthalmic solution as needed  12/16/20   Historical Provider, MD   Cholecalciferol (VITAMIN D) 50 MCG (2000 UT) CAPS capsule Take by mouth nightly     Historical Provider, MD   aspirin 81 MG tablet Take 81 mg by mouth daily    Historical Provider, MD       Current medications:    Current Outpatient Medications   Medication Sig Dispense Refill    linezolid (ZYVOX) 600 MG tablet Take 1 tablet by mouth 2 times daily for 14 days 28 tablet 0    levoFLOXacin (LEVAQUIN) 750 MG tablet Take 1 tablet by mouth every 48 hours for 14 days 7 tablet 0    Probiotic Acidophilus (FLORANEX) TABS Take 1 tablet by mouth 2 times daily 60 tablet 0    simvastatin (ZOCOR) 20 MG tablet Take 1 tablet by mouth daily 90 tablet 3    apixaban (ELIQUIS) 5 MG TABS tablet Take 1 tablet by mouth 2 times daily 180 tablet 3    Dulaglutide 3 MG/0.5ML SOPN Inject 3 mg into the skin once a week (Patient taking differently: Inject 3 mg into the skin once a week Fridays.) 12 pen 0    gabapentin (NEURONTIN) 300 MG capsule TAKE 1 CAPSULE 3 TIMES A    capsule 0    torsemide (DEMADEX) 20 MG tablet Take 1 tablet by mouth 2 times daily 180 tablet 3    apixaban (ELIQUIS) 5 MG TABS tablet Take 1 tablet by mouth 2 times daily 28 tablet 0    canagliflozin (INVOKANA) 300 MG TABS tablet Take 1 tablet by mouth every morning (before breakfast) 90 tablet 1    glimepiride (AMARYL) 4 MG tablet Take 1 tablet by mouth every morning (before breakfast) 90 tablet 1    sildenafil (VIAGRA) 100 MG tablet TAKE 1 TABLET DAILY AS     NEEDED FOR ERECTILE        DYSFUNCTION 30 tablet 1    carboxymethylcellulose (REFRESH PLUS) 0.5 % SOLN ophthalmic solution as needed       Cholecalciferol (VITAMIN D) 50 MCG (2000 UT) CAPS capsule Take by mouth nightly       aspirin 81 MG tablet Take 81 mg by mouth daily       No current facility-administered medications for this visit. Allergies:     Allergies   Allergen Reactions    Latex     Spironolactone      CAUSES INCREASED K+    Tape Lenora Bergamo Tape] Rash     SURGICAL TAPE       Problem List:    Patient Active Problem List   Diagnosis Code    Type 2 diabetes mellitus without complication, without long-term current use of insulin ([facility]) E11.9    Critical lower limb ischemia ([facility]) I70.229    Venous hypertension, chronic, with ulcer ([facility] I87.319, L97.909    Ulcer of other part of foot L97.509    Hospital-acquired pneumonia J18.9, Y95    Atrial fibrillation ([facility]) I48.91    Sinus pause I45.5    PAF (paroxysmal atrial fibrillation) ([facility]) I48.0    DM (diabetes mellitus) ([facility] E11.9    DMITRIY on CPAP G47.33, Z99.89    Hyperlipidemia E78.5    Status post incision and drainage Z98.890    Hyperkalemia E87.5    Arthritis M19.90    PVD (peripheral vascular disease) ([facility]) I73.9    Hematoma T14. 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    VHD (valvular heart disease) I38    Abnormal fractional flow reserve (FFR) on cardiac catheterization R94.39    Carotid stenosis, left I65.22    Aortic stenosis, severe I35.0    Atherosclerotic heart disease of native coronary artery with other forms of angina pectoris ([facility]) I25.118    Displacement of atrial pacemaker leads T82.120A    Pacemaker lead malfunction T82.110A    SOB (shortness of breath) R06.02    Recurrent right pleural effusion J90    Elevated liver enzymes R74.8    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    Respiratory failure ([facility]) J96.90    Generalized weakness R53.1    Status post coronary artery bypass graft Z95.1    Pneumonia due to COVID-19 virus U07.1, J12.82    Moderate malnutrition ([address] E44.0    Stage 3a chronic kidney disease (HCC) N18.31    WD-Diabetic ulcer of left foot with fat layer exposed ([address] E11.621, L97.522    WD-Non-pressure ulcer of right lower extremity with fat layer exposed ([address] L97.912    Pulmonary hypertension, unspecified ([address] I27.20    Persistent proteinuria R80.1    Diabetic ulcer of toe of left foot associated with type 2 diabetes mellitus, with muscle involvement without evidence of necrosis ([address] E11.621, L97.525    Diabetic ulcer of toe of right foot associated with type 2 diabetes mellitus, limited to breakdown of skin ([address] E11.621, L97.511       Past Medical History:        Diagnosis Date    Arrhythmia     Pacemaker placed aprox 5 years ago for A Fib per patient    Arthritis 12/2013    rt wrist    Atrial fibrillation (HCC)     on Xarelto - Dr. Richy Waddell CAD (coronary artery disease) 06/18/2014    see dr Reno Alcantara kidney disease, stage III (moderate) ([address] 07/07/2016    Critical illness myopathy 03/15/2021    Diabetes mellitus ([address]     dx 2004    Diabetic neuropathy associated with type 2 diabetes mellitus ([address] 04/23/2019    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    Gout 04/2019    \"got gout when had pacer put in because they did not give me my medication for gout \"    H/O 24 hour EKG monitoring 10/03/2013    no afib noted, sinsus rhythm    H/O cardiovascular stress test 05/12/2014    cardiolite- mild ischemia RCA EF50%    H/O echocardiogram 12/01/2020    EF 55-60% severe aortic stenosis mild to mod aortic regurg mod to severe tricuspid regurg severe pulm htn significant changes since 2018 echo.  H/O right and left heart catheterization 12/10/2020    DIFFUSE LAD DISEASE, Mild ECA Disease, Severe AS, Milf Pul HTN on RHC.  History of blood transfusion 12/2020    d/t anemia    History of transesophageal echocardiography (MABLE) 12/15/2020    Severe aortic stenosis (ANNA by planimetry: 0.778 cm sq).   Mild AR.    [place] (hard of hearing)     hearing tonya aides    Hx of Doppler echocardiogram 05/21/2018    EF 50%  Mild LV hypertrophy. Mildly enlarged RA. Mod aortic valve calcification with mod AS. Mitral annular calcification is present. Mild AR, MR and TR. Mild pulmonary htn.     Hyperlipidemia     Hypertension     Follows with PCP & Dr. Blaine Marsh Other disorders of kidney and ureter     Pacemaker     [employer], implanted 2014    Pneumonia 12/29/2012    Pneumonia due to COVID-19 virus 08/2020    Sleep apnea     dx 2013- has c-pap    Type II or unspecified type diabetes mellitus with other specified manifestations, uncontrolled 12/12/2012    Venous hypertension, chronic, with ulcer ([address] 12/12/2012    resolved       Past Surgical History:        Procedure Laterality Date    CABG WITH AORTIC VALVE REPLACEMENT N/A 2/16/2021    CABG CORONARY ARTERY BYPASS X2 WITH LIMA, AORTIC VALVE REPLACEMENT AND AORTIC ROOT REPAIR, INTRAOPERATIVE MABLE, INDUCED HYPOTHERMIA, LEFT LEG ENDOVEIN HARVEST, LEFT ATRIAL CLIP, AND CRYO PROCEDURE performed by Skyla Estes MD at [address]  12/14    at [address] Left 1/29/2021    LEFT CAROTID ENDARTERECTOMY performed by Dk Mitchell MD at [id number]  2017    COLONOSCOPY  2011    COLONOSCOPY N/A 11/19/2019    COL[facility] DIAGNOSTIC [facility]  09/30/2020    POSSIBLE CECAL avms, SIGMOID DIVERTICULOSIS, INTERNAL HEMORRHOIDS GRADE 1    COLONOSCOPY N/A 9/30/2020    COLONOSCOPY CONTROL HEMORRHAGE WITH APC performed by Finn Galvez MD at [facility]  2014    \"2 stents put in \"   [facility] Bilateral 3/31/2022    BILATERAL DEBRIEDMENT OF NON-VIABLE TISSUE & BONE performed by Kvng Castrejon DPM at [address]      abdominal    IR NONTUNNELED VASCULAR CATHETER  3/5/2021    IR NONTUNNELED VASCULAR CATHETER 3/5/2021 [facility] 04/12/2022       CMP:   Lab Results   Component Value Date     03/10/2022    K 4.1 03/31/2022    CL 96 03/10/2022    CO2 27 03/10/2022    BUN 39 03/10/2022    CREATININE 1.9 03/10/2022    GFRAA 42 03/10/2022    AGRATIO 1.8 11/24/2020    LABGLOM 35 03/10/2022    LABGLOM 51 06/15/2016    GLUCOSE 150 03/10/2022    PROT 6.2 03/14/2021    PROT 7.3 12/27/2012    CALCIUM 9.4 03/10/2022    BILITOT 0.5 03/14/2021    ALKPHOS 303 03/14/2021    AST 44 03/14/2021    ALT 53 03/14/2021       POC Tests: No results for input(s): POCGLU, POCNA, POCK, POCCL, POCBUN, POCHEMO, POCHCT in the last 72 hours. Coags:   Lab Results   Component Value Date    PROTIME 17.9 03/10/2022    INR 1.38 03/10/2022    APTT 40.6 03/10/2022       HCG (If Applicable): No results found for: PREGTESTUR, PREGSERUM, HCG, HCGQUANT     ABGs:   Lab Results   Component Value Date    PO2ART 71 03/05/2021    CLD3CIS 32.0 03/05/2021    BDS5PSO 23.3 03/05/2021        Type & Screen (If Applicable):  No results found for: LABABO, LABRH    Drug/Infectious Status (If Applicable):  No results found for: HIV, HEPCAB    COVID-19 Screening (If Applicable):   Lab Results   Component Value Date    COVID19 NOT DETECTED 03/10/2022           Anesthesia Evaluation  Patient summary reviewed  Airway: Mallampati: III  TM distance: >3 FB   Neck ROM: full  Mouth opening: > = 3 FB Dental:          Pulmonary: breath sounds clear to auscultation  (+) pneumonia:  shortness of breath:  sleep apnea: on CPAP,                             Cardiovascular:    (+) hypertension:, valvular problems/murmurs: MR, angina:, pacemaker: pacemaker, CAD:, CABG/stent:, dysrhythmias: atrial fibrillation, pulmonary hypertension: mild, hyperlipidemia        Rhythm: irregular                   ROS comment:    Summary   This is a limited echo to assess EF and RVSP. Left ventricular function and size is normal, EF is estimated at 55-60%.    Normally functioning prosthetic valve in aortic position with a mean   gradient of 11mmHg, AT 61msec. Moderate mitral regurgitation is present. Moderate tricuspid regurgitation. moderate pulmonary hypertension with RVSP of 56mmHg. No evidence of any pericardial effusion. Signature      ------------------------------------------------------------------   Electronically signed by Scott Gamble MD   (Interpreting physician) on 03/04/2022 at 12:58 PM   ------------------------------------------------------------------     Neuro/Psych:               GI/Hepatic/Renal:   (+) renal disease: CRI,           Endo/Other:    (+) DiabetesType II DM, poorly controlled, , blood dyscrasia: anticoagulation therapy and anemia, arthritis: OA., electrolyte abnormalities, . Abdominal:   (+) obese,           Vascular:           ROS comment: Carotid stenosis. Other Findings:             Anesthesia Plan      MAC     ASA 4     (Chart review 4/13/22)  Induction: intravenous. MIPS: Postoperative opioids intended. Anesthetic plan and risks discussed with patient. Plan discussed with CRNA. Attending anesthesiologist reviewed and agrees with Preprocedure content   Pre Anesthesia Assessment complete.  Chart reviewed on 4/13/2022        JUSTIN Ryan - CRNA   4/13/2022

## 2022-04-13 NOTE — PROGRESS NOTES
4/13/22 -  for patient: surgery @ [facility] on 4/14/22 @ 0930, arrival 0730. Please call with any questions.

## 2022-04-14 ENCOUNTER — HOSPITAL ENCOUNTER (OUTPATIENT)
Age: 72
Setting detail: OUTPATIENT SURGERY
Discharge: HOME | End: 2022-04-14
Attending: STUDENT IN AN ORGANIZED HEALTH CARE EDUCATION/TRAINING PROGRAM | Admitting: STUDENT IN AN ORGANIZED HEALTH CARE EDUCATION/TRAINING PROGRAM
Payer: MEDICARE

## 2022-04-14 ENCOUNTER — ANESTHESIA (OUTPATIENT)
Dept: OPERATING ROOM | Age: 72
End: 2022-04-14
Payer: MEDICARE

## 2022-04-14 VITALS
DIASTOLIC BLOOD PRESSURE: 70 MMHG | SYSTOLIC BLOOD PRESSURE: 110 MMHG | TEMPERATURE: 98.2 F | RESPIRATION RATE: 16 BRPM | OXYGEN SATURATION: 100 %

## 2022-04-14 VITALS
OXYGEN SATURATION: 99 % | BODY MASS INDEX: 27.3 KG/M2 | SYSTOLIC BLOOD PRESSURE: 156 MMHG | RESPIRATION RATE: 16 BRPM | HEART RATE: 89 BPM | WEIGHT: 195 LBS | HEIGHT: 71 IN | DIASTOLIC BLOOD PRESSURE: 67 MMHG | TEMPERATURE: 96.8 F

## 2022-04-14 DIAGNOSIS — M86.171 ACUTE OSTEOMYELITIS OF TOE, RIGHT (HCC): ICD-10-CM

## 2022-04-14 DIAGNOSIS — Z01.818 PRE-OP TESTING: Primary | ICD-10-CM

## 2022-04-14 DIAGNOSIS — E11.9 TYPE 2 DIABETES MELLITUS WITHOUT COMPLICATION, WITHOUT LONG-TERM CURRENT USE OF INSULIN (HCC): ICD-10-CM

## 2022-04-14 LAB
EKG ATRIAL RATE: 89 BPM
EKG DIAGNOSIS: NORMAL
EKG P-R INTERVAL: 186 MS
EKG Q-T INTERVAL: 474 MS
EKG QRS DURATION: 196 MS
EKG QTC CALCULATION (BAZETT): 576 MS
EKG R AXIS: -78 DEGREES
EKG T AXIS: 86 DEGREES
EKG VENTRICULAR RATE: 89 BPM
GLUCOSE BLD-MCNC: 88 MG/DL (ref 70–99)
GLUCOSE BLD-MCNC: 91 MG/DL (ref 70–99)

## 2022-04-14 PROCEDURE — 87077 CULTURE AEROBIC IDENTIFY: CPT

## 2022-04-14 PROCEDURE — 3700000001 HC ADD 15 MINUTES (ANESTHESIA): Performed by: STUDENT IN AN ORGANIZED HEALTH CARE EDUCATION/TRAINING PROGRAM

## 2022-04-14 PROCEDURE — 88305 TISSUE EXAM BY PATHOLOGIST: CPT

## 2022-04-14 PROCEDURE — 87075 CULTR BACTERIA EXCEPT BLOOD: CPT

## 2022-04-14 PROCEDURE — 2720000010 HC SURG SUPPLY STERILE: Performed by: STUDENT IN AN ORGANIZED HEALTH CARE EDUCATION/TRAINING PROGRAM

## 2022-04-14 PROCEDURE — 3600000002 HC SURGERY LEVEL 2 BASE: Performed by: STUDENT IN AN ORGANIZED HEALTH CARE EDUCATION/TRAINING PROGRAM

## 2022-04-14 PROCEDURE — 93005 ELECTROCARDIOGRAM TRACING: CPT | Performed by: ANESTHESIOLOGY

## 2022-04-14 PROCEDURE — 87070 CULTURE OTHR SPECIMN AEROBIC: CPT

## 2022-04-14 PROCEDURE — 2580000003 HC RX 258: Performed by: ANESTHESIOLOGY

## 2022-04-14 PROCEDURE — 2500000003 HC RX 250 WO HCPCS: Performed by: STUDENT IN AN ORGANIZED HEALTH CARE EDUCATION/TRAINING PROGRAM

## 2022-04-14 PROCEDURE — 7100000000 HC PACU RECOVERY - FIRST 15 MIN: Performed by: STUDENT IN AN ORGANIZED HEALTH CARE EDUCATION/TRAINING PROGRAM

## 2022-04-14 PROCEDURE — 7100000010 HC PHASE II RECOVERY - FIRST 15 MIN: Performed by: STUDENT IN AN ORGANIZED HEALTH CARE EDUCATION/TRAINING PROGRAM

## 2022-04-14 PROCEDURE — 93010 ELECTROCARDIOGRAM REPORT: CPT | Performed by: INTERNAL MEDICINE

## 2022-04-14 PROCEDURE — 6360000002 HC RX W HCPCS: Performed by: STUDENT IN AN ORGANIZED HEALTH CARE EDUCATION/TRAINING PROGRAM

## 2022-04-14 PROCEDURE — 88311 DECALCIFY TISSUE: CPT

## 2022-04-14 PROCEDURE — 7100000011 HC PHASE II RECOVERY - ADDTL 15 MIN: Performed by: STUDENT IN AN ORGANIZED HEALTH CARE EDUCATION/TRAINING PROGRAM

## 2022-04-14 PROCEDURE — 3700000000 HC ANESTHESIA ATTENDED CARE: Performed by: STUDENT IN AN ORGANIZED HEALTH CARE EDUCATION/TRAINING PROGRAM

## 2022-04-14 PROCEDURE — 87181 SC STD AGAR DILUTION PER AGT: CPT

## 2022-04-14 PROCEDURE — 87205 SMEAR GRAM STAIN: CPT

## 2022-04-14 PROCEDURE — 82962 GLUCOSE BLOOD TEST: CPT

## 2022-04-14 PROCEDURE — 2709999900 HC NON-CHARGEABLE SUPPLY: Performed by: STUDENT IN AN ORGANIZED HEALTH CARE EDUCATION/TRAINING PROGRAM

## 2022-04-14 PROCEDURE — 87186 SC STD MICRODIL/AGAR DIL: CPT

## 2022-04-14 PROCEDURE — 6360000002 HC RX W HCPCS

## 2022-04-14 PROCEDURE — 7100000001 HC PACU RECOVERY - ADDTL 15 MIN: Performed by: STUDENT IN AN ORGANIZED HEALTH CARE EDUCATION/TRAINING PROGRAM

## 2022-04-14 PROCEDURE — 3600000012 HC SURGERY LEVEL 2 ADDTL 15MIN: Performed by: STUDENT IN AN ORGANIZED HEALTH CARE EDUCATION/TRAINING PROGRAM

## 2022-04-14 RX ORDER — LABETALOL HYDROCHLORIDE 5 MG/ML
10 INJECTION, SOLUTION INTRAVENOUS
Status: DISCONTINUED | OUTPATIENT
Start: 2022-04-14 | End: 2022-04-14 | Stop reason: HOSPADM

## 2022-04-14 RX ORDER — FENTANYL CITRATE 50 UG/ML
25 INJECTION, SOLUTION INTRAMUSCULAR; INTRAVENOUS EVERY 5 MIN PRN
Status: DISCONTINUED | OUTPATIENT
Start: 2022-04-14 | End: 2022-04-14 | Stop reason: HOSPADM

## 2022-04-14 RX ORDER — LIDOCAINE HYDROCHLORIDE 10 MG/ML
INJECTION, SOLUTION INFILTRATION; PERINEURAL
Status: COMPLETED | OUTPATIENT
Start: 2022-04-14 | End: 2022-04-14

## 2022-04-14 RX ORDER — SODIUM CHLORIDE 0.9 % (FLUSH) 0.9 %
5-40 SYRINGE (ML) INJECTION EVERY 12 HOURS SCHEDULED
Status: DISCONTINUED | OUTPATIENT
Start: 2022-04-14 | End: 2022-04-14 | Stop reason: HOSPADM

## 2022-04-14 RX ORDER — ONDANSETRON 2 MG/ML
4 INJECTION INTRAMUSCULAR; INTRAVENOUS
Status: DISCONTINUED | OUTPATIENT
Start: 2022-04-14 | End: 2022-04-14 | Stop reason: HOSPADM

## 2022-04-14 RX ORDER — HYDRALAZINE HYDROCHLORIDE 20 MG/ML
10 INJECTION INTRAMUSCULAR; INTRAVENOUS
Status: DISCONTINUED | OUTPATIENT
Start: 2022-04-14 | End: 2022-04-14 | Stop reason: HOSPADM

## 2022-04-14 RX ORDER — SODIUM CHLORIDE 9 MG/ML
25 INJECTION, SOLUTION INTRAVENOUS PRN
Status: DISCONTINUED | OUTPATIENT
Start: 2022-04-14 | End: 2022-04-14 | Stop reason: HOSPADM

## 2022-04-14 RX ORDER — SODIUM CHLORIDE 0.9 % (FLUSH) 0.9 %
5-40 SYRINGE (ML) INJECTION PRN
Status: DISCONTINUED | OUTPATIENT
Start: 2022-04-14 | End: 2022-04-14 | Stop reason: HOSPADM

## 2022-04-14 RX ORDER — PROPOFOL 10 MG/ML
INJECTION, EMULSION INTRAVENOUS PRN
Status: DISCONTINUED | OUTPATIENT
Start: 2022-04-14 | End: 2022-04-14 | Stop reason: SDUPTHER

## 2022-04-14 RX ORDER — SODIUM CHLORIDE, SODIUM LACTATE, POTASSIUM CHLORIDE, CALCIUM CHLORIDE 600; 310; 30; 20 MG/100ML; MG/100ML; MG/100ML; MG/100ML
INJECTION, SOLUTION INTRAVENOUS CONTINUOUS
Status: DISCONTINUED | OUTPATIENT
Start: 2022-04-14 | End: 2022-04-14 | Stop reason: HOSPADM

## 2022-04-14 RX ORDER — FENTANYL CITRATE 50 UG/ML
50 INJECTION, SOLUTION INTRAMUSCULAR; INTRAVENOUS EVERY 5 MIN PRN
Status: DISCONTINUED | OUTPATIENT
Start: 2022-04-14 | End: 2022-04-14 | Stop reason: HOSPADM

## 2022-04-14 RX ORDER — LIDOCAINE HYDROCHLORIDE 20 MG/ML
INJECTION, SOLUTION INTRAVENOUS PRN
Status: DISCONTINUED | OUTPATIENT
Start: 2022-04-14 | End: 2022-04-14 | Stop reason: SDUPTHER

## 2022-04-14 RX ORDER — CEFAZOLIN SODIUM 2 G/100ML
2000 INJECTION, SOLUTION INTRAVENOUS EVERY 8 HOURS
Status: DISCONTINUED | OUTPATIENT
Start: 2022-04-14 | End: 2022-04-14 | Stop reason: HOSPADM

## 2022-04-14 RX ORDER — PROPOFOL 10 MG/ML
INJECTION, EMULSION INTRAVENOUS CONTINUOUS PRN
Status: DISCONTINUED | OUTPATIENT
Start: 2022-04-14 | End: 2022-04-14 | Stop reason: SDUPTHER

## 2022-04-14 RX ADMIN — PROPOFOL 70 MG: 10 INJECTION, EMULSION INTRAVENOUS at 10:23

## 2022-04-14 RX ADMIN — SODIUM CHLORIDE, POTASSIUM CHLORIDE, SODIUM LACTATE AND CALCIUM CHLORIDE: 600; 310; 30; 20 INJECTION, SOLUTION INTRAVENOUS at 08:17

## 2022-04-14 RX ADMIN — PROPOFOL 50 MCG/KG/MIN: 10 INJECTION, EMULSION INTRAVENOUS at 10:23

## 2022-04-14 RX ADMIN — CEFAZOLIN SODIUM 2000 MG: 2 INJECTION, SOLUTION INTRAVENOUS at 10:26

## 2022-04-14 RX ADMIN — LIDOCAINE HYDROCHLORIDE 100 MG: 20 INJECTION, SOLUTION INTRAVENOUS at 10:23

## 2022-04-14 ASSESSMENT — PULMONARY FUNCTION TESTS
PIF_VALUE: 1
PIF_VALUE: 14
PIF_VALUE: -1
PIF_VALUE: 1
PIF_VALUE: 2
PIF_VALUE: 1
PIF_VALUE: 1
PIF_VALUE: 10
PIF_VALUE: 1

## 2022-04-14 ASSESSMENT — PAIN SCALES - GENERAL
PAINLEVEL_OUTOF10: 0
PAINLEVEL_OUTOF10: 0

## 2022-04-14 ASSESSMENT — PAIN - FUNCTIONAL ASSESSMENT: PAIN_FUNCTIONAL_ASSESSMENT: 0-10

## 2022-04-14 NOTE — PROGRESS NOTES
1143  Pt back to Same Day from PACU per cart. Pt is awake and alert--skin pink, W&D. Pt denies any pain or nausea. Report received from [facility] (AARON VALDIVIA). Pt given soda and crackers per request.  VS rechecked and stable. Drsg with ace wrap intact and dry to Rt foot. 1205  Pt remains free of nausea and pain. VS remain stable. IV dc'd for pt to get dressed to go home. Pt and wife instructed for discharge care and follow-up with understanding voiced. 1220 Pt to car at exit per wheelchair.

## 2022-04-14 NOTE — OP NOTE
Operative Note      Patient: Concetta Sharpe  YOB: 1950  MRN: [id number]    Date of Procedure: 4/14/2022    Pre-Op Diagnosis:   Acute osteomyelitis of toe, right ([facility] [M86.171]  Nonpressure chronic ulceration down to the level of bone right foot  Uncontrolled type 2 diabetes mellitus with peripheral neuropathy  Peripheral arterial disease    Post-Op Diagnosis: Same       Procedure:  Partial second toe amputation right foot      Surgeon(s):  Terence Avelar DPM    Assistant:   * No surgical staff found *    Anesthesia: Monitor Anesthesia Care    Estimated Blood Loss (mL): Minimal    Complications: None    Specimens:   ID Type Source Tests Collected by Time Destination   1 : Right second toe deep soft tissue culture Tissue Tissue CULTURE, TISSUE Terence Avelar, MOMO 4/14/2022 1042    A : Right second toe Specimen Toe SURGICAL PATHOLOGY Terence Avelar, MOMO 4/14/2022 1047        Implants:  * No implants in log *      Drains: * No LDAs found *    Findings: Good bleeding noted to the surgical site. Do believe that I removed all residual infection. Residual bone noted to be of adequate quality. No deep infection noted. Detailed Description of Procedure: This is a patient who I been seeing in my outpatient office for concern of chronic infection and toe wounds to the left third toe and the right second toe. Last week did amputation of the left third toe for bone infection. At the visit the week before surgery for the left third toe patient had a wound to the right second toe. We did get a bone scan that was negative for any underlying bone infection so we attempted limb salvage with conservative treatment and oral antibiotics per infectious disease. Patient followed up last week and exposed bone was noted with worsening signs of wound infection. Recommended second toe amputation at that time. Patient elects to proceed with that treatment today.   All risks, benefits, and complications of the procedure were explained to the patient and his wife with their full understanding. No guarantees were given or implied. Patient has had nothing to eat or drink since midnight in anticipation for surgery today. Went over consent and signed the appropriate lower extremity with the patient in the preoperative holding area today. All questions answered to their satisfaction. Patient taken to the preoperative holding area to the operating room placed on the operating table in the supine position. 2 g of IV Ancef administered by the anesthesia team.  After MAC anesthesia by the anesthesia service the right lower extremities marked prepped and draped in usual sterile aseptic fashion. A well-padded pneumatic ankle tourniquet was applied to the right lower extremity. Timeout was performed and the appropriate surgical site and procedure to be performed was confirmed by myself and the operating staff. Ready agreed. At this time 10 cc of 1% lidocaine plain was injected as a second toe digital block of the right foot. A skin marker was then utilized to draw out a fishmouth incision over the midshaft of the proximal phalanx. #15 blade was utilized to make a full-thickness incision over this marked out incision down to the level of bone. Soft tissue attachments were then freed up to gain access to the shaft of the proximal phalanx. Sagittal saw was utilized to resect the bone at this juncture. The toe was then disarticulated from the foot and passed to the back table. Deep soft tissue culture was then obtained and sent to microbiology to guide further antibiotic treatment in the future if needed. Good bleeding was noted to the surgical site. No deep residual infection was noted. Residual bone quality noted to be adequate. The incision was then flushed with copious amounts of sterile saline. Skin edges were then reapproximated in a tensionless fashion with 3-0 nylon suture.   A modified dry sterile compression dressing was then applied to the right foot. Pneumatic ankle tourniquet was not inflated at all during the case. Patient tolerated the procedure and anesthesia well. He was taken from the operating room to the PACU with vital signs stable neurovascular status intact. Patient can be weightbearing as tolerated to the right foot in a surgical shoe. He is to keep the dressing clean dry and intact until I see him again in the office next Wednesday for his first postoperative visit. Explained to the patient and his wife that he has a high risk for wound healing complications further bone infection and possible higher amputation including below-knee amputation given his uncontrolled diabetes with peripheral arterial disease. They do understand this. These findings may require return to the operating room in the near future as well. We will monitor him closely on a weekly basis in the office. Any questions before I see him again he can contact the office. Any serious concerns to go the emergency room. Please contact any questions.       Terence Avelar DPM    [facility] [address] [place] [address] and Ankle Surgery      Electronically signed by Terence Avelar DPM on 4/14/2022 at 10:59 AM

## 2022-04-14 NOTE — ANESTHESIA POSTPROCEDURE EVALUATION
Department of Anesthesiology  Postprocedure Note    Patient: Kenia Lang  MRN: [id number]  YOB: 1950  Date of evaluation: 4/14/2022  Time:  11:14 AM     Procedure Summary     Date: 04/14/22 Room / Location: [place] [address] / [facility]    Anesthesia Start: 3391 Anesthesia Stop: 1114    Procedures:       RIGHT 2ND TOE AMPUTATION (Right Second Toe)      RIGHT FOOT DEBRIDEMENT INCISION AND DRAINAGE (Right Foot) Diagnosis:       Acute osteomyelitis of toe, right ([address]      (Acute osteomyelitis of toe, right ([address] [S27.480])    Surgeons: Scott Balbuena DPM Responsible Provider: Pratik Cobb MD    Anesthesia Type: MAC ASA Status: 4          Anesthesia Type: MAC    Sanju Phase I: Sanju Score: 8    Sanju Phase II:      Last vitals: Reviewed and per EMR flowsheets.        Anesthesia Post Evaluation    Patient location during evaluation: bedside  Patient participation: complete - patient participated  Level of consciousness: awake and alert  Pain score: 0  Airway patency: patent  Nausea & Vomiting: no vomiting and no nausea  Complications: no  Cardiovascular status: hemodynamically stable  Respiratory status: spontaneous ventilation, acceptable and face mask  Hydration status: stable  Comments: NO ST CHANGES EKG, NO CP NO SOB

## 2022-04-14 NOTE — H&P
Podiatric Foot and Ankle Surgery H&P      HISTORY OF PRESENT ILLNESS:    The patient with significant past medical history of atrial fibrillation, coronary artery disease, chronic kidney disease, type 2 diabetes mellitus with peripheral neuropathy, history of toe amputation left foot who presents today for surgical correction of bone infection on the right foot. Patient has a chronic second toe wound to the right foot that is down to the level of bone that has been exposed now. Plan today for second toe amputation of the right foot with excisional debridement of all nonviable tissue and bone. Patient has had nothing to eat or drink since midnight in anticipation for surgery today. No constitutional symptoms today. Patient still taking oral antibiotics that have been prescribed to him per infectious disease who he has been following up with on a regular basis    Past Medical History:        Diagnosis Date    Arrhythmia     Pacemaker placed aprox 5 years ago for A Fib per patient    Arthritis 12/2013    rt wrist    Atrial fibrillation ([facility] [address]     on Xarelto - Dr. Blayne Hutson CAD (coronary artery disease) 06/18/2014    see dr Joe Sullivan kidney disease, stage III (moderate) ([facility] [address] 07/07/2016    Critical illness myopathy 03/15/2021    Diabetes mellitus ([facility] [address] [facility]     dx 2004    Diabetic neuropathy associated with type 2 diabetes mellitus ([facility] [address] [facility] 04/23/2019    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    Gout 04/2019    \"got gout when had pacer put in because they did not give me my medication for gout \"    H/O 24 hour EKG monitoring 10/03/2013    no afib noted, sinsus rhythm    H/O cardiovascular stress test 05/12/2014    cardiolite- mild ischemia RCA EF50%    H/O echocardiogram 12/01/2020    EF 55-60% severe aortic stenosis mild to mod aortic regurg mod to severe tricuspid regurg severe pulm htn significant changes since 2018 echo.      H/O right and left heart catheterization 12/10/2020    DIFFUSE LAD DISEASE, Mild ECA Disease, Severe AS, Milf Pul HTN on RHC.  History of blood transfusion 12/2020    d/t anemia    History of transesophageal echocardiography (MABLE) 12/15/2020    Severe aortic stenosis (ANNA by planimetry: 0.778 cm sq). Mild AR.    [place] (hard of hearing)     hearing tonya aides    Hx of Doppler echocardiogram 05/21/2018    EF 50%  Mild LV hypertrophy. Mildly enlarged RA. Mod aortic valve calcification with mod AS. Mitral annular calcification is present. Mild AR, MR and TR. Mild pulmonary htn.     Hyperlipidemia     Hypertension     Follows with PCP & Dr. Sushant Fuentes Other disorders of kidney and ureter     Pacemaker     Medtronic, implanted 2014    Pneumonia 12/29/2012    Pneumonia due to COVID-19 virus 08/2020    Sleep apnea     dx 2013- has c-pap    Type II or unspecified type diabetes mellitus with other specified manifestations, uncontrolled 12/12/2012    Venous hypertension, chronic, with ulcer ([address] 12/12/2012    resolved     Past Surgical History:        Procedure Laterality Date    CABG WITH AORTIC VALVE REPLACEMENT N/A 2/16/2021    CABG CORONARY ARTERY BYPASS X2 WITH LIMA, AORTIC VALVE REPLACEMENT AND AORTIC ROOT REPAIR, INTRAOPERATIVE MABLE, INDUCED HYPOTHERMIA, LEFT LEG ENDOVEIN HARVEST, LEFT ATRIAL CLIP, AND CRYO PROCEDURE performed by Stefany Gutierrez MD at [facility]  12/14    at [address] [facility] [address] Left 1/29/2021    LEFT CAROTID ENDARTERECTOMY performed by Johny Lr MD at [id number]  2017    COLONOSCOPY  2011    COLONOSCOPY N/A 11/19/2019    COLONOSCOPY DIAGNOSTIC performed by Tessy Lyle MD at [facility]  09/30/2020    POSSIBLE CECAL avms, SIGMOID DIVERTICULOSIS, INTERNAL HEMORRHOIDS GRADE 1    COLONOSCOPY N/A 9/30/2020    COLONOSCOPY CONTROL HEMORRHAGE WITH APC performed by Tessy Lyle MD at [facility]  2014    \"2 stents put in \"    FOOT DEBRIDEMENT Bilateral 3/31/2022    BILATERAL DEBRIEDMENT OF NON-VIABLE TISSUE & BONE performed by Paul Dickson DPM at [address]      abdominal    IR NONTUNNELED VASCULAR CATHETER  3/5/2021    IR NONTUNNELED VASCULAR CATHETER 3/5/2021 [facility] SPECIAL PROCEDURES    JOINT REPLACEMENT  2004    total left hip    OTHER SURGICAL HISTORY Right 12/02/2017    I&D; evacuation of hematoma right hip    OTHER SURGICAL HISTORY  09/17/2020    enteroscopy    PACEMAKER INSERTION N/A 2/23/2021    PACEMAKER GENERATOR LEAD REVISION performed by Katrina Eaton MD at [facility]      9/18/14 Status post remote permanent pacemaker with atrial lead dislodgement.  7/24/14 PPM Implant    TOE AMPUTATION Bilateral 3/31/2022    LEFT 3RD TOE AMPUTATION performed by Paul Dickson DPM at [address] [place] [address] N/A 9/17/2020    ENTEROSCOPY PUSH BIOPSY performed by Babak Aguila MD at [address] [place] [address] N/A 3/4/2021    EGD DIAGNOSTIC ONLY performed by Babak Aguila MD at [address] [place] [address]  2012    \"have stents in both legs- done in [place]      POLYSACCHARIDE:[id number]}    Medications Prior to Admission:   Medications Prior to Admission: simvastatin (ZOCOR) 20 MG tablet, Take 1 tablet by mouth daily  [DISCONTINUED] apixaban (ELIQUIS) 5 MG TABS tablet, Take 1 tablet by mouth 2 times daily  Dulaglutide 3 MG/0.5ML SOPN, Inject 3 mg into the skin once a week (Patient taking differently: Inject 3 mg into the skin once a week Fridays.)  gabapentin (NEURONTIN) 300 MG capsule, TAKE 1 CAPSULE 3 TIMES A   DAY  torsemide (DEMADEX) 20 MG tablet, Take 1 tablet by mouth 2 times daily  apixaban (ELIQUIS) 5 MG TABS tablet, Take 1 tablet by mouth 2 times daily  canagliflozin (INVOKANA) 300 MG TABS tablet, Take 1 tablet by mouth every morning (before breakfast)  glimepiride (AMARYL) 4 MG tablet, Take 1 tablet by mouth every morning (before breakfast)  sildenafil (VIAGRA) 100 MG tablet, TAKE 1 TABLET DAILY AS     NEEDED FOR ERECTILE        DYSFUNCTION  carboxymethylcellulose (REFRESH PLUS) 0.5 % SOLN ophthalmic solution, as needed   Cholecalciferol (VITAMIN D) 50 MCG (2000 UT) CAPS capsule, Take by mouth nightly   aspirin 81 MG tablet, Take 81 mg by mouth daily    Allergies:  Patient has no known allergies. Social History:     Family History:       Problem Relation Age of Onset    High Blood Pressure Mother     Arthritis Mother     Diabetes Mother     Heart Disease Mother     High Blood Pressure Father     Heart Disease Father     Kidney Disease Father      REVIEW OF SYSTEMS:    Negative other than those mentioned in the HPI    PHYSICAL EXAM:    VITALS:  /71   Pulse 89   Temp 97.5 °F (36.4 °C) (Temporal)   Resp 16   Ht 5' 11\" (1.803 m)   Wt 195 lb (88.5 kg)   SpO2 97%   BMI 27.20 kg/m²     Neurovascular status unchanged from previous assessment in the office yesterday  Dermatologic: Full-thickness wound with exposed bone distal aspect second toe of the right foot. Erythema to the area improved from previous assessment. No crepitus fluctuance or malodor. Surgical amputation third toe of the left foot with central area of wound dehiscence. No signs of infection. Musculoskeletal: No pain on palpation to affected area of the right foot      Assessment:  -Osteomyelitis right foot  -Nonpressure ulceration down to level of bone right foot  -Type 2 diabetes mellitus with peripheral neuropathy  -Peripheral arterial disease      Plan:  Surgery was discussed at length today with the patient. This included the potential risks, conditions as well as the postoperative course. The risks include but are not limited to: Pain, infection, swelling, worsening or no better.   We rediscussed the risk for skin complications or wound complication/nerve related complications and/or bone related complications, hardware complications, bone healing complications, failure of procedure, recurrence, future surgery required, death, anesthesia risks. Blood clots as well as other numerous risks were discussed at length today. Patient understands all this and is agreeable. The postoperative course was explained in detail today as well as the weightbearing status, the need to keep the bandage clean, dry, and intact, and other post procedural specific care was all discussed at length. Postoperative instruction was given and reviewed with the patient. Surgical consents were reviewed today and signed appropriately. We discussed anticoagulation therapy and DVT prophylaxis. This was discussed at length. Proper prescription to be given to patient based on our discussion today. We discussed the signs and symptoms of DVT and PE at length.      -Patient was seen, evaluated, treated at bedside this morning. Wife present.  -Bone infection with exposed bone second toe of the right foot. Conservative and surgical treatment options discussed in detail with patient. This wound was present a few weeks ago but did not look as severe. Patient wanted to try limb salvage with conservative measures via oral antibiotics that he was taking and local wound care. When I saw him for his follow-up visit from his amputation of his left foot the second toe on the right foot looked worse with exposed bone. Went through treatment options with him and he would like to proceed with second toe amputation today.  -All risks, benefits, complications of the procedure were explained to the patient with his full understanding. No guarantees were given or implied.  -Patient is scheduled to see Dr. Amelia Urbina next week.   Stressed importance of this and following up with him to optimize healing given his peripheral arterial disease  -Discussed with patient that he is at high risk for wound healing complications, further bone infection, amputation, below-knee amputation given uncontrolled diabetes, peripheral arterial disease, history of wounds.   He does understand this  -Patient will follow up with me as an outpatient upon discharge     Miriam Loza DPM    [facility] [address] [place] [address] and Ankle Surgery

## 2022-04-15 RX ORDER — GLIMEPIRIDE 4 MG/1
TABLET ORAL
Qty: 90 TABLET | Refills: 1 | Status: SHIPPED | OUTPATIENT
Start: 2022-04-15 | End: 2022-05-09 | Stop reason: SDUPTHER

## 2022-04-18 ENCOUNTER — OFFICE VISIT (OUTPATIENT)
Dept: CARDIOLOGY CLINIC | Age: 72
End: 2022-04-18
Payer: MEDICARE

## 2022-04-18 VITALS
OXYGEN SATURATION: 97 % | HEIGHT: 71 IN | DIASTOLIC BLOOD PRESSURE: 70 MMHG | HEART RATE: 90 BPM | SYSTOLIC BLOOD PRESSURE: 128 MMHG | BODY MASS INDEX: 28 KG/M2 | WEIGHT: 200 LBS

## 2022-04-18 DIAGNOSIS — Z95.0 CARDIAC PACEMAKER IN SITU: ICD-10-CM

## 2022-04-18 DIAGNOSIS — I38 VHD (VALVULAR HEART DISEASE): Primary | ICD-10-CM

## 2022-04-18 DIAGNOSIS — I48.0 PAF (PAROXYSMAL ATRIAL FIBRILLATION) (HCC): ICD-10-CM

## 2022-04-18 PROCEDURE — 4040F PNEUMOC VAC/ADMIN/RCVD: CPT | Performed by: NURSE PRACTITIONER

## 2022-04-18 PROCEDURE — 3017F COLORECTAL CA SCREEN DOC REV: CPT | Performed by: NURSE PRACTITIONER

## 2022-04-18 PROCEDURE — 1036F TOBACCO NON-USER: CPT | Performed by: NURSE PRACTITIONER

## 2022-04-18 PROCEDURE — G8427 DOCREV CUR MEDS BY ELIG CLIN: HCPCS | Performed by: NURSE PRACTITIONER

## 2022-04-18 PROCEDURE — G8417 CALC BMI ABV UP PARAM F/U: HCPCS | Performed by: NURSE PRACTITIONER

## 2022-04-18 PROCEDURE — 99214 OFFICE O/P EST MOD 30 MIN: CPT | Performed by: NURSE PRACTITIONER

## 2022-04-18 PROCEDURE — 1123F ACP DISCUSS/DSCN MKR DOCD: CPT | Performed by: NURSE PRACTITIONER

## 2022-04-18 ASSESSMENT — ENCOUNTER SYMPTOMS
ORTHOPNEA: 0
SHORTNESS OF BREATH: 0
COLOR CHANGE: 1

## 2022-04-18 NOTE — LETTER
Rolinda Biles Dr. Sharyle Furlough  1950  [id number]    Have you had any Chest Pain that is not new? - No    Have you had any Shortness of Breath - Yes  If Yes - When on exertion    Have you had any dizziness - No      Have you had any palpitations that are not new?  - No    Do you have any edema - No      When did you have your last labs drawn 4/12/22    Do you have a surgery or procedure scheduled in the near future - No       [id number] Score for Atrial Fibrillation Stroke Risk   Risk   Factors  Component Value   C CHF No 0   H HTN Yes 1   A2 Age >= 76 No,  (69 y.o.) 0   D DM Yes 1   S2 Prior Stroke/TIA No 0   V Vascular Disease No 0   A Age 74-69 Yes,  (69 y.o.) 1   Sc Sex male 0    [id number]  Score  3   Score last updated 4/18/22 8:50 AM EDT

## 2022-04-18 NOTE — PATIENT INSTRUCTIONS
Please be informed that if you contact our office outside of normal business hours the physician on call cannot help with any scheduling or rescheduling issues, procedure instruction questions or any type of medication issue. We advise you for any urgent/emergency that you go to the nearest emergency room! PLEASE CALL OUR OFFICE DURING NORMAL BUSINESS HOURS    Monday - Friday   8 am to 5 pm    [place]: [place] [address]: [phone]    [place]:  [phone]      **It is YOUR responsibilty to bring medication bottles and/or updated medication list to [address] [place] [address].  This will allow us to better serve you and all your healthcare needs**

## 2022-04-18 NOTE — PROGRESS NOTES
4/18/2022  Primary cardiologist: Dr. Dario Howell  is an established 74 y.o.  male here for a 1 month follow up on atrial fib/permanent pacemaker/valvular heart disease      SUBJECTIVE/OBJECTIVE:    Pastor Almeida is a pleasant 75 y/o gentleman with a history of paroxysmal atrial fibrillation s/p MAZE, CAD, s/p PCI and CABG, valvular heart disease s/p AVR, hypertension, hyperlipidemia, carotid artery disease s/p Left CEA, CKD, anemia, pulmonary HTN  and dual chamber pacemaker. In February 2021 [place] underwent CABG x 2 with LIMA to LAD and SVG to distal RCA, AVR with a #27 mm Medtronic Mosaic bio prosthetic prosthesis, aortic root enlargement with CorMatrix patch, left atrial Maze procedure occluding pulmonary vein isolation using bipolar and epicardial roof and floor line and a 45 mm left atrial clip placement.  Unfortunately developed diabetic ulcers  of the toe to the left foot and of the toe to the right foot    HPI :   Ariana Black reports he recently underwent left third toe amputation. Unfortunately he developed ulcer to the second right toe and underwent partial second toe amputation of the right foot. (04/14/2022). He continues with dressing to the right foot. States has been afebrile. He notes shortness of breath with exertion that comes and goes. He also notes episodes of palpitations. Reports mild sitting he may feel his heart race. Episodes last for less than 1 minute. Review of Systems   Constitutional: Negative for diaphoresis and malaise/fatigue. Cardiovascular: Positive for dyspnea on exertion and palpitations. Negative for chest pain, claudication, irregular heartbeat, leg swelling, near-syncope, orthopnea and paroxysmal nocturnal dyspnea. Respiratory: Negative for shortness of breath. Skin: Positive for color change (lower legs). Neurological: Negative for dizziness and light-headedness.        Vitals:    04/18/22 0845   BP: 128/70   Site: Left Upper Arm   Position: Sitting   Cuff Size: Medium Adult   Pulse: 90   SpO2: 97%   Weight: 200 lb (90.7 kg)   Height: 5' 11\" (1.803 m)     Patient-Reported Vitals 4/8/2021   Patient-Reported Weight 185   Patient-Reported Height 5'11\"   Patient-Reported Systolic 036   Patient-Reported Diastolic 66   Patient-Reported Pulse 67   Patient-Reported Temperature 98.2     Wt Readings from Last 3 Encounters:   04/18/22 200 lb (90.7 kg)   04/14/22 195 lb (88.5 kg)   04/07/22 201 lb 6.4 oz (91.4 kg)     Body mass index is 27.89 kg/m². Physical Exam  HENT:      Head: Normocephalic. Mouth/Throat:      Mouth: Mucous membranes are moist.   Eyes:      Pupils: Pupils are equal, round, and reactive to light. Cardiovascular:      Rate and Rhythm: Normal rate and regular rhythm. Pulses: Normal pulses. Heart sounds: Normal heart sounds. No murmur heard. Pulmonary:      Effort: Pulmonary effort is normal.      Breath sounds: Normal breath sounds. Abdominal:      General: There is no distension. Tenderness: There is no abdominal tenderness. Musculoskeletal:      Right lower leg: No edema. Left lower leg: No edema. Skin:     General: Skin is warm and dry. Capillary Refill: Capillary refill takes less than 2 seconds. Comments: Dressing to the right foot   Neurological:      General: No focal deficit present. Mental Status: He is alert.                 Current Outpatient Medications   Medication Sig Dispense Refill    glimepiride (AMARYL) 4 MG tablet TAKE 1 TABLET IN THE       MORNING (BEFORE BREAKFAST) 90 tablet 1    simvastatin (ZOCOR) 20 MG tablet Take 1 tablet by mouth daily 90 tablet 3    Dulaglutide 3 MG/0.5ML SOPN Inject 3 mg into the skin once a week (Patient taking differently: Inject 3 mg into the skin once a week Fridays.) 12 pen 0    gabapentin (NEURONTIN) 300 MG capsule TAKE 1 CAPSULE 3 TIMES A    capsule 0    torsemide (DEMADEX) 20 MG tablet Take 1 tablet by mouth 2 times daily 180 tablet 3    apixaban (ELIQUIS) 5 MG TABS tablet Take 1 tablet by mouth 2 times daily 28 tablet 0    sildenafil (VIAGRA) 100 MG tablet TAKE 1 TABLET DAILY AS     NEEDED FOR ERECTILE        DYSFUNCTION 30 tablet 1    carboxymethylcellulose (REFRESH PLUS) 0.5 % SOLN ophthalmic solution as needed       Cholecalciferol (VITAMIN D) 50 MCG (2000 UT) CAPS capsule Take by mouth nightly       aspirin 81 MG tablet Take 81 mg by mouth daily      canagliflozin (INVOKANA) 300 MG TABS tablet Take 1 tablet by mouth every morning (before breakfast) (Patient not taking: Reported on 4/18/2022) 90 tablet 1     No current facility-administered medications for this visit. All pertinent data reviewed and discussed with patient       ASSESSMENT/PLAN:    1. VHD (valvular heart disease)  Limited echo from March 2022 reviewed: Normal functioning prosthetic aortic valve in aortic position with a mean gradient 11 mmHg, AT 61 ms: moderate mitral regurgitation and moderate tricuspid regurg. There is moderate pulmonary hypertension with RVSP of 56 mmHg : On Demadex-recommend to continue Demadex 20 mg twice daily  Continue with ongoing surveillance    2. PAF (paroxysmal atrial fibrillation) ([facility] [address] [address]  Noted to have episodes of PAF on pacemaker-with longest episode lasting 24 minutes. May have a difficult time maintaining sinus rhythm with mitral valve disease with associated dilated atrium  Recommend to continue with Eliquis for his anticoagulation for stroke prevention. Denies episodes of bleeding or falls. Will refer back to Dr. Chelle Pierce for further recommendations in regards to PAF     3. Cardiac pacemaker in situ  Medtronic dual-chamber pacemaker  Pacemaker has reached recommended replacement time. Battery voltage is 2.82 V. He has been evaluated by Dr. Chelle Pierce electrophysiologist for replacement-replacement was placed on hold secondary to infection of foot/osteomyelitis and is s/p amputation.   Discussed with Mack Razo NP electrophysiology: will arrange for device replacement - to call patient with date and time. Medications reviewed and confirmed with patient     Tests ordered:  none      Follow-up  After PPM     Signed:  JUSTIN Andrade CNP, 4/18/2022, 9:10 AM    An electronic signature was used to authenticate this note. Please note this report has been partially produced using speech recognition software and may contain errors related to that system including errors in grammar, punctuation, and spelling, as well as words and phrases that may be inappropriate. If there are any questions or concerns please feel free to contact the dictating provider for clarification.

## 2022-04-19 LAB
CULTURE: ABNORMAL
Lab: ABNORMAL
SPECIMEN: ABNORMAL

## 2022-04-21 ENCOUNTER — TELEPHONE (OUTPATIENT)
Dept: CARDIOLOGY CLINIC | Age: 72
End: 2022-04-21

## 2022-04-21 NOTE — TELEPHONE ENCOUNTER
Procedure scheduled with Dr Janny Roth rescheduled from 5/4/2022 to 4/26/2022. Patient aware and agrees with date change. Dr Nima Kuhn will be on standby during procedure. E-mail sent to surgery scheduling team to update patient procedure date.

## 2022-04-21 NOTE — TELEPHONE ENCOUNTER
Spoke with patient regarding rescheduling LEAD EXTRACTION/LEAD REPLACEMENT with Dual Chamber Pacemaker Generator Change previously scheduled with Dr Maritza Zimmerman but cancelled due to toe infection. Patient recently had right toe amputation and follow up yesterday with surgeon for suture removal. Patient states visit went well and no issues noted. Will reschedule patients procedure for 5/4/2022 and discussed with Mo Maxwell RN at [place] [address] and Dr Edita Mittal will be on standby. Did also call Dr Janessa Singer office requesting Dr Janessa Singer opinion on patients status related to toe infection. Will await return call.

## 2022-04-25 ENCOUNTER — HOSPITAL ENCOUNTER (OUTPATIENT)
Age: 72
Discharge: HOME | End: 2022-04-25
Payer: MEDICARE

## 2022-04-25 ENCOUNTER — HOSPITAL ENCOUNTER (OUTPATIENT)
Age: 72
Setting detail: SPECIMEN
Discharge: HOME | End: 2022-04-25
Payer: MEDICARE

## 2022-04-25 ENCOUNTER — ANESTHESIA EVENT (OUTPATIENT)
Dept: CARDIAC CATH/INVASIVE PROCEDURES | Age: 72
End: 2022-04-25
Payer: MEDICARE

## 2022-04-25 ENCOUNTER — NURSE ONLY (OUTPATIENT)
Dept: CARDIOLOGY CLINIC | Age: 72
End: 2022-04-25

## 2022-04-25 LAB
ANION GAP SERPL CALCULATED.3IONS-SCNC: 11 MMOL/L (ref 4–16)
APTT: 36.9 SECONDS (ref 25.1–37.1)
BASOPHILS ABSOLUTE: 0.1 K/CU MM
BASOPHILS RELATIVE PERCENT: 1.1 % (ref 0–1)
BUN BLDV-MCNC: 51 MG/DL (ref 6–23)
CALCIUM SERPL-MCNC: 9 MG/DL (ref 8.3–10.6)
CHLORIDE BLD-SCNC: 98 MMOL/L (ref 99–110)
CO2: 28 MMOL/L (ref 21–32)
CREAT SERPL-MCNC: 1.4 MG/DL (ref 0.9–1.3)
DIFFERENTIAL TYPE: ABNORMAL
EOSINOPHILS ABSOLUTE: 0.3 K/CU MM
EOSINOPHILS RELATIVE PERCENT: 5.3 % (ref 0–3)
GFR AFRICAN AMERICAN: 60 ML/MIN/1.73M2
GFR NON-AFRICAN AMERICAN: 50 ML/MIN/1.73M2
GLUCOSE BLD-MCNC: 237 MG/DL (ref 70–99)
HCT VFR BLD CALC: 43.6 % (ref 42–52)
HEMOGLOBIN: 12.9 GM/DL (ref 13.5–18)
IMMATURE NEUTROPHIL %: 0.4 % (ref 0–0.43)
INR BLD: 1.15 INDEX
LYMPHOCYTES ABSOLUTE: 0.9 K/CU MM
LYMPHOCYTES RELATIVE PERCENT: 15.9 % (ref 24–44)
MAGNESIUM: 2.5 MG/DL (ref 1.8–2.4)
MCH RBC QN AUTO: 24.5 PG (ref 27–31)
MCHC RBC AUTO-ENTMCNC: 29.6 % (ref 32–36)
MCV RBC AUTO: 82.7 FL (ref 78–100)
MONOCYTES ABSOLUTE: 0.8 K/CU MM
MONOCYTES RELATIVE PERCENT: 13.8 % (ref 0–4)
NUCLEATED RBC %: 0 %
PDW BLD-RTO: 16.1 % (ref 11.7–14.9)
PHOSPHORUS: 4 MG/DL (ref 2.5–4.9)
PLATELET # BLD: 140 K/CU MM (ref 140–440)
PMV BLD AUTO: 10.4 FL (ref 7.5–11.1)
POTASSIUM SERPL-SCNC: 4.4 MMOL/L (ref 3.5–5.1)
PROTHROMBIN TIME: 14.8 SECONDS (ref 11.7–14.5)
RBC # BLD: 5.27 M/CU MM (ref 4.6–6.2)
SEGMENTED NEUTROPHILS ABSOLUTE COUNT: 3.6 K/CU MM
SEGMENTED NEUTROPHILS RELATIVE PERCENT: 63.5 % (ref 36–66)
SODIUM BLD-SCNC: 137 MMOL/L (ref 135–145)
TOTAL IMMATURE NEUTOROPHIL: 0.02 K/CU MM
TOTAL NUCLEATED RBC: 0 K/CU MM
WBC # BLD: 5.7 K/CU MM (ref 4–10.5)

## 2022-04-25 PROCEDURE — U0005 INFEC AGEN DETEC AMPLI PROBE: HCPCS

## 2022-04-25 PROCEDURE — U0003 INFECTIOUS AGENT DETECTION BY NUCLEIC ACID (DNA OR RNA); SEVERE ACUTE RESPIRATORY SYNDROME CORONAVIRUS 2 (SARS-COV-2) (CORONAVIRUS DISEASE [COVID-19]), AMPLIFIED PROBE TECHNIQUE, MAKING USE OF HIGH THROUGHPUT TECHNOLOGIES AS DESCRIBED BY CMS-2020-01-R: HCPCS

## 2022-04-25 PROCEDURE — 85025 COMPLETE CBC W/AUTO DIFF WBC: CPT

## 2022-04-25 PROCEDURE — 83735 ASSAY OF MAGNESIUM: CPT

## 2022-04-25 PROCEDURE — 80048 BASIC METABOLIC PNL TOTAL CA: CPT

## 2022-04-25 PROCEDURE — 85610 PROTHROMBIN TIME: CPT

## 2022-04-25 PROCEDURE — 84100 ASSAY OF PHOSPHORUS: CPT

## 2022-04-25 PROCEDURE — 36415 COLL VENOUS BLD VENIPUNCTURE: CPT

## 2022-04-25 PROCEDURE — 85730 THROMBOPLASTIN TIME PARTIAL: CPT

## 2022-04-25 ASSESSMENT — ENCOUNTER SYMPTOMS: SHORTNESS OF BREATH: 1

## 2022-04-25 NOTE — ANESTHESIA PRE PROCEDURE
Department of Anesthesiology  Preprocedure Note       Name:  Lazara Guzman   Age:  74 y.o.  :  1950                                          MRN:  [id number]         Date:  2022      Surgeon: * No surgeons listed *    Procedure: * No procedures listed *    Medications prior to admission:   Prior to Admission medications    Medication Sig Start Date End Date Taking? Authorizing Provider   glimepiride (AMARYL) 4 MG tablet TAKE 1 TABLET IN THE       MORNING (BEFORE BREAKFAST) 4/15/22   Kaleb Mitchell DO   simvastatin (ZOCOR) 20 MG tablet Take 1 tablet by mouth daily 3/21/22 6/19/22  JUSTIN Byrne CNP   Dulaglutide 3 MG/0.5ML SOPN Inject 3 mg into the skin once a week  Patient taking differently: Inject 3 mg into the skin once a week .  3/17/22 6/15/22  SATISH Hartmann   gabapentin (NEURONTIN) 300 MG capsule TAKE 1 CAPSULE 3 TIMES A   DAY 3/17/22 6/17/22  SATISH Hartmann   torsemide (DEMADEX) 20 MG tablet Take 1 tablet by mouth 2 times daily 21   Mahesh Sandoval MD   apixaban (ELIQUIS) 5 MG TABS tablet Take 1 tablet by mouth 2 times daily 21   JUSTIN Byrne CNP   canagliflozin (INVOKANA) 300 MG TABS tablet Take 1 tablet by mouth every morning (before breakfast)  Patient not taking: Reported on 2022   Kaleb Mitchell DO   sildenafil (VIAGRA) 100 MG tablet TAKE 1 TABLET DAILY AS     NEEDED FOR ERECTILE        DYSFUNCTION 21   Kaleb Mitchell DO   carboxymethylcellulose (REFRESH PLUS) 0.5 % SOLN ophthalmic solution as needed  20   Historical Provider, MD   Cholecalciferol (VITAMIN D) 50 MCG (2000) CAPS capsule Take by mouth nightly     Historical Provider, MD   aspirin 81 MG tablet Take 81 mg by mouth daily    Historical Provider, MD       Current medications:    Current Outpatient Medications   Medication Sig Dispense Refill    glimepiride (AMARYL) 4 MG tablet TAKE 1 TABLET IN THE       MORNING (BEFORE BREAKFAST) 90 tablet 1    simvastatin (ZOCOR) 20 MG tablet Take 1 tablet by mouth daily 90 tablet 3    Dulaglutide 3 MG/0.5ML SOPN Inject 3 mg into the skin once a week (Patient taking differently: Inject 3 mg into the skin once a week Fridays.) 12 pen 0    gabapentin (NEURONTIN) 300 MG capsule TAKE 1 CAPSULE 3 TIMES A    capsule 0    torsemide (DEMADEX) 20 MG tablet Take 1 tablet by mouth 2 times daily 180 tablet 3    apixaban (ELIQUIS) 5 MG TABS tablet Take 1 tablet by mouth 2 times daily 28 tablet 0    canagliflozin (INVOKANA) 300 MG TABS tablet Take 1 tablet by mouth every morning (before breakfast) (Patient not taking: Reported on 4/18/2022) 90 tablet 1    sildenafil (VIAGRA) 100 MG tablet TAKE 1 TABLET DAILY AS     NEEDED FOR ERECTILE        DYSFUNCTION 30 tablet 1    carboxymethylcellulose (REFRESH PLUS) 0.5 % SOLN ophthalmic solution as needed       Cholecalciferol (VITAMIN D) 50 MCG (2000 UT) CAPS capsule Take by mouth nightly       aspirin 81 MG tablet Take 81 mg by mouth daily       No current facility-administered medications for this encounter. Allergies: Allergies   Allergen Reactions    Spironolactone      CAUSES INCREASED K+    Tape Kathi  Tape] Rash     SURGICAL TAPE       Problem List:    Patient Active Problem List   Diagnosis Code    Type 2 diabetes mellitus without complication, without long-term current use of insulin ([facility]) E11.9    Critical lower limb ischemia ([facility]) I70.229    Venous hypertension, chronic, with ulcer ([facility] I87.319, L97.909    Ulcer of other part of foot L97.509    Hospital-acquired pneumonia J18.9, Y95    Atrial fibrillation ([facility]) I48.91    Sinus pause I45.5    PAF (paroxysmal atrial fibrillation) ([facility]) I48.0    DM (diabetes mellitus) ([facility] E11.9    DMITRIY on CPAP G47.33, Z99.89    Hyperlipidemia E78.5    Status post incision and drainage Z98.890    Hyperkalemia E87.5    Arthritis M19.90    PVD (peripheral vascular disease) ([facility]) I73.9    Hematoma T14. 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    VHD (valvular heart disease) I38    Abnormal fractional flow reserve (FFR) on cardiac catheterization R94.39    Carotid stenosis, left I65.22    Aortic stenosis, severe I35.0    Atherosclerotic heart disease of native coronary artery with other forms of angina pectoris (HCC) I25.118    Displacement of atrial pacemaker leads T82.120A    Pacemaker lead malfunction T82.110A    SOB (shortness of breath) R06.02    Recurrent right pleural effusion J90    Elevated liver enzymes R74.8    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    Respiratory failure (HCC) J96.90    Generalized weakness R53.1    Status post coronary artery bypass graft Z95.1    Pneumonia due to COVID-19 virus U07.1, J12.82    Moderate malnutrition (HCC) E44.0    Stage 3a chronic kidney disease (HCC) N18.31    WD-Diabetic ulcer of left foot with fat layer exposed ([address] E11.621, L97.522    WD-Non-pressure ulcer of right lower extremity with fat layer exposed ([address] L97.912    Pulmonary hypertension, unspecified (HCC) I27.20    Persistent proteinuria R80.1    Diabetic ulcer of toe of left foot associated with type 2 diabetes mellitus, with muscle involvement without evidence of necrosis ([address] E11.621, L97.525    Diabetic ulcer of toe of right foot associated with type 2 diabetes mellitus, limited to breakdown of skin ([address] E11.621, L97.511       Past Medical History:        Diagnosis Date    Arrhythmia     Pacemaker placed aprox 5 years ago for A Fib per patient    Arthritis 12/2013    rt wrist    Atrial fibrillation (HCC)     on [place] CAD (coronary artery disease) 06/18/2014    see dr Raúl Eldridge kidney disease, stage III (moderate) ([facility] 07/07/2016    Critical illness myopathy 03/15/2021    Diabetes mellitus ([facility]     dx 2004    Diabetic neuropathy associated with type 2 diabetes mellitus ([facility] 04/23/2019    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    Gout 04/2019    \"got gout when had pacer put in because they did not give me my medication for gout \"    H/O 24 hour EKG monitoring 10/03/2013    no afib noted, sinsus rhythm    H/O cardiovascular stress test 05/12/2014    cardiolite- mild ischemia RCA EF50%    H/O echocardiogram 12/01/2020    EF 55-60% severe aortic stenosis mild to mod aortic regurg mod to severe tricuspid regurg severe pulm htn significant changes since 2018 echo.  H/O right and left heart catheterization 12/10/2020    DIFFUSE LAD DISEASE, Mild ECA Disease, Severe AS, Milf Pul HTN on RHC.  History of blood transfusion 12/2020    d/t anemia    History of transesophageal echocardiography (MABLE) 12/15/2020    Severe aortic stenosis (ANNA by planimetry: 0.778 cm sq). Mild AR.    [place] (hard of hearing)     hearing tonya aides    Hx of Doppler echocardiogram 05/21/2018    EF 50%  Mild LV hypertrophy. Mildly enlarged RA. Mod aortic valve calcification with mod AS. Mitral annular calcification is present. Mild AR, MR and TR. Mild pulmonary htn.     Hyperlipidemia     Hypertension     Follows with PCP & Dr. Demetrice Coker Other disorders of kidney and ureter     Pacemaker     Medtronic, implanted 2014    Pneumonia 12/29/2012    Pneumonia due to COVID-19 virus 08/2020    Sleep apnea     dx 2013- has c-pap    Type II or unspecified type diabetes mellitus with other specified manifestations, uncontrolled 12/12/2012    Venous hypertension, chronic, with ulcer ([facility] 12/12/2012    resolved       Past Surgical History:        Procedure Laterality Date    CABG WITH AORTIC VALVE REPLACEMENT N/A 2/16/2021    CABG CORONARY ARTERY BYPASS X2 WITH LIMA, AORTIC VALVE REPLACEMENT AND AORTIC ROOT REPAIR, INTRAOPERATIVE MABLE, INDUCED HYPOTHERMIA, LEFT LEG ENDOVEIN HARVEST, LEFT ATRIAL CLIP, AND CRYO PROCEDURE performed by Danilo Bowen MD at [facility]  12/14    at [address] Left 1/29/2021    LEFT CAROTID ENDARTERECTOMY performed by Paramjit Bernal MD at [id number]  2017    COLONOSCOPY  2011    COLONOSCOPY N/A 11/19/2019    COLONOSCOPY DIAGNOSTIC performed by Bertrand Meigs, MD at [facility]  09/30/2020    POSSIBLE CECAL avms, SIGMOID DIVERTICULOSIS, INTERNAL HEMORRHOIDS GRADE 1    COLONOSCOPY N/A 9/30/2020    COLONOSCOPY CONTROL HEMORRHAGE WITH APC performed by Bertrand Meigs, MD at [address] [facility]  2014    \"2 stents put in \"    FOOT DEBRIDEMENT Bilateral 3/31/2022    BILATERAL DEBRIEDMENT OF NON-VIABLE TISSUE & BONE performed by Nohemi Del Valle DPM at [facility] Right 4/14/2022    RIGHT FOOT DEBRIDEMENT INCISION AND DRAINAGE performed by Nohemi Del Valle DPM at [facility].      abdominal    IR NONTUNNELED VASCULAR CATHETER  3/5/2021    IR NONTUNNELED VASCULAR CATHETER 3/5/2021 [facility] SPECIAL PROCEDURES    JOINT REPLACEMENT  2004    total left hip    OTHER SURGICAL HISTORY Right 12/02/2017    I&D; evacuation of hematoma right hip    OTHER SURGICAL HISTORY  09/17/2020    enteroscopy    PACEMAKER INSERTION N/A 2/23/2021    PACEMAKER GENERATOR LEAD REVISION performed by Danilo Bowen MD at [facility]      9/18/14 Status post remote permanent pacemaker with atrial lead dislodgement.  7/24/14 PPM Implant    TOE AMPUTATION Bilateral 3/31/2022    LEFT 3RD TOE AMPUTATION performed by Nohemi Del Valle DPM at [place] [address] Right 4/14/2022    RIGHT 2ND TOE AMPUTATION performed by Nohemi Del Valle DPM at [facility] N/A 9/17/2020    ENTEROSCOPY PUSH BIOPSY performed by Donis Staley MD at [place] [id number] N/A 3/4/2021    EGD DIAGNOSTIC ONLY performed by Donis Staley MD at [address]  2012    \"have stents in both legs- done in [place]       Social History:    Social History     Tobacco Use    Smoking status: Never Smoker    Smokeless tobacco: Never Used   Substance Use Topics    Alcohol use: Not Currently     Alcohol/week: 2.0 standard drinks     Types: 2 Cans of beer per week     Comment: average \"one time per week\"/ Caffiene: 1 cup of coffee daily                                Counseling given: Not Answered      Vital Signs (Current): There were no vitals filed for this visit. BP Readings from Last 3 Encounters:   04/18/22 (!) 144/84   04/18/22 128/70   04/14/22 110/70       NPO Status:                                                                                 BMI:   Wt Readings from Last 3 Encounters:   04/18/22 200 lb (90.7 kg)   04/18/22 200 lb (90.7 kg)   04/14/22 195 lb (88.5 kg)     There is no height or weight on file to calculate BMI.    CBC:   Lab Results   Component Value Date    WBC 5.7 04/25/2022    RBC 5.27 04/25/2022    HGB 12.9 04/25/2022    HCT 43.6 04/25/2022    MCV 82.7 04/25/2022    RDW 16.1 04/25/2022     04/25/2022       CMP:   Lab Results   Component Value Date     04/25/2022    K 4.4 04/25/2022    CL 98 04/25/2022    CO2 28 04/25/2022    BUN 51 04/25/2022    CREATININE 1.4 04/25/2022    GFRAA 60 04/25/2022    AGRATIO 1.8 11/24/2020    LABGLOM 50 04/25/2022    LABGLOM 51 06/15/2016    GLUCOSE 237 04/25/2022    PROT 6.2 03/14/2021    PROT 7.3 12/27/2012    CALCIUM 9.0 04/25/2022    BILITOT 0.5 03/14/2021    ALKPHOS 303 03/14/2021    AST 44 03/14/2021    ALT 53 03/14/2021       POC Tests: No results for input(s): POCGLU, POCNA, POCK, POCCL, POCBUN, POCHEMO, POCHCT in the last 72 hours.     Coags:   Lab Results   Component Value Date PROTIME 14.8 04/25/2022    INR 1.15 04/25/2022    APTT 36.9 04/25/2022       HCG (If Applicable): No results found for: PREGTESTUR, PREGSERUM, HCG, HCGQUANT     ABGs:   Lab Results   Component Value Date    PO2ART 71 03/05/2021    BQQ4WPO 32.0 03/05/2021    NVN9EXN 23.3 03/05/2021        Type & Screen (If Applicable):  No results found for: LABABO, LABRH    Drug/Infectious Status (If Applicable):  No results found for: HIV, HEPCAB    COVID-19 Screening (If Applicable):   Lab Results   Component Value Date    COVID19 NOT DETECTED 03/10/2022           Anesthesia Evaluation    Airway:         Dental:          Pulmonary:   (+) pneumonia:  shortness of breath:  sleep apnea: on CPAP,                             Cardiovascular:    (+) hypertension:, angina:, pacemaker:, CAD:, CABG/stent:, dysrhythmias: atrial fibrillation, hyperlipidemia               ROS comment: Summary   This is a limited echo to assess EF and RVSP. Left ventricular function and size is normal, EF is estimated at 55-60%. Normally functioning prosthetic valve in aortic position with a mean   gradient of 11mmHg, AT 61msec. Moderate mitral regurgitation is present. Moderate tricuspid regurgitation. moderate pulmonary hypertension with RVSP of 56mmHg. No evidence of any pericardial effusion. Signature      ------------------------------------------------------------------   Electronically signed by Power Boone MD   (Interpreting physician) on 03/04/2022 at 12:58 PM   ------------------------------------------------------------------    Pulmonary hypertension, unspecified ([address]    AV dual-paced rhythm   Abnormal ECG   When compared with ECG of 01-MAR-2021 15:03,   Electronic ventricular pacemaker has replaced Electronic atrial pacemaker   Vent.  rate has increased BY  29 BPM   Confirmed by [facility] Raghav TATE ([id number]) on 4/14/2022 2:32:07 PM      Neuro/Psych:   (+) neuromuscular disease:,             GI/Hepatic/Renal:   (+) renal disease: CRI,           Endo/Other:    (+) DiabetesType II DM, , .                 Abdominal:             Vascular:   + PVD, aortic or cerebral, . Other Findings:             Anesthesia Plan      general     ASA 4       Induction: intravenous.                           Christofer Shabbir   4/25/2022

## 2022-04-25 NOTE — PROGRESS NOTES
Patient here in office and educated on Atrial Lead extraction with Atrial Lead Replacement and Dual Chamber pacemaker generator change, schedule for 4/26/2022 @ 1300, with arrival @ 1100, @ [facility]; risk explained; and consents signed. Also copy of orders given for labs and CXR due 4/25/2022 at [facility]. Instruction given to patient to :  NPO after midnight the night before procedure; call hospital at [phone] to pre-register. May take rest of morning meds of procedure except listed. Hold Eliquis 2 days prior to procedure-patient states just held this morning, Dr Tonya Dang aware and will proceed with procedure as scheduled. Hold ALL blood pressure medications morning of procedure. Patient voiced understanding. Copies of consent & info scanned in chart. Patient informed of instructions and guidance for performing test.  Throat swab performed. Swab placed into labeled collection tube. Collection tube and lab order placed in plastic bag. Bag placed in biohazard bag and placed in fridge.  called for . Patient instructed to self quarantine until procedure.

## 2022-04-26 ENCOUNTER — ANESTHESIA (OUTPATIENT)
Dept: CARDIAC CATH/INVASIVE PROCEDURES | Age: 72
End: 2022-04-26
Payer: MEDICARE

## 2022-04-26 ENCOUNTER — APPOINTMENT (OUTPATIENT)
Dept: GENERAL RADIOLOGY | Age: 72
End: 2022-04-26
Attending: INTERNAL MEDICINE
Payer: MEDICARE

## 2022-04-26 ENCOUNTER — HOSPITAL ENCOUNTER (OUTPATIENT)
Dept: CARDIAC CATH/INVASIVE PROCEDURES | Age: 72
Setting detail: OBSERVATION
Discharge: HOME | End: 2022-04-27
Attending: INTERNAL MEDICINE | Admitting: INTERNAL MEDICINE
Payer: MEDICARE

## 2022-04-26 VITALS
OXYGEN SATURATION: 100 % | SYSTOLIC BLOOD PRESSURE: 120 MMHG | TEMPERATURE: 96.9 F | RESPIRATION RATE: 16 BRPM | DIASTOLIC BLOOD PRESSURE: 100 MMHG

## 2022-04-26 LAB
EKG ATRIAL RATE: 68 BPM
EKG DIAGNOSIS: NORMAL
EKG Q-T INTERVAL: 506 MS
EKG QRS DURATION: 202 MS
EKG QTC CALCULATION (BAZETT): 557 MS
EKG R AXIS: -84 DEGREES
EKG T AXIS: 87 DEGREES
EKG VENTRICULAR RATE: 73 BPM
GLUCOSE BLD-MCNC: 105 MG/DL (ref 70–99)
GLUCOSE BLD-MCNC: 107 MG/DL (ref 70–99)
GLUCOSE BLD-MCNC: 113 MG/DL (ref 70–99)
GLUCOSE BLD-MCNC: 132 MG/DL (ref 70–99)
GLUCOSE BLD-MCNC: 77 MG/DL (ref 70–99)
GLUCOSE BLD-MCNC: 79 MG/DL (ref 70–99)
GLUCOSE BLD-MCNC: 85 MG/DL (ref 70–99)
SARS-COV-2: NOT DETECTED
SOURCE: NORMAL

## 2022-04-26 PROCEDURE — 33206 INSERT HEART PM ATRIAL: CPT

## 2022-04-26 PROCEDURE — 86922 COMPATIBILITY TEST ANTIGLOB: CPT

## 2022-04-26 PROCEDURE — 2580000003 HC RX 258: Performed by: NURSE PRACTITIONER

## 2022-04-26 PROCEDURE — C1898 LEAD, PMKR, OTHER THAN TRANS: HCPCS

## 2022-04-26 PROCEDURE — 33235 REMOVAL PACEMAKER ELECTRODE: CPT

## 2022-04-26 PROCEDURE — 33235 REMOVAL PACEMAKER ELECTRODE: CPT | Performed by: INTERNAL MEDICINE

## 2022-04-26 PROCEDURE — 6360000004 HC RX CONTRAST MEDICATION

## 2022-04-26 PROCEDURE — 6360000002 HC RX W HCPCS: Performed by: NURSE ANESTHETIST, CERTIFIED REGISTERED

## 2022-04-26 PROCEDURE — C1892 INTRO/SHEATH,FIXED,PEEL-AWAY: HCPCS

## 2022-04-26 PROCEDURE — 2709999900 HC NON-CHARGEABLE SUPPLY

## 2022-04-26 PROCEDURE — C1894 INTRO/SHEATH, NON-LASER: HCPCS

## 2022-04-26 PROCEDURE — 33206 INSERT HEART PM ATRIAL: CPT | Performed by: INTERNAL MEDICINE

## 2022-04-26 PROCEDURE — 86900 BLOOD TYPING SEROLOGIC ABO: CPT

## 2022-04-26 PROCEDURE — C1769 GUIDE WIRE: HCPCS

## 2022-04-26 PROCEDURE — 2720000010 HC SURG SUPPLY STERILE

## 2022-04-26 PROCEDURE — C1785 PMKR, DUAL, RATE-RESP: HCPCS

## 2022-04-26 PROCEDURE — 2500000003 HC RX 250 WO HCPCS: Performed by: NURSE ANESTHETIST, CERTIFIED REGISTERED

## 2022-04-26 PROCEDURE — 82962 GLUCOSE BLOOD TEST: CPT

## 2022-04-26 PROCEDURE — 2580000003 HC RX 258: Performed by: NURSE ANESTHETIST, CERTIFIED REGISTERED

## 2022-04-26 PROCEDURE — P9016 RBC LEUKOCYTES REDUCED: HCPCS

## 2022-04-26 PROCEDURE — 6360000002 HC RX W HCPCS

## 2022-04-26 PROCEDURE — 71045 X-RAY EXAM CHEST 1 VIEW: CPT

## 2022-04-26 PROCEDURE — 36620 INSERTION CATHETER ARTERY: CPT | Performed by: ANESTHESIOLOGY

## 2022-04-26 PROCEDURE — C1713 ANCHOR/SCREW BN/BN,TIS/BN: HCPCS

## 2022-04-26 PROCEDURE — 86850 RBC ANTIBODY SCREEN: CPT

## 2022-04-26 PROCEDURE — 2500000003 HC RX 250 WO HCPCS

## 2022-04-26 PROCEDURE — 93010 ELECTROCARDIOGRAM REPORT: CPT | Performed by: INTERNAL MEDICINE

## 2022-04-26 PROCEDURE — C2628 CATHETER, OCCLUSION: HCPCS

## 2022-04-26 PROCEDURE — 93005 ELECTROCARDIOGRAM TRACING: CPT | Performed by: INTERNAL MEDICINE

## 2022-04-26 PROCEDURE — C1889 IMPLANT/INSERT DEVICE, NOC: HCPCS

## 2022-04-26 PROCEDURE — 33233 REMOVAL OF PM GENERATOR: CPT

## 2022-04-26 PROCEDURE — 33233 REMOVAL OF PM GENERATOR: CPT | Performed by: INTERNAL MEDICINE

## 2022-04-26 PROCEDURE — 6370000000 HC RX 637 (ALT 250 FOR IP): Performed by: NURSE PRACTITIONER

## 2022-04-26 PROCEDURE — G0378 HOSPITAL OBSERVATION PER HR: HCPCS

## 2022-04-26 PROCEDURE — 86901 BLOOD TYPING SEROLOGIC RH(D): CPT

## 2022-04-26 RX ORDER — ROCURONIUM BROMIDE 10 MG/ML
INJECTION, SOLUTION INTRAVENOUS PRN
Status: DISCONTINUED | OUTPATIENT
Start: 2022-04-26 | End: 2022-04-26 | Stop reason: SDUPTHER

## 2022-04-26 RX ORDER — OXYCODONE HYDROCHLORIDE 5 MG/1
10 TABLET ORAL PRN
Status: DISCONTINUED | OUTPATIENT
Start: 2022-04-26 | End: 2022-04-26

## 2022-04-26 RX ORDER — IPRATROPIUM BROMIDE AND ALBUTEROL SULFATE 2.5; .5 MG/3ML; MG/3ML
1 SOLUTION RESPIRATORY (INHALATION)
Status: DISCONTINUED | OUTPATIENT
Start: 2022-04-26 | End: 2022-04-26

## 2022-04-26 RX ORDER — SODIUM CHLORIDE 9 MG/ML
INJECTION, SOLUTION INTRAVENOUS CONTINUOUS PRN
Status: DISCONTINUED | OUTPATIENT
Start: 2022-04-26 | End: 2022-04-26 | Stop reason: SDUPTHER

## 2022-04-26 RX ORDER — PHENYLEPHRINE HCL IN 0.9% NACL 1 MG/10 ML
SYRINGE (ML) INTRAVENOUS PRN
Status: DISCONTINUED | OUTPATIENT
Start: 2022-04-26 | End: 2022-04-26 | Stop reason: SDUPTHER

## 2022-04-26 RX ORDER — SODIUM CHLORIDE 9 MG/ML
INJECTION, SOLUTION INTRAVENOUS PRN
Status: DISCONTINUED | OUTPATIENT
Start: 2022-04-26 | End: 2022-04-27 | Stop reason: HOSPADM

## 2022-04-26 RX ORDER — DROPERIDOL 2.5 MG/ML
0.62 INJECTION, SOLUTION INTRAMUSCULAR; INTRAVENOUS
Status: DISCONTINUED | OUTPATIENT
Start: 2022-04-26 | End: 2022-04-26

## 2022-04-26 RX ORDER — NICOTINE POLACRILEX 4 MG
15 LOZENGE BUCCAL PRN
Status: DISCONTINUED | OUTPATIENT
Start: 2022-04-26 | End: 2022-04-26 | Stop reason: CLARIF

## 2022-04-26 RX ORDER — CEFAZOLIN SODIUM 2 G/50ML
2000 SOLUTION INTRAVENOUS EVERY 8 HOURS
Status: DISCONTINUED | OUTPATIENT
Start: 2022-04-27 | End: 2022-04-26 | Stop reason: CLARIF

## 2022-04-26 RX ORDER — DEXAMETHASONE SODIUM PHOSPHATE 4 MG/ML
INJECTION, SOLUTION INTRA-ARTICULAR; INTRALESIONAL; INTRAMUSCULAR; INTRAVENOUS; SOFT TISSUE PRN
Status: DISCONTINUED | OUTPATIENT
Start: 2022-04-26 | End: 2022-04-26 | Stop reason: SDUPTHER

## 2022-04-26 RX ORDER — SODIUM CHLORIDE 9 MG/ML
25 INJECTION, SOLUTION INTRAVENOUS PRN
Status: DISCONTINUED | OUTPATIENT
Start: 2022-04-26 | End: 2022-04-26

## 2022-04-26 RX ORDER — CEFAZOLIN SODIUM 2 G/100ML
2000 INJECTION, SOLUTION INTRAVENOUS EVERY 8 HOURS
Status: COMPLETED | OUTPATIENT
Start: 2022-04-27 | End: 2022-04-27

## 2022-04-26 RX ORDER — SODIUM CHLORIDE 0.9 % (FLUSH) 0.9 %
5-40 SYRINGE (ML) INJECTION EVERY 12 HOURS SCHEDULED
Status: DISCONTINUED | OUTPATIENT
Start: 2022-04-26 | End: 2022-04-27 | Stop reason: HOSPADM

## 2022-04-26 RX ORDER — PROPOFOL 10 MG/ML
INJECTION, EMULSION INTRAVENOUS PRN
Status: DISCONTINUED | OUTPATIENT
Start: 2022-04-26 | End: 2022-04-26 | Stop reason: SDUPTHER

## 2022-04-26 RX ORDER — FENTANYL CITRATE 50 UG/ML
25 INJECTION, SOLUTION INTRAMUSCULAR; INTRAVENOUS EVERY 5 MIN PRN
Status: DISCONTINUED | OUTPATIENT
Start: 2022-04-26 | End: 2022-04-26

## 2022-04-26 RX ORDER — DEXTROSE MONOHYDRATE 50 MG/ML
100 INJECTION, SOLUTION INTRAVENOUS PRN
Status: DISCONTINUED | OUTPATIENT
Start: 2022-04-26 | End: 2022-04-27 | Stop reason: HOSPADM

## 2022-04-26 RX ORDER — OXYCODONE HYDROCHLORIDE 5 MG/1
5 TABLET ORAL PRN
Status: DISCONTINUED | OUTPATIENT
Start: 2022-04-26 | End: 2022-04-26

## 2022-04-26 RX ORDER — ONDANSETRON 2 MG/ML
4 INJECTION INTRAMUSCULAR; INTRAVENOUS
Status: DISCONTINUED | OUTPATIENT
Start: 2022-04-26 | End: 2022-04-26

## 2022-04-26 RX ORDER — GLIMEPIRIDE 4 MG/1
4 TABLET ORAL
Status: DISCONTINUED | OUTPATIENT
Start: 2022-04-27 | End: 2022-04-27 | Stop reason: HOSPADM

## 2022-04-26 RX ORDER — SODIUM CHLORIDE 0.9 % (FLUSH) 0.9 %
5-40 SYRINGE (ML) INJECTION EVERY 12 HOURS SCHEDULED
Status: DISCONTINUED | OUTPATIENT
Start: 2022-04-26 | End: 2022-04-26

## 2022-04-26 RX ORDER — CEFAZOLIN SODIUM 1 G/3ML
INJECTION, POWDER, FOR SOLUTION INTRAMUSCULAR; INTRAVENOUS PRN
Status: DISCONTINUED | OUTPATIENT
Start: 2022-04-26 | End: 2022-04-26 | Stop reason: SDUPTHER

## 2022-04-26 RX ORDER — ONDANSETRON 2 MG/ML
INJECTION INTRAMUSCULAR; INTRAVENOUS PRN
Status: DISCONTINUED | OUTPATIENT
Start: 2022-04-26 | End: 2022-04-26 | Stop reason: SDUPTHER

## 2022-04-26 RX ORDER — SODIUM CHLORIDE 0.9 % (FLUSH) 0.9 %
5-40 SYRINGE (ML) INJECTION PRN
Status: DISCONTINUED | OUTPATIENT
Start: 2022-04-26 | End: 2022-04-27 | Stop reason: HOSPADM

## 2022-04-26 RX ORDER — GABAPENTIN 300 MG/1
300 CAPSULE ORAL 3 TIMES DAILY
Status: DISCONTINUED | OUTPATIENT
Start: 2022-04-26 | End: 2022-04-27 | Stop reason: HOSPADM

## 2022-04-26 RX ORDER — ASPIRIN 81 MG/1
81 TABLET, CHEWABLE ORAL DAILY
Status: DISCONTINUED | OUTPATIENT
Start: 2022-04-26 | End: 2022-04-27 | Stop reason: HOSPADM

## 2022-04-26 RX ORDER — LABETALOL HYDROCHLORIDE 5 MG/ML
10 INJECTION, SOLUTION INTRAVENOUS
Status: DISCONTINUED | OUTPATIENT
Start: 2022-04-26 | End: 2022-04-26

## 2022-04-26 RX ORDER — FENTANYL CITRATE 50 UG/ML
INJECTION, SOLUTION INTRAMUSCULAR; INTRAVENOUS PRN
Status: DISCONTINUED | OUTPATIENT
Start: 2022-04-26 | End: 2022-04-26 | Stop reason: SDUPTHER

## 2022-04-26 RX ORDER — DEXTROSE MONOHYDRATE 25 G/50ML
12.5 INJECTION, SOLUTION INTRAVENOUS PRN
Status: DISCONTINUED | OUTPATIENT
Start: 2022-04-26 | End: 2022-04-26 | Stop reason: CLARIF

## 2022-04-26 RX ORDER — ATORVASTATIN CALCIUM 10 MG/1
20 TABLET, FILM COATED ORAL DAILY
Status: DISCONTINUED | OUTPATIENT
Start: 2022-04-26 | End: 2022-04-27 | Stop reason: HOSPADM

## 2022-04-26 RX ORDER — SODIUM CHLORIDE 0.9 % (FLUSH) 0.9 %
5-40 SYRINGE (ML) INJECTION PRN
Status: DISCONTINUED | OUTPATIENT
Start: 2022-04-26 | End: 2022-04-26

## 2022-04-26 RX ORDER — TORSEMIDE 20 MG/1
20 TABLET ORAL 2 TIMES DAILY
Status: DISCONTINUED | OUTPATIENT
Start: 2022-04-26 | End: 2022-04-27 | Stop reason: HOSPADM

## 2022-04-26 RX ORDER — LIDOCAINE HYDROCHLORIDE 20 MG/ML
INJECTION, SOLUTION INTRAVENOUS PRN
Status: DISCONTINUED | OUTPATIENT
Start: 2022-04-26 | End: 2022-04-26 | Stop reason: SDUPTHER

## 2022-04-26 RX ORDER — HYDRALAZINE HYDROCHLORIDE 20 MG/ML
10 INJECTION INTRAMUSCULAR; INTRAVENOUS
Status: DISCONTINUED | OUTPATIENT
Start: 2022-04-26 | End: 2022-04-26

## 2022-04-26 RX ADMIN — FENTANYL CITRATE 50 MCG: 50 INJECTION, SOLUTION INTRAMUSCULAR; INTRAVENOUS at 14:48

## 2022-04-26 RX ADMIN — ROCURONIUM BROMIDE 20 MG: 50 INJECTION, SOLUTION INTRAVENOUS at 15:29

## 2022-04-26 RX ADMIN — DEXAMETHASONE SODIUM PHOSPHATE 4 MG: 4 INJECTION, SOLUTION INTRAMUSCULAR; INTRAVENOUS at 14:25

## 2022-04-26 RX ADMIN — ROCURONIUM BROMIDE 20 MG: 50 INJECTION, SOLUTION INTRAVENOUS at 14:46

## 2022-04-26 RX ADMIN — CEFAZOLIN 2000 MG: 1 INJECTION, POWDER, FOR SOLUTION INTRAMUSCULAR; INTRAVENOUS; PARENTERAL at 14:25

## 2022-04-26 RX ADMIN — GABAPENTIN 300 MG: 300 CAPSULE ORAL at 21:42

## 2022-04-26 RX ADMIN — ATORVASTATIN CALCIUM 20 MG: 10 TABLET, FILM COATED ORAL at 20:08

## 2022-04-26 RX ADMIN — Medication 2 MCG/MIN: at 14:48

## 2022-04-26 RX ADMIN — LIDOCAINE HYDROCHLORIDE 100 MG: 20 INJECTION, SOLUTION INTRAVENOUS at 14:08

## 2022-04-26 RX ADMIN — FENTANYL CITRATE 50 MCG: 50 INJECTION, SOLUTION INTRAMUSCULAR; INTRAVENOUS at 14:59

## 2022-04-26 RX ADMIN — SODIUM CHLORIDE, PRESERVATIVE FREE 10 ML: 5 INJECTION INTRAVENOUS at 20:08

## 2022-04-26 RX ADMIN — SODIUM CHLORIDE: 900 INJECTION INTRAVENOUS at 13:54

## 2022-04-26 RX ADMIN — PROPOFOL 140 MG: 10 INJECTION, EMULSION INTRAVENOUS at 14:08

## 2022-04-26 RX ADMIN — ROCURONIUM BROMIDE 50 MG: 50 INJECTION, SOLUTION INTRAVENOUS at 14:08

## 2022-04-26 RX ADMIN — ONDANSETRON 4 MG: 2 INJECTION INTRAMUSCULAR; INTRAVENOUS at 17:20

## 2022-04-26 RX ADMIN — SUGAMMADEX 100 MG: 100 INJECTION, SOLUTION INTRAVENOUS at 17:41

## 2022-04-26 RX ADMIN — ASPIRIN 81 MG 81 MG: 81 TABLET ORAL at 20:18

## 2022-04-26 RX ADMIN — Medication 50 MCG: at 14:08

## 2022-04-26 RX ADMIN — TORSEMIDE 20 MG: 20 TABLET ORAL at 20:08

## 2022-04-26 ASSESSMENT — PULMONARY FUNCTION TESTS
PIF_VALUE: 15
PIF_VALUE: 16
PIF_VALUE: 16
PIF_VALUE: 17
PIF_VALUE: 16
PIF_VALUE: 16
PIF_VALUE: 15
PIF_VALUE: 15
PIF_VALUE: 16
PIF_VALUE: 18
PIF_VALUE: 15
PIF_VALUE: 16
PIF_VALUE: 15
PIF_VALUE: 16
PIF_VALUE: 15
PIF_VALUE: 15
PIF_VALUE: 16
PIF_VALUE: 16
PIF_VALUE: 15
PIF_VALUE: 16
PIF_VALUE: 15
PIF_VALUE: 16
PIF_VALUE: 15
PIF_VALUE: 16
PIF_VALUE: 15
PIF_VALUE: 16
PIF_VALUE: 15
PIF_VALUE: 15
PIF_VALUE: 16
PIF_VALUE: 16
PIF_VALUE: 15
PIF_VALUE: 15
PIF_VALUE: 16
PIF_VALUE: 17
PIF_VALUE: 16
PIF_VALUE: 0
PIF_VALUE: 16
PIF_VALUE: 1
PIF_VALUE: 16
PIF_VALUE: 15
PIF_VALUE: 16
PIF_VALUE: 2
PIF_VALUE: 23
PIF_VALUE: 16
PIF_VALUE: 15
PIF_VALUE: 15
PIF_VALUE: 16
PIF_VALUE: 17
PIF_VALUE: 16
PIF_VALUE: 17
PIF_VALUE: 16
PIF_VALUE: 15
PIF_VALUE: 22
PIF_VALUE: 16
PIF_VALUE: 17
PIF_VALUE: 17
PIF_VALUE: 16
PIF_VALUE: 15
PIF_VALUE: 15
PIF_VALUE: 1
PIF_VALUE: 15
PIF_VALUE: 16
PIF_VALUE: 1
PIF_VALUE: 16
PIF_VALUE: 16
PIF_VALUE: 15
PIF_VALUE: 16
PIF_VALUE: 0
PIF_VALUE: 16
PIF_VALUE: 15
PIF_VALUE: 16
PIF_VALUE: 16
PIF_VALUE: 15
PIF_VALUE: 16
PIF_VALUE: 0
PIF_VALUE: 15
PIF_VALUE: 17
PIF_VALUE: 16
PIF_VALUE: 15
PIF_VALUE: 0
PIF_VALUE: 16
PIF_VALUE: 17
PIF_VALUE: 16
PIF_VALUE: 15
PIF_VALUE: 16
PIF_VALUE: 0
PIF_VALUE: 16
PIF_VALUE: 15
PIF_VALUE: 16
PIF_VALUE: 0
PIF_VALUE: 1
PIF_VALUE: 16
PIF_VALUE: 20
PIF_VALUE: 16
PIF_VALUE: 16
PIF_VALUE: 15
PIF_VALUE: 16
PIF_VALUE: 16
PIF_VALUE: 15
PIF_VALUE: 15
PIF_VALUE: 16
PIF_VALUE: 16
PIF_VALUE: 1
PIF_VALUE: 15
PIF_VALUE: 16
PIF_VALUE: 1
PIF_VALUE: 0
PIF_VALUE: 16
PIF_VALUE: 0
PIF_VALUE: 16
PIF_VALUE: 13
PIF_VALUE: 16
PIF_VALUE: 16
PIF_VALUE: 15
PIF_VALUE: 16
PIF_VALUE: 0
PIF_VALUE: 16
PIF_VALUE: 19
PIF_VALUE: 16
PIF_VALUE: 34
PIF_VALUE: 16
PIF_VALUE: 15
PIF_VALUE: 16
PIF_VALUE: 15
PIF_VALUE: 16
PIF_VALUE: 16
PIF_VALUE: 15
PIF_VALUE: 15
PIF_VALUE: 16
PIF_VALUE: 17
PIF_VALUE: 0
PIF_VALUE: 16
PIF_VALUE: 1
PIF_VALUE: 1
PIF_VALUE: 0
PIF_VALUE: 0
PIF_VALUE: 16
PIF_VALUE: 15
PIF_VALUE: 16
PIF_VALUE: 15
PIF_VALUE: 15
PIF_VALUE: 16
PIF_VALUE: 16
PIF_VALUE: 0
PIF_VALUE: 16
PIF_VALUE: 17
PIF_VALUE: 16
PIF_VALUE: 15

## 2022-04-26 ASSESSMENT — ENCOUNTER SYMPTOMS
WHEEZING: 0
SHORTNESS OF BREATH: 1
COUGH: 0
CONSTIPATION: 0
CHEST TIGHTNESS: 0
DIARRHEA: 0
EYE PAIN: 0
SHORTNESS OF BREATH: 1
PHOTOPHOBIA: 0
COLOR CHANGE: 0
BACK PAIN: 0
BLOOD IN STOOL: 0
NAUSEA: 0
VOMITING: 0
ABDOMINAL PAIN: 0

## 2022-04-26 ASSESSMENT — PAIN SCALES - GENERAL: PAINLEVEL_OUTOF10: 0

## 2022-04-26 NOTE — PROGRESS NOTES
8070 - transferred from EP lab on bed, monitor applied, alarms on and verified, bedside handoff provided by Patric Herring and via telephone with [address] - report called to [facility] - phase one care complete transferred to 3108 on oxygen and telemetry accompanied by RN

## 2022-04-26 NOTE — OP NOTE
Procedure:     1. Pocket revision  2. Malfunctioning atrial lead extraction and also extraction of old atrial lead extraction  3. Implantation of new right atrial lead  4. Generator change of pacemaker  5. Device programming  6. Right groin femoral vein central venous access  7. Venopasty of subclavian vein    Attending: Zander Fox MD    Complications: None    Estimated blood loss: 50ml    Anesthesia: General Anesthesia    Medications used for induction/testing: None    History:  Patient with complete heart block with malfunctioning atrial lead with pacemaker at end of battery life here atrial lead extraction and placement of new atrial lead and generator replacement. Procedure in detail:    The patient was brought to the electrophysiology laboratory in stable condition. The patient was in a fasting and non-sedated state. The risks, benefits and alternatives of the procedure were discussed with the patient. The risks including, but not limited to, the risks of vascular injury, bleeding, infection, device malfunction, lead dislodgement, lead malfunction, radiation exposure, injury to cardiac and surrounding structures (including pneumothorax), inappropriate shocks, SVC tear, RV avulsion stroke, myocardial infarction and death were discussed in detail. The patient opted to proceed with lead extraction and new lead placement considering his options. Written informed consent was signed and placed in the chart. The patient was brought to the [facility] OR in stable condition. The patient was prepped and draped in a sterile fashion. We performed Cardiac border cine run prior to procedure  We also performed leads Xray for integrity    Temporary RV pacemaker wire was placed in the RV under fluoroscopy from 6 F femoral venous sheath (which was placed using US guided seldinger technique.  Temporary pacer was placed as patient is in complete heart block and we did not want to loose capture for the heart if the leads malfunctioned during generator change. Patient was a very difficult venous access of the left side of the upper extremity. Patient had significant subclavian stenosis and we were extracting the lead with laser and would be using same access for other lead we did not proceed with new venous access. Right groin femoral vein access was obtained with 6f venous sheath using modified seldinger technique so that it can used for emergent transfusion in case of complications. Then patient was draped in sterile fashion. A 3 cm incision was made over previous scar after administration of lidocaine. Using electrocautery and blunt dissection pocket was opened. We noted a lot of scarring in the area. Pacemaker was disconnected from the leads and removed from the pocket. RA lead was unscrewed. Ra lead was carefully  from the fibrotic tissue and taking care RV lead is not damaged. RA lead which was malfunctioning was removed with simple extraction and the other RA lead was not able to be extractable with simple extraction. Old RA lead was then prepped for laser lead extraction. Locking stylet was used to lock the lead and then using the lead as a guide, we used a 14 Icelandic laser sheath over the lead using traction-counter traction method to extract the lead using the laser. After lead was extracted. Glide wire was placed through the laser sheath and this was exchanged for a long 7 Icelandic sheath. There was lot of resistance with stenosis to put the sheath into the subclavain vein in the middle portion of the vein so we performed venogram and significant stenosis was noted. Venoplasty of subclavian vein was performed so that we can place the sheath. After venoplasty we placed the RA lead in position in RA - Very poor sensing across the atrium and patient was in some kind of atrial tachycardia. We then positioned the RA lead in the right atrial lateral wall.  Multiple position attempts were made and this was best position were we had decent sensing. The pocket was irrigated with an antibiotic solution. Tyrx medication medtronic pouch was placed to decrease risk of infection. The leads were then connected to the new pacemaker pulse generator and placed into the cleaned pocket. Final parameters were obtained and are detailed below. The pocket was closed in three successive layer with 2.0, 2.0 and 4.0 Vicryl. Steri-strips and a pressure dressing were applied. No complication was noted from cardiac stand point. Patient was stable through the procedure. Patient was later extubated and transferred to recovery. Implanted Materials:            Temporary pacemaker was removed at the end of the procedure    Summary:    1. Laser lead extraction of malfunctioning RA lead and old abandoned RA lead  2. Implantation of new RA lead  3. Removal of old pulse generator and placement of new Pacemaker generator  4.  Venoplasty of subclavian vein    Recommendations:  1) Bedrest x 6 hours  2) Left arm in sling for 24 hours  3) Pressure dressing in place for 24 hours  4) Pain control  5) Vital signs per protocol (see orders)  6) Portable chest x-ray to rule out pneumothorax  7) Antibiotics per protocol  8) EKG upon arrival on floor and daily  9) Monitor on telemetry

## 2022-04-26 NOTE — ANESTHESIA POSTPROCEDURE EVALUATION
Department of Anesthesiology  Postprocedure Note    Patient: Michael Schneider  MRN: [id number]  YOB: 1950  Date of evaluation: 4/26/2022  Time:  6:08 PM     Procedure Summary     Date: 04/26/22 Room / Location: [address] [place] [address] Lab    Anesthesia Start: 1350 Anesthesia Stop: 5523    Procedure: LEAD EXTRACTION Diagnosis:       Encounter for adjustment and management of automatic implantable cardiac defibrillator      Pacemaker lead malfunction, initial encounter    Scheduled Providers:  Responsible Provider: Jessie Porter MD    Anesthesia Type: general ASA Status: 4          Anesthesia Type: general    Sanju Phase I: Sanju Score: 10    Sanju Phase II:      Last vitals: Reviewed and per EMR flowsheets.        Anesthesia Post Evaluation    Patient location during evaluation: PACU  Patient participation: complete - patient participated  Level of consciousness: awake and alert  Pain score: 0  Airway patency: patent  Nausea & Vomiting: no vomiting and no nausea  Complications: no  Cardiovascular status: blood pressure returned to baseline and hemodynamically stable  Respiratory status: acceptable, spontaneous ventilation, nonlabored ventilation and nasal cannula  Hydration status: stable

## 2022-04-26 NOTE — H&P
Electrophysiology H&p  Note      Reason for consultation: atrial lead MALFUNCTION    Chief complaint : LEAD EXTRACTION AND pacemaker battery replacement    Referring physician: Dr. James Vo      Primary care physician: Isai Ocampo DO      History of Present Illness: Today visit (04/26/22)    Patient here for lead extraction and new lead placement and device generator change. No change in H&P noted from previous clinic visit. Previous visit (3/11/2022)      Chief Complaint   Patient presents with    Follow-up     pt. here today to discuss PPM battery replacement. Pt. admits to some SOB, palpitations, and edema. Pt. denies chest pain. Pt. denies any tobacco use. Pt. admits to drinking a few beers weekly at times. Pt. states was exercising regularly before having issue with infection in toe. Previous visit:    Patient is a 75-year-old male with history of carotid artery disease s/p left carotid endarterectomy, severe aortic stenosis, renal insufficiency, coronary artery disease, diabetes mellitus type 2, s/p pacemaker, hypertension, persistent atrial fibrillation, obstructive sleep apnea on CPAP, hyperlipidemia presented for AVR and maze and two-vessel CABG. Postprocedure patient atrial lead noted to be not be capturing. Patient is currently intubated and sedated and is on pressors and unable to give much information so most of the history was obtained from the nurse and CT surgery.   EP was consulted for evaluation of the atrial lead on pacemaker     Pastmedical history:   Past Medical History:   Diagnosis Date    Arrhythmia     Pacemaker placed aprox 5 years ago for A Fib per patient    Arthritis 12/2013    rt wrist    Atrial fibrillation ([facility] [address]     on Xarelto - Dr. Codie Sanchez CAD (coronary artery disease) 06/18/2014    see dr Codie Sanchez Chronic kidney disease, stage III (moderate) ([address] 07/07/2016    Critical illness myopathy 03/15/2021    Diabetes mellitus ([facility] [address]     dx 2004    Diabetic neuropathy associated with type 2 diabetes mellitus ([facility] [address] 04/23/2019    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    Gout 04/2019    \"got gout when had pacer put in because they did not give me my medication for gout \"    H/O 24 hour EKG monitoring 10/03/2013    no afib noted, sinsus rhythm    H/O cardiovascular stress test 05/12/2014    cardiolite- mild ischemia RCA EF50%    H/O echocardiogram 12/01/2020    EF 55-60% severe aortic stenosis mild to mod aortic regurg mod to severe tricuspid regurg severe pulm htn significant changes since 2018 echo.  H/O right and left heart catheterization 12/10/2020    DIFFUSE LAD DISEASE, Mild ECA Disease, Severe AS, Milf Pul HTN on RHC.  History of blood transfusion 12/2020    d/t anemia    History of transesophageal echocardiography (MABLE) 12/15/2020    Severe aortic stenosis (ANNA by planimetry: 0.778 cm sq). Mild AR.    [place] (hard of hearing)     hearing tonya aides    Hx of Doppler echocardiogram 05/21/2018    EF 50%  Mild LV hypertrophy. Mildly enlarged RA. Mod aortic valve calcification with mod AS. Mitral annular calcification is present. Mild AR, MR and TR. Mild pulmonary htn.     Hyperlipidemia     Hypertension     Follows with PCP & Dr. Gino Garcia Other disorders of kidney and ureter     Pacemaker     Medtronic, implanted 2014    Pneumonia 12/29/2012    Pneumonia due to COVID-19 virus 08/2020    Sleep apnea     dx 2013- has c-pap    Type II or unspecified type diabetes mellitus with other specified manifestations, uncontrolled 12/12/2012    Venous hypertension, chronic, with ulcer ([facility] [address] 12/12/2012    resolved       Surgical history :   Past Surgical History:   Procedure Laterality Date    CABG WITH AORTIC VALVE REPLACEMENT N/A 2/16/2021    CABG CORONARY ARTERY BYPASS X2 WITH LIMA, AORTIC VALVE REPLACEMENT AND AORTIC ROOT REPAIR, INTRAOPERATIVE MABLE, INDUCED HYPOTHERMIA, LEFT LEG ENDOVEIN HARVEST, LEFT ATRIAL CLIP, AND CRYO PROCEDURE performed by Paul Garza MD at [address] [facility]  12/14    at [address] Left 1/29/2021    LEFT CAROTID ENDARTERECTOMY performed by Merced Calix MD at [id number]  2017    COLONOSCOPY  2011    COLONOSCOPY N/A 11/19/2019    COLONOSCOPY DIAGNOSTIC performed by Tamara Gramajo MD at [facility]  09/30/2020    POSSIBLE CECAL avms, SIGMOID DIVERTICULOSIS, INTERNAL HEMORRHOIDS GRADE 1    COLONOSCOPY N/A 9/30/2020    COLONOSCOPY CONTROL HEMORRHAGE WITH APC performed by Tamara Gramajo MD at [address] [facility]  2014    \"2 stents put in \"    FOOT DEBRIDEMENT Bilateral 3/31/2022    BILATERAL DEBRIEDMENT OF NON-VIABLE TISSUE & BONE performed by Lasha Rodriguez DPM at [facility] Right 4/14/2022    RIGHT FOOT DEBRIDEMENT INCISION AND DRAINAGE performed by Lasha Rodriguez DPM at [facility].      abdominal    IR NONTUNNELED VASCULAR CATHETER  3/5/2021    IR NONTUNNELED VASCULAR CATHETER 3/5/2021 [facility] SPECIAL PROCEDURES    JOINT REPLACEMENT  2004    total left hip    OTHER SURGICAL HISTORY Right 12/02/2017    I&D; evacuation of hematoma right hip    OTHER SURGICAL HISTORY  09/17/2020    enteroscopy    PACEMAKER INSERTION N/A 2/23/2021    PACEMAKER GENERATOR LEAD REVISION performed by Paul Garza MD at [facility] Left 04/26/2022    Dual Chamber PPM: Medtronic, Lucie XT DR EVERT Apodaca    PACEMAKER PLACEMENT      9/18/14 Status post remote permanent pacemaker with atrial lead dislodgement.  7/24/14 PPM Implant    TOE AMPUTATION Bilateral 3/31/2022    LEFT 3RD TOE AMPUTATION performed by Lasha Rodriguez DPM at [address] Right 4/14/2022    RIGHT 2ND TOE AMPUTATION performed by Lasha Rodriguez DPM at [address] N/A 9/17/2020    ENTEROSCOPY PUSH BIOPSY performed by Tamara Gramajo MD at [address] [facility] UPPER GASTROINTESTINAL ENDOSCOPY N/A 3/4/2021    EGD DIAGNOSTIC ONLY performed by Adelaide Mann MD at [address]  2012    \"have stents in both legs- done in [place]       Family history:   Family History   Problem Relation Age of Onset    High Blood Pressure Mother     Arthritis Mother     Diabetes Mother     Heart Disease Mother     High Blood Pressure Father     Heart Disease Father     Kidney Disease Father        Social history :  reports that he has never smoked. He has never used smokeless tobacco. He reports previous alcohol use of about 2.0 standard drinks of alcohol per week. He reports that he does not use drugs. Allergies   Allergen Reactions    Spironolactone      CAUSES INCREASED K+    Tape Gower Konig Tape] Rash     SURGICAL TAPE       No current facility-administered medications on file prior to encounter.      Current Outpatient Medications on File Prior to Encounter   Medication Sig Dispense Refill    glimepiride (AMARYL) 4 MG tablet TAKE 1 TABLET IN THE       MORNING (BEFORE BREAKFAST) 90 tablet 1    simvastatin (ZOCOR) 20 MG tablet Take 1 tablet by mouth daily 90 tablet 3    Dulaglutide 3 MG/0.5ML SOPN Inject 3 mg into the skin once a week (Patient taking differently: Inject 3 mg into the skin once a week Fridays.) 12 pen 0    gabapentin (NEURONTIN) 300 MG capsule TAKE 1 CAPSULE 3 TIMES A    capsule 0    torsemide (DEMADEX) 20 MG tablet Take 1 tablet by mouth 2 times daily 180 tablet 3    apixaban (ELIQUIS) 5 MG TABS tablet Take 1 tablet by mouth 2 times daily 28 tablet 0    canagliflozin (INVOKANA) 300 MG TABS tablet Take 1 tablet by mouth every morning (before breakfast) 90 tablet 1    sildenafil (VIAGRA) 100 MG tablet TAKE 1 TABLET DAILY AS     NEEDED FOR ERECTILE        DYSFUNCTION 30 tablet 1    carboxymethylcellulose (REFRESH PLUS) 0.5 % SOLN ophthalmic solution as needed       Cholecalciferol (VITAMIN D) 50 MCG (2000 UT) CAPS capsule Take by mouth nightly       aspirin 81 MG tablet Take 81 mg by mouth daily         Review of Systems:   Review of Systems   Constitutional: Positive for fatigue. Negative for activity change, chills and fever. HENT: Negative for congestion, ear pain and tinnitus. Eyes: Negative for photophobia, pain and visual disturbance. Respiratory: Positive for shortness of breath. Negative for cough, chest tightness and wheezing. Cardiovascular: Positive for palpitations and leg swelling. Negative for chest pain. Gastrointestinal: Negative for abdominal pain, blood in stool, constipation, diarrhea, nausea and vomiting. Endocrine: Negative for cold intolerance and heat intolerance. Genitourinary: Negative for dysuria, flank pain and hematuria. Musculoskeletal: Positive for arthralgias. Negative for back pain, myalgias and neck stiffness. Skin: Negative for color change and rash. Allergic/Immunologic: Negative for food allergies. Neurological: Negative for dizziness, light-headedness, numbness and headaches. Hematological: Does not bruise/bleed easily. Psychiatric/Behavioral: Negative for agitation, behavioral problems and confusion. Examination:      Vitals:    04/26/22 1135   BP: (!) 156/105   Pulse: 85   Resp: 18   Temp: 97.1 °F (36.2 °C)   TempSrc: Temporal   SpO2: 99%   Weight: 200 lb (90.7 kg)   Height: 5' 11\" (1.803 m)        Body mass index is 27.89 kg/m². Physical Exam  Constitutional:       General: He is not in acute distress. Appearance: Normal appearance. He is not ill-appearing. HENT:      Head: Normocephalic and atraumatic. Mouth/Throat:      Mouth: Mucous membranes are moist.   Eyes:      Conjunctiva/sclera: Conjunctivae normal.      Pupils: Pupils are equal, round, and reactive to light. Cardiovascular:      Rate and Rhythm: Normal rate and regular rhythm. Heart sounds: Murmur (grade 2/6 systolic murmur) heard.        Pulmonary:      Effort: Pulmonary effort is normal.      Breath sounds: No rhonchi or rales. Abdominal:      General: Abdomen is flat. There is no distension. Palpations: Abdomen is soft. There is no mass. Musculoskeletal:         General: Normal range of motion. Right lower leg: No edema. Left lower leg: No edema. Skin:     General: Skin is warm and dry. Comments: Healing wound of the left third toe   Neurological:      General: No focal deficit present. Mental Status: He is alert and oriented to person, place, and time. CBC:   Lab Results   Component Value Date    WBC 5.7 04/25/2022    HGB 12.9 04/25/2022    HCT 43.6 04/25/2022     04/25/2022     Lipids:  Lab Results   Component Value Date    CHOL 106 11/18/2021    TRIG 102 11/18/2021    HDL 36 (L) 11/18/2021    LDLCALC 50 11/18/2021    LDLDIRECT 62 07/22/2014     PT/INR:   Lab Results   Component Value Date    INR 1.15 04/25/2022        BMP:    Lab Results   Component Value Date     04/25/2022    K 4.4 04/25/2022    CL 98 (L) 04/25/2022    CO2 28 04/25/2022    BUN 51 (H) 04/25/2022     CMP:   Lab Results   Component Value Date    AST 44 (H) 03/14/2021    PROT 6.2 (L) 03/14/2021    BILITOT 0.5 03/14/2021    ALKPHOS 303 (H) 03/14/2021     TSH:    Lab Results   Component Value Date    TSH 0.99 07/07/2020       EKGINTERPRETATION - EKG Interpretation:  Ventricular paced    Device Assessment:      The device is Medtronic pacemaker - Dual Chamber chamber      MRI Compatible : yes    Device interrogation was performed. Mode: DDD    Atrial lead sensing is very low and thresholds are very high. RV lead is functioning within normal limits     Sensing is normal. Impedence is normal.  Threshold is normal.     There has not been interval changes. Estimated battery life is at [facility]     The underlying rhythm is AP, .  60.5 % atrial paced; 94.8 % ventricular paced.       Atrial Arrhythmia : PAF episodes and longest recorded was 6 days    Non sustained VT episodes : 12    Sustained VT episodes : No    Patient activity reported 3.6 hrs/day    The patient is pacemaker dependent. IMPRESSION / RECOMMENDATIONS:     Malfunctioning atrial lead  Pacemaker at end of battery life  Aortic stenosis s/p AVR  Cad sp post CABG -on aspirin  PAF sp maze procedure  DM-2  PAF on Eliquis  HLD on simvastatin   DMITRIY on CPAP  Infection of the left third toe on doxycycline      Patient had a lead revision in March last year by CT surgery. Today again atrial lead capture is 5 at 1 ms and bearly capturing and very low sensing. Patient is RV pacing dependent  - when I did VVI 30 he was still RV pacing and no atrial signals were noted and atrial sensitivity also has like 0.1 so I think the atrial lead is not working. I am unable to say if the patient has A. fib underlying at this point    Patient does have shortness of breath and tiredness and weakness     I am worried that if RV pacing for so long could be causing him to have LV dysfunction. Patient has already 2 atrial leads - one was abandoned and one was repositioned last year. The first device was done in 2013 patient said he had a revision that time too. lvef on repeat echo 55-60%    Patient has not seen me since hospital in February 2021 and he is now referred because of pacemaker is near end of battery life    As patient is pacemaker is dependent, and we need to do a lead extraction and place a new wires pretty soon as device is at end of life. I would recommend to remove the 2 atrial leads and place a new atrial lead and if the LVEF is less than 50% then he may need BiV pacer. Will get an echo done on the patient to evaluate his LVEF - LVEF was 55-60% so will only change the generator along with placement of new RA lead.      Patient could not get a recent MRI  because of the abandoned lead so by doing this he could get future MRIs    Discussed with patient in detail and all the risk with the procedure and patient agreeable with the plan          Tiffany Scales MD, 4/26/2022 1:20 PM     Please note this report has been partially produced using speech recognition software and may contain errors related to that system including errors in grammar, punctuation, and spelling, as well as words and phrases that may be inappropriate. If there are any questions or concerns please feel free to contact the dictating provider for clarification.

## 2022-04-26 NOTE — ANESTHESIA PRE PROCEDURE
Department of Anesthesiology  Preprocedure Note       Name:  Matthew Soliman   Age:  74 y.o.  :  1950                                          MRN:  [id number]         Date:  2022      Surgeon: * No surgeons listed *    Procedure: * No procedures listed *    Medications prior to admission:   Prior to Admission medications    Medication Sig Start Date End Date Taking? Authorizing Provider   glimepiride (AMARYL) 4 MG tablet TAKE 1 TABLET IN THE       MORNING (BEFORE BREAKFAST) 4/15/22   Kaleb Mitchell DO   simvastatin (ZOCOR) 20 MG tablet Take 1 tablet by mouth daily 3/21/22 6/19/22  JUSTIN Cherry CNP   Dulaglutide 3 MG/0.5ML SOPN Inject 3 mg into the skin once a week  Patient taking differently: Inject 3 mg into the skin once a week . 3/17/22 6/15/22  SATISH Antunez   gabapentin (NEURONTIN) 300 MG capsule TAKE 1 CAPSULE 3 TIMES A   DAY 3/17/22 6/17/22  SATISH Antunez   torsemide (DEMADEX) 20 MG tablet Take 1 tablet by mouth 2 times daily 21   Rupert Real MD   apixaban (ELIQUIS) 5 MG TABS tablet Take 1 tablet by mouth 2 times daily 21   JUSTIN Cherry CNP   canagliflozin (INVOKANA) 300 MG TABS tablet Take 1 tablet by mouth every morning (before breakfast) 21   Kaleb Mitchell DO   sildenafil (VIAGRA) 100 MG tablet TAKE 1 TABLET DAILY AS     NEEDED FOR ERECTILE        DYSFUNCTION 21   Kaleb Mitchell DO   carboxymethylcellulose (REFRESH PLUS) 0.5 % SOLN ophthalmic solution as needed  20   Historical Provider, MD   Cholecalciferol (VITAMIN D) 50 MCG (2000) CAPS capsule Take by mouth nightly     Historical Provider, MD   aspirin 81 MG tablet Take 81 mg by mouth daily    Historical Provider, MD       Current medications:    No current facility-administered medications for this visit. No current outpatient medications on file.      Facility-Administered Medications Ordered in Other Visits   Medication Dose Route Frequency Provider Last Rate Last Admin    0.9 % sodium chloride infusion   IntraVENous PRN Zander Rad Rodriguez MD           Allergies: Allergies   Allergen Reactions    Spironolactone      CAUSES INCREASED K+    Tape Escobar Southern Tape] Rash     SURGICAL TAPE       Problem List:    Patient Active Problem List   Diagnosis Code    Type 2 diabetes mellitus without complication, without long-term current use of insulin ([facility]) E11.9    Critical lower limb ischemia ([facility]) I70.229    Venous hypertension, chronic, with ulcer ([facility] I87.319, L97.909    Ulcer of other part of foot L97.509    Hospital-acquired pneumonia J18.9, Y95    Atrial fibrillation ([facility]) I48.91    Sinus pause I45.5    PAF (paroxysmal atrial fibrillation) ([facility]) I48.0    DM (diabetes mellitus) ([facility] E11.9    DMITRIY on CPAP G47.33, Z99.89    Hyperlipidemia E78.5    Status post incision and drainage Z98.890    Hyperkalemia E87.5    Arthritis M19.90    PVD (peripheral vascular disease) ([facility]) I73.9    Hematoma T14. 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    VHD (valvular heart disease) I38    Abnormal fractional flow reserve (FFR) on cardiac catheterization R94.39    Carotid stenosis, left I65.22    Aortic stenosis, severe I35.0    Atherosclerotic heart disease of native coronary artery with other forms of angina pectoris ([facility]) I25.118    Displacement of atrial pacemaker leads T82.120A    Pacemaker lead malfunction T82.110A    SOB (shortness of breath) R06.02    Recurrent right pleural effusion J90    Elevated liver enzymes R74.8    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    Respiratory failure ([facility]) J96.90    Generalized weakness R53.1    Status post coronary artery bypass graft Z95.1    Pneumonia due to COVID-19 virus U07.1, J12.82    Moderate malnutrition (HCC) E44.0    Stage 3a chronic kidney disease (HCC) N18.31    WD-Diabetic ulcer of left foot with fat layer exposed ([address] E11.621, L97.522    WD-Non-pressure ulcer of right lower extremity with fat layer exposed ([address] L97.912    Pulmonary hypertension, unspecified (HCC) I27.20    Persistent proteinuria R80.1    Diabetic ulcer of toe of left foot associated with type 2 diabetes mellitus, with muscle involvement without evidence of necrosis ([address] E11.621, L97.525    Diabetic ulcer of toe of right foot associated with type 2 diabetes mellitus, limited to breakdown of skin ([address] E11.621, L97.511       Past Medical History:        Diagnosis Date    Arrhythmia     Pacemaker placed aprox 5 years ago for A Fib per patient    Arthritis 12/2013    rt wrist    Atrial fibrillation (HCC)     on Xarelto - Dr. J Luis Pillai CAD (coronary artery disease) 06/18/2014    see dr J Luis Pillai Chronic kidney disease, stage III (moderate) ([facility] 07/07/2016    Critical illness myopathy 03/15/2021    Diabetes mellitus ([facility]     dx 2004    Diabetic neuropathy associated with type 2 diabetes mellitus ([facility] 04/23/2019    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    Gout 04/2019    \"got gout when had pacer put in because they did not give me my medication for gout \"    H/O 24 hour EKG monitoring 10/03/2013    no afib noted, sinsus rhythm    H/O cardiovascular stress test 05/12/2014    cardiolite- mild ischemia RCA EF50%    H/O echocardiogram 12/01/2020    EF 55-60% severe aortic stenosis mild to mod aortic regurg mod to severe tricuspid regurg severe pulm htn significant changes since 2018 echo.  H/O right and left heart catheterization 12/10/2020    DIFFUSE LAD DISEASE, Mild ECA Disease, Severe AS, Milf Pul HTN on RHC.     History of blood transfusion 12/2020    d/t anemia    History of transesophageal echocardiography (MABLE) 12/15/2020    Severe aortic stenosis (ANNA by planimetry: 0.778 cm sq). Mild AR.    [place] (hard of hearing)     hearing tonya aides    Hx of Doppler echocardiogram 05/21/2018    EF 50%  Mild LV hypertrophy. Mildly enlarged RA. Mod aortic valve calcification with mod AS. Mitral annular calcification is present. Mild AR, MR and TR. Mild pulmonary htn.     Hyperlipidemia     Hypertension     Follows with PCP & Dr. Milagros Rosenthal Other disorders of kidney and ureter     Pacemaker     Medtronic, implanted 2014    Pneumonia 12/29/2012    Pneumonia due to COVID-19 virus 08/2020    Sleep apnea     dx 2013- has c-pap    Type II or unspecified type diabetes mellitus with other specified manifestations, uncontrolled 12/12/2012    Venous hypertension, chronic, with ulcer ([address] 12/12/2012    resolved       Past Surgical History:        Procedure Laterality Date    CABG WITH AORTIC VALVE REPLACEMENT N/A 2/16/2021    CABG CORONARY ARTERY BYPASS X2 WITH LIMA, AORTIC VALVE REPLACEMENT AND AORTIC ROOT REPAIR, INTRAOPERATIVE MABLE, INDUCED HYPOTHERMIA, LEFT LEG ENDOVEIN HARVEST, LEFT ATRIAL CLIP, AND CRYO PROCEDURE performed by Chanelle Thomas MD at [facility]  12/14    at [facility] Left 1/29/2021    LEFT CAROTID ENDARTERECTOMY performed by Walker Lopez MD at [id number]  2017    COLONOSCOPY  2011    COLONOSCOPY N/A 11/19/2019    COLONOSCOPY DIAGNOSTIC performed by Salma Hernandez MD at [facility]  09/30/2020    POSSIBLE CECAL avms, SIGMOID DIVERTICULOSIS, INTERNAL HEMORRHOIDS GRADE 1    COLONOSCOPY N/A 9/30/2020    COLONOSCOPY CONTROL HEMORRHAGE WITH APC performed by Salma Hernandez MD at [address] [facility]  2014    \"2 stents put in \"   [facility] Bilateral 3/31/2022    BILATERAL DEBRIEDMENT OF NON-VIABLE TISSUE & BONE performed by Edgar Brown DPM at [facility] [address] Right 4/14/2022    RIGHT FOOT DEBRIDEMENT INCISION AND DRAINAGE performed by Demi Duvall DPM at [address]      abdominal    IR NONTUNNELED VASCULAR CATHETER  3/5/2021    IR NONTUNNELED VASCULAR CATHETER 3/5/2021 [address], [place]. \Hospital Has a New Name and Outlook.\"" SPECIAL PROCEDURES    JOINT REPLACEMENT  2004    total left hip    OTHER SURGICAL HISTORY Right 12/02/2017    I&D; evacuation of hematoma right hip    OTHER SURGICAL HISTORY  09/17/2020    enteroscopy    PACEMAKER INSERTION N/A 2/23/2021    PACEMAKER GENERATOR LEAD REVISION performed by Milagros Leblanc MD at [address] Left 04/26/2022    Dual Chamber PPM: Medtronic, [place] XT DR LOYD Suresccristy    PACEMAKER PLACEMENT      9/18/14 Status post remote permanent pacemaker with atrial lead dislodgement. 7/24/14 PPM Implant    TOE AMPUTATION Bilateral 3/31/2022    LEFT 3RD TOE AMPUTATION performed by Demi Duvall DPM at [facility] Right 4/14/2022    RIGHT 2ND TOE AMPUTATION performed by Demi Duvall DPM at [facility] N/A 9/17/2020    ENTEROSCOPY PUSH BIOPSY performed by Luis Alberto Gavin MD at [facility] N/A 3/4/2021    EGD DIAGNOSTIC ONLY performed by Luis Alberto Gavin MD at [address] [place] [address]  2012    \"have stents in both legs- done in [place]       Social History:    Social History     Tobacco Use    Smoking status: Never Smoker    Smokeless tobacco: Never Used   Substance Use Topics    Alcohol use: Not Currently     Alcohol/week: 2.0 standard drinks     Types: 2 Cans of beer per week     Comment: average \"one time per week\"/ Caffiene: 1 cup of coffee daily                                Counseling given: Not Answered      Vital Signs (Current): There were no vitals filed for this visit.                                            BP Readings from Last 3 Encounters:   04/26/22 (!) 156/105   04/18/22 (!) 144/84   04/18/22 128/70       NPO Status: BMI:   Wt Readings from Last 3 Encounters:   04/26/22 200 lb (90.7 kg)   04/18/22 200 lb (90.7 kg)   04/18/22 200 lb (90.7 kg)     There is no height or weight on file to calculate BMI.    CBC:   Lab Results   Component Value Date    WBC 5.7 04/25/2022    RBC 5.27 04/25/2022    HGB 12.9 04/25/2022    HCT 43.6 04/25/2022    MCV 82.7 04/25/2022    RDW 16.1 04/25/2022     04/25/2022       CMP:   Lab Results   Component Value Date     04/25/2022    K 4.4 04/25/2022    CL 98 04/25/2022    CO2 28 04/25/2022    BUN 51 04/25/2022    CREATININE 1.4 04/25/2022    GFRAA 60 04/25/2022    AGRATIO 1.8 11/24/2020    LABGLOM 50 04/25/2022    LABGLOM 51 06/15/2016    GLUCOSE 237 04/25/2022    PROT 6.2 03/14/2021    PROT 7.3 12/27/2012    CALCIUM 9.0 04/25/2022    BILITOT 0.5 03/14/2021    ALKPHOS 303 03/14/2021    AST 44 03/14/2021    ALT 53 03/14/2021       POC Tests:   Recent Labs     04/26/22  1145   POCGLU 105*       Coags:   Lab Results   Component Value Date    PROTIME 14.8 04/25/2022    INR 1.15 04/25/2022    APTT 36.9 04/25/2022       HCG (If Applicable): No results found for: PREGTESTUR, PREGSERUM, HCG, HCGQUANT     ABGs:   Lab Results   Component Value Date    PO2ART 71 03/05/2021    UQA6RHK 32.0 03/05/2021    SYU8XRP 23.3 03/05/2021        Type & Screen (If Applicable):  No results found for: LABABO, LABRH    Drug/Infectious Status (If Applicable):  No results found for: HIV, HEPCAB    COVID-19 Screening (If Applicable):   Lab Results   Component Value Date    COVID19 NOT DETECTED 04/25/2022           Anesthesia Evaluation    Airway: Mallampati: II  TM distance: >3 FB   Neck ROM: full  Mouth opening: > = 3 FB Dental: normal exam         Pulmonary:normal exam    (+) pneumonia:  shortness of breath:  sleep apnea: on CPAP,                             Cardiovascular:  Exercise tolerance: poor (<4 METS),   (+) hypertension:, angina:, pacemaker:, CAD:, CABG/stent:, dysrhythmias: atrial fibrillation, hyperlipidemia        Rhythm: regular  Rate: normal                 ROS comment: Summary   This is a limited echo to assess EF and RVSP. Left ventricular function and size is normal, EF is estimated at 55-60%. Normally functioning prosthetic valve in aortic position with a mean   gradient of 11mmHg, AT 61msec. Moderate mitral regurgitation is present. Moderate tricuspid regurgitation. moderate pulmonary hypertension with RVSP of 56mmHg. No evidence of any pericardial effusion. Signature      ------------------------------------------------------------------   Electronically signed by Rosemarie Christianson MD   (Interpreting physician) on 03/04/2022 at 12:58 PM   ------------------------------------------------------------------    Pulmonary hypertension, unspecified ([address]    AV dual-paced rhythm   Abnormal ECG   When compared with ECG of 01-MAR-2021 15:03,   Electronic ventricular pacemaker has replaced Electronic atrial pacemaker   Vent. rate has increased BY  29 BPM   Confirmed by [facility] Chuy TATE ([id number]) on 4/14/2022 2:32:07 PM      Neuro/Psych:   (+) neuromuscular disease:,             GI/Hepatic/Renal:   (+) renal disease: CRI,           Endo/Other:    (+) DiabetesType II DM, , .                 Abdominal:             Vascular:   + PVD, aortic or cerebral, . Other Findings:               Anesthesia Plan      general     ASA 4       Induction: intravenous. arterial line    Anesthetic plan and risks discussed with patient. Use of blood products discussed with patient whom consented to blood products. Plan discussed with CRNA and attending.                   JUSTIN Coffman - BRADLY   4/26/2022

## 2022-04-27 VITALS
TEMPERATURE: 97.9 F | SYSTOLIC BLOOD PRESSURE: 126 MMHG | HEIGHT: 71 IN | DIASTOLIC BLOOD PRESSURE: 58 MMHG | WEIGHT: 200 LBS | RESPIRATION RATE: 15 BRPM | BODY MASS INDEX: 28 KG/M2 | HEART RATE: 71 BPM | OXYGEN SATURATION: 100 %

## 2022-04-27 LAB
ANION GAP SERPL CALCULATED.3IONS-SCNC: 14 MMOL/L (ref 4–16)
BUN BLDV-MCNC: 34 MG/DL (ref 6–23)
CALCIUM SERPL-MCNC: 8.5 MG/DL (ref 8.3–10.6)
CHLORIDE BLD-SCNC: 98 MMOL/L (ref 99–110)
CO2: 25 MMOL/L (ref 21–32)
CREAT SERPL-MCNC: 1.3 MG/DL (ref 0.9–1.3)
GFR AFRICAN AMERICAN: >60 ML/MIN/1.73M2
GFR NON-AFRICAN AMERICAN: 54 ML/MIN/1.73M2
GLUCOSE BLD-MCNC: 189 MG/DL (ref 70–99)
GLUCOSE BLD-MCNC: 202 MG/DL (ref 70–99)
MAGNESIUM: 2.5 MG/DL (ref 1.8–2.4)
POTASSIUM SERPL-SCNC: 4.1 MMOL/L (ref 3.5–5.1)
SODIUM BLD-SCNC: 137 MMOL/L (ref 135–145)

## 2022-04-27 PROCEDURE — 6360000002 HC RX W HCPCS: Performed by: NURSE PRACTITIONER

## 2022-04-27 PROCEDURE — 96365 THER/PROPH/DIAG IV INF INIT: CPT

## 2022-04-27 PROCEDURE — G0378 HOSPITAL OBSERVATION PER HR: HCPCS

## 2022-04-27 PROCEDURE — 83735 ASSAY OF MAGNESIUM: CPT

## 2022-04-27 PROCEDURE — 2580000003 HC RX 258: Performed by: NURSE PRACTITIONER

## 2022-04-27 PROCEDURE — 93308 TTE F-UP OR LMTD: CPT

## 2022-04-27 PROCEDURE — 96366 THER/PROPH/DIAG IV INF ADDON: CPT

## 2022-04-27 PROCEDURE — 6370000000 HC RX 637 (ALT 250 FOR IP): Performed by: NURSE PRACTITIONER

## 2022-04-27 PROCEDURE — C1892 INTRO/SHEATH,FIXED,PEEL-AWAY: HCPCS

## 2022-04-27 PROCEDURE — 80048 BASIC METABOLIC PNL TOTAL CA: CPT

## 2022-04-27 PROCEDURE — 36415 COLL VENOUS BLD VENIPUNCTURE: CPT

## 2022-04-27 PROCEDURE — 82962 GLUCOSE BLOOD TEST: CPT

## 2022-04-27 RX ADMIN — TORSEMIDE 20 MG: 20 TABLET ORAL at 08:21

## 2022-04-27 RX ADMIN — GLIMEPIRIDE 4 MG: 4 TABLET ORAL at 08:21

## 2022-04-27 RX ADMIN — SODIUM CHLORIDE, PRESERVATIVE FREE 10 ML: 5 INJECTION INTRAVENOUS at 08:21

## 2022-04-27 RX ADMIN — CEFAZOLIN SODIUM 2000 MG: 2 INJECTION, SOLUTION INTRAVENOUS at 08:24

## 2022-04-27 RX ADMIN — ASPIRIN 81 MG 81 MG: 81 TABLET ORAL at 08:21

## 2022-04-27 RX ADMIN — CEFAZOLIN SODIUM 2000 MG: 2 INJECTION, SOLUTION INTRAVENOUS at 01:44

## 2022-04-27 RX ADMIN — GABAPENTIN 300 MG: 300 CAPSULE ORAL at 06:12

## 2022-04-27 ASSESSMENT — PAIN SCALES - GENERAL: PAINLEVEL_OUTOF10: 0

## 2022-04-27 NOTE — CARE COORDINATION
Pt has a d/c order. .CM has reviewed pt's chart for needs. CM screening shows that pt has PCP, insurance and is independent PTA. If needs arise please contact ANA.   TE

## 2022-04-28 ENCOUNTER — TELEPHONE (OUTPATIENT)
Dept: FAMILY MEDICINE CLINIC | Age: 72
End: 2022-04-28

## 2022-04-28 NOTE — DISCHARGE SUMMARY
[facility]  Discharge Summary    Rosa Ulloa  :  1950  MRN:  [id number]    Admit date:  2022  Discharge date:  2022    Admitting Physician:  Anette Alexander MD    Discharge Diagnoses:     1. Pocket revision  2. Malfunctioning atrial lead extraction and also extraction of old atrial lead extraction  3. Implantation of new right atrial lead  4. Generator change of pacemaker    Admission Condition:  fair    Discharged Condition:  good    Hospital Course:   Patient with hx of atrial lead malfunction presented for      Malfunctioning atrial lead extraction and also extraction of old atrial lead extraction, Implantation of new right atrial lead, Generator change of pacemaker -  Patient tolerated the procedure well. Observed overnight. Patient device area was clean, no hematoma and no tenderness. Patient device interrogation was stable. CXR confirmed placement of device with no complications. Patient stable for discharge with out patient follow up.     Discharge Medication List as of 2022 10:19 AM           Details   glimepiride (AMARYL) 4 MG tablet TAKE 1 TABLET IN THE       MORNING (BEFORE BREAKFAST), Disp-90 tablet, R-1Normal      simvastatin (ZOCOR) 20 MG tablet Take 1 tablet by mouth daily, Disp-90 tablet, R-3Normal      Dulaglutide 3 MG/0.5ML SOPN Inject 3 mg into the skin once a week, Disp-12 pen, R-0Normal      gabapentin (NEURONTIN) 300 MG capsule TAKE 1 CAPSULE 3 TIMES A   DAY, Disp-270 capsule, R-0Normal      torsemide (DEMADEX) 20 MG tablet Take 1 tablet by mouth 2 times daily, Disp-180 tablet, R-3Normal      apixaban (ELIQUIS) 5 MG TABS tablet Take 1 tablet by mouth 2 times daily, Disp-28 tablet, R-0Lot# [id number]  Exp 3/23 and Lot# [id number]  Exp 3/23 1 box each #28Normal      canagliflozin (INVOKANA) 300 MG TABS tablet Take 1 tablet by mouth every morning (before breakfast), Disp-90 tablet, R-1Normal      sildenafil (VIAGRA) 100 MG tablet TAKE 1 TABLET DAILY AS     NEEDED FOR ERECTILE DYSFUNCTION, Disp-30 tablet, R-1Normal      carboxymethylcellulose (REFRESH PLUS) 0.5 % SOLN ophthalmic solution as needed Historical Med      Cholecalciferol (VITAMIN D) 50 MCG (2000 UT) CAPS capsule Take by mouth nightly Historical Med      aspirin 81 MG tablet Take 81 mg by mouth dailyHistorical Med             Consults:  none    Discharge Exam:  BP (!) 126/58   Pulse 71   Temp 97.9 °F (36.6 °C) (Oral)   Resp 15   Ht 5' 11\" (1.803 m)   Wt 200 lb (90.7 kg)   SpO2 100%   BMI 27.89 kg/m²   General appearance: alert, appears stated age and cooperative  Head: Normocephalic, without obvious abnormality, atraumatic  Lungs: clear to auscultation bilaterally  Heart: regular rate and rhythm, S1, S2 normal, grade 2 out of 6 systolic murmur, no click, rub or gallop  Extremities: extremities normal, atraumatic, no cyanosis or edema  Pulses: 2+ and symmetric  Skin: Skin color, texture, turgor normal. No rashes or lesions. Left upper chest site well approximated.   No redness no swelling minimal bruising no hematoma  Neurologic: Grossly normal    Disposition:   home    Signed:  JUSTIN Sparks CNP  4/28/2022, 3:07 PM

## 2022-04-28 NOTE — TELEPHONE ENCOUNTER
[facility] Initial Follow Up Call    Outreach made within 2 business days of discharge: Yes    Patient: Concetta Sharpe Patient : 1950   MRN: [id number]  Reason for Admission: There are no discharge diagnoses documented for the most recent discharge. Discharge Date: 22       Spoke with:Channing    Discharge department/facility: [facility]    TCM Interactive Patient Contact:  Was patient able to fill all prescriptions: no  Was patient instructed to bring all medications to the follow-up visit: Yes  Is patient taking all medications as directed in the discharge summary? Yes  Does patient understand their discharge instructions: Yes  Does patient have questions or concerns that need addressed prior to 7-14 day follow up office visit: No, patient states that everything went well. No changes in medications.      Scheduled appointment with PCP within 7-14 days    Follow Up  Future Appointments   Date Time Provider Melissa Russell   2022 10:00 AM Ayaka Santos MD [facility] ID [facility]   2022  8:30 AM SCHEDULE, [facility] PACERS SPR [facility] Heart [facility]   2022 10:30 AM Pawan Pulse, DO [facility] FPS [facility]   2022  3:00 PM Juan Wahl MD [facility] SPR HRT  [facility] SPR HRT   2022 10:15 AM JUSTIN Purcell CNP [facility] Heart [facility]   2022  2:30 PM Bar Magana MD [facility]ADVNPHHTN AFL ADV NEPH       Dylon Cabrera MA

## 2022-05-02 ENCOUNTER — NURSE ONLY (OUTPATIENT)
Dept: CARDIOLOGY CLINIC | Age: 72
End: 2022-05-02

## 2022-05-02 ENCOUNTER — TELEPHONE (OUTPATIENT)
Dept: CARDIOLOGY CLINIC | Age: 72
End: 2022-05-02

## 2022-05-02 DIAGNOSIS — Z95.0 STATUS POST PLACEMENT OF CARDIAC PACEMAKER: Primary | ICD-10-CM

## 2022-05-02 PROCEDURE — 99024 POSTOP FOLLOW-UP VISIT: CPT | Performed by: INTERNAL MEDICINE

## 2022-05-02 NOTE — PROGRESS NOTES
Patient in today due to swelling at pacemaker site and bruising on left side. Patient states that his dermatologist told him to contact the office. I looked at site and the bandage is still in place, there is no redness or drainage. Patient advised that the swelling and bruising is natural. That he is on a blood thinner and that is the reason that the bruising looks the way it does. Advised him as it heals it will spread downwards. Also advised him that he should have been using ice packs on his site. He said that he used them 2 times a day. I advised him and wife to use ice packs every hour for 15 minutes until the swelling goes down. Patient has a appt on 5/06/22 for a site check. I told them that I would see them at that time unless there is any change to the site. They agreed.

## 2022-05-02 NOTE — TELEPHONE ENCOUNTER
Pt called in states site is swelling and arm/chest is bruised. Dermatologist told him to call and get seen.  Please advise

## 2022-05-05 ENCOUNTER — OFFICE VISIT (OUTPATIENT)
Dept: INFECTIOUS DISEASES | Age: 72
End: 2022-05-05
Payer: MEDICARE

## 2022-05-05 VITALS
TEMPERATURE: 97.2 F | BODY MASS INDEX: 28.53 KG/M2 | SYSTOLIC BLOOD PRESSURE: 135 MMHG | DIASTOLIC BLOOD PRESSURE: 56 MMHG | WEIGHT: 203.8 LBS | HEIGHT: 71 IN | OXYGEN SATURATION: 95 %

## 2022-05-05 DIAGNOSIS — L97.525 DIABETIC ULCER OF TOE OF LEFT FOOT ASSOCIATED WITH TYPE 2 DIABETES MELLITUS, WITH MUSCLE INVOLVEMENT WITHOUT EVIDENCE OF NECROSIS (HCC): ICD-10-CM

## 2022-05-05 DIAGNOSIS — E11.621 DIABETIC ULCER OF TOE OF LEFT FOOT ASSOCIATED WITH TYPE 2 DIABETES MELLITUS, WITH MUSCLE INVOLVEMENT WITHOUT EVIDENCE OF NECROSIS (HCC): ICD-10-CM

## 2022-05-05 DIAGNOSIS — L97.511 DIABETIC ULCER OF TOE OF RIGHT FOOT ASSOCIATED WITH TYPE 2 DIABETES MELLITUS, LIMITED TO BREAKDOWN OF SKIN (HCC): Primary | ICD-10-CM

## 2022-05-05 DIAGNOSIS — E11.621 DIABETIC ULCER OF TOE OF RIGHT FOOT ASSOCIATED WITH TYPE 2 DIABETES MELLITUS, LIMITED TO BREAKDOWN OF SKIN (HCC): Primary | ICD-10-CM

## 2022-05-05 PROCEDURE — 2022F DILAT RTA XM EVC RTNOPTHY: CPT | Performed by: INTERNAL MEDICINE

## 2022-05-05 PROCEDURE — 3017F COLORECTAL CA SCREEN DOC REV: CPT | Performed by: INTERNAL MEDICINE

## 2022-05-05 PROCEDURE — 3051F HG A1C>EQUAL 7.0%<8.0%: CPT | Performed by: INTERNAL MEDICINE

## 2022-05-05 PROCEDURE — 1123F ACP DISCUSS/DSCN MKR DOCD: CPT | Performed by: INTERNAL MEDICINE

## 2022-05-05 PROCEDURE — G8417 CALC BMI ABV UP PARAM F/U: HCPCS | Performed by: INTERNAL MEDICINE

## 2022-05-05 PROCEDURE — 99213 OFFICE O/P EST LOW 20 MIN: CPT | Performed by: INTERNAL MEDICINE

## 2022-05-05 PROCEDURE — 4040F PNEUMOC VAC/ADMIN/RCVD: CPT | Performed by: INTERNAL MEDICINE

## 2022-05-05 PROCEDURE — 1036F TOBACCO NON-USER: CPT | Performed by: INTERNAL MEDICINE

## 2022-05-05 PROCEDURE — G8428 CUR MEDS NOT DOCUMENT: HCPCS | Performed by: INTERNAL MEDICINE

## 2022-05-05 NOTE — PROGRESS NOTES
5/6/2022         Referring Physician: No ref. provider found  Primary Care Physician: Sadiq Mcneil DO    Impression/Plan:   Diagnosis Orders   1. Diabetic ulcer of toe of right foot associated with type 2 diabetes mellitus, limited to breakdown of skin ([address]     2. Diabetic ulcer of toe of left foot associated with type 2 diabetes mellitus, with muscle involvement without evidence of necrosis [facility])         Discussion:  Diabetic ulcer of toe of right foot associated with type 2 diabetes mellitus, limited to breakdown of skin ([address]  S/p amputation on 4/14/2022, cx positive for P aeruginosa, Candida parapsilosis, Staph epidermidis. Amputation site does not appear inflamed, it is healing. Continue to follow-up with Dr. Nuzhat Juan. We will see the patient as needed. Diabetic ulcer of toe of left foot associated with type 2 diabetes mellitus, with muscle involvement without evidence of necrosis ([address]  Amputation site is healing. Return if symptoms worsen or fail to improve. 30 minutes was spent in the encounter; more than 50% of the face-to-face time was spent with the patient providing counseling and coordination of care. History: Alhaji Morataya is a 74 y.o.  male presenting today for left foot middle toe ulcer. He has a medical history of type 2 diabetes mellitus, coronary artery disease, Charcot arthropathy, hammertoes. Patient reports that he never fractured his left foot third toe about 6 months ago. He also reports that he has had recurrent ulcers on that toe. He has previously healed, however he reported that he went golfing and the ulcer opened up again. He was seen at a hospital in [place]. There he was treated with daptomycin as Rocephin for about 4 weeks. He cannot recall any microbe that was isolated. At present, he has a pacemaker that is 2 for battery change within the month.   Dr. Gustavo Epps would like the left third toe diabetic ulcer to be treated prior to the pacemaker change (to minimize the risk of infection). At present, the patient denies fever, chills, nausea or vomiting.  3/24/2022: Patient has seen Dr. Yanira Cooley, reports that he recommends an amputation of his left foot third toe. Also, it was noted that an ulcer had developed on the plantar surface of his right second toe. He denies fever, chills, nausea or vomiting. He is presently taking cefdinir and doxycycline. 4/7/2022: At last visit, culture was taken from his right second toe-positive for Pseudomonas aeruginosa and Staphylococcus hemolyticus. .  Since his last visit he has undergone left third toe amputation, bilateral debridement of nonviable tissue and bone on 3/31/2022. Surgical culture was positive for Staph epidermidis and Finegoldia magna  5/5/2022: He underwent amputation of the right second toe on 4/14/2022. Sutures intact, the wound continues to heal.  He denies nausea, vomiting, diarrhea, fever or chills. He has completed his course of antibiotics. His pacemaker battery has been replaced without incident. Review of Systems   All other systems reviewed and are negative.       Allergies   Allergen Reactions    Spironolactone      CAUSES INCREASED K+    Tape Elige Lathe Tape] Rash     SURGICAL TAPE       Patient Active Problem List   Diagnosis    Type 2 diabetes mellitus without complication, without long-term current use of insulin ([facility])    Critical lower limb ischemia (HCC)    Venous hypertension, chronic, with ulcer ([address]    Ulcer of other part of foot    Hospital-acquired pneumonia    Atrial fibrillation (HCC)    Sinus pause    PAF (paroxysmal atrial fibrillation) ([facility])    DM (diabetes mellitus) ([address]    DMITRIY on CPAP    Hyperlipidemia    Status post incision and drainage    Hyperkalemia    Arthritis    PVD (peripheral vascular disease) ([facility])    Hematoma    Cardiac pacemaker in situ    ASCVD (arteriosclerotic cardiovascular disease)    Essential hypertension    Gout    Diabetic neuropathy associated with type 2 diabetes mellitus ([address]    Adenomatous polyp of sigmoid colon    Iron deficiency anemia due to chronic blood loss    Erythropoietin deficiency anemia    VHD (valvular heart disease)    Abnormal fractional flow reserve (FFR) on cardiac catheterization    Carotid stenosis, left    Aortic stenosis, severe    Atherosclerotic heart disease of native coronary artery with other forms of angina pectoris (HCC)    Displacement of atrial pacemaker leads    Pacemaker lead malfunction    SOB (shortness of breath)    Recurrent right pleural effusion    Elevated liver enzymes    Critical illness myopathy    Respiratory failure (HCC)    Generalized weakness    Status post coronary artery bypass graft    Pneumonia due to COVID-19 virus    Moderate malnutrition (HCC)    Stage 3a chronic kidney disease ([address]    WD-Diabetic ulcer of left foot with fat layer exposed ([address]    WD-Non-pressure ulcer of right lower extremity with fat layer exposed ([address]    Pulmonary hypertension, unspecified ([address]    Persistent proteinuria    Diabetic ulcer of toe of left foot associated with type 2 diabetes mellitus, with muscle involvement without evidence of necrosis ([address]    Diabetic ulcer of toe of right foot associated with type 2 diabetes mellitus, limited to breakdown of skin ([address]    Pacemaker at end of battery life       Current Outpatient Medications   Medication Sig Dispense Refill    glimepiride (AMARYL) 4 MG tablet TAKE 1 TABLET IN THE       MORNING (BEFORE BREAKFAST) 90 tablet 1    simvastatin (ZOCOR) 20 MG tablet Take 1 tablet by mouth daily 90 tablet 3    Dulaglutide 3 MG/0.5ML SOPN Inject 3 mg into the skin once a week (Patient taking differently: Inject 3 mg into the skin once a week Fridays.) 12 pen 0    gabapentin (NEURONTIN) 300 MG capsule TAKE 1 CAPSULE 3 TIMES A    capsule 0    torsemide (DEMADEX) 20 MG tablet Take 1 tablet by mouth 2 times daily 180 tablet 3  apixaban (ELIQUIS) 5 MG TABS tablet Take 1 tablet by mouth 2 times daily 28 tablet 0    canagliflozin (INVOKANA) 300 MG TABS tablet Take 1 tablet by mouth every morning (before breakfast) 90 tablet 1    sildenafil (VIAGRA) 100 MG tablet TAKE 1 TABLET DAILY AS     NEEDED FOR ERECTILE        DYSFUNCTION 30 tablet 1    carboxymethylcellulose (REFRESH PLUS) 0.5 % SOLN ophthalmic solution as needed       Cholecalciferol (VITAMIN D) 50 MCG (2000 UT) CAPS capsule Take by mouth nightly       aspirin 81 MG tablet Take 81 mg by mouth daily      doxycycline hyclate (VIBRA-TABS) 100 MG tablet Take 1 tablet by mouth daily for 7 days 7 tablet 0     No current facility-administered medications for this visit. Past Medical History:   Diagnosis Date    Arrhythmia     Pacemaker placed aprox 5 years ago for A Fib per patient    Arthritis 12/2013    rt wrist    Atrial fibrillation ([facility]     on Xarelto - Dr. Blayne Hutson CAD (coronary artery disease) 06/18/2014    see dr Joe Sullivan kidney disease, stage III (moderate) ([facility] 07/07/2016    Critical illness myopathy 03/15/2021    Diabetes mellitus ([facility]     dx 2004    Diabetic neuropathy associated with type 2 diabetes mellitus ([facility] 04/23/2019    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    Gout 04/2019    \"got gout when had pacer put in because they did not give me my medication for gout \"    H/O 24 hour EKG monitoring 10/03/2013    no afib noted, sinsus rhythm    H/O cardiovascular stress test 05/12/2014    cardiolite- mild ischemia RCA EF50%    H/O echocardiogram 12/01/2020    EF 55-60% severe aortic stenosis mild to mod aortic regurg mod to severe tricuspid regurg severe pulm htn significant changes since 2018 echo.  H/O right and left heart catheterization 12/10/2020    DIFFUSE LAD DISEASE, Mild ECA Disease, Severe AS, Milf Pul HTN on RHC.     History of blood transfusion 12/2020    d/t anemia    History of transesophageal echocardiography (MABLE) 12/15/2020    Severe aortic stenosis (ANNA by planimetry: 0.778 cm sq). Mild AR.    [place] (hard of hearing)     hearing tonya aides    Hx of Doppler echocardiogram 05/21/2018    EF 50%  Mild LV hypertrophy. Mildly enlarged RA. Mod aortic valve calcification with mod AS. Mitral annular calcification is present. Mild AR, MR and TR. Mild pulmonary htn.     Hyperlipidemia     Hypertension     Follows with PCP & Dr. Stephen Vela Other disorders of kidney and ureter     Pacemaker     Medtronic, implanted 2014    Pneumonia 12/29/2012    Pneumonia due to COVID-19 virus 08/2020    Sleep apnea     dx 2013- has c-pap    Type II or unspecified type diabetes mellitus with other specified manifestations, uncontrolled 12/12/2012    Venous hypertension, chronic, with ulcer ([address] 12/12/2012    resolved       Past Surgical History:   Procedure Laterality Date    CABG WITH AORTIC VALVE REPLACEMENT N/A 02/16/2021    CABG CORONARY ARTERY BYPASS X2 WITH LIMA, AORTIC VALVE REPLACEMENT AND AORTIC ROOT REPAIR, INTRAOPERATIVE MABLE, INDUCED HYPOTHERMIA, LEFT LEG ENDOVEIN HARVEST, LEFT ATRIAL CLIP, AND CRYO PROCEDURE performed by Melisa Roldan MD at [address] [place] [address]  12/2014    at [facility] Left 01/29/2021    LEFT CAROTID ENDARTERECTOMY performed by Ankit Izquierdo MD at [id number] [facility]  2017    COLONOSCOPY  2011    COLONOSCOPY N/A 11/19/2019    COLONOSCOPY DIAGNOSTIC performed by Alessia Edwards MD at [facility]  09/30/2020    POSSIBLE CECAL avms, SIGMOID DIVERTICULOSIS, INTERNAL HEMORRHOIDS GRADE 1    COLONOSCOPY N/A 09/30/2020    COLONOSCOPY CONTROL HEMORRHAGE WITH APC performed by Alessia Edwards MD at [facility]  2014    \"2 stents put in \"    FOOT DEBRIDEMENT Bilateral 03/31/2022    BILATERAL DEBRIEDMENT OF NON-VIABLE TISSUE & BONE performed by Dean Hamilton DPM at [facility] Right 04/14/2022 RIGHT FOOT DEBRIDEMENT INCISION AND DRAINAGE performed by Chalino Monteiro DPM at [facility] ([facility]      abdominal    IR NONTUNNELED VASCULAR CATHETER  03/05/2021    IR NONTUNNELED VASCULAR CATHETER 3/5/2021 [facility] SPECIAL PROCEDURES    JOINT REPLACEMENT  2004    total left hip    OTHER SURGICAL HISTORY Right 12/02/2017    I&D; evacuation of hematoma right hip    OTHER SURGICAL HISTORY  09/17/2020    enteroscopy    PACEMAKER INSERTION N/A 02/23/2021    PACEMAKER GENERATOR LEAD REVISION performed by Enmanuel Preciado MD at [facility] Left 04/26/2022    Dual PPM: Atrial lead extraction/insertion, Generator change. Medtronic, Rand XT DR LOYD Suresccristy    PACEMAKER PLACEMENT      9/18/14 Status post remote permanent pacemaker with atrial lead dislodgement.  7/24/14 PPM Implant    TOE AMPUTATION Bilateral 03/31/2022    LEFT 3RD TOE AMPUTATION performed by Chalino Monteiro DPM at [facility] Right 04/14/2022    RIGHT 2ND TOE AMPUTATION performed by Chalino Monteiro DPM at [address] N/A 09/17/2020    ENTEROSCOPY PUSH BIOPSY performed by Jose Leon MD at [address] N/A 03/04/2021    EGD DIAGNOSTIC ONLY performed by Jose Leon MD at [address] [facility] [address]  2012    \"have stents in both legs- done in [address] [facility] [address] [facility] [address] History     Socioeconomic History    Marital status:      Spouse name: Not on file    Number of children: Not on file    Years of education: Not on file    Highest education level: Not on file   Occupational History    Not on file   Tobacco Use    Smoking status: Never Smoker    Smokeless tobacco: Never Used   Vaping Use    Vaping Use: Never used   Substance and Sexual Activity    Alcohol use: Not Currently     Alcohol/week: 2.0 standard drinks     Types: 2 Cans of beer per week     Comment: average \"one time per week\"/ Caffiene: 1 cup of coffee daily    Drug use: No    Sexual activity: Yes     Partners: Female     Comment:    Other Topics Concern    Not on file   Social History Narrative    Not on file     Social Determinants of Health     Financial Resource Strain: Low Risk     Difficulty of Paying Living Expenses: Not hard at all   Food Insecurity: No Food Insecurity    Worried About Running Out of Food in the Last Year: Never true    [address] in the Last Year: Never true   Transportation Needs:     Lack of Transportation (Medical): Not on file    Lack of Transportation (Non-Medical):  Not on file   Physical Activity:     Days of Exercise per Week: Not on file    Minutes of Exercise per Session: Not on file   Stress:     Feeling of Stress : Not on file   Social Connections:     Frequency of Communication with Friends and Family: Not on file    Frequency of Social Gatherings with Friends and Family: Not on file    Attends [employer] Services: Not on file    Active Member of [address] [place] [address] [employer] or Organizations: Not on file    Attends Club or Organization Meetings: Not on file    Marital Status: Not on file   Intimate Partner Violence:     Fear of Current or Ex-Partner: Not on file    Emotionally Abused: Not on file    Physically Abused: Not on file    Sexually Abused: Not on file   Housing Stability:     Unable to Pay for Housing in the Last Year: Not on file    Number of [place] in the Last Year: Not on file    Unstable Housing in the Last Year: Not on file       Family History   Problem Relation Age of Onset    High Blood Pressure Mother     Arthritis Mother     Diabetes Mother     Heart Disease Mother     High Blood Pressure Father     Heart Disease Father     Kidney Disease Father        Vital Signs:  Vitals:    05/05/22 1000   BP: (!) 135/56   Site: Right Upper Arm   Position: Sitting   Cuff Size: Large Adult   Temp: 97.2 °F (36.2 °C)   TempSrc: Infrared   SpO2: 95%   Weight: 203 lb 12.8 oz (92.4 kg)   Height: 5' 11\" (1.803 m)        Wt Readings from Last 3 Encounters:   05/05/22 203 lb 12.8 oz (92.4 kg)   04/26/22 200 lb (90.7 kg)   04/18/22 200 lb (90.7 kg)        Physical Exam:   Gen: alert and NAD  HEENT: sclera clear, pupils equal and reactive, extra ocular muscles intact, oropharynx clear, mucus membranes moist, tympanic membranes clear bilaterally, no cervical lymphadenopathy noted and neck supple  Neck: supple, no significant adenopathy  Chest: clear to auscultation, no wheezes, rales or rhonchi, symmetric air entry  Heart: regular rate and rhythm, no murmurs  ABD: abdomen is soft without significant tenderness, masses, organomegaly or guarding. EXT:peripheral pulses normal, no pedal edema, no clubbing or cyanosis  NEURO: alert, oriented, normal speech, no focal findings or movement disorder noted  Skin: well hydrated, no lesions, surgical site examined  Wounds: Left third toe dorsum also, surrounding swelling, no discharge. Right foot second toe plantar surface ulcer, slough at the base.   Labs:   WBC   Date Value Ref Range Status   04/25/2022 5.7 4.0 - 10.5 K/CU MM Final   04/12/2022 5.6 4.0 - 10.5 K/CU MM Final   03/10/2022 7.3 4.0 - 10.5 K/CU MM Final     CREATININE   Date Value Ref Range Status   04/27/2022 1.3 0.9 - 1.3 MG/DL Final   04/25/2022 1.4 (H) 0.9 - 1.3 MG/DL Final   03/10/2022 1.9 (H) 0.9 - 1.3 MG/DL Final       Cultures:  Culture   Date Value Ref Range Status   04/14/2022 Final Report No anaerobes isolated  Preliminary   04/14/2022 PSEUDOMONAS AERUGINOSA Rare growth (A)  Preliminary   04/14/2022 (A)  Preliminary    CANDIDA PARAPSILOSIS Moderate growth No further workup   04/14/2022 (A)  Preliminary    STAPHYLOCOCCUS EPIDERMIDIS Rare growth No further workup       Imaging Studies:

## 2022-05-05 NOTE — ASSESSMENT & PLAN NOTE
S/p amputation on 4/14/2022, cx positive for P aeruginosa, Candida parapsilosis, Staph epidermidis. Amputation site does not appear inflamed, it is healing. Continue to follow-up with Dr. Francisca Castañeda. We will see the patient as needed.

## 2022-05-06 ENCOUNTER — NURSE ONLY (OUTPATIENT)
Dept: CARDIOLOGY CLINIC | Age: 72
End: 2022-05-06

## 2022-05-06 VITALS — TEMPERATURE: 97.4 F

## 2022-05-06 DIAGNOSIS — Z95.0 STATUS POST PLACEMENT OF CARDIAC PACEMAKER: Primary | ICD-10-CM

## 2022-05-06 PROCEDURE — 99024 POSTOP FOLLOW-UP VISIT: CPT | Performed by: INTERNAL MEDICINE

## 2022-05-06 RX ORDER — DOXYCYCLINE HYCLATE 100 MG
100 TABLET ORAL DAILY
Qty: 7 TABLET | Refills: 0 | Status: SHIPPED | OUTPATIENT
Start: 2022-05-06 | End: 2022-05-13

## 2022-05-06 NOTE — PROGRESS NOTES
Patient seen for site check post pacer implant. Dressing removed. No signs of inflammation or infection noted. The incision site has some redness and drainage to it. There is some swelling to the site. Dr. Claudia Hearn accessed site and cleaned in sterile fashion. He applied antibiotic cream and steri strips to site. Patient given antibiotic to take for 7 days. Patient advised that if there is any change in the site to contact the office. Patient given appointment for 1 week to reassess site. Patient has no complaints of pain or discomfort. Patient instructed to no lift arm higher than shoulder level.

## 2022-05-09 ENCOUNTER — OFFICE VISIT (OUTPATIENT)
Dept: FAMILY MEDICINE CLINIC | Age: 72
End: 2022-05-09
Payer: MEDICARE

## 2022-05-09 VITALS
DIASTOLIC BLOOD PRESSURE: 83 MMHG | HEIGHT: 71 IN | WEIGHT: 202 LBS | RESPIRATION RATE: 16 BRPM | HEART RATE: 78 BPM | OXYGEN SATURATION: 100 % | SYSTOLIC BLOOD PRESSURE: 128 MMHG | BODY MASS INDEX: 28.28 KG/M2

## 2022-05-09 DIAGNOSIS — I10 ESSENTIAL HYPERTENSION: ICD-10-CM

## 2022-05-09 DIAGNOSIS — N52.9 VASCULOGENIC ERECTILE DYSFUNCTION, UNSPECIFIED VASCULOGENIC ERECTILE DYSFUNCTION TYPE: ICD-10-CM

## 2022-05-09 DIAGNOSIS — I35.0 AORTIC STENOSIS, SEVERE: ICD-10-CM

## 2022-05-09 DIAGNOSIS — E44.0 MODERATE MALNUTRITION (HCC): ICD-10-CM

## 2022-05-09 DIAGNOSIS — E11.9 TYPE 2 DIABETES MELLITUS WITHOUT COMPLICATION, WITHOUT LONG-TERM CURRENT USE OF INSULIN (HCC): ICD-10-CM

## 2022-05-09 DIAGNOSIS — G47.33 OSA ON CPAP: ICD-10-CM

## 2022-05-09 DIAGNOSIS — Z99.89 OSA ON CPAP: ICD-10-CM

## 2022-05-09 DIAGNOSIS — I48.91 ATRIAL FIBRILLATION, UNSPECIFIED TYPE (HCC): Primary | ICD-10-CM

## 2022-05-09 DIAGNOSIS — G62.9 PERIPHERAL POLYNEUROPATHY: ICD-10-CM

## 2022-05-09 PROCEDURE — 99214 OFFICE O/P EST MOD 30 MIN: CPT | Performed by: STUDENT IN AN ORGANIZED HEALTH CARE EDUCATION/TRAINING PROGRAM

## 2022-05-09 PROCEDURE — G8427 DOCREV CUR MEDS BY ELIG CLIN: HCPCS | Performed by: STUDENT IN AN ORGANIZED HEALTH CARE EDUCATION/TRAINING PROGRAM

## 2022-05-09 PROCEDURE — 4040F PNEUMOC VAC/ADMIN/RCVD: CPT | Performed by: STUDENT IN AN ORGANIZED HEALTH CARE EDUCATION/TRAINING PROGRAM

## 2022-05-09 PROCEDURE — 1036F TOBACCO NON-USER: CPT | Performed by: STUDENT IN AN ORGANIZED HEALTH CARE EDUCATION/TRAINING PROGRAM

## 2022-05-09 PROCEDURE — G8417 CALC BMI ABV UP PARAM F/U: HCPCS | Performed by: STUDENT IN AN ORGANIZED HEALTH CARE EDUCATION/TRAINING PROGRAM

## 2022-05-09 PROCEDURE — 3051F HG A1C>EQUAL 7.0%<8.0%: CPT | Performed by: STUDENT IN AN ORGANIZED HEALTH CARE EDUCATION/TRAINING PROGRAM

## 2022-05-09 PROCEDURE — 3017F COLORECTAL CA SCREEN DOC REV: CPT | Performed by: STUDENT IN AN ORGANIZED HEALTH CARE EDUCATION/TRAINING PROGRAM

## 2022-05-09 PROCEDURE — 2022F DILAT RTA XM EVC RTNOPTHY: CPT | Performed by: STUDENT IN AN ORGANIZED HEALTH CARE EDUCATION/TRAINING PROGRAM

## 2022-05-09 PROCEDURE — 1123F ACP DISCUSS/DSCN MKR DOCD: CPT | Performed by: STUDENT IN AN ORGANIZED HEALTH CARE EDUCATION/TRAINING PROGRAM

## 2022-05-09 RX ORDER — GABAPENTIN 300 MG/1
CAPSULE ORAL
Qty: 270 CAPSULE | Refills: 1 | Status: SHIPPED | OUTPATIENT
Start: 2022-05-09 | End: 2022-09-12 | Stop reason: SDUPTHER

## 2022-05-09 RX ORDER — SILDENAFIL 100 MG/1
TABLET, FILM COATED ORAL
Qty: 90 TABLET | Refills: 1 | Status: SHIPPED | OUTPATIENT
Start: 2022-05-09 | End: 2022-09-12 | Stop reason: SDUPTHER

## 2022-05-09 RX ORDER — GLIMEPIRIDE 4 MG/1
TABLET ORAL
Qty: 90 TABLET | Refills: 1 | Status: SHIPPED | OUTPATIENT
Start: 2022-05-09 | End: 2022-09-12 | Stop reason: SDUPTHER

## 2022-05-09 ASSESSMENT — ENCOUNTER SYMPTOMS
NAUSEA: 0
ABDOMINAL PAIN: 0
SHORTNESS OF BREATH: 0
SORE THROAT: 0
WHEEZING: 0

## 2022-05-09 NOTE — PROGRESS NOTES
5/15/2022    Wilbert Hill    Chief Complaint   Patient presents with    Follow-Up from Hospital     Presenting for hospital follow up visit, second partial toe amputation , right foot and second. D/C 05/05/22. Dr. Paul Echevarria was surgeon.  Other     Had new pacemaker pacemaker.  Other     Will see Dr. Nelia Rogers soon. HPI  History was obtained from patient. Quincy Leigh is a 74 y.o. male with a PMHx as listed below who presents today for follow up on chronic condtions. Follows with Dr. Paul Echevarria. S/p amputation patient is getting dressing changed dialy at home. Healing well. Toe initially got infected started on IV ABX in [place]. Most recent a1c 7.8, patient states he is compliant with medications. Currently on doxycyclne roughly 4 days left. 1. Atrial fibrillation, unspecified type ([address]    2. Type 2 diabetes mellitus without complication, without long-term current use of insulin ([address]    3. Peripheral polyneuropathy    4. Vasculogenic erectile dysfunction, unspecified vasculogenic erectile dysfunction type    5. Moderate malnutrition ([address]    6. Essential hypertension    7. DMITRIY on CPAP    8. Aortic stenosis, severe             REVIEW OF SYMPTOMS    Review of Systems   Constitutional: Negative for chills and fatigue. HENT: Negative for congestion and sore throat. Respiratory: Negative for shortness of breath and wheezing. Cardiovascular: Negative for chest pain and palpitations. Gastrointestinal: Negative for abdominal pain and nausea. Genitourinary: Negative for frequency and urgency. Neurological: Negative for light-headedness.        PAST MEDICAL HISTORY  Past Medical History:   Diagnosis Date    Arrhythmia     Pacemaker placed aprox 5 years ago for A Fib per patient    Arthritis 12/2013    rt wrist    Atrial fibrillation ([address]     on Xarelto - Dr. Doreen Padgett CAD (coronary artery disease) 06/18/2014    see dr Doreen Padgett Chronic kidney disease, stage III (moderate) ([address] 07/07/2016    Critical illness myopathy 03/15/2021    Diabetes mellitus ([facility]     dx 2004    Diabetic neuropathy associated with type 2 diabetes mellitus ([facility] 04/23/2019    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    Gout 04/2019    \"got gout when had pacer put in because they did not give me my medication for gout \"    H/O 24 hour EKG monitoring 10/03/2013    no afib noted, sinsus rhythm    H/O cardiovascular stress test 05/12/2014    cardiolite- mild ischemia RCA EF50%    H/O echocardiogram 12/01/2020    EF 55-60% severe aortic stenosis mild to mod aortic regurg mod to severe tricuspid regurg severe pulm htn significant changes since 2018 echo.  H/O right and left heart catheterization 12/10/2020    DIFFUSE LAD DISEASE, Mild ECA Disease, Severe AS, Milf Pul HTN on RHC.  History of blood transfusion 12/2020    d/t anemia    History of transesophageal echocardiography (MABLE) 12/15/2020    Severe aortic stenosis (ANNA by planimetry: 0.778 cm sq). Mild AR.    [place] (hard of hearing)     hearing [employer]    Hx of Doppler echocardiogram 05/21/2018    EF 50%  Mild LV hypertrophy. Mildly enlarged RA. Mod aortic valve calcification with mod AS. Mitral annular calcification is present. Mild AR, MR and TR. Mild pulmonary htn.     Hyperlipidemia     Hypertension     Follows with PCP & Dr. Sekou Avalos Other disorders of kidney and ureter     Pacemaker     Medtronic, implanted 2014    Pneumonia 12/29/2012    Pneumonia due to COVID-19 virus 08/2020    Sleep apnea     dx 2013- has c-pap    Type II or unspecified type diabetes mellitus with other specified manifestations, uncontrolled 12/12/2012    Venous hypertension, chronic, with ulcer ([facility] 12/12/2012    resolved       FAMILY HISTORY  Family History   Problem Relation Age of Onset    High Blood Pressure Mother     Arthritis Mother     Diabetes Mother     Heart Disease Mother     High Blood Pressure Father     Heart Disease Father     Kidney Disease Father        SOCIAL HISTORY  Social History     Socioeconomic History    Marital status:      Spouse name: None    Number of children: None    Years of education: None    Highest education level: None   Occupational History    None   Tobacco Use    Smoking status: Never Smoker    Smokeless tobacco: Never Used   Vaping Use    Vaping Use: Never used   Substance and Sexual Activity    Alcohol use: Not Currently     Alcohol/week: 2.0 standard drinks     Types: 2 Cans of beer per week     Comment: average \"one time per week\"/ Caffiene: 1 cup of coffee daily    Drug use: No    Sexual activity: Yes     Partners: Female     Comment:    Other Topics Concern    None   Social History Narrative    None     Social Determinants of Health     Financial Resource Strain: Low Risk     Difficulty of Paying Living Expenses: Not hard at all   Food Insecurity: No Food Insecurity    Worried About Running Out of Food in the Last Year: Never true    Kiko of Food in the Last Year: Never true   Transportation Needs:     Lack of Transportation (Medical): Not on file    Lack of Transportation (Non-Medical):  Not on file   Physical Activity:     Days of Exercise per Week: Not on file    Minutes of Exercise per Session: Not on file   Stress:     Feeling of Stress : Not on file   Social Connections:     Frequency of Communication with Friends and Family: Not on file    Frequency of Social Gatherings with Friends and Family: Not on file    Attends Yazidi Services: Not on file    Active Member of Clubs or Organizations: Not on file    Attends Club or Organization Meetings: Not on file    Marital Status: Not on file   Intimate Partner Violence:     Fear of Current or Ex-Partner: Not on file    Emotionally Abused: Not on file    Physically Abused: Not on file    Sexually Abused: Not on file   Housing Stability:     Unable to Pay for Housing in the Last Year: Not on file    Number of Places Lived in the Last Year: Not on file    Unstable Housing in the Last Year: Not on file        SURGICAL HISTORY  Past Surgical History:   Procedure Laterality Date    CABG WITH AORTIC VALVE REPLACEMENT N/A 02/16/2021    CABG CORONARY ARTERY BYPASS X2 WITH LIMA, AORTIC VALVE REPLACEMENT AND AORTIC ROOT REPAIR, INTRAOPERATIVE MABLE, INDUCED HYPOTHERMIA, LEFT LEG ENDOVEIN HARVEST, LEFT ATRIAL CLIP, AND CRYO PROCEDURE performed by Angelia Mccrary MD at [address] [place] [address]  12/2014    at [address] [facility] [address] Left 01/29/2021    LEFT CAROTID ENDARTERECTOMY performed by Elliot Echevarria MD at [address]  2017    COLONOSCOPY  2011    COLONOSCOPY N/A 11/19/2019    COLONOSCOPY DIAGNOSTIC performed by Phillip Forrester MD at [place] [address]  09/30/2020    POSSIBLE CECAL avms, SIGMOID DIVERTICULOSIS, INTERNAL HEMORRHOIDS GRADE 1    COLONOSCOPY N/A 09/30/2020    COLONOSCOPY CONTROL HEMORRHAGE WITH APC performed by Phillip Forrester MD at [facility]  2014    \"2 stents put in \"    FOOT DEBRIDEMENT Bilateral 03/31/2022    BILATERAL DEBRIEDMENT OF NON-VIABLE TISSUE & BONE performed by Claudean Pili, DPM at [address] Right 04/14/2022    RIGHT FOOT DEBRIDEMENT INCISION AND DRAINAGE performed by Claudean Pili, DPM at [address]      abdominal    IR NONTUNNELED VASCULAR CATHETER  03/05/2021    IR NONTUNNELED VASCULAR CATHETER 3/5/2021 [facility] SPECIAL PROCEDURES    JOINT REPLACEMENT  2004    total left hip    OTHER SURGICAL HISTORY Right 12/02/2017    I&D; evacuation of hematoma right hip    OTHER SURGICAL HISTORY  09/17/2020    enteroscopy    PACEMAKER INSERTION N/A 02/23/2021    PACEMAKER GENERATOR LEAD REVISION performed by Angelia Mccrary MD at [facility] Left 04/26/2022    Dual PPM: Atrial lead extraction/insertion, Generator change.  Medtronic, [place] XT DR LOYD Surescan    PACEMAKER PLACEMENT 9/18/14 Status post remote permanent pacemaker with atrial lead dislodgement. 7/24/14 PPM Implant    TOE AMPUTATION Bilateral 03/31/2022    LEFT 3RD TOE AMPUTATION performed by Reba Munoz DPM at [facility] Right 04/14/2022    RIGHT 2ND TOE AMPUTATION performed by Reba Munoz DPM at [address] N/A 09/17/2020    ENTEROSCOPY PUSH BIOPSY performed by Monda Koyanagi, MD at [address] N/A 03/04/2021    EGD DIAGNOSTIC ONLY performed by Monda Koyanagi, MD at [address] [place] [address]  2012    \"have stents in both legs- done in [facility]  Current Outpatient Medications   Medication Sig Dispense Refill    gabapentin (NEURONTIN) 300 MG capsule TAKE 1 CAPSULE 3 TIMES A    capsule 1    glimepiride (AMARYL) 4 MG tablet TAKE 1 TABLET IN THE       MORNING (BEFORE BREAKFAST) 90 tablet 1    sildenafil (VIAGRA) 100 MG tablet TAKE 1 TABLET DAILY AS     NEEDED FOR ERECTILE        DYSFUNCTION 90 tablet 1    dapagliflozin (FARXIGA) 10 MG tablet Take 1 tablet by mouth every morning 90 tablet 1    Dulaglutide 4.5 MG/0.5ML SOPN Inject 4.5 mg into the skin once a week 12 pen 1    simvastatin (ZOCOR) 20 MG tablet Take 1 tablet by mouth daily 90 tablet 3    torsemide (DEMADEX) 20 MG tablet Take 1 tablet by mouth 2 times daily 180 tablet 3    apixaban (ELIQUIS) 5 MG TABS tablet Take 1 tablet by mouth 2 times daily 28 tablet 0    canagliflozin (INVOKANA) 300 MG TABS tablet Take 1 tablet by mouth every morning (before breakfast) 90 tablet 1    carboxymethylcellulose (REFRESH PLUS) 0.5 % SOLN ophthalmic solution as needed       Cholecalciferol (VITAMIN D) 50 MCG (2000 UT) CAPS capsule Take by mouth nightly       aspirin 81 MG tablet Take 81 mg by mouth daily       No current facility-administered medications for this visit.        ALLERGIES  Allergies   Allergen Reactions    Spironolactone CAUSES INCREASED K+    Tape Mandie Schilder Tape] Rash     SURGICAL TAPE       PHYSICAL EXAM    /83   Pulse 78   Resp 16   Ht 5' 11\" (1.803 m)   Wt 202 lb (91.6 kg)   SpO2 100%   BMI 28.17 kg/m²     Physical Exam  Constitutional:       Appearance: Normal appearance. HENT:      Head: Normocephalic and atraumatic. Eyes:      Extraocular Movements: Extraocular movements intact. Pupils: Pupils are equal, round, and reactive to light. Cardiovascular:      Rate and Rhythm: Normal rate and regular rhythm. Pulses: Normal pulses. Heart sounds: No murmur heard. No friction rub. No gallop. Pulmonary:      Effort: Pulmonary effort is normal.      Breath sounds: Normal breath sounds. Skin:     General: Skin is warm and dry. Neurological:      General: No focal deficit present. Mental Status: He is alert. Psychiatric:         Mood and Affect: Mood normal.         Behavior: Behavior normal.         ASSESSMENT & PLAN    1. Type 2 diabetes mellitus without complication, without long-term current use of insulin (HCC)  -will increase to 4.5 mg trulicity, continue amaryl  -  - glimepiride (AMARYL) 4 MG tablet; TAKE 1 TABLET IN THE       MORNING (BEFORE BREAKFAST)  Dispense: 90 tablet; Refill: 1  - dapagliflozin (FARXIGA) 10 MG tablet; Take 1 tablet by mouth every morning  Dispense: 90 tablet; Refill: 1  - Dulaglutide 4.5 MG/0.5ML SOPN; Inject 4.5 mg into the skin once a week  Dispense: 12 pen; Refill: 1  - CBC with Auto Differential; Future  - Hemoglobin A1C; Future  - Comprehensive Metabolic Panel; Future    2. Peripheral polyneuropathy  -neuropathy stable  - gabapentin (NEURONTIN) 300 MG capsule; TAKE 1 CAPSULE 3 TIMES A   DAY  Dispense: 270 capsule; Refill: 1    3. Vasculogenic erectile dysfunction, unspecified vasculogenic erectile dysfunction type  - sildenafil (VIAGRA) 100 MG tablet; TAKE 1 TABLET DAILY AS     NEEDED FOR ERECTILE        DYSFUNCTION  Dispense: 90 tablet;  Refill: 1    4. Atrial fibrillation, unspecified type ([address]  -s/p ppm following with cardiology    5. Moderate malnutrition ([address]  -resolved    6. Essential hypertension  -bp stable    7. DMITRIY on CPAP  Compliant cpap    8. Aortic stenosis, severe  Following with vascular, medical management. Return in about 3 months (around 8/9/2022).          Electronically signed by Mercedes Pretty DO on 5/15/2022

## 2022-05-13 ENCOUNTER — OFFICE VISIT (OUTPATIENT)
Dept: CARDIOLOGY CLINIC | Age: 72
End: 2022-05-13

## 2022-05-13 VITALS
HEIGHT: 71 IN | SYSTOLIC BLOOD PRESSURE: 130 MMHG | OXYGEN SATURATION: 98 % | HEART RATE: 72 BPM | RESPIRATION RATE: 14 BRPM | DIASTOLIC BLOOD PRESSURE: 82 MMHG | WEIGHT: 207 LBS | BODY MASS INDEX: 28.98 KG/M2

## 2022-05-13 DIAGNOSIS — T82.837D: Primary | ICD-10-CM

## 2022-05-13 PROCEDURE — 99024 POSTOP FOLLOW-UP VISIT: CPT | Performed by: INTERNAL MEDICINE

## 2022-05-13 NOTE — PROGRESS NOTES
Patient device site examination    Hematoma is much reduced  Site healing well  He is having excoriation from the tape above the incision  No ooze noted  No fever or chills  Discussed with patient to continue ice packs  Triple antibiotic cream for excoriation from tape to decrease risk of superficial infection  No tape any more as patient having allergy to it looks like    Patient advised to call if there is any concerns and discussed what to watch out for

## 2022-05-17 ENCOUNTER — TELEPHONE (OUTPATIENT)
Dept: CARDIOLOGY CLINIC | Age: 72
End: 2022-05-17

## 2022-05-17 RX ORDER — BACITRACIN ZINC, POLYMYXIN B SULFATE, NEOMYCIN SULFATE 400; 5000; 3.5 [USP'U]/G; [USP'U]/G; MG/G
OINTMENT TOPICAL
Qty: 1 EACH | Refills: 0 | Status: SHIPPED | OUTPATIENT
Start: 2022-05-17 | End: 2022-08-01

## 2022-05-31 ENCOUNTER — OFFICE VISIT (OUTPATIENT)
Dept: CARDIOLOGY CLINIC | Age: 72
End: 2022-05-31

## 2022-05-31 ENCOUNTER — HOSPITAL ENCOUNTER (OUTPATIENT)
Dept: WOUND CARE | Age: 72
Discharge: HOME | End: 2022-05-31
Payer: MEDICARE

## 2022-05-31 VITALS
SYSTOLIC BLOOD PRESSURE: 129 MMHG | DIASTOLIC BLOOD PRESSURE: 72 MMHG | HEART RATE: 69 BPM | RESPIRATION RATE: 16 BRPM | TEMPERATURE: 98.5 F

## 2022-05-31 VITALS
SYSTOLIC BLOOD PRESSURE: 138 MMHG | HEIGHT: 71 IN | WEIGHT: 210.6 LBS | BODY MASS INDEX: 29.48 KG/M2 | HEART RATE: 70 BPM | DIASTOLIC BLOOD PRESSURE: 76 MMHG

## 2022-05-31 DIAGNOSIS — Z95.0 CARDIAC PACEMAKER IN SITU: Primary | ICD-10-CM

## 2022-05-31 PROCEDURE — 11042 DBRDMT SUBQ TIS 1ST 20SQCM/<: CPT

## 2022-05-31 PROCEDURE — 99213 OFFICE O/P EST LOW 20 MIN: CPT

## 2022-05-31 PROCEDURE — 99024 POSTOP FOLLOW-UP VISIT: CPT | Performed by: NURSE PRACTITIONER

## 2022-05-31 RX ORDER — SIMVASTATIN 20 MG
20 TABLET ORAL DAILY
Qty: 90 TABLET | Refills: 3 | Status: SHIPPED | OUTPATIENT
Start: 2022-05-31 | End: 2022-10-04 | Stop reason: SDUPTHER

## 2022-05-31 ASSESSMENT — PAIN DESCRIPTION - LOCATION: LOCATION: FOOT

## 2022-05-31 ASSESSMENT — PAIN - FUNCTIONAL ASSESSMENT: PAIN_FUNCTIONAL_ASSESSMENT: ACTIVITIES ARE NOT PREVENTED

## 2022-05-31 ASSESSMENT — PAIN DESCRIPTION - ORIENTATION: ORIENTATION: RIGHT

## 2022-05-31 ASSESSMENT — PAIN SCALES - GENERAL: PAINLEVEL_OUTOF10: 0

## 2022-05-31 NOTE — PLAN OF CARE
Problem: Wound:  Goal: Will show signs of wound healing; wound closure and no evidence of infection  Description: Will show signs of wound healing; wound closure and no evidence of infection  Outcome: Progressing  Intervention: Assess pain status  Note: See flowsheet  Intervention: Assess wound size, appearance and drainage  Note: See flowsheet  Intervention: Assess pedal pulses bilaterally if patient has a foot or leg ulcer  Note: See flowsheet  Intervention: Doppler if unable to palpate pedal pulse  Note: See flowsheet

## 2022-05-31 NOTE — PATIENT INSTRUCTIONS
Please be informed that if you contact our office outside of normal business hours the physician on call cannot help with any scheduling or rescheduling issues, procedure instruction questions or any type of medication issue. We advise you for any urgent/emergency that you go to the nearest emergency room! PLEASE CALL OUR OFFICE DURING NORMAL BUSINESS HOURS    Monday - Friday   8 am to 5 pm    [place] [address]: [phone]    [place]:  [phone]    **It is YOUR responsibilty to bring medication bottles and/or updated medication list to [address] [place] [address].  This will allow us to better serve you and all your healthcare needs**

## 2022-05-31 NOTE — PROGRESS NOTES
Patient here today for 1 month follow-up on atrial lead extraction with implantation of new atrial lead and generator change. Patient reports that he has been feeling well. He does have a small hematoma to the pacemaker site. I instructed patient to continue icing continuously. It may take patient about 1 month or more for hematoma to resolve. At this time patient has no restrictions  Device Assessment:    The device is Medtronic pacemaker - Dual Chamber chamber      MRI Compatible : yes    Device interrogation was performed. Mode: DDD     Sensing is normal. Impedence is normal.  Threshold is normal.     There has not been interval changes. Estimated battery life is 8.3 years     The underlying rhythm is AP,. 100 % atrial paced; 93.8 % ventricular paced. Atrial Arrhythmia : No    Non sustained VT episodes : 3    Sustained VT episodes : No    Patient activity reported 3.3 hrs/day    The patient is pacemaker dependent. Patient to follow-up with cardiology in 3 months.   Patient to follow-up with EP as needed

## 2022-06-02 ENCOUNTER — TELEPHONE (OUTPATIENT)
Dept: CARDIOLOGY CLINIC | Age: 72
End: 2022-06-02

## 2022-06-02 RX ORDER — LIDOCAINE HYDROCHLORIDE 40 MG/ML
SOLUTION TOPICAL ONCE
Status: CANCELLED | OUTPATIENT
Start: 2022-06-02 | End: 2022-06-02

## 2022-06-02 RX ORDER — BETAMETHASONE DIPROPIONATE 0.05 %
OINTMENT (GRAM) TOPICAL ONCE
Status: CANCELLED | OUTPATIENT
Start: 2022-06-02 | End: 2022-06-02

## 2022-06-02 RX ORDER — GINSENG 100 MG
CAPSULE ORAL ONCE
Status: CANCELLED | OUTPATIENT
Start: 2022-06-02 | End: 2022-06-02

## 2022-06-02 RX ORDER — LIDOCAINE HYDROCHLORIDE 20 MG/ML
JELLY TOPICAL ONCE
Status: CANCELLED | OUTPATIENT
Start: 2022-06-02 | End: 2022-06-02

## 2022-06-02 RX ORDER — LIDOCAINE 40 MG/G
CREAM TOPICAL ONCE
Status: CANCELLED | OUTPATIENT
Start: 2022-06-02 | End: 2022-06-02

## 2022-06-02 RX ORDER — LIDOCAINE 50 MG/G
OINTMENT TOPICAL ONCE
Status: CANCELLED | OUTPATIENT
Start: 2022-06-02 | End: 2022-06-02

## 2022-06-02 RX ORDER — BACITRACIN, NEOMYCIN, POLYMYXIN B 400; 3.5; 5 [USP'U]/G; MG/G; [USP'U]/G
OINTMENT TOPICAL ONCE
Status: CANCELLED | OUTPATIENT
Start: 2022-06-02 | End: 2022-06-02

## 2022-06-02 RX ORDER — GENTAMICIN SULFATE 1 MG/G
OINTMENT TOPICAL ONCE
Status: CANCELLED | OUTPATIENT
Start: 2022-06-02 | End: 2022-06-02

## 2022-06-02 RX ORDER — BACITRACIN ZINC AND POLYMYXIN B SULFATE 500; 1000 [USP'U]/G; [USP'U]/G
OINTMENT TOPICAL ONCE
Status: CANCELLED | OUTPATIENT
Start: 2022-06-02 | End: 2022-06-02

## 2022-06-02 RX ORDER — CLOBETASOL PROPIONATE 0.5 MG/G
OINTMENT TOPICAL ONCE
Status: CANCELLED | OUTPATIENT
Start: 2022-06-02 | End: 2022-06-02

## 2022-06-02 NOTE — PROGRESS NOTES
Wound Care Center Progress Note With Procedure    Marry Maxwell  AGE: 74 y.o. GENDER: male  : 1950  EPISODE DATE:  2022     Subjective:     Chief Complaint   Patient presents with    Wound Check     right 3rd toe amp site         HISTORY of PRESENT ILLNESS      Marry Maxwell is a 74 y.o. male who presents today for wound evaluation of wound dehiscence secondary to toe amputation on the right foot. Patient has recent history of amputations by me to the right foot and the left foot. Good since being seen by me in my office for this. He is also has a history of peripheral vascular disease and diabetes. Has been seen by Dr. Umu Bradley recently. No new complaints today since I saw him in my office last week. No constitutional symptoms        PAST MEDICAL HISTORY        Diagnosis Date    Arrhythmia     Pacemaker placed aprox 5 years ago for A Fib per patient    Arthritis 2013    rt wrist    Atrial fibrillation ([facility]     on Xabeto - Dr. Laurie Salas CAD (coronary artery disease) 2014    see dr Ruslan Duran kidney disease, stage III (moderate) ([address] 2016    Critical illness myopathy 03/15/2021    Diabetes mellitus ([address]     dx     Diabetic neuropathy associated with type 2 diabetes mellitus ([facility] 2019    Erythropoietin deficiency anemia 2020    Gout 2019    \"got gout when had pacer put in because they did not give me my medication for gout \"    H/O 24 hour EKG monitoring 10/03/2013    no afib noted, sinsus rhythm    H/O cardiovascular stress test 2014    cardiolite- mild ischemia RCA EF50%    H/O echocardiogram 2020    EF 55-60% severe aortic stenosis mild to mod aortic regurg mod to severe tricuspid regurg severe pulm htn significant changes since 2018 echo.  H/O right and left heart catheterization 12/10/2020    DIFFUSE LAD DISEASE, Mild ECA Disease, Severe AS, Milf Pul HTN on RHC.     History of blood transfusion 2020    d/t anemia    History of transesophageal echocardiography (MABLE) 12/15/2020    Severe aortic stenosis (ANNA by planimetry: 0.778 cm sq). Mild AR.    [place] (hard of hearing)     hearing tonya aides    Hx of Doppler echocardiogram 05/21/2018    EF 50%  Mild LV hypertrophy. Mildly enlarged RA. Mod aortic valve calcification with mod AS. Mitral annular calcification is present. Mild AR, MR and TR. Mild pulmonary htn.     Hyperlipidemia     Hypertension     Follows with PCP & Dr. Jocelynn Hough Other disorders of kidney and ureter     Pacemaker     Medtronic, implanted 2014    Pneumonia 12/29/2012    Pneumonia due to COVID-19 virus 08/2020    Sleep apnea     dx 2013- has c-pap    Type II or unspecified type diabetes mellitus with other specified manifestations, uncontrolled 12/12/2012    Venous hypertension, chronic, with ulcer ([address] 12/12/2012    resolved       PAST SURGICAL HISTORY    Past Surgical History:   Procedure Laterality Date    CABG WITH AORTIC VALVE REPLACEMENT N/A 02/16/2021    CABG CORONARY ARTERY BYPASS X2 WITH LIMA, AORTIC VALVE REPLACEMENT AND AORTIC ROOT REPAIR, INTRAOPERATIVE MABLE, INDUCED HYPOTHERMIA, LEFT LEG ENDOVEIN HARVEST, LEFT ATRIAL CLIP, AND CRYO PROCEDURE performed by Jonathan Schwartz MD at [address] [place] [address]  12/2014    at [address] [facility] [address] Left 01/29/2021    LEFT CAROTID ENDARTERECTOMY performed by Jw Robles MD at [id number]  2017    COLONOSCOPY  2011    COLONOSCOPY N/A 11/19/2019    COLONOSCOPY DIAGNOSTIC performed by Ahsan Mazariegos MD at [facility]  09/30/2020    POSSIBLE CECAL avms, SIGMOID DIVERTICULOSIS, INTERNAL HEMORRHOIDS GRADE 1    COLONOSCOPY N/A 09/30/2020    COLONOSCOPY CONTROL HEMORRHAGE WITH APC performed by Ahsan Mazariegos MD at [address] [facility]  2014    \"2 stents put in \"    FOOT DEBRIDEMENT Bilateral 03/31/2022    BILATERAL DEBRIEDMENT OF NON-VIABLE TISSUE & BONE performed by Dean Hamilton DPM at [facility] [address] Right 04/14/2022    RIGHT FOOT DEBRIDEMENT INCISION AND DRAINAGE performed by Dean Hamilton DPM at [address] [facility]      abdominal    IR NONTUNNELED VASCULAR CATHETER  03/05/2021    IR NONTUNNELED VASCULAR CATHETER 3/5/2021 [facility] SPECIAL PROCEDURES    JOINT REPLACEMENT  2004    total left hip    OTHER SURGICAL HISTORY Right 12/02/2017    I&D; evacuation of hematoma right hip    OTHER SURGICAL HISTORY  09/17/2020    enteroscopy    PACEMAKER INSERTION N/A 02/23/2021    PACEMAKER GENERATOR LEAD REVISION performed by Melisa Roldan MD at [address] Left 04/26/2022    Dual PPM: Atrial lead extraction/insertion, Generator change. [employer], Lucie XT DR EVERT Apodaca    PACEMAKER PLACEMENT      9/18/14 Status post remote permanent pacemaker with atrial lead dislodgement.  7/24/14 PPM Implant    TOE AMPUTATION Bilateral 03/31/2022    LEFT 3RD TOE AMPUTATION performed by Dean Hamilton DPM at [facility] Right 04/14/2022    RIGHT 2ND TOE AMPUTATION performed by Dean Hamilton DPM at [address] [facility] N/A 09/17/2020    ENTEROSCOPY PUSH BIOPSY performed by Alessia Edwards MD at [address] [facility] N/A 03/04/2021    EGD DIAGNOSTIC ONLY performed by Alessia Edwards MD at [address] [facility]  2012    \"have stents in both legs- done in [address] [facility] [address]    Family History   Problem Relation Age of Onset    High Blood Pressure Mother     Arthritis Mother     Diabetes Mother     Heart Disease Mother     High Blood Pressure Father     Heart Disease Father     Kidney Disease Father        SOCIAL HISTORY    Social History     Tobacco Use    Smoking status: Never Smoker    Smokeless tobacco: Never Used   Vaping Use    Vaping Use: Never used   Substance Use Topics    Alcohol use: Not Currently     Alcohol/week: 2.0 standard drinks     Types: 2 Patient is appropriate, is  oriented to place and plan of care. Dermatologic exam: Visual inspection of the periwound reveals the skin to be normal in turgor and texture  Wound exam: see wound description below in procedure note      Assessment:     Problem List Items Addressed This Visit     None        Procedure Note    Indications:  Based on my examination of this patient's wound(s) today, sharp excision into necrotic subcutaneous tissue is required to promote healing and evaluate the extent of previous healing. Performed by: Yanni Luis DPM    Consent obtained: Yes    Time out taken:  Yes    Pain Control: lidocaine       Debridement:Non-excisional Debridement    Using #15 blade scalpel the wound(s) was/were sharply debrided down through and including the removal of subcutaneous tissue. Devitalized Tissue Debrided:  necrotic/eschar    Pre Debridement Measurements:  Are located in the Wound Documentation Flow Sheet    All active wounds listed below with today's date are evaluated  Wound(s)    debrided this date include # : 1     Post  Debridement Measurements:  Wound 05/31/22 #1 Right 3rd Toe Amp Site (Active)   Wound Image   05/31/22 1557   Dressing Status Clean;Dry; Intact; New dressing applied 05/31/22 1627   Wound Cleansed Wound cleanser 05/31/22 1557   Dressing/Treatment Collagen;Alginate;ABD 05/31/22 1627   Offloading for Diabetic Foot Ulcers Offloading not required 05/31/22 1557   Wound Length (cm) 1 cm 05/31/22 1557   Wound Width (cm) 1.3 cm 05/31/22 1557   Wound Depth (cm) 0.1 cm 05/31/22 1557   Wound Surface Area (cm^2) 1.3 cm^2 05/31/22 1557   Wound Volume (cm^3) 0.13 cm^3 05/31/22 1557   Post-Procedure Length (cm) 1 cm 05/31/22 1606   Post-Procedure Width (cm) 1.3 cm 05/31/22 1606   Post-Procedure Depth (cm) 0.1 cm 05/31/22 1606   Post-Procedure Surface Area (cm^2) 1.3 cm^2 05/31/22 1606   Post-Procedure Volume (cm^3) 0.13 cm^3 05/31/22 1606   Distance Tunneling (cm) 0 cm 05/31/22 1557 Tunneling Position ___ O'Clock 0 05/31/22 1557   Undermining Starts ___ O'Clock 0 05/31/22 1557   Undermining Ends___ O'Clock 0 05/31/22 1557   Undermining [place] Distance (cm) 0 05/31/22 1557   Wound Assessment [place]/red;[place] 05/31/22 1557   Drainage Amount Moderate 05/31/22 1557   Drainage Description Serosanguinous 05/31/22 1557   Odor None 05/31/22 1557   Angela-wound Assessment Maceration 05/31/22 1557   Margins Defined edges 05/31/22 1557   Wound Thickness Description not for Pressure Injury Full thickness 05/31/22 1557   Number of days: 1       Percent of Wound(s) Debrided: approximately 100%    Total  Area  Debrided:  1.3 sq cm     Bleeding:  None    Hemostasis Achieved:  not needed    Procedural Pain:  0  / 10     Post Procedural Pain:  0 / 10     Response to treatment:  Well tolerated by patient. Status of wound progress and description from last visit:   stable. Plan:       Discharge Instructions       PHYSICIAN ORDERS AND DISCHARGE INSTRUCTIONS    NOTE: Upon discharge from the [address], you will receive a patient experience survey. We would be grateful if you would take the time to fill this survey out. Wound care order history:     YESSENIA's   Right       Left    Date:   Cultures:     Grafts:     Antibiotics:        Continuing wound care orders and information:                Residence:                Continue home health care with:    Your wound-care supplies will be provided by: Wound cleansing:     Do not scrub or use excessive force. Wash hands with soap and water before and after dressing changes. Prior to applying a clean dressing, cleanse wound with normal saline, wound cleanser, or mild soap and water. Ask the physician or nurse before getting the wound(s) wet in a shower    Daily Wound management:   Keep weight off wounds and reposition every 2 hours. Avoid standing for long periods of time. Apply wraps/stockings in AM and remove at bedtime.    If swelling is present, elevate legs to the level of the heart or above for 30 minutes 4-5 times a day and/or when sitting. When taking antibiotics take entire prescription as ordered by physician do not stop taking until medicine is all gone. Orders for this week: 5/31/22       Rx:    Leg Wound - Wash with soap and water, pat dry. Apply Anasept and Stimulen to wound bed. Cover with ca alginate and ABD. Wrap with Conform and tape. Secure with Coban. Change daily. Follow up with Dr Da Goddard in 1 week in the wound care center. Call [phone] for any questions or concerns. Date__________   Time____________          Treatment Note Wound 05/31/22 #1 Right 3rd Toe Amp Site-Dressing/Treatment: Collagen,Alginate,ABD    Written Patient Dismissal Instructions Given     -History of wound dehiscence amputation sites bilateral foot. -Second toe amputation on the right more recently than third toe amputation on the left. Did have some healing prolongation for the site on the left but this is healed at this time  -Healing issues and dehiscence to the wound on the right toe amputation site  -No signs of infection. Monitor off antibiotics. -Patient follows up closely with vascular Dr. Va Good. Recommend he continue to do this per his recommendations. Vascular pathology may be contributing to decreased wound healing. Patient also has elevated hemoglobin A1c  -Anasept gel and stimulant to the wound bed daily.   Change wound dressing daily.  -Any concerns before see him again or any serious concerns for infection or go to the emergency room  -Follow-up 1 Week for recheck sooner if needed    Electronically signed by Scott Balbuena DPM on 6/2/2022 at 10:28 AM

## 2022-06-02 NOTE — LETTER
Cardiology [address]  Phone: [phone]  Fax: [phone]    6/2/2022        [address] [address]            Dear Paulino Lopez: This is your [facility] schedule. You can jose your calendar with these dates. Remember that your device is wireless and should automatically do these checks while you are sleeping. If for any reason I do not get your transmission then I will call you and ask that you send a manual transmission. If you have any questions or concerns, please call and ask for [address] at [phone]. Thank you.

## 2022-06-03 ENCOUNTER — HOSPITAL ENCOUNTER (OUTPATIENT)
Age: 72
Discharge: HOME | End: 2022-06-03
Payer: MEDICARE

## 2022-06-03 DIAGNOSIS — E11.9 TYPE 2 DIABETES MELLITUS WITHOUT COMPLICATION, WITH LONG-TERM CURRENT USE OF INSULIN (HCC): ICD-10-CM

## 2022-06-03 DIAGNOSIS — Z79.4 TYPE 2 DIABETES MELLITUS WITHOUT COMPLICATION, WITH LONG-TERM CURRENT USE OF INSULIN (HCC): ICD-10-CM

## 2022-06-03 DIAGNOSIS — E87.5 HYPERKALEMIA: ICD-10-CM

## 2022-06-03 DIAGNOSIS — R80.1 PERSISTENT PROTEINURIA: ICD-10-CM

## 2022-06-03 DIAGNOSIS — N18.31 STAGE 3A CHRONIC KIDNEY DISEASE (HCC): ICD-10-CM

## 2022-06-03 DIAGNOSIS — I25.118 ATHEROSCLEROTIC HEART DISEASE OF NATIVE CORONARY ARTERY WITH OTHER FORMS OF ANGINA PECTORIS (HCC): ICD-10-CM

## 2022-06-03 DIAGNOSIS — I10 ESSENTIAL HYPERTENSION: ICD-10-CM

## 2022-06-03 LAB
ALBUMIN SERPL-MCNC: 4.2 GM/DL (ref 3.4–5)
ANION GAP SERPL CALCULATED.3IONS-SCNC: 15 MMOL/L (ref 4–16)
BACTERIA: NEGATIVE /HPF
BASOPHILS ABSOLUTE: 0.1 K/CU MM
BASOPHILS RELATIVE PERCENT: 1.1 % (ref 0–1)
BILIRUBIN URINE: NEGATIVE MG/DL
BLOOD, URINE: ABNORMAL
BUN BLDV-MCNC: 42 MG/DL (ref 6–23)
CALCIUM SERPL-MCNC: 8.7 MG/DL (ref 8.3–10.6)
CHLORIDE BLD-SCNC: 97 MMOL/L (ref 99–110)
CLARITY: CLEAR
CO2: 28 MMOL/L (ref 21–32)
COLOR: YELLOW
CREAT SERPL-MCNC: 1.7 MG/DL (ref 0.9–1.3)
CREATININE URINE: 54.2 MG/DL (ref 39–259)
DIFFERENTIAL TYPE: ABNORMAL
EOSINOPHILS ABSOLUTE: 0.3 K/CU MM
EOSINOPHILS RELATIVE PERCENT: 4.6 % (ref 0–3)
GFR AFRICAN AMERICAN: 48 ML/MIN/1.73M2
GFR NON-AFRICAN AMERICAN: 40 ML/MIN/1.73M2
GLUCOSE BLD-MCNC: 175 MG/DL (ref 70–99)
GLUCOSE, URINE: >1000 MG/DL
HCT VFR BLD CALC: 43.3 % (ref 42–52)
HEMOGLOBIN: 13 GM/DL (ref 13.5–18)
IMMATURE NEUTROPHIL %: 0.3 % (ref 0–0.43)
KETONES, URINE: NEGATIVE MG/DL
LEUKOCYTE ESTERASE, URINE: NEGATIVE
LYMPHOCYTES ABSOLUTE: 1.3 K/CU MM
LYMPHOCYTES RELATIVE PERCENT: 19.8 % (ref 24–44)
MAGNESIUM: 2.5 MG/DL (ref 1.8–2.4)
MCH RBC QN AUTO: 25.2 PG (ref 27–31)
MCHC RBC AUTO-ENTMCNC: 30 % (ref 32–36)
MCV RBC AUTO: 84.1 FL (ref 78–100)
MONOCYTES ABSOLUTE: 0.9 K/CU MM
MONOCYTES RELATIVE PERCENT: 13.6 % (ref 0–4)
NITRITE URINE, QUANTITATIVE: NEGATIVE
NUCLEATED RBC %: 0 %
PDW BLD-RTO: 16.1 % (ref 11.7–14.9)
PH, URINE: 6 (ref 5–8)
PHOSPHORUS: 4.4 MG/DL (ref 2.5–4.9)
PLATELET # BLD: 136 K/CU MM (ref 140–440)
PMV BLD AUTO: 11 FL (ref 7.5–11.1)
POTASSIUM SERPL-SCNC: 4.2 MMOL/L (ref 3.5–5.1)
PROT/CREAT RATIO, UR: 1.5
PROTEIN UA: 100 MG/DL
RBC # BLD: 5.15 M/CU MM (ref 4.6–6.2)
RBC URINE: 1 /HPF (ref 0–3)
SEGMENTED NEUTROPHILS ABSOLUTE COUNT: 3.9 K/CU MM
SEGMENTED NEUTROPHILS RELATIVE PERCENT: 60.6 % (ref 36–66)
SODIUM BLD-SCNC: 140 MMOL/L (ref 135–145)
SPECIFIC GRAVITY UA: 1.01 (ref 1–1.03)
TOTAL IMMATURE NEUTOROPHIL: 0.02 K/CU MM
TOTAL NUCLEATED RBC: 0 K/CU MM
TRICHOMONAS: ABNORMAL /HPF
URINE TOTAL PROTEIN: 81.9 MG/DL
UROBILINOGEN, URINE: 0.2 MG/DL (ref 0.2–1)
WBC # BLD: 6.5 K/CU MM (ref 4–10.5)
WBC UA: <1 /HPF (ref 0–2)

## 2022-06-03 PROCEDURE — 80048 BASIC METABOLIC PNL TOTAL CA: CPT

## 2022-06-03 PROCEDURE — 83735 ASSAY OF MAGNESIUM: CPT

## 2022-06-03 PROCEDURE — 85025 COMPLETE CBC W/AUTO DIFF WBC: CPT

## 2022-06-03 PROCEDURE — 82040 ASSAY OF SERUM ALBUMIN: CPT

## 2022-06-03 PROCEDURE — 84156 ASSAY OF PROTEIN URINE: CPT

## 2022-06-03 PROCEDURE — 81001 URINALYSIS AUTO W/SCOPE: CPT

## 2022-06-03 PROCEDURE — 36415 COLL VENOUS BLD VENIPUNCTURE: CPT

## 2022-06-03 PROCEDURE — 82570 ASSAY OF URINE CREATININE: CPT

## 2022-06-03 PROCEDURE — 84100 ASSAY OF PHOSPHORUS: CPT

## 2022-06-06 PROBLEM — N18.32 STAGE 3B CHRONIC KIDNEY DISEASE (HCC): Status: ACTIVE | Noted: 2022-06-06

## 2022-06-07 ENCOUNTER — HOSPITAL ENCOUNTER (OUTPATIENT)
Dept: WOUND CARE | Age: 72
Discharge: HOME | End: 2022-06-07
Payer: MEDICARE

## 2022-06-07 VITALS
SYSTOLIC BLOOD PRESSURE: 139 MMHG | TEMPERATURE: 98.3 F | HEART RATE: 69 BPM | DIASTOLIC BLOOD PRESSURE: 76 MMHG | RESPIRATION RATE: 16 BRPM

## 2022-06-07 DIAGNOSIS — E11.621 DIABETIC ULCER OF TOE OF LEFT FOOT ASSOCIATED WITH TYPE 2 DIABETES MELLITUS, WITH FAT LAYER EXPOSED (HCC): Primary | ICD-10-CM

## 2022-06-07 DIAGNOSIS — E11.49 OTHER DIABETIC NEUROLOGICAL COMPLICATION ASSOCIATED WITH TYPE 2 DIABETES MELLITUS (HCC): ICD-10-CM

## 2022-06-07 DIAGNOSIS — L97.522 DIABETIC ULCER OF TOE OF LEFT FOOT ASSOCIATED WITH TYPE 2 DIABETES MELLITUS, WITH FAT LAYER EXPOSED (HCC): Primary | ICD-10-CM

## 2022-06-07 DIAGNOSIS — L97.912 NON-PRESSURE ULCER OF RIGHT LOWER EXTREMITY WITH FAT LAYER EXPOSED (HCC): ICD-10-CM

## 2022-06-07 DIAGNOSIS — I73.9 PVD (PERIPHERAL VASCULAR DISEASE) (HCC): ICD-10-CM

## 2022-06-07 PROCEDURE — 11042 DBRDMT SUBQ TIS 1ST 20SQCM/<: CPT

## 2022-06-07 RX ORDER — LIDOCAINE 40 MG/G
CREAM TOPICAL ONCE
Status: CANCELLED | OUTPATIENT
Start: 2022-06-07 | End: 2022-06-07

## 2022-06-07 RX ORDER — BACITRACIN ZINC AND POLYMYXIN B SULFATE 500; 1000 [USP'U]/G; [USP'U]/G
OINTMENT TOPICAL ONCE
Status: CANCELLED | OUTPATIENT
Start: 2022-06-07 | End: 2022-06-07

## 2022-06-07 RX ORDER — LIDOCAINE HYDROCHLORIDE 20 MG/ML
JELLY TOPICAL ONCE
Status: CANCELLED | OUTPATIENT
Start: 2022-06-07 | End: 2022-06-07

## 2022-06-07 RX ORDER — LIDOCAINE 50 MG/G
OINTMENT TOPICAL ONCE
Status: CANCELLED | OUTPATIENT
Start: 2022-06-07 | End: 2022-06-07

## 2022-06-07 RX ORDER — LIDOCAINE HYDROCHLORIDE 40 MG/ML
SOLUTION TOPICAL ONCE
Status: CANCELLED | OUTPATIENT
Start: 2022-06-07 | End: 2022-06-07

## 2022-06-07 RX ORDER — BETAMETHASONE DIPROPIONATE 0.05 %
OINTMENT (GRAM) TOPICAL ONCE
Status: CANCELLED | OUTPATIENT
Start: 2022-06-07 | End: 2022-06-07

## 2022-06-07 RX ORDER — CLOBETASOL PROPIONATE 0.5 MG/G
OINTMENT TOPICAL ONCE
Status: CANCELLED | OUTPATIENT
Start: 2022-06-07 | End: 2022-06-07

## 2022-06-07 RX ORDER — GINSENG 100 MG
CAPSULE ORAL ONCE
Status: CANCELLED | OUTPATIENT
Start: 2022-06-07 | End: 2022-06-07

## 2022-06-07 RX ORDER — GENTAMICIN SULFATE 1 MG/G
OINTMENT TOPICAL ONCE
Status: CANCELLED | OUTPATIENT
Start: 2022-06-07 | End: 2022-06-07

## 2022-06-07 RX ORDER — BACITRACIN, NEOMYCIN, POLYMYXIN B 400; 3.5; 5 [USP'U]/G; MG/G; [USP'U]/G
OINTMENT TOPICAL ONCE
Status: CANCELLED | OUTPATIENT
Start: 2022-06-07 | End: 2022-06-07

## 2022-06-07 NOTE — PROGRESS NOTES
Wound Care Center Progress Note With Procedure    Alhaji Morataya  AGE: 74 y.o. GENDER: male  : 1950  EPISODE DATE:  2022     Subjective:     Chief Complaint   Patient presents with    Wound Check     rt foot         HISTORY of PRESENT ILLNESS      Alhaji Morataya is a 74 y.o. male who presents today for wound evaluation of wound dehiscence secondary to toe amputation on the right foot. Well overall. No new complaints today. Saw Dr. Qing Balderrama and he is going to set up angiogram with treatment for artery to the right lower extremity. No constitutional symptoms        PAST MEDICAL HISTORY        Diagnosis Date    Arrhythmia     Pacemaker placed aprox 5 years ago for A Fib per patient    Arthritis 2013    rt wrist    Atrial fibrillation ([facility] [address]     on Xarelto - Dr. Leah Figueroa CAD (coronary artery disease) 2014    see dr Stephen Beal kidney disease, stage III (moderate) ([facility] [address] 2016    Critical illness myopathy 03/15/2021    Diabetes mellitus ([facility] [address]     dx 2004    Diabetic neuropathy associated with type 2 diabetes mellitus ([facility] [address] 2019    Erythropoietin deficiency anemia 2020    Gout 2019    \"got gout when had pacer put in because they did not give me my medication for gout \"    H/O 24 hour EKG monitoring 10/03/2013    no afib noted, sinsus rhythm    H/O cardiovascular stress test 2014    cardiolite- mild ischemia RCA EF50%    H/O echocardiogram 2020    EF 55-60% severe aortic stenosis mild to mod aortic regurg mod to severe tricuspid regurg severe pulm htn significant changes since 2018 echo.  H/O right and left heart catheterization 12/10/2020    DIFFUSE LAD DISEASE, Mild ECA Disease, Severe AS, Milf Pul HTN on RHC.  History of blood transfusion 2020    d/t anemia    History of transesophageal echocardiography (MABLE) 12/15/2020    Severe aortic stenosis (ANNA by planimetry: 0.778 cm sq).   Mild AR.    [place] (hard of hearing)     hearing tonya tay    Hx of Doppler echocardiogram 05/21/2018    EF 50%  Mild LV hypertrophy. Mildly enlarged RA. Mod aortic valve calcification with mod AS. Mitral annular calcification is present. Mild AR, MR and TR. Mild pulmonary htn.     Hyperlipidemia     Hypertension     Follows with PCP & Dr. Anthony Choudhary Other disorders of kidney and ureter     Pacemaker     Medtronic, implanted 2014    Pneumonia 12/29/2012    Pneumonia due to COVID-19 virus 08/2020    Sleep apnea     dx 2013- has c-pap    Type II or unspecified type diabetes mellitus with other specified manifestations, uncontrolled 12/12/2012    Venous hypertension, chronic, with ulcer ([address] 12/12/2012    resolved       PAST SURGICAL HISTORY    Past Surgical History:   Procedure Laterality Date    CABG WITH AORTIC VALVE REPLACEMENT N/A 02/16/2021    CABG CORONARY ARTERY BYPASS X2 WITH LIMA, AORTIC VALVE REPLACEMENT AND AORTIC ROOT REPAIR, INTRAOPERATIVE MABLE, INDUCED HYPOTHERMIA, LEFT LEG ENDOVEIN HARVEST, LEFT ATRIAL CLIP, AND CRYO PROCEDURE performed by Orlando Aguilar MD at [address] [place] [address]  12/2014    at [address] [facility] [address] Left 01/29/2021    LEFT CAROTID ENDARTERECTOMY performed by Olga Vega MD at [id number]  2017    COLONOSCOPY  2011    COLONOSCOPY N/A 11/19/2019    COLONOSCOPY DIAGNOSTIC performed by Gilford Harrow, MD at [facility]  09/30/2020    POSSIBLE CECAL avms, SIGMOID DIVERTICULOSIS, INTERNAL HEMORRHOIDS GRADE 1    COLONOSCOPY N/A 09/30/2020    COLONOSCOPY CONTROL HEMORRHAGE WITH APC performed by Gilford Harrow, MD at [address] [facility]  2014    \"2 stents put in \"    FOOT DEBRIDEMENT Bilateral 03/31/2022    BILATERAL DEBRIEDMENT OF NON-VIABLE TISSUE & BONE performed by Lashawn Rapp DPM at [address] [facility] Right 04/14/2022    RIGHT FOOT DEBRIDEMENT INCISION AND DRAINAGE performed by Lashawn Rapp DPM at [address] [facility] abdominal    IR NONTUNNELED VASCULAR CATHETER  03/05/2021    IR NONTUNNELED VASCULAR CATHETER 3/5/2021 [facility] SPECIAL PROCEDURES    JOINT REPLACEMENT  2004    total left hip    OTHER SURGICAL HISTORY Right 12/02/2017    I&D; evacuation of hematoma right hip    OTHER SURGICAL HISTORY  09/17/2020    enteroscopy    PACEMAKER INSERTION N/A 02/23/2021    PACEMAKER GENERATOR LEAD REVISION performed by Kamaljit Mehta MD at [address] [facility] Left 04/26/2022    Dual PPM: Atrial lead extraction/insertion, Generator change. [employer], [place] XT DR LOYD Suresccristy    PACEMAKER PLACEMENT      9/18/14 Status post remote permanent pacemaker with atrial lead dislodgement.  7/24/14 PPM Implant    TOE AMPUTATION Bilateral 03/31/2022    LEFT 3RD TOE AMPUTATION performed by Jake Chapa DPM at [place] [address] Right 04/14/2022    RIGHT 2ND TOE AMPUTATION performed by Jake Chapa DPM at [facility] N/A 09/17/2020    ENTEROSCOPY PUSH BIOPSY performed by Gordon Albert MD at [facility] N/A 03/04/2021    EGD DIAGNOSTIC ONLY performed by Gordon Albert MD at [address] [place] [address]  2012    \"have stents in both legs- done in [address] [place] [address]    Family History   Problem Relation Age of Onset    High Blood Pressure Mother     Arthritis Mother     Diabetes Mother     Heart Disease Mother     High Blood Pressure Father     Heart Disease Father     Kidney Disease Father        SOCIAL HISTORY    Social History     Tobacco Use    Smoking status: Never Smoker    Smokeless tobacco: Never Used   Vaping Use    Vaping Use: Never used   Substance Use Topics    Alcohol use: Not Currently     Alcohol/week: 2.0 standard drinks     Types: 2 Cans of beer per week     Comment: average \"one time per week\"/ Caffiene: 1 cup of coffee daily    Drug use: No       ALLERGIES    Allergies   Allergen Reactions    Spironolactone CAUSES INCREASED K+    Tape Virgil  Tape] Rash     SURGICAL TAPE       MEDICATIONS    Current Outpatient Medications on File Prior to Encounter   Medication Sig Dispense Refill    apixaban (ELIQUIS) 5 MG TABS tablet Take 1 tablet by mouth 2 times daily 28 tablet 0    simvastatin (ZOCOR) 20 MG tablet Take 1 tablet by mouth daily 90 tablet 3    torsemide (DEMADEX) 20 MG tablet Take 2 tablets by mouth 2 times daily 180 tablet 3    Neomycin-Bacitracin-Polymyxin (TRIPLE ANTIBIOTIC) OINT Apply to chest irritation 2x a day 1 each 0    gabapentin (NEURONTIN) 300 MG capsule TAKE 1 CAPSULE 3 TIMES A    capsule 1    glimepiride (AMARYL) 4 MG tablet TAKE 1 TABLET IN THE       MORNING (BEFORE BREAKFAST) 90 tablet 1    sildenafil (VIAGRA) 100 MG tablet TAKE 1 TABLET DAILY AS     NEEDED FOR ERECTILE        DYSFUNCTION 90 tablet 1    dapagliflozin (FARXIGA) 10 MG tablet Take 1 tablet by mouth every morning 90 tablet 1    Dulaglutide 4.5 MG/0.5ML SOPN Inject 4.5 mg into the skin once a week 12 pen 1    canagliflozin (INVOKANA) 300 MG TABS tablet Take 1 tablet by mouth every morning (before breakfast) 90 tablet 1    carboxymethylcellulose (REFRESH PLUS) 0.5 % SOLN ophthalmic solution as needed       Cholecalciferol (VITAMIN D) 50 MCG (2000 UT) CAPS capsule Take by mouth nightly       aspirin 81 MG tablet Take 81 mg by mouth daily       No current facility-administered medications on file prior to encounter. REVIEW OF SYSTEMS    Pertinent items are noted in HPI. Constitutional: Negative for systemic symptoms including fever, chills and malaise. Objective:      /76   Pulse 69   Temp 98.3 °F (36.8 °C) (Temporal)   Resp 16     PHYSICAL EXAM    General: The patient is in no acute distress. Mental status:  Patient is appropriate, is  oriented to place and plan of care.   Dermatologic exam: Visual inspection of the periwound reveals the skin to be normal in turgor and texture  Wound exam: see wound description below in procedure note      Assessment:     Problem List Items Addressed This Visit        Circulatory    PVD (peripheral vascular disease) ([facility] [address]    Relevant Orders    Initiate Outpatient Wound Care Protocol       Endocrine    Diabetic neuropathy associated with type 2 diabetes mellitus ([address]    Relevant Orders    Initiate Outpatient Wound Care Protocol    WD-Diabetic ulcer of left foot with fat layer exposed ([address] - Primary    Relevant Orders    Initiate Outpatient Wound Care Protocol       Other    WD-Non-pressure ulcer of right lower extremity with fat layer exposed ([address]    Relevant Orders    Initiate Outpatient Wound Care Protocol        Procedure Note    Indications:  Based on my examination of this patient's wound(s) today, sharp excision into necrotic subcutaneous tissue is required to promote healing and evaluate the extent of previous healing. Performed by: Sammy Alvarez DPM    Consent obtained: Yes    Time out taken:  Yes    Pain Control: lidocaine       Debridement:Non-excisional Debridement    Using #15 blade scalpel the wound(s) was/were sharply debrided down through and including the removal of subcutaneous tissue. Devitalized Tissue Debrided:  necrotic/eschar    Pre Debridement Measurements:  Are located in the Wound Documentation Flow Sheet    All active wounds listed below with today's date are evaluated  Wound(s)    debrided this date include # : 1     Post  Debridement Measurements:  Wound 05/31/22 #1 Right 3rd Toe Amp Site (Active)   Wound Image   05/31/22 1557   Wound Etiology Non-Healing Surgical 06/07/22 1249   Dressing Status Clean;Dry; Intact; New dressing applied 05/31/22 1627   Wound Cleansed Wound cleanser 06/07/22 1249   Dressing/Treatment Collagen;Alginate;ABD 05/31/22 1627   Offloading for Diabetic Foot Ulcers Offloading not required 06/07/22 1249   Wound Length (cm) 0.8 cm 06/07/22 1249   Wound Width (cm) 0.5 cm 06/07/22 1249   Wound Depth (cm) 0.1 cm 06/07/22 1249   Wound Surface Area (cm^2) 0.4 cm^2 06/07/22 1249   Change in Wound Size % (l*w) 69.23 06/07/22 1249   Wound Volume (cm^3) 0.04 cm^3 06/07/22 1249   Wound Healing % 69 06/07/22 1249   Post-Procedure Length (cm) 0.8 cm 06/07/22 1315   Post-Procedure Width (cm) 0.5 cm 06/07/22 1315   Post-Procedure Depth (cm) 0.1 cm 06/07/22 1315   Post-Procedure Surface Area (cm^2) 0.4 cm^2 06/07/22 1315   Post-Procedure Volume (cm^3) 0.04 cm^3 06/07/22 1315   Distance Tunneling (cm) 0 cm 06/07/22 1249   Tunneling Position ___ O'Clock 0 06/07/22 1249   Undermining Starts ___ O'Clock 0 06/07/22 1249   Undermining Ends___ O'Clock 0 06/07/22 1249   Undermining Maxium Distance (cm) 0 06/07/22 1249   Wound Assessment Pink/red;Slough 06/07/22 1249   Drainage Amount Moderate 06/07/22 1249   Drainage Description Yellow 06/07/22 1249   Odor None 06/07/22 1249   Angela-wound Assessment Maceration 06/07/22 1249   Margins Defined edges 06/07/22 1249   Wound Thickness Description not for Pressure Injury Full thickness 06/07/22 1249   Number of days: 6       Percent of Wound(s) Debrided: approximately 100%    Total  Area  Debrided:  0.04 sq cm     Bleeding:  None    Hemostasis Achieved:  not needed    Procedural Pain:  0  / 10     Post Procedural Pain:  0 / 10     Response to treatment:  Well tolerated by patient. Status of wound progress and description from last visit:   stable. Plan:       Discharge Instructions       PHYSICIAN ORDERS AND DISCHARGE INSTRUCTIONS     NOTE: Upon discharge from the [address], you will receive a patient experience survey.  We would be grateful if you would take the time to fill this survey out.     Wound care order history:                 YESSENIA's   Right       Left               Date:              Cultures:                Grafts:                Antibiotics:           Continuing wound care orders and information:                 Residence:                Continue home health care with: Your wound-care supplies will be provided by:      Wound cleansing:               Do not scrub or use excessive force. Wash hands with soap and water before and after dressing changes. Prior to applying a clean dressing, cleanse wound with normal saline, wound cleanser, or mild soap and water. Ask the physician or nurse before getting the wound(s) wet in a shower     Daily Wound management:              Keep weight off wounds and reposition every 2 hours. Avoid standing for long periods of time. Apply wraps/stockings in AM and remove at bedtime. If swelling is present, elevate legs to the level of the heart or above for 30 minutes 4-5 times a day and/or when sitting. When taking antibiotics take entire prescription as ordered by physician do not stop taking until medicine is all gone.                                                           Orders for this week: 6/7/22                  Rx:     Leg Wound - Wash with soap and water, pat dry. Apply Anasept and Stimulen to wound bed. Cover with ca alginate and ABD. Wrap with Conform and tape. Secure with Coban. Change daily.        Follow up with Dr Tomasz Leahy in 1 week in the wound care center. Call [phone] for any questions or concerns. Date__________   Time____________        Treatment Note      Written Patient Dismissal Instructions Given     -History of wound dehiscence amputation sites bilateral foot. -Area stable today with no signs of infection. Monitor off antibiotics.  -Continue with Anasept gel and stimulant daily changing  -Patient saw Dr. Brigitte Grant recently. Given history of nonhealing wound to the right second toe and my discussion with Dr. Brigitte Grant, he is going to do a therapeutic angiogram to the anterior tibial artery of the right lower extremity. Patient is amenable to this.   Surgery is still in the process of being scheduled  -Any concerns before see him again or any serious concerns for infection or go to the emergency room  -Follow-up 1 Week for recheck sooner if needed    Electronically signed by Terence Avelar DPM on 6/7/2022 at 1:35 PM

## 2022-06-08 DIAGNOSIS — E11.9 TYPE 2 DIABETES MELLITUS WITHOUT COMPLICATION, WITHOUT LONG-TERM CURRENT USE OF INSULIN (HCC): ICD-10-CM

## 2022-06-08 RX ORDER — CANAGLIFLOZIN 300 MG/1
TABLET, FILM COATED ORAL
Qty: 90 TABLET | Refills: 1 | Status: SHIPPED | OUTPATIENT
Start: 2022-06-08 | End: 2022-07-05 | Stop reason: ALTCHOICE

## 2022-06-13 ENCOUNTER — TELEPHONE (OUTPATIENT)
Dept: FAMILY MEDICINE CLINIC | Age: 72
End: 2022-06-13

## 2022-06-13 NOTE — TELEPHONE ENCOUNTER
farxiga 10 mg and then received invokanna 124 mg  Duplication therapy  Ref# [id number]  P#[phone] op 2    PLEASE ADVISE

## 2022-06-14 ENCOUNTER — HOSPITAL ENCOUNTER (OUTPATIENT)
Dept: WOUND CARE | Age: 72
Discharge: HOME | End: 2022-06-14
Payer: MEDICARE

## 2022-06-14 VITALS
HEART RATE: 70 BPM | TEMPERATURE: 98.1 F | DIASTOLIC BLOOD PRESSURE: 84 MMHG | RESPIRATION RATE: 16 BRPM | SYSTOLIC BLOOD PRESSURE: 153 MMHG

## 2022-06-14 DIAGNOSIS — I73.9 PVD (PERIPHERAL VASCULAR DISEASE) (HCC): ICD-10-CM

## 2022-06-14 DIAGNOSIS — E11.49 OTHER DIABETIC NEUROLOGICAL COMPLICATION ASSOCIATED WITH TYPE 2 DIABETES MELLITUS (HCC): ICD-10-CM

## 2022-06-14 DIAGNOSIS — L97.522 DIABETIC ULCER OF TOE OF LEFT FOOT ASSOCIATED WITH TYPE 2 DIABETES MELLITUS, WITH FAT LAYER EXPOSED (HCC): Primary | ICD-10-CM

## 2022-06-14 DIAGNOSIS — E11.621 DIABETIC ULCER OF TOE OF LEFT FOOT ASSOCIATED WITH TYPE 2 DIABETES MELLITUS, WITH FAT LAYER EXPOSED (HCC): Primary | ICD-10-CM

## 2022-06-14 DIAGNOSIS — L97.912 NON-PRESSURE ULCER OF RIGHT LOWER EXTREMITY WITH FAT LAYER EXPOSED (HCC): ICD-10-CM

## 2022-06-14 PROCEDURE — 11042 DBRDMT SUBQ TIS 1ST 20SQCM/<: CPT

## 2022-06-14 PROCEDURE — 11042 DBRDMT SUBQ TIS 1ST 20SQCM/<: CPT | Performed by: NURSE PRACTITIONER

## 2022-06-14 RX ORDER — LIDOCAINE HYDROCHLORIDE 20 MG/ML
JELLY TOPICAL ONCE
Status: CANCELLED | OUTPATIENT
Start: 2022-06-14 | End: 2022-06-14

## 2022-06-14 RX ORDER — GENTAMICIN SULFATE 1 MG/G
OINTMENT TOPICAL ONCE
Status: CANCELLED | OUTPATIENT
Start: 2022-06-14 | End: 2022-06-14

## 2022-06-14 RX ORDER — BACITRACIN, NEOMYCIN, POLYMYXIN B 400; 3.5; 5 [USP'U]/G; MG/G; [USP'U]/G
OINTMENT TOPICAL ONCE
Status: CANCELLED | OUTPATIENT
Start: 2022-06-14 | End: 2022-06-14

## 2022-06-14 RX ORDER — LIDOCAINE 50 MG/G
OINTMENT TOPICAL ONCE
Status: CANCELLED | OUTPATIENT
Start: 2022-06-14 | End: 2022-06-14

## 2022-06-14 RX ORDER — LIDOCAINE HYDROCHLORIDE 40 MG/ML
SOLUTION TOPICAL ONCE
Status: CANCELLED | OUTPATIENT
Start: 2022-06-14 | End: 2022-06-14

## 2022-06-14 RX ORDER — GINSENG 100 MG
CAPSULE ORAL ONCE
Status: CANCELLED | OUTPATIENT
Start: 2022-06-14 | End: 2022-06-14

## 2022-06-14 RX ORDER — BACITRACIN ZINC AND POLYMYXIN B SULFATE 500; 1000 [USP'U]/G; [USP'U]/G
OINTMENT TOPICAL ONCE
Status: CANCELLED | OUTPATIENT
Start: 2022-06-14 | End: 2022-06-14

## 2022-06-14 RX ORDER — BETAMETHASONE DIPROPIONATE 0.05 %
OINTMENT (GRAM) TOPICAL ONCE
Status: CANCELLED | OUTPATIENT
Start: 2022-06-14 | End: 2022-06-14

## 2022-06-14 RX ORDER — CLOBETASOL PROPIONATE 0.5 MG/G
OINTMENT TOPICAL ONCE
Status: CANCELLED | OUTPATIENT
Start: 2022-06-14 | End: 2022-06-14

## 2022-06-14 RX ORDER — LIDOCAINE 40 MG/G
CREAM TOPICAL ONCE
Status: CANCELLED | OUTPATIENT
Start: 2022-06-14 | End: 2022-06-14

## 2022-06-14 ASSESSMENT — PAIN DESCRIPTION - LOCATION: LOCATION: FOOT

## 2022-06-14 ASSESSMENT — PAIN SCALES - GENERAL: PAINLEVEL_OUTOF10: 0

## 2022-06-14 ASSESSMENT — PAIN - FUNCTIONAL ASSESSMENT: PAIN_FUNCTIONAL_ASSESSMENT: ACTIVITIES ARE NOT PREVENTED

## 2022-06-14 NOTE — PROGRESS NOTES
Wound Care Center Progress Note With Procedure    Concetta Sharpe  AGE: 74 y.o. GENDER: male  : 1950  EPISODE DATE:  2022     Subjective:     Chief Complaint   Patient presents with    Wound Check     right foot         HISTORY of PRESENT ILLNESS       Concetta Sharpe is a 74 y.o. male who presents today for wound evaluation of Chronic non-healing surgical ulcer right third toe amputation site post wound dehiscence post amputation. Patient has recent history of amputations by Dr. Moe Caro the right foot and the left foot. The ulcer is of mild severity. Known PVD and follows with Dr. Georgiana Parnell. Wound Pain  Timing/Severity: none  Quality of pain: N/A  Severity of pain:  0 / 10   Modifying Factors: diabetes, shear force, arterial insufficiency and anticoagulation therapy  Associated Signs/Symptoms: drainage       Diabetes: Yes, on an oral and insulin regimen, last A1c 7.8 as of 22  Smoking: Never smoker  Obesity: No  Anticoagulant therapy: Eliquis and aspirin  Immunosuppression: No    Patient educated on the 6 essential components necessary for wound healing: Circulation, Debridements, Proper Dressings and Topical Wound Products, Infection Control, Edema Control and Offloading. Patient educated on those factors that negatively effect or impact wound healing: smoking, obesity, uncontrolled diabetes, anticoagulant and immunosuppressive regimens, inadequate nutrition, untreated arterial and venous disease if applicable and measures to manage edema.        PAST MEDICAL HISTORY        Diagnosis Date    Arrhythmia     Pacemaker placed aprox 5 years ago for A Fib per patient    Arthritis 2013    rt wrist    Atrial fibrillation ([address]     on Xarelto - Dr. Renate Calderon CAD (coronary artery disease) 2014    see dr Siri Lopez kidney disease, stage III (moderate) ([address] 2016    Critical illness myopathy 03/15/2021    Diabetes mellitus ([address]     dx     Diabetic neuropathy associated with type 2 diabetes mellitus ([facility] 04/23/2019    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    Gout 04/2019    \"got gout when had pacer put in because they did not give me my medication for gout \"    H/O 24 hour EKG monitoring 10/03/2013    no afib noted, sinsus rhythm    H/O cardiovascular stress test 05/12/2014    cardiolite- mild ischemia RCA EF50%    H/O echocardiogram 12/01/2020    EF 55-60% severe aortic stenosis mild to mod aortic regurg mod to severe tricuspid regurg severe pulm htn significant changes since 2018 echo.  H/O right and left heart catheterization 12/10/2020    DIFFUSE LAD DISEASE, Mild ECA Disease, Severe AS, Milf Pul HTN on RHC.  History of blood transfusion 12/2020    d/t anemia    History of transesophageal echocardiography (MABLE) 12/15/2020    Severe aortic stenosis (ANNA by planimetry: 0.778 cm sq). Mild AR.    Teller (hard of hearing)     hearing tonya aides    Hx of Doppler echocardiogram 05/21/2018    EF 50%  Mild LV hypertrophy. Mildly enlarged RA. Mod aortic valve calcification with mod AS. Mitral annular calcification is present. Mild AR, MR and TR. Mild pulmonary htn.     Hyperlipidemia     Hypertension     Follows with PCP & Dr. Sekou Avalos Other disorders of kidney and ureter     Pacemaker     Medtronic, implanted 2014    Pneumonia 12/29/2012    Pneumonia due to COVID-19 virus 08/2020    Sleep apnea     dx 2013- has c-pap    Type II or unspecified type diabetes mellitus with other specified manifestations, uncontrolled 12/12/2012    Venous hypertension, chronic, with ulcer ([facility] 12/12/2012    resolved       PAST SURGICAL HISTORY    Past Surgical History:   Procedure Laterality Date    CABG WITH AORTIC VALVE REPLACEMENT N/A 02/16/2021    CABG CORONARY ARTERY BYPASS X2 WITH LIMA, AORTIC VALVE REPLACEMENT AND AORTIC ROOT REPAIR, INTRAOPERATIVE MABLE, INDUCED HYPOTHERMIA, LEFT LEG ENDOVEIN HARVEST, LEFT ATRIAL CLIP, AND CRYO PROCEDURE performed by Camacho Diaz MD at [facility] OR    CARDIOVERSION  12/2014    at [address] Left 01/29/2021    LEFT CAROTID ENDARTERECTOMY performed by Walker Lopez MD at [id number]  2017    COLONOSCOPY  2011    COLONOSCOPY N/A 11/19/2019    COLONOSCOPY DIAGNOSTIC performed by Salma Hernandez MD at [facility]  09/30/2020    POSSIBLE CECAL avms, SIGMOID DIVERTICULOSIS, INTERNAL HEMORRHOIDS GRADE 1    COLONOSCOPY N/A 09/30/2020    COLONOSCOPY CONTROL HEMORRHAGE WITH APC performed by Salma Hernandez MD at [facility]  2014    \"2 stents put in \"    FOOT DEBRIDEMENT Bilateral 03/31/2022    BILATERAL DEBRIEDMENT OF NON-VIABLE TISSUE & BONE performed by Edgar Brown DPM at [facility] Right 04/14/2022    RIGHT FOOT DEBRIDEMENT INCISION AND DRAINAGE performed by Edgar Brown DPM at [address] [facility]      abdominal    IR NONTUNNELED VASCULAR CATHETER  03/05/2021    IR NONTUNNELED VASCULAR CATHETER 3/5/2021 [facility] SPECIAL PROCEDURES    JOINT REPLACEMENT  2004    total left hip    OTHER SURGICAL HISTORY Right 12/02/2017    I&D; evacuation of hematoma right hip    OTHER SURGICAL HISTORY  09/17/2020    enteroscopy    PACEMAKER INSERTION N/A 02/23/2021    PACEMAKER GENERATOR LEAD REVISION performed by Chanelle Thomas MD at [address] [facility] Left 04/26/2022    Dual PPM: Atrial lead extraction/insertion, Generator change. Medtronic, Lucie XT DR EVERT Apodaca    PACEMAKER PLACEMENT      9/18/14 Status post remote permanent pacemaker with atrial lead dislodgement.  7/24/14 PPM Implant    TOE AMPUTATION Bilateral 03/31/2022    LEFT 3RD TOE AMPUTATION performed by Edgar Brown DPM at [facility] Right 04/14/2022    RIGHT 2ND TOE AMPUTATION performed by Edgar Brown DPM at [address] [facility] [address] N/A 09/17/2020    ENTEROSCOPY PUSH BIOPSY performed by Salma Hernandez MD at [address] [facility] UPPER GASTROINTESTINAL ENDOSCOPY N/A 03/04/2021    EGD DIAGNOSTIC ONLY performed by Daniel Smiley MD at [address]  2012    \"have stents in both legs- done in [address] History   Problem Relation Age of Onset    High Blood Pressure Mother     Arthritis Mother     Diabetes Mother     Heart Disease Mother     High Blood Pressure Father     Heart Disease Father     Kidney Disease Father        SOCIAL HISTORY    Social History     Tobacco Use    Smoking status: Never Smoker    Smokeless tobacco: Never Used   Vaping Use    Vaping Use: Never used   Substance Use Topics    Alcohol use: Not Currently     Alcohol/week: 2.0 standard drinks     Types: 2 Cans of beer per week     Comment: average \"one time per week\"/ Caffiene: 1 cup of coffee daily    Drug use: No       ALLERGIES    Allergies   Allergen Reactions    Spironolactone      CAUSES INCREASED K+    Tape [Adhesive Tape] Rash     SURGICAL TAPE       MEDICATIONS    Current Outpatient Medications on File Prior to Encounter   Medication Sig Dispense Refill    INVOKANA 300 MG TABS tablet TAKE 1 TABLET EVERY MORNINGBEFORE BREAKFAST 90 tablet 1    apixaban (ELIQUIS) 5 MG TABS tablet Take 1 tablet by mouth 2 times daily 28 tablet 0    simvastatin (ZOCOR) 20 MG tablet Take 1 tablet by mouth daily 90 tablet 3    torsemide (DEMADEX) 20 MG tablet Take 2 tablets by mouth 2 times daily 180 tablet 3    Neomycin-Bacitracin-Polymyxin (TRIPLE ANTIBIOTIC) OINT Apply to chest irritation 2x a day 1 each 0    gabapentin (NEURONTIN) 300 MG capsule TAKE 1 CAPSULE 3 TIMES A    capsule 1    glimepiride (AMARYL) 4 MG tablet TAKE 1 TABLET IN THE       MORNING (BEFORE BREAKFAST) 90 tablet 1    sildenafil (VIAGRA) 100 MG tablet TAKE 1 TABLET DAILY AS     NEEDED FOR ERECTILE        DYSFUNCTION 90 tablet 1    dapagliflozin (FARXIGA) 10 MG tablet Take 1 tablet by mouth every morning 90 tablet 1    Dulaglutide 4.5 MG/0.5ML SOPN Inject 4.5 mg into the skin once a week 12 pen 1    carboxymethylcellulose (REFRESH PLUS) 0.5 % SOLN ophthalmic solution as needed       Cholecalciferol (VITAMIN D) 50 MCG (2000 UT) CAPS capsule Take by mouth nightly       aspirin 81 MG tablet Take 81 mg by mouth daily       No current facility-administered medications on file prior to encounter. REVIEW OF SYSTEMS    Pertinent items are noted in HPI. Constitutional: Negative for systemic symptoms including fever, chills and malaise. Objective:      BP (!) 153/84   Pulse 70   Temp 98.1 °F (36.7 °C) (Temporal)   Resp 16     PHYSICAL EXAM    General: The patient is in no acute distress. Mental status:  Patient is appropriate, is  oriented to place and plan of care. Dermatologic exam: Visual inspection of the periwound reveals the skin to be normal in turgor and texture  Wound exam: see wound description below in procedure note      Assessment:     Problem List Items Addressed This Visit        Circulatory    PVD (peripheral vascular disease) ([facility]    Relevant Orders    Initiate Outpatient Wound Care Protocol       Endocrine    Diabetic neuropathy associated with type 2 diabetes mellitus ([facility]    Relevant Orders    Initiate Outpatient Wound Care Protocol    WD-Diabetic ulcer of left foot with fat layer exposed ([facility] - Primary    Relevant Orders    Initiate Outpatient Wound Care Protocol       Other    WD-Non-pressure ulcer of right lower extremity with fat layer exposed ([facility]    Relevant Orders    Initiate Outpatient Wound Care Protocol        Procedure Note    Indications:  Based on my examination of this patient's wound(s) today, sharp excision into necrotic subcutaneous tissue is required to promote healing and evaluate the extent of previous healing.     Performed by: JUSTIN Mace - CNP    Consent obtained: Yes    Time out taken:  Yes    Pain Control: lidocaine       Debridement:Non-excisional Debridement    Using #15 blade scalpel the wound(s) was/were sharply debrided down through and including the removal of subcutaneous tissue. Devitalized Tissue Debrided:  necrotic/eschar    Pre Debridement Measurements:  Are located in the Wound Documentation Flow Sheet    All active wounds listed below with today's date are evaluated  Wound(s)    debrided this date include # : 1     Post  Debridement Measurements:  Wound 05/31/22 #1 Right 3rd Toe Amp Site (Active)   Wound Image   05/31/22 1557   Wound Etiology Non-Healing Surgical 06/07/22 1249   Dressing Status New dressing applied;Clean;Dry; Intact 06/14/22 1320   Wound Cleansed Wound cleanser 06/14/22 1259   Dressing/Treatment Collagen;Alginate;ABD 05/31/22 1627   Offloading for Diabetic Foot Ulcers Offloading not required 06/14/22 1259   Wound Length (cm) 1 cm 06/14/22 1259   Wound Width (cm) 0.7 cm 06/14/22 1259   Wound Depth (cm) 0.1 cm 06/14/22 1259   Wound Surface Area (cm^2) 0.7 cm^2 06/14/22 1259   Change in Wound Size % (l*w) 46.15 06/14/22 1259   Wound Volume (cm^3) 0.07 cm^3 06/14/22 1259   Wound Healing % 46 06/14/22 1259   Post-Procedure Length (cm) 1 cm 06/14/22 1305   Post-Procedure Width (cm) 0.7 cm 06/14/22 1305   Post-Procedure Depth (cm) 0.1 cm 06/14/22 1305   Post-Procedure Surface Area (cm^2) 0.7 cm^2 06/14/22 1305   Post-Procedure Volume (cm^3) 0.07 cm^3 06/14/22 1305   Distance Tunneling (cm) 0 cm 06/14/22 1259   Tunneling Position ___ O'Clock 0 06/14/22 1259   Undermining Starts ___ O'Clock 0 06/14/22 1259   Undermining Ends___ O'Clock 0 06/14/22 1259   Undermining Maxium Distance (cm) 0 06/14/22 1259   Wound Assessment Pink/red;Slough 06/14/22 1259   Drainage Amount Moderate 06/14/22 1259   Drainage Description Yellow 06/14/22 1259   Odor None 06/14/22 1259   Angela-wound Assessment Intact 06/14/22 1259   Margins Defined edges 06/14/22 1259   Wound Thickness Description not for Pressure Injury Full thickness 06/14/22 1259   Number of days: 14       Percent of Wound(s) Debrided: approximately 100%    Total  Area  Debrided: 0.7 sq cm     Bleeding:  None    Hemostasis Achieved:  not needed    Procedural Pain:  0  / 10     Post Procedural Pain:  0 / 10     Response to treatment:  Well tolerated by patient. Status of wound progress and description from last visit: Improving, continue regimen. Plan:       Discharge Instructions       PHYSICIAN ORDERS AND DISCHARGE INSTRUCTIONS     NOTE: Upon discharge from the [address], you will receive a patient experience survey. We would be grateful if you would take the time to fill this survey out.     Wound care order history:                 YESSENIA's   Right       Left               Date:              Cultures:                Grafts:                Antibiotics:           Continuing wound care orders and information:                 Residence:                Continue home health care with:               Your wound-care supplies will be provided by:      Wound cleansing:               NO not scrub or use excessive force.              Wash hands with soap and water before and after dressing changes.               XSBMQ to applying a clean dressing, cleanse wound with normal saline, wound cleanser, or mild soap and water.               Ask the physician or nurse before getting the wound(s) wet in a shower     Daily Wound management:              Keep weight off wounds and reposition every 2 hours.              Avoid standing for long periods of time.              Apply wraps/stockings in AM and remove at bedtime.              If swelling is present, elevate legs to the level of the heart or above for 30 minutes 4-5 times a day and/or when sitting.                                      When taking antibiotics take entire prescription as ordered by physician do not stop taking until medicine is all gone.                                                           Orders for this week: 6/14/22                  Rx:     Leg Wound [employer] with soap and water, pat dry. Apply Anasept and Stimulen to wound bed. Cover with ca alginate and ABD. Wrap with Conform and tape. Secure with Coban. Change daily.        Follow up with Dr Catherine Sexton in 1 week in the wound care center. Call [phone] for any questions or concerns. Date__________   Time____________        Treatment Note Wound 05/31/22 #1 Right 3rd Toe Amp Site-Dressing/Treatment:  (anasept, stimulen, ABD, confrom tape coban )    Written Patient Dismissal Instructions Given     -History of wound dehiscence amputation sites bilateral foot. -Second toe amputation on the right more recently than third toe amputation on the left. Did have some healing prolongation for the site on the left but this is healed at this time  -Healing issues and dehiscence to the wound on the right toe amputation site  -No signs of infection. Monitor off antibiotics. -Patient follows up closely with vascular Dr. Vivian Watters. Recommend he continue to do this per his recommendations. Vascular pathology may be contributing to decreased wound healing. Patient also has elevated hemoglobin A1c  -Anasept gel and stimulant to the wound bed daily.   Change wound dressing daily.  -Any concerns before see him again or any serious concerns for infection or go to the emergency room  -Follow-up 1 Week for recheck sooner if needed    Electronically signed by JUSTIN Melgoza CNP on 6/14/2022 at 5:03 PM

## 2022-06-21 ENCOUNTER — TELEPHONE (OUTPATIENT)
Dept: FAMILY MEDICINE CLINIC | Age: 72
End: 2022-06-21

## 2022-06-21 ENCOUNTER — HOSPITAL ENCOUNTER (OUTPATIENT)
Dept: WOUND CARE | Age: 72
Discharge: HOME | End: 2022-06-21
Payer: MEDICARE

## 2022-06-21 VITALS
TEMPERATURE: 98.4 F | RESPIRATION RATE: 16 BRPM | DIASTOLIC BLOOD PRESSURE: 78 MMHG | HEART RATE: 69 BPM | SYSTOLIC BLOOD PRESSURE: 154 MMHG

## 2022-06-21 DIAGNOSIS — L97.522 DIABETIC ULCER OF TOE OF LEFT FOOT ASSOCIATED WITH TYPE 2 DIABETES MELLITUS, WITH FAT LAYER EXPOSED (HCC): Primary | ICD-10-CM

## 2022-06-21 DIAGNOSIS — E11.621 DIABETIC ULCER OF TOE OF LEFT FOOT ASSOCIATED WITH TYPE 2 DIABETES MELLITUS, WITH FAT LAYER EXPOSED (HCC): Primary | ICD-10-CM

## 2022-06-21 DIAGNOSIS — E11.49 OTHER DIABETIC NEUROLOGICAL COMPLICATION ASSOCIATED WITH TYPE 2 DIABETES MELLITUS (HCC): ICD-10-CM

## 2022-06-21 DIAGNOSIS — L97.912 NON-PRESSURE ULCER OF RIGHT LOWER EXTREMITY WITH FAT LAYER EXPOSED (HCC): ICD-10-CM

## 2022-06-21 DIAGNOSIS — I73.9 PVD (PERIPHERAL VASCULAR DISEASE) (HCC): ICD-10-CM

## 2022-06-21 PROCEDURE — 11042 DBRDMT SUBQ TIS 1ST 20SQCM/<: CPT

## 2022-06-21 RX ORDER — LIDOCAINE HYDROCHLORIDE 40 MG/ML
SOLUTION TOPICAL ONCE
Status: CANCELLED | OUTPATIENT
Start: 2022-06-21 | End: 2022-06-21

## 2022-06-21 RX ORDER — BACITRACIN ZINC AND POLYMYXIN B SULFATE 500; 1000 [USP'U]/G; [USP'U]/G
OINTMENT TOPICAL ONCE
Status: CANCELLED | OUTPATIENT
Start: 2022-06-21 | End: 2022-06-21

## 2022-06-21 RX ORDER — LIDOCAINE HYDROCHLORIDE 20 MG/ML
JELLY TOPICAL ONCE
Status: CANCELLED | OUTPATIENT
Start: 2022-06-21 | End: 2022-06-21

## 2022-06-21 RX ORDER — LIDOCAINE 50 MG/G
OINTMENT TOPICAL ONCE
Status: CANCELLED | OUTPATIENT
Start: 2022-06-21 | End: 2022-06-21

## 2022-06-21 RX ORDER — GINSENG 100 MG
CAPSULE ORAL ONCE
Status: CANCELLED | OUTPATIENT
Start: 2022-06-21 | End: 2022-06-21

## 2022-06-21 RX ORDER — BETAMETHASONE DIPROPIONATE 0.05 %
OINTMENT (GRAM) TOPICAL ONCE
Status: CANCELLED | OUTPATIENT
Start: 2022-06-21 | End: 2022-06-21

## 2022-06-21 RX ORDER — LIDOCAINE 40 MG/G
CREAM TOPICAL ONCE
Status: CANCELLED | OUTPATIENT
Start: 2022-06-21 | End: 2022-06-21

## 2022-06-21 RX ORDER — BACITRACIN, NEOMYCIN, POLYMYXIN B 400; 3.5; 5 [USP'U]/G; MG/G; [USP'U]/G
OINTMENT TOPICAL ONCE
Status: CANCELLED | OUTPATIENT
Start: 2022-06-21 | End: 2022-06-21

## 2022-06-21 RX ORDER — GENTAMICIN SULFATE 1 MG/G
OINTMENT TOPICAL ONCE
Status: CANCELLED | OUTPATIENT
Start: 2022-06-21 | End: 2022-06-21

## 2022-06-21 RX ORDER — CLOBETASOL PROPIONATE 0.5 MG/G
OINTMENT TOPICAL ONCE
Status: CANCELLED | OUTPATIENT
Start: 2022-06-21 | End: 2022-06-21

## 2022-06-21 ASSESSMENT — PAIN SCALES - GENERAL: PAINLEVEL_OUTOF10: 0

## 2022-06-21 NOTE — PROGRESS NOTES
Wound Care Center Progress Note With Procedure    Daisha Darden  AGE: 74 y.o. GENDER: male  : 1950  EPISODE DATE:  2022     Subjective:     Chief Complaint   Patient presents with    Wound Check     right foot         HISTORY of PRESENT ILLNESS      Daisha Darden is a 74 y.o. male who presents today for wound evaluation of wound dehiscence secondary to toe amputation on the right foot. Well overall. No new complaints today. Vascular intervention with Dr. Anne Low this Friday. PAST MEDICAL HISTORY        Diagnosis Date    Arrhythmia     Pacemaker placed aprox 5 years ago for A Fib per patient    Arthritis 2013    rt wrist    Atrial fibrillation ([address]     on Xarelto - Dr. Angela Ferreira CAD (coronary artery disease) 2014    see dr Whit Murrell kidney disease, stage III (moderate) ([address] 2016    Critical illness myopathy 03/15/2021    Diabetes mellitus ([facility]     dx 2004    Diabetic neuropathy associated with type 2 diabetes mellitus ([facility] [address] [facility] 2019    Erythropoietin deficiency anemia 2020    Gout 2019    \"got gout when had pacer put in because they did not give me my medication for gout \"    H/O 24 hour EKG monitoring 10/03/2013    no afib noted, sinsus rhythm    H/O cardiovascular stress test 2014    cardiolite- mild ischemia RCA EF50%    H/O echocardiogram 2020    EF 55-60% severe aortic stenosis mild to mod aortic regurg mod to severe tricuspid regurg severe pulm htn significant changes since 2018 echo.  H/O right and left heart catheterization 12/10/2020    DIFFUSE LAD DISEASE, Mild ECA Disease, Severe AS, Milf Pul HTN on RHC.  History of blood transfusion 2020    d/t anemia    History of transesophageal echocardiography (MABLE) 12/15/2020    Severe aortic stenosis (ANNA by planimetry: 0.778 cm sq). Mild AR.    [place] (hard of hearing)     hearing tonya aides    Hx of Doppler echocardiogram 2018    EF 50%  Mild LV hypertrophy. Mildly enlarged RA. Mod aortic valve calcification with mod AS. Mitral annular calcification is present. Mild AR, MR and TR. Mild pulmonary htn.     Hyperlipidemia     Hypertension     Follows with PCP & Dr. Sekou Avalos Other disorders of kidney and ureter     Pacemaker     Medtronic, implanted 2014    Pneumonia 12/29/2012    Pneumonia due to COVID-19 virus 08/2020    Sleep apnea     dx 2013- has c-pap    Type II or unspecified type diabetes mellitus with other specified manifestations, uncontrolled 12/12/2012    Venous hypertension, chronic, with ulcer ([address] 12/12/2012    resolved       PAST SURGICAL HISTORY    Past Surgical History:   Procedure Laterality Date    CABG WITH AORTIC VALVE REPLACEMENT N/A 02/16/2021    CABG CORONARY ARTERY BYPASS X2 WITH LIMA, AORTIC VALVE REPLACEMENT AND AORTIC ROOT REPAIR, INTRAOPERATIVE MABLE, INDUCED HYPOTHERMIA, LEFT LEG ENDOVEIN HARVEST, LEFT ATRIAL CLIP, AND CRYO PROCEDURE performed by Camacho Diaz MD at [address] [facility]  12/2014    at [address] [facility] Left 01/29/2021    LEFT CAROTID ENDARTERECTOMY performed by Monster Fernández MD at [id number]  2017    COLONOSCOPY  2011    COLONOSCOPY N/A 11/19/2019    COLONOSCOPY DIAGNOSTIC performed by Jo Ann Hope MD at [address] [facility]  09/30/2020    POSSIBLE CECAL avms, SIGMOID DIVERTICULOSIS, INTERNAL HEMORRHOIDS GRADE 1    COLONOSCOPY N/A 09/30/2020    COLONOSCOPY CONTROL HEMORRHAGE WITH APC performed by Jo Ann Hope MD at [address] [facility]  2014    \"2 stents put in \"    FOOT DEBRIDEMENT Bilateral 03/31/2022    BILATERAL DEBRIEDMENT OF NON-VIABLE TISSUE & BONE performed by Marcial Dean DPM at [facility] Right 04/14/2022    RIGHT FOOT DEBRIDEMENT INCISION AND DRAINAGE performed by Marcial Dean DPM at [address] [facility]      abdominal    IR NONTUNNELED VASCULAR CATHETER  03/05/2021    IR NONTUNNELED VASCULAR CATHETER 3/5/2021 [facility] SPECIAL PROCEDURES    JOINT REPLACEMENT  2004    total left hip    OTHER SURGICAL HISTORY Right 12/02/2017    I&D; evacuation of hematoma right hip    OTHER SURGICAL HISTORY  09/17/2020    enteroscopy    PACEMAKER INSERTION N/A 02/23/2021    PACEMAKER GENERATOR LEAD REVISION performed by Randall Crenshaw MD at [address] [facility] Left 04/26/2022    Dual PPM: Atrial lead extraction/insertion, Generator change. Medtronic, Lucie XT DR EVERT Apodaca    PACEMAKER PLACEMENT      9/18/14 Status post remote permanent pacemaker with atrial lead dislodgement.  7/24/14 PPM Implant    TOE AMPUTATION Bilateral 03/31/2022    LEFT 3RD TOE AMPUTATION performed by Ayden Jones DPM at [facility] [address] Right 04/14/2022    RIGHT 2ND TOE AMPUTATION performed by Ayden Jones DPM at [address] N/A 09/17/2020    ENTEROSCOPY PUSH BIOPSY performed by Justin Griffiths MD at [facility] N/A 03/04/2021    EGD DIAGNOSTIC ONLY performed by Justin Griffiths MD at [address]  2012    \"have stents in both legs- done in [address] [facility] [address] [facility] [address]    Family History   Problem Relation Age of Onset    High Blood Pressure Mother     Arthritis Mother     Diabetes Mother     Heart Disease Mother     High Blood Pressure Father     Heart Disease Father     Kidney Disease Father        SOCIAL HISTORY    Social History     Tobacco Use    Smoking status: Never Smoker    Smokeless tobacco: Never Used   Vaping Use    Vaping Use: Never used   Substance Use Topics    Alcohol use: Not Currently     Alcohol/week: 2.0 standard drinks     Types: 2 Cans of beer per week     Comment: average \"one time per week\"/ Caffiene: 1 cup of coffee daily    Drug use: No       ALLERGIES    Allergies   Allergen Reactions    Spironolactone      CAUSES INCREASED K+    Tape [Adhesive Tape] Rash     SURGICAL TAPE MEDICATIONS    Current Outpatient Medications on File Prior to Encounter   Medication Sig Dispense Refill    INVOKANA 300 MG TABS tablet TAKE 1 TABLET EVERY MORNINGBEFORE BREAKFAST 90 tablet 1    apixaban (ELIQUIS) 5 MG TABS tablet Take 1 tablet by mouth 2 times daily 28 tablet 0    simvastatin (ZOCOR) 20 MG tablet Take 1 tablet by mouth daily 90 tablet 3    torsemide (DEMADEX) 20 MG tablet Take 2 tablets by mouth 2 times daily 180 tablet 3    Neomycin-Bacitracin-Polymyxin (TRIPLE ANTIBIOTIC) OINT Apply to chest irritation 2x a day 1 each 0    gabapentin (NEURONTIN) 300 MG capsule TAKE 1 CAPSULE 3 TIMES A    capsule 1    glimepiride (AMARYL) 4 MG tablet TAKE 1 TABLET IN THE       MORNING (BEFORE BREAKFAST) 90 tablet 1    sildenafil (VIAGRA) 100 MG tablet TAKE 1 TABLET DAILY AS     NEEDED FOR ERECTILE        DYSFUNCTION 90 tablet 1    dapagliflozin (FARXIGA) 10 MG tablet Take 1 tablet by mouth every morning 90 tablet 1    Dulaglutide 4.5 MG/0.5ML SOPN Inject 4.5 mg into the skin once a week 12 pen 1    carboxymethylcellulose (REFRESH PLUS) 0.5 % SOLN ophthalmic solution as needed       Cholecalciferol (VITAMIN D) 50 MCG (2000 UT) CAPS capsule Take by mouth nightly       aspirin 81 MG tablet Take 81 mg by mouth daily       No current facility-administered medications on file prior to encounter. REVIEW OF SYSTEMS    Pertinent items are noted in HPI. Constitutional: Negative for systemic symptoms including fever, chills and malaise. Objective:      BP (!) 154/78   Pulse 69   Temp 98.4 °F (36.9 °C) (Temporal)   Resp 16     PHYSICAL EXAM    General: The patient is in no acute distress. Mental status:  Patient is appropriate, is  oriented to place and plan of care.   Dermatologic exam: Visual inspection of the periwound reveals the skin to be normal in turgor and texture  Wound exam: see wound description below in procedure note      Assessment:     Problem List Items Addressed This Visit        Circulatory    PVD (peripheral vascular disease) ([facility]    Relevant Orders    Initiate Outpatient Wound Care Protocol       Endocrine    Diabetic neuropathy associated with type 2 diabetes mellitus ([facility] [address] [address]    Relevant Orders    Initiate Outpatient Wound Care Protocol    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 Performed by: Yanni Luis DPM    Consent obtained: Yes    Time out taken:  Yes    Pain Control: lidocaine       Debridement:Non-excisional Debridement    Using #15 blade scalpel the wound(s) was/were sharply debrided down through and including the removal of subcutaneous tissue. Devitalized Tissue Debrided:  necrotic/eschar    Pre Debridement Measurements:  Are located in the Wound Documentation Flow Sheet    All active wounds listed below with today's date are evaluated  Wound(s)    debrided this date include # : 1     Post  Debridement Measurements:  Wound 05/31/22 #1 Right 3rd Toe Amp Site (Active)   Wound Image   06/21/22 1302   Wound Etiology Non-Healing Surgical 06/21/22 1302   Dressing Status New dressing applied;Clean;Dry; Intact 06/21/22 1335   Wound Cleansed Soap and water; Wound cleanser 06/21/22 1302   Dressing/Treatment Collagen;Alginate;ABD 05/31/22 1627   Offloading for Diabetic Foot Ulcers Offloading not required 06/21/22 1335   Wound Length (cm) 1.3 cm 06/21/22 1302   Wound Width (cm) 0.7 cm 06/21/22 1302   Wound Depth (cm) 0.1 cm 06/21/22 1302   Wound Surface Area (cm^2) 0.91 cm^2 06/21/22 1302   Change in Wound Size % (l*w) 30 06/21/22 1302   Wound Volume (cm^3) 0.091 cm^3 06/21/22 1302   Wound Healing % 30 06/21/22 1302   Post-Procedure Length (cm) 1.3 cm 06/21/22 1320   Post-Procedure Width (cm) 0.7 cm 06/21/22 1320   Post-Procedure Depth (cm) 0.1 cm 06/21/22 1320   Post-Procedure Surface Area (cm^2) 0.91 cm^2 06/21/22 1320   Post-Procedure Volume (cm^3) 0.091 cm^3 06/21/22 1320   Distance Tunneling (cm) 0 cm 06/21/22 1302   Tunneling Position ___ O'Clock 0 06/21/22 1302   Undermining Starts ___ O'Clock 0 06/21/22 1302   Undermining Ends___ O'Clock 0 06/21/22 1302   Undermining Maxium Distance (cm) 0 06/21/22 1302   Wound Assessment Pink/red;Slough 06/21/22 1302   Drainage Amount Small 06/21/22 1302   Drainage Description Serosanguinous 06/21/22 1302   Odor None 06/21/22 1302   Angela-wound Assessment Intact 06/21/22 1302   Margins Defined edges 06/21/22 1302   Wound Thickness Description not for Pressure Injury Full thickness 06/21/22 1302   Number of days: 20         Percent of Wound(s) Debrided: approximately 100%    Total  Area  Debrided:  0.91 sq cm     Bleeding:  None    Hemostasis Achieved:  not needed    Procedural Pain:  0  / 10     Post Procedural Pain:  0 / 10     Response to treatment:  Well tolerated by patient. Status of wound progress and description from last visit:   stable. Plan:       Discharge Instructions       PHYSICIAN ORDERS AND DISCHARGE INSTRUCTIONS     NOTE: Upon discharge from the [address], you will receive a patient experience survey.  We would be grateful if you would take the time to fill this survey out.     Wound care order history:                 YESSENIA's   Right       Left               Date:              Cultures:                Grafts:                Antibiotics:           Continuing wound care orders and information:                 Residence:                Continue home health care with:               Your wound-care supplies will be provided by:      Wound cleansing:             RG not scrub or use excessive force.              Wash hands with soap and water before and after dressing changes.             GCZYE to applying a clean dressing, cleanse wound with normal saline, wound cleanser, or mild soap and water.               Ask the physician or nurse before getting the wound(s) wet in a shower     Daily Wound management:              Keep weight off wounds and reposition every 2 hours.              Avoid standing for long periods of time.              Apply wraps/stockings in AM and remove at bedtime.              If swelling is present, elevate legs to the level of the heart or above for 30 minutes 4-5 times a day and/or when sitting.                                      When taking antibiotics take entire prescription as ordered by physician do not stop taking until medicine is all gone.                                                           Orders for this week: 6/21/22                  Rx:     Leg Wound - Wash with soap and water, pat dry. Apply Anasept and Stimulen to wound bed. Cover with ca alginate and ABD. Wrap with Conform and tape. Secure with Coban. Change daily.        Follow up with Dr Francisca Castañeda in 1 week in the wound care center. Call [phone] for any questions or concerns. Date__________   Time____________        Treatment Note Wound 05/31/22 #1 Right 3rd Toe Amp Site-Dressing/Treatment:  (anasept, stimulen, ABD, confrom tape coban )    Written Patient Dismissal Instructions Given     -History of wound dehiscence amputation sites bilateral foot. -Area stable today with no signs of infection.   Monitor off antibiotics.  -Continue with Anasept gel and stimulant daily changing  -Intervention with Dr. Anne Low this Friday.  -Any concerns before see him again or any serious concerns for infection or go to the emergency room  -Follow-up 1 Week for recheck sooner if needed    Electronically signed by Sarah Lee DPM on 6/21/2022 at 1:38 PM

## 2022-06-21 NOTE — TELEPHONE ENCOUNTER
Patient had called to schedule AWV. LM for patient I could do a phone visit on 6/30/22 at 2:30. Requested he call to confirm.

## 2022-06-22 NOTE — PROGRESS NOTES
Occupational Therapy    [facility] ACUTE CARE OCCUPATIONAL THERAPY EVALUATION    Naima Dobson, 1950, 2101/2101-A, 3/2/2021    Discharge Recommendation: Inpatient Rehabilitation      History:  [place]:  The primary encounter diagnosis was Hyperkalemia. Diagnoses of General weakness, Pleural effusion, and Respiratory failure, unspecified chronicity, unspecified whether with hypoxia or hypercapnia ([address] were also pertinent to this visit. Subjective:  Patient states: \"I just feel so tired. I want to do what I can though! \"  Pain: Pt denied pain this date  Communication with other providers: PT Chanell Collins RN ANA Landon  Restrictions: General Precautions, Fall Risk, Sternal Precautions, Telemetry, Pulse Ox, BP cuff, [place], [place], Bed/chair alarm    Home Setup/Prior level of function:  Social/Functional History  Lives With: Spouse (available 24/7)  Type of Home: [facility] [address]: One level (+ basement but does not need to go down there)  Home Access: Stairs to enter with rails  Entrance Stairs - Number of Steps: 2  Bathroom Shower/Tub: Walk-in shower  Bathroom Toilet: Handicap height  Bathroom Equipment: Grab bars in shower, Built-in shower seat  Home Equipment: Cane  ADL Assistance: Independent  Homemaking Assistance: Independent  Ambulation Assistance: Independent (uses no AD)  Transfer Assistance: Independent  Active : Yes  Occupation: Retired  Type of occupation: [employer]  Leisure & Hobbies: Golf    Examination:  · Observation: Supine in bed upon arrival. Agreeable to evaluation. Wife present and supportive.    · Vision: [employer] [facility]   · Hearing: [place] (has BL hearing aids)  · Vitals: Stable vitals throughout session    Body Systems and functions:  · ROM: WFL all joints in BL UEs (active shoulder flexion kept 0-90')  · Strength: Grossly 4/5 MMT all major muscle groups BL UEs  · Sensation: WFL (denies numbness/tingling)  · Tone: Normal  · Coordination: WFL for ADLs  · Perception: WNL    Activities of Daily Living (ADLs):  · Feeding: Independent (supported at chair level)  · Grooming: CGA (seated facial hygiene task EOB; unable to complete in standing this date)  · UB bathing: CGA  · LB bathing: Max A (reaching distal LEs, buttocks, posterior thighs)  · UB dressing: Min A (dynamic sitting balance with donning robe seated EOB)  · LB dressing: Dependent (donning BL socks)  · Toileting: Max A (not performed during session but anticipate assist required for clothing mgmt both directions)    Cognitive and Psychosocial Functioning:  · Overall cognitive status: WNL  · Affect: Normal     Balance:   · Sitting: CGA static sitting, Min A with dynamic sitting tasks  · Standing: CGA to Min A    Functional Mobility:  · Bed Mobility: Mod A supine to sitting EOB (HOB elevated to 40', min cues for sternal precautions, able to scoot legs off of edge but mod trunk boost required for uprighting)  · Transfers: Min A to and from recliner (min cues for sternal precautions each direction)  · Ambulation: Min A to Mod A with no AD 3 ft bed to chair; pt very fatigued this date, significantly impaired trunk extension/upright posture, wide base of support      AM-PAC 6 click short form for inpatient daily activity:   How much help from another person does the patient currently need. .. Unable  Dep A Lot  Max A A Lot   Mod A A Little  Min A A Little   CGA  SBA None   Mod I  Indep  Sup   1. Putting on and taking off regular lower body clothing? [x] 1    [] 2   [] 2   [] 3   [] 3   [] 4      2. Bathing (including washing, rinsing, drying)? [] 1   [] 2   [x] 2 [] 3 [] 3 [] 4   3. Toileting, which includes using toilet, bedpan, or urinal? [] 1    [x] 2   [] 2   [] 3   [] 3   [] 4     4. Putting on and taking off regular upper body clothing? [] 1   [] 2   [] 2   [x] 3   [] 3    [] 4      5. Taking care of personal grooming such as brushing teeth? [] 1   [] 2    [] 2 [] 3    [x] 3   [] 4      6. Eating meals?    [] 1   [] 2   [] rehab setting at discharge. Complexity: Moderate  Prognosis: Good  Plan: 4x/week      Goals:  1. Pt will complete all aspects of bed mobility for EOB/OOB ADLs min A/good adherence to sternal precautions  2. Pt will complete UB/LB bathing min A with setup  3. Pt will complete all aspects of LB dressing mod A with setup  4. Pt will complete all functional transfers to and from bed, chair, toilet, shower chair CGA/good adherence to sternal precautions  5. Pt will ambulate HH distance to bathroom for toileting CGA using LRAD  6. Pt will complete all aspects of toileting task CGA  7. Pt will complete oral hygiene/grooming routine in standing at sink SBA with setup/no seated rest breaks  8.  Pt will complete ther ex/ther act with focus on cardiac rehab exercises      Time:   Time in: 1328  Time out: 1348  Timed treatment minutes: 10  Total time: 20      Electronically signed by:    JUAN MANUEL Llanes/L, [address] [place] [id number] [Normal Coordination] : normal coordination [Normal DTR UE/LE] : normal DTR UE/LE  [Normal Sensation] : normal sensation [Normal Mood and Affect] : normal mood and affect [Orientated] : orientated [Able to Communicate] : able to communicate [Normal Skin] : normal skin [No Rash] : no rash [No Ulcers] : no ulcers [No Lesions] : no lesions [No obvious lymphadenopathy in areas examined] : no obvious lymphadenopathy in areas examined [Well Developed] : well developed [Well Nourished] : well nourished [No Respiratory Distress] : no respiratory distress [Rotation to left] : rotation to left [] : positive [place]

## 2022-06-27 SDOH — HEALTH STABILITY: PHYSICAL HEALTH: ON AVERAGE, HOW MANY MINUTES DO YOU ENGAGE IN EXERCISE AT THIS LEVEL?: 40 MIN

## 2022-06-27 SDOH — HEALTH STABILITY: PHYSICAL HEALTH: ON AVERAGE, HOW MANY DAYS PER WEEK DO YOU ENGAGE IN MODERATE TO STRENUOUS EXERCISE (LIKE A BRISK WALK)?: 3 DAYS

## 2022-06-27 ASSESSMENT — PATIENT HEALTH QUESTIONNAIRE - PHQ9
SUM OF ALL RESPONSES TO PHQ QUESTIONS 1-9: 0
SUM OF ALL RESPONSES TO PHQ QUESTIONS 1-9: 0
2. FEELING DOWN, DEPRESSED OR HOPELESS: 0
SUM OF ALL RESPONSES TO PHQ QUESTIONS 1-9: 0
SUM OF ALL RESPONSES TO PHQ9 QUESTIONS 1 & 2: 0
1. LITTLE INTEREST OR PLEASURE IN DOING THINGS: 0
SUM OF ALL RESPONSES TO PHQ QUESTIONS 1-9: 0

## 2022-06-27 ASSESSMENT — LIFESTYLE VARIABLES
HOW OFTEN DO YOU HAVE SIX OR MORE DRINKS ON ONE OCCASION: 1
HOW OFTEN DO YOU HAVE A DRINK CONTAINING ALCOHOL: 2-3 TIMES A WEEK
HOW MANY STANDARD DRINKS CONTAINING ALCOHOL DO YOU HAVE ON A TYPICAL DAY: 1 OR 2
HOW MANY STANDARD DRINKS CONTAINING ALCOHOL DO YOU HAVE ON A TYPICAL DAY: 1
HOW OFTEN DO YOU HAVE A DRINK CONTAINING ALCOHOL: 4

## 2022-06-28 ENCOUNTER — HOSPITAL ENCOUNTER (OUTPATIENT)
Dept: WOUND CARE | Age: 72
Discharge: HOME | End: 2022-06-28
Payer: MEDICARE

## 2022-06-28 VITALS — DIASTOLIC BLOOD PRESSURE: 70 MMHG | TEMPERATURE: 98.1 F | HEART RATE: 68 BPM | SYSTOLIC BLOOD PRESSURE: 156 MMHG

## 2022-06-28 DIAGNOSIS — L97.912 NON-PRESSURE ULCER OF RIGHT LOWER EXTREMITY WITH FAT LAYER EXPOSED (HCC): ICD-10-CM

## 2022-06-28 DIAGNOSIS — I73.9 PVD (PERIPHERAL VASCULAR DISEASE) (HCC): ICD-10-CM

## 2022-06-28 DIAGNOSIS — L97.522 DIABETIC ULCER OF TOE OF LEFT FOOT ASSOCIATED WITH TYPE 2 DIABETES MELLITUS, WITH FAT LAYER EXPOSED (HCC): Primary | ICD-10-CM

## 2022-06-28 DIAGNOSIS — E11.49 OTHER DIABETIC NEUROLOGICAL COMPLICATION ASSOCIATED WITH TYPE 2 DIABETES MELLITUS (HCC): ICD-10-CM

## 2022-06-28 DIAGNOSIS — E11.621 DIABETIC ULCER OF TOE OF LEFT FOOT ASSOCIATED WITH TYPE 2 DIABETES MELLITUS, WITH FAT LAYER EXPOSED (HCC): Primary | ICD-10-CM

## 2022-06-28 PROCEDURE — 11042 DBRDMT SUBQ TIS 1ST 20SQCM/<: CPT

## 2022-06-28 RX ORDER — CLOBETASOL PROPIONATE 0.5 MG/G
OINTMENT TOPICAL ONCE
Status: CANCELLED | OUTPATIENT
Start: 2022-06-28 | End: 2022-06-28

## 2022-06-28 RX ORDER — LIDOCAINE HYDROCHLORIDE 20 MG/ML
JELLY TOPICAL ONCE
Status: CANCELLED | OUTPATIENT
Start: 2022-06-28 | End: 2022-06-28

## 2022-06-28 RX ORDER — GENTAMICIN SULFATE 1 MG/G
OINTMENT TOPICAL ONCE
Status: CANCELLED | OUTPATIENT
Start: 2022-06-28 | End: 2022-06-28

## 2022-06-28 RX ORDER — BACITRACIN ZINC AND POLYMYXIN B SULFATE 500; 1000 [USP'U]/G; [USP'U]/G
OINTMENT TOPICAL ONCE
Status: CANCELLED | OUTPATIENT
Start: 2022-06-28 | End: 2022-06-28

## 2022-06-28 RX ORDER — BETAMETHASONE DIPROPIONATE 0.05 %
OINTMENT (GRAM) TOPICAL ONCE
Status: CANCELLED | OUTPATIENT
Start: 2022-06-28 | End: 2022-06-28

## 2022-06-28 RX ORDER — LIDOCAINE HYDROCHLORIDE 40 MG/ML
SOLUTION TOPICAL ONCE
Status: CANCELLED | OUTPATIENT
Start: 2022-06-28 | End: 2022-06-28

## 2022-06-28 RX ORDER — LIDOCAINE 50 MG/G
OINTMENT TOPICAL ONCE
Status: CANCELLED | OUTPATIENT
Start: 2022-06-28 | End: 2022-06-28

## 2022-06-28 RX ORDER — GINSENG 100 MG
CAPSULE ORAL ONCE
Status: CANCELLED | OUTPATIENT
Start: 2022-06-28 | End: 2022-06-28

## 2022-06-28 RX ORDER — BACITRACIN, NEOMYCIN, POLYMYXIN B 400; 3.5; 5 [USP'U]/G; MG/G; [USP'U]/G
OINTMENT TOPICAL ONCE
Status: CANCELLED | OUTPATIENT
Start: 2022-06-28 | End: 2022-06-28

## 2022-06-28 RX ORDER — LIDOCAINE 40 MG/G
CREAM TOPICAL ONCE
Status: CANCELLED | OUTPATIENT
Start: 2022-06-28 | End: 2022-06-28

## 2022-06-28 ASSESSMENT — PAIN SCALES - GENERAL: PAINLEVEL_OUTOF10: 0

## 2022-06-28 NOTE — PROGRESS NOTES
Wound Care Center Progress Note With Procedure    Shelly Crowley  AGE: 74 y.o. GENDER: male  : 1950  EPISODE DATE:  2022     Subjective:     Chief Complaint   Patient presents with    Wound Check     right foot          HISTORY of PRESENT ILLNESS      Shelly Crowley is a 74 y.o. male who presents today for wound evaluation of wound dehiscence secondary to toe amputation on the right foot. Well overall. No new complaints today. Vascular intervention with Dr. Kenney was supposed to be this past Friday but had to be scheduled again secondary to emergency surgery that had to be performed by Dr. Kenney. PAST MEDICAL HISTORY        Diagnosis Date    Arrhythmia     Pacemaker placed aprox 5 years ago for A Fib per patient    Arthritis 2013    rt wrist    Atrial fibrillation ([facility]     on Xarelto - Dr. Lockhart Heads CAD (coronary artery disease) 2014    see dr Reno Alcantara kidney disease, stage III (moderate) ([address] 2016    Critical illness myopathy 03/15/2021    Diabetes mellitus ([address]     dx     Diabetic neuropathy associated with type 2 diabetes mellitus ([address] 2019    Erythropoietin deficiency anemia 2020    Gout 2019    \"got gout when had pacer put in because they did not give me my medication for gout \"    H/O 24 hour EKG monitoring 10/03/2013    no afib noted, sinsus rhythm    H/O cardiovascular stress test 2014    cardiolite- mild ischemia RCA EF50%    H/O echocardiogram 2020    EF 55-60% severe aortic stenosis mild to mod aortic regurg mod to severe tricuspid regurg severe pulm htn significant changes since 2018 echo.  H/O right and left heart catheterization 12/10/2020    DIFFUSE LAD DISEASE, Mild ECA Disease, Severe AS, Milf Pul HTN on RHC.  History of blood transfusion 2020    d/t anemia    History of transesophageal echocardiography (MABLE) 12/15/2020    Severe aortic stenosis (ANNA by planimetry: 0.778 cm sq). Mild AR.  [place] (hard of hearing)     hearing tonya aides    Hx of Doppler echocardiogram 05/21/2018    EF 50%  Mild LV hypertrophy. Mildly enlarged RA. Mod aortic valve calcification with mod AS. Mitral annular calcification is present. Mild AR, MR and TR. Mild pulmonary htn.     Hyperlipidemia     Hypertension     Follows with PCP & Dr. Lesvia Bass Other disorders of kidney and ureter     Pacemaker     Medtronic, implanted 2014    Pneumonia 12/29/2012    Pneumonia due to COVID-19 virus 08/2020    Sleep apnea     dx 2013- has c-pap    Type II or unspecified type diabetes mellitus with other specified manifestations, uncontrolled 12/12/2012    Venous hypertension, chronic, with ulcer ([address] 12/12/2012    resolved       PAST SURGICAL HISTORY    Past Surgical History:   Procedure Laterality Date    CABG WITH AORTIC VALVE REPLACEMENT N/A 02/16/2021    CABG CORONARY ARTERY BYPASS X2 WITH LIMA, AORTIC VALVE REPLACEMENT AND AORTIC ROOT REPAIR, INTRAOPERATIVE MABLE, INDUCED HYPOTHERMIA, LEFT LEG ENDOVEIN HARVEST, LEFT ATRIAL CLIP, AND CRYO PROCEDURE performed by Kandice Seth MD at [facility]  12/2014    at [facility] Left 01/29/2021    LEFT CAROTID ENDARTERECTOMY performed by Candance Herd, MD at [address]  2017    COLONOSCOPY  2011    COLONOSCOPY N/A 11/19/2019    COLONOSCOPY DIAGNOSTIC performed by Drew Hinds MD at [facility]  09/30/2020    POSSIBLE CECAL avms, SIGMOID DIVERTICULOSIS, INTERNAL HEMORRHOIDS GRADE 1    COLONOSCOPY N/A 09/30/2020    COLONOSCOPY CONTROL HEMORRHAGE WITH APC performed by Drew Hinds MD at [facility]  2014    \"2 stents put in \"    FOOT DEBRIDEMENT Bilateral 03/31/2022    BILATERAL DEBRIEDMENT OF NON-VIABLE TISSUE & BONE performed by Emily Leahy DPM at [facility] Right 04/14/2022    RIGHT FOOT DEBRIDEMENT INCISION AND DRAINAGE performed by Allergen Reactions    Spironolactone      CAUSES INCREASED K+    Tape Mandie Schilder Tape] Rash     SURGICAL TAPE       MEDICATIONS    Current Outpatient Medications on File Prior to Encounter   Medication Sig Dispense Refill    INVOKANA 300 MG TABS tablet TAKE 1 TABLET EVERY MORNINGBEFORE BREAKFAST 90 tablet 1    apixaban (ELIQUIS) 5 MG TABS tablet Take 1 tablet by mouth 2 times daily 28 tablet 0    simvastatin (ZOCOR) 20 MG tablet Take 1 tablet by mouth daily 90 tablet 3    torsemide (DEMADEX) 20 MG tablet Take 2 tablets by mouth 2 times daily 180 tablet 3    Neomycin-Bacitracin-Polymyxin (TRIPLE ANTIBIOTIC) OINT Apply to chest irritation 2x a day 1 each 0    gabapentin (NEURONTIN) 300 MG capsule TAKE 1 CAPSULE 3 TIMES A    capsule 1    glimepiride (AMARYL) 4 MG tablet TAKE 1 TABLET IN THE       MORNING (BEFORE BREAKFAST) 90 tablet 1    sildenafil (VIAGRA) 100 MG tablet TAKE 1 TABLET DAILY AS     NEEDED FOR ERECTILE        DYSFUNCTION 90 tablet 1    dapagliflozin (FARXIGA) 10 MG tablet Take 1 tablet by mouth every morning 90 tablet 1    Dulaglutide 4.5 MG/0.5ML SOPN Inject 4.5 mg into the skin once a week 12 pen 1    carboxymethylcellulose (REFRESH PLUS) 0.5 % SOLN ophthalmic solution as needed       Cholecalciferol (VITAMIN D) 50 MCG (2000 UT) CAPS capsule Take by mouth nightly       aspirin 81 MG tablet Take 81 mg by mouth daily       No current facility-administered medications on file prior to encounter. REVIEW OF SYSTEMS    Pertinent items are noted in HPI. Constitutional: Negative for systemic symptoms including fever, chills and malaise. Objective:      BP (!) 156/70   Pulse 68   Temp 98.1 °F (36.7 °C) (Temporal)     PHYSICAL EXAM    General: The patient is in no acute distress. Mental status:  Patient is appropriate, is  oriented to place and plan of care.   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 Performed by: Demi Duvall DPM    Consent obtained: Yes    Time out taken:  Yes    Pain Control: lidocaine       Debridement:Non-excisional Debridement    Using #15 blade scalpel the wound(s) was/were sharply debrided down through and including the removal of subcutaneous tissue. Devitalized Tissue Debrided:  necrotic/eschar    Pre Debridement Measurements:  Are located in the Wound Documentation Flow Sheet    All active wounds listed below with today's date are evaluated  Wound(s)    debrided this date include # : 1     Wound 05/31/22 #1 Right 3rd Toe Amp Site (Active)   Wound Image   06/21/22 1302   Wound Etiology Non-Healing Surgical 06/28/22 1255   Dressing Status New dressing applied;Clean;Dry; Intact 06/21/22 1335   Wound Cleansed Soap and water; Wound cleanser 06/28/22 1255   Dressing/Treatment Collagen;Alginate;ABD 05/31/22 1627   Offloading for Diabetic Foot Ulcers Offloading not required 06/28/22 1255   Wound Length (cm) 0.7 cm 06/28/22 1255   Wound Width (cm) 0.3 cm 06/28/22 1255   Wound Depth (cm) 0.1 cm 06/28/22 1255 Wound Surface Area (cm^2) 0.21 cm^2 06/28/22 1255   Change in Wound Size % (l*w) 83.85 06/28/22 1255   Wound Volume (cm^3) 0.021 cm^3 06/28/22 1255   Wound Healing % 84 06/28/22 1255   Post-Procedure Length (cm) 0.7 cm 06/28/22 1302   Post-Procedure Width (cm) 0.3 cm 06/28/22 1302   Post-Procedure Depth (cm) 0.1 cm 06/28/22 1302   Post-Procedure Surface Area (cm^2) 0.21 cm^2 06/28/22 1302   Post-Procedure Volume (cm^3) 0.021 cm^3 06/28/22 1302   Distance Tunneling (cm) 0 cm 06/28/22 1255   Tunneling Position ___ O'Clock 0 06/28/22 1255   Undermining Starts ___ O'Clock 0 06/28/22 1255   Undermining Ends___ O'Clock 0 06/28/22 1255   Undermining Maxium Distance (cm) 0 06/28/22 1255   Wound Assessment Powder Horn/red;Slough 06/28/22 1255   Drainage Amount Small 06/28/22 1255   Drainage Description Serosanguinous 06/28/22 1255   Odor None 06/28/22 1255   Angela-wound Assessment Intact 06/28/22 1255   Margins Defined edges 06/28/22 1255   Wound Thickness Description not for Pressure Injury Full thickness 06/28/22 1255   Number of days: 27             Percent of Wound(s) Debrided: approximately 100%    Total  Area  Debrided:  0.21 sq cm     Bleeding:  None    Hemostasis Achieved:  not needed    Procedural Pain:  0  / 10     Post Procedural Pain:  0 / 10     Response to treatment:  Well tolerated by patient. Status of wound progress and description from last visit:   stable. Plan:       Discharge Instructions       PHYSICIAN ORDERS AND DISCHARGE INSTRUCTIONS     NOTE: Upon discharge from the [address], you will receive a patient experience survey.  We would be grateful if you would take the time to fill this survey out.     Wound care order history:                 YESSENIA's   Right       Left               Date:              Cultures:                Grafts:                Antibiotics:           Continuing wound care orders and information:                 Residence:                Continue home health care with:             [facility] wound-care supplies will be provided by:      Wound cleansing:               AU not scrub or use excessive force.              Wash hands with soap and water before and after dressing changes.             OTWQC to applying a clean dressing, cleanse wound with normal saline, wound cleanser, or mild soap and water.               Ask the physician or nurse before getting the wound(s) wet in a shower     Daily Wound management:              Keep weight off wounds and reposition every 2 hours.              Avoid standing for long periods of time.              Apply wraps/stockings in AM and remove at bedtime.              If swelling is present, elevate legs to the level of the heart or above for 30 minutes 4-5 times a day and/or when sitting.                                      When taking antibiotics take entire prescription as ordered by physician do not stop taking until medicine is all gone.                                                           Orders for this week: 6/28/22                  Rx:     Leg Wound - Wash with soap and water, pat dry. Apply Anasept and Stimulen to wound bed. Cover with ca alginate and ABD. Wrap with Conform and tape. Secure with Coban. Change daily.        Follow up with Dr Francisca Castañeda in 1 week in the wound care center. Call [phone] for any questions or concerns. Date__________   Time____________        Treatment Note      Written Patient Dismissal Instructions Given     -History of wound dehiscence amputation sites bilateral foot. -Area stable today with no signs of infection.   Monitor off antibiotics.  -Continue with Anasept gel and stimulant daily changing  -Intervention with Dr. Anne Low rescheduled for this Friday  -Any concerns before see him again or any serious concerns for infection or go to the emergency room  -Follow-up 1 Week for recheck sooner if needed    Electronically signed by Sarah Lee DPM on 6/28/2022 at 1:05 PM

## 2022-06-30 ENCOUNTER — TELEMEDICINE (OUTPATIENT)
Dept: FAMILY MEDICINE CLINIC | Age: 72
End: 2022-06-30
Payer: MEDICARE

## 2022-06-30 DIAGNOSIS — Z00.00 MEDICARE ANNUAL WELLNESS VISIT, SUBSEQUENT: Primary | ICD-10-CM

## 2022-06-30 PROCEDURE — 3017F COLORECTAL CA SCREEN DOC REV: CPT | Performed by: STUDENT IN AN ORGANIZED HEALTH CARE EDUCATION/TRAINING PROGRAM

## 2022-06-30 PROCEDURE — 1123F ACP DISCUSS/DSCN MKR DOCD: CPT | Performed by: STUDENT IN AN ORGANIZED HEALTH CARE EDUCATION/TRAINING PROGRAM

## 2022-06-30 PROCEDURE — G0439 PPPS, SUBSEQ VISIT: HCPCS | Performed by: STUDENT IN AN ORGANIZED HEALTH CARE EDUCATION/TRAINING PROGRAM

## 2022-06-30 SDOH — ECONOMIC STABILITY: FOOD INSECURITY: WITHIN THE PAST 12 MONTHS, YOU WORRIED THAT YOUR FOOD WOULD RUN OUT BEFORE YOU GOT MONEY TO BUY MORE.: NEVER TRUE

## 2022-06-30 SDOH — ECONOMIC STABILITY: FOOD INSECURITY: WITHIN THE PAST 12 MONTHS, THE FOOD YOU BOUGHT JUST DIDN'T LAST AND YOU DIDN'T HAVE MONEY TO GET MORE.: NEVER TRUE

## 2022-06-30 ASSESSMENT — SOCIAL DETERMINANTS OF HEALTH (SDOH): HOW HARD IS IT FOR YOU TO PAY FOR THE VERY BASICS LIKE FOOD, HOUSING, MEDICAL CARE, AND HEATING?: NOT HARD AT ALL

## 2022-06-30 ASSESSMENT — PATIENT HEALTH QUESTIONNAIRE - PHQ9
1. LITTLE INTEREST OR PLEASURE IN DOING THINGS: 0
2. FEELING DOWN, DEPRESSED OR HOPELESS: 0
SUM OF ALL RESPONSES TO PHQ QUESTIONS 1-9: 0
SUM OF ALL RESPONSES TO PHQ QUESTIONS 1-9: 0
SUM OF ALL RESPONSES TO PHQ9 QUESTIONS 1 & 2: 0
SUM OF ALL RESPONSES TO PHQ QUESTIONS 1-9: 0
SUM OF ALL RESPONSES TO PHQ QUESTIONS 1-9: 0

## 2022-06-30 ASSESSMENT — LIFESTYLE VARIABLES
HOW OFTEN DO YOU HAVE A DRINK CONTAINING ALCOHOL: 2-3 TIMES A WEEK
HOW MANY STANDARD DRINKS CONTAINING ALCOHOL DO YOU HAVE ON A TYPICAL DAY: 1 OR 2

## 2022-06-30 NOTE — PROGRESS NOTES
Medicare Annual Wellness Visit    Chato Degroot is here for Medicare AWV    Assessment & Plan   Medicare annual wellness visit, subsequent      Recommendations for Preventive Services Due: see orders and patient instructions/AVS.  Recommended screening schedule for the next 5-10 years is provided to the patient in written form: see Patient Instructions/AVS.     No follow-ups on file. Subjective       Patient's complete Health Risk Assessment and screening values have been reviewed and are found in Flowsheets. The following problems were reviewed today and where indicated follow up appointments were made and/or referrals ordered. Positive Risk Factor Screenings with Interventions:                  No Positive Risk Factors identified today. Objective      Patient-Reported Vitals  No data recorded     Unable to obtain 3 vital signs due to patient not having equipment to take blood pressure/temperature. Allergies   Allergen Reactions    Spironolactone      CAUSES INCREASED K+    Tape Candida Mean Tape] Rash     SURGICAL TAPE     Prior to Visit Medications    Medication Sig Taking?  Authorizing Provider   apixaban (ELIQUIS) 5 MG TABS tablet Take 1 tablet by mouth 2 times daily Yes JUSTIN Sparks CNP   simvastatin (ZOCOR) 20 MG tablet Take 1 tablet by mouth daily Yes JUSTIN Sparks CNP   torsemide (DEMADEX) 20 MG tablet Take 2 tablets by mouth 2 times daily Yes Bar Shaw MD   gabapentin (NEURONTIN) 300 MG capsule TAKE 1 CAPSULE 3 TIMES A   DAY Yes Kaleb Mitchell DO   glimepiride (AMARYL) 4 MG tablet TAKE 1 TABLET IN THE       MORNING (BEFORE BREAKFAST) Yes Kaleb Mitchell DO   sildenafil (VIAGRA) 100 MG tablet TAKE 1 TABLET DAILY AS     NEEDED FOR ERECTILE        DYSFUNCTION Yes Kaleb Mitchell DO   dapagliflozin (FARXIGA) 10 MG tablet Take 1 tablet by mouth every morning Yes Kaleb Mitchell DO   Dulaglutide 4.5 MG/0.5ML SOPN Inject 4.5 mg into the skin once a week Yes Kaleb Mitchell DO   carboxymethylcellulose (REFRESH PLUS) 0.5 % SOLN ophthalmic solution as needed  Yes Historical Provider, MD   Cholecalciferol (VITAMIN D) 50 MCG (2000 UT) CAPS capsule Take by mouth nightly  Yes Historical Provider, MD   aspirin 81 MG tablet Take 81 mg by mouth daily Yes Historical Provider, MD   INVOKANA 300 MG TABS tablet TAKE [address]  Patient not taking: Reported on 6/30/2022  Kaleb Mitchell DO   Neomycin-Bacitracin-Polymyxin (TRIPLE ANTIBIOTIC) OINT Apply to chest irritation 2x a day  Patient not taking: Reported on 6/30/2022  Rocky Collar, APRN - CNP       CareTeam (Including outside providers/suppliers regularly involved in providing care):   Patient Care Team:  Aria Steven DO as PCP - General (Family Medicine)  Aria Steven DO as PCP [facility]  Laquita Winter MD as Consulting Physician (Cardiology)     Reviewed and updated this visit:  Tobacco  Allergies  Meds  Med Hx  Surg Hx  Soc Hx  Fam Hx             I, Korina Roberto LPN, 7/55/[birth year]43, performed the documented evaluation under the direct supervision of the attending physician. Riddhi Piña, was evaluated through a synchronous (real-time) audio encounter. The patient (or guardian if applicable) is aware that this is a billable service, which includes applicable co-pays. This Virtual Visit was conducted with patient's (and/or legal guardian's) consent. The visit was conducted pursuant to the emergency declaration under the [address] authority and the [employer] Act. Patient identification was verified, and a caregiver was present when appropriate. The patient was located at Home: [address] [facility] [address]. Provider was located at [facility] [address]. [place] [address]  [facility] [address].         Total time spent for this encounter: Not billed by time    --Carlton Kumari LPN on 6/97/7179 at 4:95 PM    An electronic signature was used to authenticate this note. This encounter was performed under [employer] DOs, direct supervision, 6/30/2022.

## 2022-06-30 NOTE — PATIENT INSTRUCTIONS
Personalized Preventive Plan for Alhaji Morataya - 6/30/2022  Medicare offers a range of preventive health benefits. Some of the tests and screenings are paid in full while other may be subject to a deductible, co-insurance, and/or copay. Some of these benefits include a comprehensive review of your medical history including lifestyle, illnesses that may run in your family, and various assessments and screenings as appropriate. After reviewing your medical record and screening and assessments performed today your provider may have ordered immunizations, labs, imaging, and/or referrals for you. A list of these orders (if applicable) as well as your Preventive Care list are included within your After Visit Summary for your review. Other Preventive Recommendations:    · A preventive eye exam performed by an eye specialist is recommended every 1-2 years to screen for glaucoma; cataracts, macular degeneration, and other eye disorders. · A preventive dental visit is recommended every 6 months. · Try to get at least 150 minutes of exercise per week or 10,000 steps per day on a pedometer . · Order or download the FREE \"Exercise & Physical Activity: Your Everyday Guide\" from [employer]. Call 4-769.813.1644 or search [employer] online. · You need 6445-6508 mg of calcium and 0028-6796 IU of vitamin D per day. It is possible to meet your calcium requirement with diet alone, but a vitamin D supplement is usually necessary to meet this goal.  · When exposed to the sun, use a sunscreen that protects against both UVA and UVB radiation with an SPF of 30 or greater. Reapply every 2 to 3 hours or after sweating, drying off with a towel, or swimming. · Always wear a seat belt when traveling in a car. Always wear a helmet when riding a bicycle or motorcycle.

## 2022-07-05 ENCOUNTER — HOSPITAL ENCOUNTER (OUTPATIENT)
Dept: WOUND CARE | Age: 72
Discharge: HOME | End: 2022-07-05
Payer: MEDICARE

## 2022-07-05 VITALS
RESPIRATION RATE: 16 BRPM | HEART RATE: 69 BPM | DIASTOLIC BLOOD PRESSURE: 82 MMHG | TEMPERATURE: 98.8 F | SYSTOLIC BLOOD PRESSURE: 150 MMHG

## 2022-07-05 DIAGNOSIS — L97.912 NON-PRESSURE ULCER OF RIGHT LOWER EXTREMITY WITH FAT LAYER EXPOSED (HCC): ICD-10-CM

## 2022-07-05 DIAGNOSIS — E11.49 OTHER DIABETIC NEUROLOGICAL COMPLICATION ASSOCIATED WITH TYPE 2 DIABETES MELLITUS (HCC): ICD-10-CM

## 2022-07-05 DIAGNOSIS — E11.621 DIABETIC ULCER OF TOE OF LEFT FOOT ASSOCIATED WITH TYPE 2 DIABETES MELLITUS, WITH FAT LAYER EXPOSED (HCC): Primary | ICD-10-CM

## 2022-07-05 DIAGNOSIS — I73.9 PVD (PERIPHERAL VASCULAR DISEASE) (HCC): ICD-10-CM

## 2022-07-05 DIAGNOSIS — L97.522 DIABETIC ULCER OF TOE OF LEFT FOOT ASSOCIATED WITH TYPE 2 DIABETES MELLITUS, WITH FAT LAYER EXPOSED (HCC): Primary | ICD-10-CM

## 2022-07-05 PROCEDURE — 11042 DBRDMT SUBQ TIS 1ST 20SQCM/<: CPT

## 2022-07-05 RX ORDER — BETAMETHASONE DIPROPIONATE 0.05 %
OINTMENT (GRAM) TOPICAL ONCE
Status: CANCELLED | OUTPATIENT
Start: 2022-07-05 | End: 2022-07-05

## 2022-07-05 RX ORDER — LIDOCAINE 40 MG/G
CREAM TOPICAL ONCE
Status: CANCELLED | OUTPATIENT
Start: 2022-07-05 | End: 2022-07-05

## 2022-07-05 RX ORDER — LIDOCAINE HYDROCHLORIDE 20 MG/ML
JELLY TOPICAL ONCE
Status: CANCELLED | OUTPATIENT
Start: 2022-07-05 | End: 2022-07-05

## 2022-07-05 RX ORDER — GENTAMICIN SULFATE 1 MG/G
OINTMENT TOPICAL ONCE
Status: CANCELLED | OUTPATIENT
Start: 2022-07-05 | End: 2022-07-05

## 2022-07-05 RX ORDER — LIDOCAINE 50 MG/G
OINTMENT TOPICAL ONCE
Status: CANCELLED | OUTPATIENT
Start: 2022-07-05 | End: 2022-07-05

## 2022-07-05 RX ORDER — GINSENG 100 MG
CAPSULE ORAL ONCE
Status: CANCELLED | OUTPATIENT
Start: 2022-07-05 | End: 2022-07-05

## 2022-07-05 RX ORDER — BACITRACIN ZINC AND POLYMYXIN B SULFATE 500; 1000 [USP'U]/G; [USP'U]/G
OINTMENT TOPICAL ONCE
Status: CANCELLED | OUTPATIENT
Start: 2022-07-05 | End: 2022-07-05

## 2022-07-05 RX ORDER — LIDOCAINE HYDROCHLORIDE 40 MG/ML
SOLUTION TOPICAL ONCE
Status: CANCELLED | OUTPATIENT
Start: 2022-07-05 | End: 2022-07-05

## 2022-07-05 RX ORDER — BACITRACIN, NEOMYCIN, POLYMYXIN B 400; 3.5; 5 [USP'U]/G; MG/G; [USP'U]/G
OINTMENT TOPICAL ONCE
Status: CANCELLED | OUTPATIENT
Start: 2022-07-05 | End: 2022-07-05

## 2022-07-05 RX ORDER — CLOBETASOL PROPIONATE 0.5 MG/G
OINTMENT TOPICAL ONCE
Status: CANCELLED | OUTPATIENT
Start: 2022-07-05 | End: 2022-07-05

## 2022-07-05 NOTE — PROGRESS NOTES
Wound Care Center Progress Note With Procedure    Mariette Oppenheim  AGE: 74 y.o. GENDER: male  : 1950  EPISODE DATE:  2022     Subjective:     Chief Complaint   Patient presents with    Wound Check         HISTORY of PRESENT ILLNESS      Mariette Oppenheim is a 74 y.o. male who presents today for wound evaluation of wound dehiscence secondary to toe amputation on the right foot. Had a revascularization by  [facility] this past Friday. Was told that it went well. No worsening pain to the right foot      PAST MEDICAL HISTORY        Diagnosis Date    Arrhythmia     Pacemaker placed aprox 5 years ago for A Fib per patient    Arthritis 2013    rt wrist    Atrial fibrillation ([facility]     on Shin - Dr. Yasmin Tejeda CAD (coronary artery disease) 2014    see dr Faith Geller kidney disease, stage III (moderate) ([facility] 2016    Critical illness myopathy 03/15/2021    Diabetes mellitus ([facility]     dx     Diabetic neuropathy associated with type 2 diabetes mellitus ([facility] 2019    Erythropoietin deficiency anemia 2020    Gout 2019    \"got gout when had pacer put in because they did not give me my medication for gout \"    H/O 24 hour EKG monitoring 10/03/2013    no afib noted, sinsus rhythm    H/O cardiovascular stress test 2014    cardiolite- mild ischemia RCA EF50%    H/O echocardiogram 2020    EF 55-60% severe aortic stenosis mild to mod aortic regurg mod to severe tricuspid regurg severe pulm htn significant changes since 2018 echo.  H/O right and left heart catheterization 12/10/2020    DIFFUSE LAD DISEASE, Mild ECA Disease, Severe AS, Milf Pul HTN on RHC.  History of blood transfusion 2020    d/t anemia    History of transesophageal echocardiography (MABLE) 12/15/2020    Severe aortic stenosis (ANNA by planimetry: 0.778 cm sq).   Mild AR.    [place] (hard of hearing)     hearing tonya aides    Hx of Doppler echocardiogram 2018    EF 50%  Mild LV hypertrophy. Mildly enlarged RA. Mod aortic valve calcification with mod AS. Mitral annular calcification is present. Mild AR, MR and TR. Mild pulmonary htn.     Hyperlipidemia     Hypertension     Follows with PCP & Dr. Forest Wheatley Other disorders of kidney and ureter     Pacemaker     Medtronic, implanted 2014    Pneumonia 12/29/2012    Pneumonia due to COVID-19 virus 08/2020    Sleep apnea     dx 2013- has c-pap    Type II or unspecified type diabetes mellitus with other specified manifestations, uncontrolled 12/12/2012    Venous hypertension, chronic, with ulcer ([address] 12/12/2012    resolved       PAST SURGICAL HISTORY    Past Surgical History:   Procedure Laterality Date    CABG WITH AORTIC VALVE REPLACEMENT N/A 02/16/2021    CABG CORONARY ARTERY BYPASS X2 WITH LIMA, AORTIC VALVE REPLACEMENT AND AORTIC ROOT REPAIR, INTRAOPERATIVE MABLE, INDUCED HYPOTHERMIA, LEFT LEG ENDOVEIN HARVEST, LEFT ATRIAL CLIP, AND CRYO PROCEDURE performed by Merissa Morrell MD at [facility]  12/2014    at [facility] Left 01/29/2021    LEFT CAROTID ENDARTERECTOMY performed by Steph Robbins MD at [id number]  2017    COLONOSCOPY  2011    COLONOSCOPY N/A 11/19/2019    COLONOSCOPY DIAGNOSTIC performed by Yolanda Cheney MD at [address] [place] [address]  09/30/2020    POSSIBLE CECAL avms, SIGMOID DIVERTICULOSIS, INTERNAL HEMORRHOIDS GRADE 1    COLONOSCOPY N/A 09/30/2020    COLONOSCOPY CONTROL HEMORRHAGE WITH APC performed by Yolanda Cheney MD at [facility]  2014    \"2 stents put in \"    FOOT DEBRIDEMENT Bilateral 03/31/2022    BILATERAL DEBRIEDMENT OF NON-VIABLE TISSUE & BONE performed by Brandy Sanchez DPM at [facility] Right 04/14/2022    RIGHT FOOT DEBRIDEMENT INCISION AND DRAINAGE performed by Branyd Sanchez DPM at [address] [facility] N      abdominal    IR NONTUNNELED VASCULAR CATHETER  03/05/2021 No       ALLERGIES    Allergies   Allergen Reactions    Spironolactone      CAUSES INCREASED K+    Tape Morena Constant Tape] Rash     SURGICAL TAPE       MEDICATIONS    Current Outpatient Medications on File Prior to Encounter   Medication Sig Dispense Refill    torsemide (DEMADEX) 20 MG tablet TAKE 2 TABLETS BY MOUTH TWICE A  tablet 3    apixaban (ELIQUIS) 5 MG TABS tablet Take 1 tablet by mouth 2 times daily 28 tablet 0    simvastatin (ZOCOR) 20 MG tablet Take 1 tablet by mouth daily 90 tablet 3    Neomycin-Bacitracin-Polymyxin (TRIPLE ANTIBIOTIC) OINT Apply to chest irritation 2x a day 1 each 0    gabapentin (NEURONTIN) 300 MG capsule TAKE 1 CAPSULE 3 TIMES A    capsule 1    glimepiride (AMARYL) 4 MG tablet TAKE 1 TABLET IN THE       MORNING (BEFORE BREAKFAST) 90 tablet 1    sildenafil (VIAGRA) 100 MG tablet TAKE 1 TABLET DAILY AS     NEEDED FOR ERECTILE        DYSFUNCTION 90 tablet 1    dapagliflozin (FARXIGA) 10 MG tablet Take 1 tablet by mouth every morning 90 tablet 1    Dulaglutide 4.5 MG/0.5ML SOPN Inject 4.5 mg into the skin once a week 12 pen 1    carboxymethylcellulose (REFRESH PLUS) 0.5 % SOLN ophthalmic solution as needed       Cholecalciferol (VITAMIN D) 50 MCG (2000 UT) CAPS capsule Take by mouth nightly       aspirin 81 MG tablet Take 81 mg by mouth daily       No current facility-administered medications on file prior to encounter. REVIEW OF SYSTEMS    Pertinent items are noted in HPI. Constitutional: Negative for systemic symptoms including fever, chills and malaise. Objective:      BP (!) 150/82   Pulse 69   Temp 98.8 °F (37.1 °C) (Temporal)   Resp 16     PHYSICAL EXAM    General: The patient is in no acute distress. Mental status:  Patient is appropriate, is  oriented to place and plan of care.   Dermatologic exam: Visual inspection of the periwound reveals the skin to be normal in turgor and texture  Wound exam: see wound description below in procedure (l*w) 86.15 07/05/22 1257   Wound Volume (cm^3) 0.018 cm^3 07/05/22 1257   Wound Healing % 86 07/05/22 1257   Post-Procedure Length (cm) 0.6 cm 07/05/22 1307   Post-Procedure Width (cm) 0.3 cm 07/05/22 1307   Post-Procedure Depth (cm) 0.1 cm 07/05/22 1307   Post-Procedure Surface Area (cm^2) 0.18 cm^2 07/05/22 1307   Post-Procedure Volume (cm^3) 0.018 cm^3 07/05/22 1307   Distance Tunneling (cm) 0 cm 07/05/22 1257   Tunneling Position ___ O'Clock 0 07/05/22 1257   Undermining Starts ___ O'Clock 0 07/05/22 1257   Undermining Ends___ O'Clock 0 07/05/22 1257   Undermining Maxium Distance (cm) 0 07/05/22 1257   Wound Assessment Pink/red;Slough 07/05/22 1257   Drainage Amount Small 07/05/22 1257   Drainage Description Serosanguinous 07/05/22 1257   Odor None 07/05/22 1257   Angela-wound Assessment Intact 07/05/22 1257   Margins Defined edges 07/05/22 1257   Wound Thickness Description not for Pressure Injury Full thickness 07/05/22 1257   Number of days: 34           Percent of Wound(s) Debrided: approximately 100%    Total  Area  Debrided:  0.18 sq cm     Bleeding:  None    Hemostasis Achieved:  not needed    Procedural Pain:  0  / 10     Post Procedural Pain:  0 / 10     Response to treatment:  Well tolerated by patient. Status of wound progress and description from last visit:   stable. Plan:       Discharge Instructions       PHYSICIAN ORDERS AND DISCHARGE INSTRUCTIONS     NOTE: Upon discharge from the [address], you will receive a patient experience survey.  We would be grateful if you would take the time to fill this survey out.     Wound care order history:                 YESSENIA's   Right       Left               Date:              Cultures:                Grafts:                Antibiotics:           Continuing wound care orders and information:                 Residence:                Continue home health care with:               Your wound-care supplies will be provided by:      Wound cleansing:             LX not scrub or use excessive force.              Wash hands with soap and water before and after dressing changes.             HHOJV to applying a clean dressing, cleanse wound with normal saline, wound cleanser, or mild soap and water.               Ask the physician or nurse before getting the wound(s) wet in a shower     Daily Wound management:              Keep weight off wounds and reposition every 2 hours.              Avoid standing for long periods of time.              Apply wraps/stockings in AM and remove at bedtime.              If swelling is present, elevate legs to the level of the heart or above for 30 minutes 4-5 times a day and/or when sitting.                                      When taking antibiotics take entire prescription as ordered by physician do not stop taking until medicine is all gone.                                                           Orders for this week: 7/5/22                  Rx:     Leg Wound - Wash with soap and water, pat dry. Apply Anasept and Stimulen to wound bed. Cover with ca alginate and ABD. Wrap with Conform and tape. Secure with Coban. Change daily.        Follow up with Dr Shilpi Mchugh in 1 week in the wound care center. Call [phone] for any questions or concerns. Date__________   Time____________        Treatment Note Wound 05/31/22 #1 Right 3rd Toe Amp Site-Dressing/Treatment:  (anasept stimulen, bandaide (pt request) )    Written Patient Dismissal Instructions Given       -History of wound dehiscence amputation sites bilateral foot. -Area stable today with no signs of infection. Monitor off antibiotics.  -Continue with Anasept gel and stimulan daily changing. It appears to be responding well.   -Intervention with Dr. Minna Fitzgerald this past Friday  -Any concerns before see him again or any serious concerns for infection or go to the emergency room  -Follow-up 1 Week for recheck sooner if needed    Electronically signed by Joshua Alamo DPM on

## 2022-07-06 LAB — DIABETIC RETINOPATHY: NEGATIVE

## 2022-07-26 ENCOUNTER — HOSPITAL ENCOUNTER (OUTPATIENT)
Dept: WOUND CARE | Age: 72
Discharge: HOME | End: 2022-07-26
Payer: MEDICARE

## 2022-07-26 VITALS
TEMPERATURE: 98.7 F | RESPIRATION RATE: 18 BRPM | DIASTOLIC BLOOD PRESSURE: 84 MMHG | SYSTOLIC BLOOD PRESSURE: 164 MMHG | HEART RATE: 69 BPM

## 2022-07-26 DIAGNOSIS — L97.522 DIABETIC ULCER OF TOE OF LEFT FOOT ASSOCIATED WITH TYPE 2 DIABETES MELLITUS, WITH FAT LAYER EXPOSED (HCC): Primary | ICD-10-CM

## 2022-07-26 DIAGNOSIS — L97.912 NON-PRESSURE ULCER OF RIGHT LOWER EXTREMITY WITH FAT LAYER EXPOSED (HCC): ICD-10-CM

## 2022-07-26 DIAGNOSIS — I73.9 PVD (PERIPHERAL VASCULAR DISEASE) (HCC): ICD-10-CM

## 2022-07-26 DIAGNOSIS — E11.621 DIABETIC ULCER OF TOE OF LEFT FOOT ASSOCIATED WITH TYPE 2 DIABETES MELLITUS, WITH FAT LAYER EXPOSED (HCC): Primary | ICD-10-CM

## 2022-07-26 DIAGNOSIS — E11.49 OTHER DIABETIC NEUROLOGICAL COMPLICATION ASSOCIATED WITH TYPE 2 DIABETES MELLITUS (HCC): ICD-10-CM

## 2022-07-26 PROCEDURE — 99212 OFFICE O/P EST SF 10 MIN: CPT

## 2022-07-26 RX ORDER — GENTAMICIN SULFATE 1 MG/G
OINTMENT TOPICAL ONCE
Status: CANCELLED | OUTPATIENT
Start: 2022-07-26 | End: 2022-07-26

## 2022-07-26 RX ORDER — LIDOCAINE 40 MG/G
CREAM TOPICAL ONCE
Status: CANCELLED | OUTPATIENT
Start: 2022-07-26 | End: 2022-07-26

## 2022-07-26 RX ORDER — LIDOCAINE HYDROCHLORIDE 20 MG/ML
JELLY TOPICAL ONCE
Status: CANCELLED | OUTPATIENT
Start: 2022-07-26 | End: 2022-07-26

## 2022-07-26 RX ORDER — BACITRACIN ZINC AND POLYMYXIN B SULFATE 500; 1000 [USP'U]/G; [USP'U]/G
OINTMENT TOPICAL ONCE
Status: CANCELLED | OUTPATIENT
Start: 2022-07-26 | End: 2022-07-26

## 2022-07-26 RX ORDER — CLOBETASOL PROPIONATE 0.5 MG/G
OINTMENT TOPICAL ONCE
Status: CANCELLED | OUTPATIENT
Start: 2022-07-26 | End: 2022-07-26

## 2022-07-26 RX ORDER — BETAMETHASONE DIPROPIONATE 0.05 %
OINTMENT (GRAM) TOPICAL ONCE
Status: CANCELLED | OUTPATIENT
Start: 2022-07-26 | End: 2022-07-26

## 2022-07-26 RX ORDER — LIDOCAINE HYDROCHLORIDE 40 MG/ML
SOLUTION TOPICAL ONCE
Status: CANCELLED | OUTPATIENT
Start: 2022-07-26 | End: 2022-07-26

## 2022-07-26 RX ORDER — GINSENG 100 MG
CAPSULE ORAL ONCE
Status: CANCELLED | OUTPATIENT
Start: 2022-07-26 | End: 2022-07-26

## 2022-07-26 RX ORDER — LIDOCAINE 50 MG/G
OINTMENT TOPICAL ONCE
Status: CANCELLED | OUTPATIENT
Start: 2022-07-26 | End: 2022-07-26

## 2022-07-26 RX ORDER — BACITRACIN, NEOMYCIN, POLYMYXIN B 400; 3.5; 5 [USP'U]/G; MG/G; [USP'U]/G
OINTMENT TOPICAL ONCE
Status: CANCELLED | OUTPATIENT
Start: 2022-07-26 | End: 2022-07-26

## 2022-07-26 ASSESSMENT — PAIN SCALES - GENERAL: PAINLEVEL_OUTOF10: 0

## 2022-07-26 NOTE — DISCHARGE INSTRUCTIONS
PHYSICIAN ORDERS AND DISCHARGE INSTRUCTIONS     NOTE: Upon discharge from the [address], you will receive a patient experience survey. We would be grateful if you would take the time to fill this survey out. Wound care order history:                 YESSENIA's   Right       Left               Date:              Cultures:                Grafts:                Antibiotics:           Continuing wound care orders and information:                 Residence:                Continue home health care with:              Your wound-care supplies will be provided by: Wound cleansing:              Do not scrub or use excessive force. Wash hands with soap and water before and after dressing changes. Prior to applying a clean dressing, cleanse wound with normal saline, wound cleanser, or mild soap and water. Ask the physician or nurse before getting the wound(s) wet in a shower     Daily Wound management:              Keep weight off wounds and reposition every 2 hours. Avoid standing for long periods of time. Apply wraps/stockings in AM and remove at bedtime. If swelling is present, elevate legs to the level of the heart or above for 30 minutes 4-5 times a day and/or when sitting. When taking antibiotics take entire prescription as ordered by physician do not stop taking until medicine is all gone. Orders for this week: 7/26/22                  Rx:     Leg Wound - Healed 7/26/22. May leave open to air. Discharged from wound care center 7/26/22. Call [phone] for any questions or concerns.   Date__________   Time____________

## 2022-07-26 NOTE — PROGRESS NOTES
Wound Care Center Progress Note With Procedure    Desean Coley  AGE: 74 y.o. GENDER: male  : 1950  EPISODE DATE:  2022     Subjective:     Chief Complaint   Patient presents with    Wound Check     Right foot          HISTORY of PRESENT ILLNESS      Desean Coley is a 74 y.o. male who presents today for wound evaluation of wound dehiscence secondary to toe amputation on the right foot. Well overall. Some pain to the distal anterior ankle over the site where he had revascularization done by Dr. Laurence Sandoval. Overall doing really well      PAST MEDICAL HISTORY        Diagnosis Date    Arrhythmia     Pacemaker placed aprox 5 years ago for A Fib per patient    Arthritis 2013    rt wrist    Atrial fibrillation (HCC)     on Xarelto - Dr. 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    Diabetes mellitus ([facility]     dx 2004    Diabetic neuropathy associated with type 2 diabetes mellitus ([facility] 2019    Erythropoietin deficiency anemia 2020    Gout 2019    \"got gout when had pacer put in because they did not give me my medication for gout \"    H/O 24 hour EKG monitoring 10/03/2013    no afib noted, sinsus rhythm    H/O cardiovascular stress test 2014    cardiolite- mild ischemia RCA EF50%    H/O echocardiogram 2020    EF 55-60% severe aortic stenosis mild to mod aortic regurg mod to severe tricuspid regurg severe pulm htn significant changes since  echo. H/O right and left heart catheterization 12/10/2020    DIFFUSE LAD DISEASE, Mild ECA Disease, Severe AS, Milf Pul HTN on RHC. History of blood transfusion 2020    d/t anemia    History of transesophageal echocardiography (MABLE) 12/15/2020    Severe aortic stenosis (ANNA by planimetry: 0.778 cm sq). Mild AR.     [place] (hard of hearing)     hearing tonya aides    Hx of Doppler echocardiogram 05/21/2018    EF 50%  Mild LV hypertrophy. Mildly enlarged RA. Mod aortic valve calcification with mod AS. Mitral annular calcification is present. Mild AR, MR and TR. Mild pulmonary htn.     Hyperlipidemia     Hypertension     Follows with PCP & Dr. Erick Currie    Other disorders of kidney and ureter     Pacemaker     Medtronic, implanted 2014    Pneumonia 12/29/2012    Pneumonia due to COVID-19 virus 08/2020    Sleep apnea     dx 2013- has c-pap    Type II or unspecified type diabetes mellitus with other specified manifestations, uncontrolled 12/12/2012    Venous hypertension, chronic, with ulcer ([address] 12/12/2012    resolved       PAST SURGICAL HISTORY    Past Surgical History:   Procedure Laterality Date    CABG WITH AORTIC VALVE REPLACEMENT N/A 02/16/2021    CABG CORONARY ARTERY BYPASS X2 WITH LIMA, AORTIC VALVE REPLACEMENT AND AORTIC ROOT REPAIR, INTRAOPERATIVE MABLE, INDUCED HYPOTHERMIA, LEFT LEG ENDOVEIN HARVEST, LEFT ATRIAL CLIP, AND CRYO PROCEDURE performed by Demario Rajput MD at [address]  12/2014    at [address] Left 01/29/2021    LEFT CAROTID ENDARTERECTOMY performed by Eduardo Henning MD at [address] [place] [address]  2017    COLONOSCOPY  2011    COLONOSCOPY N/A 11/19/2019    COLONOSCOPY DIAGNOSTIC performed by Duy Santoyo MD at [address] [place] [address]  09/30/2020    POSSIBLE CECAL avms, SIGMOID DIVERTICULOSIS, INTERNAL HEMORRHOIDS GRADE 1    COLONOSCOPY N/A 09/30/2020    COLONOSCOPY CONTROL HEMORRHAGE WITH APC performed by Duy Santoyo MD at [address] [place] [address]  2014    \"2 stents put in \"    FOOT DEBRIDEMENT Bilateral 03/31/2022    BILATERAL DEBRIEDMENT OF NON-VIABLE TISSUE & BONE performed by Alisia Canela DPM at [address] [place] [address] Right 04/14/2022    RIGHT FOOT DEBRIDEMENT INCISION AND DRAINAGE performed by Alisia Canela DPM at [address] [place] [address]      abdominal    IR NONTUNNELED VASCULAR CATHETER 03/05/2021    IR NONTUNNELED VASCULAR CATHETER 3/5/2021 [facility] SPECIAL PROCEDURES    JOINT REPLACEMENT  2004    total left hip    OTHER SURGICAL HISTORY Right 12/02/2017    I&D; evacuation of hematoma right hip    OTHER SURGICAL HISTORY  09/17/2020    enteroscopy    PACEMAKER INSERTION N/A 02/23/2021    PACEMAKER GENERATOR LEAD REVISION performed by Ezekiel Arenas MD at [address] Left 04/26/2022    Dual PPM: Atrial lead extraction/insertion, Generator change. Medtronic, Lucie XT DR EVERT Apodaca    PACEMAKER PLACEMENT      9/18/14 Status post remote permanent pacemaker with atrial lead dislodgement. 7/24/14 PPM Implant    TOE AMPUTATION Bilateral 03/31/2022    LEFT 3RD TOE AMPUTATION performed by Luz Maria Simmons DPM at [address] Right 04/14/2022    RIGHT 2ND TOE AMPUTATION performed by Luz Maria Simmons DPM at [address] [facility] N/A 09/17/2020    ENTEROSCOPY PUSH BIOPSY performed by Brandy Kitchen MD at [address] [place] [address] N/A 03/04/2021    EGD DIAGNOSTIC ONLY performed by Brandy Kitchen MD at [address] [facility] [address]  2012    \"have stents in both legs- done [address] [facility] [address]    Family History   Problem Relation Age of Onset    High Blood Pressure Mother     Arthritis Mother     Diabetes Mother     Heart Disease Mother     High Blood Pressure Father     Heart Disease Father     Kidney Disease Father     No Known Problems Sister     Heart Surgery Brother     Heart Disease Brother     No Known Problems Sister     No Known Problems Brother        SOCIAL HISTORY    Social History     Tobacco Use    Smoking status: Never    Smokeless tobacco: Never   Vaping Use    Vaping Use: Never used   Substance Use Topics    Alcohol use: Not Currently     Alcohol/week: 2.0 standard drinks     Types: 2 Cans of beer per week     Comment: average \"one time per week\"/ Caffiene: 1 cup of coffee daily    Drug use:  No ALLERGIES    Allergies   Allergen Reactions    Spironolactone      CAUSES INCREASED K+    Tape Ann Payer Tape] Rash     SURGICAL TAPE       MEDICATIONS    Current Outpatient Medications on File Prior to Encounter   Medication Sig Dispense Refill    torsemide (DEMADEX) 20 MG tablet TAKE 2 TABLETS BY MOUTH TWICE A  tablet 3    apixaban (ELIQUIS) 5 MG TABS tablet Take 1 tablet by mouth 2 times daily 28 tablet 0    simvastatin (ZOCOR) 20 MG tablet Take 1 tablet by mouth daily 90 tablet 3    Neomycin-Bacitracin-Polymyxin (TRIPLE ANTIBIOTIC) OINT Apply to chest irritation 2x a day 1 each 0    gabapentin (NEURONTIN) 300 MG capsule TAKE 1 CAPSULE 3 TIMES A    capsule 1    glimepiride (AMARYL) 4 MG tablet TAKE 1 TABLET IN THE       MORNING (BEFORE BREAKFAST) 90 tablet 1    sildenafil (VIAGRA) 100 MG tablet TAKE 1 TABLET DAILY AS     NEEDED FOR ERECTILE        DYSFUNCTION 90 tablet 1    dapagliflozin (FARXIGA) 10 MG tablet Take 1 tablet by mouth every morning 90 tablet 1    Dulaglutide 4.5 MG/0.5ML SOPN Inject 4.5 mg into the skin once a week 12 pen 1    carboxymethylcellulose (REFRESH PLUS) 0.5 % SOLN ophthalmic solution as needed       Cholecalciferol (VITAMIN D) 50 MCG (2000 UT) CAPS capsule Take by mouth nightly       aspirin 81 MG tablet Take 81 mg by mouth daily       No current facility-administered medications on file prior to encounter. REVIEW OF SYSTEMS    Pertinent items are noted in HPI. Constitutional: Negative for systemic symptoms including fever, chills and malaise. Objective:      BP (!) 164/84   Pulse 69   Temp 98.7 °F (37.1 °C) (Temporal)   Resp 18     PHYSICAL EXAM    General: The patient is in no acute distress. Mental status:  Patient is appropriate, is  oriented to place and plan of care.   Dermatologic exam: Visual inspection of the periwound reveals the skin to be normal in turgor and texture  Wound exam: see wound description below in procedure note      Assessment:     Problem List Items Addressed This Visit          Circulatory    PVD (peripheral vascular disease) ([address]    Relevant Orders    Initiate Outpatient Wound Care Protocol       Endocrine    Diabetic neuropathy associated with type 2 diabetes mellitus ([address]    Relevant Orders    Initiate Outpatient Wound Care Protocol    WD-Diabetic ulcer of left foot with fat layer exposed ([address] - Primary    Relevant Orders    Initiate Outpatient Wound Care Protocol       Other    WD-Non-pressure ulcer of right lower extremity with fat layer exposed ([facility]    Relevant Orders    Initiate Outpatient Wound Care Protocol     Procedure Note    Indications:  Based on my examination of this patient's wound(s) today, sharp excision into necrotic subcutaneous tissue is required to promote healing and evaluate the extent of previous healing. Performed by: Carlo Avelar DPM    Consent obtained: Yes    Time out taken:  Yes    Pain Control: lidocaine       Debridement:Non-excisional Debridement    Using #15 blade scalpel the wound(s) was/were sharply debrided down through and including the removal of subcutaneous tissue. Devitalized Tissue Debrided:  necrotic/eschar    Pre Debridement Measurements:  Are located in the Wound Documentation Flow Sheet    All active wounds listed below with today's date are evaluated             Percent of Wound(s) Debrided: approximately 100%    Total  Area  Debrided:  0 sq cm     Bleeding:  None    Hemostasis Achieved:  not needed    Procedural Pain:  0  / 10     Post Procedural Pain:  0 / 10     Response to treatment:  Well tolerated by patient. Status of wound progress and description from last visit:   stable. Plan:       Discharge Instructions         PHYSICIAN ORDERS AND DISCHARGE INSTRUCTIONS     NOTE: Upon discharge from the [address], you will receive a patient experience survey. We would be grateful if you would take the time to fill this survey out.      Wound care order history:                 YESSENIA's   Right       Left               Date:              Cultures:                Grafts:                Antibiotics:           Continuing wound care orders and information:                 Residence:                Continue home health care with:              Your wound-care supplies will be provided by: Wound cleansing:              Do not scrub or use excessive force. Wash hands with soap and water before and after dressing changes. Prior to applying a clean dressing, cleanse wound with normal saline, wound cleanser, or mild soap and water. Ask the physician or nurse before getting the wound(s) wet in a shower     Daily Wound management:              Keep weight off wounds and reposition every 2 hours. Avoid standing for long periods of time. Apply wraps/stockings in AM and remove at bedtime. If swelling is present, elevate legs to the level of the heart or above for 30 minutes 4-5 times a day and/or when sitting. When taking antibiotics take entire prescription as ordered by physician do not stop taking until medicine is all gone. Orders for this week: 7/26/22                  Rx:     Leg Wound - Healed 7/26/22. May leave open to air. Discharged from wound care center 7/26/22. Call [phone] for any questions or concerns. Date__________   Time____________        Treatment Note      Written Patient Dismissal Instructions Given       -History of wound dehiscence amputation sites bilateral foot. -Right foot third toe amputation site has completely epithelialized today.   No signs of infection.  -To ambulate in supportive shoe gear at all times with wide toe box.  -To check feet on a daily basis  -Discussed importance of tight glycemic control.  -Follow-up with Dr. Jagruti James for the pain on the right anterior foot. This is right over the site of the anterior tibial artery that was just recently worked on by him  -From my and would like to see him every 3 months for routine diabetic foot examination.   Sooner if needed      Electronically signed by Maycol Smith DPM on 7/26/2022 at 1:48 PM

## 2022-07-29 ENCOUNTER — HOSPITAL ENCOUNTER (OUTPATIENT)
Age: 72
Discharge: HOME | End: 2022-07-29
Payer: MEDICARE

## 2022-07-29 DIAGNOSIS — E11.9 TYPE 2 DIABETES MELLITUS WITHOUT COMPLICATION, WITHOUT LONG-TERM CURRENT USE OF INSULIN (HCC): ICD-10-CM

## 2022-07-29 DIAGNOSIS — R80.1 PERSISTENT PROTEINURIA: ICD-10-CM

## 2022-07-29 DIAGNOSIS — I38 VHD (VALVULAR HEART DISEASE): ICD-10-CM

## 2022-07-29 DIAGNOSIS — I73.9 PVD (PERIPHERAL VASCULAR DISEASE) (HCC): ICD-10-CM

## 2022-07-29 DIAGNOSIS — N18.32 STAGE 3B CHRONIC KIDNEY DISEASE (HCC): ICD-10-CM

## 2022-07-29 DIAGNOSIS — I10 ESSENTIAL HYPERTENSION: ICD-10-CM

## 2022-07-29 DIAGNOSIS — Z95.0 CARDIAC PACEMAKER IN SITU: ICD-10-CM

## 2022-07-29 LAB
ALBUMIN SERPL-MCNC: 4.1 GM/DL (ref 3.4–5)
BACTERIA: NEGATIVE /HPF
BASOPHILS ABSOLUTE: 0.1 K/CU MM
BASOPHILS RELATIVE PERCENT: 1.4 % (ref 0–1)
BILIRUBIN URINE: NEGATIVE MG/DL
BLOOD, URINE: ABNORMAL
CLARITY: CLEAR
COLOR: YELLOW
CREATININE URINE: 40.1 MG/DL (ref 39–259)
DIFFERENTIAL TYPE: ABNORMAL
EOSINOPHILS ABSOLUTE: 0.4 K/CU MM
EOSINOPHILS RELATIVE PERCENT: 6.6 % (ref 0–3)
GLUCOSE, URINE: 500 MG/DL
HCT VFR BLD CALC: 38.8 % (ref 42–52)
HEMOGLOBIN: 11.5 GM/DL (ref 13.5–18)
HYALINE CASTS: 0 /LPF
IMMATURE NEUTROPHIL %: 0.4 % (ref 0–0.43)
KETONES, URINE: NEGATIVE MG/DL
LEUKOCYTE ESTERASE, URINE: NEGATIVE
LYMPHOCYTES ABSOLUTE: 1.1 K/CU MM
LYMPHOCYTES RELATIVE PERCENT: 19.9 % (ref 24–44)
MCH RBC QN AUTO: 25.1 PG (ref 27–31)
MCHC RBC AUTO-ENTMCNC: 29.6 % (ref 32–36)
MCV RBC AUTO: 84.5 FL (ref 78–100)
MONOCYTES ABSOLUTE: 0.7 K/CU MM
MONOCYTES RELATIVE PERCENT: 11.6 % (ref 0–4)
MUCUS: ABNORMAL HPF
NITRITE URINE, QUANTITATIVE: NEGATIVE
NUCLEATED RBC %: 0 %
PDW BLD-RTO: 16.2 % (ref 11.7–14.9)
PH, URINE: 6 (ref 5–8)
PHOSPHORUS: 3.8 MG/DL (ref 2.5–4.9)
PLATELET # BLD: 224 K/CU MM (ref 140–440)
PMV BLD AUTO: 10.5 FL (ref 7.5–11.1)
PROT/CREAT RATIO, UR: 1.5
PROTEIN UA: 30 MG/DL
RBC # BLD: 4.59 M/CU MM (ref 4.6–6.2)
RBC URINE: 1 /HPF (ref 0–3)
SEGMENTED NEUTROPHILS ABSOLUTE COUNT: 3.4 K/CU MM
SEGMENTED NEUTROPHILS RELATIVE PERCENT: 60.1 % (ref 36–66)
SPECIFIC GRAVITY UA: 1.01 (ref 1–1.03)
TOTAL IMMATURE NEUTOROPHIL: 0.02 K/CU MM
TOTAL NUCLEATED RBC: 0 K/CU MM
TRICHOMONAS: ABNORMAL /HPF
URINE TOTAL PROTEIN: 62.1 MG/DL
UROBILINOGEN, URINE: 0.2 MG/DL (ref 0.2–1)
WBC # BLD: 5.6 K/CU MM (ref 4–10.5)
WBC UA: <1 /HPF (ref 0–2)

## 2022-07-29 PROCEDURE — 84156 ASSAY OF PROTEIN URINE: CPT

## 2022-07-29 PROCEDURE — 82570 ASSAY OF URINE CREATININE: CPT

## 2022-07-29 PROCEDURE — 81001 URINALYSIS AUTO W/SCOPE: CPT

## 2022-07-29 PROCEDURE — 82040 ASSAY OF SERUM ALBUMIN: CPT

## 2022-07-29 PROCEDURE — 84100 ASSAY OF PHOSPHORUS: CPT

## 2022-07-29 PROCEDURE — 85025 COMPLETE CBC W/AUTO DIFF WBC: CPT

## 2022-07-29 PROCEDURE — 36415 COLL VENOUS BLD VENIPUNCTURE: CPT

## 2022-07-30 ENCOUNTER — TELEPHONE ENCOUNTER (OUTPATIENT)
Age: 72
End: 2022-07-30

## 2022-07-31 ENCOUNTER — TELEPHONE ENCOUNTER (OUTPATIENT)
Age: 72
End: 2022-07-31

## 2022-08-01 ENCOUNTER — OFFICE VISIT (OUTPATIENT)
Dept: FAMILY MEDICINE CLINIC | Age: 72
End: 2022-08-01
Payer: MEDICARE

## 2022-08-01 VITALS
DIASTOLIC BLOOD PRESSURE: 86 MMHG | HEIGHT: 71 IN | SYSTOLIC BLOOD PRESSURE: 130 MMHG | HEART RATE: 64 BPM | OXYGEN SATURATION: 95 % | BODY MASS INDEX: 28.56 KG/M2 | WEIGHT: 204 LBS

## 2022-08-01 DIAGNOSIS — E11.9 TYPE 2 DIABETES MELLITUS WITHOUT COMPLICATION, WITHOUT LONG-TERM CURRENT USE OF INSULIN (HCC): Primary | ICD-10-CM

## 2022-08-01 PROCEDURE — 1123F ACP DISCUSS/DSCN MKR DOCD: CPT | Performed by: STUDENT IN AN ORGANIZED HEALTH CARE EDUCATION/TRAINING PROGRAM

## 2022-08-01 PROCEDURE — G8427 DOCREV CUR MEDS BY ELIG CLIN: HCPCS | Performed by: STUDENT IN AN ORGANIZED HEALTH CARE EDUCATION/TRAINING PROGRAM

## 2022-08-01 PROCEDURE — 1036F TOBACCO NON-USER: CPT | Performed by: STUDENT IN AN ORGANIZED HEALTH CARE EDUCATION/TRAINING PROGRAM

## 2022-08-01 PROCEDURE — 99213 OFFICE O/P EST LOW 20 MIN: CPT | Performed by: STUDENT IN AN ORGANIZED HEALTH CARE EDUCATION/TRAINING PROGRAM

## 2022-08-01 PROCEDURE — 2022F DILAT RTA XM EVC RTNOPTHY: CPT | Performed by: STUDENT IN AN ORGANIZED HEALTH CARE EDUCATION/TRAINING PROGRAM

## 2022-08-01 PROCEDURE — 3017F COLORECTAL CA SCREEN DOC REV: CPT | Performed by: STUDENT IN AN ORGANIZED HEALTH CARE EDUCATION/TRAINING PROGRAM

## 2022-08-01 PROCEDURE — 3051F HG A1C>EQUAL 7.0%<8.0%: CPT | Performed by: STUDENT IN AN ORGANIZED HEALTH CARE EDUCATION/TRAINING PROGRAM

## 2022-08-01 PROCEDURE — G8417 CALC BMI ABV UP PARAM F/U: HCPCS | Performed by: STUDENT IN AN ORGANIZED HEALTH CARE EDUCATION/TRAINING PROGRAM

## 2022-08-01 ASSESSMENT — ENCOUNTER SYMPTOMS
ABDOMINAL PAIN: 0
SHORTNESS OF BREATH: 0
SORE THROAT: 0
WHEEZING: 0
NAUSEA: 0

## 2022-08-01 NOTE — PROGRESS NOTES
8/1/2022    Jalen Diaz    Chief Complaint   Patient presents with    Diabetes     - diabetic shoes - see dm foot exam       HPI  History was obtained from patient. Cathleen Pandya is a 74 y.o. male with a PMHx as listed below who presents today for need for diabetic shoes. 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 Type 2 diabetes mellitus without complication, without long-term current use of insulin ([facility])             REVIEW OF SYMPTOMS    Review of Systems   Constitutional:  Negative for chills and fatigue. HENT:  Negative for congestion and sore throat. Respiratory:  Negative for shortness of breath and wheezing. Cardiovascular:  Negative for chest pain and palpitations. Gastrointestinal:  Negative for abdominal pain and nausea. Genitourinary:  Negative for frequency and urgency. Neurological:  Negative for light-headedness.      PAST MEDICAL HISTORY  Past Medical History:   Diagnosis Date    Arrhythmia     Pacemaker placed aprox 5 years ago for A Fib per patient    Arthritis 12/2013    rt wrist    Atrial fibrillation ([facility]     on Xarelto - Dr. Joycelyn Petty    CAD (coronary artery disease) 06/18/2014    see dr Joycelyn Petty    Chronic kidney disease, stage III (moderate) ([address] 07/07/2016    Critical illness myopathy 03/15/2021    Diabetes mellitus ([facility]     dx 2004    Diabetic neuropathy associated with type 2 diabetes mellitus ([facility] 04/23/2019    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    Gout 04/2019    \"got gout when had pacer put in because they did not give me my medication for gout \"    H/O 24 hour EKG monitoring 10/03/2013    no afib noted, sinsus rhythm    H/O cardiovascular stress test 05/12/2014    cardiolite- mild ischemia RCA EF50%    H/O echocardiogram 12/01/2020    EF 55-60% severe aortic stenosis mild to mod aortic regurg mod to severe tricuspid regurg severe pulm htn significant changes since 2018 echo. H/O right and left heart catheterization 12/10/2020    DIFFUSE LAD DISEASE, Mild ECA Disease, Severe AS, Milf Pul HTN on RHC. History of blood transfusion 12/2020    d/t anemia    History of transesophageal echocardiography (MABLE) 12/15/2020    Severe aortic stenosis (ANNA by planimetry: 0.778 cm sq). Mild AR. [place] (hard of hearing)     hearing tonya aides    Hx of Doppler echocardiogram 05/21/2018    EF 50%  Mild LV hypertrophy. Mildly enlarged RA. Mod aortic valve calcification with mod AS. Mitral annular calcification is present. Mild AR, MR and TR. Mild pulmonary htn.     Hyperlipidemia     Hypertension     Follows with PCP & Dr. Barbara Shaw    Other disorders of kidney and ureter     Pacemaker     Medtronic, implanted 2014    Pneumonia 12/29/2012    Pneumonia due to COVID-19 virus 08/2020    Sleep apnea     dx 2013- has c-pap    Type II or unspecified type diabetes mellitus with other specified manifestations, uncontrolled 12/12/2012    Venous hypertension, chronic, with ulcer ([address] 12/12/2012    resolved       FAMILY HISTORY  Family History   Problem Relation Age of Onset    High Blood Pressure Mother     Arthritis Mother     Diabetes Mother     Heart Disease Mother     High Blood Pressure Father     Heart Disease Father     Kidney Disease Father     No Known Problems Sister     Heart Surgery Brother     Heart Disease Brother     No Known Problems Sister     No Known Problems Brother        SOCIAL HISTORY  Social History     Socioeconomic History    Marital status:    Tobacco Use    Smoking status: Never    Smokeless tobacco: Never   Vaping Use    Vaping Use: Never used   Substance and Sexual Activity    Alcohol use: Not Currently     Alcohol/week: 2.0 standard drinks     Types: 2 Cans of beer per week     Comment: average \"one time per week\"/ Caffiene: 1 cup of coffee daily    Drug use: No    Sexual activity: Yes     Partners: Female     Comment:  Social Determinants of Health     Financial Resource Strain: Low Risk     Difficulty of Paying Living Expenses: Not hard at all   Food Insecurity: No Food Insecurity    Worried About [facility] in the Last Year: Never true    Ran Out of Food in the Last Year: Never true   Physical Activity: Sufficiently Active    Days of Exercise per Week: 4 days    Minutes of Exercise per Session: 40 min        SURGICAL HISTORY  Past Surgical History:   Procedure Laterality Date    CABG WITH AORTIC VALVE REPLACEMENT N/A 02/16/2021    CABG CORONARY ARTERY BYPASS X2 WITH LIMA, AORTIC VALVE REPLACEMENT AND AORTIC ROOT REPAIR, INTRAOPERATIVE MABLE, INDUCED HYPOTHERMIA, LEFT LEG ENDOVEIN HARVEST, LEFT ATRIAL CLIP, AND CRYO PROCEDURE performed by Mathieu Woods MD at [address]  12/2014    at [address] Left 01/29/2021    LEFT CAROTID ENDARTERECTOMY performed by Samara Cao MD at [address] [place] [address]    COLONOSCOPY  2011    COLONOSCOPY N/A 11/19/2019    COLONOSCOPY DIAGNOSTIC performed by Reba Sanchez MD at [address] [place] [address]  09/30/2020    POSSIBLE CECAL avms, SIGMOID DIVERTICULOSIS, INTERNAL HEMORRHOIDS GRADE 1    COLONOSCOPY N/A 09/30/2020    COLONOSCOPY CONTROL HEMORRHAGE WITH APC performed by Reba Sanchez MD at [address] [place] [address]  2014    \"2 stents put in \"    FOOT DEBRIDEMENT Bilateral 03/31/2022    BILATERAL DEBRIEDMENT OF NON-VIABLE TISSUE & BONE performed by Lyubov Vidal DPM at [address] Right 04/14/2022    RIGHT FOOT DEBRIDEMENT INCISION AND DRAINAGE performed by Lyubov Vidal DPM at [address]      abdominal    IR NONTUNNELED VASCULAR CATHETER  03/05/2021    IR NONTUNNELED VASCULAR CATHETER 3/5/2021 [facility] SPECIAL PROCEDURES    JOINT REPLACEMENT  2004    total left hip    OTHER SURGICAL HISTORY Right 12/02/2017    I&D; evacuation of hematoma right hip    OTHER SURGICAL HISTORY 09/17/2020    enteroscopy    PACEMAKER INSERTION N/A 02/23/2021    PACEMAKER GENERATOR LEAD REVISION performed by Rocky Zayas MD at [address] Left 04/26/2022    Dual PPM: Atrial lead extraction/insertion, Generator change. Medtronic, [place] XT DR EVERT Apodaca    PACEMAKER PLACEMENT      9/18/14 Status post remote permanent pacemaker with atrial lead dislodgement.  7/24/14 PPM Implant    TOE AMPUTATION Bilateral 03/31/2022    LEFT 3RD TOE AMPUTATION performed by Julián West DPM at [address] Right 04/14/2022    RIGHT 2ND TOE AMPUTATION performed by Julián West DPM at [facility] N/A 09/17/2020    ENTEROSCOPY PUSH BIOPSY performed by Mesha Esquivel MD at [facility] 03/04/2021    EGD DIAGNOSTIC ONLY performed by Mesha Esquivel MD at [facility]  2012    \"have stents in both legs- done in [facility]  Current Outpatient Medications   Medication Sig Dispense Refill    torsemide (DEMADEX) 20 MG tablet TAKE 2 TABLETS BY MOUTH TWICE A  tablet 3    apixaban (ELIQUIS) 5 MG TABS tablet Take 1 tablet by mouth 2 times daily 28 tablet 0    simvastatin (ZOCOR) 20 MG tablet Take 1 tablet by mouth daily 90 tablet 3    gabapentin (NEURONTIN) 300 MG capsule TAKE 1 CAPSULE 3 TIMES A    capsule 1    glimepiride (AMARYL) 4 MG tablet TAKE 1 TABLET IN THE       MORNING (BEFORE BREAKFAST) 90 tablet 1    sildenafil (VIAGRA) 100 MG tablet TAKE 1 TABLET DAILY AS     NEEDED FOR ERECTILE        DYSFUNCTION 90 tablet 1    dapagliflozin (FARXIGA) 10 MG tablet Take 1 tablet by mouth every morning 90 tablet 1    Dulaglutide 4.5 MG/0.5ML SOPN Inject 4.5 mg into the skin once a week 12 pen 1    carboxymethylcellulose (REFRESH PLUS) 0.5 % SOLN ophthalmic solution as needed       Cholecalciferol (VITAMIN D) 50 MCG (2000 UT) CAPS capsule Take by mouth nightly       aspirin 81 MG tablet Take 81 mg by mouth daily       No current facility-administered medications for this visit. ALLERGIES  Allergies   Allergen Reactions    Spironolactone      CAUSES INCREASED K+    Tape [place] Tape] Rash     SURGICAL TAPE       PHYSICAL EXAM    /86   Pulse 64   Ht 5' 11\" (1.803 m)   Wt 204 lb (92.5 kg)   SpO2 95%   BMI 28.45 kg/m²     Physical Exam  Constitutional:       Appearance: Normal appearance. HENT:      Head: Normocephalic and atraumatic. Eyes:      Extraocular Movements: Extraocular movements intact. Pupils: Pupils are equal, round, and reactive to light. Cardiovascular:      Rate and Rhythm: Normal rate and regular rhythm. Pulses: Normal pulses. Heart sounds: No murmur heard. No friction rub. No gallop. Pulmonary:      Effort: Pulmonary effort is normal.      Breath sounds: Normal breath sounds. Musculoskeletal:      Cervical back: Neck supple. Feet:      Comments: Amputation 3rd digit Lt, 2nd digit partial Rt foot   Skin:     General: Skin is warm and dry. Neurological:      General: No focal deficit present. Mental Status: He is alert. Psychiatric:         Mood and Affect: Mood normal.         Behavior: Behavior normal.       ASSESSMENT & PLAN    1. Type 2 diabetes mellitus without complication, without long-term current use of insulin (HCC)    -  DIABETES FOOT EXAM  - DME Order for (Specify) as OP    As a part of a comprehensive diabetic footcare program and diabetes care management, this patient needs shoes and foot orthoses to protect the integrity of the compromised diabetic foot secondary to Previous amputation of several toes. Reducing shear forces and allowing adequate room within the shoe is imperative for the diabetic foot. I intend on following up with this patient within the next 6 months. No follow-ups on file.          Electronically signed by Lucho Corey DO on 8/1/2022

## 2022-08-01 NOTE — PROGRESS NOTES
Visual inspection:  Deformity/amputation: present - amputated 2nd toe bilateral foot, flat feet  Skin lesions/pre-ulcerative calluses: present - bunion lateral left foot  Edema: right- trace, left- trace    Sensory exam:  Monofilament sensation: abnormal - loss of sensation top & bottom bilateral foot  (minimum of 5 random plantar locations tested, avoiding callused areas - > 1 area with absence of sensation is + for neuropathy)    Plus at least one of the following:  Pulses: abnormal - poorly palpable

## 2022-08-31 DIAGNOSIS — E11.9 TYPE 2 DIABETES MELLITUS WITHOUT COMPLICATION, WITHOUT LONG-TERM CURRENT USE OF INSULIN (HCC): ICD-10-CM

## 2022-08-31 RX ORDER — DULAGLUTIDE 4.5 MG/.5ML
INJECTION, SOLUTION SUBCUTANEOUS
Qty: 6 ML | Refills: 1 | OUTPATIENT
Start: 2022-08-31

## 2022-08-31 RX ORDER — DAPAGLIFLOZIN 10 MG/1
TABLET, FILM COATED ORAL
Qty: 90 TABLET | Refills: 1 | OUTPATIENT
Start: 2022-08-31

## 2022-09-02 RX ORDER — SIMVASTATIN 20 MG
20 TABLET ORAL DAILY
Qty: 90 TABLET | Refills: 3 | OUTPATIENT
Start: 2022-09-02 | End: 2022-12-01

## 2022-09-02 RX ORDER — TORSEMIDE 20 MG/1
TABLET ORAL
Qty: 180 TABLET | Refills: 3 | OUTPATIENT
Start: 2022-09-02

## 2022-09-04 PROCEDURE — 93296 REM INTERROG EVL PM/IDS: CPT | Performed by: INTERNAL MEDICINE

## 2022-09-04 PROCEDURE — 93294 REM INTERROG EVL PM/LDLS PM: CPT | Performed by: INTERNAL MEDICINE

## 2022-09-09 ENCOUNTER — PROCEDURE VISIT (OUTPATIENT)
Dept: CARDIOLOGY CLINIC | Age: 72
End: 2022-09-09
Payer: MEDICARE

## 2022-09-09 DIAGNOSIS — Z95.0 CARDIAC PACEMAKER IN SITU: Primary | ICD-10-CM

## 2022-09-10 LAB — FECAL BLOOD IMMUNOCHEMICAL TEST: NEGATIVE

## 2022-09-12 ENCOUNTER — OFFICE VISIT (OUTPATIENT)
Dept: FAMILY MEDICINE CLINIC | Age: 72
End: 2022-09-12
Payer: MEDICARE

## 2022-09-12 VITALS
DIASTOLIC BLOOD PRESSURE: 66 MMHG | BODY MASS INDEX: 29.26 KG/M2 | SYSTOLIC BLOOD PRESSURE: 128 MMHG | HEIGHT: 71 IN | HEART RATE: 72 BPM | WEIGHT: 209 LBS | RESPIRATION RATE: 16 BRPM | OXYGEN SATURATION: 97 %

## 2022-09-12 DIAGNOSIS — I25.118 ATHEROSCLEROTIC HEART DISEASE OF NATIVE CORONARY ARTERY WITH OTHER FORMS OF ANGINA PECTORIS (HCC): ICD-10-CM

## 2022-09-12 DIAGNOSIS — E11.9 TYPE 2 DIABETES MELLITUS WITHOUT COMPLICATION, WITHOUT LONG-TERM CURRENT USE OF INSULIN (HCC): ICD-10-CM

## 2022-09-12 DIAGNOSIS — Z23 NEED FOR PROPHYLACTIC VACCINATION AND INOCULATION AGAINST INFLUENZA: ICD-10-CM

## 2022-09-12 DIAGNOSIS — N52.9 VASCULOGENIC ERECTILE DYSFUNCTION, UNSPECIFIED VASCULOGENIC ERECTILE DYSFUNCTION TYPE: ICD-10-CM

## 2022-09-12 DIAGNOSIS — I48.0 PAF (PAROXYSMAL ATRIAL FIBRILLATION) (HCC): Primary | ICD-10-CM

## 2022-09-12 DIAGNOSIS — G62.9 PERIPHERAL POLYNEUROPATHY: ICD-10-CM

## 2022-09-12 PROCEDURE — G8417 CALC BMI ABV UP PARAM F/U: HCPCS | Performed by: STUDENT IN AN ORGANIZED HEALTH CARE EDUCATION/TRAINING PROGRAM

## 2022-09-12 PROCEDURE — 99214 OFFICE O/P EST MOD 30 MIN: CPT | Performed by: STUDENT IN AN ORGANIZED HEALTH CARE EDUCATION/TRAINING PROGRAM

## 2022-09-12 PROCEDURE — 1036F TOBACCO NON-USER: CPT | Performed by: STUDENT IN AN ORGANIZED HEALTH CARE EDUCATION/TRAINING PROGRAM

## 2022-09-12 PROCEDURE — 90694 VACC AIIV4 NO PRSRV 0.5ML IM: CPT | Performed by: STUDENT IN AN ORGANIZED HEALTH CARE EDUCATION/TRAINING PROGRAM

## 2022-09-12 PROCEDURE — G0008 ADMIN INFLUENZA VIRUS VAC: HCPCS | Performed by: STUDENT IN AN ORGANIZED HEALTH CARE EDUCATION/TRAINING PROGRAM

## 2022-09-12 PROCEDURE — G8427 DOCREV CUR MEDS BY ELIG CLIN: HCPCS | Performed by: STUDENT IN AN ORGANIZED HEALTH CARE EDUCATION/TRAINING PROGRAM

## 2022-09-12 PROCEDURE — 1123F ACP DISCUSS/DSCN MKR DOCD: CPT | Performed by: STUDENT IN AN ORGANIZED HEALTH CARE EDUCATION/TRAINING PROGRAM

## 2022-09-12 PROCEDURE — 3051F HG A1C>EQUAL 7.0%<8.0%: CPT | Performed by: STUDENT IN AN ORGANIZED HEALTH CARE EDUCATION/TRAINING PROGRAM

## 2022-09-12 PROCEDURE — 3017F COLORECTAL CA SCREEN DOC REV: CPT | Performed by: STUDENT IN AN ORGANIZED HEALTH CARE EDUCATION/TRAINING PROGRAM

## 2022-09-12 PROCEDURE — 2022F DILAT RTA XM EVC RTNOPTHY: CPT | Performed by: STUDENT IN AN ORGANIZED HEALTH CARE EDUCATION/TRAINING PROGRAM

## 2022-09-12 RX ORDER — GLIMEPIRIDE 4 MG/1
TABLET ORAL
Qty: 90 TABLET | Refills: 1 | Status: SHIPPED | OUTPATIENT
Start: 2022-09-12

## 2022-09-12 RX ORDER — GABAPENTIN 300 MG/1
CAPSULE ORAL
Qty: 270 CAPSULE | Refills: 1 | Status: SHIPPED | OUTPATIENT
Start: 2022-09-12 | End: 2022-12-13

## 2022-09-12 RX ORDER — SILDENAFIL 100 MG/1
TABLET, FILM COATED ORAL
Qty: 90 TABLET | Refills: 1 | Status: SHIPPED | OUTPATIENT
Start: 2022-09-12

## 2022-09-12 ASSESSMENT — ENCOUNTER SYMPTOMS
SORE THROAT: 0
SHORTNESS OF BREATH: 0
NAUSEA: 0
WHEEZING: 0
ABDOMINAL PAIN: 0

## 2022-09-12 NOTE — PROGRESS NOTES
9/12/2022    Othelia Boeck    Chief Complaint   Patient presents with    3 Month Follow-Up       HPI  History was obtained from patient. Randie Harada is a 74 y.o. male with a PMHx as listed below who presents today for 3 month follow up on chronic conditions. No acute complaints. Compliant with medications. DM2 now well controlled. BP stable       1. PAF (paroxysmal atrial fibrillation) ([address]    2. Type 2 diabetes mellitus without complication, without long-term current use of insulin ([address]    3. Peripheral polyneuropathy    4. Vasculogenic erectile dysfunction, unspecified vasculogenic erectile dysfunction type    5. Atherosclerotic heart disease of native coronary artery with other forms of angina pectoris ([address]    6. Need for prophylactic vaccination and inoculation against influenza             REVIEW OF SYMPTOMS    Review of Systems   Constitutional:  Negative for chills and fatigue. HENT:  Negative for congestion and sore throat. Respiratory:  Negative for shortness of breath and wheezing. Cardiovascular:  Negative for chest pain and palpitations. Gastrointestinal:  Negative for abdominal pain and nausea. Genitourinary:  Negative for frequency and urgency. Neurological:  Negative for light-headedness.      PAST MEDICAL HISTORY  Past Medical History:   Diagnosis Date    Arrhythmia     Pacemaker placed aprox 5 years ago for A Fib per patient    Arthritis 12/2013    rt wrist    Atrial fibrillation ([address]     on Xarelto - Dr. Alis Ortega    CAD (coronary artery disease) 06/18/2014    see dr Alis Ortega    Chronic kidney disease, stage III (moderate) ([address] 07/07/2016    Critical illness myopathy 03/15/2021    Diabetes mellitus ([address]     dx 2004    Diabetic neuropathy associated with type 2 diabetes mellitus ([address] 04/23/2019    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    Gout 04/2019    \"got gout when had pacer put in because they did not give me my medication for gout \"    H/O 24 hour EKG monitoring 10/03/2013 no afib noted, sinsus rhythm    H/O cardiovascular stress test 05/12/2014    cardiolite- mild ischemia RCA EF50%    H/O echocardiogram 12/01/2020    EF 55-60% severe aortic stenosis mild to mod aortic regurg mod to severe tricuspid regurg severe pulm htn significant changes since 2018 echo. H/O right and left heart catheterization 12/10/2020    DIFFUSE LAD DISEASE, Mild ECA Disease, Severe AS, Milf Pul HTN on RHC. History of blood transfusion 12/2020    d/t anemia    History of transesophageal echocardiography (MABLE) 12/15/2020    Severe aortic stenosis (ANNA by planimetry: 0.778 cm sq). Mild AR. [place] (hard of hearing)     hearing tonya aides    Hx of Doppler echocardiogram 05/21/2018    EF 50%  Mild LV hypertrophy. Mildly enlarged RA. Mod aortic valve calcification with mod AS. Mitral annular calcification is present. Mild AR, MR and TR. Mild pulmonary htn.     Hyperlipidemia     Hypertension     Follows with PCP & Dr. Renny Taylor    Other disorders of kidney and ureter     Pacemaker     Medtronic, implanted 2014    Pneumonia 12/29/2012    Pneumonia due to COVID-19 virus 08/2020    Sleep apnea     dx 2013- has c-pap    Type II or unspecified type diabetes mellitus with other specified manifestations, uncontrolled 12/12/2012    Venous hypertension, chronic, with ulcer ([address] 12/12/2012    resolved       FAMILY HISTORY  Family History   Problem Relation Age of Onset    High Blood Pressure Mother     Arthritis Mother     Diabetes Mother     Heart Disease Mother     High Blood Pressure Father     Heart Disease Father     Kidney Disease Father     No Known Problems Sister     Heart Surgery Brother     Heart Disease Brother     No Known Problems Sister     No Known Problems Brother        SOCIAL HISTORY  Social History     Socioeconomic History    Marital status:    Tobacco Use    Smoking status: Never    Smokeless tobacco: Never   Vaping Use    Vaping Use: Never used   Substance and Sexual Activity    Alcohol use: Not Currently     Alcohol/week: 2.0 standard drinks     Types: 2 Cans of beer per week     Comment: average \"one time per week\"/ Caffiene: 1 cup of coffee daily    Drug use: No    Sexual activity: Yes     Partners: Female     Comment:      Social Determinants of Health     Financial Resource Strain: Low Risk     Difficulty of Paying Living Expenses: Not hard at all   Food Insecurity: No Food Insecurity    Worried About Running Out of Food in the Last Year: Never true    Ran Out of Food in the Last Year: Never true   Physical Activity: Sufficiently Active    Days of Exercise per Week: 4 days    Minutes of Exercise per Session: 40 min        SURGICAL HISTORY  Past Surgical History:   Procedure Laterality Date    CABG WITH AORTIC VALVE REPLACEMENT N/A 02/16/2021    CABG CORONARY ARTERY BYPASS X2 WITH LIMA, AORTIC VALVE REPLACEMENT AND AORTIC ROOT REPAIR, INTRAOPERATIVE MABLE, INDUCED HYPOTHERMIA, LEFT LEG ENDOVEIN HARVEST, LEFT ATRIAL CLIP, AND CRYO PROCEDURE performed by Luiza Mustafa MD at [address]  12/2014    at [address] Left 01/29/2021    LEFT CAROTID ENDARTERECTOMY performed by Brant Benson MD at [address] [place] [address]  2017    COLONOSCOPY  2011    COLONOSCOPY N/A 11/19/2019    COLONOSCOPY DIAGNOSTIC performed by Davian Smith MD at [address] [facility] [facility]  09/30/2020    POSSIBLE CECAL avms, SIGMOID DIVERTICULOSIS, INTERNAL HEMORRHOIDS GRADE 1    COLONOSCOPY N/A 09/30/2020    COLONOSCOPY CONTROL HEMORRHAGE WITH APC performed by Davian Smith MD at [address] [facility]  2014    \"2 stents put in \"    FOOT DEBRIDEMENT Bilateral 03/31/2022    BILATERAL DEBRIEDMENT OF NON-VIABLE TISSUE & BONE performed by Mika Barry DPM at [facility] [address] Right 04/14/2022    RIGHT FOOT DEBRIDEMENT INCISION AND DRAINAGE performed by Mika Barry DPM at [facility]      abdominal    IR NONTUNNELED VASCULAR CATHETER  03/05/2021    IR NONTUNNELED VASCULAR CATHETER 3/5/2021 [facility] SPECIAL PROCEDURES    JOINT REPLACEMENT  2004    total left hip    OTHER SURGICAL HISTORY Right 12/02/2017    I&D; evacuation of hematoma right hip    OTHER SURGICAL HISTORY  09/17/2020    enteroscopy    PACEMAKER INSERTION N/A 02/23/2021    PACEMAKER GENERATOR LEAD REVISION performed by Tushar Stallworth MD at [address] Left 04/26/2022    Dual PPM: Atrial lead extraction/insertion, Generator change. Medtronic, [place] XT DR EVERT Apodaca    PACEMAKER PLACEMENT      9/18/14 Status post remote permanent pacemaker with atrial lead dislodgement.  7/24/14 PPM Implant    TOE AMPUTATION Bilateral 03/31/2022    LEFT 3RD TOE AMPUTATION performed by Diane Forte DPM at [address] Right 04/14/2022    RIGHT 2ND TOE AMPUTATION performed by Diane Forte DPM at [facility] N/A 09/17/2020    ENTEROSCOPY PUSH BIOPSY performed by Lendel Koyanagi, MD at [address] [place] [address] 03/04/2021    EGD DIAGNOSTIC ONLY performed by Lendel Koyanagi, MD at [facility]  2012    \"have stents in both legs- done in [facility] 18  Current Outpatient Medications   Medication Sig Dispense Refill    dapagliflozin (FARXIGA) 10 MG tablet Take 1 tablet by mouth every morning 90 tablet 1    Dulaglutide 4.5 MG/0.5ML SOPN Inject 4.5 mg into the skin once a week 12 Adjustable Dose Pre-filled Pen Syringe 1    gabapentin (NEURONTIN) 300 MG capsule TAKE 1 CAPSULE 3 TIMES A    capsule 1    glimepiride (AMARYL) 4 MG tablet TAKE 1 TABLET IN THE       MORNING (BEFORE BREAKFAST) 90 tablet 1    sildenafil (VIAGRA) 100 MG tablet TAKE 1 TABLET DAILY AS     NEEDED FOR ERECTILE        DYSFUNCTION 90 tablet 1    apixaban (ELIQUIS) 5 MG TABS tablet Take 1 tablet by mouth 2 times daily 180 tablet 3    simvastatin (ZOCOR) 20 MG tablet Take 1 tablet by mouth daily 90 tablet 3    aspirin 81 MG tablet Take 81 mg by mouth daily      torsemide (DEMADEX) 20 MG tablet TAKE 2 TABLETS BY MOUTH TWICE A  tablet 3    carboxymethylcellulose (REFRESH PLUS) 0.5 % SOLN ophthalmic solution as needed       Cholecalciferol (VITAMIN D) 50 MCG (2000 UT) CAPS capsule Take by mouth nightly        No current facility-administered medications for this visit. ALLERGIES  Allergies   Allergen Reactions    Spironolactone      CAUSES INCREASED K+    Tape [place] Tape] Rash     SURGICAL TAPE       PHYSICAL EXAM    /66 (Site: Right Upper Arm, Position: Sitting, Cuff Size: Medium Adult)   Pulse 72   Resp 16   Ht 5' 11\" (1.803 m)   Wt 209 lb (94.8 kg)   SpO2 97%   BMI 29.15 kg/m²     Physical Exam  Constitutional:       Appearance: Normal appearance. HENT:      Head: Normocephalic and atraumatic. Eyes:      Extraocular Movements: Extraocular movements intact. Pupils: Pupils are equal, round, and reactive to light. Cardiovascular:      Rate and Rhythm: Normal rate and regular rhythm. Pulses: Normal pulses. Heart sounds: No murmur heard. No friction rub. No gallop. Skin:     General: Skin is warm and dry. Neurological:      General: No focal deficit present. Mental Status: He is alert. Psychiatric:         Mood and Affect: Mood normal.         Behavior: Behavior normal.       ASSESSMENT & PLAN    1. Type 2 diabetes mellitus without complication, without long-term current use of insulin (HCC)    - dapagliflozin (FARXIGA) 10 MG tablet; Take 1 tablet by mouth every morning  Dispense: 90 tablet; Refill: 1  - Dulaglutide 4.5 MG/0.5ML SOPN; Inject 4.5 mg into the skin once a week  Dispense: 12 Adjustable Dose Pre-filled Pen Syringe; Refill: 1  - glimepiride (AMARYL) 4 MG tablet; TAKE 1 TABLET IN THE       MORNING (BEFORE BREAKFAST)  Dispense: 90 tablet; Refill: 1  - Hemoglobin A1C; Future  - Basic Metabolic Panel; Future    2.  Peripheral polyneuropathy    - gabapentin (NEURONTIN) 300 MG capsule; TAKE 1 CAPSULE 3 TIMES A   DAY  Dispense: 270 capsule; Refill: 1    3. Vasculogenic erectile dysfunction, unspecified vasculogenic erectile dysfunction type    - sildenafil (VIAGRA) 100 MG tablet; TAKE 1 TABLET DAILY AS     NEEDED FOR ERECTILE        DYSFUNCTION  Dispense: 90 tablet; Refill: 1    4. PAF (paroxysmal atrial fibrillation) ([facility]      5. Atherosclerotic heart disease of native coronary artery with other forms of angina pectoris ([facility]      6. Need for prophylactic vaccination and inoculation against influenza    - Influenza, FLUAD, (age 72 y+), IM, PF, 0.5 mL    DM2 controlled, on current regimen  Consider stopping 81asa  Received fluad today  Pain stable on current regimen    Return in about 4 months (around 1/12/2023).          Electronically signed by Sharon Bailey DO on 9/12/2022

## 2022-09-20 ENCOUNTER — HOSPITAL ENCOUNTER (OUTPATIENT)
Dept: WOUND CARE | Age: 72
Discharge: HOME | End: 2022-09-20
Payer: MEDICARE

## 2022-09-20 VITALS
HEART RATE: 70 BPM | TEMPERATURE: 98.5 F | SYSTOLIC BLOOD PRESSURE: 164 MMHG | RESPIRATION RATE: 16 BRPM | DIASTOLIC BLOOD PRESSURE: 77 MMHG

## 2022-09-20 DIAGNOSIS — I73.9 PVD (PERIPHERAL VASCULAR DISEASE) (HCC): ICD-10-CM

## 2022-09-20 DIAGNOSIS — E11.49 OTHER DIABETIC NEUROLOGICAL COMPLICATION ASSOCIATED WITH TYPE 2 DIABETES MELLITUS (HCC): ICD-10-CM

## 2022-09-20 DIAGNOSIS — L97.522 DIABETIC ULCER OF TOE OF LEFT FOOT ASSOCIATED WITH TYPE 2 DIABETES MELLITUS, WITH FAT LAYER EXPOSED (HCC): Primary | ICD-10-CM

## 2022-09-20 DIAGNOSIS — E11.621 DIABETIC ULCER OF TOE OF LEFT FOOT ASSOCIATED WITH TYPE 2 DIABETES MELLITUS, WITH FAT LAYER EXPOSED (HCC): Primary | ICD-10-CM

## 2022-09-20 DIAGNOSIS — L97.912 NON-PRESSURE ULCER OF RIGHT LOWER EXTREMITY WITH FAT LAYER EXPOSED (HCC): ICD-10-CM

## 2022-09-20 PROCEDURE — 11044 DBRDMT BONE 1ST 20 SQ CM/<: CPT

## 2022-09-20 RX ORDER — LIDOCAINE HYDROCHLORIDE 20 MG/ML
JELLY TOPICAL ONCE
Status: CANCELLED | OUTPATIENT
Start: 2022-09-20 | End: 2022-09-20

## 2022-09-20 RX ORDER — LIDOCAINE HYDROCHLORIDE 40 MG/ML
SOLUTION TOPICAL ONCE
Status: CANCELLED | OUTPATIENT
Start: 2022-09-20 | End: 2022-09-20

## 2022-09-20 RX ORDER — GENTAMICIN SULFATE 1 MG/G
OINTMENT TOPICAL ONCE
Status: CANCELLED | OUTPATIENT
Start: 2022-09-20 | End: 2022-09-20

## 2022-09-20 RX ORDER — BACITRACIN, NEOMYCIN, POLYMYXIN B 400; 3.5; 5 [USP'U]/G; MG/G; [USP'U]/G
OINTMENT TOPICAL ONCE
Status: CANCELLED | OUTPATIENT
Start: 2022-09-20 | End: 2022-09-20

## 2022-09-20 RX ORDER — BETAMETHASONE DIPROPIONATE 0.05 %
OINTMENT (GRAM) TOPICAL ONCE
Status: CANCELLED | OUTPATIENT
Start: 2022-09-20 | End: 2022-09-20

## 2022-09-20 RX ORDER — CLOBETASOL PROPIONATE 0.5 MG/G
OINTMENT TOPICAL ONCE
Status: CANCELLED | OUTPATIENT
Start: 2022-09-20 | End: 2022-09-20

## 2022-09-20 RX ORDER — GINSENG 100 MG
CAPSULE ORAL ONCE
Status: CANCELLED | OUTPATIENT
Start: 2022-09-20 | End: 2022-09-20

## 2022-09-20 RX ORDER — LIDOCAINE 50 MG/G
OINTMENT TOPICAL ONCE
Status: CANCELLED | OUTPATIENT
Start: 2022-09-20 | End: 2022-09-20

## 2022-09-20 RX ORDER — BACITRACIN ZINC AND POLYMYXIN B SULFATE 500; 1000 [USP'U]/G; [USP'U]/G
OINTMENT TOPICAL ONCE
Status: CANCELLED | OUTPATIENT
Start: 2022-09-20 | End: 2022-09-20

## 2022-09-20 RX ORDER — LIDOCAINE 40 MG/G
CREAM TOPICAL ONCE
Status: CANCELLED | OUTPATIENT
Start: 2022-09-20 | End: 2022-09-20

## 2022-09-20 ASSESSMENT — PAIN SCALES - GENERAL: PAINLEVEL_OUTOF10: 0

## 2022-09-20 NOTE — PROGRESS NOTES
Wound Care Center Progress Note With Procedure    Mitch Rubin  AGE: 74 y.o. GENDER: male  : 1950  EPISODE DATE:  2022     Subjective:     Chief Complaint   Patient presents with    Wound Check     Right toe         HISTORY of PRESENT ILLNESS      Mitch Rubin is a 74 y.o. male who presents today for wound to the second toe of the right foot. I saw him last week to my office for concern of developing a wound to the area. He did finish the antibiotics although 1 day I gave him on that visit. Has been doing daily wound care changes. No worsening pain or drainage. No constitutional symptoms. PAST MEDICAL HISTORY        Diagnosis Date    Arrhythmia     Pacemaker placed aprox 5 years ago for A Fib per patient    Arthritis 2013    rt wrist    Atrial fibrillation ([facility] [address]     on Xarelto - Dr. Irene Lin    CAD (coronary artery disease) 2014    see dr Irene Lin    Chronic kidney disease, stage III (moderate) ([address] 2016    Critical illness myopathy 03/15/2021    Diabetes mellitus ([facility] [address]     dx     Diabetic neuropathy associated with type 2 diabetes mellitus ([facility] [address] 2019    Erythropoietin deficiency anemia 2020    Gout 2019    \"got gout when had pacer put in because they did not give me my medication for gout \"    H/O 24 hour EKG monitoring 10/03/2013    no afib noted, sinsus rhythm    H/O cardiovascular stress test 2014    cardiolite- mild ischemia RCA EF50%    H/O echocardiogram 2020    EF 55-60% severe aortic stenosis mild to mod aortic regurg mod to severe tricuspid regurg severe pulm htn significant changes since 2018 echo. H/O right and left heart catheterization 12/10/2020    DIFFUSE LAD DISEASE, Mild ECA Disease, Severe AS, Milf Pul HTN on RHC. History of blood transfusion 2020    d/t anemia    History of transesophageal echocardiography (MABLE) 12/15/2020    Severe aortic stenosis (ANNA by planimetry: 0.778 cm sq). Mild AR.     [place] IRA (hard of hearing)     hearing tonya aides    Hx of Doppler echocardiogram 05/21/2018    EF 50%  Mild LV hypertrophy. Mildly enlarged RA. Mod aortic valve calcification with mod AS. Mitral annular calcification is present. Mild AR, MR and TR. Mild pulmonary htn.     Hyperlipidemia     Hypertension     Follows with PCP & Dr. Marni Rand    Other disorders of kidney and ureter     Pacemaker     Medtronic, implanted 2014    Pneumonia 12/29/2012    Pneumonia due to COVID-19 virus 08/2020    Sleep apnea     dx 2013- has c-pap    Type II or unspecified type diabetes mellitus with other specified manifestations, uncontrolled 12/12/2012    Venous hypertension, chronic, with ulcer ([address] 12/12/2012    resolved       PAST SURGICAL HISTORY    Past Surgical History:   Procedure Laterality Date    CABG WITH AORTIC VALVE REPLACEMENT N/A 02/16/2021    CABG CORONARY ARTERY BYPASS X2 WITH LIMA, AORTIC VALVE REPLACEMENT AND AORTIC ROOT REPAIR, INTRAOPERATIVE MABLE, INDUCED HYPOTHERMIA, LEFT LEG ENDOVEIN HARVEST, LEFT ATRIAL CLIP, AND CRYO PROCEDURE performed by Michaelle Lopez MD at [address]  12/2014    at [address] Left 01/29/2021    LEFT CAROTID ENDARTERECTOMY performed by Della Espinosa MD at [facility]  2017    COLONOSCOPY  2011    COLONOSCOPY N/A 11/19/2019    COLONOSCOPY DIAGNOSTIC performed by Timoteo Tena MD at [address] [facility]  09/30/2020    POSSIBLE CECAL avms, SIGMOID DIVERTICULOSIS, INTERNAL HEMORRHOIDS GRADE 1    COLONOSCOPY N/A 09/30/2020    COLONOSCOPY CONTROL HEMORRHAGE WITH APC performed by Timoteo Tena MD at [address] [facility]  2014    \"2 stents put in \"    FOOT DEBRIDEMENT Bilateral 03/31/2022    BILATERAL DEBRIEDMENT OF NON-VIABLE TISSUE & BONE performed by Irma De Jesus DPM at [facility] Right 04/14/2022    RIGHT FOOT DEBRIDEMENT INCISION AND DRAINAGE performed by Irma De Jesus DPM at [place] [address] REPAIR      abdominal    IR NONTUNNELED VASCULAR CATHETER  03/05/2021    IR NONTUNNELED VASCULAR CATHETER 3/5/2021 [facility] SPECIAL PROCEDURES    JOINT REPLACEMENT  2004    total left hip    OTHER SURGICAL HISTORY Right 12/02/2017    I&D; evacuation of hematoma right hip    OTHER SURGICAL HISTORY  09/17/2020    enteroscopy    PACEMAKER INSERTION N/A 02/23/2021    PACEMAKER GENERATOR LEAD REVISION performed by Sandy Dos Santos MD at [address] Left 04/26/2022    Dual PPM: Atrial lead extraction/insertion, Generator change. Medtronic, [place] XT DR LOYD Suresccristy    PACEMAKER PLACEMENT      9/18/14 Status post remote permanent pacemaker with atrial lead dislodgement.  7/24/14 PPM Implant    TOE AMPUTATION Bilateral 03/31/2022    LEFT 3RD TOE AMPUTATION performed by Annamarie Thomas DPM at [address] Right 04/14/2022    RIGHT 2ND TOE AMPUTATION performed by Annamarie Thomas DPM at [facility]. N/A 09/17/2020    ENTEROSCOPY PUSH BIOPSY performed by Mikey Amaya MD at [facility]. N/A 03/04/2021    EGD DIAGNOSTIC ONLY performed by Mikey Amaya MD at [facility]  2012    \"have stents in both legs- done in [facility]    Family History   Problem Relation Age of Onset    High Blood Pressure Mother     Arthritis Mother     Diabetes Mother     Heart Disease Mother     High Blood Pressure Father     Heart Disease Father     Kidney Disease Father     No Known Problems Sister     Heart Surgery Brother     Heart Disease Brother     No Known Problems Sister     No Known Problems Brother        SOCIAL HISTORY    Social History     Tobacco Use    Smoking status: Never    Smokeless tobacco: Never   Vaping Use    Vaping Use: Never used   Substance Use Topics    Alcohol use: Not Currently     Alcohol/week: 2.0 standard drinks     Types: 2 Cans of beer per week     Comment: average \"one time per week\"/ Caffiene: 1 cup of coffee daily    Drug use: No       ALLERGIES    Allergies   Allergen Reactions    Spironolactone      CAUSES INCREASED K+    Tape [employer] Tape] Rash     SURGICAL TAPE       MEDICATIONS    Current Outpatient Medications on File Prior to Encounter   Medication Sig Dispense Refill    dapagliflozin (FARXIGA) 10 MG tablet Take 1 tablet by mouth every morning 90 tablet 1    Dulaglutide 4.5 MG/0.5ML SOPN Inject 4.5 mg into the skin once a week 12 Adjustable Dose Pre-filled Pen Syringe 1    gabapentin (NEURONTIN) 300 MG capsule TAKE 1 CAPSULE 3 TIMES A    capsule 1    glimepiride (AMARYL) 4 MG tablet TAKE 1 TABLET IN THE       MORNING (BEFORE BREAKFAST) 90 tablet 1    sildenafil (VIAGRA) 100 MG tablet TAKE 1 TABLET DAILY AS     NEEDED FOR ERECTILE        DYSFUNCTION 90 tablet 1    apixaban (ELIQUIS) 5 MG TABS tablet Take 1 tablet by mouth 2 times daily 180 tablet 3    torsemide (DEMADEX) 20 MG tablet TAKE 2 TABLETS BY MOUTH TWICE A  tablet 3    simvastatin (ZOCOR) 20 MG tablet Take 1 tablet by mouth daily 90 tablet 3    carboxymethylcellulose (REFRESH PLUS) 0.5 % SOLN ophthalmic solution as needed       Cholecalciferol (VITAMIN D) 50 MCG (2000 UT) CAPS capsule Take by mouth nightly       aspirin 81 MG tablet Take 81 mg by mouth daily       No current facility-administered medications on file prior to encounter. REVIEW OF SYSTEMS    Pertinent items are noted in HPI. Constitutional: Negative for systemic symptoms including fever, chills and malaise. Objective:      BP (!) 164/77   Pulse 70   Temp 98.5 °F (36.9 °C) (Temporal)   Resp 16     PHYSICAL EXAM    General: The patient is in no acute distress. Mental status:  Patient is appropriate, is  oriented to place and plan of care.   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     Performed by: Mika Barry DPM    Consent obtained: Yes    Time out taken:  Yes    Pain Control: lidocaine       Debridement:Non-excisional Debridement    Using #15 blade scalpel the wound(s) was/were sharply debrided down through and including the removal of bone    Devitalized Tissue Debrided:  necrotic/eschar    Pre Debridement Measurements:  Are located in the Wound Documentation Flow Sheet    All active wounds listed below with today's date are evaluated     Wound 09/20/22 Toe (Comment  which one) Right #1 right second toe (Active)   Wound Image   09/20/22 1310   Wound Etiology Diabetic 09/20/22 1310   Wound Cleansed Wound cleanser 09/20/22 1310   Wound Length (cm) 2.5 cm 09/20/22 1310   Wound Width (cm) 2.4 cm 09/20/22 1310   Wound Depth (cm) 0.1 cm 09/20/22 1310   Wound Surface Area (cm^2) 6 cm^2 09/20/22 1310   Wound Volume (cm^3) 0.6 cm^3 09/20/22 1310   Post-Procedure Length (cm) 2.5 cm 09/20/22 1315   Post-Procedure Width (cm) 2.4 cm 09/20/22 1315   Post-Procedure Depth (cm) 0.1 cm 09/20/22 1315   Post-Procedure Surface Area (cm^2) 6 cm^2 09/20/22 1315   Post-Procedure Volume (cm^3) 0.6 cm^3 09/20/22 1315   Distance Tunneling (cm) 0 cm 09/20/22 1310   Tunneling Position ___ O'Clock 0 09/20/22 1310   Undermining Starts ___ O'Clock 0 09/20/22 1310   Undermining Ends___ O'Clock 0 09/20/22 1310   Undermining Maxium Distance (cm) 0 09/20/22 1310   Wound Assessment Slough;Pink/red; Exposed structure bone 09/20/22 1310   Drainage Amount Moderate 09/20/22 1310   Drainage Description Sanguinous 09/20/22 1310   Odor None 09/20/22 1310   Angela-wound Assessment Fragile; Hyperkeratosis (callous) 09/20/22 1310   Margins Defined edges 09/20/22 1310   Wound Thickness Description not for Pressure Injury Full thickness 09/20/22 1310   Number of days: 0             Percent of Wound(s) Debrided: approximately 100%    Total  Area  Debrided:  0.6  cm3     Bleeding:  None    Hemostasis Achieved:  not needed    Procedural Pain:  0  / 10     Post Procedural Pain:  0 / 10     Response to treatment:  Well tolerated by patient. Status of wound progress and description from last visit:   stable. Plan:       Discharge Instructions         PHYSICIAN ORDERS AND DISCHARGE INSTRUCTIONS     NOTE: Upon discharge from the [address], you will receive a patient experience survey. We would be grateful if you would take the time to fill this survey out. Wound care order history:                 YESSENIA's   Right       Left               Date:              Cultures:                Grafts:                Antibiotics:           Continuing wound care orders and information:                 Residence:                Continue home health care with:              Your wound-care supplies will be provided by: Wound cleansing:              Do not scrub or use excessive force. Wash hands with soap and water before and after dressing changes. Prior to applying a clean dressing, cleanse wound with normal saline, wound cleanser, or mild soap and water.               Ask the physician or nurse before getting the wound(s) wet in a shower     Daily Wound management:              Keep weight off wounds and reposition every 2 hours. Avoid standing for long periods of time. Apply wraps/stockings in AM and remove at bedtime. If swelling is present, elevate legs to the level of the heart or above for 30 minutes 4-5 times a day and/or when sitting. When taking antibiotics take entire prescription as ordered by physician do not stop taking until medicine is all gone. Orders for this week: 9/20/22                  Rx:     Right 2nd Toe - Wash with soap and water, pat dry. Paint with Betadine. Cover with ca alginate. Wrap with 1\" Coban. Change daily. Follow up with Dr Laurence Arita in 1 week in the wound care center. Call [phone] for any questions or concerns. Date__________   Time____________        Treatment Note      Written Patient Dismissal Instructions Given       -History of wound dehiscence amputation sites bilateral foot.  -Chronic wound of the second toe of the right foot. Patient developed this about a week ago  -Finish antibiotics that I gave you on the last visit in my regular office  -Continue with same local wound care. Change daily.  -To be in supportive shoe gear at all times. Make sure no excess pressure is noted to the digits  -Bone is exposed and I did inform the patient of this but it does not appear to be actively infected.   -To never ambulate barefoot and check feet on a daily basis.  -Discussed importance of tight glycemic control.  -Any signs of infection before the next visit to go to the emergency room for  -Follow-up 1 week for recheck sooner if needed      Electronically signed by Albert Grayson DPM on 9/20/2022 at 1:38 PM

## 2022-09-20 NOTE — DISCHARGE INSTRUCTIONS
PHYSICIAN ORDERS AND DISCHARGE INSTRUCTIONS     NOTE: Upon discharge from the [address], you will receive a patient experience survey. We would be grateful if you would take the time to fill this survey out. Wound care order history:                 YESSENIA's   Right       Left               Date:              Cultures:                Grafts:                Antibiotics:           Continuing wound care orders and information:                 Residence:                Continue home health care with:              Your wound-care supplies will be provided by: Wound cleansing:              Do not scrub or use excessive force. Wash hands with soap and water before and after dressing changes. Prior to applying a clean dressing, cleanse wound with normal saline, wound cleanser, or mild soap and water. Ask the physician or nurse before getting the wound(s) wet in a shower     Daily Wound management:              Keep weight off wounds and reposition every 2 hours. Avoid standing for long periods of time. Apply wraps/stockings in AM and remove at bedtime. If swelling is present, elevate legs to the level of the heart or above for 30 minutes 4-5 times a day and/or when sitting. When taking antibiotics take entire prescription as ordered by physician do not stop taking until medicine is all gone. Orders for this week: 9/20/22                  Rx:     Right 2nd Toe - Wash with soap and water, pat dry. Paint with Betadine. Cover with ca alginate. Wrap with 1\" Coban. Change daily. Follow up with Dr Dawit Todd in 1 week in the wound care center. Call [phone] for any questions or concerns.   Date__________   Time____________

## 2022-09-26 RX ORDER — APIXABAN 5 MG/1
TABLET, FILM COATED ORAL
Qty: 180 TABLET | Refills: 1 | OUTPATIENT
Start: 2022-09-26

## 2022-09-26 RX ORDER — SIMVASTATIN 20 MG
TABLET ORAL
Qty: 90 TABLET | Refills: 1 | OUTPATIENT
Start: 2022-09-26

## 2022-09-27 ENCOUNTER — HOSPITAL ENCOUNTER (OUTPATIENT)
Dept: WOUND CARE | Age: 72
Discharge: HOME | End: 2022-09-27
Payer: MEDICARE

## 2022-09-27 VITALS
HEART RATE: 70 BPM | TEMPERATURE: 97.8 F | DIASTOLIC BLOOD PRESSURE: 94 MMHG | RESPIRATION RATE: 20 BRPM | SYSTOLIC BLOOD PRESSURE: 186 MMHG

## 2022-09-27 DIAGNOSIS — L97.522 DIABETIC ULCER OF TOE OF LEFT FOOT ASSOCIATED WITH TYPE 2 DIABETES MELLITUS, WITH FAT LAYER EXPOSED (HCC): Primary | ICD-10-CM

## 2022-09-27 DIAGNOSIS — E11.621 DIABETIC ULCER OF TOE OF LEFT FOOT ASSOCIATED WITH TYPE 2 DIABETES MELLITUS, WITH FAT LAYER EXPOSED (HCC): Primary | ICD-10-CM

## 2022-09-27 DIAGNOSIS — L97.912 NON-PRESSURE ULCER OF RIGHT LOWER EXTREMITY WITH FAT LAYER EXPOSED (HCC): ICD-10-CM

## 2022-09-27 DIAGNOSIS — E11.49 OTHER DIABETIC NEUROLOGICAL COMPLICATION ASSOCIATED WITH TYPE 2 DIABETES MELLITUS (HCC): ICD-10-CM

## 2022-09-27 DIAGNOSIS — I73.9 PVD (PERIPHERAL VASCULAR DISEASE) (HCC): ICD-10-CM

## 2022-09-27 PROCEDURE — 11044 DBRDMT BONE 1ST 20 SQ CM/<: CPT

## 2022-09-27 RX ORDER — BETAMETHASONE DIPROPIONATE 0.05 %
OINTMENT (GRAM) TOPICAL ONCE
Status: CANCELLED | OUTPATIENT
Start: 2022-09-27 | End: 2022-09-27

## 2022-09-27 RX ORDER — CLOBETASOL PROPIONATE 0.5 MG/G
OINTMENT TOPICAL ONCE
Status: CANCELLED | OUTPATIENT
Start: 2022-09-27 | End: 2022-09-27

## 2022-09-27 RX ORDER — BACITRACIN ZINC AND POLYMYXIN B SULFATE 500; 1000 [USP'U]/G; [USP'U]/G
OINTMENT TOPICAL ONCE
Status: CANCELLED | OUTPATIENT
Start: 2022-09-27 | End: 2022-09-27

## 2022-09-27 RX ORDER — LIDOCAINE 50 MG/G
OINTMENT TOPICAL ONCE
Status: CANCELLED | OUTPATIENT
Start: 2022-09-27 | End: 2022-09-27

## 2022-09-27 RX ORDER — LIDOCAINE HYDROCHLORIDE 20 MG/ML
JELLY TOPICAL ONCE
Status: CANCELLED | OUTPATIENT
Start: 2022-09-27 | End: 2022-09-27

## 2022-09-27 RX ORDER — GINSENG 100 MG
CAPSULE ORAL ONCE
Status: CANCELLED | OUTPATIENT
Start: 2022-09-27 | End: 2022-09-27

## 2022-09-27 RX ORDER — LIDOCAINE HYDROCHLORIDE 40 MG/ML
SOLUTION TOPICAL ONCE
Status: CANCELLED | OUTPATIENT
Start: 2022-09-27 | End: 2022-09-27

## 2022-09-27 RX ORDER — BACITRACIN, NEOMYCIN, POLYMYXIN B 400; 3.5; 5 [USP'U]/G; MG/G; [USP'U]/G
OINTMENT TOPICAL ONCE
Status: CANCELLED | OUTPATIENT
Start: 2022-09-27 | End: 2022-09-27

## 2022-09-27 RX ORDER — LIDOCAINE 40 MG/G
CREAM TOPICAL ONCE
Status: CANCELLED | OUTPATIENT
Start: 2022-09-27 | End: 2022-09-27

## 2022-09-27 RX ORDER — GENTAMICIN SULFATE 1 MG/G
OINTMENT TOPICAL ONCE
Status: CANCELLED | OUTPATIENT
Start: 2022-09-27 | End: 2022-09-27

## 2022-09-27 NOTE — PROGRESS NOTES
Wound Care Center Progress Note With Procedure    Daryl Noel  AGE: 74 y.o. GENDER: male  : 1950  EPISODE DATE:  2022     Subjective:     Chief Complaint   Patient presents with    Wound Check     RLE         HISTORY of PRESENT ILLNESS      Daryl Noel is a 74 y.o. male who presents today for wound to the second toe of the right foot. No new concerns today. PAST MEDICAL HISTORY        Diagnosis Date    Arrhythmia     Pacemaker placed aprox 5 years ago for A Fib per patient    Arthritis 2013    rt wrist    Atrial fibrillation ([address]     on Xarelto - Dr. Shahriar Raygoza    CAD (coronary artery disease) 2014    see dr Shahriar Raygoza    Chronic kidney disease, stage III (moderate) ([address] 2016    Critical illness myopathy 03/15/2021    Diabetes mellitus ([facility]     dx 2004    Diabetic neuropathy associated with type 2 diabetes mellitus ([facility] 2019    Erythropoietin deficiency anemia 2020    Gout 2019    \"got gout when had pacer put in because they did not give me my medication for gout \"    H/O 24 hour EKG monitoring 10/03/2013    no afib noted, sinsus rhythm    H/O cardiovascular stress test 2014    cardiolite- mild ischemia RCA EF50%    H/O echocardiogram 2020    EF 55-60% severe aortic stenosis mild to mod aortic regurg mod to severe tricuspid regurg severe pulm htn significant changes since 2018 echo. H/O right and left heart catheterization 12/10/2020    DIFFUSE LAD DISEASE, Mild ECA Disease, Severe AS, Milf Pul HTN on RHC. History of blood transfusion 2020    d/t anemia    History of transesophageal echocardiography (MABLE) 12/15/2020    Severe aortic stenosis (ANNA by planimetry: 0.778 cm sq). Mild AR. [place] (hard of hearing)     hearing tonya aides    Hx of Doppler echocardiogram 2018    EF 50%  Mild LV hypertrophy. Mildly enlarged RA. Mod aortic valve calcification with mod AS. Mitral annular calcification is present. Mild AR, MR and TR.  Mild pulmonary htn.     Hyperlipidemia     Hypertension     Follows with PCP & Dr. Goldie Avendano    Other disorders of kidney and ureter     Pacemaker     Medtronic, implanted 2014    Pneumonia 12/29/2012    Pneumonia due to COVID-19 virus 08/2020    Sleep apnea     dx 2013- has c-pap    Type II or unspecified type diabetes mellitus with other specified manifestations, uncontrolled 12/12/2012    Venous hypertension, chronic, with ulcer ([address] 12/12/2012    resolved       PAST SURGICAL HISTORY    Past Surgical History:   Procedure Laterality Date    CABG WITH AORTIC VALVE REPLACEMENT N/A 02/16/2021    CABG CORONARY ARTERY BYPASS X2 WITH LIMA, AORTIC VALVE REPLACEMENT AND AORTIC ROOT REPAIR, INTRAOPERATIVE MABLE, INDUCED HYPOTHERMIA, LEFT LEG ENDOVEIN HARVEST, LEFT ATRIAL CLIP, AND CRYO PROCEDURE performed by Maribel Knight MD at [address]  12/2014    at [address] [facility] Left 01/29/2021    LEFT CAROTID ENDARTERECTOMY performed by Juan Jose Saldana MD at [address] [place] [address]    COLONOSCOPY  2011    COLONOSCOPY N/A 11/19/2019    COLONOSCOPY DIAGNOSTIC performed by Brian Bojorquez MD at [address] [place] [address]  09/30/2020    POSSIBLE CECAL avms, SIGMOID DIVERTICULOSIS, INTERNAL HEMORRHOIDS GRADE 1    COLONOSCOPY N/A 09/30/2020    COLONOSCOPY CONTROL HEMORRHAGE WITH APC performed by Brian Bojorquez MD at [address] [facility]  2014    \"2 stents put in \"    FOOT DEBRIDEMENT Bilateral 03/31/2022    BILATERAL DEBRIEDMENT OF NON-VIABLE TISSUE & BONE performed by Josselyn Hicks DPM at [address] [place] [address] Right 04/14/2022    RIGHT FOOT DEBRIDEMENT INCISION AND DRAINAGE performed by Josselyn Hicks DPM at [address] [facility]      abdominal    IR NONTUNNELED VASCULAR CATHETER  03/05/2021    IR NONTUNNELED VASCULAR CATHETER 3/5/2021 [facility] SPECIAL PROCEDURES    JOINT REPLACEMENT  2004    total left hip    OTHER SURGICAL HISTORY Right 12/02/2017    I&D; evacuation of hematoma right hip    OTHER SURGICAL HISTORY  09/17/2020    enteroscopy    PACEMAKER INSERTION N/A 02/23/2021    PACEMAKER GENERATOR LEAD REVISION performed by Mik Landry MD at [address] Left 04/26/2022    Dual PPM: Atrial lead extraction/insertion, Generator change. Medtronic, Lucie XT DR EVERT Apodaca    PACEMAKER PLACEMENT      9/18/14 Status post remote permanent pacemaker with atrial lead dislodgement.  7/24/14 PPM Implant    TOE AMPUTATION Bilateral 03/31/2022    LEFT 3RD TOE AMPUTATION performed by Pascale Cintron DPM at [address] Right 04/14/2022    RIGHT 2ND TOE AMPUTATION performed by Pascale Cintron DPM at [address] N/A 09/17/2020    ENTEROSCOPY PUSH BIOPSY performed by Irene Galvan MD at [address] N/A 03/04/2021    EGD DIAGNOSTIC ONLY performed by Irene Galvan MD at [facility]  2012    \"have stents in both legs- done [address]    Family History   Problem Relation Age of Onset    High Blood Pressure Mother     Arthritis Mother     Diabetes Mother     Heart Disease Mother     High Blood Pressure Father     Heart Disease Father     Kidney Disease Father     No Known Problems Sister     Heart Surgery Brother     Heart Disease Brother     No Known Problems Sister     No Known Problems Brother        SOCIAL HISTORY    Social History     Tobacco Use    Smoking status: Never    Smokeless tobacco: Never   Vaping Use    Vaping Use: Never used   Substance Use Topics    Alcohol use: Not Currently     Alcohol/week: 2.0 standard drinks     Types: 2 Cans of beer per week     Comment: average \"one time per week\"/ Caffiene: 1 cup of coffee daily    Drug use: No       ALLERGIES    Allergies   Allergen Reactions    Spironolactone      CAUSES INCREASED K+    Tape Merwyn Tyesha Tape] Rash     SURGICAL TAPE       MEDICATIONS    Current Outpatient Medications on File Prior to Encounter   Medication Sig Dispense Refill    dapagliflozin (FARXIGA) 10 MG tablet Take 1 tablet by mouth every morning 90 tablet 1    Dulaglutide 4.5 MG/0.5ML SOPN Inject 4.5 mg into the skin once a week 12 Adjustable Dose Pre-filled Pen Syringe 1    gabapentin (NEURONTIN) 300 MG capsule TAKE 1 CAPSULE 3 TIMES A    capsule 1    glimepiride (AMARYL) 4 MG tablet TAKE 1 TABLET IN THE       MORNING (BEFORE BREAKFAST) 90 tablet 1    sildenafil (VIAGRA) 100 MG tablet TAKE 1 TABLET DAILY AS     NEEDED FOR ERECTILE        DYSFUNCTION 90 tablet 1    apixaban (ELIQUIS) 5 MG TABS tablet Take 1 tablet by mouth 2 times daily 180 tablet 3    torsemide (DEMADEX) 20 MG tablet TAKE 2 TABLETS BY MOUTH TWICE A  tablet 3    simvastatin (ZOCOR) 20 MG tablet Take 1 tablet by mouth daily 90 tablet 3    carboxymethylcellulose (REFRESH PLUS) 0.5 % SOLN ophthalmic solution as needed       Cholecalciferol (VITAMIN D) 50 MCG (2000 UT) CAPS capsule Take by mouth nightly       aspirin 81 MG tablet Take 81 mg by mouth daily       No current facility-administered medications on file prior to encounter. REVIEW OF SYSTEMS    Pertinent items are noted in HPI. Constitutional: Negative for systemic symptoms including fever, chills and malaise. Objective:      BP (!) 186/94   Pulse 70   Temp 97.8 °F (36.6 °C)   Resp 20     PHYSICAL EXAM    General: The patient is in no acute distress. Mental status:  Patient is appropriate, is  oriented to place and plan of care.   Dermatologic exam: Visual inspection of the periwound reveals the skin to be normal in turgor and texture  Wound exam: see wound description below in procedure note      Assessment:     Problem List Items Addressed This Visit          Circulatory    PVD (peripheral vascular disease) ([facility]    Relevant Orders    Initiate Outpatient Wound Care Protocol       Endocrine    Diabetic neuropathy associated with type 2 diabetes mellitus ([facility]    Relevant Orders    Initiate Outpatient Wound Care Protocol    WD-Diabetic ulcer of left foot with fat layer exposed ([address] - Primary    Relevant Orders    Initiate Outpatient Wound Care Protocol       Other    WD-Non-pressure ulcer of right lower extremity with fat layer exposed [facility])    Relevant Orders    Initiate Outpatient Wound Care Protocol     Procedure Note    Indications:  Based on my examination of this patient's wound(s) today, sharp excision into bone is required to promote healing and evaluate the extent of previous healing.     Performed by: Anai Judd DPM    Consent obtained: Yes    Time out taken:  Yes    Pain Control: lidocaine       Debridement:Non-excisional Debridement    Using #15 blade scalpel the wound(s) was/were sharply debrided down through and including the removal of bone    Devitalized Tissue Debrided:  necrotic/eschar    Pre Debridement Measurements:  Are located in the Wound Documentation Flow Sheet    All active wounds listed below with today's date are evaluated    Wound 09/20/22 Toe (Comment  which one) Right #1 right second toe (Active)   Wound Image   09/20/22 1310   Wound Etiology Diabetic 09/20/22 1310   Dressing Status New dressing applied 09/20/22 1343   Wound Cleansed Wound cleanser 09/27/22 1318   Offloading for Diabetic Foot Ulcers Orthowedge/inserts 09/27/22 1318   Wound Length (cm) 2 cm 09/27/22 1318   Wound Width (cm) 1.6 cm 09/27/22 1318   Wound Depth (cm) 0.1 cm 09/27/22 1318   Wound Surface Area (cm^2) 3.2 cm^2 09/27/22 1318   Change in Wound Size % (l*w) 46.67 09/27/22 1318   Wound Volume (cm^3) 0.32 cm^3 09/27/22 1318   Wound Healing % 47 09/27/22 1318   Post-Procedure Length (cm) 2 cm 09/27/22 1334   Post-Procedure Width (cm) 1.6 cm 09/27/22 1334   Post-Procedure Depth (cm) 0.1 cm 09/27/22 1334   Post-Procedure Surface Area (cm^2) 3.2 cm^2 09/27/22 1334   Post-Procedure Volume (cm^3) 0.32 cm^3 09/27/22 1334   Distance Tunneling (cm) 0 cm 09/27/22 1318   Tunneling Position ___ O'Clock 0 09/27/22 1318   Undermining Starts ___ O'Clock 0 09/27/22 1318   Undermining Ends___ O'Clock 0 09/27/22 1318   Undermining Maxium Distance (cm) 0 09/27/22 1318   Wound Assessment Exposed structure bone;Pink/red;Slough 09/27/22 1318   Drainage Amount Moderate 09/27/22 1318   Drainage Description Serosanguinous 09/27/22 1318   Odor None 09/27/22 1318   Angela-wound Assessment Fragile; Hyperkeratosis (callous) 09/27/22 1318   Margins Defined edges 09/27/22 1318   Wound Thickness Description not for Pressure Injury Full thickness 09/27/22 1318   Number of days: 7             Percent of Wound(s) Debrided: approximately 100%    Total  Area  Debrided:  0.32  cm3     Bleeding:  None    Hemostasis Achieved:  not needed    Procedural Pain:  0  / 10     Post Procedural Pain:  0 / 10     Response to treatment:  Well tolerated by patient. Status of wound progress and description from last visit:   stable. Plan:       Discharge Instructions         PHYSICIAN ORDERS AND DISCHARGE INSTRUCTIONS     NOTE: Upon discharge from the [address], you will receive a patient experience survey. We would be grateful if you would take the time to fill this survey out. Wound care order history:                 YESSENIA's   Right       Left               Date:              Cultures:                Grafts:                Antibiotics:           Continuing wound care orders and information:                 Residence:                Continue home health care with:              Your wound-care supplies will be provided by: Wound cleansing:              Do not scrub or use excessive force. Wash hands with soap and water before and after dressing changes. Prior to applying a clean dressing, cleanse wound with normal saline, wound cleanser, or mild soap and water.               Ask the physician or nurse before getting the wound(s) wet in a shower     Daily Wound management:              Keep weight off wounds and reposition every 2 hours. Avoid standing for long periods of time. Apply wraps/stockings in AM and remove at bedtime. If swelling is present, elevate legs to the level of the heart or above for 30 minutes 4-5 times a day and/or when sitting. When taking antibiotics take entire prescription as ordered by physician do not stop taking until medicine is all gone. Orders for this week: 9/27/22                  Rx:     Right 2nd Toe - Wash with soap and water, pat dry. Apply Stimulen powder to wound bed. Cover with ca alginate. Wrap with 1\" Coban. Change daily. Follow up with Dr Henna Arcos in 2 weeks in the wound care center. Call [phone] for any questions or concerns. Date__________   Time____________        Treatment Note      Written Patient Dismissal Instructions Given       -History of wound dehiscence amputation sites bilateral foot.  -Chronic wound of the second toe of the right foot.    -As of active infection today. Monitor off antibiotics.  -Wound is slightly smaller today from previous. There is still some exposed bone but we will continue to monitor to see if skin heals over this area. -We will transition to collagen calcium alginate and dry dressing. Change daily.  -To be in supportive shoe gear at all times.   Make sure no excess pressure is noted to the digits  -To never ambulate barefoot and check feet on a daily basis.  -Discussed importance of tight glycemic control.  -Any signs of infection before the next visit to go to the emergency room or call the office  -Follow-up 2 weeks for recheck sooner if needed      Electronically signed by Vasu Erazo DPM on 9/27/2022 at 1:38 PM

## 2022-09-27 NOTE — DISCHARGE INSTRUCTIONS
PHYSICIAN ORDERS AND DISCHARGE INSTRUCTIONS     NOTE: Upon discharge from the [address], you will receive a patient experience survey. We would be grateful if you would take the time to fill this survey out. Wound care order history:                 YESSENIA's   Right       Left               Date:              Cultures:                Grafts:                Antibiotics:           Continuing wound care orders and information:                 Residence:                Continue home health care with:              Your wound-care supplies will be provided by: Wound cleansing:              Do not scrub or use excessive force. Wash hands with soap and water before and after dressing changes. Prior to applying a clean dressing, cleanse wound with normal saline, wound cleanser, or mild soap and water. Ask the physician or nurse before getting the wound(s) wet in a shower     Daily Wound management:              Keep weight off wounds and reposition every 2 hours. Avoid standing for long periods of time. Apply wraps/stockings in AM and remove at bedtime. If swelling is present, elevate legs to the level of the heart or above for 30 minutes 4-5 times a day and/or when sitting. When taking antibiotics take entire prescription as ordered by physician do not stop taking until medicine is all gone. Orders for this week: 9/27/22                  Rx:     Right 2nd Toe - Wash with soap and water, pat dry. Apply Stimulen powder to wound bed. Cover with ca alginate. Wrap with 1\" Coban. Change daily. Follow up with Dr Paris Fierro in 2 weeks in the wound care center. Call [phone] for any questions or concerns.   Date__________   Time____________

## 2022-10-03 ENCOUNTER — OFFICE VISIT (OUTPATIENT)
Dept: CARDIOLOGY CLINIC | Age: 72
End: 2022-10-03
Payer: MEDICARE

## 2022-10-03 ENCOUNTER — TELEPHONE (OUTPATIENT)
Dept: CARDIOLOGY CLINIC | Age: 72
End: 2022-10-03

## 2022-10-03 VITALS
WEIGHT: 203.8 LBS | HEART RATE: 74 BPM | DIASTOLIC BLOOD PRESSURE: 80 MMHG | HEIGHT: 71 IN | OXYGEN SATURATION: 98 % | BODY MASS INDEX: 28.53 KG/M2 | SYSTOLIC BLOOD PRESSURE: 128 MMHG

## 2022-10-03 DIAGNOSIS — E78.2 MIXED HYPERLIPIDEMIA: ICD-10-CM

## 2022-10-03 DIAGNOSIS — I10 ESSENTIAL HYPERTENSION: ICD-10-CM

## 2022-10-03 DIAGNOSIS — Z99.89 OSA ON CPAP: ICD-10-CM

## 2022-10-03 DIAGNOSIS — I27.20 PULMONARY HYPERTENSION, UNSPECIFIED (HCC): ICD-10-CM

## 2022-10-03 DIAGNOSIS — I73.9 PVD (PERIPHERAL VASCULAR DISEASE) (HCC): ICD-10-CM

## 2022-10-03 DIAGNOSIS — I38 VHD (VALVULAR HEART DISEASE): ICD-10-CM

## 2022-10-03 DIAGNOSIS — I35.0 AORTIC STENOSIS, SEVERE: ICD-10-CM

## 2022-10-03 DIAGNOSIS — I25.118 ATHEROSCLEROTIC HEART DISEASE OF NATIVE CORONARY ARTERY WITH OTHER FORMS OF ANGINA PECTORIS (HCC): ICD-10-CM

## 2022-10-03 DIAGNOSIS — I48.0 PAROXYSMAL ATRIAL FIBRILLATION (HCC): Primary | ICD-10-CM

## 2022-10-03 DIAGNOSIS — I48.0 PAF (PAROXYSMAL ATRIAL FIBRILLATION) (HCC): ICD-10-CM

## 2022-10-03 DIAGNOSIS — G47.33 OSA ON CPAP: ICD-10-CM

## 2022-10-03 PROCEDURE — G8417 CALC BMI ABV UP PARAM F/U: HCPCS | Performed by: NURSE PRACTITIONER

## 2022-10-03 PROCEDURE — 3017F COLORECTAL CA SCREEN DOC REV: CPT | Performed by: NURSE PRACTITIONER

## 2022-10-03 PROCEDURE — G8427 DOCREV CUR MEDS BY ELIG CLIN: HCPCS | Performed by: NURSE PRACTITIONER

## 2022-10-03 PROCEDURE — 99214 OFFICE O/P EST MOD 30 MIN: CPT | Performed by: NURSE PRACTITIONER

## 2022-10-03 PROCEDURE — 1123F ACP DISCUSS/DSCN MKR DOCD: CPT | Performed by: NURSE PRACTITIONER

## 2022-10-03 PROCEDURE — G8484 FLU IMMUNIZE NO ADMIN: HCPCS | Performed by: NURSE PRACTITIONER

## 2022-10-03 PROCEDURE — 1036F TOBACCO NON-USER: CPT | Performed by: NURSE PRACTITIONER

## 2022-10-03 ASSESSMENT — ENCOUNTER SYMPTOMS
SHORTNESS OF BREATH: 0
ORTHOPNEA: 0
COLOR CHANGE: 1

## 2022-10-03 NOTE — PATIENT INSTRUCTIONS
**It is YOUR responsibilty to bring medication bottles and/or updated medication list to [address] [place] [address]. This will allow us to better serve you and all your healthcare needs**  Please be informed that if you contact our office outside of normal business hours the physician on call cannot help with any scheduling or rescheduling issues, procedure instruction questions or any type of medication issue. We advise you for any urgent/emergency that you go to the nearest emergency room!     PLEASE CALL OUR OFFICE DURING NORMAL BUSINESS HOURS    Monday - Friday   8 am to 5 pm    [place]: [address]: [phone]    [place]:  [phone]

## 2022-10-03 NOTE — PROGRESS NOTES
10/3/2022  Primary cardiologist: Dr. Maday Osuna  is an established 74 y.o.  male here for a 1 month follow up on atrial fib/permanent pacemaker/valvular heart disease      SUBJECTIVE/OBJECTIVE:    Tatianna Worthy is a pleasant 75 y/o gentleman with a history of paroxysmal atrial fibrillation s/p MAZE, CAD, s/p PCI and CABG, valvular heart disease s/p AVR, hypertension, hyperlipidemia, carotid artery disease s/p Left CEA, CKD, anemia, pulmonary HTN  and dual chamber pacemaker. In February 2021 Tatianna Worthy underwent CABG x 2 with LIMA to LAD and SVG to distal RCA, AVR with a #27 mm Medtronic Mosaic bio prosthetic prosthesis, aortic root enlargement with CorMatrix patch, left atrial Maze procedure occluding pulmonary vein isolation using bipolar and epicardial roof and floor line and a 45 mm left atrial clip placement. Unfortunately developed diabetic ulcers  of the toe to the left foot and of the toe to the right foot    HPI :   Mady Soulier reports that he is doing okay well. He was wearing his compression stockings and had a toe become black the morning ater. He states  Unfortunately he developed ulcer to the second right toe and underwent partial second toe amputation of the right foot. (04/14/2022). He continues with dressing to the right foot. States has been afebrile. He notes shortness of breath with exertion that comes and goes. He also notes episodes of palpitations. Reports mild sitting he may feel his heart race. Episodes last for less than 1 minute. Review of Systems   Constitutional: Negative for diaphoresis and malaise/fatigue. Cardiovascular:  Positive for dyspnea on exertion and palpitations. Negative for chest pain, claudication, irregular heartbeat, leg swelling, near-syncope, orthopnea and paroxysmal nocturnal dyspnea. Respiratory:  Negative for shortness of breath. Skin:  Positive for color change (lower legs). Neurological:  Negative for dizziness and light-headedness.      Vitals: 10/03/22 1520   BP: 128/80   Site: Right Upper Arm   Position: Sitting   Cuff Size: Large Adult   Pulse: 74   SpO2: 98%   Weight: 203 lb 12.8 oz (92.4 kg)   Height: 5' 11\" (1.803 m)     Patient-Reported Vitals 4/8/2021   Patient-Reported Weight 185   Patient-Reported Height 5'11\"   Patient-Reported Systolic 962   Patient-Reported Diastolic 66   Patient-Reported Pulse 67   Patient-Reported Temperature 98.2     Wt Readings from Last 3 Encounters:   10/03/22 203 lb 12.8 oz (92.4 kg)   09/12/22 209 lb (94.8 kg)   08/22/22 211 lb 9.6 oz (96 kg)     Body mass index is 28.42 kg/m². Physical Exam  HENT:      Head: Normocephalic. Mouth/Throat:      Mouth: Mucous membranes are moist.   Eyes:      Pupils: Pupils are equal, round, and reactive to light. Cardiovascular:      Rate and Rhythm: Normal rate and regular rhythm. Pulses: Normal pulses. Heart sounds: Normal heart sounds. No murmur heard. Pulmonary:      Effort: Pulmonary effort is normal.      Breath sounds: Normal breath sounds. Abdominal:      General: There is no distension. Tenderness: There is no abdominal tenderness. Musculoskeletal:      Right lower leg: No edema. Left lower leg: No edema. Skin:     General: Skin is warm and dry. Capillary Refill: Capillary refill takes less than 2 seconds. Comments: Dressing to the right foot   Neurological:      General: No focal deficit present. Mental Status: He is alert.               Current Outpatient Medications   Medication Sig Dispense Refill    dapagliflozin (FARXIGA) 10 MG tablet Take 1 tablet by mouth every morning 90 tablet 1    Dulaglutide 4.5 MG/0.5ML SOPN Inject 4.5 mg into the skin once a week 12 Adjustable Dose Pre-filled Pen Syringe 1    gabapentin (NEURONTIN) 300 MG capsule TAKE 1 CAPSULE 3 TIMES A    capsule 1    glimepiride (AMARYL) 4 MG tablet TAKE 1 TABLET IN THE       MORNING (BEFORE BREAKFAST) 90 tablet 1    sildenafil (VIAGRA) 100 MG tablet TAKE 1 TABLET DAILY AS     NEEDED FOR ERECTILE        DYSFUNCTION 90 tablet 1    apixaban (ELIQUIS) 5 MG TABS tablet Take 1 tablet by mouth 2 times daily 180 tablet 3    torsemide (DEMADEX) 20 MG tablet TAKE 2 TABLETS BY MOUTH TWICE A  tablet 3    simvastatin (ZOCOR) 20 MG tablet Take 1 tablet by mouth daily 90 tablet 3    carboxymethylcellulose (REFRESH PLUS) 0.5 % SOLN ophthalmic solution as needed       Cholecalciferol (VITAMIN D) 50 MCG (2000 UT) CAPS capsule Take by mouth nightly       aspirin 81 MG tablet Take 81 mg by mouth daily       No current facility-administered medications for this visit. All pertinent data reviewed and discussed with patient       ASSESSMENT/PLAN:    1. VHD (valvular heart disease)  Limited echo from March 2022 reviewed: Normal functioning prosthetic aortic valve in aortic position with a mean gradient 11 mmHg, AT 61 ms: moderate mitral regurgitation and moderate tricuspid regurg. There is moderate pulmonary hypertension with RVSP of 56 mmHg. On Demadex-recommend to continue Demadex 20 mg twice daily  Continue with ongoing surveillance    2. PAF (paroxysmal atrial fibrillation) ([facility]  Noted to have episodes of PAF on pacemaker-with longest episode lasting 24 minutes. May have a difficult time maintaining sinus rhythm with mitral valve disease with associated dilated atrium  Recommend to continue with Eliquis for his anticoagulation for stroke prevention. Denies episodes of bleeding or falls. Will refer back to Dr. Iftikhar Grossman for further recommendations in regards to PAF     3. Cardiac pacemaker in situ  Medtronic dual-chamber pacemaker  He is doing better. Last pacer interrogation was stable. Medications reviewed and confirmed with patient     Tests ordered:  none      Follow-up in 6 months    Signed:  JUSTIN Mccurdy CNP, 10/3/2022, 3:58 PM    An electronic signature was used to authenticate this note.     Please note this report has been partially produced using speech recognition software and may contain errors related to that system including errors in grammar, punctuation, and spelling, as well as words and phrases that may be inappropriate. If there are any questions or concerns please feel free to contact the dictating provider for clarification.

## 2022-10-03 NOTE — TELEPHONE ENCOUNTER
Left message for patient to r/s today appointment with Elissa Schilder due to provider having to leave.

## 2022-10-03 NOTE — PROGRESS NOTES
[id number] Score for Atrial Fibrillation Stroke Risk   Risk   Factors  Component Value   C CHF No 0   H HTN Yes 1   A2 Age >= 76 No,  (69 y.o.) 0   D DM Yes 1   S2 Prior Stroke/TIA No 0   V Vascular Disease No 0   A Age 74-69 Yes,  (69 y.o.) 1   Sc Sex male 0    [id number]  Score  3   Score last updated 10/3/22 3:21 PM EDT

## 2022-10-04 RX ORDER — SIMVASTATIN 20 MG
20 TABLET ORAL DAILY
Qty: 90 TABLET | Refills: 3 | Status: SHIPPED | OUTPATIENT
Start: 2022-10-04 | End: 2023-01-02

## 2022-10-11 ENCOUNTER — HOSPITAL ENCOUNTER (OUTPATIENT)
Dept: WOUND CARE | Age: 72
Discharge: HOME | End: 2022-10-11
Payer: MEDICARE

## 2022-10-11 VITALS
SYSTOLIC BLOOD PRESSURE: 162 MMHG | HEART RATE: 69 BPM | RESPIRATION RATE: 18 BRPM | DIASTOLIC BLOOD PRESSURE: 74 MMHG | TEMPERATURE: 97.8 F

## 2022-10-11 DIAGNOSIS — E11.49 OTHER DIABETIC NEUROLOGICAL COMPLICATION ASSOCIATED WITH TYPE 2 DIABETES MELLITUS (HCC): ICD-10-CM

## 2022-10-11 DIAGNOSIS — E11.621 DIABETIC ULCER OF TOE OF LEFT FOOT ASSOCIATED WITH TYPE 2 DIABETES MELLITUS, WITH FAT LAYER EXPOSED (HCC): Primary | ICD-10-CM

## 2022-10-11 DIAGNOSIS — L97.522 DIABETIC ULCER OF TOE OF LEFT FOOT ASSOCIATED WITH TYPE 2 DIABETES MELLITUS, WITH FAT LAYER EXPOSED (HCC): Primary | ICD-10-CM

## 2022-10-11 DIAGNOSIS — I73.9 PVD (PERIPHERAL VASCULAR DISEASE) (HCC): ICD-10-CM

## 2022-10-11 DIAGNOSIS — L97.912 NON-PRESSURE ULCER OF RIGHT LOWER EXTREMITY WITH FAT LAYER EXPOSED (HCC): ICD-10-CM

## 2022-10-11 PROCEDURE — 11042 DBRDMT SUBQ TIS 1ST 20SQCM/<: CPT

## 2022-10-11 RX ORDER — BACITRACIN ZINC AND POLYMYXIN B SULFATE 500; 1000 [USP'U]/G; [USP'U]/G
OINTMENT TOPICAL ONCE
Status: CANCELLED | OUTPATIENT
Start: 2022-10-11 | End: 2022-10-11

## 2022-10-11 RX ORDER — LIDOCAINE HYDROCHLORIDE 20 MG/ML
JELLY TOPICAL ONCE
Status: CANCELLED | OUTPATIENT
Start: 2022-10-11 | End: 2022-10-11

## 2022-10-11 RX ORDER — BACITRACIN, NEOMYCIN, POLYMYXIN B 400; 3.5; 5 [USP'U]/G; MG/G; [USP'U]/G
OINTMENT TOPICAL ONCE
Status: CANCELLED | OUTPATIENT
Start: 2022-10-11 | End: 2022-10-11

## 2022-10-11 RX ORDER — LIDOCAINE 40 MG/G
CREAM TOPICAL ONCE
Status: CANCELLED | OUTPATIENT
Start: 2022-10-11 | End: 2022-10-11

## 2022-10-11 RX ORDER — GINSENG 100 MG
CAPSULE ORAL ONCE
Status: CANCELLED | OUTPATIENT
Start: 2022-10-11 | End: 2022-10-11

## 2022-10-11 RX ORDER — BETAMETHASONE DIPROPIONATE 0.05 %
OINTMENT (GRAM) TOPICAL ONCE
Status: CANCELLED | OUTPATIENT
Start: 2022-10-11 | End: 2022-10-11

## 2022-10-11 RX ORDER — LIDOCAINE HYDROCHLORIDE 40 MG/ML
SOLUTION TOPICAL ONCE
Status: CANCELLED | OUTPATIENT
Start: 2022-10-11 | End: 2022-10-11

## 2022-10-11 RX ORDER — LIDOCAINE 50 MG/G
OINTMENT TOPICAL ONCE
Status: CANCELLED | OUTPATIENT
Start: 2022-10-11 | End: 2022-10-11

## 2022-10-11 RX ORDER — GENTAMICIN SULFATE 1 MG/G
OINTMENT TOPICAL ONCE
Status: CANCELLED | OUTPATIENT
Start: 2022-10-11 | End: 2022-10-11

## 2022-10-11 RX ORDER — CLOBETASOL PROPIONATE 0.5 MG/G
OINTMENT TOPICAL ONCE
Status: CANCELLED | OUTPATIENT
Start: 2022-10-11 | End: 2022-10-11

## 2022-10-11 ASSESSMENT — PAIN SCALES - GENERAL: PAINLEVEL_OUTOF10: 0

## 2022-10-11 NOTE — PROGRESS NOTES
Wound Care Center Progress Note With Procedure    Quinton Haque  AGE: 74 y.o. GENDER: male  : 1950  EPISODE DATE:  10/11/2022     Subjective:     Chief Complaint   Patient presents with    Wound Check     Right foot            HISTORY of PRESENT ILLNESS      Quinton Haque is a 74 y.o. male who presents today for wound to the second toe of the right foot. Patient says that he thinks that wound to his toe looks worse from previous      PAST MEDICAL HISTORY        Diagnosis Date    Arrhythmia     Pacemaker placed aprox 5 years ago for A Fib per patient    Arthritis 2013    rt wrist    Atrial fibrillation ([facility]     on Xarelto - Dr. Aden Baptiste    CAD (coronary artery disease) 2014    see dr Aden Baptiste    Chronic kidney disease, stage III (moderate) ([facility] 2016    Critical illness myopathy 03/15/2021    Diabetes mellitus ([facility]     dx 2004    Diabetic neuropathy associated with type 2 diabetes mellitus ([facility] 2019    Erythropoietin deficiency anemia 2020    Gout 2019    \"got gout when had pacer put in because they did not give me my medication for gout \"    H/O 24 hour EKG monitoring 10/03/2013    no afib noted, sinsus rhythm    H/O cardiovascular stress test 2014    cardiolite- mild ischemia RCA EF50%    H/O echocardiogram 2020    EF 55-60% severe aortic stenosis mild to mod aortic regurg mod to severe tricuspid regurg severe pulm htn significant changes since 2018 echo. H/O right and left heart catheterization 12/10/2020    DIFFUSE LAD DISEASE, Mild ECA Disease, Severe AS, Milf Pul HTN on RHC. History of blood transfusion 2020    d/t anemia    History of transesophageal echocardiography (MABLE) 12/15/2020    Severe aortic stenosis (ANNA by planimetry: 0.778 cm sq). Mild AR. [place] (hard of hearing)     hearing tonya aides    Hx of Doppler echocardiogram 2018    EF 50%  Mild LV hypertrophy. Mildly enlarged RA. Mod aortic valve calcification with mod AS.  Mitral annular calcification is present. Mild AR, MR and TR. Mild pulmonary htn.     Hyperlipidemia     Hypertension     Follows with PCP & Dr. Finley Heading    Other disorders of kidney and ureter     Pacemaker     Medtronic, implanted 2014    Pneumonia 12/29/2012    Pneumonia due to COVID-19 virus 08/2020    Sleep apnea     dx 2013- has c-pap    Type II or unspecified type diabetes mellitus with other specified manifestations, uncontrolled 12/12/2012    Venous hypertension, chronic, with ulcer ([address] 12/12/2012    resolved       PAST SURGICAL HISTORY    Past Surgical History:   Procedure Laterality Date    CABG WITH AORTIC VALVE REPLACEMENT N/A 02/16/2021    CABG CORONARY ARTERY BYPASS X2 WITH LIMA, AORTIC VALVE REPLACEMENT AND AORTIC ROOT REPAIR, INTRAOPERATIVE MABLE, INDUCED HYPOTHERMIA, LEFT LEG ENDOVEIN HARVEST, LEFT ATRIAL CLIP, AND CRYO PROCEDURE performed by Shai Head MD at [facility]  12/2014    at [address] Left 01/29/2021    LEFT CAROTID ENDARTERECTOMY performed by Irina Luque MD at [address] [place] [address]  2017    COLONOSCOPY  2011    COLONOSCOPY N/A 11/19/2019    COLONOSCOPY DIAGNOSTIC performed by Harrison Gomez MD at [address] [facility]  09/30/2020    POSSIBLE CECAL avms, SIGMOID DIVERTICULOSIS, INTERNAL HEMORRHOIDS GRADE 1    COLONOSCOPY N/A 09/30/2020    COLONOSCOPY CONTROL HEMORRHAGE WITH APC performed by Harrison Gomez MD at [address] [facility]  2014    \"2 stents put in \"    FOOT DEBRIDEMENT Bilateral 03/31/2022    BILATERAL DEBRIEDMENT OF NON-VIABLE TISSUE & BONE performed by Renetta Zimmerman DPM at [address] Right 04/14/2022    RIGHT FOOT DEBRIDEMENT INCISION AND DRAINAGE performed by Renetta Zimmerman DPM at [address] [facility]      abdominal    IR NONTUNNELED VASCULAR CATHETER  03/05/2021    IR NONTUNNELED VASCULAR CATHETER 3/5/2021 [facility] SPECIAL PROCEDURES    JOINT REPLACEMENT  2004    total left hip    OTHER SURGICAL HISTORY Right 12/02/2017    I&D; evacuation of hematoma right hip    OTHER SURGICAL HISTORY  09/17/2020    enteroscopy    PACEMAKER INSERTION N/A 02/23/2021    PACEMAKER GENERATOR LEAD REVISION performed by Jonathan Jacobs MD at [address] Left 04/26/2022    Dual PPM: Atrial lead extraction/insertion, Generator change. Medtronic, Lucie XT DR EVERT Apodaca    PACEMAKER PLACEMENT      9/18/14 Status post remote permanent pacemaker with atrial lead dislodgement.  7/24/14 PPM Implant    TOE AMPUTATION Bilateral 03/31/2022    LEFT 3RD TOE AMPUTATION performed by Braulio Milan DPM at [facility] [address] Right 04/14/2022    RIGHT 2ND TOE AMPUTATION performed by Braulio Milan DPM at [facility]. N/A 09/17/2020    ENTEROSCOPY PUSH BIOPSY performed by Aaron Resendez MD at [place] [address]. N/A 03/04/2021    EGD DIAGNOSTIC ONLY performed by Aaron Resendez MD at [address] [place] [address]  2012    \"have stents in both legs- done in [address] [place] [address]    Family History   Problem Relation Age of Onset    High Blood Pressure Mother     Arthritis Mother     Diabetes Mother     Heart Disease Mother     High Blood Pressure Father     Heart Disease Father     Kidney Disease Father     No Known Problems Sister     Heart Surgery Brother     Heart Disease Brother     No Known Problems Sister     No Known Problems Brother        SOCIAL HISTORY    Social History     Tobacco Use    Smoking status: Never    Smokeless tobacco: Never   Vaping Use    Vaping Use: Never used   Substance Use Topics    Alcohol use: Not Currently     Alcohol/week: 2.0 standard drinks     Types: 2 Cans of beer per week     Comment: average \"one time per week\"/ Caffiene: 1 cup of coffee daily    Drug use: No       ALLERGIES    Allergies   Allergen Reactions    Spironolactone      CAUSES INCREASED K+    Tape [Adhesive Tape] Rash     SURGICAL TAPE MEDICATIONS    Current Outpatient Medications on File Prior to Encounter   Medication Sig Dispense Refill    apixaban (ELIQUIS) 5 MG TABS tablet Take 1 tablet by mouth 2 times daily 180 tablet 3    simvastatin (ZOCOR) 20 MG tablet Take 1 tablet by mouth daily 90 tablet 3    apixaban (ELIQUIS) 5 MG TABS tablet Take 1 tablet by mouth 2 times daily 14 tablet 0    dapagliflozin (FARXIGA) 10 MG tablet Take 1 tablet by mouth every morning 90 tablet 1    Dulaglutide 4.5 MG/0.5ML SOPN Inject 4.5 mg into the skin once a week 12 Adjustable Dose Pre-filled Pen Syringe 1    gabapentin (NEURONTIN) 300 MG capsule TAKE 1 CAPSULE 3 TIMES A    capsule 1    glimepiride (AMARYL) 4 MG tablet TAKE 1 TABLET IN THE       MORNING (BEFORE BREAKFAST) 90 tablet 1    sildenafil (VIAGRA) 100 MG tablet TAKE 1 TABLET DAILY AS     NEEDED FOR ERECTILE        DYSFUNCTION 90 tablet 1    torsemide (DEMADEX) 20 MG tablet TAKE 2 TABLETS BY MOUTH TWICE A  tablet 3    carboxymethylcellulose (REFRESH PLUS) 0.5 % SOLN ophthalmic solution as needed       Cholecalciferol (VITAMIN D) 50 MCG (2000 UT) CAPS capsule Take by mouth nightly       aspirin 81 MG tablet Take 81 mg by mouth daily       No current facility-administered medications on file prior to encounter. REVIEW OF SYSTEMS    Pertinent items are noted in HPI. Constitutional: Negative for systemic symptoms including fever, chills and malaise. Objective:      BP (!) 162/74   Pulse 69   Temp 97.8 °F (36.6 °C) (Temporal)   Resp 18     PHYSICAL EXAM    General: The patient is in no acute distress. Mental status:  Patient is appropriate, is  oriented to place and plan of care.   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)    Relevant Orders    Initiate Outpatient Wound Care Protocol     Procedure Note    Indications:  Based on my examination of this patient's wound(s) today, sharp excision into bone is required to promote healing and evaluate the extent of previous healing.     Performed by: Gino Brown DPM    Consent obtained: Yes    Time out taken:  Yes    Pain Control: lidocaine       Debridement:Non-excisional Debridement    Using #15 blade scalpel the wound(s) was/were sharply debrided down through and including the removal of bone    Devitalized Tissue Debrided:  necrotic/eschar    Pre Debridement Measurements:  Are located in the Wound Documentation Flow Sheet    All active wounds listed below with today's date are evaluated    Wound 09/20/22 Toe (Comment  which one) Right #1 right second toe (Active)   Wound Image   09/20/22 1310   Wound Etiology Diabetic 10/11/22 1334   Dressing Status New dressing applied 10/11/22 1334   Wound Cleansed Wound cleanser 10/11/22 1307   Offloading for Diabetic Foot Ulcers Orthowedge/inserts 10/11/22 1334   Wound Length (cm) 2 cm 10/11/22 1307   Wound Width (cm) 2 cm 10/11/22 1307   Wound Depth (cm) 0.1 cm 10/11/22 1307   Wound Surface Area (cm^2) 4 cm^2 10/11/22 1307   Change in Wound Size % (l*w) 33.33 10/11/22 1307   Wound Volume (cm^3) 0.4 cm^3 10/11/22 1307   Wound Healing % 33 10/11/22 1307   Post-Procedure Length (cm) 2 cm 10/11/22 1320   Post-Procedure Width (cm) 2 cm 10/11/22 1320   Post-Procedure Depth (cm) 0.1 cm 10/11/22 1320   Post-Procedure Surface Area (cm^2) 4 cm^2 10/11/22 1320   Post-Procedure Volume (cm^3) 0.4 cm^3 10/11/22 1320   Distance Tunneling (cm) 0 cm 10/11/22 1307   Tunneling Position ___ O'Clock 0 10/11/22 1307   Undermining Starts ___ O'Clock 0 10/11/22 1307   Undermining Ends___ O'Clock 0 10/11/22 1307   Undermining Maxium Distance (cm) 0 10/11/22 1307   Wound Assessment Exposed structure bone;Pink/red;Slough;Eschar dry 10/11/22 1307   Drainage Amount Moderate 10/11/22 1307   Drainage Description Serosanguinous 10/11/22 1307   Odor None 10/11/22 1307   Angela-wound Assessment Fragile; Hyperkeratosis (callous) 10/11/22 1307   Margins Defined edges 10/11/22 1307   Wound Thickness Description not for Pressure Injury Full thickness 10/11/22 1307   Number of days: 21         Percent of Wound(s) Debrided: approximately 100%    Total  Area  Debrided:  0.4  cm3     Bleeding:  None    Hemostasis Achieved:  not needed    Procedural Pain:  0  / 10     Post Procedural Pain:  0 / 10     Response to treatment:  Well tolerated by patient. Status of wound progress and description from last visit:   stable. Plan:       Discharge Instructions         PHYSICIAN ORDERS AND DISCHARGE INSTRUCTIONS     NOTE: Upon discharge from the [address], you will receive a patient experience survey. We would be grateful if you would take the time to fill this survey out. Wound care order history:                 YESSENIA's   Right       Left               Date:              Cultures:                Grafts:                Antibiotics:           Continuing wound care orders and information:                 Residence:                Continue home health care with:              Your wound-care supplies will be provided by: Wound cleansing:              Do not scrub or use excessive force. Wash hands with soap and water before and after dressing changes.               Prior to applying a clean dressing, cleanse wound with normal saline, wound cleanser, or mild soap and water. Ask the physician or nurse before getting the wound(s) wet in a shower     Daily Wound management:              Keep weight off wounds and reposition every 2 hours. Avoid standing for long periods of time. Apply wraps/stockings in AM and remove at bedtime. If swelling is present, elevate legs to the level of the heart or above for 30 minutes 4-5 times a day and/or when sitting. When taking antibiotics take entire prescription as ordered by physician do not stop taking until medicine is all gone. Orders for this week: 10/11/22     Will schedule surgery through [facility] soon for right 3rd toe                 Rx:     Right 2nd Toe - Wash with soap and water, pat dry. Apply Betadine damp gauze wet to dry dressing. Cover with ca alginate. Wrap with 1\" conform and 1\" Coban (may use 2\" conform and coban to secure). Change daily. Follow up with Dr Kenya Peterson in 1 week in the wound care center. Call [phone] for any questions or concerns. Date__________   Time____________        Treatment Note Wound 09/20/22 Toe (Comment  which one) Right #1 right second toe-Dressing/Treatment:  (betadine damp gauze,ca ag,conform,coban)    Written Patient Dismissal Instructions Given       -History of wound dehiscence amputation sites bilateral foot.  -Chronic wound of the second toe of the right foot. -More bone is exposed today with some black necrotic changes noted to the tip of the toe.  -Discussed that at this point patient may need amputation for infection management and to prevent spread of bone infection.   Patient and his wife agree  -Discussed partial versus total toe amputation of the second toe given the level of ulceration goes very proximal on the digit  -We will see him again next week to assess and determine exactly what procedure will be done  -We will plan for surgery next week sometime  -Any signs of infection developing or worsening signs before then go to the emergency room  -Continue with daily wound care  -Follow-up 1 week for recheck        Surgery was discussed at length today with the patient. This included the potential risks, conditions as well as the postoperative course. The risks include but are not limited to: Pain, infection, swelling, worsening or no better. We rediscussed the risk for skin complications or wound complication/nerve related complications and/or bone related complications, hardware complications, bone healing complications, failure of procedure, recurrence, future surgery required, death, anesthesia risks. Blood clots as well as other numerous risks were discussed at length today. Patient understands all this and is agreeable. The postoperative course was explained in detail today as well as the weightbearing status, the need to keep the bandage clean, dry, and intact, and other post procedural specific care was all discussed at length. Postoperative instruction was given and reviewed with the patient. Surgical consents were reviewed today and signed appropriately. We discussed anticoagulation therapy and DVT prophylaxis. This was discussed at length. Proper prescription to be given to patient based on our discussion today. We discussed the signs and symptoms of DVT and PE at length.         Electronically signed by Doris Damon DPM on 10/11/2022 at 2:50 PM

## 2022-10-11 NOTE — DISCHARGE INSTRUCTIONS
PHYSICIAN ORDERS AND DISCHARGE INSTRUCTIONS     NOTE: Upon discharge from the [address], you will receive a patient experience survey. We would be grateful if you would take the time to fill this survey out. Wound care order history:                 YESSENIA's   Right       Left               Date:              Cultures:                Grafts:                Antibiotics:           Continuing wound care orders and information:                 Residence:                Continue home health care with:              Your wound-care supplies will be provided by: Wound cleansing:              Do not scrub or use excessive force. Wash hands with soap and water before and after dressing changes. Prior to applying a clean dressing, cleanse wound with normal saline, wound cleanser, or mild soap and water. Ask the physician or nurse before getting the wound(s) wet in a shower     Daily Wound management:              Keep weight off wounds and reposition every 2 hours. Avoid standing for long periods of time. Apply wraps/stockings in AM and remove at bedtime. If swelling is present, elevate legs to the level of the heart or above for 30 minutes 4-5 times a day and/or when sitting. When taking antibiotics take entire prescription as ordered by physician do not stop taking until medicine is all gone. Orders for this week: 10/11/22     Will schedule surgery through [facility] soon for right 3rd toe                 Rx:     Right 2nd Toe - Wash with soap and water, pat dry. Apply Betadine damp gauze wet to dry dressing. Cover with ca alginate. Wrap with 1\" conform and 1\" Coban (may use 2\" conform and coban to secure). Change daily. Follow up with Dr Michelle De La Cruz in 1 week in the wound care center.   Call [phone] for any questions or concerns.   Date__________   Time____________

## 2022-10-18 ENCOUNTER — HOSPITAL ENCOUNTER (OUTPATIENT)
Dept: WOUND CARE | Age: 72
Discharge: HOME | End: 2022-10-18
Payer: MEDICARE

## 2022-10-18 VITALS
RESPIRATION RATE: 18 BRPM | DIASTOLIC BLOOD PRESSURE: 70 MMHG | SYSTOLIC BLOOD PRESSURE: 116 MMHG | TEMPERATURE: 98.3 F | HEART RATE: 70 BPM

## 2022-10-18 DIAGNOSIS — L97.912 NON-PRESSURE ULCER OF RIGHT LOWER EXTREMITY WITH FAT LAYER EXPOSED (HCC): ICD-10-CM

## 2022-10-18 DIAGNOSIS — I73.9 PVD (PERIPHERAL VASCULAR DISEASE) (HCC): ICD-10-CM

## 2022-10-18 DIAGNOSIS — L97.522 DIABETIC ULCER OF TOE OF LEFT FOOT ASSOCIATED WITH TYPE 2 DIABETES MELLITUS, WITH FAT LAYER EXPOSED (HCC): Primary | ICD-10-CM

## 2022-10-18 DIAGNOSIS — E11.621 DIABETIC ULCER OF TOE OF LEFT FOOT ASSOCIATED WITH TYPE 2 DIABETES MELLITUS, WITH FAT LAYER EXPOSED (HCC): Primary | ICD-10-CM

## 2022-10-18 DIAGNOSIS — E11.49 OTHER DIABETIC NEUROLOGICAL COMPLICATION ASSOCIATED WITH TYPE 2 DIABETES MELLITUS (HCC): ICD-10-CM

## 2022-10-18 PROCEDURE — 99213 OFFICE O/P EST LOW 20 MIN: CPT

## 2022-10-18 RX ORDER — GENTAMICIN SULFATE 1 MG/G
OINTMENT TOPICAL ONCE
Status: CANCELLED | OUTPATIENT
Start: 2022-10-18 | End: 2022-10-18

## 2022-10-18 RX ORDER — CLOBETASOL PROPIONATE 0.5 MG/G
OINTMENT TOPICAL ONCE
Status: CANCELLED | OUTPATIENT
Start: 2022-10-18 | End: 2022-10-18

## 2022-10-18 RX ORDER — LIDOCAINE 40 MG/G
CREAM TOPICAL ONCE
Status: CANCELLED | OUTPATIENT
Start: 2022-10-18 | End: 2022-10-18

## 2022-10-18 RX ORDER — LIDOCAINE HYDROCHLORIDE 20 MG/ML
JELLY TOPICAL ONCE
Status: CANCELLED | OUTPATIENT
Start: 2022-10-18 | End: 2022-10-18

## 2022-10-18 RX ORDER — DOXYCYCLINE HYCLATE 100 MG
100 TABLET ORAL 2 TIMES DAILY
Qty: 20 TABLET | Refills: 0 | Status: SHIPPED | OUTPATIENT
Start: 2022-10-18 | End: 2022-10-28

## 2022-10-18 RX ORDER — LIDOCAINE HYDROCHLORIDE 40 MG/ML
SOLUTION TOPICAL ONCE
Status: CANCELLED | OUTPATIENT
Start: 2022-10-18 | End: 2022-10-18

## 2022-10-18 RX ORDER — LIDOCAINE 50 MG/G
OINTMENT TOPICAL ONCE
Status: CANCELLED | OUTPATIENT
Start: 2022-10-18 | End: 2022-10-18

## 2022-10-18 RX ORDER — BETAMETHASONE DIPROPIONATE 0.05 %
OINTMENT (GRAM) TOPICAL ONCE
Status: CANCELLED | OUTPATIENT
Start: 2022-10-18 | End: 2022-10-18

## 2022-10-18 RX ORDER — BACITRACIN ZINC AND POLYMYXIN B SULFATE 500; 1000 [USP'U]/G; [USP'U]/G
OINTMENT TOPICAL ONCE
Status: CANCELLED | OUTPATIENT
Start: 2022-10-18 | End: 2022-10-18

## 2022-10-18 RX ORDER — GINSENG 100 MG
CAPSULE ORAL ONCE
Status: CANCELLED | OUTPATIENT
Start: 2022-10-18 | End: 2022-10-18

## 2022-10-18 RX ORDER — BACITRACIN, NEOMYCIN, POLYMYXIN B 400; 3.5; 5 [USP'U]/G; MG/G; [USP'U]/G
OINTMENT TOPICAL ONCE
Status: CANCELLED | OUTPATIENT
Start: 2022-10-18 | End: 2022-10-18

## 2022-10-18 ASSESSMENT — PAIN SCALES - GENERAL: PAINLEVEL_OUTOF10: 0

## 2022-10-18 NOTE — DISCHARGE INSTRUCTIONS
PHYSICIAN ORDERS AND DISCHARGE INSTRUCTIONS     NOTE: Upon discharge from the [address], you will receive a patient experience survey. We would be grateful if you would take the time to fill this survey out. Wound care order history:                 YESSENIA's   Right       Left               Date:              Cultures:                Grafts:                Antibiotics:           Continuing wound care orders and information:                 Residence:                Continue home health care with:              Your wound-care supplies will be provided by: Wound cleansing:              Do not scrub or use excessive force. Wash hands with soap and water before and after dressing changes. Prior to applying a clean dressing, cleanse wound with normal saline, wound cleanser, or mild soap and water. Ask the physician or nurse before getting the wound(s) wet in a shower     Daily Wound management:              Keep weight off wounds and reposition every 2 hours. Avoid standing for long periods of time. Apply wraps/stockings in AM and remove at bedtime. If swelling is present, elevate legs to the level of the heart or above for 30 minutes 4-5 times a day and/or when sitting. When taking antibiotics take entire prescription as ordered by physician do not stop taking until medicine is all gone. Orders for this week: 10/18/22     Will schedule surgery through [facility] Thursday                  Rx: [address]      Right 2nd Toe - Wash with soap and water, pat dry. Apply Betadine damp gauze wet to dry dressing. Cover with ca alginate. Wrap with 1\" conform and 1\" Coban (may use 2\" conform and coban to secure). Change daily. Follow up with Dr Jorge Alberto Locke in 1 week in the wound care center.   Call [phone] for any questions or concerns.

## 2022-10-18 NOTE — PROGRESS NOTES
[facility] Progress Note With Procedure    Srikanth Sousa  AGE: 74 y.o. GENDER: male  : 1950  EPISODE DATE:  10/18/2022     Subjective:     Chief Complaint   Patient presents with    Wound Check     Right foot         HISTORY of PRESENT ILLNESS      Srikanth Sousa is a 74 y.o. male who presents today for wound to the second toe of the right foot. No worsening changes from previous      PAST MEDICAL HISTORY        Diagnosis Date    Arrhythmia     Pacemaker placed aprox 5 years ago for A Fib per patient    Arthritis 2013    rt wrist    Atrial fibrillation ([address]     on Xarelto - Dr. Laurel Woodard    CAD (coronary artery disease) 2014    see dr Laurel Woodard    Chronic kidney disease, stage III (moderate) ([address] 2016    Critical illness myopathy 03/15/2021    Diabetes mellitus ([address]     dx 2004    Diabetic neuropathy associated with type 2 diabetes mellitus ([facility] 2019    Erythropoietin deficiency anemia 2020    Gout 2019    \"got gout when had pacer put in because they did not give me my medication for gout \"    H/O 24 hour EKG monitoring 10/03/2013    no afib noted, sinsus rhythm    H/O cardiovascular stress test 2014    cardiolite- mild ischemia RCA EF50%    H/O echocardiogram 2020    EF 55-60% severe aortic stenosis mild to mod aortic regurg mod to severe tricuspid regurg severe pulm htn significant changes since 2018 echo. H/O right and left heart catheterization 12/10/2020    DIFFUSE LAD DISEASE, Mild ECA Disease, Severe AS, Milf Pul HTN on RHC. History of blood transfusion 2020    d/t anemia    History of transesophageal echocardiography (MABLE) 12/15/2020    Severe aortic stenosis (ANNA by planimetry: 0.778 cm sq). Mild AR. [place] (hard of hearing)     hearing tonya aides    Hx of Doppler echocardiogram 2018    EF 50%  Mild LV hypertrophy. Mildly enlarged RA. Mod aortic valve calcification with mod AS. Mitral annular calcification is present.  Mild AR, MR and TR. Mild pulmonary htn.     Hyperlipidemia     Hypertension     Follows with PCP & Dr. Guido Holter    Other disorders of kidney and ureter     Pacemaker     Medtronic, implanted 2014    Pneumonia 12/29/2012    Pneumonia due to COVID-19 virus 08/2020    Sleep apnea     dx 2013- has c-pap    Type II or unspecified type diabetes mellitus with other specified manifestations, uncontrolled 12/12/2012    Venous hypertension, chronic, with ulcer ([address] 12/12/2012    resolved       PAST SURGICAL HISTORY    Past Surgical History:   Procedure Laterality Date    CABG WITH AORTIC VALVE REPLACEMENT N/A 02/16/2021    CABG CORONARY ARTERY BYPASS X2 WITH LIMA, AORTIC VALVE REPLACEMENT AND AORTIC ROOT REPAIR, INTRAOPERATIVE MABLE, INDUCED HYPOTHERMIA, LEFT LEG ENDOVEIN HARVEST, LEFT ATRIAL CLIP, AND CRYO PROCEDURE performed by Noe Emery MD at [address]  12/2014    at [address] Left 01/29/2021    LEFT CAROTID ENDARTERECTOMY performed by Edgardo Duncan MD at [address] [place] [address]    COLONOSCOPY  2011    COLONOSCOPY N/A 11/19/2019    COLONOSCOPY DIAGNOSTIC performed by Chad Winston MD at [address] [place] [address]  09/30/2020    POSSIBLE CECAL avms, SIGMOID DIVERTICULOSIS, INTERNAL HEMORRHOIDS GRADE 1    COLONOSCOPY N/A 09/30/2020    COLONOSCOPY CONTROL HEMORRHAGE WITH APC performed by Chad Winston MD at [address] [facility]  2014    \"2 stents put in \"    FOOT DEBRIDEMENT Bilateral 03/31/2022    BILATERAL DEBRIEDMENT OF NON-VIABLE TISSUE & BONE performed by Karly Donovan DPM at [address] Right 04/14/2022    RIGHT FOOT DEBRIDEMENT INCISION AND DRAINAGE performed by Karly Donovan DPM at [address] [facility]      abdominal    IR NONTUNNELED VASCULAR CATHETER  03/05/2021    IR NONTUNNELED VASCULAR CATHETER 3/5/2021 [facility] SPECIAL PROCEDURES    JOINT REPLACEMENT  2004    total left hip    OTHER SURGICAL HISTORY Right 12/02/2017    I&D; evacuation of hematoma right hip    OTHER SURGICAL HISTORY  09/17/2020    enteroscopy    PACEMAKER INSERTION N/A 02/23/2021    PACEMAKER GENERATOR LEAD REVISION performed by Giovani Costello MD at [address] Left 04/26/2022    Dual PPM: Atrial lead extraction/insertion, Generator change. Medtronic, [place] XT DR EVERT Apodaca    PACEMAKER PLACEMENT      9/18/14 Status post remote permanent pacemaker with atrial lead dislodgement.  7/24/14 PPM Implant    TOE AMPUTATION Bilateral 03/31/2022    LEFT 3RD TOE AMPUTATION performed by Doris Damon DPM at [address] [facility] Right 04/14/2022    RIGHT 2ND TOE AMPUTATION performed by Doris Damon DPM at [facility] N/A 09/17/2020    ENTEROSCOPY PUSH BIOPSY performed by Obey Griffin MD at [place] [address] N/A 03/04/2021    EGD DIAGNOSTIC ONLY performed by Obey Griffin MD at [address] [facility]  2012    \"have stents in both legs- done in [facility]    Family History   Problem Relation Age of Onset    High Blood Pressure Mother     Arthritis Mother     Diabetes Mother     Heart Disease Mother     High Blood Pressure Father     Heart Disease Father     Kidney Disease Father     No Known Problems Sister     Heart Surgery Brother     Heart Disease Brother     No Known Problems Sister     No Known Problems Brother        SOCIAL HISTORY    Social History     Tobacco Use    Smoking status: Never    Smokeless tobacco: Never   Vaping Use    Vaping Use: Never used   Substance Use Topics    Alcohol use: Not Currently     Alcohol/week: 2.0 standard drinks     Types: 2 Cans of beer per week     Comment: average \"one time per week\"/ Caffiene: 1 cup of coffee daily    Drug use: No       ALLERGIES    Allergies   Allergen Reactions    Spironolactone      CAUSES INCREASED K+    Tape Freddie Gums Tape] Rash     SURGICAL TAPE       MEDICATIONS    Current Outpatient Medications on File Prior to Encounter   Medication Sig Dispense Refill    apixaban (ELIQUIS) 5 MG TABS tablet Take 1 tablet by mouth 2 times daily 180 tablet 3    simvastatin (ZOCOR) 20 MG tablet Take 1 tablet by mouth daily 90 tablet 3    apixaban (ELIQUIS) 5 MG TABS tablet Take 1 tablet by mouth 2 times daily 14 tablet 0    dapagliflozin (FARXIGA) 10 MG tablet Take 1 tablet by mouth every morning 90 tablet 1    Dulaglutide 4.5 MG/0.5ML SOPN Inject 4.5 mg into the skin once a week 12 Adjustable Dose Pre-filled Pen Syringe 1    gabapentin (NEURONTIN) 300 MG capsule TAKE 1 CAPSULE 3 TIMES A    capsule 1    glimepiride (AMARYL) 4 MG tablet TAKE 1 TABLET IN THE       MORNING (BEFORE BREAKFAST) 90 tablet 1    sildenafil (VIAGRA) 100 MG tablet TAKE 1 TABLET DAILY AS     NEEDED FOR ERECTILE        DYSFUNCTION 90 tablet 1    torsemide (DEMADEX) 20 MG tablet TAKE 2 TABLETS BY MOUTH TWICE A  tablet 3    carboxymethylcellulose (REFRESH PLUS) 0.5 % SOLN ophthalmic solution as needed       Cholecalciferol (VITAMIN D) 50 MCG (2000 UT) CAPS capsule Take by mouth nightly       aspirin 81 MG tablet Take 81 mg by mouth daily       No current facility-administered medications on file prior to encounter. REVIEW OF SYSTEMS    Pertinent items are noted in HPI. Constitutional: Negative for systemic symptoms including fever, chills and malaise. Objective:      /70   Pulse 70   Temp 98.3 °F (36.8 °C) (Temporal)   Resp 18     PHYSICAL EXAM    General: The patient is in no acute distress. Mental status:  Patient is appropriate, is  oriented to place and plan of care.   Dermatologic exam: Visual inspection of the periwound reveals the skin to be normal in turgor and texture  Wound exam: see wound description below in procedure note      Assessment:     Problem List Items Addressed This Visit          Circulatory    PVD (peripheral vascular disease) (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     Performed by: Riky Whatley DPM    Consent obtained: Yes    Time out taken:  Yes    Pain Control: lidocaine       Debridement:Non-excisional Debridement    Using #15 blade scalpel the wound(s) was/were sharply debrided down through and including the removal of bone    Devitalized Tissue Debrided:  necrotic/eschar    Pre Debridement Measurements:  Are located in the Wound Documentation Flow Sheet    All active wounds listed below with today's date are evaluated      Wound 09/20/22 Toe (Comment  which one) Right #1 right second toe (Active)   Wound Image   10/18/22 1309   Wound Etiology Diabetic 10/18/22 1309   Dressing Status New dressing applied 10/11/22 1334   Wound Cleansed Wound cleanser 10/18/22 1309   Offloading for Diabetic Foot Ulcers Orthowedge/inserts 10/18/22 1309   Wound Length (cm) 3 cm 10/18/22 1309   Wound Width (cm) 2 cm 10/18/22 1309   Wound Depth (cm) 0.1 cm 10/18/22 1309   Wound Surface Area (cm^2) 6 cm^2 10/18/22 1309   Change in Wound Size % (l*w) 0 10/18/22 1309   Wound Volume (cm^3) 0.6 cm^3 10/18/22 1309   Wound Healing % 0 10/18/22 1309   Post-Procedure Length (cm) 2 cm 10/11/22 1320   Post-Procedure Width (cm) 2 cm 10/11/22 1320   Post-Procedure Depth (cm) 0.1 cm 10/11/22 1320   Post-Procedure Surface Area (cm^2) 4 cm^2 10/11/22 1320   Post-Procedure Volume (cm^3) 0.4 cm^3 10/11/22 1320   Distance Tunneling (cm) 0 cm 10/18/22 1309   Tunneling Position ___ O'Clock 0 10/18/22 1309   Undermining Starts ___ O'Clock 0 10/18/22 1309   Undermining Ends___ O'Clock 0 10/18/22 1309   Undermining Maxium Distance (cm) 0 10/18/22 1309   Wound Assessment Eschar dry 10/18/22 1309   Drainage Amount Small 10/18/22 1309   Drainage Description Serosanguinous 10/18/22 1309   Odor None 10/18/22 1309   Angela-wound Assessment Fragile 10/18/22 1309   Margins Defined edges 10/18/22 1309   Wound Thickness Description not for Pressure Injury Full thickness 10/18/22 1309   Number of days: 28       Percent of Wound(s) Debrided: approximately 100%    Total  Area  Debrided:  0.4  cm3     Bleeding:  None    Hemostasis Achieved:  not needed    Procedural Pain:  0  / 10     Post Procedural Pain:  0 / 10     Response to treatment:  Well tolerated by patient. Status of wound progress and description from last visit:   stable. Plan:       Discharge Instructions         PHYSICIAN ORDERS AND DISCHARGE INSTRUCTIONS     NOTE: Upon discharge from the [address], you will receive a patient experience survey. We would be grateful if you would take the time to fill this survey out. Wound care order history:                 YESSENIA's   Right       Left               Date:              Cultures:                Grafts:                Antibiotics:           Continuing wound care orders and information:                 Residence:                Continue home health care with:              Your wound-care supplies will be provided by: Wound cleansing:              Do not scrub or use excessive force. Wash hands with soap and water before and after dressing changes. Prior to applying a clean dressing, cleanse wound with normal saline, wound cleanser, or mild soap and water.               Ask the physician or nurse before getting the wound(s) wet in a shower     Daily Wound management:              Keep weight off wounds and reposition every 2 hours. Avoid standing for long periods of time. Apply wraps/stockings in AM and remove at bedtime. If swelling is present, elevate legs to the level of the heart or above for 30 minutes 4-5 times a day and/or when sitting. When taking antibiotics take entire prescription as ordered by physician do not stop taking until medicine is all gone. Orders for this week: 10/18/22     Will schedule surgery through [facility] Thursday                  Rx: [address]      Right 2nd Toe - Wash with soap and water, pat dry. Apply Betadine damp gauze wet to dry dressing. Cover with ca alginate. Wrap with 1\" conform and 1\" Coban (may use 2\" conform and coban to secure). Change daily. Follow up with Dr Lizzie Moyer in 1 week in the wound care center. Call [phone] for any questions or concerns. Treatment Note      Written Patient Dismissal Instructions Given       -History of wound dehiscence amputation sites bilateral foot.  -Chronic wound of the third toe of the right foot.    -Wound to the third toe is stable from previous. No signs of active infection.  -Preoperative visit today in anticipation for his third toe amputation to the right foot this Thursday in the operating room  -All risk benefits and complication of the procedure were explained to the patient and his wife with her full understanding. No guarantees were given or implied. All questions were answered to their satisfaction  -Patient is at high risk for further amputation and wound healing complications given his history of complicated healing of surgery in the past as well as chronic wounds and bone infection as well as history of peripheral vascular disease.   He does understand this  -Prescription for doxycycline 100 mg 1 tab twice a day called into the patient's pharmacy to start taking today for prophylaxis in anticipation for surgery.  -Patient says that he does not typically need pain medication after surgery and did not need it after his other toe amputations so I did not send this in for him today  -Plan for surgery this Thursday in the operating room and first postoperative visit next week        Surgery was discussed at length today with the patient. This included the potential risks, conditions as well as the postoperative course. The risks include but are not limited to: Pain, infection, swelling, worsening or no better. We rediscussed the risk for skin complications or wound complication/nerve related complications and/or bone related complications, hardware complications, bone healing complications, failure of procedure, recurrence, future surgery required, death, anesthesia risks. Blood clots as well as other numerous risks were discussed at length today. Patient understands all this and is agreeable. The postoperative course was explained in detail today as well as the weightbearing status, the need to keep the bandage clean, dry, and intact, and other post procedural specific care was all discussed at length. Postoperative instruction was given and reviewed with the patient. Surgical consents were reviewed today and signed appropriately. We discussed anticoagulation therapy and DVT prophylaxis. This was discussed at length. Proper prescription to be given to patient based on our discussion today. We discussed the signs and symptoms of DVT and PE at length.         Electronically signed by Amy Mckeon DPM on 10/18/2022 at 1:30 PM

## 2022-10-25 ENCOUNTER — HOSPITAL ENCOUNTER (OUTPATIENT)
Dept: WOUND CARE | Age: 72
Discharge: HOME | End: 2022-10-25
Payer: MEDICARE

## 2022-10-25 VITALS
DIASTOLIC BLOOD PRESSURE: 69 MMHG | TEMPERATURE: 97.5 F | SYSTOLIC BLOOD PRESSURE: 146 MMHG | RESPIRATION RATE: 18 BRPM | HEART RATE: 70 BPM

## 2022-10-25 DIAGNOSIS — E11.621 DIABETIC ULCER OF TOE OF LEFT FOOT ASSOCIATED WITH TYPE 2 DIABETES MELLITUS, WITH FAT LAYER EXPOSED (HCC): Primary | ICD-10-CM

## 2022-10-25 DIAGNOSIS — I73.9 PVD (PERIPHERAL VASCULAR DISEASE) (HCC): ICD-10-CM

## 2022-10-25 DIAGNOSIS — E11.49 OTHER DIABETIC NEUROLOGICAL COMPLICATION ASSOCIATED WITH TYPE 2 DIABETES MELLITUS (HCC): ICD-10-CM

## 2022-10-25 DIAGNOSIS — L97.522 DIABETIC ULCER OF TOE OF LEFT FOOT ASSOCIATED WITH TYPE 2 DIABETES MELLITUS, WITH FAT LAYER EXPOSED (HCC): Primary | ICD-10-CM

## 2022-10-25 DIAGNOSIS — L97.912 NON-PRESSURE ULCER OF RIGHT LOWER EXTREMITY WITH FAT LAYER EXPOSED (HCC): ICD-10-CM

## 2022-10-25 PROCEDURE — 11042 DBRDMT SUBQ TIS 1ST 20SQCM/<: CPT

## 2022-10-25 PROCEDURE — 99213 OFFICE O/P EST LOW 20 MIN: CPT

## 2022-10-25 RX ORDER — LIDOCAINE 40 MG/G
CREAM TOPICAL ONCE
Status: CANCELLED | OUTPATIENT
Start: 2022-10-25 | End: 2022-10-25

## 2022-10-25 RX ORDER — LIDOCAINE HYDROCHLORIDE 20 MG/ML
JELLY TOPICAL ONCE
Status: CANCELLED | OUTPATIENT
Start: 2022-10-25 | End: 2022-10-25

## 2022-10-25 RX ORDER — LIDOCAINE HYDROCHLORIDE 40 MG/ML
SOLUTION TOPICAL ONCE
Status: CANCELLED | OUTPATIENT
Start: 2022-10-25 | End: 2022-10-25

## 2022-10-25 RX ORDER — GENTAMICIN SULFATE 1 MG/G
OINTMENT TOPICAL ONCE
Status: CANCELLED | OUTPATIENT
Start: 2022-10-25 | End: 2022-10-25

## 2022-10-25 RX ORDER — BACITRACIN ZINC AND POLYMYXIN B SULFATE 500; 1000 [USP'U]/G; [USP'U]/G
OINTMENT TOPICAL ONCE
Status: CANCELLED | OUTPATIENT
Start: 2022-10-25 | End: 2022-10-25

## 2022-10-25 RX ORDER — BACITRACIN, NEOMYCIN, POLYMYXIN B 400; 3.5; 5 [USP'U]/G; MG/G; [USP'U]/G
OINTMENT TOPICAL ONCE
Status: CANCELLED | OUTPATIENT
Start: 2022-10-25 | End: 2022-10-25

## 2022-10-25 RX ORDER — GINSENG 100 MG
CAPSULE ORAL ONCE
Status: CANCELLED | OUTPATIENT
Start: 2022-10-25 | End: 2022-10-25

## 2022-10-25 RX ORDER — BETAMETHASONE DIPROPIONATE 0.05 %
OINTMENT (GRAM) TOPICAL ONCE
Status: CANCELLED | OUTPATIENT
Start: 2022-10-25 | End: 2022-10-25

## 2022-10-25 RX ORDER — LIDOCAINE 50 MG/G
OINTMENT TOPICAL ONCE
Status: CANCELLED | OUTPATIENT
Start: 2022-10-25 | End: 2022-10-25

## 2022-10-25 RX ORDER — CLOBETASOL PROPIONATE 0.5 MG/G
OINTMENT TOPICAL ONCE
Status: CANCELLED | OUTPATIENT
Start: 2022-10-25 | End: 2022-10-25

## 2022-10-25 ASSESSMENT — PAIN SCALES - GENERAL: PAINLEVEL_OUTOF10: 0

## 2022-10-25 NOTE — PROGRESS NOTES
Wound Care Center Progress Note With Procedure    Srikanth Sousa  AGE: 74 y.o. GENDER: male  : 1950  EPISODE DATE:  10/25/2022     Subjective:     Chief Complaint   Patient presents with    Wound Check     Right foot          HISTORY of PRESENT ILLNESS      Srikanth Sousa is a 74 y.o. male who presents today for wound to the third toe of the right foot. Status post third toe amputation right foot (10/20/2022). Patient doing well overall. No pain to the area. Finish antibiotic as directed. Denies any constitutional symptoms. Denies any calf pain chest pain shortness of breath difficulty breathing or chest pain      PAST MEDICAL HISTORY        Diagnosis Date    Arrhythmia     Pacemaker placed aprox 5 years ago for A Fib per patient    Arthritis 2013    rt wrist    Atrial fibrillation (HCC)     on Xarelto - Dr. Laurel Woodard    CAD (coronary artery disease) 2014    see dr Laurel Woodard    Chronic kidney disease, stage III (moderate) ([facility] 2016    Critical illness myopathy 03/15/2021    Diabetes mellitus ([facility]     dx     Diabetic neuropathy associated with type 2 diabetes mellitus ([facility] 2019    Erythropoietin deficiency anemia 2020    Gout 2019    \"got gout when had pacer put in because they did not give me my medication for gout \"    H/O 24 hour EKG monitoring 10/03/2013    no afib noted, sinsus rhythm    H/O cardiovascular stress test 2014    cardiolite- mild ischemia RCA EF50%    H/O echocardiogram 2020    EF 55-60% severe aortic stenosis mild to mod aortic regurg mod to severe tricuspid regurg severe pulm htn significant changes since 2018 echo. H/O right and left heart catheterization 12/10/2020    DIFFUSE LAD DISEASE, Mild ECA Disease, Severe AS, Milf Pul HTN on RHC. History of blood transfusion 2020    d/t anemia    History of transesophageal echocardiography (MABLE) 12/15/2020    Severe aortic stenosis (ANNA by planimetry: 0.778 cm sq). Mild AR. [place] (hard of hearing)     hearing tonya aides    Hx of Doppler echocardiogram 05/21/2018    EF 50%  Mild LV hypertrophy. Mildly enlarged RA. Mod aortic valve calcification with mod AS. Mitral annular calcification is present. Mild AR, MR and TR. Mild pulmonary htn.     Hyperlipidemia     Hypertension     Follows with PCP & Dr. Dillon Solorzano    Other disorders of kidney and ureter     Pacemaker     Medtronic, implanted 2014    Pneumonia 12/29/2012    Pneumonia due to COVID-19 virus 08/2020    Sleep apnea     dx 2013- has c-pap    Type II or unspecified type diabetes mellitus with other specified manifestations, uncontrolled 12/12/2012    Venous hypertension, chronic, with ulcer ([address] 12/12/2012    resolved       PAST SURGICAL HISTORY    Past Surgical History:   Procedure Laterality Date    CABG WITH AORTIC VALVE REPLACEMENT N/A 02/16/2021    CABG CORONARY ARTERY BYPASS X2 WITH LIMA, AORTIC VALVE REPLACEMENT AND AORTIC ROOT REPAIR, INTRAOPERATIVE MABLE, INDUCED HYPOTHERMIA, LEFT LEG ENDOVEIN HARVEST, LEFT ATRIAL CLIP, AND CRYO PROCEDURE performed by Caryle Blitz, MD at [address]  12/2014    at [address] [facility] Left 01/29/2021    LEFT CAROTID ENDARTERECTOMY performed by José Miguel Campos MD at [address] [place] [address]  2017    COLONOSCOPY  2011    COLONOSCOPY N/A 11/19/2019    COLONOSCOPY DIAGNOSTIC performed by Adrian Tolbert MD at [address] [place] [address]  09/30/2020    POSSIBLE CECAL avms, SIGMOID DIVERTICULOSIS, INTERNAL HEMORRHOIDS GRADE 1    COLONOSCOPY N/A 09/30/2020    COLONOSCOPY CONTROL HEMORRHAGE WITH APC performed by Adrian Tolbert MD at [address]  2014    \"2 stents put in \"    FOOT DEBRIDEMENT Bilateral 03/31/2022    BILATERAL DEBRIEDMENT OF NON-VIABLE TISSUE & BONE performed by Karyn Valenzuela DPM at [facility] Right 04/14/2022    RIGHT FOOT DEBRIDEMENT INCISION AND DRAINAGE [facility] HERNIA REPAIR      abdominal    IR NONTUNNELED VASCULAR CATHETER  03/05/2021    IR NONTUNNELED VASCULAR CATHETER 3/5/2021 [facility] SPECIAL PROCEDURES    JOINT REPLACEMENT  2004    total left hip    OTHER SURGICAL HISTORY Right 12/02/2017    I&D; evacuation of hematoma right hip    OTHER SURGICAL HISTORY  09/17/2020    enteroscopy    PACEMAKER INSERTION N/A 02/23/2021    PACEMAKER GENERATOR LEAD REVISION performed by Melo Hurt MD at [address] Left 04/26/2022    Dual PPM: Atrial lead extraction/insertion, Generator change. Medtronic, [place] XT DR LOYD Suresccristy    PACEMAKER PLACEMENT      9/18/14 Status post remote permanent pacemaker with atrial lead dislodgement.  7/24/14 PPM Implant    TOE AMPUTATION Bilateral 03/31/2022    LEFT 3RD TOE AMPUTATION performed by Brittany Armstrong DPM at [address] Right 04/14/2022    RIGHT 2ND TOE AMPUTATION performed by Brittany Armstrong DPM at [facility] [address] N/A 09/17/2020    ENTEROSCOPY PUSH BIOPSY performed by Lawanda Ahn MD at [facility]. [address] N/A 03/04/2021    EGD DIAGNOSTIC ONLY performed by Lawanda Ahn MD at [facility]  2012    \"have stents in both legs- done in [facility]    Family History   Problem Relation Age of Onset    High Blood Pressure Mother     Arthritis Mother     Diabetes Mother     Heart Disease Mother     High Blood Pressure Father     Heart Disease Father     Kidney Disease Father     No Known Problems Sister     Heart Surgery Brother     Heart Disease Brother     No Known Problems Sister     No Known Problems Brother        SOCIAL HISTORY    Social History     Tobacco Use    Smoking status: Never    Smokeless tobacco: Never   Vaping Use    Vaping Use: Never used   Substance Use Topics    Alcohol use: Not Currently     Alcohol/week: 2.0 standard drinks     Types: 2 Cans of beer per week     Comment: average \"one time per week\"/ Caffiene: 1 cup of coffee daily    Drug use: No       ALLERGIES    Allergies   Allergen Reactions    Spironolactone      CAUSES INCREASED K+    Tape Efren Silk Tape] Rash     SURGICAL TAPE       MEDICATIONS    Current Outpatient Medications on File Prior to Encounter   Medication Sig Dispense Refill    doxycycline hyclate (VIBRA-TABS) 100 MG tablet Take 1 tablet by mouth 2 times daily for 10 days 20 tablet 0    apixaban (ELIQUIS) 5 MG TABS tablet Take 1 tablet by mouth 2 times daily 180 tablet 3    simvastatin (ZOCOR) 20 MG tablet Take 1 tablet by mouth daily 90 tablet 3    apixaban (ELIQUIS) 5 MG TABS tablet Take 1 tablet by mouth 2 times daily 14 tablet 0    dapagliflozin (FARXIGA) 10 MG tablet Take 1 tablet by mouth every morning 90 tablet 1    Dulaglutide 4.5 MG/0.5ML SOPN Inject 4.5 mg into the skin once a week 12 Adjustable Dose Pre-filled Pen Syringe 1    gabapentin (NEURONTIN) 300 MG capsule TAKE 1 CAPSULE 3 TIMES A    capsule 1    glimepiride (AMARYL) 4 MG tablet TAKE 1 TABLET IN THE       MORNING (BEFORE BREAKFAST) 90 tablet 1    sildenafil (VIAGRA) 100 MG tablet TAKE 1 TABLET DAILY AS     NEEDED FOR ERECTILE        DYSFUNCTION 90 tablet 1    torsemide (DEMADEX) 20 MG tablet TAKE 2 TABLETS BY MOUTH TWICE A  tablet 3    carboxymethylcellulose (REFRESH PLUS) 0.5 % SOLN ophthalmic solution as needed       Cholecalciferol (VITAMIN D) 50 MCG (2000 UT) CAPS capsule Take by mouth nightly       aspirin 81 MG tablet Take 81 mg by mouth daily       No current facility-administered medications on file prior to encounter. REVIEW OF SYSTEMS    Pertinent items are noted in HPI. Constitutional: Negative for systemic symptoms including fever, chills and malaise. Objective:      BP (!) 146/69   Pulse 70   Temp 97.5 °F (36.4 °C) (Temporal)   Resp 18     PHYSICAL EXAM    General: The patient is in no acute distress.     Mental status:  Patient is appropriate, is  oriented to place and plan of care.  Dermatologic exam: Visual inspection of the periwound reveals the skin to be normal in turgor and texture  Wound exam: see wound description below in procedure note. Surgical incision site well coapted with sutures intact. Some slight maceration but no dehiscence. No purulent drainage fluctuance crepitus or malodor  Musculoskeletal: Bilateral calves are soft and supple upon manual compression. 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     Performed by: Wolfgang Leslie DPM    Consent obtained: Yes    Time out taken:  Yes    Pain Control: lidocaine       Debridement:Non-excisional Debridement    Using #15 blade scalpel the wound(s) was/were sharply debrided down through and including the removal of bone    Devitalized Tissue Debrided:  necrotic/eschar    Pre Debridement Measurements:  Are located in the Wound Documentation Flow Sheet    All active wounds listed below with today's date are evaluated      Wound 09/20/22 Toe (Comment  which one) Right #1 right second toe (Active)   Wound Image   10/18/22 1309   Wound Etiology Diabetic 10/25/22 1313   Dressing Status New dressing applied 10/25/22 1334   Wound Cleansed Wound cleanser 10/18/22 1309   Offloading for Diabetic Foot Ulcers Post op shoe 10/25/22 1334   Wound Length (cm) 5 cm 10/25/22 1313   Wound Width (cm) 0.1 cm 10/25/22 1313   Wound Depth (cm) 0.1 cm 10/25/22 1313   Wound Surface Area (cm^2) 0.5 cm^2 10/25/22 1313   Change in Wound Size % (l*w) 91.67 10/25/22 1313   Wound Volume (cm^3) 0.05 cm^3 10/25/22 1313   Wound Healing % 92 10/25/22 1313   Post-Procedure Length (cm) 5 cm 10/25/22 1329   Post-Procedure Width (cm) 0.1 cm 10/25/22 1329   Post-Procedure Depth (cm) 0.1 cm 10/25/22 1329   Post-Procedure Surface Area (cm^2) 0.5 cm^2 10/25/22 1329   Post-Procedure Volume (cm^3) 0.05 cm^3 10/25/22 1329   Distance Tunneling (cm) 0 cm 10/25/22 1313   Tunneling Position ___ O'Clock 0 10/25/22 1313   Undermining Starts ___ O'Clock 0 10/25/22 1313   Undermining Ends___ O'Clock 0 10/25/22 1313   Undermining Maxium Distance (cm) 0 10/25/22 1313   Wound Assessment Pink/red; Other (Comment) 10/25/22 1313   Drainage Amount Moderate 10/25/22 1313   Drainage Description Serosanguinous 10/25/22 1313   Odor None 10/25/22 1313   Angela-wound Assessment Fragile 10/25/22 1313   Margins Defined edges 10/25/22 1313   Wound Thickness Description not for Pressure Injury Partial thickness 10/25/22 1313   Number of days: 35           Percent of Wound(s) Debrided: approximately 100%    Total  Area  Debrided:  0.4  cm3     Bleeding:  None    Hemostasis Achieved:  not needed    Procedural Pain:  0  / 10     Post Procedural Pain:  0 / 10     Response to treatment:  Well tolerated by patient. Status of wound progress and description from last visit:   stable. Plan:       Discharge Instructions         PHYSICIAN ORDERS AND DISCHARGE INSTRUCTIONS     NOTE: Upon discharge from the [address], you will receive a patient experience survey. We would be grateful if you would take the time to fill this survey out.      Wound care order history:                 YESSENIA's   Right       Left Date:              Cultures:                Grafts:                Antibiotics:           Continuing wound care orders and information:                 Residence:                Continue home health care with:              Your wound-care supplies will be provided by: Wound cleansing:              Do not scrub or use excessive force. Wash hands with soap and water before and after dressing changes. Prior to applying a clean dressing, cleanse wound with normal saline, wound cleanser, or mild soap and water. Ask the physician or nurse before getting the wound(s) wet in a shower     Daily Wound management:              Keep weight off wounds and reposition every 2 hours. Avoid standing for long periods of time. Apply wraps/stockings in AM and remove at bedtime. If swelling is present, elevate legs to the level of the heart or above for 30 minutes 4-5 times a day and/or when sitting. When taking antibiotics take entire prescription as ordered by physician do not stop taking until medicine is all gone. Orders for this week: 10/25/22     Will schedule surgery through [facility] Thursday                  Rx: [address]      Right 2nd Toe - Wash with soap and water, pat dry. Apply Betadine damp gauze wet to dry dressing. Cover with ca alginate. Wrap with conform and Coban (prefers 2\" coban to secure). Change daily. Follow up with Dr Chana Carter in 1 week in the wound care center. Call [phone] for any questions or concerns. Treatment Note Wound 09/20/22 Toe (Comment  which one) Right #1 right second toe-Dressing/Treatment:  (Betadine damp gauze, Calcium Alginate, ABD, Conform, Coban.  Border gauze to 5th toe)    Written Patient Dismissal Instructions Given       -History of wound dehiscence amputation sites bilateral foot.  -Chronic wound of the third toe of the right foot.    -Third toe amputation right foot (10/20/2022)  -Incision site well coapted with sutures intact. Some slight maceration but no signs of any dehiscence  -Foam padding placed on the lateral aspect of the fifth metatarsal where a previous ulcer lesion is present.  -New sterile dressing applied today.   Keep clean dry and intact at the next visit  -Continue to ambulate in surgical shoe  -Any serious concerns before the next visit to go to the emergency room  -Follow-up 1 week for recheck sooner if needed          Electronically signed by Sheryl Gil DPM on 10/25/2022 at 3:42 PM

## 2022-10-25 NOTE — DISCHARGE INSTRUCTIONS
PHYSICIAN ORDERS AND DISCHARGE INSTRUCTIONS     NOTE: Upon discharge from the [address], you will receive a patient experience survey. We would be grateful if you would take the time to fill this survey out. Wound care order history:                 YESSENIA's   Right       Left               Date:              Cultures:                Grafts:                Antibiotics:           Continuing wound care orders and information:                 Residence:                Continue home health care with:              Your wound-care supplies will be provided by: Wound cleansing:              Do not scrub or use excessive force. Wash hands with soap and water before and after dressing changes. Prior to applying a clean dressing, cleanse wound with normal saline, wound cleanser, or mild soap and water. Ask the physician or nurse before getting the wound(s) wet in a shower     Daily Wound management:              Keep weight off wounds and reposition every 2 hours. Avoid standing for long periods of time. Apply wraps/stockings in AM and remove at bedtime. If swelling is present, elevate legs to the level of the heart or above for 30 minutes 4-5 times a day and/or when sitting. When taking antibiotics take entire prescription as ordered by physician do not stop taking until medicine is all gone. Orders for this week: 10/25/22     Will schedule surgery through [facility] Thursday                  Rx: 19 Katherine Sullivanas      Right 2nd Toe - Wash with soap and water, pat dry. Apply Betadine damp gauze wet to dry dressing. Cover with ca alginate. Wrap with conform and Coban (prefers 2\" coban to secure). Change daily. Follow up with Dr Viviana Euceda in 1 week in the wound care center. Call [phone] for any questions or concerns.

## 2022-11-01 ENCOUNTER — HOSPITAL ENCOUNTER (OUTPATIENT)
Dept: WOUND CARE | Age: 72
Discharge: HOME | End: 2022-11-01
Payer: MEDICARE

## 2022-11-01 VITALS
TEMPERATURE: 96.8 F | RESPIRATION RATE: 18 BRPM | SYSTOLIC BLOOD PRESSURE: 162 MMHG | DIASTOLIC BLOOD PRESSURE: 87 MMHG | HEART RATE: 69 BPM

## 2022-11-01 DIAGNOSIS — I73.9 PVD (PERIPHERAL VASCULAR DISEASE) (HCC): ICD-10-CM

## 2022-11-01 DIAGNOSIS — L97.912 NON-PRESSURE ULCER OF RIGHT LOWER EXTREMITY WITH FAT LAYER EXPOSED (HCC): ICD-10-CM

## 2022-11-01 DIAGNOSIS — E11.49 OTHER DIABETIC NEUROLOGICAL COMPLICATION ASSOCIATED WITH TYPE 2 DIABETES MELLITUS (HCC): ICD-10-CM

## 2022-11-01 DIAGNOSIS — E11.621 DIABETIC ULCER OF TOE OF LEFT FOOT ASSOCIATED WITH TYPE 2 DIABETES MELLITUS, WITH FAT LAYER EXPOSED (HCC): Primary | ICD-10-CM

## 2022-11-01 DIAGNOSIS — L97.522 DIABETIC ULCER OF TOE OF LEFT FOOT ASSOCIATED WITH TYPE 2 DIABETES MELLITUS, WITH FAT LAYER EXPOSED (HCC): Primary | ICD-10-CM

## 2022-11-01 PROCEDURE — 11042 DBRDMT SUBQ TIS 1ST 20SQCM/<: CPT

## 2022-11-01 RX ORDER — BACITRACIN, NEOMYCIN, POLYMYXIN B 400; 3.5; 5 [USP'U]/G; MG/G; [USP'U]/G
OINTMENT TOPICAL ONCE
Status: CANCELLED | OUTPATIENT
Start: 2022-11-01 | End: 2022-11-01

## 2022-11-01 RX ORDER — GENTAMICIN SULFATE 1 MG/G
OINTMENT TOPICAL ONCE
Status: CANCELLED | OUTPATIENT
Start: 2022-11-01 | End: 2022-11-01

## 2022-11-01 RX ORDER — CLOBETASOL PROPIONATE 0.5 MG/G
OINTMENT TOPICAL ONCE
Status: CANCELLED | OUTPATIENT
Start: 2022-11-01 | End: 2022-11-01

## 2022-11-01 RX ORDER — LIDOCAINE HYDROCHLORIDE 20 MG/ML
JELLY TOPICAL ONCE
Status: CANCELLED | OUTPATIENT
Start: 2022-11-01 | End: 2022-11-01

## 2022-11-01 RX ORDER — LIDOCAINE HYDROCHLORIDE 40 MG/ML
SOLUTION TOPICAL ONCE
Status: CANCELLED | OUTPATIENT
Start: 2022-11-01 | End: 2022-11-01

## 2022-11-01 RX ORDER — BACITRACIN ZINC AND POLYMYXIN B SULFATE 500; 1000 [USP'U]/G; [USP'U]/G
OINTMENT TOPICAL ONCE
Status: CANCELLED | OUTPATIENT
Start: 2022-11-01 | End: 2022-11-01

## 2022-11-01 RX ORDER — LIDOCAINE 50 MG/G
OINTMENT TOPICAL ONCE
Status: CANCELLED | OUTPATIENT
Start: 2022-11-01 | End: 2022-11-01

## 2022-11-01 RX ORDER — BETAMETHASONE DIPROPIONATE 0.05 %
OINTMENT (GRAM) TOPICAL ONCE
Status: CANCELLED | OUTPATIENT
Start: 2022-11-01 | End: 2022-11-01

## 2022-11-01 RX ORDER — LIDOCAINE 40 MG/G
CREAM TOPICAL ONCE
Status: CANCELLED | OUTPATIENT
Start: 2022-11-01 | End: 2022-11-01

## 2022-11-01 RX ORDER — GINSENG 100 MG
CAPSULE ORAL ONCE
Status: CANCELLED | OUTPATIENT
Start: 2022-11-01 | End: 2022-11-01

## 2022-11-01 ASSESSMENT — PAIN DESCRIPTION - ORIENTATION: ORIENTATION: RIGHT

## 2022-11-01 ASSESSMENT — PAIN DESCRIPTION - LOCATION: LOCATION: FOOT

## 2022-11-01 ASSESSMENT — PAIN SCALES - GENERAL: PAINLEVEL_OUTOF10: 1

## 2022-11-01 NOTE — DISCHARGE INSTRUCTIONS
PHYSICIAN ORDERS AND DISCHARGE INSTRUCTIONS     NOTE: Upon discharge from the [address], you will receive a patient experience survey. We would be grateful if you would take the time to fill this survey out. Wound care order history:                 YESSENIA's   Right       Left               Date:              Cultures:                Grafts:                Antibiotics:           Continuing wound care orders and information:                 Residence:                Continue home health care with:              Your wound-care supplies will be provided by: Wound cleansing:              Do not scrub or use excessive force. Wash hands with soap and water before and after dressing changes. Prior to applying a clean dressing, cleanse wound with normal saline, wound cleanser, or mild soap and water. Ask the physician or nurse before getting the wound(s) wet in a shower     Daily Wound management:              Keep weight off wounds and reposition every 2 hours. Avoid standing for long periods of time. Apply wraps/stockings in AM and remove at bedtime. If swelling is present, elevate legs to the level of the heart or above for 30 minutes 4-5 times a day and/or when sitting. When taking antibiotics take entire prescription as ordered by physician do not stop taking until medicine is all gone. Orders for this week: 11/1/22                 Rx: 19 Katherine Corley      Right 2nd Toe - Wash with soap and water, pat dry. Apply Betadine damp gauze wet to dry dressing. Cover with ABD (pad medial and lateral sides of foot as well). Wrap with conform and Coban (prefers 2\" coban to secure). Change daily. Follow up with Dr Frank Robbins in 1 week in the wound care center. Call [phone] for any questions or concerns.

## 2022-11-01 NOTE — PROGRESS NOTES
[facility]ound Care Center Progress Note With Procedure    Mariam Kearney  AGE: 74 y.o. GENDER: male  : 1950  EPISODE DATE:  2022     Subjective:     Chief Complaint   Patient presents with    Wound Check     RLE         HISTORY of PRESENT ILLNESS      Mariam Kearney is a 74 y.o. male who presents today for wound to the third toe of the right foot. Status post third toe amputation right foot (10/20/2022). Patient doing well overall. No pain to the area. Denies any calf pain chest pain shortness of breath difficulty breathing or chest pain      PAST MEDICAL HISTORY        Diagnosis Date    Arrhythmia     Pacemaker placed aprox 5 years ago for A Fib per patient    Arthritis 2013    rt wrist    Atrial fibrillation (HCC)     on Xarelto - Dr. Tigist Marshall    CAD (coronary artery disease) 2014    see dr Tigist Marshall    Chronic kidney disease, stage III (moderate) ([facility] 2016    Critical illness myopathy 03/15/2021    Diabetes mellitus ([facility]     dx 2004    Diabetic neuropathy associated with type 2 diabetes mellitus ([facility] 2019    Erythropoietin deficiency anemia 2020    Gout 2019    \"got gout when had pacer put in because they did not give me my medication for gout \"    H/O 24 hour EKG monitoring 10/03/2013    no afib noted, sinsus rhythm    H/O cardiovascular stress test 2014    cardiolite- mild ischemia RCA EF50%    H/O echocardiogram 2020    EF 55-60% severe aortic stenosis mild to mod aortic regurg mod to severe tricuspid regurg severe pulm htn significant changes since 2018 echo. H/O right and left heart catheterization 12/10/2020    DIFFUSE LAD DISEASE, Mild ECA Disease, Severe AS, Milf Pul HTN on RHC. History of blood transfusion 2020    d/t anemia    History of transesophageal echocardiography (MABLE) 12/15/2020    Severe aortic stenosis (ANNA by planimetry: 0.778 cm sq). Mild AR.     [place] (hard of hearing)     hearing [employer]    Hx of Doppler echocardiogram 05/21/2018    EF 50%  Mild LV hypertrophy. Mildly enlarged RA. Mod aortic valve calcification with mod AS. Mitral annular calcification is present. Mild AR, MR and TR. Mild pulmonary htn.     Hyperlipidemia     Hypertension     Follows with PCP & Dr. Peyton Feliz    Other disorders of kidney and ureter     Pacemaker     Medtronic, implanted 2014    Pneumonia 12/29/2012    Pneumonia due to COVID-19 virus 08/2020    Sleep apnea     dx 2013- has c-pap    Type II or unspecified type diabetes mellitus with other specified manifestations, uncontrolled 12/12/2012    Venous hypertension, chronic, with ulcer ([address] 12/12/2012    resolved       PAST SURGICAL HISTORY    Past Surgical History:   Procedure Laterality Date    CABG WITH AORTIC VALVE REPLACEMENT N/A 02/16/2021    CABG CORONARY ARTERY BYPASS X2 WITH LIMA, AORTIC VALVE REPLACEMENT AND AORTIC ROOT REPAIR, INTRAOPERATIVE MABLE, INDUCED HYPOTHERMIA, LEFT LEG ENDOVEIN HARVEST, LEFT ATRIAL CLIP, AND CRYO PROCEDURE performed by Pratik Villegas MD at [address]  12/2014    at [address] Left 01/29/2021    LEFT CAROTID ENDARTERECTOMY performed by Melany Early MD at [address] [facility]  2017    COLONOSCOPY  2011    COLONOSCOPY N/A 11/19/2019    COLONOSCOPY DIAGNOSTIC performed by Artem Ramírez MD at [address] [facility]  09/30/2020    POSSIBLE CECAL avms, SIGMOID DIVERTICULOSIS, INTERNAL HEMORRHOIDS GRADE 1    COLONOSCOPY N/A 09/30/2020    COLONOSCOPY CONTROL HEMORRHAGE WITH APC performed by Artem Ramírez MD at [address] [facility]  2014    \"2 stents put in \"    FOOT DEBRIDEMENT Bilateral 03/31/2022    BILATERAL DEBRIEDMENT OF NON-VIABLE TISSUE & BONE performed by Manfred Raya DPM at [address] Right 04/14/2022    RIGHT FOOT DEBRIDEMENT INCISION AND DRAINAGE performed by Manfred Raya DPM at [address]      abdominal    IR NONTUNNELED VASCULAR CATHETER 03/05/2021    IR NONTUNNELED VASCULAR CATHETER 3/5/2021 [facility] SPECIAL PROCEDURES    JOINT REPLACEMENT  2004    total left hip    OTHER SURGICAL HISTORY Right 12/02/2017    I&D; evacuation of hematoma right hip    OTHER SURGICAL HISTORY  09/17/2020    enteroscopy    PACEMAKER INSERTION N/A 02/23/2021    PACEMAKER GENERATOR LEAD REVISION performed by Anneliese Mehta MD at [address] Left 04/26/2022    Dual PPM: Atrial lead extraction/insertion, Generator change. Medtronic, Sneads XT DR LOYD Suresccristy    PACEMAKER PLACEMENT      9/18/14 Status post remote permanent pacemaker with atrial lead dislodgement. 7/24/14 PPM Implant    TOE AMPUTATION Bilateral 03/31/2022    LEFT 3RD TOE AMPUTATION performed by Shaye Anders DPM at [address] Right 04/14/2022    RIGHT 2ND TOE AMPUTATION performed by Shaye Anders DPM at [address] [place] [address] N/A 09/17/2020    ENTEROSCOPY PUSH BIOPSY performed by Nate Carias MD at [address] [place] [address] N/A 03/04/2021    EGD DIAGNOSTIC ONLY performed by Nate Carias MD at [facility]  2012    \"have stents in both legs- done in [address] [place] [address]    Family History   Problem Relation Age of Onset    High Blood Pressure Mother     Arthritis Mother     Diabetes Mother     Heart Disease Mother     High Blood Pressure Father     Heart Disease Father     Kidney Disease Father     No Known Problems Sister     Heart Surgery Brother     Heart Disease Brother     No Known Problems Sister     No Known Problems Brother        SOCIAL HISTORY    Social History     Tobacco Use    Smoking status: Never    Smokeless tobacco: Never   Vaping Use    Vaping Use: Never used   Substance Use Topics    Alcohol use: Not Currently     Alcohol/week: 2.0 standard drinks     Types: 2 Cans of beer per week     Comment: average \"one time per week\"/ Caffiene: 1 cup of coffee daily    Drug use:  No ALLERGIES    Allergies   Allergen Reactions    Spironolactone      CAUSES INCREASED K+    Tape Susana Little Tape] Rash     SURGICAL TAPE       MEDICATIONS    Current Outpatient Medications on File Prior to Encounter   Medication Sig Dispense Refill    apixaban (ELIQUIS) 5 MG TABS tablet Take 1 tablet by mouth 2 times daily 180 tablet 3    simvastatin (ZOCOR) 20 MG tablet Take 1 tablet by mouth daily 90 tablet 3    apixaban (ELIQUIS) 5 MG TABS tablet Take 1 tablet by mouth 2 times daily 14 tablet 0    dapagliflozin (FARXIGA) 10 MG tablet Take 1 tablet by mouth every morning 90 tablet 1    Dulaglutide 4.5 MG/0.5ML SOPN Inject 4.5 mg into the skin once a week 12 Adjustable Dose Pre-filled Pen Syringe 1    gabapentin (NEURONTIN) 300 MG capsule TAKE 1 CAPSULE 3 TIMES A    capsule 1    glimepiride (AMARYL) 4 MG tablet TAKE 1 TABLET IN THE       MORNING (BEFORE BREAKFAST) 90 tablet 1    sildenafil (VIAGRA) 100 MG tablet TAKE 1 TABLET DAILY AS     NEEDED FOR ERECTILE        DYSFUNCTION 90 tablet 1    torsemide (DEMADEX) 20 MG tablet TAKE 2 TABLETS BY MOUTH TWICE A  tablet 3    carboxymethylcellulose (REFRESH PLUS) 0.5 % SOLN ophthalmic solution as needed       Cholecalciferol (VITAMIN D) 50 MCG (2000 UT) CAPS capsule Take by mouth nightly       aspirin 81 MG tablet Take 81 mg by mouth daily       No current facility-administered medications on file prior to encounter. REVIEW OF SYSTEMS    Pertinent items are noted in HPI. Constitutional: Negative for systemic symptoms including fever, chills and malaise. Objective:      BP (!) 162/87   Pulse 69   Temp 96.8 °F (36 °C) (Temporal)   Resp 18     PHYSICAL EXAM    General: The patient is in no acute distress. Mental status:  Patient is appropriate, is  oriented to place and plan of care.   Dermatologic exam: Visual inspection of the periwound reveals the skin to be normal in turgor and texture  Wound exam: see wound description below in procedure note. Surgical incision site well coapted with sutures intact. Some slight maceration but no dehiscence. No purulent drainage fluctuance crepitus or malodor  Musculoskeletal: Bilateral calves are soft and supple upon manual compression. 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     Performed by: Brittany Armstrong DPM    Consent obtained: Yes    Time out taken:  Yes    Pain Control: lidocaine       Debridement:Non-excisional Debridement    Using #15 blade scalpel the wound(s) was/were sharply debrided down through and including the removal of bone    Devitalized Tissue Debrided:  necrotic/eschar    Pre Debridement Measurements:  Are located in the Wound Documentation Flow Sheet    All active wounds listed below with today's date are evaluated      Wound 09/20/22 Toe (Comment  which one) Right #1 right second toe (Active)   Wound Image   10/18/22 1309   Wound Etiology Diabetic 10/25/22 1313   Dressing Status New dressing applied 11/01/22 1339   Wound Cleansed Wound cleanser 11/01/22 1309   Offloading for Diabetic Foot Ulcers Post op shoe 11/01/22 1339   Wound Length (cm) 3.7 cm 11/01/22 1309   Wound Width (cm) 3.5 cm 11/01/22 1309   Wound Depth (cm) 0.1 cm 11/01/22 1309   Wound Surface Area (cm^2) 12.95 cm^2 11/01/22 1309   Change in Wound Size % (l*w) -115.83 11/01/22 1309   Wound Volume (cm^3) 1.295 cm^3 11/01/22 1309   Wound Healing % -116 11/01/22 1309   Post-Procedure Length (cm) 3.7 cm 11/01/22 1335   Post-Procedure Width (cm) 3.5 cm 11/01/22 1335   Post-Procedure Depth (cm) 0.1 cm 11/01/22 1335   Post-Procedure Surface Area (cm^2) 12.95 cm^2 11/01/22 1335   Post-Procedure Volume (cm^3) 1.295 cm^3 11/01/22 1335   Distance Tunneling (cm) 0 cm 11/01/22 1309   Tunneling Position ___ O'Clock 0 11/01/22 1309   Undermining Starts ___ O'Clock 0 11/01/22 1309   Undermining Ends___ O'Clock 0 11/01/22 1309   Undermining Maxium Distance (cm) 0 11/01/22 1309   Wound Assessment Pink/red 11/01/22 1309   Drainage Amount Moderate 11/01/22 1309   Drainage Description Serosanguinous 11/01/22 1309   Odor None 11/01/22 1309   Angela-wound Assessment Fragile 11/01/22 1309   Margins Defined edges 11/01/22 1309   Wound Thickness Description not for Pressure Injury Partial thickness 11/01/22 1309   Number of days: 42         Percent of Wound(s) Debrided: approximately 100%    Total  Area  Debrided:  1.295  cm3     Bleeding:  None    Hemostasis Achieved:  not needed    Procedural Pain:  0  / 10     Post Procedural Pain:  0 / 10     Response to treatment:  Well tolerated by patient. Status of wound progress and description from last visit:   stable. Plan:       Discharge Instructions         PHYSICIAN ORDERS AND DISCHARGE INSTRUCTIONS     NOTE: [facility] [facility] you will receive a patient experience survey. We would be grateful if you would take the time to fill this survey out.      Wound care order history:                 YESSENIA's   Right       Left               Date:              Cultures:                Grafts:                Antibiotics:           Continuing wound care orders and information: Residence:                Continue home health care with:              Your wound-care supplies will be provided by: Wound cleansing:              Do not scrub or use excessive force. Wash hands with soap and water before and after dressing changes. Prior to applying a clean dressing, cleanse wound with normal saline, wound cleanser, or mild soap and water. Ask the physician or nurse before getting the wound(s) wet in a shower     Daily Wound management:              Keep weight off wounds and reposition every 2 hours. Avoid standing for long periods of time. Apply wraps/stockings in AM and remove at bedtime. If swelling is present, elevate legs to the level of the heart or above for 30 minutes 4-5 times a day and/or when sitting. When taking antibiotics take entire prescription as ordered by physician do not stop taking until medicine is all gone. Orders for this week: 11/1/22                 Rx: [address]      Right 2nd Toe - Wash with soap and water, pat dry. Apply Betadine damp gauze wet to dry dressing. Cover with ABD (pad medial and lateral sides of foot as well). Wrap with conform and Coban (prefers 2\" coban to secure). Change daily. Follow up with Dr Carolina Mercado in 1 week in the wound care center. Call [phone] for any questions or concerns. Treatment Note Wound 09/20/22 Toe (Comment  which one) Right #1 right second toe-Dressing/Treatment:  (betadine damp gauze, ABD conform coban)    Written Patient Dismissal Instructions Given       -History of wound dehiscence amputation sites bilateral foot.  -Chronic wound of the third toe of the right foot.    -Third toe amputation right foot (10/20/2022)  -No signs of infection today.   Monitor off antibiotics  -Some areas of maceration to the incision site as well as starting to form on the fourth toe. Recommend that the patient do Betadine wet-to-dry dressings but change daily at home to limit amount of maceration.  -Incision site still appears to be well coapted. Did remove more fibrotic slough today and does not have any deep areas of probing  -Extra padding to the lateral forefoot and medial midfoot where there are areas of increased pressure.   No wounds to these spots today  -Continue to ambulate in surgical shoe  -Any serious concerns before the next visit to go to the emergency room  -Follow-up 1 week for recheck sooner if needed          Electronically signed by Natacha Colin DPM on 11/1/2022 at 2:25 PM

## 2022-11-08 ENCOUNTER — HOSPITAL ENCOUNTER (OUTPATIENT)
Dept: WOUND CARE | Age: 72
Discharge: HOME | End: 2022-11-08
Payer: MEDICARE

## 2022-11-08 VITALS
TEMPERATURE: 99.5 F | HEART RATE: 69 BPM | RESPIRATION RATE: 20 BRPM | DIASTOLIC BLOOD PRESSURE: 68 MMHG | SYSTOLIC BLOOD PRESSURE: 141 MMHG

## 2022-11-08 DIAGNOSIS — I73.9 PVD (PERIPHERAL VASCULAR DISEASE) (HCC): ICD-10-CM

## 2022-11-08 DIAGNOSIS — L97.522 DIABETIC ULCER OF TOE OF LEFT FOOT ASSOCIATED WITH TYPE 2 DIABETES MELLITUS, WITH FAT LAYER EXPOSED (HCC): Primary | ICD-10-CM

## 2022-11-08 DIAGNOSIS — L97.912 NON-PRESSURE ULCER OF RIGHT LOWER EXTREMITY WITH FAT LAYER EXPOSED (HCC): ICD-10-CM

## 2022-11-08 DIAGNOSIS — E11.49 OTHER DIABETIC NEUROLOGICAL COMPLICATION ASSOCIATED WITH TYPE 2 DIABETES MELLITUS (HCC): ICD-10-CM

## 2022-11-08 DIAGNOSIS — E11.621 DIABETIC ULCER OF TOE OF LEFT FOOT ASSOCIATED WITH TYPE 2 DIABETES MELLITUS, WITH FAT LAYER EXPOSED (HCC): Primary | ICD-10-CM

## 2022-11-08 PROCEDURE — 11042 DBRDMT SUBQ TIS 1ST 20SQCM/<: CPT

## 2022-11-08 RX ORDER — LIDOCAINE 40 MG/G
CREAM TOPICAL ONCE
Status: CANCELLED | OUTPATIENT
Start: 2022-11-08 | End: 2022-11-08

## 2022-11-08 RX ORDER — LIDOCAINE HYDROCHLORIDE 20 MG/ML
JELLY TOPICAL ONCE
Status: CANCELLED | OUTPATIENT
Start: 2022-11-08 | End: 2022-11-08

## 2022-11-08 RX ORDER — BACITRACIN, NEOMYCIN, POLYMYXIN B 400; 3.5; 5 [USP'U]/G; MG/G; [USP'U]/G
OINTMENT TOPICAL ONCE
Status: CANCELLED | OUTPATIENT
Start: 2022-11-08 | End: 2022-11-08

## 2022-11-08 RX ORDER — CLOBETASOL PROPIONATE 0.5 MG/G
OINTMENT TOPICAL ONCE
Status: CANCELLED | OUTPATIENT
Start: 2022-11-08 | End: 2022-11-08

## 2022-11-08 RX ORDER — LIDOCAINE HYDROCHLORIDE 40 MG/ML
SOLUTION TOPICAL ONCE
Status: CANCELLED | OUTPATIENT
Start: 2022-11-08 | End: 2022-11-08

## 2022-11-08 RX ORDER — GENTAMICIN SULFATE 1 MG/G
OINTMENT TOPICAL ONCE
Status: CANCELLED | OUTPATIENT
Start: 2022-11-08 | End: 2022-11-08

## 2022-11-08 RX ORDER — BACITRACIN ZINC AND POLYMYXIN B SULFATE 500; 1000 [USP'U]/G; [USP'U]/G
OINTMENT TOPICAL ONCE
Status: CANCELLED | OUTPATIENT
Start: 2022-11-08 | End: 2022-11-08

## 2022-11-08 RX ORDER — GINSENG 100 MG
CAPSULE ORAL ONCE
Status: CANCELLED | OUTPATIENT
Start: 2022-11-08 | End: 2022-11-08

## 2022-11-08 RX ORDER — BETAMETHASONE DIPROPIONATE 0.05 %
OINTMENT (GRAM) TOPICAL ONCE
Status: CANCELLED | OUTPATIENT
Start: 2022-11-08 | End: 2022-11-08

## 2022-11-08 RX ORDER — LIDOCAINE 50 MG/G
OINTMENT TOPICAL ONCE
Status: CANCELLED | OUTPATIENT
Start: 2022-11-08 | End: 2022-11-08

## 2022-11-08 NOTE — PROGRESS NOTES
Wound Care Center Progress Note With Procedure    Quinton Haque  AGE: 74 y.o. GENDER: male  : 1950  EPISODE DATE:  2022     Subjective:     Chief Complaint   Patient presents with    Wound Check     Rt foot         HISTORY of PRESENT ILLNESS      Quinton Haque is a 74 y.o. male who presents today for wound to the third toe of the right foot. Status post third toe amputation right foot (10/20/2022). Doing well overall. Has been doing daily Betadine wet-to-dry dressing changes as directed. Denies any calf pain chest pain shortness of breath difficulty breathing      PAST MEDICAL HISTORY        Diagnosis Date    Arrhythmia     Pacemaker placed aprox 5 years ago for A Fib per patient    Arthritis 2013    rt wrist    Atrial fibrillation (HCC)     on Xarelto - Dr. Aden Baptiste    CAD (coronary artery disease) 2014    see dr Aden Baptiste    Chronic kidney disease, stage III (moderate) ([facility] 2016    Critical illness myopathy 03/15/2021    Diabetes mellitus ([facility]     dx     Diabetic neuropathy associated with type 2 diabetes mellitus ([facility] 2019    Erythropoietin deficiency anemia 2020    Gout 2019    \"got gout when had pacer put in because they did not give me my medication for gout \"    H/O 24 hour EKG monitoring 10/03/2013    no afib noted, sinsus rhythm    H/O cardiovascular stress test 2014    cardiolite- mild ischemia RCA EF50%    H/O echocardiogram 2020    EF 55-60% severe aortic stenosis mild to mod aortic regurg mod to severe tricuspid regurg severe pulm htn significant changes since 2018 echo. H/O right and left heart catheterization 12/10/2020    DIFFUSE LAD DISEASE, Mild ECA Disease, Severe AS, Milf Pul HTN on RHC. History of blood transfusion 2020    d/t anemia    History of transesophageal echocardiography (MABLE) 12/15/2020    Severe aortic stenosis (ANNA by planimetry: 0.778 cm sq). Mild AR.     [place] (hard of hearing)     hearing tonya aides Hx of Doppler echocardiogram 05/21/2018    EF 50%  Mild LV hypertrophy. Mildly enlarged RA. Mod aortic valve calcification with mod AS. Mitral annular calcification is present. Mild AR, MR and TR. Mild pulmonary htn.     Hyperlipidemia     Hypertension     Follows with PCP & Dr. Siri Steven    Other disorders of kidney and ureter     Pacemaker     Medtronic, implanted 2014    Pneumonia 12/29/2012    Pneumonia due to COVID-19 virus 08/2020    Sleep apnea     dx 2013- has c-pap    Type II or unspecified type diabetes mellitus with other specified manifestations, uncontrolled 12/12/2012    Venous hypertension, chronic, with ulcer ([address] 12/12/2012    resolved       PAST SURGICAL HISTORY    Past Surgical History:   Procedure Laterality Date    CABG WITH AORTIC VALVE REPLACEMENT N/A 02/16/2021    CABG CORONARY ARTERY BYPASS X2 WITH LIMA, AORTIC VALVE REPLACEMENT AND AORTIC ROOT REPAIR, INTRAOPERATIVE MABLE, INDUCED HYPOTHERMIA, LEFT LEG ENDOVEIN HARVEST, LEFT ATRIAL CLIP, AND CRYO PROCEDURE performed by Altagracia Maria MD at [address]  12/2014    at [address] Left 01/29/2021    LEFT CAROTID ENDARTERECTOMY performed by Asher Moreno MD at [address] [place] [address]  2017    COLONOSCOPY  2011    COLONOSCOPY N/A 11/19/2019    COLONOSCOPY DIAGNOSTIC performed by Vibha Garnica MD at [address] [facility]  09/30/2020    POSSIBLE CECAL avms, SIGMOID DIVERTICULOSIS, INTERNAL HEMORRHOIDS GRADE 1    COLONOSCOPY N/A 09/30/2020    COLONOSCOPY CONTROL HEMORRHAGE WITH APC performed by Vibha Garnica MD at [address] [facility]  2014    \"2 stents put in \"    FOOT DEBRIDEMENT Bilateral 03/31/2022    BILATERAL DEBRIEDMENT OF NON-VIABLE TISSUE & BONE performed by Antwon Williamson DPM at [address] Right 04/14/2022    RIGHT FOOT DEBRIDEMENT INCISION AND DRAINAGE performed by Antwon Williamson DPM at [address]      abdominal    IR NONTUNNELED VASCULAR CATHETER  03/05/2021    IR NONTUNNELED VASCULAR CATHETER 3/5/2021 [facility] SPECIAL PROCEDURES    JOINT REPLACEMENT  2004    total left hip    OTHER SURGICAL HISTORY Right 12/02/2017    I&D; evacuation of hematoma right hip    OTHER SURGICAL HISTORY  09/17/2020    enteroscopy    PACEMAKER INSERTION N/A 02/23/2021    PACEMAKER GENERATOR LEAD REVISION performed by Melo Hurt MD at [address] Left 04/26/2022    Dual PPM: Atrial lead extraction/insertion, Generator change. Medtronic, [place] XT DR EVERT Apodaca    PACEMAKER PLACEMENT      9/18/14 Status post remote permanent pacemaker with atrial lead dislodgement.  7/24/14 PPM Implant    TOE AMPUTATION Bilateral 03/31/2022    LEFT 3RD TOE AMPUTATION performed by Brittany Armstrong DPM at [address] Right 04/14/2022    RIGHT 2ND TOE AMPUTATION performed by Brittany Armstrong DPM at [address] N/A 09/17/2020    ENTEROSCOPY PUSH BIOPSY performed by Lawanda Ahn MD at [address] [place] [address] N/A 03/04/2021    EGD DIAGNOSTIC ONLY performed by Lawanda Ahn MD at [facility]  2012    \"have stents in both legs- done in [address]    Family History   Problem Relation Age of Onset    High Blood Pressure Mother     Arthritis Mother     Diabetes Mother     Heart Disease Mother     High Blood Pressure Father     Heart Disease Father     Kidney Disease Father     No Known Problems Sister     Heart Surgery Brother     Heart Disease Brother     No Known Problems Sister     No Known Problems Brother        SOCIAL HISTORY    Social History     Tobacco Use    Smoking status: Never    Smokeless tobacco: Never   Vaping Use    Vaping Use: Never used   Substance Use Topics    Alcohol use: Not Currently     Alcohol/week: 2.0 standard drinks     Types: 2 Cans of beer per week     Comment: average \"one time per week\"/ Caffiene: 1 cup of coffee daily    Drug use: No       ALLERGIES    Allergies   Allergen Reactions    Spironolactone      CAUSES INCREASED K+    Tape Jacqulin Keto Tape] Rash     SURGICAL TAPE       MEDICATIONS    Current Outpatient Medications on File Prior to Encounter   Medication Sig Dispense Refill    apixaban (ELIQUIS) 5 MG TABS tablet Take 1 tablet by mouth 2 times daily 180 tablet 3    simvastatin (ZOCOR) 20 MG tablet Take 1 tablet by mouth daily 90 tablet 3    apixaban (ELIQUIS) 5 MG TABS tablet Take 1 tablet by mouth 2 times daily 14 tablet 0    dapagliflozin (FARXIGA) 10 MG tablet Take 1 tablet by mouth every morning 90 tablet 1    Dulaglutide 4.5 MG/0.5ML SOPN Inject 4.5 mg into the skin once a week 12 Adjustable Dose Pre-filled Pen Syringe 1    gabapentin (NEURONTIN) 300 MG capsule TAKE 1 CAPSULE 3 TIMES A    capsule 1    glimepiride (AMARYL) 4 MG tablet TAKE 1 TABLET IN THE       MORNING (BEFORE BREAKFAST) 90 tablet 1    sildenafil (VIAGRA) 100 MG tablet TAKE 1 TABLET DAILY AS     NEEDED FOR ERECTILE        DYSFUNCTION 90 tablet 1    torsemide (DEMADEX) 20 MG tablet TAKE 2 TABLETS BY MOUTH TWICE A  tablet 3    carboxymethylcellulose (REFRESH PLUS) 0.5 % SOLN ophthalmic solution as needed       Cholecalciferol (VITAMIN D) 50 MCG (2000 UT) CAPS capsule Take by mouth nightly       aspirin 81 MG tablet Take 81 mg by mouth daily       No current facility-administered medications on file prior to encounter. REVIEW OF SYSTEMS    Pertinent items are noted in HPI. Constitutional: Negative for systemic symptoms including fever, chills and malaise. Objective:      BP (!) 141/68   Pulse 69   Temp 99.5 °F (37.5 °C) (Temporal)   Resp 20     PHYSICAL EXAM    General: The patient is in no acute distress. Mental status:  Patient is appropriate, is  oriented to place and plan of care.   Dermatologic exam: Visual inspection of the periwound reveals the skin to be normal in turgor and texture  Wound exam: see wound description below in procedure note. Surgical incision site well coapted with sutures intact. Some slight maceration but no dehiscence. No purulent drainage fluctuance crepitus or malodor  Musculoskeletal: Bilateral calves are soft and supple upon manual compression. 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     Performed by: Sheryl Gil DPM    Consent obtained: Yes    Time out taken:  Yes    Pain Control: lidocaine       Debridement:Non-excisional Debridement    Using #15 blade scalpel the wound(s) was/were sharply debrided down through and including the removal of bone    Devitalized Tissue Debrided:  necrotic/eschar    Pre Debridement Measurements:  Are located in the Wound Documentation Flow Sheet    All active wounds listed below with today's date are evaluated      Wound 09/20/22 Toe (Comment  which one) Right #1 right second toe (Active)   Wound Image   11/08/22 1315   Wound Etiology Diabetic 11/08/22 1315   Dressing Status New dressing applied 11/01/22 1339   Wound Cleansed Wound cleanser 11/08/22 1315   Offloading for Diabetic Foot Ulcers Post op shoe 11/01/22 1339   Wound Length (cm) 2.5 cm 11/08/22 1315   Wound Width (cm) 0.1 cm 11/08/22 1315   Wound Depth (cm) 0.1 cm 11/08/22 1315   Wound Surface Area (cm^2) 0.25 cm^2 11/08/22 1315   Change in Wound Size % (l*w) 95.83 11/08/22 1315   Wound Volume (cm^3) 0.025 cm^3 11/08/22 1315   Wound Healing % 96 11/08/22 1315   Post-Procedure Length (cm) 2.5 cm 11/08/22 1333   Post-Procedure Width (cm) 0.1 cm 11/08/22 1333   Post-Procedure Depth (cm) 0.1 cm 11/08/22 1333   Post-Procedure Surface Area (cm^2) 0.25 cm^2 11/08/22 1333   Post-Procedure Volume (cm^3) 0.025 cm^3 11/08/22 1333   Distance Tunneling (cm) 0 cm 11/08/22 1315   Tunneling Position ___ O'Clock 0 11/08/22 1315   Undermining Starts ___ O'Clock 0 11/08/22 1315   Undermining Ends___ O'Clock 0 11/08/22 1315   Undermining Maxium Distance (cm) 00 11/08/22 1315   Wound Assessment Pink/red 11/08/22 1315   Drainage Amount Moderate 11/08/22 1315   Drainage Description Clear 11/08/22 1315   Odor None 11/08/22 1315   Angela-wound Assessment Fragile 11/08/22 1315   Margins Defined edges 11/08/22 1315   Wound Thickness Description not for Pressure Injury Partial thickness 11/08/22 1315   Number of days: 49           Percent of Wound(s) Debrided: approximately 100%    Total  Area  Debrided:  0.025  cm3     Bleeding:  None    Hemostasis Achieved:  not needed    Procedural Pain:  0  / 10     Post Procedural Pain:  0 / 10     Response to treatment:  Well tolerated by patient. Status of wound progress and description from last visit:   stable. Plan:       Discharge Instructions         PHYSICIAN ORDERS AND DISCHARGE INSTRUCTIONS     NOTE: Upon discharge from the [address], you will receive a patient experience survey. We would be grateful if you would take the time to fill this survey out.      Wound care order history:                 YESSENIA's   Right       Left               Date:              Cultures:                Grafts:                Antibiotics:           Continuing wound care orders and information:                 Residence: Continue home health care with:              Your wound-care supplies will be provided by: Wound cleansing:              Do not scrub or use excessive force. Wash hands with soap and water before and after dressing changes. Prior to applying a clean dressing, cleanse wound with normal saline, wound cleanser, or mild soap and water. Ask the physician or nurse before getting the wound(s) wet in a shower     Daily Wound management:              Keep weight off wounds and reposition every 2 hours. Avoid standing for long periods of time. Apply wraps/stockings in AM and remove at bedtime. If swelling is present, elevate legs to the level of the heart or above for 30 minutes 4-5 times a day and/or when sitting. When taking antibiotics take entire prescription as ordered by physician do not stop taking until medicine is all gone. Orders for this week: 11/8/22                 Rx: 19 [address] Corley      Right 2nd Toe - Wash with soap and water, pat dry. Apply Betadine damp gauze wet to dry dressing. Cover with ABD (pad medial and lateral sides of foot as well). Wrap with conform and Coban (prefers 2\" coban to secure). Change daily. Follow up with Dr Javi Bui in 1 week in the wound care center. Call [phone] for any questions or concerns. Treatment Note      Written Patient Dismissal Instructions Given       -History of wound dehiscence amputation sites bilateral foot.  -Chronic wound of the third toe of the right foot.    -Third toe amputation right foot (10/20/2022)  -No signs of infection today. Monitor off antibiotics  -Wound does appear to be healing well with only some slight areas of dehiscence centrally.   Less maceration today.  -Continue with daily Betadine wet-to-dry dressing changes.  -Continue with extra padding to the lateral forefoot and medial midfoot where there are areas of increased pressure.   No wounds to these spots today  -Continue to ambulate in surgical shoe  -Any serious concerns before the next visit to go to the emergency room  -Follow-up 1 week for recheck sooner if needed          Electronically signed by Antwon Williamson DPM on 11/8/2022 at 1:46 PM

## 2022-11-08 NOTE — DISCHARGE INSTRUCTIONS
PHYSICIAN ORDERS AND DISCHARGE INSTRUCTIONS     NOTE: Upon discharge from the [address], you will receive a patient experience survey. We would be grateful if you would take the time to fill this survey out. Wound care order history:                 YESSENIA's   Right       Left               Date:              Cultures:                Grafts:                Antibiotics:           Continuing wound care orders and information:                 Residence:                Continue home health care with:              Your wound-care supplies will be provided by: Wound cleansing:              Do not scrub or use excessive force. Wash hands with soap and water before and after dressing changes. Prior to applying a clean dressing, cleanse wound with normal saline, wound cleanser, or mild soap and water. Ask the physician or nurse before getting the wound(s) wet in a shower     Daily Wound management:              Keep weight off wounds and reposition every 2 hours. Avoid standing for long periods of time. Apply wraps/stockings in AM and remove at bedtime. If swelling is present, elevate legs to the level of the heart or above for 30 minutes 4-5 times a day and/or when sitting. When taking antibiotics take entire prescription as ordered by physician do not stop taking until medicine is all gone. Orders for this week: 11/8/22                 Rx: 19 Katherine Corley      Right 2nd Toe - Wash with soap and water, pat dry. Apply Betadine damp gauze wet to dry dressing. Cover with ABD (pad medial and lateral sides of foot as well). Wrap with conform and Coban (prefers 2\" coban to secure). Change daily. Follow up with Dr Km Downs in 1 week in the wound care center. Call [phone] for any questions or concerns.

## 2022-11-15 ENCOUNTER — HOSPITAL ENCOUNTER (OUTPATIENT)
Dept: WOUND CARE | Age: 72
Discharge: HOME | End: 2022-11-15
Payer: MEDICARE

## 2022-11-15 VITALS
HEART RATE: 70 BPM | RESPIRATION RATE: 19 BRPM | DIASTOLIC BLOOD PRESSURE: 71 MMHG | SYSTOLIC BLOOD PRESSURE: 147 MMHG | TEMPERATURE: 98.1 F

## 2022-11-15 DIAGNOSIS — I73.9 PVD (PERIPHERAL VASCULAR DISEASE) (HCC): ICD-10-CM

## 2022-11-15 DIAGNOSIS — E11.621 DIABETIC ULCER OF TOE OF LEFT FOOT ASSOCIATED WITH TYPE 2 DIABETES MELLITUS, WITH FAT LAYER EXPOSED (HCC): Primary | ICD-10-CM

## 2022-11-15 DIAGNOSIS — L97.912 NON-PRESSURE ULCER OF RIGHT LOWER EXTREMITY WITH FAT LAYER EXPOSED (HCC): ICD-10-CM

## 2022-11-15 DIAGNOSIS — E11.49 OTHER DIABETIC NEUROLOGICAL COMPLICATION ASSOCIATED WITH TYPE 2 DIABETES MELLITUS (HCC): ICD-10-CM

## 2022-11-15 DIAGNOSIS — L97.522 DIABETIC ULCER OF TOE OF LEFT FOOT ASSOCIATED WITH TYPE 2 DIABETES MELLITUS, WITH FAT LAYER EXPOSED (HCC): Primary | ICD-10-CM

## 2022-11-15 PROCEDURE — 11042 DBRDMT SUBQ TIS 1ST 20SQCM/<: CPT

## 2022-11-15 RX ORDER — LIDOCAINE 40 MG/G
CREAM TOPICAL ONCE
Status: CANCELLED | OUTPATIENT
Start: 2022-11-15 | End: 2022-11-15

## 2022-11-15 RX ORDER — GENTAMICIN SULFATE 1 MG/G
OINTMENT TOPICAL ONCE
Status: CANCELLED | OUTPATIENT
Start: 2022-11-15 | End: 2022-11-15

## 2022-11-15 RX ORDER — CLOBETASOL PROPIONATE 0.5 MG/G
OINTMENT TOPICAL ONCE
Status: CANCELLED | OUTPATIENT
Start: 2022-11-15 | End: 2022-11-15

## 2022-11-15 RX ORDER — LIDOCAINE HYDROCHLORIDE 40 MG/ML
SOLUTION TOPICAL ONCE
Status: CANCELLED | OUTPATIENT
Start: 2022-11-15 | End: 2022-11-15

## 2022-11-15 RX ORDER — GINSENG 100 MG
CAPSULE ORAL ONCE
Status: CANCELLED | OUTPATIENT
Start: 2022-11-15 | End: 2022-11-15

## 2022-11-15 RX ORDER — BACITRACIN ZINC AND POLYMYXIN B SULFATE 500; 1000 [USP'U]/G; [USP'U]/G
OINTMENT TOPICAL ONCE
Status: CANCELLED | OUTPATIENT
Start: 2022-11-15 | End: 2022-11-15

## 2022-11-15 RX ORDER — BACITRACIN, NEOMYCIN, POLYMYXIN B 400; 3.5; 5 [USP'U]/G; MG/G; [USP'U]/G
OINTMENT TOPICAL ONCE
Status: CANCELLED | OUTPATIENT
Start: 2022-11-15 | End: 2022-11-15

## 2022-11-15 RX ORDER — LIDOCAINE HYDROCHLORIDE 20 MG/ML
JELLY TOPICAL ONCE
Status: CANCELLED | OUTPATIENT
Start: 2022-11-15 | End: 2022-11-15

## 2022-11-15 RX ORDER — BETAMETHASONE DIPROPIONATE 0.05 %
OINTMENT (GRAM) TOPICAL ONCE
Status: CANCELLED | OUTPATIENT
Start: 2022-11-15 | End: 2022-11-15

## 2022-11-15 RX ORDER — LIDOCAINE 50 MG/G
OINTMENT TOPICAL ONCE
Status: CANCELLED | OUTPATIENT
Start: 2022-11-15 | End: 2022-11-15

## 2022-11-15 ASSESSMENT — PAIN DESCRIPTION - ORIENTATION: ORIENTATION: RIGHT

## 2022-11-15 ASSESSMENT — PAIN DESCRIPTION - LOCATION: LOCATION: FOOT

## 2022-11-15 NOTE — DISCHARGE INSTRUCTIONS
PHYSICIAN ORDERS AND DISCHARGE INSTRUCTIONS     NOTE: Upon discharge from the [address], you will receive a patient experience survey. We would be grateful if you would take the time to fill this survey out. Wound care order history:                 YESSENIA's   Right       Left               Date:              Cultures:                Grafts:                Antibiotics:           Continuing wound care orders and information:                 Residence:                Continue home health care with:              Your wound-care supplies will be provided by: Wound cleansing:              Do not scrub or use excessive force. Wash hands with soap and water before and after dressing changes. Prior to applying a clean dressing, cleanse wound with normal saline, wound cleanser, or mild soap and water. Ask the physician or nurse before getting the wound(s) wet in a shower     Daily Wound management:              Keep weight off wounds and reposition every 2 hours. Avoid standing for long periods of time. Apply wraps/stockings in AM and remove at bedtime. If swelling is present, elevate legs to the level of the heart or above for 30 minutes 4-5 times a day and/or when sitting. When taking antibiotics take entire prescription as ordered by physician do not stop taking until medicine is all gone. Orders for this week: 11/15/22                 Rx: 19 Katherine Corley      Right 2nd Toe and Right Lateral Foot - Wash with soap and water, pat dry. Apply Betadine damp gauze wet to dry dressing. Cover with ABD.     --also pad Right Lateral foot with foam.  Wrap with conform and Coban (prefers 2\" coban to secure). Change daily. Follow up with Dr Alverto Cornelius in 1 week in the wound care center. Call [phone] for any questions or concerns.

## 2022-11-15 NOTE — PROGRESS NOTES
Wound Care Center Progress Note With Procedure    Francine Cervantes  AGE: 74 y.o. GENDER: male  : 1950  EPISODE DATE:  11/15/2022     Subjective:     Chief Complaint   Patient presents with    Wound Check     RLE         HISTORY of PRESENT ILLNESS      Francine Cervantes is a 74 y.o. male who presents today for wound to the third toe of the right foot. Status post third toe amputation right foot (10/20/2022). Surgical site is doing well. Possible worsening changes to the area over the lateral fifth metatarsal head. Patient says that he has been ambulating in his 1 surgical shoe. Denies any calf pain chest pain shortness of breath difficulty breathing      PAST MEDICAL HISTORY        Diagnosis Date    Arrhythmia     Pacemaker placed aprox 5 years ago for A Fib per patient    Arthritis 2013    rt wrist    Atrial fibrillation (HCC)     on Xarelto - Dr. Sirena Kay    CAD (coronary artery disease) 2014    see dr Sirena Kay    Chronic kidney disease, stage III (moderate) ([facility] [address] 2016    Critical illness myopathy 03/15/2021    Diabetes mellitus ([facility] [address]     dx     Diabetic neuropathy associated with type 2 diabetes mellitus ([facility] [address] 2019    Erythropoietin deficiency anemia 2020    Gout 2019    \"got gout when had pacer put in because they did not give me my medication for gout \"    H/O 24 hour EKG monitoring 10/03/2013    no afib noted, sinsus rhythm    H/O cardiovascular stress test 2014    cardiolite- mild ischemia RCA EF50%    H/O echocardiogram 2020    EF 55-60% severe aortic stenosis mild to mod aortic regurg mod to severe tricuspid regurg severe pulm htn significant changes since 2018 echo. H/O right and left heart catheterization 12/10/2020    DIFFUSE LAD DISEASE, Mild ECA Disease, Severe AS, Milf Pul HTN on RHC.     History of blood transfusion 2020    d/t anemia    History of transesophageal echocardiography (MABLE) 12/15/2020    Severe aortic stenosis (ANNA by planimetry: 0.778 cm sq). Mild AR. [place] (hard of hearing)     hearing tonya aides    Hx of Doppler echocardiogram 05/21/2018    EF 50%  Mild LV hypertrophy. Mildly enlarged RA. Mod aortic valve calcification with mod AS. Mitral annular calcification is present. Mild AR, MR and TR. Mild pulmonary htn.     Hyperlipidemia     Hypertension     Follows with PCP & Dr. Joao Cervantes    Other disorders of kidney and ureter     Pacemaker     Medtronic, implanted 2014    Pneumonia 12/29/2012    Pneumonia due to COVID-19 virus 08/2020    Sleep apnea     dx 2013- has c-pap    Type II or unspecified type diabetes mellitus with other specified manifestations, uncontrolled 12/12/2012    Venous hypertension, chronic, with ulcer ([address] 12/12/2012    resolved       PAST SURGICAL HISTORY    Past Surgical History:   Procedure Laterality Date    CABG WITH AORTIC VALVE REPLACEMENT N/A 02/16/2021    CABG CORONARY ARTERY BYPASS X2 WITH LIMA, AORTIC VALVE REPLACEMENT AND AORTIC ROOT REPAIR, INTRAOPERATIVE MABLE, INDUCED HYPOTHERMIA, LEFT LEG ENDOVEIN HARVEST, LEFT ATRIAL CLIP, AND CRYO PROCEDURE performed by Diego Mathew MD at [address]  12/2014    at [address] Left 01/29/2021    LEFT CAROTID ENDARTERECTOMY performed by Lakesha Goldstein MD at [address] [facility]  2017    COLONOSCOPY  2011    COLONOSCOPY N/A 11/19/2019    COLONOSCOPY DIAGNOSTIC performed by Natalie Sparks MD at [address] [facility]  09/30/2020    POSSIBLE CECAL avms, SIGMOID DIVERTICULOSIS, INTERNAL HEMORRHOIDS GRADE 1    COLONOSCOPY N/A 09/30/2020    COLONOSCOPY CONTROL HEMORRHAGE WITH APC performed by Natalie Sparks MD at [address] [facility]  2014    \"2 stents put in \"    FOOT DEBRIDEMENT Bilateral 03/31/2022    BILATERAL DEBRIEDMENT OF NON-VIABLE TISSUE & BONE performed by Sheryl Gil DPM at [address] [place] [address] Right 04/14/2022    RIGHT FOOT DEBRIDEMENT INCISION AND DRAINAGE performed by Efrem Adair DPM at [address]      abdominal    IR NONTUNNELED VASCULAR CATHETER  03/05/2021    IR NONTUNNELED VASCULAR CATHETER 3/5/2021 [facility] SPECIAL PROCEDURES    JOINT REPLACEMENT  2004    total left hip    OTHER SURGICAL HISTORY Right 12/02/2017    I&D; evacuation of hematoma right hip    OTHER SURGICAL HISTORY  09/17/2020    enteroscopy    PACEMAKER INSERTION N/A 02/23/2021    PACEMAKER GENERATOR LEAD REVISION performed by Terri Nunes MD at [address] Left 04/26/2022    Dual PPM: Atrial lead extraction/insertion, Generator change. Medtronic, [place] XT DR LOYD Suresccristy    PACEMAKER PLACEMENT      9/18/14 Status post remote permanent pacemaker with atrial lead dislodgement.  7/24/14 PPM Implant    TOE AMPUTATION Bilateral 03/31/2022    LEFT 3RD TOE AMPUTATION performed by Efrem Adair DPM at [address] Right 04/14/2022    RIGHT 2ND TOE AMPUTATION performed by Efrem Adair DPM at [address] N/A 09/17/2020    ENTEROSCOPY PUSH BIOPSY performed by Jong Hadley MD at [address] N/A 03/04/2021    EGD DIAGNOSTIC ONLY performed by Jong Hadley MD at [address]  2012    \"have stents in both legs- done in [address] [facility]    Family History   Problem Relation Age of Onset    High Blood Pressure Mother     Arthritis Mother     Diabetes Mother     Heart Disease Mother     High Blood Pressure Father     Heart Disease Father     Kidney Disease Father     No Known Problems Sister     Heart Surgery Brother     Heart Disease Brother     No Known Problems Sister     No Known Problems Brother        SOCIAL HISTORY    Social History     Tobacco Use    Smoking status: Never    Smokeless tobacco: Never   Vaping Use    Vaping Use: Never used   Substance Use Topics    Alcohol use: Not Currently     Alcohol/week: 2.0 standard drinks     Types: 2 Cans of beer per week Comment: average \"one time per week\"/ Caffiene: 1 cup of coffee daily    Drug use: No       ALLERGIES    Allergies   Allergen Reactions    Spironolactone      CAUSES INCREASED K+    Tape Mavis Cuming Tape] Rash     SURGICAL TAPE       MEDICATIONS    Current Outpatient Medications on File Prior to Encounter   Medication Sig Dispense Refill    apixaban (ELIQUIS) 5 MG TABS tablet Take 1 tablet by mouth 2 times daily 180 tablet 3    simvastatin (ZOCOR) 20 MG tablet Take 1 tablet by mouth daily 90 tablet 3    apixaban (ELIQUIS) 5 MG TABS tablet Take 1 tablet by mouth 2 times daily 14 tablet 0    dapagliflozin (FARXIGA) 10 MG tablet Take 1 tablet by mouth every morning 90 tablet 1    Dulaglutide 4.5 MG/0.5ML SOPN Inject 4.5 mg into the skin once a week 12 Adjustable Dose Pre-filled Pen Syringe 1    gabapentin (NEURONTIN) 300 MG capsule TAKE 1 CAPSULE 3 TIMES A    capsule 1    glimepiride (AMARYL) 4 MG tablet TAKE 1 TABLET IN THE       MORNING (BEFORE BREAKFAST) 90 tablet 1    sildenafil (VIAGRA) 100 MG tablet TAKE 1 TABLET DAILY AS     NEEDED FOR ERECTILE        DYSFUNCTION 90 tablet 1    torsemide (DEMADEX) 20 MG tablet TAKE 2 TABLETS BY MOUTH TWICE A  tablet 3    carboxymethylcellulose (REFRESH PLUS) 0.5 % SOLN ophthalmic solution as needed       Cholecalciferol (VITAMIN D) 50 MCG (2000 UT) CAPS capsule Take by mouth nightly       aspirin 81 MG tablet Take 81 mg by mouth daily       No current facility-administered medications on file prior to encounter. REVIEW OF SYSTEMS    Pertinent items are noted in HPI. Constitutional: Negative for systemic symptoms including fever, chills and malaise. Objective:      BP (!) 147/71   Pulse 70   Temp 98.1 °F (36.7 °C) (Temporal)   Resp 19     PHYSICAL EXAM    General: The patient is in no acute distress. Mental status:  Patient is appropriate, is  oriented to place and plan of care.   Dermatologic exam: Visual inspection of the periwound reveals the skin to be normal in turgor and texture  Wound exam: see wound description below in procedure note. Surgical incision site well coapted with sutures intact. Some slight maceration but no dehiscence. No purulent drainage fluctuance crepitus or malodor  Musculoskeletal: Bilateral calves are soft and supple upon manual compression. 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     Performed by: Tamara Simon DPM    Consent obtained: Yes    Time out taken:  Yes    Pain Control: lidocaine       Debridement:Non-excisional Debridement    Using #15 blade scalpel the wound(s) was/were sharply debrided down through and including the removal of bone    Devitalized Tissue Debrided:  necrotic/eschar    Pre Debridement Measurements:  Are located in the Wound Documentation Flow Sheet    All active wounds listed below with today's date are evaluated      Wound 09/20/22 Toe (Comment  which one) Right #1 right second toe (Active)   Wound Image   11/08/22 1315   Wound Etiology Diabetic 11/15/22 1352   Dressing Status New dressing applied 11/15/22 1438   Wound Cleansed Wound cleanser 11/15/22 1352   Offloading for Diabetic Foot Ulcers Post op shoe 11/15/22 1438   Wound Length (cm) 0.9 cm 11/15/22 1352   Wound Width (cm) 0.5 cm 11/15/22 1352   Wound Depth (cm) 0.1 cm 11/15/22 1352   Wound Surface Area (cm^2) 0.45 cm^2 11/15/22 1352   Change in Wound Size % (l*w) 92.5 11/15/22 1352   Wound Volume (cm^3) 0.045 cm^3 11/15/22 1352   Wound Healing % 93 11/15/22 1352   Post-Procedure Length (cm) 0.9 cm 11/15/22 1411   Post-Procedure Width (cm) 0.5 cm 11/15/22 1411   Post-Procedure Depth (cm) 0.1 cm 11/15/22 1411   Post-Procedure Surface Area (cm^2) 0.45 cm^2 11/15/22 1411   Post-Procedure Volume (cm^3) 0.045 cm^3 11/15/22 1411   Distance Tunneling (cm) 0 cm 11/15/22 1352   Tunneling Position ___ O'Clock 0 11/15/22 1352   Undermining Starts ___ O'Clock 0 11/15/22 1352   Undermining Ends___ O'Clock 0 11/15/22 1352   Undermining Maxium Distance (cm) 0 11/15/22 1352   Wound Assessment Pink/red 11/15/22 1352   Drainage Amount Moderate 11/15/22 1352   Drainage Description Clear 11/15/22 1352   Odor None 11/15/22 1352   Angela-wound Assessment Fragile 11/15/22 1352   Margins Defined edges 11/15/22 1352   Wound Thickness Description not for Pressure Injury Partial thickness 11/15/22 1352   Number of days: 56       Wound 11/15/22 #2 right lateral foot (Active)   Wound Image   11/15/22 1348   Wound Etiology Pressure Stage 2 11/15/22 1348   Dressing Status New dressing applied 11/15/22 1438   Wound Cleansed Wound cleanser 11/15/22 1348   Offloading for Diabetic Foot Ulcers Post op shoe 11/15/22 1438   Wound Length (cm) 3.1 cm 11/15/22 1348   Wound Width (cm) 3.1 cm 11/15/22 1348   Wound Depth (cm) 0.1 cm 11/15/22 1348   Wound Surface Area (cm^2) 9.61 cm^2 11/15/22 1348   Wound Volume (cm^3) 0.961 cm^3 11/15/22 1348   Post-Procedure Length (cm) 3.1 cm 11/15/22 1352   Post-Procedure Width (cm) 3.1 cm 11/15/22 1352   Post-Procedure Depth (cm) 0.1 cm 11/15/22 1352   Post-Procedure Surface Area (cm^2) 9.61 cm^2 11/15/22 1352   Post-Procedure Volume (cm^3) 0.961 cm^3 11/15/22 1352   Distance Tunneling (cm) 0 cm 11/15/22 1348   Tunneling Position ___ O'Clock 0 11/15/22 1348   Undermining Starts ___ O'Clock 0 11/15/22 1348   Undermining Ends___ O'Clock 0 11/15/22 1348   Undermining Maxium Distance (cm) 0 11/15/22 1348   Wound Assessment Pink/red 11/15/22 1348   Drainage Amount Moderate 11/15/22 1348   Drainage Description Brown 11/15/22 1348   Odor None 11/15/22 1348   Angela-wound Assessment Ecchymosis 11/15/22 1348   Margins Defined edges 11/15/22 1348   Wound Thickness Description not for Pressure Injury Full thickness 11/15/22 1348   Number of days: 0         Percent of Wound(s) Debrided: approximately 100%    Total  Area  Debrided:  1.006  cm3     Bleeding:  None    Hemostasis Achieved:  not needed    Procedural Pain:  0  / 10     Post Procedural Pain:  0 / 10     Response to treatment:  Well tolerated by patient. Status of wound progress and description from last visit:   stable. Plan:       Discharge Instructions         PHYSICIAN ORDERS AND DISCHARGE INSTRUCTIONS     NOTE: Upon discharge from the [address], you will receive a patient experience survey. We would be grateful if you would take the time to fill this survey out. Wound care order history:                 YESSENIA's   Right       Left               Date:              Cultures:                Grafts:                Antibiotics:           Continuing wound care orders and information:                 Residence:                Continue home health care with:              Your wound-care supplies will be provided by: Wound cleansing:              Do not scrub or use excessive force. Wash hands with soap and water before and after dressing changes. Prior to applying a clean dressing, cleanse wound with normal saline, wound cleanser, or mild soap and water.               Ask the physician or nurse before getting the wound(s) wet in a shower     Daily Wound management:              Keep weight off wounds and reposition every 2 hours. Avoid standing for long periods of time. Apply wraps/stockings in AM and remove at bedtime. If swelling is present, elevate legs to the level of the heart or above for 30 minutes 4-5 times a day and/or when sitting. When taking antibiotics take entire prescription as ordered by physician do not stop taking until medicine is all gone. Orders for this week: 11/15/22                 Rx: [address]      Right 2nd Toe and Right Lateral Foot - Wash with soap and water, pat dry. Apply Betadine damp gauze wet to dry dressing. Cover with ABD.     --also pad Right Lateral foot with foam.  Wrap with conform and Coban (prefers 2\" coban to secure). Change daily. Follow up with Dr Alverto Cornelius in 1 week in the wound care center. Call [phone] for any questions or concerns. Treatment Note Wound 09/20/22 Toe (Comment  which one) Right #1 right second toe-Dressing/Treatment:  (betadine gauze abd conform coban)  Wound 11/15/22 #2 right lateral foot-Dressing/Treatment:  (betadine, abd foam conform coban)    Written Patient Dismissal Instructions Given       -History of wound dehiscence amputation sites bilateral foot.  -Chronic wound of the third toe of the right foot.    -Third toe amputation right foot (10/20/2022)  -No signs of infection today. Monitor off antibiotics  -Surgical site continues to show signs of healing well  -Area to the lateral fifth metatarsal head slightly worse today. No signs of infection  -Betadine wet-to-dry dressings to the area daily  -Offloading foam placed over this area again today.   This was done last week as well  -Recommend that patient try one of the surgical shoes he has that is larger so as to prevent any excess friction to the area to help with healing  -Any serious concerns before the next visit to go to the emergency room  -Follow-up 1 week for recheck sooner if needed          Electronically signed by Yanci Ventura DPM on 11/15/2022 at 2:48 PM

## 2022-11-22 ENCOUNTER — HOSPITAL ENCOUNTER (OUTPATIENT)
Dept: GENERAL RADIOLOGY | Age: 72
Discharge: HOME | End: 2022-11-22
Payer: MEDICARE

## 2022-11-22 ENCOUNTER — HOSPITAL ENCOUNTER (OUTPATIENT)
Age: 72
Discharge: HOME | End: 2022-11-22
Payer: MEDICARE

## 2022-11-22 ENCOUNTER — HOSPITAL ENCOUNTER (OUTPATIENT)
Dept: WOUND CARE | Age: 72
Discharge: HOME | End: 2022-11-22
Payer: MEDICARE

## 2022-11-22 VITALS
DIASTOLIC BLOOD PRESSURE: 87 MMHG | RESPIRATION RATE: 20 BRPM | TEMPERATURE: 98.6 F | HEART RATE: 69 BPM | SYSTOLIC BLOOD PRESSURE: 163 MMHG

## 2022-11-22 DIAGNOSIS — I73.9 PVD (PERIPHERAL VASCULAR DISEASE) (HCC): ICD-10-CM

## 2022-11-22 DIAGNOSIS — E11.621 DIABETIC ULCER OF TOE OF LEFT FOOT ASSOCIATED WITH TYPE 2 DIABETES MELLITUS, WITH FAT LAYER EXPOSED (HCC): Primary | ICD-10-CM

## 2022-11-22 DIAGNOSIS — L97.522 DIABETIC ULCER OF TOE OF LEFT FOOT ASSOCIATED WITH TYPE 2 DIABETES MELLITUS, WITH FAT LAYER EXPOSED (HCC): Primary | ICD-10-CM

## 2022-11-22 DIAGNOSIS — E11.49 OTHER DIABETIC NEUROLOGICAL COMPLICATION ASSOCIATED WITH TYPE 2 DIABETES MELLITUS (HCC): ICD-10-CM

## 2022-11-22 DIAGNOSIS — L97.912 NON-PRESSURE ULCER OF RIGHT LOWER EXTREMITY WITH FAT LAYER EXPOSED (HCC): ICD-10-CM

## 2022-11-22 PROCEDURE — 73630 X-RAY EXAM OF FOOT: CPT

## 2022-11-22 PROCEDURE — 11042 DBRDMT SUBQ TIS 1ST 20SQCM/<: CPT

## 2022-11-22 RX ORDER — BACITRACIN, NEOMYCIN, POLYMYXIN B 400; 3.5; 5 [USP'U]/G; MG/G; [USP'U]/G
OINTMENT TOPICAL ONCE
Status: CANCELLED | OUTPATIENT
Start: 2022-11-22 | End: 2022-11-22

## 2022-11-22 RX ORDER — CLOBETASOL PROPIONATE 0.5 MG/G
OINTMENT TOPICAL ONCE
Status: CANCELLED | OUTPATIENT
Start: 2022-11-22 | End: 2022-11-22

## 2022-11-22 RX ORDER — LIDOCAINE HYDROCHLORIDE 40 MG/ML
SOLUTION TOPICAL ONCE
Status: CANCELLED | OUTPATIENT
Start: 2022-11-22 | End: 2022-11-22

## 2022-11-22 RX ORDER — BACITRACIN ZINC AND POLYMYXIN B SULFATE 500; 1000 [USP'U]/G; [USP'U]/G
OINTMENT TOPICAL ONCE
Status: CANCELLED | OUTPATIENT
Start: 2022-11-22 | End: 2022-11-22

## 2022-11-22 RX ORDER — LIDOCAINE 50 MG/G
OINTMENT TOPICAL ONCE
Status: CANCELLED | OUTPATIENT
Start: 2022-11-22 | End: 2022-11-22

## 2022-11-22 RX ORDER — GINSENG 100 MG
CAPSULE ORAL ONCE
Status: CANCELLED | OUTPATIENT
Start: 2022-11-22 | End: 2022-11-22

## 2022-11-22 RX ORDER — DOXYCYCLINE HYCLATE 100 MG
100 TABLET ORAL 2 TIMES DAILY
Qty: 14 TABLET | Refills: 0 | Status: SHIPPED | OUTPATIENT
Start: 2022-11-22 | End: 2022-11-29

## 2022-11-22 RX ORDER — LIDOCAINE HYDROCHLORIDE 20 MG/ML
JELLY TOPICAL ONCE
Status: CANCELLED | OUTPATIENT
Start: 2022-11-22 | End: 2022-11-22

## 2022-11-22 RX ORDER — LIDOCAINE 40 MG/G
CREAM TOPICAL ONCE
Status: CANCELLED | OUTPATIENT
Start: 2022-11-22 | End: 2022-11-22

## 2022-11-22 RX ORDER — BETAMETHASONE DIPROPIONATE 0.05 %
OINTMENT (GRAM) TOPICAL ONCE
Status: CANCELLED | OUTPATIENT
Start: 2022-11-22 | End: 2022-11-22

## 2022-11-22 RX ORDER — GENTAMICIN SULFATE 1 MG/G
OINTMENT TOPICAL ONCE
Status: CANCELLED | OUTPATIENT
Start: 2022-11-22 | End: 2022-11-22

## 2022-11-22 ASSESSMENT — PAIN DESCRIPTION - LOCATION: LOCATION: FOOT

## 2022-11-22 ASSESSMENT — PAIN DESCRIPTION - ORIENTATION: ORIENTATION: RIGHT

## 2022-11-22 ASSESSMENT — PAIN DESCRIPTION - DESCRIPTORS: DESCRIPTORS: BURNING;STABBING

## 2022-11-22 ASSESSMENT — PAIN DESCRIPTION - PAIN TYPE: TYPE: ACUTE PAIN

## 2022-11-22 ASSESSMENT — PAIN DESCRIPTION - FREQUENCY: FREQUENCY: INTERMITTENT

## 2022-11-22 ASSESSMENT — PAIN DESCRIPTION - ONSET: ONSET: ON-GOING

## 2022-11-22 ASSESSMENT — PAIN - FUNCTIONAL ASSESSMENT: PAIN_FUNCTIONAL_ASSESSMENT: PREVENTS OR INTERFERES SOME ACTIVE ACTIVITIES AND ADLS

## 2022-11-22 ASSESSMENT — PAIN SCALES - GENERAL: PAINLEVEL_OUTOF10: 8

## 2022-11-22 NOTE — DISCHARGE INSTRUCTIONS
PHYSICIAN ORDERS AND DISCHARGE INSTRUCTIONS     NOTE: Upon discharge from the [address], you will receive a patient experience survey. We would be grateful if you would take the time to fill this survey out. Wound care order history:                 YESSENIA's   Right       Left               Date:              Cultures:                Grafts:                Antibiotics:           Continuing wound care orders and information:                 Residence:                Continue home health care with:              Your wound-care supplies will be provided by: Wound cleansing:              Do not scrub or use excessive force. Wash hands with soap and water before and after dressing changes. Prior to applying a clean dressing, cleanse wound with normal saline, wound cleanser, or mild soap and water. Ask the physician or nurse before getting the wound(s) wet in a shower     Daily Wound management:              Keep weight off wounds and reposition every 2 hours. Avoid standing for long periods of time. Apply wraps/stockings in AM and remove at bedtime. If swelling is present, elevate legs to the level of the heart or above for 30 minutes 4-5 times a day and/or when sitting. When taking antibiotics take entire prescription as ordered by physician do not stop taking until medicine is all gone. Orders for this week: 11/22/22                 Xray of Right Foot ordered 11/22/22    Rx: [facility] [address]     Right 2nd Toe and Right Lateral Foot - Wash with soap and water, pat dry. Apply Betadine damp gauze wet to dry dressing. Cover with ABD.     --also pad Right Lateral foot with foam.  Wrap with conform and Coban (prefers 2\" coban to secure). Change daily. Follow up with Dr Gavin Santos in 1 week in the wound care center.   Call [phone] for any questions or concerns.

## 2022-11-22 NOTE — PROGRESS NOTES
Wound Care Center Progress Note With Procedure    Wing Delcid  AGE: 74 y.o. GENDER: male  : 1950  EPISODE DATE:  2022     Subjective:     Chief Complaint   Patient presents with    Wound Check     Rt foot         HISTORY of PRESENT ILLNESS      Wing Delcid is a 74 y.o. male who presents today for wound to the third toe of the right foot. Status post third toe amputation right foot (10/20/2022). Surgical site is doing well. The outside of the right foot some possible worsening changes today. They have been doing the dressing change instructions as directed. He is getting some pain to the outside of the foot as well    PAST MEDICAL HISTORY        Diagnosis Date    Arrhythmia     Pacemaker placed aprox 5 years ago for A Fib per patient    Arthritis 2013    rt wrist    Atrial fibrillation ([facility] [address]     on Xarelto - Dr. Neil Vasquez    CAD (coronary artery disease) 2014    see dr Neil Vasquez    Chronic kidney disease, stage III (moderate) ([facility] [address] 2016    Critical illness myopathy 03/15/2021    Diabetes mellitus ([facility] [address]     dx     Diabetic neuropathy associated with type 2 diabetes mellitus ([facility] [address] 2019    Erythropoietin deficiency anemia 2020    Gout 2019    \"got gout when had pacer put in because they did not give me my medication for gout \"    H/O 24 hour EKG monitoring 10/03/2013    no afib noted, sinsus rhythm    H/O cardiovascular stress test 2014    cardiolite- mild ischemia RCA EF50%    H/O echocardiogram 2020    EF 55-60% severe aortic stenosis mild to mod aortic regurg mod to severe tricuspid regurg severe pulm htn significant changes since 2018 echo. H/O right and left heart catheterization 12/10/2020    DIFFUSE LAD DISEASE, Mild ECA Disease, Severe AS, Milf Pul HTN on RHC. History of blood transfusion 2020    d/t anemia    History of transesophageal echocardiography (MABLE) 12/15/2020    Severe aortic stenosis (NANA by planimetry: 0.778 cm sq). Mild AR. [place] (hard of hearing)     hearing tonya aides    Hx of Doppler echocardiogram 05/21/2018    EF 50%  Mild LV hypertrophy. Mildly enlarged RA. Mod aortic valve calcification with mod AS. Mitral annular calcification is present. Mild AR, MR and TR. Mild pulmonary htn.     Hyperlipidemia     Hypertension     Follows with PCP & Dr. Sona Zhang    Other disorders of kidney and ureter     Pacemaker     [employer], implanted 2014    Pneumonia 12/29/2012    Pneumonia due to COVID-19 virus 08/2020    Sleep apnea     dx 2013- has c-pap    Type II or unspecified type diabetes mellitus with other specified manifestations, uncontrolled 12/12/2012    Venous hypertension, chronic, with ulcer ([address] 12/12/2012    resolved       PAST SURGICAL HISTORY    Past Surgical History:   Procedure Laterality Date    CABG WITH AORTIC VALVE REPLACEMENT N/A 02/16/2021    CABG CORONARY ARTERY BYPASS X2 WITH LIMA, AORTIC VALVE REPLACEMENT AND AORTIC ROOT REPAIR, INTRAOPERATIVE MABLE, INDUCED HYPOTHERMIA, LEFT LEG ENDOVEIN HARVEST, LEFT ATRIAL CLIP, AND CRYO PROCEDURE performed by Lizette Jaime MD at [address]  12/2014    at [address] [facility] Left 01/29/2021    LEFT CAROTID ENDARTERECTOMY performed by Benjamín Medel MD at [address] [place] [address]  2017    COLONOSCOPY  2011    COLONOSCOPY N/A 11/19/2019    COLONOSCOPY DIAGNOSTIC performed by Lisandro Haile MD at [address] [place] [address]  09/30/2020    POSSIBLE CECAL avms, SIGMOID DIVERTICULOSIS, INTERNAL HEMORRHOIDS GRADE 1    COLONOSCOPY N/A 09/30/2020    COLONOSCOPY CONTROL HEMORRHAGE WITH APC performed by Lisandro Haile MD at [address] [facility]  2014    \"2 stents put in \"    FOOT DEBRIDEMENT Bilateral 03/31/2022    BILATERAL DEBRIEDMENT OF NON-VIABLE TISSUE & BONE performed by Chanda Cox DPM at [address] Right 04/14/2022    RIGHT FOOT DEBRIDEMENT INCISION AND DRAINAGE performed by Chanda Cox DPM per week\"/ Caffiene: 1 cup of coffee daily    Drug use: No       ALLERGIES    Allergies   Allergen Reactions    Spironolactone      CAUSES INCREASED K+    Tape Lourena Tom Tape] Rash     SURGICAL TAPE       MEDICATIONS    Current Outpatient Medications on File Prior to Encounter   Medication Sig Dispense Refill    apixaban (ELIQUIS) 5 MG TABS tablet Take 1 tablet by mouth 2 times daily 180 tablet 3    simvastatin (ZOCOR) 20 MG tablet Take 1 tablet by mouth daily 90 tablet 3    apixaban (ELIQUIS) 5 MG TABS tablet Take 1 tablet by mouth 2 times daily 14 tablet 0    dapagliflozin (FARXIGA) 10 MG tablet Take 1 tablet by mouth every morning 90 tablet 1    Dulaglutide 4.5 MG/0.5ML SOPN Inject 4.5 mg into the skin once a week 12 Adjustable Dose Pre-filled Pen Syringe 1    gabapentin (NEURONTIN) 300 MG capsule TAKE 1 CAPSULE 3 TIMES A    capsule 1    glimepiride (AMARYL) 4 MG tablet TAKE 1 TABLET IN THE       MORNING (BEFORE BREAKFAST) 90 tablet 1    sildenafil (VIAGRA) 100 MG tablet TAKE 1 TABLET DAILY AS     NEEDED FOR ERECTILE        DYSFUNCTION 90 tablet 1    torsemide (DEMADEX) 20 MG tablet TAKE 2 TABLETS BY MOUTH TWICE A  tablet 3    carboxymethylcellulose (REFRESH PLUS) 0.5 % SOLN ophthalmic solution as needed       Cholecalciferol (VITAMIN D) 50 MCG (2000 UT) CAPS capsule Take by mouth nightly       aspirin 81 MG tablet Take 81 mg by mouth daily       No current facility-administered medications on file prior to encounter. REVIEW OF SYSTEMS    Pertinent items are noted in HPI. Constitutional: Negative for systemic symptoms including fever, chills and malaise. Objective:      BP (!) 163/87   Pulse 69   Temp 98.6 °F (37 °C) (Temporal)   Resp 20     PHYSICAL EXAM    General: The patient is in no acute distress. Mental status:  Patient is appropriate, is  oriented to place and plan of care.   Dermatologic exam: Visual inspection of the periwound reveals the skin to be normal in turgor and texture  Wound exam: see wound description below in procedure note. Surgical incision site well coapted with sutures intact. Some slight maceration but no dehiscence. No purulent drainage fluctuance crepitus or malodor  Musculoskeletal: Bilateral calves are soft and supple upon manual compression. 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     Performed by: Antwon Williamson DPM    Consent obtained: Yes    Time out taken:  Yes    Pain Control: lidocaine       Debridement:Non-excisional Debridement    Using #15 blade scalpel the wound(s) was/were sharply debrided down through and including the removal of bone    Devitalized Tissue Debrided:  necrotic/eschar    Pre Debridement Measurements:  Are located in the Wound Documentation Flow Sheet    All active wounds listed below with today's date are evaluated  Wound 09/20/22 Toe (Comment  which one) Right #1 right second toe (Active)   Wound Image   11/22/22 1309   Wound Etiology Diabetic 11/22/22 1309   Dressing Status New dressing applied 11/15/22 1438   Wound Cleansed Wound cleanser 11/22/22 1309   Offloading for Diabetic Foot Ulcers Post op shoe 11/22/22 1309   Wound Length (cm) 0.9 cm 11/22/22 1309   Wound Width (cm) 0.5 cm 11/22/22 1309   Wound Depth (cm) 0.1 cm 11/22/22 1309   Wound Surface Area (cm^2) 0.45 cm^2 11/22/22 1309   Change in Wound Size % (l*w) 92.5 11/22/22 1309   Wound Volume (cm^3) 0.045 cm^3 11/22/22 1309   Wound Healing % 93 11/22/22 1309   Post-Procedure Length (cm) 0.9 cm 11/22/22 1325   Post-Procedure Width (cm) 0.5 cm 11/22/22 1325   Post-Procedure Depth (cm) 0.1 cm 11/22/22 1325   Post-Procedure Surface Area (cm^2) 0.45 cm^2 11/22/22 1325   Post-Procedure Volume (cm^3) 0.045 cm^3 11/22/22 1325   Distance Tunneling (cm) 0 cm 11/22/22 1309   Tunneling Position ___ O'Clock 0 11/22/22 1309   Undermining Starts ___ O'Clock 0 11/22/22 1309   Undermining Ends___ O'Clock 0 11/22/22 1309   Undermining Maxium Distance (cm) 00 11/22/22 1309   Wound Assessment Dry 11/22/22 1309   Drainage Amount None 11/22/22 1309   Drainage Description Clear 11/15/22 1352   Odor None 11/22/22 1309   Angela-wound Assessment Fragile 11/22/22 1309   Margins Defined edges 11/22/22 1309   Wound Thickness Description not for Pressure Injury Partial thickness 11/15/22 1352   Number of days: 63       Wound 11/15/22 #2 right lateral foot (Active)   Wound Image   11/22/22 1309   Wound Etiology Pressure Stage 2 11/22/22 1325   Dressing Status New dressing applied 11/22/22 1325   Wound Cleansed Wound cleanser 11/22/22 1309   Offloading for Diabetic Foot Ulcers Post op shoe 11/22/22 1325   Wound Length (cm) 1.8 cm 11/22/22 1309   Wound Width (cm) 1.2 cm 11/22/22 1309   Wound Depth (cm) 0.4 cm 11/22/22 1309   Wound Surface Area (cm^2) 2.16 cm^2 11/22/22 1309   Change in Wound Size % (l*w) 77.52 11/22/22 1309   Wound Volume (cm^3) 0.864 cm^3 11/22/22 1309   Wound Healing % 10 11/22/22 1309   Post-Procedure Length (cm) 1.8 cm 11/22/22 1325   Post-Procedure Width (cm) 1.2 cm 11/22/22 1325   Post-Procedure Depth (cm) 0.4 cm 11/22/22 1325   Post-Procedure Surface Area (cm^2) 2.16 cm^2 11/22/22 1325   Post-Procedure Volume (cm^3) 0.864 cm^3 11/22/22 1325   Distance Tunneling (cm) 0 cm 11/22/22 1309   Tunneling Position ___ O'Clock 0 11/22/22 1309   Undermining Starts ___ O'Clock 10 11/22/22 1309   Undermining Ends___ O'Clock 2 11/22/22 1309   Undermining Maxium Distance (cm) 0.4 11/22/22 1309   Wound Assessment Pink/red;Slough 11/22/22 1309   Drainage Amount Moderate 11/22/22 1309   Drainage Description Brown;Yellow 11/22/22 1309   Odor None 11/22/22 1309   Angela-wound Assessment Maceration;Fragile 11/22/22 1309   Margins Defined edges 11/22/22 1309   Wound Thickness Description not for Pressure Injury Full thickness 11/22/22 1309   Number of days: 7             Percent of Wound(s) Debrided: approximately 100%    Total  Area  Debrided:  0.909 cm3     Bleeding:  None    Hemostasis Achieved:  not needed    Procedural Pain:  0  / 10     Post Procedural Pain:  0 / 10     Response to treatment:  Well tolerated by patient. Status of wound progress and description from last visit:   stable. Plan:       Discharge Instructions         PHYSICIAN ORDERS AND DISCHARGE INSTRUCTIONS     NOTE: Upon discharge from the [address], you will receive a patient experience survey. We would be grateful if you would take the time to fill this survey out. Wound care order history:                 YESSENIA's   Right       Left               Date:              Cultures:                Grafts:                Antibiotics:           Continuing wound care orders and information:                 Residence:                Continue home health care with:              Your wound-care supplies will be provided by: Wound cleansing:              Do not scrub or use excessive force. Wash hands with soap and water before and after dressing changes. Prior to applying a clean dressing, cleanse wound with normal saline, wound cleanser, or mild soap and water.               Ask the physician or nurse before getting the wound(s) wet in a shower     Daily Wound management:              Keep weight off wounds and reposition every 2 hours. Avoid standing for long periods of time. Apply wraps/stockings in AM and remove at bedtime. If swelling is present, elevate legs to the level of the heart or above for 30 minutes 4-5 times a day and/or when sitting. When taking antibiotics take entire prescription as ordered by physician do not stop taking until medicine is all gone. Orders for this week: 11/22/22                 Xray of Right Foot ordered 11/22/22    Rx: [facility] on [address]     Right 2nd Toe and Right Lateral Foot - Wash with soap and water, pat dry. Apply Betadine damp gauze wet to dry dressing. Cover with ABD.     --also pad Right Lateral foot with foam.  Wrap with conform and Coban (prefers 2\" coban to secure). Change daily. Follow up with Dr Frank Robbins in 1 week in the wound care center. Call [phone] for any questions or concerns. Treatment Note Wound 11/15/22 #2 right lateral foot-Dressing/Treatment:  (betadine damp wet to dry,abd,foam,conform.)    Written Patient Dismissal Instructions Given       -History of wound dehiscence amputation sites bilateral foot.  -Chronic wound of the third toe of the right foot.    -Third toe amputation right foot (10/20/2022)  -Worsening pain to the area of pressure on the outside of the right foot. Some redness and swelling to the area. -Prescription for doxycycline 100 mg 1 tab twice a day for a week called into the patient's pharmacy  -X-ray of the right foot was ordered today as well  -Continue with Betadine to the lateral fifth metatarsal head wound. Collagen and Band-Aid to the surgical site. Stitches removed today without incident. Small area of dehiscence that is still healing well centrally.   -Recommend at this time that patient try his supportive custom diabetic shoes as he could be getting some rubbing from the surgical shoes that he has.  -Any serious concerns before the next visit to go to the emergency room  -Follow-up 1 week for recheck sooner if needed          Electronically signed by Gray Gonzalez DPM on 11/22/2022 at 3:13 PM

## 2022-11-29 ENCOUNTER — HOSPITAL ENCOUNTER (OUTPATIENT)
Dept: WOUND CARE | Age: 72
Discharge: HOME | End: 2022-11-29
Payer: MEDICARE

## 2022-11-29 ENCOUNTER — OFFICE VISIT (OUTPATIENT)
Dept: FAMILY MEDICINE CLINIC | Age: 72
End: 2022-11-29
Payer: MEDICARE

## 2022-11-29 ENCOUNTER — HOSPITAL ENCOUNTER (OUTPATIENT)
Age: 72
Discharge: HOME | End: 2022-11-29
Payer: MEDICARE

## 2022-11-29 VITALS
BODY MASS INDEX: 27.69 KG/M2 | SYSTOLIC BLOOD PRESSURE: 128 MMHG | OXYGEN SATURATION: 96 % | HEIGHT: 71 IN | DIASTOLIC BLOOD PRESSURE: 74 MMHG | HEART RATE: 69 BPM | WEIGHT: 197.8 LBS

## 2022-11-29 VITALS
RESPIRATION RATE: 18 BRPM | HEART RATE: 65 BPM | TEMPERATURE: 97.7 F | SYSTOLIC BLOOD PRESSURE: 150 MMHG | DIASTOLIC BLOOD PRESSURE: 85 MMHG

## 2022-11-29 DIAGNOSIS — L97.912 NON-PRESSURE ULCER OF RIGHT LOWER EXTREMITY WITH FAT LAYER EXPOSED (HCC): ICD-10-CM

## 2022-11-29 DIAGNOSIS — L97.522 DIABETIC ULCER OF TOE OF LEFT FOOT ASSOCIATED WITH TYPE 2 DIABETES MELLITUS, WITH FAT LAYER EXPOSED (HCC): Primary | ICD-10-CM

## 2022-11-29 DIAGNOSIS — E11.49 OTHER DIABETIC NEUROLOGICAL COMPLICATION ASSOCIATED WITH TYPE 2 DIABETES MELLITUS (HCC): ICD-10-CM

## 2022-11-29 DIAGNOSIS — R22.41 MASS OF SKIN OF RIGHT LOWER LEG: ICD-10-CM

## 2022-11-29 DIAGNOSIS — L03.115 CELLULITIS OF RIGHT ANTERIOR LOWER LEG: Primary | ICD-10-CM

## 2022-11-29 DIAGNOSIS — E11.621 DIABETIC ULCER OF TOE OF LEFT FOOT ASSOCIATED WITH TYPE 2 DIABETES MELLITUS, WITH FAT LAYER EXPOSED (HCC): Primary | ICD-10-CM

## 2022-11-29 DIAGNOSIS — I73.9 PVD (PERIPHERAL VASCULAR DISEASE) (HCC): ICD-10-CM

## 2022-11-29 LAB
ALBUMIN SERPL-MCNC: 3.8 GM/DL (ref 3.4–5)
ALP BLD-CCNC: 145 IU/L (ref 40–128)
ALT SERPL-CCNC: 15 U/L (ref 10–40)
ANION GAP SERPL CALCULATED.3IONS-SCNC: 20 MMOL/L (ref 4–16)
AST SERPL-CCNC: 23 IU/L (ref 15–37)
BASOPHILS ABSOLUTE: 0.1 K/CU MM
BASOPHILS RELATIVE PERCENT: 1.7 % (ref 0–1)
BILIRUB SERPL-MCNC: 0.6 MG/DL (ref 0–1)
BILIRUBIN URINE: NEGATIVE MG/DL
BLOOD, URINE: ABNORMAL
BUN BLDV-MCNC: 36 MG/DL (ref 6–23)
CALCIUM SERPL-MCNC: 8.9 MG/DL (ref 8.3–10.6)
CHLORIDE BLD-SCNC: 98 MMOL/L (ref 99–110)
CLARITY: CLEAR
CO2: 20 MMOL/L (ref 21–32)
COLOR: YELLOW
CREAT SERPL-MCNC: 1.3 MG/DL (ref 0.9–1.3)
CREATININE URINE: 50.5 MG/DL (ref 39–259)
DIFFERENTIAL TYPE: ABNORMAL
EOSINOPHILS ABSOLUTE: 0.4 K/CU MM
EOSINOPHILS RELATIVE PERCENT: 7 % (ref 0–3)
GFR SERPL CREATININE-BSD FRML MDRD: 58 ML/MIN/1.73M2
GLUCOSE BLD-MCNC: 123 MG/DL (ref 70–99)
GLUCOSE, URINE: 500 MG/DL
HCT VFR BLD CALC: 45.8 % (ref 42–52)
HEMOGLOBIN: 13.7 GM/DL (ref 13.5–18)
IMMATURE NEUTROPHIL %: 0.3 % (ref 0–0.43)
KETONES, URINE: NEGATIVE MG/DL
LEUKOCYTE ESTERASE, URINE: NEGATIVE
LYMPHOCYTES ABSOLUTE: 1.7 K/CU MM
LYMPHOCYTES RELATIVE PERCENT: 30.3 % (ref 24–44)
MAGNESIUM: 2.2 MG/DL (ref 1.8–2.4)
MCH RBC QN AUTO: 23.4 PG (ref 27–31)
MCHC RBC AUTO-ENTMCNC: 29.9 % (ref 32–36)
MCV RBC AUTO: 78.3 FL (ref 78–100)
MONOCYTES ABSOLUTE: 0.7 K/CU MM
MONOCYTES RELATIVE PERCENT: 11.8 % (ref 0–4)
NITRITE URINE, QUANTITATIVE: NEGATIVE
NUCLEATED RBC %: 0 %
PDW BLD-RTO: 17.2 % (ref 11.7–14.9)
PH, URINE: 6 (ref 5–8)
PHOSPHORUS: 3.8 MG/DL (ref 2.5–4.9)
PLATELET # BLD: 202 K/CU MM (ref 140–440)
PMV BLD AUTO: 9.9 FL (ref 7.5–11.1)
POTASSIUM SERPL-SCNC: 3.8 MMOL/L (ref 3.5–5.1)
PROT/CREAT RATIO, UR: 1.6
PROTEIN UA: 100 MG/DL
RBC # BLD: 5.85 M/CU MM (ref 4.6–6.2)
SEGMENTED NEUTROPHILS ABSOLUTE COUNT: 2.8 K/CU MM
SEGMENTED NEUTROPHILS RELATIVE PERCENT: 48.9 % (ref 36–66)
SODIUM BLD-SCNC: 138 MMOL/L (ref 135–145)
SPECIFIC GRAVITY UA: 1.01 (ref 1–1.03)
TOTAL IMMATURE NEUTOROPHIL: 0.02 K/CU MM
TOTAL NUCLEATED RBC: 0 K/CU MM
TOTAL PROTEIN: 7.6 GM/DL (ref 6.4–8.2)
URINE TOTAL PROTEIN: 78.4 MG/DL
UROBILINOGEN, URINE: 0.2 MG/DL (ref 0.2–1)
WBC # BLD: 5.7 K/CU MM (ref 4–10.5)

## 2022-11-29 PROCEDURE — 3074F SYST BP LT 130 MM HG: CPT | Performed by: PHYSICIAN ASSISTANT

## 2022-11-29 PROCEDURE — 84156 ASSAY OF PROTEIN URINE: CPT

## 2022-11-29 PROCEDURE — 82570 ASSAY OF URINE CREATININE: CPT

## 2022-11-29 PROCEDURE — 84100 ASSAY OF PHOSPHORUS: CPT

## 2022-11-29 PROCEDURE — 1036F TOBACCO NON-USER: CPT | Performed by: PHYSICIAN ASSISTANT

## 2022-11-29 PROCEDURE — 36415 COLL VENOUS BLD VENIPUNCTURE: CPT

## 2022-11-29 PROCEDURE — 85025 COMPLETE CBC W/AUTO DIFF WBC: CPT

## 2022-11-29 PROCEDURE — 81003 URINALYSIS AUTO W/O SCOPE: CPT

## 2022-11-29 PROCEDURE — 83735 ASSAY OF MAGNESIUM: CPT

## 2022-11-29 PROCEDURE — 99214 OFFICE O/P EST MOD 30 MIN: CPT | Performed by: PHYSICIAN ASSISTANT

## 2022-11-29 PROCEDURE — 3017F COLORECTAL CA SCREEN DOC REV: CPT | Performed by: PHYSICIAN ASSISTANT

## 2022-11-29 PROCEDURE — G8484 FLU IMMUNIZE NO ADMIN: HCPCS | Performed by: PHYSICIAN ASSISTANT

## 2022-11-29 PROCEDURE — 3078F DIAST BP <80 MM HG: CPT | Performed by: PHYSICIAN ASSISTANT

## 2022-11-29 PROCEDURE — G8417 CALC BMI ABV UP PARAM F/U: HCPCS | Performed by: PHYSICIAN ASSISTANT

## 2022-11-29 PROCEDURE — 80053 COMPREHEN METABOLIC PANEL: CPT

## 2022-11-29 PROCEDURE — 11042 DBRDMT SUBQ TIS 1ST 20SQCM/<: CPT

## 2022-11-29 PROCEDURE — G8427 DOCREV CUR MEDS BY ELIG CLIN: HCPCS | Performed by: PHYSICIAN ASSISTANT

## 2022-11-29 PROCEDURE — 1123F ACP DISCUSS/DSCN MKR DOCD: CPT | Performed by: PHYSICIAN ASSISTANT

## 2022-11-29 RX ORDER — SULFAMETHOXAZOLE AND TRIMETHOPRIM 800; 160 MG/1; MG/1
1 TABLET ORAL 2 TIMES DAILY
Qty: 20 TABLET | Refills: 0 | Status: SHIPPED | OUTPATIENT
Start: 2022-11-29 | End: 2022-11-29 | Stop reason: SDUPTHER

## 2022-11-29 RX ORDER — LIDOCAINE 50 MG/G
OINTMENT TOPICAL ONCE
OUTPATIENT
Start: 2022-11-29 | End: 2022-11-29

## 2022-11-29 RX ORDER — GINSENG 100 MG
CAPSULE ORAL ONCE
OUTPATIENT
Start: 2022-11-29 | End: 2022-11-29

## 2022-11-29 RX ORDER — CEPHALEXIN 500 MG/1
500 CAPSULE ORAL 3 TIMES DAILY
Qty: 30 CAPSULE | Refills: 0 | Status: SHIPPED | OUTPATIENT
Start: 2022-11-29 | End: 2022-12-09

## 2022-11-29 RX ORDER — SULFAMETHOXAZOLE AND TRIMETHOPRIM 800; 160 MG/1; MG/1
1 TABLET ORAL 2 TIMES DAILY
Qty: 20 TABLET | Refills: 0 | Status: SHIPPED | OUTPATIENT
Start: 2022-11-29 | End: 2022-12-09

## 2022-11-29 RX ORDER — CEPHALEXIN 500 MG/1
500 CAPSULE ORAL 3 TIMES DAILY
Qty: 30 CAPSULE | Refills: 0 | Status: SHIPPED | OUTPATIENT
Start: 2022-11-29 | End: 2022-11-29 | Stop reason: SDUPTHER

## 2022-11-29 RX ORDER — GENTAMICIN SULFATE 1 MG/G
OINTMENT TOPICAL ONCE
OUTPATIENT
Start: 2022-11-29 | End: 2022-11-29

## 2022-11-29 RX ORDER — BACITRACIN ZINC AND POLYMYXIN B SULFATE 500; 1000 [USP'U]/G; [USP'U]/G
OINTMENT TOPICAL ONCE
OUTPATIENT
Start: 2022-11-29 | End: 2022-11-29

## 2022-11-29 RX ORDER — CLOBETASOL PROPIONATE 0.5 MG/G
OINTMENT TOPICAL ONCE
OUTPATIENT
Start: 2022-11-29 | End: 2022-11-29

## 2022-11-29 RX ORDER — BACITRACIN, NEOMYCIN, POLYMYXIN B 400; 3.5; 5 [USP'U]/G; MG/G; [USP'U]/G
OINTMENT TOPICAL ONCE
OUTPATIENT
Start: 2022-11-29 | End: 2022-11-29

## 2022-11-29 RX ORDER — BETAMETHASONE DIPROPIONATE 0.05 %
OINTMENT (GRAM) TOPICAL ONCE
OUTPATIENT
Start: 2022-11-29 | End: 2022-11-29

## 2022-11-29 RX ORDER — LIDOCAINE HYDROCHLORIDE 20 MG/ML
JELLY TOPICAL ONCE
OUTPATIENT
Start: 2022-11-29 | End: 2022-11-29

## 2022-11-29 RX ORDER — LIDOCAINE 40 MG/G
CREAM TOPICAL ONCE
OUTPATIENT
Start: 2022-11-29 | End: 2022-11-29

## 2022-11-29 RX ORDER — LIDOCAINE HYDROCHLORIDE 40 MG/ML
SOLUTION TOPICAL ONCE
OUTPATIENT
Start: 2022-11-29 | End: 2022-11-29

## 2022-11-29 ASSESSMENT — PAIN SCALES - GENERAL: PAINLEVEL_OUTOF10: 1

## 2022-11-29 NOTE — PROGRESS NOTES
Wound Care Center Progress Note With Procedure    Anu Deal  AGE: 74 y.o. GENDER: male  : 1950  EPISODE DATE:  2022     Subjective:     Chief Complaint   Patient presents with    Wound Check     Right toe          HISTORY of PRESENT ILLNESS      Anu Deal is a 74 y.o. male who presents today for wound to the third toe of the right foot. Status post third toe amputation right foot (10/20/2022). Surgical site is doing well. Finished antibiotics as directed. No worsening concerns to the outside of the right foot. Has been wearing certain pair of diabetic shoes that he feels is rubbing less on the area of the right foot. He is admitting today to some worsening redness and swelling and pain to the inside of the right calf. Been going on for a couple of days. No chest pain cough difficulty breathing or shortness of breath. He does have an appointment later today with his primary care doctor for this    PAST MEDICAL HISTORY        Diagnosis Date    Arrhythmia     Pacemaker placed aprox 5 years ago for A Fib per patient    Arthritis 2013    rt wrist    Atrial fibrillation ([facility]     on Xarelto - Dr. Kiki Tejeda    CAD (coronary artery disease) 2014    see dr Kiki Tejeda    Chronic kidney disease, stage III (moderate) ([facility] 2016    Critical illness myopathy 03/15/2021    Diabetes mellitus ([facility]     dx     Diabetic neuropathy associated with type 2 diabetes mellitus ([facility] 2019    Erythropoietin deficiency anemia 2020    Gout 2019    \"got gout when had pacer put in because they did not give me my medication for gout \"    H/O 24 hour EKG monitoring 10/03/2013    no afib noted, sinsus rhythm    H/O cardiovascular stress test 2014    cardiolite- mild ischemia RCA EF50%    H/O echocardiogram 2020    EF 55-60% severe aortic stenosis mild to mod aortic regurg mod to severe tricuspid regurg severe pulm htn significant changes since 2018 echo.      H/O right and left heart catheterization 12/10/2020    DIFFUSE LAD DISEASE, Mild ECA Disease, Severe AS, Milf Pul HTN on RHC. History of blood transfusion 12/2020    d/t anemia    History of transesophageal echocardiography (MABLE) 12/15/2020    Severe aortic stenosis (ANNA by planimetry: 0.778 cm sq). Mild AR. [place] (hard of hearing)     hearing tonya aides    Hx of Doppler echocardiogram 05/21/2018    EF 50%  Mild LV hypertrophy. Mildly enlarged RA. Mod aortic valve calcification with mod AS. Mitral annular calcification is present. Mild AR, MR and TR. Mild pulmonary htn.     Hyperlipidemia     Hypertension     Follows with PCP & Dr. Yue Caro    Other disorders of kidney and ureter     Pacemaker     Medtronic, implanted 2014    Pneumonia 12/29/2012    Pneumonia due to COVID-19 virus 08/2020    Sleep apnea     dx 2013- has c-pap    Type II or unspecified type diabetes mellitus with other specified manifestations, uncontrolled 12/12/2012    Venous hypertension, chronic, with ulcer ([address] 12/12/2012    resolved       PAST SURGICAL HISTORY    Past Surgical History:   Procedure Laterality Date    CABG WITH AORTIC VALVE REPLACEMENT N/A 02/16/2021    CABG CORONARY ARTERY BYPASS X2 WITH LIMA, AORTIC VALVE REPLACEMENT AND AORTIC ROOT REPAIR, INTRAOPERATIVE MABLE, INDUCED HYPOTHERMIA, LEFT LEG ENDOVEIN HARVEST, LEFT ATRIAL CLIP, AND CRYO PROCEDURE performed by Giovani Costello MD at [address]  12/2014    at [address] Left 01/29/2021    LEFT CAROTID ENDARTERECTOMY performed by Giovanni Estrada MD at [address] [place] [address]  2017    COLONOSCOPY  2011    COLONOSCOPY N/A 11/19/2019    COLONOSCOPY DIAGNOSTIC performed by Obey Griffin MD at [address] [facility]  09/30/2020    POSSIBLE CECAL avms, SIGMOID DIVERTICULOSIS, INTERNAL HEMORRHOIDS GRADE 1    COLONOSCOPY N/A 09/30/2020    COLONOSCOPY CONTROL HEMORRHAGE WITH APC performed by Obey Griffin MD at [address] [place] [address] STENT PLACEMENT  2014    \"2 stents put in \"    FOOT DEBRIDEMENT Bilateral 03/31/2022    BILATERAL DEBRIEDMENT OF NON-VIABLE TISSUE & BONE performed by Jaleel Loredo DPM at [address] [place] [address] Right 04/14/2022    RIGHT FOOT DEBRIDEMENT INCISION AND DRAINAGE performed by Jaleel Loredo DPM at [address]      abdominal    IR NONTUNNELED VASCULAR CATHETER  03/05/2021    IR NONTUNNELED VASCULAR CATHETER 3/5/2021 [facility] SPECIAL PROCEDURES    JOINT REPLACEMENT  2004    total left hip    OTHER SURGICAL HISTORY Right 12/02/2017    I&D; evacuation of hematoma right hip    OTHER SURGICAL HISTORY  09/17/2020    enteroscopy    PACEMAKER INSERTION N/A 02/23/2021    PACEMAKER GENERATOR LEAD REVISION performed by Daniel Maya MD at [address] [place] [address] Left 04/26/2022    Dual PPM: Atrial lead extraction/insertion, Generator change. Medtronic, Lucie XT DR LOYD Sureector    PACEMAKER PLACEMENT      9/18/14 Status post remote permanent pacemaker with atrial lead dislodgement.  7/24/14 PPM Implant    TOE AMPUTATION Bilateral 03/31/2022    LEFT 3RD TOE AMPUTATION performed by Jaleel Loredo DPM at [address] Right 04/14/2022    RIGHT 2ND TOE AMPUTATION performed by Jaleel Loredo DPM at [facility]. [address] N/A 09/17/2020    ENTEROSCOPY PUSH BIOPSY performed by Daryl Earl MD at [facility]. [address] N/A 03/04/2021    EGD DIAGNOSTIC ONLY performed by Daryl Earl MD at [address]  2012    \"have stents in both legs- done in [facility]    Family History   Problem Relation Age of Onset    High Blood Pressure Mother     Arthritis Mother     Diabetes Mother     Heart Disease Mother     High Blood Pressure Father     Heart Disease Father     Kidney Disease Father     No Known Problems Sister     Heart Surgery Brother     Heart Disease Brother     No Known Problems Sister     No Known Problems Brother including fever, chills and malaise. Objective:      BP (!) 150/85   Pulse 65   Temp 97.7 °F (36.5 °C) (Temporal)   Resp 18     PHYSICAL EXAM    General: The patient is in no acute distress. Mental status:  Patient is appropriate, is  oriented to place and plan of care. Dermatologic exam: Visual inspection of the periwound reveals the skin to be normal in turgor and texture  Wound exam: see wound description below in procedure note. Surgical incision site well coapted with sutures intact. Some slight maceration but no dehiscence. No purulent drainage fluctuance crepitus or malodor  Musculoskeletal: Bilateral calves are soft and supple upon manual compression. 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     Performed by: Karen Candelaria DPM    Consent obtained: Yes    Time out taken:  Yes    Pain Control: lidocaine       Debridement:Non-excisional Debridement    Using #15 blade scalpel the wound(s) was/were sharply debrided down through and including the removal of bone    Devitalized Tissue Debrided:  necrotic/eschar    Pre Debridement Measurements:  Are located in the Wound Documentation Flow Sheet      Wound (cm^2) 3.23 cm^2 11/29/22 1306   Change in Wound Size % (l*w) 66.39 11/29/22 1306   Wound Volume (cm^3) 0.969 cm^3 11/29/22 1306   Wound Healing % -1 11/29/22 1306   Post-Procedure Length (cm) 1.7 cm 11/29/22 1332   Post-Procedure Width (cm) 1.9 cm 11/29/22 1332   Post-Procedure Depth (cm) 0.3 cm 11/29/22 1332   Post-Procedure Surface Area (cm^2) 3.23 cm^2 11/29/22 1332   Post-Procedure Volume (cm^3) 0.969 cm^3 11/29/22 1332   Distance Tunneling (cm) 0 cm 11/29/22 1306   Tunneling Position ___ O'Clock 0 11/29/22 1306   Undermining Starts ___ O'Clock 0 11/29/22 1306   Undermining Ends___ O'Clock 0 11/29/22 1306   Undermining Maxium Distance (cm) 0 11/29/22 1306   Wound Assessment Pink/red;Slough 11/29/22 1306   Drainage Amount Moderate 11/29/22 1306   Drainage Description Brown;Yellow 11/29/22 1306   Odor None 11/29/22 1306   Angela-wound Assessment Maceration;Fragile 11/29/22 1306   Margins Defined edges 11/29/22 1306   Wound Thickness Description not for Pressure Injury Full thickness 11/29/22 1306   Number of days: 14             Percent of Wound(s) Debrided: approximately 100%    Total  Area  Debrided:  1.014cm3     Bleeding:  None    Hemostasis Achieved:  not needed    Procedural Pain:  0  / 10     Post Procedural Pain:  0 / 10     Response to treatment:  Well tolerated by patient. Status of wound progress and description from last visit:   stable. Plan:       Discharge Instructions         PHYSICIAN ORDERS AND DISCHARGE INSTRUCTIONS     NOTE: Upon discharge from the [address], you will receive a patient experience survey. We would be grateful if you would take the time to fill this survey out.      Wound care order history:                 YESSENIA's   Right       Left               Date:              Cultures:                Grafts:                Antibiotics:           Continuing wound care orders and information:                 Residence:                Continue home health care with: Your wound-care supplies will be provided by: Wound cleansing:              Do not scrub or use excessive force. Wash hands with soap and water before and after dressing changes. Prior to applying a clean dressing, cleanse wound with normal saline, wound cleanser, or mild soap and water. Ask the physician or nurse before getting the wound(s) wet in a shower     Daily Wound management:              Keep weight off wounds and reposition every 2 hours. Avoid standing for long periods of time. Apply wraps/stockings in AM and remove at bedtime. If swelling is present, elevate legs to the level of the heart or above for 30 minutes 4-5 times a day and/or when sitting. When taking antibiotics take entire prescription as ordered by physician do not stop taking until medicine is all gone. Orders for this week: 11/29/22                 Xray of Right Foot ordered 11/22/22     Rx: [facility] [address]     Right 2nd Toe and Right Lateral Foot - Wash with soap and water, pat dry. Apply Betadine damp gauze wet to dry dressing. Cover with ABD.     --also pad Right Lateral foot with foam.  Wrap with conform and Coban (prefers 2\" coban to secure). Change daily. Follow up with Dr Berenice Childress in 1 week in the wound care center. Call [phone] for any questions or concerns. Treatment Note Wound 11/15/22 #2 right lateral foot-Dressing/Treatment:  (betadine damp wet to dry,abd,foam,conform. (refused coban))  Wound 09/20/22 Toe (Comment  which one) Right #1 right second toe-Dressing/Treatment:  (betadine damp wet to dry,abd,foam,conform.)    Written Patient Dismissal Instructions Given       -History of wound dehiscence amputation sites bilateral foot.  -Chronic wound of the third toe of the right foot.    -Third toe amputation right foot (10/20/2022)  -Redness to the right foot has resolved from previous. No further antibiotics needed today for  -I did review the right foot x-ray that the patient had and I reviewed it in detail with the patient. Negative for any pathology over the fifth metatarsal head. It did show some possible erosive changes on the medial and lateral aspect of the third metatarsal head concerning for osteomyelitis. Clinically however the surgical site is healing well with no deep probing to bone. We will keep this in mind but for now we will just continue local wound care and watch the incision site healed  -Continue with daily local wound care instructions as previous. Offloading felt given to the patient to try to take pressure off the lateral fifth metatarsal head wound  -Patient says that he is going to see his primary care today for redness and swelling to the right calf. He could have a patient with possible underlying deep vein thrombosis going on.   Told him that if his primary care does not order a ultrasound to let me know and I will order one for him to rule out DVT  -Any serious concerns before the next visit to go to the emergency room  -Follow-up 1 week for recheck sooner if needed          Electronically signed by Sheryl Gil DPM on 11/29/2022 at 4:25 PM

## 2022-11-29 NOTE — PROGRESS NOTES
11/29/2022    Lakesha Rash    Chief Complaint   Patient presents with    Mass     Large knot on right calf muscle , pt had recent toe amputation on right foot , pt reports pain when walking , redness around entire area , pt denies any injury or fall , pt noticed 11-26-22 , pt reports warmth in area        HPI  History was obtained from patient and his wife. Corbin Garcia is a 74 y.o. male who presents today with concerns for \"a knot and skin redness\" on anterolateral aspect of right lower leg. His wife states that they both noticed the \"knot\" 3 days ago. The redness developed yesterday. There has been no improvement or worsening of the skin redness since yesterday. The red area of skin is also warm and tender to the touch. His wife states that he has been more sedentary than usual because he is recovering from a right third toe amputation that was performed approximately 5 weeks ago. He followed up with wound care today and wound specialist reportedly said that the toe is recovering well and that current problem with the right lower leg is not associated with the amputation. Patient states that he finished doxycycline yesterday. Patient states that there has been no known injury to the right lower leg, but does it is possible that he bumped to the leg and did not realize it. His wife states that he gets hematomas easily. No history of DVT/PE. He is anticoagulated on Eliquis secondary to atrial fibrillation. REVIEW OF SYMPTOMS    Constitutional:  Denies fever, chills  ENT:  Denies URI symptoms  Cardiovascular:  Denies chest pain, palpitations or swelling  Respiratory:  Denies cough or shortness of breath  GI:  Denies abdominal pain, nausea, vomiting  Musculoskeletal: Admits pain to right anterolateral right lower leg. See HPI  Skin: Admits \"a knot and skin redness\" to anterolateral right lower leg.   See HPI  Neurologic:  Denies focal weakness, or sensory changes  Lymphatic:  Denies swollen glands    PAST MEDICAL HISTORY  Past Medical History:   Diagnosis Date    Arrhythmia     Pacemaker placed aprox 5 years ago for A Fib per patient    Arthritis 12/2013    rt wrist    Atrial fibrillation ([facility] [address]     on Xarelto - Dr. Marisabel De León    CAD (coronary artery disease) 06/18/2014    see dr Marisabel De León    Chronic kidney disease, stage III (moderate) ([facility] [address] 07/07/2016    Critical illness myopathy 03/15/2021    Diabetes mellitus ([facility] [address]     dx 2004    Diabetic neuropathy associated with type 2 diabetes mellitus ([facility] [address] 04/23/2019    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    Gout 04/2019    \"got gout when had pacer put in because they did not give me my medication for gout \"    H/O 24 hour EKG monitoring 10/03/2013    no afib noted, sinsus rhythm    H/O cardiovascular stress test 05/12/2014    cardiolite- mild ischemia RCA EF50%    H/O echocardiogram 12/01/2020    EF 55-60% severe aortic stenosis mild to mod aortic regurg mod to severe tricuspid regurg severe pulm htn significant changes since 2018 echo. H/O right and left heart catheterization 12/10/2020    DIFFUSE LAD DISEASE, Mild ECA Disease, Severe AS, Milf Pul HTN on RHC. History of blood transfusion 12/2020    d/t anemia    History of transesophageal echocardiography (MABLE) 12/15/2020    Severe aortic stenosis (ANNA by planimetry: 0.778 cm sq). Mild AR. [place] (hard of hearing)     hearing [employer] aides    Hx of Doppler echocardiogram 05/21/2018    EF 50%  Mild LV hypertrophy. Mildly enlarged RA. Mod aortic valve calcification with mod AS. Mitral annular calcification is present. Mild AR, MR and TR. Mild pulmonary htn.     Hyperlipidemia     Hypertension     Follows with PCP & Dr. Brittny Almeida    Other disorders of kidney and ureter     Pacemaker     [employer]tr[employer], implanted 2014    Pneumonia 12/29/2012    Pneumonia due to COVID-19 virus 08/2020    Sleep apnea     dx 2013- has c-pap    Type II or unspecified type diabetes mellitus with other specified manifestations, uncontrolled 12/12/2012    Venous hypertension, chronic, with ulcer ([address] 12/12/2012    resolved       FAMILY HISTORY  Family History   Problem Relation Age of Onset    High Blood Pressure Mother     Arthritis Mother     Diabetes Mother     Heart Disease Mother     High Blood Pressure Father     Heart Disease Father     Kidney Disease Father     No Known Problems Sister     Heart Surgery Brother     Heart Disease Brother     No Known Problems Sister     No Known Problems Brother        SOCIAL HISTORY  Social History     Socioeconomic History    Marital status:      Spouse name: None    Number of children: None    Years of education: None    Highest education level: None   Tobacco Use    Smoking status: Never    Smokeless tobacco: Never   Vaping Use    Vaping Use: Never used   Substance and Sexual Activity    Alcohol use: Not Currently     Alcohol/week: 2.0 standard drinks     Types: 2 Cans of beer per week     Comment: average \"one time per week\"/ Caffiene: 1 cup of coffee daily    Drug use: No    Sexual activity: Yes     Partners: Female     Comment:      Social Determinants of Health     Financial Resource Strain: Low Risk     Difficulty of Paying Living Expenses: Not hard at all   Food Insecurity: No Food Insecurity    Worried About Running Out of Food in the Last Year: Never true    Ran Out of Food in the Last Year: Never true   Physical Activity: Sufficiently Active    Days of Exercise per Week: 4 days    Minutes of Exercise per Session: 40 min        SURGICAL HISTORY  Past Surgical History:   Procedure Laterality Date    CABG WITH AORTIC VALVE REPLACEMENT N/A 02/16/2021    CABG CORONARY ARTERY BYPASS X2 WITH LIMA, AORTIC VALVE REPLACEMENT AND AORTIC ROOT REPAIR, INTRAOPERATIVE MABLE, INDUCED HYPOTHERMIA, LEFT LEG ENDOVEIN HARVEST, LEFT ATRIAL CLIP, AND CRYO PROCEDURE performed by Sisi Kline MD at [address]  12/2014    at [address] Left 01/29/2021    LEFT CAROTID ENDARTERECTOMY performed by Isabel Castro MD at [facility]  2017    COLONOSCOPY  2011    COLONOSCOPY N/A 11/19/2019    COLONOSCOPY DIAGNOSTIC performed by Alize Meyer MD at [address] [place] [address]  09/30/2020    POSSIBLE CECAL avms, SIGMOID DIVERTICULOSIS, INTERNAL HEMORRHOIDS GRADE 1    COLONOSCOPY N/A 09/30/2020    COLONOSCOPY CONTROL HEMORRHAGE WITH APC performed by Alize Meyer MD at [address] [facility]  2014    \"2 stents put in \"    FOOT DEBRIDEMENT Bilateral 03/31/2022    BILATERAL DEBRIEDMENT OF NON-VIABLE TISSUE & BONE performed by Edison Ltot DPM at [address] Right 04/14/2022    RIGHT FOOT DEBRIDEMENT INCISION AND DRAINAGE performed by Edison Lott DPM at [address] [facility]      abdominal    IR NONTUNNELED VASCULAR CATHETER  03/05/2021    IR NONTUNNELED VASCULAR CATHETER 3/5/2021 [facility] SPECIAL PROCEDURES    JOINT REPLACEMENT  2004    total left hip    OTHER SURGICAL HISTORY Right 12/02/2017    I&D; evacuation of hematoma right hip    OTHER SURGICAL HISTORY  09/17/2020    enteroscopy    PACEMAKER INSERTION N/A 02/23/2021    PACEMAKER GENERATOR LEAD REVISION performed by Fayette Paget, MD at [address] Left 04/26/2022    Dual PPM: Atrial lead extraction/insertion, Generator change. Medtronic, [place] XT DR EVERT Apodaca    PACEMAKER PLACEMENT      9/18/14 Status post remote permanent pacemaker with atrial lead dislodgement.  7/24/14 PPM Implant    TOE AMPUTATION Bilateral 03/31/2022    LEFT 3RD TOE AMPUTATION performed by Edison Lott DPM at [facility] Right 04/14/2022    RIGHT 2ND TOE AMPUTATION performed by Edison Lott DPM at [address] N/A 09/17/2020    ENTEROSCOPY PUSH BIOPSY performed by Alize Meyer MD at [address] [facility] 03/04/2021    EGD DIAGNOSTIC ONLY performed by Alize Meyer MD at [facility] ENDOSCOPY VASCULAR SURGERY  2012    \"have stents in both legs- done in [address]  Current Outpatient Medications   Medication Sig Dispense Refill    cephALEXin (KEFLEX) 500 MG capsule Take 1 capsule by mouth 3 times daily for 10 days 30 capsule 0    sulfamethoxazole-trimethoprim (BACTRIM DS) 800-160 MG per tablet Take 1 tablet by mouth 2 times daily for 10 days 20 tablet 0    apixaban (ELIQUIS) 5 MG TABS tablet Take 1 tablet by mouth 2 times daily 180 tablet 3    simvastatin (ZOCOR) 20 MG tablet Take 1 tablet by mouth daily 90 tablet 3    dapagliflozin (FARXIGA) 10 MG tablet Take 1 tablet by mouth every morning 90 tablet 1    Dulaglutide 4.5 MG/0.5ML SOPN Inject 4.5 mg into the skin once a week 12 Adjustable Dose Pre-filled Pen Syringe 1    gabapentin (NEURONTIN) 300 MG capsule TAKE 1 CAPSULE 3 TIMES A    capsule 1    glimepiride (AMARYL) 4 MG tablet TAKE 1 TABLET IN THE       MORNING (BEFORE BREAKFAST) 90 tablet 1    sildenafil (VIAGRA) 100 MG tablet TAKE 1 TABLET DAILY AS     NEEDED FOR ERECTILE        DYSFUNCTION 90 tablet 1    torsemide (DEMADEX) 20 MG tablet TAKE 2 TABLETS BY MOUTH TWICE A  tablet 3    carboxymethylcellulose (REFRESH PLUS) 0.5 % SOLN ophthalmic solution as needed       Cholecalciferol (VITAMIN D) 50 MCG (2000 UT) CAPS capsule Take by mouth nightly       aspirin 81 MG tablet Take 81 mg by mouth daily      doxycycline hyclate (VIBRA-TABS) 100 MG tablet Take 1 tablet by mouth 2 times daily for 7 days (Patient not taking: Reported on 11/29/2022) 14 tablet 0     No current facility-administered medications for this visit. ALLERGIES  Allergies   Allergen Reactions    Spironolactone      CAUSES INCREASED K+    Tape Lourena Tom Tape] Rash     SURGICAL TAPE       PHYSICAL EXAM    /74   Pulse 69   Ht 5' 11\" (1.803 m)   Wt 197 lb 12.8 oz (89.7 kg)   SpO2 96%   BMI 27.59 kg/m²     Constitutional:  Well developed, well nourished. Pleasant and cooperative.   No acute distress. HENT:  Normocephalic, atraumatic  Eyes:  conjunctiva normal, no discharge, no scleral icterus  Neck:  No tenderness, supple  Lymphatic:  No lymphadenopathy noted  Cardiovascular:  Normal heart rate, normal rhythm, no murmurs, gallops or rubs  Thorax & Lungs:  Normal breath sounds, no respiratory distress, no wheezing, no rales, no rhonchi  Skin/Extremities: There is a palpable mass noted on anterolateral aspect of right lower leg which can be seen in the photo below. There is also an area of erythema, warmth, and tenderness to palpation on the anterolateral aspect. No ecchymosis. Just distal to the erythematous region, there is chronic hyperpigmentation secondary to venous stasis. The calves are soft and nontender. No skin redness or palpable cord on the posterior aspect of the leg. Distal pulses intact. Pt declined removal of bandage from right foot. 5 weeks S/P third toe amputation, follows wound care and had an appt today, told healing well and current problem with RLE not associated with amp site. Musculoskeletal:  Good range of motion all major joints of right knee/foot/ankle. Neurologic:  Alert & oriented   Psychiatric:  Affect normal, mood normal    ASSESSMENT & PLAN    Drew Brown was seen today for mass. Diagnoses and all orders for this visit:    Cellulitis of right anterior lower leg  -     VL LOWER EXTREMITY VENOUS RIGHT  -     cephALEXin (KEFLEX) 500 MG capsule; Take 1 capsule by mouth 3 times daily for 10 days  -     sulfamethoxazole-trimethoprim (BACTRIM DS) 800-160 MG per tablet; Take 1 tablet by mouth 2 times daily for 10 days    Mass of skin of right lower leg  -     VL LOWER EXTREMITY VENOUS RIGHT     Stat ultrasound of right lower extremity-infection versus hematoma versus DVT? In the meantime, initiate Keflex and Bactrim. Elevate the lower extremity. Apply intermittent application of warm moist heat. ER for any sudden changes.     Medications Discontinued During This Encounter   Medication Reason    apixaban (ELIQUIS) 5 MG TABS tablet LIST CLEANUP    cephALEXin (KEFLEX) 500 MG capsule REORDER    sulfamethoxazole-trimethoprim (BACTRIM DS) 800-160 MG per tablet REORDER        No follow-ups on file. Plan of care reviewed with patient who verbalizes understanding and wishes to continue. Please note that this chart was generated using dragon dictation software. Although every effort was made to ensure the accuracy of this automated transcription, some errors in transcription may have occurred.     Electronically signed by Denny Sparks PA-C on 11/29/2022

## 2022-11-29 NOTE — DISCHARGE INSTRUCTIONS
PHYSICIAN ORDERS AND DISCHARGE INSTRUCTIONS     NOTE: Upon discharge from the [address], you will receive a patient experience survey. We would be grateful if you would take the time to fill this survey out. Wound care order history:                 YESSENIA's   Right       Left               Date:              Cultures:                Grafts:                Antibiotics:           Continuing wound care orders and information:                 Residence:                Continue home health care with:              Your wound-care supplies will be provided by: Wound cleansing:              Do not scrub or use excessive force. Wash hands with soap and water before and after dressing changes. Prior to applying a clean dressing, cleanse wound with normal saline, wound cleanser, or mild soap and water. Ask the physician or nurse before getting the wound(s) wet in a shower     Daily Wound management:              Keep weight off wounds and reposition every 2 hours. Avoid standing for long periods of time. Apply wraps/stockings in AM and remove at bedtime. If swelling is present, elevate legs to the level of the heart or above for 30 minutes 4-5 times a day and/or when sitting. When taking antibiotics take entire prescription as ordered by physician do not stop taking until medicine is all gone. Orders for this week: 11/29/22                 Xray of Right Foot ordered 11/22/22     [facility]: [facility] on [address]     Right 2nd Toe and Right Lateral Foot - Wash with soap and water, pat dry. Apply Betadine damp gauze wet to dry dressing. Cover with ABD.     --also pad Right Lateral foot with foam.  Wrap with conform and Coban (prefers 2\" coban to secure). Change daily. Follow up with Dr Alyse Guerra in 1 week in the wound care center.   Call [phone] for any questions or concerns.

## 2022-11-30 ENCOUNTER — HOSPITAL ENCOUNTER (OUTPATIENT)
Dept: ULTRASOUND IMAGING | Age: 72
Discharge: HOME | End: 2022-11-30
Payer: MEDICARE

## 2022-11-30 DIAGNOSIS — R22.41 MASS OF SKIN OF RIGHT LOWER LEG: ICD-10-CM

## 2022-11-30 DIAGNOSIS — L03.115 CELLULITIS OF RIGHT ANTERIOR LOWER LEG: ICD-10-CM

## 2022-11-30 PROCEDURE — 93971 EXTREMITY STUDY: CPT

## 2022-12-06 ENCOUNTER — HOSPITAL ENCOUNTER (OUTPATIENT)
Dept: WOUND CARE | Age: 72
Discharge: HOME | End: 2022-12-06
Payer: MEDICARE

## 2022-12-06 VITALS
DIASTOLIC BLOOD PRESSURE: 80 MMHG | SYSTOLIC BLOOD PRESSURE: 155 MMHG | TEMPERATURE: 98 F | RESPIRATION RATE: 18 BRPM | HEART RATE: 69 BPM

## 2022-12-06 DIAGNOSIS — L97.912 NON-PRESSURE ULCER OF RIGHT LOWER EXTREMITY WITH FAT LAYER EXPOSED (HCC): ICD-10-CM

## 2022-12-06 DIAGNOSIS — E11.621 DIABETIC ULCER OF TOE OF LEFT FOOT ASSOCIATED WITH TYPE 2 DIABETES MELLITUS, WITH FAT LAYER EXPOSED (HCC): Primary | ICD-10-CM

## 2022-12-06 DIAGNOSIS — E11.49 OTHER DIABETIC NEUROLOGICAL COMPLICATION ASSOCIATED WITH TYPE 2 DIABETES MELLITUS (HCC): ICD-10-CM

## 2022-12-06 DIAGNOSIS — I73.9 PVD (PERIPHERAL VASCULAR DISEASE) (HCC): ICD-10-CM

## 2022-12-06 DIAGNOSIS — L97.522 DIABETIC ULCER OF TOE OF LEFT FOOT ASSOCIATED WITH TYPE 2 DIABETES MELLITUS, WITH FAT LAYER EXPOSED (HCC): Primary | ICD-10-CM

## 2022-12-06 PROCEDURE — 11042 DBRDMT SUBQ TIS 1ST 20SQCM/<: CPT

## 2022-12-06 RX ORDER — GENTAMICIN SULFATE 1 MG/G
OINTMENT TOPICAL ONCE
OUTPATIENT
Start: 2022-12-06 | End: 2022-12-06

## 2022-12-06 RX ORDER — GINSENG 100 MG
CAPSULE ORAL ONCE
OUTPATIENT
Start: 2022-12-06 | End: 2022-12-06

## 2022-12-06 RX ORDER — BACITRACIN, NEOMYCIN, POLYMYXIN B 400; 3.5; 5 [USP'U]/G; MG/G; [USP'U]/G
OINTMENT TOPICAL ONCE
OUTPATIENT
Start: 2022-12-06 | End: 2022-12-06

## 2022-12-06 RX ORDER — BACITRACIN ZINC AND POLYMYXIN B SULFATE 500; 1000 [USP'U]/G; [USP'U]/G
OINTMENT TOPICAL ONCE
OUTPATIENT
Start: 2022-12-06 | End: 2022-12-06

## 2022-12-06 RX ORDER — BETAMETHASONE DIPROPIONATE 0.05 %
OINTMENT (GRAM) TOPICAL ONCE
OUTPATIENT
Start: 2022-12-06 | End: 2022-12-06

## 2022-12-06 RX ORDER — LIDOCAINE HYDROCHLORIDE 20 MG/ML
JELLY TOPICAL ONCE
OUTPATIENT
Start: 2022-12-06 | End: 2022-12-06

## 2022-12-06 RX ORDER — LIDOCAINE HYDROCHLORIDE 40 MG/ML
SOLUTION TOPICAL ONCE
OUTPATIENT
Start: 2022-12-06 | End: 2022-12-06

## 2022-12-06 RX ORDER — LIDOCAINE 40 MG/G
CREAM TOPICAL ONCE
OUTPATIENT
Start: 2022-12-06 | End: 2022-12-06

## 2022-12-06 RX ORDER — LIDOCAINE 50 MG/G
OINTMENT TOPICAL ONCE
OUTPATIENT
Start: 2022-12-06 | End: 2022-12-06

## 2022-12-06 RX ORDER — CLOBETASOL PROPIONATE 0.5 MG/G
OINTMENT TOPICAL ONCE
OUTPATIENT
Start: 2022-12-06 | End: 2022-12-06

## 2022-12-06 ASSESSMENT — PAIN SCALES - GENERAL: PAINLEVEL_OUTOF10: 0

## 2022-12-06 NOTE — DISCHARGE INSTRUCTIONS
PHYSICIAN ORDERS AND DISCHARGE INSTRUCTIONS     NOTE: Upon discharge from the [address], you will receive a patient experience survey. We would be grateful if you would take the time to fill this survey out. Wound care order history:                 YESSENIA's   Right       Left               Date:              Cultures:                Grafts:                Antibiotics:           Continuing wound care orders and information:                 Residence:                Continue home health care with:              Your wound-care supplies will be provided by: Wound cleansing:              Do not scrub or use excessive force. Wash hands with soap and water before and after dressing changes. Prior to applying a clean dressing, cleanse wound with normal saline, wound cleanser, or mild soap and water. Ask the physician or nurse before getting the wound(s) wet in a shower     Daily Wound management:              Keep weight off wounds and reposition every 2 hours. Avoid standing for long periods of time. Apply wraps/stockings in AM and remove at bedtime. If swelling is present, elevate legs to the level of the heart or above for 30 minutes 4-5 times a day and/or when sitting. When taking antibiotics take entire prescription as ordered by physician do not stop taking until medicine is all gone. Orders for this week: 12/6/22                 Xray of Right Foot ordered 11/22/22     [facility] [address]     Right 2nd Toe and Right Lateral Foot - Wash with soap and water, pat dry. Apply Stimulen to wound beds   Cover with ABD.     --also pad Right Lateral foot with foam.  Wrap with conform and Coban (prefers 2\" coban to secure). Change daily. Follow up with Dr Leander Frazier in 1 week in the wound care center.   Call [phone] for any questions or concerns

## 2022-12-06 NOTE — PROGRESS NOTES
[facility] Center Progress Note With Procedure    Juan Pablo Oreilly  AGE: 74 y.o. GENDER: male  : 1950  EPISODE DATE:  2022     Subjective:     Chief Complaint   Patient presents with    Wound Check     Right foot         HISTORY of PRESENT ILLNESS      Juan Pablo Oreilly is a 74 y.o. male who presents today for wound to the third toe of the right foot. Status post third toe amputation right foot (10/20/2022). Patient is doing well overall. No worsening pain to either site. Did go to his primary care and he had an ultrasound done and was told that he did not have a blood clot in his right leg. PAST MEDICAL HISTORY        Diagnosis Date    Arrhythmia     Pacemaker placed aprox 5 years ago for A Fib per patient    Arthritis 2013    rt wrist    Atrial fibrillation ([facility]     on Xarelto - Dr. Wlater Carlisle    CAD (coronary artery disease) 2014    see dr Walter Carlisle    Chronic kidney disease, stage III (moderate) ([facility] 2016    Critical illness myopathy 03/15/2021    Diabetes mellitus ([facility]     dx     Diabetic neuropathy associated with type 2 diabetes mellitus ([facility] 2019    Erythropoietin deficiency anemia 2020    Gout 2019    \"got gout when had pacer put in because they did not give me my medication for gout \"    H/O 24 hour EKG monitoring 10/03/2013    no afib noted, sinsus rhythm    H/O cardiovascular stress test 2014    cardiolite- mild ischemia RCA EF50%    H/O echocardiogram 2020    EF 55-60% severe aortic stenosis mild to mod aortic regurg mod to severe tricuspid regurg severe pulm htn significant changes since 2018 echo. H/O right and left heart catheterization 12/10/2020    DIFFUSE LAD DISEASE, Mild ECA Disease, Severe AS, Milf Pul HTN on RHC. History of blood transfusion 2020    d/t anemia    History of transesophageal echocardiography (MABLE) 12/15/2020    Severe aortic stenosis (ANNA by planimetry: 0.778 cm sq). Mild AR.     [place] (hard of hearing) hearing tonya aides    Hx of Doppler echocardiogram 05/21/2018    EF 50%  Mild LV hypertrophy. Mildly enlarged RA. Mod aortic valve calcification with mod AS. Mitral annular calcification is present. Mild AR, MR and TR. Mild pulmonary htn.     Hyperlipidemia     Hypertension     Follows with PCP & Dr. Radha Guerrero    Other disorders of kidney and ureter     Pacemaker     Medtronic, implanted 2014    Pneumonia 12/29/2012    Pneumonia due to COVID-19 virus 08/2020    Sleep apnea     dx 2013- has c-pap    Type II or unspecified type diabetes mellitus with other specified manifestations, uncontrolled 12/12/2012    Venous hypertension, chronic, with ulcer ([address] 12/12/2012    resolved       PAST SURGICAL HISTORY    Past Surgical History:   Procedure Laterality Date    CABG WITH AORTIC VALVE REPLACEMENT N/A 02/16/2021    CABG CORONARY ARTERY BYPASS X2 WITH LIMA, AORTIC VALVE REPLACEMENT AND AORTIC ROOT REPAIR, INTRAOPERATIVE MABLE, INDUCED HYPOTHERMIA, LEFT LEG ENDOVEIN HARVEST, LEFT ATRIAL CLIP, AND CRYO PROCEDURE performed by Terri Nunes MD at [address]  12/2014    at [address] Left 01/29/2021    LEFT CAROTID ENDARTERECTOMY performed by Ellen Liang MD at [address] [facility]  2017    COLONOSCOPY  2011    COLONOSCOPY N/A 11/19/2019    COLONOSCOPY DIAGNOSTIC performed by Jong Hadley MD at [address] [facility]  09/30/2020    POSSIBLE CECAL avms, SIGMOID DIVERTICULOSIS, INTERNAL HEMORRHOIDS GRADE 1    COLONOSCOPY N/A 09/30/2020    COLONOSCOPY CONTROL HEMORRHAGE WITH APC performed by Jong Hadley MD at [address] [facility]  2014    \"2 stents put in \"    FOOT DEBRIDEMENT Bilateral 03/31/2022    BILATERAL DEBRIEDMENT OF NON-VIABLE TISSUE & BONE performed by Efrem Adair DPM at [address] Right 04/14/2022    RIGHT FOOT DEBRIDEMENT INCISION AND DRAINAGE performed by Efrem Adair DPM at [address] [place] [address] abdominal    IR NONTUNNELED VASCULAR CATHETER  03/05/2021    IR NONTUNNELED VASCULAR CATHETER 3/5/2021 [facility] SPECIAL PROCEDURES    JOINT REPLACEMENT  2004    total left hip    OTHER SURGICAL HISTORY Right 12/02/2017    I&D; evacuation of hematoma right hip    OTHER SURGICAL HISTORY  09/17/2020    enteroscopy    PACEMAKER INSERTION N/A 02/23/2021    PACEMAKER GENERATOR LEAD REVISION performed by Lizette Jaime MD at [address] Left 04/26/2022    Dual PPM: Atrial lead extraction/insertion, Generator change. Medtronic, Lucie XT DR EVERT Apodaca    PACEMAKER PLACEMENT      9/18/14 Status post remote permanent pacemaker with atrial lead dislodgement.  7/24/14 PPM Implant    TOE AMPUTATION Bilateral 03/31/2022    LEFT 3RD TOE AMPUTATION performed by Chanda Cox DPM at [place] [address] Right 04/14/2022    RIGHT 2ND TOE AMPUTATION performed by Chanda Cox DPM at [address] [place] [address] N/A 09/17/2020    ENTEROSCOPY PUSH BIOPSY performed by Lisandro Haile MD at [address] [place] [address] N/A 03/04/2021    EGD DIAGNOSTIC ONLY performed by Lisandro Haile MD at [address] [place] [address]  2012    \"have stents in both legs- done in [address] [place] [address]    Family History   Problem Relation Age of Onset    High Blood Pressure Mother     Arthritis Mother     Diabetes Mother     Heart Disease Mother     High Blood Pressure Father     Heart Disease Father     Kidney Disease Father     No Known Problems Sister     Heart Surgery Brother     Heart Disease Brother     No Known Problems Sister     No Known Problems Brother        SOCIAL HISTORY    Social History     Tobacco Use    Smoking status: Never    Smokeless tobacco: Never   Vaping Use    Vaping Use: Never used   Substance Use Topics    Alcohol use: Not Currently     Alcohol/week: 2.0 standard drinks     Types: 2 Cans of beer per week     Comment: average \"one time per week\"/ Caffiene: 1 cup of coffee daily    Drug use: No       ALLERGIES    Allergies   Allergen Reactions    Spironolactone      CAUSES INCREASED K+    Tape [place] Nuvia Tape] Rash     SURGICAL TAPE       MEDICATIONS    Current Outpatient Medications on File Prior to Encounter   Medication Sig Dispense Refill    cephALEXin (KEFLEX) 500 MG capsule Take 1 capsule by mouth 3 times daily for 10 days 30 capsule 0    sulfamethoxazole-trimethoprim (BACTRIM DS) 800-160 MG per tablet Take 1 tablet by mouth 2 times daily for 10 days 20 tablet 0    apixaban (ELIQUIS) 5 MG TABS tablet Take 1 tablet by mouth 2 times daily 180 tablet 3    simvastatin (ZOCOR) 20 MG tablet Take 1 tablet by mouth daily 90 tablet 3    dapagliflozin (FARXIGA) 10 MG tablet Take 1 tablet by mouth every morning 90 tablet 1    Dulaglutide 4.5 MG/0.5ML SOPN Inject 4.5 mg into the skin once a week 12 Adjustable Dose Pre-filled Pen Syringe 1    gabapentin (NEURONTIN) 300 MG capsule TAKE 1 CAPSULE 3 TIMES A    capsule 1    glimepiride (AMARYL) 4 MG tablet TAKE 1 TABLET IN THE       MORNING (BEFORE BREAKFAST) 90 tablet 1    sildenafil (VIAGRA) 100 MG tablet TAKE 1 TABLET DAILY AS     NEEDED FOR ERECTILE        DYSFUNCTION 90 tablet 1    torsemide (DEMADEX) 20 MG tablet TAKE 2 TABLETS BY MOUTH TWICE A  tablet 3    carboxymethylcellulose (REFRESH PLUS) 0.5 % SOLN ophthalmic solution as needed       Cholecalciferol (VITAMIN D) 50 MCG (2000 UT) CAPS capsule Take by mouth nightly       aspirin 81 MG tablet Take 81 mg by mouth daily       No current facility-administered medications on file prior to encounter. REVIEW OF SYSTEMS    Pertinent items are noted in HPI. Constitutional: Negative for systemic symptoms including fever, chills and malaise. Objective:      BP (!) 155/80   Pulse 69   Temp 98 °F (36.7 °C) (Temporal)   Resp 18     PHYSICAL EXAM    General: The patient is in no acute distress.     Mental status:  Patient is appropriate, is  oriented to place and plan of care. Dermatologic exam: Visual inspection of the periwound reveals the skin to be normal in turgor and texture  Wound exam: see wound description below in procedure note. Surgical incision site well coapted with sutures intact. Some slight maceration but no dehiscence. No purulent drainage fluctuance crepitus or malodor  Musculoskeletal: Bilateral calves are soft and supple upon manual compression. 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 Performed by: Luda Yang DPM    Consent obtained: Yes    Time out taken:  Yes    Pain Control: lidocaine       Debridement:Non-excisional Debridement    Using #15 blade scalpel the wound(s) was/were sharply debrided down through and including the removal of bone    Devitalized Tissue Debrided:  necrotic/eschar    Pre Debridement Measurements:  Are located in the Wound Documentation Flow Sheet        Wound 09/20/22 Toe (Comment  which one) Right #1 right second toe (Active)   Wound Image   11/22/22 1309   Wound Etiology Diabetic 12/06/22 1257   Dressing Status New dressing applied;Clean;Dry; Intact 11/29/22 1359   Wound Cleansed Wound cleanser 12/06/22 1257   Offloading for Diabetic Foot Ulcers Post op shoe 12/06/22 1257   Wound Length (cm) 1.2 cm 12/06/22 1257   Wound Width (cm) 0.8 cm 12/06/22 1257   Wound Depth (cm) 0.1 cm 12/06/22 1257   Wound Surface Area (cm^2) 0.96 cm^2 12/06/22 1257   Change in Wound Size % (l*w) 84 12/06/22 1257   Wound Volume (cm^3) 0.096 cm^3 12/06/22 1257   Wound Healing % 84 12/06/22 1257   Post-Procedure Length (cm) 1.2 cm 12/06/22 1314   Post-Procedure Width (cm) 0.8 cm 12/06/22 1314   Post-Procedure Depth (cm) 0.1 cm 12/06/22 1314   Post-Procedure Surface Area (cm^2) 0.96 cm^2 12/06/22 1314   Post-Procedure Volume (cm^3) 0.096 cm^3 12/06/22 1314   Distance Tunneling (cm) 0 cm 12/06/22 1257   Tunneling Position ___ O'Clock 0 12/06/22 1257   Undermining Starts ___ O'Clock 0 12/06/22 1257   Undermining Ends___ O'Clock 0 12/06/22 1257   Undermining Maxium Distance (cm) 0 12/06/22 1257   Wound Assessment Granulation tissue 12/06/22 1257   Drainage Amount Moderate 12/06/22 1257   Drainage Description Serosanguinous 12/06/22 1257   Odor None 12/06/22 1257   Angela-wound Assessment Fragile 12/06/22 1257   Margins Defined edges 12/06/22 1257   Wound Thickness Description not for Pressure Injury Partial thickness 12/06/22 1257   Number of days: 77       Wound 11/15/22 #2 right lateral foot (Active)   Wound Image   11/22/22 1309   Wound Etiology Pressure Stage 2 12/06/22 1257   Dressing Status New dressing applied;Clean;Dry; Intact 11/29/22 1359   Wound Cleansed Wound cleanser 12/06/22 1257   Offloading for Diabetic Foot Ulcers Post op shoe 12/06/22 1257   Wound Length (cm) 1.7 cm 12/06/22 1257   Wound Width (cm) 1 cm 12/06/22 1257   Wound Depth (cm) 0.3 cm 12/06/22 1257   Wound Surface Area (cm^2) 1.7 cm^2 12/06/22 1257   Change in Wound Size % (l*w) 82.31 12/06/22 1257   Wound Volume (cm^3) 0.51 cm^3 12/06/22 1257   Wound Healing % 47 12/06/22 1257   Post-Procedure Length (cm) 1.7 cm 12/06/22 1314   Post-Procedure Width (cm) 1 cm 12/06/22 1314   Post-Procedure Depth (cm) 0.3 cm 12/06/22 1314   Post-Procedure Surface Area (cm^2) 1.7 cm^2 12/06/22 1314   Post-Procedure Volume (cm^3) 0.51 cm^3 12/06/22 1314   Distance Tunneling (cm) 0 cm 12/06/22 1257   Tunneling Position ___ O'Clock 0 12/06/22 1257   Undermining Starts ___ O'Clock 0 12/06/22 1257   Undermining Ends___ O'Clock 0 12/06/22 1257   Undermining Maxium Distance (cm) 0 12/06/22 1257   Wound Assessment Pink/red;Slough 12/06/22 1257   Drainage Amount Moderate 12/06/22 1257   Drainage Description Brown;Yellow 12/06/22 1257   Odor None 12/06/22 1257   Angela-wound Assessment Maceration;Fragile 12/06/22 1257   Margins Defined edges 12/06/22 1257   Wound Thickness Description not for Pressure Injury Full thickness 12/06/22 1257   Number of days: 21             Percent of Wound(s) Debrided: approximately 100%    Total  Area  Debrided:  0.606 cm3     Bleeding:  None    Hemostasis Achieved:  not needed    Procedural Pain:  0  / 10     Post Procedural Pain:  0 / 10     Response to treatment:  Well tolerated by patient. Status of wound progress and description from last visit:   stable. Plan:       Discharge Instructions         PHYSICIAN ORDERS AND DISCHARGE INSTRUCTIONS     NOTE: Upon discharge from the [address], you will receive a patient experience survey. We would be grateful if you would take the time to fill this survey out. Wound care order history:                 YESSENIA's   Right       Left               Date:              Cultures:                Grafts:                Antibiotics:           Continuing wound care orders and information:                 Residence:                Continue home health care with:              Your wound-care supplies will be provided by: Wound cleansing:              Do not scrub or use excessive force. Wash hands with soap and water before and after dressing changes.               Prior to applying a clean dressing, cleanse wound with normal saline, shoes  -Any serious concerns before the next visit to go to the emergency room  -Follow-up 1 week for recheck sooner if needed          Electronically signed by Kathryn Grayson DPM on 12/6/2022 at 3:39 PM

## 2022-12-11 PROCEDURE — 93294 REM INTERROG EVL PM/LDLS PM: CPT | Performed by: INTERNAL MEDICINE

## 2022-12-11 PROCEDURE — 93296 REM INTERROG EVL PM/IDS: CPT | Performed by: INTERNAL MEDICINE

## 2022-12-13 ENCOUNTER — HOSPITAL ENCOUNTER (OUTPATIENT)
Dept: WOUND CARE | Age: 72
Discharge: HOME | End: 2022-12-13
Payer: MEDICARE

## 2022-12-13 VITALS
DIASTOLIC BLOOD PRESSURE: 81 MMHG | RESPIRATION RATE: 18 BRPM | SYSTOLIC BLOOD PRESSURE: 149 MMHG | HEART RATE: 69 BPM | TEMPERATURE: 98 F

## 2022-12-13 DIAGNOSIS — E11.621 DIABETIC ULCER OF RIGHT MIDFOOT ASSOCIATED WITH TYPE 2 DIABETES MELLITUS, WITH FAT LAYER EXPOSED (HCC): ICD-10-CM

## 2022-12-13 DIAGNOSIS — T81.89XA NONHEALING SURGICAL WOUND, INITIAL ENCOUNTER: ICD-10-CM

## 2022-12-13 DIAGNOSIS — E11.49 OTHER DIABETIC NEUROLOGICAL COMPLICATION ASSOCIATED WITH TYPE 2 DIABETES MELLITUS (HCC): Primary | ICD-10-CM

## 2022-12-13 DIAGNOSIS — L97.912 NON-PRESSURE ULCER OF RIGHT LOWER EXTREMITY WITH FAT LAYER EXPOSED (HCC): ICD-10-CM

## 2022-12-13 DIAGNOSIS — L97.412 DIABETIC ULCER OF RIGHT MIDFOOT ASSOCIATED WITH TYPE 2 DIABETES MELLITUS, WITH FAT LAYER EXPOSED (HCC): ICD-10-CM

## 2022-12-13 DIAGNOSIS — I73.9 PVD (PERIPHERAL VASCULAR DISEASE) (HCC): ICD-10-CM

## 2022-12-13 PROCEDURE — 11042 DBRDMT SUBQ TIS 1ST 20SQCM/<: CPT | Performed by: NURSE PRACTITIONER

## 2022-12-13 PROCEDURE — 6370000000 HC RX 637 (ALT 250 FOR IP): Performed by: STUDENT IN AN ORGANIZED HEALTH CARE EDUCATION/TRAINING PROGRAM

## 2022-12-13 PROCEDURE — 11042 DBRDMT SUBQ TIS 1ST 20SQCM/<: CPT

## 2022-12-13 RX ORDER — BACITRACIN ZINC AND POLYMYXIN B SULFATE 500; 1000 [USP'U]/G; [USP'U]/G
OINTMENT TOPICAL ONCE
OUTPATIENT
Start: 2022-12-13 | End: 2022-12-13

## 2022-12-13 RX ORDER — LIDOCAINE 50 MG/G
OINTMENT TOPICAL ONCE
Status: COMPLETED | OUTPATIENT
Start: 2022-12-13 | End: 2022-12-13

## 2022-12-13 RX ORDER — LIDOCAINE HYDROCHLORIDE 40 MG/ML
SOLUTION TOPICAL ONCE
Status: CANCELLED | OUTPATIENT
Start: 2022-12-13 | End: 2022-12-13

## 2022-12-13 RX ORDER — BETAMETHASONE DIPROPIONATE 0.05 %
OINTMENT (GRAM) TOPICAL ONCE
OUTPATIENT
Start: 2022-12-13 | End: 2022-12-13

## 2022-12-13 RX ORDER — GENTAMICIN SULFATE 1 MG/G
OINTMENT TOPICAL ONCE
Status: CANCELLED | OUTPATIENT
Start: 2022-12-13 | End: 2022-12-13

## 2022-12-13 RX ORDER — BACITRACIN ZINC AND POLYMYXIN B SULFATE 500; 1000 [USP'U]/G; [USP'U]/G
OINTMENT TOPICAL ONCE
Status: CANCELLED | OUTPATIENT
Start: 2022-12-13 | End: 2022-12-13

## 2022-12-13 RX ORDER — LIDOCAINE 50 MG/G
OINTMENT TOPICAL ONCE
OUTPATIENT
Start: 2022-12-13 | End: 2022-12-13

## 2022-12-13 RX ORDER — LIDOCAINE 40 MG/G
CREAM TOPICAL ONCE
OUTPATIENT
Start: 2022-12-13 | End: 2022-12-13

## 2022-12-13 RX ORDER — CLOBETASOL PROPIONATE 0.5 MG/G
OINTMENT TOPICAL ONCE
OUTPATIENT
Start: 2022-12-13 | End: 2022-12-13

## 2022-12-13 RX ORDER — BETAMETHASONE DIPROPIONATE 0.05 %
OINTMENT (GRAM) TOPICAL ONCE
Status: CANCELLED | OUTPATIENT
Start: 2022-12-13 | End: 2022-12-13

## 2022-12-13 RX ORDER — GINSENG 100 MG
CAPSULE ORAL ONCE
Status: CANCELLED | OUTPATIENT
Start: 2022-12-13 | End: 2022-12-13

## 2022-12-13 RX ORDER — LIDOCAINE 40 MG/G
CREAM TOPICAL ONCE
Status: CANCELLED | OUTPATIENT
Start: 2022-12-13 | End: 2022-12-13

## 2022-12-13 RX ORDER — LIDOCAINE 50 MG/G
OINTMENT TOPICAL ONCE
Status: CANCELLED | OUTPATIENT
Start: 2022-12-13 | End: 2022-12-13

## 2022-12-13 RX ORDER — LIDOCAINE HYDROCHLORIDE 20 MG/ML
JELLY TOPICAL ONCE
Status: CANCELLED | OUTPATIENT
Start: 2022-12-13 | End: 2022-12-13

## 2022-12-13 RX ORDER — BACITRACIN, NEOMYCIN, POLYMYXIN B 400; 3.5; 5 [USP'U]/G; MG/G; [USP'U]/G
OINTMENT TOPICAL ONCE
Status: CANCELLED | OUTPATIENT
Start: 2022-12-13 | End: 2022-12-13

## 2022-12-13 RX ORDER — GINSENG 100 MG
CAPSULE ORAL ONCE
OUTPATIENT
Start: 2022-12-13 | End: 2022-12-13

## 2022-12-13 RX ORDER — GENTAMICIN SULFATE 1 MG/G
OINTMENT TOPICAL ONCE
OUTPATIENT
Start: 2022-12-13 | End: 2022-12-13

## 2022-12-13 RX ORDER — LIDOCAINE HYDROCHLORIDE 40 MG/ML
SOLUTION TOPICAL ONCE
OUTPATIENT
Start: 2022-12-13 | End: 2022-12-13

## 2022-12-13 RX ORDER — BACITRACIN, NEOMYCIN, POLYMYXIN B 400; 3.5; 5 [USP'U]/G; MG/G; [USP'U]/G
OINTMENT TOPICAL ONCE
OUTPATIENT
Start: 2022-12-13 | End: 2022-12-13

## 2022-12-13 RX ORDER — CLOBETASOL PROPIONATE 0.5 MG/G
OINTMENT TOPICAL ONCE
Status: CANCELLED | OUTPATIENT
Start: 2022-12-13 | End: 2022-12-13

## 2022-12-13 RX ADMIN — LIDOCAINE: 50 OINTMENT TOPICAL at 13:23

## 2022-12-13 ASSESSMENT — PAIN SCALES - GENERAL: PAINLEVEL_OUTOF10: 0

## 2022-12-13 NOTE — DISCHARGE INSTRUCTIONS
PHYSICIAN ORDERS AND DISCHARGE INSTRUCTIONS     NOTE: Upon discharge from the [address], you will receive a patient experience survey. We would be grateful if you would take the time to fill this survey out. Wound care order history:                 YESSENIA's   Right       Left               Date:              Cultures:                Grafts:                Antibiotics:           Continuing wound care orders and information:                 Residence:                Continue home health care with:              Your wound-care supplies will be provided by: Wound cleansing:              Do not scrub or use excessive force. Wash hands with soap and water before and after dressing changes. Prior to applying a clean dressing, cleanse wound with normal saline, wound cleanser, or mild soap and water. Ask the physician or nurse before getting the wound(s) wet in a shower     Daily Wound management:              Keep weight off wounds and reposition every 2 hours. Avoid standing for long periods of time. Apply wraps/stockings in AM and remove at bedtime. If swelling is present, elevate legs to the level of the heart or above for 30 minutes 4-5 times a day and/or when sitting. When taking antibiotics take entire prescription as ordered by physician do not stop taking until medicine is all gone. Orders for this week: 12/13/22                 Applied for grafts on 12/13/22    Xray of Right Foot ordered 11/22/22     Rx: [facility] on [address]     Right 2nd Toe and Right Lateral Foot - Wash with soap and water, pat dry. Apply Vero to wound beds. Qwick (or Drawtex) to periwound of Right Lateral Foot. Cover with ABD. Wrap with conform and Coban (prefers 2\" coban to secure). Change daily.         Follow up with Dr Chana Carter in 1 week in the wound care [place].   Call [phone] for any questions or concerns

## 2022-12-13 NOTE — PROGRESS NOTES
Wound Care Center Progress Note With Procedure    Lakesha Vo  AGE: 74 y.o. GENDER: male  : 1950  EPISODE DATE:  2022     Subjective:     Chief Complaint   Patient presents with    Wound Check     Right third toe         HISTORY of PRESENT ILLNESS     Lakesha Vo is a 74 y.o. male who presents today for wound to the third toe of the right foot. Status post third toe amputation right foot (10/20/2022). Patient is doing well overall. No worsening pain to either site. Did go to his primary care and he had an ultrasound done and was told that he did not have a blood clot in his right leg. Diabetes: Yes, on an oral and insulin regimen, last A1c 7.8 as of 22  Hemoglobin A1C   Date Value Ref Range Status   2022 7.8 (H) 4.2 - 6.3 % Final      Smoking: Never smoker  Obesity: No  Anticoagulant therapy: Eliquis and aspirin  Immunosuppression: No     Nutritional status: well nourished. Discussed need for increased protein and calories for wound healing and good sources of protein (just over 7 grams for every 20 pounds of body weight). Animal-based foods high in protein (meat, poultry, fish, eggs, and dairy foods). Plant based foods high in protein (tofu, lentils, beans, chickpeas, nuts, quinoa and jade seeds. Lab Results   Component Value Date     2022    K 3.8 2022    CL 98 (L) 2022    CO2 20 (L) 2022    BUN 36 (H) 2022    CREATININE 1.3 2022    GLUCOSE 123 (H) 2022    CALCIUM 8.9 2022    PROT 7.6 2022    LABALBU 3.8 2022    BILITOT 0.6 2022    ALKPHOS 145 (H) 2022    AST 23 2022    ALT 15 2022    LABGLOM 58 (L) 2022    GFRAA 48 (L) 2022    AGRATIO 1.8 2020    GLOB 2.4 2020     Off Loading  Offloading or minimizing or removing weight placed on an area with poor circulation such as diabetic wounds or pressure.  This can be achieved with crutches, wheel chair, knee walker etc. Minimizing pressure through partial weight bearing (minimizing the amount of  pressure applied and or the amount of time on the area of pressure) or maintaining a non-weight bearing status can be used to promote and often can be essential for thee wound to heal. Off loading may also need to be achieved for non-weight bearing wounds such as pressure ulcers to the torso. Turning and changing positions frequently, at least every two hours. Use of pressure cushion if sitting up in chair. Skin Care  Keep skin clean and well moisturized , moisturize routinely with ointments for heavier moisturizer needs for extremely dry skin or cracks such as A&D ointment and lotions for a light moisturizer such as CeraVe or Eucerin. If incontinent change incontinence garments as soon as soiled and keeping skin clean and use barrier cream to protect the skin. Reduce Salt and Sodium  Choose low- or reduced- sodium, or no-salt-added versions of foods and condiments when available. Buy fresh, plain frozen, or canned with no-added-salt vegetables. Use fresh poultry, fish and lean meat, rather than canned, smoked or processed types. Choose ready-to-eat breakfast cereals that are lower in sodium. Limit cured foods (such as hudson and ham), foods packed in brine (such as pickled foods) and condiments (such as MSG, mustard, horseradish, and catsup). Limit even lower sodium versions of soy sauce and teriyaki sauce-treat these condiments just like salt). In cooking and at the table, flavor foods with herbs, spices, lemon, lime, vinegar or salt-free seasoning blends. Start by cutting salt in half. Cook rice, pasta and hot cereals without salt. Cut back on instant or flavored rice, pasta and cereal mixes, which usually have added salt. Choose convenience foods that are lower in sodium. Limit frozen dinners, packaged mixes, canned soups and dressings. Rinse canned foods, such as tuna, to remove some sodium.  Choose fruits or vegetables instead of salty snack foods. Edema Management   Whenever resting, raise your legs up. Try to keep the swollen area higher than the level of your heart. Take breaks from standing or sitting in one position. Walk around to increase the blood flow in your lower legs. Move your feet and ankles often while you stand, or tighten and relax your leg muscles. Wear support stockings. Put them on in the morning, before swelling gets worse. Eat a balanced diet. Lose weight if you need to. Limit the amount of salt (sodium) in your diet. Salt holds fluid in the body and may increase swelling. Apply compression stocking(s) every morning as soon as you get up. Remove at bedtime unless instructed to wear day and night. Hand wash and line dry to prevent loss of elasticity. Replace every 3-4 months to ensure proper fit. Weight Management   Will need to ultimately change overall eating behaviors to have success with weight loss. Encouraged to weigh daily and work towards a goal of 1-2 pounds of weight loss weekly. Encouraged to journal all food intake, myfitness pal is a useful tool to help keep track of food intake and caloric value. Keep calorie level at approximately 5139-1735. Protein intake is to be a minimum of 60 grams per day (unless otherwise directed). Water drinking was encouraged with a goal of 64oz-128oz daily. Beverages to be calorie free except for milk. Every other beverage should be water, avoid soda. Continue to increase level of physical activity. Refer to weight management as indicated and requested by patient. Patient educated on the 6 essential components necessary for wound healing: Circulation, Debridements, Proper Dressings and Topical Wound Products, Infection Control, Edema Control and Offloading.      Patient educated on those factors that negatively effect or impact wound healing: smoking, obesity, uncontrolled diabetes, anticoagulant and immunosuppressive regimens, inadequate nutrition, untreated arterial and venous disease if applicable and measures to manage edema. PAST MEDICAL HISTORY        Diagnosis Date    Arrhythmia     Pacemaker placed aprox 5 years ago for A Fib per patient    Arthritis 12/2013    rt wrist    Atrial fibrillation ([facility]     on Xarelto - Dr. Kavon Owen    CAD (coronary artery disease) 06/18/2014    see dr Kavon Owen    Chronic kidney disease, stage III (moderate) ([facility] 07/07/2016    Critical illness myopathy 03/15/2021    Diabetes mellitus ([facility]     dx 2004    Diabetic neuropathy associated with type 2 diabetes mellitus ([facility] 04/23/2019    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    Gout 04/2019    \"got gout when had pacer put in because they did not give me my medication for gout \"    H/O 24 hour EKG monitoring 10/03/2013    no afib noted, sinsus rhythm    H/O cardiovascular stress test 05/12/2014    cardiolite- mild ischemia RCA EF50%    H/O echocardiogram 12/01/2020    EF 55-60% severe aortic stenosis mild to mod aortic regurg mod to severe tricuspid regurg severe pulm htn significant changes since 2018 echo. H/O right and left heart catheterization 12/10/2020    DIFFUSE LAD DISEASE, Mild ECA Disease, Severe AS, Milf Pul HTN on RHC. History of blood transfusion 12/2020    d/t anemia    History of transesophageal echocardiography (MABLE) 12/15/2020    Severe aortic stenosis (ANNA by planimetry: 0.778 cm sq). Mild AR. Venetie (hard of hearing)     hearing tonya aides    Hx of Doppler echocardiogram 05/21/2018    EF 50%  Mild LV hypertrophy. Mildly enlarged RA. Mod aortic valve calcification with mod AS. Mitral annular calcification is present. Mild AR, MR and TR. Mild pulmonary htn.     Hyperlipidemia     Hypertension     Follows with PCP & Dr. Radha Khan    Other disorders of kidney and ureter     Pacemaker     Medtronic, implanted 2014    Pneumonia 12/29/2012    Pneumonia due to COVID-19 virus 08/2020    Sleep apnea     dx 2013- has c-pap    Type II or unspecified type diabetes mellitus with other specified manifestations, uncontrolled 12/12/2012    Venous hypertension, chronic, with ulcer ([address] 12/12/2012    resolved       PAST SURGICAL HISTORY    Past Surgical History:   Procedure Laterality Date    CABG WITH AORTIC VALVE REPLACEMENT N/A 02/16/2021    CABG CORONARY ARTERY BYPASS X2 WITH LIMA, AORTIC VALVE REPLACEMENT AND AORTIC ROOT REPAIR, INTRAOPERATIVE MABLE, INDUCED HYPOTHERMIA, LEFT LEG ENDOVEIN HARVEST, LEFT ATRIAL CLIP, AND CRYO PROCEDURE performed by Diego Mathew MD at [address]  12/2014    at [address] Left 01/29/2021    LEFT CAROTID ENDARTERECTOMY performed by Lakesha Goldstein MD at [place] [address]    COLONOSCOPY  2011    COLONOSCOPY N/A 11/19/2019    COLONOSCOPY DIAGNOSTIC performed by Natalie Sparks MD at [address] [place] [address]  09/30/2020    POSSIBLE CECAL avms, SIGMOID DIVERTICULOSIS, INTERNAL HEMORRHOIDS GRADE 1    COLONOSCOPY N/A 09/30/2020    COLONOSCOPY CONTROL HEMORRHAGE WITH APC performed by Natalie Sparks MD at [address] [facility]  2014    \"2 stents put in \"    FOOT DEBRIDEMENT Bilateral 03/31/2022    BILATERAL DEBRIEDMENT OF NON-VIABLE TISSUE & BONE performed by Sheryl Gil DPM at [address] [place] [address] Right 04/14/2022    RIGHT FOOT DEBRIDEMENT INCISION AND DRAINAGE performed by Shreyl Gil DPM at [address] [place] [address]      abdominal    IR NONTUNNELED VASCULAR CATHETER  03/05/2021    IR NONTUNNELED VASCULAR CATHETER 3/5/2021 [facility] SPECIAL PROCEDURES    JOINT REPLACEMENT  2004    total left hip    OTHER SURGICAL HISTORY Right 12/02/2017    I&D; evacuation of hematoma right hip    OTHER SURGICAL HISTORY  09/17/2020    enteroscopy    PACEMAKER INSERTION N/A 02/23/2021    PACEMAKER GENERATOR LEAD REVISION performed by Diego Mathew MD at [address] [facility] Left 04/26/2022    Dual PPM: Atrial lead extraction/insertion, Generator change.  Medtronic, [place] XT DR EVERT Apodaca    PACEMAKER PLACEMENT      9/18/14 Status post remote permanent pacemaker with atrial lead dislodgement.  7/24/14 PPM Implant    TOE AMPUTATION Bilateral 03/31/2022    LEFT 3RD TOE AMPUTATION performed by Sheryl Gil DPM at [address] Right 04/14/2022    RIGHT 2ND TOE AMPUTATION performed by Sheryl Gil DPM at [address] [facility] [address] N/A 09/17/2020    ENTEROSCOPY PUSH BIOPSY performed by Natalie Sparks MD at [address] [place] [address] N/A 03/04/2021    EGD DIAGNOSTIC ONLY performed by Natalie Sparks MD at [facility]  2012    \"have stents in both legs- done in [address] [address]    Family History   Problem Relation Age of Onset    High Blood Pressure Mother     Arthritis Mother     Diabetes Mother     Heart Disease Mother     High Blood Pressure Father     Heart Disease Father     Kidney Disease Father     No Known Problems Sister     Heart Surgery Brother     Heart Disease Brother     No Known Problems Sister     No Known Problems Brother        SOCIAL HISTORY    Social History     Tobacco Use    Smoking status: Never    Smokeless tobacco: Never   Vaping Use    Vaping Use: Never used   Substance Use Topics    Alcohol use: Not Currently     Alcohol/week: 2.0 standard drinks     Types: 2 Cans of beer per week     Comment: average \"one time per week\"/ Caffiene: 1 cup of coffee daily    Drug use: No       ALLERGIES    Allergies   Allergen Reactions    Spironolactone      CAUSES INCREASED K+    Tape [Adhesive Tape] Rash     SURGICAL TAPE       MEDICATIONS    Current Outpatient Medications on File Prior to Encounter   Medication Sig Dispense Refill    apixaban (ELIQUIS) 5 MG TABS tablet Take 1 tablet by mouth 2 times daily 180 tablet 3    simvastatin (ZOCOR) 20 MG tablet Take 1 tablet by mouth daily 90 tablet 3    dapagliflozin (FARXIGA) 10 MG tablet Take 1 tablet by mouth every morning 90 tablet 1 Dulaglutide 4.5 MG/0.5ML SOPN Inject 4.5 mg into the skin once a week 12 Adjustable Dose Pre-filled Pen Syringe 1    gabapentin (NEURONTIN) 300 MG capsule TAKE 1 CAPSULE 3 TIMES A    capsule 1    glimepiride (AMARYL) 4 MG tablet TAKE 1 TABLET IN THE       MORNING (BEFORE BREAKFAST) 90 tablet 1    sildenafil (VIAGRA) 100 MG tablet TAKE 1 TABLET DAILY AS     NEEDED FOR ERECTILE        DYSFUNCTION 90 tablet 1    torsemide (DEMADEX) 20 MG tablet TAKE 2 TABLETS BY MOUTH TWICE A  tablet 3    carboxymethylcellulose (REFRESH PLUS) 0.5 % SOLN ophthalmic solution as needed       Cholecalciferol (VITAMIN D) 50 MCG (2000 UT) CAPS capsule Take by mouth nightly       aspirin 81 MG tablet Take 81 mg by mouth daily       No current facility-administered medications on file prior to encounter. REVIEW OF SYSTEMS    Pertinent items are noted in HPI. Constitutional: Negative for systemic symptoms including fever, chills and malaise. Objective:      BP (!) 149/81   Pulse 69   Temp 98 °F (36.7 °C) (Temporal)   Resp 18     PHYSICAL EXAM    General: The patient is in no acute distress. Mental status:  Patient is appropriate, is  oriented to place and plan of care. Dermatologic exam: Visual inspection of the periwound reveals the skin to be normal in turgor and texture  Wound exam: see wound description below in procedure note. Surgical incision site well coapted with sutures intact. Some slight maceration but no dehiscence. No purulent drainage fluctuance crepitus or malodor  Musculoskeletal: Bilateral calves are soft and supple upon manual compression.   Negative Jeannie Dk and Homans test    Assessment:     Problem List Items Addressed This Visit          Circulatory    PVD (peripheral vascular disease) (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 diabetes mellitus, with fat layer exposed ([address]    Diabetic neuropathy associated with type 2 diabetes mellitus ([facility] - Primary    Relevant Orders    Initiate Outpatient Wound Care Protocol       Other    WD-Nonhealing surgical wound, initial encounter    WD-Non-pressure ulcer of right lower extremity with fat layer exposed ([facility]     Procedure Note    Indications:  Based on my examination of this patient's wound(s) today, sharp excision into subcutaneous tissue is required to promote healing and evaluate the extent of previous healing. Performed by: JUSTIN Cha - CNP    Consent obtained: Yes    Time out taken:  Yes    Pain Control: Anesthetic  Anesthetic: 4% Lidocaine Liquid Topical      Debridement:Non-excisional Debridement    Using #15 blade scalpel the wound(s) was/were sharply debrided down through and including the removal of subcutaneous tissue    Devitalized Tissue Debrided:  slough, exudate and callus    Pre Debridement Measurements:  Are located in the Wound Documentation Flow Sheet    Wound 09/20/22 Toe (Comment  which one) Right #1 right second toe (Active)   Wound Image   12/13/22 1312   Wound Etiology Diabetic 12/13/22 1354   Dressing Status New dressing applied;Clean;Dry; Intact 12/13/22 1354   Wound Cleansed Wound cleanser 12/13/22 1312   Offloading for Diabetic Foot Ulcers Post op shoe 12/13/22 1354   Wound Length (cm) 0.5 cm 12/13/22 1312   Wound Width (cm) 0.5 cm 12/13/22 1312   Wound Depth (cm) 0.1 cm 12/13/22 1312   Wound Surface Area (cm^2) 0.25 cm^2 12/13/22 1312   Change in Wound Size % (l*w) 95.83 12/13/22 1312   Wound Volume (cm^3) 0.025 cm^3 12/13/22 1312   Wound Healing % 96 12/13/22 1312   Post-Procedure Length (cm) 0.5 cm 12/13/22 1354   Post-Procedure Width (cm) 0.5 cm 12/13/22 1354   Post-Procedure Depth (cm) 0.1 cm 12/13/22 1354   Post-Procedure Surface Area (cm^2) 0.25 cm^2 12/13/22 1354   Post-Procedure Volume (cm^3) 0.025 cm^3 12/13/22 1354   Distance Tunneling (cm) 0 cm 12/13/22 1312   Tunneling Position ___ O'Clock 0 12/13/22 [address] ___ O'Clock 0 12/13/22 1312   Undermining Ends___ O'Clock 0 12/13/22 1312   Undermining Maxium Distance (cm) 0 12/13/22 1312   Wound Assessment Pink/red;Devitalized tissue 12/13/22 1312   Drainage Amount Moderate 12/13/22 1312   Drainage Description Serosanguinous 12/13/22 1312   Odor None 12/13/22 1312   Angela-wound Assessment Intact 12/13/22 1312   Margins Defined edges 12/13/22 1312   Wound Thickness Description not for Pressure Injury Partial thickness 12/13/22 1312   Number of days: 84       Wound 11/15/22 #2 right lateral foot (Active)   Wound Image   12/13/22 1312   Wound Etiology Pressure Stage 2 12/13/22 1354   Dressing Status New dressing applied;Clean;Dry; Intact 12/13/22 1354   Wound Cleansed Wound cleanser 12/13/22 1312   Offloading for Diabetic Foot Ulcers Post op shoe 12/13/22 1354   Wound Length (cm) 1.8 cm 12/13/22 1312   Wound Width (cm) 1.5 cm 12/13/22 1312   Wound Depth (cm) 0.2 cm 12/13/22 1312   Wound Surface Area (cm^2) 2.7 cm^2 12/13/22 1312   Change in Wound Size % (l*w) 71.9 12/13/22 1312   Wound Volume (cm^3) 0.54 cm^3 12/13/22 1312   Wound Healing % 44 12/13/22 1312   Post-Procedure Length (cm) 1.8 cm 12/13/22 1354   Post-Procedure Width (cm) 1.5 cm 12/13/22 1354   Post-Procedure Depth (cm) 0.2 cm 12/13/22 1354   Post-Procedure Surface Area (cm^2) 2.7 cm^2 12/13/22 1354   Post-Procedure Volume (cm^3) 0.54 cm^3 12/13/22 1354   Distance Tunneling (cm) 0 cm 12/13/22 1312   Tunneling Position ___ O'Clock 0 12/13/22 1312   Undermining Starts ___ O'Clock 0 12/13/22 1312   Undermining Ends___ O'Clock 0 12/13/22 1312   Undermining Maxium Distance (cm) 0 12/13/22 1312   Wound Assessment Pink/red;Slough 12/13/22 1312   Drainage Amount Moderate 12/13/22 1312   Drainage Description Brown;Yellow 12/13/22 1312   Odor None 12/13/22 1312   Angela-wound Assessment Hyperkeratosis (callous) 12/13/22 1312   Margins Defined edges 12/13/22 1312   Wound Thickness Description not for Pressure Injury Full thickness 12/13/22 1312   Number of days: 28     Percent of Wound(s) Debrided: approximately 100%    Total  Area  Debrided:  2.95    Bleeding:  None    Hemostasis Achieved:  not needed    Procedural Pain:  0  / 10     Post Procedural Pain:  0 / 10     Response to treatment:  Well tolerated by patient. Status of wound progress and description from last visit: Stable, regimen as bellow, follow up in one week. Will apply for grafts. Advanced Modality Checklist: Skin substitutes    [x] Yes []  No  Is the wound Greater than 1.0 sq cm  [x] Yes []  No  Has the wound had Documented Treatment for 30 days ? [] Yes [x]  No  Recurrent Wound with Skin Substitute with Last Year?  [] Yes [x]  No  Radiographic testing in the last 3 months? [] Yes [x]  No  Vascular Assessment in the last 6 months? [x] Yes []  No  Smoking Status  [x] Yes []  No  Nutrition Assessed  [x] Yes []  No  Wound Free from infection   [x] Yes []  No  Is wound free of eschar, slough, and/or Bio Loyal? [] Yes [x]  No  Malignant Process in Wound  [x] Yes []  No  Hemoglobin A1C in the last 3 months? See above  [x] Yes []  No  Off loading Diabetic Foot Ulcer? [] N/A            Plan:       Discharge Instructions         PHYSICIAN ORDERS AND DISCHARGE INSTRUCTIONS     NOTE: Upon discharge from the [address], you will receive a patient experience survey. We would be grateful if you would take the time to fill this survey out. Wound care order history:                 YESSENIA's   Right       Left               Date:              Cultures:                Grafts:                Antibiotics:           Continuing wound care orders and information:                 Residence:                Continue home health care with:              Your wound-care supplies will be provided by: Wound cleansing:              Do not scrub or use excessive force. Wash hands with soap and water before and after dressing changes. Prior to applying a clean dressing, cleanse wound with normal saline, wound cleanser, or mild soap and water. Ask the physician or nurse before getting the wound(s) wet in a shower     Daily Wound management:              Keep weight off wounds and reposition every 2 hours. Avoid standing for long periods of time. Apply wraps/stockings in AM and remove at bedtime. If swelling is present, elevate legs to the level of the heart or above for 30 minutes 4-5 times a day and/or when sitting. When taking antibiotics take entire prescription as ordered by physician do not stop taking until medicine is all gone. Orders for this week: 12/13/22                 Applied for grafts on 12/13/22    Xray of Right Foot ordered 11/22/22     Rx: [facility] [address]     Right 2nd Toe and Right Lateral Foot - Wash with soap and water, pat dry. Apply Vero to wound beds. Qwick (or Drawtex) to periwound of Right Lateral Foot. Cover with ABD. Wrap with conform and Coban (prefers 2\" coban to secure). Change daily. Follow up with Dr iCndy Gonzalez in 1 week in the wound care center. Call [phone] for any questions or concerns      Treatment Note Wound 11/15/22 #2 right lateral foot-Dressing/Treatment:  (Vero Qwick ABD conform and Coban  (pt refused coban))  Wound 09/20/22 Toe (Comment  which one) Right #1 right second toe-Dressing/Treatment:  (Vero Qwick ABD conform and Coban  (pt refused coban))    Written Patient Dismissal Instructions Given       -History of wound dehiscence amputation sites bilateral foot.  -Chronic wound of the third toe of the right foot.    -Third toe amputation right foot (10/20/2022)  -Calf pain has resolved. Went to primary care and said he had an ultrasound done and was told he is not a blood clot. -Both wounds look better today.   Wound over the lateral fifth metatarsal head appears healthier with more granular tissue  -Collagen to both wounds.   Change daily.  -Continue to ambulate in supportive wide toebox diabetic shoes  -Any serious concerns before the next visit to go to the emergency room  -Follow-up 1 week for recheck sooner if needed          Electronically signed by JUSTIN Cassidy CNP on 12/13/2022 at 2:41 PM

## 2022-12-14 ENCOUNTER — PROCEDURE VISIT (OUTPATIENT)
Dept: CARDIOLOGY CLINIC | Age: 72
End: 2022-12-14
Payer: MEDICARE

## 2022-12-14 DIAGNOSIS — Z95.0 CARDIAC PACEMAKER IN SITU: Primary | ICD-10-CM

## 2022-12-20 ENCOUNTER — HOSPITAL ENCOUNTER (OUTPATIENT)
Dept: WOUND CARE | Age: 72
Discharge: HOME | End: 2022-12-20
Payer: MEDICARE

## 2022-12-20 VITALS
TEMPERATURE: 97.6 F | SYSTOLIC BLOOD PRESSURE: 161 MMHG | RESPIRATION RATE: 18 BRPM | DIASTOLIC BLOOD PRESSURE: 76 MMHG | HEART RATE: 69 BPM

## 2022-12-20 DIAGNOSIS — L97.412 DIABETIC ULCER OF RIGHT MIDFOOT ASSOCIATED WITH TYPE 2 DIABETES MELLITUS, WITH FAT LAYER EXPOSED (HCC): ICD-10-CM

## 2022-12-20 DIAGNOSIS — E11.621 DIABETIC ULCER OF TOE OF LEFT FOOT ASSOCIATED WITH TYPE 2 DIABETES MELLITUS, WITH FAT LAYER EXPOSED (HCC): ICD-10-CM

## 2022-12-20 DIAGNOSIS — E11.49 OTHER DIABETIC NEUROLOGICAL COMPLICATION ASSOCIATED WITH TYPE 2 DIABETES MELLITUS (HCC): ICD-10-CM

## 2022-12-20 DIAGNOSIS — E11.9 TYPE 2 DIABETES MELLITUS WITHOUT COMPLICATION, WITHOUT LONG-TERM CURRENT USE OF INSULIN (HCC): Primary | ICD-10-CM

## 2022-12-20 DIAGNOSIS — I73.9 PVD (PERIPHERAL VASCULAR DISEASE) (HCC): ICD-10-CM

## 2022-12-20 DIAGNOSIS — E11.621 DIABETIC ULCER OF RIGHT MIDFOOT ASSOCIATED WITH TYPE 2 DIABETES MELLITUS, WITH FAT LAYER EXPOSED (HCC): ICD-10-CM

## 2022-12-20 DIAGNOSIS — L97.522 DIABETIC ULCER OF TOE OF LEFT FOOT ASSOCIATED WITH TYPE 2 DIABETES MELLITUS, WITH FAT LAYER EXPOSED (HCC): ICD-10-CM

## 2022-12-20 DIAGNOSIS — T81.89XA NONHEALING SURGICAL WOUND, INITIAL ENCOUNTER: ICD-10-CM

## 2022-12-20 DIAGNOSIS — L97.912 NON-PRESSURE ULCER OF RIGHT LOWER EXTREMITY WITH FAT LAYER EXPOSED (HCC): ICD-10-CM

## 2022-12-20 PROCEDURE — 15275 SKIN SUB GRAFT FACE/NK/HF/G: CPT

## 2022-12-20 PROCEDURE — 6370000000 HC RX 637 (ALT 250 FOR IP): Performed by: NURSE PRACTITIONER

## 2022-12-20 RX ORDER — BACITRACIN ZINC AND POLYMYXIN B SULFATE 500; 1000 [USP'U]/G; [USP'U]/G
OINTMENT TOPICAL ONCE
OUTPATIENT
Start: 2022-12-20 | End: 2022-12-20

## 2022-12-20 RX ORDER — LIDOCAINE 50 MG/G
OINTMENT TOPICAL ONCE
OUTPATIENT
Start: 2022-12-20 | End: 2022-12-20

## 2022-12-20 RX ORDER — BACITRACIN, NEOMYCIN, POLYMYXIN B 400; 3.5; 5 [USP'U]/G; MG/G; [USP'U]/G
OINTMENT TOPICAL ONCE
OUTPATIENT
Start: 2022-12-20 | End: 2022-12-20

## 2022-12-20 RX ORDER — LIDOCAINE 40 MG/G
CREAM TOPICAL ONCE
OUTPATIENT
Start: 2022-12-20 | End: 2022-12-20

## 2022-12-20 RX ORDER — LIDOCAINE HYDROCHLORIDE 40 MG/ML
SOLUTION TOPICAL ONCE
OUTPATIENT
Start: 2022-12-20 | End: 2022-12-20

## 2022-12-20 RX ORDER — BETAMETHASONE DIPROPIONATE 0.05 %
OINTMENT (GRAM) TOPICAL ONCE
OUTPATIENT
Start: 2022-12-20 | End: 2022-12-20

## 2022-12-20 RX ORDER — GENTAMICIN SULFATE 1 MG/G
OINTMENT TOPICAL ONCE
OUTPATIENT
Start: 2022-12-20 | End: 2022-12-20

## 2022-12-20 RX ORDER — GINSENG 100 MG
CAPSULE ORAL ONCE
OUTPATIENT
Start: 2022-12-20 | End: 2022-12-20

## 2022-12-20 RX ORDER — CLOBETASOL PROPIONATE 0.5 MG/G
OINTMENT TOPICAL ONCE
OUTPATIENT
Start: 2022-12-20 | End: 2022-12-20

## 2022-12-20 RX ORDER — LIDOCAINE 50 MG/G
OINTMENT TOPICAL ONCE
Status: COMPLETED | OUTPATIENT
Start: 2022-12-20 | End: 2022-12-20

## 2022-12-20 RX ADMIN — LIDOCAINE: 50 OINTMENT TOPICAL at 13:21

## 2022-12-20 ASSESSMENT — PAIN SCALES - GENERAL: PAINLEVEL_OUTOF10: 0

## 2022-12-20 NOTE — PROGRESS NOTES
Multilayer Compression Wrap   (Not Unna) Below the Knee    NAME:  Judge Gonzalez  YOB: 1950  MEDICAL RECORD NUMBER:  [id number]  DATE:  12/20/2022    Multilayer compression wrap: Removed old Multilayer wrap if indicated and wash leg with mild soap/water. Applied moisturizing agent to dry skin as needed. Applied primary and secondary dressing as ordered. Applied multilayered dressing below the knee to right lower leg. Instructed patient/caregiver not to remove dressing and to keep it clean and dry. Instructed patient/caregiver on complications to report to provider, such as pain, numbness in toes, heavy drainage, and slippage of dressing. Instructed patient on purpose of compression dressing and on activity and exercise recommendations.       Electronically signed by Michael Beyer LPN on [id number] at 1:53 PM

## 2022-12-20 NOTE — PROGRESS NOTES
PuraPly AMTreatment Note    NAME:  Patience Boyd OF BIRTH:  1950  MEDICAL RECORD NUMBER:  [id number]  DATE:  12/20/2022    Goal:  Patient will receive safe and proper application of skin substitute. Patient will comply with caring for dressing, and reporting complications. Expiration date checked immediately prior to use. Package intact prior to use and no damage noted. Transport temperature controlled and acceptable. PuraPly AM was removed from protective sterile packaging by provider and applied to prepared ulcer bed. PuraPly AM was hydrated with sterile normal saline per provider. PuraPly AM was applied to Right Lateral Foot and affixed with steri-strips by the provider. PuraPly AM was covered with non-adherent ulcer dressing. Applied Qwick to periwound, ABD, Coban 2 Lite over non-adherent. Applied dry gauze and/or roll gauze. Patient/caregiver was instructed not to remove dressing and to keep it clean and dry. Pt/family/caregiver was instructed on signs and symptoms of complications to report such as draining through dressing, dressing falling down/slipping, getting wet, or severe pain or tingling. PuraPly AM may be applied a total of 10 times per wound over a 12 week period. Date of first application of PuraPly for this current wound is December 20, 2022.    Guidelines followed    Electronically signed by Jovanny Casper RN on 12/20/2022 at 5:05 PM

## 2022-12-20 NOTE — DISCHARGE INSTRUCTIONS
PHYSICIAN ORDERS AND DISCHARGE INSTRUCTIONS     NOTE: Upon discharge from the [address], you will receive a patient experience survey. We would be grateful if you would take the time to fill this survey out. Wound care order history:                 YESSENIA's   Right       Left               Date:              Cultures:               Grafts:  Puraply #1 applied to Right Lateral Foot              Antibiotics:           Continuing wound care orders and information:                 Residence:                Continue home health care with:              Your wound-care supplies will be provided by: Wound cleansing:              Do not scrub or use excessive force. Wash hands with soap and water before and after dressing changes. Prior to applying a clean dressing, cleanse wound with normal saline, wound cleanser, or mild soap and water. Ask the physician or nurse before getting the wound(s) wet in a shower     Daily Wound management:              Keep weight off wounds and reposition every 2 hours. Avoid standing for long periods of time. Apply wraps/stockings in AM and remove at bedtime. If swelling is present, elevate legs to the level of the heart or above for 30 minutes 4-5 times a day and/or when sitting. When taking antibiotics take entire prescription as ordered by physician do not stop taking until medicine is all gone. Orders for this week: 12/20/22                 Applied for grafts on 12/13/22     Puraply #1 applied to Right Lateral Foot      Xray of Right Foot ordered 11/22/22     Rx: [facility] on [address]     Right 2nd Toe and Right Lateral Foot - Wash with soap and water, pat dry. Puraply #1, versatel and steri strips applied to Right Lateral Foot. Qwick (or Drawtex) to periwound of Right Lateral Foot. Cover with ABD.   Wrap with Coban 2 Lite. Change daily. Follow up with Dr Chandrika Krause in 1 week in the wound care center.   Call [phone] for any questions or concerns

## 2022-12-20 NOTE — PROGRESS NOTES
Wound Care Center Progress Note With Procedure    Anel Peralta  AGE: 74 y.o. GENDER: male  : 1950  EPISODE DATE:  2022     Subjective:     Chief Complaint   Patient presents with    Wound Check     Right foot         HISTORY of PRESENT ILLNESS     Anel Peralta is a 74 y.o. male who presents today for wound to the third toe of the right foot. Status post third toe amputation right foot (10/20/2022). Patient is doing well overall. No worsening pain to either site. Did go to his primary care and he had an ultrasound done and was told that he did not have a blood clot in his right leg. Diabetes: Yes, on an oral and insulin regimen, last A1c 7.8 as of 22  Hemoglobin A1C   Date Value Ref Range Status   2022 7.8 (H) 4.2 - 6.3 % Final      Smoking: Never smoker  Obesity: No  Anticoagulant therapy: Eliquis and aspirin  Immunosuppression: No     Nutritional status: well nourished. Discussed need for increased protein and calories for wound healing and good sources of protein (just over 7 grams for every 20 pounds of body weight). Animal-based foods high in protein (meat, poultry, fish, eggs, and dairy foods). Plant based foods high in protein (tofu, lentils, beans, chickpeas, nuts, quinoa and jade seeds. Lab Results   Component Value Date     2022    K 3.8 2022    CL 98 (L) 2022    CO2 20 (L) 2022    BUN 36 (H) 2022    CREATININE 1.3 2022    GLUCOSE 123 (H) 2022    CALCIUM 8.9 2022    PROT 7.6 2022    LABALBU 3.8 2022    BILITOT 0.6 2022    ALKPHOS 145 (H) 2022    AST 23 2022    ALT 15 2022    LABGLOM 58 (L) 2022    GFRAA 48 (L) 2022    AGRATIO 1.8 2020    GLOB 2.4 2020     Off Loading  Offloading or minimizing or removing weight placed on an area with poor circulation such as diabetic wounds or pressure.  This can be achieved with crutches, wheel chair, knee walker etc. Minimizing pressure through partial weight bearing (minimizing the amount of  pressure applied and or the amount of time on the area of pressure) or maintaining a non-weight bearing status can be used to promote and often can be essential for thee wound to heal. Off loading may also need to be achieved for non-weight bearing wounds such as pressure ulcers to the torso. Turning and changing positions frequently, at least every two hours. Use of pressure cushion if sitting up in chair. Skin Care  Keep skin clean and well moisturized , moisturize routinely with ointments for heavier moisturizer needs for extremely dry skin or cracks such as A&D ointment and lotions for a light moisturizer such as CeraVe or Eucerin. If incontinent change incontinence garments as soon as soiled and keeping skin clean and use barrier cream to protect the skin. Reduce Salt and Sodium  Choose low- or reduced- sodium, or no-salt-added versions of foods and condiments when available. Buy fresh, plain frozen, or canned with no-added-salt vegetables. Use fresh poultry, fish and lean meat, rather than canned, smoked or processed types. Choose ready-to-eat breakfast cereals that are lower in sodium. Limit cured foods (such as hudson and ham), foods packed in brine (such as pickled foods) and condiments (such as MSG, mustard, horseradish, and catsup). Limit even lower sodium versions of soy sauce and teriyaki sauce-treat these condiments just like salt). In cooking and at the table, flavor foods with herbs, spices, lemon, lime, vinegar or salt-free seasoning blends. Start by cutting salt in half. Cook rice, pasta and hot cereals without salt. Cut back on instant or flavored rice, pasta and cereal mixes, which usually have added salt. Choose convenience foods that are lower in sodium. Limit frozen dinners, packaged mixes, canned soups and dressings. Rinse canned foods, such as tuna, to remove some sodium.  Choose fruits or vegetables instead of salty snack foods. Edema Management   Whenever resting, raise your legs up. Try to keep the swollen area higher than the level of your heart. Take breaks from standing or sitting in one position. Walk around to increase the blood flow in your lower legs. Move your feet and ankles often while you stand, or tighten and relax your leg muscles. Wear support stockings. Put them on in the morning, before swelling gets worse. Eat a balanced diet. Lose weight if you need to. Limit the amount of salt (sodium) in your diet. Salt holds fluid in the body and may increase swelling. Apply compression stocking(s) every morning as soon as you get up. Remove at bedtime unless instructed to wear day and night. Hand wash and line dry to prevent loss of elasticity. Replace every 3-4 months to ensure proper fit. Weight Management   Will need to ultimately change overall eating behaviors to have success with weight loss. Encouraged to weigh daily and work towards a goal of 1-2 pounds of weight loss weekly. Encouraged to journal all food intake, myfitness pal is a useful tool to help keep track of food intake and caloric value. Keep calorie level at approximately 8552-0622. Protein intake is to be a minimum of 60 grams per day (unless otherwise directed). Water drinking was encouraged with a goal of 64oz-128oz daily. Beverages to be calorie free except for milk. Every other beverage should be water, avoid soda. Continue to increase level of physical activity. Refer to weight management as indicated and requested by patient. Patient educated on the 6 essential components necessary for wound healing: Circulation, Debridements, Proper Dressings and Topical Wound Products, Infection Control, Edema Control and Offloading.      Patient educated on those factors that negatively effect or impact wound healing: smoking, obesity, uncontrolled diabetes, anticoagulant and immunosuppressive regimens, inadequate nutrition, untreated arterial and venous disease if applicable and measures to manage edema. PAST MEDICAL HISTORY        Diagnosis Date    Arrhythmia     Pacemaker placed aprox 5 years ago for A Fib per patient    Arthritis 12/2013    rt wrist    Atrial fibrillation ([facility] [address]     on Xarelto - Dr. 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 H/O right and left heart catheterization 12/10/2020    DIFFUSE LAD DISEASE, Mild ECA Disease, Severe AS, Milf Pul HTN on RHC. History of blood transfusion 12/2020    d/t anemia    History of transesophageal echocardiography (MABLE) 12/15/2020    Severe aortic stenosis (ANNA by planimetry: 0.778 cm sq). Mild AR. [place] (hard of hearing)     hearing tonya aides    Hx of Doppler echocardiogram 05/21/2018    EF 50%  Mild LV hypertrophy. Mildly enlarged RA. Mod aortic valve calcification with mod AS. Mitral annular calcification is present. Mild AR, MR and TR. Mild pulmonary htn.     Hyperlipidemia     Hypertension     Follows with PCP & Dr. Marie Samples    Other disorders of kidney and ureter     Pacemaker     Medtronic, implanted 2014    Pneumonia 12/29/2012    Pneumonia due to COVID-19 virus 08/2020    Sleep apnea     dx 2013- has c-pap    Type II or unspecified type diabetes mellitus with other specified manifestations, uncontrolled 12/12/2012    Venous hypertension, chronic, with ulcer ([address] 12/12/2012    resolved       PAST SURGICAL HISTORY    Past Surgical History:   Procedure Laterality Date    CABG WITH AORTIC VALVE REPLACEMENT N/A 02/16/2021    CABG CORONARY ARTERY BYPASS X2 WITH LIMA, AORTIC VALVE REPLACEMENT AND AORTIC ROOT REPAIR, INTRAOPERATIVE MABLE, INDUCED HYPOTHERMIA, LEFT LEG ENDOVEIN HARVEST, LEFT ATRIAL CLIP, AND CRYO PROCEDURE performed by Erica Anderson MD at [address]  12/2014    at [address] Left 01/29/2021    LEFT CAROTID ENDARTERECTOMY performed by Rivka Amezquita MD at [address] [facility]  2017    COLONOSCOPY  2011    COLONOSCOPY N/A 11/19/2019    COLONOSCOPY DIAGNOSTIC performed by Ryan Bustos MD at [address] [facility]  09/30/2020    POSSIBLE CECAL avms, SIGMOID DIVERTICULOSIS, INTERNAL HEMORRHOIDS GRADE 1    COLONOSCOPY N/A 09/30/2020    COLONOSCOPY CONTROL HEMORRHAGE WITH APC performed by Ryan Bustos MD at [address] [facility]  2014    \"2 stents put in \"    FOOT DEBRIDEMENT Bilateral 03/31/2022    BILATERAL DEBRIEDMENT OF NON-VIABLE TISSUE & BONE performed by Judit Peterson DPM at [address] Right 04/14/2022    RIGHT FOOT DEBRIDEMENT INCISION AND DRAINAGE performed by Judit Peterson DPM at [address]      abdominal    IR NONTUNNELED VASCULAR CATHETER  03/05/2021    IR NONTUNNELED VASCULAR CATHETER 3/5/2021 [facility] SPECIAL PROCEDURES    JOINT REPLACEMENT  2004    total left hip    OTHER SURGICAL HISTORY Right 12/02/2017    I&D; evacuation of hematoma right hip    OTHER SURGICAL HISTORY  09/17/2020    enteroscopy    PACEMAKER INSERTION N/A 02/23/2021    PACEMAKER GENERATOR LEAD REVISION performed by Erica Anderson MD at [address] [facility] Left 04/26/2022    Dual PPM: Atrial lead extraction/insertion, Generator change.  [employer], Lucie XT DR EVERT Apodaca    PACEMAKER PLACEMENT      9/18/14 Status post remote permanent pacemaker with atrial lead dislodgement.  7/24/14 PPM Implant    TOE AMPUTATION Bilateral 03/31/2022    LEFT 3RD TOE AMPUTATION performed by Gino Brown DPM at [address] Right 04/14/2022    RIGHT 2ND TOE AMPUTATION performed by Gino Brown DPM at [address] [facility] N/A 09/17/2020    ENTEROSCOPY PUSH BIOPSY performed by Jennifer Richards MD at [address] [facility] N/A 03/04/2021    EGD DIAGNOSTIC ONLY performed by Jennifer Richards MD at [address]  2012    \"have stents in both legs- done in [address] [facility]    Family History   Problem Relation Age of Onset    High Blood Pressure Mother     Arthritis Mother     Diabetes Mother     Heart Disease Mother     High Blood Pressure Father     Heart Disease Father     Kidney Disease Father     No Known Problems Sister     Heart Surgery Brother     Heart Disease Brother     No Known Problems Sister     No Known Problems Brother        SOCIAL HISTORY    Social History     Tobacco Use    Smoking status: Never    Smokeless tobacco: Never   Vaping Use    Vaping Use: Never used   Substance Use Topics    Alcohol use: Not Currently     Alcohol/week: 2.0 standard drinks     Types: 2 Cans of beer per week     Comment: average \"one time per week\"/ Caffiene: 1 cup of coffee daily    Drug use: No       ALLERGIES    Allergies   Allergen Reactions    Spironolactone      CAUSES INCREASED K+    Tape [Adhesive Tape] Rash     SURGICAL TAPE       MEDICATIONS    Current Outpatient Medications on File Prior to Encounter   Medication Sig Dispense Refill    apixaban (ELIQUIS) 5 MG TABS tablet Take 1 tablet by mouth 2 times daily 180 tablet 3    simvastatin (ZOCOR) 20 MG tablet Take 1 tablet by mouth daily 90 tablet 3    dapagliflozin (FARXIGA) 10 MG tablet Take 1 tablet by mouth every morning 90 tablet 1 Dulaglutide 4.5 MG/0.5ML SOPN Inject 4.5 mg into the skin once a week 12 Adjustable Dose Pre-filled Pen Syringe 1    gabapentin (NEURONTIN) 300 MG capsule TAKE 1 CAPSULE 3 TIMES A    capsule 1    glimepiride (AMARYL) 4 MG tablet TAKE 1 TABLET IN THE       MORNING (BEFORE BREAKFAST) 90 tablet 1    sildenafil (VIAGRA) 100 MG tablet TAKE 1 TABLET DAILY AS     NEEDED FOR ERECTILE        DYSFUNCTION 90 tablet 1    torsemide (DEMADEX) 20 MG tablet TAKE 2 TABLETS BY MOUTH TWICE A  tablet 3    carboxymethylcellulose (REFRESH PLUS) 0.5 % SOLN ophthalmic solution as needed       Cholecalciferol (VITAMIN D) 50 MCG (2000 UT) CAPS capsule Take by mouth nightly       aspirin 81 MG tablet Take 81 mg by mouth daily       No current facility-administered medications on file prior to encounter. REVIEW OF SYSTEMS    Pertinent items are noted in HPI. Constitutional: Negative for systemic symptoms including fever, chills and malaise. Objective:      BP (!) 161/76   Pulse 69   Temp 97.6 °F (36.4 °C) (Temporal)   Resp 18     PHYSICAL EXAM    General: The patient is in no acute distress. Mental status:  Patient is appropriate, is  oriented to place and plan of care. Dermatologic exam: Visual inspection of the periwound reveals the skin to be normal in turgor and texture  Wound exam: see wound description below in procedure note. Surgical incision site well coapted with sutures intact. Some slight maceration but no dehiscence. No purulent drainage fluctuance crepitus or malodor  Musculoskeletal: Bilateral calves are soft and supple upon manual compression.   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 diabetes mellitus, with fat layer exposed ([facility] [address]    Relevant Orders    Initiate Outpatient Wound Care Protocol    Diabetic neuropathy associated with type 2 diabetes mellitus ([facility] [address]    Relevant Orders    Initiate Outpatient Wound Care Protocol    WD-Diabetic ulcer of left foot with fat layer exposed ([facility] [address]    Relevant Orders    Initiate Outpatient Wound Care Protocol       Other    WD-Nonhealing surgical wound, initial encounter    Relevant Orders    Initiate Outpatient Wound Care Protocol    WD-Non-pressure ulcer of right lower extremity with fat layer exposed [facility])    Relevant Orders    Initiate Outpatient Wound Care Protocol     Procedure Note    Indications:  Based on my examination of this patient's wound(s) today, sharp excision into subcutaneous tissue is required to promote healing and evaluate the extent of previous healing. Performed by: JUSTIN Lei - CNP    Consent obtained: Yes    Time out taken:  Yes    Pain Control: Anesthetic  Anesthetic: 4% Lidocaine Liquid Topical      Debridement:Non-excisional Debridement    Using #15 blade scalpel the wound(s) was/were sharply debrided down through and including the removal of subcutaneous tissue    Devitalized Tissue Debrided:  slough, exudate and callus    Pre Debridement Measurements:  Are located in the Wound Documentation Flow Sheet    Wound 09/20/22 Toe (Comment  which one) Right #1 right second toe (Active)   Wound Image   12/13/22 1312   Wound Etiology Diabetic 12/20/22 1352   Dressing Status New dressing applied;Clean;Dry; Intact 12/20/22 1352   Wound Cleansed Wound cleanser 12/20/22 1311   Offloading for Diabetic Foot Ulcers Post op shoe 12/20/22 1352   Wound Length (cm) 0.6 cm 12/20/22 1311   Wound Width (cm) 0.6 cm 12/20/22 1311   Wound Depth (cm) 0.1 cm 12/20/22 1311   Wound Surface Area (cm^2) 0.36 cm^2 12/20/22 1311   Change in Wound Size % (l*w) 94 12/20/22 1311   Wound Volume (cm^3) 0.036 cm^3 12/20/22 1311   Wound Healing % 94 12/20/22 1311   Post-Procedure Length (cm) 0.6 cm 12/20/22 1335   Post-Procedure Width (cm) 0.6 cm 12/20/22 1335   Post-Procedure Depth (cm) 0.1 cm 12/20/22 1335   Post-Procedure Surface Area (cm^2) 0.36 cm^2 12/20/22 1335   Post-Procedure Volume (cm^3) 0.036 cm^3 12/20/22 1335   Distance Tunneling (cm) 0 cm 12/20/22 1311   Tunneling Position ___ O'Clock 0 12/20/22 1311   Undermining Starts ___ O'Clock 0 12/20/22 1311   Undermining Ends___ O'Clock 0 12/20/22 1311   Undermining Maxium Distance (cm) 0 12/20/22 1311   Wound Assessment Devitalized tissue 12/20/22 1311   Drainage Amount None 12/20/22 1311   Drainage Description Serosanguinous 12/13/22 1312   Odor None 12/20/22 1311   Angela-wound Assessment Intact 12/20/22 1311   Margins Attached edges 12/20/22 1311   Wound Thickness Description not for Pressure Injury Partial thickness 12/20/22 1311   Number of days: 91       Wound 11/15/22 #2 right lateral foot (Active)   Wound Image   12/13/22 1312   Wound Etiology Pressure Stage 2 12/20/22 1352   Dressing Status New dressing applied;Clean;Dry; Intact 12/20/22 1352   Wound Cleansed Wound cleanser 12/20/22 1311   Offloading for Diabetic Foot Ulcers Post op shoe 12/20/22 1352   Wound Length (cm) 1.8 cm 12/20/22 1311   Wound Width (cm) 1.3 cm 12/20/22 1311   Wound Depth (cm) 0.2 cm 12/20/22 1311   Wound Surface Area (cm^2) 2.34 cm^2 12/20/22 1311   Change in Wound Size % (l*w) 75.65 12/20/22 1311   Wound Volume (cm^3) 0.468 cm^3 12/20/22 1311   Wound Healing % 51 12/20/22 1311   Post-Procedure Length (cm) 1.8 cm 12/20/22 1335   Post-Procedure Width (cm) 1.3 cm 12/20/22 1335   Post-Procedure Depth (cm) 0.2 cm 12/20/22 1335   Post-Procedure Surface Area (cm^2) 2.34 cm^2 12/20/22 1335   Post-Procedure Volume (cm^3) 0.468 cm^3 12/20/22 1335   Distance Tunneling (cm) 0 cm 12/20/22 1311   Tunneling Position ___ O'Clock 0 12/20/22 1311   Undermining Starts ___ O'Clock 600 12/20/22 1311   Undermining Ends___ O'Clock 900 12/20/22 1311   Undermining Maxium Distance (cm) 0.1 12/20/22 1311   Wound Assessment Pink/red;Slough 12/20/22 1311   Drainage Amount Moderate 12/20/22 1311   Drainage Description Serosanguinous;Brown 12/20/22 1311   Odor None 12/20/22 1311   Angela-wound Assessment Hyperkeratosis (callous); Maceration 12/20/22 1311   Margins Defined edges 12/20/22 1311   Wound Thickness Description not for Pressure Injury Full thickness 12/20/22 1311   Number of days: 35       Percent of Wound(s) Debrided: approximately 100%    Total  Area  Debrided:  2.34    Bleeding:  None    Hemostasis Achieved:  not needed    Procedural Pain:  0  / 10     Post Procedural Pain:  0 / 10     Response to treatment:  Well tolerated by patient. SKIN SUBSTITUTES/GRAFT APPLICATIONS PROCEDURE NOTE    Indicaton:     Chronic ulcer(s) of the right foot  that has(have) failed to heal with the usual wound protocol used for greater than 30 days. See Epic chart for previous modalities and details of previous treatment. The patient has no contraindications or evidence of infection. The patient's competency and support system is appropriate for proper care and follow up for the use of this graft, therefore a graft was placed as below. Having prepared the wound bed, the skin graft was completed as below.     Product Utilized:          [] APLIGRAF   []44 sq cm   []88 sq cm    []132 sq cm  []176 sq cm           [] DERMAGRAFT  [] 38 sq cm   []76 sq cm    []114 sq cm  []152 sq cm      [] NUSHIELD  [] 1.6 sq/cm disc   [] (2X3) 6 sq/cm    [] (2X4) 8 sq/cm         [] (3X4) 12 sq/cm  [] (4x4) 16 sq/cm   [] (4X6) 24 sq/cm         [] (6X6) 36 sq/cm        [] AFFINITY    [] (1.5 cm x 1.5cm) 2.25 cm   [] (2.5 cm x 2.5 cm) 6.25 cm         [] THERASKIN  [] 13 sq cm   []37.34 sq cm             [] EpiFix   [] 1.54 sq cm disc    [] 2 pieces (3.08 sq cm)    [] 3  pieces (4.62 sq  cm)   [] 6 sq/cm    [] 16 sq cm     [] 18 sq cm   Fenestrated        [] OASIS   [] 10.5 sq cm   []21 sq cm    []35 sq cm Tri-Matrix  []70 sq cm          Tri-Matrix                    [x] PURAPLY   [x] 4 sq cm   [] 8 sq cm   [] 25sq cm                  [] Grafix      [] 1.54 sq cm disc    [] 3 sq cm    [] 6 sq cm   [] 12 sq cm   [] 25 sq cm        Skin Substitute Applied:    Performed by: Murray Romero CNP    Consent obtained: Yes    Time out taken: Yes     Fenestrated: No    Skin Substitute was Applied to Wound Number(s):     2          Total Surface Area Covered 4 sq/cm    Was the Product Layered  No      Amount Wasted 0 sq/cm    Reason for Waste: material provided was greater than wound size      Secured: Yes    Instruments used to complete placement of graft::scissors and forceps    Secured With:   [] N/A  [x]Steri Strips    []Sutures     []Staples [x]Other Versitel    Procedural Pain: 0/10     Post Procedural Pain: 0 / 10    Response to Treatment:  Well tolerated by patient. Status of wound progress and description from last visit: Stable, regimen as below, follow up in one week. Initial graft placed 12/20/22. Plan:       Discharge Instructions         PHYSICIAN ORDERS AND DISCHARGE INSTRUCTIONS     NOTE: Upon discharge from the [address], you will receive a patient experience survey. We would be grateful if you would take the time to fill this survey out. Wound care order history:                 YESSENIA's   Right       Left               Date:              Cultures:               Grafts:  Puraply #1 applied to Right Lateral Foot              Antibiotics:           Continuing wound care orders and information:                 Residence:                Continue home health care with:              Your wound-care supplies will be provided by: Wound cleansing:              Do not scrub or use excessive force. Wash hands with soap and water before and after dressing changes. Prior to applying a clean dressing, cleanse wound with normal saline, wound cleanser, or mild soap and water. Ask the physician or nurse before getting the wound(s) wet in a shower     Daily Wound management:              Keep weight off wounds and reposition every 2 hours. Avoid standing for long periods of time. Apply wraps/stockings in AM and remove at bedtime. If swelling is present, elevate legs to the level of the heart or above for 30 minutes 4-5 times a day and/or when sitting. When taking antibiotics take entire prescription as ordered by physician do not stop taking until medicine is all gone. Orders for this week: 12/20/22                 Applied for grafts on 12/13/22     Puraply #1 applied to Right Lateral Foot      Xray of Right Foot ordered 11/22/22     Rx: [facility] [address]     Right 2nd Toe and Right Lateral Foot - Wash with soap and water, pat dry. Puraply #1, versatel and steri strips applied to Right Lateral Foot. Qwick (or Drawtex) to periwound of Right Lateral Foot. Cover with ABD. Wrap with Coban 2 Lite. Change daily. Follow up with Dr Maribel Sherwood in 1 week in the wound care center. Call [phone] for any questions or concerns        Treatment Note Wound 11/15/22 #2 right lateral foot-Dressing/Treatment:  (qwick,abd,coban2 lite)  Wound 09/20/22 Toe (Comment  which one) Right #1 right second toe-Dressing/Treatment:  (abd,coban2 lite)    Written Patient Dismissal Instructions Given       -History of wound dehiscence amputation sites bilateral foot.  -Chronic wound of the third toe of the right foot.    -Third toe amputation right foot (10/20/2022)  -Calf pain has resolved. Went to primary care and said he had an ultrasound done and was told he is not a blood clot. -Both wounds look better today.   Wound over the lateral fifth metatarsal head appears healthier with more granular tissue  -Collagen to both wounds.   Change daily.  -Continue to ambulate in supportive wide toebox diabetic shoes  -Any serious concerns before the next visit to go to the emergency room  -Follow-up 1 week for recheck sooner if needed          Electronically signed by JUSTIN Aguilar CNP on 12/20/2022 at 1:58 PM

## 2022-12-27 ENCOUNTER — HOSPITAL ENCOUNTER (OUTPATIENT)
Dept: WOUND CARE | Age: 72
Discharge: HOME | End: 2022-12-27
Payer: MEDICARE

## 2022-12-27 DIAGNOSIS — E11.621 DIABETIC ULCER OF TOE OF LEFT FOOT ASSOCIATED WITH TYPE 2 DIABETES MELLITUS, WITH FAT LAYER EXPOSED (HCC): ICD-10-CM

## 2022-12-27 DIAGNOSIS — L97.912 NON-PRESSURE ULCER OF RIGHT LOWER EXTREMITY WITH FAT LAYER EXPOSED (HCC): ICD-10-CM

## 2022-12-27 DIAGNOSIS — E11.49 OTHER DIABETIC NEUROLOGICAL COMPLICATION ASSOCIATED WITH TYPE 2 DIABETES MELLITUS (HCC): ICD-10-CM

## 2022-12-27 DIAGNOSIS — E11.621 DIABETIC ULCER OF RIGHT MIDFOOT ASSOCIATED WITH TYPE 2 DIABETES MELLITUS, WITH FAT LAYER EXPOSED (HCC): ICD-10-CM

## 2022-12-27 DIAGNOSIS — T81.89XA NONHEALING SURGICAL WOUND, INITIAL ENCOUNTER: ICD-10-CM

## 2022-12-27 DIAGNOSIS — L97.522 DIABETIC ULCER OF TOE OF LEFT FOOT ASSOCIATED WITH TYPE 2 DIABETES MELLITUS, WITH FAT LAYER EXPOSED (HCC): ICD-10-CM

## 2022-12-27 DIAGNOSIS — L97.412 DIABETIC ULCER OF RIGHT MIDFOOT ASSOCIATED WITH TYPE 2 DIABETES MELLITUS, WITH FAT LAYER EXPOSED (HCC): ICD-10-CM

## 2022-12-27 DIAGNOSIS — E11.9 TYPE 2 DIABETES MELLITUS WITHOUT COMPLICATION, WITHOUT LONG-TERM CURRENT USE OF INSULIN (HCC): Primary | ICD-10-CM

## 2022-12-27 DIAGNOSIS — I73.9 PVD (PERIPHERAL VASCULAR DISEASE) (HCC): ICD-10-CM

## 2022-12-27 PROCEDURE — 15275 SKIN SUB GRAFT FACE/NK/HF/G: CPT | Performed by: NURSE PRACTITIONER

## 2022-12-27 PROCEDURE — 15275 SKIN SUB GRAFT FACE/NK/HF/G: CPT

## 2022-12-27 RX ORDER — BETAMETHASONE DIPROPIONATE 0.05 %
OINTMENT (GRAM) TOPICAL ONCE
OUTPATIENT
Start: 2022-12-27 | End: 2022-12-27

## 2022-12-27 RX ORDER — LIDOCAINE HYDROCHLORIDE 40 MG/ML
SOLUTION TOPICAL ONCE
OUTPATIENT
Start: 2022-12-27 | End: 2022-12-27

## 2022-12-27 RX ORDER — LIDOCAINE HYDROCHLORIDE 40 MG/ML
SOLUTION TOPICAL ONCE
Status: DISCONTINUED | OUTPATIENT
Start: 2022-12-27 | End: 2022-12-28 | Stop reason: HOSPADM

## 2022-12-27 RX ORDER — BACITRACIN, NEOMYCIN, POLYMYXIN B 400; 3.5; 5 [USP'U]/G; MG/G; [USP'U]/G
OINTMENT TOPICAL ONCE
OUTPATIENT
Start: 2022-12-27 | End: 2022-12-27

## 2022-12-27 RX ORDER — CLOBETASOL PROPIONATE 0.5 MG/G
OINTMENT TOPICAL ONCE
OUTPATIENT
Start: 2022-12-27 | End: 2022-12-27

## 2022-12-27 RX ORDER — LIDOCAINE 50 MG/G
OINTMENT TOPICAL ONCE
OUTPATIENT
Start: 2022-12-27 | End: 2022-12-27

## 2022-12-27 RX ORDER — BACITRACIN ZINC AND POLYMYXIN B SULFATE 500; 1000 [USP'U]/G; [USP'U]/G
OINTMENT TOPICAL ONCE
OUTPATIENT
Start: 2022-12-27 | End: 2022-12-27

## 2022-12-27 RX ORDER — GENTAMICIN SULFATE 1 MG/G
OINTMENT TOPICAL ONCE
OUTPATIENT
Start: 2022-12-27 | End: 2022-12-27

## 2022-12-27 RX ORDER — GINSENG 100 MG
CAPSULE ORAL ONCE
OUTPATIENT
Start: 2022-12-27 | End: 2022-12-27

## 2022-12-27 RX ORDER — LIDOCAINE 40 MG/G
CREAM TOPICAL ONCE
OUTPATIENT
Start: 2022-12-27 | End: 2022-12-27

## 2022-12-27 NOTE — DISCHARGE INSTRUCTIONS
Foot.   Qwick (or Drawtex) to periwound of Right Lateral Foot. Cover with ABD. Wrap with Coban 2 Lite. Change daily. Follow up with Dr Rudi Rincon in 1 week in the wound care center.   Call [phone] for any questions or concerns

## 2022-12-27 NOTE — PROGRESS NOTES
Multilayer Compression Wrap   (Not Unna) Below the Knee    NAME:  Srikanth Sousa  YOB: 1950  MEDICAL RECORD NUMBER:  [id number]  DATE:  12/27/2022    Multilayer compression wrap: Removed old Multilayer wrap if indicated and wash leg with mild soap/water. Applied moisturizing agent to dry skin as needed. Applied primary and secondary dressing as ordered. Applied multilayered dressing below the knee to right lower leg. Applied multilayered dressing below the knee to left lower leg. Instructed patient/caregiver not to remove dressing and to keep it clean and dry. Instructed patient/caregiver on complications to report to provider, such as pain, numbness in toes, heavy drainage, and slippage of dressing. Instructed patient on purpose of compression dressing and on activity and exercise recommendations.       Electronically signed by Alhaji Maldonado LPN on 74/13/8999 at 2:00 PM

## 2022-12-27 NOTE — PROGRESS NOTES
PuraPly AMTreatment Note    NAME:  Yaquelin Grewal OF BIRTH:  1950  MEDICAL RECORD NUMBER:  [id number]  DATE:  12/27/2022    Goal:  Patient will receive safe and proper application of skin substitute. Patient will comply with caring for dressing, and reporting complications. Expiration date checked immediately prior to use. Package intact prior to use and no damage noted. Transport temperature controlled and acceptable. PuraPly AM was removed from protective sterile packaging by provider and applied to prepared ulcer bed. PuraPly AM was hydrated with sterile normal saline per provider. PuraPly AM was applied to R Foot and affixed with steri-strips by the provider. PuraPly AM was covered with non-adherent ulcer dressing. Applied Ca Alg, ABD, Coban over non-adherent. Applied dry gauze and/or roll gauze. Patient/caregiver was instructed not to remove dressing and to keep it clean and dry. Pt/family/caregiver was instructed on signs and symptoms of complications to report such as draining through dressing, dressing falling down/slipping, getting wet, or severe pain or tingling. PuraPly AM may be applied a total of 10 times per wound over a 12 week period. Date of first application of PuraPly for this current wound is December 20, 2022.    Guidelines followed    Electronically signed by Torin Fallon RN on 12/27/2022 at 1:23 PM

## 2022-12-27 NOTE — PROGRESS NOTES
Wound Care Center Progress Note With Procedure    Lesvia Shaw  AGE: 74 y.o. GENDER: male  : 1950  EPISODE DATE:  2022     Subjective:     Chief Complaint   Patient presents with    Wound Check     Right Foot          HISTORY of PRESENT ILLNESS     Lesvia Shaw is a 74 y.o. male who presents today for wound to the third toe of the right foot. Status post third toe amputation right foot (10/20/2022). Patient is doing well overall. No worsening pain to either site. Did go to his primary care and he had an ultrasound done and was told that he did not have a blood clot in his right leg. Diabetes: Yes, on an oral and insulin regimen, last A1c 7.8 as of 22  Hemoglobin A1C   Date Value Ref Range Status   2022 7.8 (H) 4.2 - 6.3 % Final      Smoking: Never smoker  Obesity: No  Anticoagulant therapy: Eliquis and aspirin  Immunosuppression: No     Nutritional status: well nourished. Discussed need for increased protein and calories for wound healing and good sources of protein (just over 7 grams for every 20 pounds of body weight). Animal-based foods high in protein (meat, poultry, fish, eggs, and dairy foods). Plant based foods high in protein (tofu, lentils, beans, chickpeas, nuts, quinoa and jade seeds. Lab Results   Component Value Date     2022    K 3.8 2022    CL 98 (L) 2022    CO2 20 (L) 2022    BUN 36 (H) 2022    CREATININE 1.3 2022    GLUCOSE 123 (H) 2022    CALCIUM 8.9 2022    PROT 7.6 2022    LABALBU 3.8 2022    BILITOT 0.6 2022    ALKPHOS 145 (H) 2022    AST 23 2022    ALT 15 2022    LABGLOM 58 (L) 2022    GFRAA 48 (L) 2022    AGRATIO 1.8 2020    GLOB 2.4 2020     Off Loading  Offloading or minimizing or removing weight placed on an area with poor circulation such as diabetic wounds or pressure.  This can be achieved with crutches, wheel chair, knee walker etc. Minimizing pressure through partial weight bearing (minimizing the amount of  pressure applied and or the amount of time on the area of pressure) or maintaining a non-weight bearing status can be used to promote and often can be essential for thee wound to heal. Off loading may also need to be achieved for non-weight bearing wounds such as pressure ulcers to the torso. Turning and changing positions frequently, at least every two hours. Use of pressure cushion if sitting up in chair. Skin Care  Keep skin clean and well moisturized , moisturize routinely with ointments for heavier moisturizer needs for extremely dry skin or cracks such as A&D ointment and lotions for a light moisturizer such as CeraVe or Eucerin. If incontinent change incontinence garments as soon as soiled and keeping skin clean and use barrier cream to protect the skin. Reduce Salt and Sodium  Choose low- or reduced- sodium, or no-salt-added versions of foods and condiments when available. Buy fresh, plain frozen, or canned with no-added-salt vegetables. Use fresh poultry, fish and lean meat, rather than canned, smoked or processed types. Choose ready-to-eat breakfast cereals that are lower in sodium. Limit cured foods (such as hudson and ham), foods packed in brine (such as pickled foods) and condiments (such as MSG, mustard, horseradish, and catsup). Limit even lower sodium versions of soy sauce and teriyaki sauce-treat these condiments just like salt). In cooking and at the table, flavor foods with herbs, spices, lemon, lime, vinegar or salt-free seasoning blends. Start by cutting salt in half. Cook rice, pasta and hot cereals without salt. Cut back on instant or flavored rice, pasta and cereal mixes, which usually have added salt. Choose convenience foods that are lower in sodium. Limit frozen dinners, packaged mixes, canned soups and dressings. Rinse canned foods, such as tuna, to remove some sodium.  Choose fruits or vegetables instead of salty snack foods. Edema Management   Whenever resting, raise your legs up. Try to keep the swollen area higher than the level of your heart. Take breaks from standing or sitting in one position. Walk around to increase the blood flow in your lower legs. Move your feet and ankles often while you stand, or tighten and relax your leg muscles. Wear support stockings. Put them on in the morning, before swelling gets worse. Eat a balanced diet. Lose weight if you need to. Limit the amount of salt (sodium) in your diet. Salt holds fluid in the body and may increase swelling. Apply compression stocking(s) every morning as soon as you get up. Remove at bedtime unless instructed to wear day and night. Hand wash and line dry to prevent loss of elasticity. Replace every 3-4 months to ensure proper fit. Weight Management   Will need to ultimately change overall eating behaviors to have success with weight loss. Encouraged to weigh daily and work towards a goal of 1-2 pounds of weight loss weekly. Encouraged to journal all food intake, myfitness pal is a useful tool to help keep track of food intake and caloric value. Keep calorie level at approximately 8817-9333. Protein intake is to be a minimum of 60 grams per day (unless otherwise directed). Water drinking was encouraged with a goal of 64oz-128oz daily. Beverages to be calorie free except for milk. Every other beverage should be water, avoid soda. Continue to increase level of physical activity. Refer to weight management as indicated and requested by patient. Patient educated on the 6 essential components necessary for wound healing: Circulation, Debridements, Proper Dressings and Topical Wound Products, Infection Control, Edema Control and Offloading.      Patient educated on those factors that negatively effect or impact wound healing: smoking, obesity, uncontrolled diabetes, anticoagulant and immunosuppressive regimens, inadequate nutrition, untreated arterial and venous disease if applicable and measures to manage edema. PAST MEDICAL HISTORY        Diagnosis Date    Arrhythmia     Pacemaker placed aprox 5 years ago for A Fib per patient    Arthritis 12/2013    rt wrist    Atrial fibrillation ([facility]     on Xarelto - Dr. Ingrid Woods    CAD (coronary artery disease) 06/18/2014    see dr Ingrid Woods    Chronic kidney disease, stage III (moderate) ([facility] 07/07/2016    Critical illness myopathy 03/15/2021    Diabetes mellitus ([facility]     dx 2004    Diabetic neuropathy associated with type 2 diabetes mellitus ([facility] 04/23/2019    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    Gout 04/2019    \"got gout when had pacer put in because they did not give me my medication for gout \"    H/O 24 hour EKG monitoring 10/03/2013    no afib noted, sinsus rhythm    H/O cardiovascular stress test 05/12/2014    cardiolite- mild ischemia RCA EF50%    H/O echocardiogram 12/01/2020    EF 55-60% severe aortic stenosis mild to mod aortic regurg mod to severe tricuspid regurg severe pulm htn significant changes since 2018 echo. H/O right and left heart catheterization 12/10/2020    DIFFUSE LAD DISEASE, Mild ECA Disease, Severe AS, Milf Pul HTN on RHC. History of blood transfusion 12/2020    d/t anemia    History of transesophageal echocardiography (MABLE) 12/15/2020    Severe aortic stenosis (ANNA by planimetry: 0.778 cm sq). Mild AR. [place] (hard of hearing)     hearing tonya aides    Hx of Doppler echocardiogram 05/21/2018    EF 50%  Mild LV hypertrophy. Mildly enlarged RA. Mod aortic valve calcification with mod AS. Mitral annular calcification is present. Mild AR, MR and TR. Mild pulmonary htn.     Hyperlipidemia     Hypertension     Follows with PCP & Dr. Marie Samples    Other disorders of kidney and ureter     Pacemaker     Medtronic, implanted 2014    Pneumonia 12/29/2012    Pneumonia due to COVID-19 virus 08/2020    Sleep apnea     dx 2013- has c-pap    Type II or unspecified type diabetes mellitus with other specified manifestations, uncontrolled 12/12/2012    Venous hypertension, chronic, with ulcer ([address] 12/12/2012    resolved       PAST SURGICAL HISTORY    Past Surgical History:   Procedure Laterality Date    CABG WITH AORTIC VALVE REPLACEMENT N/A 02/16/2021    CABG CORONARY ARTERY BYPASS X2 WITH LIMA, AORTIC VALVE REPLACEMENT AND AORTIC ROOT REPAIR, INTRAOPERATIVE MABLE, INDUCED HYPOTHERMIA, LEFT LEG ENDOVEIN HARVEST, LEFT ATRIAL CLIP, AND CRYO PROCEDURE performed by Rebeca Wahl MD at [address]  12/2014    at [address] Left 01/29/2021    LEFT CAROTID ENDARTERECTOMY performed by Keely Izaguirre MD at [address] [place] [address]  2017    COLONOSCOPY  2011    COLONOSCOPY N/A 11/19/2019    COLONOSCOPY DIAGNOSTIC performed by Ioana Glasgow MD at [address] [place] [address]  09/30/2020    POSSIBLE CECAL avms, SIGMOID DIVERTICULOSIS, INTERNAL HEMORRHOIDS GRADE 1    COLONOSCOPY N/A 09/30/2020    COLONOSCOPY CONTROL HEMORRHAGE WITH APC performed by Ioana Glasgow MD at [address] [place] [address]  2014    \"2 stents put in \"    FOOT DEBRIDEMENT Bilateral 03/31/2022    BILATERAL DEBRIEDMENT OF NON-VIABLE TISSUE & BONE performed by Va Wynn DPM at [facility] Right 04/14/2022    RIGHT FOOT DEBRIDEMENT INCISION AND DRAINAGE performed by Va Wynn DPM at [address] [place] [address] Road      abdominal    IR NONTUNNELED VASCULAR CATHETER  03/05/2021    IR NONTUNNELED VASCULAR CATHETER 3/5/2021 [facility] SPECIAL PROCEDURES    JOINT REPLACEMENT  2004    total left hip    OTHER SURGICAL HISTORY Right 12/02/2017    I&D; evacuation of hematoma right hip    OTHER SURGICAL HISTORY  09/17/2020    enteroscopy    PACEMAKER INSERTION N/A 02/23/2021    PACEMAKER GENERATOR LEAD REVISION performed by Rebeca Wahl MD at [address] Left 04/26/2022    Dual PPM: Atrial lead extraction/insertion, Generator change.  Medtronic, [place] XT DR EVERT Apodaca    PACEMAKER PLACEMENT      9/18/14 Status post remote permanent pacemaker with atrial lead dislodgement.  7/24/14 PPM Implant    TOE AMPUTATION Bilateral 03/31/2022    LEFT 3RD TOE AMPUTATION performed by Alison Swartz DPM at [facility] [address] Right 04/14/2022    RIGHT 2ND TOE AMPUTATION performed by Alison Swartz DPM at [address] [place] [address] N/A 09/17/2020    ENTEROSCOPY PUSH BIOPSY performed by Denice Huizar MD at [address] [place] [address] N/A 03/04/2021    EGD DIAGNOSTIC ONLY performed by Denice Huizar MD at [facility]  2012    \"have stents in both legs- done in [address] [place] [address]    Family History   Problem Relation Age of Onset    High Blood Pressure Mother     Arthritis Mother     Diabetes Mother     Heart Disease Mother     High Blood Pressure Father     Heart Disease Father     Kidney Disease Father     No Known Problems Sister     Heart Surgery Brother     Heart Disease Brother     No Known Problems Sister     No Known Problems Brother        SOCIAL HISTORY    Social History     Tobacco Use    Smoking status: Never    Smokeless tobacco: Never   Vaping Use    Vaping Use: Never used   Substance Use Topics    Alcohol use: Not Currently     Alcohol/week: 2.0 standard drinks     Types: 2 Cans of beer per week     Comment: average \"one time per week\"/ Caffiene: 1 cup of coffee daily    Drug use: No       ALLERGIES    Allergies   Allergen Reactions    Spironolactone      CAUSES INCREASED K+    Tape [Adhesive Tape] Rash     SURGICAL TAPE       MEDICATIONS    Current Outpatient Medications on File Prior to Encounter   Medication Sig Dispense Refill    apixaban (ELIQUIS) 5 MG TABS tablet Take 1 tablet by mouth 2 times daily 180 tablet 3    simvastatin (ZOCOR) 20 MG tablet Take 1 tablet by mouth daily 90 tablet 3    dapagliflozin (FARXIGA) 10 MG tablet Take 1 tablet by mouth every morning 90 tablet 1 Dulaglutide 4.5 MG/0.5ML SOPN Inject 4.5 mg into the skin once a week 12 Adjustable Dose Pre-filled Pen Syringe 1    gabapentin (NEURONTIN) 300 MG capsule TAKE 1 CAPSULE 3 TIMES A    capsule 1    glimepiride (AMARYL) 4 MG tablet TAKE 1 TABLET IN THE       MORNING (BEFORE BREAKFAST) 90 tablet 1    sildenafil (VIAGRA) 100 MG tablet TAKE 1 TABLET DAILY AS     NEEDED FOR ERECTILE        DYSFUNCTION 90 tablet 1    torsemide (DEMADEX) 20 MG tablet TAKE 2 TABLETS BY MOUTH TWICE A  tablet 3    carboxymethylcellulose (REFRESH PLUS) 0.5 % SOLN ophthalmic solution as needed       Cholecalciferol (VITAMIN D) 50 MCG (2000 UT) CAPS capsule Take by mouth nightly       aspirin 81 MG tablet Take 81 mg by mouth daily       No current facility-administered medications on file prior to encounter. REVIEW OF SYSTEMS    Pertinent items are noted in HPI. Constitutional: Negative for systemic symptoms including fever, chills and malaise. Objective: There were no vitals taken for this visit. PHYSICAL EXAM    General: The patient is in no acute distress. Mental status:  Patient is appropriate, is  oriented to place and plan of care. Dermatologic exam: Visual inspection of the periwound reveals the skin to be normal in turgor and texture  Wound exam: see wound description below in procedure note. Surgical incision site well coapted with sutures intact. Some slight maceration but no dehiscence. No purulent drainage fluctuance crepitus or malodor  Musculoskeletal: Bilateral calves are soft and supple upon manual compression.   Negative Elige Ana Lilia and Homans test    Assessment:     Problem List Items Addressed This Visit          Circulatory    PVD (peripheral vascular disease) ([facility]    Relevant Medications    lidocaine (XYLOCAINE) 4 % external solution    Other Relevant Orders    Initiate Outpatient Wound Care Protocol       Endocrine    Type 2 diabetes mellitus without complication, without long-term current use of insulin (HCC) - Primary    Relevant Medications    lidocaine (XYLOCAINE) 4 % external solution    Other Relevant Orders    Initiate Outpatient Wound Care Protocol    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 diabetes mellitus, with fat layer exposed ([address]    Relevant Medications    lidocaine (XYLOCAINE) 4 % external solution    Other Relevant Orders    Initiate Outpatient Wound Care Protocol    Diabetic neuropathy associated with type 2 diabetes mellitus (HCC)    Relevant Medications    lidocaine (XYLOCAINE) 4 % external solution    Other Relevant Orders    Initiate Outpatient Wound Care Protocol    WD-Diabetic ulcer of left foot with fat layer exposed ([address]    Relevant Medications    lidocaine (XYLOCAINE) 4 % external solution    Other Relevant Orders    Initiate Outpatient Wound Care Protocol       Other    WD-Nonhealing surgical wound, initial encounter    Relevant Medications    lidocaine (XYLOCAINE) 4 % external solution    Other Relevant Orders    Initiate Outpatient Wound Care Protocol    WD-Non-pressure ulcer of right lower extremity with fat layer exposed ([address]    Relevant Medications    lidocaine (XYLOCAINE) 4 % external solution    Other Relevant Orders    Initiate Outpatient Wound Care Protocol     Procedure Note    Indications:  Based on my examination of this patient's wound(s) today, sharp excision into subcutaneous tissue is required to promote healing and evaluate the extent of previous healing.     Performed by: JUSTIN Ruano - CNP    Consent obtained: Yes    Time out taken:  Yes    Pain Control: Anesthetic  Anesthetic: 4% Lidocaine Liquid Topical      Debridement:Non-excisional Debridement    Using #15 blade scalpel the wound(s) was/were sharply debrided down through and including the removal of subcutaneous tissue    Devitalized Tissue Debrided:  slough, exudate and callus    Pre Debridement Measurements:  Are located in the Wound Documentation Flow Sheet    Wound 09/20/22 Toe (Comment  which one) Right #1 right second toe (Active)   Wound Image   12/13/22 1312   Wound Etiology Diabetic 12/20/22 1352   Dressing Status New dressing applied;Clean;Dry; Intact 12/20/22 1352   Wound Cleansed Wound cleanser 12/27/22 1309   Offloading for Diabetic Foot Ulcers Post op shoe 12/27/22 1309   Wound Length (cm) 0 cm 12/27/22 1309   Wound Width (cm) 0 cm 12/27/22 1309   Wound Depth (cm) 0 cm 12/27/22 1309   Wound Surface Area (cm^2) 0 cm^2 12/27/22 1309   Change in Wound Size % (l*w) 100 12/27/22 1309   Wound Volume (cm^3) 0 cm^3 12/27/22 1309   Wound Healing % 100 12/27/22 1309   Post-Procedure Length (cm) 0.6 cm 12/20/22 1335   Post-Procedure Width (cm) 0.6 cm 12/20/22 1335   Post-Procedure Depth (cm) 0.1 cm 12/20/22 1335   Post-Procedure Surface Area (cm^2) 0.36 cm^2 12/20/22 1335   Post-Procedure Volume (cm^3) 0.036 cm^3 12/20/22 1335   Distance Tunneling (cm) 0 cm 12/27/22 1309   Tunneling Position ___ O'Clock 0 12/27/22 1309   Undermining Starts ___ O'Clock 0 12/27/22 1309   Undermining Ends___ O'Clock 0 12/27/22 1309   Undermining Maxium Distance (cm) 0 12/27/22 1309   Wound Assessment Dry 12/27/22 1309   Drainage Amount None 12/27/22 1309   Drainage Description Serosanguinous 12/13/22 1312   Odor None 12/27/22 1309   Angela-wound Assessment Fragile; Intact 12/27/22 1309   Margins Attached edges 12/27/22 1309   Wound Thickness Description not for Pressure Injury Full thickness 12/27/22 1309   Number of days: 98       Wound 11/15/22 #2 right lateral foot (Active)   Wound Image   12/13/22 1312   Wound Etiology Pressure Stage 2 12/20/22 1352   Dressing Status New dressing applied;Clean;Dry; Intact 12/20/22 1352   Wound Cleansed Wound cleanser 12/20/22 1311   Offloading for Diabetic Foot Ulcers Post op shoe 12/27/22 1309   Wound Length (cm) 1.9 cm 12/27/22 1309   Wound Width (cm) 1.2 cm 12/27/22 1309   Wound Depth (cm) 0.2 cm 12/27/22 1309   Wound Surface Area (cm^2) 2.28 cm^2 12/27/22 1309 Change in Wound Size % (l*w) 76.27 12/27/22 1309   Wound Volume (cm^3) 0.456 cm^3 12/27/22 1309   Wound Healing % 53 12/27/22 1309   Post-Procedure Length (cm) 1.9 cm 12/27/22 1318   Post-Procedure Width (cm) 1.2 cm 12/27/22 1318   Post-Procedure Depth (cm) 0.2 cm 12/27/22 1318   Post-Procedure Surface Area (cm^2) 2.28 cm^2 12/27/22 1318   Post-Procedure Volume (cm^3) 0.456 cm^3 12/27/22 1318   Distance Tunneling (cm) 0 cm 12/27/22 1309   Tunneling Position ___ O'Clock 0 12/27/22 1309   Undermining Starts ___ O'Clock 600 12/27/22 1309   Undermining Ends___ O'Clock 900 12/27/22 1309   Undermining Maxium Distance (cm) 0.1 12/27/22 1309   Wound Assessment Granulation tissue;Pink/red 12/27/22 1309   Drainage Amount Moderate 12/27/22 1309   Drainage Description Serosanguinous 12/27/22 1309   Odor None 12/27/22 1309   Angela-wound Assessment Hyperkeratosis (callous); Maceration 12/20/22 1311   Margins Defined edges 12/20/22 1311   Wound Thickness Description not for Pressure Injury Full thickness 12/20/22 1311   Number of days: 41     Percent of Wound(s) Debrided: approximately 100%    Total  Area  Debrided:  2.28    Bleeding:  None    Hemostasis Achieved:  not needed    Procedural Pain:  0  / 10     Post Procedural Pain:  0 / 10     Response to treatment:  Well tolerated by patient. SKIN SUBSTITUTES/GRAFT APPLICATIONS PROCEDURE NOTE    Indicaton:     Chronic ulcer(s) of the right foot  that has(have) failed to heal with the usual wound protocol used for greater than 30 days. See Epic chart for previous modalities and details of previous treatment. The patient has no contraindications or evidence of infection. The patient's competency and support system is appropriate for proper care and follow up for the use of this graft, therefore a graft was placed as below. Having prepared the wound bed, the skin graft was completed as below.     Product Utilized:          [] APLIGRAF   []44 sq cm   []88 sq cm    []132 sq cm []176 sq cm           [] DERMAGRAFT  [] 38 sq cm   []76 sq cm    []114 sq cm  []152 sq cm      [] NUSHIELD  [] 1.6 sq/cm disc   [] (2X3) 6 sq/cm    [] (2X4) 8 sq/cm         [] (3X4) 12 sq/cm  [] (4x4) 16 sq/cm   [] (4X6) 24 sq/cm         [] (6X6) 36 sq/cm        [] AFFINITY    [] (1.5 cm x 1.5cm) 2.25 cm   [] (2.5 cm x 2.5 cm) 6.25 cm         [] THERASKIN  [] 13 sq cm   []37.34 sq cm             [] EpiFix   [] 1.54 sq cm disc    [] 2 pieces (3.08 sq cm)    [] 3  pieces (4.62 sq  cm)   [] 6 sq/cm    [] 16 sq cm     [] 18 sq cm   Fenestrated        [] OASIS   [] 10.5 sq cm   []21 sq cm    []35 sq cm Tri-Matrix  []70 sq cm          Tri-Matrix                    [x] PURAPLY   [] 1.6 sq cm   [] 8 sq cm   [] 25sq cm                  [] Grafix      [] 1.54 sq cm disc    [] 3 sq cm    [] 6 sq cm   [] 12 sq cm   [] 25 sq cm        Skin Substitute Applied:    Performed by: Kvng Ozuna CNP    Consent obtained: Yes    Time out taken: Yes     Fenestrated: No    Skin Substitute was Applied to Wound Number(s):     2          Total Surface Area Covered 1.6 sq/cm    Was the Product Layered  No      Amount Wasted 0 sq/cm    Reason for Waste: material provided was greater than wound size      Secured: Yes    Instruments used to complete placement of graft::scissors and forceps    Secured With:   [] N/A  [x]Steri Strips    []Sutures     []Staples [x]Other Versitel    Procedural Pain: 0/10     Post Procedural Pain: 0 / 10    Response to Treatment:  Well tolerated by patient. Status of wound progress and description from last visit: Improved, regimen as below, initial graft placed 12/20/22. The patient is going to [place] for 2 months will set him up with a wound clinic there. Plan:       Discharge Instructions         PHYSICIAN ORDERS AND DISCHARGE INSTRUCTIONS     NOTE: Upon discharge from the [address], you will receive a patient experience survey.  We would be grateful if you would take the time to fill this survey out.     Wound care order history:                 YESSENIA's   Right       Left               Date:              Cultures:               Grafts:  Puraply #1 applied to Right Lateral Foot  12/20/22                            Puraply # 2 applied to Right Lateral Foot  12/27/22              Antibiotics:           Continuing wound care orders and information:                 Residence:                Continue home health care with:              Your wound-care supplies will be provided by: Wound cleansing:              Do not scrub or use excessive force. Wash hands with soap and water before and after dressing changes. Prior to applying a clean dressing, cleanse wound with normal saline, wound cleanser, or mild soap and water. Ask the physician or nurse before getting the wound(s) wet in a shower     Daily Wound management:              Keep weight off wounds and reposition every 2 hours. Avoid standing for long periods of time. Apply wraps/stockings in AM and remove at bedtime. If swelling is present, elevate legs to the level of the heart or above for 30 minutes 4-5 times a day and/or when sitting. When taking antibiotics take entire prescription as ordered by physician do not stop taking until medicine is all gone. Orders for this week: 12/27/22                 Applied for grafts on 12/13/22     Puraply #2 applied to Right Lateral Foot        Xray of Right Foot ordered 11/22/22     Rx: [facility] [address]     Right 2nd Toe and Right Lateral Foot - Wash with soap and water, pat dry. Puraply #2, versatel and steri strips applied to Right Lateral Foot. Qwick (or Drawtex) to periwound of Right Lateral Foot. Cover with ABD. Wrap with Coban 2 Lite. Change daily. Follow up with Dr Luis Moncada in 1 week in the wound care center.   Call [phone] for any questions or concerns        Treatment Note Wound 11/15/22 #2 right lateral foot-Dressing/Treatment:  (Puraply, Versatel, Steri Strips)    Written Patient Dismissal Instructions Given       -History of wound dehiscence amputation sites bilateral foot.  -Chronic wound of the third toe of the right foot.    -Third toe amputation right foot (10/20/2022)  -Calf pain has resolved. Went to primary care and said he had an ultrasound done and was told he is not a blood clot. -Both wounds look better today. Wound over the lateral fifth metatarsal head appears healthier with more granular tissue  -Collagen to both wounds.   Change daily.  -Continue to ambulate in supportive wide toebox diabetic shoes  -Any serious concerns before the next visit to go to the emergency room  -Follow-up 1 week for recheck sooner if needed          Electronically signed by JUSTIN Walker CNP on 12/27/2022 at 1:44 PM

## 2023-01-26 DIAGNOSIS — E11.9 TYPE 2 DIABETES MELLITUS WITHOUT COMPLICATION, WITHOUT LONG-TERM CURRENT USE OF INSULIN (HCC): ICD-10-CM

## 2023-01-26 RX ORDER — DULAGLUTIDE 4.5 MG/.5ML
INJECTION, SOLUTION SUBCUTANEOUS
Qty: 6 ML | Refills: 1 | OUTPATIENT
Start: 2023-01-26

## 2023-01-27 DIAGNOSIS — E11.9 TYPE 2 DIABETES MELLITUS WITHOUT COMPLICATION, WITHOUT LONG-TERM CURRENT USE OF INSULIN (HCC): ICD-10-CM

## 2023-01-27 RX ORDER — DAPAGLIFLOZIN 10 MG/1
TABLET, FILM COATED ORAL
Qty: 90 TABLET | Refills: 1 | OUTPATIENT
Start: 2023-01-27

## 2023-02-08 DIAGNOSIS — E11.9 TYPE 2 DIABETES MELLITUS WITHOUT COMPLICATION, WITHOUT LONG-TERM CURRENT USE OF INSULIN (HCC): ICD-10-CM

## 2023-02-09 RX ORDER — DULAGLUTIDE 4.5 MG/.5ML
INJECTION, SOLUTION SUBCUTANEOUS
Qty: 12 ADJUSTABLE DOSE PRE-FILLED PEN SYRINGE | Refills: 1 | Status: SHIPPED | OUTPATIENT
Start: 2023-02-09

## 2023-03-10 ENCOUNTER — TELEPHONE (OUTPATIENT)
Dept: FAMILY MEDICINE CLINIC | Age: 73
End: 2023-03-10

## 2023-03-10 DIAGNOSIS — M10.9 GOUT, UNSPECIFIED CAUSE, UNSPECIFIED CHRONICITY, UNSPECIFIED SITE: ICD-10-CM

## 2023-03-10 DIAGNOSIS — M10.9 ACUTE GOUT INVOLVING TOE, UNSPECIFIED CAUSE, UNSPECIFIED LATERALITY: Primary | ICD-10-CM

## 2023-03-10 DIAGNOSIS — M10.9 GOUT, UNSPECIFIED CAUSE, UNSPECIFIED CHRONICITY, UNSPECIFIED SITE: Primary | ICD-10-CM

## 2023-03-10 LAB — URIC ACID, SERUM: 9.4 MG/DL (ref 3.5–7.2)

## 2023-03-10 RX ORDER — COLCHICINE 0.6 MG/1
TABLET ORAL
Qty: 8 TABLET | Refills: 0 | Status: SHIPPED | OUTPATIENT
Start: 2023-03-10

## 2023-03-10 RX ORDER — PREDNISONE 20 MG/1
20 TABLET ORAL DAILY
Qty: 5 TABLET | Refills: 0 | Status: SHIPPED | OUTPATIENT
Start: 2023-03-10 | End: 2023-03-15

## 2023-03-10 NOTE — TELEPHONE ENCOUNTER
Called pt per Dr. Candace Robert , pt states he is having acute gout flare , med to be sent to [facility] , pt is having lab draw this morning for appt 3-13-23 , understanding voiced

## 2023-03-10 NOTE — TELEPHONE ENCOUNTER
To Dr. Brinda Morelos-    Patient said he is gout is flaring up -----could add to the blood work orders for his Uric Acid levels to be checked----he is coming in this morning to get labs done-----he has an appt on Monday with you.

## 2023-03-11 SDOH — ECONOMIC STABILITY: FOOD INSECURITY: WITHIN THE PAST 12 MONTHS, THE FOOD YOU BOUGHT JUST DIDN'T LAST AND YOU DIDN'T HAVE MONEY TO GET MORE.: NEVER TRUE

## 2023-03-11 SDOH — ECONOMIC STABILITY: TRANSPORTATION INSECURITY
IN THE PAST 12 MONTHS, HAS LACK OF TRANSPORTATION KEPT YOU FROM MEETINGS, WORK, OR FROM GETTING THINGS NEEDED FOR DAILY LIVING?: NO

## 2023-03-11 SDOH — ECONOMIC STABILITY: HOUSING INSECURITY
IN THE LAST 12 MONTHS, WAS THERE A TIME WHEN YOU DID NOT HAVE A STEADY PLACE TO SLEEP OR SLEPT IN A SHELTER (INCLUDING NOW)?: NO

## 2023-03-11 SDOH — ECONOMIC STABILITY: FOOD INSECURITY: WITHIN THE PAST 12 MONTHS, YOU WORRIED THAT YOUR FOOD WOULD RUN OUT BEFORE YOU GOT MONEY TO BUY MORE.: NEVER TRUE

## 2023-03-11 SDOH — ECONOMIC STABILITY: INCOME INSECURITY: HOW HARD IS IT FOR YOU TO PAY FOR THE VERY BASICS LIKE FOOD, HOUSING, MEDICAL CARE, AND HEATING?: NOT HARD AT ALL

## 2023-03-13 ENCOUNTER — OFFICE VISIT (OUTPATIENT)
Dept: FAMILY MEDICINE CLINIC | Age: 73
End: 2023-03-13
Payer: MEDICARE

## 2023-03-13 VITALS
DIASTOLIC BLOOD PRESSURE: 80 MMHG | BODY MASS INDEX: 28.9 KG/M2 | SYSTOLIC BLOOD PRESSURE: 128 MMHG | OXYGEN SATURATION: 94 % | HEIGHT: 71 IN | WEIGHT: 206.4 LBS | HEART RATE: 67 BPM

## 2023-03-13 DIAGNOSIS — N52.9 VASCULOGENIC ERECTILE DYSFUNCTION, UNSPECIFIED VASCULOGENIC ERECTILE DYSFUNCTION TYPE: ICD-10-CM

## 2023-03-13 DIAGNOSIS — E11.9 TYPE 2 DIABETES MELLITUS WITHOUT COMPLICATION, WITHOUT LONG-TERM CURRENT USE OF INSULIN (HCC): ICD-10-CM

## 2023-03-13 DIAGNOSIS — G62.9 PERIPHERAL POLYNEUROPATHY: ICD-10-CM

## 2023-03-13 DIAGNOSIS — M10.9 ACUTE GOUT INVOLVING TOE, UNSPECIFIED CAUSE, UNSPECIFIED LATERALITY: ICD-10-CM

## 2023-03-13 PROCEDURE — G8484 FLU IMMUNIZE NO ADMIN: HCPCS | Performed by: STUDENT IN AN ORGANIZED HEALTH CARE EDUCATION/TRAINING PROGRAM

## 2023-03-13 PROCEDURE — 3017F COLORECTAL CA SCREEN DOC REV: CPT | Performed by: STUDENT IN AN ORGANIZED HEALTH CARE EDUCATION/TRAINING PROGRAM

## 2023-03-13 PROCEDURE — G8417 CALC BMI ABV UP PARAM F/U: HCPCS | Performed by: STUDENT IN AN ORGANIZED HEALTH CARE EDUCATION/TRAINING PROGRAM

## 2023-03-13 PROCEDURE — 1123F ACP DISCUSS/DSCN MKR DOCD: CPT | Performed by: STUDENT IN AN ORGANIZED HEALTH CARE EDUCATION/TRAINING PROGRAM

## 2023-03-13 PROCEDURE — 3046F HEMOGLOBIN A1C LEVEL >9.0%: CPT | Performed by: STUDENT IN AN ORGANIZED HEALTH CARE EDUCATION/TRAINING PROGRAM

## 2023-03-13 PROCEDURE — 3078F DIAST BP <80 MM HG: CPT | Performed by: STUDENT IN AN ORGANIZED HEALTH CARE EDUCATION/TRAINING PROGRAM

## 2023-03-13 PROCEDURE — 1036F TOBACCO NON-USER: CPT | Performed by: STUDENT IN AN ORGANIZED HEALTH CARE EDUCATION/TRAINING PROGRAM

## 2023-03-13 PROCEDURE — 99214 OFFICE O/P EST MOD 30 MIN: CPT | Performed by: STUDENT IN AN ORGANIZED HEALTH CARE EDUCATION/TRAINING PROGRAM

## 2023-03-13 PROCEDURE — 3074F SYST BP LT 130 MM HG: CPT | Performed by: STUDENT IN AN ORGANIZED HEALTH CARE EDUCATION/TRAINING PROGRAM

## 2023-03-13 PROCEDURE — 2022F DILAT RTA XM EVC RTNOPTHY: CPT | Performed by: STUDENT IN AN ORGANIZED HEALTH CARE EDUCATION/TRAINING PROGRAM

## 2023-03-13 PROCEDURE — G8427 DOCREV CUR MEDS BY ELIG CLIN: HCPCS | Performed by: STUDENT IN AN ORGANIZED HEALTH CARE EDUCATION/TRAINING PROGRAM

## 2023-03-13 RX ORDER — GABAPENTIN 300 MG/1
CAPSULE ORAL
Qty: 270 CAPSULE | Refills: 1 | Status: SHIPPED | OUTPATIENT
Start: 2023-03-13 | End: 2023-09-11

## 2023-03-13 RX ORDER — COLCHICINE 0.6 MG/1
TABLET ORAL
Qty: 90 TABLET | Refills: 0 | Status: SHIPPED | OUTPATIENT
Start: 2023-03-13 | End: 2023-06-13

## 2023-03-13 RX ORDER — GLIMEPIRIDE 4 MG/1
TABLET ORAL
Qty: 90 TABLET | Refills: 1 | Status: SHIPPED | OUTPATIENT
Start: 2023-03-13

## 2023-03-13 RX ORDER — SILDENAFIL 100 MG/1
TABLET, FILM COATED ORAL
Qty: 90 TABLET | Refills: 1 | Status: SHIPPED | OUTPATIENT
Start: 2023-03-13

## 2023-03-13 RX ORDER — DULAGLUTIDE 4.5 MG/.5ML
INJECTION, SOLUTION SUBCUTANEOUS
Qty: 12 ADJUSTABLE DOSE PRE-FILLED PEN SYRINGE | Refills: 1 | Status: SHIPPED | OUTPATIENT
Start: 2023-03-13

## 2023-03-13 ASSESSMENT — ENCOUNTER SYMPTOMS
SHORTNESS OF BREATH: 0
ABDOMINAL PAIN: 0
SORE THROAT: 0
WHEEZING: 0
NAUSEA: 0

## 2023-03-13 NOTE — PROGRESS NOTES
3/16/2023    Jennie Sauceda    Chief Complaint   Patient presents with    Follow-up       HPI  History was obtained from pateint. Norris Nieves is a 74 y.o. male with a PMHx as listed below who presents today for follow up on chronic conditions. He will get labs at [place] next week with an a1c  Bp excellent today  Patint notes started working out again  Plan to see Dr. Radha Khan later this month, CKD cr stable near 1.3    1. Type 2 diabetes mellitus without complication, without long-term current use of insulin ([address]    2. Peripheral polyneuropathy    3. Vasculogenic erectile dysfunction, unspecified vasculogenic erectile dysfunction type    4. Acute gout involving toe, unspecified cause, unspecified laterality             REVIEW OF SYMPTOMS    Review of Systems   Constitutional:  Negative for chills and fatigue. HENT:  Negative for congestion and sore throat. Respiratory:  Negative for shortness of breath and wheezing. Cardiovascular:  Negative for chest pain and palpitations. Gastrointestinal:  Negative for abdominal pain and nausea. Genitourinary:  Negative for frequency and urgency. Neurological:  Negative for light-headedness.      PAST MEDICAL HISTORY  Past Medical History:   Diagnosis Date    Arrhythmia     Pacemaker placed aprox 5 years ago for A Fib per patient    Arthritis 12/2013    rt wrist    Atrial fibrillation ([address]     on Xarelto - Dr. 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     H/O right and left heart catheterization 12/10/2020    DIFFUSE LAD DISEASE, Mild ECA Disease, Severe AS, Milf Pul HTN on RHC.    History of blood transfusion 12/2020    d/t anemia    History of transesophageal echocardiography (MABLE) 12/15/2020    Severe aortic stenosis (ANNA by planimetry: 0.778 cm sq).  Mild AR.    [place] (hard of hearing)     hearing [employer]    Hx of Doppler echocardiogram 05/21/2018    EF 50%  Mild LV hypertrophy. Mildly enlarged RA. Mod aortic valve calcification with mod AS. Mitral annular calcification is present. Mild AR, MR and TR. Mild pulmonary htn.    Hyperlipidemia     Hypertension     Follows with PCP & Dr. Taylor    Other disorders of kidney and ureter     Pacemaker     Medtronic, implanted 2014    Pneumonia 12/29/2012    Pneumonia due to COVID-19 virus 08/2020    Sleep apnea     dx 2013- has c-pap    Type II or unspecified type diabetes mellitus with other specified manifestations, uncontrolled 12/12/2012    Venous hypertension, chronic, with ulcer (HCC) 12/12/2012    resolved       FAMILY HISTORY  Family History   Problem Relation Age of Onset    High Blood Pressure Mother     Arthritis Mother     Diabetes Mother     Heart Disease Mother     High Blood Pressure Father     Heart Disease Father     Kidney Disease Father     No Known Problems Sister     Heart Surgery Brother     Heart Disease Brother     No Known Problems Sister     No Known Problems Brother        SOCIAL HISTORY  Social History     Socioeconomic History    Marital status:    Tobacco Use    Smoking status: Never    Smokeless tobacco: Never   Vaping Use    Vaping Use: Never used   Substance and Sexual Activity    Alcohol use: Not Currently     Alcohol/week: 2.0 standard drinks     Types: 2 Cans of  beer per week     Comment: average \"one time per week\"/ Caffiene: 1 cup of coffee daily    Drug use: No    Sexual activity: Yes     Partners: Female     Comment:      Social Determinants of Health     Financial Resource Strain: Low Risk     Difficulty of Paying Living Expenses: Not hard at all   Food Insecurity: No Food Insecurity    Worried About Running Out of Food in the Last Year: Never true    Ran Out of Food in the Last Year: Never true   Physical Activity: Sufficiently Active    Days of Exercise per Week: 4 days    Minutes of Exercise per Session: 40 min        SURGICAL HISTORY  Past Surgical History:   Procedure Laterality Date    CABG WITH AORTIC VALVE REPLACEMENT N/A 02/16/2021    CABG CORONARY ARTERY BYPASS X2 WITH LIMA, AORTIC VALVE REPLACEMENT AND AORTIC ROOT REPAIR, INTRAOPERATIVE MABLE, INDUCED HYPOTHERMIA, LEFT LEG ENDOVEIN HARVEST, LEFT ATRIAL CLIP, AND CRYO PROCEDURE performed by Jasmeet Nolan MD at [facility] OR    CARDIOVERSION  12/2014    at [facility]    CAROTID ENDARTERECTOMY Left 01/29/2021    LEFT CAROTID ENDARTERECTOMY performed by Pola Miles MD at [facility] OR    CHOLECYSTECTOMY  2017    COLONOSCOPY  2011    COLONOSCOPY N/A 11/19/2019    COLONOSCOPY DIAGNOSTIC performed by Félix Huddleston MD at [facility] ENDOSCOPY    COLONOSCOPY  09/30/2020    POSSIBLE CECAL avms, SIGMOID DIVERTICULOSIS, INTERNAL HEMORRHOIDS GRADE 1    COLONOSCOPY N/A 09/30/2020    COLONOSCOPY CONTROL HEMORRHAGE WITH APC performed by Félix Huddleston MD at [facility] ASC OR    CORONARY ANGIOPLASTY WITH STENT PLACEMENT  2014    \"2 stents put in \"    FOOT DEBRIDEMENT Bilateral 03/31/2022    BILATERAL DEBRIEDMENT OF NON-VIABLE TISSUE & BONE performed by Asad Coe DPM at [facility] OR    FOOT DEBRIDEMENT Right 04/14/2022    RIGHT FOOT DEBRIDEMENT INCISION AND DRAINAGE performed by Asad Coe DPM at [facility] OR    HERNIA REPAIR      abdominal    IR NONTUNNELED VASCULAR CATHETER  03/05/2021    IR NONTUNNELED VASCULAR CATHETER 3/5/2021 [facility] SPECIAL  PROCEDURES    JOINT REPLACEMENT  2004    total left hip    OTHER SURGICAL HISTORY Right 12/02/2017    I&D; evacuation of hematoma right hip    OTHER SURGICAL HISTORY  09/17/2020    enteroscopy    PACEMAKER INSERTION N/A 02/23/2021    PACEMAKER GENERATOR LEAD REVISION performed by Pratik Villegas MD at [address] Left 04/26/2022    Dual PPM: Atrial lead extraction/insertion, Generator change. Medtronic, Lucie XT DR LOYD Suresccristy    PACEMAKER PLACEMENT      9/18/14 Status post remote permanent pacemaker with atrial lead dislodgement.  7/24/14 PPM Implant    TOE AMPUTATION Bilateral 03/31/2022    LEFT 3RD TOE AMPUTATION performed by Manfred Raya DPM at [address] Right 04/14/2022    RIGHT 2ND TOE AMPUTATION performed by Manfred Raya DPM at [facility] N/A 09/17/2020    ENTEROSCOPY PUSH BIOPSY performed by Artem Ramírez MD at [facility] N/A 03/04/2021    EGD DIAGNOSTIC ONLY performed by Artem Ramírez MD at [facility]  2012    \"have stents in both legs- done in [facility]  Current Outpatient Medications   Medication Sig Dispense Refill    dapagliflozin (FARXIGA) 10 MG tablet Take 1 tablet by mouth every morning 90 tablet 1    gabapentin (NEURONTIN) 300 MG capsule TAKE 1 CAPSULE 3 TIMES A    capsule 1    glimepiride (AMARYL) 4 MG tablet TAKE 1 TABLET IN THE       MORNING (BEFORE BREAKFAST) 90 tablet 1    sildenafil (VIAGRA) 100 MG tablet TAKE 1 TABLET DAILY AS     NEEDED FOR ERECTILE        DYSFUNCTION 90 tablet 1    Dulaglutide (TRULICITY) 4.5 FX/7.2XK SOPN INJECT 0.5ML (=4.5 MG)     SUBCUTANEOUSLY ONCE WEEKLY (EVERY 7 DAYS) 12 Adjustable Dose Pre-filled Pen Syringe 1    colchicine (COLCRYS) 0.6 MG tablet Take 1 tablet daily 90 tablet 0    torsemide (DEMADEX) 20 MG tablet TAKE 2 TABLETS TWICE A  tablet 3    apixaban (ELIQUIS) 5 MG TABS tablet Take 1 tablet by mouth 2 times daily 180 tablet 3    simvastatin (ZOCOR) 20 MG tablet Take 1 tablet by mouth daily 90 tablet 3    carboxymethylcellulose (REFRESH PLUS) 0.5 % SOLN ophthalmic solution as needed       Cholecalciferol (VITAMIN D) 50 MCG (2000 UT) CAPS capsule Take by mouth nightly       aspirin 81 MG tablet Take 81 mg by mouth daily       No current facility-administered medications for this visit. ALLERGIES  Allergies   Allergen Reactions    Spironolactone      CAUSES INCREASED K+    Tape [place] Tape] Rash     SURGICAL TAPE       PHYSICAL EXAM    /80 (Site: Left Upper Arm, Position: Sitting, Cuff Size: Medium Adult)   Pulse 67   Ht 5' 11\" (1.803 m)   Wt 206 lb 6.4 oz (93.6 kg)   SpO2 94%   BMI 28.79 kg/m²     Physical Exam  Constitutional:       Appearance: Normal appearance. HENT:      Head: Normocephalic and atraumatic. Eyes:      Extraocular Movements: Extraocular movements intact. Pupils: Pupils are equal, round, and reactive to light. Cardiovascular:      Rate and Rhythm: Normal rate and regular rhythm. Pulses: Normal pulses. Heart sounds: No murmur heard. No friction rub. No gallop. Skin:     General: Skin is warm and dry. Neurological:      General: No focal deficit present. Mental Status: He is alert. Psychiatric:         Mood and Affect: Mood normal.         Behavior: Behavior normal.       ASSESSMENT & PLAN    1. Type 2 diabetes mellitus without complication, without long-term current use of insulin (HCC)    - dapagliflozin (FARXIGA) 10 MG tablet; Take 1 tablet by mouth every morning  Dispense: 90 tablet; Refill: 1  - glimepiride (AMARYL) 4 MG tablet; TAKE 1 TABLET IN THE       MORNING (BEFORE BREAKFAST)  Dispense: 90 tablet; Refill: 1  - Dulaglutide (TRULICITY) 4.5 XO/6.9ZM SOPN; INJECT 0.5ML (=4.5 MG)     SUBCUTANEOUSLY ONCE WEEKLY (EVERY 7 DAYS)  Dispense: 12 Adjustable Dose Pre-filled Pen Syringe; Refill: 1  - Hemoglobin A1C; Future    2. Peripheral polyneuropathy    - gabapentin (NEURONTIN) 300 MG capsule; TAKE 1 CAPSULE 3 TIMES A   DAY  Dispense: 270 capsule; Refill: 1    3. Vasculogenic erectile dysfunction, unspecified vasculogenic erectile dysfunction type    - sildenafil (VIAGRA) 100 MG tablet; TAKE 1 TABLET DAILY AS     NEEDED FOR ERECTILE        DYSFUNCTION  Dispense: 90 tablet; Refill: 1    4. Acute gout involving toe, unspecified cause, unspecified laterality  - colchicine (COLCRYS) 0.6 MG tablet; Take 1 tablet daily  Dispense: 90 tablet; Refill: 0    Gout no recent flares  F/u labs DM2 consider adjustment pending labs  Neuropathy stable        Return in about 6 months (around 9/13/2023).          Electronically signed by Ebonie Alexandre DO on 3/16/2023

## 2023-03-23 ENCOUNTER — TELEPHONE (OUTPATIENT)
Dept: CARDIOLOGY CLINIC | Age: 73
End: 2023-03-23

## 2023-03-23 ENCOUNTER — PROCEDURE VISIT (OUTPATIENT)
Dept: CARDIOLOGY CLINIC | Age: 73
End: 2023-03-23
Payer: MEDICARE

## 2023-03-23 DIAGNOSIS — Z95.0 CARDIAC PACEMAKER IN SITU: Primary | ICD-10-CM

## 2023-03-23 PROCEDURE — 93296 REM INTERROG EVL PM/IDS: CPT | Performed by: INTERNAL MEDICINE

## 2023-03-23 PROCEDURE — 93294 REM INTERROG EVL PM/LDLS PM: CPT | Performed by: INTERNAL MEDICINE

## 2023-03-29 ENCOUNTER — HOSPITAL ENCOUNTER (OUTPATIENT)
Age: 73
Discharge: HOME | End: 2023-03-29
Payer: MEDICARE

## 2023-03-29 DIAGNOSIS — R80.1 PERSISTENT PROTEINURIA: ICD-10-CM

## 2023-03-29 DIAGNOSIS — I48.0 PAROXYSMAL ATRIAL FIBRILLATION (HCC): ICD-10-CM

## 2023-03-29 DIAGNOSIS — N18.31 STAGE 3A CHRONIC KIDNEY DISEASE (HCC): ICD-10-CM

## 2023-03-29 DIAGNOSIS — E11.49 OTHER DIABETIC NEUROLOGICAL COMPLICATION ASSOCIATED WITH TYPE 2 DIABETES MELLITUS (HCC): ICD-10-CM

## 2023-03-29 DIAGNOSIS — I10 ESSENTIAL HYPERTENSION: ICD-10-CM

## 2023-03-29 LAB
ALBUMIN SERPL-MCNC: 4.1 GM/DL (ref 3.4–5)
ANION GAP SERPL CALCULATED.3IONS-SCNC: 11 MMOL/L (ref 4–16)
BILIRUBIN URINE: NEGATIVE MG/DL
BLOOD, URINE: NORMAL
BUN SERPL-MCNC: 31 MG/DL (ref 6–23)
CALCIUM SERPL-MCNC: 8.7 MG/DL (ref 8.3–10.6)
CHLORIDE BLD-SCNC: 100 MMOL/L (ref 99–110)
CLARITY: CLEAR
CO2: 30 MMOL/L (ref 21–32)
COLOR: YELLOW
CREAT SERPL-MCNC: 1.3 MG/DL (ref 0.9–1.3)
CREATININE URINE: 58.8 MG/DL (ref 39–259)
GFR SERPL CREATININE-BSD FRML MDRD: 57 ML/MIN/1.73M2
GLUCOSE SERPL-MCNC: 176 MG/DL (ref 70–99)
GLUCOSE, URINE: >=1000 MG/DL
KETONES, URINE: NEGATIVE MG/DL
LEUKOCYTE ESTERASE, URINE: NEGATIVE
MAGNESIUM: 2.2 MG/DL (ref 1.8–2.4)
NITRITE URINE, QUANTITATIVE: NEGATIVE
PH, URINE: 7 (ref 5–8)
PHOSPHORUS: 3.8 MG/DL (ref 2.5–4.9)
POTASSIUM SERPL-SCNC: 4 MMOL/L (ref 3.5–5.1)
PROT/CREAT RATIO, UR: 2.9
PROTEIN UA: >=300 MG/DL
SODIUM BLD-SCNC: 141 MMOL/L (ref 135–145)
SPECIFIC GRAVITY UA: 1.01 (ref 1–1.03)
URINE TOTAL PROTEIN: 172.4 MG/DL
UROBILINOGEN, URINE: 0.2 MG/DL (ref 0.2–1)

## 2023-03-29 PROCEDURE — 82040 ASSAY OF SERUM ALBUMIN: CPT

## 2023-03-29 PROCEDURE — 84156 ASSAY OF PROTEIN URINE: CPT

## 2023-03-29 PROCEDURE — 36415 COLL VENOUS BLD VENIPUNCTURE: CPT

## 2023-03-29 PROCEDURE — 82570 ASSAY OF URINE CREATININE: CPT

## 2023-03-29 PROCEDURE — 81003 URINALYSIS AUTO W/O SCOPE: CPT

## 2023-03-29 PROCEDURE — 80048 BASIC METABOLIC PNL TOTAL CA: CPT

## 2023-03-29 PROCEDURE — 84100 ASSAY OF PHOSPHORUS: CPT

## 2023-03-29 PROCEDURE — 83735 ASSAY OF MAGNESIUM: CPT

## 2023-06-02 ENCOUNTER — HOSPITAL ENCOUNTER (OUTPATIENT)
Age: 73
Discharge: HOME | End: 2023-06-02
Payer: MEDICARE

## 2023-06-02 DIAGNOSIS — I10 ESSENTIAL HYPERTENSION: ICD-10-CM

## 2023-06-02 DIAGNOSIS — N18.31 STAGE 3A CHRONIC KIDNEY DISEASE (HCC): ICD-10-CM

## 2023-06-02 DIAGNOSIS — M10.9 GOUT, UNSPECIFIED CAUSE, UNSPECIFIED CHRONICITY, UNSPECIFIED SITE: ICD-10-CM

## 2023-06-02 DIAGNOSIS — E11.9 TYPE 2 DIABETES MELLITUS WITHOUT COMPLICATION, WITHOUT LONG-TERM CURRENT USE OF INSULIN (HCC): ICD-10-CM

## 2023-06-02 DIAGNOSIS — I48.0 PAROXYSMAL ATRIAL FIBRILLATION (HCC): ICD-10-CM

## 2023-06-02 LAB
ALBUMIN SERPL-MCNC: 4 GM/DL (ref 3.4–5)
ANION GAP SERPL CALCULATED.3IONS-SCNC: 15 MMOL/L (ref 4–16)
BACTERIA: NEGATIVE /HPF
BASOPHILS ABSOLUTE: 0.1 K/CU MM
BASOPHILS RELATIVE PERCENT: 0.8 % (ref 0–1)
BILIRUBIN URINE: NEGATIVE MG/DL
BLOOD, URINE: NEGATIVE
BUN SERPL-MCNC: 46 MG/DL (ref 6–23)
CALCIUM SERPL-MCNC: 8.4 MG/DL (ref 8.3–10.6)
CHLORIDE BLD-SCNC: 102 MMOL/L (ref 99–110)
CLARITY: CLEAR
CO2: 25 MMOL/L (ref 21–32)
COLOR: YELLOW
CREAT SERPL-MCNC: 1.5 MG/DL (ref 0.9–1.3)
CREATININE URINE: 34.5 MG/DL (ref 39–259)
DIFFERENTIAL TYPE: ABNORMAL
EOSINOPHILS ABSOLUTE: 0.3 K/CU MM
EOSINOPHILS RELATIVE PERCENT: 4.4 % (ref 0–3)
ESTIMATED AVERAGE GLUCOSE: 183 MG/DL
GFR SERPL CREATININE-BSD FRML MDRD: 48 ML/MIN/1.73M2
GLUCOSE SERPL-MCNC: 105 MG/DL (ref 70–99)
GLUCOSE, URINE: 500 MG/DL
HBA1C MFR BLD: 8 % (ref 4.2–6.3)
HCT VFR BLD CALC: 42.3 % (ref 42–52)
HEMOGLOBIN: 12.7 GM/DL (ref 13.5–18)
IMMATURE NEUTROPHIL %: 0.3 % (ref 0–0.43)
KETONES, URINE: NEGATIVE MG/DL
LEUKOCYTE ESTERASE, URINE: NEGATIVE
LYMPHOCYTES ABSOLUTE: 1 K/CU MM
LYMPHOCYTES RELATIVE PERCENT: 16.7 % (ref 24–44)
MCH RBC QN AUTO: 25 PG (ref 27–31)
MCHC RBC AUTO-ENTMCNC: 30 % (ref 32–36)
MCV RBC AUTO: 83.3 FL (ref 78–100)
MONOCYTES ABSOLUTE: 0.7 K/CU MM
MONOCYTES RELATIVE PERCENT: 10.5 % (ref 0–4)
NITRITE URINE, QUANTITATIVE: NEGATIVE
NUCLEATED RBC %: 0 %
PDW BLD-RTO: 17.7 % (ref 11.7–14.9)
PH, URINE: 6.5 (ref 5–8)
PHOSPHORUS: 4 MG/DL (ref 2.5–4.9)
PLATELET # BLD: 124 K/CU MM (ref 140–440)
PMV BLD AUTO: 11.1 FL (ref 7.5–11.1)
POTASSIUM SERPL-SCNC: 3.8 MMOL/L (ref 3.5–5.1)
PROT/CREAT RATIO, UR: 1.6
PROTEIN UA: 100 MG/DL
RBC # BLD: 5.08 M/CU MM (ref 4.6–6.2)
RBC URINE: <1 /HPF (ref 0–3)
SEGMENTED NEUTROPHILS ABSOLUTE COUNT: 4.2 K/CU MM
SEGMENTED NEUTROPHILS RELATIVE PERCENT: 67.3 % (ref 36–66)
SODIUM BLD-SCNC: 142 MMOL/L (ref 135–145)
SPECIFIC GRAVITY UA: 1.01 (ref 1–1.03)
TOTAL IMMATURE NEUTOROPHIL: 0.02 K/CU MM
TOTAL NUCLEATED RBC: 0 K/CU MM
TRICHOMONAS: NORMAL /HPF
URINE TOTAL PROTEIN: 55.7 MG/DL
UROBILINOGEN, URINE: 0.2 MG/DL (ref 0.2–1)
WBC # BLD: 6.2 K/CU MM (ref 4–10.5)
WBC UA: 1 /HPF (ref 0–2)

## 2023-06-02 PROCEDURE — 81001 URINALYSIS AUTO W/SCOPE: CPT

## 2023-06-02 PROCEDURE — 36415 COLL VENOUS BLD VENIPUNCTURE: CPT

## 2023-06-02 PROCEDURE — 80048 BASIC METABOLIC PNL TOTAL CA: CPT

## 2023-06-02 PROCEDURE — 83036 HEMOGLOBIN GLYCOSYLATED A1C: CPT

## 2023-06-02 PROCEDURE — 82570 ASSAY OF URINE CREATININE: CPT

## 2023-06-02 PROCEDURE — 85025 COMPLETE CBC W/AUTO DIFF WBC: CPT

## 2023-06-02 PROCEDURE — 82040 ASSAY OF SERUM ALBUMIN: CPT

## 2023-06-02 PROCEDURE — 84156 ASSAY OF PROTEIN URINE: CPT

## 2023-06-02 PROCEDURE — 84100 ASSAY OF PHOSPHORUS: CPT

## 2023-06-26 LAB — DIABETIC RETINOPATHY: POSITIVE

## 2023-06-26 PROCEDURE — 93294 REM INTERROG EVL PM/LDLS PM: CPT | Performed by: INTERNAL MEDICINE

## 2023-06-26 PROCEDURE — 93296 REM INTERROG EVL PM/IDS: CPT | Performed by: INTERNAL MEDICINE

## 2023-06-27 ENCOUNTER — PROCEDURE VISIT (OUTPATIENT)
Dept: CARDIOLOGY CLINIC | Age: 73
End: 2023-06-27
Payer: MEDICARE

## 2023-06-27 DIAGNOSIS — Z95.0 CARDIAC PACEMAKER IN SITU: Primary | ICD-10-CM

## 2023-07-05 ENCOUNTER — TELEMEDICINE (OUTPATIENT)
Dept: FAMILY MEDICINE CLINIC | Age: 73
End: 2023-07-05
Payer: MEDICARE

## 2023-07-05 DIAGNOSIS — Z00.00 MEDICARE ANNUAL WELLNESS VISIT, SUBSEQUENT: Primary | ICD-10-CM

## 2023-07-05 PROCEDURE — 1123F ACP DISCUSS/DSCN MKR DOCD: CPT | Performed by: FAMILY MEDICINE

## 2023-07-05 PROCEDURE — G0439 PPPS, SUBSEQ VISIT: HCPCS | Performed by: FAMILY MEDICINE

## 2023-07-05 ASSESSMENT — PATIENT HEALTH QUESTIONNAIRE - PHQ9
SUM OF ALL RESPONSES TO PHQ QUESTIONS 1-9: 0
SUM OF ALL RESPONSES TO PHQ QUESTIONS 1-9: 0
2. FEELING DOWN, DEPRESSED OR HOPELESS: 0
1. LITTLE INTEREST OR PLEASURE IN DOING THINGS: 0
SUM OF ALL RESPONSES TO PHQ QUESTIONS 1-9: 0
SUM OF ALL RESPONSES TO PHQ9 QUESTIONS 1 & 2: 0
SUM OF ALL RESPONSES TO PHQ QUESTIONS 1-9: 0

## 2023-07-05 ASSESSMENT — LIFESTYLE VARIABLES
HOW MANY STANDARD DRINKS CONTAINING ALCOHOL DO YOU HAVE ON A TYPICAL DAY: 1 OR 2
HOW OFTEN DO YOU HAVE A DRINK CONTAINING ALCOHOL: 2-3 TIMES A WEEK

## 2023-07-05 NOTE — PATIENT INSTRUCTIONS
Personalized Preventive Plan for Beatriz Jain - 7/5/2023  Medicare offers a range of preventive health benefits. Some of the tests and screenings are paid in full while other may be subject to a deductible, co-insurance, and/or copay. Some of these benefits include a comprehensive review of your medical history including lifestyle, illnesses that may run in your family, and various assessments and screenings as appropriate. After reviewing your medical record and screening and assessments performed today your provider may have ordered immunizations, labs, imaging, and/or referrals for you. A list of these orders (if applicable) as well as your Preventive Care list are included within your After Visit Summary for your review. Other Preventive Recommendations:    A preventive eye exam performed by an eye specialist is recommended every 1-2 years to screen for glaucoma; cataracts, macular degeneration, and other eye disorders. A preventive dental visit is recommended every 6 months. Try to get at least 150 minutes of exercise per week or 10,000 steps per day on a pedometer . Order or download the FREE \"Exercise & Physical Activity: Your Everyday Guide\" from [employer]. Call 0-515.932.5998 or search [employer] online. You need 9582-5102 mg of calcium and 7787-0744 IU of vitamin D per day. It is possible to meet your calcium requirement with diet alone, but a vitamin D supplement is usually necessary to meet this goal.  When exposed to the sun, use a sunscreen that protects against both UVA and UVB radiation with an SPF of 30 or greater. Reapply every 2 to 3 hours or after sweating, drying off with a towel, or swimming. Always wear a seat belt when traveling in a car. Always wear a helmet when riding a bicycle or motorcycle.

## 2023-07-05 NOTE — PROGRESS NOTES
Medicare Annual Wellness Visit    Damian Friedman is here for Medicare AWV    Assessment & Plan   Medicare annual wellness visit, subsequent  Recommendations for Preventive Services Due: see orders and patient instructions/AVS.  Recommended screening schedule for the next 5-10 years is provided to the patient in written form: see Patient Instructions/AVS.     No follow-ups on file. Subjective       Patient's complete Health Risk Assessment and screening values have been reviewed and are found in Flowsheets. The following problems were reviewed today and where indicated follow up appointments were made and/or referrals ordered. Positive Risk Factor Screenings with Interventions:    Fall Risk:  Do you feel unsteady or are you worried about falling? : no  2 or more falls in past year?: (!) yes  Fall with injury in past year?: no     Interventions:    Patient declines any further evaluation or treatment                                    Objective      Patient-Reported Vitals  No data recorded     Unable to obtain 3 vital signs due to patient not having equipment to take blood pressure/temperature. Allergies   Allergen Reactions    Spironolactone      CAUSES INCREASED K+    Tape [place] Tape] Rash     SURGICAL TAPE     Prior to Visit Medications    Medication Sig Taking?  Authorizing Provider   FARXIGA 10 MG tablet TAKE 1 TABLET EVERY MORNING Yes Kaleb Mitchell DO   gabapentin (NEURONTIN) 300 MG capsule TAKE 1 CAPSULE 3 TIMES A   DAY Yes Kaleb Mitchell DO   simvastatin (ZOCOR) 20 MG tablet Take 1 tablet by mouth daily Yes Laura Mckinnon MD   apixaban (ELIQUIS) 5 MG TABS tablet Take 1 tablet by mouth 2 times daily Yes Laura Mckinnon MD   Finerenone (KERENDIA) 10 MG TABS Take 10 mg by mouth daily Yes Bar Yadav MD   losartan (COZAAR) 25 MG tablet Take 1 tablet by mouth daily Yes Bar Taylor MD   glimepiride (AMARYL) 4 MG tablet TAKE 1 TABLET IN THE       MORNING (BEFORE BREAKFAST) Yes Kaleb DE DIOS

## 2023-07-13 DIAGNOSIS — E11.9 TYPE 2 DIABETES MELLITUS WITHOUT COMPLICATION, WITHOUT LONG-TERM CURRENT USE OF INSULIN (HCC): ICD-10-CM

## 2023-07-14 RX ORDER — GLIMEPIRIDE 4 MG/1
TABLET ORAL
Qty: 90 TABLET | Refills: 1 | Status: SHIPPED | OUTPATIENT
Start: 2023-07-14

## 2023-07-17 ENCOUNTER — OFFICE VISIT (OUTPATIENT)
Dept: FAMILY MEDICINE CLINIC | Age: 73
End: 2023-07-17
Payer: MEDICARE

## 2023-07-17 VITALS
SYSTOLIC BLOOD PRESSURE: 150 MMHG | WEIGHT: 206 LBS | HEIGHT: 71 IN | RESPIRATION RATE: 12 BRPM | DIASTOLIC BLOOD PRESSURE: 84 MMHG | OXYGEN SATURATION: 99 % | BODY MASS INDEX: 28.84 KG/M2 | HEART RATE: 70 BPM

## 2023-07-17 DIAGNOSIS — Z12.5 PROSTATE CANCER SCREENING: ICD-10-CM

## 2023-07-17 DIAGNOSIS — R39.12 WEAK URINE STREAM: ICD-10-CM

## 2023-07-17 DIAGNOSIS — R60.0 EDEMA OF BOTH LOWER EXTREMITIES: ICD-10-CM

## 2023-07-17 DIAGNOSIS — R21 SKIN RASH: Primary | ICD-10-CM

## 2023-07-17 DIAGNOSIS — M25.541 ARTHRALGIA OF RIGHT HAND: ICD-10-CM

## 2023-07-17 PROCEDURE — 99213 OFFICE O/P EST LOW 20 MIN: CPT | Performed by: PHYSICIAN ASSISTANT

## 2023-07-17 PROCEDURE — 3074F SYST BP LT 130 MM HG: CPT | Performed by: PHYSICIAN ASSISTANT

## 2023-07-17 PROCEDURE — 1123F ACP DISCUSS/DSCN MKR DOCD: CPT | Performed by: PHYSICIAN ASSISTANT

## 2023-07-17 PROCEDURE — 3078F DIAST BP <80 MM HG: CPT | Performed by: PHYSICIAN ASSISTANT

## 2023-07-17 RX ORDER — CLOBETASOL PROPIONATE 0.5 MG/G
OINTMENT TOPICAL
Qty: 60 G | Refills: 2 | Status: SHIPPED | OUTPATIENT
Start: 2023-07-17

## 2023-07-17 NOTE — PROGRESS NOTES
7/18/2023    Darylene Boards    Chief Complaint   Patient presents with    Rash     Rash on legs. Itches a little. Has been there three weeks. Using cream from dermatologist but does not know the name. Just helps with the dryness, not the rash. Blood Work     Would like uric acid, psa and A1C done. HPI  History was obtained from patient  Ronald Pedersen is a 74 y.o. male who presents today with complaints of skin rash on bilateral legs. Slightly pruritic. There has been worsening of his chronic edema of bilateral lower extremities. Has been present for approximately 3 weeks. He wondered if it could be related to starting Kate so nephrology told him it was okay to stop it. He does already take torsemide 20 mg twice daily. He has been using some type of prescription for cream from dermatology that seems to help with overall dryness but is not helping the rash. Patient denies any new exposures that could be the cause of his rash. No changes in lotions, soaps, detergents, etc.  No recent traveling. Patient would like uric acid and PSA levels drawn today. He states he has a history of gout, trying to avoid known dietary triggers. No personal or family history of prostate cancer. He does occasionally have a weak urine stream.        REVIEW OF SYMPTOMS    Constitutional:  Denies fever, chills, weight gain   Cardiovascular: Admits chronic edema of bilateral lower extremities which is currently worse than usual.  See HPI. Denies chest pain, palpitations  Respiratory:  Denies cough or shortness of breath  GI:  Denies abdominal pain, nausea, vomiting, or diarrhea  Skin: Admits skin rash to bilateral lower extremities. See HPI.   Neurologic:  Denies headache, focal weakness, or sensory changes      PAST MEDICAL HISTORY  Past Medical History:   Diagnosis Date    Arrhythmia     Pacemaker placed aprox 5 years ago for A Fib per patient    Arthritis 12/2013    rt wrist    Atrial fibrillation ([address])     on Xarelto

## 2023-07-28 ENCOUNTER — HOSPITAL ENCOUNTER (OUTPATIENT)
Age: 73
Discharge: HOME | End: 2023-07-28
Payer: MEDICARE

## 2023-07-28 DIAGNOSIS — N18.31 STAGE 3A CHRONIC KIDNEY DISEASE (HCC): ICD-10-CM

## 2023-07-28 DIAGNOSIS — I38 VHD (VALVULAR HEART DISEASE): ICD-10-CM

## 2023-07-28 DIAGNOSIS — Z79.4 TYPE 2 DIABETES MELLITUS WITHOUT COMPLICATION, WITH LONG-TERM CURRENT USE OF INSULIN (HCC): ICD-10-CM

## 2023-07-28 DIAGNOSIS — E87.5 HYPERKALEMIA: ICD-10-CM

## 2023-07-28 DIAGNOSIS — E11.9 TYPE 2 DIABETES MELLITUS WITHOUT COMPLICATION, WITH LONG-TERM CURRENT USE OF INSULIN (HCC): ICD-10-CM

## 2023-07-28 DIAGNOSIS — Z95.0 CARDIAC PACEMAKER IN SITU: ICD-10-CM

## 2023-07-28 DIAGNOSIS — I10 ESSENTIAL HYPERTENSION: ICD-10-CM

## 2023-07-28 LAB
ALBUMIN SERPL-MCNC: 4.1 GM/DL (ref 3.4–5)
ANION GAP SERPL CALCULATED.3IONS-SCNC: 10 MMOL/L (ref 4–16)
BACTERIA: NEGATIVE /HPF
BASOPHILS ABSOLUTE: 0 K/CU MM
BASOPHILS RELATIVE PERCENT: 0.8 % (ref 0–1)
BILIRUBIN URINE: NEGATIVE MG/DL
BLOOD, URINE: ABNORMAL
BUN SERPL-MCNC: 46 MG/DL (ref 6–23)
CALCIUM SERPL-MCNC: 8.8 MG/DL (ref 8.3–10.6)
CHLORIDE BLD-SCNC: 99 MMOL/L (ref 99–110)
CLARITY: CLEAR
CO2: 30 MMOL/L (ref 21–32)
COLOR: YELLOW
CREAT SERPL-MCNC: 1.6 MG/DL (ref 0.9–1.3)
CREATININE URINE: 39 MG/DL (ref 39–259)
DIFFERENTIAL TYPE: ABNORMAL
EOSINOPHILS ABSOLUTE: 0.2 K/CU MM
EOSINOPHILS RELATIVE PERCENT: 3.9 % (ref 0–3)
GFR SERPL CREATININE-BSD FRML MDRD: 45 ML/MIN/1.73M2
GLUCOSE SERPL-MCNC: 83 MG/DL (ref 70–99)
GLUCOSE, URINE: 250 MG/DL
HCT VFR BLD CALC: 45.3 % (ref 42–52)
HEMOGLOBIN: 13.6 GM/DL (ref 13.5–18)
IMMATURE NEUTROPHIL %: 0.2 % (ref 0–0.43)
KETONES, URINE: NEGATIVE MG/DL
LEUKOCYTE ESTERASE, URINE: NEGATIVE
LYMPHOCYTES ABSOLUTE: 1.2 K/CU MM
LYMPHOCYTES RELATIVE PERCENT: 23.2 % (ref 24–44)
MCH RBC QN AUTO: 25.3 PG (ref 27–31)
MCHC RBC AUTO-ENTMCNC: 30 % (ref 32–36)
MCV RBC AUTO: 84.2 FL (ref 78–100)
MONOCYTES ABSOLUTE: 0.7 K/CU MM
MONOCYTES RELATIVE PERCENT: 13.1 % (ref 0–4)
MUCUS: ABNORMAL HPF
NITRITE URINE, QUANTITATIVE: NEGATIVE
NUCLEATED RBC %: 0 %
PDW BLD-RTO: 16.3 % (ref 11.7–14.9)
PH, URINE: 6.5 (ref 5–8)
PHOSPHORUS: 4.2 MG/DL (ref 2.5–4.9)
PLATELET # BLD: 127 K/CU MM (ref 140–440)
PMV BLD AUTO: 11.4 FL (ref 7.5–11.1)
POTASSIUM SERPL-SCNC: 4.2 MMOL/L (ref 3.5–5.1)
PROT/CREAT RATIO, UR: 1.2
PROTEIN UA: 30 MG/DL
RBC # BLD: 5.38 M/CU MM (ref 4.6–6.2)
RBC URINE: 0 /HPF (ref 0–3)
SEGMENTED NEUTROPHILS ABSOLUTE COUNT: 3 K/CU MM
SEGMENTED NEUTROPHILS RELATIVE PERCENT: 58.8 % (ref 36–66)
SODIUM BLD-SCNC: 139 MMOL/L (ref 135–145)
SPECIFIC GRAVITY UA: 1.01 (ref 1–1.03)
SQUAMOUS EPITHELIAL: <1 /HPF
TOTAL IMMATURE NEUTOROPHIL: 0.01 K/CU MM
TOTAL NUCLEATED RBC: 0 K/CU MM
TRICHOMONAS: ABNORMAL /HPF
URINE TOTAL PROTEIN: 44.9 MG/DL
UROBILINOGEN, URINE: 0.2 MG/DL (ref 0.2–1)
WBC # BLD: 5.1 K/CU MM (ref 4–10.5)
WBC UA: <1 /HPF (ref 0–2)

## 2023-07-28 PROCEDURE — 36415 COLL VENOUS BLD VENIPUNCTURE: CPT

## 2023-07-28 PROCEDURE — 80048 BASIC METABOLIC PNL TOTAL CA: CPT

## 2023-07-28 PROCEDURE — 81001 URINALYSIS AUTO W/SCOPE: CPT

## 2023-07-28 PROCEDURE — 82570 ASSAY OF URINE CREATININE: CPT

## 2023-07-28 PROCEDURE — 85025 COMPLETE CBC W/AUTO DIFF WBC: CPT

## 2023-07-28 PROCEDURE — 84100 ASSAY OF PHOSPHORUS: CPT

## 2023-07-28 PROCEDURE — 84156 ASSAY OF PROTEIN URINE: CPT

## 2023-07-28 PROCEDURE — 82040 ASSAY OF SERUM ALBUMIN: CPT

## 2023-07-31 ENCOUNTER — OFFICE VISIT (OUTPATIENT)
Dept: CARDIOLOGY CLINIC | Age: 73
End: 2023-07-31
Payer: MEDICARE

## 2023-07-31 VITALS
BODY MASS INDEX: 26.88 KG/M2 | SYSTOLIC BLOOD PRESSURE: 124 MMHG | OXYGEN SATURATION: 95 % | HEART RATE: 70 BPM | WEIGHT: 192 LBS | DIASTOLIC BLOOD PRESSURE: 76 MMHG | HEIGHT: 71 IN

## 2023-07-31 DIAGNOSIS — I73.9 PVD (PERIPHERAL VASCULAR DISEASE) (HCC): Primary | ICD-10-CM

## 2023-07-31 PROCEDURE — 3078F DIAST BP <80 MM HG: CPT | Performed by: NURSE PRACTITIONER

## 2023-07-31 PROCEDURE — 1123F ACP DISCUSS/DSCN MKR DOCD: CPT | Performed by: NURSE PRACTITIONER

## 2023-07-31 PROCEDURE — 99213 OFFICE O/P EST LOW 20 MIN: CPT | Performed by: NURSE PRACTITIONER

## 2023-07-31 PROCEDURE — 3074F SYST BP LT 130 MM HG: CPT | Performed by: NURSE PRACTITIONER

## 2023-07-31 PROCEDURE — 93000 ELECTROCARDIOGRAM COMPLETE: CPT | Performed by: NURSE PRACTITIONER

## 2023-07-31 RX ORDER — SIMVASTATIN 20 MG
20 TABLET ORAL DAILY
Qty: 90 TABLET | Refills: 3 | Status: SHIPPED | OUTPATIENT
Start: 2023-07-31 | End: 2024-07-25

## 2023-07-31 ASSESSMENT — ENCOUNTER SYMPTOMS
ORTHOPNEA: 0
SHORTNESS OF BREATH: 0

## 2023-07-31 NOTE — PATIENT INSTRUCTIONS
**It is YOUR responsibilty to bring medication bottles and/or updated medication list to [address] [place] [address]. This will allow us to better serve you and all your healthcare needs**  [facility] Locations - No appointment necessary. Sites open Monday to Friday. Call your preferred location for test preparation, business   hours and other information you need. [employer]. [address] [place] [address]. [address], [address] [facility]  Phone: [phone]     Please be informed that if you contact our office outside of normal business hours the physician on call cannot help with any scheduling or rescheduling issues, procedure instruction questions or any type of medication issue. We advise you for any urgent/emergency that you go to the nearest emergency room! PLEASE CALL OUR OFFICE DURING NORMAL BUSINESS HOURS    Monday - Friday   8 am to 5 pm    [place]: [address] [place]: [phone]    [place]:  [phone]  Thank you for allowing us to care for you today! We want to ensure we can follow your treatment plan and we strive to give you the best outcomes and experience possible. If you ever have a life threatening emergency and call 911 - for an ambulance (EMS)   Our providers can only care for you at:   [facility] or [facility]. Even if you have someone take you or you drive yourself we can only care for you in a [facility] facility. Our providers are not setup at the other healthcare locations! We are committed to providing you the best care possible. If you receive a survey after visiting one of our offices, please take time to share your experience concerning your physician office visit. These surveys are confidential and no health information about you is shared. We are eager to improve for you and we are counting on your feedback to help make that happen.

## 2023-08-28 LAB
ALBUMIN SERPL-MCNC: 3.8 G/DL
ALP BLD-CCNC: 157 U/L
ALP BLD-CCNC: 163 U/L
ALT SERPL-CCNC: 29 U/L
ANION GAP SERPL CALCULATED.3IONS-SCNC: 8 MMOL/L
AST SERPL-CCNC: 34 U/L
AVERAGE GLUCOSE: NORMAL
BASOPHILS ABSOLUTE: NORMAL
BASOPHILS RELATIVE PERCENT: NORMAL
BILIRUB SERPL-MCNC: 0.8 MG/DL (ref 0.1–1.4)
BUN BLDV-MCNC: 48 MG/DL
CALCIUM SERPL-MCNC: 9.6 MG/DL
CHLORIDE BLD-SCNC: 102 MMOL/L
CHOLESTEROL, TOTAL: 82 MG/DL
CHOLESTEROL/HDL RATIO: NORMAL
CO2: 29 MMOL/L
CREAT SERPL-MCNC: 1.4 MG/DL
CREATININE, URINE: 26.9
EGFR: 53
EOSINOPHILS ABSOLUTE: NORMAL
EOSINOPHILS RELATIVE PERCENT: NORMAL
GLUCOSE BLD-MCNC: 63 MG/DL
HBA1C MFR BLD: 6.5 %
HCT VFR BLD CALC: 41.2 % (ref 41–53)
HDLC SERPL-MCNC: 45 MG/DL (ref 35–70)
HEMOGLOBIN: 13.5 G/DL (ref 13.5–17.5)
LDL CHOLESTEROL CALCULATED: 30 MG/DL (ref 0–160)
LYMPHOCYTES ABSOLUTE: NORMAL
LYMPHOCYTES RELATIVE PERCENT: NORMAL
MCH RBC QN AUTO: 26.4 PG
MCHC RBC AUTO-ENTMCNC: 32.7 G/DL
MCV RBC AUTO: 80.8 FL
MICROALBUMIN/CREAT 24H UR: 188 MG/G{CREAT}
MICROALBUMIN/CREAT UR-RTO: 697
MONOCYTES ABSOLUTE: NORMAL
MONOCYTES RELATIVE PERCENT: NORMAL
NEUTROPHILS ABSOLUTE: NORMAL
NEUTROPHILS RELATIVE PERCENT: NORMAL
NONHDLC SERPL-MCNC: 38 MG/DL
PDW BLD-RTO: 16.4 %
PLATELET # BLD: 116 K/ΜL
PMV BLD AUTO: 9.7 FL
POTASSIUM SERPL-SCNC: 3.6 MMOL/L
RBC # BLD: 5.1 10^6/ΜL
SODIUM BLD-SCNC: 139 MMOL/L
TOTAL PROTEIN: 7.9
TRIGL SERPL-MCNC: 37 MG/DL
VITAMIN D 25-HYDROXY: 50
VITAMIN D2, 25 HYDROXY: NORMAL
VITAMIN D3,25 HYDROXY: NORMAL
VLDLC SERPL CALC-MCNC: NORMAL MG/DL
WBC # BLD: 5.9 10^3/ML

## 2023-08-29 ENCOUNTER — OFFICE VISIT (OUTPATIENT)
Dept: FAMILY MEDICINE CLINIC | Age: 73
End: 2023-08-29
Payer: MEDICARE

## 2023-08-29 VITALS
WEIGHT: 193 LBS | RESPIRATION RATE: 16 BRPM | OXYGEN SATURATION: 97 % | HEART RATE: 57 BPM | BODY MASS INDEX: 27.02 KG/M2 | SYSTOLIC BLOOD PRESSURE: 120 MMHG | DIASTOLIC BLOOD PRESSURE: 70 MMHG | HEIGHT: 71 IN

## 2023-08-29 DIAGNOSIS — Z89.421 ACQUIRED ABSENCE OF OTHER RIGHT TOE(S) (HCC): ICD-10-CM

## 2023-08-29 DIAGNOSIS — R09.89 DECREASED PEDAL PULSES: ICD-10-CM

## 2023-08-29 DIAGNOSIS — E11.610 CHARCOT FOOT DUE TO DIABETES MELLITUS (HCC): ICD-10-CM

## 2023-08-29 DIAGNOSIS — E11.42 TYPE 2 DIABETES MELLITUS WITH DIABETIC POLYNEUROPATHY, WITHOUT LONG-TERM CURRENT USE OF INSULIN (HCC): ICD-10-CM

## 2023-08-29 DIAGNOSIS — S98.132A AMPUTATED TOE OF LEFT FOOT (HCC): Primary | ICD-10-CM

## 2023-08-29 PROCEDURE — 3052F HG A1C>EQUAL 8.0%<EQUAL 9.0%: CPT | Performed by: PHYSICIAN ASSISTANT

## 2023-08-29 PROCEDURE — 99212 OFFICE O/P EST SF 10 MIN: CPT | Performed by: PHYSICIAN ASSISTANT

## 2023-08-29 PROCEDURE — 3074F SYST BP LT 130 MM HG: CPT | Performed by: PHYSICIAN ASSISTANT

## 2023-08-29 PROCEDURE — 1123F ACP DISCUSS/DSCN MKR DOCD: CPT | Performed by: PHYSICIAN ASSISTANT

## 2023-08-29 PROCEDURE — 3078F DIAST BP <80 MM HG: CPT | Performed by: PHYSICIAN ASSISTANT

## 2023-08-29 RX ORDER — DULAGLUTIDE 4.5 MG/.5ML
INJECTION, SOLUTION SUBCUTANEOUS
Qty: 12 ADJUSTABLE DOSE PRE-FILLED PEN SYRINGE | Refills: 1 | Status: SHIPPED | OUTPATIENT
Start: 2023-08-29

## 2023-08-29 NOTE — PROGRESS NOTES
8/29/2023    [facility]    Chief Complaint   Patient presents with    Other     Presenting for DM foot exam for DM shoes. HPI  History was obtained from pt. Jhonny Price is a 74 y.o. male with a PMHx as listed below who presents today for diabetic foot exam for his specialized diabetic foot wear. He also needs a refill on his Trulicity. 1. Amputation of left great toe ([address])    2. Type 2 diabetes mellitus with diabetic polyneuropathy, without long-term current use of insulin ([address])    3. Acquired absence of other right toe(s) ([address])    4. Charcot foot due to diabetes mellitus ([address])           REVIEW OF SYMPTOMS    Review of Systems    PAST MEDICAL HISTORY  Past Medical History:   Diagnosis Date    Arrhythmia     Pacemaker placed aprox 5 years ago for A Fib per patient    Arthritis 12/2013    rt wrist    Atrial fibrillation ([address])     on Xarelto - Dr. 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 H/O right and left heart catheterization 12/10/2020    DIFFUSE LAD DISEASE, Mild ECA Disease, Severe AS, Milf Pul HTN on RHC.     History of blood transfusion 12/2020    d/t anemia    History of transesophageal echocardiography (MABLE) 12/15/2020    Severe aortic stenosis (ANNA by planimetry:

## 2023-09-06 NOTE — FLOWSHEET NOTE
Per Dr. Osbaldo Lott, do not get patient up at this time. Will re-evaluate this afternoon. May reposition in bed for comfort.      Jacky Arthur RN 10:58 AM English

## 2023-10-01 PROCEDURE — 93294 REM INTERROG EVL PM/LDLS PM: CPT | Performed by: INTERNAL MEDICINE

## 2023-10-01 PROCEDURE — 93296 REM INTERROG EVL PM/IDS: CPT | Performed by: INTERNAL MEDICINE

## 2023-10-01 ASSESSMENT — PATIENT HEALTH QUESTIONNAIRE - PHQ9
2. FEELING DOWN, DEPRESSED OR HOPELESS: 0
SUM OF ALL RESPONSES TO PHQ QUESTIONS 1-9: 0
1. LITTLE INTEREST OR PLEASURE IN DOING THINGS: 0
SUM OF ALL RESPONSES TO PHQ QUESTIONS 1-9: 0
SUM OF ALL RESPONSES TO PHQ9 QUESTIONS 1 & 2: 0

## 2023-10-04 ENCOUNTER — PROCEDURE VISIT (OUTPATIENT)
Dept: CARDIOLOGY CLINIC | Age: 73
End: 2023-10-04
Payer: MEDICARE

## 2023-10-04 DIAGNOSIS — Z95.0 CARDIAC PACEMAKER IN SITU: Primary | ICD-10-CM

## 2023-11-04 ASSESSMENT — PATIENT HEALTH QUESTIONNAIRE - PHQ9
SUM OF ALL RESPONSES TO PHQ QUESTIONS 1-9: 0
1. LITTLE INTEREST OR PLEASURE IN DOING THINGS: NOT AT ALL
2. FEELING DOWN, DEPRESSED OR HOPELESS: NOT AT ALL
SUM OF ALL RESPONSES TO PHQ QUESTIONS 1-9: 0
SUM OF ALL RESPONSES TO PHQ9 QUESTIONS 1 & 2: 0
1. LITTLE INTEREST OR PLEASURE IN DOING THINGS: 0
SUM OF ALL RESPONSES TO PHQ QUESTIONS 1-9: 0
SUM OF ALL RESPONSES TO PHQ QUESTIONS 1-9: 0
SUM OF ALL RESPONSES TO PHQ9 QUESTIONS 1 & 2: 0
2. FEELING DOWN, DEPRESSED OR HOPELESS: 0

## 2023-11-06 ENCOUNTER — OFFICE VISIT (OUTPATIENT)
Dept: FAMILY MEDICINE CLINIC | Age: 73
End: 2023-11-06
Payer: MEDICARE

## 2023-11-06 VITALS
SYSTOLIC BLOOD PRESSURE: 120 MMHG | OXYGEN SATURATION: 100 % | BODY MASS INDEX: 27.59 KG/M2 | HEART RATE: 62 BPM | WEIGHT: 197.1 LBS | HEIGHT: 71 IN | DIASTOLIC BLOOD PRESSURE: 72 MMHG

## 2023-11-06 DIAGNOSIS — G62.9 PERIPHERAL POLYNEUROPATHY: ICD-10-CM

## 2023-11-06 DIAGNOSIS — R21 SKIN RASH: ICD-10-CM

## 2023-11-06 DIAGNOSIS — N52.9 VASCULOGENIC ERECTILE DYSFUNCTION, UNSPECIFIED VASCULOGENIC ERECTILE DYSFUNCTION TYPE: ICD-10-CM

## 2023-11-06 DIAGNOSIS — E11.42 TYPE 2 DIABETES MELLITUS WITH DIABETIC POLYNEUROPATHY, WITHOUT LONG-TERM CURRENT USE OF INSULIN (HCC): ICD-10-CM

## 2023-11-06 DIAGNOSIS — E11.9 TYPE 2 DIABETES MELLITUS WITHOUT COMPLICATION, WITHOUT LONG-TERM CURRENT USE OF INSULIN (HCC): ICD-10-CM

## 2023-11-06 PROCEDURE — 3044F HG A1C LEVEL LT 7.0%: CPT | Performed by: STUDENT IN AN ORGANIZED HEALTH CARE EDUCATION/TRAINING PROGRAM

## 2023-11-06 PROCEDURE — 3074F SYST BP LT 130 MM HG: CPT | Performed by: STUDENT IN AN ORGANIZED HEALTH CARE EDUCATION/TRAINING PROGRAM

## 2023-11-06 PROCEDURE — 1123F ACP DISCUSS/DSCN MKR DOCD: CPT | Performed by: STUDENT IN AN ORGANIZED HEALTH CARE EDUCATION/TRAINING PROGRAM

## 2023-11-06 PROCEDURE — 99213 OFFICE O/P EST LOW 20 MIN: CPT | Performed by: STUDENT IN AN ORGANIZED HEALTH CARE EDUCATION/TRAINING PROGRAM

## 2023-11-06 PROCEDURE — 3078F DIAST BP <80 MM HG: CPT | Performed by: STUDENT IN AN ORGANIZED HEALTH CARE EDUCATION/TRAINING PROGRAM

## 2023-11-06 RX ORDER — GABAPENTIN 300 MG/1
300 CAPSULE ORAL 3 TIMES DAILY
Qty: 270 CAPSULE | Refills: 1 | Status: SHIPPED | OUTPATIENT
Start: 2023-11-06 | End: 2024-05-04

## 2023-11-06 RX ORDER — GLIMEPIRIDE 1 MG/1
TABLET ORAL
Qty: 90 TABLET | Refills: 0 | Status: SHIPPED | OUTPATIENT
Start: 2023-11-06

## 2023-11-06 RX ORDER — SILDENAFIL 100 MG/1
TABLET, FILM COATED ORAL
Qty: 90 TABLET | Refills: 1 | Status: SHIPPED | OUTPATIENT
Start: 2023-11-06

## 2023-11-06 RX ORDER — DULAGLUTIDE 4.5 MG/.5ML
INJECTION, SOLUTION SUBCUTANEOUS
Qty: 12 ADJUSTABLE DOSE PRE-FILLED PEN SYRINGE | Refills: 1 | Status: SHIPPED | OUTPATIENT
Start: 2023-11-06

## 2023-11-06 RX ORDER — CLOBETASOL PROPIONATE 0.5 MG/G
OINTMENT TOPICAL
Qty: 60 G | Refills: 2 | Status: SHIPPED | OUTPATIENT
Start: 2023-11-06

## 2023-11-13 ASSESSMENT — ENCOUNTER SYMPTOMS
WHEEZING: 0
NAUSEA: 0
ABDOMINAL PAIN: 0
SORE THROAT: 0
SHORTNESS OF BREATH: 0

## 2023-11-14 ENCOUNTER — HOSPITAL ENCOUNTER (OUTPATIENT)
Age: 73
Discharge: HOME | End: 2023-11-14
Payer: MEDICARE

## 2023-11-14 LAB
ALBUMIN SERPL-MCNC: 3.9 GM/DL (ref 3.4–5)
ANION GAP SERPL CALCULATED.3IONS-SCNC: 11 MMOL/L (ref 4–16)
BACTERIA: ABNORMAL /HPF
BASOPHILS ABSOLUTE: 0.1 K/CU MM
BASOPHILS RELATIVE PERCENT: 1.2 % (ref 0–1)
BILIRUBIN URINE: NEGATIVE MG/DL
BLOOD, URINE: NEGATIVE
BUN SERPL-MCNC: 50 MG/DL (ref 6–23)
CALCIUM SERPL-MCNC: 9.2 MG/DL (ref 8.3–10.6)
CHLORIDE BLD-SCNC: 101 MMOL/L (ref 99–110)
CLARITY: CLEAR
CO2: 29 MMOL/L (ref 21–32)
COLOR: YELLOW
CREAT SERPL-MCNC: 1.5 MG/DL (ref 0.9–1.3)
CREATININE URINE: 55.7 MG/DL (ref 39–259)
DIFFERENTIAL TYPE: ABNORMAL
EOSINOPHILS ABSOLUTE: 0.4 K/CU MM
EOSINOPHILS RELATIVE PERCENT: 5.9 % (ref 0–3)
GFR SERPL CREATININE-BSD FRML MDRD: 48 ML/MIN/1.73M2
GLUCOSE SERPL-MCNC: 111 MG/DL (ref 70–99)
GLUCOSE, URINE: 250 MG/DL
HCT VFR BLD CALC: 43.1 % (ref 42–52)
HEMOGLOBIN: 13.2 GM/DL (ref 13.5–18)
HYALINE CASTS: 5 /LPF
IMMATURE NEUTROPHIL %: 0.7 % (ref 0–0.43)
KETONES, URINE: NEGATIVE MG/DL
LEUKOCYTE ESTERASE, URINE: NEGATIVE
LYMPHOCYTES ABSOLUTE: 1.3 K/CU MM
LYMPHOCYTES RELATIVE PERCENT: 22 % (ref 24–44)
MAGNESIUM: 2.3 MG/DL (ref 1.8–2.4)
MCH RBC QN AUTO: 26.1 PG (ref 27–31)
MCHC RBC AUTO-ENTMCNC: 30.6 % (ref 32–36)
MCV RBC AUTO: 85.3 FL (ref 78–100)
MONOCYTES ABSOLUTE: 0.8 K/CU MM
MONOCYTES RELATIVE PERCENT: 12.5 % (ref 0–4)
MUCUS: ABNORMAL HPF
NITRITE URINE, QUANTITATIVE: NEGATIVE
NUCLEATED RBC %: 0 %
PDW BLD-RTO: 14.8 % (ref 11.7–14.9)
PH, URINE: 6 (ref 5–8)
PHOSPHORUS: 4.1 MG/DL (ref 2.5–4.9)
PLATELET # BLD: 136 K/CU MM (ref 140–440)
PMV BLD AUTO: 11.7 FL (ref 7.5–11.1)
POTASSIUM SERPL-SCNC: 4 MMOL/L (ref 3.5–5.1)
PROT/CREAT RATIO, UR: 1.1
PROTEIN UA: 100 MG/DL
RBC # BLD: 5.05 M/CU MM (ref 4.6–6.2)
RBC URINE: <1 /HPF (ref 0–3)
SEGMENTED NEUTROPHILS ABSOLUTE COUNT: 3.5 K/CU MM
SEGMENTED NEUTROPHILS RELATIVE PERCENT: 57.7 % (ref 36–66)
SODIUM BLD-SCNC: 141 MMOL/L (ref 135–145)
SPECIFIC GRAVITY UA: 1.01 (ref 1–1.03)
TOTAL IMMATURE NEUTOROPHIL: 0.04 K/CU MM
TOTAL NUCLEATED RBC: 0 K/CU MM
TRICHOMONAS: ABNORMAL /HPF
URINE TOTAL PROTEIN: 61.7 MG/DL
UROBILINOGEN, URINE: 0.2 MG/DL (ref 0.2–1)
WBC # BLD: 6.1 K/CU MM (ref 4–10.5)
WBC UA: <1 /HPF (ref 0–2)

## 2023-11-14 PROCEDURE — 83735 ASSAY OF MAGNESIUM: CPT

## 2023-11-14 PROCEDURE — 80048 BASIC METABOLIC PNL TOTAL CA: CPT

## 2023-11-14 PROCEDURE — 82040 ASSAY OF SERUM ALBUMIN: CPT

## 2023-11-14 PROCEDURE — 82570 ASSAY OF URINE CREATININE: CPT

## 2023-11-14 PROCEDURE — 81001 URINALYSIS AUTO W/SCOPE: CPT

## 2023-11-14 PROCEDURE — 84100 ASSAY OF PHOSPHORUS: CPT

## 2023-11-14 PROCEDURE — 85025 COMPLETE CBC W/AUTO DIFF WBC: CPT

## 2023-11-14 PROCEDURE — 36415 COLL VENOUS BLD VENIPUNCTURE: CPT

## 2023-11-14 PROCEDURE — 84156 ASSAY OF PROTEIN URINE: CPT

## 2023-12-29 DIAGNOSIS — E11.9 TYPE 2 DIABETES MELLITUS WITHOUT COMPLICATION, WITHOUT LONG-TERM CURRENT USE OF INSULIN (HCC): ICD-10-CM

## 2023-12-29 RX ORDER — GLIMEPIRIDE 1 MG/1
TABLET ORAL
Qty: 90 TABLET | Refills: 0 | Status: SHIPPED | OUTPATIENT
Start: 2023-12-29

## 2024-01-08 ENCOUNTER — PROCEDURE VISIT (OUTPATIENT)
Dept: CARDIOLOGY CLINIC | Age: 74
End: 2024-01-08

## 2024-01-08 ENCOUNTER — TELEPHONE (OUTPATIENT)
Dept: CARDIOLOGY CLINIC | Age: 74
End: 2024-01-08

## 2024-01-08 DIAGNOSIS — Z95.0 CARDIAC PACEMAKER IN SITU: Primary | ICD-10-CM

## 2024-03-02 DIAGNOSIS — E11.9 TYPE 2 DIABETES MELLITUS WITHOUT COMPLICATION, WITHOUT LONG-TERM CURRENT USE OF INSULIN (HCC): ICD-10-CM

## 2024-03-04 ENCOUNTER — TELEPHONE (OUTPATIENT)
Dept: FAMILY MEDICINE CLINIC | Age: 74
End: 2024-03-04

## 2024-03-04 DIAGNOSIS — E11.42 TYPE 2 DIABETES MELLITUS WITH DIABETIC POLYNEUROPATHY, WITHOUT LONG-TERM CURRENT USE OF INSULIN (HCC): Primary | ICD-10-CM

## 2024-03-04 RX ORDER — GLIMEPIRIDE 1 MG/1
TABLET ORAL
Qty: 90 TABLET | Refills: 1 | Status: SHIPPED | OUTPATIENT
Start: 2024-03-04

## 2024-03-04 NOTE — TELEPHONE ENCOUNTER
Re:    Dulaglutide (TRULICITY) 4.5 MG/0.5ML SOPN     Pharmacist said this is on a long back order----they do have:    3 mg    1.5 mg    .75 mg       Please advise

## 2024-03-05 ENCOUNTER — OFFICE VISIT (OUTPATIENT)
Dept: CARDIOLOGY CLINIC | Age: 74
End: 2024-03-05
Payer: MEDICARE

## 2024-03-05 ENCOUNTER — TELEPHONE (OUTPATIENT)
Dept: CARDIOLOGY CLINIC | Age: 74
End: 2024-03-05

## 2024-03-05 VITALS
DIASTOLIC BLOOD PRESSURE: 68 MMHG | OXYGEN SATURATION: 99 % | BODY MASS INDEX: 30.18 KG/M2 | HEIGHT: 71 IN | SYSTOLIC BLOOD PRESSURE: 128 MMHG | HEART RATE: 60 BPM | RESPIRATION RATE: 18 BRPM | WEIGHT: 215.6 LBS

## 2024-03-05 DIAGNOSIS — I50.23 ACUTE ON CHRONIC SYSTOLIC CONGESTIVE HEART FAILURE (HCC): Primary | ICD-10-CM

## 2024-03-05 PROCEDURE — 99213 OFFICE O/P EST LOW 20 MIN: CPT | Performed by: NURSE PRACTITIONER

## 2024-03-05 PROCEDURE — 1036F TOBACCO NON-USER: CPT | Performed by: NURSE PRACTITIONER

## 2024-03-05 PROCEDURE — G8484 FLU IMMUNIZE NO ADMIN: HCPCS | Performed by: NURSE PRACTITIONER

## 2024-03-05 PROCEDURE — 3074F SYST BP LT 130 MM HG: CPT | Performed by: NURSE PRACTITIONER

## 2024-03-05 PROCEDURE — 3078F DIAST BP <80 MM HG: CPT | Performed by: NURSE PRACTITIONER

## 2024-03-05 PROCEDURE — 1123F ACP DISCUSS/DSCN MKR DOCD: CPT | Performed by: NURSE PRACTITIONER

## 2024-03-05 PROCEDURE — G8417 CALC BMI ABV UP PARAM F/U: HCPCS | Performed by: NURSE PRACTITIONER

## 2024-03-05 PROCEDURE — G8427 DOCREV CUR MEDS BY ELIG CLIN: HCPCS | Performed by: NURSE PRACTITIONER

## 2024-03-05 RX ORDER — CARVEDILOL 6.25 MG/1
6.25 TABLET ORAL 2 TIMES DAILY
Qty: 180 TABLET | Refills: 3 | Status: SHIPPED | OUTPATIENT
Start: 2024-03-05

## 2024-03-05 RX ORDER — SACUBITRIL AND VALSARTAN 24; 26 MG/1; MG/1
1 TABLET, FILM COATED ORAL 2 TIMES DAILY
COMMUNITY
Start: 2024-02-05 | End: 2024-03-05

## 2024-03-05 RX ORDER — CARVEDILOL 6.25 MG/1
6.25 TABLET ORAL 2 TIMES DAILY
COMMUNITY
Start: 2024-02-05 | End: 2024-03-05 | Stop reason: SDUPTHER

## 2024-03-05 ASSESSMENT — PATIENT HEALTH QUESTIONNAIRE - PHQ9
SUM OF ALL RESPONSES TO PHQ QUESTIONS 1-9: 0
1. LITTLE INTEREST OR PLEASURE IN DOING THINGS: 0
2. FEELING DOWN, DEPRESSED OR HOPELESS: NOT AT ALL
SUM OF ALL RESPONSES TO PHQ9 QUESTIONS 1 & 2: 0
SUM OF ALL RESPONSES TO PHQ9 QUESTIONS 1 & 2: 0
SUM OF ALL RESPONSES TO PHQ QUESTIONS 1-9: 0
SUM OF ALL RESPONSES TO PHQ QUESTIONS 1-9: 0
2. FEELING DOWN, DEPRESSED OR HOPELESS: 0
SUM OF ALL RESPONSES TO PHQ QUESTIONS 1-9: 0
1. LITTLE INTEREST OR PLEASURE IN DOING THINGS: NOT AT ALL

## 2024-03-05 ASSESSMENT — ENCOUNTER SYMPTOMS
SHORTNESS OF BREATH: 0
ORTHOPNEA: 0

## 2024-03-05 NOTE — PATIENT INSTRUCTIONS
No outreach public draw sites as of 2/13/2024.  Please be informed that if you contact our office outside of normal business hours the physician on call cannot help with any scheduling or rescheduling issues, procedure instruction questions or any type of medication issue.    We advise you for any urgent/emergency that you go to the nearest emergency room!    PLEASE CALL OUR OFFICE DURING NORMAL BUSINESS HOURS    Monday - Friday   8 am to 5 pm    [place]: [phone]    [place]: [phone]    [place]:  [phone]  Thank you for allowing us to care for you today!   We want to ensure we can follow your treatment plan and we strive to give you the best outcomes and experience possible.   If you ever have a life threatening emergency and call 911 - for an ambulance (EMS)   Our providers can only care for you at:   [facility] or [facility].   Even if you have someone take you or you drive yourself we can only care for you in a [facility] facility. Our providers are not setup at the other healthcare locations!   We are committed to providing you the best care possible.    If you receive a survey after visiting one of our offices, please take time to share your experience concerning your physician office visit.  These surveys are confidential and no health information about you is shared.    We are eager to improve for you and we are counting on your feedback to help make that happen.

## 2024-03-05 NOTE — PROGRESS NOTES
3/5/2024  Primary cardiologist: Dr. Farias    CC:   Mayo  is an established 74 y.o.  male here for a follow-up from hospital      SUBJECTIVE/OBJECTIVE:  Mayo is a 74 y.o. male with a history of coronary artery disease, PCI, CABG, valvular heart disease s/p AVR, carotid artery disease s/p left CEA, PVD fib pacemaker, CKD and diabetes mellitus.     In in 2021 Channing had CABG  x 2 with LIMA to the LAD and vein graft to the RCA with AVR with a #27 Medtronic Mosaic bioprosthetic valve aortic root enlargement with CorMatrix patch, LOU clip and Maze procedure       HPI :   Mayo reports recently in [place] and was admitted to the hospital for edema and weight gain. States his legs were significantly swollen along with significant abdominal bloating.  He states even had to go up a pant size because of his abdominal bloating.  He tells me he underwent a heart catheterization and reported no stents were placed.  He also underwent a paracentesis.    Unfortunately do not have his medical records from there hence we will retry.   He continues to have bilateral lower extremity edema up to his ankles.  His weight is also up about 10 pounds from his baseline.  He was started short of breath with walking from the parking lot into the office today      Review of Systems   Constitutional: Positive for malaise/fatigue. Negative for diaphoresis.   Cardiovascular:  Positive for dyspnea on exertion and leg swelling. Negative for chest pain, claudication, irregular heartbeat, near-syncope, orthopnea, palpitations and paroxysmal nocturnal dyspnea.   Respiratory:  Negative for shortness of breath.    Neurological:  Negative for dizziness and light-headedness.       Vitals:    03/05/24 1002   BP: 128/68   Site: Left Upper Arm   Position: Sitting   Cuff Size: Medium Adult   Pulse: 60   Resp: 18   SpO2: 99%   Weight: 97.8 kg (215 lb 9.6 oz)   Height: 1.803 m (5' 11\")     Wt Readings from Last 3 Encounters:   03/05/24 97.8 kg (215 lb 9.6 oz)

## 2024-03-07 ENCOUNTER — APPOINTMENT (OUTPATIENT)
Dept: GENERAL RADIOLOGY | Age: 74
End: 2024-03-07
Payer: MEDICARE

## 2024-03-07 ENCOUNTER — OFFICE VISIT (OUTPATIENT)
Dept: FAMILY MEDICINE CLINIC | Age: 74
End: 2024-03-07
Payer: MEDICARE

## 2024-03-07 ENCOUNTER — HOSPITAL ENCOUNTER (EMERGENCY)
Age: 74
Discharge: HOME | End: 2024-03-07
Attending: EMERGENCY MEDICINE
Payer: MEDICARE

## 2024-03-07 ENCOUNTER — APPOINTMENT (OUTPATIENT)
Dept: CT IMAGING | Age: 74
End: 2024-03-07
Payer: MEDICARE

## 2024-03-07 VITALS
SYSTOLIC BLOOD PRESSURE: 132 MMHG | HEART RATE: 65 BPM | DIASTOLIC BLOOD PRESSURE: 72 MMHG | WEIGHT: 208.5 LBS | BODY MASS INDEX: 29.19 KG/M2 | HEIGHT: 71 IN | OXYGEN SATURATION: 96 %

## 2024-03-07 VITALS
RESPIRATION RATE: 14 BRPM | DIASTOLIC BLOOD PRESSURE: 56 MMHG | SYSTOLIC BLOOD PRESSURE: 115 MMHG | HEART RATE: 70 BPM | TEMPERATURE: 97.8 F | OXYGEN SATURATION: 96 %

## 2024-03-07 DIAGNOSIS — D68.69 SECONDARY HYPERCOAGULABLE STATE (HCC): ICD-10-CM

## 2024-03-07 DIAGNOSIS — M10.9 GOUT, UNSPECIFIED CAUSE, UNSPECIFIED CHRONICITY, UNSPECIFIED SITE: ICD-10-CM

## 2024-03-07 DIAGNOSIS — E11.42 TYPE 2 DIABETES MELLITUS WITH DIABETIC POLYNEUROPATHY, WITHOUT LONG-TERM CURRENT USE OF INSULIN (HCC): ICD-10-CM

## 2024-03-07 DIAGNOSIS — R18.8 OTHER ASCITES: ICD-10-CM

## 2024-03-07 DIAGNOSIS — N17.9 AKI (ACUTE KIDNEY INJURY) (HCC): Primary | ICD-10-CM

## 2024-03-07 DIAGNOSIS — R39.12 WEAK URINE STREAM: ICD-10-CM

## 2024-03-07 DIAGNOSIS — R79.89 ELEVATED TROPONIN: ICD-10-CM

## 2024-03-07 DIAGNOSIS — D64.9 ANEMIA, UNSPECIFIED TYPE: ICD-10-CM

## 2024-03-07 DIAGNOSIS — R14.0 BLOATING SYMPTOM: Primary | ICD-10-CM

## 2024-03-07 DIAGNOSIS — I48.0 PAF (PAROXYSMAL ATRIAL FIBRILLATION) (HCC): ICD-10-CM

## 2024-03-07 DIAGNOSIS — R42 DIZZINESS: ICD-10-CM

## 2024-03-07 DIAGNOSIS — T14.8XXA SUBCUTANEOUS HEMATOMA: ICD-10-CM

## 2024-03-07 DIAGNOSIS — Z91.81 HISTORY OF FALL: ICD-10-CM

## 2024-03-07 LAB
ALBUMIN SERPL-MCNC: 4.2 G/DL (ref 3.4–5)
ALBUMIN SERPL-MCNC: 4.2 GM/DL (ref 3.4–5)
ALBUMIN/GLOB SERPL: 1.4 {RATIO} (ref 1.1–2.2)
ALP BLD-CCNC: 188 IU/L (ref 40–129)
ALP SERPL-CCNC: 172 U/L (ref 40–129)
ALT SERPL-CCNC: 12 U/L (ref 10–40)
ALT SERPL-CCNC: 13 U/L (ref 10–40)
ANION GAP SERPL CALCULATED.3IONS-SCNC: 15 MMOL/L (ref 3–16)
ANION GAP SERPL CALCULATED.3IONS-SCNC: 16 MMOL/L (ref 7–16)
AST SERPL-CCNC: 20 U/L (ref 15–37)
AST SERPL-CCNC: 21 IU/L (ref 15–37)
BACTERIA: NEGATIVE /HPF
BASOPHILS # BLD: 0.1 K/UL (ref 0–0.2)
BASOPHILS ABSOLUTE: 0.1 K/CU MM
BASOPHILS NFR BLD: 1.1 %
BASOPHILS RELATIVE PERCENT: 1 % (ref 0–1)
BILIRUB SERPL-MCNC: 1 MG/DL (ref 0–1)
BILIRUB SERPL-MCNC: 1 MG/DL (ref 0–1)
BILIRUBIN URINE: NEGATIVE MG/DL
BLOOD, URINE: NEGATIVE
BUN SERPL-MCNC: 82 MG/DL (ref 6–23)
BUN SERPL-MCNC: 92 MG/DL (ref 7–20)
CALCIUM SERPL-MCNC: 9 MG/DL (ref 8.3–10.6)
CALCIUM SERPL-MCNC: 9.1 MG/DL (ref 8.3–10.6)
CHLORIDE BLD-SCNC: 93 MMOL/L (ref 99–110)
CHLORIDE SERPL-SCNC: 99 MMOL/L (ref 99–110)
CLARITY: CLEAR
CO2 SERPL-SCNC: 24 MMOL/L (ref 21–32)
CO2: 26 MMOL/L (ref 21–32)
COLOR: YELLOW
CREAT SERPL-MCNC: 2.2 MG/DL (ref 0.9–1.3)
CREAT SERPL-MCNC: 2.4 MG/DL (ref 0.8–1.3)
DEPRECATED RDW RBC AUTO: 20 % (ref 12.4–15.4)
DIFFERENTIAL TYPE: ABNORMAL
EOSINOPHIL # BLD: 0.5 K/UL (ref 0–0.6)
EOSINOPHIL NFR BLD: 8.8 %
EOSINOPHILS ABSOLUTE: 0.6 K/CU MM
EOSINOPHILS RELATIVE PERCENT: 9.8 % (ref 0–3)
GFR SERPL CREATININE-BSD FRML MDRD: 30 ML/MIN/1.73M2
GFR SERPLBLD CREATININE-BSD FMLA CKD-EPI: 27 ML/MIN/{1.73_M2}
GLUCOSE SERPL-MCNC: 125 MG/DL (ref 70–99)
GLUCOSE SERPL-MCNC: 242 MG/DL (ref 70–99)
GLUCOSE, URINE: 250 MG/DL
HCT VFR BLD AUTO: 28.5 % (ref 40.5–52.5)
HCT VFR BLD CALC: 32.1 % (ref 42–52)
HEMOGLOBIN: 9.9 GM/DL (ref 13.5–18)
HGB BLD-MCNC: 9.5 G/DL (ref 13.5–17.5)
IMMATURE NEUTROPHIL %: 0.2 % (ref 0–0.43)
KETONES, URINE: NEGATIVE MG/DL
LEUKOCYTE ESTERASE, URINE: NEGATIVE
LYMPHOCYTES # BLD: 0.9 K/UL (ref 1–5.1)
LYMPHOCYTES ABSOLUTE: 1.1 K/CU MM
LYMPHOCYTES NFR BLD: 17.2 %
LYMPHOCYTES RELATIVE PERCENT: 17.7 % (ref 24–44)
MCH RBC QN AUTO: 25 PG (ref 27–31)
MCH RBC QN AUTO: 25.6 PG (ref 26–34)
MCHC RBC AUTO-ENTMCNC: 30.8 % (ref 32–36)
MCHC RBC AUTO-ENTMCNC: 33.3 G/DL (ref 31–36)
MCV RBC AUTO: 76.9 FL (ref 80–100)
MCV RBC AUTO: 81.1 FL (ref 78–100)
MONOCYTES # BLD: 0.7 K/UL (ref 0–1.3)
MONOCYTES ABSOLUTE: 0.8 K/CU MM
MONOCYTES NFR BLD: 12.8 %
MONOCYTES RELATIVE PERCENT: 13.6 % (ref 0–4)
NEUTROPHILS # BLD: 3.2 K/UL (ref 1.7–7.7)
NEUTROPHILS NFR BLD: 60.1 %
NITRITE URINE, QUANTITATIVE: NEGATIVE
NUCLEATED RBC %: 0 %
PDW BLD-RTO: 19.9 % (ref 11.7–14.9)
PH, URINE: 6.5 (ref 5–8)
PLATELET # BLD AUTO: 141 K/UL (ref 135–450)
PLATELET # BLD: 166 K/CU MM (ref 140–440)
PMV BLD AUTO: 11.4 FL (ref 7.5–11.1)
PMV BLD AUTO: 9.1 FL (ref 5–10.5)
POTASSIUM SERPL-SCNC: 4.3 MMOL/L (ref 3.5–5.1)
POTASSIUM SERPL-SCNC: 4.4 MMOL/L (ref 3.5–5.1)
PRO-BNP: 2198 PG/ML
PROT SERPL-MCNC: 7.3 G/DL (ref 6.4–8.2)
PROTEIN UA: ABNORMAL MG/DL
PSA SERPL DL<=0.01 NG/ML-MCNC: 0.38 NG/ML (ref 0–4)
RBC # BLD AUTO: 3.7 M/UL (ref 4.2–5.9)
RBC # BLD: 3.96 M/CU MM (ref 4.6–6.2)
RBC URINE: <1 /HPF (ref 0–3)
SEGMENTED NEUTROPHILS ABSOLUTE COUNT: 3.4 K/CU MM
SEGMENTED NEUTROPHILS RELATIVE PERCENT: 57.7 % (ref 36–66)
SODIUM BLD-SCNC: 135 MMOL/L (ref 135–145)
SODIUM SERPL-SCNC: 138 MMOL/L (ref 136–145)
SPECIFIC GRAVITY UA: 1.01 (ref 1–1.03)
TOTAL IMMATURE NEUTOROPHIL: 0.01 K/CU MM
TOTAL NUCLEATED RBC: 0 K/CU MM
TOTAL PROTEIN: 8 GM/DL (ref 6.4–8.2)
TRICHOMONAS: NORMAL /HPF
TROPONIN, HIGH SENSITIVITY: 76 NG/L (ref 0–22)
URATE SERPL-MCNC: 12.3 MG/DL (ref 3.5–7.2)
UROBILINOGEN, URINE: 0.2 MG/DL (ref 0.2–1)
WBC # BLD AUTO: 5.3 K/UL (ref 4–11)
WBC # BLD: 5.9 K/CU MM (ref 4–10.5)
WBC UA: <1 /HPF (ref 0–2)

## 2024-03-07 PROCEDURE — 3075F SYST BP GE 130 - 139MM HG: CPT | Performed by: STUDENT IN AN ORGANIZED HEALTH CARE EDUCATION/TRAINING PROGRAM

## 2024-03-07 PROCEDURE — 85025 COMPLETE CBC W/AUTO DIFF WBC: CPT

## 2024-03-07 PROCEDURE — G8484 FLU IMMUNIZE NO ADMIN: HCPCS | Performed by: STUDENT IN AN ORGANIZED HEALTH CARE EDUCATION/TRAINING PROGRAM

## 2024-03-07 PROCEDURE — 93005 ELECTROCARDIOGRAM TRACING: CPT

## 2024-03-07 PROCEDURE — 1123F ACP DISCUSS/DSCN MKR DOCD: CPT | Performed by: STUDENT IN AN ORGANIZED HEALTH CARE EDUCATION/TRAINING PROGRAM

## 2024-03-07 PROCEDURE — 74176 CT ABD & PELVIS W/O CONTRAST: CPT

## 2024-03-07 PROCEDURE — 3078F DIAST BP <80 MM HG: CPT | Performed by: STUDENT IN AN ORGANIZED HEALTH CARE EDUCATION/TRAINING PROGRAM

## 2024-03-07 PROCEDURE — 99214 OFFICE O/P EST MOD 30 MIN: CPT | Performed by: STUDENT IN AN ORGANIZED HEALTH CARE EDUCATION/TRAINING PROGRAM

## 2024-03-07 PROCEDURE — 84484 ASSAY OF TROPONIN QUANT: CPT

## 2024-03-07 PROCEDURE — 83880 ASSAY OF NATRIURETIC PEPTIDE: CPT

## 2024-03-07 PROCEDURE — 1036F TOBACCO NON-USER: CPT | Performed by: STUDENT IN AN ORGANIZED HEALTH CARE EDUCATION/TRAINING PROGRAM

## 2024-03-07 PROCEDURE — G8427 DOCREV CUR MEDS BY ELIG CLIN: HCPCS | Performed by: STUDENT IN AN ORGANIZED HEALTH CARE EDUCATION/TRAINING PROGRAM

## 2024-03-07 PROCEDURE — 81001 URINALYSIS AUTO W/SCOPE: CPT

## 2024-03-07 PROCEDURE — 71045 X-RAY EXAM CHEST 1 VIEW: CPT

## 2024-03-07 PROCEDURE — 80053 COMPREHEN METABOLIC PANEL: CPT

## 2024-03-07 PROCEDURE — 99285 EMERGENCY DEPT VISIT HI MDM: CPT

## 2024-03-07 PROCEDURE — G8417 CALC BMI ABV UP PARAM F/U: HCPCS | Performed by: STUDENT IN AN ORGANIZED HEALTH CARE EDUCATION/TRAINING PROGRAM

## 2024-03-07 RX ORDER — CARVEDILOL 6.25 MG/1
6.25 TABLET ORAL 2 TIMES DAILY
Qty: 180 TABLET | Refills: 3 | OUTPATIENT
Start: 2024-03-07

## 2024-03-07 RX ORDER — SIMETHICONE 80 MG
80 TABLET,CHEWABLE ORAL 4 TIMES DAILY PRN
Qty: 90 TABLET | Refills: 0 | Status: SHIPPED | OUTPATIENT
Start: 2024-03-07 | End: 2024-06-05

## 2024-03-07 ASSESSMENT — ENCOUNTER SYMPTOMS
ABDOMINAL PAIN: 0
SORE THROAT: 0
WHEEZING: 0
SHORTNESS OF BREATH: 0
NAUSEA: 0

## 2024-03-07 NOTE — TELEPHONE ENCOUNTER
Refill/ 90 day- Patient out of medication.  Refilling through [employer].  Can we send a small refill to [facility] on [address] [facility]?

## 2024-03-07 NOTE — PROGRESS NOTES
3/7/2024    Mayo Marrufoton    Chief Complaint   Patient presents with    Bloated     On going for about 2 months, says he was in the hospital in [place] due to the bloating, had to drain fluid, says cardiology is monitoring the swelling and bloating.        HPI  History was obtained from patient.  Mayo is a 74 y.o. male with a PMHx as listed below who presents today for bloating.     They note he is having horrible gas. TTE pending  Regular Bms, admits to starining at times.     Recent admission     1. Bloating symptom    2. Dizziness    3. History of fall    4. Weak urine stream    5. PAF (paroxysmal atrial fibrillation) (HCC)    6. Type 2 diabetes mellitus with diabetic polyneuropathy, without long-term current use of insulin (HCC)    7. Gout, unspecified cause, unspecified chronicity, unspecified site    8. Other ascites    9. Secondary hypercoagulable state (HCC)             REVIEW OF SYMPTOMS    Review of Systems   Constitutional:  Negative for chills and fatigue.   HENT:  Negative for congestion and sore throat.    Respiratory:  Negative for shortness of breath and wheezing.    Cardiovascular:  Negative for chest pain and palpitations.   Gastrointestinal:  Negative for abdominal pain and nausea.   Genitourinary:  Negative for frequency and urgency.   Neurological:  Negative for light-headedness.       PAST MEDICAL HISTORY  Past Medical History:   Diagnosis Date    Arrhythmia     Pacemaker placed aprox 5 years ago for A Fib per patient    Arthritis 12/2013    rt wrist    Atrial fibrillation (HCC)     on Xarelto - Dr. 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

## 2024-03-08 ENCOUNTER — CARE COORDINATION (OUTPATIENT)
Dept: CARE COORDINATION | Age: 74
End: 2024-03-08

## 2024-03-08 LAB
EKG ATRIAL RATE: 61 BPM
EKG DIAGNOSIS: NORMAL
EKG P-R INTERVAL: 182 MS
EKG Q-T INTERVAL: 512 MS
EKG QRS DURATION: 208 MS
EKG QTC CALCULATION (BAZETT): 564 MS
EKG R AXIS: -78 DEGREES
EKG T AXIS: 101 DEGREES
EKG VENTRICULAR RATE: 73 BPM
EST. AVERAGE GLUCOSE BLD GHB EST-MCNC: 151.3 MG/DL
HBA1C MFR BLD: 6.9 %

## 2024-03-08 PROCEDURE — 93010 ELECTROCARDIOGRAM REPORT: CPT | Performed by: INTERNAL MEDICINE

## 2024-03-08 NOTE — ED PROVIDER NOTES
Emergency Department Encounter  [facility] EMERGENCY DEPARTMENT    Patient: Mayo Mejia  MRN: [id number]  : 1950  Date of Evaluation: 3/7/2024  ED Supervising Physician: Bhanu Quintana MD    I independently examined and evaluated Mayo Mejia.    In brief, Mayo Mejia is a 74 y.o. male that presents to the emergency department with worsening renal function in the setting of recently increased torsemide for CHF.  Currently denies any abdominal pain to me or other acute complaints.  Denies recent illness.    Focused exam: Trace pretibial pitting edema bilaterally.  Lung sounds clear bilaterally.  Abdomen is soft and nontender    Brief ED course/MDM: Patient does have increased creatinine here but no significant electrolyte abnormalities.  States that his volume status is improved with increasing doses of torsemide although this is likely led to his worsening renal function.  CT abdomen/pelvis does not show any evidence of acute abnormality.  Patient had recent fall which is showing as a hematoma of the left hip on his scan.    Case was discussed with nephrology who recommends decreasing torsemide, following up next week for repeat evaluation.  Patient does agree.    12 lead EKG as interpreted by me reveals a paced rhythm. Axis is normal. There are no ischemic ST elevations or other suspicious ST changes;  QRS interval is 208, QT interval is not prolonged. Final interpretation: Paced rhythm. PVCs      I personally saw the patient and made/approved the management plan and take responsibility for the patient management.    All diagnostic, treatment, and disposition decisions were made by myself in conjunction with the JAYE. For all further details of the patient's emergency department visit, please see their documentation.    (Please note that portions of this note may have been completed with a voice recognition program. Efforts were made to edit the dictations but

## 2024-03-08 NOTE — ED PROVIDER NOTES
[facility] EMERGENCY DEPARTMENT  EMERGENCY DEPARTMENT ENCOUNTER        Pt Name: Mayo Mejia  MRN: [id number]  Birthdate 1950  Date of evaluation: 3/7/2024  Provider: JUSTIN John - CNP  PCP: Kaleb Mitchell DO  Note Started: 9:18 PM EST 3/7/24       I have seen and evaluated this patient with my supervising physician Bhanu Quintana MD.      CHIEF COMPLAINT       Chief Complaint   Patient presents with    Abnormal Lab     Routine labs drawn this morning by PCP. Advised his kidney lab was critical.        HISTORY OF PRESENT ILLNESS: 1 or more Elements     History From: Patient and wife    Limitations to history : None    Social Determinants Significantly Affecting Health : None    Chief Complaint: Sent by PCP for abnormal labs    Mayo Mejia is a 74 y.o. male History of A-fib, type 2 diabetes, gout, ascites, CKD stage IIIb, pacemaker, STEVEN, HLD who presents to ED stating he went to the PCP today for increased gas.  Stated he has a history of being in [place] and having a heart catheter done.  She stated while he was down there they had to remove 9 pounds of fluid.  Stated they had to do a paracentesis.  Stated since then his swelling has resolved after they have put him on torsemide 3 times a day.  Denies any chest pain shortness of breath cough or congestion.  Denies any fevers.  States his lower extremity edema has resolved.  Denies any history of liver issues.  Denies any nausea vomiting or diarrhea.  Denies any current abdominal pain.  States sometimes when asked get up to urinate in the middle of the night he has bilateral kidney pain.  Denies any dysuria or hematuria.     Nursing Notes were all reviewed and agreed with or any disagreements were addressed in the HPI.    REVIEW OF SYSTEMS :      Review of Systems    Positives and Pertinent negatives as per HPI.     SURGICAL HISTORY     Past Surgical History:   Procedure Laterality Date    CABG WITH  Anemia, unspecified type    3. Subcutaneous hematoma    4. Elevated troponin          DISPOSITION/PLAN     DISPOSITION Decision To Discharge 03/07/2024 10:36:33 PM      PATIENT REFERRED TO:  Bar Taylor MD  [address] [address]  [phone]    Schedule an appointment as soon as possible for a visit         DISCHARGE MEDICATIONS:  New Prescriptions    No medications on file       DISCONTINUED MEDICATIONS:  Discontinued Medications    No medications on file              (Please note that portions of this note were completed with a voice recognition program.  Efforts were made to edit the dictations but occasionally words are mis-transcribed.)    JUSTIN John CNP (electronically signed)         Bettie Ramirez APRN - CNP  03/07/24 3290

## 2024-03-13 ENCOUNTER — OFFICE VISIT (OUTPATIENT)
Dept: CARDIOLOGY CLINIC | Age: 74
End: 2024-03-13
Payer: MEDICARE

## 2024-03-13 VITALS
HEIGHT: 71 IN | WEIGHT: 199.8 LBS | BODY MASS INDEX: 27.97 KG/M2 | DIASTOLIC BLOOD PRESSURE: 70 MMHG | HEART RATE: 69 BPM | SYSTOLIC BLOOD PRESSURE: 126 MMHG

## 2024-03-13 DIAGNOSIS — I50.23 ACUTE ON CHRONIC SYSTOLIC CONGESTIVE HEART FAILURE (HCC): ICD-10-CM

## 2024-03-13 DIAGNOSIS — I51.9 MODERATE LEFT VENTRICULAR SYSTOLIC DYSFUNCTION: Primary | ICD-10-CM

## 2024-03-13 PROCEDURE — 1123F ACP DISCUSS/DSCN MKR DOCD: CPT | Performed by: INTERNAL MEDICINE

## 2024-03-13 PROCEDURE — G8427 DOCREV CUR MEDS BY ELIG CLIN: HCPCS | Performed by: INTERNAL MEDICINE

## 2024-03-13 PROCEDURE — 1036F TOBACCO NON-USER: CPT | Performed by: INTERNAL MEDICINE

## 2024-03-13 PROCEDURE — G8417 CALC BMI ABV UP PARAM F/U: HCPCS | Performed by: INTERNAL MEDICINE

## 2024-03-13 PROCEDURE — 3074F SYST BP LT 130 MM HG: CPT | Performed by: INTERNAL MEDICINE

## 2024-03-13 PROCEDURE — 3078F DIAST BP <80 MM HG: CPT | Performed by: INTERNAL MEDICINE

## 2024-03-13 PROCEDURE — G8484 FLU IMMUNIZE NO ADMIN: HCPCS | Performed by: INTERNAL MEDICINE

## 2024-03-13 PROCEDURE — 99215 OFFICE O/P EST HI 40 MIN: CPT | Performed by: INTERNAL MEDICINE

## 2024-03-13 PROCEDURE — 93000 ELECTROCARDIOGRAM COMPLETE: CPT | Performed by: INTERNAL MEDICINE

## 2024-03-13 ASSESSMENT — ENCOUNTER SYMPTOMS
PHOTOPHOBIA: 0
WHEEZING: 0
VOMITING: 0
SHORTNESS OF BREATH: 1
COUGH: 0
DIARRHEA: 0
BACK PAIN: 0
COLOR CHANGE: 0
CHEST TIGHTNESS: 0
ABDOMINAL PAIN: 0
EYE PAIN: 0
NAUSEA: 0
CONSTIPATION: 0
BLOOD IN STOOL: 0

## 2024-03-13 NOTE — PROGRESS NOTES
Electrophysiology FU Note    Chief complaint : Shortness of breath with exertion    Referring physician: Dr. Nolan      Primary care physician: Kaleb Mitchell DO      History of Present Illness:     This visit (3/13/2023)    Patient here for BIV pacer or BiV ICD upgrade.  Patient was in [place] and he was noted to have LVEF low and he was admitted with heart failure admission.  Patient then subsequently had a heart cath and did not need any stents.  Patient was told that he may need an upgrade of the device.  Patient now here for assessment for upgrade of the device.  Patient reports shortness of breath with minimal exertion and has been more tired.  Patient feels a little better but still continues to have shortness of breath with exertion.  Patient does not drink or smoke.  Patient denies any chest pain denies dizziness or syncope.    Patient wants to discuss his options.        Previous visit:  (3/11/2022)      Chief Complaint   Patient presents with    Follow-up     pt. here today to discuss PPM battery replacement.  Pt. admits to some SOB, palpitations, and edema. Pt. denies chest pain. Pt. denies any tobacco use. Pt. admits to drinking a few beers weekly at times. Pt. states was exercising regularly before having issue with infection in toe.           Previous visit:    Patient is a 71-year-old male with history of carotid artery disease s/p left carotid endarterectomy, severe aortic stenosis, renal insufficiency, coronary artery disease, diabetes mellitus type 2, s/p pacemaker, hypertension, persistent atrial fibrillation, obstructive sleep apnea on CPAP, hyperlipidemia presented for AVR and maze and two-vessel CABG.  Postprocedure patient atrial lead noted to be not be capturing.  Patient is currently intubated and sedated and is on pressors and unable to give much information so most of the history was obtained from the nurse and CT surgery.  EP was consulted for

## 2024-03-13 NOTE — PATIENT INSTRUCTIONS
Please be informed that if you contact our office outside of normal business hours the physician on call cannot help with any scheduling or rescheduling issues, procedure instruction questions or any type of medication issue.    We advise you for any urgent/emergency that you go to the nearest emergency room!    PLEASE CALL OUR OFFICE DURING NORMAL BUSINESS HOURS    Monday - Friday   8 am to 5 pm    [place]: [phone]    [place]: [phone]    [place]:  [phone]    **It is YOUR responsibilty to bring medication bottles and/or updated medication list to EACH APPOINTMENT. This will allow us to better serve you and all your healthcare needs**    Thank you for allowing us to care for you today!   We want to ensure we can follow your treatment plan and we strive to give you the best outcomes and experience possible.   If you ever have a life threatening emergency and call 911 - for an ambulance (EMS)   Our providers can only care for you at:   [facility] or [facility].   Even if you have someone take you or you drive yourself we can only care for you in a [facility] facility. Our providers are not setup at the other healthcare locations!

## 2024-03-14 ENCOUNTER — TELEPHONE (OUTPATIENT)
Dept: CARDIOTHORACIC SURGERY | Age: 74
End: 2024-03-14

## 2024-03-14 NOTE — H&P (VIEW-ONLY)
stress test 05/12/2014    cardiolite- mild ischemia RCA EF50%    H/O echocardiogram 12/01/2020    EF 55-60% severe aortic stenosis mild to mod aortic regurg mod to severe tricuspid regurg severe pulm htn significant changes since 2018 echo.     H/O right and left heart catheterization 12/10/2020    DIFFUSE LAD DISEASE, Mild ECA Disease, Severe AS, Milf Pul HTN on RHC.    History of blood transfusion 12/2020    d/t anemia    History of transesophageal echocardiography (MABLE) 12/15/2020    Severe aortic stenosis (ANNA by planimetry: 0.778 cm sq).  Mild AR.    [place] (hard of hearing)     hearing tonya aides    Hx of Doppler echocardiogram 05/21/2018    EF 50%  Mild LV hypertrophy. Mildly enlarged RA. Mod aortic valve calcification with mod AS. Mitral annular calcification is present. Mild AR, MR and TR. Mild pulmonary htn.    Hyperlipidemia     Hypertension     Follows with PCP & Dr. Taylor    Other disorders of kidney and ureter     Pacemaker     Medtronic, implanted 2014    Pneumonia 12/29/2012    Pneumonia due to COVID-19 virus 08/2020    Sleep apnea     dx 2013- has c-pap    Type II or unspecified type diabetes mellitus with other specified manifestations, uncontrolled 12/12/2012    Venous hypertension, chronic, with ulcer (HCC) 12/12/2012    resolved       PSHx  Past Surgical History:   Procedure Laterality Date    CABG WITH AORTIC VALVE REPLACEMENT N/A 02/16/2021    CABG CORONARY ARTERY BYPASS X2 WITH LIMA, AORTIC VALVE REPLACEMENT AND AORTIC ROOT REPAIR, INTRAOPERATIVE MABLE, INDUCED HYPOTHERMIA, LEFT LEG ENDOVEIN HARVEST, LEFT ATRIAL CLIP, AND CRYO PROCEDURE performed by Jasmeet Nolan MD at [facility] OR    CARDIOVERSION  12/2014    at [facility]    CAROTID ENDARTERECTOMY Left 01/29/2021    LEFT CAROTID ENDARTERECTOMY performed by Pola Miles MD at [facility] OR    CHOLECYSTECTOMY  2017    COLONOSCOPY  2011    COLONOSCOPY N/A 11/19/2019    COLONOSCOPY DIAGNOSTIC performed by Félix Huddleston MD at [facility] ENDOSCOPY    COLONOSCOPY

## 2024-03-14 NOTE — PROGRESS NOTES
[place] cardiothoracic Surgery     History & Physical    3/19/2024    Patient Name: Mayo Mejia : 1950     ATTENDING PHYSICIAN: Kaleb Mitchell DO     CARDIOLOGIST/REFERRING PHYSICIAN: Zander Fox MD     REASON FOR REFERRAL: Moderate left ventricular systolic dysfunction       CC:SOB       HPI  Mayo Mejia is a 74 y.o. male with PMH of  patient was in [place] and he was noted to have LVEF low and he was admitted with heart failure admission.He had wgt gain and 3+pitting edema.  Patient then subsequently had a heart cath and did not need any stents.  Patient was told that he may need an upgrade of the device.  Patient now here for assessment for upgrade of the device.  Patient reports shortness of breath with minimal exertion and has been more tired.  Patient feels a little better but still continues to have shortness of breath with exertion.  Patient does not drink or smoke.  Patient denies any chest pain denies dizziness or syncope. The patient and his wife was educated on the risk and benefits of the different options, The STS risk were discussed, multiple questions were asked and answered and the patient wishes to proceed with surgical intervention.       PMHx  Past Medical History:   Diagnosis Date    Arrhythmia     Pacemaker placed aprox 5 years ago for A Fib per patient    Arthritis 2013    rt wrist    Atrial fibrillation (HCC)     on Xarelto - Dr. 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

## 2024-03-15 ENCOUNTER — TELEPHONE (OUTPATIENT)
Dept: CARDIOLOGY CLINIC | Age: 74
End: 2024-03-15

## 2024-03-18 ENCOUNTER — HOSPITAL ENCOUNTER (OUTPATIENT)
Dept: ULTRASOUND IMAGING | Age: 74
Discharge: HOME | End: 2024-03-18
Payer: MEDICARE

## 2024-03-18 DIAGNOSIS — R18.8 OTHER ASCITES: ICD-10-CM

## 2024-03-18 PROCEDURE — 76705 ECHO EXAM OF ABDOMEN: CPT

## 2024-03-19 ENCOUNTER — INITIAL CONSULT (OUTPATIENT)
Dept: CARDIOTHORACIC SURGERY | Age: 74
End: 2024-03-19
Payer: MEDICARE

## 2024-03-19 VITALS
HEIGHT: 71 IN | WEIGHT: 194.8 LBS | BODY MASS INDEX: 27.27 KG/M2 | HEART RATE: 70 BPM | DIASTOLIC BLOOD PRESSURE: 62 MMHG | OXYGEN SATURATION: 99 % | SYSTOLIC BLOOD PRESSURE: 128 MMHG

## 2024-03-19 DIAGNOSIS — I50.9 HEART FAILURE, UNSPECIFIED HF CHRONICITY, UNSPECIFIED HEART FAILURE TYPE (HCC): Primary | ICD-10-CM

## 2024-03-19 PROCEDURE — 3078F DIAST BP <80 MM HG: CPT | Performed by: THORACIC SURGERY (CARDIOTHORACIC VASCULAR SURGERY)

## 2024-03-19 PROCEDURE — 3074F SYST BP LT 130 MM HG: CPT | Performed by: THORACIC SURGERY (CARDIOTHORACIC VASCULAR SURGERY)

## 2024-03-19 PROCEDURE — G8417 CALC BMI ABV UP PARAM F/U: HCPCS | Performed by: THORACIC SURGERY (CARDIOTHORACIC VASCULAR SURGERY)

## 2024-03-19 PROCEDURE — G8427 DOCREV CUR MEDS BY ELIG CLIN: HCPCS | Performed by: THORACIC SURGERY (CARDIOTHORACIC VASCULAR SURGERY)

## 2024-03-19 PROCEDURE — G8484 FLU IMMUNIZE NO ADMIN: HCPCS | Performed by: THORACIC SURGERY (CARDIOTHORACIC VASCULAR SURGERY)

## 2024-03-19 PROCEDURE — 99205 OFFICE O/P NEW HI 60 MIN: CPT | Performed by: THORACIC SURGERY (CARDIOTHORACIC VASCULAR SURGERY)

## 2024-03-19 PROCEDURE — 1123F ACP DISCUSS/DSCN MKR DOCD: CPT | Performed by: THORACIC SURGERY (CARDIOTHORACIC VASCULAR SURGERY)

## 2024-03-19 PROCEDURE — 1036F TOBACCO NON-USER: CPT | Performed by: THORACIC SURGERY (CARDIOTHORACIC VASCULAR SURGERY)

## 2024-03-20 ENCOUNTER — TELEPHONE (OUTPATIENT)
Dept: CARDIOTHORACIC SURGERY | Age: 74
End: 2024-03-20

## 2024-03-20 ENCOUNTER — HOSPITAL ENCOUNTER (OUTPATIENT)
Age: 74
Setting detail: SPECIMEN
Discharge: HOME | End: 2024-03-20
Payer: MEDICARE

## 2024-03-20 ENCOUNTER — NURSE ONLY (OUTPATIENT)
Dept: CARDIOLOGY CLINIC | Age: 74
End: 2024-03-20

## 2024-03-20 DIAGNOSIS — I50.23 ACUTE ON CHRONIC SYSTOLIC CONGESTIVE HEART FAILURE (HCC): Primary | ICD-10-CM

## 2024-03-20 PROCEDURE — 80048 BASIC METABOLIC PNL TOTAL CA: CPT

## 2024-03-20 NOTE — TELEPHONE ENCOUNTER
Auth was submitted to [facility] through the [employer] portal, request has been approved from 04/08/24 to 04/10/24 Auth#[id number]

## 2024-03-20 NOTE — TELEPHONE ENCOUNTER
Patient returned call stating he had received vm left for him with dates and times of testing and surgery.

## 2024-03-20 NOTE — TELEPHONE ENCOUNTER
Called left message for patient, advised he would need to be at [facility] 04/01/24 arrive at 8:45am for PAT and CTA will foll at 9:30am , he will need to be NPO 4 hours prior, also advised his surgery is scheduled for 04/8/24 arrive at 5:30am for 7:30am surgery, asked yanique the please call us back and let us know this message was received

## 2024-03-21 ENCOUNTER — TELEPHONE (OUTPATIENT)
Dept: CARDIOTHORACIC SURGERY | Age: 74
End: 2024-03-21

## 2024-03-21 ENCOUNTER — PREP FOR PROCEDURE (OUTPATIENT)
Dept: CARDIOTHORACIC SURGERY | Age: 74
End: 2024-03-21

## 2024-03-21 ENCOUNTER — TELEPHONE (OUTPATIENT)
Dept: CARDIOLOGY CLINIC | Age: 74
End: 2024-03-21

## 2024-03-21 DIAGNOSIS — Z01.818 PREOP EXAMINATION: Primary | ICD-10-CM

## 2024-03-21 DIAGNOSIS — I10 ESSENTIAL HYPERTENSION: Primary | ICD-10-CM

## 2024-03-21 DIAGNOSIS — E87.5 HYPERKALEMIA: Primary | ICD-10-CM

## 2024-03-21 DIAGNOSIS — K55.20 AVM (ARTERIOVENOUS MALFORMATION) OF COLON: ICD-10-CM

## 2024-03-21 LAB
ANION GAP SERPL CALCULATED.3IONS-SCNC: 13 MMOL/L (ref 7–16)
BUN SERPL-MCNC: 65 MG/DL (ref 6–23)
CALCIUM SERPL-MCNC: 9.5 MG/DL (ref 8.3–10.6)
CHLORIDE BLD-SCNC: 100 MMOL/L (ref 99–110)
CO2: 27 MMOL/L (ref 21–32)
CREAT SERPL-MCNC: 2 MG/DL (ref 0.9–1.3)
GFR SERPL CREATININE-BSD FRML MDRD: 34 ML/MIN/1.73M2
GLUCOSE SERPL-MCNC: 130 MG/DL (ref 70–99)
POTASSIUM SERPL-SCNC: 6 MMOL/L (ref 3.5–5.1)
SODIUM BLD-SCNC: 140 MMOL/L (ref 135–145)

## 2024-03-21 RX ORDER — SODIUM CHLORIDE 0.9 % (FLUSH) 0.9 %
5-40 SYRINGE (ML) INJECTION PRN
Status: CANCELLED | OUTPATIENT
Start: 2024-03-21

## 2024-03-21 RX ORDER — TRAMADOL HYDROCHLORIDE 50 MG/1
100 TABLET ORAL ONCE
Status: CANCELLED | OUTPATIENT
Start: 2024-03-21 | End: 2024-03-21

## 2024-03-21 RX ORDER — SODIUM CHLORIDE 0.9 % (FLUSH) 0.9 %
5-40 SYRINGE (ML) INJECTION EVERY 12 HOURS SCHEDULED
Status: CANCELLED | OUTPATIENT
Start: 2024-03-21

## 2024-03-21 RX ORDER — ACETAMINOPHEN 500 MG
1000 TABLET ORAL ONCE
Status: CANCELLED | OUTPATIENT
Start: 2024-03-21 | End: 2024-03-21

## 2024-03-21 RX ORDER — SODIUM CHLORIDE 9 MG/ML
INJECTION, SOLUTION INTRAVENOUS PRN
Status: CANCELLED | OUTPATIENT
Start: 2024-03-21

## 2024-03-21 RX ORDER — GABAPENTIN 100 MG/1
300 CAPSULE ORAL ONCE
Status: CANCELLED | OUTPATIENT
Start: 2024-03-21 | End: 2024-03-21

## 2024-03-21 NOTE — TELEPHONE ENCOUNTER
----- Message from JUSTIN Hernandez - CNP sent at 3/21/2024  1:06 PM EDT -----  Please see my note

## 2024-03-21 NOTE — PROGRESS NOTES
Potassium noted per perfect serve: K 6.0  Reviewed meds with patient - states he is on losartan and Entresto- he is to stop the losartan- hold Entresto today and tomorrow - increase po fluids- take extra 20 mg   Repeat BMP tomorrow

## 2024-03-21 NOTE — TELEPHONE ENCOUNTER
Per RIKKI Walsh STOP losartan - hold entresto today and tomorrow - increase po fluids today - have BMP done tomorrow

## 2024-03-21 NOTE — TELEPHONE ENCOUNTER
Patient said someone called him to stop taking a medication.  He thought they said it ended in LOL.  Please advise.  Patient said you can leave a message on his phone with Medication to stop and Mgs.

## 2024-03-22 ENCOUNTER — NURSE ONLY (OUTPATIENT)
Dept: CARDIOLOGY CLINIC | Age: 74
End: 2024-03-22

## 2024-03-22 ENCOUNTER — TELEPHONE (OUTPATIENT)
Dept: FAMILY MEDICINE CLINIC | Age: 74
End: 2024-03-22

## 2024-03-22 DIAGNOSIS — R16.0 HEPATOMEGALY: Primary | ICD-10-CM

## 2024-03-22 DIAGNOSIS — E87.5 HYPERKALEMIA: ICD-10-CM

## 2024-03-22 NOTE — RESULT ENCOUNTER NOTE
Channing's liver shows enlargement. Given hx. Of elevated LFTs in the past I would recommend GI consult if ok with patient. We can place referrral.

## 2024-03-23 LAB
ANION GAP SERPL CALCULATED.3IONS-SCNC: 14 MMOL/L (ref 3–16)
BUN SERPL-MCNC: 71 MG/DL (ref 7–20)
CALCIUM SERPL-MCNC: 9 MG/DL (ref 8.3–10.6)
CHLORIDE SERPL-SCNC: 98 MMOL/L (ref 99–110)
CO2 SERPL-SCNC: 25 MMOL/L (ref 21–32)
CREAT SERPL-MCNC: 2.7 MG/DL (ref 0.8–1.3)
GFR SERPLBLD CREATININE-BSD FMLA CKD-EPI: 24 ML/MIN/{1.73_M2}
GLUCOSE SERPL-MCNC: 201 MG/DL (ref 70–99)
POTASSIUM SERPL-SCNC: 4.3 MMOL/L (ref 3.5–5.1)
SODIUM SERPL-SCNC: 137 MMOL/L (ref 136–145)

## 2024-03-26 ENCOUNTER — TELEPHONE (OUTPATIENT)
Dept: CARDIOTHORACIC SURGERY | Age: 74
End: 2024-03-26

## 2024-03-26 NOTE — TELEPHONE ENCOUNTER
Pt notified PAT appt was changed to 4/4/24 @8:45am. But CTA is still scheduled for 4/1/24 @9am. Pt voiced understanding.

## 2024-03-27 ENCOUNTER — TELEPHONE (OUTPATIENT)
Dept: GASTROENTEROLOGY | Age: 74
End: 2024-03-27

## 2024-03-27 ENCOUNTER — TELEPHONE (OUTPATIENT)
Dept: CARDIOTHORACIC SURGERY | Age: 74
End: 2024-03-27

## 2024-03-27 ENCOUNTER — HOSPITAL ENCOUNTER (OUTPATIENT)
Age: 74
Discharge: HOME | End: 2024-03-27
Payer: MEDICARE

## 2024-03-27 DIAGNOSIS — Z01.818 PREOP EXAMINATION: ICD-10-CM

## 2024-03-27 LAB
ABO/RH: NORMAL
ANION GAP SERPL CALCULATED.3IONS-SCNC: 10 MMOL/L (ref 7–16)
ANTIBODY SCREEN: NEGATIVE
BACTERIA: ABNORMAL /HPF
BASOPHILS ABSOLUTE: 0.1 K/CU MM
BASOPHILS RELATIVE PERCENT: 1.9 % (ref 0–1)
BILIRUBIN URINE: NEGATIVE MG/DL
BLOOD, URINE: NEGATIVE
BUN SERPL-MCNC: 66 MG/DL (ref 6–23)
CALCIUM SERPL-MCNC: 9.1 MG/DL (ref 8.3–10.6)
CHLORIDE BLD-SCNC: 104 MMOL/L (ref 99–110)
CLARITY: CLEAR
CO2: 26 MMOL/L (ref 21–32)
COLOR: YELLOW
COMMENT: NORMAL
CREAT SERPL-MCNC: 1.9 MG/DL (ref 0.9–1.3)
DIFFERENTIAL TYPE: ABNORMAL
EOSINOPHILS ABSOLUTE: 0.5 K/CU MM
EOSINOPHILS RELATIVE PERCENT: 9.9 % (ref 0–3)
ESTIMATED AVERAGE GLUCOSE: 140 MG/DL
GFR SERPL CREATININE-BSD FRML MDRD: 36 ML/MIN/1.73M2
GLUCOSE SERPL-MCNC: 135 MG/DL (ref 70–99)
GLUCOSE, URINE: 500 MG/DL
HBA1C MFR BLD: 6.5 % (ref 4.2–6.3)
HCT VFR BLD CALC: 35.2 % (ref 42–52)
HEMOGLOBIN: 10.5 GM/DL (ref 13.5–18)
HYALINE CASTS: 2 /LPF
IMMATURE NEUTROPHIL %: 0.2 % (ref 0–0.43)
INR BLD: 1.5 INDEX
KETONES, URINE: NEGATIVE MG/DL
LEUKOCYTE ESTERASE, URINE: NEGATIVE
LYMPHOCYTES ABSOLUTE: 1.2 K/CU MM
LYMPHOCYTES RELATIVE PERCENT: 26.8 % (ref 24–44)
MCH RBC QN AUTO: 25.5 PG (ref 27–31)
MCHC RBC AUTO-ENTMCNC: 29.8 % (ref 32–36)
MCV RBC AUTO: 85.4 FL (ref 78–100)
MONOCYTES ABSOLUTE: 0.5 K/CU MM
MONOCYTES RELATIVE PERCENT: 11.4 % (ref 0–4)
NITRITE URINE, QUANTITATIVE: NEGATIVE
NUCLEATED RBC %: 0 %
PDW BLD-RTO: 19.4 % (ref 11.7–14.9)
PH, URINE: 6.5 (ref 5–8)
PLATELET # BLD: 153 K/CU MM (ref 140–440)
PMV BLD AUTO: 11.5 FL (ref 7.5–11.1)
POTASSIUM SERPL-SCNC: 4.4 MMOL/L (ref 3.5–5.1)
PROTEIN UA: 30 MG/DL
PROTHROMBIN TIME: 18.1 SECONDS (ref 11.7–14.5)
RBC # BLD: 4.12 M/CU MM (ref 4.6–6.2)
RBC URINE: 0 /HPF (ref 0–3)
SEGMENTED NEUTROPHILS ABSOLUTE COUNT: 2.3 K/CU MM
SEGMENTED NEUTROPHILS RELATIVE PERCENT: 49.8 % (ref 36–66)
SODIUM BLD-SCNC: 140 MMOL/L (ref 135–145)
SPECIFIC GRAVITY UA: 1.01 (ref 1–1.03)
TOTAL IMMATURE NEUTOROPHIL: 0.01 K/CU MM
TOTAL NUCLEATED RBC: 0 K/CU MM
TRICHOMONAS: ABNORMAL /HPF
UROBILINOGEN, URINE: 0.2 MG/DL (ref 0.2–1)
WBC # BLD: 4.6 K/CU MM (ref 4–10.5)
WBC UA: <1 /HPF (ref 0–2)

## 2024-03-27 PROCEDURE — 87086 URINE CULTURE/COLONY COUNT: CPT

## 2024-03-27 PROCEDURE — 80048 BASIC METABOLIC PNL TOTAL CA: CPT

## 2024-03-27 PROCEDURE — 81001 URINALYSIS AUTO W/SCOPE: CPT

## 2024-03-27 PROCEDURE — 86850 RBC ANTIBODY SCREEN: CPT

## 2024-03-27 PROCEDURE — 36415 COLL VENOUS BLD VENIPUNCTURE: CPT

## 2024-03-27 PROCEDURE — 85025 COMPLETE CBC W/AUTO DIFF WBC: CPT

## 2024-03-27 PROCEDURE — 85018 HEMOGLOBIN: CPT

## 2024-03-27 PROCEDURE — 86900 BLOOD TYPING SEROLOGIC ABO: CPT

## 2024-03-27 PROCEDURE — 85610 PROTHROMBIN TIME: CPT

## 2024-03-27 PROCEDURE — 86901 BLOOD TYPING SEROLOGIC RH(D): CPT

## 2024-03-27 PROCEDURE — 85014 HEMATOCRIT: CPT

## 2024-03-27 PROCEDURE — 83036 HEMOGLOBIN GLYCOSYLATED A1C: CPT

## 2024-03-27 NOTE — TELEPHONE ENCOUNTER
Patient given instructions over telephone on Left Thoracotomy.  Surgery is scheduled for 4/8/24 @ 0730, w/arrival @ 0530, @ [facility]. Medication/Education Letter gone over with patient. Questions answered, Patient voiced understanding.        Patient was notified that surgery date or time could be changed due to an emergency. Patient voiced understanding.

## 2024-03-28 LAB
CULTURE: NORMAL
Lab: NORMAL
SPECIMEN: NORMAL

## 2024-03-29 ENCOUNTER — TELEPHONE (OUTPATIENT)
Dept: CARDIOLOGY CLINIC | Age: 74
End: 2024-03-29

## 2024-03-29 NOTE — TELEPHONE ENCOUNTER
----- Message from JUSTIN Hernandez - CNP sent at 3/27/2024  5:24 PM EDT -----  K is much better

## 2024-04-01 ENCOUNTER — HOSPITAL ENCOUNTER (OUTPATIENT)
Dept: CT IMAGING | Age: 74
Discharge: HOME | End: 2024-04-01
Payer: MEDICARE

## 2024-04-01 DIAGNOSIS — I50.9 HEART FAILURE, UNSPECIFIED HF CHRONICITY, UNSPECIFIED HEART FAILURE TYPE (HCC): ICD-10-CM

## 2024-04-01 PROCEDURE — 6360000004 HC RX CONTRAST MEDICATION: Performed by: INTERNAL MEDICINE

## 2024-04-01 PROCEDURE — 71275 CT ANGIOGRAPHY CHEST: CPT

## 2024-04-01 RX ADMIN — IOPAMIDOL 75 ML: 755 INJECTION, SOLUTION INTRAVENOUS at 09:29

## 2024-04-03 ENCOUNTER — TELEPHONE (OUTPATIENT)
Dept: GASTROENTEROLOGY | Age: 74
End: 2024-04-03

## 2024-04-03 NOTE — PROGRESS NOTES
Left VM with reminder of pre-testing on 4/4/24 @0845.   Bring insurance card, picture ID and a list of your home medications. Check in at the information desk in the lobby upon your arrival. You can eat and take morning medications.

## 2024-04-04 ENCOUNTER — OFFICE VISIT (OUTPATIENT)
Dept: CARDIOLOGY CLINIC | Age: 74
End: 2024-04-04
Payer: MEDICARE

## 2024-04-04 ENCOUNTER — HOSPITAL ENCOUNTER (OUTPATIENT)
Age: 74
Discharge: HOME | End: 2024-04-06
Payer: MEDICARE

## 2024-04-04 ENCOUNTER — HOSPITAL ENCOUNTER (OUTPATIENT)
Dept: PULMONOLOGY | Age: 74
Discharge: HOME | End: 2024-04-04
Payer: MEDICARE

## 2024-04-04 ENCOUNTER — HOSPITAL ENCOUNTER (OUTPATIENT)
Age: 74
Discharge: HOME | End: 2024-04-04
Payer: MEDICARE

## 2024-04-04 ENCOUNTER — HOSPITAL ENCOUNTER (OUTPATIENT)
Dept: GENERAL RADIOLOGY | Age: 74
Discharge: HOME | End: 2024-04-04
Payer: MEDICARE

## 2024-04-04 ENCOUNTER — HOSPITAL ENCOUNTER (OUTPATIENT)
Dept: PREADMISSION TESTING | Age: 74
Discharge: HOME | End: 2024-04-04
Payer: MEDICARE

## 2024-04-04 VITALS
WEIGHT: 191.4 LBS | DIASTOLIC BLOOD PRESSURE: 82 MMHG | SYSTOLIC BLOOD PRESSURE: 128 MMHG | BODY MASS INDEX: 26.8 KG/M2 | HEART RATE: 66 BPM | HEIGHT: 71 IN

## 2024-04-04 VITALS
HEART RATE: 69 BPM | RESPIRATION RATE: 20 BRPM | DIASTOLIC BLOOD PRESSURE: 62 MMHG | TEMPERATURE: 97.8 F | HEIGHT: 71 IN | BODY MASS INDEX: 26.6 KG/M2 | SYSTOLIC BLOOD PRESSURE: 131 MMHG | WEIGHT: 190 LBS

## 2024-04-04 DIAGNOSIS — L97.909 VENOUS HYPERTENSION, CHRONIC, WITH ULCER (HCC): ICD-10-CM

## 2024-04-04 DIAGNOSIS — Z01.818 PREOP EXAMINATION: ICD-10-CM

## 2024-04-04 DIAGNOSIS — L97.525 DIABETIC ULCER OF TOE OF LEFT FOOT ASSOCIATED WITH TYPE 2 DIABETES MELLITUS, WITH MUSCLE INVOLVEMENT WITHOUT EVIDENCE OF NECROSIS (HCC): ICD-10-CM

## 2024-04-04 DIAGNOSIS — I87.319 VENOUS HYPERTENSION, CHRONIC, WITH ULCER (HCC): ICD-10-CM

## 2024-04-04 DIAGNOSIS — I25.118 ATHEROSCLEROTIC HEART DISEASE OF NATIVE CORONARY ARTERY WITH OTHER FORMS OF ANGINA PECTORIS (HCC): Primary | ICD-10-CM

## 2024-04-04 DIAGNOSIS — I70.229 CRITICAL LOWER LIMB ISCHEMIA (HCC): ICD-10-CM

## 2024-04-04 DIAGNOSIS — E11.621 DIABETIC ULCER OF TOE OF LEFT FOOT ASSOCIATED WITH TYPE 2 DIABETES MELLITUS, WITH MUSCLE INVOLVEMENT WITHOUT EVIDENCE OF NECROSIS (HCC): ICD-10-CM

## 2024-04-04 LAB
ABO/RH: NORMAL
ANION GAP SERPL CALCULATED.3IONS-SCNC: 12 MMOL/L (ref 7–16)
ANTIBODY SCREEN: NEGATIVE
BACTERIA: NEGATIVE /HPF
BASOPHILS ABSOLUTE: 0.1 K/CU MM
BASOPHILS RELATIVE PERCENT: 1.7 % (ref 0–1)
BILIRUBIN URINE: NEGATIVE MG/DL
BLOOD, URINE: NEGATIVE
BUN SERPL-MCNC: 56 MG/DL (ref 6–23)
CALCIUM SERPL-MCNC: 9.1 MG/DL (ref 8.3–10.6)
CHLORIDE BLD-SCNC: 100 MMOL/L (ref 99–110)
CLARITY: CLEAR
CO2: 29 MMOL/L (ref 21–32)
COLOR: YELLOW
COMMENT: NORMAL
COMPONENT: NORMAL
COMPONENT: NORMAL
CREAT SERPL-MCNC: 2 MG/DL (ref 0.9–1.3)
CROSSMATCH RESULT: NORMAL
CROSSMATCH RESULT: NORMAL
DIFFERENTIAL TYPE: ABNORMAL
EOSINOPHILS ABSOLUTE: 0.4 K/CU MM
EOSINOPHILS RELATIVE PERCENT: 8.1 % (ref 0–3)
ESTIMATED AVERAGE GLUCOSE: 134 MG/DL
GFR SERPL CREATININE-BSD FRML MDRD: 34 ML/MIN/1.73M2
GLUCOSE SERPL-MCNC: 158 MG/DL (ref 70–99)
GLUCOSE, URINE: 500 MG/DL
HBA1C MFR BLD: 6.3 % (ref 4.2–6.3)
HCT VFR BLD CALC: 35.5 % (ref 42–52)
HEMOGLOBIN: 10.4 GM/DL (ref 13.5–18)
HYALINE CASTS: 5 /LPF
IMMATURE NEUTROPHIL %: 0.2 % (ref 0–0.43)
INR BLD: 1.2 INDEX
KETONES, URINE: NEGATIVE MG/DL
LEUKOCYTE ESTERASE, URINE: NEGATIVE
LYMPHOCYTES ABSOLUTE: 1.2 K/CU MM
LYMPHOCYTES RELATIVE PERCENT: 23.8 % (ref 24–44)
MCH RBC QN AUTO: 24.8 PG (ref 27–31)
MCHC RBC AUTO-ENTMCNC: 29.3 % (ref 32–36)
MCV RBC AUTO: 84.7 FL (ref 78–100)
MONOCYTES ABSOLUTE: 0.6 K/CU MM
MONOCYTES RELATIVE PERCENT: 12.2 % (ref 0–4)
NITRITE URINE, QUANTITATIVE: NEGATIVE
NUCLEATED RBC %: 0 %
PDW BLD-RTO: 18.2 % (ref 11.7–14.9)
PH, URINE: 5.5 (ref 5–8)
PLATELET # BLD: 160 K/CU MM (ref 140–440)
PMV BLD AUTO: 10.6 FL (ref 7.5–11.1)
POTASSIUM SERPL-SCNC: 4.4 MMOL/L (ref 3.5–5.1)
PROTEIN UA: 30 MG/DL
PROTHROMBIN TIME: 15.7 SECONDS (ref 11.7–14.5)
RBC # BLD: 4.19 M/CU MM (ref 4.6–6.2)
RBC URINE: <1 /HPF (ref 0–3)
SEGMENTED NEUTROPHILS ABSOLUTE COUNT: 2.6 K/CU MM
SEGMENTED NEUTROPHILS RELATIVE PERCENT: 54 % (ref 36–66)
SODIUM BLD-SCNC: 141 MMOL/L (ref 135–145)
SPECIFIC GRAVITY UA: 1.01 (ref 1–1.03)
STATUS: NORMAL
STATUS: NORMAL
TOTAL IMMATURE NEUTOROPHIL: 0.01 K/CU MM
TOTAL NUCLEATED RBC: 0 K/CU MM
TRANSFUSION STATUS: NORMAL
TRANSFUSION STATUS: NORMAL
TRICHOMONAS: NORMAL /HPF
UNIT DIVISION: 0
UNIT DIVISION: 0
UNIT NUMBER: NORMAL
UNIT NUMBER: NORMAL
UROBILINOGEN, URINE: 0.2 MG/DL (ref 0.2–1)
WBC # BLD: 4.8 K/CU MM (ref 4–10.5)
WBC UA: <1 /HPF (ref 0–2)

## 2024-04-04 PROCEDURE — 87086 URINE CULTURE/COLONY COUNT: CPT

## 2024-04-04 PROCEDURE — 93005 ELECTROCARDIOGRAM TRACING: CPT

## 2024-04-04 PROCEDURE — 85018 HEMOGLOBIN: CPT

## 2024-04-04 PROCEDURE — 86901 BLOOD TYPING SEROLOGIC RH(D): CPT

## 2024-04-04 PROCEDURE — 86900 BLOOD TYPING SEROLOGIC ABO: CPT

## 2024-04-04 PROCEDURE — G8427 DOCREV CUR MEDS BY ELIG CLIN: HCPCS | Performed by: INTERNAL MEDICINE

## 2024-04-04 PROCEDURE — G8417 CALC BMI ABV UP PARAM F/U: HCPCS | Performed by: INTERNAL MEDICINE

## 2024-04-04 PROCEDURE — 86850 RBC ANTIBODY SCREEN: CPT

## 2024-04-04 PROCEDURE — 85025 COMPLETE CBC W/AUTO DIFF WBC: CPT

## 2024-04-04 PROCEDURE — 3044F HG A1C LEVEL LT 7.0%: CPT | Performed by: INTERNAL MEDICINE

## 2024-04-04 PROCEDURE — 80048 BASIC METABOLIC PNL TOTAL CA: CPT

## 2024-04-04 PROCEDURE — 3074F SYST BP LT 130 MM HG: CPT | Performed by: INTERNAL MEDICINE

## 2024-04-04 PROCEDURE — 85610 PROTHROMBIN TIME: CPT

## 2024-04-04 PROCEDURE — 3079F DIAST BP 80-89 MM HG: CPT | Performed by: INTERNAL MEDICINE

## 2024-04-04 PROCEDURE — 71046 X-RAY EXAM CHEST 2 VIEWS: CPT

## 2024-04-04 PROCEDURE — 1036F TOBACCO NON-USER: CPT | Performed by: INTERNAL MEDICINE

## 2024-04-04 PROCEDURE — 1123F ACP DISCUSS/DSCN MKR DOCD: CPT | Performed by: INTERNAL MEDICINE

## 2024-04-04 PROCEDURE — 81001 URINALYSIS AUTO W/SCOPE: CPT

## 2024-04-04 PROCEDURE — 83036 HEMOGLOBIN GLYCOSYLATED A1C: CPT

## 2024-04-04 PROCEDURE — 85014 HEMATOCRIT: CPT

## 2024-04-04 PROCEDURE — 94010 BREATHING CAPACITY TEST: CPT

## 2024-04-04 PROCEDURE — 99214 OFFICE O/P EST MOD 30 MIN: CPT | Performed by: INTERNAL MEDICINE

## 2024-04-04 NOTE — PROGRESS NOTES
.Surgery @ [facility] on 4/8/24 you will be called 4/5/24 with times    NOTHING TO EAT OR DRINK AFTER MIDNIGHT DAY OF SURGERY    1. Enter thru the hospital main entrance on day of surgery, check in at the Information Desk. If you arrive prior to 6:00am, enter thru the ER entrance.    2. Follow the directions as prescribed by the doctor for your procedure and medications.         Morning of surgery take: FOLLOW OFFICE INSTRUCTIONS FOR ANY HOME MEDICATIONS          Stop vitamins, supplements and NSAIDS:      3. Check with your Doctor regarding stopping blood thinners and follow their instructions.    4. Do not smoke, vape or use chewing tobacco morning of surgery. Do not drink any alcoholic beverages 24 hours prior to surgery.       This includes NA Beer. No street drugs 7 days prior to surgery.    5. If you have dentures, contacts of glasses they will be removed before going to the OR; please bring a case.    6. Please bring picture ID, insurance card, paperwork from the doctor’s office (H & P, Consent, & card for implantable devices).    7. Take a shower with an antibacterial soap the night before surgery and the morning of surgery. Do not put anything on your skin      After your morning shower.    8. You will need a responsible adult to drive you home and check on you after surgery.

## 2024-04-04 NOTE — PATIENT INSTRUCTIONS
We are committed to providing you the best care possible.    If you receive a survey after visiting one of our offices, please take time to share your experience concerning your physician office visit.  These surveys are confidential and no health information about you is shared.    We are eager to improve for you and we are counting on your feedback to help make that happen.      **It is YOUR responsibilty to bring medication bottles and/or updated medication list to EACH APPOINTMENT. This will allow us to better serve you and all your healthcare needs**  Thank you for allowing us to care for you today!   We want to ensure we can follow your treatment plan and we strive to give you the best outcomes and experience possible.   If you ever have a life threatening emergency and call 911 - for an ambulance (EMS)   Our providers can only care for you at:   [facility] or [facility].   Even if you have someone take you or you drive yourself we can only care for you in a [facility] facility. Our providers are not setup at the other healthcare locations!   Please be informed that if you contact our office outside of normal business hours the physician on call cannot help with any scheduling or rescheduling issues, procedure instruction questions or any type of medication issue.    We advise you for any urgent/emergency that you go to the nearest emergency room!    PLEASE CALL OUR OFFICE DURING NORMAL BUSINESS HOURS    Monday - Friday   8 am to 5 pm    [place]: [phone]    [place]: [phone]    [place]:  [phone]

## 2024-04-04 NOTE — PROGRESS NOTES
Mayo  is a  Established patient  ,74 y.o.   male here for evaluation of the following chief complaint(s):    cad        SUBJECTIVE/OBJECTIVE:  HPI : h/o  Cad, vhd, PPM  now here  Post CABG, doing better has no CP, SOB    Review of Systems    neg    Vitals:    04/04/24 1111   BP: 128/82   Site: Right Upper Arm   Position: Sitting   Cuff Size: Medium Adult   Pulse: 66   Weight: 86.8 kg (191 lb 6.4 oz)   Height: 1.803 m (5' 11\")       /82 (Site: Right Upper Arm, Position: Sitting, Cuff Size: Medium Adult)   Pulse 66   Ht 1.803 m (5' 11\")   Wt 86.8 kg (191 lb 6.4 oz)   BMI 26.69 kg/m²       4/8/2021     1:04 PM   Patient-Reported Vitals   Patient-Reported Weight 185   Patient-Reported Height 5'11\"   Patient-Reported Systolic 114 mmHg   Patient-Reported Diastolic 66 mmHg   Patient-Reported Pulse 67   Patient-Reported Temperature 98.2     Wt Readings from Last 3 Encounters:   04/04/24 86.8 kg (191 lb 6.4 oz)   04/04/24 86.2 kg (190 lb)   03/19/24 88.4 kg (194 lb 12.8 oz)     Body mass index is 26.69 kg/m².    Physical Exam     Neck: JVD  neg    Lungs : clear    Cardio : Si and S2 audilble  no    Ext: edema  no    All pertinent data reviewed      Meds : reviewed         Tests ordered    ETT and tehab    ASSESSMENT/PLAN:               -     CORONARY ARTERY DISEASE:  asymptomatic     All available  tests in chart reviewed. Management discussed .  Testing ordered  no  On aspirin history of CABG we will continue          CAD CABG with LIMA to the LAD and vein graft to the RCA        2/21               CABG CORONARY ARTERY BYPASS X2 WITH LIMA, AORTIC VALVE REPLACEMENT AND AORTIC ROOT REPAIR, INTRAOPERATIVE MABLE, INDUCED HYPOTHERMIA, LEFT LEG ENDOVEIN HARVEST, LEFT ATRIAL CLIP, AND CRYO PROCEDURE   FRIEND to LAD  SVG to RCA  Last cath in [place] , was OK  3/24    -  LIPID MANAGEMENT:  Importance of lipid levels discussed with patient   and patient was given dietary advice. NCEP- ATP III guidelines reviewed with patient.    -

## 2024-04-04 NOTE — PROGRESS NOTES
Bedside Spirometry    FVC               Actual  3.92 ---  94  %Predicted  FEV1             Actual   2.90 --- 90  %Predicted  FEV1/FVC     Actual  74.0 ---  95  %Predicted  FEF 25-75     Actual   2.13 ---  68  %Predicted    Machine Interpretation:   Normal Spirometry

## 2024-04-05 ENCOUNTER — ANESTHESIA EVENT (OUTPATIENT)
Dept: OPERATING ROOM | Age: 74
DRG: 243 | End: 2024-04-05
Payer: MEDICARE

## 2024-04-05 PROBLEM — I50.23 ACUTE ON CHRONIC SYSTOLIC CONGESTIVE HEART FAILURE (HCC): Status: ACTIVE | Noted: 2024-04-05

## 2024-04-05 PROBLEM — E11.22 TYPE 2 DIABETES MELLITUS WITH CHRONIC KIDNEY DISEASE (HCC): Status: ACTIVE | Noted: 2024-04-05

## 2024-04-05 LAB
CULTURE: NORMAL
Lab: NORMAL
SPECIMEN: NORMAL

## 2024-04-06 LAB
EKG ATRIAL RATE: 91 BPM
EKG DIAGNOSIS: NORMAL
EKG Q-T INTERVAL: 542 MS
EKG QRS DURATION: 222 MS
EKG QTC CALCULATION (BAZETT): 585 MS
EKG R AXIS: -86 DEGREES
EKG T AXIS: 86 DEGREES
EKG VENTRICULAR RATE: 70 BPM

## 2024-04-08 ENCOUNTER — APPOINTMENT (OUTPATIENT)
Dept: GENERAL RADIOLOGY | Age: 74
DRG: 243 | End: 2024-04-08
Attending: THORACIC SURGERY (CARDIOTHORACIC VASCULAR SURGERY)
Payer: MEDICARE

## 2024-04-08 ENCOUNTER — ANESTHESIA (OUTPATIENT)
Dept: OPERATING ROOM | Age: 74
DRG: 243 | End: 2024-04-08
Payer: MEDICARE

## 2024-04-08 ENCOUNTER — HOSPITAL ENCOUNTER (INPATIENT)
Age: 74
LOS: 1 days | Discharge: HOME | DRG: 243 | End: 2024-04-09
Attending: THORACIC SURGERY (CARDIOTHORACIC VASCULAR SURGERY) | Admitting: THORACIC SURGERY (CARDIOTHORACIC VASCULAR SURGERY)
Payer: MEDICARE

## 2024-04-08 DIAGNOSIS — Z45.010 PACEMAKER AT END OF BATTERY LIFE: Primary | ICD-10-CM

## 2024-04-08 DIAGNOSIS — T82.110S PACEMAKER LEAD MALFUNCTION, SEQUELA: ICD-10-CM

## 2024-04-08 PROBLEM — I50.9 HEART FAILURE (HCC): Status: ACTIVE | Noted: 2024-04-08

## 2024-04-08 LAB
ANION GAP SERPL CALCULATED.3IONS-SCNC: 12 MMOL/L (ref 7–16)
ANION GAP SERPL CALCULATED.3IONS-SCNC: 12 MMOL/L (ref 7–16)
BASE EXCESS MIXED: 0 (ref 0–3)
BASOPHILS ABSOLUTE: 0.1 K/CU MM
BASOPHILS RELATIVE PERCENT: 1.2 % (ref 0–1)
BUN SERPL-MCNC: 50 MG/DL (ref 6–23)
BUN SERPL-MCNC: 50 MG/DL (ref 6–23)
CALCIUM SERPL-MCNC: 8 MG/DL (ref 8.3–10.6)
CALCIUM SERPL-MCNC: 8.8 MG/DL (ref 8.3–10.6)
CARBON MONOXIDE, BLOOD: 1.2 % (ref 0–5)
CHLORIDE BLD-SCNC: 100 MMOL/L (ref 99–110)
CHLORIDE BLD-SCNC: 97 MMOL/L (ref 99–110)
CO2 CONTENT: 27.9 MMOL/L (ref 21–32)
CO2: 25 MMOL/L (ref 21–32)
CO2: 28 MMOL/L (ref 21–32)
COMMENT: ABNORMAL
CREAT SERPL-MCNC: 1.9 MG/DL (ref 0.9–1.3)
CREAT SERPL-MCNC: 2 MG/DL (ref 0.9–1.3)
DIFFERENTIAL TYPE: ABNORMAL
EKG DIAGNOSIS: NORMAL
EKG P-R INTERVAL: 176 MS
EKG Q-T INTERVAL: 482 MS
EKG QRS DURATION: 178 MS
EKG QTC CALCULATION (BAZETT): 520 MS
EKG R AXIS: -79 DEGREES
EKG T AXIS: -47 DEGREES
EKG VENTRICULAR RATE: 70 BPM
EOSINOPHILS ABSOLUTE: 0.4 K/CU MM
EOSINOPHILS RELATIVE PERCENT: 6.3 % (ref 0–3)
GFR SERPL CREATININE-BSD FRML MDRD: 34 ML/MIN/1.73M2
GFR SERPL CREATININE-BSD FRML MDRD: 36 ML/MIN/1.73M2
GLUCOSE BLD-MCNC: 135 MG/DL (ref 70–99)
GLUCOSE SERPL-MCNC: 151 MG/DL (ref 70–99)
GLUCOSE SERPL-MCNC: 171 MG/DL (ref 70–99)
HCO3 ARTERIAL: 26.4 MMOL/L (ref 21–28)
HCT VFR BLD CALC: 34.4 % (ref 42–52)
HCT VFR BLD CALC: 35.1 % (ref 42–52)
HEMOGLOBIN: 10.3 GM/DL (ref 13.5–18)
HEMOGLOBIN: 10.4 GM/DL (ref 13.5–18)
IMMATURE NEUTROPHIL %: 0.2 % (ref 0–0.43)
INR BLD: 1.2 INDEX
LYMPHOCYTES ABSOLUTE: 1.4 K/CU MM
LYMPHOCYTES RELATIVE PERCENT: 22.8 % (ref 24–44)
MAGNESIUM: 2.3 MG/DL (ref 1.8–2.4)
MCH RBC QN AUTO: 25.2 PG (ref 27–31)
MCH RBC QN AUTO: 25.3 PG (ref 27–31)
MCHC RBC AUTO-ENTMCNC: 29.6 % (ref 32–36)
MCHC RBC AUTO-ENTMCNC: 29.9 % (ref 32–36)
MCV RBC AUTO: 84.1 FL (ref 78–100)
MCV RBC AUTO: 85.4 FL (ref 78–100)
METHEMOGLOBIN ARTERIAL: 0.7 %
MONOCYTES ABSOLUTE: 0.7 K/CU MM
MONOCYTES RELATIVE PERCENT: 12.5 % (ref 0–4)
NUCLEATED RBC %: 0 %
O2 SATURATION: 95.4 % (ref 94–98)
PCO2 ARTERIAL: 49 MMHG (ref 35–48)
PDW BLD-RTO: 17.5 % (ref 11.7–14.9)
PDW BLD-RTO: 17.6 % (ref 11.7–14.9)
PH BLOOD: 7.34 (ref 7.35–7.45)
PHOSPHORUS: 4 MG/DL (ref 2.5–4.9)
PLATELET # BLD: 121 K/CU MM (ref 140–440)
PLATELET # BLD: 141 K/CU MM (ref 140–440)
PMV BLD AUTO: 10.3 FL (ref 7.5–11.1)
PMV BLD AUTO: 10.4 FL (ref 7.5–11.1)
PO2 ARTERIAL: 103 MMHG (ref 83–108)
POTASSIUM SERPL-SCNC: 4.1 MMOL/L (ref 3.5–5.1)
POTASSIUM SERPL-SCNC: 4.7 MMOL/L (ref 3.5–5.1)
PROTHROMBIN TIME: 15.4 SECONDS (ref 11.7–14.5)
RBC # BLD: 4.09 M/CU MM (ref 4.6–6.2)
RBC # BLD: 4.11 M/CU MM (ref 4.6–6.2)
SEGMENTED NEUTROPHILS ABSOLUTE COUNT: 3.4 K/CU MM
SEGMENTED NEUTROPHILS RELATIVE PERCENT: 57 % (ref 36–66)
SODIUM BLD-SCNC: 137 MMOL/L (ref 135–145)
SODIUM BLD-SCNC: 137 MMOL/L (ref 135–145)
TOTAL IMMATURE NEUTOROPHIL: 0.01 K/CU MM
TOTAL NUCLEATED RBC: 0 K/CU MM
WBC # BLD: 5.9 K/CU MM (ref 4–10.5)
WBC # BLD: 6.8 K/CU MM (ref 4–10.5)

## 2024-04-08 PROCEDURE — C2621 PMKR, OTHER THAN SING/DUAL: HCPCS | Performed by: THORACIC SURGERY (CARDIOTHORACIC VASCULAR SURGERY)

## 2024-04-08 PROCEDURE — 2100000000 HC CCU R&B

## 2024-04-08 PROCEDURE — 93010 ELECTROCARDIOGRAM REPORT: CPT | Performed by: INTERNAL MEDICINE

## 2024-04-08 PROCEDURE — 85610 PROTHROMBIN TIME: CPT

## 2024-04-08 PROCEDURE — 2709999900 HC NON-CHARGEABLE SUPPLY: Performed by: THORACIC SURGERY (CARDIOTHORACIC VASCULAR SURGERY)

## 2024-04-08 PROCEDURE — 3E0102A INTRODUCTION OF ANTI-INFECTIVE ENVELOPE INTO SUBCUTANEOUS TISSUE, OPEN APPROACH: ICD-10-PCS | Performed by: THORACIC SURGERY (CARDIOTHORACIC VASCULAR SURGERY)

## 2024-04-08 PROCEDURE — 2580000003 HC RX 258: Performed by: PHYSICIAN ASSISTANT

## 2024-04-08 PROCEDURE — 0JH607Z INSERTION OF CARDIAC RESYNCHRONIZATION PACEMAKER PULSE GENERATOR INTO CHEST SUBCUTANEOUS TISSUE AND FASCIA, OPEN APPROACH: ICD-10-PCS | Performed by: THORACIC SURGERY (CARDIOTHORACIC VASCULAR SURGERY)

## 2024-04-08 PROCEDURE — 3600000016 HC SURGERY LEVEL 6 ADDTL 15MIN: Performed by: THORACIC SURGERY (CARDIOTHORACIC VASCULAR SURGERY)

## 2024-04-08 PROCEDURE — 36600 WITHDRAWAL OF ARTERIAL BLOOD: CPT

## 2024-04-08 PROCEDURE — 80048 BASIC METABOLIC PNL TOTAL CA: CPT

## 2024-04-08 PROCEDURE — C1883 ADAPT/EXT, PACING/NEURO LEAD: HCPCS | Performed by: THORACIC SURGERY (CARDIOTHORACIC VASCULAR SURGERY)

## 2024-04-08 PROCEDURE — 86850 RBC ANTIBODY SCREEN: CPT

## 2024-04-08 PROCEDURE — 99222 1ST HOSP IP/OBS MODERATE 55: CPT | Performed by: INTERNAL MEDICINE

## 2024-04-08 PROCEDURE — APPNB60 APP NON BILLABLE TIME 46-60 MINS: Performed by: NURSE PRACTITIONER

## 2024-04-08 PROCEDURE — 82962 GLUCOSE BLOOD TEST: CPT

## 2024-04-08 PROCEDURE — 6360000002 HC RX W HCPCS: Performed by: PHYSICIAN ASSISTANT

## 2024-04-08 PROCEDURE — 2500000003 HC RX 250 WO HCPCS: Performed by: PHYSICIAN ASSISTANT

## 2024-04-08 PROCEDURE — 86900 BLOOD TYPING SEROLOGIC ABO: CPT

## 2024-04-08 PROCEDURE — 6360000002 HC RX W HCPCS

## 2024-04-08 PROCEDURE — 0W9B30Z DRAINAGE OF LEFT PLEURAL CAVITY WITH DRAINAGE DEVICE, PERCUTANEOUS APPROACH: ICD-10-PCS | Performed by: THORACIC SURGERY (CARDIOTHORACIC VASCULAR SURGERY)

## 2024-04-08 PROCEDURE — 83735 ASSAY OF MAGNESIUM: CPT

## 2024-04-08 PROCEDURE — 6370000000 HC RX 637 (ALT 250 FOR IP): Performed by: PHYSICIAN ASSISTANT

## 2024-04-08 PROCEDURE — 6370000000 HC RX 637 (ALT 250 FOR IP): Performed by: NURSE PRACTITIONER

## 2024-04-08 PROCEDURE — 6360000002 HC RX W HCPCS: Performed by: THORACIC SURGERY (CARDIOTHORACIC VASCULAR SURGERY)

## 2024-04-08 PROCEDURE — 0JPT0PZ REMOVAL OF CARDIAC RHYTHM RELATED DEVICE FROM TRUNK SUBCUTANEOUS TISSUE AND FASCIA, OPEN APPROACH: ICD-10-PCS | Performed by: THORACIC SURGERY (CARDIOTHORACIC VASCULAR SURGERY)

## 2024-04-08 PROCEDURE — 85027 COMPLETE CBC AUTOMATED: CPT

## 2024-04-08 PROCEDURE — C1898 LEAD, PMKR, OTHER THAN TRANS: HCPCS | Performed by: THORACIC SURGERY (CARDIOTHORACIC VASCULAR SURGERY)

## 2024-04-08 PROCEDURE — 86901 BLOOD TYPING SEROLOGIC RH(D): CPT

## 2024-04-08 PROCEDURE — 2720000010 HC SURG SUPPLY STERILE: Performed by: THORACIC SURGERY (CARDIOTHORACIC VASCULAR SURGERY)

## 2024-04-08 PROCEDURE — 85025 COMPLETE CBC W/AUTO DIFF WBC: CPT

## 2024-04-08 PROCEDURE — 2500000003 HC RX 250 WO HCPCS: Performed by: NURSE ANESTHETIST, CERTIFIED REGISTERED

## 2024-04-08 PROCEDURE — 3600000006 HC SURGERY LEVEL 6 BASE: Performed by: THORACIC SURGERY (CARDIOTHORACIC VASCULAR SURGERY)

## 2024-04-08 PROCEDURE — 3700000001 HC ADD 15 MINUTES (ANESTHESIA): Performed by: THORACIC SURGERY (CARDIOTHORACIC VASCULAR SURGERY)

## 2024-04-08 PROCEDURE — 6360000002 HC RX W HCPCS: Performed by: NURSE ANESTHETIST, CERTIFIED REGISTERED

## 2024-04-08 PROCEDURE — 82803 BLOOD GASES ANY COMBINATION: CPT

## 2024-04-08 PROCEDURE — C1889 IMPLANT/INSERT DEVICE, NOC: HCPCS | Performed by: THORACIC SURGERY (CARDIOTHORACIC VASCULAR SURGERY)

## 2024-04-08 PROCEDURE — 2580000003 HC RX 258: Performed by: NURSE PRACTITIONER

## 2024-04-08 PROCEDURE — 81003 URINALYSIS AUTO W/O SCOPE: CPT

## 2024-04-08 PROCEDURE — 93005 ELECTROCARDIOGRAM TRACING: CPT | Performed by: PHYSICIAN ASSISTANT

## 2024-04-08 PROCEDURE — 3700000000 HC ANESTHESIA ATTENDED CARE: Performed by: THORACIC SURGERY (CARDIOTHORACIC VASCULAR SURGERY)

## 2024-04-08 PROCEDURE — 2580000003 HC RX 258: Performed by: ANESTHESIOLOGY

## 2024-04-08 PROCEDURE — 02HN3JZ INSERTION OF PACEMAKER LEAD INTO PERICARDIUM, PERCUTANEOUS APPROACH: ICD-10-PCS | Performed by: THORACIC SURGERY (CARDIOTHORACIC VASCULAR SURGERY)

## 2024-04-08 PROCEDURE — 84100 ASSAY OF PHOSPHORUS: CPT

## 2024-04-08 PROCEDURE — 71045 X-RAY EXAM CHEST 1 VIEW: CPT

## 2024-04-08 DEVICE — IMPLANTABLE DEVICE: Type: IMPLANTABLE DEVICE | Site: HEART | Status: FUNCTIONAL

## 2024-04-08 DEVICE — ENVELOPE PACEMKR M W2.5XL2.7IN ABSRB ANTIBACT TYRX: Type: IMPLANTABLE DEVICE | Site: HEART | Status: FUNCTIONAL

## 2024-04-08 DEVICE — CABLE PACE LNG L12IN CHN A OR V SM CLP EPG TEMP DISP: Type: IMPLANTABLE DEVICE | Site: HEART | Status: FUNCTIONAL

## 2024-04-08 RX ORDER — CARVEDILOL 6.25 MG/1
6.25 TABLET ORAL 2 TIMES DAILY
Status: DISCONTINUED | OUTPATIENT
Start: 2024-04-08 | End: 2024-04-09 | Stop reason: HOSPADM

## 2024-04-08 RX ORDER — CELECOXIB 200 MG/1
200 CAPSULE ORAL 2 TIMES DAILY
Status: DISCONTINUED | OUTPATIENT
Start: 2024-04-10 | End: 2024-04-09 | Stop reason: HOSPADM

## 2024-04-08 RX ORDER — SODIUM CHLORIDE 0.9 % (FLUSH) 0.9 %
5-40 SYRINGE (ML) INJECTION EVERY 12 HOURS SCHEDULED
Status: DISCONTINUED | OUTPATIENT
Start: 2024-04-08 | End: 2024-04-09 | Stop reason: HOSPADM

## 2024-04-08 RX ORDER — PHENYLEPHRINE HCL IN 0.9% NACL 1 MG/10 ML
SYRINGE (ML) INTRAVENOUS PRN
Status: DISCONTINUED | OUTPATIENT
Start: 2024-04-08 | End: 2024-04-08 | Stop reason: SDUPTHER

## 2024-04-08 RX ORDER — POTASSIUM CHLORIDE 7.45 MG/ML
10 INJECTION INTRAVENOUS PRN
Status: DISCONTINUED | OUTPATIENT
Start: 2024-04-08 | End: 2024-04-09 | Stop reason: HOSPADM

## 2024-04-08 RX ORDER — CHLORHEXIDINE GLUCONATE 4 G/100ML
SOLUTION TOPICAL ONCE
Status: COMPLETED | OUTPATIENT
Start: 2024-04-08 | End: 2024-04-08

## 2024-04-08 RX ORDER — HYDRALAZINE HYDROCHLORIDE 20 MG/ML
10 INJECTION INTRAMUSCULAR; INTRAVENOUS
Status: DISCONTINUED | OUTPATIENT
Start: 2024-04-08 | End: 2024-04-08 | Stop reason: SDUPTHER

## 2024-04-08 RX ORDER — GABAPENTIN 300 MG/1
300 CAPSULE ORAL 3 TIMES DAILY
Status: DISCONTINUED | OUTPATIENT
Start: 2024-04-08 | End: 2024-04-09

## 2024-04-08 RX ORDER — ONDANSETRON 4 MG/1
4 TABLET, ORALLY DISINTEGRATING ORAL EVERY 8 HOURS PRN
Status: DISCONTINUED | OUTPATIENT
Start: 2024-04-08 | End: 2024-04-09 | Stop reason: HOSPADM

## 2024-04-08 RX ORDER — SODIUM CHLORIDE 0.9 % (FLUSH) 0.9 %
5-40 SYRINGE (ML) INJECTION PRN
Status: DISCONTINUED | OUTPATIENT
Start: 2024-04-08 | End: 2024-04-09 | Stop reason: HOSPADM

## 2024-04-08 RX ORDER — HYDROMORPHONE HYDROCHLORIDE 1 MG/ML
0.5 INJECTION, SOLUTION INTRAMUSCULAR; INTRAVENOUS; SUBCUTANEOUS
Status: DISCONTINUED | OUTPATIENT
Start: 2024-04-08 | End: 2024-04-09 | Stop reason: HOSPADM

## 2024-04-08 RX ORDER — FENTANYL CITRATE 50 UG/ML
50 INJECTION, SOLUTION INTRAMUSCULAR; INTRAVENOUS EVERY 5 MIN PRN
Status: DISCONTINUED | OUTPATIENT
Start: 2024-04-08 | End: 2024-04-08 | Stop reason: SDUPTHER

## 2024-04-08 RX ORDER — POLYETHYLENE GLYCOL 3350 17 G/17G
17 POWDER, FOR SOLUTION ORAL DAILY
Status: DISCONTINUED | OUTPATIENT
Start: 2024-04-08 | End: 2024-04-09 | Stop reason: HOSPADM

## 2024-04-08 RX ORDER — TRAMADOL HYDROCHLORIDE 50 MG/1
100 TABLET ORAL ONCE
Status: DISCONTINUED | OUTPATIENT
Start: 2024-04-08 | End: 2024-04-08 | Stop reason: HOSPADM

## 2024-04-08 RX ORDER — ACETAMINOPHEN 500 MG
1000 TABLET ORAL EVERY 8 HOURS SCHEDULED
Status: DISCONTINUED | OUTPATIENT
Start: 2024-04-08 | End: 2024-04-09 | Stop reason: HOSPADM

## 2024-04-08 RX ORDER — ACETAMINOPHEN 500 MG
1000 TABLET ORAL ONCE
Status: COMPLETED | OUTPATIENT
Start: 2024-04-08 | End: 2024-04-08

## 2024-04-08 RX ORDER — FAMOTIDINE 10 MG/ML
20 INJECTION, SOLUTION INTRAVENOUS DAILY
Status: DISCONTINUED | OUTPATIENT
Start: 2024-04-08 | End: 2024-04-09 | Stop reason: HOSPADM

## 2024-04-08 RX ORDER — GINSENG 100 MG
CAPSULE ORAL DAILY
Status: DISCONTINUED | OUTPATIENT
Start: 2024-04-08 | End: 2024-04-09 | Stop reason: HOSPADM

## 2024-04-08 RX ORDER — FAMOTIDINE 20 MG/1
20 TABLET, FILM COATED ORAL DAILY
Status: DISCONTINUED | OUTPATIENT
Start: 2024-04-08 | End: 2024-04-09 | Stop reason: HOSPADM

## 2024-04-08 RX ORDER — SODIUM CHLORIDE 0.9 % (FLUSH) 0.9 %
5-40 SYRINGE (ML) INJECTION PRN
Status: DISCONTINUED | OUTPATIENT
Start: 2024-04-08 | End: 2024-04-08 | Stop reason: HOSPADM

## 2024-04-08 RX ORDER — LIDOCAINE HYDROCHLORIDE 20 MG/ML
INJECTION, SOLUTION INTRAVENOUS PRN
Status: DISCONTINUED | OUTPATIENT
Start: 2024-04-08 | End: 2024-04-08 | Stop reason: SDUPTHER

## 2024-04-08 RX ORDER — GABAPENTIN 300 MG/1
300 CAPSULE ORAL ONCE
Status: COMPLETED | OUTPATIENT
Start: 2024-04-08 | End: 2024-04-08

## 2024-04-08 RX ORDER — POTASSIUM CHLORIDE 20 MEQ/1
40 TABLET, EXTENDED RELEASE ORAL PRN
Status: DISCONTINUED | OUTPATIENT
Start: 2024-04-08 | End: 2024-04-09 | Stop reason: HOSPADM

## 2024-04-08 RX ORDER — SODIUM CHLORIDE 9 MG/ML
INJECTION, SOLUTION INTRAVENOUS PRN
Status: DISCONTINUED | OUTPATIENT
Start: 2024-04-08 | End: 2024-04-09 | Stop reason: HOSPADM

## 2024-04-08 RX ORDER — KETOROLAC TROMETHAMINE 30 MG/ML
15 INJECTION, SOLUTION INTRAMUSCULAR; INTRAVENOUS EVERY 6 HOURS
Status: DISCONTINUED | OUTPATIENT
Start: 2024-04-08 | End: 2024-04-09 | Stop reason: HOSPADM

## 2024-04-08 RX ORDER — PROPOFOL 10 MG/ML
INJECTION, EMULSION INTRAVENOUS PRN
Status: DISCONTINUED | OUTPATIENT
Start: 2024-04-08 | End: 2024-04-08 | Stop reason: SDUPTHER

## 2024-04-08 RX ORDER — ENOXAPARIN SODIUM 100 MG/ML
40 INJECTION SUBCUTANEOUS DAILY
Status: DISCONTINUED | OUTPATIENT
Start: 2024-04-08 | End: 2024-04-08

## 2024-04-08 RX ORDER — SODIUM CHLORIDE, SODIUM LACTATE, POTASSIUM CHLORIDE, CALCIUM CHLORIDE 600; 310; 30; 20 MG/100ML; MG/100ML; MG/100ML; MG/100ML
INJECTION, SOLUTION INTRAVENOUS CONTINUOUS
Status: DISCONTINUED | OUTPATIENT
Start: 2024-04-08 | End: 2024-04-09

## 2024-04-08 RX ORDER — LABETALOL HYDROCHLORIDE 5 MG/ML
10 INJECTION, SOLUTION INTRAVENOUS
Status: DISCONTINUED | OUTPATIENT
Start: 2024-04-08 | End: 2024-04-08 | Stop reason: SDUPTHER

## 2024-04-08 RX ORDER — TORSEMIDE 20 MG/1
20 TABLET ORAL 2 TIMES DAILY
Status: DISCONTINUED | OUTPATIENT
Start: 2024-04-08 | End: 2024-04-09 | Stop reason: HOSPADM

## 2024-04-08 RX ORDER — SODIUM CHLORIDE 0.9 % (FLUSH) 0.9 %
5-40 SYRINGE (ML) INJECTION EVERY 12 HOURS SCHEDULED
Status: DISCONTINUED | OUTPATIENT
Start: 2024-04-08 | End: 2024-04-08 | Stop reason: HOSPADM

## 2024-04-08 RX ORDER — DEXAMETHASONE SODIUM PHOSPHATE 4 MG/ML
INJECTION, SOLUTION INTRA-ARTICULAR; INTRALESIONAL; INTRAMUSCULAR; INTRAVENOUS; SOFT TISSUE PRN
Status: DISCONTINUED | OUTPATIENT
Start: 2024-04-08 | End: 2024-04-08 | Stop reason: SDUPTHER

## 2024-04-08 RX ORDER — MAGNESIUM SULFATE IN WATER 40 MG/ML
2000 INJECTION, SOLUTION INTRAVENOUS PRN
Status: DISCONTINUED | OUTPATIENT
Start: 2024-04-08 | End: 2024-04-09 | Stop reason: HOSPADM

## 2024-04-08 RX ORDER — ONDANSETRON 2 MG/ML
INJECTION INTRAMUSCULAR; INTRAVENOUS
Status: COMPLETED
Start: 2024-04-08 | End: 2024-04-08

## 2024-04-08 RX ORDER — ENOXAPARIN SODIUM 100 MG/ML
40 INJECTION SUBCUTANEOUS DAILY
Status: DISCONTINUED | OUTPATIENT
Start: 2024-04-08 | End: 2024-04-09

## 2024-04-08 RX ORDER — SODIUM CHLORIDE 9 MG/ML
INJECTION, SOLUTION INTRAVENOUS PRN
Status: DISCONTINUED | OUTPATIENT
Start: 2024-04-08 | End: 2024-04-08 | Stop reason: HOSPADM

## 2024-04-08 RX ORDER — DROPERIDOL 2.5 MG/ML
0.62 INJECTION, SOLUTION INTRAMUSCULAR; INTRAVENOUS
Status: DISCONTINUED | OUTPATIENT
Start: 2024-04-08 | End: 2024-04-08 | Stop reason: SDUPTHER

## 2024-04-08 RX ORDER — BUPIVACAINE HYDROCHLORIDE 5 MG/ML
INJECTION, SOLUTION PERINEURAL
Status: COMPLETED | OUTPATIENT
Start: 2024-04-08 | End: 2024-04-08

## 2024-04-08 RX ORDER — OXYCODONE HYDROCHLORIDE 5 MG/1
5 TABLET ORAL
Status: DISCONTINUED | OUTPATIENT
Start: 2024-04-08 | End: 2024-04-08 | Stop reason: SDUPTHER

## 2024-04-08 RX ORDER — ONDANSETRON 2 MG/ML
4 INJECTION INTRAMUSCULAR; INTRAVENOUS EVERY 6 HOURS PRN
Status: DISCONTINUED | OUTPATIENT
Start: 2024-04-08 | End: 2024-04-09 | Stop reason: HOSPADM

## 2024-04-08 RX ORDER — ONDANSETRON 2 MG/ML
INJECTION INTRAMUSCULAR; INTRAVENOUS PRN
Status: DISCONTINUED | OUTPATIENT
Start: 2024-04-08 | End: 2024-04-08 | Stop reason: SDUPTHER

## 2024-04-08 RX ORDER — MAGNESIUM SULFATE IN WATER 40 MG/ML
2000 INJECTION, SOLUTION INTRAVENOUS PRN
Status: DISCONTINUED | OUTPATIENT
Start: 2024-04-08 | End: 2024-04-08 | Stop reason: SDUPTHER

## 2024-04-08 RX ORDER — NALOXONE HYDROCHLORIDE 0.4 MG/ML
INJECTION, SOLUTION INTRAMUSCULAR; INTRAVENOUS; SUBCUTANEOUS PRN
Status: DISCONTINUED | OUTPATIENT
Start: 2024-04-08 | End: 2024-04-09 | Stop reason: HOSPADM

## 2024-04-08 RX ORDER — TRAMADOL HYDROCHLORIDE 50 MG/1
50 TABLET ORAL EVERY 6 HOURS PRN
Status: DISCONTINUED | OUTPATIENT
Start: 2024-04-08 | End: 2024-04-09 | Stop reason: HOSPADM

## 2024-04-08 RX ORDER — ONDANSETRON 2 MG/ML
4 INJECTION INTRAMUSCULAR; INTRAVENOUS
Status: DISCONTINUED | OUTPATIENT
Start: 2024-04-08 | End: 2024-04-08 | Stop reason: SDUPTHER

## 2024-04-08 RX ORDER — FENTANYL CITRATE 50 UG/ML
INJECTION, SOLUTION INTRAMUSCULAR; INTRAVENOUS PRN
Status: DISCONTINUED | OUTPATIENT
Start: 2024-04-08 | End: 2024-04-08 | Stop reason: SDUPTHER

## 2024-04-08 RX ORDER — SODIUM CHLORIDE 9 MG/ML
INJECTION, SOLUTION INTRAVENOUS PRN
Status: DISCONTINUED | OUTPATIENT
Start: 2024-04-08 | End: 2024-04-08 | Stop reason: SDUPTHER

## 2024-04-08 RX ORDER — ALBUTEROL SULFATE 90 UG/1
2 AEROSOL, METERED RESPIRATORY (INHALATION) EVERY 6 HOURS PRN
Status: DISCONTINUED | OUTPATIENT
Start: 2024-04-08 | End: 2024-04-09 | Stop reason: HOSPADM

## 2024-04-08 RX ORDER — ROCURONIUM BROMIDE 10 MG/ML
INJECTION, SOLUTION INTRAVENOUS PRN
Status: DISCONTINUED | OUTPATIENT
Start: 2024-04-08 | End: 2024-04-08 | Stop reason: SDUPTHER

## 2024-04-08 RX ADMIN — TRAMADOL HYDROCHLORIDE 50 MG: 50 TABLET ORAL at 14:15

## 2024-04-08 RX ADMIN — GABAPENTIN 300 MG: 300 CAPSULE ORAL at 21:06

## 2024-04-08 RX ADMIN — SODIUM CHLORIDE, PRESERVATIVE FREE 10 ML: 5 INJECTION INTRAVENOUS at 11:01

## 2024-04-08 RX ADMIN — LIDOCAINE HYDROCHLORIDE 100 MG: 20 INJECTION, SOLUTION INTRAVENOUS at 07:54

## 2024-04-08 RX ADMIN — SODIUM CHLORIDE, PRESERVATIVE FREE 10 ML: 5 INJECTION INTRAVENOUS at 23:14

## 2024-04-08 RX ADMIN — CHLORHEXIDINE GLUCONATE 118 ML: 4 SOLUTION TOPICAL at 06:54

## 2024-04-08 RX ADMIN — GABAPENTIN 300 MG: 300 CAPSULE ORAL at 06:55

## 2024-04-08 RX ADMIN — FAMOTIDINE 20 MG: 10 INJECTION, SOLUTION INTRAVENOUS at 10:50

## 2024-04-08 RX ADMIN — ONDANSETRON 4 MG: 2 INJECTION INTRAMUSCULAR; INTRAVENOUS at 10:26

## 2024-04-08 RX ADMIN — ROCURONIUM BROMIDE 10 MG: 10 INJECTION INTRAVENOUS at 07:55

## 2024-04-08 RX ADMIN — DEXAMETHASONE SODIUM PHOSPHATE 4 MG: 4 INJECTION, SOLUTION INTRAMUSCULAR; INTRAVENOUS at 08:15

## 2024-04-08 RX ADMIN — SUGAMMADEX 200 MG: 100 INJECTION, SOLUTION INTRAVENOUS at 09:47

## 2024-04-08 RX ADMIN — ACETAMINOPHEN 1000 MG: 500 TABLET ORAL at 06:55

## 2024-04-08 RX ADMIN — ENOXAPARIN SODIUM 40 MG: 100 INJECTION SUBCUTANEOUS at 21:06

## 2024-04-08 RX ADMIN — Medication 100 MCG: at 08:16

## 2024-04-08 RX ADMIN — KETOROLAC TROMETHAMINE 15 MG: 30 INJECTION, SOLUTION INTRAMUSCULAR; INTRAVENOUS at 16:39

## 2024-04-08 RX ADMIN — SODIUM CHLORIDE: 9 INJECTION, SOLUTION INTRAVENOUS at 06:55

## 2024-04-08 RX ADMIN — ACETAMINOPHEN 1000 MG: 500 TABLET ORAL at 14:15

## 2024-04-08 RX ADMIN — SODIUM CHLORIDE, POTASSIUM CHLORIDE, SODIUM LACTATE AND CALCIUM CHLORIDE: 600; 310; 30; 20 INJECTION, SOLUTION INTRAVENOUS at 11:00

## 2024-04-08 RX ADMIN — PROPOFOL 150 MG: 10 INJECTION, EMULSION INTRAVENOUS at 07:54

## 2024-04-08 RX ADMIN — MUPIROCIN: 20 OINTMENT TOPICAL at 06:57

## 2024-04-08 RX ADMIN — GABAPENTIN 300 MG: 300 CAPSULE ORAL at 16:39

## 2024-04-08 RX ADMIN — HYDROMORPHONE HYDROCHLORIDE 0.5 MG: 1 INJECTION, SOLUTION INTRAMUSCULAR; INTRAVENOUS; SUBCUTANEOUS at 11:29

## 2024-04-08 RX ADMIN — KETOROLAC TROMETHAMINE 15 MG: 30 INJECTION, SOLUTION INTRAMUSCULAR; INTRAVENOUS at 10:50

## 2024-04-08 RX ADMIN — ONDANSETRON 4 MG: 2 INJECTION INTRAMUSCULAR; INTRAVENOUS at 08:15

## 2024-04-08 RX ADMIN — FENTANYL CITRATE 100 MCG: 50 INJECTION, SOLUTION INTRAMUSCULAR; INTRAVENOUS at 07:54

## 2024-04-08 ASSESSMENT — ENCOUNTER SYMPTOMS
ABDOMINAL PAIN: 0
ABDOMINAL DISTENTION: 0
BACK PAIN: 0
SHORTNESS OF BREATH: 1
SINUS PAIN: 0
SHORTNESS OF BREATH: 1
SINUS PRESSURE: 0
COLOR CHANGE: 0
PHOTOPHOBIA: 0
COUGH: 0

## 2024-04-08 ASSESSMENT — PAIN DESCRIPTION - LOCATION
LOCATION: SHOULDER;CHEST;INCISION
LOCATION: INCISION;SHOULDER
LOCATION: INCISION
LOCATION: SHOULDER

## 2024-04-08 ASSESSMENT — PAIN - FUNCTIONAL ASSESSMENT: PAIN_FUNCTIONAL_ASSESSMENT: 0-10

## 2024-04-08 ASSESSMENT — PAIN DESCRIPTION - ORIENTATION
ORIENTATION: LEFT

## 2024-04-08 ASSESSMENT — PAIN DESCRIPTION - DESCRIPTORS
DESCRIPTORS: ACHING

## 2024-04-08 ASSESSMENT — PAIN SCALES - GENERAL
PAINLEVEL_OUTOF10: 10
PAINLEVEL_OUTOF10: 2
PAINLEVEL_OUTOF10: 5
PAINLEVEL_OUTOF10: 4
PAINLEVEL_OUTOF10: 10

## 2024-04-08 NOTE — INTERVAL H&P NOTE
Update History & Physical    The patient's History and Physical of March 19, 2024 was reviewed with the patient and I examined the patient. There was no change. The surgical site was confirmed by the patient and me.     Plan: The risks, benefits, expected outcome, and alternative to the recommended procedure have been discussed with the patient. Patient understands and wants to proceed with the procedure.     Electronically signed by Khari Lainez MD on 4/8/2024 at 7:37 AM

## 2024-04-08 NOTE — ANESTHESIA POSTPROCEDURE EVALUATION
Department of Anesthesiology  Postprocedure Note    Patient: Mayo Mejia  MRN: [id number]  YOB: 1950  Date of evaluation: 4/8/2024    Procedure Summary       Date: 04/08/24 Room / Location: [address]    Anesthesia Start: 0745 Anesthesia Stop: 1009    Procedures:       LEFT THORACOTOMY (Left)      EPICARDIAL LEAD PLACEMENT Diagnosis:       Heart failure, unspecified HF chronicity, unspecified heart failure type (HCC)      (Heart failure, unspecified HF chronicity, unspecified heart failure type (HCC) [I50.9])    Surgeons: Khari Lainez MD Responsible Provider: Nahum Robison MD    Anesthesia Type: General ASA Status: 4            Anesthesia Type: General    Sanju Phase I: Sanju Score: 10    Sanju Phase II:      Anesthesia Post Evaluation    Patient location during evaluation: ICU  Patient participation: complete - patient participated  Level of consciousness: sleepy but conscious  Pain score: 2  Nausea & Vomiting: no nausea and no vomiting  Cardiovascular status: blood pressure returned to baseline  Respiratory status: acceptable and face mask  Hydration status: stable  Multimodal analgesia pain management approach  Pain management: adequate    No notable events documented.

## 2024-04-08 NOTE — PROGRESS NOTES
RENAL DOSE ADJUSTMENT MADE PER P/T PROTOCOL    PREVIOUS ORDER:  Pepcid 20 mg BID    Estimated Creatinine Clearance: 33 mL/min (A) (based on SCr of 2 mg/dL (H)).  Recent Labs     04/08/24  0619   BUN 50*   CREATININE 2.0*      INR 1.2     NEW RENALLY ADJUSTED ORDER:  Pepcid 20 mg QDAY    Toña Savage RPH  4/8/2024 10:13 AM

## 2024-04-08 NOTE — PROGRESS NOTES
04/08/24 0650   Encounter Summary   Encounter Overview/Reason  Pre-Op   Service Provided For: Family   Referral/Consult From: [facility]   Support System Spouse   Last Encounter  04/08/24  (Companioned with spouse (pre-op).  Good support in place. People of se. No other needs at this time. Family informed how to contact .)   Complexity of Encounter Low   Begin Time 0620   End Time  0625   Total Time Calculated 5 min   Spiritual/Emotional needs   Type Spiritual Support   Assessment/Intervention/Outcome   Assessment Calm;Hopeful   Intervention Active listening;Nurtured Hope;Prayer (assurance of)/[place];Sustaining Presence/Ministry of presence   Outcome Comfort;Engaged in conversation;Expressed Gratitude   Plan and Referrals   Plan/Referrals Continue Support (comment)  (Support as needed)

## 2024-04-08 NOTE — ANESTHESIA PRE PROCEDURE
Cardiovascular:  Exercise tolerance: poor (<4 METS)  (+) hypertension:, angina:, pacemaker:, CAD:, CABG/stent:, dysrhythmias: atrial fibrillation, hyperlipidemia        Rhythm: regular  Rate: normal                 ROS comment: Summary   This is a limited echo to assess EF and RVSP.   Left ventricular function and size is normal, EF is estimated at 55-60%.   Normally functioning prosthetic valve in aortic position with a mean   gradient of 11mmHg, AT 61msec.   Moderate mitral regurgitation is present.   Moderate tricuspid regurgitation.   moderate pulmonary hypertension with RVSP of 56mmHg.   No evidence of any pericardial effusion.      Signature      ------------------------------------------------------------------   Electronically signed by Brie Farias MD   (Interpreting physician) on 03/04/2022 at 12:58 PM   ------------------------------------------------------------------    Pulmonary hypertension, unspecified (HCC)    AV dual-paced rhythm   Abnormal ECG   When compared with ECG of 01-MAR-2021 15:03,   Electronic ventricular pacemaker has replaced Electronic atrial pacemaker   Vent. rate has increased BY  29 BPM   Confirmed by PEDRO RIGGINS MD ([id number]) on 4/14/2022 2:32:07 PM      Neuro/Psych:   (+) neuromuscular disease:            GI/Hepatic/Renal:   (+) renal disease: CRI          Endo/Other:    (+) DiabetesType II DM, blood dyscrasia (eliquis held >3d; has continued asa 81): anticoagulation therapy:..                 Abdominal: normal exam            Vascular:   + PVD, aortic or cerebral.       Other Findings:         Anesthesia Plan      general and regional     ASA 4       Induction: intravenous.  arterial line    Anesthetic plan and risks discussed with patient.    Use of blood products discussed with patient whom consented to blood products.    Plan discussed with CRNA.    Attending anesthesiologist reviewed and agrees with Preprocedure content              Nahum Robison MD

## 2024-04-08 NOTE — PROGRESS NOTES
Epicardial LV lead placement  Moderate LV systolic dysfunction  Pacemaker induced cardiomyopathy  Complete heart block sp pacemaker  History of RA lead extraction in 2021 and placement of lead  Aortic stenosis s/p AVR  Cad sp post CABG -on aspirin  PAF sp maze procedure  HTN - BP controlled  DM-2  PAF on Eliquis - held for procedure. Start when cleared from surgical stand point  HLD on simvastatin   DMITRIY on CPAP    Patient still very sedated and in mild pain but patient over all stable  Patient is having BIV pacing  LV lead thresholds are good.   Will follow along  Continue medications as tolerated    Full note to follow

## 2024-04-08 NOTE — BRIEF OP NOTE
Brief Postoperative Note      Patient: Mayo Mejia  YOB: 1950  MRN: [id number]    Date of Procedure: 4/8/2024    Pre-Op Diagnosis Codes:     * Heart failure, unspecified HF chronicity, unspecified heart failure type (HCC) [I50.9]    Post-Op Diagnosis: Same       Procedure(s):  LEFT THORACOTOMY  EPICARDIAL LEAD PLACEMENT    Surgeon(s):  Khari Lainez MD    Assistant:  Surgical Assistant: Dede Peterson    Anesthesia: General    Estimated Blood Loss (mL): Minimal    Complications: None    Specimens:   * No specimens in log *    Implants:  Implant Name Type Inv. Item Serial No.  Lot No. LRB No. Used Action   LEAD SHYAM EPICARDIAL SCREW-IN - [id number] ICD:Lead LEAD SHYAM EPICARDIAL SCREW-IN  [employer]-PMM [id number] N/A 1 Implanted   LEAD SHYAM EPICARDIAL SCREW-IN - [id number] ICD:Lead LEAD SHYAM EPICARDIAL SCREW-IN  [employer]-PMM [id number] N/A 1 Implanted   CABLE PACE LNG L12IN CHN A OR V SM CLP EPG TEMP DISP - [id number] Cardiac Lead CABLE PACE LNG L12IN CHN A OR V SM CLP EPG TEMP DISP  [employer] CARDIAC RTHYM MGT-WD [id number] N/A 1 Implanted   PACEMAKER CARD 30GM [id number] D11MM TI POLYUR LORI RUB - [id number] Cardiac PPM/CRT-P PACEMAKER CARD 30GM W46.2VP07LE D11MM TI POLYUR LORI RUB [id number] [employer] CARDIAC RTHYM MGT-WD  N/A 1 Implanted   ENVELOPE PACEMKR M W2.5XL2.7IN ABSRB ANTIBACT TYRX - [id number]  ENVELOPE PACEMKR M W2.5XL2.7IN ABSRB ANTIBACT TYRX  [employer] CARDIAC RTHYM MGT-WD [id number] N/A 1 Implanted   ADAPTER LD L17CM 2 UPLR RECPT IS-1 CONN LORI INSUL - [id number] Cardiac Lead ADAPTER LD L17CM 2 UPLR RECPT IS-1 CONN LORI INSUL [id number] [employer] CARDIAC RTHYM MGT-WD  N/A 1 Implanted         Drains:   Chest Tube Left Mediastinal (Active)   Chest Tube Airleak No 04/08/24 1200   Status Continuous Suction 04/08/24 1200   Suction -20 cm H2O 04/08/24 1200   Y Connector Used No 04/08/24 1200   Drainage Description Dark red 04/08/24 1200   Dressing Status Clean,

## 2024-04-08 NOTE — ANESTHESIA PROCEDURE NOTES
Arterial Line:    An arterial line was placed using surface landmarks, in the OR for the following indication(s): continuous blood pressure monitoring and blood sampling needed.    A 20 gauge (size), 1 and 3/8 inch (length), Arrow (type) catheter was placed, Seldinger technique not used, into the left radial artery, secured by tape.  Anesthesia type: Local  Local infiltration: None    Events:  patient tolerated procedure well with no complications and EBL < 5mL.4/8/2024 8:07 AM4/8/2024 8:09 AM  Resident/CRNA: Wes Keita APRN - CRNA  Performed: Resident/CRNA   Preanesthetic Checklist  Completed: patient identified, IV checked, site marked, risks and benefits discussed, surgical/procedural consents, equipment checked, pre-op evaluation, timeout performed, anesthesia consent given, oxygen available, monitors applied/VS acknowledged, fire risk safety assessment completed and verbalized and blood product R/B/A discussed and consented

## 2024-04-09 ENCOUNTER — TELEPHONE (OUTPATIENT)
Dept: CARDIOTHORACIC SURGERY | Age: 74
End: 2024-04-09

## 2024-04-09 ENCOUNTER — APPOINTMENT (OUTPATIENT)
Dept: GENERAL RADIOLOGY | Age: 74
DRG: 243 | End: 2024-04-09
Attending: THORACIC SURGERY (CARDIOTHORACIC VASCULAR SURGERY)
Payer: MEDICARE

## 2024-04-09 VITALS
HEART RATE: 73 BPM | WEIGHT: 195.6 LBS | RESPIRATION RATE: 23 BRPM | BODY MASS INDEX: 27.28 KG/M2 | TEMPERATURE: 98 F | OXYGEN SATURATION: 100 % | SYSTOLIC BLOOD PRESSURE: 92 MMHG | DIASTOLIC BLOOD PRESSURE: 67 MMHG

## 2024-04-09 LAB
ANION GAP SERPL CALCULATED.3IONS-SCNC: 13 MMOL/L (ref 7–16)
BUN SERPL-MCNC: 57 MG/DL (ref 6–23)
CALCIUM SERPL-MCNC: 8.2 MG/DL (ref 8.3–10.6)
CHLORIDE BLD-SCNC: 99 MMOL/L (ref 99–110)
CO2: 23 MMOL/L (ref 21–32)
CREAT SERPL-MCNC: 2.1 MG/DL (ref 0.9–1.3)
GFR SERPL CREATININE-BSD FRML MDRD: 32 ML/MIN/1.73M2
GLUCOSE SERPL-MCNC: 191 MG/DL (ref 70–99)
HCT VFR BLD CALC: 30.9 % (ref 42–52)
HEMOGLOBIN: 9.2 GM/DL (ref 13.5–18)
INR BLD: 1.3 INDEX
MAGNESIUM: 2.5 MG/DL (ref 1.8–2.4)
MCH RBC QN AUTO: 25.3 PG (ref 27–31)
MCHC RBC AUTO-ENTMCNC: 29.8 % (ref 32–36)
MCV RBC AUTO: 84.9 FL (ref 78–100)
PDW BLD-RTO: 17.6 % (ref 11.7–14.9)
PHOSPHORUS: 4.3 MG/DL (ref 2.5–4.9)
PLATELET # BLD: 129 K/CU MM (ref 140–440)
PMV BLD AUTO: 11.6 FL (ref 7.5–11.1)
POTASSIUM SERPL-SCNC: 4.7 MMOL/L (ref 3.5–5.1)
PROTHROMBIN TIME: 17 SECONDS (ref 11.7–14.5)
RBC # BLD: 3.64 M/CU MM (ref 4.6–6.2)
SODIUM BLD-SCNC: 135 MMOL/L (ref 135–145)
WBC # BLD: 6.6 K/CU MM (ref 4–10.5)

## 2024-04-09 PROCEDURE — 84100 ASSAY OF PHOSPHORUS: CPT

## 2024-04-09 PROCEDURE — 80048 BASIC METABOLIC PNL TOTAL CA: CPT

## 2024-04-09 PROCEDURE — 83735 ASSAY OF MAGNESIUM: CPT

## 2024-04-09 PROCEDURE — 85610 PROTHROMBIN TIME: CPT

## 2024-04-09 PROCEDURE — 33214 UPGRADE OF PACEMAKER SYSTEM: CPT | Performed by: THORACIC SURGERY (CARDIOTHORACIC VASCULAR SURGERY)

## 2024-04-09 PROCEDURE — 6370000000 HC RX 637 (ALT 250 FOR IP): Performed by: PHYSICIAN ASSISTANT

## 2024-04-09 PROCEDURE — 2580000003 HC RX 258: Performed by: PHYSICIAN ASSISTANT

## 2024-04-09 PROCEDURE — 71046 X-RAY EXAM CHEST 2 VIEWS: CPT

## 2024-04-09 PROCEDURE — 97161 PT EVAL LOW COMPLEX 20 MIN: CPT

## 2024-04-09 PROCEDURE — 99222 1ST HOSP IP/OBS MODERATE 55: CPT | Performed by: INTERNAL MEDICINE

## 2024-04-09 PROCEDURE — 97535 SELF CARE MNGMENT TRAINING: CPT

## 2024-04-09 PROCEDURE — 6360000002 HC RX W HCPCS: Performed by: PHYSICIAN ASSISTANT

## 2024-04-09 PROCEDURE — 85027 COMPLETE CBC AUTOMATED: CPT

## 2024-04-09 PROCEDURE — 97166 OT EVAL MOD COMPLEX 45 MIN: CPT

## 2024-04-09 PROCEDURE — 71045 X-RAY EXAM CHEST 1 VIEW: CPT

## 2024-04-09 RX ORDER — GABAPENTIN 100 MG/1
200 CAPSULE ORAL 3 TIMES DAILY
Status: DISCONTINUED | OUTPATIENT
Start: 2024-04-09 | End: 2024-04-09 | Stop reason: HOSPADM

## 2024-04-09 RX ORDER — TRAMADOL HYDROCHLORIDE 50 MG/1
50 TABLET ORAL EVERY 6 HOURS PRN
Qty: 10 TABLET | Refills: 0 | Status: SHIPPED | OUTPATIENT
Start: 2024-04-09 | End: 2024-04-16

## 2024-04-09 RX ADMIN — POLYETHYLENE GLYCOL 3350 17 G: 17 POWDER, FOR SOLUTION ORAL at 09:54

## 2024-04-09 RX ADMIN — SODIUM CHLORIDE, PRESERVATIVE FREE 10 ML: 5 INJECTION INTRAVENOUS at 09:54

## 2024-04-09 RX ADMIN — GABAPENTIN 200 MG: 100 CAPSULE ORAL at 13:53

## 2024-04-09 RX ADMIN — ENOXAPARIN SODIUM 40 MG: 100 INJECTION SUBCUTANEOUS at 09:52

## 2024-04-09 RX ADMIN — GABAPENTIN 200 MG: 100 CAPSULE ORAL at 09:52

## 2024-04-09 RX ADMIN — FAMOTIDINE 20 MG: 20 TABLET ORAL at 09:52

## 2024-04-09 RX ADMIN — ACETAMINOPHEN 1000 MG: 500 TABLET ORAL at 13:53

## 2024-04-09 ASSESSMENT — ENCOUNTER SYMPTOMS
WHEEZING: 0
EYE PAIN: 0
SHORTNESS OF BREATH: 1
DIARRHEA: 0
VOMITING: 0
PHOTOPHOBIA: 0
COUGH: 0
BLOOD IN STOOL: 0
CHEST TIGHTNESS: 0
ABDOMINAL PAIN: 0
NAUSEA: 0
CONSTIPATION: 0
BACK PAIN: 0
COLOR CHANGE: 0

## 2024-04-09 ASSESSMENT — PAIN SCALES - GENERAL: PAINLEVEL_OUTOF10: 1

## 2024-04-09 NOTE — PROGRESS NOTES
Occupational Therapy    [facility] ACUTE CARE OCCUPATIONAL THERAPY EVALUATION    Mayo Mejia, 1950, 2006/2006-A, 4/9/2024    Discharge Recommendation: Home with initial 24 hour supervision/assistance    History:  [place]:  There were no encounter diagnoses.    Subjective:  Patient states: \"I'm a big golfer!\"  Pain: Pt denied pain this date  Communication with other providers: PT KIERA Valdez  Restrictions: General Precautions, Fall Risk, Chest Tube, Telemetry, Pulse Ox, BP cuff, Bed/chair alarm    Home Setup/Prior level of function:  Social/Functional History  Lives With: Spouse (available 24/7)  Type of Home: House  Home Layout: One level (+ basement but does not need to go down there)  Home Access: Stairs to enter with rails  Entrance Stairs - Number of Steps: 2  Bathroom Shower/Tub: Walk-in shower  Bathroom Toilet: Handicap height  Bathroom Equipment: Grab bars in shower, Built-in shower seat  Home Equipment: Cane, Rolling Walker  ADL Assistance: Independent  Homemaking Assistance: Independent  Ambulation Assistance: Independent (uses no AD)  Transfer Assistance: Independent  Active : Yes  Occupation: Retired  Type of occupation: [employer]  Leisure & Hobbies: Golf     Examination:  Observation: Pt in bathroom upon OT arrival with RN present. Pleasant and agreeable to evaluation.  Vision: WFL  Hearing: Slightly [place] (has BL hearing aids)  Vitals: Stable vitals throughout session    Body Systems and functions:  ROM: WFL all joints in BL UEs  Strength: WFL all major muscle groups BL UEs  Sensation: WFL in BL UEs (See PT evaluation for LE assessment)  Tone: Normal  Coordination: WNL  Perception: WNL    Activities of Daily Living (ADLs):  Feeding: Independent   Grooming: SBA (completed hand hygiene and facial hygiene in standing at sink; min cues for safe body positioning during tasks)  UB bathing: SBA   LB bathing: Min A (for thoroughly reaching distal BL LEs)  UB dressing: SBA

## 2024-04-09 NOTE — CARE COORDINATION
04/09/24 0900   Service Assessment   Patient Orientation Alert and Oriented   Cognition Alert   History Provided By Medical Record   Support Systems Spouse/Significant Other   Patient's Healthcare Decision Maker is: Legal Next of Kin   PCP Verified by CM Yes   Prior Functional Level Independent in ADLs/IADLs   Current Functional Level Assistance with the following:;Bathing;Toileting;Mobility  (Hospital policy)   Can patient return to prior living arrangement Yes   Ability to make needs known: Good   Family able to assist with home care needs: Yes   Would you like for me to discuss the discharge plan with any other family members/significant others, and if so, who? Yes   Financial Resources Medicare   Community Resources None   Social/Functional History   Lives With Spouse   Type of Home House   Active  Yes   Mode of Transportation Car     Discussed with Aparna COVARRUBIAS. Patient is from home; IPA. Has a PCP and insurance that assists with Rx when needed.  Plan is home; no needs. Kimberly Robison RN

## 2024-04-09 NOTE — DISCHARGE SUMMARY
Cardiothoracic and Vascular Surgery Discharge Summary  Today's Date: 24  Name:  Mayo Mejia /Age/Sex: 1950  (74 y.o. male)   MRN & CSN:  [id number] & [id number] Admission Date/Time: 2024  5:55 AM   Location:  A PCP: Kaleb Mitchell DO       Admit date: 2024   Discharge date: 2024      Admitting Provider: Khari Lainez MD   Discharge Provider: Aparna Fuentes PA-C     Discharge Diagnoses:   Active Hospital Problems    Diagnosis Date Noted    Heart failure (HCC) [I50.9] 2024     Discharged Condition: stable.    Hospital Course:    OPERATION 24  Left anterior thoracotomy.  Epicardial ventricular lead placement X2  Pacemaker pocket revision and removal of single-chamber pacemaker  Implantation of new Bi-Ventricular pacemaker (S# [id number])      Patient was admitted for above procedure, taken to PACU post-operatively. Extubated in PACU and transitioned to NC. Chest tubes to suction and Able to be removed on POD 1. [employer] rep interrogated pacemaker and it is functioning well. PT/OT evaluated patient and is recommending be discharged to Home    Patient is doing well today and is ready for DC.     Consults: cardiology    VS at discharge   Vitals:    24 1215   BP: 92/67   Pulse: 73   Resp: 23   Temp:    SpO2: 100%       Physical Exam     GEN:  WDWN, NAD, pleasant and conversational  Neck:  supple, no carotid bruits, no JVD  ENT: ZAINA  Chest: equal excursion  Lung:  CTAB, chest tube in place this morning. Removed without complication. Dry dressing placed.  Heart: paced  Abd: soft, NT,ND, +BS  Ext: normal ROM, no BLE edema  Neuro: no focal deficits  Skin: Warm and dry, no rashes or lesions      Disposition: home    Patient Instructions:      Medication List        START taking these medications      traMADol 50 MG tablet  Commonly known as: ULTRAM  Take 1 tablet by mouth every 6 hours as needed for Pain for up to 10 doses. Max Daily Amount: 200 mg

## 2024-04-09 NOTE — CONSULTS
Name:  Mayo Mejia /Age/Sex: 1950  (74 y.o. male)   MRN & CSN:  [id number] & [id number] Admission Date/Time: 2024  5:55 AM   Location:  -A PCP: Kaleb Mitchell DO       Hospital Day: 1          Referring physician:  Khari Lainez MD         Reason for consultation: Post epicardial lead placement management        Thanks for referral.    Information source: Chart/staff    CC; for epicardial lead placement      HPI:   Thank you for involving me in taking  care of Mayo Mejia who  is a 74 y.o.year  Old male  Presents with history of CAD, hypertension, hyperlipidemia, HFrEF, here for LV lead   had lead extraction earlier by EP and now here for LV lead                  Past medical history:    has a past medical history of Arrhythmia, Arthritis, Atrial fibrillation (HCC), CAD (coronary artery disease), Chronic kidney disease, stage III (moderate) (HCC), Critical illness myopathy, Diabetes mellitus (HCC), Diabetic neuropathy associated with type 2 diabetes mellitus (HCC), Erythropoietin deficiency anemia, Gout, H/O 24 hour EKG monitoring, H/O cardiovascular stress test, H/O echocardiogram, H/O right and left heart catheterization, Heart failure (HCC), History of blood transfusion, History of transesophageal echocardiography (MABLE), [place] (hard of hearing), Hx of Doppler echocardiogram, Hyperlipidemia, Hypertension, Other disorders of kidney and ureter, Pacemaker, Pneumonia, Pneumonia due to COVID-19 virus, Sleep apnea, Type II or unspecified type diabetes mellitus with other specified manifestations, uncontrolled, and Venous hypertension, chronic, with ulcer (HCC).  Past surgical history:   has a past surgical history that includes joint replacement (); pacemaker placement; Coronary angioplasty with stent (); vascular surgery (); Cardioversion (2014); other surgical history (Right, 2017); other surgical history (2020); Upper gastrointestinal 
    Electrophysiology Consult Note      Reason for consultation: LV lead placement    Chief complaint: scheduled epicardial lead placement    Referring physician:  Dr Lainez      Primary care physician: Kaleb Mitchell DO      History of Present Illness:     Patient is a 71-year-old male with history of carotid artery disease s/p left carotid endarterectomy, severe aortic stenosis, renal insufficiency, coronary artery disease, diabetes mellitus type 2, s/p pacemaker, hypertension, persistent atrial fibrillation, obstructive sleep apnea on CPAP, hyperlipidemia presented for AVR and maze and two-vessel CABG.   He presents for placement of epicardial LV lead placement.  Patient previously noted to have difficult extraction of pacemaker leads due to calcification of the veins.  It was recommended when patient needed BiV upgrade that he would have LV lead placement epicardially by CT surgery.      Past medical history:   Past Medical History:   Diagnosis Date    Arrhythmia     Pacemaker placed aprox 5 years ago for A Fib per patient    Arthritis 12/2013    rt wrist    Atrial fibrillation (HCC)     on Xarelto - Dr. Farias    CAD (coronary artery disease) 06/18/2014    see dr Farias    Chronic kidney disease, stage III (moderate) (HCC) 07/07/2016    Critical illness myopathy 03/15/2021    Diabetes mellitus (HCC)     dx 2004    Diabetic neuropathy associated with type 2 diabetes mellitus (HCC) 04/23/2019    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    Gout 04/2019    \"got gout when had pacer put in because they did not give me my medication for gout \"    H/O 24 hour EKG monitoring 10/03/2013    no afib noted, sinsus rhythm    H/O cardiovascular stress test 05/12/2014    cardiolite- mild ischemia RCA EF50%    H/O echocardiogram 12/01/2020    EF 55-60% severe aortic stenosis mild to mod aortic regurg mod to severe tricuspid regurg severe pulm htn significant changes since 2018 echo.     H/O right and left heart 
[facility] ACUTE CARE PHYSICAL THERAPY EVALUATION  Mayo Mejia, 1950, 2006/2006-A, 4/9/2024    History  [place]:  There were no encounter diagnoses.  Patient  has a past medical history of Arrhythmia, Arthritis, Atrial fibrillation (HCC), CAD (coronary artery disease), Chronic kidney disease, stage III (moderate) (HCC), Critical illness myopathy, Diabetes mellitus (HCC), Diabetic neuropathy associated with type 2 diabetes mellitus (HCC), Erythropoietin deficiency anemia, Gout, H/O 24 hour EKG monitoring, H/O cardiovascular stress test, H/O echocardiogram, H/O right and left heart catheterization, Heart failure (HCC), History of blood transfusion, History of transesophageal echocardiography (MABLE), [place] (hard of hearing), Hx of Doppler echocardiogram, Hyperlipidemia, Hypertension, Other disorders of kidney and ureter, Pacemaker, Pneumonia, Pneumonia due to COVID-19 virus, Sleep apnea, Type II or unspecified type diabetes mellitus with other specified manifestations, uncontrolled, and Venous hypertension, chronic, with ulcer (HCC).  Patient  has a past surgical history that includes joint replacement (2004); pacemaker placement; Coronary angioplasty with stent (2014); vascular surgery (2012); Cardioversion (12/2014); other surgical history (Right, 12/02/2017); other surgical history (09/17/2020); Upper gastrointestinal endoscopy (N/A, 09/17/2020); Colonoscopy (2011); Colonoscopy (N/A, 11/19/2019); Colonoscopy (09/30/2020); Colonoscopy (N/A, 09/30/2020); Cholecystectomy (2017); Carotid endarterectomy (Left, 01/29/2021); Coronary artery bypass graft (N/A, 02/16/2021); Pacemaker insertion (N/A, 02/23/2021); Upper gastrointestinal endoscopy (N/A, 03/04/2021); IR NONTUNNELED VASCULAR CATHETER > 5 YEARS (03/05/2021); Toe amputation (Bilateral, 03/31/2022); Foot Debridement (Bilateral, 03/31/2022); hernia repair; Toe amputation (Right, 04/14/2022); Foot Debridement (Right, 04/14/2022); Pacemaker insertion 
Problems Sister     No Known Problems Brother          Social history :  reports that he has never smoked. He has never used smokeless tobacco. He reports current alcohol use of about 2.0 standard drinks of alcohol per week. He reports that he does not use drugs.    Allergies   Allergen Reactions    Spironolactone      CAUSES INCREASED K+    Tape [Adhesive Tape] Rash     SURGICAL TAPE       No current facility-administered medications on file prior to encounter.     Current Outpatient Medications on File Prior to Encounter   Medication Sig Dispense Refill    simethicone (MYLICON) 80 MG chewable tablet Take 1 tablet by mouth 4 times daily as needed for Flatulence (Patient not taking: Reported on 4/4/2024) 90 tablet 0    carvedilol (COREG) 6.25 MG tablet Take 1 tablet by mouth 2 times daily 180 tablet 3    sacubitril-valsartan (ENTRESTO) 49-51 MG per tablet Take 1 tablet by mouth 2 times daily (Patient not taking: Reported on 4/4/2024) 60 tablet 3    glimepiride (AMARYL) 1 MG tablet TAKE 1 TABLET IN THE       MORNING BEFORE BREAKFAST 90 tablet 1    Dulaglutide 3 MG/0.5ML SOPN Inject 3 mg into the skin once a week 6 Adjustable Dose Pre-filled Pen Syringe 1    dapagliflozin (FARXIGA) 10 MG tablet Take 1 tablet by mouth every morning 90 tablet 1    gabapentin (NEURONTIN) 300 MG capsule Take 1 capsule by mouth 3 times daily for 180 days. 270 capsule 1    torsemide (DEMADEX) 20 MG tablet TAKE 2 TABLETS TWICE A DAY (Patient taking differently: Take 1 tablet by mouth in the morning and at bedtime) 360 tablet 3    simvastatin (ZOCOR) 20 MG tablet Take 1 tablet by mouth daily 90 tablet 3    carboxymethylcellulose (REFRESH PLUS) 0.5 % SOLN ophthalmic solution as needed       Cholecalciferol (VITAMIN D) 50 MCG (2000 UT) CAPS capsule Take by mouth nightly  (Patient not taking: Reported on 4/5/2024)      aspirin 81 MG tablet Take 1 tablet by mouth daily         Review of Systems:   Review of Systems   Constitutional:  Negative for

## 2024-04-09 NOTE — PROGRESS NOTES
Nutrition Education    Educated on Heart Failure/heart healthy nutrition therapy   Learners: Patient  Readiness: Eager  Method: Explanation, Handout, and Teachback  Response: Verbalizes Understanding and Demonstrated Understanding  Discussed low sodium, heart healthy diet- pt typically eats hot/cold cereal for breakfast w/ fruit, salad or leftovers w/ fruit for lunch, and usually grilled poultry with vegetables for dinner. He and his SO do not use added salt, use salt-free seasoning blends, rarely choose high-sodium foods. Encouraged him to continue current eating habits. Provided handouts w/ contact info in case he had additional questions.     Senia Hu RD  Contact Number: [phone]

## 2024-04-09 NOTE — OP NOTE
Mayo Marrufoton     PROCEDURE DATE   4/8/2024     SURGEON   Khari Lainez MD.      FIRST ASSISTANT   Dede Peterson SA     PREOPERATIVE DIAGNOSIS   Pacemaker dependent  Hx of CABG     POSTOPERATIVE DIAGNOSIS   Pacemaker dependent  Hx of CABG     OPERATIONS   Left anterior thoracotomy.  Epicardial ventricular lead placement X2  Pacemaker pocket revision and removal of single-chamber pacemaker  Implantation of new Bi-Ventricular pacemaker (S# [id number])     ESTIMATED BLOOD LOSS   10 mL     PQRI measures  Patient received preoperative antibiotics.      PROCEDURE   The patient was brought to the operating room and placed in a supine position on the operating table. After adequate general anesthesia and the chest being draped and prepped in a sterile surgical fashion, a 5-cm incision was performed underneath the left breast. The incision was carried down through the subcutaneous tissue and the ribs were identified. The chest cavity was entered above the rib edge. The pericardium was identified and was opened.  There were minimal pericardial adhesions.       A good epicardial surface was identified for epicardial leads.  Two epicardial leads were screw in the myocardium.  Measurements were obtained and it were satisfactory.  Lead cables were tunneled to the existing soft tissue pocket holding an pacemaker.  The right clavicle pocket was revised and enlarge with blunt and sharp dissection.  The old pacemaker was removed.  A new BiVentricular pacemaker was placed inside antimicrobial mesh and all old leads were connected.  ICD was secure to the soft tissue with a silk stitch.     Measurements and thresholds were measure and it was satisfactory.     A chest tube was placed in the left pleural cavity. Soft tissue was closed in different layers, and the skin was closed in a regular fashion. The patient tolerated the procedure and transferred to the recovery room alert and awake.     PA served as first assistant

## 2024-04-10 ENCOUNTER — TELEPHONE (OUTPATIENT)
Dept: FAMILY MEDICINE CLINIC | Age: 74
End: 2024-04-10

## 2024-04-10 NOTE — TELEPHONE ENCOUNTER
Care Transitions Initial Follow Up Call    Outreach made within 2 business days of discharge: Yes    Patient: Mayo Mejia Patient : 1950   MRN: [id number]  Reason for Admission: There are no discharge diagnoses documented for the most recent discharge.  Discharge Date: 24       Spoke with: pt    Discharge department/facility: [facility]    TCM Interactive Patient Contact:  Was patient able to fill all prescriptions: Yes  Was patient instructed to bring all medications to the follow-up visit: Yes  Is patient taking all medications as directed in the discharge summary? Yes  Does patient understand their discharge instructions: Yes  Does patient have questions or concerns that need addressed prior to 7-14 day follow up office visit: no    Scheduled appointment with PCP within 7-14 days    Follow Up  Future Appointments   Date Time Provider Department Center   2024 11:30 AM Khari Lainez MD [facility] CT SURG [facility]   5/10/2024 10:15 AM Bar Taylor MD [id number] NEPH   5/15/2024 11:15 AM Alex Jiménez MD [facility] Gastro [facility]   6/10/2024 11:15 AM Kaleb Mitchell DO [facility] FPS [facility]   7/15/2024 10:50 AM Brie Farias MD [facility] Heart [facility]   2024  2:30 PM [facility] FPS AWV LPN [facility] FPS [facility]       Zully Atkins LPN

## 2024-04-11 LAB
ABO/RH: NORMAL
ANTIBODY SCREEN: NEGATIVE
COMMENT: NORMAL
COMPONENT: NORMAL
COMPONENT: NORMAL
CROSSMATCH RESULT: NORMAL
CROSSMATCH RESULT: NORMAL
STATUS: NORMAL
STATUS: NORMAL
TRANSFUSION STATUS: NORMAL
TRANSFUSION STATUS: NORMAL
UNIT DIVISION: 0
UNIT DIVISION: 0
UNIT NUMBER: NORMAL
UNIT NUMBER: NORMAL

## 2024-04-17 ENCOUNTER — TELEPHONE (OUTPATIENT)
Dept: CARDIOTHORACIC SURGERY | Age: 74
End: 2024-04-17

## 2024-04-17 ENCOUNTER — HOSPITAL ENCOUNTER (OUTPATIENT)
Dept: ULTRASOUND IMAGING | Age: 74
Discharge: HOME | End: 2024-04-17
Payer: MEDICARE

## 2024-04-17 ENCOUNTER — TELEPHONE (OUTPATIENT)
Dept: CARDIOLOGY CLINIC | Age: 74
End: 2024-04-17

## 2024-04-17 DIAGNOSIS — R60.0 LOCALIZED EDEMA: Primary | ICD-10-CM

## 2024-04-17 DIAGNOSIS — R60.0 LOCALIZED EDEMA: ICD-10-CM

## 2024-04-17 PROCEDURE — 93970 EXTREMITY STUDY: CPT

## 2024-04-17 NOTE — PROGRESS NOTES
Notified by Sisi WISDOM that patient is concerned about LUE and LLE swelling. S/p PPM on 4/8/24. Cardiology also notified but deferred decision making to CTS.     STAT bilateral upper and lower extremity venous duplex to r/o DVT.  He is currently on Eliquis so I would anticipate the studies will be negative. Swelling likely due to post operative recovery.   OV scheduled for Tuesday 4/23/24.       Aparna Fuentes PA-C 04/17/24 10:59 AM

## 2024-04-17 NOTE — TELEPHONE ENCOUNTER
Called patient to inform him we had got him set up in [place] today at the hospital to get a upper and lower ultrasound done due his complaint of swelling, patient is unable to do today, but will call central scheduling to reschedule as soon as possible. I will monitor his account and update once patient has gotten new time and date

## 2024-04-17 NOTE — TELEPHONE ENCOUNTER
Swelling in hands , left side of body and feet.  Dr. Hanks- implanted pacemaker 4/8.  Please advise.

## 2024-04-18 ENCOUNTER — OFFICE VISIT (OUTPATIENT)
Dept: FAMILY MEDICINE CLINIC | Age: 74
End: 2024-04-18
Payer: MEDICARE

## 2024-04-18 ENCOUNTER — TELEPHONE (OUTPATIENT)
Dept: CARDIOTHORACIC SURGERY | Age: 74
End: 2024-04-18

## 2024-04-18 VITALS
BODY MASS INDEX: 27.3 KG/M2 | WEIGHT: 195 LBS | HEIGHT: 71 IN | DIASTOLIC BLOOD PRESSURE: 74 MMHG | OXYGEN SATURATION: 99 % | HEART RATE: 64 BPM | SYSTOLIC BLOOD PRESSURE: 118 MMHG

## 2024-04-18 DIAGNOSIS — I89.0 LYMPHEDEMA OF LEFT UPPER EXTREMITY: Primary | ICD-10-CM

## 2024-04-18 PROCEDURE — G8427 DOCREV CUR MEDS BY ELIG CLIN: HCPCS | Performed by: STUDENT IN AN ORGANIZED HEALTH CARE EDUCATION/TRAINING PROGRAM

## 2024-04-18 PROCEDURE — G8417 CALC BMI ABV UP PARAM F/U: HCPCS | Performed by: STUDENT IN AN ORGANIZED HEALTH CARE EDUCATION/TRAINING PROGRAM

## 2024-04-18 PROCEDURE — 3078F DIAST BP <80 MM HG: CPT | Performed by: STUDENT IN AN ORGANIZED HEALTH CARE EDUCATION/TRAINING PROGRAM

## 2024-04-18 PROCEDURE — 1111F DSCHRG MED/CURRENT MED MERGE: CPT | Performed by: STUDENT IN AN ORGANIZED HEALTH CARE EDUCATION/TRAINING PROGRAM

## 2024-04-18 PROCEDURE — 99213 OFFICE O/P EST LOW 20 MIN: CPT | Performed by: STUDENT IN AN ORGANIZED HEALTH CARE EDUCATION/TRAINING PROGRAM

## 2024-04-18 PROCEDURE — 3074F SYST BP LT 130 MM HG: CPT | Performed by: STUDENT IN AN ORGANIZED HEALTH CARE EDUCATION/TRAINING PROGRAM

## 2024-04-18 PROCEDURE — 1036F TOBACCO NON-USER: CPT | Performed by: STUDENT IN AN ORGANIZED HEALTH CARE EDUCATION/TRAINING PROGRAM

## 2024-04-18 PROCEDURE — 1123F ACP DISCUSS/DSCN MKR DOCD: CPT | Performed by: STUDENT IN AN ORGANIZED HEALTH CARE EDUCATION/TRAINING PROGRAM

## 2024-04-18 SDOH — ECONOMIC STABILITY: INCOME INSECURITY: HOW HARD IS IT FOR YOU TO PAY FOR THE VERY BASICS LIKE FOOD, HOUSING, MEDICAL CARE, AND HEATING?: NOT HARD AT ALL

## 2024-04-18 SDOH — ECONOMIC STABILITY: FOOD INSECURITY: WITHIN THE PAST 12 MONTHS, THE FOOD YOU BOUGHT JUST DIDN'T LAST AND YOU DIDN'T HAVE MONEY TO GET MORE.: NEVER TRUE

## 2024-04-18 SDOH — ECONOMIC STABILITY: FOOD INSECURITY: WITHIN THE PAST 12 MONTHS, YOU WORRIED THAT YOUR FOOD WOULD RUN OUT BEFORE YOU GOT MONEY TO BUY MORE.: NEVER TRUE

## 2024-04-18 NOTE — PROGRESS NOTES
NONTUNNELED VASCULAR CATHETER  03/05/2021    IR NONTUNNELED VASCULAR CATHETER 3/5/2021 [facility] SPECIAL PROCEDURES    JOINT REPLACEMENT  2004    total left hip    OTHER SURGICAL HISTORY Right 12/02/2017    I&D; evacuation of hematoma right hip    OTHER SURGICAL HISTORY  09/17/2020    enteroscopy    PACEMAKER INSERTION N/A 02/23/2021    PACEMAKER GENERATOR LEAD REVISION performed by Jasmeet Nolan MD at [facility] OR    PACEMAKER INSERTION Left 04/26/2022    Dual PPM: Atrial lead extraction/insertion, Generator change. [employer]tronic, Lucie XT DR LOYD Surescan    PACEMAKER PLACEMENT      9/18/14 Status post remote permanent pacemaker with atrial lead dislodgement. 7/24/14 PPM Implant    THORACOSCOPY N/A 4/8/2024    EPICARDIAL LEAD PLACEMENT performed by Khari Lainez MD at [facility] OR    THORACOTOMY Left 4/8/2024    LEFT THORACOTOMY performed by Khari Lainez MD at [facility] OR    TOE AMPUTATION Bilateral 03/31/2022    LEFT 3RD TOE AMPUTATION performed by Asad Coe DPM at [facility] OR    TOE AMPUTATION Right 04/14/2022    RIGHT 2ND TOE AMPUTATION performed by Asad Coe DPM at [facility] OR    UPPER GASTROINTESTINAL ENDOSCOPY N/A 09/17/2020    ENTEROSCOPY PUSH BIOPSY performed by Félix Huddleston MD at [facility] ASC OR    UPPER GASTROINTESTINAL ENDOSCOPY N/A 03/04/2021    EGD DIAGNOSTIC ONLY performed by Félix Huddleston MD at [facility] ENDOSCOPY    VASCULAR SURGERY  2012    \"have stents in both legs- done in [place]                 CURRENT MEDICATIONS  Current Outpatient Medications   Medication Sig Dispense Refill    apixaban (ELIQUIS) 5 MG TABS tablet Take 1 tablet by mouth 2 times daily 28 tablet 0    carvedilol (COREG) 6.25 MG tablet Take 1 tablet by mouth 2 times daily 180 tablet 3    glimepiride (AMARYL) 1 MG tablet TAKE 1 TABLET IN THE       MORNING BEFORE BREAKFAST 90 tablet 1    Dulaglutide 3 MG/0.5ML SOPN Inject 3 mg into the skin once a week 6 Adjustable Dose Pre-filled Pen Syringe 1    dapagliflozin (FARXIGA) 10 MG tablet Take 1 tablet by

## 2024-04-18 NOTE — TELEPHONE ENCOUNTER
Called pt to notify his results of imaging were reviewed by Aparna COVARRUBIAS. Per Aparna pt is okay to keep previously scheduled appt on 4/23/24 and to instruct pt to elevate arms/legs for swelling, if it becomes worse, red or warm to touch to contact office. Pt & wife voiced understanding.

## 2024-04-22 ENCOUNTER — HOSPITAL ENCOUNTER (OUTPATIENT)
Age: 74
Discharge: HOME | End: 2024-04-22
Payer: MEDICARE

## 2024-04-22 ENCOUNTER — HOSPITAL ENCOUNTER (OUTPATIENT)
Dept: GENERAL RADIOLOGY | Age: 74
Discharge: HOME | End: 2024-04-22
Payer: MEDICARE

## 2024-04-22 DIAGNOSIS — T82.110S PACEMAKER LEAD MALFUNCTION, SEQUELA: ICD-10-CM

## 2024-04-22 DIAGNOSIS — Z45.010 PACEMAKER AT END OF BATTERY LIFE: ICD-10-CM

## 2024-04-22 PROCEDURE — 71046 X-RAY EXAM CHEST 2 VIEWS: CPT

## 2024-04-22 NOTE — PROGRESS NOTES
Cardiothoracic Surgery     Cardiologist: Brie Farias MD    CC:follow up       HISTORY OF PRESENT ILLNESS:  Mayo Mejia is a 74 y.o. male who presents today for:  1. Postop followup s/p Left anterior thoracotomy. Epicardial ventricular lead placement X2 on 4/8/24   2. Pt is doing well, continues to improve, increasing activity level as tolerated - no SOB or CP noted. Concerned about edema in arm and swelling bellow incision    PHYSICAL EXAM:   VITALS:  /84 (Site: Right Upper Arm, Position: Sitting, Cuff Size: Large Adult)   Pulse 70   Ht 1.803 m (5' 11\")   Wt 90.2 kg (198 lb 12.8 oz)   SpO2 95%   BMI 27.73 kg/m²      Physical Exam:  Gen: alert, well appearing, and in no distress  Chest:  incision, clean and dry.swelling dependent to incision with multiple shades of ecchymosis   Lung: clear to auscultation, no wheezes or rales, and unlabored breathing  Heart: S1 and S2 normal, no murmur, click, gallop or rub  Extremities: peripheral pulses normal,edema in left lower left arm    ROS:   See pertinent ROS noted in HPI    Radiological and other results:  CXR: no pneumothorax or effusion    Assessment:  Pt continues progress well, no c/o at this time  Sternal and LE wound healing well, monitor for hematoma     Plan:  Pt to f/u with cardiology on date 07/15/2024  Pt is going to edema clinic 04/24/2024  Return in one week   Keflex

## 2024-04-23 ENCOUNTER — OFFICE VISIT (OUTPATIENT)
Dept: CARDIOTHORACIC SURGERY | Age: 74
End: 2024-04-23

## 2024-04-23 VITALS
HEIGHT: 71 IN | DIASTOLIC BLOOD PRESSURE: 84 MMHG | BODY MASS INDEX: 27.83 KG/M2 | HEART RATE: 70 BPM | OXYGEN SATURATION: 95 % | SYSTOLIC BLOOD PRESSURE: 136 MMHG | WEIGHT: 198.8 LBS

## 2024-04-23 DIAGNOSIS — Z48.89 POSTOPERATIVE VISIT: Primary | ICD-10-CM

## 2024-04-23 PROCEDURE — 99024 POSTOP FOLLOW-UP VISIT: CPT | Performed by: THORACIC SURGERY (CARDIOTHORACIC VASCULAR SURGERY)

## 2024-04-23 RX ORDER — ACETAMINOPHEN 160 MG
2000 TABLET,DISINTEGRATING ORAL DAILY
COMMUNITY

## 2024-04-24 ENCOUNTER — TELEPHONE (OUTPATIENT)
Dept: CARDIOTHORACIC SURGERY | Age: 74
End: 2024-04-24

## 2024-04-24 RX ORDER — CEPHALEXIN 500 MG/1
500 CAPSULE ORAL 4 TIMES DAILY
Qty: 40 CAPSULE | Refills: 0 | Status: SHIPPED | OUTPATIENT
Start: 2024-04-24 | End: 2024-05-04

## 2024-04-26 ASSESSMENT — ENCOUNTER SYMPTOMS
SORE THROAT: 0
NAUSEA: 0
WHEEZING: 0
ABDOMINAL PAIN: 0
SHORTNESS OF BREATH: 0

## 2024-04-26 NOTE — PROGRESS NOTES
Cardiologist: Brie Farias MD     CC:follow up  wound  possible hematoma      HISTORY OF PRESENT ILLNESS:  Mayo Mejia is a 74 y.o. male who presents today for:  1. Postop followup s/p Left anterior thoracotomy. Epicardial ventricular lead placement X2 on 4/8/24   2. Pt is doing well, continues to improve, increasing activity level as tolerated - no SOB or CP noted. Follow up about edema in arm and swelling bellow incision. The redness and swelling is much reduced. Feeling  much  better and will follow up with cardiology.       PHYSICAL EXAM:   VITALS:  /80 (Site: Right Upper Arm, Position: Sitting, Cuff Size: Large Adult)   Pulse 78   Ht 1.803 m (5' 11\")   Wt 86.9 kg (191 lb 9.6 oz)   SpO2 98%   BMI 26.72 kg/m²      Physical Exam:  Gen: alert, well appearing, and in no distress  Chest incision: clean and dry.  No signs of infection.mild bruising noted   Lung: clear to auscultation, no wheezes or rales, and unlabored breathing  Heart: S1 and S2 normal, no murmur, click, gallop or rub  Extremities: peripheral pulses normal, no pedal edema, mild edema in the right hand  ROS:   See pertinent ROS noted in HPI    Radiological and other results:  CXR: no pneumothorax or effusion    Assessment:  Pt continues progress well, no c/o at this time  Chest incision healing well    Plan:  Pt to f/u with cardiology   Return in one mo for follow up

## 2024-04-30 ENCOUNTER — OFFICE VISIT (OUTPATIENT)
Dept: CARDIOTHORACIC SURGERY | Age: 74
End: 2024-04-30

## 2024-04-30 VITALS
BODY MASS INDEX: 26.82 KG/M2 | HEIGHT: 71 IN | SYSTOLIC BLOOD PRESSURE: 130 MMHG | DIASTOLIC BLOOD PRESSURE: 80 MMHG | WEIGHT: 191.6 LBS | OXYGEN SATURATION: 98 % | HEART RATE: 78 BPM

## 2024-04-30 DIAGNOSIS — Z48.89 POSTOPERATIVE VISIT: ICD-10-CM

## 2024-04-30 DIAGNOSIS — I50.9 HEART FAILURE, UNSPECIFIED HF CHRONICITY, UNSPECIFIED HEART FAILURE TYPE (HCC): Primary | ICD-10-CM

## 2024-04-30 PROCEDURE — 99024 POSTOP FOLLOW-UP VISIT: CPT | Performed by: THORACIC SURGERY (CARDIOTHORACIC VASCULAR SURGERY)

## 2024-05-02 ENCOUNTER — HOSPITAL ENCOUNTER (OUTPATIENT)
Dept: PHYSICAL THERAPY | Age: 74
Setting detail: THERAPIES SERIES
Discharge: HOME | End: 2024-05-02
Payer: MEDICARE

## 2024-05-02 PROCEDURE — 97162 PT EVAL MOD COMPLEX 30 MIN: CPT

## 2024-05-02 NOTE — PROGRESS NOTES
PHYSICAL THERAPY LYMPHEDEMA EVAL    Patient Name:  Mayo Mejia Date:  2024  :  1950 MRN: [id number]  Referring Physician:  Kaleb Mitchell DO     Medical Diagnosis:  Lymphedema of left upper extremity [I89.0]       SUBJECTIVE    History of edema: 24  Pacemaker wire replacement and new pacemaker placement.  LUE swelling.   Recent onset of R UE stiffness at hand onset 24 and bruising at medial R arm.      Improved with:  time  Worsened with:  post op.     Functional Capacity:  R hand dominant  Edema Induced Limitations:  gripping    Assistance available for treatment:  self primary.   Home/social/DME: no DME  Financial resources available for treatment:  insurance    Pain: 10/10 pain R hand    Patient Goal(s):  decrease pain and swelling.     OBJECTIVE    Other medical conditions/surgeries:   gout  Past Medical History:   Diagnosis Date    Arrhythmia     Pacemaker placed aprox 5 years ago for A Fib per patient    Arthritis 2013    rt wrist    Atrial fibrillation (HCC)     on Xarelto - Dr. 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     H/O right and left heart catheterization 12/10/2020    DIFFUSE LAD DISEASE, Mild ECA Disease, Severe AS, Milf Pul HTN on RHC.

## 2024-05-03 PROCEDURE — 93294 REM INTERROG EVL PM/LDLS PM: CPT | Performed by: INTERNAL MEDICINE

## 2024-05-03 PROCEDURE — 93296 REM INTERROG EVL PM/IDS: CPT | Performed by: INTERNAL MEDICINE

## 2024-05-03 PROCEDURE — 93297 REM INTERROG DEV EVAL ICPMS: CPT | Performed by: INTERNAL MEDICINE

## 2024-05-06 ENCOUNTER — TELEPHONE (OUTPATIENT)
Dept: CARDIOLOGY CLINIC | Age: 74
End: 2024-05-06

## 2024-05-06 ENCOUNTER — PROCEDURE VISIT (OUTPATIENT)
Dept: CARDIOLOGY CLINIC | Age: 74
End: 2024-05-06
Payer: MEDICARE

## 2024-05-06 DIAGNOSIS — Z95.0 BIVENTRICULAR CARDIAC PACEMAKER IN SITU: Primary | ICD-10-CM

## 2024-05-06 NOTE — TELEPHONE ENCOUNTER
Called patient and advised that Dr Fox needs to see patient for a f/u on his new leads that where placed. Apt scheduled for 5/15/2024 @ 430.

## 2024-05-07 ENCOUNTER — HOSPITAL ENCOUNTER (OUTPATIENT)
Age: 74
Discharge: HOME | End: 2024-05-07
Payer: MEDICARE

## 2024-05-07 DIAGNOSIS — E11.9 TYPE 2 DIABETES MELLITUS WITHOUT COMPLICATION, WITHOUT LONG-TERM CURRENT USE OF INSULIN (HCC): ICD-10-CM

## 2024-05-07 DIAGNOSIS — N18.32 STAGE 3B CHRONIC KIDNEY DISEASE (HCC): ICD-10-CM

## 2024-05-07 DIAGNOSIS — T82.110A PACEMAKER LEAD MALFUNCTION, INITIAL ENCOUNTER: ICD-10-CM

## 2024-05-07 DIAGNOSIS — I50.23 ACUTE ON CHRONIC SYSTOLIC CONGESTIVE HEART FAILURE (HCC): ICD-10-CM

## 2024-05-07 LAB
ALBUMIN SERPL-MCNC: 4.3 GM/DL (ref 3.4–5)
ANION GAP SERPL CALCULATED.3IONS-SCNC: 12 MMOL/L (ref 7–16)
BACTERIA: NEGATIVE /HPF
BASOPHILS ABSOLUTE: 0.1 K/CU MM
BASOPHILS RELATIVE PERCENT: 1.5 % (ref 0–1)
BILIRUBIN, URINE: NEGATIVE MG/DL
BLOOD, URINE: NEGATIVE
BUN SERPL-MCNC: 37 MG/DL (ref 6–23)
CALCIUM SERPL-MCNC: 9.2 MG/DL (ref 8.3–10.6)
CHLORIDE BLD-SCNC: 100 MMOL/L (ref 99–110)
CLARITY: CLEAR
CO2: 27 MMOL/L (ref 21–32)
COLOR: YELLOW
CREAT SERPL-MCNC: 1.4 MG/DL (ref 0.9–1.3)
CREATININE URINE: 40.3 MG/DL (ref 39–259)
DIFFERENTIAL TYPE: ABNORMAL
EOSINOPHILS ABSOLUTE: 0.4 K/CU MM
EOSINOPHILS RELATIVE PERCENT: 6.1 % (ref 0–3)
GFR, ESTIMATED: 52 ML/MIN/1.73M2
GLUCOSE SERPL-MCNC: 134 MG/DL (ref 70–99)
GLUCOSE URINE: 500 MG/DL
HCT VFR BLD CALC: 32.5 % (ref 42–52)
HEMOGLOBIN: 9.4 GM/DL (ref 13.5–18)
IMMATURE NEUTROPHIL %: 0.3 % (ref 0–0.43)
KETONES, URINE: NEGATIVE MG/DL
LEUKOCYTE ESTERASE, URINE: NEGATIVE
LYMPHOCYTES ABSOLUTE: 1.3 K/CU MM
LYMPHOCYTES RELATIVE PERCENT: 18.4 % (ref 24–44)
MCH RBC QN AUTO: 24.3 PG (ref 27–31)
MCHC RBC AUTO-ENTMCNC: 28.9 % (ref 32–36)
MCV RBC AUTO: 84 FL (ref 78–100)
MONOCYTES ABSOLUTE: 0.8 K/CU MM
MONOCYTES RELATIVE PERCENT: 11.8 % (ref 0–4)
MUCUS: ABNORMAL HPF
NEUTROPHILS ABSOLUTE: 4.2 K/CU MM
NEUTROPHILS RELATIVE PERCENT: 61.9 % (ref 36–66)
NITRITE URINE, QUANTITATIVE: NEGATIVE
NUCLEATED RBC %: 0 %
PDW BLD-RTO: 15.6 % (ref 11.7–14.9)
PH, URINE: 7 (ref 5–8)
PHOSPHORUS: 3.7 MG/DL (ref 2.5–4.9)
PLATELET # BLD: 200 K/CU MM (ref 140–440)
PMV BLD AUTO: 10.9 FL (ref 7.5–11.1)
POTASSIUM SERPL-SCNC: 4.2 MMOL/L (ref 3.5–5.1)
PROT/CREAT RATIO, UR: 1
PROTEIN UA: 30 MG/DL
RBC # BLD: 3.87 M/CU MM (ref 4.6–6.2)
RBC URINE: 1 /HPF (ref 0–3)
SODIUM BLD-SCNC: 139 MMOL/L (ref 135–145)
SPECIFIC GRAVITY UA: 1.01 (ref 1–1.03)
TOTAL IMMATURE NEUTOROPHIL: 0.02 K/CU MM
TOTAL NUCLEATED RBC: 0 K/CU MM
TRICHOMONAS: ABNORMAL /HPF
URINE TOTAL PROTEIN: 38.7 MG/DL
UROBILINOGEN, URINE: 0.2 MG/DL (ref 0.2–1)
WBC # BLD: 6.9 K/CU MM (ref 4–10.5)
WBC UA: <1 /HPF (ref 0–2)

## 2024-05-07 PROCEDURE — 36415 COLL VENOUS BLD VENIPUNCTURE: CPT

## 2024-05-07 PROCEDURE — 85025 COMPLETE CBC W/AUTO DIFF WBC: CPT

## 2024-05-07 PROCEDURE — 82040 ASSAY OF SERUM ALBUMIN: CPT

## 2024-05-07 PROCEDURE — 84156 ASSAY OF PROTEIN URINE: CPT

## 2024-05-07 PROCEDURE — 81001 URINALYSIS AUTO W/SCOPE: CPT

## 2024-05-07 PROCEDURE — 82570 ASSAY OF URINE CREATININE: CPT

## 2024-05-07 PROCEDURE — 80048 BASIC METABOLIC PNL TOTAL CA: CPT

## 2024-05-07 PROCEDURE — 84100 ASSAY OF PHOSPHORUS: CPT

## 2024-05-07 RX ORDER — GLIMEPIRIDE 4 MG/1
TABLET ORAL
Qty: 90 TABLET | Refills: 1 | OUTPATIENT
Start: 2024-05-07

## 2024-05-09 ENCOUNTER — TELEPHONE (OUTPATIENT)
Dept: FAMILY MEDICINE CLINIC | Age: 74
End: 2024-05-09

## 2024-05-09 DIAGNOSIS — E11.9 TYPE 2 DIABETES MELLITUS WITHOUT COMPLICATION, WITHOUT LONG-TERM CURRENT USE OF INSULIN (HCC): Primary | ICD-10-CM

## 2024-05-09 RX ORDER — TIRZEPATIDE 2.5 MG/.5ML
2.5 INJECTION, SOLUTION SUBCUTANEOUS WEEKLY
Qty: 6 ML | Refills: 1 | Status: SHIPPED | OUTPATIENT
Start: 2024-05-09 | End: 2024-11-05

## 2024-05-09 NOTE — TELEPHONE ENCOUNTER
To Dr. Mitchell-    Re;    Dulaglutide 3 MG/0.5ML SOPN     Is on Back Order -----no expected shipment date.    Patient will not be able to get the refill on file------is there another medication you want to send in for patient that will replace this medicine.

## 2024-05-14 ENCOUNTER — TELEPHONE (OUTPATIENT)
Dept: GASTROENTEROLOGY | Age: 74
End: 2024-05-14

## 2024-05-14 NOTE — TELEPHONE ENCOUNTER
[facility] called stating the pt came in today (5/14/24) to get labs done for his appt tomorrow (5/15/24) that he said  asked him to get, but there are no orders from , the pt's never seen .

## 2024-05-15 ENCOUNTER — OFFICE VISIT (OUTPATIENT)
Dept: GASTROENTEROLOGY | Age: 74
End: 2024-05-15
Payer: MEDICARE

## 2024-05-15 ENCOUNTER — OFFICE VISIT (OUTPATIENT)
Dept: CARDIOLOGY CLINIC | Age: 74
End: 2024-05-15
Payer: MEDICARE

## 2024-05-15 VITALS
TEMPERATURE: 97.9 F | HEART RATE: 73 BPM | BODY MASS INDEX: 27.58 KG/M2 | SYSTOLIC BLOOD PRESSURE: 148 MMHG | DIASTOLIC BLOOD PRESSURE: 82 MMHG | OXYGEN SATURATION: 98 % | HEIGHT: 71 IN | WEIGHT: 197 LBS

## 2024-05-15 VITALS
WEIGHT: 197 LBS | DIASTOLIC BLOOD PRESSURE: 66 MMHG | HEART RATE: 72 BPM | SYSTOLIC BLOOD PRESSURE: 126 MMHG | HEIGHT: 71 IN | BODY MASS INDEX: 27.58 KG/M2

## 2024-05-15 DIAGNOSIS — D50.0 IRON DEFICIENCY ANEMIA DUE TO CHRONIC BLOOD LOSS: ICD-10-CM

## 2024-05-15 DIAGNOSIS — R79.89 ELEVATED LFTS: Primary | ICD-10-CM

## 2024-05-15 DIAGNOSIS — K55.20 AVM (ARTERIOVENOUS MALFORMATION) OF COLON: ICD-10-CM

## 2024-05-15 DIAGNOSIS — Z45.018 BIVENTRICULAR PACEMAKER CHECK: ICD-10-CM

## 2024-05-15 DIAGNOSIS — R16.0 HEPATOMEGALY: ICD-10-CM

## 2024-05-15 DIAGNOSIS — Z95.0 CARDIAC PACEMAKER IN SITU: ICD-10-CM

## 2024-05-15 DIAGNOSIS — T82.110A PACEMAKER LEAD MALFUNCTION, INITIAL ENCOUNTER: Primary | ICD-10-CM

## 2024-05-15 PROCEDURE — 3079F DIAST BP 80-89 MM HG: CPT | Performed by: INTERNAL MEDICINE

## 2024-05-15 PROCEDURE — 3074F SYST BP LT 130 MM HG: CPT | Performed by: INTERNAL MEDICINE

## 2024-05-15 PROCEDURE — 1036F TOBACCO NON-USER: CPT | Performed by: INTERNAL MEDICINE

## 2024-05-15 PROCEDURE — G8417 CALC BMI ABV UP PARAM F/U: HCPCS | Performed by: INTERNAL MEDICINE

## 2024-05-15 PROCEDURE — 99214 OFFICE O/P EST MOD 30 MIN: CPT | Performed by: INTERNAL MEDICINE

## 2024-05-15 PROCEDURE — G8427 DOCREV CUR MEDS BY ELIG CLIN: HCPCS | Performed by: INTERNAL MEDICINE

## 2024-05-15 PROCEDURE — 3077F SYST BP >= 140 MM HG: CPT | Performed by: INTERNAL MEDICINE

## 2024-05-15 PROCEDURE — 99204 OFFICE O/P NEW MOD 45 MIN: CPT | Performed by: INTERNAL MEDICINE

## 2024-05-15 PROCEDURE — 93000 ELECTROCARDIOGRAM COMPLETE: CPT | Performed by: INTERNAL MEDICINE

## 2024-05-15 PROCEDURE — 3078F DIAST BP <80 MM HG: CPT | Performed by: INTERNAL MEDICINE

## 2024-05-15 PROCEDURE — 1123F ACP DISCUSS/DSCN MKR DOCD: CPT | Performed by: INTERNAL MEDICINE

## 2024-05-15 PROCEDURE — G8428 CUR MEDS NOT DOCUMENT: HCPCS | Performed by: INTERNAL MEDICINE

## 2024-05-15 RX ORDER — FERROUS SULFATE 325(65) MG
325 TABLET ORAL EVERY OTHER DAY
Qty: 60 TABLET | Refills: 1 | Status: SHIPPED | OUTPATIENT
Start: 2024-05-15

## 2024-05-15 RX ORDER — SODIUM CHLORIDE 9 MG/ML
INJECTION, SOLUTION INTRAVENOUS PRN
OUTPATIENT
Start: 2024-05-15

## 2024-05-15 RX ORDER — SODIUM CHLORIDE, SODIUM LACTATE, POTASSIUM CHLORIDE, CALCIUM CHLORIDE 600; 310; 30; 20 MG/100ML; MG/100ML; MG/100ML; MG/100ML
INJECTION, SOLUTION INTRAVENOUS CONTINUOUS
OUTPATIENT
Start: 2024-05-15

## 2024-05-15 RX ORDER — SODIUM CHLORIDE 0.9 % (FLUSH) 0.9 %
5-40 SYRINGE (ML) INJECTION EVERY 12 HOURS SCHEDULED
OUTPATIENT
Start: 2024-05-15

## 2024-05-15 RX ORDER — FERROUS SULFATE 325(65) MG
325 TABLET ORAL EVERY OTHER DAY
Qty: 60 TABLET | Refills: 1 | Status: SHIPPED | OUTPATIENT
Start: 2024-05-15 | End: 2024-05-15

## 2024-05-15 RX ORDER — SODIUM CHLORIDE 0.9 % (FLUSH) 0.9 %
5-40 SYRINGE (ML) INJECTION PRN
OUTPATIENT
Start: 2024-05-15

## 2024-05-15 NOTE — PROGRESS NOTES
Electrophysiology FU Note      Reason for consultation: LV lead placement    Chief complaint : discuss about pacemaker    Referring physician:       Primary care physician: Kaleb Mitchell DO      History of Present Illness:     This visit (5/15/2024)      Chief Complaint   Patient presents with    Follow-up     PT denies nicotine use states he will have a couple beers a week and drink a couple cups of coffee a day, no cardiac sx gastro wants to do endo and colonoscopy. Feeling better now     Patient came to discuss about pacemaker      Previous visit:     Patient is a 71-year-old male with history of carotid artery disease s/p left carotid endarterectomy, severe aortic stenosis, renal insufficiency, coronary artery disease, diabetes mellitus type 2, s/p pacemaker, hypertension, persistent atrial fibrillation, obstructive sleep apnea on CPAP, hyperlipidemia presented for AVR and maze and two-vessel CABG.   He presents for placement of epicardial LV lead placement.  Patient previously noted to have difficult extraction of pacemaker leads due to calcification of the veins.  It was recommended when patient needed BiV upgrade that he would have LV lead placement epicardially by CT surgery.        Pastmedical history:   Past Medical History:   Diagnosis Date    Arrhythmia     Pacemaker placed aprox 5 years ago for A Fib per patient    Arthritis 12/2013    rt wrist    Atrial fibrillation (HCC)     on Xarelto - Dr. 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

## 2024-05-15 NOTE — PROGRESS NOTES
[facility]             MD Alex Singh MD Carol Christensen, APRN-CNP       Violeta Marmolejo, APRN-CNP             [address] [place] [address] [phone] fax [phone]        Gastroenterology Clinic Consultation    Alex Jiménez MD  Encounter Date: 05/15/24     CC: New Patient (Pt states he doesn't have any idea why he's here, he just knows his pcp requested him to come and he wanted to respect his wishes. )       Kaleb Mitchell R, DO  [address]  [address]  [place] [address]     History obtained from: patient,  medical records     Subjective:       Mayo Mejia is an 74 y.o. male with past medical history of type 2 diabetes, atrial fibrillation on Eliquis, DMITRIY on CPAP, status post aortic valve replacement, biventricular ICD CKD who presents for as a new patient for hepatomegaly.    Of note patient was previously established with Dr. Huddleston and last seen by him in March 2021.  At that time he was admitted in the hospital and did undergo upper endoscopy which was noted to be negative.  There was some suspicion of possible small bowel angiodysplasia.  His last colonoscopy was done by Dr. Huddleston back in September 2020 where he was noted to have angiodysplasia in the cecum status post APC, sigmoid diverticulosis and internal hemorrhoids.    On review of labs he continues to be anemic with hemoglobin of 9.4 platelet count of 200, WBC 6.9.  Iron Studies were ordered by Dr. Fox and he was noted to be iron deficient on studies done on 04/02/24. Serum ferritin 39.3, Iron 38, TIBC 420.  He was recently evaluated in the hospital and underwent epicardial ventricular lead placement as well as pacemaker pocket revision and placement of biventricular pacemaker.     He was seen by his PCP back in March 2024 and was noted to have an ultrasound done which revealed hepatomegaly with liver measuring 19.1 cm.

## 2024-05-15 NOTE — PATIENT INSTRUCTIONS
We are committed to providing you the best care possible.    If you receive a survey after visiting one of our offices, please take time to share your experience concerning your physician office visit.  These surveys are confidential and no health information about you is shared.    We are eager to improve for you and we are counting on your feedback to help make that happen.      **It is YOUR responsibilty to bring medication bottles and/or updated medication list to EACH APPOINTMENT. This will allow us to better serve you and all your healthcare needs**  Thank you for allowing us to care for you today!   We want to ensure we can follow your treatment plan and we strive to give you the best outcomes and experience possible.   If you ever have a life threatening emergency and call 911 - for an ambulance (EMS)   Our providers can only care for you at:   [facility] or [facility].   Even if you have someone take you or you drive yourself we can only care for you in a [facility] facility. Our providers are not setup at the other healthcare locations!   Please be informed that if you contact our office outside of normal business hours the physician on call cannot help with any scheduling or rescheduling issues, procedure instruction questions or any type of medication issue.    We advise you for any urgent/emergency that you go to the nearest emergency room!    PLEASE CALL OUR OFFICE DURING NORMAL BUSINESS HOURS    Monday - Friday   8 am to 5 pm    [place]: [phone]    [place]: [phone]    [place]:  [phone]

## 2024-05-16 ENCOUNTER — TELEPHONE (OUTPATIENT)
Dept: CARDIOLOGY CLINIC | Age: 74
End: 2024-05-16

## 2024-05-16 NOTE — TELEPHONE ENCOUNTER
Cardiologist: Dr. Farias  Surgeon: Dr. Jiménez  Surgery: Colonoscopy / EGD  Surgery/Procedure should be done in the hospital setting    _____ yes    _____  no    Anesthesia: Propofol  Date: 7/27/2024  Fax# [phone]  # [phone]    Last OV 4/4/2024 w/Jarrett    CORONARY ARTERY DISEASE:  asymptomatic     All available  tests in chart reviewed. Management discussed .  Testing ordered  no  On aspirin history of CABG we will continue          CAD CABG with LIMA to the LAD and vein graft to the RCA     LIPID MANAGEMENT:  Importance of lipid levels discussed with patient   and patient was given dietary advice. NCEP- ATP III guidelines reviewed with patient.    -   Changes  in medicines made: No     On diet now     -   DIABETES MELLITUS: Available pertinent lab data reviewed   and  patient was given dietary advice . Advised to check blood glucose level on a regular basis.      -   Changes  in medicines made: No        On Amaryl and Farxiga we will check the A1c     -  Hypertension: Patients blood pressure is normal. Patient is advised about low sodium diet. Present medical regimen will not be changed.    On losartan 25 mg p.o. twice daily monitor the blood pressure     -Atrial fibrillation off Eliquis for Surgery  being seen by EP as well  -Valvular heart disease status post AVR AVR with a #27 Medtronic Mosaic bioprosthetic valve aortic root enlargement with CorMatrix patch also had the Maze procedure     - hfref ; ef 40%  on GDMT        Last EKG- 5/15/2024    Echo- 3/13/2024  Left Ventricle: Reduced left ventricular systolic function with a visually estimated EF of 40%. Left ventricle size is normal. Normal wall thickness. Mild global hypokinesis present. Indeterminate diastolic function.    Mitral Valve: Mildly thickened leaflet, at the anterior leaflet. Mild annular calcification at the posterior leaflet of the mitral valve. Mild to moderate regurgitation.    Tricuspid Valve: Moderate to severe regurgitation.

## 2024-05-19 ENCOUNTER — HOSPITAL ENCOUNTER (EMERGENCY)
Age: 74
Discharge: HOME | End: 2024-05-19
Attending: STUDENT IN AN ORGANIZED HEALTH CARE EDUCATION/TRAINING PROGRAM
Payer: MEDICARE

## 2024-05-19 VITALS
RESPIRATION RATE: 16 BRPM | OXYGEN SATURATION: 98 % | BODY MASS INDEX: 26.6 KG/M2 | DIASTOLIC BLOOD PRESSURE: 60 MMHG | TEMPERATURE: 98 F | HEART RATE: 78 BPM | WEIGHT: 190 LBS | HEIGHT: 71 IN | SYSTOLIC BLOOD PRESSURE: 140 MMHG

## 2024-05-19 DIAGNOSIS — S01.512A LACERATION OF TONGUE, INITIAL ENCOUNTER: Primary | ICD-10-CM

## 2024-05-19 PROCEDURE — 2500000003 HC RX 250 WO HCPCS: Performed by: STUDENT IN AN ORGANIZED HEALTH CARE EDUCATION/TRAINING PROGRAM

## 2024-05-19 PROCEDURE — 99283 EMERGENCY DEPT VISIT LOW MDM: CPT

## 2024-05-19 PROCEDURE — 6370000000 HC RX 637 (ALT 250 FOR IP): Performed by: STUDENT IN AN ORGANIZED HEALTH CARE EDUCATION/TRAINING PROGRAM

## 2024-05-19 PROCEDURE — 41250 REPAIR TONGUE LACERATION: CPT

## 2024-05-19 RX ORDER — LIDOCAINE HYDROCHLORIDE 10 MG/ML
5 INJECTION, SOLUTION INFILTRATION; PERINEURAL ONCE
Status: COMPLETED | OUTPATIENT
Start: 2024-05-19 | End: 2024-05-19

## 2024-05-19 RX ADMIN — LIDOCAINE HYDROCHLORIDE 5 ML: 10 INJECTION, SOLUTION INFILTRATION; PERINEURAL at 18:27

## 2024-05-19 RX ADMIN — Medication 1 EACH: at 18:27

## 2024-05-19 ASSESSMENT — PAIN SCALES - GENERAL: PAINLEVEL_OUTOF10: 0

## 2024-05-19 ASSESSMENT — LIFESTYLE VARIABLES
HOW MANY STANDARD DRINKS CONTAINING ALCOHOL DO YOU HAVE ON A TYPICAL DAY: 1 OR 2
HOW OFTEN DO YOU HAVE A DRINK CONTAINING ALCOHOL: MONTHLY OR LESS

## 2024-05-19 ASSESSMENT — PAIN - FUNCTIONAL ASSESSMENT: PAIN_FUNCTIONAL_ASSESSMENT: 0-10

## 2024-05-19 NOTE — ED TRIAGE NOTES
Patient to triage with c/o cut to tongue. Patient states he cannot get it to stop bleeding. Patient is currently taking blood thinners. Resps even and unlabored.

## 2024-05-19 NOTE — DISCHARGE INSTRUCTIONS
Call and schedule follow-up appoint with your primary care provider.  Return to emergency department if you develop new or worsening symptoms.  Hold your Eliquis for 2 days and return if you develop recurrent bleeding.

## 2024-05-19 NOTE — ED PROVIDER NOTES
EMERGENCY DEPARTMENT HISTORY AND PHYSICAL EXAM      Patient Name: Mayo Mejia  MRN: [id number]  : 1950  Date of Evaluation: 2024  ED Provider:  Jil Angel DO    History of Presenting Illness     Chief Complaint:   Chief Complaint   Patient presents with    Facial Laceration     States has cut on tongue that won't stop bleeding. + blood thinners.        HPI: Patient is a 74 y.o. male with past medical history of atrial fibrillation on Xarelto who is presenting to the emergency department with a chief complaint of bleeding to his tongue.  Patient states this has been a recurrent issue for him.  Patient states 4 days ago he bit his tongue and has been unable to get the bleeding to stop.  Patient states he had silver nitrate applicators which he has tried use at home which worked temporarily but the bleeding then persists.  Patient denies any fevers or chills.  Patient denies chest pain or shortness of breath.  Patient denies dizziness lightheadedness, weakness, nausea, vomiting.          Past History     Past Medical History:   Past Medical History:   Diagnosis Date    Arrhythmia     Pacemaker placed aprox 5 years ago for A Fib per patient    Arthritis 2013    rt wrist    Atrial fibrillation (HCC)     on Xarelto - Dr. 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

## 2024-05-20 ENCOUNTER — TELEPHONE (OUTPATIENT)
Dept: GASTROENTEROLOGY | Age: 74
End: 2024-05-20

## 2024-05-20 NOTE — TELEPHONE ENCOUNTER
Patient called and stated that his arthritis Dr told him to use a topical gel called Diclofenace sodium topical gel 1% and just wanted to make sure that it would be safe for him to use concerning his liver.  Please advise

## 2024-05-21 ENCOUNTER — OFFICE VISIT (OUTPATIENT)
Dept: CARDIOTHORACIC SURGERY | Age: 74
End: 2024-05-21

## 2024-05-21 VITALS
WEIGHT: 193 LBS | BODY MASS INDEX: 27.02 KG/M2 | HEART RATE: 72 BPM | HEIGHT: 71 IN | SYSTOLIC BLOOD PRESSURE: 120 MMHG | DIASTOLIC BLOOD PRESSURE: 60 MMHG

## 2024-05-21 DIAGNOSIS — I50.9 HEART FAILURE, UNSPECIFIED HF CHRONICITY, UNSPECIFIED HEART FAILURE TYPE (HCC): Primary | ICD-10-CM

## 2024-05-21 PROCEDURE — 99024 POSTOP FOLLOW-UP VISIT: CPT | Performed by: THORACIC SURGERY (CARDIOTHORACIC VASCULAR SURGERY)

## 2024-05-21 NOTE — PROGRESS NOTES
Cardiothoracic Surgery   Postoperative Follow Up    Cardiologist:  Brie Farias MD     Procedure:  Epicardial ventricular lead placement X2 on 4/8/24     HISTORY OF PRESENT ILLNESS:  Mayo Mejia is a 74 y.o. male who presents today for a postoperative follow up appointment.Left anterior thoracotomy. Epicardial ventricular lead placement X2 on 4/8/24      Pt is doing well, continues to improve, increasing activity level as tolerated - no SOB or CP noted. The edema and ecchymosis has cleared he has resumed playing golf and is feeling well.      PHYSICAL EXAM:   VITALS:  /60 (Site: Left Upper Arm, Position: Sitting, Cuff Size: Medium Adult)   Pulse 72   Ht 1.803 m (5' 11\")   Wt 87.5 kg (193 lb)   BMI 26.92 kg/m²      Physical Exam:  Gen: alert, well appearing, and in no distress  Chest:  incision site well approximated, clean and dry.  No signs of erythema, swelling, or drainage. Chest tube insertion sites clean and dry.  No signs of infection.  Lung: clear to auscultation, no wheezes or rales, and unlabored breathing  Heart: S1 and S2 normal, no murmur, click, gallop or rub  Extremities: peripheral pulses normal, no pedal edema, no clubbing or cyanosis     ROS:   See pertinent ROS noted in HPI    Radiological and other results:  CXR: no pneumothorax or effusion                 Assessment:  S/p Left anterior thoracotomy.  Epicardial ventricular lead placement X2        Plan:  PRN  Follow up with Cardiology

## 2024-05-23 ENCOUNTER — HOSPITAL ENCOUNTER (EMERGENCY)
Age: 74
Discharge: HOME | End: 2024-05-23
Payer: MEDICARE

## 2024-05-23 VITALS
HEIGHT: 71 IN | OXYGEN SATURATION: 97 % | TEMPERATURE: 98.3 F | HEART RATE: 71 BPM | SYSTOLIC BLOOD PRESSURE: 141 MMHG | WEIGHT: 195 LBS | RESPIRATION RATE: 16 BRPM | DIASTOLIC BLOOD PRESSURE: 80 MMHG | BODY MASS INDEX: 27.3 KG/M2

## 2024-05-23 DIAGNOSIS — K14.8 HEMORRHAGE OF TONGUE: Primary | ICD-10-CM

## 2024-05-23 PROCEDURE — 99283 EMERGENCY DEPT VISIT LOW MDM: CPT

## 2024-05-23 PROCEDURE — 2500000003 HC RX 250 WO HCPCS

## 2024-05-23 PROCEDURE — 85025 COMPLETE CBC W/AUTO DIFF WBC: CPT

## 2024-05-23 RX ORDER — TRANEXAMIC ACID 100 MG/ML
15 INJECTION, SOLUTION INTRAVENOUS ONCE
Status: COMPLETED | OUTPATIENT
Start: 2024-05-23 | End: 2024-05-23

## 2024-05-23 RX ADMIN — TRANEXAMIC ACID 1328 MG: 100 INJECTION, SOLUTION INTRAVENOUS at 14:23

## 2024-05-23 ASSESSMENT — PAIN - FUNCTIONAL ASSESSMENT
PAIN_FUNCTIONAL_ASSESSMENT: NONE - DENIES PAIN
PAIN_FUNCTIONAL_ASSESSMENT: NONE - DENIES PAIN

## 2024-05-23 ASSESSMENT — PAIN SCALES - GENERAL: PAINLEVEL_OUTOF10: 0

## 2024-05-23 NOTE — ED PROVIDER NOTES
tablet by mouth daily, Disp-90 tablet, R-3Normal      carboxymethylcellulose (REFRESH PLUS) 0.5 % SOLN ophthalmic solution as needed Historical Med      aspirin 81 MG tablet Take 1 tablet by mouth dailyHistorical Med       !! - Potential duplicate medications found. Please discuss with provider.          ALLERGIES     Spironolactone and Tape [adhesive tape]    FAMILYHISTORY       Family History   Problem Relation Age of Onset    High Blood Pressure Mother     Arthritis Mother     Diabetes Mother     Heart Disease Mother     High Blood Pressure Father     Heart Disease Father     Kidney Disease Father     No Known Problems Sister     Heart Surgery Brother     Heart Disease Brother     No Known Problems Sister     No Known Problems Brother         SOCIAL HISTORY       Social History     Tobacco Use    Smoking status: Never    Smokeless tobacco: Never   Vaping Use    Vaping Use: Never used   Substance Use Topics    Alcohol use: Yes     Alcohol/week: 2.0 standard drinks of alcohol     Types: 2 Cans of beer per week     Comment: 1-2 beers a week. caffeine: 2-3 glasses of tea a day    Drug use: Yes     Types: Marijuana (Weed)     Comment: Once every 2-3 months       SCREENINGS          Hansa Coma Scale  Eye Opening: Spontaneous  Best Verbal Response: Oriented  Best Motor Response: Obeys commands  Hansa Coma Scale Score: 15              CIWA Assessment  BP: (!) 141/80  Pulse: 71             PHYSICAL EXAM  1 or more Elements     ED Triage Vitals [05/23/24 1314]   BP Temp Temp src Pulse Resp SpO2 Height Weight   (!) 141/80 -- -- -- -- 99 % -- --       Physical Exam    Patient in no acute distress.  Able to talk in complete sentences.  Able to ambulate without any difficulty.  Vital signs are stable.  Patient is afebrile.  Center of tongue with healing laceration noted multiple areas of venous bleeding noted.  No obvious hematoma noted.  Tympanic membranes are clear bilaterally without bulging or erythema.  Throat is 
understanding.  Patient was given strict return precautions.  Patient agrees with plans for discharge home.      Voice Dictation Disclaimer     Comment: Please note this report has been produced using speech recognition software and may contain errors related to that system including errors in grammar, punctuation, and spelling, as well as words and phrases that may be inappropriate.  Efforts were made to edit the dictations.      Jil Burgess D.O.,   05/23/24 9335

## 2024-05-24 ENCOUNTER — TELEPHONE (OUTPATIENT)
Dept: CARDIOLOGY CLINIC | Age: 74
End: 2024-05-24

## 2024-05-24 NOTE — TELEPHONE ENCOUNTER
Called patients wife and apt with DR Fox scheduled for 5/31/2024 @ 115 to discuss eliquis. Advised not to stop or decrease until seen by Dr Fox. Patient wife stated understanding.

## 2024-05-24 NOTE — TELEPHONE ENCOUNTER
Patient's wife states patient has been in ED for biting tongue requiring stitches, hard to stop bleeding, severe bruising. Requesting change to blood thinner.

## 2024-05-24 NOTE — TELEPHONE ENCOUNTER
Pt takes  eliquis 2x a day, he was wondering if this can be cut back because he is bleeding and brusing bad. Call wife because he is hard of hearing

## 2024-05-31 ENCOUNTER — OFFICE VISIT (OUTPATIENT)
Dept: CARDIOLOGY CLINIC | Age: 74
End: 2024-05-31
Payer: MEDICARE

## 2024-05-31 VITALS
BODY MASS INDEX: 27.35 KG/M2 | HEIGHT: 71 IN | SYSTOLIC BLOOD PRESSURE: 138 MMHG | WEIGHT: 195.4 LBS | HEART RATE: 72 BPM | DIASTOLIC BLOOD PRESSURE: 82 MMHG

## 2024-05-31 DIAGNOSIS — Z45.018 BIVENTRICULAR PACEMAKER CHECK: Primary | ICD-10-CM

## 2024-05-31 DIAGNOSIS — I10 ESSENTIAL HYPERTENSION: ICD-10-CM

## 2024-05-31 DIAGNOSIS — E78.2 MIXED HYPERLIPIDEMIA: ICD-10-CM

## 2024-05-31 PROCEDURE — 3075F SYST BP GE 130 - 139MM HG: CPT | Performed by: INTERNAL MEDICINE

## 2024-05-31 PROCEDURE — 93000 ELECTROCARDIOGRAM COMPLETE: CPT | Performed by: INTERNAL MEDICINE

## 2024-05-31 PROCEDURE — G8427 DOCREV CUR MEDS BY ELIG CLIN: HCPCS | Performed by: INTERNAL MEDICINE

## 2024-05-31 PROCEDURE — G8417 CALC BMI ABV UP PARAM F/U: HCPCS | Performed by: INTERNAL MEDICINE

## 2024-05-31 PROCEDURE — 99214 OFFICE O/P EST MOD 30 MIN: CPT | Performed by: INTERNAL MEDICINE

## 2024-05-31 PROCEDURE — 1123F ACP DISCUSS/DSCN MKR DOCD: CPT | Performed by: INTERNAL MEDICINE

## 2024-05-31 PROCEDURE — 3079F DIAST BP 80-89 MM HG: CPT | Performed by: INTERNAL MEDICINE

## 2024-05-31 PROCEDURE — 1036F TOBACCO NON-USER: CPT | Performed by: INTERNAL MEDICINE

## 2024-05-31 ASSESSMENT — ENCOUNTER SYMPTOMS: SHORTNESS OF BREATH: 0

## 2024-05-31 NOTE — PROGRESS NOTES
Electrophysiology FU Note      Reason for consultation: LV lead placement    Chief complaint : discussed about Elqiuis    Referring physician:       Primary care physician: Kaleb Mitchell DO      History of Present Illness:     This visit (5/31/2024)    Chief Complaint   Patient presents with    Follow-up     Patient denies any cardiac symptoms - Denies chest pain, shortness of breath, palpitations, syncope or presyncope, edema   Patient denies tobacco.   Patient states he drinks beer occasionally.   Patient states he drinks 2 cups of coffee daily.   Patient exercises daily.     Patient here to discuss about wanting to come down on Eliquis as he is having a lot of bruising and easy bleeding with minimal cuts or injuries.      Previous visit:     Patient is a 71-year-old male with history of carotid artery disease s/p left carotid endarterectomy, severe aortic stenosis, renal insufficiency, coronary artery disease, diabetes mellitus type 2, s/p pacemaker, hypertension, persistent atrial fibrillation, obstructive sleep apnea on CPAP, hyperlipidemia presented for AVR and maze and two-vessel CABG.   He presents for placement of epicardial LV lead placement.  Patient previously noted to have difficult extraction of pacemaker leads due to calcification of the veins.  It was recommended when patient needed BiV upgrade that he would have LV lead placement epicardially by CT surgery.        Pastmedical history:   Past Medical History:   Diagnosis Date    Arrhythmia     Pacemaker placed aprox 5 years ago for A Fib per patient    Arthritis 12/2013    rt wrist    Atrial fibrillation (HCC)     on Xarelto - Dr. 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

## 2024-06-04 ENCOUNTER — TELEPHONE (OUTPATIENT)
Dept: CARDIOLOGY CLINIC | Age: 74
End: 2024-06-04

## 2024-06-04 ENCOUNTER — TELEPHONE (OUTPATIENT)
Dept: GASTROENTEROLOGY | Age: 74
End: 2024-06-04

## 2024-06-04 DIAGNOSIS — I48.0 PAROXYSMAL ATRIAL FIBRILLATION (HCC): Primary | ICD-10-CM

## 2024-06-05 ENCOUNTER — TELEPHONE (OUTPATIENT)
Dept: GASTROENTEROLOGY | Age: 74
End: 2024-06-05

## 2024-06-10 ENCOUNTER — OFFICE VISIT (OUTPATIENT)
Dept: FAMILY MEDICINE CLINIC | Age: 74
End: 2024-06-10
Payer: MEDICARE

## 2024-06-10 VITALS
HEIGHT: 71 IN | WEIGHT: 200 LBS | HEART RATE: 84 BPM | RESPIRATION RATE: 19 BRPM | DIASTOLIC BLOOD PRESSURE: 70 MMHG | BODY MASS INDEX: 28 KG/M2 | OXYGEN SATURATION: 97 % | SYSTOLIC BLOOD PRESSURE: 122 MMHG

## 2024-06-10 DIAGNOSIS — I48.0 PAF (PAROXYSMAL ATRIAL FIBRILLATION) (HCC): ICD-10-CM

## 2024-06-10 DIAGNOSIS — L89.892 PRESSURE ULCER OF OTHER SITE, STAGE 2 (HCC): Primary | ICD-10-CM

## 2024-06-10 DIAGNOSIS — J96.10 CHRONIC RESPIRATORY FAILURE, UNSPECIFIED WHETHER WITH HYPOXIA OR HYPERCAPNIA (HCC): ICD-10-CM

## 2024-06-10 DIAGNOSIS — E11.42 TYPE 2 DIABETES MELLITUS WITH DIABETIC POLYNEUROPATHY, WITHOUT LONG-TERM CURRENT USE OF INSULIN (HCC): ICD-10-CM

## 2024-06-10 DIAGNOSIS — I89.0 LYMPHEDEMA OF LEFT UPPER EXTREMITY: ICD-10-CM

## 2024-06-10 DIAGNOSIS — D50.9 IRON DEFICIENCY ANEMIA, UNSPECIFIED IRON DEFICIENCY ANEMIA TYPE: ICD-10-CM

## 2024-06-10 DIAGNOSIS — E11.9 TYPE 2 DIABETES MELLITUS WITHOUT COMPLICATION, WITHOUT LONG-TERM CURRENT USE OF INSULIN (HCC): ICD-10-CM

## 2024-06-10 DIAGNOSIS — G62.9 PERIPHERAL POLYNEUROPATHY: ICD-10-CM

## 2024-06-10 DIAGNOSIS — Z89.421 ACQUIRED ABSENCE OF OTHER RIGHT TOE(S) (HCC): ICD-10-CM

## 2024-06-10 DIAGNOSIS — M10.9 ACUTE GOUT INVOLVING TOE, UNSPECIFIED CAUSE, UNSPECIFIED LATERALITY: ICD-10-CM

## 2024-06-10 DIAGNOSIS — I25.118 ATHEROSCLEROTIC HEART DISEASE OF NATIVE CORONARY ARTERY WITH OTHER FORMS OF ANGINA PECTORIS (HCC): ICD-10-CM

## 2024-06-10 DIAGNOSIS — E44.0 MODERATE MALNUTRITION (HCC): ICD-10-CM

## 2024-06-10 PROCEDURE — 99214 OFFICE O/P EST MOD 30 MIN: CPT | Performed by: STUDENT IN AN ORGANIZED HEALTH CARE EDUCATION/TRAINING PROGRAM

## 2024-06-10 PROCEDURE — 3074F SYST BP LT 130 MM HG: CPT | Performed by: STUDENT IN AN ORGANIZED HEALTH CARE EDUCATION/TRAINING PROGRAM

## 2024-06-10 PROCEDURE — 3044F HG A1C LEVEL LT 7.0%: CPT | Performed by: STUDENT IN AN ORGANIZED HEALTH CARE EDUCATION/TRAINING PROGRAM

## 2024-06-10 PROCEDURE — G8417 CALC BMI ABV UP PARAM F/U: HCPCS | Performed by: STUDENT IN AN ORGANIZED HEALTH CARE EDUCATION/TRAINING PROGRAM

## 2024-06-10 PROCEDURE — 1036F TOBACCO NON-USER: CPT | Performed by: STUDENT IN AN ORGANIZED HEALTH CARE EDUCATION/TRAINING PROGRAM

## 2024-06-10 PROCEDURE — G8427 DOCREV CUR MEDS BY ELIG CLIN: HCPCS | Performed by: STUDENT IN AN ORGANIZED HEALTH CARE EDUCATION/TRAINING PROGRAM

## 2024-06-10 PROCEDURE — 3078F DIAST BP <80 MM HG: CPT | Performed by: STUDENT IN AN ORGANIZED HEALTH CARE EDUCATION/TRAINING PROGRAM

## 2024-06-10 PROCEDURE — 1123F ACP DISCUSS/DSCN MKR DOCD: CPT | Performed by: STUDENT IN AN ORGANIZED HEALTH CARE EDUCATION/TRAINING PROGRAM

## 2024-06-10 RX ORDER — FERROUS SULFATE 325(65) MG
325 TABLET ORAL EVERY OTHER DAY
Qty: 60 TABLET | Refills: 1 | Status: SHIPPED | OUTPATIENT
Start: 2024-06-10 | End: 2024-06-12 | Stop reason: SDUPTHER

## 2024-06-10 RX ORDER — SIMVASTATIN 20 MG
20 TABLET ORAL DAILY
Qty: 90 TABLET | Refills: 3 | Status: SHIPPED | OUTPATIENT
Start: 2024-06-10 | End: 2024-06-12 | Stop reason: SDUPTHER

## 2024-06-10 RX ORDER — COLCHICINE 0.6 MG/1
TABLET ORAL
Qty: 90 TABLET | Refills: 0 | Status: SHIPPED | OUTPATIENT
Start: 2024-06-10 | End: 2024-06-12 | Stop reason: SDUPTHER

## 2024-06-10 RX ORDER — TIRZEPATIDE 2.5 MG/.5ML
2.5 INJECTION, SOLUTION SUBCUTANEOUS WEEKLY
Qty: 6 ML | Refills: 1 | Status: SHIPPED | OUTPATIENT
Start: 2024-06-10 | End: 2024-06-12 | Stop reason: SDUPTHER

## 2024-06-10 RX ORDER — CARVEDILOL 6.25 MG/1
6.25 TABLET ORAL 2 TIMES DAILY
Qty: 180 TABLET | Refills: 3 | Status: SHIPPED | OUTPATIENT
Start: 2024-06-10 | End: 2024-06-12 | Stop reason: SDUPTHER

## 2024-06-10 RX ORDER — DAPAGLIFLOZIN 10 MG/1
10 TABLET, FILM COATED ORAL EVERY MORNING
Qty: 90 TABLET | Refills: 1 | Status: SHIPPED | OUTPATIENT
Start: 2024-06-10 | End: 2024-06-12 | Stop reason: SDUPTHER

## 2024-06-10 RX ORDER — GLIMEPIRIDE 1 MG/1
4 TABLET ORAL
Qty: 360 TABLET | Refills: 1 | Status: SHIPPED | OUTPATIENT
Start: 2024-06-10 | End: 2024-06-10

## 2024-06-10 RX ORDER — TORSEMIDE 20 MG/1
40 TABLET ORAL 2 TIMES DAILY
Qty: 360 TABLET | Refills: 3 | Status: SHIPPED | OUTPATIENT
Start: 2024-06-10 | End: 2024-06-12 | Stop reason: SDUPTHER

## 2024-06-10 RX ORDER — GLIMEPIRIDE 1 MG/1
1 TABLET ORAL
Qty: 90 TABLET | Refills: 1 | Status: SHIPPED | OUTPATIENT
Start: 2024-06-10 | End: 2024-06-12 | Stop reason: SDUPTHER

## 2024-06-10 RX ORDER — GABAPENTIN 300 MG/1
300 CAPSULE ORAL 3 TIMES DAILY
Qty: 270 CAPSULE | Refills: 1 | Status: SHIPPED | OUTPATIENT
Start: 2024-06-10 | End: 2024-06-12 | Stop reason: SDUPTHER

## 2024-06-10 NOTE — PROGRESS NOTES
Asad Coe DPM at [facility] OR    TOE AMPUTATION Right 04/14/2022    RIGHT 2ND TOE AMPUTATION performed by Asad Coe DPM at [facility] OR    UPPER GASTROINTESTINAL ENDOSCOPY N/A 09/17/2020    ENTEROSCOPY PUSH BIOPSY performed by Félix Huddleston MD at [facility] ASC OR    UPPER GASTROINTESTINAL ENDOSCOPY N/A 03/04/2021    EGD DIAGNOSTIC ONLY performed by Félix Huddleston MD at [facility] ENDOSCOPY    VASCULAR SURGERY  2012    \"have stents in both legs- done in [place]                 CURRENT MEDICATIONS  Current Outpatient Medications   Medication Sig Dispense Refill    apixaban (ELIQUIS) 5 MG TABS tablet Take 1 tablet by mouth 2 times daily 28 tablet 0    carboxymethylcellulose (REFRESH PLUS) 0.5 % SOLN ophthalmic solution as needed       aspirin 81 MG tablet Take 1 tablet by mouth daily      apixaban (ELIQUIS) 5 MG TABS tablet Take 1 tablet by mouth 2 times daily 180 tablet 3    carvedilol (COREG) 6.25 MG tablet Take 1 tablet by mouth 2 times daily 180 tablet 3    colchicine (COLCRYS) 0.6 MG tablet Take 1 tablet daily 90 tablet 0    dapagliflozin (FARXIGA) 10 MG tablet Take 1 tablet by mouth every morning 90 tablet 1    ferrous sulfate (IRON 325) 325 (65 Fe) MG tablet Take 1 tablet by mouth every other day 60 tablet 1    gabapentin (NEURONTIN) 300 MG capsule Take 1 capsule by mouth 3 times daily for 180 days. 270 capsule 1    glimepiride (AMARYL) 1 MG tablet Take 1 tablet by mouth every morning (before breakfast) 90 tablet 1    simvastatin (ZOCOR) 20 MG tablet Take 1 tablet by mouth daily 90 tablet 3    Tirzepatide (MOUNJARO) 2.5 MG/0.5ML SOPN SC injection Inject 0.5 mLs into the skin once a week 6 mL 1    torsemide (DEMADEX) 20 MG tablet Take 2 tablets by mouth 2 times daily 360 tablet 3     No current facility-administered medications for this visit.       ALLERGIES  Allergies   Allergen Reactions    Spironolactone      CAUSES INCREASED K+    Tape [Adhesive Tape] Rash     SURGICAL TAPE       PHYSICAL EXAM    /70 (Site: Right

## 2024-06-12 ENCOUNTER — TELEPHONE (OUTPATIENT)
Dept: FAMILY MEDICINE CLINIC | Age: 74
End: 2024-06-12

## 2024-06-12 DIAGNOSIS — M10.9 ACUTE GOUT INVOLVING TOE, UNSPECIFIED CAUSE, UNSPECIFIED LATERALITY: ICD-10-CM

## 2024-06-12 DIAGNOSIS — E11.9 TYPE 2 DIABETES MELLITUS WITHOUT COMPLICATION, WITHOUT LONG-TERM CURRENT USE OF INSULIN (HCC): ICD-10-CM

## 2024-06-12 DIAGNOSIS — G62.9 PERIPHERAL POLYNEUROPATHY: ICD-10-CM

## 2024-06-12 DIAGNOSIS — D50.9 IRON DEFICIENCY ANEMIA, UNSPECIFIED IRON DEFICIENCY ANEMIA TYPE: ICD-10-CM

## 2024-06-12 DIAGNOSIS — E11.42 TYPE 2 DIABETES MELLITUS WITH DIABETIC POLYNEUROPATHY, WITHOUT LONG-TERM CURRENT USE OF INSULIN (HCC): ICD-10-CM

## 2024-06-12 DIAGNOSIS — I48.0 PAF (PAROXYSMAL ATRIAL FIBRILLATION) (HCC): ICD-10-CM

## 2024-06-12 DIAGNOSIS — I89.0 LYMPHEDEMA OF LEFT UPPER EXTREMITY: ICD-10-CM

## 2024-06-13 ENCOUNTER — CARE COORDINATION (OUTPATIENT)
Dept: CARE COORDINATION | Age: 74
End: 2024-06-13

## 2024-06-13 RX ORDER — GLIMEPIRIDE 1 MG/1
1 TABLET ORAL
Qty: 90 TABLET | Refills: 1 | Status: SHIPPED | OUTPATIENT
Start: 2024-06-13 | End: 2024-12-10

## 2024-06-13 RX ORDER — GABAPENTIN 300 MG/1
300 CAPSULE ORAL 3 TIMES DAILY
Qty: 270 CAPSULE | Refills: 1 | Status: SHIPPED | OUTPATIENT
Start: 2024-06-13 | End: 2024-12-10

## 2024-06-13 RX ORDER — TIRZEPATIDE 2.5 MG/.5ML
2.5 INJECTION, SOLUTION SUBCUTANEOUS WEEKLY
Qty: 6 ML | Refills: 1 | Status: SHIPPED | OUTPATIENT
Start: 2024-06-13 | End: 2024-12-10

## 2024-06-13 RX ORDER — FERROUS SULFATE 325(65) MG
325 TABLET ORAL EVERY OTHER DAY
Qty: 60 TABLET | Refills: 1 | Status: SHIPPED | OUTPATIENT
Start: 2024-06-13

## 2024-06-13 RX ORDER — COLCHICINE 0.6 MG/1
TABLET ORAL
Qty: 90 TABLET | Refills: 0 | Status: SHIPPED | OUTPATIENT
Start: 2024-06-13 | End: 2024-09-12

## 2024-06-13 RX ORDER — SIMVASTATIN 20 MG
20 TABLET ORAL DAILY
Qty: 90 TABLET | Refills: 3 | Status: SHIPPED | OUTPATIENT
Start: 2024-06-13 | End: 2025-06-08

## 2024-06-13 RX ORDER — DAPAGLIFLOZIN 10 MG/1
10 TABLET, FILM COATED ORAL EVERY MORNING
Qty: 90 TABLET | Refills: 1 | Status: SHIPPED | OUTPATIENT
Start: 2024-06-13

## 2024-06-13 RX ORDER — TORSEMIDE 20 MG/1
40 TABLET ORAL 2 TIMES DAILY
Qty: 360 TABLET | Refills: 3 | Status: SHIPPED | OUTPATIENT
Start: 2024-06-13

## 2024-06-13 RX ORDER — CARVEDILOL 6.25 MG/1
6.25 TABLET ORAL 2 TIMES DAILY
Qty: 180 TABLET | Refills: 3 | Status: SHIPPED | OUTPATIENT
Start: 2024-06-13

## 2024-06-13 NOTE — CARE COORDINATION
ACM [facility] the [facility] for Care Management (CM)/RPM:     \"Jamir Ziegler,     This patient was referred by PCP for RPM/ACM enrollment. Please outreach to patient.     Thank you!\"     This ACM placed call to patient today for enrollment outreach, but patient did not answer. ACM left voice message with call back number provided. ACM also left regular ACM's contact information, as she will be back from [facility] on 6/17/24.      Patient's chronic conditions include CHF, HTN/CKD3b & T2DM.     Next scheduled enrollment outreach assigned to JONATHON Melendrez.

## 2024-06-14 ENCOUNTER — TELEPHONE (OUTPATIENT)
Dept: FAMILY MEDICINE CLINIC | Age: 74
End: 2024-06-14

## 2024-06-14 DIAGNOSIS — E11.42 TYPE 2 DIABETES MELLITUS WITH DIABETIC POLYNEUROPATHY, WITHOUT LONG-TERM CURRENT USE OF INSULIN (HCC): Primary | ICD-10-CM

## 2024-06-14 ASSESSMENT — ENCOUNTER SYMPTOMS
ABDOMINAL PAIN: 0
WHEEZING: 0
SORE THROAT: 0
NAUSEA: 0
SHORTNESS OF BREATH: 0

## 2024-06-14 NOTE — TELEPHONE ENCOUNTER
Spoke with patient to let him know [facility] is no longer carrying GLP-1 meds and to find a pharmacy that he would like his Mounjaro sent to, patient verbalized understanding, says he will call back with which pharmacy he would like it sent to.

## 2024-06-28 ENCOUNTER — CARE COORDINATION (OUTPATIENT)
Dept: CARE COORDINATION | Age: 74
End: 2024-06-28

## 2024-07-01 ENCOUNTER — HOSPITAL ENCOUNTER (OUTPATIENT)
Age: 74
Discharge: HOME | End: 2024-07-01
Payer: MEDICARE

## 2024-07-01 DIAGNOSIS — R79.89 ELEVATED LFTS: ICD-10-CM

## 2024-07-01 LAB
ALBUMIN SERPL-MCNC: 4.3 GM/DL (ref 3.4–5)
ALP BLD-CCNC: 168 IU/L (ref 40–129)
ALT SERPL-CCNC: 11 U/L (ref 10–40)
AST SERPL-CCNC: 21 IU/L (ref 15–37)
BILIRUB SERPL-MCNC: 0.6 MG/DL (ref 0–1)
BILIRUBIN DIRECT: 0.2 MG/DL (ref 0–0.3)
BILIRUBIN, INDIRECT: 0.4 MG/DL (ref 0–0.7)
TOTAL PROTEIN: 7.7 GM/DL (ref 6.4–8.2)

## 2024-07-01 PROCEDURE — 84080 ASSAY ALKALINE PHOSPHATASES: CPT

## 2024-07-01 PROCEDURE — 80076 HEPATIC FUNCTION PANEL: CPT

## 2024-07-01 PROCEDURE — 84075 ASSAY ALKALINE PHOSPHATASE: CPT

## 2024-07-01 PROCEDURE — 86256 FLUORESCENT ANTIBODY TITER: CPT

## 2024-07-01 PROCEDURE — 36415 COLL VENOUS BLD VENIPUNCTURE: CPT

## 2024-07-02 NOTE — RESULT ENCOUNTER NOTE
Isaías Snell your hepatic function panel looks pretty good except for your alkaline phosphatase it seems to be uptrending from 3 months ago.  Just recommend you keep a close follow-up with us in the office

## 2024-07-03 ENCOUNTER — NURSE ONLY (OUTPATIENT)
Dept: CARDIOLOGY CLINIC | Age: 74
End: 2024-07-03

## 2024-07-03 ENCOUNTER — CARE COORDINATION (OUTPATIENT)
Dept: CARE COORDINATION | Age: 74
End: 2024-07-03

## 2024-07-03 ENCOUNTER — HOSPITAL ENCOUNTER (OUTPATIENT)
Age: 74
Discharge: HOME | End: 2024-07-03
Payer: MEDICARE

## 2024-07-03 DIAGNOSIS — I48.0 PAROXYSMAL ATRIAL FIBRILLATION (HCC): ICD-10-CM

## 2024-07-03 DIAGNOSIS — I48.0 PAROXYSMAL ATRIAL FIBRILLATION (HCC): Primary | ICD-10-CM

## 2024-07-03 LAB
ANION GAP SERPL CALCULATED.3IONS-SCNC: 14 MMOL/L (ref 7–16)
APTT: 39.6 SECONDS (ref 25.1–37.1)
BUN SERPL-MCNC: 37 MG/DL (ref 6–23)
CALCIUM SERPL-MCNC: 9.1 MG/DL (ref 8.3–10.6)
CHLORIDE BLD-SCNC: 101 MMOL/L (ref 99–110)
CO2: 24 MMOL/L (ref 21–32)
CREAT SERPL-MCNC: 1.5 MG/DL (ref 0.9–1.3)
GFR, ESTIMATED: 48 ML/MIN/1.73M2
GLUCOSE SERPL-MCNC: 126 MG/DL (ref 70–99)
HCT VFR BLD CALC: 39.4 % (ref 42–52)
HEMOGLOBIN: 11.9 GM/DL (ref 13.5–18)
INR BLD: 1.7 INDEX
MAGNESIUM: 2.3 MG/DL (ref 1.8–2.4)
MCH RBC QN AUTO: 26.2 PG (ref 27–31)
MCHC RBC AUTO-ENTMCNC: 30.2 % (ref 32–36)
MCV RBC AUTO: 86.6 FL (ref 78–100)
MITOCHONDRIA M2 IGG SER-ACNC: 4.7 UNITS (ref 0–24.9)
PDW BLD-RTO: 17.6 % (ref 11.7–14.9)
PHOSPHORUS: 4.2 MG/DL (ref 2.5–4.9)
PLATELET # BLD: 155 K/CU MM (ref 140–440)
PMV BLD AUTO: 10.6 FL (ref 7.5–11.1)
POTASSIUM SERPL-SCNC: 4.2 MMOL/L (ref 3.5–5.1)
PROTHROMBIN TIME: 20.5 SECONDS (ref 11.7–14.5)
RBC # BLD: 4.55 M/CU MM (ref 4.6–6.2)
SODIUM BLD-SCNC: 139 MMOL/L (ref 135–145)
WBC # BLD: 4.5 K/CU MM (ref 4–10.5)

## 2024-07-03 PROCEDURE — 85730 THROMBOPLASTIN TIME PARTIAL: CPT

## 2024-07-03 PROCEDURE — 83735 ASSAY OF MAGNESIUM: CPT

## 2024-07-03 PROCEDURE — 85610 PROTHROMBIN TIME: CPT

## 2024-07-03 PROCEDURE — 80048 BASIC METABOLIC PNL TOTAL CA: CPT

## 2024-07-03 PROCEDURE — 84100 ASSAY OF PHOSPHORUS: CPT

## 2024-07-03 PROCEDURE — 36415 COLL VENOUS BLD VENIPUNCTURE: CPT

## 2024-07-03 PROCEDURE — 85027 COMPLETE CBC AUTOMATED: CPT

## 2024-07-03 NOTE — PROGRESS NOTES
Patient here in office and educated on 7/3/2024, scheduled for MABLE on 7/8/2024 @ 1000, with arrival @ 0900, @ [facility]; consents signed. Copy of orders given for labs due 7/3/2024 at [facility]. Instructions given to patient to :NPO after midnight including water the night before procedure; call hospital at [phone] to pre-register. May take rest of morning medications day of procedure except the following; Hold Torsemide the morning of the procedure. Continue to take Eliquis as directed. Patient voiced understanding. Copies of consent & info scanned in chart.

## 2024-07-03 NOTE — CARE COORDINATION
Call to pt for acm outreach. Explained role and denies needs. Reports he has been regularly Working out and played 18 holes of golf this week. Reports he has trulicity and monjauro at ths time, enough for 5 weeks. Reports Both monjauro and Trulicity on back order through [facility] . WIll call pcp office If still not able to obtain when he gets lower on supply in a few weeks. Denies need for further acm outreach. Advised to call with any needs or symptoms, voices understanding.

## 2024-07-04 LAB
ALP BONE SERPL-CCNC: 49 U/L (ref 0–55)
ALP LIVER SERPL-CCNC: 115 U/L (ref 0–94)
ALP OTHER SERPL-CCNC: 0 U/L
ALP SERPL-CCNC: 164 U/L (ref 40–120)

## 2024-07-08 ENCOUNTER — HOSPITAL ENCOUNTER (OUTPATIENT)
Dept: NON INVASIVE DIAGNOSTICS | Age: 74
Discharge: HOME | End: 2024-07-10
Attending: INTERNAL MEDICINE
Payer: MEDICARE

## 2024-07-08 VITALS
OXYGEN SATURATION: 97 % | DIASTOLIC BLOOD PRESSURE: 89 MMHG | BODY MASS INDEX: 28.63 KG/M2 | WEIGHT: 200 LBS | HEART RATE: 70 BPM | RESPIRATION RATE: 22 BRPM | SYSTOLIC BLOOD PRESSURE: 139 MMHG | HEIGHT: 70 IN

## 2024-07-08 DIAGNOSIS — I48.0 PAROXYSMAL ATRIAL FIBRILLATION (HCC): ICD-10-CM

## 2024-07-08 LAB — ECHO BSA: 2.12 M2

## 2024-07-08 PROCEDURE — 7100000011 HC PHASE II RECOVERY - ADDTL 15 MIN: Performed by: INTERNAL MEDICINE

## 2024-07-08 PROCEDURE — 6360000002 HC RX W HCPCS: Performed by: INTERNAL MEDICINE

## 2024-07-08 PROCEDURE — 93320 DOPPLER ECHO COMPLETE: CPT

## 2024-07-08 PROCEDURE — 6370000000 HC RX 637 (ALT 250 FOR IP): Performed by: INTERNAL MEDICINE

## 2024-07-08 PROCEDURE — 7100000010 HC PHASE II RECOVERY - FIRST 15 MIN: Performed by: INTERNAL MEDICINE

## 2024-07-08 PROCEDURE — 93325 DOPPLER ECHO COLOR FLOW MAPG: CPT | Performed by: INTERNAL MEDICINE

## 2024-07-08 PROCEDURE — 93312 ECHO TRANSESOPHAGEAL: CPT | Performed by: INTERNAL MEDICINE

## 2024-07-08 PROCEDURE — 93320 DOPPLER ECHO COMPLETE: CPT | Performed by: INTERNAL MEDICINE

## 2024-07-08 PROCEDURE — 99152 MOD SED SAME PHYS/QHP 5/>YRS: CPT | Performed by: INTERNAL MEDICINE

## 2024-07-08 RX ORDER — LIDOCAINE HYDROCHLORIDE 20 MG/ML
SOLUTION OROPHARYNGEAL PRN
Status: COMPLETED | OUTPATIENT
Start: 2024-07-08 | End: 2024-07-08

## 2024-07-08 RX ORDER — MIDAZOLAM HYDROCHLORIDE 1 MG/ML
INJECTION INTRAMUSCULAR; INTRAVENOUS PRN
Status: COMPLETED | OUTPATIENT
Start: 2024-07-08 | End: 2024-07-08

## 2024-07-08 RX ORDER — CLOPIDOGREL BISULFATE 75 MG/1
75 TABLET ORAL DAILY
Qty: 90 TABLET | Refills: 1 | Status: SHIPPED | OUTPATIENT
Start: 2024-07-08

## 2024-07-08 RX ORDER — FENTANYL CITRATE 50 UG/ML
INJECTION, SOLUTION INTRAMUSCULAR; INTRAVENOUS PRN
Status: COMPLETED | OUTPATIENT
Start: 2024-07-08 | End: 2024-07-08

## 2024-07-08 RX ADMIN — LIDOCAINE HYDROCHLORIDE 15 ML: 20 SOLUTION ORAL at 09:38

## 2024-07-08 RX ADMIN — MIDAZOLAM 1 MG: 1 INJECTION INTRAMUSCULAR; INTRAVENOUS at 09:41

## 2024-07-08 RX ADMIN — FENTANYL CITRATE 25 MCG: 50 INJECTION, SOLUTION INTRAMUSCULAR; INTRAVENOUS at 09:41

## 2024-07-08 ASSESSMENT — ENCOUNTER SYMPTOMS
COLOR CHANGE: 0
PHOTOPHOBIA: 0
NAUSEA: 0
ABDOMINAL PAIN: 0
DIARRHEA: 0
BLOOD IN STOOL: 0
SHORTNESS OF BREATH: 1
EYE PAIN: 0
BACK PAIN: 0
COUGH: 0
WHEEZING: 0
CHEST TIGHTNESS: 0
CONSTIPATION: 0
VOMITING: 0

## 2024-07-08 NOTE — PROCEDURES
Procedure     Conscious sedation for MABLE     ASA 3  Mallampati 3     After obtaining form consent patient was brought to the holding area and underwent conscious sedation with Versed , fentanyl, remained hemodynamically stable  Patient is hemodynamic status, neuro status was evaluated before conscious sedation    Patient's blood pressure, heart rate, pulse ox, neuro status was monitored for 30 minutes post procedure and she remained stable    The details of the conscious sedation is in the flowsheet in epic

## 2024-07-08 NOTE — H&P
Electrophysiology h&p Note      Reason for consultation: LV lead placement    Chief complaint : HERE FOR veronica    Referring physician:       Primary care physician: Kaleb Mitchell DO      History of Present Illness:     Today visit (07/08/24)    Patient here for veronica. No change in H&P noted from previous clinic visit.     Previous visit (5/31/2024)    Chief Complaint   Patient presents with    Follow-up     Patient denies any cardiac symptoms - Denies chest pain, shortness of breath, palpitations, syncope or presyncope, edema   Patient denies tobacco.   Patient states he drinks beer occasionally.   Patient states he drinks 2 cups of coffee daily.   Patient exercises daily.     Patient here to discuss about wanting to come down on Eliquis as he is having a lot of bruising and easy bleeding with minimal cuts or injuries.      Previous visit:     Patient is a 71-year-old male with history of carotid artery disease s/p left carotid endarterectomy, severe aortic stenosis, renal insufficiency, coronary artery disease, diabetes mellitus type 2, s/p pacemaker, hypertension, persistent atrial fibrillation, obstructive sleep apnea on CPAP, hyperlipidemia presented for AVR and maze and two-vessel CABG.   He presents for placement of epicardial LV lead placement.  Patient previously noted to have difficult extraction of pacemaker leads due to calcification of the veins.  It was recommended when patient needed BiV upgrade that he would have LV lead placement epicardially by CT surgery.        Pastmedical history:   Past Medical History:   Diagnosis Date    Arrhythmia     Pacemaker placed aprox 5 years ago for A Fib per patient    Arthritis 12/2013    rt wrist    Atrial fibrillation (HCC)     on Xarelto - Dr. Farias    CAD (coronary artery disease) 06/18/2014    see dr Farias    Chronic kidney disease, stage III (moderate) (HCC) 07/07/2016    Critical illness myopathy

## 2024-07-15 ENCOUNTER — OFFICE VISIT (OUTPATIENT)
Dept: CARDIOLOGY CLINIC | Age: 74
End: 2024-07-15
Payer: MEDICARE

## 2024-07-15 VITALS
SYSTOLIC BLOOD PRESSURE: 130 MMHG | WEIGHT: 202 LBS | HEIGHT: 71 IN | BODY MASS INDEX: 28.28 KG/M2 | DIASTOLIC BLOOD PRESSURE: 78 MMHG | OXYGEN SATURATION: 96 % | HEART RATE: 72 BPM

## 2024-07-15 DIAGNOSIS — I48.0 PAROXYSMAL ATRIAL FIBRILLATION (HCC): Primary | ICD-10-CM

## 2024-07-15 PROCEDURE — G8417 CALC BMI ABV UP PARAM F/U: HCPCS | Performed by: INTERNAL MEDICINE

## 2024-07-15 PROCEDURE — 1123F ACP DISCUSS/DSCN MKR DOCD: CPT | Performed by: INTERNAL MEDICINE

## 2024-07-15 PROCEDURE — 3075F SYST BP GE 130 - 139MM HG: CPT | Performed by: INTERNAL MEDICINE

## 2024-07-15 PROCEDURE — G8427 DOCREV CUR MEDS BY ELIG CLIN: HCPCS | Performed by: INTERNAL MEDICINE

## 2024-07-15 PROCEDURE — 99214 OFFICE O/P EST MOD 30 MIN: CPT | Performed by: INTERNAL MEDICINE

## 2024-07-15 PROCEDURE — 3078F DIAST BP <80 MM HG: CPT | Performed by: INTERNAL MEDICINE

## 2024-07-15 PROCEDURE — 1036F TOBACCO NON-USER: CPT | Performed by: INTERNAL MEDICINE

## 2024-07-15 NOTE — PROGRESS NOTES
Mayo  is a  Established patient  ,74 y.o.   male here for evaluation of the following chief complaint(s):    cad        SUBJECTIVE/OBJECTIVE:  HPI : h/o  Cad, vhd, PPM  now here  Post CABG, doing better has no CP, SOB    Review of Systems    neg    Vitals:    07/15/24 1059   BP: 130/78   Site: Left Upper Arm   Position: Sitting   Cuff Size: Medium Adult   Pulse: 72   SpO2: 96%   Weight: 91.6 kg (202 lb)   Height: 1.803 m (5' 11\")         /78 (Site: Left Upper Arm, Position: Sitting, Cuff Size: Medium Adult)   Pulse 72   Ht 1.803 m (5' 11\")   Wt 91.6 kg (202 lb)   SpO2 96%   BMI 28.17 kg/m²       4/8/2021     1:04 PM   Patient-Reported Vitals   Patient-Reported Weight 185   Patient-Reported Height 5'11\"   Patient-Reported Systolic 114 mmHg   Patient-Reported Diastolic 66 mmHg   Patient-Reported Pulse 67   Patient-Reported Temperature 98.2     Wt Readings from Last 3 Encounters:   07/15/24 91.6 kg (202 lb)   07/08/24 90.7 kg (200 lb)   06/10/24 90.7 kg (200 lb)     Body mass index is 28.17 kg/m².    Physical Exam     Neck: JVD  neg    Lungs : clear    Cardio : Si and S2 audilble  no    Ext: edema  no    All pertinent data reviewed      Meds : reviewed           ASSESSMENT/PLAN:               -     CORONARY ARTERY DISEASE:  asymptomatic     All available  tests in chart reviewed. Management discussed .  Testing ordered  no  On aspirin history of CABG we will continue          CAD CABG with LIMA to the LAD and vein graft to the RCA        2/21               CABG CORONARY ARTERY BYPASS X2 WITH LIMA, AORTIC VALVE REPLACEMENT AND AORTIC ROOT REPAIR, INTRAOPERATIVE MABLE, INDUCED HYPOTHERMIA, LEFT LEG ENDOVEIN HARVEST, LEFT ATRIAL CLIP, AND CRYO PROCEDURE   FRIEND to LAD  SVG to RCA  Last cath in [place] , was OK  3/24    -  LIPID MANAGEMENT:  Importance of lipid levels discussed with patient   and patient was given dietary advice. NCEP- ATP III guidelines reviewed with patient.    -   Changes  in medicines made: No

## 2024-07-15 NOTE — PATIENT INSTRUCTIONS
Please be informed that if you contact our office outside of normal business hours the physician on call cannot help with any scheduling or rescheduling issues, procedure instruction questions or any type of medication issue.    We advise you for any urgent/emergency that you go to the nearest emergency room!    PLEASE CALL OUR OFFICE DURING NORMAL BUSINESS HOURS    Monday - Friday   8 am to 5 pm    [place]: [phone]    [place]: [phone]    [place]:  [phone]  We are committed to providing you the best care possible.    If you receive a survey after visiting one of our offices, please take time to share your experience concerning your physician office visit.  These surveys are confidential and no health information about you is shared.    We are eager to improve for you and we are counting on your feedback to help make that happen.      /**It is YOUR responsibilty to bring medication bottles and/or updated medication list to EACH APPOINTMENT. This will allow us to better serve you and all your healthcare needs**  Thank you for allowing us to care for you today!   We want to ensure we can follow your treatment plan and we strive to give you the best outcomes and experience possible.   If you ever have a life threatening emergency and call 911 - for an ambulance (EMS)   Our providers can only care for you at:   [facility] or [facility].   Even if you have someone take you or you drive yourself we can only care for you in a [facility] facility. Our providers are not setup at the other healthcare locations!

## 2024-07-17 ENCOUNTER — TELEMEDICINE (OUTPATIENT)
Dept: FAMILY MEDICINE CLINIC | Age: 74
End: 2024-07-17
Payer: MEDICARE

## 2024-07-17 DIAGNOSIS — Z00.00 MEDICARE ANNUAL WELLNESS VISIT, SUBSEQUENT: Primary | ICD-10-CM

## 2024-07-17 PROCEDURE — G0439 PPPS, SUBSEQ VISIT: HCPCS | Performed by: STUDENT IN AN ORGANIZED HEALTH CARE EDUCATION/TRAINING PROGRAM

## 2024-07-17 PROCEDURE — 1123F ACP DISCUSS/DSCN MKR DOCD: CPT | Performed by: STUDENT IN AN ORGANIZED HEALTH CARE EDUCATION/TRAINING PROGRAM

## 2024-07-17 SDOH — HEALTH STABILITY: PHYSICAL HEALTH: ON AVERAGE, HOW MANY MINUTES DO YOU ENGAGE IN EXERCISE AT THIS LEVEL?: 40 MIN

## 2024-07-17 SDOH — HEALTH STABILITY: PHYSICAL HEALTH: ON AVERAGE, HOW MANY DAYS PER WEEK DO YOU ENGAGE IN MODERATE TO STRENUOUS EXERCISE (LIKE A BRISK WALK)?: 4 DAYS

## 2024-07-17 ASSESSMENT — LIFESTYLE VARIABLES
HOW OFTEN DO YOU HAVE A DRINK CONTAINING ALCOHOL: 2-4 TIMES A MONTH
HOW MANY STANDARD DRINKS CONTAINING ALCOHOL DO YOU HAVE ON A TYPICAL DAY: 1 OR 2
HOW OFTEN DO YOU HAVE A DRINK CONTAINING ALCOHOL: 3
HOW MANY STANDARD DRINKS CONTAINING ALCOHOL DO YOU HAVE ON A TYPICAL DAY: 1 OR 2
HOW OFTEN DO YOU HAVE A DRINK CONTAINING ALCOHOL: 2-4 TIMES A MONTH
HOW OFTEN DO YOU HAVE SIX OR MORE DRINKS ON ONE OCCASION: 1
HOW MANY STANDARD DRINKS CONTAINING ALCOHOL DO YOU HAVE ON A TYPICAL DAY: 1

## 2024-07-17 ASSESSMENT — PATIENT HEALTH QUESTIONNAIRE - PHQ9
SUM OF ALL RESPONSES TO PHQ QUESTIONS 1-9: 0
SUM OF ALL RESPONSES TO PHQ9 QUESTIONS 1 & 2: 0
SUM OF ALL RESPONSES TO PHQ QUESTIONS 1-9: 0
SUM OF ALL RESPONSES TO PHQ9 QUESTIONS 1 & 2: 0
1. LITTLE INTEREST OR PLEASURE IN DOING THINGS: NOT AT ALL
SUM OF ALL RESPONSES TO PHQ QUESTIONS 1-9: 0
1. LITTLE INTEREST OR PLEASURE IN DOING THINGS: NOT AT ALL
SUM OF ALL RESPONSES TO PHQ QUESTIONS 1-9: 0
2. FEELING DOWN, DEPRESSED OR HOPELESS: NOT AT ALL
2. FEELING DOWN, DEPRESSED OR HOPELESS: NOT AT ALL

## 2024-07-17 NOTE — PATIENT INSTRUCTIONS
cereals and breads, oatmeal, beans, brown rice, citrus fruits, and apples.     Eat lean proteins. Heart-healthy proteins include seafood, lean meats and poultry, eggs, beans, peas, nuts, seeds, and soy products.     Limit drinks and foods with added sugar. These include candy, desserts, and soda pop.   Heart-healthy lifestyle    If your doctor recommends it, get more exercise. For many people, walking is a good choice. Or you may want to swim, bike, or do other activities. Bit by bit, increase the time you're active every day. Try for at least 30 minutes on most days of the week.     Try to quit or cut back on using tobacco and other nicotine products. This includes smoking and vaping. If you need help quitting, talk to your doctor about stop-smoking programs and medicines. These can increase your chances of quitting for good. Quitting is one of the most important things you can do to protect your heart. It is never too late to quit. Try to avoid secondhand smoke too.     Stay at a weight that's healthy for you. Talk to your doctor if you need help losing weight.     Try to get 7 to 9 hours of sleep each night.     Limit alcohol to 2 drinks a day for men and 1 drink a day for women. Too much alcohol can cause health problems.     Manage other health problems such as diabetes, high blood pressure, and high cholesterol. If you think you may have a problem with alcohol or drug use, talk to your doctor.   Medicines    Take your medicines exactly as prescribed. Call your doctor if you think you are having a problem with your medicine.     If your doctor recommends aspirin, take the amount directed each day. Make sure you take aspirin and not another kind of pain reliever, such as acetaminophen (Tylenol).   When should you call for help?   Call 911 if you have symptoms of a heart attack. These may include:    Chest pain or pressure, or a strange feeling in the chest.     Sweating.     Shortness of breath.     Pain,

## 2024-07-17 NOTE — PROGRESS NOTES
Medicare Annual Wellness Visit    Mayo Mejia is here for Medicare AWV    Assessment & Plan   Medicare annual wellness visit, subsequent  Recommendations for Preventive Services Due: see orders and patient instructions/AVS.  Recommended screening schedule for the next 5-10 years is provided to the patient in written form: see Patient Instructions/AVS.     No follow-ups on file.     Subjective       Patient's complete Health Risk Assessment and screening values have been reviewed and are found in Flowsheets. The following problems were reviewed today and where indicated follow up appointments were made and/or referrals ordered.    No Positive Risk Factors identified today.    *Patient was unable to get his camera working today for the virtual visit, so we did audio only. He could see me, but I couldn't see him*                                    Objective    Patient-Reported Vitals  No data recorded     Unable to obtain 3 vital signs due to patient not having equipment to take blood pressure/temperature.                Allergies   Allergen Reactions    Spironolactone      CAUSES INCREASED K+    Tape [Adhesive Tape] Rash     SURGICAL TAPE     Prior to Visit Medications    Medication Sig Taking? Authorizing Provider   Dulaglutide (TRULICITY SC) Inject 4.5 mg into the skin once a week Yes Provider, MD Abel   clopidogrel (PLAVIX) 75 MG tablet Take 1 tablet by mouth daily Yes Zander Fox MD   carvedilol (COREG) 6.25 MG tablet Take 1 tablet by mouth 2 times daily Yes Kaleb Mitchell DO   colchicine (COLCRYS) 0.6 MG tablet Take 1 tablet daily Yes Kaleb Mitchell DO   dapagliflozin (FARXIGA) 10 MG tablet Take 1 tablet by mouth every morning Yes Kaleb Mitchell DO   ferrous sulfate (IRON 325) 325 (65 Fe) MG tablet Take 1 tablet by mouth every other day Yes Kaleb Mitchell DO   gabapentin (NEURONTIN) 300 MG capsule Take 1 capsule by mouth 3 times daily for 180 days. Yes Kaleb Mitchell, DO

## 2024-07-19 NOTE — PROGRESS NOTES
Surgery @ [facility] on 07/29/24 you will be called 07/26/24 with times    NOTHING TO EAT OR DRINK AFTER MIDNIGHT DAY OF SURGERY    1. Enter thru the hospital main entrance on day of surgery, check in at the Information Desk. If you arrive prior to 6:00am, enter thru the ER entrance.    2. Follow the directions as prescribed by the doctor for your procedure and medications.         Morning of surgery take: Coreg, Gabapentin         Stop vitamins, supplements and NSAIDS:  stop Plavix 07/25/24, stop Farxiga 07/27/24, stop Trulicity 07/20/24, hold Glimipiride day of surgery.    3. Check with your Doctor regarding stopping blood thinners and follow their instructions.    4. Do not smoke, vape or use chewing tobacco morning of surgery. Do not drink any alcoholic beverages 24 hours prior to surgery.       This includes NA Beer. No street drugs 7 days prior to surgery.    5. If you have dentures, contacts of glasses they will be removed before going to the OR; please bring a case.    6. Please bring picture ID, insurance card, paperwork from the doctor’s office (H & P, Consent, & card for implantable devices).    7. Take a shower with an antibacterial soap the night before surgery and the morning of surgery. Do not put anything on your skin      After your morning shower.    8. You will need a responsible adult to drive you home and check on you after surgery.

## 2024-07-21 PROCEDURE — 93296 REM INTERROG EVL PM/IDS: CPT | Performed by: INTERNAL MEDICINE

## 2024-07-21 PROCEDURE — 93297 REM INTERROG DEV EVAL ICPMS: CPT | Performed by: INTERNAL MEDICINE

## 2024-07-21 PROCEDURE — 93295 DEV INTERROG REMOTE 1/2/MLT: CPT | Performed by: INTERNAL MEDICINE

## 2024-07-24 ENCOUNTER — PROCEDURE VISIT (OUTPATIENT)
Dept: CARDIOLOGY CLINIC | Age: 74
End: 2024-07-24
Payer: MEDICARE

## 2024-07-24 DIAGNOSIS — Z95.0 BIVENTRICULAR CARDIAC PACEMAKER IN SITU: Primary | ICD-10-CM

## 2024-07-26 ENCOUNTER — ANESTHESIA EVENT (OUTPATIENT)
Dept: ENDOSCOPY | Age: 74
End: 2024-07-26
Payer: MEDICARE

## 2024-07-26 NOTE — ANESTHESIA PRE PROCEDURE
Department of Anesthesiology  Preprocedure Note       Name:  Mayo Mejia   Age:  74 y.o.  :  1950                                          MRN:  [id number]         Date:  2024      Surgeon: Surgeon(s):  Alex Jiménez MD    Procedure: Procedure(s):  COLONOSCOPY DIAGNOSTIC  ESOPHAGOGASTRODUODENOSCOPY CONTROL HEMORRHAGE    Medications prior to admission:   Prior to Admission medications    Medication Sig Start Date End Date Taking? Authorizing Provider   Dulaglutide (TRULICITY SC) Inject 4.5 mg into the skin once a week    Provider, MD Abel   clopidogrel (PLAVIX) 75 MG tablet Take 1 tablet by mouth daily 24   Zander Fox MD   carvedilol (COREG) 6.25 MG tablet Take 1 tablet by mouth 2 times daily 24   Kaleb Mitchell DO   colchicine (COLCRYS) 0.6 MG tablet Take 1 tablet daily 24  Kaleb Mitchell DO   dapagliflozin (FARXIGA) 10 MG tablet Take 1 tablet by mouth every morning 24   Kaleb Mitchell DO   ferrous sulfate (IRON 325) 325 (65 Fe) MG tablet Take 1 tablet by mouth every other day 24   Kaleb Mitchell DO   gabapentin (NEURONTIN) 300 MG capsule Take 1 capsule by mouth 3 times daily for 180 days. 6/13/24 12/10/24  Kaleb Mitchell DO   glimepiride (AMARYL) 1 MG tablet Take 1 tablet by mouth every morning (before breakfast) 6/13/24 12/10/24  Kaleb Mitchell DO   simvastatin (ZOCOR) 20 MG tablet Take 1 tablet by mouth daily 24  Kaleb Mitchell DO   Tirzepatide (MOUNJARO) 2.5 MG/0.5ML SOPN SC injection Inject 0.5 mLs into the skin once a week  Patient not taking: Reported on 2024 6/13/24 12/10/24  Kaleb Mitchell DO   torsemide (DEMADEX) 20 MG tablet Take 2 tablets by mouth 2 times daily 24   Kaleb Mitchell DO   carboxymethylcellulose (REFRESH PLUS) 0.5 % SOLN ophthalmic solution as needed  20   Provider, MD Abel   aspirin 81 MG tablet Take 1 tablet by mouth daily    Provider, MD Abel       Current

## 2024-07-29 ENCOUNTER — HOSPITAL ENCOUNTER (OUTPATIENT)
Age: 74
Setting detail: OUTPATIENT SURGERY
Discharge: HOME | End: 2024-07-29
Attending: INTERNAL MEDICINE | Admitting: INTERNAL MEDICINE
Payer: MEDICARE

## 2024-07-29 ENCOUNTER — ANESTHESIA (OUTPATIENT)
Dept: ENDOSCOPY | Age: 74
End: 2024-07-29
Payer: MEDICARE

## 2024-07-29 VITALS
TEMPERATURE: 97.2 F | SYSTOLIC BLOOD PRESSURE: 167 MMHG | BODY MASS INDEX: 28.28 KG/M2 | OXYGEN SATURATION: 98 % | DIASTOLIC BLOOD PRESSURE: 92 MMHG | HEART RATE: 74 BPM | WEIGHT: 202 LBS | RESPIRATION RATE: 16 BRPM | HEIGHT: 71 IN

## 2024-07-29 DIAGNOSIS — D50.0 IRON DEFICIENCY ANEMIA DUE TO CHRONIC BLOOD LOSS: ICD-10-CM

## 2024-07-29 DIAGNOSIS — K55.20 AVM (ARTERIOVENOUS MALFORMATION) OF COLON: ICD-10-CM

## 2024-07-29 LAB — GLUCOSE BLD-MCNC: 132 MG/DL (ref 70–99)

## 2024-07-29 PROCEDURE — 82962 GLUCOSE BLOOD TEST: CPT

## 2024-07-29 PROCEDURE — 2580000003 HC RX 258: Performed by: NURSE PRACTITIONER

## 2024-07-29 PROCEDURE — 88305 TISSUE EXAM BY PATHOLOGIST: CPT | Performed by: PATHOLOGY

## 2024-07-29 PROCEDURE — 45388 COLONOSCOPY W/ABLATION: CPT | Performed by: INTERNAL MEDICINE

## 2024-07-29 PROCEDURE — 7100000010 HC PHASE II RECOVERY - FIRST 15 MIN: Performed by: INTERNAL MEDICINE

## 2024-07-29 PROCEDURE — 2500000003 HC RX 250 WO HCPCS: Performed by: NURSE ANESTHETIST, CERTIFIED REGISTERED

## 2024-07-29 PROCEDURE — 6360000002 HC RX W HCPCS: Performed by: NURSE ANESTHETIST, CERTIFIED REGISTERED

## 2024-07-29 PROCEDURE — 2720000010 HC SURG SUPPLY STERILE: Performed by: INTERNAL MEDICINE

## 2024-07-29 PROCEDURE — 3700000001 HC ADD 15 MINUTES (ANESTHESIA): Performed by: INTERNAL MEDICINE

## 2024-07-29 PROCEDURE — 7100000011 HC PHASE II RECOVERY - ADDTL 15 MIN: Performed by: INTERNAL MEDICINE

## 2024-07-29 PROCEDURE — 88342 IMHCHEM/IMCYTCHM 1ST ANTB: CPT | Performed by: PATHOLOGY

## 2024-07-29 PROCEDURE — 43239 EGD BIOPSY SINGLE/MULTIPLE: CPT | Performed by: INTERNAL MEDICINE

## 2024-07-29 PROCEDURE — 3609009900 HC COLONOSCOPY W/CONTROL BLEEDING ANY METHOD: Performed by: INTERNAL MEDICINE

## 2024-07-29 PROCEDURE — 3700000000 HC ANESTHESIA ATTENDED CARE: Performed by: INTERNAL MEDICINE

## 2024-07-29 PROCEDURE — 2709999900 HC NON-CHARGEABLE SUPPLY: Performed by: INTERNAL MEDICINE

## 2024-07-29 PROCEDURE — 3609012400 HC EGD TRANSORAL BIOPSY SINGLE/MULTIPLE: Performed by: INTERNAL MEDICINE

## 2024-07-29 RX ORDER — LIDOCAINE HYDROCHLORIDE 20 MG/ML
INJECTION, SOLUTION EPIDURAL; INFILTRATION; INTRACAUDAL; PERINEURAL PRN
Status: DISCONTINUED | OUTPATIENT
Start: 2024-07-29 | End: 2024-07-29 | Stop reason: SDUPTHER

## 2024-07-29 RX ORDER — SODIUM CHLORIDE 0.9 % (FLUSH) 0.9 %
5-40 SYRINGE (ML) INJECTION EVERY 12 HOURS SCHEDULED
Status: DISCONTINUED | OUTPATIENT
Start: 2024-07-29 | End: 2024-07-29 | Stop reason: HOSPADM

## 2024-07-29 RX ORDER — OMEPRAZOLE 40 MG/1
40 CAPSULE, DELAYED RELEASE ORAL
Qty: 90 CAPSULE | Refills: 0 | Status: SHIPPED | OUTPATIENT
Start: 2024-07-29

## 2024-07-29 RX ORDER — CLOPIDOGREL BISULFATE 75 MG/1
75 TABLET ORAL DAILY
Qty: 90 TABLET | Refills: 1
Start: 2024-07-31

## 2024-07-29 RX ORDER — SODIUM CHLORIDE, SODIUM LACTATE, POTASSIUM CHLORIDE, CALCIUM CHLORIDE 600; 310; 30; 20 MG/100ML; MG/100ML; MG/100ML; MG/100ML
INJECTION, SOLUTION INTRAVENOUS CONTINUOUS
Status: DISCONTINUED | OUTPATIENT
Start: 2024-07-29 | End: 2024-07-29 | Stop reason: HOSPADM

## 2024-07-29 RX ORDER — SODIUM CHLORIDE 9 MG/ML
INJECTION, SOLUTION INTRAVENOUS PRN
Status: DISCONTINUED | OUTPATIENT
Start: 2024-07-29 | End: 2024-07-29 | Stop reason: HOSPADM

## 2024-07-29 RX ORDER — PROPOFOL 10 MG/ML
INJECTION, EMULSION INTRAVENOUS PRN
Status: DISCONTINUED | OUTPATIENT
Start: 2024-07-29 | End: 2024-07-29 | Stop reason: SDUPTHER

## 2024-07-29 RX ORDER — SODIUM CHLORIDE 0.9 % (FLUSH) 0.9 %
5-40 SYRINGE (ML) INJECTION PRN
Status: DISCONTINUED | OUTPATIENT
Start: 2024-07-29 | End: 2024-07-29 | Stop reason: HOSPADM

## 2024-07-29 RX ADMIN — LIDOCAINE HYDROCHLORIDE 5 ML: 20 INJECTION, SOLUTION EPIDURAL; INFILTRATION; INTRACAUDAL; PERINEURAL at 12:43

## 2024-07-29 RX ADMIN — PROPOFOL 50 MG: 10 INJECTION, EMULSION INTRAVENOUS at 12:57

## 2024-07-29 RX ADMIN — PROPOFOL 50 MG: 10 INJECTION, EMULSION INTRAVENOUS at 13:11

## 2024-07-29 RX ADMIN — PROPOFOL 25 MG: 10 INJECTION, EMULSION INTRAVENOUS at 13:01

## 2024-07-29 RX ADMIN — PROPOFOL 50 MG: 10 INJECTION, EMULSION INTRAVENOUS at 12:52

## 2024-07-29 RX ADMIN — SODIUM CHLORIDE, POTASSIUM CHLORIDE, SODIUM LACTATE AND CALCIUM CHLORIDE: 600; 310; 30; 20 INJECTION, SOLUTION INTRAVENOUS at 11:37

## 2024-07-29 RX ADMIN — PROPOFOL 25 MG: 10 INJECTION, EMULSION INTRAVENOUS at 13:03

## 2024-07-29 RX ADMIN — PROPOFOL 50 MG: 10 INJECTION, EMULSION INTRAVENOUS at 12:46

## 2024-07-29 RX ADMIN — PROPOFOL 60 MG: 10 INJECTION, EMULSION INTRAVENOUS at 12:43

## 2024-07-29 RX ADMIN — PROPOFOL 50 MG: 10 INJECTION, EMULSION INTRAVENOUS at 13:14

## 2024-07-29 RX ADMIN — PROPOFOL 25 MG: 10 INJECTION, EMULSION INTRAVENOUS at 13:07

## 2024-07-29 RX ADMIN — PROPOFOL 25 MG: 10 INJECTION, EMULSION INTRAVENOUS at 13:05

## 2024-07-29 ASSESSMENT — PAIN SCALES - GENERAL: PAINLEVEL_OUTOF10: 0

## 2024-07-29 ASSESSMENT — PAIN - FUNCTIONAL ASSESSMENT: PAIN_FUNCTIONAL_ASSESSMENT: 0-10

## 2024-07-29 NOTE — PROGRESS NOTES
1230: PT ALERT AND ORIENTED X 4. WIFE AT BEDSIDE. PRE-OP QUESTIONS ASKED AND ANSWERED APPROPRIATELY BY PT. NAME, , ARMBAND VERIFIED, PROCEDURES, ALLERGIES, IMPLANTS, PREP STATUS, LAST PO INTAKE, HISTORY, MEDS.

## 2024-07-29 NOTE — PROGRESS NOTES
Pt returned to room from endo    Report received from som  Pt A&O, call light placed within reach, side rails up x's 2  1400 Dr Jiménez in room to speak to pt  1410 Discharge instructions given to pt and wife,both voiced understanding  1415 Pt escorted to main entrance via wheelchair for discharge with wife

## 2024-07-29 NOTE — ANESTHESIA POSTPROCEDURE EVALUATION
Department of Anesthesiology  Postprocedure Note    Patient: Mayo Mejia  MRN: [id number]  YOB: 1950  Date of evaluation: 7/29/2024    Procedure Summary       Date: 07/29/24 Room / Location: [place] [address] / [facility]    Anesthesia Start: 1240 Anesthesia Stop: 1319    Procedures:       COLONOSCOPY CONTROL HEMORRHAGE WITH ARGON PLASMA COAGULATION TO AVM X 3 PROXIMAL ASCENDING COLON      ESOPHAGOGASTRODUODENOSCOPY BIOPSY Diagnosis:       AVM (arteriovenous malformation) of colon      Iron deficiency anemia due to chronic blood loss      (AVM (arteriovenous malformation) of colon [K55.20])      (Iron deficiency anemia due to chronic blood loss [D50.0])    Surgeons: Alex Jiménez MD Responsible Provider: Anna Paulino MD    Anesthesia Type: MAC ASA Status: 4            Anesthesia Type: No value filed.    Sanju Phase I: Sanju Score: 10    Sanju Phase II: Sanju Score: 10    Anesthesia Post Evaluation    Patient location during evaluation: bedside  Patient participation: complete - patient participated  Level of consciousness: responsive to verbal stimuli  Pain score: 0  Airway patency: patent  Nausea & Vomiting: no nausea and no vomiting  Cardiovascular status: blood pressure returned to baseline  Respiratory status: acceptable and room air  Hydration status: euvolemic  Pain management: adequate    No notable events documented.

## 2024-07-29 NOTE — H&P
ENDOSCOPY   Pre-operative History and Physical    Patient: Mayo Mejia  : 1950      History Obtained From:  patient, electronic medical record        HISTORY OF PRESENT ILLNESS:                The patient is a 74 y.o. male with significant past medical history as below who presents for EGD/colonoscopy    Indication Iron def Anemia, Hx of AVMs     Past Medical History:        Diagnosis Date    Arrhythmia     Pacemaker placed aprox 5 years ago for A Fib per patient    Arthritis 2013    rt wrist    Atrial fibrillation (HCC)     on Xarelto - Dr. 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     H/O right and left heart catheterization 12/10/2020    DIFFUSE LAD DISEASE, Mild ECA Disease, Severe AS, Milf Pul HTN on RHC.    Heart failure (HCC) 2024    History of blood transfusion 2020    d/t anemia    History of transesophageal echocardiography (MABLE) 12/15/2020    Severe aortic stenosis (ANNA by planimetry: 0.778 cm sq).  Mild AR.    [place] (hard of hearing)     hearing [employer]    Hx of Doppler echocardiogram 2018    EF 50%  Mild LV hypertrophy. Mildly enlarged RA. Mod aortic valve calcification with mod AS. Mitral annular calcification is present. Mild AR, MR and TR. Mild

## 2024-07-29 NOTE — DISCHARGE INSTRUCTIONS
[facility]  [phone]    Do not drive, work around machines or use equipment.  Do not drink any alcoholic beverages.  Do not smoke while alone.  Avoid making important decisions.  Plan to spend a quiet, relaxed evening @ home.  Resume normal activities as you begin to feel better.  Eat lightly for your first meal, then gradually increase your diet to what is normal for you.  In case of nausea, avoid food and drink only clear liquids.  Resume food as nausea ceases.  Notify your surgeon if you experience fever, chills, large amount of bleeding, difficulty breathing, persistent nausea and vomiting or any other disturbing problem.  Call for a follow-up appointment with your surgeon.

## 2024-07-30 ENCOUNTER — TELEPHONE (OUTPATIENT)
Dept: CARDIOTHORACIC SURGERY | Age: 74
End: 2024-07-30

## 2024-07-31 NOTE — TELEPHONE ENCOUNTER
Patient LM on the voicemail requesting a call back.     Spoke with patient, patient requesting a report from his surgery with Dr. Lainez [facility]. Op note & discharge summary printed and mailed to patient.

## 2024-08-03 NOTE — RESULT ENCOUNTER NOTE
Surgical pathology revealed normal gastric mucosa with some mild inflammation.  No H. pylori/infection.

## 2024-08-05 LAB
ALBUMIN: 3.9 G/DL
ALP BLD-CCNC: 183 U/L
ALT SERPL-CCNC: 12 U/L
ANION GAP SERPL CALCULATED.3IONS-SCNC: NORMAL MMOL/L
AST SERPL-CCNC: 17 U/L
BASOPHILS ABSOLUTE: 0.1 /ΜL
BASOPHILS RELATIVE PERCENT: 1.1 %
BILIRUB SERPL-MCNC: 0.7 MG/DL (ref 0.1–1.4)
BILIRUBIN, URINE: NEGATIVE
BLOOD, URINE: NEGATIVE
BUN BLDV-MCNC: 29 MG/DL
CALCIUM SERPL-MCNC: 9.8 MG/DL
CHLORIDE BLD-SCNC: 104 MMOL/L
CHOLESTEROL, TOTAL: 94 MG/DL
CHOLESTEROL/HDL RATIO: NORMAL
CLARITY, UA: CLEAR
CO2: 29 MMOL/L
COLOR, UA: YELLOW
CREAT SERPL-MCNC: 1.5 MG/DL
CREATININE URINE: 59.8 MG/DL
EOSINOPHILS ABSOLUTE: 0.2 /ΜL
EOSINOPHILS RELATIVE PERCENT: 4.4 %
ESTIMATED AVERAGE GLUCOSE: NORMAL
FOLATE: 16.8
GFR, ESTIMATED: 48
GLUCOSE BLD-MCNC: 153 MG/DL
GLUCOSE URINE: 500
HBA1C MFR BLD: 7.3 %
HCT VFR BLD CALC: 42.8 % (ref 41–53)
HDLC SERPL-MCNC: 45 MG/DL (ref 35–70)
HEMOGLOBIN: 13.5 G/DL (ref 13.5–17.5)
KETONES, URINE: NEGATIVE
LDL CHOLESTEROL: 38
LEUKOCYTE ESTERASE, URINE: NEGATIVE
LYMPHOCYTES ABSOLUTE: 1 /ΜL
LYMPHOCYTES RELATIVE PERCENT: 18.9 %
MCH RBC QN AUTO: 26.4 PG
MCHC RBC AUTO-ENTMCNC: 31.5 G/DL
MCV RBC AUTO: 83.6 FL
MICROALBUMIN/CREAT 24H UR: 479 MG/DL
MICROALBUMIN/CREAT UR-RTO: 801.8 MG/G
MONOCYTES ABSOLUTE: 0.7 /ΜL
MONOCYTES RELATIVE PERCENT: 12.7 %
NEUTROPHILS ABSOLUTE: 3.5 /ΜL
NEUTROPHILS RELATIVE PERCENT: 62.7 %
NITRITE, URINE: NEGATIVE
NONHDLC SERPL-MCNC: 49 MG/DL
PDW BLD-RTO: 16.4 %
PH UA: 6.5 (ref 4.5–8)
PLATELET # BLD: 151 K/ΜL
PMV BLD AUTO: 11.5 FL
POTASSIUM SERPL-SCNC: 4 MMOL/L
PROTEIN UA: POSITIVE
RBC # BLD: 5.12 10^6/ΜL
SODIUM BLD-SCNC: 138 MMOL/L
SPECIFIC GRAVITY UA: 1.01 (ref 1–1.03)
TOTAL PROTEIN: 8 G/DL (ref 6.4–8.2)
TRIGL SERPL-MCNC: 56 MG/DL
TSH SERPL DL<=0.05 MIU/L-ACNC: 1.54 UIU/ML
UROBILINOGEN, URINE: NORMAL
VITAMIN B-12: 502
VITAMIN D 25-HYDROXY: 47
VITAMIN D2, 25 HYDROXY: NORMAL
VITAMIN D3,25 HYDROXY: NORMAL
VLDLC SERPL CALC-MCNC: NORMAL MG/DL
WBC # BLD: 5.5 10^3/ML

## 2024-08-14 RX ORDER — CLOPIDOGREL BISULFATE 75 MG/1
75 TABLET ORAL DAILY
Qty: 90 TABLET | Refills: 1 | Status: SHIPPED | OUTPATIENT
Start: 2024-08-14

## 2024-08-28 ENCOUNTER — OFFICE VISIT (OUTPATIENT)
Dept: CARDIOLOGY CLINIC | Age: 74
End: 2024-08-28
Payer: MEDICARE

## 2024-08-28 VITALS
HEIGHT: 71 IN | DIASTOLIC BLOOD PRESSURE: 72 MMHG | HEART RATE: 68 BPM | WEIGHT: 202.6 LBS | SYSTOLIC BLOOD PRESSURE: 128 MMHG | BODY MASS INDEX: 28.36 KG/M2

## 2024-08-28 DIAGNOSIS — Z98.890 HISTORY OF LEFT ATRIAL APPENDAGE CLOSURE: Primary | ICD-10-CM

## 2024-08-28 DIAGNOSIS — Z95.0 STATUS POST BIVENTRICULAR PACEMAKER: ICD-10-CM

## 2024-08-28 DIAGNOSIS — I10 PRIMARY HYPERTENSION: ICD-10-CM

## 2024-08-28 PROCEDURE — G8417 CALC BMI ABV UP PARAM F/U: HCPCS | Performed by: NURSE PRACTITIONER

## 2024-08-28 PROCEDURE — 1036F TOBACCO NON-USER: CPT | Performed by: NURSE PRACTITIONER

## 2024-08-28 PROCEDURE — 99214 OFFICE O/P EST MOD 30 MIN: CPT | Performed by: NURSE PRACTITIONER

## 2024-08-28 PROCEDURE — G8427 DOCREV CUR MEDS BY ELIG CLIN: HCPCS | Performed by: NURSE PRACTITIONER

## 2024-08-28 PROCEDURE — 3078F DIAST BP <80 MM HG: CPT | Performed by: NURSE PRACTITIONER

## 2024-08-28 PROCEDURE — 3074F SYST BP LT 130 MM HG: CPT | Performed by: NURSE PRACTITIONER

## 2024-08-28 PROCEDURE — 1123F ACP DISCUSS/DSCN MKR DOCD: CPT | Performed by: NURSE PRACTITIONER

## 2024-08-28 ASSESSMENT — ENCOUNTER SYMPTOMS
ABDOMINAL PAIN: 0
ABDOMINAL DISTENTION: 0
BLOOD IN STOOL: 0
SHORTNESS OF BREATH: 0
SINUS PAIN: 0
COUGH: 0
PHOTOPHOBIA: 0
COLOR CHANGE: 0
BACK PAIN: 0
SINUS PRESSURE: 0

## 2024-08-28 NOTE — PROGRESS NOTES
Electrophysiology Follow Up Note      Reason for consultation: LV lead placement    Chief complaint: here for follow up  on MABLE    Referring physician:       Primary care physician: Kaleb Mitchell DO      History of Present Illness:     This visit 8/28/2024  Patient is here today for follow-up on MABLE.  He denies chest pain, palpitations, shortness of breath, lightheadedness, dizziness, edema or syncope.  He denies tobacco abuse.  He does drink a few beers occasionally when golfing.  He drinks 2 cups of coffee daily.  Patient states he golfs regularly and stays active for exercise.    Previous visit (5/31/2024)    Chief Complaint   Patient presents with    Follow-up     Patient denies any cardiac symptoms - Denies chest pain, shortness of breath, palpitations, syncope or presyncope, edema   Patient denies tobacco.   Patient states he drinks beer occasionally.   Patient states he drinks 2 cups of coffee daily.   Patient exercises daily.     Patient here to discuss about wanting to come down on Eliquis as he is having a lot of bruising and easy bleeding with minimal cuts or injuries.      Previous visit:     Patient is a 71-year-old male with history of carotid artery disease s/p left carotid endarterectomy, severe aortic stenosis, renal insufficiency, coronary artery disease, diabetes mellitus type 2, s/p pacemaker, hypertension, persistent atrial fibrillation, obstructive sleep apnea on CPAP, hyperlipidemia presented for AVR and maze and two-vessel CABG.   He presents for placement of epicardial LV lead placement.  Patient previously noted to have difficult extraction of pacemaker leads due to calcification of the veins.  It was recommended when patient needed BiV upgrade that he would have LV lead placement epicardially by CT surgery.        Pastmedical history:   Past Medical History:   Diagnosis Date    Arrhythmia     Pacemaker placed aprox 5 years ago for A    Musculoskeletal:  Negative for arthralgias and back pain.   Skin:  Negative for color change and wound.        Brusing and multiple wounds which are healing   Allergic/Immunologic: Negative for food allergies.   Neurological:  Negative for dizziness, syncope and light-headedness.   Hematological:  Does not bruise/bleed easily.   Psychiatric/Behavioral:  Negative for agitation and confusion. The patient is not nervous/anxious.            Examination:      Vitals:    08/28/24 1542   BP: 128/72   Site: Right Upper Arm   Position: Sitting   Cuff Size: Small Adult   Pulse: 68   Weight: 91.9 kg (202 lb 9.6 oz)   Height: 1.803 m (5' 11\")        Body mass index is 28.26 kg/m².      Physical Exam  HENT:      Head: Normocephalic and atraumatic.      Right Ear: External ear normal.      Left Ear: External ear normal.      Nose: Nose normal.      Mouth/Throat:      Mouth: Mucous membranes are moist.   Eyes:      General:         Right eye: No discharge.         Left eye: No discharge.      Conjunctiva/sclera: Conjunctivae normal.   Cardiovascular:      Rate and Rhythm: Normal rate and regular rhythm.      Heart sounds: Murmur (grade 2/6 systolic murmur) heard.   Pulmonary:      Breath sounds: No wheezing, rhonchi or rales.   Abdominal:      General: Bowel sounds are normal.   Musculoskeletal:      Cervical back: Normal range of motion.      Right lower leg: No edema.      Left lower leg: No edema.   Skin:     General: Skin is warm.   Neurological:      Mental Status: He is alert and oriented to person, place, and time.   Psychiatric:         Mood and Affect: Mood normal.         Thought Content: Thought content normal.               CBC:   Lab Results   Component Value Date/Time    WBC 4.5 07/03/2024 10:03 AM    HGB 11.9 07/03/2024 10:03 AM    HCT 39.4 07/03/2024 10:03 AM     07/03/2024 10:03 AM     Lipids:  Lab Results   Component Value Date    CHOL 82 08/28/2023    TRIG 37 08/28/2023    HDL 45 08/28/2023    LDLDIRECT

## 2024-09-02 PROCEDURE — 93294 REM INTERROG EVL PM/LDLS PM: CPT | Performed by: INTERNAL MEDICINE

## 2024-09-02 PROCEDURE — 93296 REM INTERROG EVL PM/IDS: CPT | Performed by: INTERNAL MEDICINE

## 2024-09-03 ENCOUNTER — PROCEDURE VISIT (OUTPATIENT)
Dept: CARDIOLOGY CLINIC | Age: 74
End: 2024-09-03
Payer: MEDICARE

## 2024-09-03 DIAGNOSIS — Z95.0 PACEMAKER: Primary | ICD-10-CM

## 2024-09-03 DIAGNOSIS — E11.9 TYPE 2 DIABETES MELLITUS WITHOUT COMPLICATION, WITHOUT LONG-TERM CURRENT USE OF INSULIN (HCC): ICD-10-CM

## 2024-09-03 RX ORDER — TIRZEPATIDE 2.5 MG/.5ML
2.5 INJECTION, SOLUTION SUBCUTANEOUS WEEKLY
Qty: 6 ML | Refills: 1 | Status: SHIPPED | OUTPATIENT
Start: 2024-09-03 | End: 2025-03-02

## 2024-09-03 NOTE — TELEPHONE ENCOUNTER
Original sent to [facility] but they were on back order. [facility] on [facility] now has them.     Medication:   Requested Prescriptions     Pending Prescriptions Disp Refills    Tirzepatide (MOUNJARO) 2.5 MG/0.5ML SOPN SC injection 6 mL 1     Sig: Inject 0.5 mLs into the skin once a week        Last Filled:      Patient Phone Number: [phone] (home)     Last appt: 7/17/2024   Next appt: 10/7/2024    Last OARRS:        No data to display

## 2024-09-23 ENCOUNTER — TELEPHONE (OUTPATIENT)
Dept: GASTROENTEROLOGY | Age: 74
End: 2024-09-23

## 2024-09-23 RX ORDER — OMEPRAZOLE 40 MG/1
40 CAPSULE, DELAYED RELEASE ORAL
Qty: 90 CAPSULE | Refills: 0 | Status: CANCELLED | OUTPATIENT
Start: 2024-09-23

## 2024-09-23 NOTE — TELEPHONE ENCOUNTER
Patient called to get a refill on omeprazole 40 MG to [facility].      Thank you

## 2024-10-07 ENCOUNTER — OFFICE VISIT (OUTPATIENT)
Dept: FAMILY MEDICINE CLINIC | Age: 74
End: 2024-10-07
Payer: MEDICARE

## 2024-10-07 VITALS
WEIGHT: 195 LBS | OXYGEN SATURATION: 98 % | DIASTOLIC BLOOD PRESSURE: 82 MMHG | BODY MASS INDEX: 27.3 KG/M2 | SYSTOLIC BLOOD PRESSURE: 138 MMHG | HEART RATE: 71 BPM | HEIGHT: 71 IN

## 2024-10-07 DIAGNOSIS — R97.20 ELEVATED PROSTATE SPECIFIC ANTIGEN (PSA): ICD-10-CM

## 2024-10-07 DIAGNOSIS — E11.9 TYPE 2 DIABETES MELLITUS WITHOUT COMPLICATION, WITHOUT LONG-TERM CURRENT USE OF INSULIN (HCC): ICD-10-CM

## 2024-10-07 DIAGNOSIS — I48.0 PAF (PAROXYSMAL ATRIAL FIBRILLATION) (HCC): ICD-10-CM

## 2024-10-07 DIAGNOSIS — D50.9 IRON DEFICIENCY ANEMIA, UNSPECIFIED IRON DEFICIENCY ANEMIA TYPE: ICD-10-CM

## 2024-10-07 DIAGNOSIS — E11.42 TYPE 2 DIABETES MELLITUS WITH DIABETIC POLYNEUROPATHY, WITHOUT LONG-TERM CURRENT USE OF INSULIN (HCC): ICD-10-CM

## 2024-10-07 DIAGNOSIS — G62.9 PERIPHERAL POLYNEUROPATHY: ICD-10-CM

## 2024-10-07 DIAGNOSIS — I89.0 LYMPHEDEMA OF LEFT UPPER EXTREMITY: ICD-10-CM

## 2024-10-07 DIAGNOSIS — S76.011A MUSCLE STRAIN OF RIGHT GLUTEAL REGION, INITIAL ENCOUNTER: Primary | ICD-10-CM

## 2024-10-07 PROCEDURE — 99214 OFFICE O/P EST MOD 30 MIN: CPT | Performed by: STUDENT IN AN ORGANIZED HEALTH CARE EDUCATION/TRAINING PROGRAM

## 2024-10-07 PROCEDURE — 1123F ACP DISCUSS/DSCN MKR DOCD: CPT | Performed by: STUDENT IN AN ORGANIZED HEALTH CARE EDUCATION/TRAINING PROGRAM

## 2024-10-07 PROCEDURE — 3075F SYST BP GE 130 - 139MM HG: CPT | Performed by: STUDENT IN AN ORGANIZED HEALTH CARE EDUCATION/TRAINING PROGRAM

## 2024-10-07 PROCEDURE — 3051F HG A1C>EQUAL 7.0%<8.0%: CPT | Performed by: STUDENT IN AN ORGANIZED HEALTH CARE EDUCATION/TRAINING PROGRAM

## 2024-10-07 PROCEDURE — G8427 DOCREV CUR MEDS BY ELIG CLIN: HCPCS | Performed by: STUDENT IN AN ORGANIZED HEALTH CARE EDUCATION/TRAINING PROGRAM

## 2024-10-07 PROCEDURE — G8417 CALC BMI ABV UP PARAM F/U: HCPCS | Performed by: STUDENT IN AN ORGANIZED HEALTH CARE EDUCATION/TRAINING PROGRAM

## 2024-10-07 PROCEDURE — 3079F DIAST BP 80-89 MM HG: CPT | Performed by: STUDENT IN AN ORGANIZED HEALTH CARE EDUCATION/TRAINING PROGRAM

## 2024-10-07 PROCEDURE — G8482 FLU IMMUNIZE ORDER/ADMIN: HCPCS | Performed by: STUDENT IN AN ORGANIZED HEALTH CARE EDUCATION/TRAINING PROGRAM

## 2024-10-07 PROCEDURE — 1036F TOBACCO NON-USER: CPT | Performed by: STUDENT IN AN ORGANIZED HEALTH CARE EDUCATION/TRAINING PROGRAM

## 2024-10-07 RX ORDER — TIRZEPATIDE 2.5 MG/.5ML
2.5 INJECTION, SOLUTION SUBCUTANEOUS WEEKLY
Qty: 6 ML | Refills: 1 | Status: SHIPPED | OUTPATIENT
Start: 2024-10-07 | End: 2025-04-05

## 2024-10-07 RX ORDER — GLIMEPIRIDE 1 MG/1
1 TABLET ORAL
Qty: 90 TABLET | Refills: 1 | Status: SHIPPED | OUTPATIENT
Start: 2024-10-07 | End: 2025-04-05

## 2024-10-07 RX ORDER — DAPAGLIFLOZIN 10 MG/1
10 TABLET, FILM COATED ORAL EVERY MORNING
Qty: 90 TABLET | Refills: 1 | Status: SHIPPED | OUTPATIENT
Start: 2024-10-07

## 2024-10-07 RX ORDER — CARVEDILOL 6.25 MG/1
6.25 TABLET ORAL 2 TIMES DAILY
Qty: 180 TABLET | Refills: 3 | Status: SHIPPED | OUTPATIENT
Start: 2024-10-07

## 2024-10-07 RX ORDER — SIMVASTATIN 20 MG
20 TABLET ORAL DAILY
Qty: 90 TABLET | Refills: 3 | Status: SHIPPED | OUTPATIENT
Start: 2024-10-07 | End: 2025-10-02

## 2024-10-07 RX ORDER — HYDROCODONE BITARTRATE AND ACETAMINOPHEN 5; 325 MG/1; MG/1
1 TABLET ORAL EVERY 8 HOURS PRN
Qty: 21 TABLET | Refills: 0 | Status: SHIPPED | OUTPATIENT
Start: 2024-10-07 | End: 2024-10-14

## 2024-10-07 RX ORDER — GABAPENTIN 300 MG/1
300 CAPSULE ORAL 3 TIMES DAILY
Qty: 270 CAPSULE | Refills: 1 | Status: SHIPPED | OUTPATIENT
Start: 2024-10-07 | End: 2025-04-05

## 2024-10-07 RX ORDER — FERROUS SULFATE 325(65) MG
325 TABLET ORAL EVERY OTHER DAY
Qty: 90 TABLET | Refills: 1 | Status: SHIPPED | OUTPATIENT
Start: 2024-10-07 | End: 2025-10-02

## 2024-10-07 RX ORDER — TORSEMIDE 20 MG/1
40 TABLET ORAL 2 TIMES DAILY
Qty: 360 TABLET | Refills: 3 | Status: SHIPPED | OUTPATIENT
Start: 2024-10-07

## 2024-10-07 ASSESSMENT — ENCOUNTER SYMPTOMS
WHEEZING: 0
SHORTNESS OF BREATH: 0
NAUSEA: 0
ABDOMINAL PAIN: 0
SORE THROAT: 0

## 2024-10-07 NOTE — PROGRESS NOTES
10/7/2024    Mayo TATE Mejia    Chief Complaint   Patient presents with    Follow-up      Type 2 diabetes mellitus without complication, without long-term current use of insulin    Hip Pain     Right hip, started last Wednesday, was working out at [employer] and pulled a muscle, has tried cold compresses, did see a chiropractor which helped some, did take ibuprofen this morning which is not helping.        HPI  History was obtained from patient.  Mayo is a 74 y.o. male with a PMHx as listed below who presents today for follow up. No acute complaints.     Last Wednesday while at [employer]. Had an injury on machine.  Also had an event going up stairs 4 days ago going up stairs he felt a 'tear'.     1. Muscle strain of right gluteal region, initial encounter    2. PAF (paroxysmal atrial fibrillation) (HCC)    3. Type 2 diabetes mellitus without complication, without long-term current use of insulin (HCC)    4. Peripheral polyneuropathy    5. Type 2 diabetes mellitus with diabetic polyneuropathy, without long-term current use of insulin (HCC)    6. Lymphedema of left upper extremity    7. Iron deficiency anemia, unspecified iron deficiency anemia type    8. Elevated prostate specific antigen (PSA)             REVIEW OF SYMPTOMS    Review of Systems   Constitutional:  Negative for chills and fatigue.   HENT:  Negative for congestion and sore throat.    Respiratory:  Negative for shortness of breath and wheezing.    Cardiovascular:  Negative for chest pain and palpitations.   Gastrointestinal:  Negative for abdominal pain and nausea.   Genitourinary:  Negative for frequency and urgency.   Neurological:  Negative for light-headedness.       PAST MEDICAL HISTORY  Past Medical History:   Diagnosis Date    Arrhythmia     Pacemaker placed aprox 5 years ago for A Fib per patient    Arthritis 12/2013    rt wrist    Atrial fibrillation (HCC)     on Shin Farias    CAD (coronary artery disease)

## 2024-10-10 ENCOUNTER — HOSPITAL ENCOUNTER (OUTPATIENT)
Dept: ULTRASOUND IMAGING | Age: 74
Discharge: HOME | End: 2024-10-10

## 2024-10-10 DIAGNOSIS — S76.011A MUSCLE STRAIN OF RIGHT GLUTEAL REGION, INITIAL ENCOUNTER: ICD-10-CM

## 2024-10-30 ENCOUNTER — TELEPHONE (OUTPATIENT)
Dept: CARDIOLOGY CLINIC | Age: 74
End: 2024-10-30

## 2024-10-31 RX ORDER — OMEPRAZOLE 40 MG/1
40 CAPSULE, DELAYED RELEASE ORAL
Qty: 90 CAPSULE | Refills: 0 | Status: SHIPPED | OUTPATIENT
Start: 2024-10-31

## 2024-11-07 ENCOUNTER — HOSPITAL ENCOUNTER (OUTPATIENT)
Age: 74
Discharge: HOME | End: 2024-11-07
Payer: MEDICARE

## 2024-11-07 DIAGNOSIS — E11.9 TYPE 2 DIABETES MELLITUS WITHOUT COMPLICATION, WITHOUT LONG-TERM CURRENT USE OF INSULIN (HCC): ICD-10-CM

## 2024-11-07 DIAGNOSIS — T82.110S PACEMAKER LEAD MALFUNCTION, SEQUELA: ICD-10-CM

## 2024-11-07 DIAGNOSIS — R80.1 PERSISTENT PROTEINURIA: ICD-10-CM

## 2024-11-07 DIAGNOSIS — I50.23 ACUTE ON CHRONIC SYSTOLIC CONGESTIVE HEART FAILURE (HCC): ICD-10-CM

## 2024-11-07 DIAGNOSIS — N18.31 STAGE 3A CHRONIC KIDNEY DISEASE (HCC): ICD-10-CM

## 2024-11-07 DIAGNOSIS — I48.0 PAROXYSMAL ATRIAL FIBRILLATION (HCC): ICD-10-CM

## 2024-11-07 DIAGNOSIS — E87.5 HYPERKALEMIA: ICD-10-CM

## 2024-11-07 LAB
ALBUMIN SERPL-MCNC: 4 G/DL (ref 3.4–5)
ANION GAP SERPL CALCULATED.3IONS-SCNC: 13 MMOL/L (ref 9–17)
BILIRUB UR QL STRIP: NEGATIVE
BUN SERPL-MCNC: 42 MG/DL (ref 7–20)
CALCIUM SERPL-MCNC: 9.5 MG/DL (ref 8.3–10.6)
CHLORIDE SERPL-SCNC: 98 MMOL/L (ref 99–110)
CLARITY UR: CLEAR
CO2 SERPL-SCNC: 28 MMOL/L (ref 21–32)
COLOR UR: YELLOW
CREAT SERPL-MCNC: 1.4 MG/DL (ref 0.8–1.3)
CREAT UR-MCNC: 40.5 MG/DL (ref 39–259)
GFR, ESTIMATED: 48 ML/MIN/1.73M2
GLUCOSE SERPL-MCNC: 155 MG/DL (ref 74–99)
GLUCOSE UR STRIP-MCNC: >=1000 MG/DL
HGB UR QL STRIP.AUTO: NEGATIVE
KETONES UR STRIP-MCNC: NEGATIVE MG/DL
LEUKOCYTE ESTERASE UR QL STRIP: NEGATIVE
MAGNESIUM SERPL-MCNC: 2.3 MG/DL (ref 1.8–2.4)
MUCOUS THREADS URNS QL MICRO: ABNORMAL
NITRITE UR QL STRIP: NEGATIVE
PH UR STRIP: 6.5 [PH] (ref 5–8)
PHOSPHATE SERPL-MCNC: 4.1 MG/DL (ref 2.5–4.9)
POTASSIUM SERPL-SCNC: 4.1 MMOL/L (ref 3.5–5.1)
PROT UR STRIP-MCNC: 30 MG/DL
RBC #/AREA URNS HPF: <1 /HPF (ref 0–2)
SODIUM SERPL-SCNC: 139 MMOL/L (ref 136–145)
SP GR UR STRIP: 1.01 (ref 1–1.03)
TOTAL PROTEIN, URINE: 44 MG/DL
TRICHOMONAS #/AREA URNS HPF: ABNORMAL /[HPF]
URINE TOTAL PROTEIN CREATININE RATIO: 1.08 (ref 0–0.2)
UROBILINOGEN UR STRIP-ACNC: 0.2 EU/DL (ref 0–1)
WBC #/AREA URNS HPF: <1 /HPF (ref 0–5)

## 2024-11-07 PROCEDURE — 82040 ASSAY OF SERUM ALBUMIN: CPT

## 2024-11-07 PROCEDURE — 83735 ASSAY OF MAGNESIUM: CPT

## 2024-11-07 PROCEDURE — 36415 COLL VENOUS BLD VENIPUNCTURE: CPT

## 2024-11-07 PROCEDURE — 82570 ASSAY OF URINE CREATININE: CPT

## 2024-11-07 PROCEDURE — 81001 URINALYSIS AUTO W/SCOPE: CPT

## 2024-11-07 PROCEDURE — 84100 ASSAY OF PHOSPHORUS: CPT

## 2024-11-07 PROCEDURE — 80048 BASIC METABOLIC PNL TOTAL CA: CPT

## 2024-11-07 PROCEDURE — 84156 ASSAY OF PROTEIN URINE: CPT

## 2025-01-27 ENCOUNTER — OFFICE VISIT (OUTPATIENT)
Dept: CARDIOLOGY CLINIC | Age: 75
End: 2025-01-27
Payer: MEDICARE

## 2025-01-27 VITALS
HEIGHT: 71 IN | SYSTOLIC BLOOD PRESSURE: 126 MMHG | WEIGHT: 212 LBS | DIASTOLIC BLOOD PRESSURE: 80 MMHG | BODY MASS INDEX: 29.68 KG/M2

## 2025-01-27 DIAGNOSIS — L97.525 DIABETIC ULCER OF TOE OF LEFT FOOT ASSOCIATED WITH TYPE 2 DIABETES MELLITUS, WITH MUSCLE INVOLVEMENT WITHOUT EVIDENCE OF NECROSIS (HCC): ICD-10-CM

## 2025-01-27 DIAGNOSIS — I50.23 ACUTE ON CHRONIC SYSTOLIC CONGESTIVE HEART FAILURE (HCC): ICD-10-CM

## 2025-01-27 DIAGNOSIS — I48.0 PAF (PAROXYSMAL ATRIAL FIBRILLATION) (HCC): ICD-10-CM

## 2025-01-27 DIAGNOSIS — Z95.0 CARDIAC PACEMAKER IN SITU: Primary | ICD-10-CM

## 2025-01-27 DIAGNOSIS — I10 ESSENTIAL HYPERTENSION: ICD-10-CM

## 2025-01-27 DIAGNOSIS — E11.621 DIABETIC ULCER OF TOE OF LEFT FOOT ASSOCIATED WITH TYPE 2 DIABETES MELLITUS, WITH MUSCLE INVOLVEMENT WITHOUT EVIDENCE OF NECROSIS (HCC): ICD-10-CM

## 2025-01-27 DIAGNOSIS — I70.229 CRITICAL LOWER LIMB ISCHEMIA (HCC): ICD-10-CM

## 2025-01-27 PROCEDURE — G8427 DOCREV CUR MEDS BY ELIG CLIN: HCPCS | Performed by: INTERNAL MEDICINE

## 2025-01-27 PROCEDURE — G8417 CALC BMI ABV UP PARAM F/U: HCPCS | Performed by: INTERNAL MEDICINE

## 2025-01-27 PROCEDURE — 1036F TOBACCO NON-USER: CPT | Performed by: INTERNAL MEDICINE

## 2025-01-27 PROCEDURE — 93000 ELECTROCARDIOGRAM COMPLETE: CPT | Performed by: INTERNAL MEDICINE

## 2025-01-27 PROCEDURE — 1159F MED LIST DOCD IN RCRD: CPT | Performed by: INTERNAL MEDICINE

## 2025-01-27 PROCEDURE — 99214 OFFICE O/P EST MOD 30 MIN: CPT | Performed by: INTERNAL MEDICINE

## 2025-01-27 PROCEDURE — 3079F DIAST BP 80-89 MM HG: CPT | Performed by: INTERNAL MEDICINE

## 2025-01-27 PROCEDURE — 3074F SYST BP LT 130 MM HG: CPT | Performed by: INTERNAL MEDICINE

## 2025-01-27 PROCEDURE — 1123F ACP DISCUSS/DSCN MKR DOCD: CPT | Performed by: INTERNAL MEDICINE

## 2025-01-27 NOTE — ADDENDUM NOTE
Addended by: AGUSTINA VELA on: 1/27/2025 10:21 AM     Modules accepted: Orders     patient/chart(s)/physician office

## 2025-01-27 NOTE — PATIENT INSTRUCTIONS
Thank you for allowing us to care for you today!   We want to ensure we can follow your treatment plan and we strive to give you the best outcomes and experience possible.   If you ever have a life threatening emergency and call 911 - for an ambulance (EMS)  REMEMBER  Our providers can only care for you at:   [facility] or [facility]   Even if you have someone take you or you drive yourself we can only care for you in a [facility] facility. Our providers are not setup at the other healthcare locations!    PLEASE CALL OUR OFFICE DURING NORMAL BUSINESS HOURS  Monday through Friday 8 am to 5 pm  AFTER HOURS the physician on-call cannot help with scheduling, rescheduling, procedure instruction questions or any type of medication need or issue.  [facility] P:[phone] - [facility] P:[phone] - [facility] P:[phone]      If you receive a survey:  We would appreciate you taking the time to share your experience concerning your provider visit in the office.    These surveys are confidential!  We are eager to improve and are counting on you to share your feedback so we can ensure you get the best care possible.

## 2025-01-27 NOTE — PROGRESS NOTES
Mayo  is a  Established patient  ,75 y.o.   male here for evaluation of the following chief complaint(s):    cad        SUBJECTIVE/OBJECTIVE:  HPI : h/o  Cad, vhd, Bi V PPM  now here  Post CABG, doing better has no CP, SOB    Review of Systems    neg    Vitals:    01/27/25 1010   BP: 126/80   Weight: 96.2 kg (212 lb)   Height: 1.803 m (5' 11\")           /80   Ht 1.803 m (5' 11\")   Wt 96.2 kg (212 lb)   BMI 29.57 kg/m²       4/8/2021     1:04 PM   Patient-Reported Vitals   Patient-Reported Weight 185   Patient-Reported Height 5'11\"   Patient-Reported Systolic 114 mmHg   Patient-Reported Diastolic 66 mmHg   Patient-Reported Pulse 67   Patient-Reported Temperature 98.2     Wt Readings from Last 3 Encounters:   01/27/25 96.2 kg (212 lb)   11/18/24 92.6 kg (204 lb 3.2 oz)   10/07/24 88.5 kg (195 lb)     Body mass index is 29.57 kg/m².    Physical Exam     Neck: JVD  neg    Lungs : clear    Cardio : Si and S2 audilble  no    Ext: edema  no    All pertinent data reviewed      Meds : reviewed           ASSESSMENT/PLAN:               -     CORONARY ARTERY DISEASE:  asymptomatic     All available  tests in chart reviewed. Management discussed .  Testing ordered  no  On aspirin history of CABG we will continue          CAD CABG with LIMA to the LAD and vein graft to the RCA        2/21               CABG CORONARY ARTERY BYPASS X2 WITH LIMA, AORTIC VALVE REPLACEMENT AND AORTIC ROOT REPAIR, INTRAOPERATIVE MABLE, INDUCED HYPOTHERMIA, LEFT LEG ENDOVEIN HARVEST, LEFT ATRIAL CLIP, AND CRYO PROCEDURE   FRIEND to LAD  SVG to RCA  Last cath in [place] , was OK  3/24    -  LIPID MANAGEMENT:  Importance of lipid levels discussed with patient   and patient was given dietary advice. NCEP- ATP III guidelines reviewed with patient.    -   Changes  in medicines made: No     On diet now    -   DIABETES MELLITUS: Available pertinent lab data reviewed   and  patient was given dietary advice . Advised to check blood glucose level on a regular

## 2025-02-04 DIAGNOSIS — G62.9 PERIPHERAL POLYNEUROPATHY: ICD-10-CM

## 2025-02-04 DIAGNOSIS — I25.118 ATHEROSCLEROTIC HEART DISEASE OF NATIVE CORONARY ARTERY WITH OTHER FORMS OF ANGINA PECTORIS (HCC): ICD-10-CM

## 2025-02-04 DIAGNOSIS — D50.9 IRON DEFICIENCY ANEMIA, UNSPECIFIED IRON DEFICIENCY ANEMIA TYPE: ICD-10-CM

## 2025-02-04 DIAGNOSIS — E11.42 TYPE 2 DIABETES MELLITUS WITH DIABETIC POLYNEUROPATHY, WITHOUT LONG-TERM CURRENT USE OF INSULIN (HCC): ICD-10-CM

## 2025-02-04 DIAGNOSIS — E11.9 TYPE 2 DIABETES MELLITUS WITHOUT COMPLICATION, WITHOUT LONG-TERM CURRENT USE OF INSULIN (HCC): ICD-10-CM

## 2025-02-04 DIAGNOSIS — R97.20 ELEVATED PROSTATE SPECIFIC ANTIGEN (PSA): ICD-10-CM

## 2025-02-04 LAB
BASOPHILS # BLD: 0 K/UL (ref 0–0.2)
BASOPHILS NFR BLD: 0.6 %
CHOLEST SERPL-MCNC: 94 MG/DL (ref 0–199)
DEPRECATED RDW RBC AUTO: 16.1 % (ref 12.4–15.4)
EOSINOPHIL # BLD: 0.3 K/UL (ref 0–0.6)
EOSINOPHIL NFR BLD: 6.2 %
EST. AVERAGE GLUCOSE BLD GHB EST-MCNC: 168.6 MG/DL
FERRITIN SERPL IA-MCNC: 126 NG/ML (ref 30–400)
HBA1C MFR BLD: 7.5 %
HCT VFR BLD AUTO: 42.3 % (ref 40.5–52.5)
HDLC SERPL-MCNC: 50 MG/DL (ref 40–60)
HGB BLD-MCNC: 13.9 G/DL (ref 13.5–17.5)
IRON SATN MFR SERPL: 20 % (ref 20–50)
IRON SERPL-MCNC: 68 UG/DL (ref 59–158)
LDL CHOLESTEROL: 31 MG/DL
LYMPHOCYTES # BLD: 1.2 K/UL (ref 1–5.1)
LYMPHOCYTES NFR BLD: 26 %
MCH RBC QN AUTO: 28.1 PG (ref 26–34)
MCHC RBC AUTO-ENTMCNC: 32.9 G/DL (ref 31–36)
MCV RBC AUTO: 85.6 FL (ref 80–100)
MONOCYTES # BLD: 0.7 K/UL (ref 0–1.3)
MONOCYTES NFR BLD: 15.1 %
NEUTROPHILS # BLD: 2.3 K/UL (ref 1.7–7.7)
NEUTROPHILS NFR BLD: 52.1 %
PLATELET # BLD AUTO: 111 K/UL (ref 135–450)
PMV BLD AUTO: 9.8 FL (ref 5–10.5)
PSA SERPL DL<=0.01 NG/ML-MCNC: 0.38 NG/ML (ref 0–4)
RBC # BLD AUTO: 4.95 M/UL (ref 4.2–5.9)
TIBC SERPL-MCNC: 346 UG/DL (ref 260–445)
TRIGL SERPL-MCNC: 64 MG/DL (ref 0–150)
URATE SERPL-MCNC: 10.8 MG/DL (ref 3.5–7.2)
VLDLC SERPL CALC-MCNC: 13 MG/DL
WBC # BLD AUTO: 4.5 K/UL (ref 4–11)

## 2025-02-04 SDOH — ECONOMIC STABILITY: INCOME INSECURITY: IN THE LAST 12 MONTHS, WAS THERE A TIME WHEN YOU WERE NOT ABLE TO PAY THE MORTGAGE OR RENT ON TIME?: NO

## 2025-02-04 SDOH — ECONOMIC STABILITY: FOOD INSECURITY: WITHIN THE PAST 12 MONTHS, THE FOOD YOU BOUGHT JUST DIDN'T LAST AND YOU DIDN'T HAVE MONEY TO GET MORE.: NEVER TRUE

## 2025-02-04 SDOH — ECONOMIC STABILITY: FOOD INSECURITY: WITHIN THE PAST 12 MONTHS, YOU WORRIED THAT YOUR FOOD WOULD RUN OUT BEFORE YOU GOT MONEY TO BUY MORE.: NEVER TRUE

## 2025-02-04 SDOH — ECONOMIC STABILITY: TRANSPORTATION INSECURITY
IN THE PAST 12 MONTHS, HAS THE LACK OF TRANSPORTATION KEPT YOU FROM MEDICAL APPOINTMENTS OR FROM GETTING MEDICATIONS?: NO

## 2025-02-04 ASSESSMENT — PATIENT HEALTH QUESTIONNAIRE - PHQ9
2. FEELING DOWN, DEPRESSED OR HOPELESS: NOT AT ALL
1. LITTLE INTEREST OR PLEASURE IN DOING THINGS: NOT AT ALL
SUM OF ALL RESPONSES TO PHQ QUESTIONS 1-9: 0
SUM OF ALL RESPONSES TO PHQ QUESTIONS 1-9: 0
SUM OF ALL RESPONSES TO PHQ9 QUESTIONS 1 & 2: 0
SUM OF ALL RESPONSES TO PHQ QUESTIONS 1-9: 0
1. LITTLE INTEREST OR PLEASURE IN DOING THINGS: NOT AT ALL
SUM OF ALL RESPONSES TO PHQ9 QUESTIONS 1 & 2: 0
2. FEELING DOWN, DEPRESSED OR HOPELESS: NOT AT ALL
SUM OF ALL RESPONSES TO PHQ QUESTIONS 1-9: 0

## 2025-02-05 ENCOUNTER — TELEPHONE (OUTPATIENT)
Dept: CARDIOLOGY CLINIC | Age: 75
End: 2025-02-05

## 2025-02-07 ENCOUNTER — OFFICE VISIT (OUTPATIENT)
Dept: FAMILY MEDICINE CLINIC | Age: 75
End: 2025-02-07
Payer: MEDICARE

## 2025-02-07 VITALS
WEIGHT: 206.6 LBS | HEIGHT: 71 IN | SYSTOLIC BLOOD PRESSURE: 132 MMHG | BODY MASS INDEX: 28.92 KG/M2 | DIASTOLIC BLOOD PRESSURE: 88 MMHG

## 2025-02-07 DIAGNOSIS — Z71.89 ACP (ADVANCE CARE PLANNING): ICD-10-CM

## 2025-02-07 DIAGNOSIS — M1A.09X0 IDIOPATHIC CHRONIC GOUT OF MULTIPLE SITES WITHOUT TOPHUS: Primary | ICD-10-CM

## 2025-02-07 DIAGNOSIS — I48.0 PAF (PAROXYSMAL ATRIAL FIBRILLATION) (HCC): ICD-10-CM

## 2025-02-07 DIAGNOSIS — N18.32 STAGE 3B CHRONIC KIDNEY DISEASE (HCC): ICD-10-CM

## 2025-02-07 DIAGNOSIS — I25.10 ASCVD (ARTERIOSCLEROTIC CARDIOVASCULAR DISEASE): ICD-10-CM

## 2025-02-07 DIAGNOSIS — E11.42 TYPE 2 DIABETES MELLITUS WITH DIABETIC POLYNEUROPATHY, WITHOUT LONG-TERM CURRENT USE OF INSULIN (HCC): ICD-10-CM

## 2025-02-07 DIAGNOSIS — D50.9 IRON DEFICIENCY ANEMIA, UNSPECIFIED IRON DEFICIENCY ANEMIA TYPE: ICD-10-CM

## 2025-02-07 DIAGNOSIS — E11.9 TYPE 2 DIABETES MELLITUS WITHOUT COMPLICATION, WITHOUT LONG-TERM CURRENT USE OF INSULIN (HCC): ICD-10-CM

## 2025-02-07 DIAGNOSIS — I89.0 LYMPHEDEMA OF LEFT UPPER EXTREMITY: ICD-10-CM

## 2025-02-07 DIAGNOSIS — J96.10 CHRONIC RESPIRATORY FAILURE, UNSPECIFIED WHETHER WITH HYPOXIA OR HYPERCAPNIA: ICD-10-CM

## 2025-02-07 DIAGNOSIS — G62.9 PERIPHERAL POLYNEUROPATHY: ICD-10-CM

## 2025-02-07 PROCEDURE — 3051F HG A1C>EQUAL 7.0%<8.0%: CPT | Performed by: STUDENT IN AN ORGANIZED HEALTH CARE EDUCATION/TRAINING PROGRAM

## 2025-02-07 PROCEDURE — 3079F DIAST BP 80-89 MM HG: CPT | Performed by: STUDENT IN AN ORGANIZED HEALTH CARE EDUCATION/TRAINING PROGRAM

## 2025-02-07 PROCEDURE — G8417 CALC BMI ABV UP PARAM F/U: HCPCS | Performed by: STUDENT IN AN ORGANIZED HEALTH CARE EDUCATION/TRAINING PROGRAM

## 2025-02-07 PROCEDURE — 1123F ACP DISCUSS/DSCN MKR DOCD: CPT | Performed by: STUDENT IN AN ORGANIZED HEALTH CARE EDUCATION/TRAINING PROGRAM

## 2025-02-07 PROCEDURE — 99214 OFFICE O/P EST MOD 30 MIN: CPT | Performed by: STUDENT IN AN ORGANIZED HEALTH CARE EDUCATION/TRAINING PROGRAM

## 2025-02-07 PROCEDURE — 3075F SYST BP GE 130 - 139MM HG: CPT | Performed by: STUDENT IN AN ORGANIZED HEALTH CARE EDUCATION/TRAINING PROGRAM

## 2025-02-07 PROCEDURE — 1159F MED LIST DOCD IN RCRD: CPT | Performed by: STUDENT IN AN ORGANIZED HEALTH CARE EDUCATION/TRAINING PROGRAM

## 2025-02-07 PROCEDURE — G8427 DOCREV CUR MEDS BY ELIG CLIN: HCPCS | Performed by: STUDENT IN AN ORGANIZED HEALTH CARE EDUCATION/TRAINING PROGRAM

## 2025-02-07 PROCEDURE — 1036F TOBACCO NON-USER: CPT | Performed by: STUDENT IN AN ORGANIZED HEALTH CARE EDUCATION/TRAINING PROGRAM

## 2025-02-07 RX ORDER — CARVEDILOL 6.25 MG/1
6.25 TABLET ORAL 2 TIMES DAILY
Qty: 180 TABLET | Refills: 3 | Status: SHIPPED | OUTPATIENT
Start: 2025-02-07

## 2025-02-07 RX ORDER — GABAPENTIN 300 MG/1
300 CAPSULE ORAL 3 TIMES DAILY
Qty: 270 CAPSULE | Refills: 1 | Status: SHIPPED | OUTPATIENT
Start: 2025-02-07 | End: 2025-08-06

## 2025-02-07 RX ORDER — SIMVASTATIN 20 MG
20 TABLET ORAL DAILY
Qty: 90 TABLET | Refills: 3 | Status: SHIPPED | OUTPATIENT
Start: 2025-02-07 | End: 2026-02-02

## 2025-02-07 RX ORDER — DAPAGLIFLOZIN 10 MG/1
10 TABLET, FILM COATED ORAL EVERY MORNING
Qty: 90 TABLET | Refills: 1 | Status: SHIPPED | OUTPATIENT
Start: 2025-02-07

## 2025-02-07 RX ORDER — TORSEMIDE 20 MG/1
20 TABLET ORAL 2 TIMES DAILY
Qty: 180 TABLET | Refills: 1 | Status: SHIPPED | OUTPATIENT
Start: 2025-02-07

## 2025-02-07 RX ORDER — GLIMEPIRIDE 1 MG/1
1 TABLET ORAL
Qty: 90 TABLET | Refills: 1 | Status: SHIPPED | OUTPATIENT
Start: 2025-02-07 | End: 2025-08-06

## 2025-02-07 RX ORDER — FERROUS SULFATE 325(65) MG
325 TABLET ORAL EVERY OTHER DAY
Qty: 90 TABLET | Refills: 1 | Status: SHIPPED | OUTPATIENT
Start: 2025-02-07 | End: 2026-02-02

## 2025-02-07 RX ORDER — ALLOPURINOL 100 MG/1
100 TABLET ORAL DAILY
Qty: 90 TABLET | Refills: 1 | Status: SHIPPED | OUTPATIENT
Start: 2025-02-07 | End: 2025-08-06

## 2025-02-07 RX ORDER — TIRZEPATIDE 2.5 MG/.5ML
0.5 INJECTION, SOLUTION SUBCUTANEOUS WEEKLY
Qty: 6 ML | Refills: 1 | Status: SHIPPED | OUTPATIENT
Start: 2025-02-07 | End: 2025-02-07 | Stop reason: ALTCHOICE

## 2025-02-07 NOTE — PATIENT INSTRUCTIONS

## 2025-02-07 NOTE — PROGRESS NOTES
2/7/2025    Mayo Mejia    Chief Complaint   Patient presents with    Follow-up     Type 2 dm        HPI  Mayo is a 75 y.o. male with a PMHx as listed below who presents today for follow up on chronic conditions. No acute complaints.       High chads vasc,     Uric acid levels high, reports gout flare last week in wrist  History of Present Illness        1. Idiopathic chronic gout of multiple sites without tophus    2. PAF (paroxysmal atrial fibrillation) (HCC)    3. Type 2 diabetes mellitus without complication, without long-term current use of insulin (HCC)    4. Iron deficiency anemia, unspecified iron deficiency anemia type    5. Peripheral polyneuropathy    6. Type 2 diabetes mellitus with diabetic polyneuropathy, without long-term current use of insulin (HCC)    7. Lymphedema of left upper extremity    8. Chronic respiratory failure, unspecified whether with hypoxia or hypercapnia    9. Stage 3b chronic kidney disease (HCC)    10. ASCVD (arteriosclerotic cardiovascular disease)    11. ACP (advance care planning)             REVIEW OF SYMPTOMS    Review of Systems   Constitutional:  Negative for chills and fatigue.   HENT:  Negative for congestion and sore throat.    Respiratory:  Negative for shortness of breath and wheezing.    Cardiovascular:  Negative for chest pain and palpitations.   Gastrointestinal:  Negative for abdominal pain and nausea.   Genitourinary:  Negative for frequency and urgency.   Neurological:  Negative for light-headedness.       PAST MEDICAL HISTORY  Past Medical History:   Diagnosis Date    Arrhythmia     Pacemaker placed aprox 5 years ago for A Fib per patient    Arthritis 12/2013    rt wrist    Atrial fibrillation (HCC)     on Xarelto - Dr. 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

## 2025-02-12 ASSESSMENT — ENCOUNTER SYMPTOMS
SORE THROAT: 0
WHEEZING: 0
NAUSEA: 0
SHORTNESS OF BREATH: 0
ABDOMINAL PAIN: 0

## 2025-03-02 NOTE — LETTER
Cristal Pollard is a 63 y.o. female on day 0 of admission presenting with Back pain of thoracolumbar region. TCC met with patient at bedside. Patient states she feels terrible and does not want to complete a assessment today. Primary TCC will follow up tomorrow.    Adalgisa Humphrey RN, TCC   [address]  [address] [address]  Phone: [phone]  Fax: [phone]            January 12, 2022    [address] [place] [address]      Dear [place]: This is your CARELINK schedule. Please jose your calendar with these dates. You can do your checks anytime during the scheduled day. Since we do not do reminder calls, it will be your responsibility to perform the checks on the day it is scheduled. If you have any questions or concerns, please call and ask for Barbara Quigley at [phone].

## 2025-03-25 ENCOUNTER — TELEPHONE (OUTPATIENT)
Dept: CARDIOLOGY CLINIC | Age: 75
End: 2025-03-25

## 2025-03-25 PROBLEM — C44.722 SCC (SQUAMOUS CELL CARCINOMA), LEG, RIGHT: Status: ACTIVE | Noted: 2025-03-25

## 2025-03-25 PROBLEM — C44.712: Status: ACTIVE | Noted: 2025-03-25

## 2025-03-25 NOTE — TELEPHONE ENCOUNTER
Cardiologist: Dr. Love  Surgeon: Dr. Cooper  Surgery: Excision right leg BCC, R leg SCC  Surgery/Procedure should be done in the hospital setting    _____ yes    _____  no    Anesthesia: MAC/Local  Date: TBD  Fax# [phone]  # [phone]    Hold Aspirin 5 days prior      Last OV 1/27/2025 w/Jarrett    CORONARY ARTERY DISEASE:  asymptomatic     All available  tests in chart reviewed. Management discussed .  Testing ordered  no  On aspirin history of CABG we will continue          CAD CABG with LIMA to the LAD and vein graft to the RCA     LIPID MANAGEMENT:  Importance of lipid levels discussed with patient   and patient was given dietary advice. NCEP- ATP III guidelines reviewed with patient.    -   Changes  in medicines made: No     On diet now     -   DIABETES MELLITUS: Available pertinent lab data reviewed   and  patient was given dietary advice . Advised to check blood glucose level on a regular basis.      -   Changes  in medicines made: No        On Amaryl and Farxiga we will check the A1c     -  Hypertension: Patients blood pressure is normal. Patient is advised about low sodium diet. Present medical regimen will not be changed.    On losartan 25 mg p.o. twice daily monitor the blood pressure     -Atrial fibrillation off Eliquis for Surgery  DC plavix  being seen by EP as well  On hold noac  Had left atrial appendage ligation during surgery a MABLE was performed by EP and they are following it      Last EKG- 1/27/2025    Stress Test- 4/13/2021  The patient exercised according to the JUAN for 1:44 min:s, achieving a   work level of Max. METS: 3.90.   The resting heart rate of 64 bpm kaleb to a maximal heart rate of 123 bpm.   This value represents 83 % of the   maximal, age-predicted heart rate. The resting blood pressure of 126/70 mmHg   , kaleb to a maximum blood   pressure of 126/70 mmHg. The exercise test was stopped due to Dyspnea.   Interpretation   Summary: Resting ECG: abnormal.      Echo-

## 2025-04-28 ENCOUNTER — OFFICE VISIT (OUTPATIENT)
Dept: FAMILY MEDICINE CLINIC | Age: 75
End: 2025-04-28
Payer: MEDICARE

## 2025-04-28 ENCOUNTER — TELEPHONE (OUTPATIENT)
Dept: CARDIOLOGY CLINIC | Age: 75
End: 2025-04-28

## 2025-04-28 ENCOUNTER — RESULTS FOLLOW-UP (OUTPATIENT)
Dept: FAMILY MEDICINE CLINIC | Age: 75
End: 2025-04-28

## 2025-04-28 ENCOUNTER — HOSPITAL ENCOUNTER (OUTPATIENT)
Dept: GENERAL RADIOLOGY | Age: 75
Discharge: HOME | End: 2025-04-28
Payer: MEDICARE

## 2025-04-28 VITALS
TEMPERATURE: 97.7 F | WEIGHT: 221 LBS | HEART RATE: 67 BPM | SYSTOLIC BLOOD PRESSURE: 132 MMHG | HEIGHT: 70 IN | BODY MASS INDEX: 31.64 KG/M2 | OXYGEN SATURATION: 91 % | DIASTOLIC BLOOD PRESSURE: 76 MMHG

## 2025-04-28 DIAGNOSIS — R60.0 BILATERAL LOWER EXTREMITY EDEMA: ICD-10-CM

## 2025-04-28 DIAGNOSIS — R06.02 SHORTNESS OF BREATH: Primary | ICD-10-CM

## 2025-04-28 DIAGNOSIS — I50.23 ACUTE ON CHRONIC SYSTOLIC CONGESTIVE HEART FAILURE (HCC): ICD-10-CM

## 2025-04-28 DIAGNOSIS — R53.83 FATIGUE, UNSPECIFIED TYPE: ICD-10-CM

## 2025-04-28 DIAGNOSIS — N18.32 STAGE 3B CHRONIC KIDNEY DISEASE (HCC): ICD-10-CM

## 2025-04-28 DIAGNOSIS — R63.5 WEIGHT GAIN: ICD-10-CM

## 2025-04-28 PROCEDURE — 71046 X-RAY EXAM CHEST 2 VIEWS: CPT

## 2025-04-28 PROCEDURE — G8427 DOCREV CUR MEDS BY ELIG CLIN: HCPCS | Performed by: PHYSICIAN ASSISTANT

## 2025-04-28 PROCEDURE — 3078F DIAST BP <80 MM HG: CPT | Performed by: PHYSICIAN ASSISTANT

## 2025-04-28 PROCEDURE — 1160F RVW MEDS BY RX/DR IN RCRD: CPT | Performed by: PHYSICIAN ASSISTANT

## 2025-04-28 PROCEDURE — 1123F ACP DISCUSS/DSCN MKR DOCD: CPT | Performed by: PHYSICIAN ASSISTANT

## 2025-04-28 PROCEDURE — 99214 OFFICE O/P EST MOD 30 MIN: CPT | Performed by: PHYSICIAN ASSISTANT

## 2025-04-28 PROCEDURE — 3075F SYST BP GE 130 - 139MM HG: CPT | Performed by: PHYSICIAN ASSISTANT

## 2025-04-28 PROCEDURE — 1159F MED LIST DOCD IN RCRD: CPT | Performed by: PHYSICIAN ASSISTANT

## 2025-04-28 PROCEDURE — G8417 CALC BMI ABV UP PARAM F/U: HCPCS | Performed by: PHYSICIAN ASSISTANT

## 2025-04-28 PROCEDURE — 1036F TOBACCO NON-USER: CPT | Performed by: PHYSICIAN ASSISTANT

## 2025-04-28 RX ORDER — AZITHROMYCIN 250 MG/1
TABLET, FILM COATED ORAL
Qty: 6 TABLET | Refills: 0 | Status: SHIPPED | OUTPATIENT
Start: 2025-04-28

## 2025-04-28 NOTE — PROGRESS NOTES
allopurinol (ZYLOPRIM) 100 MG tablet Take 1 tablet by mouth daily 90 tablet 1    Tirzepatide 5 MG/0.5ML SOAJ Inject 5 mg into the skin every 7 days 6 mL 1    carboxymethylcellulose (REFRESH PLUS) 0.5 % SOLN ophthalmic solution as needed       aspirin 81 MG tablet Take 1 tablet by mouth daily      amoxicillin-clavulanate (AUGMENTIN) 875-125 MG per tablet Take 1 tablet by mouth 2 times daily for 10 days 20 tablet 0    azithromycin (ZITHROMAX) 250 MG tablet Use as directed 6 tablet 0     No current facility-administered medications for this visit.       ALLERGIES  Allergies   Allergen Reactions    Spironolactone      CAUSES INCREASED K+    Tape [Adhesive Tape] Rash     SURGICAL TAPE       PHYSICAL EXAM     /76   Pulse 67   Temp 97.7 °F (36.5 °C) (Oral)   Ht 1.778 m (5' 10\")   Wt 100.2 kg (221 lb)   SpO2 91%   BMI 31.71 kg/m²     Constitutional:  Well developed, well nourished.  No acute distress.  HENT:  Normocephalic, atraumatic  Eyes:  conjunctiva normal, no discharge, no scleral icterus  Cardiovascular:  Normal heart rate, normal rhythm, no murmurs, gallops or rubs  Thorax & Lungs:  Normal breath sounds, no respiratory distress, no wheezing, no rales, no rhonchi  Abdomen:  Soft, no tenderness to palpation.  No palpable mass or organomegaly.  Possible distention  Skin:  Warm, dry, no erythema, no rash  Extremities:  2+ pitting edema left lower leg, chronic hyperpigmentation.  Right lower leg wrapped in ACE wrap- recent procedure.  no tenderness, no cyanosis, no palpable cords  Neurologic:  Alert & oriented   Psychiatric:  Affect normal, mood normal    ASSESSMENT & PLAN    Mayo \"Albina" was seen today for shortness of breath.    Diagnoses and all orders for this visit:    Shortness of breath    Bilateral lower extremity edema    Weight gain    Fatigue, unspecified type    Acute on chronic systolic congestive heart failure (HCC)  -     CBC with Auto Differential; Future  -     Comprehensive Metabolic Panel;

## 2025-04-28 NOTE — TELEPHONE ENCOUNTER
Patient and wife in office lobby, sent by PCP for OV SOB, Edema, weight gain.  Discussed with CALIXTO Hamm NP. Patient to increase Torsemide to 40mg BID and schedule OV 5/1. Patient to get cxr and labs ordered by PCP.  Wife and patient notified.

## 2025-04-29 ENCOUNTER — RESULTS FOLLOW-UP (OUTPATIENT)
Dept: FAMILY MEDICINE CLINIC | Age: 75
End: 2025-04-29

## 2025-04-29 DIAGNOSIS — R74.01 TRANSAMINITIS: ICD-10-CM

## 2025-04-29 DIAGNOSIS — R14.0 ABDOMINAL DISTENTION: Primary | ICD-10-CM

## 2025-04-29 DIAGNOSIS — R14.0 ABDOMINAL DISTENTION: ICD-10-CM

## 2025-04-29 LAB
ALBUMIN SERPL-MCNC: 4 G/DL (ref 3.4–5)
ALBUMIN/GLOB SERPL: 1.4 {RATIO} (ref 1.1–2.2)
ALP SERPL-CCNC: 340 U/L (ref 40–129)
ALT SERPL-CCNC: 22 U/L (ref 10–40)
ANION GAP SERPL CALCULATED.3IONS-SCNC: 11 MMOL/L (ref 3–16)
AST SERPL-CCNC: 40 U/L (ref 15–37)
BASOPHILS # BLD: 0 K/UL (ref 0–0.2)
BASOPHILS NFR BLD: 0.7 %
BILIRUB SERPL-MCNC: 1.7 MG/DL (ref 0–1)
BUN SERPL-MCNC: 52 MG/DL (ref 7–20)
CALCIUM SERPL-MCNC: 8.7 MG/DL (ref 8.3–10.6)
CHLORIDE SERPL-SCNC: 102 MMOL/L (ref 99–110)
CO2 SERPL-SCNC: 27 MMOL/L (ref 21–32)
CREAT SERPL-MCNC: 1.5 MG/DL (ref 0.8–1.3)
DEPRECATED RDW RBC AUTO: 17.7 % (ref 12.4–15.4)
EOSINOPHIL # BLD: 0.2 K/UL (ref 0–0.6)
EOSINOPHIL NFR BLD: 2.7 %
GFR SERPLBLD CREATININE-BSD FMLA CKD-EPI: 48 ML/MIN/{1.73_M2}
GLUCOSE SERPL-MCNC: 193 MG/DL (ref 70–99)
HAV IGM SERPL QL IA: NORMAL
HBV CORE IGM SERPL QL IA: NORMAL
HBV SURFACE AG SERPL QL IA: NORMAL
HCT VFR BLD AUTO: 41.9 % (ref 40.5–52.5)
HCV AB SERPL QL IA: NORMAL
HGB BLD-MCNC: 13.6 G/DL (ref 13.5–17.5)
LYMPHOCYTES # BLD: 0.7 K/UL (ref 1–5.1)
LYMPHOCYTES NFR BLD: 11.7 %
MCH RBC QN AUTO: 29.1 PG (ref 26–34)
MCHC RBC AUTO-ENTMCNC: 32.5 G/DL (ref 31–36)
MCV RBC AUTO: 89.5 FL (ref 80–100)
MONOCYTES # BLD: 1.1 K/UL (ref 0–1.3)
MONOCYTES NFR BLD: 17.1 %
NEUTROPHILS # BLD: 4.3 K/UL (ref 1.7–7.7)
NEUTROPHILS NFR BLD: 67.8 %
NT-PROBNP SERPL-MCNC: 3215 PG/ML (ref 0–449)
PLATELET # BLD AUTO: 128 K/UL (ref 135–450)
PMV BLD AUTO: 10.4 FL (ref 5–10.5)
POTASSIUM SERPL-SCNC: 3.9 MMOL/L (ref 3.5–5.1)
PROT SERPL-MCNC: 6.8 G/DL (ref 6.4–8.2)
RBC # BLD AUTO: 4.67 M/UL (ref 4.2–5.9)
SODIUM SERPL-SCNC: 140 MMOL/L (ref 136–145)
WBC # BLD AUTO: 6.4 K/UL (ref 4–11)

## 2025-04-29 NOTE — PROGRESS NOTES
MRN: [id number]  Name: Mayo Mejia  : 1950    Insurance: Payor: [employer] MEDICARE /  /  /      Phone #: [phone]  Provider: JUSTIN Hope CNP     Date of Visit: 2025    Reason for visit: ak SOB, edema OV per TB Recent Hospitalization Date:    Reason for Hospitalization:    Last EK25  Type of Device:Medtronic BIV - Pacemaker CARELINK Last Checked : 24       Vitals BP HR O2% WT HT ORTHO BP LYING ORTHO BP SITTING ORTHO BP SITTING   Today's Findings           Patients work up- Check List     Testing Last Date Completed Date Expected  (Jamul One) Additional Notes    MA to document For provider to complete Either MA or Provider    Carotid Duplex  STAT 1 WK 6 MTH       THIS WK 2 WK 1 YEAR     Cardiac CTA  STAT 1 WK 6 MTH       THIS WK 2 WK 1 YEAR     Cardiac CT Calcium scoring  STAT 1 WK 6 MTH       THIS WK 2 WK 1 YEAR     CTA Chest, Abdomen & Pelvis  STAT 1 WK 6 MTH       THIS WK 2 WK 1 YEAR     CT Chest IV w/ Contrast  STAT 1 WK 6 MTH       THIS WK 2 WK 1 YEAR     CT Chest w/o Contrast  STAT 1 WK 6 MTH       THIS WK 2 WK 1 YEAR     CXR 25 STAT 1 WK 6 MTH       THIS WK 2 WK 1 YEAR     ECHO  Stress Complete Limited     MRI- Cardiac  STAT 1 WK 6 MTH       THIS WK 2 WK 1 YEAR     MUGA Scan  STAT 1 WK 6 MTH       THIS WK 2 WK 1 YEAR     Nuclear Stress  Lexiscan Cardiolite     PFT  STAT 1 WK 6 MTH       THIS WK 2 WK 1 YEAR     Treadmill Stress Test  STAT 1 WK 6 MTH       THIS WK 2 WK 1 YEAR     Vascular Duplex  Lower: Right Left Bilat       Upper: Right Left Bilat     Other Test Not Listed:    Monitors Last Date Completed Day's Request/Ordered     Holter  Short term 24 hours 48 hours      Long term 3 days 7 days 14 days   Event   (1-30 days)      Procedures Last Date Performed Procedure Details Date Expected   Additional Notes    ASD Closure        Carotid Angio        Cardioversion        Heart Cath  R L R&L      Peripheral Angio  R L      PFO Closure        PTCA/PCI        MABLE

## 2025-05-01 ENCOUNTER — OFFICE VISIT (OUTPATIENT)
Dept: CARDIOLOGY CLINIC | Age: 75
End: 2025-05-01
Payer: MEDICARE

## 2025-05-01 ENCOUNTER — TELEPHONE (OUTPATIENT)
Dept: FAMILY MEDICINE CLINIC | Age: 75
End: 2025-05-01

## 2025-05-01 VITALS
HEART RATE: 72 BPM | DIASTOLIC BLOOD PRESSURE: 74 MMHG | SYSTOLIC BLOOD PRESSURE: 132 MMHG | HEIGHT: 71 IN | WEIGHT: 222 LBS | OXYGEN SATURATION: 95 % | BODY MASS INDEX: 31.08 KG/M2

## 2025-05-01 DIAGNOSIS — I25.10 ASCVD (ARTERIOSCLEROTIC CARDIOVASCULAR DISEASE): Primary | ICD-10-CM

## 2025-05-01 DIAGNOSIS — I38 VHD (VALVULAR HEART DISEASE): ICD-10-CM

## 2025-05-01 DIAGNOSIS — I25.10 CORONARY ARTERY DISEASE INVOLVING NATIVE CORONARY ARTERY OF NATIVE HEART WITHOUT ANGINA PECTORIS: ICD-10-CM

## 2025-05-01 DIAGNOSIS — I50.23 ACUTE ON CHRONIC SYSTOLIC CONGESTIVE HEART FAILURE (HCC): ICD-10-CM

## 2025-05-01 DIAGNOSIS — I42.9 CARDIOMYOPATHY, UNSPECIFIED TYPE (HCC): ICD-10-CM

## 2025-05-01 DIAGNOSIS — R06.02 SHORTNESS OF BREATH: ICD-10-CM

## 2025-05-01 PROCEDURE — 99214 OFFICE O/P EST MOD 30 MIN: CPT | Performed by: NURSE PRACTITIONER

## 2025-05-01 PROCEDURE — 1036F TOBACCO NON-USER: CPT | Performed by: NURSE PRACTITIONER

## 2025-05-01 PROCEDURE — 1123F ACP DISCUSS/DSCN MKR DOCD: CPT | Performed by: NURSE PRACTITIONER

## 2025-05-01 PROCEDURE — G8428 CUR MEDS NOT DOCUMENT: HCPCS | Performed by: NURSE PRACTITIONER

## 2025-05-01 PROCEDURE — 3075F SYST BP GE 130 - 139MM HG: CPT | Performed by: NURSE PRACTITIONER

## 2025-05-01 PROCEDURE — G8417 CALC BMI ABV UP PARAM F/U: HCPCS | Performed by: NURSE PRACTITIONER

## 2025-05-01 PROCEDURE — 3078F DIAST BP <80 MM HG: CPT | Performed by: NURSE PRACTITIONER

## 2025-05-01 RX ORDER — METOLAZONE 5 MG/1
5 TABLET ORAL DAILY
Qty: 30 TABLET | Refills: 0 | Status: SHIPPED | OUTPATIENT
Start: 2025-05-01

## 2025-05-01 RX ORDER — FUROSEMIDE INJECTION 80 MG/ 10 ML 8 MG/ML
80 INJECTION SUBCUTANEOUS ONCE
Qty: 1 EACH | Refills: 0 | Status: SHIPPED | OUTPATIENT
Start: 2025-05-01 | End: 2025-05-01

## 2025-05-01 RX ORDER — IMIQUIMOD 12.5 MG/.25G
CREAM TOPICAL
COMMUNITY
Start: 2025-04-14

## 2025-05-01 ASSESSMENT — ENCOUNTER SYMPTOMS: SHORTNESS OF BREATH: 1

## 2025-05-01 NOTE — PROGRESS NOTES
CLINICAL STAFF DOCUMENTATION    Violeta Hamm, JL     Mayo Mejia  1950  [id number]    Have you had any Chest Pain recently? - No    Have you had any Shortness of Breath - Yes  When did it begin? - pt states ongoing, has gotten worse in the past week or so      Have you had any dizziness - No      Have you had any palpitations recently? - No    Do you have any edema - swelling in  bilateral legs/feet/ankle - right worse then left          Is the patient on any of the following medications -  No  If Yes DO EKG - Needs done every 3 months    When did you have your last labs drawn 8/2024  Do we have the labs in their chart Yes    If we do not have these labs, you are retrieve these labs for the provider!    Do you need any prescriptions refilled? - No    Do you have a surgery or procedure scheduled in the near future - No      Do use tobacco products? - No  Do you drink alcohol? - pt states about 8 beers a week   Do you use any illicit drugs? - No  Caffeine? - Iced tea off & on, soda occasionally         Check medication list thoroughly!!! AND RECONCILE OUTSIDE MEDICATIONS  If dose has changed change the entire order not just the MG  BE SURE TO ASK PATIENT IF THEY NEED MEDICATION REFILLS  Verify Pharmacy and update if incorrect    Add to every patient's \"wrap up\" the following dot phrase AFTERVISITCARDIOHEARTHOUSE and ensure we explain this to our patients    
WEEKS      amoxicillin-clavulanate (AUGMENTIN) 875-125 MG per tablet Take 1 tablet by mouth 2 times daily for 10 days 20 tablet 0    azithromycin (ZITHROMAX) 250 MG tablet Use as directed 6 tablet 0    carvedilol (COREG) 6.25 MG tablet Take 1 tablet by mouth 2 times daily 180 tablet 3    dapagliflozin (FARXIGA) 10 MG tablet Take 1 tablet by mouth every morning 90 tablet 1    ferrous sulfate (IRON 325) 325 (65 Fe) MG tablet Take 1 tablet by mouth every other day 90 tablet 1    gabapentin (NEURONTIN) 300 MG capsule Take 1 capsule by mouth 3 times daily for 180 days. 270 capsule 1    glimepiride (AMARYL) 1 MG tablet Take 1 tablet by mouth every morning (before breakfast) 90 tablet 1    simvastatin (ZOCOR) 20 MG tablet Take 1 tablet by mouth daily 90 tablet 3    torsemide (DEMADEX) 20 MG tablet Take 1 tablet by mouth 2 times daily (Patient taking differently: Take 2 tablets by mouth 2 times daily) 180 tablet 1    allopurinol (ZYLOPRIM) 100 MG tablet Take 1 tablet by mouth daily 90 tablet 1    Tirzepatide 5 MG/0.5ML SOAJ Inject 5 mg into the skin every 7 days 6 mL 1    carboxymethylcellulose (REFRESH PLUS) 0.5 % SOLN ophthalmic solution as needed       aspirin 81 MG tablet Take 1 tablet by mouth daily       No current facility-administered medications for this visit.          All pertinent data reviewed and discussed with patient    Assessment & Plan  1. Fluid retention: Stable. Weight has remained stable at approximately 217 pounds for the past 3 days. Kidney function remains unchanged. Echocardiogram performed in 07/2024 revealed a reduced ejection fraction of 35%. Evidence of valvular regurgitation.   - Initiate metolazone once daily, preferably 2 hours after torsemide, for 5 days. If challenging, take concurrently with torsemide.  - Provide device for fluid removal for use over the weekend if weight gain is observed.  - Conduct nuclear stress test to assess for sudden changes in blockage.  - Perform echocardiogram to

## 2025-05-01 NOTE — PATIENT INSTRUCTIONS
Thank you for allowing us to care for you today!   We want to ensure we can follow your treatment plan and we strive to give you the best outcomes and experience possible.   If you ever have a life threatening emergency and call 911 - for an ambulance (EMS)  REMEMBER  Our providers can only care for you at:   [facility] or [facility]   Even if you have someone take you or you drive yourself we can only care for you in a [facility] facility. Our providers are not setup at the other healthcare locations!    PLEASE CALL OUR OFFICE DURING NORMAL BUSINESS HOURS  Monday through Friday 8 am to 5 pm  AFTER HOURS the physician on-call cannot help with scheduling, rescheduling, procedure instruction questions or any type of medication need or issue.  [facility] P:[phone] - [facility] P:[phone] - [facility] P:[phone]      If you receive a survey:  We would appreciate you taking the time to share your experience concerning your provider visit in the office.    These surveys are confidential!  We are eager to improve and are counting on you to share your feedback so we can ensure you get the best care possible.

## 2025-05-05 ENCOUNTER — HOSPITAL ENCOUNTER (OUTPATIENT)
Age: 75
Discharge: HOME | End: 2025-05-05
Payer: MEDICARE

## 2025-05-05 ENCOUNTER — RESULTS FOLLOW-UP (OUTPATIENT)
Dept: CARDIOLOGY CLINIC | Age: 75
End: 2025-05-05

## 2025-05-05 ENCOUNTER — OFFICE VISIT (OUTPATIENT)
Dept: CARDIOLOGY CLINIC | Age: 75
End: 2025-05-05
Payer: MEDICARE

## 2025-05-05 VITALS
SYSTOLIC BLOOD PRESSURE: 132 MMHG | BODY MASS INDEX: 28.06 KG/M2 | OXYGEN SATURATION: 96 % | HEIGHT: 71 IN | HEART RATE: 67 BPM | DIASTOLIC BLOOD PRESSURE: 64 MMHG | WEIGHT: 200.4 LBS

## 2025-05-05 DIAGNOSIS — I50.23 ACUTE ON CHRONIC SYSTOLIC CONGESTIVE HEART FAILURE (HCC): Primary | ICD-10-CM

## 2025-05-05 LAB
ANION GAP SERPL CALCULATED.3IONS-SCNC: 14 MMOL/L (ref 9–17)
BNP SERPL-MCNC: 6829 PG/ML (ref 0–450)
BUN SERPL-MCNC: 55 MG/DL (ref 7–20)
CALCIUM SERPL-MCNC: 9.4 MG/DL (ref 8.3–10.6)
CHLORIDE SERPL-SCNC: 91 MMOL/L (ref 99–110)
CO2 SERPL-SCNC: 32 MMOL/L (ref 21–32)
CREAT SERPL-MCNC: 1.6 MG/DL (ref 0.8–1.3)
GFR, ESTIMATED: 43 ML/MIN/1.73M2
GLUCOSE SERPL-MCNC: 151 MG/DL (ref 74–99)
POTASSIUM SERPL-SCNC: 3.3 MMOL/L (ref 3.5–5.1)
SODIUM SERPL-SCNC: 137 MMOL/L (ref 136–145)

## 2025-05-05 PROCEDURE — 36415 COLL VENOUS BLD VENIPUNCTURE: CPT | Performed by: NURSE PRACTITIONER

## 2025-05-05 PROCEDURE — 99214 OFFICE O/P EST MOD 30 MIN: CPT | Performed by: NURSE PRACTITIONER

## 2025-05-05 PROCEDURE — 1036F TOBACCO NON-USER: CPT | Performed by: NURSE PRACTITIONER

## 2025-05-05 PROCEDURE — 1159F MED LIST DOCD IN RCRD: CPT | Performed by: NURSE PRACTITIONER

## 2025-05-05 PROCEDURE — 80048 BASIC METABOLIC PNL TOTAL CA: CPT

## 2025-05-05 PROCEDURE — G8417 CALC BMI ABV UP PARAM F/U: HCPCS | Performed by: NURSE PRACTITIONER

## 2025-05-05 PROCEDURE — 83880 ASSAY OF NATRIURETIC PEPTIDE: CPT

## 2025-05-05 PROCEDURE — 1123F ACP DISCUSS/DSCN MKR DOCD: CPT | Performed by: NURSE PRACTITIONER

## 2025-05-05 PROCEDURE — G8427 DOCREV CUR MEDS BY ELIG CLIN: HCPCS | Performed by: NURSE PRACTITIONER

## 2025-05-05 PROCEDURE — 3078F DIAST BP <80 MM HG: CPT | Performed by: NURSE PRACTITIONER

## 2025-05-05 PROCEDURE — 3075F SYST BP GE 130 - 139MM HG: CPT | Performed by: NURSE PRACTITIONER

## 2025-05-05 RX ORDER — METOLAZONE 5 MG/1
5 TABLET ORAL DAILY
Qty: 90 TABLET | Refills: 1 | OUTPATIENT
Start: 2025-05-05

## 2025-05-05 ASSESSMENT — ENCOUNTER SYMPTOMS: SHORTNESS OF BREATH: 1

## 2025-05-05 NOTE — PROGRESS NOTES
5/5/2025  Primary cardiologist: Dr. Farias    CC:   Mayo  is an established 75 y.o.  male here for a follow up on his cardiovascular health      SUBJECTIVE/OBJECTIVE:  Mayo is a 75 y.o. male with a history of coronary artery disease, PCI, CABG, valvular heart disease s/p AVR, carotid artery disease s/p left CEA, PVD fib pacemaker, CKD and diabetes mellitus.     In in 2021 [place] had CABG  x 2 with LIMA to the LAD and vein graft to the RCA with AVR with a #27 Medtronic Mosaic bioprosthetic valve aortic root enlargement with CorMatrix patch, LOU clip and Maze procedure    History of Present Illness  The patient presents for evaluation of congestive heart failure.    He reports a significant improvement in his condition, attributing it to the administration of metolazone on Saturday and Sunday. This medication regimen resulted in a loss of 22 pounds of fluid, enhancing his overall well-being. He no longer exhibits facial puffiness or lethargy and has regained his ability to breathe comfortably. He is currently on a twice-daily dose of torsemide 40 mg, with metolazone added to his treatment plan. He has not taken metolazone today.    He is also on amoxicillin for a lung infection, which was diagnosed following a chest x-ray. He was prescribed two different antibiotics to combat this infection.    He has an upcoming appointment with Dr. Taylor on 05/19/2025.    SOCIAL HISTORY  Occupations: Retired  Alcohol: Was drinking two or three beers a day, maybe four days a week. Has switched to non-alcoholic beer.        Review of Systems   Cardiovascular:  Positive for leg swelling.   Respiratory:  Positive for shortness of breath.    All other systems reviewed and are negative.      Vitals:    05/05/25 1124   BP: 132/64   BP Site: Left Upper Arm   Patient Position: Sitting   BP Cuff Size: Medium Adult   Pulse: 67   SpO2: 96%   Weight: 90.9 kg (200 lb 6.4 oz)   Height: 1.803 m (5' 11\")       Wt Readings from Last 3

## 2025-05-06 NOTE — TELEPHONE ENCOUNTER
----- Message from JUSTIN Carvalho CNP sent at 5/5/2025  4:25 PM EDT -----  Unfortunately needs to be redrawn due to potassium significantly effected by clotting.

## 2025-05-07 ENCOUNTER — TELEPHONE (OUTPATIENT)
Dept: CARDIOLOGY CLINIC | Age: 75
End: 2025-05-07

## 2025-05-08 ENCOUNTER — LAB (OUTPATIENT)
Dept: CARDIOLOGY CLINIC | Age: 75
End: 2025-05-08
Payer: MEDICARE

## 2025-05-08 DIAGNOSIS — I50.23 ACUTE ON CHRONIC SYSTOLIC CONGESTIVE HEART FAILURE (HCC): Primary | ICD-10-CM

## 2025-05-08 PROCEDURE — 36415 COLL VENOUS BLD VENIPUNCTURE: CPT | Performed by: INTERNAL MEDICINE

## 2025-05-09 ENCOUNTER — RESULTS FOLLOW-UP (OUTPATIENT)
Dept: CARDIOLOGY | Age: 75
End: 2025-05-09

## 2025-05-09 DIAGNOSIS — E87.6 HYPOKALEMIA: Primary | ICD-10-CM

## 2025-05-09 PROBLEM — Z09 S/P EXCISION OF SKIN LESION, FOLLOW-UP EXAM: Status: ACTIVE | Noted: 2025-05-09

## 2025-05-09 PROBLEM — L76.31: Status: ACTIVE | Noted: 2025-05-09

## 2025-05-09 LAB
ANION GAP SERPL CALCULATED.3IONS-SCNC: 15 MMOL/L (ref 3–16)
BUN SERPL-MCNC: 77 MG/DL (ref 7–20)
CALCIUM SERPL-MCNC: 8.6 MG/DL (ref 8.3–10.6)
CHLORIDE SERPL-SCNC: 86 MMOL/L (ref 99–110)
CO2 SERPL-SCNC: 32 MMOL/L (ref 21–32)
CREAT SERPL-MCNC: 2 MG/DL (ref 0.8–1.3)
GFR SERPLBLD CREATININE-BSD FMLA CKD-EPI: 34 ML/MIN/{1.73_M2}
GLUCOSE SERPL-MCNC: 182 MG/DL (ref 70–99)
POTASSIUM SERPL-SCNC: 3 MMOL/L (ref 3.5–5.1)
SODIUM SERPL-SCNC: 133 MMOL/L (ref 136–145)

## 2025-05-09 RX ORDER — POTASSIUM CHLORIDE 1500 MG/1
20 TABLET, EXTENDED RELEASE ORAL 2 TIMES DAILY
Qty: 180 TABLET | Refills: 1 | Status: SHIPPED | OUTPATIENT
Start: 2025-05-09

## 2025-05-09 NOTE — TELEPHONE ENCOUNTER
Spoke to pt advised of medication addition and where it was called in, scheduled patient for 5/15/25 @ 10:30 am for BMP recheck

## 2025-05-09 NOTE — TELEPHONE ENCOUNTER
----- Message from JUSTIN Carvalho CNP sent at 5/9/2025  7:59 AM EDT -----  Please start potassium 20 mEq BID  recheck in 1 week

## 2025-05-12 NOTE — TELEPHONE ENCOUNTER
Pt called in stating [facility] uses [facility] for [facility] cause of insurance. They have not been able to get his potassium chloride in. He has an apt for 5/15 for labs.

## 2025-05-12 NOTE — TELEPHONE ENCOUNTER
Due to inventory issues at pharmacy, patient will start K+ later today or tomorrow. Scheduled for lab draw on 5/15. Should be keep or reschedule?

## 2025-05-13 ENCOUNTER — HOSPITAL ENCOUNTER (OUTPATIENT)
Age: 75
Discharge: HOME | End: 2025-05-13
Payer: MEDICARE

## 2025-05-13 DIAGNOSIS — E87.5 HYPERKALEMIA: ICD-10-CM

## 2025-05-13 DIAGNOSIS — N18.31 STAGE 3A CHRONIC KIDNEY DISEASE (HCC): ICD-10-CM

## 2025-05-13 DIAGNOSIS — I50.23 ACUTE ON CHRONIC SYSTOLIC CONGESTIVE HEART FAILURE (HCC): ICD-10-CM

## 2025-05-13 DIAGNOSIS — Z95.0 CARDIAC PACEMAKER IN SITU: ICD-10-CM

## 2025-05-13 DIAGNOSIS — E11.9 TYPE 2 DIABETES MELLITUS WITHOUT COMPLICATION, WITHOUT LONG-TERM CURRENT USE OF INSULIN (HCC): ICD-10-CM

## 2025-05-13 DIAGNOSIS — I10 PRIMARY HYPERTENSION: ICD-10-CM

## 2025-05-13 LAB
ALBUMIN SERPL-MCNC: 3.8 G/DL (ref 3.4–5)
ANION GAP SERPL CALCULATED.3IONS-SCNC: 15 MMOL/L (ref 9–17)
BACTERIA URNS QL MICRO: ABNORMAL
BILIRUB UR QL STRIP: NEGATIVE
BUN SERPL-MCNC: 76 MG/DL (ref 7–20)
CALCIUM SERPL-MCNC: 10.2 MG/DL (ref 8.3–10.6)
CASTS #/AREA URNS LPF: ABNORMAL /LPF
CHLORIDE SERPL-SCNC: 92 MMOL/L (ref 99–110)
CLARITY UR: CLEAR
CO2 SERPL-SCNC: 33 MMOL/L (ref 21–32)
COLOR UR: YELLOW
CREAT SERPL-MCNC: 1.4 MG/DL (ref 0.8–1.3)
CREAT UR-MCNC: 38 MG/DL (ref 39–259)
GFR, ESTIMATED: 50 ML/MIN/1.73M2
GLUCOSE SERPL-MCNC: 120 MG/DL (ref 74–99)
GLUCOSE UR STRIP-MCNC: 250 MG/DL
HGB UR QL STRIP.AUTO: NEGATIVE
KETONES UR STRIP-MCNC: NEGATIVE MG/DL
LEUKOCYTE ESTERASE UR QL STRIP: NEGATIVE
MAGNESIUM SERPL-MCNC: 2.4 MG/DL (ref 1.8–2.4)
NITRITE UR QL STRIP: NEGATIVE
PH UR STRIP: 7 [PH] (ref 5–8)
PHOSPHATE SERPL-MCNC: 4.1 MG/DL (ref 2.5–4.9)
POTASSIUM SERPL-SCNC: 2.8 MMOL/L (ref 3.5–5.1)
PROT UR STRIP-MCNC: 30 MG/DL
RBC #/AREA URNS HPF: 1 /HPF (ref 0–2)
SODIUM SERPL-SCNC: 140 MMOL/L (ref 136–145)
SP GR UR STRIP: 1.01 (ref 1–1.03)
TOTAL PROTEIN, URINE: 46 MG/DL
URINE TOTAL PROTEIN CREATININE RATIO: 1.21 (ref 0–0.2)
UROBILINOGEN UR STRIP-ACNC: 0.2 EU/DL (ref 0–1)
WBC #/AREA URNS HPF: 1 /HPF (ref 0–5)

## 2025-05-13 PROCEDURE — 81001 URINALYSIS AUTO W/SCOPE: CPT

## 2025-05-13 PROCEDURE — 84156 ASSAY OF PROTEIN URINE: CPT

## 2025-05-13 PROCEDURE — 82040 ASSAY OF SERUM ALBUMIN: CPT

## 2025-05-13 PROCEDURE — 83735 ASSAY OF MAGNESIUM: CPT

## 2025-05-13 PROCEDURE — 82570 ASSAY OF URINE CREATININE: CPT

## 2025-05-13 PROCEDURE — 80048 BASIC METABOLIC PNL TOTAL CA: CPT

## 2025-05-13 PROCEDURE — 84100 ASSAY OF PHOSPHORUS: CPT

## 2025-05-13 RX ORDER — POTASSIUM CHLORIDE 1500 MG/1
40 TABLET, EXTENDED RELEASE ORAL 2 TIMES DAILY
Qty: 360 TABLET | Refills: 1 | Status: SHIPPED | OUTPATIENT
Start: 2025-05-13

## 2025-05-16 ENCOUNTER — LAB (OUTPATIENT)
Dept: CARDIOLOGY CLINIC | Age: 75
End: 2025-05-16

## 2025-05-16 DIAGNOSIS — I48.0 PAF (PAROXYSMAL ATRIAL FIBRILLATION) (HCC): Primary | ICD-10-CM

## 2025-05-16 DIAGNOSIS — M1A.09X0 IDIOPATHIC CHRONIC GOUT OF MULTIPLE SITES WITHOUT TOPHUS: ICD-10-CM

## 2025-05-16 PROBLEM — L97.912 TRAUMATIC LEG ULCER, RIGHT, WITH FAT LAYER EXPOSED (HCC): Status: ACTIVE | Noted: 2025-05-16

## 2025-05-16 PROBLEM — M79.604 RIGHT LEG PAIN: Status: ACTIVE | Noted: 2025-05-16

## 2025-05-17 ENCOUNTER — RESULTS FOLLOW-UP (OUTPATIENT)
Dept: CARDIOLOGY | Age: 75
End: 2025-05-17

## 2025-05-17 LAB
ANION GAP SERPL CALCULATED.3IONS-SCNC: 9 MMOL/L (ref 3–16)
BUN SERPL-MCNC: 67 MG/DL (ref 7–20)
CALCIUM SERPL-MCNC: 8.7 MG/DL (ref 8.3–10.6)
CHLORIDE SERPL-SCNC: 95 MMOL/L (ref 99–110)
CO2 SERPL-SCNC: 30 MMOL/L (ref 21–32)
CREAT SERPL-MCNC: 1.4 MG/DL (ref 0.8–1.3)
GFR SERPLBLD CREATININE-BSD FMLA CKD-EPI: 52 ML/MIN/{1.73_M2}
GLUCOSE SERPL-MCNC: 269 MG/DL (ref 70–99)
POTASSIUM SERPL-SCNC: 5 MMOL/L (ref 3.5–5.1)
SODIUM SERPL-SCNC: 134 MMOL/L (ref 136–145)

## 2025-05-19 RX ORDER — ALLOPURINOL 100 MG/1
100 TABLET ORAL DAILY
Qty: 90 TABLET | Refills: 1 | Status: SHIPPED | OUTPATIENT
Start: 2025-05-19

## 2025-05-22 ENCOUNTER — TELEPHONE (OUTPATIENT)
Dept: CARDIOLOGY CLINIC | Age: 75
End: 2025-05-22

## 2025-05-22 ENCOUNTER — RESULTS FOLLOW-UP (OUTPATIENT)
Dept: CARDIOLOGY | Age: 75
End: 2025-05-22

## 2025-05-22 NOTE — TELEPHONE ENCOUNTER
Called and advised patient that he needs to call medtronic as there is a problem connecting with his device. Patient had a transmission due on 5/11 and I did not receive it. Patient said that he would call today.

## 2025-05-27 NOTE — PROGRESS NOTES
MRN: [id number]  Name: Mayo Mejia  : 1950    Insurance: Payor: [employer]ICARE /  /  /      Phone #: [phone]  Provider: Brie Farias MD     Date of Visit: 2025    Reason for visit: 1 mo f/u  Recent Hospitalization Date:    Reason for Hospitalization:    Last EK/25  Type of Device:Medtronic BIV - Pacemaker                      CAREL[employer]   Implant Date 2024 (Battery Replacement)   MN Percepta CRTP MRI  W1TR01   SN [id number]   Atrial Lead MN 5076MRI-52 SN [id number] Implant Date 2022   R Ventricular Lead MN 5086MRI-58 SN [id number] Implant Date 2014   L Ventricular Lead  MN  5071/53   SN  [id number]  Implant date  2024   L Ventricular Lead  MN  5071/53   SN  [id number]  Implant date  2024     Vitals BP HR O2% WT HT ORTHO BP LYING ORTHO BP SITTING ORTHO BP SITTING   Today's Findings           Patients work up- Check List     Testing Last Date Completed Date Expected  ([facility]) Additional Notes    MA to document For provider to complete Either MA or Provider    Carotid Duplex  STAT 1 WK 6 MTH       THIS WK 2 WK 1 YEAR     Cardiac CTA  STAT 1 WK 6 MTH       THIS WK 2 WK  YEAR     Cardiac CT Calcium scoring  STAT 1 WK 6 MTH       THIS WK 2 WK  YEAR     CTA Chest, Abdomen & Pelvis  STAT 1 WK 6 MTH       THIS WK 2 WK 1 YEAR     CT Chest IV w/ Contrast  STAT 1 WK 6 MTH       THIS WK 2 WK 1 YEAR     CT Chest w/o Contrast  STAT 1 WK 6 MTH       THIS WK 2 WK 1 YEAR     CXR  STAT 1 WK 6 MTH       THIS WK 2 WK 1 YEAR     ECHO  Stress Complete Limited     MRI- Cardiac  STAT 1 WK 6 MTH       THIS WK 2 WK 1 YEAR     MUGA Scan  STAT 1 WK 6 MTH       THIS WK 2 WK 1 YEAR     Nuclear Stress  Lexiscan Cardiolite     PFT  STAT 1 WK 6 MTH       THIS WK 2 WK 1 YEAR     Treadmill Stress Test  STAT 1 WK 6 MTH       THIS WK 2 WK 1 YEAR     Vascular Duplex  Lower: Right Left Bilat       Upper: Right Left Bilat     Other Test Not Listed:    Monitors Last Date Completed

## 2025-06-08 PROBLEM — Z09 S/P EXCISION OF SKIN LESION, FOLLOW-UP EXAM: Status: RESOLVED | Noted: 2025-05-09 | Resolved: 2025-06-08

## 2025-06-09 ENCOUNTER — OFFICE VISIT (OUTPATIENT)
Dept: FAMILY MEDICINE CLINIC | Age: 75
End: 2025-06-09
Payer: MEDICARE

## 2025-06-09 VITALS
DIASTOLIC BLOOD PRESSURE: 62 MMHG | BODY MASS INDEX: 26.47 KG/M2 | SYSTOLIC BLOOD PRESSURE: 114 MMHG | OXYGEN SATURATION: 99 % | HEIGHT: 70 IN | WEIGHT: 184.9 LBS | HEART RATE: 72 BPM

## 2025-06-09 DIAGNOSIS — E11.42 TYPE 2 DIABETES MELLITUS WITH DIABETIC POLYNEUROPATHY, WITHOUT LONG-TERM CURRENT USE OF INSULIN (HCC): ICD-10-CM

## 2025-06-09 DIAGNOSIS — E11.9 TYPE 2 DIABETES MELLITUS WITHOUT COMPLICATION, WITHOUT LONG-TERM CURRENT USE OF INSULIN (HCC): ICD-10-CM

## 2025-06-09 DIAGNOSIS — D50.9 IRON DEFICIENCY ANEMIA, UNSPECIFIED IRON DEFICIENCY ANEMIA TYPE: ICD-10-CM

## 2025-06-09 DIAGNOSIS — M1A.09X0 IDIOPATHIC CHRONIC GOUT OF MULTIPLE SITES WITHOUT TOPHUS: ICD-10-CM

## 2025-06-09 DIAGNOSIS — G62.9 PERIPHERAL POLYNEUROPATHY: ICD-10-CM

## 2025-06-09 DIAGNOSIS — I48.0 PAF (PAROXYSMAL ATRIAL FIBRILLATION) (HCC): ICD-10-CM

## 2025-06-09 DIAGNOSIS — I25.10 ASCVD (ARTERIOSCLEROTIC CARDIOVASCULAR DISEASE): ICD-10-CM

## 2025-06-09 DIAGNOSIS — I89.0 LYMPHEDEMA OF LEFT UPPER EXTREMITY: ICD-10-CM

## 2025-06-09 LAB
ALBUMIN SERPL-MCNC: 4 G/DL (ref 3.4–5)
ALBUMIN/GLOB SERPL: 1.1 {RATIO} (ref 1.1–2.2)
ALP SERPL-CCNC: 351 U/L (ref 40–129)
ALT SERPL-CCNC: 25 U/L (ref 10–40)
ANION GAP SERPL CALCULATED.3IONS-SCNC: 12 MMOL/L (ref 3–16)
AST SERPL-CCNC: 38 U/L (ref 15–37)
BILIRUB SERPL-MCNC: 1.3 MG/DL (ref 0–1)
BUN SERPL-MCNC: 47 MG/DL (ref 7–20)
CALCIUM SERPL-MCNC: 9.1 MG/DL (ref 8.3–10.6)
CHLORIDE SERPL-SCNC: 98 MMOL/L (ref 99–110)
CHOLEST SERPL-MCNC: 85 MG/DL (ref 0–199)
CO2 SERPL-SCNC: 29 MMOL/L (ref 21–32)
CREAT SERPL-MCNC: 1.4 MG/DL (ref 0.8–1.3)
EST. AVERAGE GLUCOSE BLD GHB EST-MCNC: 159.9 MG/DL
GFR SERPLBLD CREATININE-BSD FMLA CKD-EPI: 52 ML/MIN/{1.73_M2}
GLUCOSE SERPL-MCNC: 118 MG/DL (ref 70–99)
HBA1C MFR BLD: 7.2 %
HDLC SERPL-MCNC: 47 MG/DL (ref 40–60)
LDL CHOLESTEROL: 29 MG/DL
POTASSIUM SERPL-SCNC: 4.1 MMOL/L (ref 3.5–5.1)
PROT SERPL-MCNC: 7.6 G/DL (ref 6.4–8.2)
SODIUM SERPL-SCNC: 139 MMOL/L (ref 136–145)
TRIGL SERPL-MCNC: 47 MG/DL (ref 0–150)
URATE SERPL-MCNC: 8 MG/DL (ref 3.5–7.2)
VLDLC SERPL CALC-MCNC: 9 MG/DL

## 2025-06-09 PROCEDURE — 99214 OFFICE O/P EST MOD 30 MIN: CPT | Performed by: STUDENT IN AN ORGANIZED HEALTH CARE EDUCATION/TRAINING PROGRAM

## 2025-06-09 PROCEDURE — G8417 CALC BMI ABV UP PARAM F/U: HCPCS | Performed by: STUDENT IN AN ORGANIZED HEALTH CARE EDUCATION/TRAINING PROGRAM

## 2025-06-09 PROCEDURE — G8427 DOCREV CUR MEDS BY ELIG CLIN: HCPCS | Performed by: STUDENT IN AN ORGANIZED HEALTH CARE EDUCATION/TRAINING PROGRAM

## 2025-06-09 PROCEDURE — 1036F TOBACCO NON-USER: CPT | Performed by: STUDENT IN AN ORGANIZED HEALTH CARE EDUCATION/TRAINING PROGRAM

## 2025-06-09 PROCEDURE — 3074F SYST BP LT 130 MM HG: CPT | Performed by: STUDENT IN AN ORGANIZED HEALTH CARE EDUCATION/TRAINING PROGRAM

## 2025-06-09 PROCEDURE — 3051F HG A1C>EQUAL 7.0%<8.0%: CPT | Performed by: STUDENT IN AN ORGANIZED HEALTH CARE EDUCATION/TRAINING PROGRAM

## 2025-06-09 PROCEDURE — 1159F MED LIST DOCD IN RCRD: CPT | Performed by: STUDENT IN AN ORGANIZED HEALTH CARE EDUCATION/TRAINING PROGRAM

## 2025-06-09 PROCEDURE — 1123F ACP DISCUSS/DSCN MKR DOCD: CPT | Performed by: STUDENT IN AN ORGANIZED HEALTH CARE EDUCATION/TRAINING PROGRAM

## 2025-06-09 PROCEDURE — 3078F DIAST BP <80 MM HG: CPT | Performed by: STUDENT IN AN ORGANIZED HEALTH CARE EDUCATION/TRAINING PROGRAM

## 2025-06-09 RX ORDER — ALLOPURINOL 100 MG/1
100 TABLET ORAL DAILY
Qty: 90 TABLET | Refills: 1 | Status: SHIPPED | OUTPATIENT
Start: 2025-06-09

## 2025-06-09 RX ORDER — TORSEMIDE 20 MG/1
40 TABLET ORAL 2 TIMES DAILY
Qty: 360 TABLET | Refills: 1 | Status: SHIPPED | OUTPATIENT
Start: 2025-06-09

## 2025-06-09 RX ORDER — GLIMEPIRIDE 1 MG/1
1 TABLET ORAL
Qty: 90 TABLET | Refills: 1 | Status: SHIPPED | OUTPATIENT
Start: 2025-06-09 | End: 2025-12-06

## 2025-06-09 RX ORDER — COLLAGENASE SANTYL 250 [ARB'U]/G
OINTMENT TOPICAL
COMMUNITY
Start: 2025-06-04

## 2025-06-09 RX ORDER — GABAPENTIN 300 MG/1
300 CAPSULE ORAL 3 TIMES DAILY
Qty: 270 CAPSULE | Refills: 1 | Status: SHIPPED | OUTPATIENT
Start: 2025-06-09 | End: 2025-12-06

## 2025-06-09 RX ORDER — FERROUS SULFATE 325(65) MG
325 TABLET ORAL EVERY OTHER DAY
Qty: 90 TABLET | Refills: 1 | Status: SHIPPED | OUTPATIENT
Start: 2025-06-09 | End: 2025-06-12 | Stop reason: SDUPTHER

## 2025-06-09 RX ORDER — SIMVASTATIN 20 MG
20 TABLET ORAL DAILY
Qty: 90 TABLET | Refills: 3 | Status: SHIPPED | OUTPATIENT
Start: 2025-06-09 | End: 2026-06-04

## 2025-06-09 RX ORDER — CARVEDILOL 6.25 MG/1
6.25 TABLET ORAL 2 TIMES DAILY
Qty: 180 TABLET | Refills: 3 | Status: SHIPPED | OUTPATIENT
Start: 2025-06-09

## 2025-06-09 RX ORDER — DAPAGLIFLOZIN 10 MG/1
10 TABLET, FILM COATED ORAL EVERY MORNING
Qty: 90 TABLET | Refills: 1 | Status: SHIPPED | OUTPATIENT
Start: 2025-06-09

## 2025-06-09 NOTE — PROGRESS NOTES
6/13/2025    Mayo Mejia    Chief Complaint   Patient presents with    Follow-up     Type 2 dm        HPI  Mayo is a 75 y.o. male with a PMHx as listed below who presents today for follow    History of Present Illness  The patient presents for evaluation of diabetes, hypertension, and gout.    He has been monitoring his blood glucose levels, which typically register around 130. He is scheduled for blood work today to check his A1c levels.    He reports no recent episodes of gout. He considered reducing his medication but decided to maintain the current regimen until his lab results are available.    He has an upcoming appointment with wound care for a basal cell carcinoma on his abdomen. The wound was more severe than initially thought, requiring a deep incision and 37 stitches. He developed a hematoma that caused the stitches to burst. He is currently cancer-free and is receiving wound care at [facility], where he has been using a wound VAC for 30 days.      1. Idiopathic chronic gout of multiple sites without tophus    2. PAF (paroxysmal atrial fibrillation) ([facility])    3. Type 2 diabetes mellitus without complication, without long-term current use of insulin ([facility])    4. Iron deficiency anemia, unspecified iron deficiency anemia type    5. Peripheral polyneuropathy    6. Type 2 diabetes mellitus with diabetic polyneuropathy, without long-term current use of insulin ([facility])    7. Lymphedema of left upper extremity             REVIEW OF SYMPTOMS    Review of Systems   Constitutional:  Negative for chills and fatigue.   HENT:  Negative for congestion and sore throat.    Respiratory:  Negative for shortness of breath and wheezing.    Cardiovascular:  Negative for chest pain and palpitations.   Gastrointestinal:  Negative for abdominal pain and nausea.   Genitourinary:  Negative for frequency and urgency.   Neurological:  Negative for light-headedness.       PAST MEDICAL HISTORY  Past Medical History:

## 2025-06-12 ENCOUNTER — TELEPHONE (OUTPATIENT)
Dept: FAMILY MEDICINE CLINIC | Age: 75
End: 2025-06-12

## 2025-06-12 DIAGNOSIS — D50.9 IRON DEFICIENCY ANEMIA, UNSPECIFIED IRON DEFICIENCY ANEMIA TYPE: ICD-10-CM

## 2025-06-13 ENCOUNTER — RESULTS FOLLOW-UP (OUTPATIENT)
Dept: FAMILY MEDICINE CLINIC | Age: 75
End: 2025-06-13

## 2025-06-13 RX ORDER — FERROUS SULFATE 325(65) MG
325 TABLET ORAL EVERY OTHER DAY
Qty: 90 TABLET | Refills: 1 | Status: SHIPPED | OUTPATIENT
Start: 2025-06-13 | End: 2026-06-08

## 2025-06-13 ASSESSMENT — ENCOUNTER SYMPTOMS
SHORTNESS OF BREATH: 0
ABDOMINAL PAIN: 0
SORE THROAT: 0
NAUSEA: 0
WHEEZING: 0

## 2025-06-16 ENCOUNTER — TELEPHONE (OUTPATIENT)
Dept: CARDIOLOGY CLINIC | Age: 75
End: 2025-06-16

## 2025-06-17 ENCOUNTER — OFFICE VISIT (OUTPATIENT)
Dept: CARDIOLOGY CLINIC | Age: 75
End: 2025-06-17
Payer: MEDICARE

## 2025-06-17 VITALS
BODY MASS INDEX: 27.06 KG/M2 | SYSTOLIC BLOOD PRESSURE: 136 MMHG | HEIGHT: 70 IN | DIASTOLIC BLOOD PRESSURE: 72 MMHG | OXYGEN SATURATION: 98 % | HEART RATE: 73 BPM | WEIGHT: 189 LBS

## 2025-06-17 DIAGNOSIS — I50.20 HFREF (HEART FAILURE WITH REDUCED EJECTION FRACTION) (HCC): Primary | ICD-10-CM

## 2025-06-17 PROCEDURE — 99214 OFFICE O/P EST MOD 30 MIN: CPT | Performed by: INTERNAL MEDICINE

## 2025-06-17 PROCEDURE — 3075F SYST BP GE 130 - 139MM HG: CPT | Performed by: INTERNAL MEDICINE

## 2025-06-17 PROCEDURE — 1159F MED LIST DOCD IN RCRD: CPT | Performed by: INTERNAL MEDICINE

## 2025-06-17 PROCEDURE — 3078F DIAST BP <80 MM HG: CPT | Performed by: INTERNAL MEDICINE

## 2025-06-17 PROCEDURE — 1123F ACP DISCUSS/DSCN MKR DOCD: CPT | Performed by: INTERNAL MEDICINE

## 2025-06-17 PROCEDURE — G8417 CALC BMI ABV UP PARAM F/U: HCPCS | Performed by: INTERNAL MEDICINE

## 2025-06-17 PROCEDURE — G8427 DOCREV CUR MEDS BY ELIG CLIN: HCPCS | Performed by: INTERNAL MEDICINE

## 2025-06-17 PROCEDURE — 1036F TOBACCO NON-USER: CPT | Performed by: INTERNAL MEDICINE

## 2025-06-17 NOTE — PROGRESS NOTES
Mayo  is a  Established patient  ,75 y.o.   male here for evaluation of the following chief complaint(s):    Cad  FU on cardiolite        SUBJECTIVE/OBJECTIVE:  HPI : h/o  Cad, vhd, Bi V PPM  now here  Post CABG,   Having more shortness of breath and see a Cardiolite stress test was ordered and he is here for follow-up on the     review of Systems    neg    Vitals:    06/17/25 1035   BP: 136/72   BP Site: Left Upper Arm   Patient Position: Sitting   BP Cuff Size: Medium Adult   Pulse: 73   SpO2: 98%   Weight: 85.7 kg (189 lb)   Height: 1.778 m (5' 10\")             /72 (BP Site: Left Upper Arm, Patient Position: Sitting, BP Cuff Size: Medium Adult)   Pulse 73   Ht 1.778 m (5' 10\")   Wt 85.7 kg (189 lb)   SpO2 98%   BMI 27.12 kg/m²       4/8/2021     1:04 PM   Patient-Reported Vitals   Patient-Reported Weight 185   Patient-Reported Height 5'11\"   Patient-Reported Systolic 114 mmHg   Patient-Reported Diastolic 66 mmHg   Patient-Reported Pulse 67   Patient-Reported Temperature 98.2     Wt Readings from Last 3 Encounters:   06/17/25 85.7 kg (189 lb)   06/09/25 83.9 kg (184 lb 14.4 oz)   05/30/25 90.7 kg (200 lb)     Body mass index is 27.12 kg/m².    Physical Exam     Neck: JVD  neg    Lungs : clear    Cardio : Si and S2 audilble  no    Ext: edema  no    All pertinent data reviewed      Meds : reviewed           ASSESSMENT/PLAN:           - HFrEF on GDMT  Patient was seen by NP given the low EF CHF exacerbation was scheduled for a Cardiolite stress test and the patient is here for follow-up on that    We discussed in detail 6/17/2020 regarding the findings on the Cardiolite stress test which was showing low EF  Patient is totally asymptomatic at the present time  I discussed with him for MUGA test and see if his ejection fraction is really that low given that he is asymptomatic and the echo showed better EF  If the EF is low on the MUGA then we might consider cardiac cath  He also had a risk of

## 2025-06-17 NOTE — PATIENT INSTRUCTIONS
Thank you for allowing us to care for you today!   We want to ensure we can follow your treatment plan and we strive to give you the best outcomes and experience possible.   If you ever have a life threatening emergency and call 911 - for an ambulance (EMS)  REMEMBER  Our providers can only care for you at:   [facility] or [facility]   Even if you have someone take you or you drive yourself we can only care for you in a [facility] facility. Our providers are not setup at the other healthcare locations!    PLEASE CALL OUR OFFICE DURING NORMAL BUSINESS HOURS  Monday through Friday 8 am to 5 pm  AFTER HOURS the physician on-call cannot help with scheduling, rescheduling, procedure instruction questions or any type of medication need or issue.  [facility] P:[phone] - [facility] P:[phone] - [facility] P:[phone]      If you receive a survey:  We would appreciate you taking the time to share your experience concerning your provider visit in the office.    These surveys are confidential!  We are eager to improve and are counting on you to share your feedback so we can ensure you get the best care possible.

## 2025-07-17 DIAGNOSIS — E11.42 TYPE 2 DIABETES MELLITUS WITH DIABETIC POLYNEUROPATHY, WITHOUT LONG-TERM CURRENT USE OF INSULIN (HCC): ICD-10-CM

## 2025-07-17 NOTE — TELEPHONE ENCOUNTER
To Dr. Mitchell:    Patient said the 5 mg of the Mounaro was making him \"feel funny\"-----please put him back on the 2.5 mg ----he does need a refill because he will be out next week.    [facility] [address]

## 2025-07-21 SDOH — HEALTH STABILITY: PHYSICAL HEALTH: ON AVERAGE, HOW MANY MINUTES DO YOU ENGAGE IN EXERCISE AT THIS LEVEL?: 30 MIN

## 2025-07-21 SDOH — HEALTH STABILITY: PHYSICAL HEALTH: ON AVERAGE, HOW MANY DAYS PER WEEK DO YOU ENGAGE IN MODERATE TO STRENUOUS EXERCISE (LIKE A BRISK WALK)?: 2 DAYS

## 2025-07-21 ASSESSMENT — PATIENT HEALTH QUESTIONNAIRE - PHQ9
2. FEELING DOWN, DEPRESSED OR HOPELESS: NOT AT ALL
SUM OF ALL RESPONSES TO PHQ QUESTIONS 1-9: 0
1. LITTLE INTEREST OR PLEASURE IN DOING THINGS: NOT AT ALL

## 2025-07-21 ASSESSMENT — LIFESTYLE VARIABLES
HAS A RELATIVE, FRIEND, DOCTOR, OR ANOTHER HEALTH PROFESSIONAL EXPRESSED CONCERN ABOUT YOUR DRINKING OR SUGGESTED YOU CUT DOWN: NO
HOW OFTEN DURING THE LAST YEAR HAVE YOU NEEDED AN ALCOHOLIC DRINK FIRST THING IN THE MORNING TO GET YOURSELF GOING AFTER A NIGHT OF HEAVY DRINKING: NEVER
HOW OFTEN DURING THE LAST YEAR HAVE YOU FOUND THAT YOU WERE NOT ABLE TO STOP DRINKING ONCE YOU HAD STARTED: NEVER
HAVE YOU OR SOMEONE ELSE BEEN INJURED AS A RESULT OF YOUR DRINKING: NO
HOW OFTEN DURING THE LAST YEAR HAVE YOU BEEN UNABLE TO REMEMBER WHAT HAPPENED THE NIGHT BEFORE BECAUSE YOU HAD BEEN DRINKING: NEVER
HOW OFTEN DURING THE LAST YEAR HAVE YOU FAILED TO DO WHAT WAS NORMALLY EXPECTED FROM YOU BECAUSE OF DRINKING: NEVER
HOW OFTEN DO YOU HAVE A DRINK CONTAINING ALCOHOL: 4 OR MORE TIMES A WEEK
HOW MANY STANDARD DRINKS CONTAINING ALCOHOL DO YOU HAVE ON A TYPICAL DAY: 1 OR 2
HOW MANY STANDARD DRINKS CONTAINING ALCOHOL DO YOU HAVE ON A TYPICAL DAY: 1
HOW OFTEN DURING THE LAST YEAR HAVE YOU HAD A FEELING OF GUILT OR REMORSE AFTER DRINKING: NEVER

## 2025-07-23 ENCOUNTER — TELEMEDICINE (OUTPATIENT)
Dept: FAMILY MEDICINE CLINIC | Age: 75
End: 2025-07-23
Payer: MEDICARE

## 2025-07-23 DIAGNOSIS — Z00.00 MEDICARE ANNUAL WELLNESS VISIT, SUBSEQUENT: Primary | ICD-10-CM

## 2025-07-23 PROCEDURE — 1160F RVW MEDS BY RX/DR IN RCRD: CPT | Performed by: STUDENT IN AN ORGANIZED HEALTH CARE EDUCATION/TRAINING PROGRAM

## 2025-07-23 PROCEDURE — 1159F MED LIST DOCD IN RCRD: CPT | Performed by: STUDENT IN AN ORGANIZED HEALTH CARE EDUCATION/TRAINING PROGRAM

## 2025-07-23 PROCEDURE — G0439 PPPS, SUBSEQ VISIT: HCPCS | Performed by: STUDENT IN AN ORGANIZED HEALTH CARE EDUCATION/TRAINING PROGRAM

## 2025-07-23 PROCEDURE — 1123F ACP DISCUSS/DSCN MKR DOCD: CPT | Performed by: STUDENT IN AN ORGANIZED HEALTH CARE EDUCATION/TRAINING PROGRAM

## 2025-07-23 ASSESSMENT — PATIENT HEALTH QUESTIONNAIRE - PHQ9
SUM OF ALL RESPONSES TO PHQ QUESTIONS 1-9: 0
SUM OF ALL RESPONSES TO PHQ QUESTIONS 1-9: 0
1. LITTLE INTEREST OR PLEASURE IN DOING THINGS: NOT AT ALL
SUM OF ALL RESPONSES TO PHQ QUESTIONS 1-9: 0
2. FEELING DOWN, DEPRESSED OR HOPELESS: NOT AT ALL
SUM OF ALL RESPONSES TO PHQ QUESTIONS 1-9: 0

## 2025-07-23 NOTE — PATIENT INSTRUCTIONS
Personalized Preventive Plan for Mayo Mejia - 7/23/2025  Medicare offers a range of preventive health benefits. Some of the tests and screenings are paid in full while other may be subject to a deductible, co-insurance, and/or copay.  Some of these benefits include a comprehensive review of your medical history including lifestyle, illnesses that may run in your family, and various assessments and screenings as appropriate.  After reviewing your medical record and screening and assessments performed today your provider may have ordered immunizations, labs, imaging, and/or referrals for you.  A list of these orders (if applicable) as well as your Preventive Care list are included within your After Visit Summary for your review.

## 2025-07-23 NOTE — PROGRESS NOTES
Medicare Annual Wellness Visit    Maoy Mejia is here for Medicare AWV    Assessment & Plan   Medicare annual wellness visit, subsequent     No follow-ups on file.     Subjective       Patient's complete Health Risk Assessment and screening values have been reviewed and are found in Flowsheets. The following problems were reviewed today and where indicated follow up appointments were made and/or referrals ordered.    Positive Risk Factor Screenings with Interventions:              Inactivity:  On average, how many days per week do you engage in moderate to strenuous exercise (like a brisk walk)?: 2 days (!) Abnormal  On average, how many minutes do you engage in exercise at this level?: 30 min  Interventions:  Patient comments: patient states he had cancer removed from his leg and has had some problems and has had a wound vac. He states once his leg has healed, he will get back to the gym.  Patient declined any further interventions or treatment                          Objective    Patient-Reported Vitals  No data recorded     Unable to obtain 3 vital signs due to patient not having equipment to take blood pressure/temperature.                Allergies   Allergen Reactions    Spironolactone      CAUSES INCREASED K+    Tape [Adhesive Tape] Rash     SURGICAL TAPE     Prior to Visit Medications    Medication Sig Taking? Authorizing Provider   Tirzepatide (MOUNJARO) 2.5 MG/0.5ML SOAJ pen Inject 2.5 mg into the skin every 7 days Yes Kaleb Mitchell, DO   ferrous sulfate (IRON 325) 325 (65 Fe) MG tablet Take 1 tablet by mouth every other day Yes Kaleb Mitchell DO   allopurinol (ZYLOPRIM) 100 MG tablet Take 1 tablet by mouth daily Yes Kaleb Mitchell DO   carvedilol (COREG) 6.25 MG tablet Take 1 tablet by mouth 2 times daily Yes Kaleb Mitchell DO   dapagliflozin (FARXIGA) 10 MG tablet Take 1 tablet by mouth every morning Yes Kaleb Mitchell DO   gabapentin (NEURONTIN) 300 MG capsule Take 1 capsule by mouth 3

## 2025-07-24 ENCOUNTER — OFFICE VISIT (OUTPATIENT)
Dept: CARDIOLOGY CLINIC | Age: 75
End: 2025-07-24
Payer: MEDICARE

## 2025-07-24 VITALS
SYSTOLIC BLOOD PRESSURE: 138 MMHG | BODY MASS INDEX: 27.69 KG/M2 | HEART RATE: 73 BPM | WEIGHT: 197.8 LBS | DIASTOLIC BLOOD PRESSURE: 84 MMHG | OXYGEN SATURATION: 98 % | HEIGHT: 71 IN

## 2025-07-24 DIAGNOSIS — I50.20 HFREF (HEART FAILURE WITH REDUCED EJECTION FRACTION) (HCC): Primary | ICD-10-CM

## 2025-07-24 DIAGNOSIS — I25.10 ASCVD (ARTERIOSCLEROTIC CARDIOVASCULAR DISEASE): ICD-10-CM

## 2025-07-24 PROCEDURE — G8417 CALC BMI ABV UP PARAM F/U: HCPCS | Performed by: INTERNAL MEDICINE

## 2025-07-24 PROCEDURE — 1159F MED LIST DOCD IN RCRD: CPT | Performed by: INTERNAL MEDICINE

## 2025-07-24 PROCEDURE — 3079F DIAST BP 80-89 MM HG: CPT | Performed by: INTERNAL MEDICINE

## 2025-07-24 PROCEDURE — 3075F SYST BP GE 130 - 139MM HG: CPT | Performed by: INTERNAL MEDICINE

## 2025-07-24 PROCEDURE — 1036F TOBACCO NON-USER: CPT | Performed by: INTERNAL MEDICINE

## 2025-07-24 PROCEDURE — 99214 OFFICE O/P EST MOD 30 MIN: CPT | Performed by: INTERNAL MEDICINE

## 2025-07-24 PROCEDURE — G8427 DOCREV CUR MEDS BY ELIG CLIN: HCPCS | Performed by: INTERNAL MEDICINE

## 2025-07-24 PROCEDURE — 1123F ACP DISCUSS/DSCN MKR DOCD: CPT | Performed by: INTERNAL MEDICINE

## 2025-07-24 NOTE — PATIENT INSTRUCTIONS
Thank you for allowing us to care for you today!   We want to ensure we can follow your treatment plan and we strive to give you the best outcomes and experience possible.   If you ever have a life threatening emergency and call 911 - for an ambulance (EMS)  REMEMBER  Our providers can only care for you at:   [facility] or [facility]   Even if you have someone take you or you drive yourself we can only care for you in a [facility] facility. Our providers are not setup at the other healthcare locations!    PLEASE CALL OUR OFFICE DURING NORMAL BUSINESS HOURS  Monday through Friday 8 am to 5 pm  AFTER HOURS the physician on-call cannot help with scheduling, rescheduling, procedure instruction questions or any type of medication need or issue.  [facility] P:[phone] - [facility] P:[phone] - [facility] P:[phone]      If you receive a survey:  We would appreciate you taking the time to share your experience concerning your provider visit in the office.    These surveys are confidential!  We are eager to improve and are counting on you to share your feedback so we can ensure you get the best care possible.

## 2025-07-24 NOTE — PROGRESS NOTES
CLINICAL STAFF DOCUMENTATION    Dr. Brie Mejia  1950  [id number]    Have you had any Chest Pain recently? - No      Have you had any Shortness of Breath - No    Have you had any dizziness - No    Have you had any palpitations recently? - No      Do you have any edema - swelling in legs, occasionally not all the time       Is the patient on any of the following medications -   If Yes DO EKG - Needs done every 3 months    When did you have your last labs drawn 6/2025  What doctor ordered Stephen  Do we have the labs in their chart Yes       If we do not have these labs, you are retrieve these labs for the provider!    Do you need any prescriptions refilled? - No    Do you have a surgery or procedure scheduled in the near future - No      Do use tobacco products? - No  Do you drink alcohol? - Yes  Do you use any illicit drugs? - No  Caffeine? -no  How much caffeine? .          Check medication list thoroughly!!! AND RECONCILE OUTSIDE MEDICATIONS  If dose has changed change the entire order not just the MG  BE SURE TO ASK PATIENT IF THEY NEED MEDICATION REFILLS  Verify Pharmacy and update if incorrect    Add to every patient's \"wrap up\" the following dot phrase AFTERVISITCARDIOHEARTHOUSE and ensure we explain this to our patients

## 2025-07-24 NOTE — PROGRESS NOTES
Mayo  is a  Established patient  ,75 y.o.   male here for evaluation of the following chief complaint(s):    Cad  FU on MUGA        SUBJECTIVE/OBJECTIVE:  HPI : h/o  Cad, vhd, Bi V PPM  now here  Post CABG,   Here for FU on MUGA     review of Systems    neg    Vitals:    07/24/25 1125   BP: 138/84   BP Site: Left Upper Arm   Patient Position: Sitting   BP Cuff Size: Medium Adult   Pulse: 73   SpO2: 98%   Weight: 89.7 kg (197 lb 12.8 oz)   Height: 1.803 m (5' 11\")             /84 (BP Site: Left Upper Arm, Patient Position: Sitting, BP Cuff Size: Medium Adult)   Pulse 73   Ht 1.803 m (5' 11\")   Wt 89.7 kg (197 lb 12.8 oz)   SpO2 98%   BMI 27.59 kg/m²       4/8/2021     1:04 PM   Patient-Reported Vitals   Patient-Reported Weight 185   Patient-Reported Height 5'11\"   Patient-Reported Systolic 114 mmHg   Patient-Reported Diastolic 66 mmHg   Patient-Reported Pulse 67   Patient-Reported Temperature 98.2     Wt Readings from Last 3 Encounters:   07/24/25 89.7 kg (197 lb 12.8 oz)   06/17/25 85.7 kg (189 lb)   06/09/25 83.9 kg (184 lb 14.4 oz)     Body mass index is 27.59 kg/m².    Physical Exam     Neck: JVD  neg    Lungs : clear    Cardio : Si and S2 audilble  no    Ext: edema  no    All pertinent data reviewed      Meds : reviewed           ASSESSMENT/PLAN:           - HFrEF on GDMT  Patient was seen by NP given the low EF CHF exacerbation was scheduled for a Cardiolite stress test and the patient is here for follow-up on that    We discussed in detail 6/17/2020 regarding the findings on the Cardiolite stress test which was showing low EF  Patient is totally asymptomatic at the present time  I discussed with him for MUGA test and see if his ejection fraction is really that low given that he is asymptomatic and the echo showed better EF  If the EF is low on the MUGA then we might consider cardiac cath  He also had a risk of contrast-induced nephropathy given that his creatinine is elevated  All this was

## 2025-08-06 RX ORDER — SILVER SULFADIAZINE 10 MG/G
CREAM TOPICAL
Qty: 25 G | Refills: 0 | Status: SHIPPED | OUTPATIENT
Start: 2025-08-06

## 2025-08-07 DIAGNOSIS — L97.912 CHRONIC ULCER OF RIGHT LEG WITH FAT LAYER EXPOSED (HCC): Primary | ICD-10-CM

## 2025-08-07 RX ORDER — SILVER SULFADIAZINE 10 MG/G
CREAM TOPICAL
Qty: 50 G | Refills: 0 | Status: SHIPPED | OUTPATIENT
Start: 2025-08-07

## 2025-09-03 DIAGNOSIS — I48.0 PAF (PAROXYSMAL ATRIAL FIBRILLATION) (HCC): ICD-10-CM

## 2025-09-03 DIAGNOSIS — E11.9 TYPE 2 DIABETES MELLITUS WITHOUT COMPLICATION, WITHOUT LONG-TERM CURRENT USE OF INSULIN (HCC): ICD-10-CM

## 2025-09-05 RX ORDER — GLIMEPIRIDE 1 MG/1
1 TABLET ORAL
Qty: 90 TABLET | Refills: 1 | Status: SHIPPED | OUTPATIENT
Start: 2025-09-05

## 2025-09-05 RX ORDER — CARVEDILOL 6.25 MG/1
6.25 TABLET ORAL 2 TIMES DAILY
Qty: 180 TABLET | Refills: 1 | Status: SHIPPED | OUTPATIENT
Start: 2025-09-05

## (undated) DEVICE — RETRACTOR SURG INSRT SUT HLD OCTOBASE

## (undated) DEVICE — APPLICATOR MEDICATED 26 CC SOLUTION HI LT ORNG CHLORAPREP

## (undated) DEVICE — TOTAL TRAY, DB, 100% SILI FOLEY, 16FR 10: Brand: MEDLINE

## (undated) DEVICE — PAD,NON-ADHERENT,3X8,STERILE,LF,1/PK: Brand: MEDLINE

## (undated) DEVICE — PROBE ABLAT L10CM DISPOSABLE CRYOFLEX 10 S

## (undated) DEVICE — MARKER SURG SKIN UTIL REGULAR/FINE 2 TIP W/ RUL AND 9 LBL

## (undated) DEVICE — SUTURE ETHLN SZ 4-0 L18IN NONABSORBABLE BLK L19MM PS-2 3/8 1667H

## (undated) DEVICE — STRL PENROSE DRAIN 18" X 1/2": Brand: CARDINAL HEALTH

## (undated) DEVICE — GLOVE SURG SZ 6 THK91MIL LTX FREE SYN POLYISOPRENE ANTI

## (undated) DEVICE — STOCKING COMPR 1 REG

## (undated) DEVICE — SYRINGE MED 10ML SLIP TIP BLNT FILL AND LUERLOCK DISP

## (undated) DEVICE — PRECISION THIN, OFFSET (5.5 X 0.38 X 25.0MM)

## (undated) DEVICE — SUTURE VICRYL SZ 1 L36IN ABSRB UD CTX L48MM 1/2 CIR J977H

## (undated) DEVICE — SUTURE NONABSORBABLE BRAIDED 2-0 SH-2 10X30 IN ETHBND EXCEL SXP82

## (undated) DEVICE — TOWEL,OR,DSP,ST,WHITE,DLX,XR,4/PK,20PK/C: Brand: MEDLINE

## (undated) DEVICE — DRAPE,MEDI-SLUSH,STERILE: Brand: MEDLINE

## (undated) DEVICE — SUTURE ETHLN SZ 3-0 L18IN NONABSORBABLE BLK FS-1 L24MM 3/8 663H

## (undated) DEVICE — YANKAUER,BULB TIP,W/O VENT,RIGID,STERILE: Brand: MEDLINE

## (undated) DEVICE — BLADE CLIPPER GEN PURP NS

## (undated) DEVICE — CONTAINER,SPECIMEN,OR STERILE,4OZ: Brand: MEDLINE

## (undated) DEVICE — SUTURE NONABSORBABLE MONOFILAMENT 5-0 C-1 1X24 IN PROLENE 8725H

## (undated) DEVICE — GLOVE SURG SZ 7 L12IN FNGR THK79MIL GRN LTX FREE

## (undated) DEVICE — GLOVE SURG SZ 7 L12IN FNGR THK94MIL TRNSLUC YEL LTX HYDRGEL

## (undated) DEVICE — GLOVE SURG SZ 7 CRM LTX FREE POLYISOPRENE POLYMER BEAD ANTI

## (undated) DEVICE — BLADE ES L2.75IN ELASTOMERIC COAT DURABLE BEND UPTO 90DEG

## (undated) DEVICE — TOTAL TRAY, 16FR 10ML SIL FOLEY, URN: Brand: MEDLINE

## (undated) DEVICE — DRAPE,EXTREMITY,89X128,STERILE: Brand: MEDLINE

## (undated) DEVICE — Device

## (undated) DEVICE — TUBING, SUCTION, 9/32" X 10', STRAIGHT: Brand: MEDLINE

## (undated) DEVICE — DEVICE ABLAT CARD BPLR RF SYS CARDIOBLATE BP2

## (undated) DEVICE — ADHESIVE SKIN CLSR 0.7ML TOP DERMBND ADV

## (undated) DEVICE — PRESSURE TUBING: Brand: TRUWAVE

## (undated) DEVICE — Z DUPLICATE USE 2436340 CANNULA PERF L15IN DIA29FR VEN 3 STG THN WALL DSGN W VENT

## (undated) DEVICE — GARMENT,MEDLINE,DVT,INT,CALF,MED, GEN2: Brand: MEDLINE

## (undated) DEVICE — 3M™ STERI-DRAPE™ INSTRUMENT POUCH 1018: Brand: STERI-DRAPE™

## (undated) DEVICE — DRESSING TRNSPAR W2XL2.75IN FLM SHT SEMIPERMEABLE WIND

## (undated) DEVICE — CANNULA PRFSN 12.5IN 15FR CS ADLT RTRGD

## (undated) DEVICE — GLOVE ORANGE PI 7 1/2   MSG9075

## (undated) DEVICE — SPONGE LAP W18XL18IN WHT COT 4 PLY FLD STRUNG RADPQ DISP ST

## (undated) DEVICE — PADDING,UNDERCAST,COTTON, 4"X4YD STERILE: Brand: MEDLINE

## (undated) DEVICE — INTENDED FOR TISSUE SEPARATION, AND OTHER PROCEDURES THAT REQUIRE A SHARP SURGICAL BLADE TO PUNCTURE OR CUT.: Brand: BARD-PARKER ® STAINLESS STEEL BLADES

## (undated) DEVICE — SET ADMIN PRIMING 67ML L105IN NVENT 180UM FLTR 3 RLER CLMP

## (undated) DEVICE — BANDAGE,GAUZE,BULKEE II,4.5"X4.1YD,STRL: Brand: MEDLINE

## (undated) DEVICE — JELLY LUBRICATING 3 GM BACTERIOSTATIC

## (undated) DEVICE — TUBING, SUCTION, 3/16" X 10', STRAIGHT: Brand: MEDLINE

## (undated) DEVICE — BLADE,STAINLESS-STEEL,11,STRL,DISPOSABLE: Brand: MEDLINE

## (undated) DEVICE — PERCUTANEOUS INSERTION KIT-VENOUS: Brand: PERCUTANEOUS INSERTION KIT-VENOUS

## (undated) DEVICE — GAUZE,SPONGE,8"X4",12PLY,XRAY,STRL,LF: Brand: MEDLINE

## (undated) DEVICE — 3M™ IOBAN™ 2 ANTIMICROBIAL INCISE DRAPE 6650EZ: Brand: IOBAN™ 2

## (undated) DEVICE — Z DISCONTINUED (USE MFG CAT MVABO)  TUBING GAS SAMPLING STD 6.5 FT FEMALE CONN SMRT CAPNOLINE

## (undated) DEVICE — TUBING SUCT 9 11FR L475IN RIG SHFT MINI SUC TIP DLP

## (undated) DEVICE — BLANKET THER AD W24XL60IN FAB COVERING SUP SFT ULT THN LTWT

## (undated) DEVICE — SPONGE LAP W18XL18IN WHT COT 4 PLY FLD STRUNG RADPQ DISP ST 2 PER PACK

## (undated) DEVICE — SET ADMIN L105IN 10GTT 3 NDL FREE CK VLV 2 PC M LUERLOCK

## (undated) DEVICE — GLOVE SURG SZ 65 L12IN FNGR THK79MIL GRN LTX FREE

## (undated) DEVICE — COUNTER NDL 30 COUNT FOAM STRP SGL MAG

## (undated) DEVICE — SOLUTION IV 1000ML 0.9% SOD CHL FOR IRRIG PLAS CONT

## (undated) DEVICE — DRESSING PETRO W3XL3IN OIL EMUL N ADH GZ KNIT IMPREG CELOS

## (undated) DEVICE — TOWEL,OR,DSP,ST,BLUE,STD,6/PK,12PK/CS: Brand: MEDLINE

## (undated) DEVICE — SYRINGE MED 10ML LUERLOCK TIP W/O SFTY DISP

## (undated) DEVICE — KIT INTRO 9FR L4IN POLYUR PERC SHTH RADPQ W INTEGR HEMSTAS

## (undated) DEVICE — GLOVE ORANGE PI 7   MSG9070

## (undated) DEVICE — DECANTER FLD 9IN ST BG FOR ASEP TRNSF OF FLD

## (undated) DEVICE — SOLUTION IV IRRIG WATER 1000ML POUR BRL 2F7114

## (undated) DEVICE — BLADE SAW STRNM 10X35X0.6MM

## (undated) DEVICE — APPLICATOR MEDICATED 10.5 CC SOLUTION HI LT ORNG CHLORAPREP

## (undated) DEVICE — BRUSH CLN DIA5MM NYL SGL END CBL ASST FLEX DSTL TIP POLYPR

## (undated) DEVICE — ROTATING SURGICAL PUNCHES, 1 PER POUCH: Brand: A&E MEDICAL / ROTATING SURGICAL PUNCHES

## (undated) DEVICE — Z DISCONTINUED NO SUB IDED TUBING ETCO2 AD L6.5FT NSL ORAL CVD PRNG NONFLARED TIP OVR

## (undated) DEVICE — BLADE ES L6IN ELASTOMERIC COAT EXT DURABLE BEND UPTO 90DEG

## (undated) DEVICE — TRAY PREP DRY W/ PREM GLV 2 APPL 6 SPNG 2 UNDPD 1 OVERWRAP

## (undated) DEVICE — INTENDED FOR TISSUE SEPARATION, AND OTHER PROCEDURES THAT REQUIRE A SHARP SURGICAL BLADE TO PUNCTURE OR CUT.: Brand: BARD-PARKER ® CARBON RIB-BACK BLADES

## (undated) DEVICE — SPONGE,PEANUT,XRAY,ST,SM,3/8",5/CARD: Brand: MEDLINE INDUSTRIES, INC.

## (undated) DEVICE — THORACIC CATHETER, STRAIGHT, SILICONE, WITH CLOTSTOP®: Brand: AXIOM® ATRAUM™ WITH CLOTSTOP®

## (undated) DEVICE — GLOVE SURG SZ 65 CRM LTX FREE POLYISOPRENE POLYMER BEAD ANTI

## (undated) DEVICE — SUTURE PROL 3-0 L36IN NONABSORBABLE BLU L26MM SH 1/2 CIR P8522

## (undated) DEVICE — DRAPE SHEET ULTRAGARD: Brand: MEDLINE

## (undated) DEVICE — Device: Brand: VIRTUOSAPH PLUS WITH RADIAL INDICATION

## (undated) DEVICE — ELECTRODE ES L4IN PTFE INSUL BLDE W/ SFTY SL DISP EDGE

## (undated) DEVICE — PACK,UNIVERSAL,NO GOWNS: Brand: MEDLINE

## (undated) DEVICE — DEVICE RESUS AD TB L40IN SELF INFL MASK TEXT BG DBL SWVL EL

## (undated) DEVICE — SUTURE NONABSORBABLE MONOFILAMENT 4-0 PS-2 18 IN BLK ETHILON 1667G

## (undated) DEVICE — GAUZE,SPONGE,4"X4",8PLY,STRL,LF,10/TRAY: Brand: MEDLINE

## (undated) DEVICE — AMD ANTIMICROBIAL NON-ADHERENT PAD,0.2% POLYHEXAMETHYLENE BIGUANIDE HCI (PHMB): Brand: TELFA

## (undated) DEVICE — LINER SUCT CANSTR 1500CC SEMI RIG W/ POR HYDROPHOBIC SHUT

## (undated) DEVICE — SHEET,T,THYROID,STERILE: Brand: MEDLINE

## (undated) DEVICE — CONNECTOR PIPE 3/8X1/2IN REDUC STR

## (undated) DEVICE — SUTURE MONOCRYL SZ 3-0 L27IN ABSRB UD L24MM PS-1 3/8 CIR PRIM Y936H

## (undated) DEVICE — SOLUTION IV IRRIG POUR BRL 0.9% SODIUM CHL 2F7124

## (undated) DEVICE — NEEDLE, QUINCKE 22GX3.5": Brand: MEDLINE INDUSTRIES, INC.

## (undated) DEVICE — BW-412T DISP COMBO CLEANING BRUSH: Brand: SINGLE USE COMBINATION CLEANING BRUSH

## (undated) DEVICE — FORCEPS BX L240CM JAW DIA2.8MM L CAP W/ NDL MIC MESH TOOTH

## (undated) DEVICE — SUTURE VCRL SZ 3-0 L27IN ABSRB UD L17MM RB-1 1/2 CIR J215H

## (undated) DEVICE — SENSOR OXMTR SM AD DISP FOR INVOS SYS

## (undated) DEVICE — GAUZE,SPONGE,2"X2",8PLY,STERILE,LF,2'S: Brand: MEDLINE

## (undated) DEVICE — PLEDGET VASC W3/16XL3/8IN THK1/16IN PTFE SFT

## (undated) DEVICE — LINE SAMP O2 6.5FT W/FEMALE CONN F/ADULT CAPNOLINE PLUS

## (undated) DEVICE — ELECTRODE ES L2.75IN S STL INSUL BLDE W/ SL EDGE

## (undated) DEVICE — ELECTRODE ES AD CRDLSS PT RET REM POLYHESIVE

## (undated) DEVICE — CLIP LIG M BLU TI HRT SHP WIRE HORZ 600 PER BX

## (undated) DEVICE — KIT CVC AD 7FR L20CM POLYUR BLU FLEXTIP ANTIMIC MULTILUMEN

## (undated) DEVICE — SET ADMIN 25ML L117IN PMP MOD CK VLV RLER CLMP 2 SMRTSITE

## (undated) DEVICE — SUTURE ETHIBOND EXCEL SZ 1 L30IN NONABSORBABLE GRN L36MM CT-1 X425H

## (undated) DEVICE — LINER,SEMI-RIGID,3000CC,50EA/CS: Brand: MEDLINE

## (undated) DEVICE — GLOVE ORANGE PI 8   MSG9080

## (undated) DEVICE — PACK SET UP

## (undated) DEVICE — 1010 S-DRAPE TOWEL DRAPE 10/BX: Brand: STERI-DRAPE™

## (undated) DEVICE — THE TORRENT IRRIGATION SCOPE CONNECTOR IS USED WITH THE TORRENT IRRIGATION TUBING TO PROVIDE IRRIGATION FLUIDS SUCH AS STERILE WATER DURING GASTROINTESTINAL ENDOSCOPIC PROCEDURES WHEN USED IN CONJUNCTION WITH AN IRRIGATION PUMP (OR ELECTROSURGICAL UNIT).: Brand: TORRENT

## (undated) DEVICE — LEAD PACE L475MM CHNL A OR V MYOCARDIAL STEROID ELUT SIL

## (undated) DEVICE — WAX SURG 2.5GM HEMSTAT BNE BEESWAX PARAFFIN ISO PALMITATE

## (undated) DEVICE — SUTURE PERMAHAND SZ 2-0 L17X18IN NONABSORBABLE BLK SILK SA65H

## (undated) DEVICE — GLOVE SURG SZ 6 CRM LTX FREE POLYISOPRENE POLYMER BEAD ANTI

## (undated) DEVICE — SWAN-GANZ CCOMBO V THERMODILUTION CATHETER: Brand: SWAN-GANZ CCOMBO V

## (undated) DEVICE — ADHESIVE SKIN CLOSURE WND 8.661X1.5 IN 22 CM LIQUIBAND SECUR

## (undated) DEVICE — SUTURE PROL SZ 4-0 L36IN NONABSORBABLE BLU L26MM SH 1/2 CIR 8521H

## (undated) DEVICE — SUTURE SURGLON SZ 1 L30IN NONABSORBABLE BLK NO NDL NYL PRE 8886191971

## (undated) DEVICE — KENDALL 500 SERIES DIAPHORETIC FOAM MONITORING ELECTRODE - TEAR DROP SHAPE ( 30/PK): Brand: KENDALL

## (undated) DEVICE — CANNULA VEN 32 40FR L15IN CONN SITE 1 2IN OVL BODY TWO STG

## (undated) DEVICE — PENCIL ES CRD L10FT HND SWCHING ROCK SWCH W/ EDGE COAT BLDE

## (undated) DEVICE — GAUZE,SPONGE,4"X4",16PLY,XRAY,STRL,LF: Brand: MEDLINE

## (undated) DEVICE — STERILE LATEX POWDER FREE SURGICAL GLOVES WITH HYDROGEL COATING: Brand: PROTEXIS

## (undated) DEVICE — SHEET,DRAPE,53X77,STERILE: Brand: MEDLINE

## (undated) DEVICE — SWAB CULT SGL AMIES W/O CHAR FOR THRT VAG SKIN HRT CULTSWAB

## (undated) DEVICE — Z INACTIVE USE 2660664 SOLUTION IRRIG 3000ML 0.9% SOD CHL USP UROMATIC PLAS CONT

## (undated) DEVICE — CANNULA PERF 17FR L10IN SIL COR ART OSTIAL SFT BLB SHP TIP

## (undated) DEVICE — SPONGE GZ W4XL8IN COT WVN 12 PLY

## (undated) DEVICE — SUTURE MCRYL SZ 3-0 L27IN ABSRB UD L24MM PS-1 3/8 CIR PRIM Y936H

## (undated) DEVICE — COVER,C-ARM,41X74: Brand: MEDLINE

## (undated) DEVICE — GLOVE SURG SZ 8 CRM LTX FREE POLYISOPRENE POLYMER BEAD ANTI

## (undated) DEVICE — PVC URETHRAL CATHETER: Brand: DOVER

## (undated) DEVICE — GLOVE SURG SZ 65 THK91MIL LTX FREE SYN POLYISOPRENE

## (undated) DEVICE — FIAPC® PROBE W/ FILTER 2200 A OD 2.3MM/6.9FR; L 2.2M/7.2FT: Brand: ERBE

## (undated) DEVICE — BANDAGE,ELASTIC,ESMARK,STERILE,4"X9',LF: Brand: MEDLINE

## (undated) DEVICE — AGENT HEMSTAT 3GM OXIDIZED REGENERATED CELOS ABSRB FOR CONT (ORDER MULTIPLES OF 5EA)

## (undated) DEVICE — BAG AUTOTRNS 600ML BLD SGL CHMBR 3 PRT PLAS DEHP PVC

## (undated) DEVICE — SUTURE PROL SZ 7-0 L18IN NONABSORBABLE BLU L9.3MM BV-1 3/8 8701H

## (undated) DEVICE — FOGARTY - HYDRAGRIP SURGICAL - CLAMP INSERTS: Brand: FOGARTY SOFTJAW

## (undated) DEVICE — PACK,BASIC,SIRUS,V: Brand: MEDLINE

## (undated) DEVICE — ADAPTER IV TBNG CLR POLYCARB DBL M LUERLOCK

## (undated) DEVICE — CANNULA PERF L15IN DIA29FR VEN 3 STG THN WALL DSGN W  VENT

## (undated) DEVICE — SET,IRRIGATION,CYSTO/TUR: Brand: MEDLINE

## (undated) DEVICE — GOWN,SIRUS,POLYRNF,BRTHSLV,XLN/XL,20/CS: Brand: MEDLINE

## (undated) DEVICE — PREMIUM WET SKIN PREP TRAY CHG: Brand: MEDLINE INDUSTRIES, INC.

## (undated) DEVICE — YANKAUER,FLEXIBLE HANDLE,REGLR CAPACITY: Brand: MEDLINE INDUSTRIES, INC.

## (undated) DEVICE — BLANKET WRM W40.2XL55.9IN IORT LO BODY + MISTRAL AIR

## (undated) DEVICE — SET EXTN 4ML L32IN 4 W STPCOCK SPIN M LUERLOCK STD BOR

## (undated) DEVICE — BRONCHOSCOPE GS BFLEX 2 SLIM 3.8 SU

## (undated) DEVICE — CANNULA PERF 20FR L10IN SIL COR ART OSTIAL SFT BLB SHP TIP

## (undated) DEVICE — GOWN,ECLIPSE,POLYRNF,BRTHSLV,L,30/CS: Brand: MEDLINE

## (undated) DEVICE — CANNULA ART 20FR NVENT 3 8IN CONN EOPA 3D

## (undated) DEVICE — NEEDLE HYPO 25GA L1.5IN BLU POLYPR HUB S STL REG BVL STR

## (undated) DEVICE — SUTURE VCRL SZ 2 L27IN ABSRB UD L65MM TP-1 1/2 CIR J849G

## (undated) DEVICE — CANNULA PERF L5.5IN DIA9FR AORT ROOT AG STD TIP W/ VENT LN

## (undated) DEVICE — MAGNETIC INSTR DRAPE 20X16: Brand: MEDLINE INDUSTRIES, INC.

## (undated) DEVICE — ENDOSCOPY KIT: Brand: MEDLINE INDUSTRIES, INC.

## (undated) DEVICE — CANNULA PERF L125IN OD13FR AUTO INFL CUF DEFL TIP RG

## (undated) DEVICE — SYRINGE MED 20ML STD CLR PLAS LUERLOCK TIP N CTRL DISP

## (undated) DEVICE — BLOOD TRANSFUSION FILTER: Brand: HAEMONETICS

## (undated) DEVICE — DRAIN,WOUND,ROUND,24FR,5/16",FULL-FLUTED: Brand: MEDLINE

## (undated) DEVICE — SUTURE VCRL SZ 4-0 L27IN ABSRB UD L17MM RB-1 1/2 CIR J214H

## (undated) DEVICE — MPS® DELIVERY SET W/ARREST AGENT AND ADDITIVE CASSETTES, HEAT EXCHANGER & 10 FT. DELIVERY TUBING: Brand: MPS

## (undated) DEVICE — DRESSING TRNSPAR W5XL4.5IN FLM SHT SEMIPERMEABLE WIND

## (undated) DEVICE — DRAIN SURG SGL COLL PT TB FOR ATS BG OASIS

## (undated) DEVICE — COR-KNOT MINI® COMBO KITBASE PACKAGE TYPE - KITEACH STERILE PACKAGE KIT CONTAINS (2) SINGLE PATIENT USE COR-KNOT MINI® DEVICES AND (12) COR-KNOT® QUICK LOADS®.: Brand: COR-KNOT MINI®

## (undated) DEVICE — OPEN HEART PACK: Brand: MEDLINE INDUSTRIES, INC.

## (undated) DEVICE — SUTURE S STL SZ 5 L18IN NONABSORBABLE SIL L48MM CCS 1/4 CIR M657G

## (undated) DEVICE — SUTURE NRLN SZ 1 L18IN NONABSORBABLE BLK L36MM CT-1 1/2 CIR C520D

## (undated) DEVICE — GOWN,ECLIPSE,POLYRNF,BRTHSLV,XL,30/CS: Brand: MEDLINE

## (undated) DEVICE — ZINACTIVE USE 2539609 APPLICATOR MEDICATED 10.5 CC SOLUTION HI LT ORNG CHLORAPREP

## (undated) DEVICE — SUTURE VICRYL 2-0 L36IN ABSRB UD CTX L48MM 1/2 CIR TAPERPOINT J979H

## (undated) DEVICE — SUTURE VCRL SZ 4-0 L27IN ABSRB VLT L26MM SH 1/2 CIR J315H

## (undated) DEVICE — CONNECTOR DRNGE W3/8-0.5XH3/16XL3/16IN 2:1 SIL CARD STR

## (undated) DEVICE — KIT PLT CONC DISP SGL W/ ACDA GPS II

## (undated) DEVICE — SUTURE ETHBND EXCEL SZ 2-0 L36IN NONABSORBABLE GRN L26MM SH X523H

## (undated) DEVICE — SUTURE PROL SZ 6-0 L18IN NONABSORBABLE BLU L13MM C-1 3/8 8718H

## (undated) DEVICE — SUTURE MCRYL SZ 4-0 L18IN ABSRB UD L19MM PS-2 3/8 CIR PRIM Y496G

## (undated) DEVICE — SUTURE PROL SZ 5-0 L36IN NONABSORBABLE BLU L17MM RB-1 1/2 8556H

## (undated) DEVICE — DRAPE,UTILITY,XL,4/PK,STERILE: Brand: MEDLINE

## (undated) DEVICE — PLASMABLADE PS210-030S 3.0S LOCK: Brand: PLASMABLADE™

## (undated) DEVICE — KIT WRNCH CARD W/ NO2 TORQ RATCH WHT HEX FOR CARD PACEMKR

## (undated) DEVICE — THORACIC CATHETER, STRAIGHT, SILICONE: Brand: AXIOM®

## (undated) DEVICE — SYRINGE IRRIG 60ML SFT PLIABLE BLB EZ TO GRP 1 HND USE W/

## (undated) DEVICE — SUTURE PERMAHAND SZ 2-0 L30IN NONABSORBABLE BLK SH L26MM C016D

## (undated) DEVICE — HEMOCONCENTERATOR PERF W TBNG AND ADPT SET 400 MEDIVATORS

## (undated) DEVICE — SUTURE S STL SZ 5 L18IN NONABSORBABLE SIL V-40 L48MM 1/2 M650G

## (undated) DEVICE — VESSEL LOOP MAXI WHITE

## (undated) DEVICE — SUTURE PROL SZ 2-0 L30IN NONABSORBABLE BLU L26MM SH 1/2 CIR 8833H

## (undated) DEVICE — CANNULA SUCT OD20FR SIL L HRT VENT 17L

## (undated) DEVICE — AGENT HEMOSTATIC SURG ORIGINAL ABS 2X14IN LOOSE KNIT 12/CA

## (undated) DEVICE — SUMP INTCARD SUCT AD 20FR PERICARD MAYO STYL FLX VERSATILE

## (undated) DEVICE — TUBING, SUCTION, 3/16" X 6', STRAIGHT: Brand: MEDLINE

## (undated) DEVICE — SUTURE ETHIB EXCL X BR GRN V-7 DA 2-0 30 PX977 PX977H

## (undated) DEVICE — SYRINGE MED 3ML NDL 18GA L1.5IN BLNT FILL LUERLOCK TIP DISP

## (undated) DEVICE — CATHETER URETH 18FR RED RUB INTMIT ALL PURP

## (undated) DEVICE — PRESSURE MONITORING SET: Brand: TRUWAVE

## (undated) DEVICE — AGENT HEMOSTATIC SURGIFLOW MATRIX KIT W/THROMBIN

## (undated) DEVICE — MPS® RETROGRADE EXTENSION PRESSURE LINE W/Y-SET: Brand: MPS

## (undated) DEVICE — BLADE SAW W10XL54MM FOR PRI REPEAT STRNOTMY

## (undated) DEVICE — SUTURE PROL SZ 6-0 L24IN NONABSORBABLE BLU L13MM C-1 3/8 8726H

## (undated) DEVICE — ZIMMER® STERILE DISPOSABLE TOURNIQUET CUFF WITH PLC, DUAL PORT, SINGLE BLADDER, 24 IN. (61 CM)

## (undated) DEVICE — CANNULA PERF 20FR L25CM CONN 3 8IN ART HI FLOW NVENT CRV TIP

## (undated) DEVICE — SUTURE SURGLON SZ 1 L18IN NONABSORBABLE BLK GS 21 L37MM 1 2 8886196272

## (undated) DEVICE — ZIMMER® STERILE DISPOSABLE TOURNIQUET CUFF WITH PLC, DUAL PORT, SINGLE BLADDER, 18 IN. (46 CM)

## (undated) DEVICE — COR-KNOT® QUICK LOAD® 6-POUCH: Brand: COR-KNOT® QUICK LOAD®

## (undated) DEVICE — CLIP SM RED INTERN HMOCLP TITAN LIGATING

## (undated) DEVICE — BLADE OPHTH GRN ROUNDED TIP 1 SIDE SHRP GRINDLESS MINI-BLDE

## (undated) DEVICE — SENSOR PLSE OXMTR AD CBL L3FT ADH TRANSMISSIVE

## (undated) DEVICE — CHLORAPREP 26ML ORANGE

## (undated) DEVICE — SUTURE PROL 7-0 L18IN NONABSORBABLE BLU L9.3MM BV-1 3/8 CIR M8701

## (undated) DEVICE — FIAPC® PROBE W/ FILTER 2200 C OD 2.3MM/6.9FR; L 2.2M/7.2FT: Brand: ERBE

## (undated) DEVICE — SUTURE VCRL 2-0 L36IN ABSRB UD CTX L48MM 1/2 CIR TAPERPOINT J979H

## (undated) DEVICE — TUBING INSUFFLATOR HEAT HI FLO SET PNEUMOCLEAR

## (undated) DEVICE — DRESSING,GAUZE,XEROFORM,CURAD,5"X9",ST: Brand: CURAD

## (undated) DEVICE — SUTURE NONABSORBABLE MONOFILAMENT 4-0 RB-1 36 IN BLU PROLENE 8557H